# Patient Record
Sex: FEMALE | Race: WHITE | NOT HISPANIC OR LATINO | Employment: OTHER | URBAN - METROPOLITAN AREA
[De-identification: names, ages, dates, MRNs, and addresses within clinical notes are randomized per-mention and may not be internally consistent; named-entity substitution may affect disease eponyms.]

---

## 2017-01-04 ENCOUNTER — MYC MEDICAL ADVICE (OUTPATIENT)
Dept: PEDIATRICS | Facility: CLINIC | Age: 62
End: 2017-01-04

## 2017-02-06 ENCOUNTER — OFFICE VISIT (OUTPATIENT)
Dept: ENDOCRINOLOGY | Facility: CLINIC | Age: 62
End: 2017-02-06
Payer: COMMERCIAL

## 2017-02-06 VITALS
SYSTOLIC BLOOD PRESSURE: 120 MMHG | DIASTOLIC BLOOD PRESSURE: 61 MMHG | HEIGHT: 68 IN | BODY MASS INDEX: 44.41 KG/M2 | HEART RATE: 70 BPM | WEIGHT: 293 LBS

## 2017-02-06 DIAGNOSIS — Z79.4 TYPE 2 DIABETES MELLITUS WITHOUT COMPLICATION, WITH LONG-TERM CURRENT USE OF INSULIN (H): Primary | ICD-10-CM

## 2017-02-06 DIAGNOSIS — E11.9 TYPE 2 DIABETES MELLITUS WITHOUT COMPLICATION, WITH LONG-TERM CURRENT USE OF INSULIN (H): Primary | ICD-10-CM

## 2017-02-06 LAB — HBA1C MFR BLD: 7.5 % (ref 0–5.7)

## 2017-02-06 PROCEDURE — 36415 COLL VENOUS BLD VENIPUNCTURE: CPT | Performed by: INTERNAL MEDICINE

## 2017-02-06 PROCEDURE — 99214 OFFICE O/P EST MOD 30 MIN: CPT | Performed by: INTERNAL MEDICINE

## 2017-02-06 PROCEDURE — 83036 HEMOGLOBIN GLYCOSYLATED A1C: CPT | Performed by: INTERNAL MEDICINE

## 2017-02-06 NOTE — PROGRESS NOTES
Endocrinology Note         Sherry is a 61 year old female presents today for type 2 diabetes.    PREMA Powell is a 61 years old pleasant female who is here for follow up type 2 diabetes.     She has been diagnosed with type 2 diabetes for 25 years after she was being diagnosed with gestational diabetes in her last 2 pregnancies. Her weight before pregnancy was 190-200. Her highest weight was 300s. She was initially started on oral medication and then insulin was added for more than 5 years ago.    Her A1C has been in 9% range since 2012. She was on Byetta but she did not feel it helped. She was also on Actos but stopped due to 40 lbs weight gain and increased SOB.     Interim history: Last visit was 9/2016  A1C today is improving to 7.5% with more adherence and compliance.  She is currently on Victoza 1.8 mg daily, Lantus 70 units am and 70 units pm, Humalog 3 units per 15 gram CHO, Novolog sliding scale 1:5 above 140 mg/dl and metformin 2000 mg daily. Reviewed glucose log, FBG ranged 189 (SD 58), no hypoglycemia. She checked blood glucose once a day in the morning but did not check in the afternoon.     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     DM complications  Retinopathy: no DR, +mild cataract - 9/2015 -- due this year  Nephropathy: negative urine microalb - on lisinopril 60 mg, BP controlled  Neuropathy: none  Aspirin - taking 325 mg  LDL 26 (3/1016) on Crestor 10 mg    Past Medical History  Past Medical History   Diagnosis Date     Type II or unspecified type diabetes mellitus without mention of complication, not stated as uncontrolled 1991     oral meds frist, then insulin eventually later, difficult to control most of the time      Family history of malignant neoplasm of breast      mother - young age - 45, and maternal cousin and aunt  as well - no BRCA testing done      PERS HX HEPATITIS B- RESOLVED] 1977     acute virus only - no chronic disease      Irritable bowel syndrome      goes between the 2 - nerve related - more stressed more problems      MICROALBUMINURIA      unsure how long - been on the lisinopril - for a few years at well      HYPERLIPIDEMIA 2000     fairly recent - in the last 5 years - high for DM levels  -cholesterol recent      DEPRESSION      comes and goes - tried meds - unsuccessfully, certain times of the year, no psych intervention and no counselors in the past - and not interested      OBESITY      OSTEOARTHRITIS L KNEE 2003     total knee on the left - and pain is gone since the knee replacement      FAMILY HX COLON CANCER      Pat uncles x 2     Diverticulosis of colon (without mention of hemorrhage) 4/04     colonoscopy     ECHO-mildLVH,tr MR,mild thick lflets w inc LA,trTR   12/03     Unspecified hypothyroidism 10/11/2006     TSH 3.36 - mild subclinical hypothyroidism - deciding on following or starting low dose meds - with dr Oreilly - following      Nonspecific abnormal results of liver function study 2/3/2003     SGOT - has been high in the past - since the hepatitis b - borderline elevation - not really changing any      Essential hypertension, benign 1990s     late 1990s - started medications at that time - not to difficult to control meds      Family history of stroke (cerebrovascular)      grandmother in early life in her 40s      Family history of other cardiovascular diseases      father - CAD, and lipids/HTN - multiple members of the family      Family history of diabetes mellitus      sister and grandmother with DM      GENESIS (obstructive sleep apnea) 10/15/2006     psg 5/15 AHI 53 aPAP 8-15     PERS HX HEPATITIS B- RESOLVED]      Cataract      Heart murmur        Allergies  Allergies   Allergen Reactions     Atorvastatin Calcium      Gas     Pravachol [Pravastatin Sodium]      Pravachol - dry cough       Simvastatin      Myalgia     Medications  Current Outpatient Prescriptions   Medication Sig Dispense Refill     blood glucose monitoring (FREESTYLE LITE) test strip Uses 4 strips daily. 3 Month 0     insulin lispro (HUMALOG) 100 UNIT/ML injection Humalog 3 units per 15 gram CHO and sliding scale 1:10 above 140 mg/dl. Uses approx 90 units daily. 30 mL 1     insulin glargine (LANTUS) 100 UNIT/ML PEN Lantus 70 units am and 70 units pm 135 mL 3     metoprolol (TOPROL XL) 25 MG 24 hr tablet Take 1 tablet (25 mg) by mouth daily 90 tablet 3     hydrochlorothiazide (HYDRODIURIL) 25 MG tablet Take 1 tablet (25 mg) by mouth daily 90 tablet 3     levothyroxine (SYNTHROID, LEVOTHROID) 75 MCG tablet Take 1 tablet (75 mcg) by mouth daily 90 tablet 3     rosuvastatin (CRESTOR) 10 MG tablet Take 1 tablet (10 mg) by mouth daily 90 tablet 3     lisinopril (PRINIVIL,ZESTRIL) 40 MG tablet Take 1.5 tablets (60 mg) by mouth daily 135 tablet 3     metFORMIN (GLUCOPHAGE-XR) 500 MG 24 hr tablet Take 4 tablets (2,000 mg) by mouth At Bedtime 360 tablet 3     liraglutide (VICTOZA PEN) 18 MG/3ML soln Inject 1.8 mg Subcutaneous daily 12 mL 3     ASPIRIN EC PO Take 325 mg by mouth daily       Ibuprofen (ADVIL PO) Take 600 mg by mouth 4 times daily       Multiple Vitamins-Minerals (ADVANCED DIABETIC MULTIVITAMIN) TABS Take 1 tablet by mouth daily       Garlic 1000 MG CAPS Take 1 capsule by mouth daily Takes during summer months.  Done taking until next summer.       ORDER FOR DME, SET TO LOCAL PRINT, Respironics REMSTAR 60 Series Auto CPAP 8-15cm H2O, Airfit P10 nasal pillow mask w/medium pillows       insulin pen needle (GNP CLICKFINE PEN NEEDLES) 31G X 8 MM MISC 1 Device by * route daily Patient due for office visit for further refills. 30 each 0     Family History  Maternal grandmother had type 2 diabetes  Daughter has GDM  Father had CAD s/p stent, lung cancer and abdominal aortic anuerysm  Older brother has CABG, and abdominal aortic  "anuerysm  Young brother has Afib, SVT  Another younger brother has multiple myeloma  Sister has SVT    Social History  No smoking, rarely drink EtOH  No illicit drug  Works a nurse in the ICU  Lives by herself, has 3 daughters    ROS  Constitutional: gained 6 lbs in 5 months  Eyes: no vision change, +mild cataract, no DR - eye exam in 9/2015  Cardiovascular: no chest pain, palpitations  Respiratory: no dyspnea, cough, +shortness of breath  GI: no nausea, vomiting, diarrhea, constipation, no abdominal pain   : no change in urine, no dysuria   Musculoskeletal: no legs swelling, s/p left and right knee replacement  Integumentary: no concerning lesions  Neuro: no loss of strength or sensation, no numbness or tingling, no tremor, no dizziness, no headache   Endo: no polyuria or polydipsia, no temperature intolerance   Heme/Lymph: no concerning bumps, no bleeding problems   Psych: no depression or anxiety, no sleep problems.    Physical Exam  /61 mmHg  Pulse 70  Ht 1.727 m (5' 8\")  Wt 133.6 kg (294 lb 8.6 oz)  BMI 44.79 kg/m2  LMP 10/02/2007  Body mass index is 44.79 kg/(m^2).  Constitutional: no distress, comfortable, pleasant   Eyes: anicteric, normal extra-ocular movements, no lid lag or retraction  Thyroid: not enlarged  CVS: RRR, normal S1,S2, no murmur  Lungs: CTA B/L  Abd: soft, obese, NT, ND  Musculoskeletal: no edema, DP 2+  NS: cranial nerve grossly intact, peripheral sensation intact, 1+patellar reflex bilaterally, normal gait, no tremor   Skin:no jaundice   Psychological: appropriate mood     RESULTS  A1C      7.5   2/6/2017  A1C      8.9   9/7/2016  A1C      7.3   3/16/2016  A1C      7.9   1/18/2016  A1C      7.7   9/16/2015       ENDO DIABETES Latest Ref Rng 3/16/2016   HEMOGLOBIN A1C 4.3 - 6.0 % 7.3 (H)   HGB 11.7 - 15.7 g/dL    GLUCOSE, FASTING     GLUCOSE 70 - 99 mg/dL 182 (H)   CHOLESTEROL <200 mg/dL 108   LDL CHOLESTEROL, CALCULATED <100 mg/dL 26   HDL CHOLESTEROL >49 mg/dL 37 (L) "   VLDL-CHOLESTEROL 0 - 30 mg/dL    NON HDL CHOLESTEROL <130 mg/dL 71   TRIGLYCERIDES <150 mg/dL 225 (H)   MICROALBUMIN URINE  13   MICROALBUMIN MG/G CR 0 - 25 mg/g Cr 10.16   CREATININE 0.52 - 1.04 mg/dL 0.73   POTASSIUM 3.4 - 5.3 mmol/L 3.6   ALT 0 - 50 U/L 27     ASSESSMENT:    Sherry is a 61 years old pleasant female who is here to for type 2 diabetes.     1) type 2 diabetes: better control with A1C of 7.5% today due to improved adherence and compliance. She has multiple risks for poor control due to obesity, poor dietary choice and lack of physical activity. A1C goal should be <8% to prevent microvascular complications.   -continue Lantus 70 units am and 70 units pm, Humalog 3 units per 15 gram CHO and change sliding scale 1:10 above 140 mg/dl and metformin 2000 mg daily.  -continue Victoza 1.8 mg daily    2) DM complication  Retinopathy: no DR, +mild cataract - 9/2015  -- remind her to have eye exam this year.  Nephropathy: negative urine microalb 3/2016 - BP controlled - on lisinopril 60 mg  Neuropathy: none  Aspirin - taking 325 mg  LDL 26 (3/2016) on Crestor 10 mg    3) Hypertension: borderline. On lisinopirl 60 mg, HCTZ 25 mg.     PLAN:   - continue Lantus 70 units am and 70 units pm  - continue Victoza 1.8 mg daily  - continue with current dose of Humalog 3 units per 15 gram of CHO, metformin 2000 mg per day  - emphasize on diet modification and exercise  - RTC 4 months    Renny Agarwal MD  Endocrinology  [phone]

## 2017-02-06 NOTE — Clinical Note
2/6/2017      RE: Sherry Slaughter  [address]  [facility] [id number]     Dear Colleague,    Thank you for referring your patient, Sherry Slaughter, to the [facility]. Please see a copy of my visit note below.         Endocrinology Note         Sherry is a 61 year old female presents today for type 2 diabetes.    HPI  Sherry is a 61 years old pleasant female who is here for follow up type 2 diabetes.     She has been diagnosed with type 2 diabetes for 25 years after she was being diagnosed with gestational diabetes in her last 2 pregnancies. Her weight before pregnancy was 190-200. Her highest weight was 300s. She was initially started on oral medication and then insulin was added for more than 5 years ago.    Her A1C has been in 9% range since 2012. She was on Byetta but she did not feel it helped. She was also on Actos but stopped due to 40 lbs weight gain and increased SOB.     Interim history: Last visit was 9/2016  A1C today is improving to 7.5% with more adherence and compliance.  She is currently on Victoza 1.8 mg daily, Lantus 70 units am and 70 units pm, Humalog 3 units per 15 gram CHO, Novolog sliding scale 1:5 above 140 mg/dl and metformin 2000 mg daily. Reviewed glucose log, FBG ranged 189 (SD 58), no hypoglycemia. She checked blood glucose once a day in the morning but did not check in the afternoon.     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     DM complications  Retinopathy: no DR, +mild cataract - 9/2015 -- due this year  Nephropathy: negative urine microalb - on lisinopril 60 mg, BP controlled  Neuropathy: none  Aspirin - taking 325 mg  LDL 26 (3/1016) on Crestor 10 mg    Past Medical History  Past Medical History   Diagnosis Date     Type II or unspecified type diabetes mellitus without  mention of complication, not stated as uncontrolled 1991     oral meds frist, then insulin eventually later, difficult to control most of the time      Family history of malignant neoplasm of breast      mother - young age - 45, and maternal cousin and aunt as well - no BRCA testing done      PERS HX HEPATITIS B- RESOLVED] 1977     acute virus only - no chronic disease      Irritable bowel syndrome      goes between the 2 - nerve related - more stressed more problems      MICROALBUMINURIA      unsure how long - been on the lisinopril - for a few years at well      HYPERLIPIDEMIA 2000     fairly recent - in the last 5 years - high for DM levels  -cholesterol recent      DEPRESSION      comes and goes - tried meds - unsuccessfully, certain times of the year, no psych intervention and no counselors in the past - and not interested      OBESITY      OSTEOARTHRITIS L KNEE 2003     total knee on the left - and pain is gone since the knee replacement      FAMILY HX COLON CANCER      Pat uncles x 2     Diverticulosis of colon (without mention of hemorrhage) 4/04     colonoscopy     ECHO-mildLVH,tr MR,mild thick lflets w inc LA,trTR   12/03     Unspecified hypothyroidism 10/11/2006     TSH 3.36 - mild subclinical hypothyroidism - deciding on following or starting low dose meds - with dr Oreilly - following      Nonspecific abnormal results of liver function study 2/3/2003     SGOT - has been high in the past - since the hepatitis b - borderline elevation - not really changing any      Essential hypertension, benign 1990s     late 1990s - started medications at that time - not to difficult to control meds      Family history of stroke (cerebrovascular)      grandmother in early life in her 40s      Family history of other cardiovascular diseases      father - CAD, and lipids/HTN - multiple members of the family      Family history of diabetes mellitus      sister and grandmother with DM      GENESIS (obstructive sleep apnea)  10/15/2006     psg 5/15 AHI 53 aPAP 8-15     PERS HX HEPATITIS B- RESOLVED]      Cataract      Heart murmur        Allergies  Allergies   Allergen Reactions     Atorvastatin Calcium      Gas     Pravachol [Pravastatin Sodium]      Pravachol - dry cough      Simvastatin      Myalgia     Medications  Current Outpatient Prescriptions   Medication Sig Dispense Refill     blood glucose monitoring (FREESTYLE LITE) test strip Uses 4 strips daily. 3 Month 0     insulin lispro (HUMALOG) 100 UNIT/ML injection Humalog 3 units per 15 gram CHO and sliding scale 1:10 above 140 mg/dl. Uses approx 90 units daily. 30 mL 1     insulin glargine (LANTUS) 100 UNIT/ML PEN Lantus 70 units am and 70 units pm 135 mL 3     metoprolol (TOPROL XL) 25 MG 24 hr tablet Take 1 tablet (25 mg) by mouth daily 90 tablet 3     hydrochlorothiazide (HYDRODIURIL) 25 MG tablet Take 1 tablet (25 mg) by mouth daily 90 tablet 3     levothyroxine (SYNTHROID, LEVOTHROID) 75 MCG tablet Take 1 tablet (75 mcg) by mouth daily 90 tablet 3     rosuvastatin (CRESTOR) 10 MG tablet Take 1 tablet (10 mg) by mouth daily 90 tablet 3     lisinopril (PRINIVIL,ZESTRIL) 40 MG tablet Take 1.5 tablets (60 mg) by mouth daily 135 tablet 3     metFORMIN (GLUCOPHAGE-XR) 500 MG 24 hr tablet Take 4 tablets (2,000 mg) by mouth At Bedtime 360 tablet 3     liraglutide (VICTOZA PEN) 18 MG/3ML soln Inject 1.8 mg Subcutaneous daily 12 mL 3     ASPIRIN EC PO Take 325 mg by mouth daily       Ibuprofen (ADVIL PO) Take 600 mg by mouth 4 times daily       Multiple Vitamins-Minerals (ADVANCED DIABETIC MULTIVITAMIN) TABS Take 1 tablet by mouth daily       Garlic 1000 MG CAPS Take 1 capsule by mouth daily Takes during summer months.  Done taking until next summer.       ORDER FOR DME, SET TO LOCAL PRINT, Respironics REMSTAR 60 Series Auto CPAP 8-15cm H2O, Airfit P10 nasal pillow mask w/medium pillows       insulin pen needle (GNP CLICKFINE PEN NEEDLES) 31G X 8 MM MISC 1 Device by * route daily  "Patient due for office visit for further refills. 30 each 0     Family History  Maternal grandmother had type 2 diabetes  Daughter has GDM  Father had CAD s/p stent, lung cancer and abdominal aortic anuerysm  Older brother has CABG, and abdominal aortic anuerysm  Young brother has Afib, SVT  Another younger brother has multiple myeloma  Sister has SVT    Social History  No smoking, rarely drink EtOH  No illicit drug  Works a nurse in the ICU  Lives by herself, has 3 daughters    ROS  Constitutional: gained 6 lbs in 5 months  Eyes: no vision change, +mild cataract, no DR - eye exam in 9/2015  Cardiovascular: no chest pain, palpitations  Respiratory: no dyspnea, cough, +shortness of breath  GI: no nausea, vomiting, diarrhea, constipation, no abdominal pain   : no change in urine, no dysuria   Musculoskeletal: no legs swelling, s/p left and right knee replacement  Integumentary: no concerning lesions  Neuro: no loss of strength or sensation, no numbness or tingling, no tremor, no dizziness, no headache   Endo: no polyuria or polydipsia, no temperature intolerance   Heme/Lymph: no concerning bumps, no bleeding problems   Psych: no depression or anxiety, no sleep problems.    Physical Exam  /61 mmHg  Pulse 70  Ht 1.727 m (5' 8\")  Wt 133.6 kg (294 lb 8.6 oz)  BMI 44.79 kg/m2  LMP 10/02/2007  Body mass index is 44.79 kg/(m^2).  Constitutional: no distress, comfortable, pleasant   Eyes: anicteric, normal extra-ocular movements, no lid lag or retraction  Thyroid: not enlarged  CVS: RRR, normal S1,S2, no murmur  Lungs: CTA B/L  Abd: soft, obese, NT, ND  Musculoskeletal: no edema, DP 2+  NS: cranial nerve grossly intact, peripheral sensation intact, 1+patellar reflex bilaterally, normal gait, no tremor   Skin:no jaundice   Psychological: appropriate mood     RESULTS  A1C      7.5   2/6/2017  A1C      8.9   9/7/2016  A1C      7.3   3/16/2016  A1C      7.9   1/18/2016  A1C      7.7   9/16/2015       ENDO DIABETES " Latest Ref Rng 3/16/2016   HEMOGLOBIN A1C 4.3 - 6.0 % 7.3 (H)   HGB 11.7 - 15.7 g/dL    GLUCOSE, FASTING     GLUCOSE 70 - 99 mg/dL 182 (H)   CHOLESTEROL <200 mg/dL 108   LDL CHOLESTEROL, CALCULATED <100 mg/dL 26   HDL CHOLESTEROL >49 mg/dL 37 (L)   VLDL-CHOLESTEROL 0 - 30 mg/dL    NON HDL CHOLESTEROL <130 mg/dL 71   TRIGLYCERIDES <150 mg/dL 225 (H)   MICROALBUMIN URINE  13   MICROALBUMIN MG/G CR 0 - 25 mg/g Cr 10.16   CREATININE 0.52 - 1.04 mg/dL 0.73   POTASSIUM 3.4 - 5.3 mmol/L 3.6   ALT 0 - 50 U/L 27     ASSESSMENT:    Sherry is a 61 years old pleasant female who is here to for type 2 diabetes.     1) type 2 diabetes: better control with A1C of 7.5% today due to improved adherence and compliance. She has multiple risks for poor control due to obesity, poor dietary choice and lack of physical activity. A1C goal should be <8% to prevent microvascular complications.   -continue Lantus 70 units am and 70 units pm, Humalog 3 units per 15 gram CHO and change sliding scale 1:10 above 140 mg/dl and metformin 2000 mg daily.  -continue Victoza 1.8 mg daily    2) DM complication  Retinopathy: no DR, +mild cataract - 9/2015  -- remind her to have eye exam this year.  Nephropathy: negative urine microalb 3/2016 - BP controlled - on lisinopril 60 mg  Neuropathy: none  Aspirin - taking 325 mg  LDL 26 (3/2016) on Crestor 10 mg    3) Hypertension: borderline. On lisinopirl 60 mg, HCTZ 25 mg.     PLAN:   - continue Lantus 70 units am and 70 units pm  - continue Victoza 1.8 mg daily  - continue with current dose of Humalog 3 units per 15 gram of CHO, metformin 2000 mg per day  - emphasize on diet modification and exercise  - RTC 4 months    Renny Agarwal MD  Endocrinology  [phone]        Again, thank you for allowing me to participate in the care of your patient.      Sincerely,    Renny Agarwal MD

## 2017-02-06 NOTE — MR AVS SNAPSHOT
After Visit Summary   2/6/2017    Sherry Slaughter    MRN: [id number]           Patient Information     Date Of Birth          1955        Visit Information        Provider Department      2/6/2017 8:45 AM Renny Agarwal MD; MG SAMMI LOU [facility]        Today's Diagnoses     Type 2 diabetes mellitus without complication, with long-term current use of insulin (H)    -  1       Care Instructions      Sending blood sugars to your provider at [place]:  We want to help you with your diabetes management, which often requires frequent adjustments to your therapy. For your convenience, we have several ways to send your blood sugars to your doctor for review.    - Send message directly to your doctor through My Chart.  Please ask the rooming staff if you would like to sign up for My Chart.  This is a fast and confidential way to send your information and communicate directly with your provider.   - Record readings and fax to [phone].  We have a template for you to use for your convenience.  - If you have a Medtronic pump, upload to [employer] and notify your provider of your username and password.   - Stop by the clinic with your meter for download.   - My Chart or call Devi Harkins, Diabetes Educator at [phone]  - Call the clinic and speak to one of the endocrine nurses to relay information on the telephone.  Maya Montero, or Mae at [phone].   -    Please call the on-call Endocrinologist at the [facility] for after       hours/weekend needs at [phone] Option #4.    Please note that you do not need to FAST if you are just having an A1C drawn. Please remember to ALWAYS bring your glucose meter with to your appointment. This data is very important for the management of your care.    Thank you!  Your [place] Diabetes Care Team              Follow-ups after your visit        Your next 10 appointments already scheduled     Jun 22, 2017  8:00 AM    LAB with LAB FIRST FLOOR [facility] ([facility])    [address] [place] [id number] [phone]           Patient must bring picture ID.  Patient should be prepared to give a urine specimen  Please do not eat 10-12 hours before your appointment if you are coming in fasting for labs on lipids, cholesterol, or glucose (sugar).  Pregnant women should follow their Care Team instructions. Water with medications is okay. Do not drink coffee or other fluids.   If you have concerns about taking  your medications, please ask at office or if scheduling via [employer], send a message by clicking on Secure Messaging, Message Your Care Team.            Jun 26, 2017  9:30 AM   Return Visit with Renny Agarwal MD   [facility] ([facility]    [address] [place] [address] [id number] [phone]              Future tests that were ordered for you today     Open Standing Orders        Priority Remaining Interval Expires Ordered    Hemoglobin A1c Routine 4/4 Q 3 Mo 2/6/2018 2/6/2017          Open Future Orders        Priority Expected Expires Ordered    Creatinine Routine  2/6/2018 2/6/2017    Lipid Profile Routine  2/6/2018 2/6/2017    TSH Routine  2/6/2018 2/6/2017            Who to contact     If you have questions or need follow up information about today's clinic visit or your schedule please contact [facility] directly at [phone].  Normal or non-critical lab and imaging results will be communicated to you by MyChart, letter or phone within 4 business days after the clinic has received the results. If you do not hear from us within 7 days, please contact the clinic through Pop.[employer]art or phone. If you have a critical or abnormal lab result, we will notify you by phone as soon as possible.  Submit refill requests through [employer] or call your pharmacy and they will forward the refill request to us.  "Please allow 3 business days for your refill to be completed.          Additional Information About Your Visit        Clav[employer] Information     STYLHUNT gives you secure access to your electronic health record. If you see a primary care provider, you can also send messages to your care team and make appointments. If you have questions, please call your primary care clinic.  If you do not have a primary care provider, please call [phone] and they will assist you.      STYLHUNT is an electronic gateway that provides easy, online access to your medical records. With STYLHUNT, you can request a clinic appointment, read your test results, renew a prescription or communicate with your care team.     To access your existing account, please contact your [facility] or call [phone] for assistance.        Care EveryWhere ID     This is your Care EveryWhere ID. This could be used by other organizations to access your [facility] medical records  [id number]        Your Vitals Were     Pulse Height BMI (Body Mass Index) Last Period          70 1.727 m (5' 8\") 44.79 kg/m2 10/02/2007         Blood Pressure from Last 3 Encounters:   02/06/17 120/61   10/20/16 137/79   10/17/16 128/68    Weight from Last 3 Encounters:   02/06/17 133.6 kg (294 lb 8.6 oz)   10/17/16 130.636 kg (288 lb)   09/07/16 132.45 kg (292 lb)              We Performed the Following     Albumin Random Urine Quantitative     Hemoglobin A1c POCT          Today's Medication Changes          These changes are accurate as of: 2/6/17  9:25 AM.  If you have any questions, ask your nurse or doctor.               Stop taking these medicines if you haven't already. Please contact your care team if you have questions.     blood glucose monitoring test strip   Commonly known as:  FREESTYLE LITE   Stopped by:  Renny Agarwal MD                    Primary Care Provider Office Phone # Fax #    Marilou Arciniega [phone]    "    [facility] [address]  [facility] [address]        Thank you!     Thank you for choosing [facility]  for your care. Our goal is always to provide you with excellent care. Hearing back from our patients is one way we can continue to improve our services. Please take a few minutes to complete the written survey that you may receive in the mail after your visit with us. Thank you!             Your Updated Medication List - Protect others around you: Learn how to safely use, store and throw away your medicines at www.disposemymeds.org.          This list is accurate as of: 2/6/17  9:25 AM.  Always use your most recent med list.                   Brand Name Dispense Instructions for use    ADVANCED DIABETIC MULTIVITAMIN Tabs      Take 1 tablet by mouth daily       ADVIL PO      Take 600 mg by mouth 4 times daily       ASPIRIN EC PO      Take 325 mg by mouth daily       Garlic 1000 MG Caps      Take 1 capsule by mouth daily Takes during summer months.  Done taking until next summer.       hydrochlorothiazide 25 MG tablet    HYDRODIURIL    90 tablet    Take 1 tablet (25 mg) by mouth daily       insulin glargine 100 UNIT/ML injection    LANTUS    135 mL    Lantus 70 units am and 70 units pm       insulin lispro 100 UNIT/ML injection    HumaLOG    30 mL    Humalog 3 units per 15 gram CHO and sliding scale 1:10 above 140 mg/dl. Uses approx 90 units daily.       insulin pen needle 31G X 8 MM    GNP CLICKFINE PEN NEEDLES    30 each    1 Device by * route daily Patient due for office visit for further refills.       levothyroxine 75 MCG tablet    SYNTHROID/LEVOTHROID    90 tablet    Take 1 tablet (75 mcg) by mouth daily       liraglutide 18 MG/3ML soln    VICTOZA PEN    12 mL    Inject 1.8 mg Subcutaneous daily       lisinopril 40 MG tablet    PRINIVIL/ZESTRIL    135 tablet    Take 1.5 tablets (60 mg) by mouth daily       metFORMIN 500 MG 24 hr tablet    GLUCOPHAGE-XR    360 tablet    Take 4  tablets (2,000 mg) by mouth At Bedtime       metoprolol 25 MG 24 hr tablet    TOPROL XL    90 tablet    Take 1 tablet (25 mg) by mouth daily       order for [facility]      Respironics REMSTAR 60 Series Auto CPAP 8-15cm H2O, Airfit P10 nasal pillow mask w/medium pillows       rosuvastatin 10 MG tablet    CRESTOR    90 tablet    Take 1 tablet (10 mg) by mouth daily

## 2017-02-06 NOTE — PATIENT INSTRUCTIONS
Sending blood sugars to your provider at [place]:  We want to help you with your diabetes management, which often requires frequent adjustments to your therapy. For your convenience, we have several ways to send your blood sugars to your doctor for review.    - Send message directly to your doctor through My Chart.  Please ask the rooming staff if you would like to sign up for My Chart.  This is a fast and confidential way to send your information and communicate directly with your provider.   - Record readings and fax to [phone].  We have a template for you to use for your convenience.  - If you have a Medtronic pump, upload to [employer] and notify your provider of your username and password.   - Stop by the clinic with your meter for download.   - My Chart or call Devi Harkins, Diabetes Educator at [phone]  - Call the clinic and speak to one of the endocrine nurses to relay information on the telephone.  Maya Montero, or Mae at [phone].   -    Please call the on-call Endocrinologist at the [facility] for after       hours/weekend needs at [phone] Option #4.    Please note that you do not need to FAST if you are just having an A1C drawn. Please remember to ALWAYS bring your glucose meter with to your appointment. This data is very important for the management of your care.    Thank you!  Your [place] Diabetes Care Team

## 2017-02-06 NOTE — NURSING NOTE
"Sherry Slaughter's goals for this visit include: Follow up Diabetes  She requests these members of her care team be copied on today's visit information: YES    PCP: Marilou Arciniega    Referring Provider:  Marilou Arciniega MD  [facility]  [address] [place] [address]    Chief Complaint   Patient presents with     Diabetes       Initial Ht 1.727 m (5' 8\")  Wt 133.6 kg (294 lb 8.6 oz)  BMI 44.79 kg/m2  LMP 10/02/2007 Estimated body mass index is 44.79 kg/(m^2) as calculated from the following:    Height as of this encounter: 1.727 m (5' 8\").    Weight as of this encounter: 133.6 kg (294 lb 8.6 oz).  Medication Reconciliation: complete    Do you need any medication refills at today's visit? NO    "

## 2017-04-21 DIAGNOSIS — Z79.4 LONG-TERM INSULIN USE (H): ICD-10-CM

## 2017-05-09 DIAGNOSIS — E11.65 TYPE 2 DIABETES MELLITUS WITH HYPERGLYCEMIA (H): ICD-10-CM

## 2017-05-09 RX ORDER — LIRAGLUTIDE 6 MG/ML
1.8 INJECTION SUBCUTANEOUS DAILY
Qty: 12 ML | Refills: 3 | Status: SHIPPED | OUTPATIENT
Start: 2017-05-09 | End: 2017-07-03

## 2017-05-10 ENCOUNTER — OFFICE VISIT (OUTPATIENT)
Dept: OPTOMETRY | Facility: CLINIC | Age: 62
End: 2017-05-10
Payer: COMMERCIAL

## 2017-05-10 DIAGNOSIS — Z79.4 TYPE 2 DIABETES MELLITUS WITHOUT COMPLICATION, WITH LONG-TERM CURRENT USE OF INSULIN (H): ICD-10-CM

## 2017-05-10 DIAGNOSIS — H52.4 PRESBYOPIA: Primary | ICD-10-CM

## 2017-05-10 DIAGNOSIS — H52.203 ASTIGMATISM, BILATERAL: ICD-10-CM

## 2017-05-10 DIAGNOSIS — E11.9 TYPE 2 DIABETES MELLITUS WITHOUT COMPLICATION, WITH LONG-TERM CURRENT USE OF INSULIN (H): ICD-10-CM

## 2017-05-10 DIAGNOSIS — H26.9 CATARACTS, BOTH EYES: ICD-10-CM

## 2017-05-10 PROCEDURE — 92015 DETERMINE REFRACTIVE STATE: CPT | Performed by: OPTOMETRIST

## 2017-05-10 PROCEDURE — 92014 COMPRE OPH EXAM EST PT 1/>: CPT | Performed by: OPTOMETRIST

## 2017-05-10 ASSESSMENT — REFRACTION_WEARINGRX
OD_AXIS: 150
OS_CYLINDER: +0.75
SPECS_TYPE: OTC READERS DIDNT BRING
OS_ADD: +2.25
OS_SPHERE: -0.75
OD_ADD: +2.25
OD_CYLINDER: +0.50
OD_SPHERE: PLANO
OS_AXIS: 017

## 2017-05-10 ASSESSMENT — VISUAL ACUITY
METHOD: SNELLEN - LINEAR
OS_SC: 20/70
OS_SC: 20/25
OD_SC: 20/70
OS_SC+: -2
OD_SC: 20/20

## 2017-05-10 ASSESSMENT — REFRACTION_MANIFEST
OD_AXIS: 010
OS_AXIS: 011
OS_CYLINDER: +0.25
OD_ADD: +2.50
OS_SPHERE: -0.50
OS_ADD: +2.50
OD_CYLINDER: +0.75
OD_SPHERE: -0.25

## 2017-05-10 ASSESSMENT — CONF VISUAL FIELD
OD_NORMAL: 1
METHOD: COUNTING FINGERS
OS_NORMAL: 1

## 2017-05-10 ASSESSMENT — EXTERNAL EXAM - RIGHT EYE: OD_EXAM: NORMAL

## 2017-05-10 ASSESSMENT — TONOMETRY
OS_IOP_MMHG: 21
IOP_METHOD: TONOPEN
OD_IOP_MMHG: 19

## 2017-05-10 ASSESSMENT — EXTERNAL EXAM - LEFT EYE: OS_EXAM: NORMAL

## 2017-05-10 ASSESSMENT — SLIT LAMP EXAM - LIDS
COMMENTS: NORMAL
COMMENTS: NORMAL

## 2017-05-10 ASSESSMENT — CUP TO DISC RATIO
OD_RATIO: 0.3
OS_RATIO: 0.3

## 2017-05-10 NOTE — MR AVS SNAPSHOT
After Visit Summary   5/10/2017    Sherry Slaughter    MRN: [id number]           Patient Information     Date Of Birth          1955        Visit Information        Provider Department      5/10/2017 10:30 AM Kameron Pedraza, KULDEEP [facility]        Today's Diagnoses     Presbyopia    -  1    Astigmatism, bilateral        Cataracts, both eyes        Type 2 diabetes mellitus without complication, with long-term current use of insulin (H)        No diabetic retinopathy in both eyes          Care Instructions    You have cataracts which are affecting your vision.  A change in glasses will not give you much improvement.  A cataract evaluation can be scheduled with the eye surgeon to discuss with you the option of cataract surgery.     Referral to Dr. Linares at [facility] for cataract evaluation.    There are not any signs of the diabetes affecting the eyes today.  It is important that you get your eyes dilated once yearly and keep good control of your diabetes.    Return in 1 year for a complete eye exam or sooner if needed.    Kameron Pedraza, KULDEEP          Follow-ups after your visit        Additional Services     OPHTHALMOLOGY ADULT REFERRAL       Your provider has referred you to:  [facility]: [facility] [phone]   [email or web address]      Please be aware that coverage of these services is subject to the terms and limitations of your health insurance plan.  Call member services at your health plan with any benefit or coverage questions.      Please bring the following to your appointment:  >>   Any x-rays, CTs or MRIs which have been performed.  Contact the facility where they were done to arrange for  prior to your scheduled appointment.  Any new CT, MRI or other procedures ordered by your specialist must be performed at a [facility] facility or coordinated by your clinic's  referral office.    >>   List of current medications   >>   This referral request   >>   Any documents/labs given to you for this referral                  Your next 10 appointments already scheduled     Jul 03, 2017  9:30 AM CDT   LAB with LAB FIRST FLOOR [facility] ([facility])    [address] [place] [id number] [phone]           Patient must bring picture ID.  Patient should be prepared to give a urine specimen  Please do not eat 10-12 hours before your appointment if you are coming in fasting for labs on lipids, cholesterol, or glucose (sugar).  Pregnant women should follow their Care Team instructions. Water with medications is okay. Do not drink coffee or other fluids.   If you have concerns about taking  your medications, please ask at office or if scheduling via [employer], send a message by clicking on Secure Messaging, Message Your Care Team.            Jul 03, 2017 10:00 AM CDT   Return Visit with Renny Agarwal MD   [facility])    [address] [facility] [address] [phone]              Who to contact     If you have questions or need follow up information about today's clinic visit or your schedule please contact [facility] directly at [phone].  Normal or non-critical lab and imaging results will be communicated to you by MyChart, letter or phone within 4 business days after the clinic has received the results. If you do not hear from us within 7 days, please contact the clinic through MyChart or phone. If you have a critical or abnormal lab result, we will notify you by phone as soon as possible.  Submit refill requests through [employer] or call your pharmacy and they will forward the refill request to us. Please allow 3 business days for your refill to be completed.          Additional Information About Your Visit        [employer] Text Information      [employer] gives you secure access to your electronic health record. If you see a primary care provider, you can also send messages to your care team and make appointments. If you have questions, please call your primary care clinic.  If you do not have a primary care provider, please call [phone] and they will assist you.      [employer] is an electronic gateway that provides easy, online access to your medical records. With [employer], you can request a clinic appointment, read your test results, renew a prescription or communicate with your care team.     To access your existing account, please contact your [facility] or call [phone] for assistance.        Care EveryWhere ID     This is your Care EveryWhere ID. This could be used by other organizations to access your [facility] medical records  [id number]        Your Vitals Were     Last Period                   10/02/2007            Blood Pressure from Last 3 Encounters:   02/06/17 120/61   10/20/16 137/79   10/17/16 128/68    Weight from Last 3 Encounters:   02/06/17 133.6 kg (294 lb 8.6 oz)   10/17/16 130.6 kg (288 lb)   09/07/16 132.5 kg (292 lb)              We Performed the Following     EYE EXAM (SIMPLE-NONBILLABLE)     OPHTHALMOLOGY ADULT REFERRAL     REFRACTION        Primary Care Provider Office Phone # Fax #    Marilou Arciniega [phone]       [facility] [address]  [facility] [address]        Thank you!     Thank you for choosing [facility]  for your care. Our goal is always to provide you with excellent care. Hearing back from our patients is one way we can continue to improve our services. Please take a few minutes to complete the written survey that you may receive in the mail after your visit with us. Thank you!             Your Updated Medication List - Protect others around you: Learn how to safely use, store and throw away your medicines at www.disposemymeds.org.     "      This list is accurate as of: 5/10/17 11:26 AM.  Always use your most recent med list.                   Brand Name Dispense Instructions for use    ADVANCED DIABETIC MULTIVITAMIN Tabs      Take 1 tablet by mouth daily       ADVIL PO      Take 600 mg by mouth 4 times daily       ASPIRIN EC PO      Take 325 mg by mouth daily       Garlic 1000 MG Caps      Take 1 capsule by mouth daily Takes during summer months.  Done taking until next summer.       hydrochlorothiazide 25 MG tablet    HYDRODIURIL    90 tablet    Take 1 tablet (25 mg) by mouth daily       insulin glargine 100 UNIT/ML injection    LANTUS    135 mL    Lantus 70 units am and 70 units pm       insulin lispro 100 UNIT/ML injection    HumaLOG    45 mL    Needs Humalog \"KWIKPEN\". Inject 3 units per 15 gram CHO and sliding scale 1:10 above 140 mg/dl. Uses approx 90 units daily.       insulin pen needle 31G X 8 MM    GNP CLICKFINE PEN NEEDLES    30 each    1 Device by * route daily Patient due for office visit for further refills.       levothyroxine 75 MCG tablet    SYNTHROID/LEVOTHROID    90 tablet    Take 1 tablet (75 mcg) by mouth daily       liraglutide 18 MG/3ML soln    VICTOZA PEN    12 mL    Inject 1.8 mg Subcutaneous daily       lisinopril 40 MG tablet    PRINIVIL/ZESTRIL    135 tablet    Take 1.5 tablets (60 mg) by mouth daily       metFORMIN 500 MG 24 hr tablet    GLUCOPHAGE-XR    360 tablet    Take 4 tablets (2,000 mg) by mouth At Bedtime       metoprolol 25 MG 24 hr tablet    TOPROL XL    90 tablet    Take 1 tablet (25 mg) by mouth daily       order for [facility]      Respironics REMSTAR 60 Series Auto CPAP 8-15cm H2O, Airfit P10 nasal pillow mask w/medium pillows       rosuvastatin 10 MG tablet    CRESTOR    90 tablet    Take 1 tablet (10 mg) by mouth daily         "

## 2017-05-10 NOTE — LETTER
May 10, 2017         Sherry Slaughter    1955  [id number]    Dear Dr. Arciniega and Any,    Your patient was seen in our office on 5/10/2017 for a dilated diabetic eye exam.      Findings:    No Retinopathy  OU - Bilaterally (both)    Comments:   Moderate cataracts both eyes-referral for cataract surgery if interested.    Return in 1 year for a complete eye exam or sooner if needed.      Sincerely,    Kameron Pedraza, OD  [address] [address]  [phone]

## 2017-05-10 NOTE — PATIENT INSTRUCTIONS
You have cataracts which are affecting your vision.  A change in glasses will not give you much improvement.  A cataract evaluation can be scheduled with the eye surgeon to discuss with you the option of cataract surgery.     Ref[facility] to [facility] for [facility].    There are not any signs of the diabetes affecting the eyes today.  It is important that you get your eyes dilated once yearly and keep good control of your diabetes.    Return in 1 year for a complete eye exam or sooner if needed.    Kameron Pedraza, OD

## 2017-06-04 ENCOUNTER — MYC REFILL (OUTPATIENT)
Dept: PEDIATRICS | Facility: CLINIC | Age: 62
End: 2017-06-04

## 2017-06-04 DIAGNOSIS — E11.9 TYPE 2 DIABETES, HBA1C GOAL < 7% (H): ICD-10-CM

## 2017-06-05 NOTE — TELEPHONE ENCOUNTER
Message from MyChart:  Original authorizing provider: MD Sherry Jordan would like a refill of the following medications:  insulin pen needle (GNP CLICKFINE PEN NEEDLES) 31G X 8 MM MISC [Tyler Stoddard MD]    Preferred pharmacy: [facility] [place], [place] - [address] [place] [address]    Comment:

## 2017-06-05 NOTE — TELEPHONE ENCOUNTER
insulin pen needle (GNP CLICKFINE PEN NEEDLES) 31G X 8 MM MISC      Last Written Prescription Date: 12/16/13  Last Fill Quantity: 30,  # refills: 0   Last Office Visit with [facility], [facility] or [facility] prescribing provider: 10/17/16.                                           Next 5 appointments (look out 90 days)     Jul 03, 2017 10:00 AM CDT   Return Visit with Renny Agarwal MD   [facility] ([facility])    [address] [place] [phone]   [phone]

## 2017-06-06 NOTE — TELEPHONE ENCOUNTER
Routing refill request to provider for review/approval because:  Patient needs to be seen because:  It has been greater than 6 months since last office visit with PCP.     Adrienne Zaidi RN

## 2017-06-17 ENCOUNTER — HEALTH MAINTENANCE LETTER (OUTPATIENT)
Age: 62
End: 2017-06-17

## 2017-07-03 ENCOUNTER — OFFICE VISIT (OUTPATIENT)
Dept: ENDOCRINOLOGY | Facility: CLINIC | Age: 62
End: 2017-07-03
Payer: COMMERCIAL

## 2017-07-03 VITALS
BODY MASS INDEX: 41.95 KG/M2 | WEIGHT: 293 LBS | HEART RATE: 69 BPM | DIASTOLIC BLOOD PRESSURE: 71 MMHG | HEIGHT: 70 IN | SYSTOLIC BLOOD PRESSURE: 140 MMHG

## 2017-07-03 DIAGNOSIS — Z79.4 UNCONTROLLED TYPE 2 DIABETES MELLITUS WITH HYPERGLYCEMIA, WITH LONG-TERM CURRENT USE OF INSULIN (H): ICD-10-CM

## 2017-07-03 DIAGNOSIS — I10 ESSENTIAL HYPERTENSION WITH GOAL BLOOD PRESSURE LESS THAN 140/90: ICD-10-CM

## 2017-07-03 DIAGNOSIS — Z79.4 LONG-TERM INSULIN USE (H): ICD-10-CM

## 2017-07-03 DIAGNOSIS — Z79.4 TYPE 2 DIABETES MELLITUS WITH HYPERGLYCEMIA, WITH LONG-TERM CURRENT USE OF INSULIN (H): ICD-10-CM

## 2017-07-03 DIAGNOSIS — E03.4 HYPOTHYROIDISM DUE TO ACQUIRED ATROPHY OF THYROID: ICD-10-CM

## 2017-07-03 DIAGNOSIS — E78.5 HYPERLIPIDEMIA LDL GOAL <100: ICD-10-CM

## 2017-07-03 DIAGNOSIS — E11.9 TYPE 2 DIABETES MELLITUS WITHOUT COMPLICATION, WITH LONG-TERM CURRENT USE OF INSULIN (H): ICD-10-CM

## 2017-07-03 DIAGNOSIS — Z79.4 TYPE 2 DIABETES MELLITUS WITHOUT COMPLICATION, WITH LONG-TERM CURRENT USE OF INSULIN (H): ICD-10-CM

## 2017-07-03 DIAGNOSIS — E11.65 TYPE 2 DIABETES MELLITUS WITH HYPERGLYCEMIA, WITH LONG-TERM CURRENT USE OF INSULIN (H): ICD-10-CM

## 2017-07-03 DIAGNOSIS — E11.65 UNCONTROLLED TYPE 2 DIABETES MELLITUS WITH HYPERGLYCEMIA, WITH LONG-TERM CURRENT USE OF INSULIN (H): ICD-10-CM

## 2017-07-03 DIAGNOSIS — Z11.59 NEED FOR HEPATITIS C SCREENING TEST: ICD-10-CM

## 2017-07-03 LAB
ALT SERPL W P-5'-P-CCNC: 48 U/L (ref 0–50)
ANION GAP SERPL CALCULATED.3IONS-SCNC: 7 MMOL/L (ref 3–14)
BUN SERPL-MCNC: 20 MG/DL (ref 7–30)
CALCIUM SERPL-MCNC: 9.6 MG/DL (ref 8.5–10.1)
CHLORIDE SERPL-SCNC: 106 MMOL/L (ref 94–109)
CHOLEST SERPL-MCNC: 143 MG/DL
CO2 SERPL-SCNC: 28 MMOL/L (ref 20–32)
CREAT SERPL-MCNC: 0.78 MG/DL (ref 0.52–1.04)
CREAT UR-MCNC: 86 MG/DL
GFR SERPL CREATININE-BSD FRML MDRD: 74 ML/MIN/1.7M2
GLUCOSE SERPL-MCNC: 147 MG/DL (ref 70–99)
HBA1C MFR BLD: 7.7 % (ref 4.3–6)
HCV AB SERPL QL IA: NORMAL
HDLC SERPL-MCNC: 39 MG/DL
LDLC SERPL CALC-MCNC: 71 MG/DL
MICROALBUMIN UR-MCNC: 7 MG/L
MICROALBUMIN/CREAT UR: 8.21 MG/G CR (ref 0–25)
NONHDLC SERPL-MCNC: 104 MG/DL
POTASSIUM SERPL-SCNC: 4.4 MMOL/L (ref 3.4–5.3)
SODIUM SERPL-SCNC: 141 MMOL/L (ref 133–144)
TRIGL SERPL-MCNC: 164 MG/DL
TSH SERPL DL<=0.005 MIU/L-ACNC: 2.03 MU/L (ref 0.4–4)

## 2017-07-03 PROCEDURE — 99214 OFFICE O/P EST MOD 30 MIN: CPT | Performed by: INTERNAL MEDICINE

## 2017-07-03 PROCEDURE — 84443 ASSAY THYROID STIM HORMONE: CPT | Performed by: INTERNAL MEDICINE

## 2017-07-03 PROCEDURE — 80061 LIPID PANEL: CPT | Performed by: INTERNAL MEDICINE

## 2017-07-03 PROCEDURE — 36415 COLL VENOUS BLD VENIPUNCTURE: CPT | Performed by: INTERNAL MEDICINE

## 2017-07-03 PROCEDURE — 82043 UR ALBUMIN QUANTITATIVE: CPT | Performed by: INTERNAL MEDICINE

## 2017-07-03 PROCEDURE — 83036 HEMOGLOBIN GLYCOSYLATED A1C: CPT | Performed by: INTERNAL MEDICINE

## 2017-07-03 PROCEDURE — 86803 HEPATITIS C AB TEST: CPT | Performed by: INTERNAL MEDICINE

## 2017-07-03 PROCEDURE — 84460 ALANINE AMINO (ALT) (SGPT): CPT | Performed by: INTERNAL MEDICINE

## 2017-07-03 PROCEDURE — 80048 BASIC METABOLIC PNL TOTAL CA: CPT | Performed by: INTERNAL MEDICINE

## 2017-07-03 RX ORDER — LIRAGLUTIDE 6 MG/ML
1.8 INJECTION SUBCUTANEOUS DAILY
Qty: 12 ML | Refills: 3 | Status: SHIPPED | OUTPATIENT
Start: 2017-07-03 | End: 2018-01-11

## 2017-07-03 RX ORDER — LEVOTHYROXINE SODIUM 75 UG/1
75 TABLET ORAL DAILY
Qty: 90 TABLET | Refills: 3 | Status: SHIPPED | OUTPATIENT
Start: 2017-07-03 | End: 2018-06-25

## 2017-07-03 RX ORDER — METFORMIN HCL 500 MG
2000 TABLET, EXTENDED RELEASE 24 HR ORAL AT BEDTIME
Qty: 360 TABLET | Refills: 3 | Status: SHIPPED | OUTPATIENT
Start: 2017-07-03 | End: 2018-09-18

## 2017-07-03 NOTE — PATIENT INSTRUCTIONS
Thank you for choosing the [facility], Department of Endocrinology. It has been a pleasure servicing you today. Please contact us at [phone] with any questions. If you need to speak to an Endocrinologist and it is after 5:00 pm or on a weekend, please call the On-Call Endocrinologist at [phone].        Sending blood sugars to your provider at [facility]:  We want to help you with your diabetes management, which often requires frequent adjustments to your therapy. For your convenience, we have several ways to send your blood sugars to your doctor for review.    - Send message directly to your doctor through My Chart.  Please ask the rooming staff if you would like to sign up for My Chart.  This is a fast and confidential way to send your information and communicate directly with your provider.   - Record readings and fax to [phone].  We have a template for you to use for your convenience.  - If you have a Medtronic pump, upload to [employer] and notify your provider of your username and password.   - Stop by the clinic with your meter for download.   - My Chart or call Devi Harkins, Diabetes Educator at [phone]  - Call the clinic and speak to one of the endocrine nurses to relay information on the telephone.  Maya Montero or Mae at [phone].   -    Please call the on-call Endocrinologist at the [facility] for after       hours/weekend needs at [phone] Option #4.    Please note that you do not need to FAST if you are just having an A1C drawn. Please remember to ALWAYS bring your glucose meter with to your appointment. This data is very important for the management of your care.    Thank you!  Your [facility] Diabetes Care Team

## 2017-07-03 NOTE — PROGRESS NOTES
Dear Sherry,   Here are your recent results which are within the expected range. Please continue with your current plan of care.     Please call or Mychart to our office if you have further questions.     Marilou Arciniega MD

## 2017-07-03 NOTE — LETTER
7/3/2017       RE: Sherry Slaughter  [id number]  [facility] [address]     Dear Colleague,    Thank you for referring your patient, Sherry Slaughter, to the [facility]. Please see a copy of my visit note below.         Endocrinology Note         Sherry is a 62 years old female presents today for type 2 diabetes.    HPI  Sherry is a 62 years old pleasant female who is here for follow up type 2 diabetes.     She has been diagnosed with type 2 diabetes for 25 years after she was being diagnosed with gestational diabetes in her last 2 pregnancies. Her weight before pregnancy was 190-200. Her highest weight was 300s. She was initially started on oral medication and then insulin was added for more than 5 years ago.    Her A1C has been in 9% range since 2012. She was on Byetta but she did not feel it helped. She was also on Actos but stopped due to 40 lbs weight gain and increased SOB.     Interim history:   A1C today is 7.7%.  She is currently on Victoza 1.8 mg daily, Lantus 70 units am and 70 units pm, Humalog 3 units per 15 gram CHO, Novolog sliding scale 1:5 above 140 mg/dl and metformin 2000 mg daily. Reviewed glucose log, FBG ranged 206 (SD 41), no hypoglycemia. She checked blood glucose 1-3 times per day.     Exercise: no regular exercise but walk at work, s/p bilateral knee replacement. 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     DM complications  Retinopathy: no DR, +cataract - 6/2017  Nephropathy: negative urine microalb - on lisinopril 60 mg -- BP is a little high today  Neuropathy: none  Aspirin - taking 325 mg  LDL 71 (3/2017) on Crestor 10 mg    Past Medical History  Past Medical History:   Diagnosis Date     Cataract      DEPRESSION     comes and goes - tried meds - unsuccessfully, certain times of the year, no  psych intervention and no counselors in the past - and not interested      Diverticulosis of colon (without mention of hemorrhage) 4/04    colonoscopy     ECHO-mildLVH,tr MR,mild thick lflets w inc LA,trTR   12/03     Essential hypertension, benign 1990s    late 1990s - started medications at that time - not to difficult to control meds      Fam hx-cardiovas dis NEC     father - CAD, and lipids/HTN - multiple members of the family      Family history of diabetes mellitus     sister and grandmother with DM      Family history of malignant neoplasm of breast     mother - young age - 45, and maternal cousin and aunt as well - no BRCA testing done      Family history of stroke (cerebrovascular)     grandmother in early life in her 40s      FAMILY HX COLON CANCER     Pat uncles x 2     Heart murmur      HYPERLIPIDEMIA 2000    fairly recent - in the last 5 years - high for DM levels  -cholesterol recent      Irritable bowel syndrome     goes between the 2 - nerve related - more stressed more problems      MICROALBUMINURIA     unsure how long - been on the lisinopril - for a few years at well      Nonspecific abnormal results of liver function study 2/3/2003    SGOT - has been high in the past - since the hepatitis b - borderline elevation - not really changing any      OBESITY      GENESIS (obstructive sleep apnea) 10/15/2006    psg 5/15 AHI 53 aPAP 8-15     OSTEOARTHRITIS L KNEE 2003    total knee on the left - and pain is gone since the knee replacement      PERS HX HEPATITIS B- RESOLVED] 1977    acute virus only - no chronic disease      PERS HX HEPATITIS B- RESOLVED]      Type II or unspecified type diabetes mellitus without mention of complication, not stated as uncontrolled 1991    oral meds frist, then insulin eventually later, difficult to control most of the time      Unspecified hypothyroidism 10/11/2006    TSH 3.36 - mild subclinical hypothyroidism - deciding on following or starting low dose meds - with dr Oreilly  "- following        Allergies  Allergies   Allergen Reactions     Atorvastatin Calcium      Gas     Pravachol [Pravastatin Sodium]      Pravachol - dry cough      Simvastatin      Myalgia     Medications  Current Outpatient Prescriptions   Medication Sig Dispense Refill     insulin pen needle (GNP CLICKFINE PEN NEEDLES) 31G X 8 MM 1 Device by * route daily Patient due for office visit for further refills. 30 each 2     liraglutide (VICTOZA PEN) 18 MG/3ML soln Inject 1.8 mg Subcutaneous daily 12 mL 3     insulin lispro (HUMALOG) 100 UNIT/ML injection Needs Humalog \"KWIKPEN\". Inject 3 units per 15 gram CHO and sliding scale 1:10 above 140 mg/dl. Uses approx 90 units daily. 45 mL 1     insulin glargine (LANTUS) 100 UNIT/ML PEN Lantus 70 units am and 70 units pm 135 mL 3     metoprolol (TOPROL XL) 25 MG 24 hr tablet Take 1 tablet (25 mg) by mouth daily 90 tablet 3     hydrochlorothiazide (HYDRODIURIL) 25 MG tablet Take 1 tablet (25 mg) by mouth daily 90 tablet 3     levothyroxine (SYNTHROID, LEVOTHROID) 75 MCG tablet Take 1 tablet (75 mcg) by mouth daily 90 tablet 3     rosuvastatin (CRESTOR) 10 MG tablet Take 1 tablet (10 mg) by mouth daily 90 tablet 3     lisinopril (PRINIVIL,ZESTRIL) 40 MG tablet Take 1.5 tablets (60 mg) by mouth daily 135 tablet 3     metFORMIN (GLUCOPHAGE-XR) 500 MG 24 hr tablet Take 4 tablets (2,000 mg) by mouth At Bedtime 360 tablet 3     ASPIRIN EC PO Take 325 mg by mouth daily       Ibuprofen (ADVIL PO) Take 600 mg by mouth 4 times daily       Multiple Vitamins-Minerals (ADVANCED DIABETIC MULTIVITAMIN) TABS Take 1 tablet by mouth daily       Garlic 1000 MG CAPS Take 1 capsule by mouth daily Takes during summer months.  Done taking until next summer.       ORDER FOR DME, SET TO LOCAL PRINT, Respironics REMSTAR 60 Series Auto CPAP 8-15cm H2O, Airfit P10 nasal pillow mask w/medium pillows       Family History  Maternal grandmother had type 2 diabetes  Daughter has GDM  Father had CAD s/p stent, " "lung cancer and abdominal aortic anuerysm  Older brother has CABG, and abdominal aortic anuerysm  Young brother has Afib, SVT  Another younger brother has multiple myeloma  Sister has SVT    Social History  No smoking, rarely drink EtOH  No illicit drug  Works a nurse in the ICU  Lives by herself, has 3 daughters    ROS  Constitutional: stable weight  Eyes: no vision change, +mild cataract, no DR - eye exam in 9/2015  Cardiovascular: no chest pain, palpitations  Respiratory: no dyspnea, cough, +shortness of breath  GI: no nausea, vomiting, diarrhea, constipation, no abdominal pain   : no change in urine, no dysuria   Musculoskeletal: no legs swelling, s/p left and right knee replacement, +low back pain  Integumentary: no concerning lesions  Neuro: no loss of strength or sensation, no numbness or tingling, no tremor, no dizziness, no headache   Endo: no polyuria or polydipsia, no temperature intolerance   Heme/Lymph: no concerning bumps, no bleeding problems   Psych: no depression or anxiety, no sleep problems.    Physical Exam  /71  Pulse 69  Ht 1.778 m (5' 10\")  Wt 133.9 kg (295 lb 1.6 oz)  LMP 10/02/2007  BMI 42.34 kg/m2  Body mass index is 42.34 kg/(m^2).  Constitutional: no distress, comfortable, pleasant   Eyes: anicteric, normal extra-ocular movements, no lid lag or retraction  CVS: RRR, normal S1,S2, no murmur  Lungs: CTA B/L  Abd: soft, obese, NT, ND  Musculoskeletal: no edema, DP 2+  NS: cranial nerve grossly intact, peripheral sensation intact, 1+patellar reflex bilaterally, normal gait, no tremor   Skin:no jaundice   Psychological: appropriate mood     RESULTS  Lab Results   Component Value Date    A1C 7.7 07/03/2017    A1C 7.5 02/06/2017    A1C 8.9 09/07/2016    A1C 7.3 03/16/2016    A1C 7.9 01/18/2016      ENDO DIABETES Latest Ref Rng & Units 7/3/2017   GLUCOSE 70 - 99 mg/dL 147 (H)   CHOLESTEROL <200 mg/dL 143   LDL CHOLESTEROL, CALCULATED <100 mg/dL 71   HDL CHOLESTEROL >49 mg/dL 39 (L) "   VLDL-CHOLESTEROL 0 - 30 mg/dL    NON HDL CHOLESTEROL <130 mg/dL 104   TRIGLYCERIDES <150 mg/dL 164 (H)   ALBUMIN URINE MG/L mg/L    ALBUMIN URINE MG/G CR 0 - 25 mg/g Cr    CREATININE 0.52 - 1.04 mg/dL 0.78   POTASSIUM 3.4 - 5.3 mmol/L 4.4   ALT 0 - 50 U/L 48     ENDO THYROID LABS-UMP Latest Ref Rng & Units 7/3/2017   TSH 0.40 - 4.00 mU/L 2.03     ASSESSMENT:    Sherry is a 62 years old pleasant female who is here to for type 2 diabetes.     1) type 2 diabetes: A1C of 7.7% today. She has multiple risks for poor control due to obesity, poor dietary choice and lack of physical activity. A1C goal should be <8% to prevent microvascular complications.   - continue Lantus 70 units am and 70 units pm, Humalog 3 units per 15 gram CHO and change sliding scale 1:10 above 140 mg/dl and metformin 2000 mg daily.  - continue Victoza 1.8 mg daily -- she will also call the insurance company and see which one will cover from now on    2) DM complication  Retinopathy: no DR, +cataract - 6/2017  Nephropathy: negative urine microalb 3/2016 - BP controlled - on lisinopril 60 mg  Neuropathy: none  Aspirin - taking 325 mg  LDL 71 (7/2017) on Crestor 10 mg    3) Hypertension: borderline. On lisinopirl 60 mg, HCTZ 25 mg.     4) hypothyroidism: stable. TSH is 2.03 today. Will continue with levothyroxine 75 mcg daily.    PLAN:   - continue Lantus 70 units am and 70 units pm  - continue Victoza 1.8 mg daily -- she will also call the insurance company and see which one will cover from now on.  - continue with current dose of Humalog 3 units per 15 gram of CHO, metformin 2000 mg per day  - continue with levothyroxine 75 mcg daily.  - emphasize on diet modification and exercise  - RTC 5 months    Renny Agarwal MD  Endocrinology  [phone]      Again, thank you for allowing me to participate in the care of your patient.      Sincerely,    Renny Agarwal MD

## 2017-07-03 NOTE — PROGRESS NOTES
Endocrinology Note         Sherry is a 62 years old female presents today for type 2 diabetes.    HPI  Sherry is a 62 years old pleasant female who is here for follow up type 2 diabetes.     She has been diagnosed with type 2 diabetes for 25 years after she was being diagnosed with gestational diabetes in her last 2 pregnancies. Her weight before pregnancy was 190-200. Her highest weight was 300s. She was initially started on oral medication and then insulin was added for more than 5 years ago.    Her A1C has been in 9% range since 2012. She was on Byetta but she did not feel it helped. She was also on Actos but stopped due to 40 lbs weight gain and increased SOB.     Interim history:   A1C today is 7.7%.  She is currently on Victoza 1.8 mg daily, Lantus 70 units am and 70 units pm, Humalog 3 units per 15 gram CHO, Novolog sliding scale 1:5 above 140 mg/dl and metformin 2000 mg daily. Reviewed glucose log, FBG ranged 206 (SD 41), no hypoglycemia. She checked blood glucose 1-3 times per day.     Exercise: no regular exercise but walk at work, s/p bilateral knee replacement. 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     DM complications  Retinopathy: no DR, +cataract - 6/2017  Nephropathy: negative urine microalb - on lisinopril 60 mg -- BP is a little high today  Neuropathy: none  Aspirin - taking 325 mg  LDL 71 (3/2017) on Crestor 10 mg    Past Medical History  Past Medical History:   Diagnosis Date     Cataract      DEPRESSION     comes and goes - tried meds - unsuccessfully, certain times of the year, no psych intervention and no counselors in the past - and not interested      Diverticulosis of colon (without mention of hemorrhage) 4/04    colonoscopy     ECHO-mildLVH,tr MR,mild thick lflets w inc LA,trTR   12/03     Essential hypertension, benign 1990s    late 1990s - started medications at that time - not  to difficult to control meds      Fam hx-cardiovas dis NEC     father - CAD, and lipids/HTN - multiple members of the family      Family history of diabetes mellitus     sister and grandmother with DM      Family history of malignant neoplasm of breast     mother - young age - 45, and maternal cousin and aunt as well - no BRCA testing done      Family history of stroke (cerebrovascular)     grandmother in early life in her 40s      FAMILY HX COLON CANCER     Pat uncles x 2     Heart murmur      HYPERLIPIDEMIA 2000    fairly recent - in the last 5 years - high for DM levels  -cholesterol recent      Irritable bowel syndrome     goes between the 2 - nerve related - more stressed more problems      MICROALBUMINURIA     unsure how long - been on the lisinopril - for a few years at well      Nonspecific abnormal results of liver function study 2/3/2003    SGOT - has been high in the past - since the hepatitis b - borderline elevation - not really changing any      OBESITY      GENESIS (obstructive sleep apnea) 10/15/2006    psg 5/15 AHI 53 aPAP 8-15     OSTEOARTHRITIS L KNEE 2003    total knee on the left - and pain is gone since the knee replacement      PERS HX HEPATITIS B- RESOLVED] 1977    acute virus only - no chronic disease      PERS HX HEPATITIS B- RESOLVED]      Type II or unspecified type diabetes mellitus without mention of complication, not stated as uncontrolled 1991    oral meds frist, then insulin eventually later, difficult to control most of the time      Unspecified hypothyroidism 10/11/2006    TSH 3.36 - mild subclinical hypothyroidism - deciding on following or starting low dose meds - with dr Oreilly - following        Allergies  Allergies   Allergen Reactions     Atorvastatin Calcium      Gas     Pravachol [Pravastatin Sodium]      Pravachol - dry cough      Simvastatin      Myalgia     Medications  Current Outpatient Prescriptions   Medication Sig Dispense Refill     insulin pen needle (GNP CLICKFINE  "PEN NEEDLES) 31G X 8 MM 1 Device by * route daily Patient due for office visit for further refills. 30 each 2     liraglutide (VICTOZA PEN) 18 MG/3ML soln Inject 1.8 mg Subcutaneous daily 12 mL 3     insulin lispro (HUMALOG) 100 UNIT/ML injection Needs Humalog \"KWIKPEN\". Inject 3 units per 15 gram CHO and sliding scale 1:10 above 140 mg/dl. Uses approx 90 units daily. 45 mL 1     insulin glargine (LANTUS) 100 UNIT/ML PEN Lantus 70 units am and 70 units pm 135 mL 3     metoprolol (TOPROL XL) 25 MG 24 hr tablet Take 1 tablet (25 mg) by mouth daily 90 tablet 3     hydrochlorothiazide (HYDRODIURIL) 25 MG tablet Take 1 tablet (25 mg) by mouth daily 90 tablet 3     levothyroxine (SYNTHROID, LEVOTHROID) 75 MCG tablet Take 1 tablet (75 mcg) by mouth daily 90 tablet 3     rosuvastatin (CRESTOR) 10 MG tablet Take 1 tablet (10 mg) by mouth daily 90 tablet 3     lisinopril (PRINIVIL,ZESTRIL) 40 MG tablet Take 1.5 tablets (60 mg) by mouth daily 135 tablet 3     metFORMIN (GLUCOPHAGE-XR) 500 MG 24 hr tablet Take 4 tablets (2,000 mg) by mouth At Bedtime 360 tablet 3     ASPIRIN EC PO Take 325 mg by mouth daily       Ibuprofen (ADVIL PO) Take 600 mg by mouth 4 times daily       Multiple Vitamins-Minerals (ADVANCED DIABETIC MULTIVITAMIN) TABS Take 1 tablet by mouth daily       Garlic 1000 MG CAPS Take 1 capsule by mouth daily Takes during summer months.  Done taking until next summer.       ORDER FOR DME, SET TO LOCAL PRINT, Respironics REMSTAR 60 Series Auto CPAP 8-15cm H2O, Airfit P10 nasal pillow mask w/medium pillows       Family History  Maternal grandmother had type 2 diabetes  Daughter has GDM  Father had CAD s/p stent, lung cancer and abdominal aortic anuerysm  Older brother has CABG, and abdominal aortic anuerysm  Young brother has Afib, SVT  Another younger brother has multiple myeloma  Sister has SVT    Social History  No smoking, rarely drink EtOH  No illicit drug  Works a nurse in the ICU  Lives by herself, has 3 " "daughters    ROS  Constitutional: stable weight  Eyes: no vision change, +mild cataract, no DR - eye exam in 9/2015  Cardiovascular: no chest pain, palpitations  Respiratory: no dyspnea, cough, +shortness of breath  GI: no nausea, vomiting, diarrhea, constipation, no abdominal pain   : no change in urine, no dysuria   Musculoskeletal: no legs swelling, s/p left and right knee replacement, +low back pain  Integumentary: no concerning lesions  Neuro: no loss of strength or sensation, no numbness or tingling, no tremor, no dizziness, no headache   Endo: no polyuria or polydipsia, no temperature intolerance   Heme/Lymph: no concerning bumps, no bleeding problems   Psych: no depression or anxiety, no sleep problems.    Physical Exam  /71  Pulse 69  Ht 1.778 m (5' 10\")  Wt 133.9 kg (295 lb 1.6 oz)  LMP 10/02/2007  BMI 42.34 kg/m2  Body mass index is 42.34 kg/(m^2).  Constitutional: no distress, comfortable, pleasant   Eyes: anicteric, normal extra-ocular movements, no lid lag or retraction  CVS: RRR, normal S1,S2, no murmur  Lungs: CTA B/L  Abd: soft, obese, NT, ND  Musculoskeletal: no edema, DP 2+  NS: cranial nerve grossly intact, peripheral sensation intact, 1+patellar reflex bilaterally, normal gait, no tremor   Skin:no jaundice   Psychological: appropriate mood     RESULTS  Lab Results   Component Value Date    A1C 7.7 07/03/2017    A1C 7.5 02/06/2017    A1C 8.9 09/07/2016    A1C 7.3 03/16/2016    A1C 7.9 01/18/2016      ENDO DIABETES Latest Ref Rng & Units 7/3/2017   GLUCOSE 70 - 99 mg/dL 147 (H)   CHOLESTEROL <200 mg/dL 143   LDL CHOLESTEROL, CALCULATED <100 mg/dL 71   HDL CHOLESTEROL >49 mg/dL 39 (L)   VLDL-CHOLESTEROL 0 - 30 mg/dL    NON HDL CHOLESTEROL <130 mg/dL 104   TRIGLYCERIDES <150 mg/dL 164 (H)   ALBUMIN URINE MG/L mg/L    ALBUMIN URINE MG/G CR 0 - 25 mg/g Cr    CREATININE 0.52 - 1.04 mg/dL 0.78   POTASSIUM 3.4 - 5.3 mmol/L 4.4   ALT 0 - 50 U/L 48     ENDO THYROID LABS-UMP Latest Ref Rng & " Units 7/3/2017   TSH 0.40 - 4.00 mU/L 2.03     ASSESSMENT:    Sherry is a 62 years old pleasant female who is here to for type 2 diabetes.     1) type 2 diabetes: A1C of 7.7% today. She has multiple risks for poor control due to obesity, poor dietary choice and lack of physical activity. A1C goal should be <8% to prevent microvascular complications.   - continue Lantus 70 units am and 70 units pm, Humalog 3 units per 15 gram CHO and change sliding scale 1:10 above 140 mg/dl and metformin 2000 mg daily.  - continue Victoza 1.8 mg daily -- she will also call the insurance company and see which one will cover from now on    2) DM complication  Retinopathy: no DR, +cataract - 6/2017  Nephropathy: negative urine microalb 3/2016 - BP controlled - on lisinopril 60 mg  Neuropathy: none  Aspirin - taking 325 mg  LDL 71 (7/2017) on Crestor 10 mg    3) Hypertension: borderline. On lisinopirl 60 mg, HCTZ 25 mg.     4) hypothyroidism: stable. TSH is 2.03 today. Will continue with levothyroxine 75 mcg daily.    PLAN:   - continue Lantus 70 units am and 70 units pm  - continue Victoza 1.8 mg daily -- she will also call the insurance company and see which one will cover from now on.  - continue with current dose of Humalog 3 units per 15 gram of CHO, metformin 2000 mg per day  - continue with levothyroxine 75 mcg daily.  - emphasize on diet modification and exercise  - RTC 5 months    Renny Agarwal MD  Endocrinology  [phone]

## 2017-07-03 NOTE — PROGRESS NOTES
Dear Sherry,   Here are your recent results.   -- labs were normal or at baseline. No change in your thyroid/lipid medications.   -- return in October for annual physical    Please call or Mychart to our office if you have further questions.     Marilou Arciniega MD

## 2017-07-03 NOTE — MR AVS SNAPSHOT
After Visit Summary   7/3/2017    Sherry Slaughter    MRN: [id number]           Patient Information     Date Of Birth          1955        Visit Information        Provider Department      7/3/2017 10:00 AM Renny Agarwal MD [facility]        Today's Diagnoses     Uncontrolled diabetes mellitus (H)        Long-term insulin use (H)          Care Instructions    Thank you for choosing the [facility] Department of Endocrinology. It has been a pleasure servicing you today. Please contact us at [phone] with any questions. If you need to speak to an Endocrinologist and it is after 5:00 pm or on a weekend, please call the On-Call Endocrinologist at [phone].        Sending blood sugars to your provider at [place]:  We want to help you with your diabetes management, which often requires frequent adjustments to your therapy. For your convenience, we have several ways to send your blood sugars to your doctor for review.    - Send message directly to your doctor through My Chart.  Please ask the rooming staff if you would like to sign up for My Chart.  This is a fast and confidential way to send your information and communicate directly with your provider.   - Record readings and fax to [phone].  We have a template for you to use for your convenience.  - If you have a Medtronic pump, upload to Carelink and notify your provider of your username and password.   - Stop by the clinic with your meter for download.   - My Chart or call Devi Harkins Diabetes Educator at [phone]  - Call the clinic and speak to one of the endocrine nurses to relay information on the telephone.  Maya Montero, or Mae at [phone].   -    Please call the on-call Endocrinologist at the [facility] for after       hours/weekend needs at [phone] Option #4.    Please note that you do not need to FAST if you are just having an A1C drawn. Please  remember to ALWAYS bring your glucose meter with to your appointment. This data is very important for the management of your care.    Thank you!  Your [place] [facility] Care Team                      Follow-ups after your visit        Your next 10 appointments already scheduled     Dec 11, 2017  8:45 AM CST   Return Visit with Renny Agarwal MD, MG ENDO NURSE   [facility]    [id number] [place] [address] [phone]              Who to contact     If you have questions or need follow up information about today's clinic visit or your schedule please contact [facility] directly at [phone].  Normal or non-critical lab and imaging results will be communicated to you by PowerDMShart, letter or phone within 4 business days after the clinic has received the results. If you do not hear from us within 7 days, please contact the clinic through PowerDMShart or phone. If you have a critical or abnormal lab result, we will notify you by phone as soon as possible.  Submit refill requests through Lonely Sock or call your pharmacy and they will forward the refill request to us. Please allow 3 business days for your refill to be completed.          Additional Information About Your Visit        PowerDMSharMicromidas Information     Lonely Sock gives you secure access to your electronic health record. If you see a primary care provider, you can also send messages to your care team and make appointments. If you have questions, please call your primary care clinic.  If you do not have a primary care provider, please call [phone] and they will assist you.      Lonely Sock is an electronic gateway that provides easy, online access to your medical records. With Lonely Sock, you can request a clinic appointment, read your test results, renew a prescription or communicate with your care team.     To access your existing account, please contact your [facility]  "Physicians Clinic or call [phone] for assistance.        Care EveryWhere ID     This is your Care EveryWhere ID. This could be used by other organizations to access your [place] medical records  [id number]        Your Vitals Were     Pulse Height Last Period BMI (Body Mass Index)          69 1.778 m (5' 10\") 10/02/2007 42.34 kg/m2         Blood Pressure from Last 3 Encounters:   07/03/17 140/71   02/06/17 120/61   10/20/16 137/79    Weight from Last 3 Encounters:   07/03/17 133.9 kg (295 lb 1.6 oz)   02/06/17 133.6 kg (294 lb 8.6 oz)   10/17/16 130.6 kg (288 lb)              Today, you had the following     No orders found for display       Primary Care Provider Office Phone # Fax #    Marilou Arciniega [phone]       [facility] [address]  [facility] [address]        Equal Access to Services     [facility] : Hadii aad ku hadasho Soomaali, waaxda luqadaha, qaybta kaalmada adeegyada, waxay viky haylola [place] . So [facility] [phone].    ATENCIÓN: Si habla español, tiene a espinosa disposición servicios gratuitos de asistencia lingüística. Llame al [phone].    We comply with applicable federal civil rights laws and [place] laws. We do not discriminate on the basis of race, color, national origin, age, disability sex, sexual orientation or gender identity.            Thank you!     Thank you for choosing [facility]  for your care. Our goal is always to provide you with excellent care. Hearing back from our patients is one way we can continue to improve our services. Please take a few minutes to complete the written survey that you may receive in the mail after your visit with us. Thank you!             Your Updated Medication List - Protect others around you: Learn how to safely use, store and throw away your medicines at www.disposemymeds.org.          This list is accurate as of: 7/3/17 10:24 AM.  Always use your most recent med list.             " "      Brand Name Dispense Instructions for use Diagnosis    ADVANCED DIABETIC MULTIVITAMIN Tabs      Take 1 tablet by mouth daily        ADVIL PO      Take 600 mg by mouth 4 times daily        ASPIRIN EC PO      Take 325 mg by mouth daily        Garlic 1000 MG Caps      Take 1 capsule by mouth daily Takes during summer months.  Done taking until next summer.        hydrochlorothiazide 25 MG tablet    HYDRODIURIL    90 tablet    Take 1 tablet (25 mg) by mouth daily    Essential hypertension with goal blood pressure less than 140/90       insulin glargine 100 UNIT/ML injection    LANTUS    135 mL    Lantus 70 units am and 70 units pm    Uncontrolled diabetes mellitus (H), Long-term insulin use (H)       insulin lispro 100 UNIT/ML injection    HumaLOG    45 mL    Needs Humalog \"KWIKPEN\". Inject 3 units per 15 gram CHO and sliding scale 1:10 above 140 mg/dl. Uses approx 90 units daily.    Uncontrolled diabetes mellitus (H), Long-term insulin use (H)       insulin pen needle 31G X 8 MM    GNP CLICKFINE PEN NEEDLES    30 each    1 Device by * route daily Patient due for office visit for further refills.    Type 2 diabetes, HbA1c goal < 7% (H)       levothyroxine 75 MCG tablet    SYNTHROID/LEVOTHROID    90 tablet    Take 1 tablet (75 mcg) by mouth daily    Hypothyroidism due to acquired atrophy of thyroid       liraglutide 18 MG/3ML soln    VICTOZA PEN    12 mL    Inject 1.8 mg Subcutaneous daily    Type 2 diabetes mellitus with hyperglycemia (H)       lisinopril 40 MG tablet    PRINIVIL/ZESTRIL    135 tablet    Take 1.5 tablets (60 mg) by mouth daily    Essential hypertension with goal blood pressure less than 140/90       metFORMIN 500 MG 24 hr tablet    GLUCOPHAGE-XR    360 tablet    Take 4 tablets (2,000 mg) by mouth At Bedtime    Uncontrolled diabetes mellitus (H)       metoprolol 25 MG 24 hr tablet    TOPROL XL    90 tablet    Take 1 tablet (25 mg) by mouth daily    Essential hypertension with goal blood pressure " less than 140/90       order for DME      Respironics REMSTAR 60 Series Auto CPAP 8-15cm H2O, Airfit P10 nasal pillow mask w/medium pillows        rosuvastatin 10 MG tablet    CRESTOR    90 tablet    Take 1 tablet (10 mg) by mouth daily    Essential hypertension with goal blood pressure less than 140/90

## 2017-07-03 NOTE — NURSING NOTE
"Sherry Slaughter's goals for this visit include:   Chief Complaint   Patient presents with     Diabetes       She requests these members of her care team be copied on today's visit information: Marilou Arciniega      PCP: Marilou Arciniega    Referring Provider:  ESTABLISHED PATIENT  No address on file    Chief Complaint   Patient presents with     Diabetes       Initial /71  Pulse 69  Ht 1.778 m (5' 10\")  Wt 133.9 kg (295 lb 1.6 oz)  LMP 10/02/2007  BMI 42.34 kg/m2 Estimated body mass index is 42.34 kg/(m^2) as calculated from the following:    Height as of this encounter: 1.778 m (5' 10\").    Weight as of this encounter: 133.9 kg (295 lb 1.6 oz).  Medication Reconciliation: complete    Do you need any medication refills at today's visit? yes    Mae Bustamante LPN      "

## 2017-09-06 ENCOUNTER — OFFICE VISIT (OUTPATIENT)
Dept: OPTOMETRY | Facility: CLINIC | Age: 62
End: 2017-09-06
Payer: COMMERCIAL

## 2017-09-06 DIAGNOSIS — H43.811 POSTERIOR VITREOUS DETACHMENT OF RIGHT EYE: Primary | ICD-10-CM

## 2017-09-06 PROCEDURE — 99213 OFFICE O/P EST LOW 20 MIN: CPT | Performed by: OPTOMETRIST

## 2017-09-06 ASSESSMENT — VISUAL ACUITY
OD_SC: 20/30
METHOD: SNELLEN - LINEAR
OS_SC: 20/30
OS_SC+: -1

## 2017-09-06 ASSESSMENT — EXTERNAL EXAM - RIGHT EYE: OD_EXAM: NORMAL

## 2017-09-06 ASSESSMENT — REFRACTION_WEARINGRX
OD_CYLINDER: +0.75
OS_ADD: +2.50
OS_AXIS: 011
OD_AXIS: 010
OS_CYLINDER: +0.25
OD_SPHERE: -0.25
OS_SPHERE: -0.50
OD_ADD: +2.50

## 2017-09-06 ASSESSMENT — CUP TO DISC RATIO
OD_RATIO: 0.3
OS_RATIO: 0.3

## 2017-09-06 ASSESSMENT — SLIT LAMP EXAM - LIDS
COMMENTS: NORMAL
COMMENTS: NORMAL

## 2017-09-06 ASSESSMENT — TONOMETRY
IOP_METHOD: TONOPEN
OD_IOP_MMHG: 21
OS_IOP_MMHG: 19

## 2017-09-06 ASSESSMENT — EXTERNAL EXAM - LEFT EYE: OS_EXAM: NORMAL

## 2017-09-06 NOTE — PATIENT INSTRUCTIONS
You have a PVD- posterior vitreous detachment which is due to the gel of the eye shrinking and clumping together.  This can sometimes cause holes or tears in the retina.  The signs of a retinal detachment are flashes of light or a curtain coming over the vision. If you notice any of these changes please let me know right away.  If I am not available this is an emergency type situation that you would need to be seen.    Recommend annual eye exams.  Return 5/18 or sooner if needed.    Kameron Pedraza, OD

## 2017-09-06 NOTE — MR AVS SNAPSHOT
After Visit Summary   9/6/2017    Sherry Slaughter    MRN: [id number]           Patient Information     Date Of Birth          1955        Visit Information        Provider Department      9/6/2017 3:00 PM Kameron Pedraza, OD [facility]        Today's Diagnoses     Posterior vitreous detachment of right eye    -  1      Care Instructions    You have a PVD- posterior vitreous detachment which is due to the gel of the eye shrinking and clumping together.  This can sometimes cause holes or tears in the retina.  The signs of a retinal detachment are flashes of light or a curtain coming over the vision. If you notice any of these changes please let me know right away.  If I am not available this is an emergency type situation that you would need to be seen.    Recommend annual eye exams.  Return 5/18 or sooner if needed.    Kameron Pedraza OD            Follow-ups after your visit        Follow-up notes from your care team     Return for Annual Visit.      Your next 10 appointments already scheduled     Oct 17, 2017  9:00 AM CDT   New Visit with Bandar Linares MD, MG OPHTH NURSE ONLY   [facility] ([facility])    [address] [place] [address] [phone]            Dec 18, 2017  9:00 AM CST   Return Visit with Renny Agarwal MD, MG ENDO NURSE   [facility] ([facility])    [address] [place] [address] [phone]              Who to contact     If you have questions or need follow up information about today's clinic visit or your schedule please contact [facility] directly at [phone].  Normal or non-critical lab and imaging results will be communicated to you by MyChart, letter or phone within 4 business days after the clinic has received the results. If you do not hear from us within 7 days, please contact the clinic through Gidsyt or  phone. If you have a critical or abnormal lab result, we will notify you by phone as soon as possible.  Submit refill requests through Aliveshoes or call your pharmacy and they will forward the refill request to us. Please allow 3 business days for your refill to be completed.          Additional Information About Your Visit        Tappxhart Information     Aliveshoes gives you secure access to your electronic health record. If you see a primary care provider, you can also send messages to your care team and make appointments. If you have questions, please call your primary care clinic.  If you do not have a primary care provider, please call [phone] and they will assist you.      Aliveshoes is an electronic gateway that provides easy, online access to your medical records. With Aliveshoes, you can request a clinic appointment, read your test results, renew a prescription or communicate with your care team.     To access your existing account, please contact your [facility] or call [phone] for assistance.        Care EveryWhere ID     This is your Care EveryWhere ID. This could be used by other organizations to access your [place] medical records  [id number]        Your Vitals Were     Last Period                   10/02/2007            Blood Pressure from Last 3 Encounters:   07/03/17 140/71   02/06/17 120/61   10/20/16 137/79    Weight from Last 3 Encounters:   07/03/17 133.9 kg (295 lb 1.6 oz)   02/06/17 133.6 kg (294 lb 8.6 oz)   10/17/16 130.6 kg (288 lb)              Today, you had the following     No orders found for display       Primary Care Provider Office Phone # Fax #    Marilou Arciniega [phone]       [address]  [facility] [address]        Equal Access to Services     [facility]: Hadii canelo [place], wamarsha manjarrez, qaybta acosta cuellar. So [facility] [phone].    ATENCIÓN: Kraig rodriguez,  tiene a espinosa disposición servicios gratuitos de asistencia lingüística. Wilfredo pace [phone].    We comply with applicable federal civil rights laws and [place] laws. We do not discriminate on the basis of race, color, national origin, age, disability sex, sexual orientation or gender identity.            Thank you!     Thank you for choosing [facility]  for your care. Our goal is always to provide you with excellent care. Hearing back from our patients is one way we can continue to improve our services. Please take a few minutes to complete the written survey that you may receive in the mail after your visit with us. Thank you!             Your Updated Medication List - Protect others around you: Learn how to safely use, store and throw away your medicines at www.disposemymeds.org.          This list is accurate as of: 9/6/17  5:27 PM.  Always use your most recent med list.                   Brand Name Dispense Instructions for use Diagnosis    ADVANCED DIABETIC MULTIVITAMIN Tabs      Take 1 tablet by mouth daily        ADVIL PO      Take 600 mg by mouth 4 times daily        ASPIRIN EC PO      Take 325 mg by mouth daily        Garlic 1000 MG Caps      Take 1 capsule by mouth daily Takes during summer months.  Done taking until next summer.        hydrochlorothiazide 25 MG tablet    HYDRODIURIL    90 tablet    Take 1 tablet (25 mg) by mouth daily    Essential hypertension with goal blood pressure less than 140/90       insulin glargine 100 UNIT/ML injection    LANTUS    135 mL    Lantus 70 units am and 70 units pm    Long-term insulin use (H)       insulin lispro 100 UNIT/ML injection    HumaLOG PEN    45 mL    3 units per 15 gram CHO and sliding scale 1:10 above 140 mg/dl. Uses approx 90 units daily.    Long-term insulin use (H)       insulin pen needle 31G X 8 MM    GNP CLICKFINE PEN NEEDLES    30 each    1 Device by * route daily Patient due for office visit for further refills.    Type 2  diabetes, HbA1c goal < 7% (H)       levothyroxine 75 MCG tablet    SYNTHROID/LEVOTHROID    90 tablet    Take 1 tablet (75 mcg) by mouth daily    Hypothyroidism due to acquired atrophy of thyroid       liraglutide 18 MG/3ML soln    VICTOZA PEN    12 mL    Inject 1.8 mg Subcutaneous daily    Type 2 diabetes mellitus with hyperglycemia, with long-term current use of insulin (H)       lisinopril 40 MG tablet    PRINIVIL/ZESTRIL    135 tablet    Take 1.5 tablets (60 mg) by mouth daily    Essential hypertension with goal blood pressure less than 140/90       metFORMIN 500 MG 24 hr tablet    GLUCOPHAGE-XR    360 tablet    Take 4 tablets (2,000 mg) by mouth At Bedtime    Uncontrolled type 2 diabetes mellitus with hyperglycemia, with long-term current use of insulin (H)       metoprolol 25 MG 24 hr tablet    TOPROL XL    90 tablet    Take 1 tablet (25 mg) by mouth daily    Essential hypertension with goal blood pressure less than 140/90       order for [facility]      Respironics REMSTAR 60 Series Auto CPAP 8-15cm H2O, Airfit P10 nasal pillow mask w/medium pillows        rosuvastatin 10 MG tablet    CRESTOR    90 tablet    Take 1 tablet (10 mg) by mouth daily    Essential hypertension with goal blood pressure less than 140/90

## 2017-09-06 NOTE — PROGRESS NOTES
Chief Complaint   Patient presents with     Eye Problem     New floaters right eye       HPI    Affected eye(s):  Right   Symptoms:     Floaters      Duration:  4 days   Frequency:  Constant       Do you have eye pain now?:  No      Comments:  Feels like bugs floating around right eye.  No flashes/curtain.             Medical, surgical and family histories reviewed and updated 9/6/2017.       OBJECTIVE: See Ophthalmology exam    ASSESSMENT:    ICD-10-CM    1. Posterior vitreous detachment of right eye H43.811       PLAN:     Patient Instructions   You have a PVD- posterior vitreous detachment which is due to the gel of the eye shrinking and clumping together.  This can sometimes cause holes or tears in the retina.  The signs of a retinal detachment are flashes of light or a curtain coming over the vision. If you notice any of these changes please let me know right away.  If I am not available this is an emergency type situation that you would need to be seen.    Recommend annual eye exams.  Return 5/18 or sooner if needed.    Kameron Pedraza, OD

## 2017-09-15 DIAGNOSIS — E11.9 TYPE 2 DIABETES, HBA1C GOAL < 7% (H): ICD-10-CM

## 2017-10-05 DIAGNOSIS — I10 ESSENTIAL HYPERTENSION WITH GOAL BLOOD PRESSURE LESS THAN 140/90: ICD-10-CM

## 2017-10-05 NOTE — TELEPHONE ENCOUNTER
Lisinopril 40 mg Tablet  Last Written Prescription Date: 10/17/2016  Last Fill Quantity: 135, # refills: 3  Last Office Visit with [facility], [facility] or [facility] prescribing provider: 07/03/2017  Next 5 appointments (look out 90 days)     Dec 18, 2017  9:00 AM CST   Return Visit with Renny Agarwal MD, MG ENDO NURSE   [facility] ([facility])    [address] [address]   [phone]                   Potassium   Date Value Ref Range Status   07/03/2017 4.4 3.4 - 5.3 mmol/L Final     Creatinine   Date Value Ref Range Status   07/03/2017 0.78 0.52 - 1.04 mg/dL Final     BP Readings from Last 3 Encounters:   07/03/17 140/71   02/06/17 120/61   10/20/16 137/79     Hydrochlorothiazide 25 mg Tablet    Last Written Prescription Date: 10/17/2016  Last Fill Quantity: 90, # refills: 3  Last Office Visit with [facility] prescribing provider: 07/03/2017  Next 5 appointments (look out 90 days)     Dec 18, 2017  9:00 AM CST   Return Visit with Renny Agarwal MD, MG ENDO NURSE   [facility] ([facility])    [address] [address] [address]   [phone]                   Potassium   Date Value Ref Range Status   07/03/2017 4.4 3.4 - 5.3 mmol/L Final     Creatinine   Date Value Ref Range Status   07/03/2017 0.78 0.52 - 1.04 mg/dL Final     BP Readings from Last 3 Encounters:   07/03/17 140/71   02/06/17 120/61   10/20/16 137/79         Rosuvastatin 10 mg Tablet  Last Written Prescription Date: 10/17/2016  Last Fill Quantity: 90, # refills: 3  Last Office Visit with [facility], [facility] or [facility] prescribing provider: 07/03/2017  Next 5 appointments (look out 90 days)     Dec 18, 2017  9:00 AM CST   Return Visit with Renny Agarwal MD, MG ENDO NURSE   [facility] ([facility])    [address] [address]   [phone]                   Lab Results    Component Value Date    CHOL 143 07/03/2017     Lab Results   Component Value Date    HDL 39 07/03/2017     Lab Results   Component Value Date    LDL 71 07/03/2017     Lab Results   Component Value Date    TRIG 164 07/03/2017     Lab Results   Component Value Date    CHOLHDLRATIO 3.2 02/19/2015         [facility]y Chan [facility]  [phone]

## 2017-10-05 NOTE — LETTER
October 6, 2017      Sherry Slaughter  [address]  [facility] [address]              Dear Sherry,      Thank you for your refill request. We recently received a call from your pharmacy requesting a refill of your medication. We have provided a 30 day refill of your medication requests.      However, our records show it is time for an annual office visit with Dr. Arciniega to ensure your treatment is safe and effective for you.      Regular follow up, including visits with your health care provider and laboratory monitoring are necessary to make sure your medication is working properly.    You can reach us at [phone] or schedule online via Touch-Writer.    Thank you for taking an active role in your healthcare.    Sincerely,  Adrienne Zaidi RN,   [facility]  Marilou Arciniega MD

## 2017-10-06 RX ORDER — LISINOPRIL 40 MG/1
60 TABLET ORAL DAILY
Qty: 45 TABLET | Refills: 0 | Status: SHIPPED | OUTPATIENT
Start: 2017-10-06 | End: 2017-11-02

## 2017-10-06 RX ORDER — HYDROCHLOROTHIAZIDE 25 MG/1
25 TABLET ORAL DAILY
Qty: 30 TABLET | Refills: 0 | Status: SHIPPED | OUTPATIENT
Start: 2017-10-06 | End: 2017-11-02

## 2017-10-06 RX ORDER — ROSUVASTATIN CALCIUM 10 MG/1
10 TABLET, COATED ORAL DAILY
Qty: 30 TABLET | Refills: 0 | Status: SHIPPED | OUTPATIENT
Start: 2017-10-06 | End: 2017-11-02

## 2017-10-11 ENCOUNTER — PRE VISIT (OUTPATIENT)
Dept: OPHTHALMOLOGY | Facility: CLINIC | Age: 62
End: 2017-10-11

## 2017-10-11 NOTE — TELEPHONE ENCOUNTER
October 11, 2017                 Pre-Visit Documentation: Sherry Slaughter is a 62 year old female    Chief Complaint   Patient presents with     Previsit     appt w/ Dr Linares     Cataract Evaluation     refered by Dr Pedraza           Current Outpatient Prescriptions   Medication Sig Dispense Refill     hydrochlorothiazide (HYDRODIURIL) 25 MG tablet Take 1 tablet (25 mg) by mouth daily 30 tablet 0     lisinopril (PRINIVIL/ZESTRIL) 40 MG tablet Take 1.5 tablets (60 mg) by mouth daily 45 tablet 0     rosuvastatin (CRESTOR) 10 MG tablet Take 1 tablet (10 mg) by mouth daily 30 tablet 0     blood glucose monitoring (NO BRAND SPECIFIED) test strip Use to test blood sugar 4 times daily or as directed. 100 each 11     insulin pen needle (GNP Avrupa MineralsFINE PEN NEEDLES) 31G X 8 MM 1 Device by * route daily Patient due for office visit for further refills. 30 each 2     insulin glargine (LANTUS) 100 UNIT/ML injection Lantus 70 units am and 70 units pm 135 mL 3     insulin lispro (HUMALOG PEN) 100 UNIT/ML injection 3 units per 15 gram CHO and sliding scale 1:10 above 140 mg/dl. Uses approx 90 units daily. 45 mL 3     liraglutide (VICTOZA PEN) 18 MG/3ML soln Inject 1.8 mg Subcutaneous daily 12 mL 3     levothyroxine (SYNTHROID/LEVOTHROID) 75 MCG tablet Take 1 tablet (75 mcg) by mouth daily 90 tablet 3     metFORMIN (GLUCOPHAGE-XR) 500 MG 24 hr tablet Take 4 tablets (2,000 mg) by mouth At Bedtime 360 tablet 3     metoprolol (TOPROL XL) 25 MG 24 hr tablet Take 1 tablet (25 mg) by mouth daily 90 tablet 3     ASPIRIN EC PO Take 325 mg by mouth daily       Ibuprofen (ADVIL PO) Take 600 mg by mouth 4 times daily       Multiple Vitamins-Minerals (ADVANCED DIABETIC MULTIVITAMIN) TABS Take 1 tablet by mouth daily       Garlic 1000 MG CAPS Take 1 capsule by mouth daily Takes during summer months.  Done taking until next summer.       ORDER FOR DME, SET TO LOCAL PRINT, Respironics REMSTAR 60 Series Auto CPAP 8-15cm H2O, Airfit P10  nasal pillow mask w/medium pillows         Savita MONET. COA. OSC

## 2017-10-17 ENCOUNTER — TRANSFERRED RECORDS (OUTPATIENT)
Dept: HEALTH INFORMATION MANAGEMENT | Facility: CLINIC | Age: 62
End: 2017-10-17

## 2017-10-17 ENCOUNTER — TELEPHONE (OUTPATIENT)
Dept: OPHTHALMOLOGY | Facility: CLINIC | Age: 62
End: 2017-10-17

## 2017-10-17 ENCOUNTER — OFFICE VISIT (OUTPATIENT)
Dept: OPHTHALMOLOGY | Facility: CLINIC | Age: 62
End: 2017-10-17
Attending: OPTOMETRIST
Payer: COMMERCIAL

## 2017-10-17 DIAGNOSIS — H25.13 AGE-RELATED NUCLEAR CATARACT OF BOTH EYES: Primary | ICD-10-CM

## 2017-10-17 PROCEDURE — 92002 INTRM OPH EXAM NEW PATIENT: CPT | Performed by: OPHTHALMOLOGY

## 2017-10-17 ASSESSMENT — SLIT LAMP EXAM - LIDS
COMMENTS: NORMAL
COMMENTS: NORMAL

## 2017-10-17 ASSESSMENT — EXTERNAL EXAM - RIGHT EYE: OD_EXAM: NORMAL

## 2017-10-17 ASSESSMENT — VISUAL ACUITY
METHOD: SNELLEN - LINEAR
OS_SC: 20/30
OD_SC: 20/30
OS_SC+: -1

## 2017-10-17 ASSESSMENT — TONOMETRY
OD_IOP_MMHG: 16
OS_IOP_MMHG: 19
IOP_METHOD: ICARE

## 2017-10-17 ASSESSMENT — EXTERNAL EXAM - LEFT EYE: OS_EXAM: NORMAL

## 2017-10-17 ASSESSMENT — CUP TO DISC RATIO
OS_RATIO: 0.5
OD_RATIO: 0.5

## 2017-10-17 NOTE — PROGRESS NOTES
Assessment & Plan   Sherry Slaughter is a 62 year old female who presents with:   Review of systems for the eyes was negative other than the pertinent positives and negatives noted in the HPI.  History is obtained from the patient.    Age-related nuclear cataract of both eyes  - visually significant left eye greater than right eye.  - A's and K's today - results reviewed and appropriate lenses chosen, see scanned documentation.  - Schedule surgery - at [facility]   Discussed lens options. Standard lens monofocal vs monovision. Multifocal vs Toric     Risks, benefits, an alternatives of intended procedure discussed. She wishes to proceed.    Pt would like to proceed with surgery starting with the left eye using a standard lens setting both eyes for distance.    She understands that She may still need to wear glasses after surgery.    Return for surgery    Documentation for today's encounter was performed by Savita Ramirez COA. OSC. Acting as a scribe in my presence. I have reviewed and verified that it is an accurate recording of today's encounter.    Attending Physician Attestation:  Complete documentation of historical and exam elements from today's encounter can be found in the full encounter summary report (not reduplicated in this progress note).  I personally obtained the chief complaint(s) and history of present illness.  I confirmed and edited as necessary the review of systems, past medical/surgical history, family history, social history, and examination findings as documented by others; and I examined the patient myself.  I personally reviewed the relevant tests, images, and reports as documented above.  I formulated and edited as necessary the assessment and plan and discussed the findings and management plan with the patient and family. - Bandar Linares MD

## 2017-10-17 NOTE — TELEPHONE ENCOUNTER
Surgery orders sent to Lesvia at Dr. Linares's [place] office.  Albertina Pineda, Surgery Scheduling Coordinator

## 2017-10-17 NOTE — LETTER
10/17/2017       RE: Sherry Slaughter  [address]  [facility] [address]     Dear Colleague,    Thank you for referring your patient, Sherry Slaughter, to the [facility] at [facility]. Please see a copy of my visit note below.    Assessment & Plan   Sherry Slaughter is a 62 year old female who presents with:   Review of systems for the eyes was negative other than the pertinent positives and negatives noted in the HPI.  History is obtained from the patient.    Age-related nuclear cataract of both eyes  - visually significant left eye greater than right eye.  - A's and K's today - results reviewed and appropriate lenses chosen, see scanned documentation.  - Schedule surgery - at [facility]   Discussed lens options. Standard lens monofocal vs monovision. Multifocal vs Toric     Risks, benefits, an alternatives of intended procedure discussed. She wishes to proceed.    Pt would like to proceed with surgery starting with the left eye using a standard lens setting both eyes for distance.    She understands that She may still need to wear glasses after surgery.    Return for surgery    Documentation for today's encounter was performed by Savita Ramirez COA. OSC. Acting as a scribe in my presence. I have reviewed and verified that it is an accurate recording of today's encounter.    Attending Physician Attestation:  Complete documentation of historical and exam elements from today's encounter can be found in the full encounter summary report (not reduplicated in this progress note).  I personally obtained the chief complaint(s) and history of present illness.  I confirmed and edited as necessary the review of systems, past medical/surgical history, family history, social history, and examination findings as documented by others; and I examined the patient myself.  I personally reviewed the relevant tests, images, and reports as  documented above.  I formulated and edited as necessary the assessment and plan and discussed the findings and management plan with the patient and family. - Bandar Linares MD      Again, thank you for allowing me to participate in the care of your patient.      Sincerely,    Bandar Linares MD

## 2017-10-17 NOTE — PATIENT INSTRUCTIONS
Dr. Linares's surgery scheduler will call you to set up your surgery and follow up appointments following your surgery. You will need a pre op physical with your primary doctor within 30 days both of your separate eye surgeries.  (ex. If you are scheduled to have surgery on Aug 8, and Aug 15 you will want to have your pre op physical after July 15 but before Aug 7. This will ensure that the preoperative physical will be able to be used for both surgeries and you will not have to get a second physical)  You will be prescribed 3 eye drops to start before surgery. An antibiotic that you will use for a total of 10 days, 1 anti inflammatory drop and a steroid eye drop that you will use for a total of 21 days.    When you schedule your surgery you should get a kit from the RN or surgery scheduler.     This kit will include: 1 pair of post surgical sunglasses,  and tape to secure the shield on that you will receive during the procedure.      Please remember to bring your kit with you on the day of surgery.    Prescription medications to be included in your eye kit and direction are as follow: (these could be different however based on your insurance coverage, please make sure to follow the directions on the bottles)

## 2017-10-17 NOTE — MR AVS SNAPSHOT
After Visit Summary   10/17/2017    Sherry Slaughter    MRN: [id number]           Patient Information     Date Of Birth          1955        Visit Information        Provider Department      10/17/2017 9:15 AM Bandar Linares MD [facility]        Today's Diagnoses     Age-related nuclear cataract of both eyes    -  1      Care Instructions    Dr. Linares's surgery scheduler will call you to set up your surgery and follow up appointments following your surgery. You will need a pre op physical with your primary doctor within 30 days both of your separate eye surgeries.  (ex. If you are scheduled to have surgery on Aug 8, and Aug 15 you will want to have your pre op physical after July 15 but before Aug 7. This will ensure that the preoperative physical will be able to be used for both surgeries and you will not have to get a second physical)  You will be prescribed 3 eye drops to start before surgery. An antibiotic that you will use for a total of 10 days, 1 anti inflammatory drop and a steroid eye drop that you will use for a total of 21 days.    When you schedule your surgery you should get a kit from the RN or surgery scheduler.     This kit will include: 1 pair of post surgical sunglasses,  and tape to secure the shield on that you will receive during the procedure.      Please remember to bring your kit with you on the day of surgery.    Prescription medications to be included in your eye kit and direction are as follow: (these could be different however based on your insurance coverage, please make sure to follow the directions on the bottles)                    Follow-ups after your visit        Follow-up notes from your care team     Return for Surgery.      Your next 10 appointments already scheduled     Nov 02, 2017  9:10 AM CDT   MyChart Long with Marilou Arciniega MD PhD   [facility]    [address] [place] [address]  [facility] [id number] [phone]            Dec 18, 2017  9:00 AM CST   Return Visit with Renny Agarwal MD, [facility] ENDO NURSE   [facility] ([facility]    [address] [place] [id number] [phone]              Who to contact     If you have questions or need follow up information about today's clinic visit or your schedule please contact [facility] directly at [phone].  Normal or non-critical lab and imaging results will be communicated to you by [employer], letter or phone within 4 business days after the clinic has received the results. If you do not hear from us within 7 days, please contact the clinic through [employer] or phone. If you have a critical or abnormal lab result, we will notify you by phone as soon as possible.  Submit refill requests through [employer] or call your pharmacy and they will forward the refill request to us. Please allow 3 business days for your refill to be completed.          Additional Information About Your Visit        MyChart Information     [employer] gives you secure access to your electronic health record. If you see a primary care provider, you can also send messages to your care team and make appointments. If you have questions, please call your primary care clinic.  If you do not have a primary care provider, please call [phone] and they will assist you.      [employer] is an electronic gateway that provides easy, online access to your medical records. With [employer], you can request a clinic appointment, read your test results, renew a prescription or communicate with your care team.     To access your existing account, please contact your [facility] or call [phone] for assistance.        Care EveryWhere ID     This is your Care EveryWhere ID. This could be used by other organizations to access your [facility] medical records  [id number]        Your Vitals  Were     Last Period                   10/02/2007            Blood Pressure from Last 3 Encounters:   07/03/17 140/71   02/06/17 120/61   10/20/16 137/79    Weight from Last 3 Encounters:   07/03/17 133.9 kg (295 lb 1.6 oz)   02/06/17 133.6 kg (294 lb 8.6 oz)   10/17/16 130.6 kg (288 lb)              We Performed the Following     EYE EXAM, NEW PATIENT,COMPREHESV        Primary Care Provider Office Phone # Fax #    Marilou Arciniega MD PhD [phone]       [address]  [facility] [address]        Equal Access to Services     [facility]: Hadii aad ku hadasho Sorikyali, waaxda luqadaha, qaybta kaalmada adechrisyada, acosta pennington . So [facility] [phone].    ATENCIÓN: Si habla español, tiene a espinosa disposición servicios gratuitos de asistencia lingüística. Llame al [phone].    We comply with applicable federal civil rights laws and [place] laws. We do not discriminate on the basis of race, color, national origin, age, disability, sex, sexual orientation, or gender identity.            Thank you!     Thank you for choosing [facility]  for your care. Our goal is always to provide you with excellent care. Hearing back from our patients is one way we can continue to improve our services. Please take a few minutes to complete the written survey that you may receive in the mail after your visit with us. Thank you!             Your Updated Medication List - Protect others around you: Learn how to safely use, store and throw away your medicines at www.disposemymeds.org.          This list is accurate as of: 10/17/17 10:06 AM.  Always use your most recent med list.                   Brand Name Dispense Instructions for use Diagnosis    ADVANCED DIABETIC MULTIVITAMIN Tabs      Take 1 tablet by mouth daily        ADVIL PO      Take 600 mg by mouth 4 times daily        ASPIRIN EC PO      Take 325 mg by mouth daily        blood glucose monitoring test strip    no brand  specified    100 each    Use to test blood sugar 4 times daily or as directed.    Type 2 diabetes mellitus without complication, with long-term current use of insulin (H)       Garlic 1000 MG Caps      Take 1 capsule by mouth daily Takes during summer months.  Done taking until next summer.        hydrochlorothiazide 25 MG tablet    HYDRODIURIL    30 tablet    Take 1 tablet (25 mg) by mouth daily    Essential hypertension with goal blood pressure less than 140/90       insulin glargine 100 UNIT/ML injection    LANTUS    135 mL    Lantus 70 units am and 70 units pm    Long-term insulin use (H)       insulin lispro 100 UNIT/ML injection    HumaLOG PEN    45 mL    3 units per 15 gram CHO and sliding scale 1:10 above 140 mg/dl. Uses approx 90 units daily.    Long-term insulin use (H)       insulin pen needle 31G X 8 MM    GNP CLICKFINE PEN NEEDLES    30 each    1 Device by * route daily Patient due for office visit for further refills.    Type 2 diabetes, HbA1c goal < 7% (H)       levothyroxine 75 MCG tablet    SYNTHROID/LEVOTHROID    90 tablet    Take 1 tablet (75 mcg) by mouth daily    Hypothyroidism due to acquired atrophy of thyroid       liraglutide 18 MG/3ML soln    VICTOZA PEN    12 mL    Inject 1.8 mg Subcutaneous daily    Type 2 diabetes mellitus with hyperglycemia, with long-term current use of insulin (H)       lisinopril 40 MG tablet    PRINIVIL/ZESTRIL    45 tablet    Take 1.5 tablets (60 mg) by mouth daily    Essential hypertension with goal blood pressure less than 140/90       metFORMIN 500 MG 24 hr tablet    GLUCOPHAGE-XR    360 tablet    Take 4 tablets (2,000 mg) by mouth At Bedtime    Uncontrolled type 2 diabetes mellitus with hyperglycemia, with long-term current use of insulin (H)       metoprolol 25 MG 24 hr tablet    TOPROL XL    90 tablet    Take 1 tablet (25 mg) by mouth daily    Essential hypertension with goal blood pressure less than 140/90       order for [facility]      Respironics REMSTAR 60  Series Auto CPAP 8-15cm H2O, Airfit P10 nasal pillow mask w/medium pillows        rosuvastatin 10 MG tablet    CRESTOR    30 tablet    Take 1 tablet (10 mg) by mouth daily    Essential hypertension with goal blood pressure less than 140/90

## 2017-10-17 NOTE — NURSING NOTE
Patient presents with:  Cataract Evaluation: refered from Dr Pedraza, having trouble with night vision, lots of glare and halos      Referring Provider:  [place]  [address] [place] [address]    HPI    Symptoms:     Decreased vision   Difficulty with reading   Difficulty watching television   Difficulty with driving   Glare   Halos      Frequency:  Constant       Do you have eye pain now?:  No      Comments:     Cataract Evaluation: refered from Dr Pedraza, having trouble with night vision, lots of glare and halos               Savita MONET. COA. OSC

## 2017-10-17 NOTE — TELEPHONE ENCOUNTER
"Procedure: left Cataract  Facility: [facility]  Length: 0.5 hour(s)  Surgeon:Patrick Linares MD  Anesthesia: Local with MAC  Diagnosis: Cataract  Out Patient or AM admit:  (Same day)  BMI:Estimated body mass index is 42.34 kg/(m^2) as calculated from the following:    Height as of 7/3/17: 1.778 m (5' 10\").    Weight as of 7/3/17: 133.9 kg (295 lb 1.6 oz). (If over 43 for general or 45 for MAC cannot be scheduled at [facility])   Pre-op Appointments needed: History & Physical within 30 days of surgery  Post-op appointments needed: Cataract1 day 1 week   needed:No   Surgery packet/instructions given to patient?:  No  Pre op teaching done:  No  Lens Orders Needed: Yes: ZCB00 23.0, with incision at  135    Referring provider: Royal Arrington Considerations:       Procedure: right Cataract  Facility: [facility]  Length: 0.5 hour(s)  Surgeon:Patrick Linares MD  Anesthesia: Local with MAC  Diagnosis: Cataract  Out Patient or AM admit:  (Same day)  BMI:Estimated body mass index is 42.34 kg/(m^2) as calculated from the following:    Height as of 7/3/17: 1.778 m (5' 10\").    Weight as of 7/3/17: 133.9 kg (295 lb 1.6 oz). (If over 43 for general or 45 for MAC cannot be scheduled at [facility])   Pre-op Appointments needed: History & Physical within 30 days of surgery  Post-op appointments needed: Cataract 1 day 1 week 4 weeks   needed:No   Surgery packet/instructions given to patient?:  No  Pre op teaching done:  No  Lens Orders Needed: Yes: ZCB00 23.0, with incision at  135    Referring provider:   Special Considerations:       PT WANTS ASAP!!            "

## 2017-11-02 ENCOUNTER — TELEPHONE (OUTPATIENT)
Dept: PEDIATRICS | Facility: CLINIC | Age: 62
End: 2017-11-02

## 2017-11-02 ENCOUNTER — OFFICE VISIT (OUTPATIENT)
Dept: PEDIATRICS | Facility: CLINIC | Age: 62
End: 2017-11-02
Payer: COMMERCIAL

## 2017-11-02 VITALS
HEART RATE: 81 BPM | OXYGEN SATURATION: 97 % | TEMPERATURE: 97.1 F | DIASTOLIC BLOOD PRESSURE: 78 MMHG | WEIGHT: 293 LBS | BODY MASS INDEX: 42.9 KG/M2 | SYSTOLIC BLOOD PRESSURE: 136 MMHG

## 2017-11-02 DIAGNOSIS — I10 ESSENTIAL HYPERTENSION WITH GOAL BLOOD PRESSURE LESS THAN 140/90: Primary | ICD-10-CM

## 2017-11-02 DIAGNOSIS — Z79.4 TYPE 2 DIABETES MELLITUS WITHOUT COMPLICATION, WITH LONG-TERM CURRENT USE OF INSULIN (H): ICD-10-CM

## 2017-11-02 DIAGNOSIS — E66.01 MORBID OBESITY (H): ICD-10-CM

## 2017-11-02 DIAGNOSIS — E78.5 HYPERLIPIDEMIA LDL GOAL <100: ICD-10-CM

## 2017-11-02 DIAGNOSIS — E11.9 TYPE 2 DIABETES MELLITUS WITHOUT COMPLICATION, WITH LONG-TERM CURRENT USE OF INSULIN (H): ICD-10-CM

## 2017-11-02 DIAGNOSIS — Z12.31 ENCOUNTER FOR SCREENING MAMMOGRAM FOR BREAST CANCER: ICD-10-CM

## 2017-11-02 DIAGNOSIS — I10 ESSENTIAL HYPERTENSION WITH GOAL BLOOD PRESSURE LESS THAN 140/90: ICD-10-CM

## 2017-11-02 PROCEDURE — 99214 OFFICE O/P EST MOD 30 MIN: CPT | Performed by: INTERNAL MEDICINE

## 2017-11-02 RX ORDER — LISINOPRIL 40 MG/1
60 TABLET ORAL DAILY
Qty: 135 TABLET | Refills: 3 | Status: SHIPPED | OUTPATIENT
Start: 2017-11-02 | End: 2018-09-17

## 2017-11-02 RX ORDER — LISINOPRIL 40 MG/1
60 TABLET ORAL DAILY
Qty: 45 TABLET | Refills: 3 | Status: SHIPPED | OUTPATIENT
Start: 2017-11-02 | End: 2017-11-02

## 2017-11-02 RX ORDER — METOPROLOL SUCCINATE 25 MG/1
25 TABLET, EXTENDED RELEASE ORAL DAILY
Qty: 90 TABLET | Refills: 3 | Status: SHIPPED | OUTPATIENT
Start: 2017-11-02 | End: 2018-09-17

## 2017-11-02 RX ORDER — ROSUVASTATIN CALCIUM 10 MG/1
10 TABLET, COATED ORAL DAILY
Qty: 90 TABLET | Refills: 3 | Status: SHIPPED | OUTPATIENT
Start: 2017-11-02 | End: 2018-09-17

## 2017-11-02 RX ORDER — HYDROCHLOROTHIAZIDE 25 MG/1
25 TABLET ORAL DAILY
Qty: 90 TABLET | Refills: 3 | Status: SHIPPED | OUTPATIENT
Start: 2017-11-02 | End: 2018-09-17

## 2017-11-02 NOTE — MR AVS SNAPSHOT
After Visit Summary   11/2/2017    Sherry Slaughter    MRN: [id number]           Patient Information     Date Of Birth          1955        Visit Information        Provider Department      11/2/2017 9:10 AM Marilou Arciniega MD PhD [facility]        Today's Diagnoses     Essential hypertension with goal blood pressure less than 140/90    -  1    Type 2 diabetes mellitus without complication, with long-term current use of insulin (H)        Hyperlipidemia LDL goal <100        Encounter for screening mammogram for breast cancer        Need for shingles vaccine          Care Instructions    Return in one year for annual check up.     Mammogram next year.     Find out from [employer] the date and type of Tetanus vaccine you received.       Medication(s) prescribed today:    Orders Placed This Encounter   Medications     hydrochlorothiazide (HYDRODIURIL) 25 MG tablet     Sig: Take 1 tablet (25 mg) by mouth daily     Dispense:  90 tablet     Refill:  3     lisinopril (PRINIVIL/ZESTRIL) 40 MG tablet     Sig: Take 1.5 tablets (60 mg) by mouth daily     Dispense:  45 tablet     Refill:  3     rosuvastatin (CRESTOR) 10 MG tablet     Sig: Take 1 tablet (10 mg) by mouth daily     Dispense:  90 tablet     Refill:  3     metoprolol (TOPROL XL) 25 MG 24 hr tablet     Sig: Take 1 tablet (25 mg) by mouth daily     Dispense:  90 tablet     Refill:  3                Follow-ups after your visit        Your next 10 appointments already scheduled     Dec 18, 2017  9:00 AM CST   Return Visit with Renny Agarwal MD, MG ENDO NURSE   [facility] ([facility]    [address] [place] [id number] [phone]              Future tests that were ordered for you today     Open Future Orders        Priority Expected Expires Ordered    MA Screening Digital Bilateral Routine  11/2/2018 11/2/2017            Who to contact     If you have questions or  need follow up information about today's clinic visit or your schedule please contact [facility] directly at [phone].  Normal or non-critical lab and imaging results will be communicated to you by [employer], letter or phone within 4 business days after the clinic has received the results. If you do not hear from us within 7 days, please contact the clinic through [employer] or phone. If you have a critical or abnormal lab result, we will notify you by phone as soon as possible.  Submit refill requests through [employer] or call your pharmacy and they will forward the refill request to us. Please allow 3 business days for your refill to be completed.          Additional Information About Your Visit        [employer] Information     [employer] gives you secure access to your electronic health record. If you see a primary care provider, you can also send messages to your care team and make appointments. If you have questions, please call your primary care clinic.  If you do not have a primary care provider, please call [phone] and they will assist you.      [employer] is an electronic gateway that provides easy, online access to your medical records. With [employer], you can request a clinic appointment, read your test results, renew a prescription or communicate with your care team.     To access your existing account, please contact your [facility] or call [phone] for assistance.        Care EveryWhere ID     This is your Care EveryWhere ID. This could be used by other organizations to access your [facility] medical records  [id number]        Your Vitals Were     Pulse Temperature Last Period Pulse Oximetry BMI (Body Mass Index)       81 97.1  F (36.2  C) (Temporal) 10/02/2007 97% 42.9 kg/m2        Blood Pressure from Last 3 Encounters:   11/02/17 136/78   07/03/17 140/71   02/06/17 120/61    Weight from Last 3 Encounters:   11/02/17 299 lb (135.6 kg)   07/03/17 295 lb 1.6  oz (133.9 kg)   02/06/17 294 lb 8.6 oz (133.6 kg)              We Performed the Following     ZOSTER VACC LIVE SUBQ NJX          Where to get your medicines      These medications were sent to [facility] - [place], [place] - [address]  [address], [facility] [address]     Phone:  [phone]     hydrochlorothiazide 25 MG tablet    lisinopril 40 MG tablet    metoprolol 25 MG 24 hr tablet    rosuvastatin 10 MG tablet          Primary Care Provider Office Phone # Fax #    Marilou Arciniega MD PhD [phone]       [address]  [facility] [address]        Equal Access to Services     [facility]: Hadii aad ku hadasho Soomaali, waaxda luqadaha, qaybta kaalmada adeegyada, waxtomasz [place] . So [facility] [phone].    ATENCIÓN: Si habla español, tiene a espinosa disposición servicios gratuitos de asistencia lingüística. Llame al [phone].    We comply with applicable federal civil rights laws and [place] laws. We do not discriminate on the basis of race, color, national origin, age, disability, sex, sexual orientation, or gender identity.            Thank you!     Thank you for choosing [facility]  for your care. Our goal is always to provide you with excellent care. Hearing back from our patients is one way we can continue to improve our services. Please take a few minutes to complete the written survey that you may receive in the mail after your visit with us. Thank you!             Your Updated Medication List - Protect others around you: Learn how to safely use, store and throw away your medicines at www.disposemymeds.org.          This list is accurate as of: 11/2/17  9:40 AM.  Always use your most recent med list.                   Brand Name Dispense Instructions for use Diagnosis    ADVANCED DIABETIC MULTIVITAMIN Tabs      Take 1 tablet by mouth daily        ADVIL PO      Take 600 mg by mouth 4 times daily        ASPIRIN EC PO       Take 325 mg by mouth daily        blood glucose monitoring test strip    no brand specified    100 each    Use to test blood sugar 4 times daily or as directed.    Type 2 diabetes mellitus without complication, with long-term current use of insulin (H)       Garlic 1000 MG Caps      Take 1 capsule by mouth daily Takes during summer months.  Done taking until next summer.        hydrochlorothiazide 25 MG tablet    HYDRODIURIL    90 tablet    Take 1 tablet (25 mg) by mouth daily    Essential hypertension with goal blood pressure less than 140/90       insulin glargine 100 UNIT/ML injection    LANTUS    135 mL    Lantus 70 units am and 70 units pm    Long-term insulin use (H)       insulin lispro 100 UNIT/ML injection    HumaLOG PEN    45 mL    3 units per 15 gram CHO and sliding scale 1:10 above 140 mg/dl. Uses approx 90 units daily.    Long-term insulin use (H)       insulin pen needle 31G X 8 MM    GNP CLICKFINE PEN NEEDLES    30 each    1 Device by * route daily Patient due for office visit for further refills.    Type 2 diabetes, HbA1c goal < 7% (H)       levothyroxine 75 MCG tablet    SYNTHROID/LEVOTHROID    90 tablet    Take 1 tablet (75 mcg) by mouth daily    Hypothyroidism due to acquired atrophy of thyroid       liraglutide 18 MG/3ML soln    VICTOZA PEN    12 mL    Inject 1.8 mg Subcutaneous daily    Type 2 diabetes mellitus with hyperglycemia, with long-term current use of insulin (H)       lisinopril 40 MG tablet    PRINIVIL/ZESTRIL    45 tablet    Take 1.5 tablets (60 mg) by mouth daily    Essential hypertension with goal blood pressure less than 140/90       metFORMIN 500 MG 24 hr tablet    GLUCOPHAGE-XR    360 tablet    Take 4 tablets (2,000 mg) by mouth At Bedtime    Uncontrolled type 2 diabetes mellitus with hyperglycemia, with long-term current use of insulin (H)       metoprolol 25 MG 24 hr tablet    TOPROL XL    90 tablet    Take 1 tablet (25 mg) by mouth daily    Essential hypertension with  goal blood pressure less than 140/90       order for DME      Respironics REMSTAR 60 Series Auto CPAP 8-15cm H2O, Airfit P10 nasal pillow mask w/medium pillows        rosuvastatin 10 MG tablet    CRESTOR    90 tablet    Take 1 tablet (10 mg) by mouth daily    Hyperlipidemia LDL goal <100

## 2017-11-02 NOTE — NURSING NOTE
"Chief Complaint   Patient presents with     Recheck Medication       Initial /78  Pulse 81  Temp 97.1  F (36.2  C) (Temporal)  Wt 299 lb (135.6 kg)  LMP 10/02/2007  SpO2 97%  BMI 42.9 kg/m2 Estimated body mass index is 42.9 kg/(m^2) as calculated from the following:    Height as of 7/3/17: 5' 10\" (1.778 m).    Weight as of this encounter: 299 lb (135.6 kg).  Medication Reconciliation: complete    "

## 2017-11-02 NOTE — PATIENT INSTRUCTIONS
Return in one year for annual check up.     Mammogram next year.     Find out from [employer] the date and type of Tetanus vaccine you received.       Medication(s) prescribed today:    Orders Placed This Encounter   Medications     hydrochlorothiazide (HYDRODIURIL) 25 MG tablet     Sig: Take 1 tablet (25 mg) by mouth daily     Dispense:  90 tablet     Refill:  3     lisinopril (PRINIVIL/ZESTRIL) 40 MG tablet     Sig: Take 1.5 tablets (60 mg) by mouth daily     Dispense:  45 tablet     Refill:  3     rosuvastatin (CRESTOR) 10 MG tablet     Sig: Take 1 tablet (10 mg) by mouth daily     Dispense:  90 tablet     Refill:  3     metoprolol (TOPROL XL) 25 MG 24 hr tablet     Sig: Take 1 tablet (25 mg) by mouth daily     Dispense:  90 tablet     Refill:  3

## 2017-11-02 NOTE — PROGRESS NOTES
SUBJECTIVE:   Sherry Slaughter is a 62 year old female who presents to clinic today for the following health issues:      Medication Followup of all meds    Taking Medication as prescribed: yes    Side Effects:  None    Medication Helping Symptoms:  yes     Sherry has Morbid obesity (H); Hypothyroidism; GENESIS (obstructive sleep apnea); Hypertension goal BP (blood pressure) < 140/90; Mitral valve insufficiency; Tubular adenoma of colon; Long-term insulin use (H); and Uncontrolled diabetes mellitus (H) on her pertinent problem list.    Patient reports she has been doing well.  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  She hopes that this new job will be less more anterior on her body.    She has diabetes follow up with endocrine in December.    She reports occasional palpitations.  It so infrequent that she has never been able to have this Trip to monitor.  She has seen cardiology couple years ago.  She thinks those episodes are atrial fibrillation because her brother and sisters both have it.  She declined any further evaluation at this time.      ROS:  Constitutional, HEENT, cardiovascular, pulmonary, gi and gu systems are negative, except as otherwise noted.       Current Outpatient Prescriptions on File Prior to Visit:  insulin glargine (LANTUS) 100 UNIT/ML injection Lantus 70 units am and 70 units pm   insulin lispro (HUMALOG PEN) 100 UNIT/ML injection 3 units per 15 gram CHO and sliding scale 1:10 above 140 mg/dl. Uses approx 90 units daily.   liraglutide (VICTOZA PEN) 18 MG/3ML soln Inject 1.8 mg Subcutaneous daily   levothyroxine (SYNTHROID/LEVOTHROID) 75 MCG tablet Take 1 tablet (75 mcg) by mouth daily   metFORMIN (GLUCOPHAGE-XR) 500 MG 24 hr tablet Take 4 tablets (2,000 mg) by mouth At Bedtime   ASPIRIN EC PO Take 325 mg by mouth daily   Ibuprofen (ADVIL PO) Take 600 mg by mouth 4 times daily   Multiple Vitamins-Minerals (ADVANCED DIABETIC  MULTIVITAMIN) TABS Take 1 tablet by mouth daily   Garlic 1000 MG CAPS Take 1 capsule by mouth daily Takes during summer months.  Done taking until next summer.   [DISCONTINUED] hydrochlorothiazide (HYDRODIURIL) 25 MG tablet Take 1 tablet (25 mg) by mouth daily   [DISCONTINUED] lisinopril (PRINIVIL/ZESTRIL) 40 MG tablet Take 1.5 tablets (60 mg) by mouth daily   [DISCONTINUED] rosuvastatin (CRESTOR) 10 MG tablet Take 1 tablet (10 mg) by mouth daily   blood glucose monitoring (NO BRAND SPECIFIED) test strip Use to test blood sugar 4 times daily or as directed.   insulin pen needle (GNP [employer] PEN NEEDLES) 31G X 8 MM 1 Device by * route daily Patient due for office visit for further refills.   [DISCONTINUED] metoprolol (TOPROL XL) 25 MG 24 hr tablet Take 1 tablet (25 mg) by mouth daily   ORDER FOR DME, SET TO LOCAL PRINT, Respironics REMSTAR 60 Series Auto CPAP 8-15cm H2O, Airfit P10 nasal pillow mask w/medium pillows     No current facility-administered medications on file prior to visit.     Patient Active Problem List   Diagnosis     Morbid obesity (H)     Diverticulosis of large intestine     Nonspecific abnormal results of cardiovascular function study     FAMILY HX COLON CANCER     Nonallopathic lesion of thoracic region     Hypothyroidism     GENESIS (obstructive sleep apnea)     Irritable bowel syndrome     Family history of malignant neoplasm of breast     History of major depression     OSTEOARTHRITIS L KNEE  s/p knee replacement on the left      Hypertension goal BP (blood pressure) < 140/90     Family history of stroke (cerebrovascular)     Family history of other cardiovascular diseases     Family history of diabetes mellitus     ABSENCE OF MENSTRUATION - perimenopausal      JOINT PAIN-ANKLE - right ankle      Mitral valve insufficiency     Hyperlipidemia LDL goal <100     Aortic sclerosis     Cataracts, both eyes     Tubular adenoma of colon     History of viral hepatitis, type B     Chronic low back pain      Long-term insulin use (H)     Uncontrolled diabetes mellitus (H)     S/P total knee replacement using cement, right     Aftercare following right knee joint replacement surgery     Lumbago     Type 2 diabetes mellitus with hyperglycemia (H)     Posterior vitreous detachment of right eye     Past Surgical History:   Procedure Laterality Date     ARTHROPLASTY KNEE Right 9/23/2015    Procedure: ARTHROPLASTY KNEE;  Surgeon: Rufus Brown MD;  Location: [facility] OR     C NONSPECIFIC PROCEDURE  6/06    right triple arthrodecesis      C NONSPECIFIC PROCEDURE  7/06     right bunion surgery      C TOTAL KNEE ARTHROPLASTY  3/03    L knee     COLONOSCOPY  4/04    diverticulosis, rec repeat 10 yrs(consider fam hx?)     ORTHOPEDIC SURGERY      right ankle     STRESS ECHO (METRO)  12/03    no ischemia, limited by fatigue     SURGICAL HISTORY OF -       EXP LAP- R OVARY CYSTS     SURGICAL HISTORY OF -   1981,1984,1985    CHILDBIRTH       Social History   Substance Use Topics     Smoking status: Never Smoker     Smokeless tobacco: Never Used      Comment: tried in early 20s only      Alcohol use Yes      Comment: Approx 3 times a year - mild increase now      Family History   Problem Relation Age of Onset     DIABETES Maternal Grandmother      DM TYPE 2     CEREBROVASCULAR DISEASE Maternal Grandmother      Hypertension Sister      Lipids Sister      Hypertension Sister      Lipids Sister      Gynecology Sister      Blood Disease Paternal Grandmother      T CELL LEUKEMIA     Hypertension Brother      Lipids Brother      Congenital Anomalies Brother      Cardiovascular Brother      Hypertension Brother      Lipids Brother      Obesity Brother      Hypertension Brother      Respiratory Brother      Sleep apnea     Alzheimer Disease Mother      Asthma Mother      Hypertension Mother      Breast Cancer Mother 40     has mastectomy and lived to 84     Allergies Mother      Sulfa,     Arthritis Mother      Cardiovascular Mother       Depression Mother      Respiratory Mother      Lipids Mother      CANCER Mother      Thyroid Disease Mother      CEREBROVASCULAR DISEASE Mother      Cancer - colorectal Other      CANCER Father      Aneurysm Father      brother, AAA     Other Cancer Father      lung ca     Cancer - colorectal Paternal Uncle      late 50s to early 60s - great uncles      Breast Cancer Other      Maternal cousin     Arrhythmia Sister      2 brothers 1 sister Afib     Breast Cancer Maternal Aunt 62     Other Cancer Maternal Aunt 35     Ovarian cancer.  Survived despite late recurrence and is now 92.     Glaucoma No family hx of      Macular Degeneration No family hx of             Problem list, Medication list, Allergies, and Medical/Social/Surgical histories reviewed in [facility] and updated as appropriate.    OBJECTIVE:                                                    /78  Pulse 81  Temp 97.1  F (36.2  C) (Temporal)  Wt 299 lb (135.6 kg)  LMP 10/02/2007  SpO2 97%  BMI 42.9 kg/m2    GEN: healthy, alert and no distress  HEENT: unremarkable.  Neck:     supple, no thyromegaly, no cervical lymphadenopathy.   ARTUR:  CTA B/L, no wheezing or crackles.  CV:  RRR no murmur.  Intact distal pulses, good cap refill.  Abd: soft, normal active bowel sounds, non tender, non distended. No mass or organomegaly.   Ext:  no cyanosis, clubbing or edema.       Component      Latest Ref Rng & Units 7/3/2017   Sodium      133 - 144 mmol/L 141   Potassium      3.4 - 5.3 mmol/L 4.4   Chloride      94 - 109 mmol/L 106   Carbon Dioxide      20 - 32 mmol/L 28   Anion Gap      3 - 14 mmol/L 7   Glucose      70 - 99 mg/dL 147 (H)   Urea Nitrogen      7 - 30 mg/dL 20   Creatinine      0.52 - 1.04 mg/dL 0.78   GFR Estimate      >60 mL/min/1.7m2 74   GFR Estimate If Black      >60 mL/min/1.7m2 90   Calcium      8.5 - 10.1 mg/dL 9.6   Cholesterol      <200 mg/dL 143   Triglycerides      <150 mg/dL 164 (H)   HDL Cholesterol      >49 mg/dL 39 (L)   LDL  Cholesterol Calculated      <100 mg/dL 71   Non HDL Cholesterol      <130 mg/dL 104   Creatinine Urine      mg/dL 86   Albumin Urine mg/L      mg/L 7   Albumin Urine mg/g Cr      0 - 25 mg/g Cr 8.21   Hepatitis C Antibody      NR Nonreactive . . .   ALT      0 - 50 U/L 48   TSH      0.40 - 4.00 mU/L 2.03   Hemoglobin A1C      4.3 - 6.0 % 7.7 (H)        ASSESSMENT/PLAN:                                                      62 year old female with the following diagnoses and treatment plan:      ICD-10-CM    1. Essential hypertension with goal blood pressure less than 140/90 I10 hydrochlorothiazide (HYDRODIURIL) 25 MG tablet     lisinopril (PRINIVIL/ZESTRIL) 40 MG tablet     metoprolol (TOPROL XL) 25 MG 24 hr tablet   2. Type 2 diabetes mellitus without complication, with long-term current use of insulin (H) E11.9     Z79.4    3. Hyperlipidemia LDL goal <100 E78.5 rosuvastatin (CRESTOR) 10 MG tablet   4. Encounter for screening mammogram for breast cancer Z12.31 MA Screening Digital Bilateral   5. Morbid obesity (H) E66.01        -- Blood pressure is still in control.  Her medications are refilled.  We discussed weight management.  Patient at this time is not interested in any formal evaluation for this.  She will see endocrine for diabetes follow up.    -- She plans to do mammogram next year when she has new health insurance. She reports having had tetanus vaccine with [employer] 2 months ago. I asked her to get the date and record of which type of tetanus vaccine she received.    -- discussed the implication of atrial fibrillation, even if PAF, she would be high risk for stroke, and anticoagulation would be recommended if the palpitations she experienced are indeed afib. She is ICU nurse, aware of the implication but not interested in pursuing further evaluation at this time.   -- return to see me in one year for chronic disease review.     Will call or return to clinic if worsening or symptoms not improving as  discussed.  See Patient Instructions.      Marilou Arciniega MD-PhD  [facility]    (Note: Chart documentation was done in part with Dragon Voice Recognition software. Although reviewed after completion, some word and grammatical errors may remain.)

## 2017-11-02 NOTE — TELEPHONE ENCOUNTER
[facility] Call Center    Phone Message    Name of Caller: [facility]    Phone Number: Other phone number:  [phone]    Best time to return call: any    May a detailed message be left on voicemail: NA    Relation to patient: Other Name: Charmaine  Relationship: [facility]  Is there legal documentation in chart to discuss information with this person: NA      Reason for Call: Pharmacy requesting prescription for lisinopril (PRINIVIL/ZESTRIL) 40 MG tablet be re-entered to reflect 90 day supply with 3 refills to match other prescriptions that were entered.    Action Taken: Message routed to:  Primary Care p [phone]

## 2017-11-21 DIAGNOSIS — E11.9 TYPE 2 DIABETES, HBA1C GOAL < 7% (H): ICD-10-CM

## 2018-01-08 NOTE — TELEPHONE ENCOUNTER
Date Scheduled: OS 3/15/18, OD 4/5/18  Facility: Other [facility]  Surgeon: Vijay   On google calendar?: Not Applicable  Post-op appointment scheduled:    scheduled?: Not Applicable  Surgery packet/instructions confirmed received?  mailed by [place] office  Special Considerations:   Savita PALACIOS. O[facility]

## 2018-01-11 ENCOUNTER — TELEPHONE (OUTPATIENT)
Dept: ENDOCRINOLOGY | Facility: CLINIC | Age: 63
End: 2018-01-11

## 2018-01-11 DIAGNOSIS — Z79.4 TYPE 2 DIABETES MELLITUS WITH HYPERGLYCEMIA, WITH LONG-TERM CURRENT USE OF INSULIN (H): ICD-10-CM

## 2018-01-11 DIAGNOSIS — E11.65 TYPE 2 DIABETES MELLITUS WITH HYPERGLYCEMIA, WITH LONG-TERM CURRENT USE OF INSULIN (H): ICD-10-CM

## 2018-01-11 RX ORDER — LIRAGLUTIDE 6 MG/ML
1.8 INJECTION SUBCUTANEOUS DAILY
Qty: 12 ML | Refills: 1 | Status: SHIPPED | OUTPATIENT
Start: 2018-01-11 | End: 2018-03-27

## 2018-01-11 NOTE — TELEPHONE ENCOUNTER
[facility] Call Center    Phone Message    Name of Caller: [place] [facility]    Phone Number: Other phone number:  [phone]    Best time to return call: soon    May a detailed message be left on voicemail: no      Reason for Call: Other: [facility] discharge pharmacy would like a call back they have some questions about the presciription Humalo. Please give a call back     Action Taken: Message routed to:  Adult Clinics: Endocrinology p [phone]

## 2018-01-11 NOTE — TELEPHONE ENCOUNTER
Contacted pharmacy to review and discuss question. Pharmacist states that patient's insurance is no longer covering Humalog and she needs a new prescription for Novolog. She also notes that patient needs a refill on her Victoza.    Confirmed no allergy on medication list for Novolog. Prescriptions will be sent to pharmacy. No added to prescriptions to call for appointment.     Kylah Katz RN  Endocrine Care Coordinator  [facility]

## 2018-03-14 ENCOUNTER — OFFICE VISIT (OUTPATIENT)
Dept: PEDIATRICS | Facility: CLINIC | Age: 63
End: 2018-03-14
Payer: COMMERCIAL

## 2018-03-14 VITALS
SYSTOLIC BLOOD PRESSURE: 122 MMHG | WEIGHT: 293 LBS | HEART RATE: 74 BPM | HEIGHT: 70 IN | TEMPERATURE: 97.7 F | BODY MASS INDEX: 41.95 KG/M2 | DIASTOLIC BLOOD PRESSURE: 68 MMHG | OXYGEN SATURATION: 97 %

## 2018-03-14 DIAGNOSIS — Z79.4 LONG-TERM INSULIN USE (H): ICD-10-CM

## 2018-03-14 DIAGNOSIS — H26.223 CATARACT OF BOTH EYES SECONDARY TO OCULAR DISORDER: ICD-10-CM

## 2018-03-14 DIAGNOSIS — Z01.818 PREOP GENERAL PHYSICAL EXAM: Primary | ICD-10-CM

## 2018-03-14 DIAGNOSIS — E03.4 HYPOTHYROIDISM DUE TO ACQUIRED ATROPHY OF THYROID: ICD-10-CM

## 2018-03-14 LAB
ANION GAP SERPL CALCULATED.3IONS-SCNC: 5 MMOL/L (ref 3–14)
BUN SERPL-MCNC: 19 MG/DL (ref 7–30)
CALCIUM SERPL-MCNC: 9.8 MG/DL (ref 8.5–10.1)
CHLORIDE SERPL-SCNC: 107 MMOL/L (ref 94–109)
CO2 SERPL-SCNC: 31 MMOL/L (ref 20–32)
CREAT SERPL-MCNC: 0.91 MG/DL (ref 0.52–1.04)
GFR SERPL CREATININE-BSD FRML MDRD: 62 ML/MIN/1.7M2
GLUCOSE SERPL-MCNC: 81 MG/DL (ref 70–99)
HBA1C MFR BLD: 6.9 % (ref 4.3–6)
POTASSIUM SERPL-SCNC: 4.2 MMOL/L (ref 3.4–5.3)
SODIUM SERPL-SCNC: 143 MMOL/L (ref 133–144)

## 2018-03-14 PROCEDURE — 99215 OFFICE O/P EST HI 40 MIN: CPT | Performed by: INTERNAL MEDICINE

## 2018-03-14 PROCEDURE — 93000 ELECTROCARDIOGRAM COMPLETE: CPT | Performed by: INTERNAL MEDICINE

## 2018-03-14 PROCEDURE — 83036 HEMOGLOBIN GLYCOSYLATED A1C: CPT | Performed by: INTERNAL MEDICINE

## 2018-03-14 PROCEDURE — 36415 COLL VENOUS BLD VENIPUNCTURE: CPT | Performed by: INTERNAL MEDICINE

## 2018-03-14 PROCEDURE — 80048 BASIC METABOLIC PNL TOTAL CA: CPT | Performed by: INTERNAL MEDICINE

## 2018-03-14 RX ORDER — INSULIN GLARGINE 100 [IU]/ML
70 INJECTION, SOLUTION SUBCUTANEOUS 2 TIMES DAILY
Qty: 63 ML | Refills: 1 | COMMUNITY
Start: 2018-03-14 | End: 2019-05-21

## 2018-03-14 NOTE — PATIENT INSTRUCTIONS
Make appointment(s) for:   -- get labs today       Before your surgery:  10 days before: stop all over the counter supplements  1 week before: stop aspirin if you are taking aspirin.  2 days before: stop ibuprofen, naproxen or similar antiinflammatory medications. You may use Tylenol for pain or headache.     On the day of your surgery:  You may take routine morning pills with the smallest amount of water possible.     Insulin:   Basaglar: 56 units the evening before and 56 units the morning of the surgery   Do not take Victoza or quick acting insulin the morning of your surgery.       After surgery:   You may resume all your medications after the surgery unless your surgeon instructs you otherwise.             Before Your Surgery      Call your surgeon if there is any change in your health. This includes signs of a cold or flu (such as a sore throat, runny nose, cough, rash or fever).    Do not smoke, drink alcohol or take over the counter medicine (unless your surgeon or primary care doctor tells you to) for the 24 hours before and after surgery.    If you take prescribed drugs: Follow your doctor s orders about which medicines to take and which to stop until after surgery.    Eating and drinking prior to surgery: follow the instructions from your surgeon    Take a shower or bath the night before surgery. Use the soap your surgeon gave you to gently clean your skin. If you do not have soap from your surgeon, use your regular soap. Do not shave or scrub the surgery site.  Wear clean pajamas and have clean sheets on your bed.

## 2018-03-14 NOTE — NURSING NOTE
"Chief Complaint   Patient presents with     Pre-Op Exam       Initial /68  Pulse 74  Temp 97.7  F (36.5  C) (Temporal)  Ht 5' 10\" (1.778 m)  Wt 295 lb 8 oz (134 kg)  LMP 10/02/2007  SpO2 97%  BMI 42.4 kg/m2 Estimated body mass index is 42.4 kg/(m^2) as calculated from the following:    Height as of this encounter: 5' 10\" (1.778 m).    Weight as of this encounter: 295 lb 8 oz (134 kg).  Medication Reconciliation: complete     Nichol Friend CMA  "

## 2018-03-14 NOTE — PROGRESS NOTES
Dear Sherry,   Here are your recent results.   -- A1c improved. This is excellent.     Please call or Mychart to our office if you have further questions.     Marilou Arciniega MD-PhD

## 2018-03-14 NOTE — PROGRESS NOTES
[address] [address]  [phone]  Dept: [phone]    PRE-OP EVALUATION:  Today's date: 3/14/2018    Sherry Slaughter (: 1955) presents for pre-operative evaluation assessment as requested by Dr. Linares.  She requires evaluation and anesthesia risk assessment prior to undergoing surgery/procedure for treatment of Cataract Left eye .    Proposed Surgery/ Procedure: Cataract Left eye .  Date of Surgery/ Procedure: 03/15/18  Time of Surgery/ Procedure: 1 pm  Hospital/Surgical Facility: [facility]  Fax number for surgical facility: [phone]  Primary Physician: Marilou Arciniega  Type of Anesthesia Anticipated: General    Patient has a Health Care Directive or Living Will:  NO    1. NO - Do you have a history of heart attack, stroke, stent, bypass or surgery on an artery in the head, neck, heart or legs?  2. NO - Do you ever have any pain or discomfort in your chest?  3. NO - Do you have a history of  Heart Failure?  4. YES- Are you troubled by shortness of breath when: walking on the level, up a slight hill or at night? After 2-5 flights  5. NO - Do you currently have a cold, bronchitis or other respiratory infection?  6. NO - Do you have a cough, shortness of breath or wheezing?  7. NO - Do you sometimes get pains in the calves of your legs when you walk?  8. YES - Do you or anyone in your family have previous history of blood clots? Brother recently dx with PE, DVT. He has multiple myeloma.   9. NO - Do you or does anyone in your family have a serious bleeding problem such as prolonged bleeding following surgeries or cuts?  10. NO - Have you ever had problems with anemia or been told to take iron pills?  11. NO - Have you had any abnormal blood loss such as black, tarry or bloody stools, or abnormal vaginal bleeding?  12. NO - Have you ever had a blood transfusion?  13. NO - Have you or any of your relatives ever had  problems with anesthesia?  14. YES - Do you have sleep apnea, excessive snoring or daytime drowsiness? Snoring, sleep apnea  15. NO - Do you have any prosthetic heart valves?  16. YES - Do you have prosthetic joints? Bilateral knees, right ankle  17. NO - Is there any chance that you may be pregnant?      HPI:     HPI related to upcoming procedure: cataract surgery for both eyes.       DIABETES - Patient has a longstanding history of DiabetesType Type II . Patient is being treated with insulin injections and denies significant side effects. Control has been good. Complicating factors include but are not limited to: high cholesterol , HTN  and obesity; overdue for diabetes follow up.                                                                                                                .  HYPERTENSION - Patient has longstanding history of HTN , currently denies any symptoms referable to elevated blood pressure. Specifically denies chest pain, palpitations, dyspnea, orthopnea, PND or peripheral edema. Blood pressure readings have been in normal range. Current medication regimen is as listed below. Patient denies any side effects of medication.                                                                                                                                                                                          .  HYPERLIPIDEMIA - Patient has a long history of significant Hyperlipidemia requiring medication for treatment with recent good control. Patient reports no problems or side effects with the medication.                                                                                                                                                       .  SLEEP PROBLEM - known GENESIS on CPAP.                                                                                                                  .    MEDICAL HISTORY:     Patient Active Problem List    Diagnosis Date Noted     Mitral  valve insufficiency 06/15/2010     Priority: High     Interpretation Summary 6.15.10  Global and regional left ventricular function is normal with an EF of 60-65%.   Mild mitral insufficiency is present.  PatientHeight: 70 in  PatientWeight: 290 lbs  BSA 2.4 m^2        Posterior vitreous detachment of right eye 09/06/2017     Priority: Medium     Type 2 diabetes mellitus with hyperglycemia (H) 06/06/2016     Priority: Medium     Lumbago 11/02/2015     Priority: Medium     Aftercare following right knee joint replacement surgery 09/28/2015     Priority: Medium     S/P total knee replacement using cement, right 09/23/2015     Priority: Medium     Long-term insulin use (H) 02/19/2015     Priority: Medium     Uncontrolled diabetes mellitus (H) 02/19/2015     Priority: Medium     Chronic low back pain 06/11/2014     Priority: Medium     Since 2007. Works with chiropractor on back pain.        History of viral hepatitis, type B 03/07/2014     Priority: Medium     In 1979 due to finger stick form dialysis patient.        Tubular adenoma of colon 02/28/2014     Priority: Medium     On colonoscopy 2/2014. Repeat in 5 years.        Cataracts, both eyes 10/16/2013     Priority: Medium     Aortic sclerosis 07/10/2011     Priority: Medium     On echo 7/11       Hyperlipidemia LDL goal <100 10/31/2010     Priority: Medium     History of major depression      Priority: Medium     comes and goes - tried meds - unsuccessfully, certain times of the year, no psych intervention and no counselors in the past - and not interested        Hypertension goal BP (blood pressure) < 140/90      Priority: Medium     late 1990s - started medications at that time - not too difficult to control meds        GENESIS (obstructive sleep apnea) 10/15/2006     Priority: Medium     PSH at [facility] [place] 5/2015 Severe GENESIS AHI 54  Clinically signficant GENESIS with hypoxemia - with sats into the 70s during REM - rem phenomenon - rec for CPAP, weight reduction as well         Nonspecific abnormal results of cardiovascular function study 04/10/2005     Priority: Medium     Problem list name updated by automated process. Provider to review       Morbid obesity (H) 04/14/2003     Priority: Medium     JOINT PAIN-ANKLE - right ankle  12/11/2006     Priority: Low     S/p surgery - triple arthrodecesis and bunion surgery on the right        ABSENCE OF MENSTRUATION - perimenopausal  10/30/2006     Priority: Low     Irritable bowel syndrome      Priority: Low     goes between the 2 - nerve related - more stressed more problems        Family history of malignant neoplasm of breast      Priority: Low     mother - young age - 45, and maternal cousin and aunt as well - no BRCA testing done        OSTEOARTHRITIS L KNEE  s/p knee replacement on the left       Priority: Low     total knee on the left - and pain is gone since the knee replacement        Family history of stroke (cerebrovascular)      Priority: Low     grandmother in early life in her 40s        Family history of other cardiovascular diseases      Priority: Low     father - CAD, and lipids/HTN - multiple members of the family   Problem list name updated by automated process. Provider to review and confirm  Problem list name updated by automated process. Provider to review       Family history of diabetes mellitus      Priority: Low     sister and grandmother with DM        Hypothyroidism 10/11/2006     Priority: Low     TSH 3.36 - mild subclinical hypothyroidism - deciding on following or starting low dose meds - with dr Oreilly - following          Nonallopathic lesion of thoracic region 08/23/2005     Priority: Low     Problem list name updated by automated process. Provider to review       FAMILY HX COLON CANCER 04/10/2005     Priority: Low     Pat uncles x 2       Diverticulosis of large intestine 02/15/2005     Priority: Low     colonoscopy  Problem list name updated by automated process. Provider to review        Past Medical  History:   Diagnosis Date     Cataract      DEPRESSION     comes and goes - tried meds - unsuccessfully, certain times of the year, no psych intervention and no counselors in the past - and not interested      Diverticulosis of colon (without mention of hemorrhage) 4/04    colonoscopy     ECHO-mildLVH,tr MR,mild thick lflets w inc LA,trTR   12/03     Essential hypertension, benign 1990s    late 1990s - started medications at that time - not to difficult to control meds      Fam hx-cardiovas dis NEC     father - CAD, and lipids/HTN - multiple members of the family      Family history of diabetes mellitus     sister and grandmother with DM      Family history of malignant neoplasm of breast     mother - young age - 45, and maternal cousin and aunt as well - no BRCA testing done      Family history of stroke (cerebrovascular)     grandmother in early life in her 40s      FAMILY HX COLON CANCER     Pat uncles x 2     Heart murmur      HYPERLIPIDEMIA 2000    fairly recent - in the last 5 years - high for DM levels  -cholesterol recent      Irritable bowel syndrome     goes between the 2 - nerve related - more stressed more problems      MICROALBUMINURIA     unsure how long - been on the lisinopril - for a few years at well      Nonspecific abnormal results of liver function study 2/3/2003    SGOT - has been high in the past - since the hepatitis b - borderline elevation - not really changing any      OBESITY      GENESIS (obstructive sleep apnea) 10/15/2006    psg 5/15 AHI 53 aPAP 8-15     OSTEOARTHRITIS L KNEE 2003    total knee on the left - and pain is gone since the knee replacement      PERS HX HEPATITIS B- RESOLVED] 1977    acute virus only - no chronic disease      PERS HX HEPATITIS B- RESOLVED]      Type II or unspecified type diabetes mellitus without mention of complication, not stated as uncontrolled 1991    oral meds frist, then insulin eventually later, difficult to control most of the time      Unspecified  hypothyroidism 10/11/2006    TSH 3.36 - mild subclinical hypothyroidism - deciding on following or starting low dose meds - with dr Oreilly - following      Past Surgical History:   Procedure Laterality Date     ARTHROPLASTY KNEE Right 9/23/2015    Procedure: ARTHROPLASTY KNEE;  Surgeon: Rufus Brown MD;  Location: [facility] OR     C NONSPECIFIC PROCEDURE  6/06    right triple arthrodecesis      C NONSPECIFIC PROCEDURE  7/06     right bunion surgery      C TOTAL KNEE ARTHROPLASTY  3/03    L knee     COLONOSCOPY  4/04    diverticulosis, rec repeat 10 yrs(consider fam hx?)     ORTHOPEDIC SURGERY      right ankle     STRESS ECHO (METRO)  12/03    no ischemia, limited by fatigue     SURGICAL HISTORY OF -       EXP LAP- R OVARY CYSTS     SURGICAL HISTORY OF -   1981,1984,1985    CHILDBIRTH     Current Outpatient Prescriptions   Medication Sig Dispense Refill     insulin aspart (NOVOLOG FLEXPEN) 100 UNIT/ML injection 3 units per 15 gram CHO and sliding scale 1:10 above 140 mg/dl. Uses approx 90 units daily 45 mL 1     liraglutide (VICTOZA PEN) 18 MG/3ML soln Inject 1.8 mg Subcutaneous daily 12 mL 1     insulin pen needle (GNP [employer] Joy[employer] Technology[employer]INE PEN NEEDLES) 31G X 8 MM 1 Device by * route daily 100 each 2     hydrochlorothiazide (HYDRODIURIL) 25 MG tablet Take 1 tablet (25 mg) by mouth daily 90 tablet 3     rosuvastatin (CRESTOR) 10 MG tablet Take 1 tablet (10 mg) by mouth daily 90 tablet 3     metoprolol (TOPROL XL) 25 MG 24 hr tablet Take 1 tablet (25 mg) by mouth daily 90 tablet 3     lisinopril (PRINIVIL/ZESTRIL) 40 MG tablet Take 1.5 tablets (60 mg) by mouth daily 135 tablet 3     blood glucose monitoring (NO BRAND SPECIFIED) test strip Use to test blood sugar 4 times daily or as directed. 100 each 11     insulin glargine (LANTUS) 100 UNIT/ML injection Lantus 70 units am and 70 units pm 135 mL 3     levothyroxine (SYNTHROID/LEVOTHROID) 75 MCG tablet Take 1 tablet (75 mcg) by mouth daily 90 tablet 3     metFORMIN (GLUCOPHAGE-XR)  500 MG 24 hr tablet Take 4 tablets (2,000 mg) by mouth At Bedtime 360 tablet 3     ASPIRIN EC PO Take 325 mg by mouth daily       Ibuprofen (ADVIL PO) Take 600 mg by mouth 4 times daily       Multiple Vitamins-Minerals (ADVANCED DIABETIC MULTIVITAMIN) TABS Take 1 tablet by mouth daily       Garlic 1000 MG CAPS Take 1 capsule by mouth daily Takes during summer months.  Done taking until next summer.       ORDER FOR DME, SET TO LOCAL PRINT, Respironics REMSTAR 60 Series Auto CPAP 8-15cm H2O, Airfit P10 nasal pillow mask w/medium pillows       OTC products: None, except as noted above    Allergies   Allergen Reactions     Atorvastatin Calcium      Gas     Pravachol [Pravastatin Sodium]      Pravachol - dry cough      Simvastatin      Myalgia      Latex Allergy: NO    Social History   Substance Use Topics     Smoking status: Never Smoker     Smokeless tobacco: Never Used      Comment: tried in early 20s only      Alcohol use Yes      Comment: Approx 3 times a year - mild increase now      History   Drug Use No       REVIEW OF SYSTEMS:   CONSTITUTIONAL: NEGATIVE for fever, chills, change in weight  ENT/MOUTH: NEGATIVE for ear, mouth and throat problems  RESP: NEGATIVE for significant cough or SOB  CV: NEGATIVE for chest pain, palpitations or peripheral edema  GI: NEGATIVE for nausea, abdominal pain, heartburn, or change in bowel habits  MUSCULOSKELETAL: NEGATIVE for significant arthralgias or myalgia    EXAM:   LMP 10/02/2007  GENERAL APPEARANCE: healthy, alert and no distress  HENT: ear canals and TM's normal and nose and mouth without ulcers or lesions  RESP: lungs clear to auscultation - no rales, rhonchi or wheezes  CV: regular rate and rhythm, normal S1 S2, no S3 or S4 and no murmur, click or rub   ABDOMEN: soft, nontender, no HSM or masses and bowel sounds normal  MS: extremities normal- no gross deformities noted    DIAGNOSTICS:   EKG: appears normal, NSR    Results for orders placed or performed in visit on  03/14/18   Hemoglobin A1c   Result Value Ref Range    Hemoglobin A1C 6.9 (H) 4.3 - 6.0 %   Basic metabolic panel   Result Value Ref Range    Sodium 143 133 - 144 mmol/L    Potassium 4.2 3.4 - 5.3 mmol/L    Chloride 107 94 - 109 mmol/L    Carbon Dioxide 31 20 - 32 mmol/L    Anion Gap 5 3 - 14 mmol/L    Glucose 81 70 - 99 mg/dL    Urea Nitrogen 19 7 - 30 mg/dL    Creatinine 0.91 0.52 - 1.04 mg/dL    GFR Estimate 62 >60 mL/min/1.7m2    GFR Estimate If Black 75 >60 mL/min/1.7m2    Calcium 9.8 8.5 - 10.1 mg/dL          IMPRESSION:   Reason for surgery/procedure: bilateral cataract  Diagnosis/reason for consult:       ICD-10-CM    1. Preop general physical exam Z01.818 EKG 12-lead complete w/read - Clinics     Hemoglobin A1c     Basic metabolic panel   2. Cataract of both eyes secondary to ocular disorder H26.223 Hemoglobin A1c     Basic metabolic panel   3. Long-term insulin use (H) Z79.4 BASAGLAR 100 UNIT/ML injection   4. Hypothyroidism due to acquired atrophy of thyroid E03.4         The proposed surgical procedure is considered LOW risk.    REVISED CARDIAC RISK INDEX  The patient has the following serious cardiovascular risks for perioperative complications such as (MI, PE, VFib and 3  AV Block):  Diabetes Mellitus (on Insulin)  INTERPRETATION: 1 risks: Class II (low risk - 0.9% complication rate)    The patient has the following additional risks for perioperative complications:  No identified additional risks      RECOMMENDATIONS:       Obstructive Sleep Apnea (or suspected sleep apnea)  Monitor clinically respiratory status      --Patient is to take all scheduled medications on the day of surgery EXCEPT for modifications listed below.    Diabetes Medication Use  -----Hold usual oral and non-insulin diabetic meds (e.g. Metformin, Actos, Glipizide) while NPO.   -----Take 80% of long acting insulin (e.g. Lantus, NPH) while NPO (fasting)  -----Hold short acting insulin (e.g. Novolog, Humalog) while NPO  (fasting)      Anticoagulant or Antiplatelet Medication Use  Bleeding risk is low for this procedure (e.g. dental, skin, cataract)        ACE Inhibitor or Angiotensin Receptor Blocker (ARB) Use  Ace inhibitor or Angiotensin Receptor Blocker (ARB) and should HOLD this medication for the 24 hours prior to surgery.      APPROVAL GIVEN to proceed with proposed procedure, without further diagnostic evaluation       Signed Electronically by: Marilou Arciniega MD PhD    Copy of this evaluation report is provided to requesting physician.    [place] Preop Guidelines

## 2018-03-14 NOTE — MR AVS SNAPSHOT
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  2 days before: stop ibuprofen, naproxen or similar antiinflammatory medications. You may use Tylenol for pain or headache.     On the day of your surgery:  You may take routine morning pills with the smallest amount of water possible.     Insulin:   Basaglar: 56 units the evening before and 56 units the morning of the surgery   Do not take Victoza or quick acting insulin the morning of your surgery.       After surgery:   You may resume all your medications after the surgery unless your surgeon instructs you otherwise.             Before Your Surgery      Call your surgeon if there is any change in your health. This includes signs of a cold or flu (such as a sore throat, runny nose, cough, rash or fever).    Do not smoke, drink alcohol or take over the counter medicine (unless your surgeon or primary care doctor tells you to) for the 24 hours before and after surgery.    If you take prescribed drugs: Follow your doctor s orders about which medicines to take and which to stop until after surgery.    Eating and drinking prior to surgery: follow the instructions from your surgeon    Take a shower or bath the night before surgery. Use the soap your surgeon gave you to gently clean your skin. If you do not have soap from your  surgeon, use your regular soap. Do not shave or scrub the surgery site.  Wear clean pajamas and have clean sheets on your bed.           Follow-ups after your visit        Your next 10 appointments already scheduled     Mar 15, 2018   Procedure with [facility] MD   [facility]    [address]  [place] [address]   [phone]            Mar 16, 2018  8:20 AM CDT   [facility] Visit with Kameron Pedraza OD   [facility]    [address] [place] [address]   [phone]            Mar 21, 2018 11:40 AM CDT   Return Visit with Kameron Pedraza OD   [facility]    [address] [place] [address]   [phone]            Apr 05, 2018   Procedure with Bandar Linares MD   [facility] (--)    [address]  [facility] [address]   [phone]            Apr 11, 2018 11:20 AM CDT   Return Visit with Kameron Pedraza OD   [facility]    [address] [place] [address] [phone]            May 02, 2018 11:00 AM CDT   Return Visit with Kameron Pedraza OD   [facility])    [address] [place] [address] [phone]              Who to contact     If you have questions or need follow up information about today's clinic visit or your schedule please contact [facility] directly at [phone].  Normal or non-critical lab and imaging results will be communicated to you by MyChart, letter or phone within 4 business days after the clinic has received the results. If you do not hear from us within 7 days, please contact the clinic through MyChart or phone. If you have a critical or abnormal lab result, we will notify you by phone as soon as possible.  Submit refill requests  "through [employer] or call your pharmacy and they will forward the refill request to us. Please allow 3 business days for your refill to be completed.          Additional Information About Your Visit        [employer] Information     [employer] gives you secure access to your electronic health record. If you see a primary care provider, you can also send messages to your care team and make appointments. If you have questions, please call your primary care clinic.  If you do not have a primary care provider, please call [phone] and they will assist you.      [employer] is an electronic gateway that provides easy, online access to your medical records. With [employer], you can request a clinic appointment, read your test results, renew a prescription or communicate with your care team.     To access your existing account, please contact your [facility] or call [phone] for assistance.        Care EveryWhere ID     This is your Care EveryWhere ID. This could be used by other organizations to access your [place] medical records  [id number]        Your Vitals Were     Pulse Temperature Height Last Period Pulse Oximetry BMI (Body Mass Index)    74 97.7  F (36.5  C) (Temporal) 5' 10\" (1.778 m) 10/02/2007 97% 42.4 kg/m2       Blood Pressure from Last 3 Encounters:   03/14/18 122/68   11/02/17 136/78   07/03/17 140/71    Weight from Last 3 Encounters:   03/14/18 295 lb 8 oz (134 kg)   11/02/17 299 lb (135.6 kg)   07/03/17 295 lb 1.6 oz (133.9 kg)              We Performed the Following     Basic metabolic panel     EKG 12-lead complete w/read - Clinics     Hemoglobin A1c          Today's Medication Changes          These changes are accurate as of 3/14/18 10:34 AM.  If you have any questions, ask your nurse or doctor.               These medicines have changed or have updated prescriptions.        Dose/Directions    BASAGLAR 100 UNIT/ML injection   This may have changed:    - how much to take  - " how to take this  - when to take this  - additional instructions   Used for:  Long-term insulin use (H)   Changed by:  Marilou Arciniega MD PhD        Dose:  70 Units   Inject 70 Units Subcutaneous 2 times daily   Quantity:  63 mL   Refills:  1                Primary Care Provider Office Phone # Fax #    Marilou Arciniega MD PhD [phone] [address]  [facility] [address]        Equal Access to Services     [facility]: Hadii aad ku hadasho Soomaali, waaxda luqadaha, qaybta kaalmada adeegyada, waxay idiin hayaan adeeg kharash la'aan . So [facility] [phone].    ATENCIÓN: Si habla español, tiene a espinosa disposición servicios gratuitos de asistencia lingüística. Llame al [phone].    We comply with applicable federal civil rights laws and [place] laws. We do not discriminate on the basis of race, color, national origin, age, disability, sex, sexual orientation, or gender identity.            Thank you!     Thank you for choosing [facility]  for your care. Our goal is always to provide you with excellent care. Hearing back from our patients is one way we can continue to improve our services. Please take a few minutes to complete the written survey that you may receive in the mail after your visit with us. Thank you!             Your Updated Medication List - Protect others around you: Learn how to safely use, store and throw away your medicines at www.disposemymeds.org.          This list is accurate as of 3/14/18 10:34 AM.  Always use your most recent med list.                   Brand Name Dispense Instructions for use Diagnosis    ADVANCED DIABETIC MULTIVITAMIN Tabs      Take 1 tablet by mouth daily        ADVIL PO      Take 600 mg by mouth 4 times daily        ASPIRIN EC PO      Take 325 mg by mouth daily        BASAGLAR 100 UNIT/ML injection     63 mL    Inject 70 Units Subcutaneous 2 times daily    Long-term insulin use (H)       blood glucose monitoring test strip    no brand  specified    100 each    Use to test blood sugar 4 times daily or as directed.    Type 2 diabetes mellitus without complication, with long-term current use of insulin (H)       Garlic 1000 MG Caps      Take 1 capsule by mouth daily Takes during summer months.  Done taking until next summer.        hydrochlorothiazide 25 MG tablet    HYDRODIURIL    90 tablet    Take 1 tablet (25 mg) by mouth daily    Essential hypertension with goal blood pressure less than 140/90       insulin aspart 100 UNIT/ML injection    NovoLOG FLEXPEN    45 mL    3 units per 15 gram CHO and sliding scale 1:10 above 140 mg/dl. Uses approx 90 units daily    Type 2 diabetes mellitus with hyperglycemia, with long-term current use of insulin (H)       insulin pen needle 31G X 8 MM    GNP CLICKFINE PEN NEEDLES    100 each    1 Device by * route daily    Type 2 diabetes, HbA1c goal < 7% (H)       levothyroxine 75 MCG tablet    SYNTHROID/LEVOTHROID    90 tablet    Take 1 tablet (75 mcg) by mouth daily    Hypothyroidism due to acquired atrophy of thyroid       liraglutide 18 MG/3ML soln    VICTOZA PEN    12 mL    Inject 1.8 mg Subcutaneous daily    Type 2 diabetes mellitus with hyperglycemia, with long-term current use of insulin (H)       lisinopril 40 MG tablet    PRINIVIL/ZESTRIL    135 tablet    Take 1.5 tablets (60 mg) by mouth daily    Essential hypertension with goal blood pressure less than 140/90       metFORMIN 500 MG 24 hr tablet    GLUCOPHAGE-XR    360 tablet    Take 4 tablets (2,000 mg) by mouth At Bedtime    Uncontrolled type 2 diabetes mellitus with hyperglycemia, with long-term current use of insulin (H)       metoprolol succinate 25 MG 24 hr tablet    TOPROL XL    90 tablet    Take 1 tablet (25 mg) by mouth daily    Essential hypertension with goal blood pressure less than 140/90       [facility]      [facility] 60 Series Auto CPAP 8-15cm H2O, Airfit P10 nasal pillow mask w/medium pillows        rosuvastatin 10 MG tablet     CRESTOR    90 tablet    Take 1 tablet (10 mg) by mouth daily    Hyperlipidemia LDL goal <100

## 2018-03-15 ENCOUNTER — HOSPITAL ENCOUNTER (OUTPATIENT)
Facility: CLINIC | Age: 63
Discharge: HOME | End: 2018-03-15
Attending: OPHTHALMOLOGY | Admitting: OPHTHALMOLOGY
Payer: COMMERCIAL

## 2018-03-15 ENCOUNTER — ANESTHESIA (OUTPATIENT)
Dept: SURGERY | Facility: CLINIC | Age: 63
End: 2018-03-15
Payer: COMMERCIAL

## 2018-03-15 ENCOUNTER — SURGERY (OUTPATIENT)
Age: 63
End: 2018-03-15

## 2018-03-15 ENCOUNTER — ANESTHESIA EVENT (OUTPATIENT)
Dept: SURGERY | Facility: CLINIC | Age: 63
End: 2018-03-15
Payer: COMMERCIAL

## 2018-03-15 VITALS
RESPIRATION RATE: 16 BRPM | SYSTOLIC BLOOD PRESSURE: 148 MMHG | TEMPERATURE: 97.2 F | OXYGEN SATURATION: 96 % | DIASTOLIC BLOOD PRESSURE: 77 MMHG

## 2018-03-15 LAB
GLUCOSE BLDC GLUCOMTR-MCNC: 66 MG/DL (ref 70–99)
GLUCOSE BLDC GLUCOMTR-MCNC: 68 MG/DL (ref 70–99)
GLUCOSE BLDC GLUCOMTR-MCNC: 74 MG/DL (ref 70–99)

## 2018-03-15 PROCEDURE — 25000128 H RX IP 250 OP 636: Performed by: NURSE ANESTHETIST, CERTIFIED REGISTERED

## 2018-03-15 PROCEDURE — 25000128 H RX IP 250 OP 636: Performed by: ANESTHESIOLOGY

## 2018-03-15 PROCEDURE — 25000125 ZZHC RX 250: Performed by: OPHTHALMOLOGY

## 2018-03-15 PROCEDURE — V2632 POST CHMBR INTRAOCULAR LENS: HCPCS | Performed by: OPHTHALMOLOGY

## 2018-03-15 PROCEDURE — 25000125 ZZHC RX 250: Performed by: NURSE ANESTHETIST, CERTIFIED REGISTERED

## 2018-03-15 PROCEDURE — 82962 GLUCOSE BLOOD TEST: CPT

## 2018-03-15 PROCEDURE — 25000128 H RX IP 250 OP 636: Performed by: OPHTHALMOLOGY

## 2018-03-15 PROCEDURE — 25800025 ZZH RX 258: Performed by: SURGERY

## 2018-03-15 PROCEDURE — 36000101 ZZH EYE SURGERY LEVEL 3 1ST 30 MIN: Performed by: OPHTHALMOLOGY

## 2018-03-15 PROCEDURE — 37000008 ZZH ANESTHESIA TECHNICAL FEE, 1ST 30 MIN: Performed by: OPHTHALMOLOGY

## 2018-03-15 PROCEDURE — 27210794 ZZH OR GENERAL SUPPLY STERILE: Performed by: OPHTHALMOLOGY

## 2018-03-15 PROCEDURE — 40000170 ZZH STATISTIC PRE-PROCEDURE ASSESSMENT II: Performed by: OPHTHALMOLOGY

## 2018-03-15 PROCEDURE — 71000028 ZZH EYE RECOVERY PHASE 2 EACH 15 MINS: Performed by: OPHTHALMOLOGY

## 2018-03-15 PROCEDURE — 25000125 ZZHC RX 250: Performed by: ANESTHESIOLOGY

## 2018-03-15 DEVICE — EYE IMP IOL AMO PCL TECNIS ZCB00 23.0: Type: IMPLANTABLE DEVICE | Site: EYE | Status: FUNCTIONAL

## 2018-03-15 RX ORDER — PROPARACAINE HYDROCHLORIDE 5 MG/ML
1 SOLUTION/ DROPS OPHTHALMIC ONCE
Status: COMPLETED | OUTPATIENT
Start: 2018-03-15 | End: 2018-03-15

## 2018-03-15 RX ORDER — TROPICAMIDE 10 MG/ML
1 SOLUTION/ DROPS OPHTHALMIC
Status: COMPLETED | OUTPATIENT
Start: 2018-03-15 | End: 2018-03-15

## 2018-03-15 RX ORDER — TETRACAINE HYDROCHLORIDE 5 MG/ML
SOLUTION OPHTHALMIC PRN
Status: DISCONTINUED | OUTPATIENT
Start: 2018-03-15 | End: 2018-03-15 | Stop reason: HOSPADM

## 2018-03-15 RX ORDER — SODIUM CHLORIDE, SODIUM LACTATE, POTASSIUM CHLORIDE, CALCIUM CHLORIDE 600; 310; 30; 20 MG/100ML; MG/100ML; MG/100ML; MG/100ML
500 INJECTION, SOLUTION INTRAVENOUS CONTINUOUS
Status: DISCONTINUED | OUTPATIENT
Start: 2018-03-15 | End: 2018-03-15 | Stop reason: HOSPADM

## 2018-03-15 RX ORDER — LIDOCAINE HYDROCHLORIDE 10 MG/ML
INJECTION, SOLUTION EPIDURAL; INFILTRATION; INTRACAUDAL; PERINEURAL PRN
Status: DISCONTINUED | OUTPATIENT
Start: 2018-03-15 | End: 2018-03-15 | Stop reason: HOSPADM

## 2018-03-15 RX ORDER — DEXTROSE MONOHYDRATE 25 G/50ML
25 INJECTION, SOLUTION INTRAVENOUS ONCE
Status: COMPLETED | OUTPATIENT
Start: 2018-03-15 | End: 2018-03-15

## 2018-03-15 RX ORDER — DICLOFENAC SODIUM 1 MG/ML
1 SOLUTION/ DROPS OPHTHALMIC
Status: COMPLETED | OUTPATIENT
Start: 2018-03-15 | End: 2018-03-15

## 2018-03-15 RX ORDER — BALANCED SALT SOLUTION 6.4; .75; .48; .3; 3.9; 1.7 MG/ML; MG/ML; MG/ML; MG/ML; MG/ML; MG/ML
SOLUTION OPHTHALMIC PRN
Status: DISCONTINUED | OUTPATIENT
Start: 2018-03-15 | End: 2018-03-15 | Stop reason: HOSPADM

## 2018-03-15 RX ORDER — PHENYLEPHRINE HYDROCHLORIDE 25 MG/ML
1 SOLUTION/ DROPS OPHTHALMIC
Status: COMPLETED | OUTPATIENT
Start: 2018-03-15 | End: 2018-03-15

## 2018-03-15 RX ORDER — MOXIFLOXACIN 5 MG/ML
1 SOLUTION/ DROPS OPHTHALMIC
Status: COMPLETED | OUTPATIENT
Start: 2018-03-15 | End: 2018-03-15

## 2018-03-15 RX ORDER — PROPARACAINE HYDROCHLORIDE 5 MG/ML
1 SOLUTION/ DROPS OPHTHALMIC ONCE
Status: DISCONTINUED | OUTPATIENT
Start: 2018-03-15 | End: 2018-03-15 | Stop reason: HOSPADM

## 2018-03-15 RX ORDER — ONDANSETRON 2 MG/ML
INJECTION INTRAMUSCULAR; INTRAVENOUS PRN
Status: DISCONTINUED | OUTPATIENT
Start: 2018-03-15 | End: 2018-03-15

## 2018-03-15 RX ADMIN — PHENYLEPHRINE HYDROCHLORIDE 1 DROP: 2.5 SOLUTION/ DROPS OPHTHALMIC at 12:22

## 2018-03-15 RX ADMIN — TROPICAMIDE 1 DROP: 10 SOLUTION/ DROPS OPHTHALMIC at 12:04

## 2018-03-15 RX ADMIN — MOXIFLOXACIN 1 DROP: 5 SOLUTION/ DROPS OPHTHALMIC at 12:14

## 2018-03-15 RX ADMIN — DEXMEDETOMIDINE HYDROCHLORIDE 8 MCG: 100 INJECTION, SOLUTION INTRAVENOUS at 13:45

## 2018-03-15 RX ADMIN — MIDAZOLAM 2 MG: 1 INJECTION INTRAMUSCULAR; INTRAVENOUS at 13:32

## 2018-03-15 RX ADMIN — DICLOFENAC SODIUM 1 DROP: 1 SOLUTION/ DROPS OPHTHALMIC at 12:22

## 2018-03-15 RX ADMIN — TROPICAMIDE 1 DROP: 10 SOLUTION/ DROPS OPHTHALMIC at 12:22

## 2018-03-15 RX ADMIN — PHENYLEPHRINE HYDROCHLORIDE 1 DROP: 2.5 SOLUTION/ DROPS OPHTHALMIC at 12:14

## 2018-03-15 RX ADMIN — EPINEPHRINE 500 ML: 1 INJECTION, SOLUTION, CONCENTRATE INTRAVENOUS at 13:43

## 2018-03-15 RX ADMIN — BALANCED SALT SOLUTION 15 ML: 6.4; .75; .48; .3; 3.9; 1.7 SOLUTION OPHTHALMIC at 13:43

## 2018-03-15 RX ADMIN — Medication 1 DROP: at 13:50

## 2018-03-15 RX ADMIN — PHENYLEPHRINE HYDROCHLORIDE 1 DROP: 2.5 SOLUTION/ DROPS OPHTHALMIC at 12:04

## 2018-03-15 RX ADMIN — DICLOFENAC SODIUM 1 DROP: 1 SOLUTION/ DROPS OPHTHALMIC at 12:14

## 2018-03-15 RX ADMIN — DEXTROSE MONOHYDRATE 25 ML: 25 INJECTION, SOLUTION INTRAVENOUS at 13:07

## 2018-03-15 RX ADMIN — EPINEPHRINE 0.5 ML: 1 INJECTION, SOLUTION, CONCENTRATE INTRAVENOUS at 13:43

## 2018-03-15 RX ADMIN — MOXIFLOXACIN 1 DROP: 5 SOLUTION/ DROPS OPHTHALMIC at 12:04

## 2018-03-15 RX ADMIN — DICLOFENAC SODIUM 1 DROP: 1 SOLUTION/ DROPS OPHTHALMIC at 12:04

## 2018-03-15 RX ADMIN — TROPICAMIDE 1 DROP: 10 SOLUTION/ DROPS OPHTHALMIC at 12:14

## 2018-03-15 RX ADMIN — TETRACAINE HYDROCHLORIDE 1 DROP: 5 SOLUTION OPHTHALMIC at 13:44

## 2018-03-15 RX ADMIN — PROPARACAINE HYDROCHLORIDE 1 DROP: 5 SOLUTION/ DROPS OPHTHALMIC at 12:04

## 2018-03-15 RX ADMIN — LIDOCAINE HYDROCHLORIDE 1 ML: 10 INJECTION, SOLUTION EPIDURAL; INFILTRATION; INTRACAUDAL; PERINEURAL at 12:29

## 2018-03-15 RX ADMIN — LIDOCAINE HYDROCHLORIDE 0.5 ML: 10 INJECTION, SOLUTION EPIDURAL; INFILTRATION; INTRACAUDAL; PERINEURAL at 13:43

## 2018-03-15 RX ADMIN — SODIUM CHLORIDE, POTASSIUM CHLORIDE, SODIUM LACTATE AND CALCIUM CHLORIDE 500 ML: 600; 310; 30; 20 INJECTION, SOLUTION INTRAVENOUS at 12:29

## 2018-03-15 RX ADMIN — ONDANSETRON 4 MG: 2 INJECTION INTRAMUSCULAR; INTRAVENOUS at 13:41

## 2018-03-15 RX ADMIN — Medication 0.8 ML: at 13:43

## 2018-03-15 RX ADMIN — MOXIFLOXACIN 1 DROP: 5 SOLUTION/ DROPS OPHTHALMIC at 12:22

## 2018-03-15 ASSESSMENT — ENCOUNTER SYMPTOMS: DYSRHYTHMIAS: 0

## 2018-03-15 NOTE — OP NOTE
PATIENT NAME:  Sherry Slaughter    :  1955    PATIENT NUMBER:  [id number]    DATE OF SURGERY:  3/15/2018    SURGEON:  Bandar Linares M.D.    PREOPERATIVE DIAGNOSIS: Cataract left eye.    POSTOPERATIVE DIAGNOSIS:  Same    PROCEDURE PERFORMED:    1. Phacoemulsification with posterior chamber intraocular lens left eye.    ANESTHESIA:  Topical/MAC    COMPLICATIONS:  None    PROCEDURE: Following adequate preoperative dilation the patient was given topical anesthesia consisting of Proparacaine.  The patient was brought to the operative suite where the eye was prepped and draped in the usual sterile fashion.  A lid speculum was applied. A super sharp blade was used to create a paracentesis, through which 1% preservative free Lidocaine was injected.  Visoelastic was then used to inflate the anterior chamber.  A biplanar incision at the clear cornea limbus was created with a keratome.  A continuous curvilinear capsulorrhexis was started with a cystitome and completed using Utrata forceps.  The lens was hydrodissected and hydro delineated using BSS on a cannula.  The lens nucleus was removed using phacoemulsification.  Remaining cortex was removed using irrigation and aspiration.  Viscoelastic was injected to inflate the capsular bag and a 23.0 D [id number] IOL was inserted into the capsular bag without difficulty.  Residual viscoelastic and provisc material was removed with irrigation and aspiration.  BSS was used to hydrate the corneal incision and paracentesis sites which were checked and noted to be watertight.  A drop of Vigamox was applied to the eye and a clear plastic shield was placed.  The patient tolerated the procedure well and left the operative suite in stable condition.    Bandar Linares M.D.

## 2018-03-15 NOTE — ANESTHESIA POSTPROCEDURE EVALUATION
Patient: Sherry Slaughter    Procedure(s):  LEFT EYE PHACOEMULSIFICATION CLEAR CORNEA WITH STANDARD INTRAOCULAR LENS IMPLANT - Wound Class: I-Clean    Diagnosis:LEFT EYE CATARACT  Diagnosis Additional Information: No value filed.    Anesthesia Type:  MAC    Note:  Anesthesia Post Evaluation    Patient location during evaluation: PACU  Patient participation: Able to fully participate in evaluation  Level of consciousness: awake and alert  Pain management: adequate  Airway patency: patent  Cardiovascular status: acceptable and hemodynamically stable  Respiratory status: acceptable and unassisted  Hydration status: acceptable  PONV: none     Anesthetic complications: None          Last vitals:  Vitals:    03/15/18 1213 03/15/18 1357 03/15/18 1400   BP: 146/79 141/84 148/77   Resp: 16 16 16   Temp: 36.2  C (97.2  F)     SpO2: 97% 96% 96%         Electronically Signed By: Tamir Treadwell MD  March 15, 2018  2:39 PM

## 2018-03-15 NOTE — H&P (VIEW-ONLY)
[address] [address]  [phone]  Dept: [phone]    PRE-OP EVALUATION:  Today's date: 3/14/2018    Sherry Slaughter (: 1955) presents for pre-operative evaluation assessment as requested by Dr. Linares.  She requires evaluation and anesthesia risk assessment prior to undergoing surgery/procedure for treatment of Cataract Left eye .    Proposed Surgery/ Procedure: Cataract Left eye .  Date of Surgery/ Procedure: 03/15/18  Time of Surgery/ Procedure: 1 pm  Hospital/Surgical Facility: [facility]  Fax number for surgical facility: [phone]  Primary Physician: Marilou Arciniega  Type of Anesthesia Anticipated: General    Patient has a Health Care Directive or Living Will:  NO    1. NO - Do you have a history of heart attack, stroke, stent, bypass or surgery on an artery in the head, neck, heart or legs?  2. NO - Do you ever have any pain or discomfort in your chest?  3. NO - Do you have a history of  Heart Failure?  4. YES- Are you troubled by shortness of breath when: walking on the level, up a slight hill or at night? After 2-5 flights  5. NO - Do you currently have a cold, bronchitis or other respiratory infection?  6. NO - Do you have a cough, shortness of breath or wheezing?  7. NO - Do you sometimes get pains in the calves of your legs when you walk?  8. YES - Do you or anyone in your family have previous history of blood clots? Brother recently dx with PE, DVT. He has multiple myeloma.   9. NO - Do you or does anyone in your family have a serious bleeding problem such as prolonged bleeding following surgeries or cuts?  10. NO - Have you ever had problems with anemia or been told to take iron pills?  11. NO - Have you had any abnormal blood loss such as black, tarry or bloody stools, or abnormal vaginal bleeding?  12. NO - Have you ever had a blood transfusion?  13. NO - Have you or any of your relatives ever had  problems with anesthesia?  14. YES - Do you have sleep apnea, excessive snoring or daytime drowsiness? Snoring, sleep apnea  15. NO - Do you have any prosthetic heart valves?  16. YES - Do you have prosthetic joints? Bilateral knees, right ankle  17. NO - Is there any chance that you may be pregnant?      HPI:     HPI related to upcoming procedure: cataract surgery for both eyes.       DIABETES - Patient has a longstanding history of DiabetesType Type II . Patient is being treated with insulin injections and denies significant side effects. Control has been good. Complicating factors include but are not limited to: high cholesterol , HTN  and obesity; overdue for diabetes follow up.                                                                                                                .  HYPERTENSION - Patient has longstanding history of HTN , currently denies any symptoms referable to elevated blood pressure. Specifically denies chest pain, palpitations, dyspnea, orthopnea, PND or peripheral edema. Blood pressure readings have been in normal range. Current medication regimen is as listed below. Patient denies any side effects of medication.                                                                                                                                                                                          .  HYPERLIPIDEMIA - Patient has a long history of significant Hyperlipidemia requiring medication for treatment with recent good control. Patient reports no problems or side effects with the medication.                                                                                                                                                       .  SLEEP PROBLEM - known GENESIS on CPAP.                                                                                                                  .    MEDICAL HISTORY:     Patient Active Problem List    Diagnosis Date Noted     Mitral  valve insufficiency 06/15/2010     Priority: High     Interpretation Summary 6.15.10  Global and regional left ventricular function is normal with an EF of 60-65%.   Mild mitral insufficiency is present.  PatientHeight: 70 in  PatientWeight: 290 lbs  BSA 2.4 m^2        Posterior vitreous detachment of right eye 09/06/2017     Priority: Medium     Type 2 diabetes mellitus with hyperglycemia (H) 06/06/2016     Priority: Medium     Lumbago 11/02/2015     Priority: Medium     Aftercare following right knee joint replacement surgery 09/28/2015     Priority: Medium     S/P total knee replacement using cement, right 09/23/2015     Priority: Medium     Long-term insulin use (H) 02/19/2015     Priority: Medium     Uncontrolled diabetes mellitus (H) 02/19/2015     Priority: Medium     Chronic low back pain 06/11/2014     Priority: Medium     Since 2007. Works with chiropractor on back pain.        History of viral hepatitis, type B 03/07/2014     Priority: Medium     In 1979 due to finger stick form dialysis patient.        Tubular adenoma of colon 02/28/2014     Priority: Medium     On colonoscopy 2/2014. Repeat in 5 years.        Cataracts, both eyes 10/16/2013     Priority: Medium     Aortic sclerosis 07/10/2011     Priority: Medium     On echo 7/11       Hyperlipidemia LDL goal <100 10/31/2010     Priority: Medium     History of major depression      Priority: Medium     comes and goes - tried meds - unsuccessfully, certain times of the year, no psych intervention and no counselors in the past - and not interested        Hypertension goal BP (blood pressure) < 140/90      Priority: Medium     late 1990s - started medications at that time - not too difficult to control meds        GENESIS (obstructive sleep apnea) 10/15/2006     Priority: Medium     PSH at [facility] 5/2015 Severe GENESIS AHI 54  Clinically signficant GENESIS with hypoxemia - with sats into the 70s during REM - rem phenomenon - rec for CPAP, weight reduction as well         Nonspecific abnormal results of cardiovascular function study 04/10/2005     Priority: Medium     Problem list name updated by automated process. Provider to review       Morbid obesity (H) 04/14/2003     Priority: Medium     JOINT PAIN-ANKLE - right ankle  12/11/2006     Priority: Low     S/p surgery - triple arthrodecesis and bunion surgery on the right        ABSENCE OF MENSTRUATION - perimenopausal  10/30/2006     Priority: Low     Irritable bowel syndrome      Priority: Low     goes between the 2 - nerve related - more stressed more problems        Family history of malignant neoplasm of breast      Priority: Low     mother - young age - 45, and maternal cousin and aunt as well - no BRCA testing done        OSTEOARTHRITIS L KNEE  s/p knee replacement on the left       Priority: Low     total knee on the left - and pain is gone since the knee replacement        Family history of stroke (cerebrovascular)      Priority: Low     grandmother in early life in her 40s        Family history of other cardiovascular diseases      Priority: Low     father - CAD, and lipids/HTN - multiple members of the family   Problem list name updated by automated process. Provider to review and confirm  Problem list name updated by automated process. Provider to review       Family history of diabetes mellitus      Priority: Low     sister and grandmother with DM        Hypothyroidism 10/11/2006     Priority: Low     TSH 3.36 - mild subclinical hypothyroidism - deciding on following or starting low dose meds - with dr Oreilly - following          Nonallopathic lesion of thoracic region 08/23/2005     Priority: Low     Problem list name updated by automated process. Provider to review       FAMILY HX COLON CANCER 04/10/2005     Priority: Low     Pat uncles x 2       Diverticulosis of large intestine 02/15/2005     Priority: Low     colonoscopy  Problem list name updated by automated process. Provider to review        Past Medical  History:   Diagnosis Date     Cataract      DEPRESSION     comes and goes - tried meds - unsuccessfully, certain times of the year, no psych intervention and no counselors in the past - and not interested      Diverticulosis of colon (without mention of hemorrhage) 4/04    colonoscopy     ECHO-mildLVH,tr MR,mild thick lflets w inc LA,trTR   12/03     Essential hypertension, benign 1990s    late 1990s - started medications at that time - not to difficult to control meds      Fam hx-cardiovas dis NEC     father - CAD, and lipids/HTN - multiple members of the family      Family history of diabetes mellitus     sister and grandmother with DM      Family history of malignant neoplasm of breast     mother - young age - 45, and maternal cousin and aunt as well - no BRCA testing done      Family history of stroke (cerebrovascular)     grandmother in early life in her 40s      FAMILY HX COLON CANCER     Pat uncles x 2     Heart murmur      HYPERLIPIDEMIA 2000    fairly recent - in the last 5 years - high for DM levels  -cholesterol recent      Irritable bowel syndrome     goes between the 2 - nerve related - more stressed more problems      MICROALBUMINURIA     unsure how long - been on the lisinopril - for a few years at well      Nonspecific abnormal results of liver function study 2/3/2003    SGOT - has been high in the past - since the hepatitis b - borderline elevation - not really changing any      OBESITY      GENESIS (obstructive sleep apnea) 10/15/2006    psg 5/15 AHI 53 aPAP 8-15     OSTEOARTHRITIS L KNEE 2003    total knee on the left - and pain is gone since the knee replacement      PERS HX HEPATITIS B- RESOLVED] 1977    acute virus only - no chronic disease      PERS HX HEPATITIS B- RESOLVED]      Type II or unspecified type diabetes mellitus without mention of complication, not stated as uncontrolled 1991    oral meds frist, then insulin eventually later, difficult to control most of the time      Unspecified  hypothyroidism 10/11/2006    TSH 3.36 - mild subclinical hypothyroidism - deciding on following or starting low dose meds - with dr Oreilly - following      Past Surgical History:   Procedure Laterality Date     ARTHROPLASTY KNEE Right 9/23/2015    Procedure: ARTHROPLASTY KNEE;  Surgeon: Rufus Brown MD;  Location: [facility] OR     C NONSPECIFIC PROCEDURE  6/06    right triple arthrodecesis      C NONSPECIFIC PROCEDURE  7/06     right bunion surgery      C TOTAL KNEE ARTHROPLASTY  3/03    L knee     COLONOSCOPY  4/04    diverticulosis, rec repeat 10 yrs(consider fam hx?)     ORTHOPEDIC SURGERY      right ankle     STRESS ECHO (METRO)  12/03    no ischemia, limited by fatigue     SURGICAL HISTORY OF -       EXP LAP- R OVARY CYSTS     SURGICAL HISTORY OF -   1981,1984,1985    CHILDBIRTH     Current Outpatient Prescriptions   Medication Sig Dispense Refill     insulin aspart (NOVOLOG FLEXPEN) 100 UNIT/ML injection 3 units per 15 gram CHO and sliding scale 1:10 above 140 mg/dl. Uses approx 90 units daily 45 mL 1     liraglutide (VICTOZA PEN) 18 MG/3ML soln Inject 1.8 mg Subcutaneous daily 12 mL 1     insulin pen needle (GNP FotomotoFINE PEN NEEDLES) 31G X 8 MM 1 Device by * route daily 100 each 2     hydrochlorothiazide (HYDRODIURIL) 25 MG tablet Take 1 tablet (25 mg) by mouth daily 90 tablet 3     rosuvastatin (CRESTOR) 10 MG tablet Take 1 tablet (10 mg) by mouth daily 90 tablet 3     metoprolol (TOPROL XL) 25 MG 24 hr tablet Take 1 tablet (25 mg) by mouth daily 90 tablet 3     lisinopril (PRINIVIL/ZESTRIL) 40 MG tablet Take 1.5 tablets (60 mg) by mouth daily 135 tablet 3     blood glucose monitoring (NO BRAND SPECIFIED) test strip Use to test blood sugar 4 times daily or as directed. 100 each 11     insulin glargine (LANTUS) 100 UNIT/ML injection Lantus 70 units am and 70 units pm 135 mL 3     levothyroxine (SYNTHROID/LEVOTHROID) 75 MCG tablet Take 1 tablet (75 mcg) by mouth daily 90 tablet 3     metFORMIN (GLUCOPHAGE-XR)  500 MG 24 hr tablet Take 4 tablets (2,000 mg) by mouth At Bedtime 360 tablet 3     ASPIRIN EC PO Take 325 mg by mouth daily       Ibuprofen (ADVIL PO) Take 600 mg by mouth 4 times daily       Multiple Vitamins-Minerals (ADVANCED DIABETIC MULTIVITAMIN) TABS Take 1 tablet by mouth daily       Garlic 1000 MG CAPS Take 1 capsule by mouth daily Takes during summer months.  Done taking until next summer.       ORDER FOR DME, SET TO LOCAL PRINT, Respironics REMSTAR 60 Series Auto CPAP 8-15cm H2O, Airfit P10 nasal pillow mask w/medium pillows       OTC products: None, except as noted above    Allergies   Allergen Reactions     Atorvastatin Calcium      Gas     Pravachol [Pravastatin Sodium]      Pravachol - dry cough      Simvastatin      Myalgia      Latex Allergy: NO    Social History   Substance Use Topics     Smoking status: Never Smoker     Smokeless tobacco: Never Used      Comment: tried in early 20s only      Alcohol use Yes      Comment: Approx 3 times a year - mild increase now      History   Drug Use No       REVIEW OF SYSTEMS:   CONSTITUTIONAL: NEGATIVE for fever, chills, change in weight  ENT/MOUTH: NEGATIVE for ear, mouth and throat problems  RESP: NEGATIVE for significant cough or SOB  CV: NEGATIVE for chest pain, palpitations or peripheral edema  GI: NEGATIVE for nausea, abdominal pain, heartburn, or change in bowel habits  MUSCULOSKELETAL: NEGATIVE for significant arthralgias or myalgia    EXAM:   LMP 10/02/2007  GENERAL APPEARANCE: healthy, alert and no distress  HENT: ear canals and TM's normal and nose and mouth without ulcers or lesions  RESP: lungs clear to auscultation - no rales, rhonchi or wheezes  CV: regular rate and rhythm, normal S1 S2, no S3 or S4 and no murmur, click or rub   ABDOMEN: soft, nontender, no HSM or masses and bowel sounds normal  MS: extremities normal- no gross deformities noted    DIAGNOSTICS:   EKG: appears normal, NSR    Results for orders placed or performed in visit on  03/14/18   Hemoglobin A1c   Result Value Ref Range    Hemoglobin A1C 6.9 (H) 4.3 - 6.0 %   Basic metabolic panel   Result Value Ref Range    Sodium 143 133 - 144 mmol/L    Potassium 4.2 3.4 - 5.3 mmol/L    Chloride 107 94 - 109 mmol/L    Carbon Dioxide 31 20 - 32 mmol/L    Anion Gap 5 3 - 14 mmol/L    Glucose 81 70 - 99 mg/dL    Urea Nitrogen 19 7 - 30 mg/dL    Creatinine 0.91 0.52 - 1.04 mg/dL    GFR Estimate 62 >60 mL/min/1.7m2    GFR Estimate If Black 75 >60 mL/min/1.7m2    Calcium 9.8 8.5 - 10.1 mg/dL          IMPRESSION:   Reason for surgery/procedure: bilateral cataract  Diagnosis/reason for consult:       ICD-10-CM    1. Preop general physical exam Z01.818 EKG 12-lead complete w/read - Clinics     Hemoglobin A1c     Basic metabolic panel   2. Cataract of both eyes secondary to ocular disorder H26.223 Hemoglobin A1c     Basic metabolic panel   3. Long-term insulin use (H) Z79.4 BASAGLAR 100 UNIT/ML injection   4. Hypothyroidism due to acquired atrophy of thyroid E03.4         The proposed surgical procedure is considered LOW risk.    REVISED CARDIAC RISK INDEX  The patient has the following serious cardiovascular risks for perioperative complications such as (MI, PE, VFib and 3  AV Block):  Diabetes Mellitus (on Insulin)  INTERPRETATION: 1 risks: Class II (low risk - 0.9% complication rate)    The patient has the following additional risks for perioperative complications:  No identified additional risks      RECOMMENDATIONS:       Obstructive Sleep Apnea (or suspected sleep apnea)  Monitor clinically respiratory status      --Patient is to take all scheduled medications on the day of surgery EXCEPT for modifications listed below.    Diabetes Medication Use  -----Hold usual oral and non-insulin diabetic meds (e.g. Metformin, Actos, Glipizide) while NPO.   -----Take 80% of long acting insulin (e.g. Lantus, NPH) while NPO (fasting)  -----Hold short acting insulin (e.g. Novolog, Humalog) while NPO  (fasting)      Anticoagulant or Antiplatelet Medication Use  Bleeding risk is low for this procedure (e.g. dental, skin, cataract)        ACE Inhibitor or Angiotensin Receptor Blocker (ARB) Use  Ace inhibitor or Angiotensin Receptor Blocker (ARB) and should HOLD this medication for the 24 hours prior to surgery.      APPROVAL GIVEN to proceed with proposed procedure, without further diagnostic evaluation       Signed Electronically by: Marilou Arciniega MD PhD    Copy of this evaluation report is provided to requesting physician.    [employer] Preop Guidelines

## 2018-03-15 NOTE — IP AVS SNAPSHOT
[facility]    [address]    [place] [address]    Phone:  [phone]    Fax:  [phone]                                       After Visit Summary   3/15/2018    Sherry Slaughter    MRN: [id number]           After Visit Summary Signature Page     I have received my discharge instructions, and my questions have been answered. I have discussed any challenges I see with this plan with the nurse or doctor.    ..........................................................................................................................................  Patient/Patient Representative Signature      ..........................................................................................................................................  Patient Representative Print Name and Relationship to Patient    ..................................................               ................................................  Date                                            Time    ..........................................................................................................................................  Reviewed by Signature/Title    ...................................................              ..............................................  Date                                                            Time

## 2018-03-15 NOTE — IP AVS SNAPSHOT
MRN:[id number]                      After Visit Summary   3/15/2018    Sherry Slaughter    MRN: [id number]           Thank you!     Thank you for choosing [facility] for your care. Our goal is always to provide you with excellent care. Hearing back from our patients is one way we can continue to improve our services. Please take a few minutes to complete the written survey that you may receive in the mail after you visit with us. Thank you!        Patient Information     Date Of Birth          1955        About your hospital stay     You were admitted on:  March 15, 2018 You last received care in the:  [facility]    You were discharged on:  March 15, 2018       Who to Call     For medical emergencies, please call 911.  For non-urgent questions about your medical care, please call your primary care provider or clinic, [phone]  For questions related to your surgery, please call your surgery clinic        Attending Provider     Provider Specialty    Bandar Linares MD Ophthalmology       Primary Care Provider Office Phone # Fax #    Marilou Arciniega MD [facility] [phone]      Your next 10 appointments already scheduled     Mar 16, 2018  8:20 AM CDT   Return Visit with Kameron Pedraza OD   [facility])    [address] [place] [address] [id number] [phone]            Mar 21, 2018 11:40 AM CDT   Return Visit with Kameron Pedraza OD   [facility])    [address] [place] [id number] [phone]            Apr 05, 2018   Procedure with Bandar Linares MD   [facility] (--)    [address]  [facility] [address]   [phone]            Apr 11, 2018 11:20 AM CDT   Return Visit with Kameron Pedraza OD   [facility])    [address] [place] [address]  [id number]   [phone]            May 02, 2018 11:00 AM CDT   Return Visit with Kameron Pedraza OD   [facility] ([facility])    [address] [place] [id number]   [phone]              Further instructions from your care team       [facility] Discharge  Instructions  Anesthesia ([facility] Care Center)   Adult Discharge Instructions    For 24 hours after surgery    1. Get plenty of rest.  Make arrangements to have a responsible adult stay with you for at least 6 hours after you leave the hospital.  2. Do not drive or use heavy equipment for 24 hours.    3. Do not drink alcohol for 24 hours.  4. Do not sign legal documents or make important decisions for 24 hours.  5. Avoid strenuous or risky activities. You may feel lightheaded.  If so, sit for a few minutes before standing.  Have someone help you get up.   6. Conscious sedation patients may resume a regular diet..  7. Any questions of medical nature, call your physician.    Cataract Surgery Postoperative Instructions  Dr. Bandar Linares      Postoperative Medications: After surgery, you will use eye drop medications. In most cases, you will start these eye drops 2 days before the day of surgery.   Follow the directions provided to you from the pharmacy or from the doctor's office.    The drops might sting a little when they are instilled, and that is normal.    It doesn't matter what order you put the drops into your eyes, but you should wait at least one minute between drops.    Recently we started using a compounded drop that contains all three prescriptions in one bottle. If you have this drop you only need to use one drop 4 time per day. Many people choose to do the drops at breakfast, lunch, dinner, and bedtime.    Artificial Tears- Are lubricating drops used to moisturize the eye.  You can use these as much as you want.  Particularly if your eyes feel watery, gritty, or  "uncomfortable.  Chilling these drops in the refrigerator results in a more soothing feeling.  There are several brands of artificial tears available including, but not limited to, Optive, Refresh, Systane, Blink, Genteal, Soothe, and others.  You should not use drops that are \"gets the red out.\"  You do not need a prescription for these medications.     Please continue any glaucoma, dry eye, or other medications you were using prior to the surgery.      Please allow 24 to 48 hours when requesting refills, and call BEFORE you run out of drops.    Restriction on Activities-  It is extremely important that you DO NOT RUB THE TREATED EYE.    - You will be given a clear plastic shield to wear as protection over your eye the night after surgery.    - Refrain from many activities that may put your eye at risk of injury, as well as areas containing a high volume of chemicals, dust, and debris.    - Do Not wear any eye makeup or moisturizer around the eye for 1 week after surgery.    - Do Not swim or go into a hot-tub, jacuzzi, or sauna for 1 week following surgery.  You can take showers as normal, but avoid getting shampoo or soap into your eyes.     - Avoid strenuous activity, including lifting more than 10 pounds, for 1 week after surgery.       - It is fine to bathe, read, watch TV, and use the computer.    Symptoms requiring medical attention:     - Sudden onset of increased discharge from the eye.  - Persistent or increasing pain in the eye.  - Sudden decrease in vision.  - Persistent nausea or vomiting.      If you have any questions or concerns before or after your surgery, please contact Dr. Linares's office at [phone].         Revised 3/27/2017    Pending Results     Date and Time Order Name Status Description    3/14/2018 1000 EKG 12-LEAD COMPLETE W/READ - CLINICS In process             Admission Information     Date & Time Provider Department Dept. Phone    3/15/2018 Bandar Linares MD [place] [facility] [phone]      Your Vitals Were     Blood Pressure Temperature Respirations Last Period Pulse Oximetry       148/77 97.2  F (36.2  C) (Temporal) 16 10/02/2007 96%       [employer] Information     [employer] gives you secure access to your electronic health record. If you see a primary care provider, you can also send messages to your care team and make appointments. If you have questions, please call your primary care clinic.  If you do not have a primary care provider, please call [phone] and they will assist you.        Care EveryWhere ID     This is your Care EveryWhere ID. This could be used by other organizations to access your [place] medical records  [id number]        Equal Access to Services     OLE VILLEGAS : Fili Dunaway, fabio manjarrez, chepe willett, acosta salinas. So [facility] [phone].    ATENCIÓN: Si habla español, tiene a espinosa disposición servicios gratuitos de asistencia lingüística. Llame al [phone].    We comply with applicable federal civil rights laws and [place] laws. We do not discriminate on the basis of race, color, national origin, age, disability, sex, sexual orientation, or gender identity.               Review of your medicines      UNREVIEWED medicines. Ask your doctor about these medicines        Dose / Directions    ADVANCED DIABETIC MULTIVITAMIN Tabs        Dose:  1 tablet   Take 1 tablet by mouth daily   Refills:  0       ADVIL PO        Dose:  600 mg   Take 600 mg by mouth 4 times daily   Refills:  0       ASPIRIN EC PO        Dose:  325 mg   Take 325 mg by mouth daily   Refills:  0       BASAGLAR 100 UNIT/ML injection   Used for:  Long-term insulin use (H)        Dose:  70 Units   Inject 70 Units Subcutaneous 2 times daily   Quantity:  63 mL   Refills:  1       Garlic 1000 MG Caps        Dose:  1 capsule   Take 1 capsule by mouth daily Takes during summer months.  Done taking until next summer.    Refills:  0       hydrochlorothiazide 25 MG tablet   Commonly known as:  HYDRODIURIL   Used for:  Essential hypertension with goal blood pressure less than 140/90        Dose:  25 mg   Take 1 tablet (25 mg) by mouth daily   Quantity:  90 tablet   Refills:  3       insulin aspart 100 UNIT/ML injection   Commonly known as:  NovoLOG FLEXPEN   Used for:  Type 2 diabetes mellitus with hyperglycemia, with long-term current use of insulin (H)        3 units per 15 gram CHO and sliding scale 1:10 above 140 mg/dl. Uses approx 90 units daily   Quantity:  45 mL   Refills:  1       levothyroxine 75 MCG tablet   Commonly known as:  SYNTHROID/LEVOTHROID   Used for:  Hypothyroidism due to acquired atrophy of thyroid        Dose:  75 mcg   Take 1 tablet (75 mcg) by mouth daily   Quantity:  90 tablet   Refills:  3       liraglutide 18 MG/3ML soln   Commonly known as:  VICTOZA PEN   Used for:  Type 2 diabetes mellitus with hyperglycemia, with long-term current use of insulin (H)        Dose:  1.8 mg   Inject 1.8 mg Subcutaneous daily   Quantity:  12 mL   Refills:  1       lisinopril 40 MG tablet   Commonly known as:  PRINIVIL/ZESTRIL   Used for:  Essential hypertension with goal blood pressure less than 140/90        Dose:  60 mg   Take 1.5 tablets (60 mg) by mouth daily   Quantity:  135 tablet   Refills:  3       metFORMIN 500 MG 24 hr tablet   Commonly known as:  GLUCOPHAGE-XR   Used for:  Uncontrolled type 2 diabetes mellitus with hyperglycemia, with long-term current use of insulin (H)        Dose:  2000 mg   Take 4 tablets (2,000 mg) by mouth At Bedtime   Quantity:  360 tablet   Refills:  3       metoprolol succinate 25 MG 24 hr tablet   Commonly known as:  TOPROL XL   Used for:  Essential hypertension with goal blood pressure less than 140/90        Dose:  25 mg   Take 1 tablet (25 mg) by mouth daily   Quantity:  90 tablet   Refills:  3       rosuvastatin 10 MG tablet   Commonly known as:  CRESTOR   Used for:  Hyperlipidemia  LDL goal <100        Dose:  10 mg   Take 1 tablet (10 mg) by mouth daily   Quantity:  90 tablet   Refills:  3         CONTINUE these medicines which have NOT CHANGED        Dose / Directions    blood glucose monitoring test strip   Commonly known as:  no brand specified   Used for:  Type 2 diabetes mellitus without complication, with long-term current use of insulin (H)        Use to test blood sugar 4 times daily or as directed.   Quantity:  100 each   Refills:  11       insulin pen needle 31G X 8 MM   Commonly known as:  GNP CLICKFINE PEN NEEDLES   Used for:  Type 2 diabetes, HbA1c goal < 7% (H)        Dose:  1 Device   1 Device by * route daily   Quantity:  100 each   Refills:  2       order for DME        Respironics REMSTAR 60 Series Auto CPAP 8-15cm H2O, Airfit P10 nasal pillow mask w/medium pillows   Refills:  0                Protect others around you: Learn how to safely use, store and throw away your medicines at www.disposemymeds.org.             Medication List: This is a list of all your medications and when to take them. Check marks below indicate your daily home schedule. Keep this list as a reference.      Medications           Morning Afternoon Evening Bedtime As Needed    ADVANCED DIABETIC MULTIVITAMIN Tabs   Take 1 tablet by mouth daily                                ADVIL PO   Take 600 mg by mouth 4 times daily                                ASPIRIN EC PO   Take 325 mg by mouth daily                                BASAGLAR 100 UNIT/ML injection   Inject 70 Units Subcutaneous 2 times daily                                blood glucose monitoring test strip   Commonly known as:  no brand specified   Use to test blood sugar 4 times daily or as directed.                                Garlic 1000 MG Caps   Take 1 capsule by mouth daily Takes during summer months.  Done taking until next summer.                                hydrochlorothiazide 25 MG tablet   Commonly known as:  HYDRODIURIL   Take 1  tablet (25 mg) by mouth daily                                insulin aspart 100 UNIT/ML injection   Commonly known as:  NovoLOG FLEXPEN   3 units per 15 gram CHO and sliding scale 1:10 above 140 mg/dl. Uses approx 90 units daily                                insulin pen needle 31G X 8 MM   Commonly known as:  GNP CLICKFINE PEN NEEDLES   1 Device by * route daily                                levothyroxine 75 MCG tablet   Commonly known as:  SYNTHROID/LEVOTHROID   Take 1 tablet (75 mcg) by mouth daily                                liraglutide 18 MG/3ML soln   Commonly known as:  VICTOZA PEN   Inject 1.8 mg Subcutaneous daily                                lisinopril 40 MG tablet   Commonly known as:  PRINIVIL/ZESTRIL   Take 1.5 tablets (60 mg) by mouth daily                                metFORMIN 500 MG 24 hr tablet   Commonly known as:  GLUCOPHAGE-XR   Take 4 tablets (2,000 mg) by mouth At Bedtime                                metoprolol succinate 25 MG 24 hr tablet   Commonly known as:  TOPROL XL   Take 1 tablet (25 mg) by mouth daily                                order for [facility]   Respironics REMSTAR 60 Series Auto CPAP 8-15cm H2O, Airfit P10 nasal pillow mask w/medium pillows                                rosuvastatin 10 MG tablet   Commonly known as:  CRESTOR   Take 1 tablet (10 mg) by mouth daily

## 2018-03-15 NOTE — ANESTHESIA PREPROCEDURE EVALUATION
Anesthesia Evaluation     . Pt has had prior anesthetic.     No history of anesthetic complications          ROS/MED HX    ENT/Pulmonary:     (+)sleep apnea, uses CPAP , . .    Neurologic:       Cardiovascular:     (+) hypertension----. : . . . :. .      (-) arrhythmias and irregular heartbeat/palpitations   METS/Exercise Tolerance:     Hematologic:         Musculoskeletal:         GI/Hepatic:        (-) GERD   Renal/Genitourinary:         Endo:     (+) type II DM thyroid problem hypothyroidism, Obesity, .      Psychiatric:     (+) psychiatric history depression      Infectious Disease:         Malignancy:         Other:                     Physical Exam  Normal systems: cardiovascular and pulmonary    Airway   Mallampati: II  TM distance: >3 FB  Neck ROM: full    Dental   Comment: native    Cardiovascular       Pulmonary                     Anesthesia Plan      History & Physical Review  History and physical reviewed and following examination; no interval change.    ASA Status:  2 .    NPO Status:  > 8 hours    Plan for MAC   PONV prophylaxis:  Ondansetron (or other 5HT-3)       Postoperative Care      Consents  Anesthetic plan, risks, benefits and alternatives discussed with:  Patient..                          .

## 2018-03-16 ENCOUNTER — OFFICE VISIT (OUTPATIENT)
Dept: OPTOMETRY | Facility: CLINIC | Age: 63
End: 2018-03-16
Payer: COMMERCIAL

## 2018-03-16 DIAGNOSIS — Z96.1 PSEUDOPHAKIA OF LEFT EYE: Primary | ICD-10-CM

## 2018-03-16 PROCEDURE — 99024 POSTOP FOLLOW-UP VISIT: CPT | Performed by: OPTOMETRIST

## 2018-03-16 ASSESSMENT — REFRACTION_WEARINGRX
OD_ADD: +2.50
OD_CYLINDER: +0.75
OD_AXIS: 010
OS_CYLINDER: +0.25
OS_SPHERE: -0.50
OS_ADD: +2.50
OD_SPHERE: -0.25
OS_AXIS: 011

## 2018-03-16 ASSESSMENT — TONOMETRY
IOP_METHOD: TONOPEN
OS_IOP_MMHG: 29

## 2018-03-16 ASSESSMENT — VISUAL ACUITY
OS_SC: 20/25
METHOD: SNELLEN - LINEAR
OD_SC: 20/25
OD_SC+: -1

## 2018-03-16 ASSESSMENT — EXTERNAL EXAM - LEFT EYE: OS_EXAM: NORMAL

## 2018-03-16 ASSESSMENT — SLIT LAMP EXAM - LIDS: COMMENTS: NORMAL

## 2018-03-16 NOTE — LETTER
3/16/2018         RE: Sherry Slaughter  [address]  [facility] [address]        Dear Colleague,    Thank you for referring your patient, Sherry Slaughter, to the [facility]. Please see a copy of my visit note below.    CHIEF COMPLAINT:   Chief Complaint   Patient presents with     Surgical Followup     1 day CAT surgery OS       Type of surgery CAT surgery OS  Date of surgery 3/15/2018 - OS; OD scheduled 4/5    Sometimes feels like something is in her eye.    Drops reviewed.    OBJECTIVE:     See ophthalmology exam    ASSESSMENT:         ICD-10-CM    1. Pseudophakia of left eye Z96.1 POST-OP FOLLOW-UP VISIT     PLAN:      Patient Instructions   Continue with drops and scheduled follow up appointments.  Follow directions on your bottles as far as how many drops to use daily.    Wear shield while sleeping.       Please continue any glaucoma, dry eye, or other medications you were using prior to the surgery.        Kameron Pedraza, KULDEEP  [facility]  [address] [place] [address]  Tel- [phone]  Lfk-[phone]        Again, thank you for allowing me to participate in the care of your patient.        Sincerely,        Kameron Pedraza OD

## 2018-03-16 NOTE — PROGRESS NOTES
CHIEF COMPLAINT:   Chief Complaint   Patient presents with     Surgical Followup     1 day CAT surgery OS       Type of surgery CAT surgery OS  Date of surgery 3/15/2018 - OS; OD scheduled 4/5    Sometimes feels like something is in her eye.    Drops reviewed.    OBJECTIVE:     See ophthalmology exam    ASSESSMENT:         ICD-10-CM    1. Pseudophakia of left eye Z96.1 POST-OP FOLLOW-UP VISIT     PLAN:      Patient Instructions   Continue with drops and scheduled follow up appointments.  Follow directions on your bottles as far as how many drops to use daily.    Wear shield while sleeping.       Please continue any glaucoma, dry eye, or other medications you were using prior to the surgery.        Kameron Pedraza, OD  [facility]  [address] [place] [address]  Tel- [phone]  Owi-[phone]

## 2018-03-16 NOTE — MR AVS SNAPSHOT
After Visit Summary   3/16/2018    Sherry Slaughter    MRN: [id number]           Patient Information     Date Of Birth          1955        Visit Information        Provider Department      3/16/2018 8:20 AM Kameron Pedraza OD [facility]        Today's Diagnoses     Pseudophakia of left eye    -  1      Care Instructions    Continue with drops and scheduled follow up appointments.  Follow directions on your bottles as far as how many drops to use daily.    Wear shield while sleeping.       Please continue any glaucoma, dry eye, or other medications you were using prior to the surgery.        Kameron Pedraza OD  [facility]  [address] [place] [address]  Tel- [phone]  [phone]            Follow-ups after your visit        Follow-up notes from your care team     Return in about 1 week (around 3/23/2018) for Follow Up.      Your next 10 appointments already scheduled     Mar 21, 2018 11:40 AM CDT   Return Visit with Kameron Pedraza OD   [facility])    [address] [facility] [id number] [phone]            Apr 05, 2018   Procedure with Bandar Linares MD   [facility] PeriOP Services (--)    [address]  [facility] [address]   [phone]            Apr 11, 2018 11:20 AM CDT   Return Visit with Kameron Pedraza OD   [facility])    [address] [facility] [id number] [phone]            May 02, 2018 11:00 AM CDT   Return Visit with Kameron Pedraza OD   [facility] ([facility])    [address] [facility] [address] [phone]              Who to contact     If you have questions or need follow up information about today's clinic visit or your schedule please contact [facility] directly at [phone].  Normal or  non-critical lab and imaging results will be communicated to you by C[employer] Controller[employer], letter or phone within 4 business days after the clinic has received the results. If you do not hear from us within 7 days, please contact the clinic through [employer] or phone. If you have a critical or abnormal lab result, we will notify you by phone as soon as possible.  Submit refill requests through [employer] or call your pharmacy and they will forward the refill request to us. Please allow 3 business days for your refill to be completed.          Additional Information About Your Visit        [employer] Information     [employer] gives you secure access to your electronic health record. If you see a primary care provider, you can also send messages to your care team and make appointments. If you have questions, please call your primary care clinic.  If you do not have a primary care provider, please call [phone] and they will assist you.      [employer] is an electronic gateway that provides easy, online access to your medical records. With [employer], you can request a clinic appointment, read your test results, renew a prescription or communicate with your care team.     To access your existing account, please contact your [facility] or call [phone] for assistance.        Care EveryWhere ID     This is your Care EveryWhere ID. This could be used by other organizations to access your [place] medical records  [id number]        Your Vitals Were     Last Period                   10/02/2007            Blood Pressure from Last 3 Encounters:   03/15/18 148/77   03/14/18 122/68   11/02/17 136/78    Weight from Last 3 Encounters:   03/14/18 134 kg (295 lb 8 oz)   11/02/17 135.6 kg (299 lb)   07/03/17 133.9 kg (295 lb 1.6 oz)              We Performed the Following     POST-OP FOLLOW-UP VISIT        Primary Care Provider Office Phone # Fax #    Marilou Arciniega MD PhD [phone] [address]  [facility] [address]        Equal Access to Services     [facility] NELLY : Hadii canelo love hugorichard Magdalenaali, waaxda luqadaha, qaybta kamelloraffaele willett, acosta murphykevinfito salinas. So [facility] [phone].    ATENCIÓN: Si habla español, tiene a espinosa disposición servicios gratuitos de asistencia lingüística. Llame al [phone].    We comply with applicable federal civil rights laws and [place] laws. We do not discriminate on the basis of race, color, national origin, age, disability, sex, sexual orientation, or gender identity.            Thank you!     Thank you for choosing [facility]  for your care. Our goal is always to provide you with excellent care. Hearing back from our patients is one way we can continue to improve our services. Please take a few minutes to complete the written survey that you may receive in the mail after your visit with us. Thank you!             Your Updated Medication List - Protect others around you: Learn how to safely use, store and throw away your medicines at www.disposemymeds.org.          This list is accurate as of 3/16/18  8:35 AM.  Always use your most recent med list.                   Brand Name Dispense Instructions for use Diagnosis    ADVANCED DIABETIC MULTIVITAMIN Tabs      Take 1 tablet by mouth daily        ADVIL PO      Take 600 mg by mouth 4 times daily        ASPIRIN EC PO      Take 325 mg by mouth daily        BASAGLAR 100 UNIT/ML injection     63 mL    Inject 70 Units Subcutaneous 2 times daily    Long-term insulin use (H)       blood glucose monitoring test strip    no brand specified    100 each    Use to test blood sugar 4 times daily or as directed.    Type 2 diabetes mellitus without complication, with long-term current use of insulin (H)       Garlic 1000 MG Caps      Take 1 capsule by mouth daily Takes during summer months.  Done taking until next summer.        hydrochlorothiazide 25 MG tablet    HYDRODIURIL    90 tablet     Take 1 tablet (25 mg) by mouth daily    Essential hypertension with goal blood pressure less than 140/90       insulin aspart 100 UNIT/ML injection    NovoLOG FLEXPEN    45 mL    3 units per 15 gram CHO and sliding scale 1:10 above 140 mg/dl. Uses approx 90 units daily    Type 2 diabetes mellitus with hyperglycemia, with long-term current use of insulin (H)       insulin pen needle 31G X 8 MM    GNP CLICKFINE PEN NEEDLES    100 each    1 Device by * route daily    Type 2 diabetes, HbA1c goal < 7% (H)       levothyroxine 75 MCG tablet    SYNTHROID/LEVOTHROID    90 tablet    Take 1 tablet (75 mcg) by mouth daily    Hypothyroidism due to acquired atrophy of thyroid       liraglutide 18 MG/3ML soln    VICTOZA PEN    12 mL    Inject 1.8 mg Subcutaneous daily    Type 2 diabetes mellitus with hyperglycemia, with long-term current use of insulin (H)       lisinopril 40 MG tablet    PRINIVIL/ZESTRIL    135 tablet    Take 1.5 tablets (60 mg) by mouth daily    Essential hypertension with goal blood pressure less than 140/90       metFORMIN 500 MG 24 hr tablet    GLUCOPHAGE-XR    360 tablet    Take 4 tablets (2,000 mg) by mouth At Bedtime    Uncontrolled type 2 diabetes mellitus with hyperglycemia, with long-term current use of insulin (H)       metoprolol succinate 25 MG 24 hr tablet    TOPROL XL    90 tablet    Take 1 tablet (25 mg) by mouth daily    Essential hypertension with goal blood pressure less than 140/90       order for [facility]      Respironics REMSTAR 60 Series Auto CPAP 8-15cm H2O, Airfit P10 nasal pillow mask w/medium pillows        rosuvastatin 10 MG tablet    CRESTOR    90 tablet    Take 1 tablet (10 mg) by mouth daily    Hyperlipidemia LDL goal <100

## 2018-03-16 NOTE — PATIENT INSTRUCTIONS
Continue with drops and scheduled follow up appointments.  Follow directions on your bottles as far as how many drops to use daily.    Wear shield while sleeping.       Please continue any glaucoma, dry eye, or other medications you were using prior to the surgery.        Kameron Pedraza, OD  [facility]  [address] [place] [address]  Tel- [phone]  [phone]

## 2018-03-21 ENCOUNTER — OFFICE VISIT (OUTPATIENT)
Dept: OPTOMETRY | Facility: CLINIC | Age: 63
End: 2018-03-21
Payer: COMMERCIAL

## 2018-03-21 DIAGNOSIS — Z96.1 PSEUDOPHAKIA OF LEFT EYE: Primary | ICD-10-CM

## 2018-03-21 PROCEDURE — 99024 POSTOP FOLLOW-UP VISIT: CPT | Performed by: OPTOMETRIST

## 2018-03-21 ASSESSMENT — TONOMETRY
OS_IOP_MMHG: 19
IOP_METHOD: TONOPEN

## 2018-03-21 ASSESSMENT — VISUAL ACUITY
OS_SC: 20/20
OD_SC+: -1
METHOD: SNELLEN - LINEAR
OD_SC: 20/20

## 2018-03-21 ASSESSMENT — EXTERNAL EXAM - LEFT EYE: OS_EXAM: NORMAL

## 2018-03-21 ASSESSMENT — SLIT LAMP EXAM - LIDS: COMMENTS: NORMAL

## 2018-03-21 NOTE — MR AVS SNAPSHOT
After Visit Summary   3/21/2018    Sherry Slaughter    MRN: [id number]           Patient Information     Date Of Birth          1955        Visit Information        Provider Department      3/21/2018 11:40 AM Kameron Pedraza OD [facility]        Today's Diagnoses     Pseudophakia of left eye    -  1      Care Instructions    Continue drops as recommended.     Ok to discontinue shield while sleeping.  All restrictions are lifted.    Keep follow up appointments as scheduled.       Please continue any glaucoma, dry eye, or other medications you were using prior to the surgery.        Kameron Pedraza OD  [facility]  [address] [place] [address]  Tel- [phone]            Follow-ups after your visit        Follow-up notes from your care team     Return if symptoms worsen or fail to improve.      Your next 10 appointments already scheduled     Apr 05, 2018   Procedure with Bandar Linares MD   [facility] PeriOP Services (--)    [address]  [facility] [address]   [phone]            Apr 11, 2018 11:20 AM CDT   Return Visit with Kameron Pedraza OD   [facility] ([facility])    [address] [facility] [address] [phone]            May 02, 2018 11:00 AM CDT   Return Visit with Kameron Pedraza OD   [facility] ([facility])    [address] [facility] [address] [phone]              Who to contact     If you have questions or need follow up information about today's clinic visit or your schedule please contact [facility] directly at [phone].  Normal or non-critical lab and imaging results will be communicated to you by MyChart, letter or phone within 4 business days after the clinic has received the results. If you do not hear from us within 7 days, please contact the clinic through MyChart or  phone. If you have a critical or abnormal lab result, we will notify you by phone as soon as possible.  Submit refill requests through [employer] or call your pharmacy and they will forward the refill request to us. Please allow 3 business days for your refill to be completed.          Additional Information About Your Visit        [employer] Information     [employer] gives you secure access to your electronic health record. If you see a primary care provider, you can also send messages to your care team and make appointments. If you have questions, please call your primary care clinic.  If you do not have a primary care provider, please call [phone] and they will assist you.      [employer] is an electronic gateway that provides easy, online access to your medical records. With [employer], you can request a clinic appointment, read your test results, renew a prescription or communicate with your care team.     To access your existing account, please contact your [facility] or call [phone] for assistance.        Care EveryWhere ID     This is your Care EveryWhere ID. This could be used by other organizations to access your [place] medical records  [id number]        Your Vitals Were     Last Period                   10/02/2007            Blood Pressure from Last 3 Encounters:   03/15/18 148/77   03/14/18 122/68   11/02/17 136/78    Weight from Last 3 Encounters:   03/14/18 134 kg (295 lb 8 oz)   11/02/17 135.6 kg (299 lb)   07/03/17 133.9 kg (295 lb 1.6 oz)              We Performed the Following     POST-OP FOLLOW-UP VISIT        Primary Care Provider Office Phone # Fax #    Marilou Arciniega MD PhD [phone]       [address]  [facility] [address]        Equal Access to Services     [facility]: Hadii canelo [place], fabio manjarrez, qaacosta mir . [facility] [phone].    ATENCIÓN: sola Perez  espinosa disposición servicios gratuitos de asistencia lingüística. Wilfredo pace [phone].    We comply with applicable federal civil rights laws and [place] laws. We do not discriminate on the basis of race, color, national origin, age, disability, sex, sexual orientation, or gender identity.            Thank you!     Thank you for choosing [facility]  for your care. Our goal is always to provide you with excellent care. Hearing back from our patients is one way we can continue to improve our services. Please take a few minutes to complete the written survey that you may receive in the mail after your visit with us. Thank you!             Your Updated Medication List - Protect others around you: Learn how to safely use, store and throw away your medicines at www.disposemymeds.org.          This list is accurate as of 3/21/18 11:48 AM.  Always use your most recent med list.                   Brand Name Dispense Instructions for use Diagnosis    ADVANCED DIABETIC MULTIVITAMIN Tabs      Take 1 tablet by mouth daily        ADVIL PO      Take 600 mg by mouth 4 times daily        ASPIRIN EC PO      Take 325 mg by mouth daily        BASAGLAR 100 UNIT/ML injection     63 mL    Inject 70 Units Subcutaneous 2 times daily    Long-term insulin use (H)       blood glucose monitoring test strip    no brand specified    100 each    Use to test blood sugar 4 times daily or as directed.    Type 2 diabetes mellitus without complication, with long-term current use of insulin (H)       Garlic 1000 MG Caps      Take 1 capsule by mouth daily Takes during summer months.  Done taking until next summer.        hydrochlorothiazide 25 MG tablet    HYDRODIURIL    90 tablet    Take 1 tablet (25 mg) by mouth daily    Essential hypertension with goal blood pressure less than 140/90       insulin aspart 100 UNIT/ML injection    NovoLOG FLEXPEN    45 mL    3 units per 15 gram CHO and sliding scale 1:10 above 140 mg/dl. Uses approx 90  units daily    Type 2 diabetes mellitus with hyperglycemia, with long-term current use of insulin (H)       insulin pen needle 31G X 8 MM    GNP CLICKFINE PEN NEEDLES    100 each    1 Device by * route daily    Type 2 diabetes, HbA1c goal < 7% (H)       levothyroxine 75 MCG tablet    SYNTHROID/LEVOTHROID    90 tablet    Take 1 tablet (75 mcg) by mouth daily    Hypothyroidism due to acquired atrophy of thyroid       liraglutide 18 MG/3ML soln    VICTOZA PEN    12 mL    Inject 1.8 mg Subcutaneous daily    Type 2 diabetes mellitus with hyperglycemia, with long-term current use of insulin (H)       lisinopril 40 MG tablet    PRINIVIL/ZESTRIL    135 tablet    Take 1.5 tablets (60 mg) by mouth daily    Essential hypertension with goal blood pressure less than 140/90       metFORMIN 500 MG 24 hr tablet    GLUCOPHAGE-XR    360 tablet    Take 4 tablets (2,000 mg) by mouth At Bedtime    Uncontrolled type 2 diabetes mellitus with hyperglycemia, with long-term current use of insulin (H)       metoprolol succinate 25 MG 24 hr tablet    TOPROL XL    90 tablet    Take 1 tablet (25 mg) by mouth daily    Essential hypertension with goal blood pressure less than 140/90       order for [facility]      Respironics REMSTAR 60 Series Auto CPAP 8-15cm H2O, Airfit P10 nasal pillow mask w/medium pillows        rosuvastatin 10 MG tablet    CRESTOR    90 tablet    Take 1 tablet (10 mg) by mouth daily    Hyperlipidemia LDL goal <100

## 2018-03-21 NOTE — LETTER
3/21/2018         RE: Sherry Slaughter  [address]  [facility] [address]        Dear Colleague,    Thank you for referring your patient, Sherry Slaughter, to the [facility]. Please see a copy of my visit note below.    CHIEF COMPLAINT:   Chief Complaint   Patient presents with     Surgical Followup     1 week post op cataract os eye       Type of surgery cataract   Date of surgery 3- os eye    Mona Herbert, Optometric Assistant, A.B.O.C.     Drops reviewed.    OBJECTIVE:     See ophthalmology exam    ASSESSMENT:         ICD-10-CM    1. Pseudophakia of left eye Z96.1 POST-OP FOLLOW-UP VISIT     PLAN:      Patient Instructions   Continue drops as recommended.     Ok to discontinue shield while sleeping.  All restrictions are lifted.    Keep follow up appointments as scheduled.       Please continue any glaucoma, dry eye, or other medications you were using prior to the surgery.        Kameron Pedraza OD  [facility]  [address] [place] [address]  Tel- [phone]        Again, thank you for allowing me to participate in the care of your patient.        Sincerely,        Kameron Pedraza, OD

## 2018-03-21 NOTE — PATIENT INSTRUCTIONS
Continue drops as recommended.     Ok to discontinue shield while sleeping.  All restrictions are lifted.    Keep follow up appointments as scheduled.       Please continue any glaucoma, dry eye, or other medications you were using prior to the surgery.        Kameron Pedraza, OD  [facility]  [address] [place] [address]  Tel- [phone]

## 2018-03-21 NOTE — PROGRESS NOTES
CHIEF COMPLAINT:   Chief Complaint   Patient presents with     Surgical Followup     1 week post op cataract os eye       Type of surgery cataract   Date of surgery 3- os eye    Mona Herbert, Optometric Assistant, A.B.O.C.     Drops reviewed.    OBJECTIVE:     See ophthalmology exam    ASSESSMENT:         ICD-10-CM    1. Pseudophakia of left eye Z96.1 POST-OP FOLLOW-UP VISIT     PLAN:      Patient Instructions   Continue drops as recommended.     Ok to discontinue shield while sleeping.  All restrictions are lifted.    Keep follow up appointments as scheduled.       Please continue any glaucoma, dry eye, or other medications you were using prior to the surgery.        Kameron Pedraza, OD  [facility]  [address] [place] [address]  Tel- [phone]

## 2018-03-27 DIAGNOSIS — E11.65 TYPE 2 DIABETES MELLITUS WITH HYPERGLYCEMIA, WITH LONG-TERM CURRENT USE OF INSULIN (H): ICD-10-CM

## 2018-03-27 DIAGNOSIS — Z79.4 TYPE 2 DIABETES MELLITUS WITH HYPERGLYCEMIA, WITH LONG-TERM CURRENT USE OF INSULIN (H): ICD-10-CM

## 2018-03-27 RX ORDER — LIRAGLUTIDE 6 MG/ML
1.8 INJECTION SUBCUTANEOUS DAILY
Qty: 12 ML | Refills: 1 | Status: SHIPPED | OUTPATIENT
Start: 2018-03-27 | End: 2018-05-22

## 2018-03-27 NOTE — TELEPHONE ENCOUNTER
Received refill request for patient's Victoza 1.8 mg daily.    Last office visit with Dr. Agarwal on 7/3/17. No future office visit scheduled.    Contacted patient to review. She reports that she has started a new job and can only make Wednesday appointments and can no longer see Dr. Agarwal.     Rescheduled patient to see Dr. Delgado Wednesday May 2. Refill completed until next appointment.     Patient also notes that she had recent A1C with Dr. Arciniega on 3/14/18 and it was 6.9%.      Will update Dr. Agarwal.      Kylah Katz, RN  Endocrine Care Coordinator  [facility]

## 2018-04-02 ENCOUNTER — MYC MEDICAL ADVICE (OUTPATIENT)
Dept: PEDIATRICS | Facility: CLINIC | Age: 63
End: 2018-04-02

## 2018-04-03 DIAGNOSIS — R59.1 LYMPHADENOPATHY: Primary | ICD-10-CM

## 2018-04-03 RX ORDER — CEPHALEXIN 500 MG/1
500 CAPSULE ORAL 3 TIMES DAILY
Qty: 30 CAPSULE | Refills: 0 | Status: SHIPPED | OUTPATIENT
Start: 2018-04-03 | End: 2018-05-02

## 2018-04-03 NOTE — TELEPHONE ENCOUNTER
Routing [employer] message to covering providers  to please review when able.      Veronika Mcneil RN, [facility]

## 2018-04-03 NOTE — TELEPHONE ENCOUNTER
Lets get her in for an appointment when she requests but if significant pain or swelling may need to go to the ER

## 2018-04-03 NOTE — TELEPHONE ENCOUNTER
Called patient, gave message per Opal [place] NP.  She agrees to plan.  She states she will talk to someone she works with and if they cannot see her, she will go to the ER.    Veronika Mcneil RN, [facility]

## 2018-04-04 NOTE — H&P (VIEW-ONLY)
[address] [address]  [phone]  Dept: [phone]    PRE-OP EVALUATION:  Today's date: 3/14/2018    Sherry Slaughter (: 1955) presents for pre-operative evaluation assessment as requested by Dr. Linares.  She requires evaluation and anesthesia risk assessment prior to undergoing surgery/procedure for treatment of Cataract Left eye .    Proposed Surgery/ Procedure: Cataract Left eye .  Date of Surgery/ Procedure: 03/15/18  Time of Surgery/ Procedure: 1 pm  Hospital/Surgical Facility: [facility]  Fax number for surgical facility: [phone]  Primary Physician: Marilou Arciniega  Type of Anesthesia Anticipated: General    Patient has a Health Care Directive or Living Will:  NO    1. NO - Do you have a history of heart attack, stroke, stent, bypass or surgery on an artery in the head, neck, heart or legs?  2. NO - Do you ever have any pain or discomfort in your chest?  3. NO - Do you have a history of  Heart Failure?  4. YES- Are you troubled by shortness of breath when: walking on the level, up a slight hill or at night? After 2-5 flights  5. NO - Do you currently have a cold, bronchitis or other respiratory infection?  6. NO - Do you have a cough, shortness of breath or wheezing?  7. NO - Do you sometimes get pains in the calves of your legs when you walk?  8. YES - Do you or anyone in your family have previous history of blood clots? Brother recently dx with PE, DVT. He has multiple myeloma.   9. NO - Do you or does anyone in your family have a serious bleeding problem such as prolonged bleeding following surgeries or cuts?  10. NO - Have you ever had problems with anemia or been told to take iron pills?  11. NO - Have you had any abnormal blood loss such as black, tarry or bloody stools, or abnormal vaginal bleeding?  12. NO - Have you ever had a blood transfusion?  13. NO - Have you or any of your relatives ever had  problems with anesthesia?  14. YES - Do you have sleep apnea, excessive snoring or daytime drowsiness? Snoring, sleep apnea  15. NO - Do you have any prosthetic heart valves?  16. YES - Do you have prosthetic joints? Bilateral knees, right ankle  17. NO - Is there any chance that you may be pregnant?      HPI:     HPI related to upcoming procedure: cataract surgery for both eyes.       DIABETES - Patient has a longstanding history of DiabetesType Type II . Patient is being treated with insulin injections and denies significant side effects. Control has been good. Complicating factors include but are not limited to: high cholesterol , HTN  and obesity; overdue for diabetes follow up.                                                                                                                .  HYPERTENSION - Patient has longstanding history of HTN , currently denies any symptoms referable to elevated blood pressure. Specifically denies chest pain, palpitations, dyspnea, orthopnea, PND or peripheral edema. Blood pressure readings have been in normal range. Current medication regimen is as listed below. Patient denies any side effects of medication.                                                                                                                                                                                          .  HYPERLIPIDEMIA - Patient has a long history of significant Hyperlipidemia requiring medication for treatment with recent good control. Patient reports no problems or side effects with the medication.                                                                                                                                                       .  SLEEP PROBLEM - known GENESIS on CPAP.                                                                                                                  .    MEDICAL HISTORY:     Patient Active Problem List    Diagnosis Date Noted     Mitral  valve insufficiency 06/15/2010     Priority: High     Interpretation Summary 6.15.10  Global and regional left ventricular function is normal with an EF of 60-65%.   Mild mitral insufficiency is present.  PatientHeight: 70 in  PatientWeight: 290 lbs  BSA 2.4 m^2        Posterior vitreous detachment of right eye 09/06/2017     Priority: Medium     Type 2 diabetes mellitus with hyperglycemia (H) 06/06/2016     Priority: Medium     Lumbago 11/02/2015     Priority: Medium     Aftercare following right knee joint replacement surgery 09/28/2015     Priority: Medium     S/P total knee replacement using cement, right 09/23/2015     Priority: Medium     Long-term insulin use (H) 02/19/2015     Priority: Medium     Uncontrolled diabetes mellitus (H) 02/19/2015     Priority: Medium     Chronic low back pain 06/11/2014     Priority: Medium     Since 2007. Works with chiropractor on back pain.        History of viral hepatitis, type B 03/07/2014     Priority: Medium     In 1979 due to finger stick form dialysis patient.        Tubular adenoma of colon 02/28/2014     Priority: Medium     On colonoscopy 2/2014. Repeat in 5 years.        Cataracts, both eyes 10/16/2013     Priority: Medium     Aortic sclerosis 07/10/2011     Priority: Medium     On echo 7/11       Hyperlipidemia LDL goal <100 10/31/2010     Priority: Medium     History of major depression      Priority: Medium     comes and goes - tried meds - unsuccessfully, certain times of the year, no psych intervention and no counselors in the past - and not interested        Hypertension goal BP (blood pressure) < 140/90      Priority: Medium     late 1990s - started medications at that time - not too difficult to control meds        GENESIS (obstructive sleep apnea) 10/15/2006     Priority: Medium     PSH at [facility] 5/2015 Severe GENESIS AHI 54  Clinically signficant GENESIS with hypoxemia - with sats into the 70s during REM - rem phenomenon - rec for CPAP, weight reduction as well         Nonspecific abnormal results of cardiovascular function study 04/10/2005     Priority: Medium     Problem list name updated by automated process. Provider to review       Morbid obesity (H) 04/14/2003     Priority: Medium     JOINT PAIN-ANKLE - right ankle  12/11/2006     Priority: Low     S/p surgery - triple arthrodecesis and bunion surgery on the right        ABSENCE OF MENSTRUATION - perimenopausal  10/30/2006     Priority: Low     Irritable bowel syndrome      Priority: Low     goes between the 2 - nerve related - more stressed more problems        Family history of malignant neoplasm of breast      Priority: Low     mother - young age - 45, and maternal cousin and aunt as well - no BRCA testing done        OSTEOARTHRITIS L KNEE  s/p knee replacement on the left       Priority: Low     total knee on the left - and pain is gone since the knee replacement        Family history of stroke (cerebrovascular)      Priority: Low     grandmother in early life in her 40s        Family history of other cardiovascular diseases      Priority: Low     father - CAD, and lipids/HTN - multiple members of the family   Problem list name updated by automated process. Provider to review and confirm  Problem list name updated by automated process. Provider to review       Family history of diabetes mellitus      Priority: Low     sister and grandmother with DM        Hypothyroidism 10/11/2006     Priority: Low     TSH 3.36 - mild subclinical hypothyroidism - deciding on following or starting low dose meds - with dr Oreilly - following          Nonallopathic lesion of thoracic region 08/23/2005     Priority: Low     Problem list name updated by automated process. Provider to review       FAMILY HX COLON CANCER 04/10/2005     Priority: Low     Pat uncles x 2       Diverticulosis of large intestine 02/15/2005     Priority: Low     colonoscopy  Problem list name updated by automated process. Provider to review        Past Medical  History:   Diagnosis Date     Cataract      DEPRESSION     comes and goes - tried meds - unsuccessfully, certain times of the year, no psych intervention and no counselors in the past - and not interested      Diverticulosis of colon (without mention of hemorrhage) 4/04    colonoscopy     ECHO-mildLVH,tr MR,mild thick lflets w inc LA,trTR   12/03     Essential hypertension, benign 1990s    late 1990s - started medications at that time - not to difficult to control meds      Fam hx-cardiovas dis NEC     father - CAD, and lipids/HTN - multiple members of the family      Family history of diabetes mellitus     sister and grandmother with DM      Family history of malignant neoplasm of breast     mother - young age - 45, and maternal cousin and aunt as well - no BRCA testing done      Family history of stroke (cerebrovascular)     grandmother in early life in her 40s      FAMILY HX COLON CANCER     Pat uncles x 2     Heart murmur      HYPERLIPIDEMIA 2000    fairly recent - in the last 5 years - high for DM levels  -cholesterol recent      Irritable bowel syndrome     goes between the 2 - nerve related - more stressed more problems      MICROALBUMINURIA     unsure how long - been on the lisinopril - for a few years at well      Nonspecific abnormal results of liver function study 2/3/2003    SGOT - has been high in the past - since the hepatitis b - borderline elevation - not really changing any      OBESITY      GENESIS (obstructive sleep apnea) 10/15/2006    psg 5/15 AHI 53 aPAP 8-15     OSTEOARTHRITIS L KNEE 2003    total knee on the left - and pain is gone since the knee replacement      PERS HX HEPATITIS B- RESOLVED] 1977    acute virus only - no chronic disease      PERS HX HEPATITIS B- RESOLVED]      Type II or unspecified type diabetes mellitus without mention of complication, not stated as uncontrolled 1991    oral meds frist, then insulin eventually later, difficult to control most of the time      Unspecified  hypothyroidism 10/11/2006    TSH 3.36 - mild subclinical hypothyroidism - deciding on following or starting low dose meds - with dr Oreilly - following      Past Surgical History:   Procedure Laterality Date     ARTHROPLASTY KNEE Right 9/23/2015    Procedure: ARTHROPLASTY KNEE;  Surgeon: Rufus Brown MD;  Location: [facility] OR     C NONSPECIFIC PROCEDURE  6/06    right triple arthrodecesis      C NONSPECIFIC PROCEDURE  7/06     right bunion surgery      C TOTAL KNEE ARTHROPLASTY  3/03    L knee     COLONOSCOPY  4/04    diverticulosis, rec repeat 10 yrs(consider fam hx?)     ORTHOPEDIC SURGERY      right ankle     STRESS ECHO (METRO)  12/03    no ischemia, limited by fatigue     SURGICAL HISTORY OF -       EXP LAP- R OVARY CYSTS     SURGICAL HISTORY OF -   1981,1984,1985    CHILDBIRTH     Current Outpatient Prescriptions   Medication Sig Dispense Refill     insulin aspart (NOVOLOG FLEXPEN) 100 UNIT/ML injection 3 units per 15 gram CHO and sliding scale 1:10 above 140 mg/dl. Uses approx 90 units daily 45 mL 1     liraglutide (VICTOZA PEN) 18 MG/3ML soln Inject 1.8 mg Subcutaneous daily 12 mL 1     insulin pen needle (GNP Outracks TechnologiesFINE PEN NEEDLES) 31G X 8 MM 1 Device by * route daily 100 each 2     hydrochlorothiazide (HYDRODIURIL) 25 MG tablet Take 1 tablet (25 mg) by mouth daily 90 tablet 3     rosuvastatin (CRESTOR) 10 MG tablet Take 1 tablet (10 mg) by mouth daily 90 tablet 3     metoprolol (TOPROL XL) 25 MG 24 hr tablet Take 1 tablet (25 mg) by mouth daily 90 tablet 3     lisinopril (PRINIVIL/ZESTRIL) 40 MG tablet Take 1.5 tablets (60 mg) by mouth daily 135 tablet 3     blood glucose monitoring (NO BRAND SPECIFIED) test strip Use to test blood sugar 4 times daily or as directed. 100 each 11     insulin glargine (LANTUS) 100 UNIT/ML injection Lantus 70 units am and 70 units pm 135 mL 3     levothyroxine (SYNTHROID/LEVOTHROID) 75 MCG tablet Take 1 tablet (75 mcg) by mouth daily 90 tablet 3     metFORMIN (GLUCOPHAGE-XR)  500 MG 24 hr tablet Take 4 tablets (2,000 mg) by mouth At Bedtime 360 tablet 3     ASPIRIN EC PO Take 325 mg by mouth daily       Ibuprofen (ADVIL PO) Take 600 mg by mouth 4 times daily       Multiple Vitamins-Minerals (ADVANCED DIABETIC MULTIVITAMIN) TABS Take 1 tablet by mouth daily       Garlic 1000 MG CAPS Take 1 capsule by mouth daily Takes during summer months.  Done taking until next summer.       ORDER FOR DME, SET TO LOCAL PRINT, Respironics REMSTAR 60 Series Auto CPAP 8-15cm H2O, Airfit P10 nasal pillow mask w/medium pillows       OTC products: None, except as noted above    Allergies   Allergen Reactions     Atorvastatin Calcium      Gas     Pravachol [Pravastatin Sodium]      Pravachol - dry cough      Simvastatin      Myalgia      Latex Allergy: NO    Social History   Substance Use Topics     Smoking status: Never Smoker     Smokeless tobacco: Never Used      Comment: tried in early 20s only      Alcohol use Yes      Comment: Approx 3 times a year - mild increase now      History   Drug Use No       REVIEW OF SYSTEMS:   CONSTITUTIONAL: NEGATIVE for fever, chills, change in weight  ENT/MOUTH: NEGATIVE for ear, mouth and throat problems  RESP: NEGATIVE for significant cough or SOB  CV: NEGATIVE for chest pain, palpitations or peripheral edema  GI: NEGATIVE for nausea, abdominal pain, heartburn, or change in bowel habits  MUSCULOSKELETAL: NEGATIVE for significant arthralgias or myalgia    EXAM:   LMP 10/02/2007  GENERAL APPEARANCE: healthy, alert and no distress  HENT: ear canals and TM's normal and nose and mouth without ulcers or lesions  RESP: lungs clear to auscultation - no rales, rhonchi or wheezes  CV: regular rate and rhythm, normal S1 S2, no S3 or S4 and no murmur, click or rub   ABDOMEN: soft, nontender, no HSM or masses and bowel sounds normal  MS: extremities normal- no gross deformities noted    DIAGNOSTICS:   EKG: appears normal, NSR    Results for orders placed or performed in visit on  03/14/18   Hemoglobin A1c   Result Value Ref Range    Hemoglobin A1C 6.9 (H) 4.3 - 6.0 %   Basic metabolic panel   Result Value Ref Range    Sodium 143 133 - 144 mmol/L    Potassium 4.2 3.4 - 5.3 mmol/L    Chloride 107 94 - 109 mmol/L    Carbon Dioxide 31 20 - 32 mmol/L    Anion Gap 5 3 - 14 mmol/L    Glucose 81 70 - 99 mg/dL    Urea Nitrogen 19 7 - 30 mg/dL    Creatinine 0.91 0.52 - 1.04 mg/dL    GFR Estimate 62 >60 mL/min/1.7m2    GFR Estimate If Black 75 >60 mL/min/1.7m2    Calcium 9.8 8.5 - 10.1 mg/dL          IMPRESSION:   Reason for surgery/procedure: bilateral cataract  Diagnosis/reason for consult:       ICD-10-CM    1. Preop general physical exam Z01.818 EKG 12-lead complete w/read - Clinics     Hemoglobin A1c     Basic metabolic panel   2. Cataract of both eyes secondary to ocular disorder H26.223 Hemoglobin A1c     Basic metabolic panel   3. Long-term insulin use (H) Z79.4 BASAGLAR 100 UNIT/ML injection   4. Hypothyroidism due to acquired atrophy of thyroid E03.4         The proposed surgical procedure is considered LOW risk.    REVISED CARDIAC RISK INDEX  The patient has the following serious cardiovascular risks for perioperative complications such as (MI, PE, VFib and 3  AV Block):  Diabetes Mellitus (on Insulin)  INTERPRETATION: 1 risks: Class II (low risk - 0.9% complication rate)    The patient has the following additional risks for perioperative complications:  No identified additional risks      RECOMMENDATIONS:       Obstructive Sleep Apnea (or suspected sleep apnea)  Monitor clinically respiratory status      --Patient is to take all scheduled medications on the day of surgery EXCEPT for modifications listed below.    Diabetes Medication Use  -----Hold usual oral and non-insulin diabetic meds (e.g. Metformin, Actos, Glipizide) while NPO.   -----Take 80% of long acting insulin (e.g. Lantus, NPH) while NPO (fasting)  -----Hold short acting insulin (e.g. Novolog, Humalog) while NPO  (fasting)      Anticoagulant or Antiplatelet Medication Use  Bleeding risk is low for this procedure (e.g. dental, skin, cataract)        ACE Inhibitor or Angiotensin Receptor Blocker (ARB) Use  Ace inhibitor or Angiotensin Receptor Blocker (ARB) and should HOLD this medication for the 24 hours prior to surgery.      APPROVAL GIVEN to proceed with proposed procedure, without further diagnostic evaluation       Signed Electronically by: Marilou Arciniega MD PhD    Copy of this evaluation report is provided to requesting physician.    [facility] Preop Guidelines

## 2018-04-05 ENCOUNTER — HOSPITAL ENCOUNTER (OUTPATIENT)
Facility: CLINIC | Age: 63
Discharge: HOME | End: 2018-04-05
Attending: OPHTHALMOLOGY | Admitting: OPHTHALMOLOGY
Payer: COMMERCIAL

## 2018-04-05 ENCOUNTER — ANESTHESIA (OUTPATIENT)
Dept: SURGERY | Facility: CLINIC | Age: 63
End: 2018-04-05
Payer: COMMERCIAL

## 2018-04-05 ENCOUNTER — ANESTHESIA EVENT (OUTPATIENT)
Dept: SURGERY | Facility: CLINIC | Age: 63
End: 2018-04-05
Payer: COMMERCIAL

## 2018-04-05 VITALS
RESPIRATION RATE: 20 BRPM | HEART RATE: 69 BPM | OXYGEN SATURATION: 97 % | DIASTOLIC BLOOD PRESSURE: 86 MMHG | WEIGHT: 293 LBS | TEMPERATURE: 99 F | SYSTOLIC BLOOD PRESSURE: 147 MMHG | BODY MASS INDEX: 41.95 KG/M2 | HEIGHT: 70 IN

## 2018-04-05 LAB
GLUCOSE BLDC GLUCOMTR-MCNC: 115 MG/DL (ref 70–99)
GLUCOSE BLDC GLUCOMTR-MCNC: 132 MG/DL (ref 70–99)

## 2018-04-05 PROCEDURE — 27210794 ZZH OR GENERAL SUPPLY STERILE: Performed by: OPHTHALMOLOGY

## 2018-04-05 PROCEDURE — 25000125 ZZHC RX 250: Performed by: ANESTHESIOLOGY

## 2018-04-05 PROCEDURE — 25000128 H RX IP 250 OP 636: Performed by: ANESTHESIOLOGY

## 2018-04-05 PROCEDURE — 40000170 ZZH STATISTIC PRE-PROCEDURE ASSESSMENT II: Performed by: OPHTHALMOLOGY

## 2018-04-05 PROCEDURE — 82962 GLUCOSE BLOOD TEST: CPT

## 2018-04-05 PROCEDURE — 25000128 H RX IP 250 OP 636: Performed by: OPHTHALMOLOGY

## 2018-04-05 PROCEDURE — 25000128 H RX IP 250 OP 636: Performed by: NURSE ANESTHETIST, CERTIFIED REGISTERED

## 2018-04-05 PROCEDURE — 37000009 ZZH ANESTHESIA TECHNICAL FEE, EACH ADDTL 15 MIN: Performed by: OPHTHALMOLOGY

## 2018-04-05 PROCEDURE — V2632 POST CHMBR INTRAOCULAR LENS: HCPCS | Performed by: OPHTHALMOLOGY

## 2018-04-05 PROCEDURE — 37000008 ZZH ANESTHESIA TECHNICAL FEE, 1ST 30 MIN: Performed by: OPHTHALMOLOGY

## 2018-04-05 PROCEDURE — 71000028 ZZH EYE RECOVERY PHASE 2 EACH 15 MINS: Performed by: OPHTHALMOLOGY

## 2018-04-05 PROCEDURE — 36000101 ZZH EYE SURGERY LEVEL 3 1ST 30 MIN: Performed by: OPHTHALMOLOGY

## 2018-04-05 PROCEDURE — 25000125 ZZHC RX 250: Performed by: OPHTHALMOLOGY

## 2018-04-05 DEVICE — EYE IMP IOL AMO PCL TECNIS ZCB00 23.0: Type: IMPLANTABLE DEVICE | Site: EYE | Status: FUNCTIONAL

## 2018-04-05 RX ORDER — DICLOFENAC SODIUM 1 MG/ML
1 SOLUTION/ DROPS OPHTHALMIC
Status: COMPLETED | OUTPATIENT
Start: 2018-04-05 | End: 2018-04-05

## 2018-04-05 RX ORDER — PHENYLEPHRINE HYDROCHLORIDE 25 MG/ML
1 SOLUTION/ DROPS OPHTHALMIC
Status: COMPLETED | OUTPATIENT
Start: 2018-04-05 | End: 2018-04-05

## 2018-04-05 RX ORDER — MOXIFLOXACIN 5 MG/ML
1 SOLUTION/ DROPS OPHTHALMIC
Status: COMPLETED | OUTPATIENT
Start: 2018-04-05 | End: 2018-04-05

## 2018-04-05 RX ORDER — LIDOCAINE HYDROCHLORIDE 10 MG/ML
INJECTION, SOLUTION EPIDURAL; INFILTRATION; INTRACAUDAL; PERINEURAL PRN
Status: DISCONTINUED | OUTPATIENT
Start: 2018-04-05 | End: 2018-04-05 | Stop reason: HOSPADM

## 2018-04-05 RX ORDER — PROPARACAINE HYDROCHLORIDE 5 MG/ML
1 SOLUTION/ DROPS OPHTHALMIC ONCE
Status: DISCONTINUED | OUTPATIENT
Start: 2018-04-05 | End: 2018-04-05 | Stop reason: HOSPADM

## 2018-04-05 RX ORDER — TETRACAINE HYDROCHLORIDE 5 MG/ML
SOLUTION OPHTHALMIC PRN
Status: DISCONTINUED | OUTPATIENT
Start: 2018-04-05 | End: 2018-04-05 | Stop reason: HOSPADM

## 2018-04-05 RX ORDER — BALANCED SALT SOLUTION 6.4; .75; .48; .3; 3.9; 1.7 MG/ML; MG/ML; MG/ML; MG/ML; MG/ML; MG/ML
SOLUTION OPHTHALMIC PRN
Status: DISCONTINUED | OUTPATIENT
Start: 2018-04-05 | End: 2018-04-05 | Stop reason: HOSPADM

## 2018-04-05 RX ORDER — PROPARACAINE HYDROCHLORIDE 5 MG/ML
1 SOLUTION/ DROPS OPHTHALMIC ONCE
Status: COMPLETED | OUTPATIENT
Start: 2018-04-05 | End: 2018-04-05

## 2018-04-05 RX ORDER — SODIUM CHLORIDE, SODIUM LACTATE, POTASSIUM CHLORIDE, CALCIUM CHLORIDE 600; 310; 30; 20 MG/100ML; MG/100ML; MG/100ML; MG/100ML
500 INJECTION, SOLUTION INTRAVENOUS CONTINUOUS
Status: DISCONTINUED | OUTPATIENT
Start: 2018-04-05 | End: 2018-04-05 | Stop reason: HOSPADM

## 2018-04-05 RX ORDER — TROPICAMIDE 10 MG/ML
1 SOLUTION/ DROPS OPHTHALMIC
Status: COMPLETED | OUTPATIENT
Start: 2018-04-05 | End: 2018-04-05

## 2018-04-05 RX ADMIN — DICLOFENAC SODIUM 1 DROP: 1 SOLUTION OPHTHALMIC at 12:47

## 2018-04-05 RX ADMIN — DICLOFENAC SODIUM 1 DROP: 1 SOLUTION OPHTHALMIC at 12:57

## 2018-04-05 RX ADMIN — SODIUM CHLORIDE, SODIUM LACTATE, POTASSIUM CHLORIDE, CALCIUM CHLORIDE: 600; 310; 30; 20 INJECTION, SOLUTION INTRAVENOUS at 13:54

## 2018-04-05 RX ADMIN — TROPICAMIDE 1 DROP: 10 SOLUTION/ DROPS OPHTHALMIC at 12:47

## 2018-04-05 RX ADMIN — LIDOCAINE HYDROCHLORIDE 0.5 ML: 10 INJECTION, SOLUTION EPIDURAL; INFILTRATION; INTRACAUDAL; PERINEURAL at 13:09

## 2018-04-05 RX ADMIN — PHENYLEPHRINE HYDROCHLORIDE 1 DROP: 2.5 SOLUTION/ DROPS OPHTHALMIC at 12:52

## 2018-04-05 RX ADMIN — TROPICAMIDE 1 DROP: 10 SOLUTION/ DROPS OPHTHALMIC at 12:57

## 2018-04-05 RX ADMIN — SODIUM CHLORIDE, SODIUM LACTATE, POTASSIUM CHLORIDE, CALCIUM CHLORIDE 500 ML: 600; 310; 30; 20 INJECTION, SOLUTION INTRAVENOUS at 13:09

## 2018-04-05 RX ADMIN — MOXIFLOXACIN 1 DROP: 5 SOLUTION/ DROPS OPHTHALMIC at 12:47

## 2018-04-05 RX ADMIN — PROPARACAINE HYDROCHLORIDE 1 DROP: 5 SOLUTION/ DROPS OPHTHALMIC at 12:47

## 2018-04-05 RX ADMIN — MOXIFLOXACIN 1 DROP: 5 SOLUTION/ DROPS OPHTHALMIC at 12:57

## 2018-04-05 RX ADMIN — PHENYLEPHRINE HYDROCHLORIDE 1 DROP: 2.5 SOLUTION/ DROPS OPHTHALMIC at 12:57

## 2018-04-05 RX ADMIN — TROPICAMIDE 1 DROP: 10 SOLUTION/ DROPS OPHTHALMIC at 12:52

## 2018-04-05 RX ADMIN — MIDAZOLAM 2 MG: 1 INJECTION INTRAMUSCULAR; INTRAVENOUS at 13:56

## 2018-04-05 RX ADMIN — DICLOFENAC SODIUM 1 DROP: 1 SOLUTION OPHTHALMIC at 12:52

## 2018-04-05 RX ADMIN — MOXIFLOXACIN 1 DROP: 5 SOLUTION/ DROPS OPHTHALMIC at 12:52

## 2018-04-05 RX ADMIN — PHENYLEPHRINE HYDROCHLORIDE 1 DROP: 2.5 SOLUTION/ DROPS OPHTHALMIC at 12:47

## 2018-04-05 ASSESSMENT — ENCOUNTER SYMPTOMS: DYSRHYTHMIAS: 0

## 2018-04-05 NOTE — OP NOTE
PATIENT NAME:  Sherry Slaughter    :  1955    PATIENT NUMBER:  [id number]    DATE OF SURGERY:  2018    SURGEON:  Bandar Linares M.D.    PREOPERATIVE DIAGNOSIS: Cataract right eye.    POSTOPERATIVE DIAGNOSIS:  Same    PROCEDURE PERFORMED:    1. Phacoemulsification with posterior chamber intraocular lens right eye.    ANESTHESIA:  Topical/MAC    COMPLICATIONS:  None    PROCEDURE: Following adequate preoperative dilation the patient was given topical anesthesia consisting of Proparacaine.  The patient was brought to the operative suite where the eye was prepped and draped in the usual sterile fashion.  A lid speculum was applied. A super sharp blade was used to create a paracentesis, through which 1% preservative free Lidocaine was injected.  Visoelastic was then used to inflate the anterior chamber.  A biplanar incision at the clear cornea limbus was created with a keratome.  A continuous curvilinear capsulorrhexis was started with a cystitome and completed using Utrata forceps.  The lens was hydrodissected and hydro delineated using BSS on a cannula.  The lens nucleus was removed using phacoemulsification.  Remaining cortex was removed using irrigation and aspiration.  Viscoelastic was injected to inflate the capsular bag and a 23.0 D [id number] IOL was inserted into the capsular bag without difficulty.  Residual viscoelastic and provisc material was removed with irrigation and aspiration.  BSS was used to hydrate the corneal incision and paracentesis sites which were checked and noted to be watertight.  A drop of Vigamox was applied to the eye and a clear plastic shield was placed.  The patient tolerated the procedure well and left the operative suite in stable condition.    Bandar Linares M.D.

## 2018-04-05 NOTE — ANESTHESIA CARE TRANSFER NOTE
Patient: Sherry Slaughter    Procedure(s):  RIGHT PHACOEMULSIFICATION CLEAR CORNEA WITH STANDARD INTRAOCULAR LENS IMPLANT  - Wound Class: I-Clean    Diagnosis: CATARACT  Diagnosis Additional Information: No value filed.    Anesthesia Type:   MAC     Note:  Airway :Room Air  Patient transferred to:Phase II  Comments: Level of Conscious:Awake  Vital Signs   BP:162/85   HR:66   RR:16   O2 Saturation:96   Oxygen LPM:Room Air  Patient Status:Stable  Report to PACU RN.Handoff Report: Identifed the Patient, Identified the Reponsible Provider, Reviewed the pertinent medical history, Discussed the surgical course, Reviewed Intra-OP anesthesia mangement and issues during anesthesia, Set expectations for post-procedure period and Allowed opportunity for questions and acknowledgement of understanding      Vitals: (Last set prior to Anesthesia Care Transfer)    CRNA VITALS  4/5/2018 1352 - 4/5/2018 1425      4/5/2018             Resp Rate (set): 10                Electronically Signed By: Christine Marie Volp Hodgkins, CRNA, APRN CRNA  April 5, 2018  2:25 PM

## 2018-04-05 NOTE — IP AVS SNAPSHOT
MRN:[id number]                      After Visit Summary   4/5/2018    Sherry Slaughter    MRN: [id number]           Thank you!     Thank you for choosing [facility] for your care. Our goal is always to provide you with excellent care. Hearing back from our patients is one way we can continue to improve our services. Please take a few minutes to complete the written survey that you may receive in the mail after you visit with us. Thank you!        Patient Information     Date Of Birth          1955        About your hospital stay     You were admitted on:  April 5, 2018 You last received care in the:  [facility]    You were discharged on:  April 5, 2018       Who to Call     For medical emergencies, please call 911.  For non-urgent questions about your medical care, please call your primary care provider or clinic, [phone]  For questions related to your surgery, please call your surgery clinic        Attending Provider     Provider Specialty    Bandar Linares MD Ophthalmology       Primary Care Provider Office Phone # Fax #    Marilou Arciniega MD [facility] [phone]      Your next 10 appointments already scheduled     Apr 11, 2018 11:20 AM CDT   Return Visit with Kameron Pedraza OD   [facility])    [address] [place] [address] [id number] [phone]            May 02, 2018  9:00 AM CDT   Return Visit with Rika Delgado MD   [facility])    [address] [place] [id number] [phone]            May 02, 2018 11:00 AM CDT   Return Visit with Kameron Pedraza OD   [facility])    [address] [facility] [id number] [phone]              Further instructions from your care team       [facility] Discharge  Instructions  Anesthesia (Eye  "[facility])   Adult Discharge Instructions    For 24 hours after surgery    1. Get plenty of rest.  Make arrangements to have a responsible adult stay with you for at least 6 hours after you leave the hospital.  2. Do not drive or use heavy equipment for 24 hours.    3. Do not drink alcohol for 24 hours.  4. Do not sign legal documents or make important decisions for 24 hours.  5. Avoid strenuous or risky activities. You may feel lightheaded.  If so, sit for a few minutes before standing.  Have someone help you get up.   6. Conscious sedation patients may resume a regular diet..  7. Any questions of medical nature, call your physician.    Cataract Surgery Postoperative Instructions  Dr. Bandar Linares      Postoperative Medications: After surgery, you will use eye drop medications. In most cases, you will start these eye drops 2 days before the day of surgery.   Follow the directions provided to you from the pharmacy or from the doctor's office.    The drops might sting a little when they are instilled, and that is normal.    It doesn't matter what order you put the drops into your eyes, but you should wait at least one minute between drops.    Recently we started using a compounded drop that contains all three prescriptions in one bottle. If you have this drop you only need to use one drop 4 time per day. Many people choose to do the drops at breakfast, lunch, dinner, and bedtime.    Artificial Tears- Are lubricating drops used to moisturize the eye.  You can use these as much as you want.  Particularly if your eyes feel watery, gritty, or uncomfortable.  Chilling these drops in the refrigerator results in a more soothing feeling.  There are several brands of artificial tears available including, but not limited to, Optive, Refresh, Systane, Blink, Genteal, Soothe, and others.  You should not use drops that are \"gets the red out.\"  You do not need a prescription for these medications.     Please continue any " "glaucoma, dry eye, or other medications you were using prior to the surgery.      Please allow 24 to 48 hours when requesting refills, and call BEFORE you run out of drops.    Restriction on Activities-  It is extremely important that you DO NOT RUB THE TREATED EYE.    - You will be given a clear plastic shield to wear as protection over your eye the night after surgery.    - Refrain from many activities that may put your eye at risk of injury, as well as areas containing a high volume of chemicals, dust, and debris.    - Do Not wear any eye makeup or moisturizer around the eye for 1 week after surgery.    - Do Not swim or go into a hot-tub, jacuzzi, or sauna for 1 week following surgery.  You can take showers as normal, but avoid getting shampoo or soap into your eyes.     - Avoid strenuous activity, including lifting more than 10 pounds, for 1 week after surgery.       - It is fine to bathe, read, watch TV, and use the computer.    Symptoms requiring medical attention:     - Sudden onset of increased discharge from the eye.  - Persistent or increasing pain in the eye.  - Sudden decrease in vision.  - Persistent nausea or vomiting.      If you have any questions or concerns before or after your surgery, please contact Dr. Linares's office at [phone].             Pending Results     No orders found from 4/3/2018 to 4/6/2018.            Admission Information     Date & Time Provider Department Dept. Phone    4/5/2018 Bandar Linares MD [facility] [phone]      Your Vitals Were     Blood Pressure Pulse Temperature Respirations Height Weight    162/85 69 99  F (37.2  C) (Temporal) 16 1.778 m (5' 10\") 134 kg (295 lb 8 oz)    Last Period Pulse Oximetry BMI (Body Mass Index)             10/02/2007 96% 42.4 kg/m2         [employer] Information     [facility] gives you secure access to your electronic health record. If you see a primary care provider, you can also send messages to your " care team and make appointments. If you have questions, please call your primary care clinic.  If you do not have a primary care provider, please call [phone] and they will assist you.        Care EveryWhere ID     This is your Care EveryWhere ID. This could be used by other organizations to access your [facility] medical records  [id number]        Equal Access to Services     OLE VILLEGAS : Hadchino salomon Sopresley, waaxda luqadaha, qaybta kaalmada tamie, acosta pennington . So [place] -[phone].    ATENCIÓN: Si habla español, tiene a espinosa disposición servicios gratuitos de asistencia lingüística. Llame al [phone].    We comply with applicable federal civil rights laws and [place] laws. We do not discriminate on the basis of race, color, national origin, age, disability, sex, sexual orientation, or gender identity.               Review of your medicines      UNREVIEWED medicines. Ask your doctor about these medicines        Dose / Directions    ADVANCED DIABETIC MULTIVITAMIN Tabs        Dose:  1 tablet   Take 1 tablet by mouth daily   Refills:  0       ADVIL PO        Dose:  600 mg   Take 600 mg by mouth 4 times daily   Refills:  0       ASPIRIN EC PO        Dose:  325 mg   Take 325 mg by mouth daily   Refills:  0       BASAGLAR 100 UNIT/ML injection   Used for:  Long-term insulin use (H)        Dose:  70 Units   Inject 70 Units Subcutaneous 2 times daily Today took 35 units this am   Quantity:  63 mL   Refills:  1       cephALEXin 500 MG capsule   Commonly known as:  KEFLEX   Used for:  Lymphadenopathy        Dose:  500 mg   Take 1 capsule (500 mg) by mouth 3 times daily   Quantity:  30 capsule   Refills:  0       Garlic 1000 MG Caps        Dose:  1 capsule   Take 1 capsule by mouth daily Takes during summer months.  Done taking until next summer.   Refills:  0       hydrochlorothiazide 25 MG tablet   Commonly known as:  HYDRODIURIL   Used for:  Essential hypertension with  goal blood pressure less than 140/90        Dose:  25 mg   Take 1 tablet (25 mg) by mouth daily   Quantity:  90 tablet   Refills:  3       insulin aspart 100 UNIT/ML injection   Commonly known as:  NovoLOG FLEXPEN   Used for:  Type 2 diabetes mellitus with hyperglycemia, with long-term current use of insulin (H)        3 units per 15 gram CHO and sliding scale 1:10 above 140 mg/dl. Uses approx 90 units daily   Quantity:  45 mL   Refills:  1       levothyroxine 75 MCG tablet   Commonly known as:  SYNTHROID/LEVOTHROID   Used for:  Hypothyroidism due to acquired atrophy of thyroid        Dose:  75 mcg   Take 1 tablet (75 mcg) by mouth daily   Quantity:  90 tablet   Refills:  3       liraglutide 18 MG/3ML soln   Commonly known as:  VICTOZA PEN   Used for:  Type 2 diabetes mellitus with hyperglycemia, with long-term current use of insulin (H)        Dose:  1.8 mg   Inject 1.8 mg Subcutaneous daily   Quantity:  12 mL   Refills:  1       lisinopril 40 MG tablet   Commonly known as:  PRINIVIL/ZESTRIL   Used for:  Essential hypertension with goal blood pressure less than 140/90        Dose:  60 mg   Take 1.5 tablets (60 mg) by mouth daily   Quantity:  135 tablet   Refills:  3       metFORMIN 500 MG 24 hr tablet   Commonly known as:  GLUCOPHAGE-XR   Used for:  Uncontrolled type 2 diabetes mellitus with hyperglycemia, with long-term current use of insulin (H)        Dose:  2000 mg   Take 4 tablets (2,000 mg) by mouth At Bedtime   Quantity:  360 tablet   Refills:  3       metoprolol succinate 25 MG 24 hr tablet   Commonly known as:  TOPROL XL   Used for:  Essential hypertension with goal blood pressure less than 140/90        Dose:  25 mg   Take 1 tablet (25 mg) by mouth daily   Quantity:  90 tablet   Refills:  3       rosuvastatin 10 MG tablet   Commonly known as:  CRESTOR   Used for:  Hyperlipidemia LDL goal <100        Dose:  10 mg   Take 1 tablet (10 mg) by mouth daily   Quantity:  90 tablet   Refills:  3         CONTINUE  these medicines which have NOT CHANGED        Dose / Directions    blood glucose monitoring test strip   Commonly known as:  no brand specified   Used for:  Type 2 diabetes mellitus without complication, with long-term current use of insulin (H)        Use to test blood sugar 4 times daily or as directed.   Quantity:  100 each   Refills:  11       insulin pen needle 31G X 8 MM   Commonly known as:  GNP CLICKFINE PEN NEEDLES   Used for:  Type 2 diabetes, HbA1c goal < 7% (H)        Dose:  1 Device   1 Device by * route daily   Quantity:  100 each   Refills:  2       order for DME        Respironics REMSTAR 60 Series Auto CPAP 8-15cm H2O, Airfit P10 nasal pillow mask w/medium pillows   Refills:  0                Protect others around you: Learn how to safely use, store and throw away your medicines at www.disposemymeds.org.             Medication List: This is a list of all your medications and when to take them. Check marks below indicate your daily home schedule. Keep this list as a reference.      Medications           Morning Afternoon Evening Bedtime As Needed    ADVANCED DIABETIC MULTIVITAMIN Tabs   Take 1 tablet by mouth daily                                ADVIL PO   Take 600 mg by mouth 4 times daily                                ASPIRIN EC PO   Take 325 mg by mouth daily                                BASAGLAR 100 UNIT/ML injection   Inject 70 Units Subcutaneous 2 times daily Today took 35 units this am                                blood glucose monitoring test strip   Commonly known as:  no brand specified   Use to test blood sugar 4 times daily or as directed.                                cephALEXin 500 MG capsule   Commonly known as:  KEFLEX   Take 1 capsule (500 mg) by mouth 3 times daily                                Garlic 1000 MG Caps   Take 1 capsule by mouth daily Takes during summer months.  Done taking until next summer.                                hydrochlorothiazide 25 MG tablet    Commonly known as:  HYDRODIURIL   Take 1 tablet (25 mg) by mouth daily                                insulin aspart 100 UNIT/ML injection   Commonly known as:  NovoLOG FLEXPEN   3 units per 15 gram CHO and sliding scale 1:10 above 140 mg/dl. Uses approx 90 units daily                                insulin pen needle 31G X 8 MM   Commonly known as:  GNP CLICKFINE PEN NEEDLES   1 Device by * route daily                                levothyroxine 75 MCG tablet   Commonly known as:  SYNTHROID/LEVOTHROID   Take 1 tablet (75 mcg) by mouth daily                                liraglutide 18 MG/3ML soln   Commonly known as:  VICTOZA PEN   Inject 1.8 mg Subcutaneous daily                                lisinopril 40 MG tablet   Commonly known as:  PRINIVIL/ZESTRIL   Take 1.5 tablets (60 mg) by mouth daily                                metFORMIN 500 MG 24 hr tablet   Commonly known as:  GLUCOPHAGE-XR   Take 4 tablets (2,000 mg) by mouth At Bedtime                                metoprolol succinate 25 MG 24 hr tablet   Commonly known as:  TOPROL XL   Take 1 tablet (25 mg) by mouth daily                                order for [facility]   Respironics REMSTAR 60 Series Auto CPAP 8-15cm H2O, Airfit P10 nasal pillow mask w/medium pillows                                rosuvastatin 10 MG tablet   Commonly known as:  CRESTOR   Take 1 tablet (10 mg) by mouth daily

## 2018-04-05 NOTE — ANESTHESIA CARE TRANSFER NOTE
Patient: Sherry Slaughter    Procedure(s):  RIGHT PHACOEMULSIFICATION CLEAR CORNEA WITH STANDARD INTRAOCULAR LENS IMPLANT  - Wound Class: I-Clean    Diagnosis: CATARACT  Diagnosis Additional Information: No value filed.    Anesthesia Type:   MAC     Note:  Anesthesia Care Transfer Notewriter    Vitals: (Last set prior to Anesthesia Care Transfer)    CRNA VITALS  4/5/2018 1352 - 4/5/2018 1426      4/5/2018             Resp Rate (set): 10                Electronically Signed By: Christine Marie Volp Hodgkins, CRNA, APRN CRNA  April 5, 2018  2:26 PM

## 2018-04-05 NOTE — ANESTHESIA PREPROCEDURE EVALUATION
Anesthesia Evaluation     . Pt has had prior anesthetic.     No history of anesthetic complications          ROS/MED HX    ENT/Pulmonary:     (+)sleep apnea, uses CPAP , . .    Neurologic:  - neg neurologic ROS     Cardiovascular:     (+) hypertension----. : . . . :. .      (-) arrhythmias and irregular heartbeat/palpitations   METS/Exercise Tolerance:     Hematologic:  - neg hematologic  ROS       Musculoskeletal:  - neg musculoskeletal ROS       GI/Hepatic:        (-) GERD   Renal/Genitourinary:  - ROS Renal section negative       Endo:     (+) type II DM thyroid problem hypothyroidism, Obesity, .      Psychiatric:     (+) psychiatric history depression      Infectious Disease:  - neg infectious disease ROS       Malignancy:      - no malignancy   Other:    - neg other ROS                 Physical Exam  Normal systems: cardiovascular, pulmonary and dental    Airway   Mallampati: II  TM distance: >3 FB  Neck ROM: full    Dental   Comment: native    Cardiovascular       Pulmonary                         Anesthesia Plan      History & Physical Review  History and physical reviewed and following examination; no interval change.    ASA Status:  2 .    NPO Status:  > 6 hours    Plan for MAC   PONV prophylaxis:  Ondansetron (or other 5HT-3)       Postoperative Care      Consents  Anesthetic plan, risks, benefits and alternatives discussed with:  Patient..                          .

## 2018-04-05 NOTE — ANESTHESIA POSTPROCEDURE EVALUATION
Patient: Sherry Slaughter    Procedure(s):  RIGHT PHACOEMULSIFICATION CLEAR CORNEA WITH STANDARD INTRAOCULAR LENS IMPLANT  - Wound Class: I-Clean    Diagnosis:CATARACT  Diagnosis Additional Information: No value filed.    Anesthesia Type:  MAC    Note:  Anesthesia Post Evaluation    Patient location during evaluation: PACU  Patient participation: Able to fully participate in evaluation  Level of consciousness: awake  Pain management: adequate  Airway patency: patent  Cardiovascular status: acceptable  Respiratory status: acceptable  Hydration status: acceptable  PONV: none     Anesthetic complications: None          Last vitals:  Vitals:    04/05/18 1310 04/05/18 1427 04/05/18 1440   BP: 153/78 162/85 147/86   Pulse: 69     Resp: 18 16 20   Temp: 37.2  C (99  F)     SpO2: 98% 96% 97%         Electronically Signed By: María Parham  April 5, 2018  4:05 PM

## 2018-04-05 NOTE — IP AVS SNAPSHOT
[facility]    [address]    [place] [address]    Phone:  [phone]    Fax:  [phone]                                       After Visit Summary   4/5/2018    Sherry Slaughter    MRN: [id number]           After Visit Summary Signature Page     I have received my discharge instructions, and my questions have been answered. I have discussed any challenges I see with this plan with the nurse or doctor.    ..........................................................................................................................................  Patient/Patient Representative Signature      ..........................................................................................................................................  Patient Representative Print Name and Relationship to Patient    ..................................................               ................................................  Date                                            Time    ..........................................................................................................................................  Reviewed by Signature/Title    ...................................................              ..............................................  Date                                                            Time

## 2018-04-05 NOTE — DISCHARGE INSTRUCTIONS
"[facility] Anesthesia Eye Care Center Discharge  Instructions  Anesthesia (Eye Care Center)   Adult Discharge Instructions    For 24 hours after surgery    1. Get plenty of rest.  Make arrangements to have a responsible adult stay with you for at least 6 hours after you leave the hospital.  2. Do not drive or use heavy equipment for 24 hours.    3. Do not drink alcohol for 24 hours.  4. Do not sign legal documents or make important decisions for 24 hours.  5. Avoid strenuous or risky activities. You may feel lightheaded.  If so, sit for a few minutes before standing.  Have someone help you get up.   6. Conscious sedation patients may resume a regular diet..  7. Any questions of medical nature, call your physician.    Cataract Surgery Postoperative Instructions  Dr. Bandar Linares      Postoperative Medications: After surgery, you will use eye drop medications. In most cases, you will start these eye drops 2 days before the day of surgery.   Follow the directions provided to you from the pharmacy or from the doctor's office.    The drops might sting a little when they are instilled, and that is normal.    It doesn't matter what order you put the drops into your eyes, but you should wait at least one minute between drops.    Recently we started using a compounded drop that contains all three prescriptions in one bottle. If you have this drop you only need to use one drop 4 time per day. Many people choose to do the drops at breakfast, lunch, dinner, and bedtime.    Artificial Tears- Are lubricating drops used to moisturize the eye.  You can use these as much as you want.  Particularly if your eyes feel watery, gritty, or uncomfortable.  Chilling these drops in the refrigerator results in a more soothing feeling.  There are several brands of artificial tears available including, but not limited to, Optive, Refresh, Systane, Blink, Genteal, Soothe, and others.  You should not use drops that are \"gets the red " "out.\"  You do not need a prescription for these medications.     Please continue any glaucoma, dry eye, or other medications you were using prior to the surgery.      Please allow 24 to 48 hours when requesting refills, and call BEFORE you run out of drops.    Restriction on Activities-  It is extremely important that you DO NOT RUB THE TREATED EYE.    - You will be given a clear plastic shield to wear as protection over your eye the night after surgery.    - Refrain from many activities that may put your eye at risk of injury, as well as areas containing a high volume of chemicals, dust, and debris.    - Do Not wear any eye makeup or moisturizer around the eye for 1 week after surgery.    - Do Not swim or go into a hot-tub, jacuzzi, or sauna for 1 week following surgery.  You can take showers as normal, but avoid getting shampoo or soap into your eyes.     - Avoid strenuous activity, including lifting more than 10 pounds, for 1 week after surgery.       - It is fine to bathe, read, watch TV, and use the computer.    Symptoms requiring medical attention:     - Sudden onset of increased discharge from the eye.  - Persistent or increasing pain in the eye.  - Sudden decrease in vision.  - Persistent nausea or vomiting.      If you have any questions or concerns before or after your surgery, please contact Dr. Linares's office at [phone].           "

## 2018-04-09 DIAGNOSIS — R59.0 LYMPHADENOPATHY OF RIGHT CERVICAL REGION: Primary | ICD-10-CM

## 2018-04-10 ENCOUNTER — RADIANT APPOINTMENT (OUTPATIENT)
Dept: CT IMAGING | Facility: CLINIC | Age: 63
End: 2018-04-10
Payer: COMMERCIAL

## 2018-04-10 DIAGNOSIS — R59.0 LYMPHADENOPATHY OF RIGHT CERVICAL REGION: ICD-10-CM

## 2018-04-11 ENCOUNTER — OFFICE VISIT (OUTPATIENT)
Dept: OPTOMETRY | Facility: CLINIC | Age: 63
End: 2018-04-11
Payer: COMMERCIAL

## 2018-04-11 DIAGNOSIS — Z96.1 PSEUDOPHAKIA OF RIGHT EYE: Primary | ICD-10-CM

## 2018-04-11 PROCEDURE — 99024 POSTOP FOLLOW-UP VISIT: CPT | Performed by: OPTOMETRIST

## 2018-04-11 ASSESSMENT — VISUAL ACUITY
METHOD: SNELLEN - LINEAR
OS_SC: 20/25
OD_SC+: -2
OD_SC: 20/25
OS_SC+: -2

## 2018-04-11 ASSESSMENT — TONOMETRY
OD_IOP_MMHG: 28
IOP_METHOD: TONOPEN

## 2018-04-11 ASSESSMENT — REFRACTION_WEARINGRX
OD_ADD: +2.50
OD_SPHERE: -0.25
OD_CYLINDER: +0.75
OS_AXIS: 011
OD_AXIS: 010
OS_SPHERE: -0.50
OS_ADD: +2.50
OS_CYLINDER: +0.25

## 2018-04-11 ASSESSMENT — SLIT LAMP EXAM - LIDS
COMMENTS: NORMAL
COMMENTS: NORMAL

## 2018-04-11 ASSESSMENT — EXTERNAL EXAM - LEFT EYE: OS_EXAM: NORMAL

## 2018-04-11 ASSESSMENT — EXTERNAL EXAM - RIGHT EYE: OD_EXAM: NORMAL

## 2018-04-11 NOTE — PROGRESS NOTES
CHIEF COMPLAINT:   Chief Complaint   Patient presents with     Surgical Followup     1 week post op cataract od eye       Type of surgery cataract   Date of surgery 4-5-2018 od eye                            3- os eye    Mona Herbert, Optometric Assistant, A.B.O.C.     Drops reviewed.    OBJECTIVE:     See ophthalmology exam    ASSESSMENT:         ICD-10-CM    1. Pseudophakia of right eye Z96.1 POST-OP FOLLOW-UP VISIT     PLAN:      Patient Instructions   Continue drops as directed.    Start drops left eye also- same directions as right eye.    Ok to discontinue shield while sleeping.  All restrictions are lifted.    Keep follow up appointments as scheduled.    Return in 3 weeks for final refraction.       Please continue any glaucoma, dry eye, or other medications you were using prior to the surgery.        Kameron Pedraza, OD  [facility]  [address] [place] [address]  Tel- [phone]

## 2018-04-11 NOTE — MR AVS SNAPSHOT
After Visit Summary   4/11/2018    Sherry Slaughter    MRN: [id number]           Patient Information     Date Of Birth          1955        Visit Information        Provider Department      4/11/2018 11:20 AM Kameron Pedraza OD [facility]        Today's Diagnoses     Pseudophakia of right eye    -  1      Care Instructions    Continue drops as directed.    Start drops left eye also- same directions as right eye.    Ok to discontinue shield while sleeping.  All restrictions are lifted.    Keep follow up appointments as scheduled.    Return in 3 weeks for final refraction.       Please continue any glaucoma, dry eye, or other medications you were using prior to the surgery.        Kameron Pedraza OD  [facility]  [address] [address] [place], [place] [address]. [place] [address]  Tel- [phone]          Follow-ups after your visit        Follow-up notes from your care team     Return in about 3 weeks (around 5/2/2018) for Follow Up.      Your next 10 appointments already scheduled     May 02, 2018  9:00 AM CDT   Return Visit with Rika Delgado MD   [facility] ([facility])    [address] [place] [id number] [phone]            May 02, 2018 11:00 AM CDT   Return Visit with Kameron Pedraza OD   [facility] ([facility])    [address] [place] [address] [phone]            May 08, 2018  7:45 AM CDT   (Arrive by 7:30 AM)   New Patient Visit with Gen Watkins MD   [facility] ([facility])    [address] [place] [address] [phone]              Who to contact     If you have questions or need follow up information about today's clinic visit or your schedule please contact [facility] directly at [phone].  Normal or non-critical lab and imaging results will be  communicated to you by TransMed Systemshart, letter or phone within 4 business days after the clinic has received the results. If you do not hear from us within 7 days, please contact the clinic through CombineNet or phone. If you have a critical or abnormal lab result, we will notify you by phone as soon as possible.  Submit refill requests through CombineNet or call your pharmacy and they will forward the refill request to us. Please allow 3 business days for your refill to be completed.          Additional Information About Your Visit        TransMed SystemsharKeychain Logistics Information     CombineNet gives you secure access to your electronic health record. If you see a primary care provider, you can also send messages to your care team and make appointments. If you have questions, please call your primary care clinic.  If you do not have a primary care provider, please call [phone] and they will assist you.      CombineNet is an electronic gateway that provides easy, online access to your medical records. With CombineNet, you can request a clinic appointment, read your test results, renew a prescription or communicate with your care team.     To access your existing account, please contact your [facility] or call [phone] for assistance.        Care EveryWhere ID     This is your Care EveryWhere ID. This could be used by other organizations to access your [facility] medical records  [id number]        Your Vitals Were     Last Period                   10/02/2007            Blood Pressure from Last 3 Encounters:   04/05/18 147/86   03/15/18 148/77   03/14/18 122/68    Weight from Last 3 Encounters:   04/05/18 134 kg (295 lb 8 oz)   03/14/18 134 kg (295 lb 8 oz)   11/02/17 135.6 kg (299 lb)              We Performed the Following     POST-OP FOLLOW-UP VISIT        Primary Care Provider Office Phone # Fax #    Marilou Arciniega MD PhD [phone]       [address] [address] [address]        Equal Access to Services      [place] : Hadii aad ku aime [place], waaxda luqadaha, qaybta kaalmada adeel, acosta viky haylola murphykevinfito [place] . So [facility] [phone].    ATENCIÓN: Si habla español, tiene a espinosa disposición servicios gratuitos de asistencia lingüística. Llame al [phone].    We comply with applicable federal civil rights laws and [place] laws. We do not discriminate on the basis of race, color, national origin, age, disability, sex, sexual orientation, or gender identity.            Thank you!     Thank you for choosing [facility]  for your care. Our goal is always to provide you with excellent care. Hearing back from our patients is one way we can continue to improve our services. Please take a few minutes to complete the written survey that you may receive in the mail after your visit with us. Thank you!             Your Updated Medication List - Protect others around you: Learn how to safely use, store and throw away your medicines at www.disposemymeds.org.          This list is accurate as of 4/11/18 12:20 PM.  Always use your most recent med list.                   Brand Name Dispense Instructions for use Diagnosis    ADVANCED DIABETIC MULTIVITAMIN Tabs      Take 1 tablet by mouth daily        ADVIL PO      Take 600 mg by mouth 4 times daily        ASPIRIN EC PO      Take 325 mg by mouth daily        BASAGLAR 100 UNIT/ML injection     63 mL    Inject 70 Units Subcutaneous 2 times daily Today took 35 units this am    Long-term insulin use (H)       blood glucose monitoring test strip    no brand specified    100 each    Use to test blood sugar 4 times daily or as directed.    Type 2 diabetes mellitus without complication, with long-term current use of insulin (H)       cephALEXin 500 MG capsule    KEFLEX    30 capsule    Take 1 capsule (500 mg) by mouth 3 times daily    Lymphadenopathy       Garlic 1000 MG Caps      Take 1 capsule by mouth daily Takes during summer months.  Done  taking until next summer.        hydrochlorothiazide 25 MG tablet    HYDRODIURIL    90 tablet    Take 1 tablet (25 mg) by mouth daily    Essential hypertension with goal blood pressure less than 140/90       insulin aspart 100 UNIT/ML injection    NovoLOG FLEXPEN    45 mL    3 units per 15 gram CHO and sliding scale 1:10 above 140 mg/dl. Uses approx 90 units daily    Type 2 diabetes mellitus with hyperglycemia, with long-term current use of insulin (H)       insulin pen needle 31G X 8 MM    GNP CLICKFINE PEN NEEDLES    100 each    1 Device by * route daily    Type 2 diabetes, HbA1c goal < 7% (H)       levothyroxine 75 MCG tablet    SYNTHROID/LEVOTHROID    90 tablet    Take 1 tablet (75 mcg) by mouth daily    Hypothyroidism due to acquired atrophy of thyroid       liraglutide 18 MG/3ML soln    VICTOZA PEN    12 mL    Inject 1.8 mg Subcutaneous daily    Type 2 diabetes mellitus with hyperglycemia, with long-term current use of insulin (H)       lisinopril 40 MG tablet    PRINIVIL/ZESTRIL    135 tablet    Take 1.5 tablets (60 mg) by mouth daily    Essential hypertension with goal blood pressure less than 140/90       metFORMIN 500 MG 24 hr tablet    GLUCOPHAGE-XR    360 tablet    Take 4 tablets (2,000 mg) by mouth At Bedtime    Uncontrolled type 2 diabetes mellitus with hyperglycemia, with long-term current use of insulin (H)       metoprolol succinate 25 MG 24 hr tablet    TOPROL XL    90 tablet    Take 1 tablet (25 mg) by mouth daily    Essential hypertension with goal blood pressure less than 140/90       order for [facility]      Respironics REMSTAR 60 Series Auto CPAP 8-15cm H2O, Airfit P10 nasal pillow mask w/medium pillows        rosuvastatin 10 MG tablet    CRESTOR    90 tablet    Take 1 tablet (10 mg) by mouth daily    Hyperlipidemia LDL goal <100

## 2018-04-11 NOTE — PATIENT INSTRUCTIONS
Continue drops as directed.    Start drops left eye also- same directions as right eye.    Ok to discontinue shield while sleeping.  All restrictions are lifted.    Keep follow up appointments as scheduled.    Return in 3 weeks for final refraction.       Please continue any glaucoma, dry eye, or other medications you were using prior to the surgery.        Kameron Pedraza, OD  [facility]  [address] [place] [address]  Tel- [phone]

## 2018-04-11 NOTE — LETTER
4/11/2018         RE: Sherry Slaughter  [address]  [facility] [address]        Dear Colleague,    Thank you for referring your patient, Sherry Slaughter, to the [facility]. Please see a copy of my visit note below.    CHIEF COMPLAINT:   Chief Complaint   Patient presents with     Surgical Followup     1 week post op cataract od eye       Type of surgery cataract   Date of surgery 4-5-2018 od eye                            3- os eye    Mona Herbert, Optometric Assistant, A.B.O.C.     Drops reviewed.    OBJECTIVE:     See ophthalmology exam    ASSESSMENT:         ICD-10-CM    1. Pseudophakia of right eye Z96.1 POST-OP FOLLOW-UP VISIT     PLAN:      Patient Instructions   Continue drops as directed.    Start drops left eye also- same directions as right eye.    Ok to discontinue shield while sleeping.  All restrictions are lifted.    Keep follow up appointments as scheduled.    Return in 3 weeks for final refraction.       Please continue any glaucoma, dry eye, or other medications you were using prior to the surgery.        Kameron Pedraza, KULDEEP  [facility]  [address] [place] [address]  Tel- [phone]      Again, thank you for allowing me to participate in the care of your patient.        Sincerely,        Kameron Pedraza OD

## 2018-05-02 ENCOUNTER — OFFICE VISIT (OUTPATIENT)
Dept: OPTOMETRY | Facility: CLINIC | Age: 63
End: 2018-05-02
Payer: COMMERCIAL

## 2018-05-02 ENCOUNTER — TELEPHONE (OUTPATIENT)
Dept: OTOLARYNGOLOGY | Facility: CLINIC | Age: 63
End: 2018-05-02

## 2018-05-02 ENCOUNTER — OFFICE VISIT (OUTPATIENT)
Dept: ENDOCRINOLOGY | Facility: CLINIC | Age: 63
End: 2018-05-02
Payer: COMMERCIAL

## 2018-05-02 VITALS
HEIGHT: 68 IN | HEART RATE: 77 BPM | OXYGEN SATURATION: 97 % | BODY MASS INDEX: 44.41 KG/M2 | SYSTOLIC BLOOD PRESSURE: 154 MMHG | WEIGHT: 293 LBS | DIASTOLIC BLOOD PRESSURE: 85 MMHG

## 2018-05-02 DIAGNOSIS — Z79.4 TYPE 2 DIABETES MELLITUS TREATED WITH INSULIN (H): Primary | ICD-10-CM

## 2018-05-02 DIAGNOSIS — Z96.1 PSEUDOPHAKIA OF BOTH EYES: Primary | ICD-10-CM

## 2018-05-02 DIAGNOSIS — E66.01 MORBID OBESITY (H): ICD-10-CM

## 2018-05-02 DIAGNOSIS — I10 HYPERTENSION GOAL BP (BLOOD PRESSURE) < 140/90: ICD-10-CM

## 2018-05-02 DIAGNOSIS — E11.9 TYPE 2 DIABETES MELLITUS TREATED WITH INSULIN (H): Primary | ICD-10-CM

## 2018-05-02 DIAGNOSIS — H10.13 ALLERGIC CONJUNCTIVITIS, BILATERAL: ICD-10-CM

## 2018-05-02 DIAGNOSIS — E03.9 HYPOTHYROIDISM, UNSPECIFIED TYPE: ICD-10-CM

## 2018-05-02 PROCEDURE — 99024 POSTOP FOLLOW-UP VISIT: CPT | Performed by: OPTOMETRIST

## 2018-05-02 PROCEDURE — 99214 OFFICE O/P EST MOD 30 MIN: CPT | Performed by: INTERNAL MEDICINE

## 2018-05-02 RX ORDER — OLOPATADINE HYDROCHLORIDE 2 MG/ML
1 SOLUTION/ DROPS OPHTHALMIC DAILY PRN
Qty: 1 BOTTLE | Refills: 6 | Status: SHIPPED | OUTPATIENT
Start: 2018-05-02 | End: 2019-05-21

## 2018-05-02 ASSESSMENT — VISUAL ACUITY
METHOD: SNELLEN - LINEAR
OD_SC: 20/40
OS_SC: 20/60
OS_SC: 20/25
OD_SC: 20/40+2

## 2018-05-02 ASSESSMENT — TONOMETRY
IOP_METHOD: TONOPEN
OD_IOP_MMHG: 13
OS_IOP_MMHG: 11

## 2018-05-02 ASSESSMENT — REFRACTION_WEARINGRX
OS_AXIS: 011
OS_ADD: +2.50
OD_AXIS: 010
OS_SPHERE: -0.50
OS_CYLINDER: +0.25
OD_SPHERE: -0.25
OD_ADD: +2.50
OD_CYLINDER: +0.75

## 2018-05-02 ASSESSMENT — REFRACTION_MANIFEST
OD_AXIS: 010
OD_ADD: +2.50
OS_AXIS: 008
OS_SPHERE: -1.00
OS_ADD: +2.50
OD_CYLINDER: +1.50
OD_SPHERE: -1.00
OS_CYLINDER: +1.25

## 2018-05-02 ASSESSMENT — EXTERNAL EXAM - RIGHT EYE: OD_EXAM: NORMAL

## 2018-05-02 ASSESSMENT — CUP TO DISC RATIO
OS_RATIO: 0.3
OD_RATIO: 0.3

## 2018-05-02 ASSESSMENT — SLIT LAMP EXAM - LIDS
COMMENTS: NORMAL
COMMENTS: NORMAL

## 2018-05-02 ASSESSMENT — EXTERNAL EXAM - LEFT EYE: OS_EXAM: NORMAL

## 2018-05-02 NOTE — MR AVS SNAPSHOT
After Visit Summary   5/2/2018    Sherry Slaughter    MRN: [id number]           Patient Information     Date Of Birth          1955        Visit Information        Provider Department      5/2/2018 9:00 AM Rika Delgado MD [facility]        Today's Diagnoses     Type 2 diabetes mellitus treated with insulin (H)    -  1    Hypothyroidism, unspecified type        Morbid obesity (H)          Care Instructions    [facility]-Department of Endocrinology  Devi Harkins RN, Diabetes Educator: [phone]  Clinic Nurses Kylah Trejo: [phone]  Clinic Fax: [phone]  On-Call Endocrine at the [place] (after hours/weekends): [phone] option 4  Scheduling Line: [phone]    Appointment Reminders:  * Please bring meter with for staff to download  * If you are due ONLY for an A1C, it is scheduled with the nurse and will be done in clinic. You do not need to schedule a lab appointment. Fasting is not required for an A1C.  * Refill request should be submitted to your pharmacy. They will contact clinic for approval.      Please call [facility] to schedule an outpatient lab appointment prior to 9/12 follow up            Follow-ups after your visit        Follow-up notes from your care team     Return in about 4 months (around 9/2/2018) for labs prior to f/up apt.      Your next 10 appointments already scheduled     May 02, 2018 11:00 AM CDT   Return Visit with Kameron Pedraza OD   [facility] ([facility])    [address] [place] [address] [phone]            May 08, 2018  7:45 AM CDT   (Arrive by 7:30 AM)   New Patient Visit with Gen Watkins MD   [facility] ([facility])    [address] [address] [phone]            Sep 12, 2018  1:30 PM CDT   Return Visit with Rika Jenkins  MD Danny   [facility]    [address] [address] [place] [address] [phone]              Future tests that were ordered for you today     Open Standing Orders        Priority Remaining Interval Expires Ordered    Hemoglobin A1c Routine 4/4 Q 3 MO 5/2/2019 5/2/2018          Open Future Orders        Priority Expected Expires Ordered    Lipid panel reflex to direct LDL Fasting Routine 9/2/2018 10/2/2018 5/2/2018    Albumin Random Urine Quantitative with Creat Ratio Routine 9/2/2018 10/2/2018 5/2/2018    TSH with free T4 reflex Routine 9/2/2018 10/2/2018 5/2/2018    Hematocrit Routine 9/2/2018 10/2/2018 5/2/2018            Who to contact     If you have questions or need follow up information about today's clinic visit or your schedule please contact [facility] directly at [phone].  Normal or non-critical lab and imaging results will be communicated to you by Kosmixhart, letter or phone within 4 business days after the clinic has received the results. If you do not hear from us within 7 days, please contact the clinic through Ganjiwangt or phone. If you have a critical or abnormal lab result, we will notify you by phone as soon as possible.  Submit refill requests through Sungy Mobile or call your pharmacy and they will forward the refill request to us. Please allow 3 business days for your refill to be completed.          Additional Information About Your Visit        Sungy Mobile Information     Sung[employer] Mobile gives you secure access to your electronic health record. If you see a primary care provider, you can also send messages to your care team and make appointments. If you have questions, please call your primary care clinic.  If you do not have a primary care provider, please call [phone] and they will assist you.      Sungy Mobile is an electronic gateway that provides easy, online access to your medical records. With Sungy Mobile, you can request a  "clinic appointment, read your test results, renew a prescription or communicate with your care team.     To access your existing account, please contact your [facility] or call [phone] for assistance.        Care EveryWhere ID     This is your Care EveryWhere ID. This could be used by other organizations to access your [facility] medical records  [id number]        Your Vitals Were     Pulse Height Last Period Pulse Oximetry BMI (Body Mass Index)       77 1.721 m (5' 7.75\") 10/02/2007 97% 45.93 kg/m2        Blood Pressure from Last 3 Encounters:   05/02/18 154/85   04/05/18 147/86   03/15/18 148/77    Weight from Last 3 Encounters:   05/02/18 136 kg (299 lb 13.2 oz)   04/05/18 134 kg (295 lb 8 oz)   03/14/18 134 kg (295 lb 8 oz)               Primary Care Provider Office Phone # Fax #    Marilou Arciniega MD PhD [phone]       [address] [address] [facility] [address]        Equal Access to Services     [facility]: Hadii aad ku hadasho Soomaali, waaxda luqadaha, qaybta kaalmada adeegyada, acosta [place] . So [facility] [phone].    ATENCIÓN: Si habla español, tiene a espinosa disposición servicios gratuitos de asistencia lingüística. [facility] [phone].    We comply with applicable federal civil rights laws and [place] laws. We do not discriminate on the basis of race, color, national origin, age, disability, sex, sexual orientation, or gender identity.            Thank you!     Thank you for choosing [facility]  for your care. Our goal is always to provide you with excellent care. Hearing back from our patients is one way we can continue to improve our services. Please take a few minutes to complete the written survey that you may receive in the mail after your visit with us. Thank you!             Your Updated Medication List - Protect others around you: Learn how to safely use, store and throw away your medicines at " www.disposemymeds.org.          This list is accurate as of 5/2/18 10:05 AM.  Always use your most recent med list.                   Brand Name Dispense Instructions for use Diagnosis    ADVANCED DIABETIC MULTIVITAMIN Tabs      Take 1 tablet by mouth daily        ADVIL PO      Take 600 mg by mouth 4 times daily        ASPIRIN EC PO      Take 325 mg by mouth daily        BASAGLAR 100 UNIT/ML injection     63 mL    Inject 70 Units Subcutaneous 2 times daily Today took 35 units this am    Long-term insulin use (H)       blood glucose monitoring test strip    no brand specified    100 each    Use to test blood sugar 4 times daily or as directed.    Type 2 diabetes mellitus without complication, with long-term current use of insulin (H)       Garlic 1000 MG Caps      Take 1 capsule by mouth daily Takes during summer months.  Done taking until next summer.        hydrochlorothiazide 25 MG tablet    HYDRODIURIL    90 tablet    Take 1 tablet (25 mg) by mouth daily    Essential hypertension with goal blood pressure less than 140/90       insulin aspart 100 UNIT/ML injection    NovoLOG FLEXPEN    45 mL    3 units per 15 gram CHO and sliding scale 1:10 above 140 mg/dl. Uses approx 90 units daily    Type 2 diabetes mellitus with hyperglycemia, with long-term current use of insulin (H)       insulin pen needle 31G X 8 MM    GNP CLICKFINE PEN NEEDLES    100 each    1 Device by * route daily    Type 2 diabetes, HbA1c goal < 7% (H)       levothyroxine 75 MCG tablet    SYNTHROID/LEVOTHROID    90 tablet    Take 1 tablet (75 mcg) by mouth daily    Hypothyroidism due to acquired atrophy of thyroid       liraglutide 18 MG/3ML soln    VICTOZA PEN    12 mL    Inject 1.8 mg Subcutaneous daily    Type 2 diabetes mellitus with hyperglycemia, with long-term current use of insulin (H)       lisinopril 40 MG tablet    PRINIVIL/ZESTRIL    135 tablet    Take 1.5 tablets (60 mg) by mouth daily    Essential hypertension with goal blood pressure  less than 140/90       metFORMIN 500 MG 24 hr tablet    GLUCOPHAGE-XR    360 tablet    Take 4 tablets (2,000 mg) by mouth At Bedtime    Uncontrolled type 2 diabetes mellitus with hyperglycemia, with long-term current use of insulin (H)       metoprolol succinate 25 MG 24 hr tablet    TOPROL XL    90 tablet    Take 1 tablet (25 mg) by mouth daily    Essential hypertension with goal blood pressure less than 140/90       [place] [facility]      Respironics REMSTAR 60 Series Auto CPAP 8-15cm H2O, Airfit P10 nasal pillow mask w/medium pillows        rosuvastatin 10 MG tablet    CRESTOR    90 tablet    Take 1 tablet (10 mg) by mouth daily    Hyperlipidemia LDL goal <100

## 2018-05-02 NOTE — MR AVS SNAPSHOT
After Visit Summary   5/2/2018    Sherry Slaughter    MRN: [id number]           Patient Information     Date Of Birth          1955        Visit Information        Provider Department      5/2/2018 11:00 AM Kameron Pedraza OD [facility]        Today's Diagnoses     Pseudophakia of both eyes    -  1    Allergic conjunctivitis, bilateral          Care Instructions    Eyeglass prescription given.    Prescription for Pataday to be used once daily for itchy eyes.  Use as needed.  Patanol, Zaditor, or Optivar- ok substitute- 1 drop both eyes 2 x day as needed.     Recommend returning for dilated fundus exam. It is a more thorough exam of the retina.    Kameron Pedraza, KULDEEP              Follow-ups after your visit        Follow-up notes from your care team     Return for dilated fundus exam.      Your next 10 appointments already scheduled     May 18, 2018 12:30 PM CDT   (Arrive by 12:15 PM)   New Patient Visit with Gen Watkins MD   [facility] ([facility])    [address] [facility] [facility]  [address] [facility] [address] [phone]            Sep 12, 2018  1:30 PM CDT   Return Visit with Rika Delgado MD   [facility] ([facility])    [address] [place] [address] [phone]              Future tests that were ordered for you today     Open Standing Orders        Priority Remaining Interval Expires Ordered    Hemoglobin A1c Routine 4/4 Q 3 MO 5/2/2019 5/2/2018          Open Future Orders        Priority Expected Expires Ordered    Lipid panel reflex to direct LDL Fasting Routine 9/2/2018 10/2/2018 5/2/2018    Albumin Random Urine Quantitative with Creat Ratio Routine 9/2/2018 10/2/2018 5/2/2018    TSH with free T4 reflex Routine 9/2/2018 10/2/2018 5/2/2018    Hematocrit Routine 9/2/2018 10/2/2018 5/2/2018            Who to contact     If you have questions or need follow  up information about today's clinic visit or your schedule please contact [facility] directly at [phone].  Normal or non-critical lab and imaging results will be communicated to you by [employer], letter or phone within 4 business days after the clinic has received the results. If you do not hear from us within 7 days, please contact the clinic through [employer] or phone. If you have a critical or abnormal lab result, we will notify you by phone as soon as possible.  Submit refill requests through [employer] or call your pharmacy and they will forward the refill request to us. Please allow 3 business days for your refill to be completed.          Additional Information About Your Visit        [employer] Information     [employer] gives you secure access to your electronic health record. If you see a primary care provider, you can also send messages to your care team and make appointments. If you have questions, please call your primary care clinic.  If you do not have a primary care provider, please call [phone] and they will assist you.      [employer] is an electronic gateway that provides easy, online access to your medical records. With [employer], you can request a clinic appointment, read your test results, renew a prescription or communicate with your care team.     To access your existing account, please contact your [facility] or call [phone] for assistance.        Care EveryWhere ID     This is your Care EveryWhere ID. This could be used by other organizations to access your [facility] medical records  [id number]        Your Vitals Were     Last Period                   10/02/2007            Blood Pressure from Last 3 Encounters:   05/02/18 154/85   04/05/18 147/86   03/15/18 148/77    Weight from Last 3 Encounters:   05/02/18 136 kg (299 lb 13.2 oz)   04/05/18 134 kg (295 lb 8 oz)   03/14/18 134 kg (295 lb 8 oz)              We Performed the Following      POST-OP FOLLOW-UP VISIT          Today's Medication Changes          These changes are accurate as of 5/2/18 12:39 PM.  If you have any questions, ask your nurse or doctor.               Start taking these medicines.        Dose/Directions    olopatadine HCl 0.2 % Soln   Commonly known as:  PATADAY   Used for:  Allergic conjunctivitis, bilateral   Started by:  Kameron Pedraza, OD        Dose:  1 drop   Place 1 drop into both eyes daily as needed (For itchy, water eyes.)   Quantity:  1 Bottle   Refills:  6            Where to get your medicines      These medications were sent to [facility] - [place], [place] - [address], [address]  [address], [address], [facility] [address]     Phone:  [phone]     olopatadine HCl 0.2 % Soln                Primary Care Provider Office Phone # Fax #    Marilou Arciniega MD PhD [phone]       [address]  [facility] [address]        Equal Access to Services     [facility]: Hadii canelo ku hadasho Soomaali, waaxda luqadaha, qaybta kaalmada adeegyada, waxay idiin haylola [place] . So [facility] [phone].    ATENCIÓN: Si habla español, tiene a espinosa disposición servicios gratuitos de asistencia lingüística. [facility] [phone].    We comply with applicable federal civil rights laws and [place] laws. We do not discriminate on the basis of race, color, national origin, age, disability, sex, sexual orientation, or gender identity.            Thank you!     Thank you for choosing [facility]  for your care. Our goal is always to provide you with excellent care. Hearing back from our patients is one way we can continue to improve our services. Please take a few minutes to complete the written survey that you may receive in the mail after your visit with us. Thank you!             Your Updated Medication List - Protect others around you: Learn how to safely use, store and throw away your medicines at  www.disposemymeds.org.          This list is accurate as of 5/2/18 12:39 PM.  Always use your most recent med list.                   Brand Name Dispense Instructions for use Diagnosis    ADVANCED DIABETIC MULTIVITAMIN Tabs      Take 1 tablet by mouth daily        ADVIL PO      Take 600 mg by mouth 4 times daily        ASPIRIN EC PO      Take 325 mg by mouth daily        BASAGLAR 100 UNIT/ML injection     63 mL    Inject 70 Units Subcutaneous 2 times daily Today took 35 units this am    Long-term insulin use (H)       blood glucose monitoring test strip    no brand specified    100 each    Use to test blood sugar 4 times daily or as directed.    Type 2 diabetes mellitus without complication, with long-term current use of insulin (H)       Garlic 1000 MG Caps      Take 1 capsule by mouth daily Takes during summer months.  Done taking until next summer.        hydrochlorothiazide 25 MG tablet    HYDRODIURIL    90 tablet    Take 1 tablet (25 mg) by mouth daily    Essential hypertension with goal blood pressure less than 140/90       insulin aspart 100 UNIT/ML injection    NovoLOG FLEXPEN    45 mL    3 units per 15 gram CHO and sliding scale 1:10 above 140 mg/dl. Uses approx 90 units daily    Type 2 diabetes mellitus with hyperglycemia, with long-term current use of insulin (H)       insulin pen needle 31G X 8 MM    GNP CLICKFINE PEN NEEDLES    100 each    1 Device by * route daily    Type 2 diabetes, HbA1c goal < 7% (H)       levothyroxine 75 MCG tablet    SYNTHROID/LEVOTHROID    90 tablet    Take 1 tablet (75 mcg) by mouth daily    Hypothyroidism due to acquired atrophy of thyroid       liraglutide 18 MG/3ML soln    VICTOZA PEN    12 mL    Inject 1.8 mg Subcutaneous daily    Type 2 diabetes mellitus with hyperglycemia, with long-term current use of insulin (H)       lisinopril 40 MG tablet    PRINIVIL/ZESTRIL    135 tablet    Take 1.5 tablets (60 mg) by mouth daily    Essential hypertension with goal blood pressure  less than 140/90       metFORMIN 500 MG 24 hr tablet    GLUCOPHAGE-XR    360 tablet    Take 4 tablets (2,000 mg) by mouth At Bedtime    Uncontrolled type 2 diabetes mellitus with hyperglycemia, with long-term current use of insulin (H)       metoprolol succinate 25 MG 24 hr tablet    TOPROL XL    90 tablet    Take 1 tablet (25 mg) by mouth daily    Essential hypertension with goal blood pressure less than 140/90       olopatadine HCl 0.2 % Soln    PATADAY    1 Bottle    Place 1 drop into both eyes daily as needed (For itchy, water eyes.)    Allergic conjunctivitis, bilateral       order for DME      Respironics REMSTAR 60 Series Auto CPAP 8-15cm H2O, Airfit P10 nasal pillow mask w/medium pillows        rosuvastatin 10 MG tablet    CRESTOR    90 tablet    Take 1 tablet (10 mg) by mouth daily    Hyperlipidemia LDL goal <100

## 2018-05-02 NOTE — PATIENT INSTRUCTIONS
Eyeglass prescription given.    Prescription for Pataday to be used once daily for itchy eyes.  Use as needed.  Patanol, Zaditor, or Optivar- ok substitute- 1 drop both eyes 2 x day as needed.     Recommend returning for dilated fundus exam. It is a more thorough exam of the retina.    Kameron Pedraza, OD

## 2018-05-02 NOTE — PATIENT INSTRUCTIONS
[facility]-Department of Endocrinology  Devi Harkins RN, Diabetes Educator: [phone]  Clinic Nurses Kylah Trejo: [phone]  Clinic Fax: [phone]  On-Call Endocrine at the [facility] (after hours/weekends): [phone] option 4  Scheduling Line: [phone]    Appointment Reminders:  * Please bring meter with for staff to download  * If you are due ONLY for an A1C, it is scheduled with the nurse and will be done in clinic. You do not need to schedule a lab appointment. Fasting is not required for an A1C.  * Refill request should be submitted to your pharmacy. They will contact clinic for approval.      Please call [facility] to schedule an outpatient lab appointment prior to 9/12 follow up

## 2018-05-02 NOTE — PROGRESS NOTES
Sherry is a 63 year old female previously seen by one of my colleagues, who returns for follow-up.    She has been diagnosed with type 2 diabetes 25 years after she was being diagnosed with gestational diabetes during both her pregnancies. Her weight before pregnancy was 190-200. Her highest weight was 300s. She was initially started on oral medication and then insulin was added, more than 6 years ago.  Her A1C has been in 9% range since 2012. She was on Byetta but she did not feel it helped. She was also on Actos but stopped due to 40 lbs weight gain and increased SOB.     Hemoglobin A1c in mid March this year was 6.9%. She is currently on Victoza 1.8 mg daily, Lantus 70 units am and 70 units pm, Humalog 3 units per 15 gram CHO, Novolog sliding scale 1:5 above 140 mg/dl and 2000 mg metformin daily.  On average, she reports administering approximately 15 units NovoLog per meal (she has 5 carb units per meal).    The glucometer readings revealed that she checks her blood glucose 0.8 times daily.  Average blood glucose is 189, with a standard deviation of 66 and a range variable between 70 and 309.  In the morning, her blood sugar is quite variable, from 1 isolated hypoglycemic episode of 72 309.  She rarely checks her blood glucose at bedtime but, when she does, the numbers are elevated, in the 200s.  Dinner is around 7 PM and she goes to bed around 9 PM.  She denies snacking around bedtime, although, sometimes, she might have an ice cream.  The hypoglycemic episodes have been rare. As per patient, they tend to occur during daytime.    DM complications  Retinopathy: last eye exam: this month; no DR, B/L surgery for cataract   Nephropathy: negative urine microalb - on lisinopril 60 mg -- BP is a little high today  Neuropathy: occasional burning sensation in her feet for the last couple of years   She is symptomatic for hypoglycemia (able to recognize blood sugar numbers in the 90s - she gets sweaty, hot and  lightheaded).  She corrects the hypoglycemic symptoms by having something to eat. She does not have glucagon at home (never experienced severe hypoglycemic episodes).   Aspirin - taking 325 mg  LDL 71 (3/2017) on Crestor 10 mg  Using the CPAP   Exercise limited due to bilateral knee replacement and low back pain.    Past Medical History  Past Medical History:   Diagnosis Date     Cataract      DEPRESSION     comes and goes - tried meds - unsuccessfully, certain times of the year, no psych intervention and no counselors in the past - and not interested      Diverticulosis of colon (without mention of hemorrhage) 4/04    colonoscopy     ECHO-mildLVH,tr MR,mild thick lflets w inc LA,trTR   12/03     Essential hypertension, benign 1990s    late 1990s - started medications at that time - not to difficult to control meds      Fam hx-cardiovas dis NEC     father - CAD, and lipids/HTN - multiple members of the family      Family history of diabetes mellitus     sister and grandmother with DM      Family history of malignant neoplasm of breast     mother - young age - 45, and maternal cousin and aunt as well - no BRCA testing done      Family history of stroke (cerebrovascular)     grandmother in early life in her 40s      FAMILY HX COLON CANCER     Pat uncles x 2     Heart murmur      HYPERLIPIDEMIA 2000    fairly recent - in the last 5 years - high for DM levels  -cholesterol recent      Irritable bowel syndrome     goes between the 2 - nerve related - more stressed more problems      MICROALBUMINURIA     unsure how long - been on the lisinopril - for a few years at well      Nonspecific abnormal results of liver function study 2/3/2003    SGOT - has been high in the past - since the hepatitis b - borderline elevation - not really changing any      OBESITY      GENESIS (obstructive sleep apnea) 10/15/2006    psg 5/15 AHI 53 aPAP 8-15     OSTEOARTHRITIS L KNEE 2003    total knee on the left - and pain is gone since the knee  replacement      PERS HX HEPATITIS B- RESOLVED] 1977    acute virus only - no chronic disease      PERS HX HEPATITIS B- RESOLVED]      Type II or unspecified type diabetes mellitus without mention of complication, not stated as uncontrolled 1991    oral meds frist, then insulin eventually later, difficult to control most of the time      Unspecified hypothyroidism 10/11/2006    TSH 3.36 - mild subclinical hypothyroidism - deciding on following or starting low dose meds - with dr Oreilly - following    Hepatis B     Allergies  Allergies   Allergen Reactions     Atorvastatin Calcium      Gas     Pravachol [Pravastatin Sodium]      Pravachol - dry cough      Simvastatin      Myalgia     Medications  Current Outpatient Prescriptions   Medication Sig Dispense Refill     ASPIRIN EC PO Take 325 mg by mouth daily       BASAGLAR 100 UNIT/ML injection Inject 70 Units Subcutaneous 2 times daily Today took 35 units this am 63 mL 1     blood glucose monitoring (NO BRAND SPECIFIED) test strip Use to test blood sugar 4 times daily or as directed. 100 each 11     Garlic 1000 MG CAPS Take 1 capsule by mouth daily Takes during summer months.  Done taking until next summer.       hydrochlorothiazide (HYDRODIURIL) 25 MG tablet Take 1 tablet (25 mg) by mouth daily 90 tablet 3     Ibuprofen (ADVIL PO) Take 600 mg by mouth 4 times daily       insulin aspart (NOVOLOG FLEXPEN) 100 UNIT/ML injection 3 units per 15 gram CHO and sliding scale 1:10 above 140 mg/dl. Uses approx 90 units daily 45 mL 1     insulin pen needle (GNP CLICKFINE PEN NEEDLES) 31G X 8 MM 1 Device by * route daily 100 each 2     levothyroxine (SYNTHROID/LEVOTHROID) 75 MCG tablet Take 1 tablet (75 mcg) by mouth daily 90 tablet 3     liraglutide (VICTOZA PEN) 18 MG/3ML soln Inject 1.8 mg Subcutaneous daily 12 mL 1     lisinopril (PRINIVIL/ZESTRIL) 40 MG tablet Take 1.5 tablets (60 mg) by mouth daily 135 tablet 3     metFORMIN (GLUCOPHAGE-XR) 500 MG 24 hr tablet Take 4 tablets  "(2,000 mg) by mouth At Bedtime 360 tablet 3     metoprolol (TOPROL XL) 25 MG 24 hr tablet Take 1 tablet (25 mg) by mouth daily 90 tablet 3     Multiple Vitamins-Minerals (ADVANCED DIABETIC MULTIVITAMIN) TABS Take 1 tablet by mouth daily       rosuvastatin (CRESTOR) 10 MG tablet Take 1 tablet (10 mg) by mouth daily 90 tablet 3     ORDER FOR DME, SET TO LOCAL PRINT, Respironics REMSTAR 60 Series Auto CPAP 8-15cm H2O, Airfit P10 nasal pillow mask w/medium pillows       Family History  Maternal grandmother had type 2 diabetes  Daughter has GDM  Father had CAD s/p stent, lung cancer and abdominal aortic anuerysm  Older brother has CABG, and abdominal aortic anuerysm  Young brother has Afib, SVT  Another younger brother has multiple myeloma  Sister has SVT    Social History  No smoking, rarely drink EtOH  No illicit drug  Works a nurse in the ICU  Lives by herself, has 3 daughters    ROS  Constitutional: stable weight, fatigue   Eyes: no vision change, +mild cataract, no DR - eye exam in 9/2015  Cardiovascular: no chest pain, palpitations  Respiratory: has some URI for a couple of days   GI: no nausea, vomiting, diarrhea, constipation, no abdominal pain   : no change in urine, no dysuria   Musculoskeletal: no legs swelling, joint pain - mainly the knees and the low back pain  Integumentary: no concerning lesions  Neuro: no loss of strength or sensation, no numbness or tingling, no tremor, no dizziness, no headache   Endo: no polyuria or polydipsia, no temperature intolerance   Heme/Lymph: no concerning bumps, no bleeding problems   Psych: no depression or anxiety, no sleep problems.    Physical Exam  BP Readings from Last 6 Encounters:   05/02/18 154/85   04/05/18 147/86   03/15/18 148/77   03/14/18 122/68   11/02/17 136/78   07/03/17 140/71     /85  Pulse 77  Ht 1.721 m (5' 7.75\")  Wt 136 kg (299 lb 13.2 oz)  LMP 10/02/2007  SpO2 97%  BMI 45.93 kg/m2  Body mass index is 45.93 kg/(m^2).  Constitutional: " general obesity, no distress, comfortable, pleasant   Eyes: anicteric, normal extra-ocular movements, no lid lag or retraction  Neck: no thyromegaly; palpable 2-3 cm firm mass anterior and under the jaw angle, fairly fixed   CVS: RRR, normal S1,S2, systolic murmur with radiation to the carotid arteries   Lungs: CTA B/L  Abd: soft, obese, NT, ND  Musculoskeletal: no edema, DP 2+; left lower extremities - mild atrophy compared with the right   NS: no tremor, normal gait, knee reflexes absent, decreased sensation on the soles of both feet (R>L), flat feet, B/L mild calluses   Skin: benign abd striae   Psychological: appropriate mood     RESULTS  Lab Results   Component Value Date    A1C 6.9 (H) 03/14/2018    A1C 7.7 (H) 07/03/2017    A1C 7.5 02/06/2017    A1C 8.9 09/07/2016    A1C 7.3 (H) 03/16/2016       Hemoglobin   Date Value Ref Range Status   09/25/2015 11.5 (L) 11.7 - 15.7 g/dL Final     Hematocrit   Date Value Ref Range Status   05/14/2013 39.3 35.0 - 47.0 % Final     Cholesterol   Date Value Ref Range Status   07/03/2017 143 <200 mg/dL Final     Cholesterol/HDL Ratio   Date Value Ref Range Status   02/19/2015 3.2 0.0 - 5.0 Final     HDL Cholesterol   Date Value Ref Range Status   07/03/2017 39 (L) >49 mg/dL Final     LDL Cholesterol Calculated   Date Value Ref Range Status   07/03/2017 71 <100 mg/dL Final     Comment:     Desirable:       <100 mg/dl     VLDL-Cholesterol   Date Value Ref Range Status   02/19/2015 34 (H) 0 - 30 mg/dL Final     Triglycerides   Date Value Ref Range Status   07/03/2017 164 (H) <150 mg/dL Final     Comment:     Borderline high:  150-199 mg/dl   High:             200-499 mg/dl   Very high:       >499 mg/dl   Fasting specimen       Albumin Urine mg/L   Date Value Ref Range Status   07/03/2017 7 mg/L Final     TSH   Date Value Ref Range Status   07/03/2017 2.03 0.40 - 4.00 mU/L Final         Last Basic Metabolic Panel:    Sodium   Date Value Ref Range Status   03/14/2018 143 133 - 144  mmol/L Final     Potassium   Date Value Ref Range Status   03/14/2018 4.2 3.4 - 5.3 mmol/L Final     Chloride   Date Value Ref Range Status   03/14/2018 107 94 - 109 mmol/L Final     Calcium   Date Value Ref Range Status   03/14/2018 9.8 8.5 - 10.1 mg/dL Final     Carbon Dioxide   Date Value Ref Range Status   03/14/2018 31 20 - 32 mmol/L Final     Urea Nitrogen   Date Value Ref Range Status   03/14/2018 19 7 - 30 mg/dL Final     Creatinine   Date Value Ref Range Status   03/14/2018 0.91 0.52 - 1.04 mg/dL Final     GFR Estimate   Date Value Ref Range Status   03/14/2018 62 >60 mL/min/1.7m2 Final     Comment:     Non  GFR Calc     Glucose   Date Value Ref Range Status   03/14/2018 81 70 - 99 mg/dL Final     Comment:     Non Fasting       AST   Date Value Ref Range Status   03/07/2014 41 0 - 45 U/L Final     ALT   Date Value Ref Range Status   07/03/2017 48 0 - 50 U/L Final     Albumin   Date Value Ref Range Status   03/07/2014 4.2 3.3 - 4.9 g/dL Final         ASSESSMENT:      1. Type 2 diabetes  Sherry is a 63 year old pleasant female, with a history of type 2 diabetes in the setting of obesity, sleep apnea, decreased physical exercise due to knee and back pain.    Her diabetes appears to be well controlled, based on the A1c.  It is known to be complicated by intermittent microalbuminuria and mild neuropathy.  The glucometer reveals higher blood sugar numbers than what the A1c indicates.  The patient has not had a hemoglobin checked recently.  Recommendations:  Administer NovoLog 10-15 minutes prior to eating  Keep written records of the blood glucose, carbohydrate intake and insulin dose administered and send them to us as needed  Follow-up urine microalbumin, hematocrit and lipid panel.    2. Hypertension: Uncontrolled. On lisinopril 40 mg, HCTZ 25 mg and metoprolol 25 mg daily.   The patient is reluctant to start another antihypertensive medication.  She would prefer to check her blood pressure  at home and contact us if the numbers are persistently above 140/80.  Counseled on the beneficial effects of diet and weight loss on both diabetes and blood pressure control.  I also advised the patient to try to reduce the intake of salt.    3.  Hypothyroidism.  Clinically, the patient appears euthyroid.  Biochemically, most recent TSH was normal in July last year.  I recommended to continue to take 75 mcg levothyroxine daily and have the thyroid hormone levels rechecked at her follow-up appointment.      Orders Placed This Encounter   Procedures     Lipid panel reflex to direct LDL Fasting     Albumin Random Urine Quantitative with Creat Ratio     TSH with free T4 reflex     Hematocrit     Hemoglobin A1c

## 2018-05-02 NOTE — LETTER
5/2/2018         RE: Sherry Slaughter  [address]  [facility] [address]        Dear Colleague,    Thank you for referring your patient, Sherry Slaughter, to the [facility]. Please see a copy of my visit note below.    CHIEF COMPLAINT:   Chief Complaint   Patient presents with     Surgical Followup     final refraction cataract surgery       Type of surgery cataract  Date of [id number] od eye                            3- os eye    Eyes are itchy and dry at times.  Pt is using refresh drops when eyes get really itchy - last used yesterday afternoon.  Eyes are really itchy today.    Drops reviewed.    OBJECTIVE:     See ophthalmology exam    ASSESSMENT:         ICD-10-CM    1. Pseudophakia of both eyes Z96.1 POST-OP FOLLOW-UP VISIT   2. Allergic conjunctivitis, bilateral H10.13 olopatadine HCl (PATADAY) 0.2 % SOLN     POST-OP FOLLOW-UP VISIT     PLAN:      Patient Instructions   Eyeglass prescription given.    Prescription for Pataday to be used once daily for itchy eyes.  Use as needed.  Patanol, Zaditor, or Optivar- ok substitute- 1 drop both eyes 2 x day as needed.     Recommend returning for dilated fundus exam. It is a more thorough exam of the retina.    Kameron Pedraza, OD          Again, thank you for allowing me to participate in the care of your patient.        Sincerely,        Kameron Pedraza, OD

## 2018-05-02 NOTE — LETTER
5/2/2018         RE: Sherry Slaughter  [address]  [facility] [address]        Dear Colleague,    Thank you for referring your patient, Sherry Slaughter, to the [email or web address] Please see a copy of my visit note below.        Sherry is a 63 year old female previously seen by one of my colleagues, who returns for follow-up.    She has been diagnosed with type 2 diabetes 25 years after she was being diagnosed with gestational diabetes during both her pregnancies. Her weight before pregnancy was 190-200. Her highest weight was 300s. She was initially started on oral medication and then insulin was added, more than 6 years ago.  Her A1C has been in 9% range since 2012. She was on Byetta but she did not feel it helped. She was also on Actos but stopped due to 40 lbs weight gain and increased SOB.     Hemoglobin A1c in mid March this year was 6.9%. She is currently on Victoza 1.8 mg daily, Lantus 70 units am and 70 units pm, Humalog 3 units per 15 gram CHO, Novolog sliding scale 1:5 above 140 mg/dl and 2000 mg metformin daily.  On average, she reports administering approximately 15 units NovoLog per meal (she has 5 carb units per meal).    The glucometer readings revealed that she checks her blood glucose 0.8 times daily.  Average blood glucose is 189, with a standard deviation of 66 and a range variable between 70 and 309.  In the morning, her blood sugar is quite variable, from 1 isolated hypoglycemic episode of 72 309.  She rarely checks her blood glucose at bedtime but, when she does, the numbers are elevated, in the 200s.  Dinner is around 7 PM and she goes to bed around 9 PM.  She denies snacking around bedtime, although, sometimes, she might have an ice cream.  The hypoglycemic episodes have been rare. As per patient, they tend to occur during daytime.    DM complications  Retinopathy: last eye exam: this month; no DR, B/L surgery for cataract   Nephropathy: negative  urine microalb - on lisinopril 60 mg -- BP is a little high today  Neuropathy: occasional burning sensation in her feet for the last couple of years   She is symptomatic for hypoglycemia (able to recognize blood sugar numbers in the 90s - she gets sweaty, hot and lightheaded).  She corrects the hypoglycemic symptoms by having something to eat. She does not have glucagon at home (never experienced severe hypoglycemic episodes).   Aspirin - taking 325 mg  LDL 71 (3/2017) on Crestor 10 mg  Using the CPAP   Exercise limited due to bilateral knee replacement and low back pain.    Past Medical History  Past Medical History:   Diagnosis Date     Cataract      DEPRESSION     comes and goes - tried meds - unsuccessfully, certain times of the year, no psych intervention and no counselors in the past - and not interested      Diverticulosis of colon (without mention of hemorrhage) 4/04    colonoscopy     ECHO-mildLVH,tr MR,mild thick lflets w inc LA,trTR   12/03     Essential hypertension, benign 1990s    late 1990s - started medications at that time - not to difficult to control meds      Fam hx-cardiovas dis NEC     father - CAD, and lipids/HTN - multiple members of the family      Family history of diabetes mellitus     sister and grandmother with DM      Family history of malignant neoplasm of breast     mother - young age - 45, and maternal cousin and aunt as well - no BRCA testing done      Family history of stroke (cerebrovascular)     grandmother in early life in her 40s      FAMILY HX COLON CANCER     Pat uncles x 2     Heart murmur      HYPERLIPIDEMIA 2000    fairly recent - in the last 5 years - high for DM levels  -cholesterol recent      Irritable bowel syndrome     goes between the 2 - nerve related - more stressed more problems      MICROALBUMINURIA     unsure how long - been on the lisinopril - for a few years at well      Nonspecific abnormal results of liver function study 2/3/2003    SGOT - has been high in  the past - since the hepatitis b - borderline elevation - not really changing any      OBESITY      GENESIS (obstructive sleep apnea) 10/15/2006    psg 5/15 AHI 53 aPAP 8-15     OSTEOARTHRITIS L KNEE 2003    total knee on the left - and pain is gone since the knee replacement      PERS HX HEPATITIS B- RESOLVED] 1977    acute virus only - no chronic disease      PERS HX HEPATITIS B- RESOLVED]      Type II or unspecified type diabetes mellitus without mention of complication, not stated as uncontrolled 1991    oral meds frist, then insulin eventually later, difficult to control most of the time      Unspecified hypothyroidism 10/11/2006    TSH 3.36 - mild subclinical hypothyroidism - deciding on following or starting low dose meds - with dr Oreilly - following    Hepatis B     Allergies  Allergies   Allergen Reactions     Atorvastatin Calcium      Gas     Pravachol [Pravastatin Sodium]      Pravachol - dry cough      Simvastatin      Myalgia     Medications  Current Outpatient Prescriptions   Medication Sig Dispense Refill     ASPIRIN EC PO Take 325 mg by mouth daily       BASAGLAR 100 UNIT/ML injection Inject 70 Units Subcutaneous 2 times daily Today took 35 units this am 63 mL 1     blood glucose monitoring (NO BRAND SPECIFIED) test strip Use to test blood sugar 4 times daily or as directed. 100 each 11     Garlic 1000 MG CAPS Take 1 capsule by mouth daily Takes during summer months.  Done taking until next summer.       hydrochlorothiazide (HYDRODIURIL) 25 MG tablet Take 1 tablet (25 mg) by mouth daily 90 tablet 3     Ibuprofen (ADVIL PO) Take 600 mg by mouth 4 times daily       insulin aspart (NOVOLOG FLEXPEN) 100 UNIT/ML injection 3 units per 15 gram CHO and sliding scale 1:10 above 140 mg/dl. Uses approx 90 units daily 45 mL 1     insulin pen needle (GNP CLICKFINE PEN NEEDLES) 31G X 8 MM 1 Device by * route daily 100 each 2     levothyroxine (SYNTHROID/LEVOTHROID) 75 MCG tablet Take 1 tablet (75 mcg) by mouth daily  90 tablet 3     liraglutide (VICTOZA PEN) 18 MG/3ML soln Inject 1.8 mg Subcutaneous daily 12 mL 1     lisinopril (PRINIVIL/ZESTRIL) 40 MG tablet Take 1.5 tablets (60 mg) by mouth daily 135 tablet 3     metFORMIN (GLUCOPHAGE-XR) 500 MG 24 hr tablet Take 4 tablets (2,000 mg) by mouth At Bedtime 360 tablet 3     metoprolol (TOPROL XL) 25 MG 24 hr tablet Take 1 tablet (25 mg) by mouth daily 90 tablet 3     Multiple Vitamins-Minerals (ADVANCED DIABETIC MULTIVITAMIN) TABS Take 1 tablet by mouth daily       rosuvastatin (CRESTOR) 10 MG tablet Take 1 tablet (10 mg) by mouth daily 90 tablet 3     ORDER FOR DME, SET TO LOCAL PRINT, Respironics REMSTAR 60 Series Auto CPAP 8-15cm H2O, Airfit P10 nasal pillow mask w/medium pillows       Family History  Maternal grandmother had type 2 diabetes  Daughter has GDM  Father had CAD s/p stent, lung cancer and abdominal aortic anuerysm  Older brother has CABG, and abdominal aortic anuerysm  Young brother has Afib, SVT  Another younger brother has multiple myeloma  Sister has SVT    Social History  No smoking, rarely drink EtOH  No illicit drug  Works a nurse in the ICU  Lives by herself, has 3 daughters    ROS  Constitutional: stable weight, fatigue   Eyes: no vision change, +mild cataract, no DR - eye exam in 9/2015  Cardiovascular: no chest pain, palpitations  Respiratory: has some URI for a couple of days   GI: no nausea, vomiting, diarrhea, constipation, no abdominal pain   : no change in urine, no dysuria   Musculoskeletal: no legs swelling, joint pain - mainly the knees and the low back pain  Integumentary: no concerning lesions  Neuro: no loss of strength or sensation, no numbness or tingling, no tremor, no dizziness, no headache   Endo: no polyuria or polydipsia, no temperature intolerance   Heme/Lymph: no concerning bumps, no bleeding problems   Psych: no depression or anxiety, no sleep problems.    Physical Exam  BP Readings from Last 6 Encounters:   05/02/18 154/85  "  04/05/18 147/86   03/15/18 148/77   03/14/18 122/68   11/02/17 136/78   07/03/17 140/71     /85  Pulse 77  Ht 1.721 m (5' 7.75\")  Wt 136 kg (299 lb 13.2 oz)  LMP 10/02/2007  SpO2 97%  BMI 45.93 kg/m2  Body mass index is 45.93 kg/(m^2).  Constitutional: general obesity, no distress, comfortable, pleasant   Eyes: anicteric, normal extra-ocular movements, no lid lag or retraction  Neck: no thyromegaly; palpable 2-3 cm firm mass anterior and under the jaw angle, fairly fixed   CVS: RRR, normal S1,S2, systolic murmur with radiation to the carotid arteries   Lungs: CTA B/L  Abd: soft, obese, NT, ND  Musculoskeletal: no edema, DP 2+; left lower extremities - mild atrophy compared with the right   NS: no tremor, normal gait, knee reflexes absent, decreased sensation on the soles of both feet (R>L), flat feet, B/L mild calluses   Skin: benign abd striae   Psychological: appropriate mood     RESULTS  Lab Results   Component Value Date    A1C 6.9 (H) 03/14/2018    A1C 7.7 (H) 07/03/2017    A1C 7.5 02/06/2017    A1C 8.9 09/07/2016    A1C 7.3 (H) 03/16/2016       Hemoglobin   Date Value Ref Range Status   09/25/2015 11.5 (L) 11.7 - 15.7 g/dL Final     Hematocrit   Date Value Ref Range Status   05/14/2013 39.3 35.0 - 47.0 % Final     Cholesterol   Date Value Ref Range Status   07/03/2017 143 <200 mg/dL Final     Cholesterol/HDL Ratio   Date Value Ref Range Status   02/19/2015 3.2 0.0 - 5.0 Final     HDL Cholesterol   Date Value Ref Range Status   07/03/2017 39 (L) >49 mg/dL Final     LDL Cholesterol Calculated   Date Value Ref Range Status   07/03/2017 71 <100 mg/dL Final     Comment:     Desirable:       <100 mg/dl     VLDL-Cholesterol   Date Value Ref Range Status   02/19/2015 34 (H) 0 - 30 mg/dL Final     Triglycerides   Date Value Ref Range Status   07/03/2017 164 (H) <150 mg/dL Final     Comment:     Borderline high:  150-199 mg/dl   High:             200-499 mg/dl   Very high:       >499 mg/dl   Fasting " specimen       Albumin Urine mg/L   Date Value Ref Range Status   07/03/2017 7 mg/L Final     TSH   Date Value Ref Range Status   07/03/2017 2.03 0.40 - 4.00 mU/L Final         Last Basic Metabolic Panel:    Sodium   Date Value Ref Range Status   03/14/2018 143 133 - 144 mmol/L Final     Potassium   Date Value Ref Range Status   03/14/2018 4.2 3.4 - 5.3 mmol/L Final     Chloride   Date Value Ref Range Status   03/14/2018 107 94 - 109 mmol/L Final     Calcium   Date Value Ref Range Status   03/14/2018 9.8 8.5 - 10.1 mg/dL Final     Carbon Dioxide   Date Value Ref Range Status   03/14/2018 31 20 - 32 mmol/L Final     Urea Nitrogen   Date Value Ref Range Status   03/14/2018 19 7 - 30 mg/dL Final     Creatinine   Date Value Ref Range Status   03/14/2018 0.91 0.52 - 1.04 mg/dL Final     GFR Estimate   Date Value Ref Range Status   03/14/2018 62 >60 mL/min/1.7m2 Final     Comment:     Non  GFR Calc     Glucose   Date Value Ref Range Status   03/14/2018 81 70 - 99 mg/dL Final     Comment:     Non Fasting       AST   Date Value Ref Range Status   03/07/2014 41 0 - 45 U/L Final     ALT   Date Value Ref Range Status   07/03/2017 48 0 - 50 U/L Final     Albumin   Date Value Ref Range Status   03/07/2014 4.2 3.3 - 4.9 g/dL Final         ASSESSMENT:      1. Type 2 diabetes  Sherry is a 63 year old pleasant female, with a history of type 2 diabetes in the setting of obesity, sleep apnea, decreased physical exercise due to knee and back pain.    Her diabetes appears to be well controlled, based on the A1c.  It is known to be complicated by intermittent microalbuminuria and mild neuropathy.  The glucometer reveals higher blood sugar numbers than what the A1c indicates.  The patient has not had a hemoglobin checked recently.  Recommendations:  Administer NovoLog 10-15 minutes prior to eating  Keep written records of the blood glucose, carbohydrate intake and insulin dose administered and send them to us as  needed  Follow-up urine microalbumin, hematocrit and lipid panel.    2. Hypertension: Uncontrolled. On lisinopril 40 mg, HCTZ 25 mg and metoprolol 25 mg daily.   The patient is reluctant to start another antihypertensive medication.  She would prefer to check her blood pressure at home and contact us if the numbers are persistently above 140/80.  Counseled on the beneficial effects of diet and weight loss on both diabetes and blood pressure control.  I also advised the patient to try to reduce the intake of salt.    3.  Hypothyroidism.  Clinically, the patient appears euthyroid.  Biochemically, most recent TSH was normal in July last year.  I recommended to continue to take 75 mcg levothyroxine daily and have the thyroid hormone levels rechecked at her follow-up appointment.      Orders Placed This Encounter   Procedures     Lipid panel reflex to direct LDL Fasting     Albumin Random Urine Quantitative with Creat Ratio     TSH with free T4 reflex     Hematocrit     Hemoglobin A1c           Again, thank you for allowing me to participate in the care of your patient.        Sincerely,        Rika Delgado MD

## 2018-05-02 NOTE — PROGRESS NOTES
CHIEF COMPLAINT:   Chief Complaint   Patient presents with     Surgical Followup     final refraction cataract surgery       Type of surgery cataract  Date of [id number] od eye                            3- os eye    Eyes are itchy and dry at times.  Pt is using refresh drops when eyes get really itchy - last used yesterday afternoon.  Eyes are really itchy today.    Drops reviewed.    OBJECTIVE:     See ophthalmology exam    ASSESSMENT:         ICD-10-CM    1. Pseudophakia of both eyes Z96.1 POST-OP FOLLOW-UP VISIT   2. Allergic conjunctivitis, bilateral H10.13 olopatadine HCl (PATADAY) 0.2 % SOLN     POST-OP FOLLOW-UP VISIT     PLAN:      Patient Instructions   Eyeglass prescription given.    Prescription for Pataday to be used once daily for itchy eyes.  Use as needed.  Patanol, Zaditor, or Optivar- ok substitute- 1 drop both eyes 2 x day as needed.     Recommend returning for dilated fundus exam. It is a more thorough exam of the retina.    Kameron Pedraza, OD

## 2018-05-02 NOTE — NURSING NOTE
"Sherry Slaughter's goals for this visit include: Establish Care Diabetes  She requests these members of her care team be copied on today's visit information: YES    PCP: Marilou Arciniega    Referring Provider:  No referring provider defined for this encounter.    Chief Complaint   Patient presents with     Diabetes       Initial Ht 1.721 m (5' 7.75\")  Wt 136 kg (299 lb 13.2 oz)  LMP 10/02/2007  BMI 45.93 kg/m2 Estimated body mass index is 45.93 kg/(m^2) as calculated from the following:    Height as of this encounter: 1.721 m (5' 7.75\").    Weight as of this encounter: 136 kg (299 lb 13.2 oz).  Medication Reconciliation: complete    Do you need any medication refills at today's visit? NO    "

## 2018-05-14 NOTE — TELEPHONE ENCOUNTER
FUTURE VISIT INFORMATION      FUTURE VISIT INFORMATION:    Date: 5/18/18    Time: 12:30PM    Location: [facility]  REFERRAL INFORMATION:    Referring provider:  Dr Drea Shannon    Referring providers clinic:  [facility]    Reason for visit/diagnosis  Blocked salivary gland    RECORDS REQUESTED FROM:       Clinic name Comments Records Status Imaging Status   [facility] 4/10/18 CT Neck  EPIC PACS                                   RECORDS STATUS

## 2018-05-18 ENCOUNTER — PRE VISIT (OUTPATIENT)
Dept: OTOLARYNGOLOGY | Facility: CLINIC | Age: 63
End: 2018-05-18

## 2018-05-18 ENCOUNTER — OFFICE VISIT (OUTPATIENT)
Dept: OTOLARYNGOLOGY | Facility: CLINIC | Age: 63
End: 2018-05-18
Payer: COMMERCIAL

## 2018-05-18 ENCOUNTER — OFFICE VISIT (OUTPATIENT)
Dept: OPTOMETRY | Facility: CLINIC | Age: 63
End: 2018-05-18
Payer: COMMERCIAL

## 2018-05-18 VITALS — SYSTOLIC BLOOD PRESSURE: 144 MMHG | DIASTOLIC BLOOD PRESSURE: 79 MMHG | HEIGHT: 68 IN

## 2018-05-18 DIAGNOSIS — K11.8 SUBMANDIBULAR DUCT OBSTRUCTION: Primary | ICD-10-CM

## 2018-05-18 DIAGNOSIS — Z96.1 PSEUDOPHAKIA OF BOTH EYES: Primary | ICD-10-CM

## 2018-05-18 PROCEDURE — 99024 POSTOP FOLLOW-UP VISIT: CPT | Performed by: OPTOMETRIST

## 2018-05-18 ASSESSMENT — TONOMETRY
OS_IOP_MMHG: 15
OD_IOP_MMHG: 12
IOP_METHOD: TONOPEN

## 2018-05-18 ASSESSMENT — SLIT LAMP EXAM - LIDS
COMMENTS: MEIBOMIAN GLAND DYSFUNCTION
COMMENTS: MEIBOMIAN GLAND DYSFUNCTION

## 2018-05-18 ASSESSMENT — REFRACTION_WEARINGRX
OD_ADD: +2.50
OD_AXIS: 010
OS_AXIS: 008
OD_CYLINDER: +1.50
OS_SPHERE: -1.00
OD_SPHERE: -1.00
OS_ADD: +2.50
OS_CYLINDER: +1.25

## 2018-05-18 ASSESSMENT — VISUAL ACUITY
OS_SC: 20/25
OD_SC: 20/30
OS_SC+: -2
OD_SC+: -2
METHOD: SNELLEN - LINEAR

## 2018-05-18 ASSESSMENT — EXTERNAL EXAM - LEFT EYE: OS_EXAM: NORMAL

## 2018-05-18 ASSESSMENT — EXTERNAL EXAM - RIGHT EYE: OD_EXAM: NORMAL

## 2018-05-18 ASSESSMENT — PAIN SCALES - GENERAL: PAINLEVEL: NO PAIN (0)

## 2018-05-18 ASSESSMENT — CUP TO DISC RATIO
OS_RATIO: 0.3
OD_RATIO: 0.3

## 2018-05-18 NOTE — PATIENT INSTRUCTIONS
Follow up in early July with a pre CT scan to see Dr Watkins.  Conner Hurtado RN on 5/18/2018 at 1:39 PM

## 2018-05-18 NOTE — MR AVS SNAPSHOT
After Visit Summary   5/18/2018    Sherry Slaughter    MRN: [id number]           Patient Information     Date Of Birth          1955        Visit Information        Provider Department      5/18/2018 12:30 PM Gen Watkins MD [facility]        Today's Diagnoses     Submandibular duct obstruction    -  1      Care Instructions    Follow up in early July with a pre CT scan to see Dr Watkins.  Conner Hurtado RN on 5/18/2018 at 1:39 PM            Follow-ups after your visit        Your next 10 appointments already scheduled     Jul 10, 2018  9:40 AM CDT   CT SOFT TISSUE NECK W/O & W CONTRAST with UCCT1   [facility] [place] [facility] ([facility])    [address] [address] [phone]           Please bring any scans or X-rays taken at other hospitals, if similar tests were done. Also bring a list of your medicines, including vitamins, minerals and over-the-counter drugs. It is safest to leave personal items at home.  Be sure to tell your doctor:   If you have any allergies.   If there s any chance you are pregnant.   If you are breastfeeding.    If you have diabetes as your medication may need to be adjusted for this exam.  You will have contrast for this exam. To prepare:   Do not eat or drink for 2 hours before your exam. If you need to take medicine, you may take it with small sips of water. (We may ask you to take liquid medicine as well.)   The day before your exam, drink extra fluids at least six 8-ounce glasses (unless your doctor tells you to restrict your fluids).  Patients over 70 or patients with diabetes or kidney problems:   If you haven t had a blood test (creatinine test) within the last 30 days, the Cardiologist/Radiologist may require you to get this test prior to your exam.  Please wear loose clothing, such as a sweat suit or jogging clothes. Avoid snaps, zippers and other metal. We may  "ask you to undress and put on a hospital gown.  If you have any questions, please call the Imaging Department where you will have your exam.            Jul 10, 2018 11:00 AM CDT   (Arrive by 10:45 AM)   New Patient Visit with Gen Watkins MD   [facility] ([facility])    [address] [facility]  [address] [facility] [id number] [phone]            Sep 12, 2018  1:30 PM CDT   Return Visit with Rika Delgado MD   [facility]    [address] [place] [address] [phone]              Who to contact     Please call your clinic at [phone] to:    Ask questions about your health    Make or cancel appointments    Discuss your medicines    Learn about your test results    Speak to your doctor            Additional Information About Your Visit        The [employer] Information     [employer] gives you secure access to your electronic health record. If you see a primary care provider, you can also send messages to your care team and make appointments. If you have questions, please call your primary care clinic.  If you do not have a primary care provider, please call [phone] and they will assist you.      [employer] is an electronic gateway that provides easy, online access to your medical records. With [employer], you can request a clinic appointment, read your test results, renew a prescription or communicate with your care team.     To access your existing account, please contact your [facility] or call [phone] for assistance.        Care EveryWhere ID     This is your Care EveryWhere ID. This could be used by other organizations to access your [place] medical records  [id number]        Your Vitals Were     Height Last Period                1.727 m (5' 8\") 10/02/2007           Blood Pressure from Last 3 Encounters:   05/18/18 144/79   05/02/18 154/85 "   04/05/18 147/86    Weight from Last 3 Encounters:   05/02/18 136 kg (299 lb 13.2 oz)   04/05/18 134 kg (295 lb 8 oz)   03/14/18 134 kg (295 lb 8 oz)              We Performed the Following     CT Soft tissue neck w & w/o contrast        Primary Care Provider Office Phone # Fax #    Marilou Arciniega MD PhD [phone]       [address]  [facility] [address]        Equal Access to Services     GINETTE VILLEGAS : Hadii aad ku hadasho Soomaali, waaxda luqadaha, qaybta kaalmada adeegyada, waxay idiin hayaan adechris murphyarafito [place] . So [facility] [phone].    ATENCIÓN: Si habla español, tiene a espinosa disposición servicios gratuitos de asistencia lingüística. Ll[facility] [phone].    We comply with applicable federal civil rights laws and [place] laws. We do not discriminate on the basis of race, color, national origin, age, disability, sex, sexual orientation, or gender identity.            Thank you!     Thank you for choosing [facility]  for your care. Our goal is always to provide you with excellent care. Hearing back from our patients is one way we can continue to improve our services. Please take a few minutes to complete the written survey that you may receive in the mail after your visit with us. Thank you!             Your Updated Medication List - Protect others around you: Learn how to safely use, store and throw away your medicines at www.disposemymeds.org.          This list is accurate as of 5/18/18  1:46 PM.  Always use your most recent med list.                   Brand Name Dispense Instructions for use Diagnosis    ADVANCED DIABETIC MULTIVITAMIN Tabs      Take 1 tablet by mouth daily        ADVIL PO      Take 600 mg by mouth 4 times daily        ASPIRIN EC PO      Take 325 mg by mouth daily        BASAGLAR 100 UNIT/ML injection     63 mL    Inject 70 Units Subcutaneous 2 times daily Today took 35 units this am    Long-term insulin use (H)       blood glucose monitoring test strip     no brand specified    100 each    Use to test blood sugar 4 times daily or as directed.    Type 2 diabetes mellitus without complication, with long-term current use of insulin (H)       Garlic 1000 MG Caps      Take 1 capsule by mouth daily Takes during summer months.  Done taking until next summer.        hydrochlorothiazide 25 MG tablet    HYDRODIURIL    90 tablet    Take 1 tablet (25 mg) by mouth daily    Essential hypertension with goal blood pressure less than 140/90       insulin pen needle 31G X 8 MM    GNP CLICKFINE PEN NEEDLES    100 each    1 Device by * route daily    Type 2 diabetes, HbA1c goal < 7% (H)       levothyroxine 75 MCG tablet    SYNTHROID/LEVOTHROID    90 tablet    Take 1 tablet (75 mcg) by mouth daily    Hypothyroidism due to acquired atrophy of thyroid       liraglutide 18 MG/3ML soln    VICTOZA PEN    12 mL    Inject 1.8 mg Subcutaneous daily    Type 2 diabetes mellitus with hyperglycemia, with long-term current use of insulin (H)       lisinopril 40 MG tablet    PRINIVIL/ZESTRIL    135 tablet    Take 1.5 tablets (60 mg) by mouth daily    Essential hypertension with goal blood pressure less than 140/90       metFORMIN 500 MG 24 hr tablet    GLUCOPHAGE-XR    360 tablet    Take 4 tablets (2,000 mg) by mouth At Bedtime    Uncontrolled type 2 diabetes mellitus with hyperglycemia, with long-term current use of insulin (H)       metoprolol succinate 25 MG 24 hr tablet    TOPROL XL    90 tablet    Take 1 tablet (25 mg) by mouth daily    Essential hypertension with goal blood pressure less than 140/90       olopatadine HCl 0.2 % Soln    PATADAY    1 Bottle    Place 1 drop into both eyes daily as needed (For itchy, water eyes.)    Allergic conjunctivitis, bilateral       order for DME      Respironics REMSTAR 60 Series Auto CPAP 8-15cm H2O, Airfit P10 nasal pillow mask w/medium pillows        rosuvastatin 10 MG tablet    CRESTOR    90 tablet    Take 1 tablet (10 mg) by mouth daily     Hyperlipidemia LDL goal <100

## 2018-05-18 NOTE — LETTER
5/18/2018       RE: Sherry Slaughter  [address]  [facility] [address]     Dear Colleague,    Thank you for referring your patient, Sherry Slaughter, to the [facility] EAR NOSE AND THROAT at [facility]. Please see a copy of my visit note below.    HISTORY OF PRESENT ILLNESS: Sherry Slaughter is a 63 year old female with a history of HISTORY OF PRESENT ILLNESS:  The patient had a history of a right submandibular gland area that got better with Keflex.  A CAT scan was done and shows almost a nodular area adjacent to the gland that could represent infection and could almost represent a mass.  There were no stones that are present.  She has been on both antibiotics as well as sialagogues and things of this nature.  She is a lot better.  She has no other current complaints at the present time today.      ASSESSMENT:  Patient with a history of right submandibular gland infection.        PLAN:  I do not quite understand what exactly is going on in the CAT scan on the right side.  This could be resulting infection because the scan was done at about that time.   We will need to repeat a CAT scan of her neck in the near future to be able to make sure that this gets complete resolution.  Otherwise, she may have to have a biopsy or submandibular gland removal.      FO/ms         Last 2 Scores for Patient-Answered VHI Questionnaire  No flowsheet data found.    Last 2 Scores for Patient-Answered CSI Questionnaire  No flowsheet data found.      Last 2 Scores for Patient-Answered EAT Questionnaire  No flowsheet data found.        PAST MEDICAL HISTORY:   Past Medical History:   Diagnosis Date     Cataract      DEPRESSION     comes and goes - tried meds - unsuccessfully, certain times of the year, no psych intervention and no counselors in the past - and not interested      Diverticulosis of colon (without mention of hemorrhage) 4/04    colonoscopy     ECHO-Janessatr  MR,mild thick lflets w inc LA,trTR   12/03     Essential hypertension, benign 1990s    late 1990s - started medications at that time - not to difficult to control meds      Fam hx-cardiovas dis NEC     father - CAD, and lipids/HTN - multiple members of the family      Family history of diabetes mellitus     sister and grandmother with DM      Family history of malignant neoplasm of breast     mother - young age - 45, and maternal cousin and aunt as well - no BRCA testing done      Family history of stroke (cerebrovascular)     grandmother in early life in her 40s      FAMILY HX COLON CANCER     Pat uncles x 2     Heart disease     murmur/     Heart murmur      HYPERLIPIDEMIA 2000    fairly recent - in the last 5 years - high for DM levels  -cholesterol recent      Irritable bowel syndrome     goes between the 2 - nerve related - more stressed more problems      MICROALBUMINURIA     unsure how long - been on the lisinopril - for a few years at well      Nonspecific abnormal results of liver function study 2/3/2003    SGOT - has been high in the past - since the hepatitis b - borderline elevation - not really changing any      OBESITY      Obstructive sleep apnea      GENESIS (obstructive sleep apnea) 10/15/2006    psg 5/15 AHI 53 aPAP 8-15     OSTEOARTHRITIS L KNEE 2003    total knee on the left - and pain is gone since the knee replacement      PERS HX HEPATITIS B- RESOLVED] 1977    acute virus only - no chronic disease      PERS HX HEPATITIS B- RESOLVED]      Type II or unspecified type diabetes mellitus without mention of complication, not stated as uncontrolled 1991    oral meds frist, then insulin eventually later, difficult to control most of the time      Unspecified hypothyroidism 10/11/2006    TSH 3.36 - mild subclinical hypothyroidism - deciding on following or starting low dose meds - with dr Oreilly - following        PAST SURGICAL HISTORY:   Past Surgical History:   Procedure Laterality Date     ARTHROPLASTY  KNEE Right 9/23/2015    Procedure: ARTHROPLASTY KNEE;  Surgeon: Rufus Brown MD;  Location: [facility] OR     C NONSPECIFIC PROCEDURE  6/06    right triple arthrodecesis      C NONSPECIFIC PROCEDURE  7/06     right bunion surgery      C TOTAL KNEE ARTHROPLASTY  3/03    L knee     CATARACT IOL, RT/LT Left      COLONOSCOPY  4/04    diverticulosis, rec repeat 10 yrs(consider fam hx?)     ORTHOPEDIC SURGERY      right ankle     PHACOEMULSIFICATION CLEAR CORNEA WITH STANDARD INTRAOCULAR LENS IMPLANT Left 3/15/2018    Procedure: PHACOEMULSIFICATION CLEAR CORNEA WITH STANDARD INTRAOCULAR LENS IMPLANT;  LEFT EYE PHACOEMULSIFICATION CLEAR CORNEA WITH STANDARD INTRAOCULAR LENS IMPLANT;  Surgeon: Bandar Linares MD;  [facility]     PHACOEMULSIFICATION CLEAR CORNEA WITH STANDARD INTRAOCULAR LENS IMPLANT Right 4/5/2018    Procedure: PHACOEMULSIFICATION CLEAR CORNEA WITH STANDARD INTRAOCULAR LENS IMPLANT;  RIGHT PHACOEMULSIFICATION CLEAR CORNEA WITH STANDARD INTRAOCULAR LENS IMPLANT ;  Surgeon: Bandar Linares MD;  Location:  [facility]     STRESS ECHO (METRO)  12/03    no ischemia, limited by fatigue     SURGICAL HISTORY OF -       EXP LAP- R OVARY CYSTS     SURGICAL HISTORY OF -   1981,1984,1985    CHILDBIRTH       FAMILY HISTORY:   Family History   Problem Relation Age of Onset     DIABETES Maternal Grandmother      DM TYPE 2     CEREBROVASCULAR DISEASE Maternal Grandmother      Hypertension Sister      Lipids Sister      HEART DISEASE Sister      Chronic AFib     Hypertension Sister      Lipids Sister      Gynecology Sister      DIABETES Sister      Asthma Sister      Blood Disease Paternal Grandmother      T CELL LEUKEMIA     Hypertension Brother      Lipids Brother      Congenital Anomalies Brother      Cardiovascular Brother      HEART DISEASE Brother      CABG/AFIB     Hypertension Brother      Lipids Brother      Obesity Brother      Hypertension Brother      Respiratory Brother      Sleep apnea     HEART  DISEASE Brother      AFib     Alzheimer Disease Mother      Asthma Mother      Hypertension Mother      Breast Cancer Mother 40     has mastectomy and lived to 84     Allergies Mother      Sulfa,     Arthritis Mother      Cardiovascular Mother      Depression Mother      Respiratory Mother      Lipids Mother      CANCER Mother      breast     Thyroid Disease Mother      CEREBROVASCULAR DISEASE Mother      Dementia Mother      Alzheimers     Cancer - colorectal Other      CANCER Father      lung     Aneurysm Father      brother, AAA     Other Cancer Father      lung ca     Asthma Sister      Cancer - colorectal Paternal Uncle      late 50s to early 60s - great uncles      Breast Cancer Other      Maternal cousin     Arrhythmia Sister      2 brothers 1 sister Afib     Breast Cancer Maternal Aunt 62     Other Cancer Maternal Aunt 35     Ovarian cancer.  Survived despite late recurrence and is now 92.     Glaucoma No family hx of      Macular Degeneration No family hx of        SOCIAL HISTORY:   Social History   Substance Use Topics     Smoking status: Never Smoker     Smokeless tobacco: Never Used      Comment: tried in early 20s only      Alcohol use Yes      Comment: rare       REVIEW OF SYSTEMS: Ten point review of systems was performed and is negative except for:   [facility] ENT ROS 5/9/2018   Ears, Nose, Throat Nasal congestion or drainage   Cardiopulmonary Cough, Wheezing   Hematologic Lymph node swelling        ALLERGIES: Atorvastatin calcium; Pravachol [pravastatin sodium]; and Simvastatin    MEDICATIONS:   Current Outpatient Prescriptions   Medication Sig Dispense Refill     ASPIRIN EC PO Take 325 mg by mouth daily       BASAGLAR 100 UNIT/ML injection Inject 70 Units Subcutaneous 2 times daily Today took 35 units this am 63 mL 1     blood glucose monitoring (NO BRAND SPECIFIED) test strip Use to test blood sugar 4 times daily or as directed. 100 each 11     Garlic 1000 MG CAPS Take 1 capsule by mouth daily Takes  during summer months.  Done taking until next summer.       hydrochlorothiazide (HYDRODIURIL) 25 MG tablet Take 1 tablet (25 mg) by mouth daily 90 tablet 3     Ibuprofen (ADVIL PO) Take 600 mg by mouth 4 times daily       insulin pen needle (GNP CLICKFINE PEN NEEDLES) 31G X 8 MM 1 Device by * route daily 100 each 2     levothyroxine (SYNTHROID/LEVOTHROID) 75 MCG tablet Take 1 tablet (75 mcg) by mouth daily 90 tablet 3     liraglutide (VICTOZA PEN) 18 MG/3ML soln Inject 1.8 mg Subcutaneous daily 12 mL 1     lisinopril (PRINIVIL/ZESTRIL) 40 MG tablet Take 1.5 tablets (60 mg) by mouth daily 135 tablet 3     metFORMIN (GLUCOPHAGE-XR) 500 MG 24 hr tablet Take 4 tablets (2,000 mg) by mouth At Bedtime 360 tablet 3     metoprolol (TOPROL XL) 25 MG 24 hr tablet Take 1 tablet (25 mg) by mouth daily 90 tablet 3     Multiple Vitamins-Minerals (ADVANCED DIABETIC MULTIVITAMIN) TABS Take 1 tablet by mouth daily       olopatadine HCl (PATADAY) 0.2 % SOLN Place 1 drop into both eyes daily as needed (For itchy, water eyes.) 1 Bottle 6     ORDER FOR DME, SET TO LOCAL PRINT, Respironics REMSTAR 60 Series Auto CPAP 8-15cm H2O, Airfit P10 nasal pillow mask w/medium pillows       rosuvastatin (CRESTOR) 10 MG tablet Take 1 tablet (10 mg) by mouth daily 90 tablet 3         PHYSICAL EXAMINATION:  She  is awake, alert and in no apparent distress.    Her tympanic membranes are clear and intact bilaterally. External auditory canals are clear.  Nasal exam shows a mild septal deviation without obstruction.  Examination of the oral cavity shows no suspicious lesions.  There is symmetric movement of the tongue and soft palate.    The oropharynx is clear.  Her neck is supple without significant adenopathy.  Pulse is regular.  Upper airway is clear.  Cranial nerves II-XII are grossly intact.   Submandibular glaND SLIGHTLY TENDER STILL INDURATED.          IMPRESSION/PLAN:      Again, thank you for allowing me to participate in the care of your  patient.      Sincerely,    Gen Watkins MD

## 2018-05-18 NOTE — PROGRESS NOTES
Chief Complaint   Patient presents with     DILATED EXAM      Complains of right eye being dry    Dilation to complete 5/2/2018    Stacy Velazquez  Optometric Assistant, [facility]      Medical, surgical and family histories reviewed and updated 5/18/2018.      OBJECTIVE: See Ophthalmology exam    ASSESSMENT:    ICD-10-CM    1. Pseudophakia of both eyes Z96.1 POST-OP FOLLOW-UP VISIT      PLAN:    Patient Instructions   Continue allergy drops as needed.    Systane Balance- 1 drop both eyes 2-4 x day.    Rx for glasses as needed.    Return in 1 year for a complete eye exam or sooner if needed.    Kameron Pedraza, OD

## 2018-05-18 NOTE — PATIENT INSTRUCTIONS
Continue allergy drops as needed.    Systane Balance- 1 drop both eyes 2-4 x day.    Rx for glasses as needed.    Return in 1 year for a complete eye exam or sooner if needed.    Kameron Pedraza, KULDEEP    The affects of the dilating drops last for 4- 6 hours.  You will be more sensitive to light and vision will be blurry up close.  Mydriatic sunglasses were given if needed.      Optometry Providers       Clinic Locations                                 Telephone Number   Dr. Alisha Rodriguez [facility] [place]   [place] [phone]     [place] [facility] Hours:                [place] [facility] Hours:       [place] [facility] Hours:   [address]   [address]     [address] [place] [address]   [place] [address]    [place] [address]  Phone: [phone]                    Phone: [phone]     Phone: [phone]                      Monday 8:00-7:00                          Monday 8:00-7:00                          Monday 8:00-7:00              Tuesday 8:00-6:00                          Tuesday 8:00-7:00                          Tuesday 8:00-7:00              Wednesday 8:00-6:00                  Wednesday 8:00-7:00                   Wednesday 8:00-7:00      Thursday 8:00-6:00                        Thursday 8:00-7:00                         Thursday 8:00-7:00            Friday 8:00-5:00                              Friday 8:00-5:00                              Friday 8:00-5:00    [facility]:   [address] [place] [address]  [phone]    Monday 8:00-7:00  Tuesday 8:00-7:00  Wednesday 8:00-7:00  Thursday 8:00-7:00  Friday 8:00-5:00  Please log on to [email or web address] to order your contact lenses.  The link is found on the [facility] page.  As always, Thank you for trusting us with your health care needs!

## 2018-05-18 NOTE — PROGRESS NOTES
HISTORY OF PRESENT ILLNESS: Sherry Slaughter is a 63 year old female with a history of HISTORY OF PRESENT ILLNESS:  The patient had a history of a right submandibular gland area that got better with Keflex.  A CAT scan was done and shows almost a nodular area adjacent to the gland that could represent infection and could almost represent a mass.  There were no stones that are present.  She has been on both antibiotics as well as sialagogues and things of this nature.  She is a lot better.  She has no other current complaints at the present time today.      ASSESSMENT:  Patient with a history of right submandibular gland infection.        PLAN:  I do not quite understand what exactly is going on in the CAT scan on the right side.  This could be resulting infection because the scan was done at about that time.   We will need to repeat a CAT scan of her neck in the near future to be able to make sure that this gets complete resolution.  Otherwise, she may have to have a biopsy or submandibular gland removal.      FO/ms         Last 2 Scores for Patient-Answered VHI Questionnaire  No flowsheet data found.    Last 2 Scores for Patient-Answered CSI Questionnaire  No flowsheet data found.      Last 2 Scores for Patient-Answered EAT Questionnaire  No flowsheet data found.        PAST MEDICAL HISTORY:   Past Medical History:   Diagnosis Date     Cataract      DEPRESSION     comes and goes - tried meds - unsuccessfully, certain times of the year, no psych intervention and no counselors in the past - and not interested      Diverticulosis of colon (without mention of hemorrhage) 4/04    colonoscopy     ECHO-mildLVH,tr MR,mild thick lflets w inc LA,trTR   12/03     Essential hypertension, benign 1990s    late 1990s - started medications at that time - not to difficult to control meds      Fam hx-cardiovas dis NEC     father - CAD, and lipids/HTN - multiple members of the family      Family history of diabetes mellitus      sister and grandmother with DM      Family history of malignant neoplasm of breast     mother - young age - 45, and maternal cousin and aunt as well - no BRCA testing done      Family history of stroke (cerebrovascular)     grandmother in early life in her 40s      FAMILY HX COLON CANCER     Pat uncles x 2     Heart disease     murmur/     Heart murmur      HYPERLIPIDEMIA 2000    fairly recent - in the last 5 years - high for DM levels  -cholesterol recent      Irritable bowel syndrome     goes between the 2 - nerve related - more stressed more problems      MICROALBUMINURIA     unsure how long - been on the lisinopril - for a few years at well      Nonspecific abnormal results of liver function study 2/3/2003    SGOT - has been high in the past - since the hepatitis b - borderline elevation - not really changing any      OBESITY      Obstructive sleep apnea      GENESIS (obstructive sleep apnea) 10/15/2006    psg 5/15 AHI 53 aPAP 8-15     OSTEOARTHRITIS L KNEE 2003    total knee on the left - and pain is gone since the knee replacement      PERS HX HEPATITIS B- RESOLVED] 1977    acute virus only - no chronic disease      PERS HX HEPATITIS B- RESOLVED]      Type II or unspecified type diabetes mellitus without mention of complication, not stated as uncontrolled 1991    oral meds frist, then insulin eventually later, difficult to control most of the time      Unspecified hypothyroidism 10/11/2006    TSH 3.36 - mild subclinical hypothyroidism - deciding on following or starting low dose meds - with dr Oreilly - following        PAST SURGICAL HISTORY:   Past Surgical History:   Procedure Laterality Date     ARTHROPLASTY KNEE Right 9/23/2015    Procedure: ARTHROPLASTY KNEE;  Surgeon: Rufus Brown MD;  Location:  OR     C NONSPECIFIC PROCEDURE  6/06    right triple arthrodecesis      C NONSPECIFIC PROCEDURE  7/06     right bunion surgery      C TOTAL KNEE ARTHROPLASTY  3/03    L knee     CATARACT IOL, RT/LT Left       COLONOSCOPY  4/04    diverticulosis, rec repeat 10 yrs(consider fam hx?)     ORTHOPEDIC SURGERY      right ankle     PHACOEMULSIFICATION CLEAR CORNEA WITH STANDARD INTRAOCULAR LENS IMPLANT Left 3/15/2018    Procedure: PHACOEMULSIFICATION CLEAR CORNEA WITH STANDARD INTRAOCULAR LENS IMPLANT;  LEFT EYE PHACOEMULSIFICATION CLEAR CORNEA WITH STANDARD INTRAOCULAR LENS IMPLANT;  Surgeon: Bandar Linares MD;  [facility]     PHACOEMULSIFICATION CLEAR CORNEA WITH STANDARD INTRAOCULAR LENS IMPLANT Right 4/5/2018    Procedure: PHACOEMULSIFICATION CLEAR CORNEA WITH STANDARD INTRAOCULAR LENS IMPLANT;  RIGHT PHACOEMULSIFICATION CLEAR CORNEA WITH STANDARD INTRAOCULAR LENS IMPLANT ;  Surgeon: Bandar Linares MD;  Location:  [facility]     STRESS ECHO (METRO)  12/03    no ischemia, limited by fatigue     SURGICAL HISTORY OF -       EXP LAP- R OVARY CYSTS     SURGICAL HISTORY OF -   1981,1984,1985    CHILDBIRTH       FAMILY HISTORY:   Family History   Problem Relation Age of Onset     DIABETES Maternal Grandmother      DM TYPE 2     CEREBROVASCULAR DISEASE Maternal Grandmother      Hypertension Sister      Lipids Sister      HEART DISEASE Sister      Chronic AFib     Hypertension Sister      Lipids Sister      Gynecology Sister      DIABETES Sister      Asthma Sister      Blood Disease Paternal Grandmother      T CELL LEUKEMIA     Hypertension Brother      Lipids Brother      Congenital Anomalies Brother      Cardiovascular Brother      HEART DISEASE Brother      CABG/AFIB     Hypertension Brother      Lipids Brother      Obesity Brother      Hypertension Brother      Respiratory Brother      Sleep apnea     HEART DISEASE Brother      AFib     Alzheimer Disease Mother      Asthma Mother      Hypertension Mother      Breast Cancer Mother 40     has mastectomy and lived to 84     Allergies Mother      Sulfa,     Arthritis Mother      Cardiovascular Mother      Depression Mother      Respiratory Mother      Lipids  Mother      CANCER Mother      breast     Thyroid Disease Mother      CEREBROVASCULAR DISEASE Mother      Dementia Mother      Alzheimers     Cancer - colorectal Other      CANCER Father      lung     Aneurysm Father      brother, AAA     Other Cancer Father      lung ca     Asthma Sister      Cancer - colorectal Paternal Uncle      late 50s to early 60s - great uncles      Breast Cancer Other      Maternal cousin     Arrhythmia Sister      2 brothers 1 sister Afib     Breast Cancer Maternal Aunt 62     Other Cancer Maternal Aunt 35     Ovarian cancer.  Survived despite late recurrence and is now 92.     Glaucoma No family hx of      Macular Degeneration No family hx of        SOCIAL HISTORY:   Social History   Substance Use Topics     Smoking status: Never Smoker     Smokeless tobacco: Never Used      Comment: tried in early 20s only      Alcohol use Yes      Comment: rare       REVIEW OF SYSTEMS: Ten point review of systems was performed and is negative except for:   [facility] ENT ROS 5/9/2018   Ears, Nose, Throat Nasal congestion or drainage   Cardiopulmonary Cough, Wheezing   Hematologic Lymph node swelling        ALLERGIES: Atorvastatin calcium; Pravachol [pravastatin sodium]; and Simvastatin    MEDICATIONS:   Current Outpatient Prescriptions   Medication Sig Dispense Refill     ASPIRIN [facility] PO Take 325 mg by mouth daily       BASAGLAR 100 UNIT/ML injection Inject 70 Units Subcutaneous 2 times daily Today took 35 units this am 63 mL 1     blood glucose monitoring (NO BRAND SPECIFIED) test strip Use to test blood sugar 4 times daily or as directed. 100 each 11     Garlic 1000 MG CAPS Take 1 capsule by mouth daily Takes during summer months.  Done taking until next summer.       hydrochlorothiazide (HYDRODIURIL) 25 MG tablet Take 1 tablet (25 mg) by mouth daily 90 tablet 3     Ibuprofen (ADVIL PO) Take 600 mg by mouth 4 times daily       insulin pen needle (GNP CLICKFINE PEN NEEDLES) 31G X 8 MM 1 Device by * route daily  100 each 2     levothyroxine (SYNTHROID/LEVOTHROID) 75 MCG tablet Take 1 tablet (75 mcg) by mouth daily 90 tablet 3     liraglutide (VICTOZA PEN) 18 MG/3ML soln Inject 1.8 mg Subcutaneous daily 12 mL 1     lisinopril (PRINIVIL/ZESTRIL) 40 MG tablet Take 1.5 tablets (60 mg) by mouth daily 135 tablet 3     metFORMIN (GLUCOPHAGE-XR) 500 MG 24 hr tablet Take 4 tablets (2,000 mg) by mouth At Bedtime 360 tablet 3     metoprolol (TOPROL XL) 25 MG 24 hr tablet Take 1 tablet (25 mg) by mouth daily 90 tablet 3     Multiple Vitamins-Minerals (ADVANCED DIABETIC MULTIVITAMIN) TABS Take 1 tablet by mouth daily       olopatadine HCl (PATADAY) 0.2 % SOLN Place 1 drop into both eyes daily as needed (For itchy, water eyes.) 1 Bottle 6     ORDER FOR DME, SET TO LOCAL PRINT, Respironics REMSTAR 60 Series Auto CPAP 8-15cm H2O, Airfit P10 nasal pillow mask w/medium pillows       rosuvastatin (CRESTOR) 10 MG tablet Take 1 tablet (10 mg) by mouth daily 90 tablet 3         PHYSICAL EXAMINATION:  She  is awake, alert and in no apparent distress.    Her tympanic membranes are clear and intact bilaterally. External auditory canals are clear.  Nasal exam shows a mild septal deviation without obstruction.  Examination of the oral cavity shows no suspicious lesions.  There is symmetric movement of the tongue and soft palate.    The oropharynx is clear.  Her neck is supple without significant adenopathy.  Pulse is regular.  Upper airway is clear.  Cranial nerves II-XII are grossly intact.   Submandibular glaND SLIGHTLY TENDER STILL INDURATED.          IMPRESSION/PLAN:

## 2018-05-18 NOTE — NURSING NOTE
"Chief Complaint   Patient presents with     Consult     Consultation for blocked salivary gland right sided, currently subsided      Blood pressure 144/79, height 1.727 m (5' 8\"), last menstrual period 10/02/2007, not currently breastfeeding.    Ruy Campo    "

## 2018-05-18 NOTE — LETTER
5/18/2018         RE: Sherry Slaughter  [address]  [facility] [address]        Dear Colleague,    Thank you for referring your patient, Sherry Slaughter, to the [facility]. Please see a copy of my visit note below.    Chief Complaint   Patient presents with     DILATED EXAM      Complains of right eye being dry    Dilation to complete 5/2/2018    Stacy LampSauk Centre Hospitaljessenia  Optometric Assistant, [facility]      Medical, surgical and family histories reviewed and updated 5/18/2018.      OBJECTIVE: See Ophthalmology exam    ASSESSMENT:    ICD-10-CM    1. Pseudophakia of both eyes Z96.1 POST-OP FOLLOW-UP VISIT      PLAN:    Patient Instructions   Continue allergy drops as needed.    Systane Balance- 1 drop both eyes 2-4 x day.    Rx for glasses as needed.    Return in 1 year for a complete eye exam or sooner if needed.    Kameron Pedraza, OD                      Again, thank you for allowing me to participate in the care of your patient.        Sincerely,        Kameron Pedraza, OD

## 2018-05-18 NOTE — MR AVS SNAPSHOT
After Visit Summary   5/18/2018    Sherry Slaughter    MRN: [id number]           Patient Information     Date Of Birth          1955        Visit Information        Provider Department      5/18/2018 9:20 AM Kameron Pedraza OD [facility]        Today's Diagnoses     Pseudophakia of both eyes    -  1      Care Instructions    Continue allergy drops as needed.    Systane Balance- 1 drop both eyes 2-4 x day.    Rx for glasses as needed.    Return in 1 year for a complete eye exam or sooner if needed.    Kameron Pedraza OD    The affects of the dilating drops last for 4- 6 hours.  You will be more sensitive to light and vision will be blurry up close.  Mydriatic sunglasses were given if needed.      Optometry Providers       Clinic Locations                                 Telephone Number   Dr. Alisha Rodriguez [facility]   [place] [phone]     [facility] Hours:                [facility] Hours:       [facility] Hours:   [address] [facility] [address]   [address] [facility]     [address] [place] [address]   [place] [address]    [place] [address]  Phone: [phone]                    Phone: [phone]     Phone: [phone]                      Monday 8:00-7:00                          Monday 8:00-7:00                          Monday 8:00-7:00              Tuesday 8:00-6:00                          Tuesday 8:00-7:00                          Tuesday 8:00-7:00              Wednesday 8:00-6:00                  Wednesday 8:00-7:00                   Wednesday 8:00-7:00      Thursday 8:00-6:00                        Thursday 8:00-7:00                         Thursday 8:00-7:00            Friday 8:00-5:00                              Friday 8:00-5:00                              Friday 8:00-5:00    [facility] Hours:   [address]  [place] [address]  [phone]    Monday 8:00-7:00  Tuesday 8:00-7:00  Wednesday 8:00-7:00  Thursday 8:00-7:00  Friday 8:00-5:00  Please log on to [email or web address] to order your contact lenses.  The link is found on the [facility] page.  As always, Thank you for trusting us with your health care needs!              Follow-ups after your visit        Follow-up notes from your care team     Return in about 1 year (around 5/18/2019) for Annual Visit.      Your next 10 appointments already scheduled     May 18, 2018 12:30 PM CDT   (Arrive by 12:15 PM)   New Patient Visit with Gen Watkins MD   [facility] ([facility])    [address] [address] [phone]            Sep 12, 2018  1:30 PM CDT   Return Visit with Rika Dlegado MD   [facility]    [address] [place] [address] [phone]              Who to contact     If you have questions or need follow up information about today's clinic visit or your schedule please contact [facility] directly at [phone].  Normal or non-critical lab and imaging results will be communicated to you by MyChart, letter or phone within 4 business days after the clinic has received the results. If you do not hear from us within 7 days, please contact the clinic through MyChart or phone. If you have a critical or abnormal lab result, we will notify you by phone as soon as possible.  Submit refill requests through [employer] or call your pharmacy and they will forward the refill request to us. Please allow 3 business days for your refill to be completed.          Additional Information About Your Visit        [employer] Information     [employer] gives you secure access to your electronic health record. If you see a primary care provider, you can also send messages to your care team  and make appointments. If you have questions, please call your primary care clinic.  If you do not have a primary care provider, please call [phone] and they will assist you.      [employer] is an electronic gateway that provides easy, online access to your medical records. With [employer], you can request a clinic appointment, read your test results, renew a prescription or communicate with your care team.     To access your existing account, please contact your [facility] or call [phone] for assistance.        Care EveryWhere ID     This is your Care EveryWhere ID. This could be used by other organizations to access your [place] medical records  [id number]        Your Vitals Were     Last Period                   10/02/2007            Blood Pressure from Last 3 Encounters:   05/02/18 154/85   04/05/18 147/86   03/15/18 148/77    Weight from Last 3 Encounters:   05/02/18 136 kg (299 lb 13.2 oz)   04/05/18 134 kg (295 lb 8 oz)   03/14/18 134 kg (295 lb 8 oz)              We Performed the Following     POST-OP FOLLOW-UP VISIT        Primary Care Provider Office Phone # Fax #    Marilou Arciniega MD PhD [phone]       [address]  [facility] [address]        Equal Access to Services     OLE VILLEGAS : Hadii aad ku hadasho Soomaali, waaxda luqadaha, qaybta kaalmada adeegyada, waxay viky haylola salinas. So [facility] [phone].    ATENCIÓN: Si habla español, tiene a espinosa disposición servicios gratuitos de asistencia lingüística. Llame al [phone].    We comply with applicable federal civil rights laws and [place] laws. We do not discriminate on the basis of race, color, national origin, age, disability, sex, sexual orientation, or gender identity.            Thank you!     Thank you for choosing [facility]  for your care. Our goal is always to provide you with excellent care. Hearing back from our patients is one way we can continue  to improve our services. Please take a few minutes to complete the written survey that you may receive in the mail after your visit with us. Thank you!             Your Updated Medication List - Protect others around you: Learn how to safely use, store and throw away your medicines at www.disposemymeds.org.          This list is accurate as of 5/18/18  9:55 AM.  Always use your most recent med list.                   Brand Name Dispense Instructions for use Diagnosis    ADVANCED DIABETIC MULTIVITAMIN Tabs      Take 1 tablet by mouth daily        ADVIL PO      Take 600 mg by mouth 4 times daily        ASPIRIN EC PO      Take 325 mg by mouth daily        BASAGLAR 100 UNIT/ML injection     63 mL    Inject 70 Units Subcutaneous 2 times daily Today took 35 units this am    Long-term insulin use (H)       blood glucose monitoring test strip    no brand specified    100 each    Use to test blood sugar 4 times daily or as directed.    Type 2 diabetes mellitus without complication, with long-term current use of insulin (H)       Garlic 1000 MG Caps      Take 1 capsule by mouth daily Takes during summer months.  Done taking until next summer.        hydrochlorothiazide 25 MG tablet    HYDRODIURIL    90 tablet    Take 1 tablet (25 mg) by mouth daily    Essential hypertension with goal blood pressure less than 140/90       insulin aspart 100 UNIT/ML injection    NovoLOG FLEXPEN    45 mL    3 units per 15 gram CHO and sliding scale 1:10 above 140 mg/dl. Uses approx 90 units daily    Type 2 diabetes mellitus with hyperglycemia, with long-term current use of insulin (H)       insulin pen needle 31G X 8 MM    GNP CLICKFINE PEN NEEDLES    100 each    1 Device by * route daily    Type 2 diabetes, HbA1c goal < 7% (H)       levothyroxine 75 MCG tablet    SYNTHROID/LEVOTHROID    90 tablet    Take 1 tablet (75 mcg) by mouth daily    Hypothyroidism due to acquired atrophy of thyroid       liraglutide 18 MG/3ML soln    VICTOZA PEN    12 mL     Inject 1.8 mg Subcutaneous daily    Type 2 diabetes mellitus with hyperglycemia, with long-term current use of insulin (H)       lisinopril 40 MG tablet    PRINIVIL/ZESTRIL    135 tablet    Take 1.5 tablets (60 mg) by mouth daily    Essential hypertension with goal blood pressure less than 140/90       metFORMIN 500 MG 24 hr tablet    GLUCOPHAGE-XR    360 tablet    Take 4 tablets (2,000 mg) by mouth At Bedtime    Uncontrolled type 2 diabetes mellitus with hyperglycemia, with long-term current use of insulin (H)       metoprolol succinate 25 MG 24 hr tablet    TOPROL XL    90 tablet    Take 1 tablet (25 mg) by mouth daily    Essential hypertension with goal blood pressure less than 140/90       olopatadine HCl 0.2 % Soln    PATADAY    1 Bottle    Place 1 drop into both eyes daily as needed (For itchy, water eyes.)    Allergic conjunctivitis, bilateral       order for DME      Respironics REMSTAR 60 Series Auto CPAP 8-15cm H2O, Airfit P10 nasal pillow mask w/medium pillows        rosuvastatin 10 MG tablet    CRESTOR    90 tablet    Take 1 tablet (10 mg) by mouth daily    Hyperlipidemia LDL goal <100

## 2018-05-22 DIAGNOSIS — E11.65 TYPE 2 DIABETES MELLITUS WITH HYPERGLYCEMIA, WITH LONG-TERM CURRENT USE OF INSULIN (H): ICD-10-CM

## 2018-05-22 DIAGNOSIS — Z79.4 TYPE 2 DIABETES MELLITUS WITH HYPERGLYCEMIA, WITH LONG-TERM CURRENT USE OF INSULIN (H): ICD-10-CM

## 2018-05-22 RX ORDER — LIRAGLUTIDE 6 MG/ML
1.8 INJECTION SUBCUTANEOUS DAILY
Qty: 12 ML | Refills: 3 | Status: SHIPPED | OUTPATIENT
Start: 2018-05-22 | End: 2018-10-05

## 2018-05-22 RX ORDER — INSULIN ASPART 100 [IU]/ML
INJECTION, SOLUTION INTRAVENOUS; SUBCUTANEOUS
Qty: 45 ML | Refills: 3 | Status: SHIPPED | OUTPATIENT
Start: 2018-05-22 | End: 2018-12-12

## 2018-06-15 ENCOUNTER — TELEPHONE (OUTPATIENT)
Dept: ENDOCRINOLOGY | Facility: CLINIC | Age: 63
End: 2018-06-15

## 2018-06-15 DIAGNOSIS — E11.65 TYPE 2 DIABETES MELLITUS WITH HYPERGLYCEMIA (H): Primary | ICD-10-CM

## 2018-06-15 NOTE — TELEPHONE ENCOUNTER
Rin is calling from  [facility] pharmacy requesting if the patient's current med BASAGLAR 100 UNIT/ML injection [[id number]] (Order [id number]) can be switched to Lantus due to patient not having insurance right now. It would be more cost effective for her. If so please send a new Rx. Please advise [phone].

## 2018-06-15 NOTE — TELEPHONE ENCOUNTER
Patient no longer has health insurance. Pharmacy was able to find co-pay card for Lantus.     Prescription completed to pharmacy.    Maya Soler LPN  Adult Endocrinology  [facility]

## 2018-06-23 ENCOUNTER — HEALTH MAINTENANCE LETTER (OUTPATIENT)
Age: 63
End: 2018-06-23

## 2018-06-25 DIAGNOSIS — E03.4 HYPOTHYROIDISM DUE TO ACQUIRED ATROPHY OF THYROID: ICD-10-CM

## 2018-06-25 RX ORDER — LEVOTHYROXINE SODIUM 75 UG/1
75 TABLET ORAL DAILY
Qty: 90 TABLET | Refills: 3 | Status: SHIPPED | OUTPATIENT
Start: 2018-06-25 | End: 2018-12-12

## 2018-07-02 ENCOUNTER — DOCUMENTATION ONLY (OUTPATIENT)
Dept: LAB | Facility: CLINIC | Age: 63
End: 2018-07-02

## 2018-07-02 DIAGNOSIS — I10 HYPERTENSION GOAL BP (BLOOD PRESSURE) < 140/90: Primary | ICD-10-CM

## 2018-07-02 NOTE — PROGRESS NOTES
Pt is coming to lab and needs a creatine level done for her CT scan please place future lab order for pt   Thank You Zoila ERIC

## 2018-07-03 NOTE — PROGRESS NOTES
Routing to provider for review.     Ivory Ang RN   [facility]

## 2018-07-09 DIAGNOSIS — I10 HYPERTENSION GOAL BP (BLOOD PRESSURE) < 140/90: ICD-10-CM

## 2018-07-09 LAB
ANION GAP SERPL CALCULATED.3IONS-SCNC: 7 MMOL/L (ref 3–14)
BUN SERPL-MCNC: 17 MG/DL (ref 7–30)
CALCIUM SERPL-MCNC: 9.4 MG/DL (ref 8.5–10.1)
CHLORIDE SERPL-SCNC: 105 MMOL/L (ref 94–109)
CO2 SERPL-SCNC: 28 MMOL/L (ref 20–32)
CREAT SERPL-MCNC: 0.74 MG/DL (ref 0.52–1.04)
GFR SERPL CREATININE-BSD FRML MDRD: 78 ML/MIN/1.7M2
GLUCOSE SERPL-MCNC: 109 MG/DL (ref 70–99)
POTASSIUM SERPL-SCNC: 4 MMOL/L (ref 3.4–5.3)
SODIUM SERPL-SCNC: 140 MMOL/L (ref 133–144)

## 2018-07-09 PROCEDURE — 36415 COLL VENOUS BLD VENIPUNCTURE: CPT | Performed by: NURSE PRACTITIONER

## 2018-07-09 PROCEDURE — 80048 BASIC METABOLIC PNL TOTAL CA: CPT | Performed by: NURSE PRACTITIONER

## 2018-07-09 NOTE — PROGRESS NOTES
Dear Sherry,   Here are your recent results.   -- electrolyte panel is normal other than glucose slightly elevated.  -- looks like the lab didn't run the labs ordered by Dr. Lazo. You have an appointment to see her in September. I can have the lab run some of the labs you did the lab fasting today. Otherwise, you will need to schedule another lab appointment to do her labs. Let me know if you want the lab to run some of her labs.     Please call or Mychart to our office if you have further questions.     Marilou Arciniega MD-PhD

## 2018-07-10 ENCOUNTER — RADIANT APPOINTMENT (OUTPATIENT)
Dept: CT IMAGING | Facility: CLINIC | Age: 63
End: 2018-07-10
Attending: OTOLARYNGOLOGY
Payer: COMMERCIAL

## 2018-07-10 ENCOUNTER — OFFICE VISIT (OUTPATIENT)
Dept: OTOLARYNGOLOGY | Facility: CLINIC | Age: 63
End: 2018-07-10
Payer: COMMERCIAL

## 2018-07-10 VITALS — WEIGHT: 293 LBS | HEIGHT: 68 IN | BODY MASS INDEX: 44.41 KG/M2

## 2018-07-10 DIAGNOSIS — K11.20 SIALADENITIS: Primary | ICD-10-CM

## 2018-07-10 RX ORDER — IOPAMIDOL 755 MG/ML
100 INJECTION, SOLUTION INTRAVASCULAR ONCE
Status: COMPLETED | OUTPATIENT
Start: 2018-07-10 | End: 2018-07-10

## 2018-07-10 RX ADMIN — IOPAMIDOL 100 ML: 755 INJECTION, SOLUTION INTRAVASCULAR at 10:16

## 2018-07-10 ASSESSMENT — PAIN SCALES - GENERAL: PAINLEVEL: NO PAIN (0)

## 2018-07-10 NOTE — LETTER
7/10/2018       RE: Sherry Slaughter  [address]  [facility] [address]     Dear Colleague,    Thank you for referring your patient, Sherry Slaughter, to the [facility] E[facility]SE [facility] TH[facility]AT at [facility]. Please see a copy of my visit note below.    HISTORY OF PRESENT ILLNESS:  Sherry had lymphadenopathy/sialadenitis on the right side of her neck.  There was a couple of months ago.  She comes back now.  She still feels a hard mass on the right side of her neck adjacent to her submandibular gland.  It is essentially nontender now and is probably 8-9 times smaller than the area that was swollen on her last exam.  She has no other current complaints at the present time today.  She is otherwise getting by quite well.      PHYSICAL EXAMINATION:  The patient is alert, oriented x3 and pleasant.  Skin of the face, lips, and neck on her is quite normal.  Oral cavity and oropharynx is clear.  I feel this almost a firm lymph node that is about 12 mm in size in zone I of the neck on the right side.  I feel the gland on that side.  Everything else feels wonderful and there are no other masses or lesions seen.      ASSESSMENT:  Patient with a history of right-sided sialadenitis.  She seems to be getting better at the present time today.        PLAN:  We are going to give her about another six weeks and feel this again before we go ahead and do any biopsies on it.     Again, thank you for allowing me to participate in the care of your patient.      Sincerely,    Gen Watkins MD

## 2018-07-10 NOTE — MR AVS SNAPSHOT
After Visit Summary   7/10/2018    Sherry Slaughter    MRN: [id number]           Patient Information     Date Of Birth          1955        Visit Information        Provider Department      7/10/2018 11:00 AM Gen Watkins MD M [facility] Ear Nose and Thro[facility]         Follow-ups after your visit        Follow-up notes from your care team     Return in about 6 weeks (around 8/21/2018).      Your next 10 appointments already scheduled     Aug 21, 2018  9:00 AM CDT   (Arrive by 8:45 AM)   Return Visit with MD GAUTAM Donato [facility] N[facility] ([facility])    [address] [facility]  [address] [facility] [address] [phone]            Sep 12, 2018  1:30 PM CDT   Return Visit with Rika Delgado MD   [facility]    [address] [place] [address] [phone]              Who to contact     Please call your clinic at [phone] to:    Ask questions about your health    Make or cancel appointments    Discuss your medicines    Learn about your test results    Speak to your doctor            Additional Information About Your Visit        [employer] Information     [employer] gives you secure access to your electronic health record. If you see a primary care provider, you can also send messages to your care team and make appointments. If you have questions, please call your primary care clinic.  If you do not have a primary care provider, please call [phone] and they will assist you.      [employer] is an electronic gateway that provides easy, online access to your medical records. With [employer], you can request a clinic appointment, read your test results, renew a prescription or communicate with your care team.     To access your existing account, please contact your [facility] or call [phone] for assistance.        Care EveryWhere ID      "This is your Care EveryWhere ID. This could be used by other organizations to access your [place] medical records  [id number]        Your Vitals Were     Height Last Period BMI (Body Mass Index)             1.727 m (5' 8\") 10/02/2007 44.85 kg/m2          Blood Pressure from Last 3 Encounters:   05/18/18 144/79   05/02/18 154/85   04/05/18 147/86    Weight from Last 3 Encounters:   07/10/18 133.8 kg (295 lb)   05/02/18 136 kg (299 lb 13.2 oz)   04/05/18 134 kg (295 lb 8 oz)              Today, you had the following     No orders found for display       Primary Care Provider Office Phone # Fax #    Marilou Arciniega MD [facility] [phone]       [address]  [facility] [address]        Equal Access to Services     [facility]: Hadii canelo love hadasho Soomaali, waaxda luqadaha, qaybta kaalmada adeegyada, acosta pennington . So [facility] [phone].    ATENCIÓN: Si habla español, tiene a espinosa disposición servicios gratuitos de asistencia lingüística. Llame al [phone].    We comply with applicable federal civil rights laws and [place] laws. We do not discriminate on the basis of race, color, national origin, age, disability, sex, sexual orientation, or gender identity.            Thank you!     Thank you for choosing [facility] E[facility] NOSE AND TH[facility]AT  for your care. Our goal is always to provide you with excellent care. Hearing back from our patients is one way we can continue to improve our services. Please take a few minutes to complete the written survey that you may receive in the mail after your visit with us. Thank you!             Your Updated Medication List - Protect others around you: Learn how to safely use, store and throw away your medicines at www.disposemymeds.org.          This list is accurate as of 7/10/18 12:33 PM.  Always use your most recent med list.                   Brand Name Dispense Instructions for use Diagnosis    ADVANCED DIABETIC MULTIVITAMIN Tabs      Take 1 " tablet by mouth daily        ADVIL PO      Take 600 mg by mouth 4 times daily        ASPIRIN EC PO      Take 325 mg by mouth daily        * BASAGLAR 100 UNIT/ML injection     63 mL    Inject 70 Units Subcutaneous 2 times daily Today took 35 units this am    Long-term insulin use (H)       * insulin glargine 100 UNIT/ML injection    LANTUS SOLOSTAR    45 mL    Inject 70 Units Subcutaneous 2 times daily    Type 2 diabetes mellitus with hyperglycemia (H)       blood glucose monitoring test strip    no brand specified    100 each    Use to test blood sugar 4 times daily or as directed.    Type 2 diabetes mellitus without complication, with long-term current use of insulin (H)       Garlic 1000 MG Caps      Take 1 capsule by mouth daily Takes during summer months.  Done taking until next summer.        hydrochlorothiazide 25 MG tablet    HYDRODIURIL    90 tablet    Take 1 tablet (25 mg) by mouth daily    Essential hypertension with goal blood pressure less than 140/90       insulin pen needle 31G X 8 MM    GNP CLICKFINE PEN NEEDLES    100 each    1 Device by * route daily    Type 2 diabetes, HbA1c goal < 7% (H)       levothyroxine 75 MCG tablet    SYNTHROID/LEVOTHROID    90 tablet    Take 1 tablet (75 mcg) by mouth daily    Hypothyroidism due to acquired atrophy of thyroid       liraglutide 18 MG/3ML soln    VICTOZA PEN    12 mL    Inject 1.8 mg Subcutaneous daily    Type 2 diabetes mellitus with hyperglycemia, with long-term current use of insulin (H)       lisinopril 40 MG tablet    PRINIVIL/ZESTRIL    135 tablet    Take 1.5 tablets (60 mg) by mouth daily    Essential hypertension with goal blood pressure less than 140/90       metFORMIN 500 MG 24 hr tablet    GLUCOPHAGE-XR    360 tablet    Take 4 tablets (2,000 mg) by mouth At Bedtime    Uncontrolled type 2 diabetes mellitus with hyperglycemia, with long-term current use of insulin (H)       metoprolol succinate 25 MG 24 hr tablet    TOPROL XL    90 tablet    Take 1  tablet (25 mg) by mouth daily    Essential hypertension with goal blood pressure less than 140/90       NovoLOG FLEXPEN 100 UNIT/ML injection   Generic drug:  insulin aspart     45 mL    Use 3 units per 15 gram carbs + sliding scale. Total Daily Dose=90 units    Type 2 diabetes mellitus with hyperglycemia, with long-term current use of insulin (H)       olopatadine HCl 0.2 % Soln    PATADAY    1 Bottle    Place 1 drop into both eyes daily as needed (For itchy, water eyes.)    Allergic conjunctivitis, bilateral       order for DME      Respironics REMSTAR 60 Series Auto CPAP 8-15cm H2O, Airfit P10 nasal pillow mask w/medium pillows        rosuvastatin 10 MG tablet    CRESTOR    90 tablet    Take 1 tablet (10 mg) by mouth daily    Hyperlipidemia LDL goal <100       * Notice:  This list has 2 medication(s) that are the same as other medications prescribed for you. Read the directions carefully, and ask your doctor or other care provider to review them with you.

## 2018-07-10 NOTE — NURSING NOTE
Chief Complaint   Patient presents with     RECHECK     Follow up, evaluating new mass on right side of neck     Ignacio Wright, EMT

## 2018-07-10 NOTE — DISCHARGE INSTRUCTIONS

## 2018-07-10 NOTE — PROGRESS NOTES
HISTORY OF PRESENT ILLNESS:  Sherry had lymphadenopathy/sialadenitis on the right side of her neck.  There was a couple of months ago.  She comes back now.  She still feels a hard mass on the right side of her neck adjacent to her submandibular gland.  It is essentially nontender now and is probably 8-9 times smaller than the area that was swollen on her last exam.  She has no other current complaints at the present time today.  She is otherwise getting by quite well.      PHYSICAL EXAMINATION:  The patient is alert, oriented x3 and pleasant.  Skin of the face, lips, and neck on her is quite normal.  Oral cavity and oropharynx is clear.  I feel this almost a firm lymph node that is about 12 mm in size in zone I of the neck on the right side.  I feel the gland on that side.  Everything else feels wonderful and there are no other masses or lesions seen.      ASSESSMENT:  Patient with a history of right-sided sialadenitis.  She seems to be getting better at the present time today.        PLAN:  We are going to give her about another six weeks and feel this again before we go ahead and do any biopsies on it.

## 2018-08-01 ENCOUNTER — DOCUMENTATION ONLY (OUTPATIENT)
Dept: ENDOCRINOLOGY | Facility: CLINIC | Age: 63
End: 2018-08-01

## 2018-08-01 NOTE — PROGRESS NOTES
Insulin-Treated Diabetes Mellitus Report completed and signed by Dr. Delgado.    Faxed to [place] [employer] [address] [employer] [phone].    Original mailed to patient. Copy sent down to scanning.     Kylah Katz RN  Endocrine Care Coordinator  [facility] [place]

## 2018-08-21 ENCOUNTER — OFFICE VISIT (OUTPATIENT)
Dept: OTOLARYNGOLOGY | Facility: CLINIC | Age: 63
End: 2018-08-21
Payer: COMMERCIAL

## 2018-08-21 VITALS — WEIGHT: 293 LBS | BODY MASS INDEX: 44.41 KG/M2 | HEIGHT: 68 IN

## 2018-08-21 DIAGNOSIS — K11.20 SIALADENITIS: Primary | ICD-10-CM

## 2018-08-21 ASSESSMENT — PAIN SCALES - GENERAL: PAINLEVEL: NO PAIN (0)

## 2018-08-21 NOTE — MR AVS SNAPSHOT
After Visit Summary   8/21/2018    Sherry Slaughter    MRN: [id number]           Patient Information     Date Of Birth          1955        Visit Information        Provider Department      8/21/2018 9:00 AM Gen Watkins MD [facility]        Today's Diagnoses     Sialadenitis    -  1      Care Instructions    1.  You were seen in the ENT Clinic today by Dr. Watkins.  If you have any questions or concerns after your appointment, please call [phone].  Press option #1 for scheduling related needs.  Press option #3 for Nurse advice.    Millicent WILDE, RN  [facility]   Head & Neck Surgery                 Follow-ups after your visit        Your next 10 appointments already scheduled     Sep 12, 2018  1:15 PM CDT   Return Visit with Riak Delgado MD, MG ENDO NURSE   [facility] ([facility]    [address] [place] [id number] [phone]              Who to contact     Please call your clinic at [phone] to:    Ask questions about your health    Make or cancel appointments    Discuss your medicines    Learn about your test results    Speak to your doctor            Additional Information About Your Visit        [employer] Information     Ch[employer] gives you secure access to your electronic health record. If you see a primary care provider, you can also send messages to your care team and make appointments. If you have questions, please call your primary care clinic.  If you do not have a primary care provider, please call [phone] and they will assist you.      Chirply is an electronic gateway that provides easy, online access to your medical records. With [employer], you can request a clinic appointment, read your test results, renew a prescription or communicate with your care team.     To access your existing account, please contact your [facility] or  "call [phone] for assistance.        Care EveryWhere ID     This is your Care EveryWhere ID. This could be used by other organizations to access your [facility] medical records  [id number]        Your Vitals Were     Height Last Period BMI (Body Mass Index)             1.727 m (5' 8\") 10/02/2007 44.85 kg/m2          Blood Pressure from Last 3 Encounters:   05/18/18 144/79   05/02/18 154/85   04/05/18 147/86    Weight from Last 3 Encounters:   08/21/18 133.8 kg (295 lb)   07/10/18 133.8 kg (295 lb)   05/02/18 136 kg (299 lb 13.2 oz)              Today, you had the following     No orders found for display       Primary Care Provider Office Phone # Fax #    Marilou Arciniega MD PhD [phone]       [address]  [facility] [address]        Equal Access to Services     OLE VILLEGAS AH: Hadii canelo ku hadasho Soomaali, waaxda luqadaha, qaybta kaalmada adeegyada, acosta pennington . So [facility] [place] [phone].    ATENCIÓN: Si gerardola espprieto, tiene a espinosa disposición servicios gratuitos de asistencia lingüística. Llame al [phone].    We comply with applicable federal civil rights laws and [place] laws. We do not discriminate on the basis of race, color, national origin, age, disability, sex, sexual orientation, or gender identity.            Thank you!     Thank you for choosing [facility] EAR NOSE AND THROAT  for your care. Our goal is always to provide you with excellent care. Hearing back from our patients is one way we can continue to improve our services. Please take a few minutes to complete the written survey that you may receive in the mail after your visit with us. Thank you!             Your Updated Medication List - Protect others around you: Learn how to safely use, store and throw away your medicines at www.disposemymeds.org.          This list is accurate as of 8/21/18 11:59 PM.  Always use your most recent med list.                   Brand Name Dispense Instructions for use " Diagnosis    ADVANCED DIABETIC MULTIVITAMIN Tabs      Take 1 tablet by mouth daily        ADVIL PO      Take 600 mg by mouth 4 times daily        ASPIRIN EC PO      Take 325 mg by mouth daily        * BASAGLAR 100 UNIT/ML injection     63 mL    Inject 70 Units Subcutaneous 2 times daily Today took 35 units this am    Long-term insulin use (H)       * insulin glargine 100 UNIT/ML injection    LANTUS SOLOSTAR    45 mL    Inject 70 Units Subcutaneous 2 times daily    Type 2 diabetes mellitus with hyperglycemia (H)       blood glucose monitoring test strip    no brand specified    100 each    Use to test blood sugar 4 times daily or as directed.    Type 2 diabetes mellitus without complication, with long-term current use of insulin (H)       Garlic 1000 MG Caps      Take 1 capsule by mouth daily Takes during summer months.  Done taking until next summer.        hydrochlorothiazide 25 MG tablet    HYDRODIURIL    90 tablet    Take 1 tablet (25 mg) by mouth daily    Essential hypertension with goal blood pressure less than 140/90       insulin pen needle 31G X 8 MM    GNP CLICKFINE PEN NEEDLES    100 each    1 Device by * route daily    Type 2 diabetes, HbA1c goal < 7% (H)       levothyroxine 75 MCG tablet    SYNTHROID/LEVOTHROID    90 tablet    Take 1 tablet (75 mcg) by mouth daily    Hypothyroidism due to acquired atrophy of thyroid       liraglutide 18 MG/3ML soln    VICTOZA PEN    12 mL    Inject 1.8 mg Subcutaneous daily    Type 2 diabetes mellitus with hyperglycemia, with long-term current use of insulin (H)       lisinopril 40 MG tablet    PRINIVIL/ZESTRIL    135 tablet    Take 1.5 tablets (60 mg) by mouth daily    Essential hypertension with goal blood pressure less than 140/90       metFORMIN 500 MG 24 hr tablet    GLUCOPHAGE-XR    360 tablet    Take 4 tablets (2,000 mg) by mouth At Bedtime    Uncontrolled type 2 diabetes mellitus with hyperglycemia, with long-term current use of insulin (H)       metoprolol  succinate 25 MG 24 hr tablet    TOPROL XL    90 tablet    Take 1 tablet (25 mg) by mouth daily    Essential hypertension with goal blood pressure less than 140/90       NovoLOG FLEXPEN 100 UNIT/ML injection   Generic drug:  insulin aspart     45 mL    Use 3 units per 15 gram carbs + sliding scale. Total Daily Dose=90 units    Type 2 diabetes mellitus with hyperglycemia, with long-term current use of insulin (H)       olopatadine HCl 0.2 % Soln    PATADAY    1 Bottle    Place 1 drop into both eyes daily as needed (For itchy, water eyes.)    Allergic conjunctivitis, bilateral       order for DME      Respironics REMSTAR 60 Series Auto CPAP 8-15cm H2O, Airfit P10 nasal pillow mask w/medium pillows        rosuvastatin 10 MG tablet    CRESTOR    90 tablet    Take 1 tablet (10 mg) by mouth daily    Hyperlipidemia LDL goal <100       * Notice:  This list has 2 medication(s) that are the same as other medications prescribed for you. Read the directions carefully, and ask your doctor or other care provider to review them with you.

## 2018-08-21 NOTE — LETTER
8/21/2018       RE: Sherry Slaughter  [address] [address] [place] [id number]     Dear Colleague,    Thank you for referring your patient, Sherry Slaughter, to the [facility] E[facility] AND TH[facility]AT at [facility]. Please see a copy of my visit note below.    HISTORY OF PRESENT ILLNESS:  Sherry Slaughter is 63 years of age.  She had a history of a terrible sialadenitis on the right side.  It ended up with a large inflamed lymph node that persisted for quite some time afterwards.  It was quite significant.  She has no other new complaints at the present time today.  She is here for followup to see that she is turning in the right direction or for resolution of the lymph node.  She cannot feel it now and it does not cause her any pain.  No other current complaints.  No recurrent sialadenitis.      PHYSICAL EXAMINATION:  The patient is alert, oriented x3 and pleasant.  Skin of the face, lips, and neck on her is quite normal.  Cranial nerve function is intact.  Her oral cavity is clear.  Submandibular gland palpation on the right side is normal and there is no tenderness, but there is a single lymph node that still persists at 10-15 mm on the right side that is not visible anymore.  It is hyalinized and not fixed to anything in it palpates out quite nicely.  No other adenopathy is present in the neck.      ASSESSMENT:  Submandibular sialadenitis.      PLAN:  At this point in time, I am going to make her p.r.n. followup.  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      FO/ms         Again, thank you for allowing me to participate in the care of your patient.      Sincerely,    Gen Watkins MD

## 2018-08-21 NOTE — PATIENT INSTRUCTIONS
1.  You were seen in the ENT Clinic today by Dr. Watkins.  If you have any questions or concerns after your appointment, please call [phone].  Press option #1 for scheduling related needs.  Press option #3 for Nurse advice.    Millicent SEWELLN, RN  [facility]   Head & Neck Surgery

## 2018-08-21 NOTE — PROGRESS NOTES
HISTORY OF PRESENT ILLNESS:  Sherry Slaughter is 63 years of age.  She had a history of a terrible sialadenitis on the right side.  It ended up with a large inflamed lymph node that persisted for quite some time afterwards.  It was quite significant.  She has no other new complaints at the present time today.  She is here for followup to see that she is turning in the right direction or for resolution of the lymph node.  She cannot feel it now and it does not cause her any pain.  No other current complaints.  No recurrent sialadenitis.      PHYSICAL EXAMINATION:  The patient is alert, oriented x3 and pleasant.  Skin of the face, lips, and neck on her is quite normal.  Cranial nerve function is intact.  Her oral cavity is clear.  Submandibular gland palpation on the right side is normal and there is no tenderness, but there is a single lymph node that still persists at 10-15 mm on the right side that is not visible anymore.  It is hyalinized and not fixed to anything in it palpates out quite nicely.  No other adenopathy is present in the neck.      ASSESSMENT:  Submandibular sialadenitis.      PLAN:  At this point in time, I am going to make her p.r.n. followup.  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      TOBI/ms

## 2018-08-24 ENCOUNTER — TELEPHONE (OUTPATIENT)
Dept: ENDOCRINOLOGY | Facility: CLINIC | Age: 63
End: 2018-08-24

## 2018-08-24 NOTE — TELEPHONE ENCOUNTER
Left message for patient with the following information:    Your appointment with Dr. Delgado on 09/12/2018 has been canceled due to a family emergency. You will be contacted in the near future to reschedule the appointment. A letter is also being mailed out to you regarding canceled appointment.     Efe Gerardo  Procedure , [place]  Peds Specialty and Adult Endocrinology

## 2018-09-17 ENCOUNTER — MYC REFILL (OUTPATIENT)
Dept: PEDIATRICS | Facility: CLINIC | Age: 63
End: 2018-09-17

## 2018-09-17 DIAGNOSIS — I10 ESSENTIAL HYPERTENSION WITH GOAL BLOOD PRESSURE LESS THAN 140/90: ICD-10-CM

## 2018-09-17 DIAGNOSIS — E78.5 HYPERLIPIDEMIA LDL GOAL <100: ICD-10-CM

## 2018-09-17 NOTE — TELEPHONE ENCOUNTER
[facility]  Original authorizing provider: Marilou Arciniega MD PhD    Sherry Slaughter would like a refill of the following medications:  hydrochlorothiazide (HYDRODIURIL) 25 MG tablet [Marilou Arciniega MD PhD]  rosuvastatin (CRESTOR) 10 MG tablet [Marilou Arciniega MD PhD]  metoprolol (TOPROL XL) 25 MG 24 hr tablet [Marilou Arciniega MD PhD]  lisinopril (PRINIVIL/ZESTRIL) 40 MG tablet [Marilou Arciniega MD PhD]    Preferred pharmacy: [address]    Comment:  need to be reordered    Medication renewals requested in this message routed to other providers:  liraglutide (VICTOZA PEN) 18 MG/3ML soln [Rika Delgado MD]  levothyroxine (SYNTHROID/LEVOTHROID) 75 MCG tablet [Rika Delgado MD]  metFORMIN (GLUCOPHAGE-XR) 500 MG 24 hr tablet [Renny Agarwal MD]  insulin glargine (LANTUS SOLOSTAR) 100 UNIT/ML pen [Renny Agarwal MD]

## 2018-09-17 NOTE — LETTER
September 18, 2018      Sherry Slaughter  [address] [address] [place] [id number]              Dear Sherry,      We recently received a call from your pharmacy requesting a refill of your medication. We have provided a 30 day refill of your medication, lisinopril (PRINIVIL/ZESTRIL) 40 MG tablet, metoprolol (TOPROL XL) 25 MG 24 hr tablet, rosuvastatin (CRESTOR) 10 MG tablet, & hydrochlorothiazide (HYDRODIURIL) 25 MG tablet, as requested.      A review of your chart indicates that you are due for an annual physical and annual fasting lab work with Dr. Arciniega.    We have authorized a one time refill of your medication to allow time for you to schedule.     Please come in fasting for the appointment. Fasting entails nothing to eat or drink 8 hours prior to your appointment; with the exception on water. You may take your medication the day of the appointment.    Please call the clinic to schedule your appointment.    Thank you for taking an active role in your healthcare.      Sincerely,    [facility]

## 2018-09-18 RX ORDER — LISINOPRIL 40 MG/1
60 TABLET ORAL DAILY
Qty: 45 TABLET | Refills: 0 | Status: SHIPPED | OUTPATIENT
Start: 2018-09-18 | End: 2018-11-14

## 2018-09-18 RX ORDER — ROSUVASTATIN CALCIUM 10 MG/1
10 TABLET, COATED ORAL DAILY
Qty: 30 TABLET | Refills: 0 | Status: SHIPPED | OUTPATIENT
Start: 2018-09-18 | End: 2018-11-14

## 2018-09-18 RX ORDER — METOPROLOL SUCCINATE 25 MG/1
25 TABLET, EXTENDED RELEASE ORAL DAILY
Qty: 30 TABLET | Refills: 0 | Status: SHIPPED | OUTPATIENT
Start: 2018-09-18 | End: 2018-11-14

## 2018-09-18 RX ORDER — HYDROCHLOROTHIAZIDE 25 MG/1
25 TABLET ORAL DAILY
Qty: 30 TABLET | Refills: 0 | Status: SHIPPED | OUTPATIENT
Start: 2018-09-18 | End: 2018-11-14

## 2018-09-18 NOTE — TELEPHONE ENCOUNTER
hydrochlorothiazide (HYDRODIURIL) 25 MG tablet 90 tablet 3 11/2/2017  No   Sig - Route: Take 1 tablet (25 mg) by mouth daily - Oral     Last OV with Dr. Arciniega: 3/14/18    No future apts.     BP Readings from Last 3 Encounters:   05/18/18 144/79   05/02/18 154/85   04/05/18 147/86     Creatinine   Date Value Ref Range Status   07/09/2018 0.74 0.52 - 1.04 mg/dL Final     Potassium   Date Value Ref Range Status   07/09/2018 4.0 3.4 - 5.3 mmol/L Final     Diuretics (Including Combos) Protocol Failed9/17 8:55 AM   Blood pressure under 140/90 in past 12 months    Recent (12 mo) or future (30 days) visit within the authorizing provider's specialty    Patient is age 18 or older    No active pregancy on record    Normal serum creatinine on file in past 12 months    Normal serum potassium on file in past 12 months    Normal serum sodium on file in past 12 months    No positive pregnancy test in past 12 months     rosuvastatin (CRESTOR) 10 MG tablet 90 tablet 3 11/2/2017  No   Sig - Route: Take 1 tablet (10 mg) by mouth daily - Oral     LDL Cholesterol Calculated   Date Value Ref Range Status   07/03/2017 71 <100 mg/dL Final     Comment:     Desirable:       <100 mg/dl       Statins Protocol Failed9/17 8:55 AM   LDL on file in past 12 months    No abnormal creatine kinase in past 12 months    Recent (12 mo) or future (30 days) visit within the authorizing provider's specialty    Patient is age 18 or older    No active pregnancy on record    No positive pregnancy test in past 12 months     metoprolol (TOPROL XL) 25 MG 24 hr tablet 90 tablet 3 11/2/2017  No   Sig - Route: Take 1 tablet (25 mg) by mouth daily - Oral       Beta-Blockers Protocol Failed9/17 8:55 AM   Blood pressure under 140/90 in past 12 months    Patient is age 6 or older    Recent (12 mo) or future (30 days) visit within the authorizing provider's specialty     lisinopril (PRINIVIL/ZESTRIL) 40 MG tablet 135 tablet 3 11/2/2017  No   Sig - Route: Take 1.5 tablets (60  mg) by mouth daily - Oral     ACE Inhibitors (Including Combos) Protocol Failed9/17 8:55 AM   Blood pressure under 140/90 in past 12 months    Recent (12 mo) or future (30 days) visit within the authorizing provider's specialty    Patient is age 18 or older    No active pregnancy on record    Normal serum creatinine on file in past 12 months    Normal serum potassium on file in past 12 months    No positive pregnancy test in past 12 months     Patient due for an annual physical and fasting lab work with Dr. Suleiman bull. Patient notified by letter.     Adrienne Zaidi RN

## 2018-10-05 DIAGNOSIS — Z79.4 TYPE 2 DIABETES MELLITUS WITH HYPERGLYCEMIA, WITH LONG-TERM CURRENT USE OF INSULIN (H): ICD-10-CM

## 2018-10-05 DIAGNOSIS — E11.65 TYPE 2 DIABETES MELLITUS WITH HYPERGLYCEMIA, WITH LONG-TERM CURRENT USE OF INSULIN (H): ICD-10-CM

## 2018-10-05 RX ORDER — LIRAGLUTIDE 6 MG/ML
1.8 INJECTION SUBCUTANEOUS DAILY
Qty: 12 ML | Refills: 1 | Status: SHIPPED | OUTPATIENT
Start: 2018-10-05 | End: 2018-11-21

## 2018-10-05 NOTE — TELEPHONE ENCOUNTER
Faxed refill request received from [facility] pharmacy.   Medication Requested: Victoza 18mg/3ml  Directions: Inject 1.8mg under the skin daily  Quantity: 9  Last Office Visit: 5/2/18  Next Appointment Scheduled for: 12/4/18

## 2018-10-19 DIAGNOSIS — E11.65 TYPE 2 DIABETES MELLITUS WITH HYPERGLYCEMIA (H): ICD-10-CM

## 2018-10-19 NOTE — TELEPHONE ENCOUNTER
Long Acting Insulin Protocol Dwyajn35/19 9:56 AM  x Blood pressure less than 140/90 in past 6 months   x LDL on file in past 12 months   x Microalbumin on file in past 12 months   x HgbA1C in past 3 or 6 months    Serum creatinine on file in past 12 months    Patient is age 18 or older    Recent (6 mo) or future (30 days) visit within the authorizing provider's specialty         Patient has office visit with Dr. Delgado on 12/4/18. Will refill until office visit.    Kylah Katz RN  Endocrine Care Coordinator  [facility]

## 2018-10-19 NOTE — TELEPHONE ENCOUNTER
Faxed refill request received from: [facility] [place], [place] - [address] [place] [address].   Medication Requested: Lantus solostar  Directions: inject 70 units under the skin two times a daily  Quantity: 45  Last Office Visit: 5/2/18  Next Appointment Scheduled for: 12/4/18    Chloé Sparks CMA  Adult Endocrinology  [facility]

## 2018-11-14 ENCOUNTER — RADIANT APPOINTMENT (OUTPATIENT)
Dept: MAMMOGRAPHY | Facility: CLINIC | Age: 63
End: 2018-11-14
Payer: COMMERCIAL

## 2018-11-14 ENCOUNTER — OFFICE VISIT (OUTPATIENT)
Dept: PEDIATRICS | Facility: CLINIC | Age: 63
End: 2018-11-14
Payer: COMMERCIAL

## 2018-11-14 VITALS
DIASTOLIC BLOOD PRESSURE: 71 MMHG | WEIGHT: 285 LBS | BODY MASS INDEX: 43.19 KG/M2 | OXYGEN SATURATION: 97 % | TEMPERATURE: 97.3 F | HEIGHT: 68 IN | HEART RATE: 75 BPM | SYSTOLIC BLOOD PRESSURE: 121 MMHG

## 2018-11-14 DIAGNOSIS — E11.65 TYPE 2 DIABETES MELLITUS WITH HYPERGLYCEMIA, WITH LONG-TERM CURRENT USE OF INSULIN (H): ICD-10-CM

## 2018-11-14 DIAGNOSIS — Z00.00 MEDICARE ANNUAL WELLNESS VISIT, SUBSEQUENT: Primary | ICD-10-CM

## 2018-11-14 DIAGNOSIS — Z23 NEED FOR PNEUMOCOCCAL VACCINATION: ICD-10-CM

## 2018-11-14 DIAGNOSIS — Z00.00 PREVENTATIVE HEALTH CARE: ICD-10-CM

## 2018-11-14 DIAGNOSIS — E11.9 TYPE 2 DIABETES MELLITUS TREATED WITH INSULIN (H): ICD-10-CM

## 2018-11-14 DIAGNOSIS — E78.5 HYPERLIPIDEMIA LDL GOAL <100: ICD-10-CM

## 2018-11-14 DIAGNOSIS — I10 ESSENTIAL HYPERTENSION WITH GOAL BLOOD PRESSURE LESS THAN 140/90: ICD-10-CM

## 2018-11-14 DIAGNOSIS — Z79.4 TYPE 2 DIABETES MELLITUS WITH HYPERGLYCEMIA, WITH LONG-TERM CURRENT USE OF INSULIN (H): ICD-10-CM

## 2018-11-14 DIAGNOSIS — Z79.4 TYPE 2 DIABETES MELLITUS TREATED WITH INSULIN (H): ICD-10-CM

## 2018-11-14 DIAGNOSIS — E03.9 HYPOTHYROIDISM, UNSPECIFIED TYPE: ICD-10-CM

## 2018-11-14 LAB
ALT SERPL W P-5'-P-CCNC: 37 U/L (ref 0–50)
CHOLEST SERPL-MCNC: 109 MG/DL
CREAT UR-MCNC: 146 MG/DL
HBA1C MFR BLD: 6.8 % (ref 0–5.6)
HCT VFR BLD AUTO: 41.6 % (ref 35–47)
HDLC SERPL-MCNC: 37 MG/DL
LDLC SERPL CALC-MCNC: 20 MG/DL
MICROALBUMIN UR-MCNC: 24 MG/L
MICROALBUMIN/CREAT UR: 16.44 MG/G CR (ref 0–25)
NONHDLC SERPL-MCNC: 72 MG/DL
TRIGL SERPL-MCNC: 262 MG/DL
TSH SERPL DL<=0.005 MIU/L-ACNC: 1.74 MU/L (ref 0.4–4)

## 2018-11-14 PROCEDURE — 36415 COLL VENOUS BLD VENIPUNCTURE: CPT | Performed by: INTERNAL MEDICINE

## 2018-11-14 PROCEDURE — 90471 IMMUNIZATION ADMIN: CPT | Performed by: INTERNAL MEDICINE

## 2018-11-14 PROCEDURE — 84460 ALANINE AMINO (ALT) (SGPT): CPT | Performed by: INTERNAL MEDICINE

## 2018-11-14 PROCEDURE — 77067 SCR MAMMO BI INCL CAD: CPT | Performed by: RADIOLOGY

## 2018-11-14 PROCEDURE — 85014 HEMATOCRIT: CPT | Performed by: INTERNAL MEDICINE

## 2018-11-14 PROCEDURE — 90670 PCV13 VACCINE IM: CPT | Performed by: INTERNAL MEDICINE

## 2018-11-14 PROCEDURE — 83036 HEMOGLOBIN GLYCOSYLATED A1C: CPT | Performed by: INTERNAL MEDICINE

## 2018-11-14 PROCEDURE — 84443 ASSAY THYROID STIM HORMONE: CPT | Performed by: INTERNAL MEDICINE

## 2018-11-14 PROCEDURE — 99397 PER PM REEVAL EST PAT 65+ YR: CPT | Mod: 25 | Performed by: INTERNAL MEDICINE

## 2018-11-14 PROCEDURE — 80061 LIPID PANEL: CPT | Performed by: INTERNAL MEDICINE

## 2018-11-14 PROCEDURE — 82043 UR ALBUMIN QUANTITATIVE: CPT | Performed by: INTERNAL MEDICINE

## 2018-11-14 RX ORDER — METOPROLOL SUCCINATE 25 MG/1
25 TABLET, EXTENDED RELEASE ORAL DAILY
Qty: 90 TABLET | Refills: 3 | Status: SHIPPED | OUTPATIENT
Start: 2018-11-14 | End: 2019-12-17

## 2018-11-14 RX ORDER — HYDROCHLOROTHIAZIDE 25 MG/1
25 TABLET ORAL DAILY
Qty: 90 TABLET | Refills: 3 | Status: SHIPPED | OUTPATIENT
Start: 2018-11-14 | End: 2019-12-17

## 2018-11-14 RX ORDER — LIRAGLUTIDE 6 MG/ML
1.8 INJECTION SUBCUTANEOUS DAILY
Qty: 12 ML | Refills: 1 | Status: CANCELLED | OUTPATIENT
Start: 2018-11-14

## 2018-11-14 RX ORDER — LISINOPRIL 40 MG/1
60 TABLET ORAL DAILY
Qty: 45 TABLET | Refills: 3 | Status: SHIPPED | OUTPATIENT
Start: 2018-11-14 | End: 2019-04-09

## 2018-11-14 RX ORDER — INSULIN ASPART 100 [IU]/ML
INJECTION, SOLUTION INTRAVENOUS; SUBCUTANEOUS
Qty: 45 ML | Refills: 3 | Status: CANCELLED | OUTPATIENT
Start: 2018-11-14

## 2018-11-14 RX ORDER — ROSUVASTATIN CALCIUM 10 MG/1
10 TABLET, COATED ORAL DAILY
Qty: 90 TABLET | Refills: 3 | Status: SHIPPED | OUTPATIENT
Start: 2018-11-14 | End: 2018-11-20

## 2018-11-14 NOTE — MR AVS SNAPSHOT
After Visit Summary   11/14/2018    Sherry Slaughter    MRN: [id number]           Patient Information     Date Of Birth          1955        Visit Information        Provider Department      11/14/2018 3:30 PM Marilou Arciniega MD PhD [facility]        Today's Diagnoses     Medicare annual wellness visit, subsequent    -  1    Type 2 diabetes mellitus with hyperglycemia (H)        Essential hypertension with goal blood pressure less than 140/90        Type 2 diabetes mellitus with hyperglycemia, with long-term current use of insulin (H)        Hyperlipidemia LDL goal <100        Need for pneumococcal vaccination          Care Instructions    Make appointment(s) for:   --      Check with your insurance regarding coverage for Shingrix (RZV) - the new shingles vaccine.       Medication(s) prescribed today:    Orders Placed This Encounter   Medications     hydrochlorothiazide (HYDRODIURIL) 25 MG tablet     Sig: Take 1 tablet (25 mg) by mouth daily     Dispense:  90 tablet     Refill:  3     lisinopril (PRINIVIL/ZESTRIL) 40 MG tablet     Sig: Take 1.5 tablets (60 mg) by mouth daily     Dispense:  45 tablet     Refill:  3     metoprolol succinate (TOPROL XL) 25 MG 24 hr tablet     Sig: Take 1 tablet (25 mg) by mouth daily     Dispense:  90 tablet     Refill:  3     rosuvastatin (CRESTOR) 10 MG tablet     Sig: Take 1 tablet (10 mg) by mouth daily     Dispense:  90 tablet     Refill:  3           Preventive Health Recommendations    See your health care provider every year to    Review health changes.     Discuss preventive care.      Review your medicines if your doctor has prescribed any.      You no longer need a yearly Pap test unless you've had an abnormal Pap test in the past 10 years. If you have vaginal symptoms, such as bleeding or discharge, be sure to talk with your provider about a Pap test.      Every 1 to 2 years, have a mammogram.  If you are over 69, talk with your health  care provider about whether or not you want to continue having screening mammograms.      Every 10 years, have a colonoscopy. Or, have a yearly FIT test (stool test). These exams will check for colon cancer.       Have a cholesterol test every 5 years, or more often if your doctor advises it.       Have a diabetes test (fasting glucose) every three years. If you are at risk for diabetes, you should have this test more often.       At age 65, have a bone density scan (DEXA) to check for osteoporosis (brittle bone disease).    Shots:    Get a flu shot each year.    Get a tetanus shot every 10 years.    Talk to your doctor about your pneumonia vaccines. There are now two you should receive - Pneumovax (PPSV 23) and Prevnar (PCV 13).    Talk to your pharmacist about the shingles vaccine.    Talk to your doctor about the hepatitis B vaccine.    Nutrition:     Eat at least 5 servings of fruits and vegetables each day.      Eat whole-grain bread, whole-wheat pasta and brown rice instead of white grains and rice.      Get adequate Calcium and Vitamin D.     Lifestyle    Exercise at least 150 minutes a week (30 minutes a day, 5 days a week). This will help you control your weight and prevent disease.      Limit alcohol to one drink per day.      No smoking.       Wear sunscreen to prevent skin cancer.       See your dentist twice a year for an exam and cleaning.      See your eye doctor every 1 to 2 years to screen for conditions such as glaucoma, macular degeneration and cataracts.    Personalized Prevention Plan  You are due for the preventive services outlined below.  Your care team is available to assist you in scheduling these services.  If you have already completed any of these items, please share that information with your care team to update in your medical record.  Health Maintenance Due   Topic Date Due     HIV SCREEN (SYSTEM ASSIGNED)  04/09/1971     JUDD QUESTIONNAIRE 1 YEAR  01/12/2015     Depression Assessment  - yearly  01/12/2015     Discuss Advance Directive Planning  07/13/2016     Mammogram - yearly  03/16/2017     Pneumococcal Vaccine (1 of 2 - PCV13) 04/09/2018     A1C (Diabetes) Lab - every 3 months  06/14/2018     Liver Monitoring Lab - yearly  07/03/2018     Thyroid Function Lab (TSH) - yearly  07/03/2018     Cholesterol Lab - yearly  07/03/2018     Microalbumin Lab - yearly  07/03/2018     Flu Vaccine (1) 09/01/2018             Follow-ups after your visit        Your next 10 appointments already scheduled     Dec 04, 2018  2:00 PM CST   Return Visit with Rika Delgado MD   [facility]    [address] [place] [id number] [phone]              Who to contact     If you have questions or need follow up information about today's clinic visit or your schedule please contact [facility] directly at [phone].  Normal or non-critical lab and imaging results will be communicated to you by [employer], letter or phone within 4 business days after the clinic has received the results. If you do not hear from us within 7 days, please contact the clinic through [employer] or phone. If you have a critical or abnormal lab result, we will notify you by phone as soon as possible.  Submit refill requests through [employer] or call your pharmacy and they will forward the refill request to us. Please allow 3 business days for your refill to be completed.          Additional Information About Your Visit        [employer] Information     [employer] gives you secure access to your electronic health record. If you see a primary care provider, you can also send messages to your care team and make appointments. If you have questions, please call your primary care clinic.  If you do not have a primary care provider, please call [phone] and they will assist you.      [employer] is an electronic gateway that provides easy, online access to your medical  "records. With [employer], you can request a clinic appointment, read your test results, renew a prescription or communicate with your care team.     To access your existing account, please contact your [facility] or call [phone] for assistance.        Care EveryWhere ID     This is your Care EveryWhere ID. This could be used by other organizations to access your [facility] [place] medical records  [id number]        Your Vitals Were     Pulse Temperature Height Last Period Pulse Oximetry BMI (Body Mass Index)    75 97.3  F (36.3  C) (Temporal) 5' 7.75\" (1.721 m) 10/02/2007 97% 43.65 kg/m2       Blood Pressure from Last 3 Encounters:   11/14/18 121/71   05/18/18 144/79   05/02/18 154/85    Weight from Last 3 Encounters:   11/14/18 285 lb (129.3 kg)   08/21/18 295 lb (133.8 kg)   07/10/18 295 lb (133.8 kg)              We Performed the Following     ALT     PCV13, IM (6+ WK) - [id number]          Where to get your medicines      These medications were sent to [facility] [place], [place] - [address]  [address] [facility] [address] [facility] [address]     Phone:  [phone]     hydrochlorothiazide 25 MG tablet    lisinopril 40 MG tablet    metoprolol succinate 25 MG 24 hr tablet    rosuvastatin 10 MG tablet          Primary Care Provider Office Phone # Fax #    Marilou Arciniega MD PhD [phone]       [address]  [facility] [address]        Equal Access to Services     [facility]: Hadii canelo ku hadasho Soomaali, waaxda luqadaha, qaybta kaalmada adeegyaraffaele, acosta salinas. So [facility] [phone].    ATENCIÓN: Si habla español, tiene a espinosa disposición servicios gratuitos de asistencia lingüística. Llame al [phone].    We comply with applicable federal civil rights laws and [place] laws. We do not discriminate on the basis of race, color, national origin, age, disability, sex, sexual orientation, or gender identity.            Thank " you!     Thank you for choosing [facility]  for your care. Our goal is always to provide you with excellent care. Hearing back from our patients is one way we can continue to improve our services. Please take a few minutes to complete the written survey that you may receive in the mail after your visit with us. Thank you!             Your Updated Medication List - Protect others around you: Learn how to safely use, store and throw away your medicines at www.disposemymeds.org.          This list is accurate as of 11/14/18  3:56 PM.  Always use your most recent med list.                   Brand Name Dispense Instructions for use Diagnosis    ADVANCED DIABETIC MULTIVITAMIN Tabs      Take 1 tablet by mouth daily        ADVIL PO      Take 600 mg by mouth 4 times daily        ASPIRIN EC PO      Take 325 mg by mouth daily        * BASAGLAR 100 UNIT/ML injection     63 mL    Inject 70 Units Subcutaneous 2 times daily Today took 35 units this am    Long-term insulin use (H)       * insulin glargine 100 UNIT/ML injection    LANTUS SOLOSTAR    45 mL    Inject 70 Units Subcutaneous 2 times daily    Type 2 diabetes mellitus with hyperglycemia (H)       blood glucose monitoring test strip    no brand specified    100 each    Use to test blood sugar 4 times daily or as directed.    Type 2 diabetes mellitus without complication, with long-term current use of insulin (H)       Garlic 1000 MG Caps      Take 1 capsule by mouth daily Takes during summer months.  Done taking until next summer.        hydrochlorothiazide 25 MG tablet    HYDRODIURIL    90 tablet    Take 1 tablet (25 mg) by mouth daily    Essential hypertension with goal blood pressure less than 140/90       insulin pen needle 31G X 8 MM    GNP CLICKFINE PEN NEEDLES    100 each    1 Device by * route daily    Type 2 diabetes, HbA1c goal < 7% (H)       levothyroxine 75 MCG tablet    SYNTHROID/LEVOTHROID    90 tablet    Take 1 tablet (75 mcg) by mouth daily     Hypothyroidism due to acquired atrophy of thyroid       liraglutide 18 MG/3ML soln    VICTOZA PEN    12 mL    Inject 1.8 mg Subcutaneous daily    Type 2 diabetes mellitus with hyperglycemia, with long-term current use of insulin (H)       lisinopril 40 MG tablet    PRINIVIL/ZESTRIL    45 tablet    Take 1.5 tablets (60 mg) by mouth daily    Essential hypertension with goal blood pressure less than 140/90       metFORMIN 500 MG 24 hr tablet    GLUCOPHAGE-XR    360 tablet    Take 4 tablets (2,000 mg) by mouth At Bedtime    Uncontrolled type 2 diabetes mellitus with hyperglycemia, with long-term current use of insulin (H)       metoprolol succinate 25 MG 24 hr tablet    TOPROL XL    90 tablet    Take 1 tablet (25 mg) by mouth daily    Essential hypertension with goal blood pressure less than 140/90       NovoLOG FLEXPEN 100 UNIT/ML injection   Generic drug:  insulin aspart     45 mL    Use 3 units per 15 gram carbs + sliding scale. Total Daily Dose=90 units    Type 2 diabetes mellitus with hyperglycemia, with long-term current use of insulin (H)       olopatadine HCl 0.2 % Soln    PATADAY    1 Bottle    Place 1 drop into both eyes daily as needed (For itchy, water eyes.)    Allergic conjunctivitis, bilateral       order for DME      Respironics REMSTAR 60 Series Auto CPAP 8-15cm H2O, Airfit P10 nasal pillow mask w/medium pillows        rosuvastatin 10 MG tablet    CRESTOR    90 tablet    Take 1 tablet (10 mg) by mouth daily    Hyperlipidemia LDL goal <100       * Notice:  This list has 2 medication(s) that are the same as other medications prescribed for you. Read the directions carefully, and ask your doctor or other care provider to review them with you.       verbal cues/1 person assist

## 2018-11-14 NOTE — PATIENT INSTRUCTIONS
Make appointment(s) for:   --      Check with your insurance regarding coverage for Shingrix (RZV) - the new shingles vaccine.       Medication(s) prescribed today:    Orders Placed This Encounter   Medications     hydrochlorothiazide (HYDRODIURIL) 25 MG tablet     Sig: Take 1 tablet (25 mg) by mouth daily     Dispense:  90 tablet     Refill:  3     lisinopril (PRINIVIL/ZESTRIL) 40 MG tablet     Sig: Take 1.5 tablets (60 mg) by mouth daily     Dispense:  45 tablet     Refill:  3     metoprolol succinate (TOPROL XL) 25 MG 24 hr tablet     Sig: Take 1 tablet (25 mg) by mouth daily     Dispense:  90 tablet     Refill:  3     rosuvastatin (CRESTOR) 10 MG tablet     Sig: Take 1 tablet (10 mg) by mouth daily     Dispense:  90 tablet     Refill:  3           Preventive Health Recommendations    See your health care provider every year to    Review health changes.     Discuss preventive care.      Review your medicines if your doctor has prescribed any.      You no longer need a yearly Pap test unless you've had an abnormal Pap test in the past 10 years. If you have vaginal symptoms, such as bleeding or discharge, be sure to talk with your provider about a Pap test.      Every 1 to 2 years, have a mammogram.  If you are over 69, talk with your health care provider about whether or not you want to continue having screening mammograms.      Every 10 years, have a colonoscopy. Or, have a yearly FIT test (stool test). These exams will check for colon cancer.       Have a cholesterol test every 5 years, or more often if your doctor advises it.       Have a diabetes test (fasting glucose) every three years. If you are at risk for diabetes, you should have this test more often.       At age 65, have a bone density scan (DEXA) to check for osteoporosis (brittle bone disease).    Shots:    Get a flu shot each year.    Get a tetanus shot every 10 years.    Talk to your doctor about your pneumonia vaccines. There are now two you  should receive - Pneumovax (PPSV 23) and Prevnar (PCV 13).    Talk to your pharmacist about the shingles vaccine.    Talk to your doctor about the hepatitis B vaccine.    Nutrition:     Eat at least 5 servings of fruits and vegetables each day.      Eat whole-grain bread, whole-wheat pasta and brown rice instead of white grains and rice.      Get adequate Calcium and Vitamin D.     Lifestyle    Exercise at least 150 minutes a week (30 minutes a day, 5 days a week). This will help you control your weight and prevent disease.      Limit alcohol to one drink per day.      No smoking.       Wear sunscreen to prevent skin cancer.       See your dentist twice a year for an exam and cleaning.      See your eye doctor every 1 to 2 years to screen for conditions such as glaucoma, macular degeneration and cataracts.    Personalized Prevention Plan  You are due for the preventive services outlined below.  Your care team is available to assist you in scheduling these services.  If you have already completed any of these items, please share that information with your care team to update in your medical record.  Health Maintenance Due   Topic Date Due     HIV SCREEN (SYSTEM ASSIGNED)  04/09/1971     JUDD QUESTIONNAIRE 1 YEAR  01/12/2015     Depression Assessment - yearly  01/12/2015     Discuss Advance Directive Planning  07/13/2016     Mammogram - yearly  03/16/2017     Pneumococcal Vaccine (1 of 2 - PCV13) 04/09/2018     A1C (Diabetes) Lab - every 3 months  06/14/2018     Liver Monitoring Lab - yearly  07/03/2018     Thyroid Function Lab (TSH) - yearly  07/03/2018     Cholesterol Lab - yearly  07/03/2018     Microalbumin Lab - yearly  07/03/2018     Flu Vaccine (1) 09/01/2018

## 2018-11-14 NOTE — NURSING NOTE
Screening Questionnaire for Adult Immunization    Are you sick today?   No   Do you have allergies to medications, food, a vaccine component or latex?   No   Have you ever had a serious reaction after receiving a vaccination?   No   Do you have a long-term health problem with heart disease, lung disease, asthma, kidney disease, metabolic disease (e.g. diabetes), anemia, or other blood disorder?   No   Do you have cancer, leukemia, HIV/AIDS, or any other immune system problem?   No   In the past 3 months, have you taken medications that affect  your immune system, such as prednisone, other steroids, or anticancer drugs; drugs for the treatment of rheumatoid arthritis, Crohn s disease, or psoriasis; or have you had radiation treatments?   No   Have you had a seizure, or a brain or other nervous system problem?   No   During the past year, have you received a transfusion of blood or blood     products, or been given immune (gamma) globulin or antiviral drug?   No   For women: Are you pregnant or is there a chance you could become        pregnant during the next month?   No   Have you received any vaccinations in the past 4 weeks?   No     Immunization questionnaire answers were all negative.      MNVFC doesn't apply on this patient           Screening performed by Ginger Shane

## 2018-11-14 NOTE — PROGRESS NOTES
"  SUBJECTIVE:   Sherry Slaughter is a 63 year old female who presents for Preventive Visit.      Are you in the first 12 months of your Medicare Part B coverage?  No.    Physical Health:    In general, how would you rate your overall physical health? good    Outside of work, how many days during the week do you exercise? none    Outside of work, approximately how many minutes a day do you exercise?not applicable    If you drink alcohol do you typically have >3 drinks per day or >7 drinks per week? No    Do you usually eat at least 4 servings of fruit and vegetables a day, include whole grains & fiber and avoid regularly eating high fat or \"junk\" foods? Yes    Do you have any problems taking medications regularly?  No    Do you have any side effects from medications? none    Needs assistance for the following daily activities: no assistance needed    Which of the following safety concerns are present in your home?:  throw rugs in the hallway and lack of grab bars in the bathroom     Hearing impairment: No    In the past 6 months, have you been bothered by leaking of urine? yes    Mental Health:    In general, how would you rate your overall mental or emotional health? good  PHQ-2 Score: 0    Additional concerns to address?  No    Fall risk:      Fallen 2 or more times in the past year?: No  Any fall with injury in the past year?: No        COGNITIVE SCREEN  1) Repeat 3 items (Leader, Season, Table)    2) Clock draw: NORMAL  3) 3 item recall: Recalls 3 objects  Results: 3 items recalled: COGNITIVE IMPAIRMENT LESS LIKELY    Mini-CogTM Copyright PASHA Hernandez. Licensed by the author for use in [facility]; reprinted with permission (david@[facility]). All rights reserved.            -------------------------------------    Reviewed and updated as needed this visit by clinical staff  Tobacco  Allergies  Meds  Med Hx  Surg Hx  Fam Hx  Soc Hx        Reviewed and updated as needed this visit by Provider     " "   Social History   Substance Use Topics     Smoking status: Never Smoker     Smokeless tobacco: Never Used      Comment: tried in early 20s only      Alcohol use Yes      Comment: rare                             Do you feel safe in your environment - Yes    Do you have a Health Care Directive?: No: Advance care planning reviewed with patient; information given to patient to review.    Current providers sharing in care for this patient include:   Patient Care Team:  Marilou Arciniega MD PhD as PCP - General (Internal Medicine)    The following health maintenance items are reviewed in Epic and correct as of today:  Health Maintenance   Topic Date Due     JUDD QUESTIONNAIRE 1 YEAR  01/12/2015     PHQ-9 Q1YR  01/12/2015     ADVANCE DIRECTIVE PLANNING Q5 YRS  07/13/2016     A1C Q3 MO  02/14/2019     COLONOSCOPY Q5 YR  02/24/2019     FOOT EXAM Q1 YEAR  05/02/2019     EYE EXAM Q1 YEAR  05/18/2019     BMP Q1 YR  07/09/2019     ALT Q1 YR  11/14/2019     TSH W/ FREE T4 REFLEX Q1 YEAR  11/14/2019     FALL RISK ASSESSMENT  11/14/2019     LIPID MONITORING Q1 YEAR  11/14/2019     MICROALBUMIN Q1 YEAR  11/14/2019     PNEUMOCOCCAL (2 of 2 - PPSV23) 11/14/2019     MAMMO Q1 YR  11/14/2019     TETANUS Q10 YR  10/13/2027     DEXA SCAN SCREENING (SYSTEM ASSIGNED)  Completed     HIV SCREEN (SYSTEM ASSIGNED)  Addressed     INFLUENZA VACCINE  Completed     HEPATITIS C SCREENING  Completed     BP Readings from Last 3 Encounters:   11/14/18 121/71   05/18/18 144/79   05/02/18 154/85    Wt Readings from Last 3 Encounters:   11/14/18 285 lb (129.3 kg)   08/21/18 295 lb (133.8 kg)   07/10/18 295 lb (133.8 kg)                    Mammogram Screening: Patient over age 50, mutual decision to screen reflected in health maintenance.    ROS:  Constitutional, HEENT, cardiovascular, pulmonary, gi and gu systems are negative, except as otherwise noted.    OBJECTIVE:   /71  Pulse 75  Temp 97.3  F (36.3  C) (Temporal)  Ht 5' 7.75\" (1.721 m)  Wt 285 lb " "(129.3 kg)  [facility] 10/02/2007  SpO2 97%  BMI 43.65 kg/m2 Estimated body mass index is 43.65 kg/(m^2) as calculated from the following:    Height as of this encounter: 5' 7.75\" (1.721 m).    Weight as of this encounter: 285 lb (129.3 kg).  EXAM:   GENERAL: healthy, alert and no distress  EYES: Eyes grossly normal to inspection, PERRL and conjunctivae and sclerae normal  HENT: ear canals and TM's normal, nose and mouth without ulcers or lesions  NECK: no adenopathy, no asymmetry, masses, or scars and thyroid normal to palpation  RESP: lungs clear to auscultation - no rales, rhonchi or wheezes  BREAST: normal without masses, tenderness or nipple discharge and no palpable axillary masses or adenopathy  CV: regular rate and rhythm, normal S1 S2, no S3 or S4, no murmur, click or rub, no peripheral edema and peripheral pulses strong  ABDOMEN: soft, nontender, no hepatosplenomegaly, no masses and bowel sounds normal  MS: no gross musculoskeletal defects noted, no edema  SKIN: no suspicious lesions or rashes  NEURO: Normal strength and tone, mentation intact and speech normal  PSYCH: mentation appears normal, affect normal/bright    Diagnostic Test Results:  Orders Only on 11/14/2018   Component Date Value Ref Range Status     Cholesterol 11/14/2018 109  <200 mg/dL Final     Triglycerides 11/14/2018 262* <150 mg/dL Final    Comment: Borderline high:  150-199 mg/dl  High:             200-499 mg/dl  Very high:       >499 mg/dl  Fasting specimen       HDL Cholesterol 11/14/2018 37* >49 mg/dL Final     LDL Cholesterol Calculated 11/14/2018 20  <100 mg/dL Final    Desirable:       <100 mg/dl     Non HDL Cholesterol 11/14/2018 72  <130 mg/dL Final     Creatinine Urine 11/14/2018 146  mg/dL Final     Albumin Urine mg/L 11/14/2018 24  mg/L Final     Albumin Urine mg/g Cr 11/14/2018 16.44  0 - 25 mg/g Cr Final     TSH 11/14/2018 1.74  0.40 - 4.00 mU/L Final     Hematocrit 11/14/2018 41.6  35.0 - 47.0 % Final     Hemoglobin A1C " "11/14/2018 6.8* 0 - 5.6 % Final    Comment: Normal <5.7% Prediabetes 5.7-6.4%  Diabetes 6.5% or higher - adopted from ADA   consensus guidelines.            ASSESSMENT / PLAN:       ICD-10-CM    1. Medicare annual wellness visit, subsequent Z00.00    2. Type 2 diabetes mellitus with hyperglycemia (H) E11.65    3. Essential hypertension with goal blood pressure less than 140/90 I10 hydrochlorothiazide (HYDRODIURIL) 25 MG tablet     lisinopril (PRINIVIL/ZESTRIL) 40 MG tablet     metoprolol succinate (TOPROL XL) 25 MG 24 hr tablet   4. Type 2 diabetes mellitus with hyperglycemia, with long-term current use of insulin (H) E11.65     Z79.4    5. Hyperlipidemia LDL goal <100 E78.5 ALT     DISCONTINUED: rosuvastatin (CRESTOR) 10 MG tablet   6. Need for pneumococcal vaccination Z23 PCV13, IM (6+ WK) - [id number]     -- stable chronic health issues. Medications refilled.    -- return in 1 year for annual wellness.     End of Life Planning:  Patient currently has an advanced directive: No.  I have verified the patient's ablity to prepare an advanced directive/make health care decisions.  Literature was provided to assist patient in preparing an advanced directive.    COUNSELING:  Reviewed preventive health counseling, as reflected in patient instructions    BP Readings from Last 1 Encounters:   11/14/18 121/71     Estimated body mass index is 43.65 kg/(m^2) as calculated from the following:    Height as of this encounter: 5' 7.75\" (1.721 m).    Weight as of this encounter: 285 lb (129.3 kg).      Weight management plan: sees Endocrine     reports that she has never smoked. She has never used smokeless tobacco.      Appropriate preventive services were discussed with this patient, including applicable screening as appropriate for cardiovascular disease, diabetes, osteopenia/osteoporosis, and glaucoma.  As appropriate for age/gender, discussed screening for colorectal cancer, prostate cancer, breast cancer, and cervical cancer. " Checklist reviewing preventive services available has been given to the patient.    Reviewed patients plan of care and provided an AVS. The Basic Care Plan (routine screening as documented in Health Maintenance) for Sherry meets the Care Plan requirement. This Care Plan has been established and reviewed with the Patient.    Counseling Resources:  ATP IV Guidelines  Pooled Cohorts Equation Calculator  Breast Cancer Risk Calculator  FRAX Risk Assessment  ICSI Preventive Guidelines  Dietary Guidelines for Americans, 2010  USDA's MyPlate  ASA Prophylaxis  Lung CA Screening    Marilou Arciniega MD PhD  [facility]

## 2018-11-15 NOTE — PROGRESS NOTES
Dear Sherry,   Your recent result are within acceptable range or at baseline. Please continue with your current plan of care.       Please call or Mychart to our office if you have further questions.     Marilou Arciniega MD-PhD

## 2018-11-20 DIAGNOSIS — E78.5 HYPERLIPIDEMIA LDL GOAL <100: ICD-10-CM

## 2018-11-20 RX ORDER — ROSUVASTATIN CALCIUM 10 MG/1
10 TABLET, COATED ORAL DAILY
Qty: 90 TABLET | Refills: 3 | Status: SHIPPED | OUTPATIENT
Start: 2018-11-20 | End: 2020-01-09

## 2018-11-20 NOTE — TELEPHONE ENCOUNTER
CRESTOR 10MG TABS  Last Written Prescription Date:  10/05/2018  Last Fill Quantity: 90 TABLETS,  # refills: 3   Last office visit: 11/14/2018 with prescribing provider:    Last refill per Pharmacy   Future Office Visit:   Next 5 appointments (look out 90 days)     Dec 04, 2018  2:00 PM CST   [facility]    [address] [place] [id number]   [phone]

## 2018-12-04 ENCOUNTER — OFFICE VISIT (OUTPATIENT)
Dept: ENDOCRINOLOGY | Facility: CLINIC | Age: 63
End: 2018-12-04
Payer: COMMERCIAL

## 2018-12-04 VITALS
BODY MASS INDEX: 44.45 KG/M2 | HEART RATE: 81 BPM | WEIGHT: 290.2 LBS | OXYGEN SATURATION: 98 % | DIASTOLIC BLOOD PRESSURE: 68 MMHG | SYSTOLIC BLOOD PRESSURE: 138 MMHG

## 2018-12-04 DIAGNOSIS — E03.9 HYPOTHYROIDISM, UNSPECIFIED TYPE: ICD-10-CM

## 2018-12-04 DIAGNOSIS — Z79.4 CONTROLLED TYPE 2 DIABETES MELLITUS WITH DIABETIC NEPHROPATHY, WITH LONG-TERM CURRENT USE OF INSULIN (H): Primary | ICD-10-CM

## 2018-12-04 DIAGNOSIS — I10 HYPERTENSION GOAL BP (BLOOD PRESSURE) < 140/90: ICD-10-CM

## 2018-12-04 DIAGNOSIS — E11.21 CONTROLLED TYPE 2 DIABETES MELLITUS WITH DIABETIC NEPHROPATHY, WITH LONG-TERM CURRENT USE OF INSULIN (H): Primary | ICD-10-CM

## 2018-12-04 PROCEDURE — 99214 OFFICE O/P EST MOD 30 MIN: CPT | Performed by: INTERNAL MEDICINE

## 2018-12-04 RX ORDER — FLASH GLUCOSE SCANNING READER
1 EACH MISCELLANEOUS DAILY
Qty: 1 DEVICE | Refills: 0 | Status: SHIPPED | OUTPATIENT
Start: 2018-12-04 | End: 2021-11-24

## 2018-12-04 RX ORDER — FLASH GLUCOSE SENSOR
1 KIT MISCELLANEOUS
Qty: 6 EACH | Refills: 3 | Status: SHIPPED | OUTPATIENT
Start: 2018-12-04 | End: 2019-05-21

## 2018-12-04 NOTE — PATIENT INSTRUCTIONS
[facility]  Devi Harkins RN, Diabetes Educator: [phone]  Clinic Nurses Kylah Trejo: [phone]  Clinic Fax: [phone]  On-Call Endocrine at [facility] (after hours/weekends): [phone] option 4  Scheduling Line: [phone]    Appointment Reminders:  * Please bring meter with for staff to download  * If you are due ONLY for an A1C, it is scheduled with the nurse and will be done in clinic. You do not need to schedule a lab appointment. Fasting is not required for an A1C.  * Refill request should be submitted to your pharmacy. They will contact clinic for approval.

## 2018-12-04 NOTE — LETTER
12/4/2018         RE: Sherry Slaughter  [address] [address] [place] [id number]        Dear Colleague,    Thank you for referring your patient, Sherry Slaughter, to the [facility]. Please see a copy of my visit note below.        Sherry is a 63 year old female seen in follow-up for type 2 diabetes.    She has been diagnosed with type 2 diabetes 25 years after she was diagnosed with gestational diabetes, during both her pregnancies. Her weight before pregnancy was 190-200. Her highest weight was 300s. She was initially started on oral medications and then insulin was added, more than 6 years ago.  Her A1C has been in 9% range since 2012. She was on Byetta but she did not feel it helped. She was also on Actos but stopped due to 40 lbs weight gain and increased SOB.  For the last 2-3 years, she has been taking Victoza and the patient does not feel the medication is effective in curbing her appetite.  She continues to take metformin, 2 g extended release daily.      Current insulin regimen consists of 70 units Basaglar, twice daily, and NovoLog, 3 units per 15 g carbohydrates and the correction scale of 1 unit per 5 mg above 140.  On average, she reports administering approximately 15 units NovoLog per meal (5 carb units).    She states she has not been paying attention to her diabetes control recently, as she has been busy moving.  A1c today was 6.8%, stable since her last visit here.  Today, she forgot her meter.  She reports morning blood sugar numbers in the 140-160 range.  In general, the morning blood sugar is the highest blood sugar of the day.  The hypoglycemic episodes occur approximately once a month mainly mid afternoon, if she skips lunch.    DM complications  Retinopathy: last eye exam:5/2018; no DR, B/L surgery for cataract   Nephropathy: negative urine microalb - on lisinopril 60 mg; GFR in the 70s  Neuropathy: occasional burning sensation in her feet for the  last couple of years   She is symptomatic for hypoglycemia (able to recognize blood sugar numbers in the 90s - she gets sweaty, hot and lightheaded).  She corrects the hypoglycemic symptoms by having something to eat. She does not have glucagon at home (never experienced severe hypoglycemic episodes).   Aspirin - taking 325 mg  LDL 20 (2018) on Crestor 10 mg  Using the CPAP   Exercise limited due to bilateral knee replacement and low back pain.    Past Medical History  Past Medical History:   Diagnosis Date     Cataract      DEPRESSION     comes and goes - tried meds - unsuccessfully, certain times of the year, no psych intervention and no counselors in the past - and not interested      Diverticulosis of colon (without mention of hemorrhage) 4/04    colonoscopy     ECHO-mildLVH,tr MR,mild thick lflets w inc LA,trTR   12/03     Essential hypertension, benign 1990s    late 1990s - started medications at that time - not to difficult to control meds      Fam hx-cardiovas dis NEC     father - CAD, and lipids/HTN - multiple members of the family      Family history of diabetes mellitus     sister and grandmother with DM      Family history of malignant neoplasm of breast     mother - young age - 45, and maternal cousin and aunt as well - no BRCA testing done      Family history of stroke (cerebrovascular)     grandmother in early life in her 40s      FAMILY HX COLON CANCER     Pat uncles x 2     Heart disease     murmur/     Heart murmur      HYPERLIPIDEMIA 2000    fairly recent - in the last 5 years - high for DM levels  -cholesterol recent      Irritable bowel syndrome     goes between the 2 - nerve related - more stressed more problems      MICROALBUMINURIA     unsure how long - been on the lisinopril - for a few years at well      Nonspecific abnormal results of liver function study 2/3/2003    SGOT - has been high in the past - since the hepatitis b - borderline elevation - not really changing any      OBESITY       Obstructive sleep apnea      GENESIS (obstructive sleep apnea) 10/15/2006    psg 5/15 AHI 53 aPAP 8-15     OSTEOARTHRITIS L KNEE 2003    total knee on the left - and pain is gone since the knee replacement      PERS HX HEPATITIS B- RESOLVED] 1977    acute virus only - no chronic disease      PERS HX HEPATITIS B- RESOLVED]      Type II or unspecified type diabetes mellitus without mention of complication, not stated as uncontrolled 1991    oral meds frist, then insulin eventually later, difficult to control most of the time      Unspecified hypothyroidism 10/11/2006    TSH 3.36 - mild subclinical hypothyroidism - deciding on following or starting low dose meds - with dr Oreilly - following    Hepatis B     Allergies  Allergies   Allergen Reactions     Atorvastatin Calcium      Gas     Pravachol [Pravastatin Sodium]      Pravachol - dry cough      Simvastatin      Myalgia     Medications  Current Outpatient Prescriptions   Medication Sig Dispense Refill     ASPIRIN EC PO Take 325 mg by mouth daily       blood glucose monitoring (NO BRAND SPECIFIED) test strip Use to test blood sugar 4 times daily or as directed. 100 each 11     hydrochlorothiazide (HYDRODIURIL) 25 MG tablet Take 1 tablet (25 mg) by mouth daily 90 tablet 3     Ibuprofen (ADVIL PO) Take 600 mg by mouth 4 times daily       insulin glargine (LANTUS SOLOSTAR) 100 UNIT/ML pen Inject 70 Units Subcutaneous 2 times daily 45 mL 3     insulin pen needle (GNP CLICKFINE PEN NEEDLES) 31G X 8 MM 1 Device by * route daily 100 each 2     levothyroxine (SYNTHROID/LEVOTHROID) 75 MCG tablet Take 1 tablet (75 mcg) by mouth daily 90 tablet 3     liraglutide (VICTOZA PEN) 18 MG/3ML solution Inject 1.8 mg Subcutaneous daily 27 mL 1     lisinopril (PRINIVIL/ZESTRIL) 40 MG tablet Take 1.5 tablets (60 mg) by mouth daily 45 tablet 3     metFORMIN (GLUCOPHAGE-XR) 500 MG 24 hr tablet Take 4 tablets (2,000 mg) by mouth At Bedtime 360 tablet 3     metoprolol succinate (TOPROL XL) 25 MG  24 hr tablet Take 1 tablet (25 mg) by mouth daily 90 tablet 3     Multiple Vitamins-Minerals (ADVANCED DIABETIC MULTIVITAMIN) TABS Take 1 tablet by mouth daily       NOVOLOG FLEXPEN 100 UNIT/ML soln Use 3 units per 15 gram carbs + sliding scale. Total Daily Dose=90 units 45 mL 3     rosuvastatin (CRESTOR) 10 MG tablet Take 1 tablet (10 mg) by mouth daily 90 tablet 3     BASAGLAR 100 UNIT/ML injection Inject 70 Units Subcutaneous 2 times daily Today took 35 units this am 63 mL 1     Garlic 1000 MG CAPS Take 1 capsule by mouth daily Takes during summer months.  Done taking until next summer.       olopatadine HCl (PATADAY) 0.2 % SOLN Place 1 drop into both eyes daily as needed (For itchy, water eyes.) (Patient not taking: Reported on 11/14/2018) 1 Bottle 6     ORDER FOR DME, SET TO LOCAL PRINT, Respironics REMSTAR 60 Series Auto CPAP 8-15cm H2O, Airfit P10 nasal pillow mask w/medium pillows       Family History  Maternal grandmother had type 2 diabetes  Daughter has GDM  Father had CAD s/p stent, lung cancer and abdominal aortic anuerysm  Older brother has CABG, and abdominal aortic anuerysm  Young brother has Afib, SVT  Another younger brother has multiple myeloma  Sister has SVT    Social History  No smoking, rarely drink EtOH  No illicit drug  Works as a nurse in the ICU  Lives by herself, has 3 daughters    ROS  Constitutional: stable weight, fatigue   Eyes: no vision changes  Cardiovascular: no chest pain, occasional palpitations - short lived   Respiratory: no SOB or cough   GI: alternating episodes of constipation and diarrhea; no abdominal pain; she associates the diarrhea with increased stress   : no change in urine, no dysuria; urinates 1-2 times a night   Musculoskeletal: no legs swelling, joint pain - mainly the knees and the low back pain  Integumentary: no concerning lesions  Neuro: no loss of strength or sensation, no numbness or tingling, no tremor, no dizziness, no headache   Endo: no temperature  intolerance   Heme/Lymph: no concerning bumps, no bleeding problems   Psych: no depression or anxiety, no sleep problems.    Physical Exam  BP Readings from Last 6 Encounters:   12/04/18 138/68   11/14/18 121/71   05/18/18 144/79   05/02/18 154/85   04/05/18 147/86   03/15/18 148/77     Wt Readings from Last 10 Encounters:   12/04/18 131.6 kg (290 lb 3.2 oz)   11/14/18 129.3 kg (285 lb)   08/21/18 133.8 kg (295 lb)   07/10/18 133.8 kg (295 lb)   05/02/18 136 kg (299 lb 13.2 oz)   04/05/18 134 kg (295 lb 8 oz)   03/14/18 134 kg (295 lb 8 oz)   11/02/17 135.6 kg (299 lb)   07/03/17 133.9 kg (295 lb 1.6 oz)   02/06/17 133.6 kg (294 lb 8.6 oz)     /68 (BP Location: Left arm, Patient Position: Chair, Cuff Size: Adult Large)  Pulse 81  Wt 131.6 kg (290 lb 3.2 oz)  LMP 10/02/2007  SpO2 98%  BMI 44.45 kg/m2  Body mass index is 44.45 kg/(m^2).     Constitutional: general obesity, no distress, comfortable, pleasant   Eyes: anicteric, normal extra-ocular movements, no lid lag or retraction  Neck: no thyromegaly; no palpable nodules  CVS: RRR, normal S1,S2, systolic murmur with radiation to the carotid arteries   Lungs: CTA B/L  Musculoskeletal: no edema, DP 2+; left lower extremities - mild atrophy compared with the right   NS: no tremor, normal gait, knee reflexes absent, decreased sensation on the soles of both feet (R>L), flat feet, B/L calluses, onychomycosis  Skin: benign abd striae   Psychological: appropriate mood     RESULTS  I reviewed prior lab results documented in epic.  Lab Results   Component Value Date    A1C 6.8 (H) 11/14/2018    A1C 6.9 (H) 03/14/2018    A1C 7.7 (H) 07/03/2017    A1C 7.5 02/06/2017    A1C 8.9 09/07/2016       Hemoglobin   Date Value Ref Range Status   09/25/2015 11.5 (L) 11.7 - 15.7 g/dL Final     Hematocrit   Date Value Ref Range Status   11/14/2018 41.6 35.0 - 47.0 % Final     Cholesterol   Date Value Ref Range Status   11/14/2018 109 <200 mg/dL Final     Cholesterol/HDL Ratio    Date Value Ref Range Status   02/19/2015 3.2 0.0 - 5.0 Final     HDL Cholesterol   Date Value Ref Range Status   11/14/2018 37 (L) >49 mg/dL Final     LDL Cholesterol Calculated   Date Value Ref Range Status   11/14/2018 20 <100 mg/dL Final     Comment:     Desirable:       <100 mg/dl     VLDL-Cholesterol   Date Value Ref Range Status   02/19/2015 34 (H) 0 - 30 mg/dL Final     Triglycerides   Date Value Ref Range Status   11/14/2018 262 (H) <150 mg/dL Final     Comment:     Borderline high:  150-199 mg/dl  High:             200-499 mg/dl  Very high:       >499 mg/dl  Fasting specimen       Albumin Urine mg/L   Date Value Ref Range Status   11/14/2018 24 mg/L Final     TSH   Date Value Ref Range Status   11/14/2018 1.74 0.40 - 4.00 mU/L Final         Last Basic Metabolic Panel:    Sodium   Date Value Ref Range Status   07/09/2018 140 133 - 144 mmol/L Final     Potassium   Date Value Ref Range Status   07/09/2018 4.0 3.4 - 5.3 mmol/L Final     Chloride   Date Value Ref Range Status   07/09/2018 105 94 - 109 mmol/L Final     Calcium   Date Value Ref Range Status   07/09/2018 9.4 8.5 - 10.1 mg/dL Final     Carbon Dioxide   Date Value Ref Range Status   07/09/2018 28 20 - 32 mmol/L Final     Urea Nitrogen   Date Value Ref Range Status   07/09/2018 17 7 - 30 mg/dL Final     Creatinine   Date Value Ref Range Status   07/09/2018 0.74 0.52 - 1.04 mg/dL Final     GFR Estimate   Date Value Ref Range Status   07/09/2018 78 >60 mL/min/1.7m2 Final     Comment:     Non  GFR Calc     Glucose   Date Value Ref Range Status   07/09/2018 109 (H) 70 - 99 mg/dL Final     Comment:     Non Fasting       AST   Date Value Ref Range Status   03/07/2014 41 0 - 45 U/L Final     ALT   Date Value Ref Range Status   11/14/2018 37 0 - 50 U/L Final     Albumin   Date Value Ref Range Status   03/07/2014 4.2 3.3 - 4.9 g/dL Final         ASSESSMENT:      1. Type 2 diabetes  Sherry is a 63 year old pleasant female, with a history of  type 2 diabetes in the setting of obesity, sleep apnea, decreased physical exercise due to knee and back pain.    Her diabetes appears to be well controlled, based on the A1c.  It is known to be complicated by mild neuropathy.  Checking her blood sugar on a consistent basis has been challenging and the patient is interested in a glucose sensor.  I reviewed with her the DEXCOM G6 and the FreeStyle Flash Tanya sensors.  Counseled on their use and differences.  Advised to continue to check her blood sugar with the meter, for hypo-and hyperglycemic episodes noted on the sensor.  She opted for FreeStyle Flash Tanya.  Recommendations:  Prescription for CGM placed  Advised to schedule an appointment with the CDE if she has questions regarding the use of the sensor.  Also discussed about considering an SGLT2 medication, like Jardiance.  Counseled on side effects.  Long-term, she might benefit from this medication as it does decrease the cardiovascular mortality, improves the blood pressure and controls the weight.  With this medication, we may be able to reduce the dose of insulin.  The patient prefers to postpone this for now.  Schedule an appointment with podiatry.     2. Hypertension: Fairly well controlled. On lisinopril 60 mg, HCTZ 25 mg and metoprolol 25 mg daily.  We will continue to monitor.    3.  Hypothyroidism. Biochemically, most recent TSH was normal, last month.  I recommended to continue to take the same dose of levothyroxine.    Orders Placed This Encounter   Procedures     ORTHO  REFERRAL           Again, thank you for allowing me to participate in the care of your patient.        Sincerely,        Rika Delgado MD

## 2018-12-04 NOTE — PROGRESS NOTES
Sherry is a 63 year old female seen in follow-up for type 2 diabetes.    She has been diagnosed with type 2 diabetes 25 years after she was diagnosed with gestational diabetes, during both her pregnancies. Her weight before pregnancy was 190-200. Her highest weight was 300s. She was initially started on oral medications and then insulin was added, more than 6 years ago.  Her A1C has been in 9% range since 2012. She was on Byetta but she did not feel it helped. She was also on Actos but stopped due to 40 lbs weight gain and increased SOB.  For the last 2-3 years, she has been taking Victoza and the patient does not feel the medication is effective in curbing her appetite.  She continues to take metformin, 2 g extended release daily.      Current insulin regimen consists of 70 units Basaglar, twice daily, and NovoLog, 3 units per 15 g carbohydrates and the correction scale of 1 unit per 5 mg above 140.  On average, she reports administering approximately 15 units NovoLog per meal (5 carb units).    She states she has not been paying attention to her diabetes control recently, as she has been busy moving.  A1c today was 6.8%, stable since her last visit here.  Today, she forgot her meter.  She reports morning blood sugar numbers in the 140-160 range.  In general, the morning blood sugar is the highest blood sugar of the day.  The hypoglycemic episodes occur approximately once a month mainly mid afternoon, if she skips lunch.    DM complications  Retinopathy: last eye exam:5/2018; no DR, B/L surgery for cataract   Nephropathy: negative urine microalb - on lisinopril 60 mg; GFR in the 70s  Neuropathy: occasional burning sensation in her feet for the last couple of years   She is symptomatic for hypoglycemia (able to recognize blood sugar numbers in the 90s - she gets sweaty, hot and lightheaded).  She corrects the hypoglycemic symptoms by having something to eat. She does not have glucagon at home (never  experienced severe hypoglycemic episodes).   Aspirin - taking 325 mg  LDL 20 (2018) on Crestor 10 mg  Using the CPAP   Exercise limited due to bilateral knee replacement and low back pain.    Past Medical History  Past Medical History:   Diagnosis Date     Cataract      DEPRESSION     comes and goes - tried meds - unsuccessfully, certain times of the year, no psych intervention and no counselors in the past - and not interested      Diverticulosis of colon (without mention of hemorrhage) 4/04    colonoscopy     ECHO-mildLVH,tr MR,mild thick lflets w inc LA,trTR   12/03     Essential hypertension, benign 1990s    late 1990s - started medications at that time - not to difficult to control meds      Fam hx-cardiovas dis NEC     father - CAD, and lipids/HTN - multiple members of the family      Family history of diabetes mellitus     sister and grandmother with DM      Family history of malignant neoplasm of breast     mother - young age - 45, and maternal cousin and aunt as well - no BRCA testing done      Family history of stroke (cerebrovascular)     grandmother in early life in her 40s      FAMILY HX COLON CANCER     Pat uncles x 2     Heart disease     murmur/     Heart murmur      HYPERLIPIDEMIA 2000    fairly recent - in the last 5 years - high for DM levels  -cholesterol recent      Irritable bowel syndrome     goes between the 2 - nerve related - more stressed more problems      MICROALBUMINURIA     unsure how long - been on the lisinopril - for a few years at well      Nonspecific abnormal results of liver function study 2/3/2003    SGOT - has been high in the past - since the hepatitis b - borderline elevation - not really changing any      OBESITY      Obstructive sleep apnea      GENESIS (obstructive sleep apnea) 10/15/2006    psg 5/15 AHI 53 aPAP 8-15     OSTEOARTHRITIS L KNEE 2003    total knee on the left - and pain is gone since the knee replacement      PERS HX HEPATITIS B- RESOLVED] 1977    acute virus  only - no chronic disease      PERS HX HEPATITIS B- RESOLVED]      Type II or unspecified type diabetes mellitus without mention of complication, not stated as uncontrolled 1991    oral meds frist, then insulin eventually later, difficult to control most of the time      Unspecified hypothyroidism 10/11/2006    TSH 3.36 - mild subclinical hypothyroidism - deciding on following or starting low dose meds - with dr Oreilly - following    Hepatis B     Allergies  Allergies   Allergen Reactions     Atorvastatin Calcium      Gas     Pravachol [Pravastatin Sodium]      Pravachol - dry cough      Simvastatin      Myalgia     Medications  Current Outpatient Prescriptions   Medication Sig Dispense Refill     ASPIRIN EC PO Take 325 mg by mouth daily       blood glucose monitoring (NO BRAND SPECIFIED) test strip Use to test blood sugar 4 times daily or as directed. 100 each 11     hydrochlorothiazide (HYDRODIURIL) 25 MG tablet Take 1 tablet (25 mg) by mouth daily 90 tablet 3     Ibuprofen (ADVIL PO) Take 600 mg by mouth 4 times daily       insulin glargine (LANTUS SOLOSTAR) 100 UNIT/ML pen Inject 70 Units Subcutaneous 2 times daily 45 mL 3     insulin pen needle (GNP PrePlayFINE PEN NEEDLES) 31G X 8 MM 1 Device by * route daily 100 each 2     levothyroxine (SYNTHROID/LEVOTHROID) 75 MCG tablet Take 1 tablet (75 mcg) by mouth daily 90 tablet 3     liraglutide (VICTOZA PEN) 18 MG/3ML solution Inject 1.8 mg Subcutaneous daily 27 mL 1     lisinopril (PRINIVIL/ZESTRIL) 40 MG tablet Take 1.5 tablets (60 mg) by mouth daily 45 tablet 3     metFORMIN (GLUCOPHAGE-XR) 500 MG 24 hr tablet Take 4 tablets (2,000 mg) by mouth At Bedtime 360 tablet 3     metoprolol succinate (TOPROL XL) 25 MG 24 hr tablet Take 1 tablet (25 mg) by mouth daily 90 tablet 3     Multiple Vitamins-Minerals (ADVANCED DIABETIC MULTIVITAMIN) TABS Take 1 tablet by mouth daily       NOVOLOG FLEXPEN 100 UNIT/ML soln Use 3 units per 15 gram carbs + sliding scale. Total Daily  Dose=90 units 45 mL 3     rosuvastatin (CRESTOR) 10 MG tablet Take 1 tablet (10 mg) by mouth daily 90 tablet 3     BASAGLAR 100 UNIT/ML injection Inject 70 Units Subcutaneous 2 times daily Today took 35 units this am 63 mL 1     Garlic 1000 MG CAPS Take 1 capsule by mouth daily Takes during summer months.  Done taking until next summer.       olopatadine HCl (PATADAY) 0.2 % SOLN Place 1 drop into both eyes daily as needed (For itchy, water eyes.) (Patient not taking: Reported on 11/14/2018) 1 Bottle 6     ORDER FOR DME, SET TO LOCAL PRINT, Respironics REMSTAR 60 Series Auto CPAP 8-15cm H2O, Airfit P10 nasal pillow mask w/medium pillows       Family History  Maternal grandmother had type 2 diabetes  Daughter has GDM  Father had CAD s/p stent, lung cancer and abdominal aortic anuerysm  Older brother has CABG, and abdominal aortic anuerysm  Young brother has Afib, SVT  Another younger brother has multiple myeloma  Sister has SVT    Social History  No smoking, rarely drink EtOH  No illicit drug  Works as a nurse in the ICU  Lives by herself, has 3 daughters    ROS  Constitutional: stable weight, fatigue   Eyes: no vision changes  Cardiovascular: no chest pain, occasional palpitations - short lived   Respiratory: no SOB or cough   GI: alternating episodes of constipation and diarrhea; no abdominal pain; she associates the diarrhea with increased stress   : no change in urine, no dysuria; urinates 1-2 times a night   Musculoskeletal: no legs swelling, joint pain - mainly the knees and the low back pain  Integumentary: no concerning lesions  Neuro: no loss of strength or sensation, no numbness or tingling, no tremor, no dizziness, no headache   Endo: no temperature intolerance   Heme/Lymph: no concerning bumps, no bleeding problems   Psych: no depression or anxiety, no sleep problems.    Physical Exam  BP Readings from Last 6 Encounters:   12/04/18 138/68   11/14/18 121/71   05/18/18 144/79   05/02/18 154/85   04/05/18  147/86   03/15/18 148/77     Wt Readings from Last 10 Encounters:   12/04/18 131.6 kg (290 lb 3.2 oz)   11/14/18 129.3 kg (285 lb)   08/21/18 133.8 kg (295 lb)   07/10/18 133.8 kg (295 lb)   05/02/18 136 kg (299 lb 13.2 oz)   04/05/18 134 kg (295 lb 8 oz)   03/14/18 134 kg (295 lb 8 oz)   11/02/17 135.6 kg (299 lb)   07/03/17 133.9 kg (295 lb 1.6 oz)   02/06/17 133.6 kg (294 lb 8.6 oz)     /68 (BP Location: Left arm, Patient Position: Chair, Cuff Size: Adult Large)  Pulse 81  Wt 131.6 kg (290 lb 3.2 oz)  LMP 10/02/2007  SpO2 98%  BMI 44.45 kg/m2  Body mass index is 44.45 kg/(m^2).     Constitutional: general obesity, no distress, comfortable, pleasant   Eyes: anicteric, normal extra-ocular movements, no lid lag or retraction  Neck: no thyromegaly; no palpable nodules  CVS: RRR, normal S1,S2, systolic murmur with radiation to the carotid arteries   Lungs: CTA B/L  Musculoskeletal: no edema, DP 2+; left lower extremities - mild atrophy compared with the right   NS: no tremor, normal gait, knee reflexes absent, decreased sensation on the soles of both feet (R>L), flat feet, B/L calluses, onychomycosis  Skin: benign abd striae   Psychological: appropriate mood     RESULTS  I reviewed prior lab results documented in epic.  Lab Results   Component Value Date    A1C 6.8 (H) 11/14/2018    A1C 6.9 (H) 03/14/2018    A1C 7.7 (H) 07/03/2017    A1C 7.5 02/06/2017    A1C 8.9 09/07/2016       Hemoglobin   Date Value Ref Range Status   09/25/2015 11.5 (L) 11.7 - 15.7 g/dL Final     Hematocrit   Date Value Ref Range Status   11/14/2018 41.6 35.0 - 47.0 % Final     Cholesterol   Date Value Ref Range Status   11/14/2018 109 <200 mg/dL Final     Cholesterol/HDL Ratio   Date Value Ref Range Status   02/19/2015 3.2 0.0 - 5.0 Final     HDL Cholesterol   Date Value Ref Range Status   11/14/2018 37 (L) >49 mg/dL Final     LDL Cholesterol Calculated   Date Value Ref Range Status   11/14/2018 20 <100 mg/dL Final     Comment:      Desirable:       <100 mg/dl     VLDL-Cholesterol   Date Value Ref Range Status   02/19/2015 34 (H) 0 - 30 mg/dL Final     Triglycerides   Date Value Ref Range Status   11/14/2018 262 (H) <150 mg/dL Final     Comment:     Borderline high:  150-199 mg/dl  High:             200-499 mg/dl  Very high:       >499 mg/dl  Fasting specimen       Albumin Urine mg/L   Date Value Ref Range Status   11/14/2018 24 mg/L Final     TSH   Date Value Ref Range Status   11/14/2018 1.74 0.40 - 4.00 mU/L Final         Last Basic Metabolic Panel:    Sodium   Date Value Ref Range Status   07/09/2018 140 133 - 144 mmol/L Final     Potassium   Date Value Ref Range Status   07/09/2018 4.0 3.4 - 5.3 mmol/L Final     Chloride   Date Value Ref Range Status   07/09/2018 105 94 - 109 mmol/L Final     Calcium   Date Value Ref Range Status   07/09/2018 9.4 8.5 - 10.1 mg/dL Final     Carbon Dioxide   Date Value Ref Range Status   07/09/2018 28 20 - 32 mmol/L Final     Urea Nitrogen   Date Value Ref Range Status   07/09/2018 17 7 - 30 mg/dL Final     Creatinine   Date Value Ref Range Status   07/09/2018 0.74 0.52 - 1.04 mg/dL Final     GFR Estimate   Date Value Ref Range Status   07/09/2018 78 >60 mL/min/1.7m2 Final     Comment:     Non  GFR Calc     Glucose   Date Value Ref Range Status   07/09/2018 109 (H) 70 - 99 mg/dL Final     Comment:     Non Fasting       AST   Date Value Ref Range Status   03/07/2014 41 0 - 45 U/L Final     ALT   Date Value Ref Range Status   11/14/2018 37 0 - 50 U/L Final     Albumin   Date Value Ref Range Status   03/07/2014 4.2 3.3 - 4.9 g/dL Final         ASSESSMENT:      1. Type 2 diabetes  Sherry is a 63 year old pleasant female, with a history of type 2 diabetes in the setting of obesity, sleep apnea, decreased physical exercise due to knee and back pain.    Her diabetes appears to be well controlled, based on the A1c.  It is known to be complicated by mild neuropathy.  Checking her blood sugar on a  consistent basis has been challenging and the patient is interested in a glucose sensor.  I reviewed with her the DEXCOM G6 and the FreeStyle Flash Tanya sensors.  Counseled on their use and differences.  Advised to continue to check her blood sugar with the meter, for hypo-and hyperglycemic episodes noted on the sensor.  She opted for FreeStyle Flash Tanya.  Recommendations:  Prescription for CGM placed  Advised to schedule an appointment with the CDE if she has questions regarding the use of the sensor.  Also discussed about considering an SGLT2 medication, like Jardiance.  Counseled on side effects.  Long-term, she might benefit from this medication as it does decrease the cardiovascular mortality, improves the blood pressure and controls the weight.  With this medication, we may be able to reduce the dose of insulin.  The patient prefers to postpone this for now.  Schedule an appointment with podiatry.     2. Hypertension: Fairly well controlled. On lisinopril 60 mg, HCTZ 25 mg and metoprolol 25 mg daily.  We will continue to monitor.    3.  Hypothyroidism. Biochemically, most recent TSH was normal, last month.  I recommended to continue to take the same dose of levothyroxine.    Orders Placed This Encounter   Procedures     ORTHO  REFERRAL

## 2018-12-04 NOTE — MR AVS SNAPSHOT
After Visit Summary   12/4/2018    Sherry Slaughter    MRN: [id number]           Patient Information     Date Of Birth          1955        Visit Information        Provider Department      12/4/2018 2:00 PM Rika Delgado MD [facility]        Today's Diagnoses     Controlled type 2 diabetes mellitus with diabetic nephropathy, with long-term current use of insulin (H)    -  1      Care Instructions    [facility]-Department of Endocrinology  Devi Harkins RN, Diabetes Educator: [phone]  Clinic Nurses Kylah Trejo: [phone]  Clinic Fax: [phone]  On-Call Endocrine at [facility] (after hours/weekends): [phone] option 4  Scheduling Line: [phone]    Appointment Reminders:  * Please bring meter with for staff to download  * If you are due ONLY for an A1C, it is scheduled with the nurse and will be done in clinic. You do not need to schedule a lab appointment. Fasting is not required for an A1C.  * Refill request should be submitted to your pharmacy. They will contact clinic for approval.                Follow-ups after your visit        Additional Services     ORTHO  REFERRAL       [facility] is referring you to the Orthopedic  Services at [facility].       The  Representative will assist you in the coordination of your Orthopedic and Musculoskeletal Care as prescribed by your physician.    The  Representative will call you within 1 business day to help schedule your appointment, or you may contact the  Representative at:    All areas ~ [phone]    Type of Referral : Podiatry / Foot & Ankle Surgery       Timeframe requested: Routine    Coverage of these services is subject to the terms and limitations of your health insurance plan.  Please call member services at your health plan with any benefit or coverage questions.       If X-rays, CT or MRI's have been performed, please contact the facility where they were done to arrange for , prior to your scheduled appointment.  Please bring this referral request to your appointment and present it to your specialist.                  Follow-up notes from your care team     Return in about 6 months (around 6/4/2019).      Your next 10 appointments already scheduled     Jun 05, 2019 10:15 AM CDT   Return Visit with Rika Delgado MD, MG ENDO NURSE   [facility]    [id number] [place] [id number] [phone]              Who to contact     If you have questions or need follow up information about today's clinic visit or your schedule please contact [facility] directly at [phone].  Normal or non-critical lab and imaging results will be communicated to you by MaxPrepshart, letter or phone within 4 business days after the clinic has received the results. If you do not hear from us within 7 days, please contact the clinic through MaxPrepshart or phone. If you have a critical or abnormal lab result, we will notify you by phone as soon as possible.  Submit refill requests through GILUPI or call your pharmacy and they will forward the refill request to us. Please allow 3 business days for your refill to be completed.          Additional Information About Your Visit        MaxPrepshart Information     GILUPI gives you secure access to your electronic health record. If you see a primary care provider, you can also send messages to your care team and make appointments. If you have questions, please call your primary care clinic.  If you do not have a primary care provider, please call [phone] and they will assist you.      GILUPI is an electronic gateway that provides easy, online access to your medical records. With GILUPI, you can request a clinic appointment, read your test results, renew a prescription  or communicate with your care team.     To access your existing account, please contact your [facility] or call [phone] for assistance.        Care EveryWhere ID     This is your Care EveryWhere ID. This could be used by other organizations to access your [facility] medical records  [id number]        Your Vitals Were     Pulse Last Period Pulse Oximetry BMI (Body Mass Index)          81 10/02/2007 98% 44.45 kg/m2         Blood Pressure from Last 3 Encounters:   12/04/18 138/68   11/14/18 121/71   05/18/18 144/79    Weight from Last 3 Encounters:   12/04/18 131.6 kg (290 lb 3.2 oz)   11/14/18 129.3 kg (285 lb)   08/21/18 133.8 kg (295 lb)              We Performed the Following     ORTHO  REFERRAL          Today's Medication Changes          These changes are accurate as of 12/4/18  3:02 PM.  If you have any questions, ask your nurse or doctor.               Start taking these medicines.        Dose/Directions    FREESTYLE MIKE 14 DAY READER Fiona   Used for:  Controlled type 2 diabetes mellitus with diabetic nephropathy, with long-term current use of insulin (H)   Started by:  Rika Delgado MD        Dose:  1 each   1 each daily   Quantity:  1 Device   Refills:  0       FREESTYLE MIKE 14 DAY SENSOR Misc   Used for:  Controlled type 2 diabetes mellitus with diabetic nephropathy, with long-term current use of insulin (H)   Started by:  Rika Delgado MD        Dose:  1 each   1 each every 14 days   Quantity:  6 each   Refills:  3            Where to get your medicines      These medications were sent to [facility] [address] [place] [address] [address] [facility] [address]  [address] [facility] [address] [facility] [address]     Phone:  [phone]     FREESTYLE MIKE 14 DAY READER Fiona    FREESTYLE MIKE 14 DAY SENSOR Misc                Primary Care Provider Office Phone # Fax #    Marilou Arciniega MD PhD [phone]       [address]  [facility]  [address]        Equal Access to Services     [facility] : Hadii canelo love hugorichard Magdalenaali, waaramda luqadaha, qaybta dahianamelloraffaele willett, acosta salinas. So [facility] [phone].    ATENCIÓN: Si habla español, tiene a espinosa disposición servicios gratuitos de asistencia lingüística. Llame al [phone].    We comply with applicable federal civil rights laws and [place] laws. We do not discriminate on the basis of race, color, national origin, age, disability, sex, sexual orientation, or gender identity.            Thank you!     Thank you for choosing [facility]  for your care. Our goal is always to provide you with excellent care. Hearing back from our patients is one way we can continue to improve our services. Please take a few minutes to complete the written survey that you may receive in the mail after your visit with us. Thank you!             Your Updated Medication List - Protect others around you: Learn how to safely use, store and throw away your medicines at www.disposemymeds.org.          This list is accurate as of 12/4/18  3:02 PM.  Always use your most recent med list.                   Brand Name Dispense Instructions for use Diagnosis    ADVANCED DIABETIC MULTIVITAMIN Tabs      Take 1 tablet by mouth daily        ADVIL PO      Take 600 mg by mouth 4 times daily        ASPIRIN EC PO      Take 325 mg by mouth daily        blood glucose monitoring test strip    NO BRAND SPECIFIED    100 each    Use to test blood sugar 4 times daily or as directed.    Type 2 diabetes mellitus without complication, with long-term current use of insulin (H)       FREESTYLE MIKE 14 DAY READER Fiona     1 Device    1 each daily    Controlled type 2 diabetes mellitus with diabetic nephropathy, with long-term current use of insulin (H)       FREESTYLE MIKE 14 DAY SENSOR Misc     6 each    1 each every 14 days    Controlled type 2 diabetes mellitus with diabetic nephropathy, with long-term  current use of insulin (H)       Garlic 1000 MG Caps      Take 1 capsule by mouth daily Takes during summer months.  Done taking until next summer.        hydrochlorothiazide 25 MG tablet    HYDRODIURIL    90 tablet    Take 1 tablet (25 mg) by mouth daily    Essential hypertension with goal blood pressure less than 140/90       * insulin glargine 100 UNIT/ML pen     63 mL    Inject 70 Units Subcutaneous 2 times daily Today took 35 units this am    Long-term insulin use (H)       * insulin glargine 100 UNIT/ML pen    LANTUS SOLOSTAR    45 mL    Inject 70 Units Subcutaneous 2 times daily    Type 2 diabetes mellitus with hyperglycemia (H)       insulin pen needle 31G X 8 MM miscellaneous    GNP CLICKFINE PEN NEEDLES    100 each    1 Device by * route daily    Type 2 diabetes, HbA1c goal < 7% (H)       levothyroxine 75 MCG tablet    SYNTHROID/LEVOTHROID    90 tablet    Take 1 tablet (75 mcg) by mouth daily    Hypothyroidism due to acquired atrophy of thyroid       liraglutide 18 MG/3ML solution    VICTOZA PEN    27 mL    Inject 1.8 mg Subcutaneous daily    Type 2 diabetes mellitus with hyperglycemia, with long-term current use of insulin (H)       lisinopril 40 MG tablet    PRINIVIL/ZESTRIL    45 tablet    Take 1.5 tablets (60 mg) by mouth daily    Essential hypertension with goal blood pressure less than 140/90       metFORMIN 500 MG 24 hr tablet    GLUCOPHAGE-XR    360 tablet    Take 4 tablets (2,000 mg) by mouth At Bedtime    Uncontrolled type 2 diabetes mellitus with hyperglycemia, with long-term current use of insulin (H)       metoprolol succinate ER 25 MG 24 hr tablet    TOPROL XL    90 tablet    Take 1 tablet (25 mg) by mouth daily    Essential hypertension with goal blood pressure less than 140/90       NovoLOG FLEXPEN 100 UNIT/ML pen   Generic drug:  insulin aspart     45 mL    Use 3 units per 15 gram carbs + sliding scale. Total Daily Dose=90 units    Type 2 diabetes mellitus with hyperglycemia, with  long-term current use of insulin (H)       olopatadine 0.2 % ophthalmic solution    PATADAY    1 Bottle    Place 1 drop into both eyes daily as needed (For itchy, water eyes.)    Allergic conjunctivitis, bilateral       order for DME      Respironics REMSTAR 60 Series Auto CPAP 8-15cm H2O, Airfit P10 nasal pillow mask w/medium pillows        rosuvastatin 10 MG tablet    CRESTOR    90 tablet    Take 1 tablet (10 mg) by mouth daily    Hyperlipidemia LDL goal <100       * Notice:  This list has 2 medication(s) that are the same as other medications prescribed for you. Read the directions carefully, and ask your doctor or other care provider to review them with you.

## 2018-12-04 NOTE — NURSING NOTE
Sherry Slaughter's goals for this visit include: Dm follow up  She requests these members of her care team be copied on today's visit information: Yes    PCP: Marilou Arciniega    Referring Provider:  No referring provider defined for this encounter.    /68 (BP Location: Left arm, Patient Position: Chair, Cuff Size: Adult Large)  Pulse 81  Wt 131.6 kg (290 lb 3.2 oz)  LMP 10/02/2007  SpO2 98%  BMI 44.45 kg/m2    Do you need any medication refills at today's visit? Yes - would like 90 day supplies

## 2018-12-12 ENCOUNTER — MYC REFILL (OUTPATIENT)
Dept: PEDIATRICS | Facility: CLINIC | Age: 63
End: 2018-12-12

## 2018-12-12 ENCOUNTER — MYC REFILL (OUTPATIENT)
Dept: ENDOCRINOLOGY | Facility: CLINIC | Age: 63
End: 2018-12-12

## 2018-12-12 DIAGNOSIS — E78.5 HYPERLIPIDEMIA LDL GOAL <100: ICD-10-CM

## 2018-12-12 DIAGNOSIS — I10 ESSENTIAL HYPERTENSION WITH GOAL BLOOD PRESSURE LESS THAN 140/90: ICD-10-CM

## 2018-12-12 DIAGNOSIS — E11.65 TYPE 2 DIABETES MELLITUS WITH HYPERGLYCEMIA (H): ICD-10-CM

## 2018-12-12 DIAGNOSIS — E11.65 TYPE 2 DIABETES MELLITUS WITH HYPERGLYCEMIA, WITH LONG-TERM CURRENT USE OF INSULIN (H): ICD-10-CM

## 2018-12-12 DIAGNOSIS — E11.65 UNCONTROLLED TYPE 2 DIABETES MELLITUS WITH HYPERGLYCEMIA, WITH LONG-TERM CURRENT USE OF INSULIN (H): ICD-10-CM

## 2018-12-12 DIAGNOSIS — Z79.4 UNCONTROLLED TYPE 2 DIABETES MELLITUS WITH HYPERGLYCEMIA, WITH LONG-TERM CURRENT USE OF INSULIN (H): ICD-10-CM

## 2018-12-12 DIAGNOSIS — Z79.4 TYPE 2 DIABETES MELLITUS WITH HYPERGLYCEMIA, WITH LONG-TERM CURRENT USE OF INSULIN (H): ICD-10-CM

## 2018-12-12 DIAGNOSIS — E11.9 TYPE 2 DIABETES, HBA1C GOAL < 7% (H): ICD-10-CM

## 2018-12-12 DIAGNOSIS — E03.4 HYPOTHYROIDISM DUE TO ACQUIRED ATROPHY OF THYROID: ICD-10-CM

## 2018-12-12 RX ORDER — INSULIN ASPART 100 [IU]/ML
INJECTION, SOLUTION INTRAVENOUS; SUBCUTANEOUS
Qty: 45 ML | Refills: 3 | Status: SHIPPED | OUTPATIENT
Start: 2018-12-12 | End: 2019-05-21

## 2018-12-12 RX ORDER — METFORMIN HCL 500 MG
2000 TABLET, EXTENDED RELEASE 24 HR ORAL AT BEDTIME
Qty: 360 TABLET | Refills: 3 | Status: SHIPPED | OUTPATIENT
Start: 2018-12-12 | End: 2019-05-19

## 2018-12-12 RX ORDER — LIRAGLUTIDE 6 MG/ML
1.8 INJECTION SUBCUTANEOUS DAILY
Qty: 27 ML | Refills: 3 | Status: SHIPPED | OUTPATIENT
Start: 2018-12-12 | End: 2019-03-27

## 2018-12-12 RX ORDER — LEVOTHYROXINE SODIUM 75 UG/1
75 TABLET ORAL DAILY
Qty: 90 TABLET | Refills: 3 | Status: SHIPPED | OUTPATIENT
Start: 2018-12-12 | End: 2019-07-05

## 2018-12-13 RX ORDER — ROSUVASTATIN CALCIUM 10 MG/1
10 TABLET, COATED ORAL DAILY
Qty: 90 TABLET | Refills: 3 | OUTPATIENT
Start: 2018-12-13

## 2018-12-13 RX ORDER — METOPROLOL SUCCINATE 25 MG/1
25 TABLET, EXTENDED RELEASE ORAL DAILY
Qty: 90 TABLET | Refills: 3 | OUTPATIENT
Start: 2018-12-13

## 2018-12-13 RX ORDER — LISINOPRIL 40 MG/1
60 TABLET ORAL DAILY
Qty: 45 TABLET | Refills: 3 | OUTPATIENT
Start: 2018-12-13

## 2018-12-13 RX ORDER — HYDROCHLOROTHIAZIDE 25 MG/1
25 TABLET ORAL DAILY
Qty: 90 TABLET | Refills: 3 | OUTPATIENT
Start: 2018-12-13

## 2018-12-13 NOTE — TELEPHONE ENCOUNTER
Disp Refills Start End LORNA    hydrochlorothiazide (HYDRODIURIL) 25 MG tablet 90 tablet 3 11/14/2018  No   Sig - Route: Take 1 tablet (25 mg) by mouth daily - Oral   Pharmacy     [address]      Disp Refills Start End LORNA   lisinopril (PRINIVIL/ZESTRIL) 40 MG tablet 45 tablet 3 11/14/2018  No   Sig - Route: Take 1.5 tablets (60 mg) by mouth daily - Oral     Pharmacy     [address]      Disp Refills Start End LORNA   metoprolol succinate (TOPROL XL) 25 MG 24 hr tablet 90 tablet 3 11/14/2018  No   Sig - Route: Take 1 tablet (25 mg) by mouth daily - Oral     Pharmacy     [address]      Disp Refills Start End LORNA   rosuvastatin (CRESTOR) 10 MG tablet 90 tablet 3 11/20/2018  No   Sig - Route: Take 1 tablet (10 mg) by mouth daily - Oral     Pharmacy     [address]     Our records indicate duplicate request. Same requesting pharmacy. Adrienne Zaidi, RN

## 2018-12-19 ENCOUNTER — OFFICE VISIT (OUTPATIENT)
Dept: PODIATRY | Facility: CLINIC | Age: 63
End: 2018-12-19
Payer: COMMERCIAL

## 2018-12-19 VITALS — OXYGEN SATURATION: 98 % | DIASTOLIC BLOOD PRESSURE: 72 MMHG | SYSTOLIC BLOOD PRESSURE: 138 MMHG | HEART RATE: 72 BPM

## 2018-12-19 DIAGNOSIS — M21.619 BUNION: Primary | ICD-10-CM

## 2018-12-19 DIAGNOSIS — E11.69 TYPE 2 DIABETES MELLITUS WITH DIABETIC FOOT DEFORMITY (H): ICD-10-CM

## 2018-12-19 DIAGNOSIS — M21.969 TYPE 2 DIABETES MELLITUS WITH DIABETIC FOOT DEFORMITY (H): ICD-10-CM

## 2018-12-19 DIAGNOSIS — B35.3 TINEA PEDIS OF BOTH FEET: ICD-10-CM

## 2018-12-19 PROCEDURE — 99203 OFFICE O/P NEW LOW 30 MIN: CPT | Performed by: PODIATRIST

## 2018-12-19 RX ORDER — ECONAZOLE NITRATE 10 MG/G
CREAM TOPICAL DAILY
Qty: 85 G | Refills: 6 | Status: SHIPPED | OUTPATIENT
Start: 2018-12-19 | End: 2019-03-19

## 2018-12-19 NOTE — PATIENT INSTRUCTIONS
Thanks for coming today.  [facility]  [address] [place] [address]    To schedule future appointments in Ortho Clinic, you may call [phone].    To schedule ordered imaging by your Provider: Call [facility] at [phone]    [employer] available online at:   [email or web address]    Please call if any further questions or concerns [phone] and ask for the Orthopedic Department. Clinic hours 8 am to 5 pm.    Return to clinic if symptoms worsen.

## 2018-12-19 NOTE — LETTER
12/19/2018         RE: Sherry Slaughter  [address]  [place] [address]        Dear Colleague,    Thank you for referring your patient, Sherry Slaughter, to the [facility]. Please see a copy of my visit note below.    Past Medical History:   Diagnosis Date     Cataract      DEPRESSION     comes and goes - tried meds - unsuccessfully, certain times of the year, no psych intervention and no counselors in the past - and not interested      Diverticulosis of colon (without mention of hemorrhage) 4/04    colonoscopy     ECHO-mildLVH,tr MR,mild thick lflets w inc LA,trTR   12/03     Essential hypertension, benign 1990s    late 1990s - started medications at that time - not to difficult to control meds      Fam hx-cardiovas dis [facility]     father - CAD, and lipids/HTN - multiple members of the family      Family history of diabetes mellitus     sister and grandmother with DM      Family history of malignant neoplasm of breast     mother - young age - 45, and maternal cousin and aunt as well - no BRCA testing done      Family history of stroke (cerebrovascular)     grandmother in early life in her 40s      FAMILY HX COLON CANCER     Pat uncles x 2     Heart disease     murmur/     Heart murmur      HYPERLIPIDEMIA 2000    fairly recent - in the last 5 years - high for DM levels  -cholesterol recent      Irritable bowel syndrome     goes between the 2 - nerve related - more stressed more problems      MICROALBUMINURIA     unsure how long - been on the lisinopril - for a few years at well      Nonspecific abnormal results of liver function study 2/3/2003    SGOT - has been high in the past - since the hepatitis b - borderline elevation - not really changing any      OBESITY      Obstructive sleep apnea      GENESIS (obstructive sleep apnea) 10/15/2006    psg 5/15 AHI 53 aPAP 8-15     OSTEOARTHRITIS L KNEE 2003    total knee on the left - and pain is gone since the knee replacement      PERS  HX HEPATITIS B- RESOLVED] 1977    acute virus only - no chronic disease      PERS HX HEPATITIS B- RESOLVED]      Type II or unspecified type diabetes mellitus without mention of complication, not stated as uncontrolled 1991    oral meds frist, then insulin eventually later, difficult to control most of the time      Unspecified hypothyroidism 10/11/2006    TSH 3.36 - mild subclinical hypothyroidism - deciding on following or starting low dose meds - with dr Oreilly - following      Patient Active Problem List   Diagnosis     Morbid obesity (H)     Diverticulosis of large intestine     Nonspecific abnormal results of cardiovascular function study     FAMILY HX COLON CANCER     Nonallopathic lesion of thoracic region     Hypothyroidism     GENESIS (obstructive sleep apnea)     Irritable bowel syndrome     Family history of malignant neoplasm of breast     History of major depression     OSTEOARTHRITIS L KNEE  s/p knee replacement on the left      Hypertension goal BP (blood pressure) < 140/90     Family history of stroke (cerebrovascular)     Family history of other cardiovascular diseases     Family history of diabetes mellitus     ABSENCE OF MENSTRUATION - perimenopausal      JOINT PAIN-ANKLE - right ankle      Mitral valve insufficiency     Hyperlipidemia LDL goal <100     Aortic sclerosis     Tubular adenoma of colon     History of viral hepatitis, type B     Chronic low back pain     Long-term insulin use (H)     Uncontrolled diabetes mellitus (H)     S/P total knee replacement using cement, right     Aftercare following right knee joint replacement surgery     Lumbago     Type 2 diabetes mellitus with hyperglycemia (H)     Posterior vitreous detachment of right eye     Pseudophakia of both eyes     Past Surgical History:   Procedure Laterality Date     ARTHROPLASTY KNEE Right 9/23/2015    Procedure: ARTHROPLASTY KNEE;  Surgeon: Rufus Brown MD;  Location:  OR      NONSPECIFIC PROCEDURE  6/06    right triple  arthrodecesis      C NONSPECIFIC PROCEDURE  7/06     right bunion surgery      C TOTAL KNEE ARTHROPLASTY  3/03    L knee     CATARACT IOL, RT/LT Left      COLONOSCOPY  4/04    diverticulosis, rec repeat 10 yrs(consider fam hx?)     ORTHOPEDIC SURGERY      right ankle     PHACOEMULSIFICATION CLEAR CORNEA WITH STANDARD INTRAOCULAR LENS IMPLANT Left 3/15/2018    Procedure: PHACOEMULSIFICATION CLEAR CORNEA WITH STANDARD INTRAOCULAR LENS IMPLANT;  LEFT EYE PHACOEMULSIFICATION CLEAR CORNEA WITH STANDARD INTRAOCULAR LENS IMPLANT;  Surgeon: Bandar Linares MD;  Location:  [facility]     PHACOEMULSIFICATION CLEAR CORNEA WITH STANDARD INTRAOCULAR LENS IMPLANT Right 4/5/2018    Procedure: PHACOEMULSIFICATION CLEAR CORNEA WITH STANDARD INTRAOCULAR LENS IMPLANT;  RIGHT PHACOEMULSIFICATION CLEAR CORNEA WITH STANDARD INTRAOCULAR LENS IMPLANT ;  Surgeon: Bandar Linares MD;  Location:  [facility]     STRESS ECHO (METRO)  12/03    no ischemia, limited by fatigue     SURGICAL HISTORY OF -       EXP LAP- R OVARY CYSTS     SURGICAL HISTORY OF -   1981,1984,1985    CHILDBIRTH     Social History     Socioeconomic History     Marital status:      Spouse name: Not on file     Number of children: 3     Years of education: Not on file     Highest education level: Not on file   Social Needs     Financial resource strain: Not on file     Food insecurity - worry: Not on file     Food insecurity - inability: Not on file     Transportation needs - medical: Not on file     Transportation needs - non-medical: Not on file   Occupational History     Occupation: RN     Employer: [employer]   Tobacco Use     Smoking status: Never Smoker     Smokeless tobacco: Never Used     Tobacco comment: tried in early 20s only    Substance and Sexual Activity     Alcohol use: Yes     Comment: rare     Drug use: No     Sexual activity: No     Comment:  - since for 20 years, no signficant other  > 5 years sexual activity     Other Topics Concern      Service No     Blood Transfusions No     Caffeine Concern No     Occupational Exposure Yes     Comment: Normal nursing exposures     Hobby Hazards No     Sleep Concern Yes     Stress Concern No     Comment: Stress level at HM=5, stress level at [facility]=6     Weight Concern Yes     Special Diet Yes     Comment: Diabetic diet (watching carbs)     Back Care No     Comment: Occassional back spasms     Exercise Yes     Comment: Pt joined a health club goes approx 3-4 times a week,half to one mile daily     Bike Helmet No     Seat Belt Yes     Comment: Sometimes     Self-Exams Yes     Parent/sibling w/ CABG, MI or angioplasty before 65F 55M? 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      Family History   Problem Relation Age of Onset     Diabetes Maternal Grandmother         DM TYPE 2     Cerebrovascular Disease Maternal Grandmother      Hypertension Sister      Lipids Sister      Heart Disease Sister         Chronic AFib     Hypertension Sister      Lipids Sister      Gynecology Sister      Diabetes Sister      Asthma Sister      Blood Disease Paternal Grandmother         T CELL LEUKEMIA     Hypertension Brother      Lipids Brother      Congenital Anomalies Brother      Cardiovascular Brother      Heart Disease Brother         CABG/AFIB     Hypertension Brother      Lipids Brother      Obesity Brother      Hypertension Brother      Respiratory Brother         Sleep apnea     Heart Disease Brother         AFib     Alzheimer Disease Mother      Asthma Mother      Hypertension Mother      Breast Cancer Mother 40        has mastectomy and lived to 84     Allergies Mother         Sulfa,     Arthritis Mother      Cardiovascular Mother      Depression Mother      Respiratory Mother      Lipids Mother      Cancer Mother         breast     Thyroid Disease Mother      Cerebrovascular Disease Mother      Dementia Mother          Alzheimers     Cancer - colorectal Other      Cancer Father         lung     Aneurysm Father         brother, AAA     Other Cancer Father         lung ca     Asthma Sister      Cancer - colorectal Paternal Uncle         late 50s to early 60s - great uncles      Breast Cancer Other         Maternal cousin     Arrhythmia Sister         2 brothers 1 sister Afib     Breast Cancer Maternal Aunt 62     Other Cancer Maternal Aunt 35        Ovarian cancer.  Survived despite late recurrence and is now 92.     Glaucoma No family hx of      Macular Degeneration No family hx of      Lab Results   Component Value Date    A1C 6.8 11/14/2018    A1C 6.9 03/14/2018    A1C 7.7 07/03/2017    A1C 7.5 02/06/2017    A1C 8.9 09/07/2016     Last Comprehensive Metabolic Panel:  Sodium   Date Value Ref Range Status   07/09/2018 140 133 - 144 mmol/L Final     Potassium   Date Value Ref Range Status   07/09/2018 4.0 3.4 - 5.3 mmol/L Final     Chloride   Date Value Ref Range Status   07/09/2018 105 94 - 109 mmol/L Final     Carbon Dioxide   Date Value Ref Range Status   07/09/2018 28 20 - 32 mmol/L Final     Anion Gap   Date Value Ref Range Status   07/09/2018 7 3 - 14 mmol/L Final     Glucose   Date Value Ref Range Status   07/09/2018 109 (H) 70 - 99 mg/dL Final     Comment:     Non Fasting     Urea Nitrogen   Date Value Ref Range Status   07/09/2018 17 7 - 30 mg/dL Final     Creatinine   Date Value Ref Range Status   07/09/2018 0.74 0.52 - 1.04 mg/dL Final     GFR Estimate   Date Value Ref Range Status   07/09/2018 78 >60 mL/min/1.7m2 Final     Comment:     Non  GFR Calc     Calcium   Date Value Ref Range Status   07/09/2018 9.4 8.5 - 10.1 mg/dL Final     SUBJECTIVE FINDINGS:  This 63-year-old female presents from Rika Delgado MD, for bunion left foot and she relates that it has been bothering her. She relates if she does a lot of walking, it kind of hurts on the first MPJ.  She relates no specific treatment.  She does  not get too much numbness, tingling or neuropathy in her feet.  She relates she has had ulcers on the left foot in the past.  She does not wear diabetic shoes.  She relates she did have a triple arthrodesis and bunion surgery about 6 years ago on the right foot and that was for flat foot and for bunion.  She relates no other specific relieving or aggravating factors.        OBJECTIVE FINDINGS:  DP and PT are 2/4 bilaterally.  She has laterally deviated hallux with dorsal medial first MPJ prominence left foot.  She has a flexible flat foot type.  She has hyperkeratotic tissue buildup plantar medial first MPJ and hallux on the left.  She has decreased inversion and eversion range of motion on the right foot.  There is no erythema, no drainage, no odor, no calor bilaterally, no gross tendon voids bilaterally.  Deep tendon reflexes are intact bilaterally.  Sharp/dull is intact with 5.07 [place] monofilament bilaterally although I had to check it twice on the left fifth MPJ area.  She has dry scaly skin in a moccasin pattern bilaterally.  She has thick and dystrophic discolored nails bilaterally do differing degrees.  She relates that the first MPJ bothers her when it hurts.        ASSESSMENT AND PLAN:  Hallux abductor valgus with bunion left.  Tinea pedis bilaterally.  She is diabetic.  She is getting some neuropathy symptoms.  Her protective sensation is intact.  She also has dorsally contracted digits 2-5 bilaterally on physical exam.  She has hammertoes.  Diagnosis and treatment options discussed with her.  She is advised on stretching, Raiza toe alignment splint dispensed and use discussed with her.  Prescription for diabetic shoes and inserts given and she was given the phone number and address to orthotics and prosthetics lab to pick that up.  Prescription for econazole cream given and use discussed with her.  She will return to clinic to see me in about 3 months.         Again, thank you for allowing me  to participate in the care of your patient.        Sincerely,        Rufus Hutson DPM

## 2018-12-19 NOTE — NURSING NOTE
Sherry Slaughter's chief complaint for this visit includes:  Chief Complaint   Patient presents with     Consult For     Diabetic foot check      PCP: Marilou Arciniega    Referring Provider:  Rika Delgado MD  [address] [place] [address]    /72   Pulse 72   LMP 10/02/2007   SpO2 98%   Data Unavailable     Do you need any medication refills at today's visit? no

## 2018-12-19 NOTE — PROGRESS NOTES
Past Medical History:   Diagnosis Date     Cataract      DEPRESSION     comes and goes - tried meds - unsuccessfully, certain times of the year, no psych intervention and no counselors in the past - and not interested      Diverticulosis of colon (without mention of hemorrhage) 4/04    colonoscopy     ECHO-mildLVH,tr MR,mild thick lflets w inc LA,trTR   12/03     Essential hypertension, benign 1990s    late 1990s - started medications at that time - not to difficult to control meds      Fam hx-cardiovas dis NEC     father - CAD, and lipids/HTN - multiple members of the family      Family history of diabetes mellitus     sister and grandmother with DM      Family history of malignant neoplasm of breast     mother - young age - 45, and maternal cousin and aunt as well - no BRCA testing done      Family history of stroke (cerebrovascular)     grandmother in early life in her 40s      FAMILY HX COLON CANCER     Pat uncles x 2     Heart disease     murmur/     Heart murmur      HYPERLIPIDEMIA 2000    fairly recent - in the last 5 years - high for DM levels  -cholesterol recent      Irritable bowel syndrome     goes between the 2 - nerve related - more stressed more problems      MICROALBUMINURIA     unsure how long - been on the lisinopril - for a few years at well      Nonspecific abnormal results of liver function study 2/3/2003    SGOT - has been high in the past - since the hepatitis b - borderline elevation - not really changing any      OBESITY      Obstructive sleep apnea      GENESIS (obstructive sleep apnea) 10/15/2006    psg 5/15 AHI 53 aPAP 8-15     OSTEOARTHRITIS L KNEE 2003    total knee on the left - and pain is gone since the knee replacement      PERS HX HEPATITIS B- RESOLVED] 1977    acute virus only - no chronic disease      PERS HX HEPATITIS B- RESOLVED]      Type II or unspecified type diabetes mellitus without mention of complication, not stated as uncontrolled 1991    oral meds frist, then insulin  eventually later, difficult to control most of the time      Unspecified hypothyroidism 10/11/2006    TSH 3.36 - mild subclinical hypothyroidism - deciding on following or starting low dose meds - with dr Oreilly - following      Patient Active Problem List   Diagnosis     Morbid obesity (H)     Diverticulosis of large intestine     Nonspecific abnormal results of cardiovascular function study     FAMILY HX COLON CANCER     Nonallopathic lesion of thoracic region     Hypothyroidism     GENESIS (obstructive sleep apnea)     Irritable bowel syndrome     Family history of malignant neoplasm of breast     History of major depression     OSTEOARTHRITIS L KNEE  s/p knee replacement on the left      Hypertension goal BP (blood pressure) < 140/90     Family history of stroke (cerebrovascular)     Family history of other cardiovascular diseases     Family history of diabetes mellitus     ABSENCE OF MENSTRUATION - perimenopausal      JOINT PAIN-ANKLE - right ankle      Mitral valve insufficiency     Hyperlipidemia LDL goal <100     Aortic sclerosis     Tubular adenoma of colon     History of viral hepatitis, type B     Chronic low back pain     Long-term insulin use (H)     Uncontrolled diabetes mellitus (H)     S/P total knee replacement using cement, right     Aftercare following right knee joint replacement surgery     Lumbago     Type 2 diabetes mellitus with hyperglycemia (H)     Posterior vitreous detachment of right eye     Pseudophakia of both eyes     Past Surgical History:   Procedure Laterality Date     ARTHROPLASTY KNEE Right 9/23/2015    Procedure: ARTHROPLASTY KNEE;  Surgeon: Rufus Brown MD;  Location: [facility] OR     C NONSPECIFIC PROCEDURE  6/06    right triple arthrodecesis      C NONSPECIFIC PROCEDURE  7/06     right bunion surgery      C TOTAL KNEE ARTHROPLASTY  3/03    L knee     CATARACT IOL, RT/LT Left      COLONOSCOPY  4/04    diverticulosis, rec repeat 10 yrs(consider fam hx?)     ORTHOPEDIC SURGERY       right ankle     PHACOEMULSIFICATION CLEAR CORNEA WITH STANDARD INTRAOCULAR LENS IMPLANT Left 3/15/2018    Procedure: PHACOEMULSIFICATION CLEAR CORNEA WITH STANDARD INTRAOCULAR LENS IMPLANT;  LEFT EYE PHACOEMULSIFICATION CLEAR CORNEA WITH STANDARD INTRAOCULAR LENS IMPLANT;  Surgeon: Bandar Linares MD;  Location: [facility]     PHACOEMULSIFICATION CLEAR CORNEA WITH STANDARD INTRAOCULAR LENS IMPLANT Right 4/5/2018    Procedure: PHACOEMULSIFICATION CLEAR CORNEA WITH STANDARD INTRAOCULAR LENS IMPLANT;  RIGHT PHACOEMULSIFICATION CLEAR CORNEA WITH STANDARD INTRAOCULAR LENS IMPLANT ;  Surgeon: Bandar Linares MD;  Location: [facility]     STRESS ECHO (METRO)  12/03    no ischemia, limited by fatigue     SURGICAL HISTORY OF -       EXP LAP- R OVARY CYSTS     SURGICAL HISTORY OF -   1981,1984,1985    CHILDBIRTH     Social History     Socioeconomic History     Marital status:      Spouse name: Not on file     Number of children: 3     Years of education: Not on file     Highest education level: Not on file   Social Needs     Financial resource strain: Not on file     Food insecurity - worry: Not on file     Food insecurity - inability: Not on file     Transportation needs - medical: Not on file     Transportation needs - non-medical: Not on file   Occupational History     Occupation: RN     Employer: [employer]   Tobacco Use     Smoking status: Never Smoker     Smokeless tobacco: Never Used     Tobacco comment: tried in early 20s only    Substance and Sexual Activity     Alcohol use: Yes     Comment: rare     Drug use: No     Sexual activity: No     Comment:  - since for 20 years, no signficant other  > 5 years sexual activity    Other Topics Concern      Service No     Blood Transfusions No     Caffeine Concern No     Occupational Exposure Yes     Comment: Normal nursing exposures     Hobby Hazards No     Sleep Concern Yes     Stress Concern No     Comment: Stress level at  HM=5, stress level at WK=6     Weight Concern Yes     Special Diet Yes     Comment: Diabetic diet (watching carbs)     Back Care No     Comment: Occassional back spasms     Exercise Yes     Comment: Pt joined a health club goes approx 3-4 times a week,half to one mile daily     Bike Helmet No     Seat Belt Yes     Comment: Sometimes     Self-Exams Yes     Parent/sibling w/ CABG, MI or angioplasty before 65F 55M? 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      Family History   Problem Relation Age of Onset     Diabetes Maternal Grandmother         DM TYPE 2     Cerebrovascular Disease Maternal Grandmother      Hypertension Sister      Lipids Sister      Heart Disease Sister         Chronic AFib     Hypertension Sister      Lipids Sister      Gynecology Sister      Diabetes Sister      Asthma Sister      Blood Disease Paternal Grandmother         T CELL LEUKEMIA     Hypertension Brother      Lipids Brother      Congenital Anomalies Brother      Cardiovascular Brother      Heart Disease Brother         CABG/AFIB     Hypertension Brother      Lipids Brother      Obesity Brother      Hypertension Brother      Respiratory Brother         Sleep apnea     Heart Disease Brother         AFib     Alzheimer Disease Mother      Asthma Mother      Hypertension Mother      Breast Cancer Mother 40        has mastectomy and lived to      Allergies Mother         Sulfa,     Arthritis Mother      Cardiovascular Mother      Depression Mother      Respiratory Mother      Lipids Mother      Cancer Mother         breast     Thyroid Disease Mother      Cerebrovascular Disease Mother      Dementia Mother         Alzheimers     Cancer - colorectal Other      Cancer Father         lung     Aneurysm Father         brother, AAA     Other Cancer Father         lung ca     Asthma Sister      Cancer - colorectal Paternal Uncle         late 50s to early 60s - great uncles       Breast Cancer Other         Maternal cousin     Arrhythmia Sister         2 brothers 1 sister Afib     Breast Cancer Maternal Aunt 62     Other Cancer Maternal Aunt 35        Ovarian cancer.  Survived despite late recurrence and is now 92.     Glaucoma No family hx of      Macular Degeneration No family hx of      Lab Results   Component Value Date    A1C 6.8 11/14/2018    A1C 6.9 03/14/2018    A1C 7.7 07/03/2017    A1C 7.5 02/06/2017    A1C 8.9 09/07/2016     Last Comprehensive Metabolic Panel:  Sodium   Date Value Ref Range Status   07/09/2018 140 133 - 144 mmol/L Final     Potassium   Date Value Ref Range Status   07/09/2018 4.0 3.4 - 5.3 mmol/L Final     Chloride   Date Value Ref Range Status   07/09/2018 105 94 - 109 mmol/L Final     Carbon Dioxide   Date Value Ref Range Status   07/09/2018 28 20 - 32 mmol/L Final     Anion Gap   Date Value Ref Range Status   07/09/2018 7 3 - 14 mmol/L Final     Glucose   Date Value Ref Range Status   07/09/2018 109 (H) 70 - 99 mg/dL Final     Comment:     Non Fasting     Urea Nitrogen   Date Value Ref Range Status   07/09/2018 17 7 - 30 mg/dL Final     Creatinine   Date Value Ref Range Status   07/09/2018 0.74 0.52 - 1.04 mg/dL Final     GFR Estimate   Date Value Ref Range Status   07/09/2018 78 >60 mL/min/1.7m2 Final     Comment:     Non  GFR Calc     Calcium   Date Value Ref Range Status   07/09/2018 9.4 8.5 - 10.1 mg/dL Final     SUBJECTIVE FINDINGS:  This 63-year-old female presents from Rika Delgado MD, for bunion left foot and she relates that it has been bothering her. She relates if she does a lot of walking, it kind of hurts on the first MPJ.  She relates no specific treatment.  She does not get too much numbness, tingling or neuropathy in her feet.  She relates she has had ulcers on the left foot in the past.  She does not wear diabetic shoes.  She relates she did have a triple arthrodesis and bunion surgery about 6 years ago on the right  foot and that was for flat foot and for bunion.  She relates no other specific relieving or aggravating factors.        OBJECTIVE FINDINGS:  DP and PT are 2/4 bilaterally.  She has laterally deviated hallux with dorsal medial first MPJ prominence left foot.  She has a flexible flat foot type.  She has hyperkeratotic tissue buildup plantar medial first MPJ and hallux on the left.  She has decreased inversion and eversion range of motion on the right foot.  There is no erythema, no drainage, no odor, no calor bilaterally, no gross tendon voids bilaterally.  Deep tendon reflexes are intact bilaterally.  Sharp/dull is intact with 5.07 Barney-Andrez monofilament bilaterally although I had to check it twice on the left fifth MPJ area.  She has dry scaly skin in a moccasin pattern bilaterally.  She has thick and dystrophic discolored nails bilaterally do differing degrees.  She relates that the first MPJ bothers her when it hurts.        ASSESSMENT AND PLAN:  Hallux abductor valgus with bunion left.  Tinea pedis bilaterally.  She is diabetic.  She is getting some neuropathy symptoms.  Her protective sensation is intact.  She also has dorsally contracted digits 2-5 bilaterally on physical exam.  She has hammertoes.  Diagnosis and treatment options discussed with her.  She is advised on stretching, Raiza toe alignment splint dispensed and use discussed with her.  Prescription for diabetic shoes and inserts given and she was given the phone number and address to orthotics and prosthetics lab to pick that up.  Prescription for econazole cream given and use discussed with her.  She will return to clinic to see me in about 3 months.

## 2019-01-03 ENCOUNTER — DOCUMENTATION ONLY (OUTPATIENT)
Dept: ORTHOPEDICS | Facility: CLINIC | Age: 64
End: 2019-01-03

## 2019-01-03 NOTE — PROGRESS NOTES
S. Pt. arrived to our  facility today for an eval. for new diabetic shoes and custom orthotics.     O./goals. The objective is to help protect diabetic feet and to relieve 1st met/ray callus areas. The goal is to help pt. stand/transfer/ambulate with ease and relief.     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  She would like some orthotics and shoes to help protect her and give her 1st met/ray relief.  At this time, I have measured pt.'s feet for new, diabetic shoes and have taken biofoam impressions for custom fo's. At this time, I have ordered New Balance Woman's 928 White, Lace, 124E diabetic shoes from [facility](conf. #[id number]) and sent out the biofoam impressions to have 3 pairs of custom, fo's fabricated from [employer].     P. Pt. is scheduled to arrive back to our facility in approx. 3 weeks for a fitting. Pt. has been instructed to contact our facility with any future questions and/or concerns.    Ad ESPINAL [facility] Licensed Orthotist , ABC Certified Orthotist

## 2019-01-22 ENCOUNTER — DOCUMENTATION ONLY (OUTPATIENT)
Dept: ORTHOPEDICS | Facility: CLINIC | Age: 64
End: 2019-01-22

## 2019-01-22 NOTE — PROGRESS NOTES
S: Pt. arrived to our facility today for a fitting for custom, foot orthotics and shoes to help protect diabetic feet.     O/Goals: The objective is to help protect diabetic feet and to relieve 1st met/ray callus areas. The goal is to help pt. stand/transfer/ambulate with ease and relief.     A: I have fit pt. with 3 pairs of custom diabetic foot orthotics and 1 pair of New Balance Woman's 928 White, Lace, 124E diabetic shoes. Pt. has been instructed with proper donning, doffing, cleaning, and the importance skin care and checking. An instructional sheet has been given to pt. and gone through thoroughly. Pt. has ambulated with the orthotics and is satisfied with overall fit.  I have instructed patient to stop using the shoes/orthotics immediately, if there were any concerns on fit or any signs of irritation and to contact our facility to discuss the issue(s.)    P: Pt. has been instructed to contact our facility with any future questions and/or concerns.     Ad ESPINAL [facility] Licensed Orthotist , ABC Certified Orthotist

## 2019-03-19 ENCOUNTER — OFFICE VISIT (OUTPATIENT)
Dept: PODIATRY | Facility: CLINIC | Age: 64
End: 2019-03-19
Payer: COMMERCIAL

## 2019-03-19 ENCOUNTER — MYC REFILL (OUTPATIENT)
Dept: ORTHOPEDICS | Facility: CLINIC | Age: 64
End: 2019-03-19

## 2019-03-19 ENCOUNTER — OFFICE VISIT (OUTPATIENT)
Dept: URGENT CARE | Facility: RETAIL CLINIC | Age: 64
End: 2019-03-19
Payer: COMMERCIAL

## 2019-03-19 VITALS
SYSTOLIC BLOOD PRESSURE: 122 MMHG | HEART RATE: 83 BPM | OXYGEN SATURATION: 95 % | DIASTOLIC BLOOD PRESSURE: 75 MMHG | TEMPERATURE: 97.3 F

## 2019-03-19 VITALS — DIASTOLIC BLOOD PRESSURE: 70 MMHG | SYSTOLIC BLOOD PRESSURE: 129 MMHG | OXYGEN SATURATION: 98 % | HEART RATE: 74 BPM

## 2019-03-19 DIAGNOSIS — J02.9 ACUTE PHARYNGITIS, UNSPECIFIED ETIOLOGY: Primary | ICD-10-CM

## 2019-03-19 DIAGNOSIS — J06.9 VIRAL URI WITH COUGH: ICD-10-CM

## 2019-03-19 DIAGNOSIS — M21.619 BUNION: Primary | ICD-10-CM

## 2019-03-19 DIAGNOSIS — E11.69 TYPE 2 DIABETES MELLITUS WITH DIABETIC FOOT DEFORMITY (H): ICD-10-CM

## 2019-03-19 DIAGNOSIS — M21.969 TYPE 2 DIABETES MELLITUS WITH DIABETIC FOOT DEFORMITY (H): ICD-10-CM

## 2019-03-19 DIAGNOSIS — B35.3 TINEA PEDIS OF BOTH FEET: ICD-10-CM

## 2019-03-19 LAB — S PYO AG THROAT QL IA.RAPID: NORMAL

## 2019-03-19 PROCEDURE — 99213 OFFICE O/P EST LOW 20 MIN: CPT | Performed by: PHYSICIAN ASSISTANT

## 2019-03-19 PROCEDURE — 99213 OFFICE O/P EST LOW 20 MIN: CPT | Performed by: PODIATRIST

## 2019-03-19 PROCEDURE — 87880 STREP A ASSAY W/OPTIC: CPT | Mod: QW | Performed by: PHYSICIAN ASSISTANT

## 2019-03-19 PROCEDURE — 87081 CULTURE SCREEN ONLY: CPT | Performed by: PHYSICIAN ASSISTANT

## 2019-03-19 RX ORDER — ECONAZOLE NITRATE 10 MG/G
CREAM TOPICAL DAILY
Qty: 85 G | Refills: 6 | Status: SHIPPED | OUTPATIENT
Start: 2019-03-19 | End: 2019-05-19

## 2019-03-19 NOTE — PATIENT INSTRUCTIONS
Thanks for coming today.  [facility]tho[facility] Clinic  [address] [place] [address]    To schedule future appointments in Ortho Clinic, you may call [phone].    To schedule ordered imaging by your Provider: Call [facility] at [phone]    [employer] available online at:   [email or web address]    Please call if any further questions or concerns [phone] and ask for the Orthopedic Department. Clinic hours 8 am to 5 pm.    Return to clinic if symptoms worsen.

## 2019-03-19 NOTE — NURSING NOTE
Sherry Slaughter's chief complaint for this visit includes:  Chief Complaint   Patient presents with     RECHECK     diabetic foot check     PCP: Marilou Arciniega    Referring Provider:  Rufus Hutson DPM  [address] [place] [phone]    /70   Pulse 74   LMP 10/02/2007   SpO2 98%   Data Unavailable     Do you need any medication refills at today's visit? no

## 2019-03-19 NOTE — LETTER
3/19/2019         RE: Sherry Slaughter  [id number]  [place] [address]        Dear Colleague,    Thank you for referring your patient, Sherry Slaughter, to the [facility]. Please see a copy of my visit note below.    Past Medical History:   Diagnosis Date     Cataract      DEPRESSION     comes and goes - tried meds - unsuccessfully, certain times of the year, no psych intervention and no counselors in the past - and not interested      Diverticulosis of colon (without mention of hemorrhage) 4/04    colonoscopy     ECHO-mildLVH,tr MR,mild thick lflets w inc LA,trTR   12/03     Essential hypertension, benign 1990s    late 1990s - started medications at that time - not to difficult to control meds      Fam hx-cardiovas dis NEC     father - CAD, and lipids/HTN - multiple members of the family      Family history of diabetes mellitus     sister and grandmother with DM      Family history of malignant neoplasm of breast     mother - young age - 45, and maternal cousin and aunt as well - no BRCA testing done      Family history of stroke (cerebrovascular)     grandmother in early life in her 40s      FAMILY HX COLON CANCER     Pat uncles x 2     Heart disease     murmur/     Heart murmur      HYPERLIPIDEMIA 2000    fairly recent - in the last 5 years - high for DM levels  -cholesterol recent      Irritable bowel syndrome     goes between the 2 - nerve related - more stressed more problems      MICROALBUMINURIA     unsure how long - been on the lisinopril - for a few years at well      Nonspecific abnormal results of liver function study 2/3/2003    SGOT - has been high in the past - since the hepatitis b - borderline elevation - not really changing any      OBESITY      Obstructive sleep apnea      GENESIS (obstructive sleep apnea) 10/15/2006    psg 5/15 AHI 53 aPAP 8-15     OSTEOARTHRITIS L KNEE 2003    total knee on the left - and pain is gone since the knee replacement      PERS HX  HEPATITIS B- RESOLVED] 1977    acute virus only - no chronic disease      PERS HX HEPATITIS B- RESOLVED]      Type II or unspecified type diabetes mellitus without mention of complication, not stated as uncontrolled 1991    oral meds frist, then insulin eventually later, difficult to control most of the time      Unspecified hypothyroidism 10/11/2006    TSH 3.36 - mild subclinical hypothyroidism - deciding on following or starting low dose meds - with dr Oreilly - following      Patient Active Problem List   Diagnosis     Morbid obesity (H)     Diverticulosis of large intestine     Nonspecific abnormal results of cardiovascular function study     FAMILY HX COLON CANCER     Nonallopathic lesion of thoracic region     Hypothyroidism     GENESIS (obstructive sleep apnea)     Irritable bowel syndrome     Family history of malignant neoplasm of breast     History of major depression     OSTEOARTHRITIS L KNEE  s/p knee replacement on the left      Hypertension goal BP (blood pressure) < 140/90     Family history of stroke (cerebrovascular)     Family history of other cardiovascular diseases     Family history of diabetes mellitus     ABSENCE OF MENSTRUATION - perimenopausal      JOINT PAIN-ANKLE - right ankle      Mitral valve insufficiency     Hyperlipidemia LDL goal <100     Aortic sclerosis     Tubular adenoma of colon     History of viral hepatitis, type B     Chronic low back pain     Long-term insulin use (H)     Uncontrolled diabetes mellitus (H)     S/P total knee replacement using cement, right     Aftercare following right knee joint replacement surgery     Lumbago     Type 2 diabetes mellitus with hyperglycemia (H)     Posterior vitreous detachment of right eye     Pseudophakia of both eyes     Past Surgical History:   Procedure Laterality Date     ARTHROPLASTY KNEE Right 9/23/2015    Procedure: ARTHROPLASTY KNEE;  Surgeon: Rufus Brown MD;  Location:  OR      NONSPECIFIC PROCEDURE  6/06    right triple  arthrodecesis      C NONSPECIFIC PROCEDURE  7/06     right bunion surgery      C TOTAL KNEE ARTHROPLASTY  3/03    L knee     CATARACT IOL, RT/LT Left      COLONOSCOPY  4/04    diverticulosis, rec repeat 10 yrs(consider fam hx?)     ORTHOPEDIC SURGERY      right ankle     PHACOEMULSIFICATION CLEAR CORNEA WITH STANDARD INTRAOCULAR LENS IMPLANT Left 3/15/2018    Procedure: PHACOEMULSIFICATION CLEAR CORNEA WITH STANDARD INTRAOCULAR LENS IMPLANT;  LEFT EYE PHACOEMULSIFICATION CLEAR CORNEA WITH STANDARD INTRAOCULAR LENS IMPLANT;  Surgeon: Bandar Linares MD;  Location:  [facility]     PHACOEMULSIFICATION CLEAR CORNEA WITH STANDARD INTRAOCULAR LENS IMPLANT Right 4/5/2018    Procedure: PHACOEMULSIFICATION CLEAR CORNEA WITH STANDARD INTRAOCULAR LENS IMPLANT;  RIGHT PHACOEMULSIFICATION CLEAR CORNEA WITH STANDARD INTRAOCULAR LENS IMPLANT ;  Surgeon: Bandar Linares MD;  Location:  [facility]     STRESS ECHO (METRO)  12/03    no ischemia, limited by fatigue     SURGICAL HISTORY OF -       EXP LAP- R OVARY CYSTS     SURGICAL HISTORY OF -   1981,1984,1985    CHILDBIRTH     Social History     Socioeconomic History     Marital status:      Spouse name: Not on file     Number of children: 3     Years of education: Not on file     Highest education level: Not on file   Occupational History     Occupation: RN     Employer: [employer]   Social Needs     Financial resource strain: Not on file     Food insecurity:     Worry: Not on file     Inability: Not on file     Transportation needs:     Medical: Not on file     Non-medical: Not on file   Tobacco Use     Smoking status: Never Smoker     Smokeless tobacco: Never Used     Tobacco comment: tried in early 20s only    Substance and Sexual Activity     Alcohol use: Yes     Comment: rare     Drug use: No     Sexual activity: No     Comment:  - since for 20 years, no signficant other  > 5 years sexual activity    Lifestyle     Physical activity:      Days per week: Not on file     Minutes per session: Not on file     Stress: Not on file   Relationships     Social connections:     Talks on phone: Not on file     Gets together: Not on file     Attends Cheondoism service: Not on file     Active member of club or organization: Not on file     Attends meetings of clubs or organizations: Not on file     Relationship status: Not on file     Intimate partner violence:     Fear of current or ex partner: Not on file     Emotionally abused: Not on file     Physically abused: Not on file     Forced sexual activity: Not on file   Other Topics Concern      Service No     Blood Transfusions No     Caffeine Concern No     Occupational Exposure Yes     Comment: Normal nursing exposures     Hobby Hazards No     Sleep Concern Yes     Stress Concern No     Comment: Stress level at HM=5, stress level at WK=6     Weight Concern Yes     Special Diet Yes     Comment: Diabetic diet (watching carbs)     Back Care No     Comment: Occassional back spasms     Exercise Yes     Comment: Pt joined a health club goes approx 3-4 times a week,half to one mile daily     Bike Helmet No     Seat Belt Yes     Comment: Sometimes     Self-Exams Yes     Parent/sibling w/ CABG, MI or angioplasty before 65F 55M? 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      Family History   Problem Relation Age of Onset     Diabetes Maternal Grandmother         DM TYPE 2     Cerebrovascular Disease Maternal Grandmother      Hypertension Sister      Lipids Sister      Heart Disease Sister         Chronic AFib     Hypertension Sister      Lipids Sister      Gynecology Sister      Diabetes Sister      Asthma Sister      Blood Disease Paternal Grandmother         T CELL LEUKEMIA     Hypertension Brother      Lipids Brother      Congenital Anomalies Brother      Cardiovascular Brother      Heart Disease Brother         CABG/AFIB     Hypertension  Brother      Lipids Brother      Obesity Brother      Hypertension Brother      Respiratory Brother         Sleep apnea     Heart Disease Brother         AFib     Alzheimer Disease Mother      Asthma Mother      Hypertension Mother      Breast Cancer Mother 40        has mastectomy and lived to 84     Allergies Mother         Sulfa,     Arthritis Mother      Cardiovascular Mother      Depression Mother      Respiratory Mother      Lipids Mother      Cancer Mother         breast     Thyroid Disease Mother      Cerebrovascular Disease Mother      Dementia Mother         Alzheimers     Cancer - colorectal Other      Cancer Father         lung     Aneurysm Father         brother, AAA     Other Cancer Father         lung ca     Asthma Sister      Cancer - colorectal Paternal Uncle         late 50s to early 60s - great uncles      Breast Cancer Other         Maternal cousin     Arrhythmia Sister         2 brothers 1 sister Afib     Breast Cancer Maternal Aunt 62     Other Cancer Maternal Aunt 35        Ovarian cancer.  Survived despite late recurrence and is now 92.     Glaucoma No family hx of      Macular Degeneration No family hx of      Lab Results   Component Value Date    A1C 6.8 11/14/2018    A1C 6.9 03/14/2018    A1C 7.7 07/03/2017    A1C 7.5 02/06/2017    A1C 8.9 09/07/2016     SUBJECTIVE FINDINGS:  A 63-year-old female returns to clinic for hallux abductovalgus with bunion, left, tinea pedis, and diabetes.  She relates that she got her diabetic shoes and those are good.  Relates that she is wearing compression socks.  She relates that she gets some slight neuropathy symptoms in her feet.  No ulcers or sores since we have seen her last.  She uses the econazole.  She feels that helps.  She still has some toenail fungus, though.      OBJECTIVE FINDINGS:  DP and PT are 2/4 bilaterally.  She has hyperkeratotic tissue buildup, plantar medial hallux and first MPJ, left.  She has laterally deviated hallux with  dorsomedial first MPJ prominence, left.  She has dorsally contracted digits bilaterally.  There is no erythema, no edema, no drainage, no odor, no calor bilaterally.      ASSESSMENT:  Hallux abductovalgus with bunion, left.  Tinea pedis is nearly resolved.  She is diabetic with some neuropathy symptoms.  Diagnosis and treatment options discussed with her.  She will continue the diabetic shoes and the stretching and the econazole cream for the onychomycosis and return to clinic and see me in about 4 months for diabetic foot check.           Again, thank you for allowing me to participate in the care of your patient.        Sincerely,        Rufus Hutson DPM

## 2019-03-19 NOTE — PROGRESS NOTES
Past Medical History:   Diagnosis Date     Cataract      DEPRESSION     comes and goes - tried meds - unsuccessfully, certain times of the year, no psych intervention and no counselors in the past - and not interested      Diverticulosis of colon (without mention of hemorrhage) 4/04    colonoscopy     ECHO-mildLVH,tr MR,mild thick lflets w inc LA,trTR   12/03     Essential hypertension, benign 1990s    late 1990s - started medications at that time - not to difficult to control meds      Fam hx-cardiovas dis NEC     father - CAD, and lipids/HTN - multiple members of the family      Family history of diabetes mellitus     sister and grandmother with DM      Family history of malignant neoplasm of breast     mother - young age - 45, and maternal cousin and aunt as well - no BRCA testing done      Family history of stroke (cerebrovascular)     grandmother in early life in her 40s      FAMILY HX COLON CANCER     Pat uncles x 2     Heart disease     murmur/     Heart murmur      HYPERLIPIDEMIA 2000    fairly recent - in the last 5 years - high for DM levels  -cholesterol recent      Irritable bowel syndrome     goes between the 2 - nerve related - more stressed more problems      MICROALBUMINURIA     unsure how long - been on the lisinopril - for a few years at well      Nonspecific abnormal results of liver function study 2/3/2003    SGOT - has been high in the past - since the hepatitis b - borderline elevation - not really changing any      OBESITY      Obstructive sleep apnea      GENESIS (obstructive sleep apnea) 10/15/2006    psg 5/15 AHI 53 aPAP 8-15     OSTEOARTHRITIS L KNEE 2003    total knee on the left - and pain is gone since the knee replacement      PERS HX HEPATITIS B- RESOLVED] 1977    acute virus only - no chronic disease      PERS HX HEPATITIS B- RESOLVED]      Type II or unspecified type diabetes mellitus without mention of complication, not stated as uncontrolled 1991    oral meds frist, then insulin  eventually later, difficult to control most of the time      Unspecified hypothyroidism 10/11/2006    TSH 3.36 - mild subclinical hypothyroidism - deciding on following or starting low dose meds - with dr Oreilly - following      Patient Active Problem List   Diagnosis     Morbid obesity (H)     Diverticulosis of large intestine     Nonspecific abnormal results of cardiovascular function study     FAMILY HX COLON CANCER     Nonallopathic lesion of thoracic region     Hypothyroidism     GENESIS (obstructive sleep apnea)     Irritable bowel syndrome     Family history of malignant neoplasm of breast     History of major depression     OSTEOARTHRITIS L KNEE  s/p knee replacement on the left      Hypertension goal BP (blood pressure) < 140/90     Family history of stroke (cerebrovascular)     Family history of other cardiovascular diseases     Family history of diabetes mellitus     ABSENCE OF MENSTRUATION - perimenopausal      JOINT PAIN-ANKLE - right ankle      Mitral valve insufficiency     Hyperlipidemia LDL goal <100     Aortic sclerosis     Tubular adenoma of colon     History of viral hepatitis, type B     Chronic low back pain     Long-term insulin use (H)     Uncontrolled diabetes mellitus (H)     S/P total knee replacement using cement, right     Aftercare following right knee joint replacement surgery     Lumbago     Type 2 diabetes mellitus with hyperglycemia (H)     Posterior vitreous detachment of right eye     Pseudophakia of both eyes     Past Surgical History:   Procedure Laterality Date     ARTHROPLASTY KNEE Right 9/23/2015    Procedure: ARTHROPLASTY KNEE;  Surgeon: Rufus Brown MD;  Location: [facility] OR     C NONSPECIFIC PROCEDURE  6/06    right triple arthrodecesis      C NONSPECIFIC PROCEDURE  7/06     right bunion surgery      C TOTAL KNEE ARTHROPLASTY  3/03    L knee     CATARACT IOL, RT/LT Left      COLONOSCOPY  4/04    diverticulosis, rec repeat 10 yrs(consider fam hx?)     ORTHOPEDIC SURGERY       right ankle     PHACOEMULSIFICATION CLEAR CORNEA WITH STANDARD INTRAOCULAR LENS IMPLANT Left 3/15/2018    Procedure: PHACOEMULSIFICATION CLEAR CORNEA WITH STANDARD INTRAOCULAR LENS IMPLANT;  LEFT EYE PHACOEMULSIFICATION CLEAR CORNEA WITH STANDARD INTRAOCULAR LENS IMPLANT;  Surgeon: Bandar Linares MD;  Location: [facility]     PHACOEMULSIFICATION CLEAR CORNEA WITH STANDARD INTRAOCULAR LENS IMPLANT Right 4/5/2018    Procedure: PHACOEMULSIFICATION CLEAR CORNEA WITH STANDARD INTRAOCULAR LENS IMPLANT;  RIGHT PHACOEMULSIFICATION CLEAR CORNEA WITH STANDARD INTRAOCULAR LENS IMPLANT ;  Surgeon: Bandar Linares MD;  Location: [facility]     STRESS ECHO (METRO)  12/03    no ischemia, limited by fatigue     SURGICAL HISTORY OF -       EXP LAP- R OVARY CYSTS     SURGICAL HISTORY OF -   1981,1984,1985    CHILDBIRTH     Social History     Socioeconomic History     Marital status:      Spouse name: Not on file     Number of children: 3     Years of education: Not on file     Highest education level: Not on file   Occupational History     Occupation: RN     Employer: [employer]   Social Needs     Financial resource strain: Not on file     Food insecurity:     Worry: Not on file     Inability: Not on file     Transportation needs:     Medical: Not on file     Non-medical: Not on file   Tobacco Use     Smoking status: Never Smoker     Smokeless tobacco: Never Used     Tobacco comment: tried in early 20s only    Substance and Sexual Activity     Alcohol use: Yes     Comment: rare     Drug use: No     Sexual activity: No     Comment:  - since for 20 years, no signficant other  > 5 years sexual activity    Lifestyle     Physical activity:     Days per week: Not on file     Minutes per session: Not on file     Stress: Not on file   Relationships     Social connections:     Talks on phone: Not on file     Gets together: Not on file     Attends Mosque service: Not on file     Active member of  club or organization: Not on file     Attends meetings of clubs or organizations: Not on file     Relationship status: Not on file     Intimate partner violence:     Fear of current or ex partner: Not on file     Emotionally abused: Not on file     Physically abused: Not on file     Forced sexual activity: Not on file   Other Topics Concern      Service No     Blood Transfusions No     Caffeine Concern No     Occupational Exposure Yes     Comment: Normal nursing exposures     Hobby Hazards No     Sleep Concern Yes     Stress Concern No     Comment: Stress level at HM=5, stress level at WK=6     Weight Concern Yes     Special Diet Yes     Comment: Diabetic diet (watching carbs)     Back Care No     Comment: Occassional back spasms     Exercise Yes     Comment: Pt joined a health club goes approx 3-4 times a week,half to one mile daily     Bike Helmet No     Seat Belt Yes     Comment: Sometimes     Self-Exams Yes     Parent/sibling w/ CABG, MI or angioplasty before 65F 55M? 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      Family History   Problem Relation Age of Onset     Diabetes Maternal Grandmother         DM TYPE 2     Cerebrovascular Disease Maternal Grandmother      Hypertension Sister      Lipids Sister      Heart Disease Sister         Chronic AFib     Hypertension Sister      Lipids Sister      Gynecology Sister      Diabetes Sister      Asthma Sister      Blood Disease Paternal Grandmother         T CELL LEUKEMIA     Hypertension Brother      Lipids Brother      Congenital Anomalies Brother      Cardiovascular Brother      Heart Disease Brother         CABG/AFIB     Hypertension Brother      Lipids Brother      Obesity Brother      Hypertension Brother      Respiratory Brother         Sleep apnea     Heart Disease Brother         AFib     Alzheimer Disease Mother      Asthma Mother      Hypertension Mother      Breast Cancer  Mother 40        has mastectomy and lived to 84     Allergies Mother         Sulfa,     Arthritis Mother      Cardiovascular Mother      Depression Mother      Respiratory Mother      Lipids Mother      Cancer Mother         breast     Thyroid Disease Mother      Cerebrovascular Disease Mother      Dementia Mother         Alzheimers     Cancer - colorectal Other      Cancer Father         lung     Aneurysm Father         brother, AAA     Other Cancer Father         lung ca     Asthma Sister      Cancer - colorectal Paternal Uncle         late 50s to early 60s - great uncles      Breast Cancer Other         Maternal cousin     Arrhythmia Sister         2 brothers 1 sister Afib     Breast Cancer Maternal Aunt 62     Other Cancer Maternal Aunt 35        Ovarian cancer.  Survived despite late recurrence and is now 92.     Glaucoma No family hx of      Macular Degeneration No family hx of      Lab Results   Component Value Date    A1C 6.8 11/14/2018    A1C 6.9 03/14/2018    A1C 7.7 07/03/2017    A1C 7.5 02/06/2017    A1C 8.9 09/07/2016     SUBJECTIVE FINDINGS:  A 63-year-old female returns to clinic for hallux abductovalgus with bunion, left, tinea pedis, and diabetes.  She relates that she got her diabetic shoes and those are good.  Relates that she is wearing compression socks.  She relates that she gets some slight neuropathy symptoms in her feet.  No ulcers or sores since we have seen her last.  She uses the econazole.  She feels that helps.  She still has some toenail fungus, though.      OBJECTIVE FINDINGS:  DP and PT are 2/4 bilaterally.  She has hyperkeratotic tissue buildup, plantar medial hallux and first MPJ, left.  She has laterally deviated hallux with dorsomedial first MPJ prominence, left.  She has dorsally contracted digits bilaterally.  There is no erythema, no edema, no drainage, no odor, no calor bilaterally.      ASSESSMENT:  Hallux abductovalgus with bunion, left.  Tinea pedis is nearly resolved.   She is diabetic with some neuropathy symptoms.  Diagnosis and treatment options discussed with her.  She will continue the diabetic shoes and the stretching and the econazole cream for the onychomycosis and return to clinic and see me in about 4 months for diabetic foot check.

## 2019-03-20 NOTE — PROGRESS NOTES
Chief Complaint   Patient presents with     Pharyngitis     since saturday, started out with a non productive cough then sore throat, pt thinks feverish     SUBJECTIVE:  Sherry Slaughter is a 63 year old female presenting with a chief complaint of a sore throat.  Onset of symptoms was 3 days ago.  Course of illness: gradual onset.  Severity: moderate  Current and Associated symptoms: cough - productive and subjective fever.  Treatment measures tried include: ibuprofen about 12 hours ago  Predisposing factors include: None.    Past Medical History:   Diagnosis Date     Cataract      DEPRESSION     comes and goes - tried meds - unsuccessfully, certain times of the year, no psych intervention and no counselors in the past - and not interested      Diverticulosis of colon (without mention of hemorrhage) 4/04    colonoscopy     ECHO-mildLVH,tr MR,mild thick lflets w inc LA,trTR   12/03     Essential hypertension, benign 1990s    late 1990s - started medications at that time - not to difficult to control meds      Fam hx-cardiovas dis NEC     father - CAD, and lipids/HTN - multiple members of the family      Family history of diabetes mellitus     sister and grandmother with DM      Family history of malignant neoplasm of breast     mother - young age - 45, and maternal cousin and aunt as well - no BRCA testing done      Family history of stroke (cerebrovascular)     grandmother in early life in her 40s      FAMILY HX COLON CANCER     Pat uncles x 2     Heart disease     murmur/     Heart murmur      HYPERLIPIDEMIA 2000    fairly recent - in the last 5 years - high for DM levels  -cholesterol recent      Irritable bowel syndrome     goes between the 2 - nerve related - more stressed more problems      MICROALBUMINURIA     unsure how long - been on the lisinopril - for a few years at well      Nonspecific abnormal results of liver function study 2/3/2003    SGOT - has been high in the past - since the hepatitis b -  borderline elevation - not really changing any      OBESITY      Obstructive sleep apnea      GENESIS (obstructive sleep apnea) 10/15/2006    psg 5/15 AHI 53 aPAP 8-15     OSTEOARTHRITIS L KNEE 2003    total knee on the left - and pain is gone since the knee replacement      PERS HX HEPATITIS B- RESOLVED] 1977    acute virus only - no chronic disease      PERS HX HEPATITIS B- RESOLVED]      Type II or unspecified type diabetes mellitus without mention of complication, not stated as uncontrolled 1991    oral meds frist, then insulin eventually later, difficult to control most of the time      Unspecified hypothyroidism 10/11/2006    TSH 3.36 - mild subclinical hypothyroidism - deciding on following or starting low dose meds - with dr Oreilly - following      Current Outpatient Medications   Medication Sig Dispense Refill     ASPIRIN EC PO Take 325 mg by mouth daily       blood glucose monitoring (NO BRAND SPECIFIED) test strip Use to test blood sugar 4 times daily or as directed. 100 each 11     Continuous Blood Gluc  (FREESTYLE MIKE 14 DAY READER) KATIA 1 each daily 1 Device 0     Continuous Blood Gluc Sensor (FREESTYLE MIKE 14 DAY SENSOR) MISC 1 each every 14 days 6 each 3     econazole nitrate 1 % external cream Apply topically daily To feet and toenails. 85 g 6     hydrochlorothiazide (HYDRODIURIL) 25 MG tablet Take 1 tablet (25 mg) by mouth daily 90 tablet 3     Ibuprofen (ADVIL PO) Take 600 mg by mouth 4 times daily       insulin glargine (LANTUS SOLOSTAR PEN) 100 UNIT/ML pen Inject 70 Units Subcutaneous 2 times daily 45 mL 3     insulin pen needle (GNP CLICKFINE PEN NEEDLES) 31G X 8 MM miscellaneous 1 Device by Combination route daily 100 each 3     levothyroxine (SYNTHROID/LEVOTHROID) 75 MCG tablet Take 1 tablet (75 mcg) by mouth daily 90 tablet 3     liraglutide (VICTOZA PEN) 18 MG/3ML solution Inject 1.8 mg Subcutaneous daily 27 mL 3     lisinopril (PRINIVIL/ZESTRIL) 40 MG tablet Take 1.5 tablets (60  mg) by mouth daily 45 tablet 3     metFORMIN (GLUCOPHAGE-XR) 500 MG 24 hr tablet Take 4 tablets (2,000 mg) by mouth At Bedtime 360 tablet 3     metoprolol succinate (TOPROL XL) 25 MG 24 hr tablet Take 1 tablet (25 mg) by mouth daily 90 tablet 3     Multiple Vitamins-Minerals (ADVANCED DIABETIC MULTIVITAMIN) TABS Take 1 tablet by mouth daily       NOVOLOG FLEXPEN 100 UNIT/ML soln Use 3 units per 15 gram carbs + sliding scale. Total Daily Dose=90 units 45 mL 3     olopatadine HCl (PATADAY) 0.2 % SOLN Place 1 drop into both eyes daily as needed (For itchy, water eyes.) 1 Bottle 6     ORDER FOR DME, SET TO LOCAL PRINT, Respironics REMSTAR 60 Series Auto CPAP 8-15cm H2O, Airfit P10 nasal pillow mask w/medium pillows       rosuvastatin (CRESTOR) 10 MG tablet Take 1 tablet (10 mg) by mouth daily 90 tablet 3     BASAGLAR 100 UNIT/ML injection Inject 70 Units Subcutaneous 2 times daily Today took 35 units this am 63 mL 1     Garlic 1000 MG CAPS Take 1 capsule by mouth daily Takes during summer months.  Done taking until next summer.       Social History     Tobacco Use     Smoking status: Never Smoker     Smokeless tobacco: Never Used     Tobacco comment: tried in early 20s only    Substance Use Topics     Alcohol use: Yes     Comment: rare     Allergies   Allergen Reactions     Atorvastatin Calcium      Gas     Pravachol [Pravastatin Sodium]      Pravachol - dry cough      Simvastatin      Myalgia     REVIEW OF SYSTEMS  General: NEGATIVE for fever, headache.  Skin: Negative for rash.  ENT: POSITIVE for sore throat. NEGATIVE for nasal congestion.  Resp: POSITIVE for cough. NEGATIVE for wheezing and SOB.    OBJECTIVE:   /75   Pulse 83   Temp 97.3  F (36.3  C) (Tympanic)   LMP 10/02/2007   SpO2 95%   GENERAL APPEARANCE: healthy, alert and in no distress  HEENT: Eyes PEERL, conjunctiva clear. Bilateral ear canals and TMs normal. Nose normal. Pharynx erythematous without tonsillar hypertrophy or exudate noted.  NECK:  "supple, non-tender to palpation, no adenopathy noted  RESP: lungs clear to auscultation - no rales, rhonchi or wheezes  CV: regular rates and rhythm, normal S1 S2, no murmur noted  SKIN: no suspicious lesions or rashes    Rapid Strep test is negative; await throat culture results.    ASSESSMENT:    ICD-10-CM    1. Acute pharyngitis, unspecified etiology J02.9 BETA STREP GROUP A R/O CULTURE     RAPID STREP SCREEN   2. Viral URI with cough J06.9     B97.89      PLAN:   Patient Instructions   Rapid strep test today is negative.   Your throat culture is pending. [facility] will call if positive results to start antibiotics at that time; No call if the culture is negative.  Drink plenty of fluids and rest.  May use salt water gargles- about 8 oz warm water with about 1 teaspoon salt  Over the counter pain relievers such as Tylenol or ibuprofen may be used as needed.   Honey lemon tea helps to soothe the throat. \"Throat Coat\" tea is soothing as well.  Please follow up with primary care provider if not improving, worsening or new symptoms.    Follow up with primary care provider with any problems, questions or concerns or if symptoms worsen or fail to improve. Patient agreed to plan and verbalized understanding.    HARJIT Leigh - [place]  "

## 2019-03-22 LAB
BACTERIA SPEC CULT: NORMAL
SPECIMEN SOURCE: NORMAL

## 2019-03-27 ENCOUNTER — MYC REFILL (OUTPATIENT)
Dept: ENDOCRINOLOGY | Facility: CLINIC | Age: 64
End: 2019-03-27

## 2019-03-27 DIAGNOSIS — E11.65 TYPE 2 DIABETES MELLITUS WITH HYPERGLYCEMIA (H): ICD-10-CM

## 2019-03-27 DIAGNOSIS — Z79.4 TYPE 2 DIABETES MELLITUS WITH HYPERGLYCEMIA, WITH LONG-TERM CURRENT USE OF INSULIN (H): ICD-10-CM

## 2019-03-27 DIAGNOSIS — E11.9 TYPE 2 DIABETES, HBA1C GOAL < 7% (H): ICD-10-CM

## 2019-03-27 DIAGNOSIS — E11.65 TYPE 2 DIABETES MELLITUS WITH HYPERGLYCEMIA, WITH LONG-TERM CURRENT USE OF INSULIN (H): ICD-10-CM

## 2019-03-27 RX ORDER — LIRAGLUTIDE 6 MG/ML
1.8 INJECTION SUBCUTANEOUS DAILY
Qty: 27 ML | Refills: 3 | Status: SHIPPED | OUTPATIENT
Start: 2019-03-27 | End: 2019-06-24

## 2019-04-09 DIAGNOSIS — I10 ESSENTIAL HYPERTENSION WITH GOAL BLOOD PRESSURE LESS THAN 140/90: ICD-10-CM

## 2019-04-09 RX ORDER — LISINOPRIL 40 MG/1
TABLET ORAL
Qty: 45 TABLET | Refills: 2 | Status: SHIPPED | OUTPATIENT
Start: 2019-04-09 | End: 2019-05-19

## 2019-05-16 ENCOUNTER — HEALTH MAINTENANCE LETTER (OUTPATIENT)
Age: 64
End: 2019-05-16

## 2019-05-19 ENCOUNTER — MYC REFILL (OUTPATIENT)
Dept: ORTHOPEDICS | Facility: CLINIC | Age: 64
End: 2019-05-19

## 2019-05-19 ENCOUNTER — MYC REFILL (OUTPATIENT)
Dept: ENDOCRINOLOGY | Facility: CLINIC | Age: 64
End: 2019-05-19

## 2019-05-19 ENCOUNTER — MYC REFILL (OUTPATIENT)
Dept: PEDIATRICS | Facility: CLINIC | Age: 64
End: 2019-05-19

## 2019-05-19 DIAGNOSIS — E11.65 UNCONTROLLED TYPE 2 DIABETES MELLITUS WITH HYPERGLYCEMIA, WITH LONG-TERM CURRENT USE OF INSULIN (H): ICD-10-CM

## 2019-05-19 DIAGNOSIS — I10 ESSENTIAL HYPERTENSION WITH GOAL BLOOD PRESSURE LESS THAN 140/90: ICD-10-CM

## 2019-05-19 DIAGNOSIS — Z79.4 UNCONTROLLED TYPE 2 DIABETES MELLITUS WITH HYPERGLYCEMIA, WITH LONG-TERM CURRENT USE OF INSULIN (H): ICD-10-CM

## 2019-05-19 DIAGNOSIS — B35.3 TINEA PEDIS OF BOTH FEET: ICD-10-CM

## 2019-05-20 RX ORDER — LISINOPRIL 40 MG/1
60 TABLET ORAL DAILY
Qty: 135 TABLET | Refills: 0 | Status: SHIPPED | OUTPATIENT
Start: 2019-05-20 | End: 2019-07-05

## 2019-05-20 RX ORDER — METFORMIN HCL 500 MG
2000 TABLET, EXTENDED RELEASE 24 HR ORAL AT BEDTIME
Qty: 360 TABLET | Refills: 0 | Status: SHIPPED | OUTPATIENT
Start: 2019-05-20 | End: 2019-05-21

## 2019-05-20 RX ORDER — ECONAZOLE NITRATE 10 MG/G
CREAM TOPICAL DAILY
Qty: 85 G | Refills: 6 | Status: SHIPPED | OUTPATIENT
Start: 2019-05-20 | End: 2019-06-10

## 2019-05-21 ENCOUNTER — OFFICE VISIT (OUTPATIENT)
Dept: ENDOCRINOLOGY | Facility: CLINIC | Age: 64
End: 2019-05-21
Payer: COMMERCIAL

## 2019-05-21 VITALS
WEIGHT: 292 LBS | HEART RATE: 74 BPM | SYSTOLIC BLOOD PRESSURE: 168 MMHG | DIASTOLIC BLOOD PRESSURE: 80 MMHG | BODY MASS INDEX: 44.25 KG/M2 | OXYGEN SATURATION: 96 % | HEIGHT: 68 IN

## 2019-05-21 DIAGNOSIS — E03.4 HYPOTHYROIDISM DUE TO ACQUIRED ATROPHY OF THYROID: ICD-10-CM

## 2019-05-21 DIAGNOSIS — Z79.4 UNCONTROLLED TYPE 2 DIABETES MELLITUS WITH HYPERGLYCEMIA, WITH LONG-TERM CURRENT USE OF INSULIN (H): Primary | ICD-10-CM

## 2019-05-21 DIAGNOSIS — E11.65 UNCONTROLLED TYPE 2 DIABETES MELLITUS WITH HYPERGLYCEMIA, WITH LONG-TERM CURRENT USE OF INSULIN (H): Primary | ICD-10-CM

## 2019-05-21 DIAGNOSIS — I10 HYPERTENSION GOAL BP (BLOOD PRESSURE) < 140/90: ICD-10-CM

## 2019-05-21 DIAGNOSIS — E66.01 MORBID OBESITY (H): ICD-10-CM

## 2019-05-21 LAB — HBA1C MFR BLD: 7.8 % (ref 0–5.6)

## 2019-05-21 PROCEDURE — 83036 HEMOGLOBIN GLYCOSYLATED A1C: CPT | Performed by: INTERNAL MEDICINE

## 2019-05-21 PROCEDURE — 99214 OFFICE O/P EST MOD 30 MIN: CPT | Performed by: INTERNAL MEDICINE

## 2019-05-21 PROCEDURE — 36415 COLL VENOUS BLD VENIPUNCTURE: CPT | Performed by: INTERNAL MEDICINE

## 2019-05-21 RX ORDER — METFORMIN HCL 500 MG
2000 TABLET, EXTENDED RELEASE 24 HR ORAL AT BEDTIME
Qty: 360 TABLET | Refills: 3 | Status: SHIPPED | OUTPATIENT
Start: 2019-05-21 | End: 2020-06-07

## 2019-05-21 RX ORDER — FLASH GLUCOSE SENSOR
1 KIT MISCELLANEOUS
Qty: 6 EACH | Refills: 3 | Status: SHIPPED | OUTPATIENT
Start: 2019-05-21 | End: 2019-07-31

## 2019-05-21 ASSESSMENT — MIFFLIN-ST. JEOR: SCORE: 1908.51

## 2019-05-21 NOTE — PATIENT INSTRUCTIONS
Empagliflozin  Rik Noonan       U500 insulin - administer 85 U in the morning and 80 U before supper (preferably 30 minutes prior to eating)  Scan the nevin before meals and at bedtime, 4 times daily

## 2019-05-21 NOTE — NURSING NOTE
"Sherry Slaughter's goals for this visit include:   Chief Complaint   Patient presents with     Diabetes     She requests these members of her care team be copied on today's visit information: Yes    PCP: Marilou Arciniega    Referring Provider:  Marilou Arciniega MD PhD  [address] [place] [address]    /87 (BP Location: Left arm, Patient Position: Sitting, Cuff Size: Adult Large)   Pulse 97   Ht 1.72 m (5' 7.72\")   Wt 132.5 kg (292 lb)   LMP 10/02/2007   SpO2 96%   BMI 44.77 kg/m      Do you need any medication refills at today's visit? Yes    "

## 2019-05-21 NOTE — LETTER
5/21/2019         RE: Sherry Slaughter  [address]  [place] [address]        Dear Colleague,    Thank you for referring your patient, Sherry Slaughter, to the [facility]. Please see a copy of my visit note below.        Sherry is a 64 year old female seen in follow-up for type 2 diabetes.    She has been diagnosed with type 2 diabetes 25 years after she was diagnosed with gestational diabetes, during both her pregnancies. Her weight before pregnancy was 190-200. Her highest weight was 300s. She was initially started on oral medications and then insulin was added around 2012.     Her A1C has been in 9% range since 2012. She was on Byetta but she did not feel it helped. She was also on Actos but stopped due to 40 lbs weight gain and increased SOB.  For the last 2-3 years, she has been taking Victoza and the patient does not feel the medication is effective in curbing her appetite.  She continues to take metformin, 2 g extended release daily.      Current antidiabetic regimen consists of:   2 g extended release metformin daily  1.8 mg Victoza daily  70 units Lantus, twice daily (at 6 AM and 10 PM)  NovoLog, 3 units per 15 g carbohydrates and a correction scale of 1 unit per 5 mg above 140.      A1c today was 7.8%, up from 6.8% at her last visit here.   She is going to retire in the beginning of June and she plans to transition to Medicare.  She is concerned about the cost of her antidiabetic medications and she points out that Victoza is too expensive.  On average, she reports administering approximately 20 units NovoLog per meal.  Most of the days, she has 3 meals.  Sometimes, she skips lunch.  The nevin sensor reveals an average blood glucose of 190 with a standard addition of 53.  40% of the blood sugar numbers are within target of  and 60% are above.  She only scans the sensor consistently in the morning, occasionally during daytime.  As a result, there is a gap  between 1 PM and 9:30 PM, when no blood glucose numbers are recorded, most of the days.  She denies experiencing hypoglycemia and she has not been using the correction NovoLog.  Average morning blood sugar is around 180.    DM complications  Retinopathy: last eye exam: scheduled for 6/2019; no DR, B/L surgery for cataract   Nephropathy: negative urine microalb - on lisinopril 60 mg; GFR in the 70s  Neuropathy: occasional burning sensation in her feet for the last couple of years   She is symptomatic for hypoglycemia (able to recognize blood sugar numbers in the 90s - she gets sweaty, hot and lightheaded).  She corrects the hypoglycemic symptoms by having something to eat. She does not have glucagon at home (never experienced severe hypoglycemic episodes).   Aspirin - taking 325 mg  LDL 20 (2018) on Crestor 10 mg  Using the CPAP   Exercise limited due to bilateral knee replacement and low back pain.    2.  Hypertension  Her blood pressure is managed with 60 mg lisinopril daily, 25 mg metoprolol daily and 25 mg hydrochlorothiazide daily.    Past Medical History  Past Medical History:   Diagnosis Date     Cataract      DEPRESSION     comes and goes - tried meds - unsuccessfully, certain times of the year, no psych intervention and no counselors in the past - and not interested      Diverticulosis of colon (without mention of hemorrhage) 4/04    colonoscopy     ECHO-mildLVH,tr MR,mild thick lflets w inc LA,trTR   12/03     Essential hypertension, benign 1990s    late 1990s - started medications at that time - not to difficult to control meds      Fam hx-cardiovas dis NEC     father - CAD, and lipids/HTN - multiple members of the family      Family history of diabetes mellitus     sister and grandmother with DM      Family history of malignant neoplasm of breast     mother - young age - 45, and maternal cousin and aunt as well - no BRCA testing done      Family history of stroke (cerebrovascular)     grandmother in early  life in her 40s      FAMILY HX COLON CANCER     Pat uncles x 2     Heart disease     murmur/     Heart murmur      HYPERLIPIDEMIA 2000    fairly recent - in the last 5 years - high for DM levels  -cholesterol recent      Irritable bowel syndrome     goes between the 2 - nerve related - more stressed more problems      MICROALBUMINURIA     unsure how long - been on the lisinopril - for a few years at well      Nonspecific abnormal results of liver function study 2/3/2003    SGOT - has been high in the past - since the hepatitis b - borderline elevation - not really changing any      OBESITY      Obstructive sleep apnea      GENESIS (obstructive sleep apnea) 10/15/2006    psg 5/15 AHI 53 aPAP 8-15     OSTEOARTHRITIS L KNEE 2003    total knee on the left - and pain is gone since the knee replacement      PERS HX HEPATITIS B- RESOLVED] 1977    acute virus only - no chronic disease      PERS HX HEPATITIS B- RESOLVED]      Type II or unspecified type diabetes mellitus without mention of complication, not stated as uncontrolled 1991    oral meds frist, then insulin eventually later, difficult to control most of the time      Unspecified hypothyroidism 10/11/2006    TSH 3.36 - mild subclinical hypothyroidism - deciding on following or starting low dose meds - with dr Oreilly - following    Hepatis B     Allergies  Allergies   Allergen Reactions     Atorvastatin Calcium      Gas     Pravachol [Pravastatin Sodium]      Pravachol - dry cough      Simvastatin      Myalgia     Medications    Current Outpatient Medications:      ASPIRIN EC PO, Take 325 mg by mouth daily, Disp: , Rfl:      Continuous Blood Gluc  (FREESTYLE MIKE 14 DAY READER) KATIA, 1 each daily, Disp: 1 Device, Rfl: 0     Continuous Blood Gluc Sensor (FREESTYLE MIKE 14 DAY SENSOR) MISC, 1 each every 14 days, Disp: 6 each, Rfl: 3     econazole nitrate 1 % external cream, Apply topically daily To feet and toenails., Disp: 85 g, Rfl: 6     Garlic 1000 MG CAPS,  Take 1 capsule by mouth daily Takes during summer months.  Done taking until next summer., Disp: , Rfl:      hydrochlorothiazide (HYDRODIURIL) 25 MG tablet, Take 1 tablet (25 mg) by mouth daily, Disp: 90 tablet, Rfl: 3     Ibuprofen (ADVIL PO), Take 600 mg by mouth 4 times daily, Disp: , Rfl:      insulin glargine (LANTUS SOLOSTAR PEN) 100 UNIT/ML pen, Inject 70 Units Subcutaneous 2 times daily, Disp: 45 mL, Rfl: 3     insulin pen needle (GNP CLICKFINE PEN NEEDLES) 31G X 8 MM miscellaneous, 1 Device by Combination route daily, Disp: 100 each, Rfl: 3     levothyroxine (SYNTHROID/LEVOTHROID) 75 MCG tablet, Take 1 tablet (75 mcg) by mouth daily, Disp: 90 tablet, Rfl: 3     liraglutide (VICTOZA PEN) 18 MG/3ML solution, Inject 1.8 mg Subcutaneous daily, Disp: 27 mL, Rfl: 3     lisinopril (PRINIVIL/ZESTRIL) 40 MG tablet, Take 1.5 tablets (60 mg) by mouth daily, Disp: 135 tablet, Rfl: 0     metFORMIN (GLUCOPHAGE-XR) 500 MG 24 hr tablet, Take 4 tablets (2,000 mg) by mouth At Bedtime, Disp: 360 tablet, Rfl: 3     metoprolol succinate (TOPROL XL) 25 MG 24 hr tablet, Take 1 tablet (25 mg) by mouth daily, Disp: 90 tablet, Rfl: 3     Multiple Vitamins-Minerals (ADVANCED DIABETIC MULTIVITAMIN) TABS, Take 1 tablet by mouth daily, Disp: , Rfl:      NOVOLOG FLEXPEN 100 UNIT/ML soln, Use 3 units per 15 gram carbs + sliding scale. Total Daily Dose=90 units, Disp: 45 mL, Rfl: 3     ORDER FOR DME, SET TO LOCAL PRINT,, Respironics REMSTAR 60 Series Auto CPAP 8-15cm H2O, Airfit P10 nasal pillow mask w/medium pillows, Disp: , Rfl:      rosuvastatin (CRESTOR) 10 MG tablet, Take 1 tablet (10 mg) by mouth daily, Disp: 90 tablet, Rfl: 3     blood glucose monitoring (NO BRAND SPECIFIED) test strip, Use to test blood sugar 4 times daily or as directed. (Patient not taking: Reported on 5/21/2019), Disp: 100 each, Rfl: 11    Family History  Maternal grandmother had type 2 diabetes  Daughter has GDM  Father had CAD s/p stent, lung cancer and  "abdominal aortic anuerysm  Older brother has CABG, and abdominal aortic anuerysm  Young brother has Afib, SVT  Another younger brother has multiple myeloma  Sister has SVT    Social History  No smoking, rarely drink EtOH  No illicit drug  Works as a nurse in the ICU  Lives by herself, has 3 daughters    ROS  Constitutional: stable weight, fatigue   Eyes: no vision changes  Cardiovascular: no chest pain, no palpitations    Respiratory: no SOB or cough   GI: alternating episodes of constipation and diarrhea; no abdominal pain; she associates the diarrhea with increased stress   : no change in urine, no dysuria; urinates 1-2 times a night   Musculoskeletal: no legs swelling, joint pain - mainly the knees and the low back pain  Integumentary: no concerning lesions  Neuro: no loss of strength or sensation, no numbness or tingling, no tremor, no dizziness, no headache   Endo: no temperature intolerance   Heme/Lymph: no concerning bumps, no bleeding problems   Psych: no depression or anxiety, no sleep problems.    Physical Exam  BP Readings from Last 6 Encounters:   05/21/19 149/87   03/19/19 122/75   03/19/19 129/70   12/19/18 138/72   12/04/18 138/68   11/14/18 121/71     Wt Readings from Last 10 Encounters:   05/21/19 132.5 kg (292 lb)   12/04/18 131.6 kg (290 lb 3.2 oz)   11/14/18 129.3 kg (285 lb)   08/21/18 133.8 kg (295 lb)   07/10/18 133.8 kg (295 lb)   05/02/18 136 kg (299 lb 13.2 oz)   04/05/18 134 kg (295 lb 8 oz)   03/14/18 134 kg (295 lb 8 oz)   11/02/17 135.6 kg (299 lb)   07/03/17 133.9 kg (295 lb 1.6 oz)     /80   Pulse 74   Ht 1.72 m (5' 7.72\")   Wt 132.5 kg (292 lb)   LMP 10/02/2007   SpO2 96%   BMI 44.77 kg/m     Body mass index is 44.77 kg/m .     Constitutional: general obesity, no distress, comfortable, pleasant   Eyes: anicteric, normal extra-ocular movements, no lid lag or retraction  Neck: no thyromegaly; no palpable nodules  CVS: RRR, normal S1,S2, systolic murmur with radiation to " the carotid arteries   Lungs: CTA B/L  Musculoskeletal: no edema, DP 2+; left lower extremities - mild atrophy compared with the right   GI: Abdomen soft, nontender, nondistended, positive bowel sounds  NS: no tremor, normal gait, knee reflexes absent  Skin: benign abd striae   Psychological: appropriate mood  Feet: mildy decreased sensation on the soles of both feet, flat feet, B/L calluses, onychomycosis    RESULTS  I reviewed prior lab results documented in epic.  Lab Results   Component Value Date    A1C 7.8 (A) 05/21/2019    A1C 6.8 (H) 11/14/2018    A1C 6.9 (H) 03/14/2018    A1C 7.7 (H) 07/03/2017    A1C 7.5 02/06/2017       Hemoglobin   Date Value Ref Range Status   09/25/2015 11.5 (L) 11.7 - 15.7 g/dL Final     Hematocrit   Date Value Ref Range Status   11/14/2018 41.6 35.0 - 47.0 % Final     Cholesterol   Date Value Ref Range Status   11/14/2018 109 <200 mg/dL Final     Cholesterol/HDL Ratio   Date Value Ref Range Status   02/19/2015 3.2 0.0 - 5.0 Final     HDL Cholesterol   Date Value Ref Range Status   11/14/2018 37 (L) >49 mg/dL Final     LDL Cholesterol Calculated   Date Value Ref Range Status   11/14/2018 20 <100 mg/dL Final     Comment:     Desirable:       <100 mg/dl     VLDL-Cholesterol   Date Value Ref Range Status   02/19/2015 34 (H) 0 - 30 mg/dL Final     Triglycerides   Date Value Ref Range Status   11/14/2018 262 (H) <150 mg/dL Final     Comment:     Borderline high:  150-199 mg/dl  High:             200-499 mg/dl  Very high:       >499 mg/dl  Fasting specimen       Albumin Urine mg/L   Date Value Ref Range Status   11/14/2018 24 mg/L Final     TSH   Date Value Ref Range Status   11/14/2018 1.74 0.40 - 4.00 mU/L Final         Last Basic Metabolic Panel:    Sodium   Date Value Ref Range Status   07/09/2018 140 133 - 144 mmol/L Final     Potassium   Date Value Ref Range Status   07/09/2018 4.0 3.4 - 5.3 mmol/L Final     Chloride   Date Value Ref Range Status   07/09/2018 105 94 - 109 mmol/L Final      Calcium   Date Value Ref Range Status   07/09/2018 9.4 8.5 - 10.1 mg/dL Final     Carbon Dioxide   Date Value Ref Range Status   07/09/2018 28 20 - 32 mmol/L Final     Urea Nitrogen   Date Value Ref Range Status   07/09/2018 17 7 - 30 mg/dL Final     Creatinine   Date Value Ref Range Status   07/09/2018 0.74 0.52 - 1.04 mg/dL Final     GFR Estimate   Date Value Ref Range Status   07/09/2018 78 >60 mL/min/1.7m2 Final     Comment:     Non  GFR Calc     Glucose   Date Value Ref Range Status   07/09/2018 109 (H) 70 - 99 mg/dL Final     Comment:     Non Fasting       AST   Date Value Ref Range Status   03/07/2014 41 0 - 45 U/L Final     ALT   Date Value Ref Range Status   11/14/2018 37 0 - 50 U/L Final     Albumin   Date Value Ref Range Status   03/07/2014 4.2 3.3 - 4.9 g/dL Final         ASSESSMENT:      1. Type 2 diabetes  Sherry is a 64 year old pleasant female, with a history of type 2 diabetes in the setting of obesity, sleep apnea, decreased physical exercise due to knee and back pain.  Her diabetes is suboptimally controlled.  It is known to be complicated by mild neuropathy.    Recommendations:  Scan the nevin sensor consistently, before meals and at bedtime  Continue metformin  Discontinue Victoza, in view of the high copayment.  The patient was given a list with other GLP-1 analogs and SGLT2 inhibitors, to check their insurance coverage.  She is going to contact us if any of these medications are a good option from a financial standpoint.  Discontinue NovoLog and Lantus and start U500 insulin.  I recommended 85 units U500 insulin in the morning, prior to breakfast and 80 units prior to dinner.  Insulin should be administered approximately 30 minutes prior to food intake.  She should contact us with the blood sugar numbers, so we can gradually titrate the insulin based on the sensor readings.  Follow-up lab work, urine microalbumin CMP, hematocrit, lipid panel.    2. Hypertension,  uncontrolled. On lisinopril 60 mg, HCTZ 25 mg and metoprolol 25 mg daily.    If this is a persistent issue at her next appointment, we are going to consider increasing the dose of metoprolol.    3.  Hypothyroidism.  Clinically and biochemically, the patient is euthyroid.  I recommended to continue to take the same dose of levothyroxine.  Follow-up reflex TSH at her next appointment    Orders Placed This Encounter   Procedures     Hemoglobin A1c POCT     Albumin Random Urine Quantitative with Creat Ratio     Comprehensive metabolic panel     Hematocrit     Lipid panel reflex to direct LDL Fasting     TSH with free T4 reflex           Again, thank you for allowing me to participate in the care of your patient.        Sincerely,        Rika Delgado MD

## 2019-05-21 NOTE — PROGRESS NOTES
Sherry is a 64 year old female seen in follow-up for type 2 diabetes.    She has been diagnosed with type 2 diabetes 25 years after she was diagnosed with gestational diabetes, during both her pregnancies. Her weight before pregnancy was 190-200. Her highest weight was 300s. She was initially started on oral medications and then insulin was added around 2012.     Her A1C has been in 9% range since 2012. She was on Byetta but she did not feel it helped. She was also on Actos but stopped due to 40 lbs weight gain and increased SOB.  For the last 2-3 years, she has been taking Victoza and the patient does not feel the medication is effective in curbing her appetite.  She continues to take metformin, 2 g extended release daily.      Current antidiabetic regimen consists of:   2 g extended release metformin daily  1.8 mg Victoza daily  70 units Lantus, twice daily (at 6 AM and 10 PM)  NovoLog, 3 units per 15 g carbohydrates and a correction scale of 1 unit per 5 mg above 140.      A1c today was 7.8%, up from 6.8% at her last visit here.   She is going to retire in the beginning of June and she plans to transition to Medicare.  She is concerned about the cost of her antidiabetic medications and she points out that Victoza is too expensive.  On average, she reports administering approximately 20 units NovoLog per meal.  Most of the days, she has 3 meals.  Sometimes, she skips lunch.  The nevin sensor reveals an average blood glucose of 190 with a standard addition of 53.  40% of the blood sugar numbers are within target of  and 60% are above.  She only scans the sensor consistently in the morning, occasionally during daytime.  As a result, there is a gap between 1 PM and 9:30 PM, when no blood glucose numbers are recorded, most of the days.  She denies experiencing hypoglycemia and she has not been using the correction NovoLog.  Average morning blood sugar is around 180.    DM complications  Retinopathy: last  eye exam: scheduled for 6/2019; no DR, B/L surgery for cataract   Nephropathy: negative urine microalb - on lisinopril 60 mg; GFR in the 70s  Neuropathy: occasional burning sensation in her feet for the last couple of years   She is symptomatic for hypoglycemia (able to recognize blood sugar numbers in the 90s - she gets sweaty, hot and lightheaded).  She corrects the hypoglycemic symptoms by having something to eat. She does not have glucagon at home (never experienced severe hypoglycemic episodes).   Aspirin - taking 325 mg  LDL 20 (2018) on Crestor 10 mg  Using the CPAP   Exercise limited due to bilateral knee replacement and low back pain.    2.  Hypertension  Her blood pressure is managed with 60 mg lisinopril daily, 25 mg metoprolol daily and 25 mg hydrochlorothiazide daily.    Past Medical History  Past Medical History:   Diagnosis Date     Cataract      DEPRESSION     comes and goes - tried meds - unsuccessfully, certain times of the year, no psych intervention and no counselors in the past - and not interested      Diverticulosis of colon (without mention of hemorrhage) 4/04    colonoscopy     ECHO-mildLVH,tr MR,mild thick lflets w inc LA,trTR   12/03     Essential hypertension, benign 1990s    late 1990s - started medications at that time - not to difficult to control meds      Fam hx-cardiovas dis NEC     father - CAD, and lipids/HTN - multiple members of the family      Family history of diabetes mellitus     sister and grandmother with DM      Family history of malignant neoplasm of breast     mother - young age - 45, and maternal cousin and aunt as well - no BRCA testing done      Family history of stroke (cerebrovascular)     grandmother in early life in her 40s      FAMILY HX COLON CANCER     Pat uncles x 2     Heart disease     murmur/     Heart murmur      HYPERLIPIDEMIA 2000    fairly recent - in the last 5 years - high for DM levels  -cholesterol recent      Irritable bowel syndrome     goes  between the 2 - nerve related - more stressed more problems      MICROALBUMINURIA     unsure how long - been on the lisinopril - for a few years at well      Nonspecific abnormal results of liver function study 2/3/2003    SGOT - has been high in the past - since the hepatitis b - borderline elevation - not really changing any      OBESITY      Obstructive sleep apnea      GENESIS (obstructive sleep apnea) 10/15/2006    psg 5/15 AHI 53 aPAP 8-15     OSTEOARTHRITIS L KNEE 2003    total knee on the left - and pain is gone since the knee replacement      PERS HX HEPATITIS B- RESOLVED] 1977    acute virus only - no chronic disease      PERS HX HEPATITIS B- RESOLVED]      Type II or unspecified type diabetes mellitus without mention of complication, not stated as uncontrolled 1991    oral meds frist, then insulin eventually later, difficult to control most of the time      Unspecified hypothyroidism 10/11/2006    TSH 3.36 - mild subclinical hypothyroidism - deciding on following or starting low dose meds - with dr Oreilly - following    Hepatis B     Allergies  Allergies   Allergen Reactions     Atorvastatin Calcium      Gas     Pravachol [Pravastatin Sodium]      Pravachol - dry cough      Simvastatin      Myalgia     Medications    Current Outpatient Medications:      ASPIRIN EC PO, Take 325 mg by mouth daily, Disp: , Rfl:      Continuous Blood Gluc  (FREESTYLE MIKE 14 DAY READER) KATIA, 1 each daily, Disp: 1 Device, Rfl: 0     Continuous Blood Gluc Sensor (FREESTYLE MIKE 14 DAY SENSOR) MISC, 1 each every 14 days, Disp: 6 each, Rfl: 3     econazole nitrate 1 % external cream, Apply topically daily To feet and toenails., Disp: 85 g, Rfl: 6     Garlic 1000 MG CAPS, Take 1 capsule by mouth daily Takes during summer months.  Done taking until next summer., Disp: , Rfl:      hydrochlorothiazide (HYDRODIURIL) 25 MG tablet, Take 1 tablet (25 mg) by mouth daily, Disp: 90 tablet, Rfl: 3     Ibuprofen (ADVIL PO), Take 600 mg  by mouth 4 times daily, Disp: , Rfl:      insulin glargine (LANTUS SOLOSTAR PEN) 100 UNIT/ML pen, Inject 70 Units Subcutaneous 2 times daily, Disp: 45 mL, Rfl: 3     insulin pen needle (GNP CLICKFINE PEN NEEDLES) 31G X 8 MM miscellaneous, 1 Device by Combination route daily, Disp: 100 each, Rfl: 3     levothyroxine (SYNTHROID/LEVOTHROID) 75 MCG tablet, Take 1 tablet (75 mcg) by mouth daily, Disp: 90 tablet, Rfl: 3     liraglutide (VICTOZA PEN) 18 MG/3ML solution, Inject 1.8 mg Subcutaneous daily, Disp: 27 mL, Rfl: 3     lisinopril (PRINIVIL/ZESTRIL) 40 MG tablet, Take 1.5 tablets (60 mg) by mouth daily, Disp: 135 tablet, Rfl: 0     metFORMIN (GLUCOPHAGE-XR) 500 MG 24 hr tablet, Take 4 tablets (2,000 mg) by mouth At Bedtime, Disp: 360 tablet, Rfl: 3     metoprolol succinate (TOPROL XL) 25 MG 24 hr tablet, Take 1 tablet (25 mg) by mouth daily, Disp: 90 tablet, Rfl: 3     Multiple Vitamins-Minerals (ADVANCED DIABETIC MULTIVITAMIN) TABS, Take 1 tablet by mouth daily, Disp: , Rfl:      NOVOLOG FLEXPEN 100 UNIT/ML soln, Use 3 units per 15 gram carbs + sliding scale. Total Daily Dose=90 units, Disp: 45 mL, Rfl: 3     ORDER FOR DME, SET TO LOCAL PRINT,, Respironics REMSTAR 60 Series Auto CPAP 8-15cm H2O, Airfit P10 nasal pillow mask w/medium pillows, Disp: , Rfl:      rosuvastatin (CRESTOR) 10 MG tablet, Take 1 tablet (10 mg) by mouth daily, Disp: 90 tablet, Rfl: 3     blood glucose monitoring (NO BRAND SPECIFIED) test strip, Use to test blood sugar 4 times daily or as directed. (Patient not taking: Reported on 5/21/2019), Disp: 100 each, Rfl: 11    Family History  Maternal grandmother had type 2 diabetes  Daughter has GDM  Father had CAD s/p stent, lung cancer and abdominal aortic anuerysm  Older brother has CABG, and abdominal aortic anuerysm  Young brother has Afib, SVT  Another younger brother has multiple myeloma  Sister has SVT    Social History  No smoking, rarely drink EtOH  No illicit drug  Works as a nurse in the  "ICU  Lives by herself, has 3 daughters    ROS  Constitutional: stable weight, fatigue   Eyes: no vision changes  Cardiovascular: no chest pain, no palpitations    Respiratory: no SOB or cough   GI: alternating episodes of constipation and diarrhea; no abdominal pain; she associates the diarrhea with increased stress   : no change in urine, no dysuria; urinates 1-2 times a night   Musculoskeletal: no legs swelling, joint pain - mainly the knees and the low back pain  Integumentary: no concerning lesions  Neuro: no loss of strength or sensation, no numbness or tingling, no tremor, no dizziness, no headache   Endo: no temperature intolerance   Heme/Lymph: no concerning bumps, no bleeding problems   Psych: no depression or anxiety, no sleep problems.    Physical Exam  BP Readings from Last 6 Encounters:   05/21/19 149/87   03/19/19 122/75   03/19/19 129/70   12/19/18 138/72   12/04/18 138/68   11/14/18 121/71     Wt Readings from Last 10 Encounters:   05/21/19 132.5 kg (292 lb)   12/04/18 131.6 kg (290 lb 3.2 oz)   11/14/18 129.3 kg (285 lb)   08/21/18 133.8 kg (295 lb)   07/10/18 133.8 kg (295 lb)   05/02/18 136 kg (299 lb 13.2 oz)   04/05/18 134 kg (295 lb 8 oz)   03/14/18 134 kg (295 lb 8 oz)   11/02/17 135.6 kg (299 lb)   07/03/17 133.9 kg (295 lb 1.6 oz)     /80   Pulse 74   Ht 1.72 m (5' 7.72\")   Wt 132.5 kg (292 lb)   LMP 10/02/2007   SpO2 96%   BMI 44.77 kg/m    Body mass index is 44.77 kg/m .     Constitutional: general obesity, no distress, comfortable, pleasant   Eyes: anicteric, normal extra-ocular movements, no lid lag or retraction  Neck: no thyromegaly; no palpable nodules  CVS: RRR, normal S1,S2, systolic murmur with radiation to the carotid arteries   Lungs: CTA B/L  Musculoskeletal: no edema, DP 2+; left lower extremities - mild atrophy compared with the right   GI: Abdomen soft, nontender, nondistended, positive bowel sounds  NS: no tremor, normal gait, knee reflexes absent  Skin: benign " abd striae   Psychological: appropriate mood  Feet: mildy decreased sensation on the soles of both feet, flat feet, B/L calluses, onychomycosis    RESULTS  I reviewed prior lab results documented in epic.  Lab Results   Component Value Date    A1C 7.8 (A) 05/21/2019    A1C 6.8 (H) 11/14/2018    A1C 6.9 (H) 03/14/2018    A1C 7.7 (H) 07/03/2017    A1C 7.5 02/06/2017       Hemoglobin   Date Value Ref Range Status   09/25/2015 11.5 (L) 11.7 - 15.7 g/dL Final     Hematocrit   Date Value Ref Range Status   11/14/2018 41.6 35.0 - 47.0 % Final     Cholesterol   Date Value Ref Range Status   11/14/2018 109 <200 mg/dL Final     Cholesterol/HDL Ratio   Date Value Ref Range Status   02/19/2015 3.2 0.0 - 5.0 Final     HDL Cholesterol   Date Value Ref Range Status   11/14/2018 37 (L) >49 mg/dL Final     LDL Cholesterol Calculated   Date Value Ref Range Status   11/14/2018 20 <100 mg/dL Final     Comment:     Desirable:       <100 mg/dl     VLDL-Cholesterol   Date Value Ref Range Status   02/19/2015 34 (H) 0 - 30 mg/dL Final     Triglycerides   Date Value Ref Range Status   11/14/2018 262 (H) <150 mg/dL Final     Comment:     Borderline high:  150-199 mg/dl  High:             200-499 mg/dl  Very high:       >499 mg/dl  Fasting specimen       Albumin Urine mg/L   Date Value Ref Range Status   11/14/2018 24 mg/L Final     TSH   Date Value Ref Range Status   11/14/2018 1.74 0.40 - 4.00 mU/L Final         Last Basic Metabolic Panel:    Sodium   Date Value Ref Range Status   07/09/2018 140 133 - 144 mmol/L Final     Potassium   Date Value Ref Range Status   07/09/2018 4.0 3.4 - 5.3 mmol/L Final     Chloride   Date Value Ref Range Status   07/09/2018 105 94 - 109 mmol/L Final     Calcium   Date Value Ref Range Status   07/09/2018 9.4 8.5 - 10.1 mg/dL Final     Carbon Dioxide   Date Value Ref Range Status   07/09/2018 28 20 - 32 mmol/L Final     Urea Nitrogen   Date Value Ref Range Status   07/09/2018 17 7 - 30 mg/dL Final     Creatinine    Date Value Ref Range Status   07/09/2018 0.74 0.52 - 1.04 mg/dL Final     GFR Estimate   Date Value Ref Range Status   07/09/2018 78 >60 mL/min/1.7m2 Final     Comment:     Non  GFR Calc     Glucose   Date Value Ref Range Status   07/09/2018 109 (H) 70 - 99 mg/dL Final     Comment:     Non Fasting       AST   Date Value Ref Range Status   03/07/2014 41 0 - 45 U/L Final     ALT   Date Value Ref Range Status   11/14/2018 37 0 - 50 U/L Final     Albumin   Date Value Ref Range Status   03/07/2014 4.2 3.3 - 4.9 g/dL Final         ASSESSMENT:      1. Type 2 diabetes  Sherry is a 64 year old pleasant female, with a history of type 2 diabetes in the setting of obesity, sleep apnea, decreased physical exercise due to knee and back pain.  Her diabetes is suboptimally controlled.  It is known to be complicated by mild neuropathy.    Recommendations:  Scan the nevin sensor consistently, before meals and at bedtime  Continue metformin  Discontinue Victoza, in view of the high copayment.  The patient was given a list with other GLP-1 analogs and SGLT2 inhibitors, to check their insurance coverage.  She is going to contact us if any of these medications are a good option from a financial standpoint.  Discontinue NovoLog and Lantus and start U500 insulin.  I recommended 85 units U500 insulin in the morning, prior to breakfast and 80 units prior to dinner.  Insulin should be administered approximately 30 minutes prior to food intake.  She should contact us with the blood sugar numbers, so we can gradually titrate the insulin based on the sensor readings.  Follow-up lab work, urine microalbumin CMP, hematocrit, lipid panel.    2. Hypertension, uncontrolled. On lisinopril 60 mg, HCTZ 25 mg and metoprolol 25 mg daily.    If this is a persistent issue at her next appointment, we are going to consider increasing the dose of metoprolol.    3.  Hypothyroidism.  Clinically and biochemically, the patient is euthyroid.  I  recommended to continue to take the same dose of levothyroxine.  Follow-up reflex TSH at her next appointment    Orders Placed This Encounter   Procedures     Hemoglobin A1c POCT     Albumin Random Urine Quantitative with Creat Ratio     Comprehensive metabolic panel     Hematocrit     Lipid panel reflex to direct LDL Fasting     TSH with free T4 reflex

## 2019-05-28 ENCOUNTER — MYC REFILL (OUTPATIENT)
Dept: ENDOCRINOLOGY | Facility: CLINIC | Age: 64
End: 2019-05-28

## 2019-05-28 DIAGNOSIS — E11.9 TYPE 2 DIABETES, HBA1C GOAL < 7% (H): ICD-10-CM

## 2019-06-10 ENCOUNTER — MYC REFILL (OUTPATIENT)
Dept: PEDIATRICS | Facility: CLINIC | Age: 64
End: 2019-06-10

## 2019-06-10 ENCOUNTER — MYC REFILL (OUTPATIENT)
Dept: ORTHOPEDICS | Facility: CLINIC | Age: 64
End: 2019-06-10

## 2019-06-10 DIAGNOSIS — B35.3 TINEA PEDIS OF BOTH FEET: ICD-10-CM

## 2019-06-10 DIAGNOSIS — I10 ESSENTIAL HYPERTENSION WITH GOAL BLOOD PRESSURE LESS THAN 140/90: ICD-10-CM

## 2019-06-11 ENCOUNTER — MYC MEDICAL ADVICE (OUTPATIENT)
Dept: ENDOCRINOLOGY | Facility: CLINIC | Age: 64
End: 2019-06-11

## 2019-06-11 DIAGNOSIS — E11.9 TYPE 2 DIABETES, HBA1C GOAL < 7% (H): Primary | ICD-10-CM

## 2019-06-11 RX ORDER — METOPROLOL SUCCINATE 25 MG/1
25 TABLET, EXTENDED RELEASE ORAL DAILY
Qty: 90 TABLET | Refills: 3 | OUTPATIENT
Start: 2019-06-11

## 2019-06-11 RX ORDER — ECONAZOLE NITRATE 10 MG/G
CREAM TOPICAL DAILY
Qty: 85 G | Refills: 6 | Status: SHIPPED | OUTPATIENT
Start: 2019-06-11 | End: 2019-12-17

## 2019-06-11 NOTE — TELEPHONE ENCOUNTER
Per chart review, patient was given refills on metoprolol for one year in November. Should have refills available. Pharmacy notified.     Ivory Ang RN, BSN, PHN  [facility]

## 2019-06-18 NOTE — TELEPHONE ENCOUNTER
I placed the prescription for empagliflozin, if covered by her insurance.  As mentioned in my note, she can transition to you 500 insulin and stop taking Lantus and NovoLog.

## 2019-06-19 ENCOUNTER — HOSPITAL ENCOUNTER (OUTPATIENT)
Facility: AMBULATORY SURGERY CENTER | Age: 64
Discharge: HOME | End: 2019-06-19
Attending: SPECIALIST | Admitting: SPECIALIST
Payer: COMMERCIAL

## 2019-06-19 VITALS
RESPIRATION RATE: 16 BRPM | OXYGEN SATURATION: 94 % | DIASTOLIC BLOOD PRESSURE: 61 MMHG | TEMPERATURE: 97.8 F | SYSTOLIC BLOOD PRESSURE: 124 MMHG | HEART RATE: 77 BPM

## 2019-06-19 LAB
COLONOSCOPY: NORMAL
GLUCOSE BLDC GLUCOMTR-MCNC: 237 MG/DL (ref 70–99)

## 2019-06-19 PROCEDURE — 88305 TISSUE EXAM BY PATHOLOGIST: CPT | Performed by: SPECIALIST

## 2019-06-19 PROCEDURE — G8918 PT W/O PREOP ORDER IV AB PRO: HCPCS

## 2019-06-19 PROCEDURE — 45385 COLONOSCOPY W/LESION REMOVAL: CPT

## 2019-06-19 PROCEDURE — G8907 PT DOC NO EVENTS ON DISCHARG: HCPCS

## 2019-06-19 RX ORDER — ONDANSETRON 2 MG/ML
4 INJECTION INTRAMUSCULAR; INTRAVENOUS
Status: DISCONTINUED | OUTPATIENT
Start: 2019-06-19 | End: 2019-06-20 | Stop reason: HOSPADM

## 2019-06-19 RX ORDER — FENTANYL CITRATE 50 UG/ML
INJECTION, SOLUTION INTRAMUSCULAR; INTRAVENOUS PRN
Status: DISCONTINUED | OUTPATIENT
Start: 2019-06-19 | End: 2019-06-19 | Stop reason: HOSPADM

## 2019-06-19 RX ORDER — INSULIN ASPART 100 [IU]/ML
INJECTION, SOLUTION INTRAVENOUS; SUBCUTANEOUS
COMMUNITY
Start: 2019-04-17 | End: 2019-08-21

## 2019-06-19 RX ORDER — INSULIN GLARGINE 100 [IU]/ML
INJECTION, SOLUTION SUBCUTANEOUS
COMMUNITY
Start: 2019-04-24 | End: 2019-08-21

## 2019-06-19 RX ORDER — AMOXICILLIN 500 MG/1
2000 CAPSULE ORAL
COMMUNITY
Start: 2019-03-21 | End: 2022-05-25

## 2019-06-19 RX ORDER — LIDOCAINE 40 MG/G
CREAM TOPICAL
Status: DISCONTINUED | OUTPATIENT
Start: 2019-06-19 | End: 2019-06-20 | Stop reason: HOSPADM

## 2019-06-19 NOTE — H&P
Pre-Endoscopy History and Physical     Sherry Slaughter MRN# [id number]   YOB: 1955 Age: 64 year old     Date of Procedure: 6/19/2019  Primary care provider: Marilou Arciniega  Type of Endoscopy: Colonoscopy with possible biopsy, possible polypectomy  Reason for Procedure: history of polyps   Type of Anesthesia Anticipated: Conscious Sedation    HPI:    Sherry is a 64 year old female who will be undergoing the above procedure.      A history and physical has been performed. The patient's medications and allergies have been reviewed. The risks and benefits of the procedure and the sedation options and risks were discussed with the patient.  All questions were answered and informed consent was obtained.      She denies a personal or family history of anesthesia complications or bleeding disorders.     Patient Active Problem List   Diagnosis     Morbid obesity (H)     Diverticulosis of large intestine     Nonspecific abnormal results of cardiovascular function study     FAMILY HX COLON CANCER     Nonallopathic lesion of thoracic region     Hypothyroidism     GENESIS (obstructive sleep apnea)     Irritable bowel syndrome     Family history of malignant neoplasm of breast     History of major depression     OSTEOARTHRITIS L KNEE  s/p knee replacement on the left      Hypertension goal BP (blood pressure) < 140/90     Family history of stroke (cerebrovascular)     Family history of other cardiovascular diseases     Family history of diabetes mellitus     ABSENCE OF MENSTRUATION - perimenopausal      JOINT PAIN-ANKLE - right ankle      Mitral valve insufficiency     Hyperlipidemia LDL goal <100     Aortic sclerosis     Tubular adenoma of colon     History of viral hepatitis, type B     Chronic low back pain     Long-term insulin use (H)     Uncontrolled diabetes mellitus (H)     S/P total knee replacement using cement, right     Aftercare following right knee joint replacement surgery     Lumbago     Type 2  diabetes mellitus with hyperglycemia (H)     Posterior vitreous detachment of right eye     Pseudophakia of both eyes        Past Medical History:   Diagnosis Date     Cataract      DEPRESSION     comes and goes - tried meds - unsuccessfully, certain times of the year, no psych intervention and no counselors in the past - and not interested      Diverticulosis of colon (without mention of hemorrhage) 4/04    colonoscopy     ECHO-mildLVH,tr MR,mild thick lflets w inc LA,trTR   12/03     Essential hypertension, benign 1990s    late 1990s - started medications at that time - not to difficult to control meds      Fam hx-cardiovas dis [facility]     father - CAD, and lipids/HTN - multiple members of the family      Family history of diabetes mellitus     sister and grandmother with DM      Family history of malignant neoplasm of breast     mother - young age - 45, and maternal cousin and aunt as well - no BRCA testing done      Family history of stroke (cerebrovascular)     grandmother in early life in her 40s      FAMILY HX COLON CANCER     Pat uncles x 2     Heart disease     murmur/     Heart murmur      HYPERLIPIDEMIA 2000    fairly recent - in the last 5 years - high for DM levels  -cholesterol recent      Irritable bowel syndrome     goes between the 2 - nerve related - more stressed more problems      MICROALBUMINURIA     unsure how long - been on the lisinopril - for a few years at well      Nonspecific abnormal results of liver function study 2/3/2003    SGOT - has been high in the past - since the hepatitis b - borderline elevation - not really changing any      OBESITY      Obstructive sleep apnea      GENESIS (obstructive sleep apnea) 10/15/2006    psg 5/15 AHI 53 aPAP 8-15     OSTEOARTHRITIS L KNEE 2003    total knee on the left - and pain is gone since the knee replacement      PERS HX HEPATITIS B- RESOLVED] 1977    acute virus only - no chronic disease      PERS HX HEPATITIS B- RESOLVED]      Type II or unspecified  type diabetes mellitus without mention of complication, not stated as uncontrolled 1991    oral meds frist, then insulin eventually later, difficult to control most of the time      Unspecified hypothyroidism 10/11/2006    TSH 3.36 - mild subclinical hypothyroidism - deciding on following or starting low dose meds - with dr Oreilly - following         Past Surgical History:   Procedure Laterality Date     ARTHROPLASTY KNEE Right 9/23/2015    Procedure: ARTHROPLASTY KNEE;  Surgeon: Rufus Brown MD;  Location:  OR     C NONSPECIFIC PROCEDURE  6/06    right triple arthrodecesis      C NONSPECIFIC PROCEDURE  7/06     right bunion surgery      C TOTAL KNEE ARTHROPLASTY  3/03    L knee     CATARACT IOL, RT/LT Left      COLONOSCOPY  4/04    diverticulosis, rec repeat 10 yrs(consider fam hx?)     ORTHOPEDIC SURGERY      right ankle     PHACOEMULSIFICATION CLEAR CORNEA WITH STANDARD INTRAOCULAR LENS IMPLANT Left 3/15/2018    Procedure: PHACOEMULSIFICATION CLEAR CORNEA WITH STANDARD INTRAOCULAR LENS IMPLANT;  LEFT EYE PHACOEMULSIFICATION CLEAR CORNEA WITH STANDARD INTRAOCULAR LENS IMPLANT;  Surgeon: Bandar Linares MD;  Location:  [facility]     PHACOEMULSIFICATION CLEAR CORNEA WITH STANDARD INTRAOCULAR LENS IMPLANT Right 4/5/2018    Procedure: PHACOEMULSIFICATION CLEAR CORNEA WITH STANDARD INTRAOCULAR LENS IMPLANT;  RIGHT PHACOEMULSIFICATION CLEAR CORNEA WITH STANDARD INTRAOCULAR LENS IMPLANT ;  Surgeon: Bandar Linares MD;  Location:  [facility]     STRESS ECHO (METRO)  12/03    no ischemia, limited by fatigue     SURGICAL HISTORY OF -       EXP LAP- R OVARY CYSTS     SURGICAL HISTORY OF -   1981,1984,1985    CHILDBIRTH       Social History     Tobacco Use     Smoking status: Never Smoker     Smokeless tobacco: Never Used     Tobacco comment: tried in early 20s only    Substance Use Topics     Alcohol use: Yes     Comment: rare       Family History   Problem Relation Age of Onset     Diabetes Maternal  Grandmother         DM TYPE 2     Cerebrovascular Disease Maternal Grandmother      Hypertension Sister      Lipids Sister      Heart Disease Sister         Chronic AFib     Hypertension Sister      Lipids Sister      Gynecology Sister      Diabetes Sister      Asthma Sister      Blood Disease Paternal Grandmother         T CELL LEUKEMIA     Hypertension Brother      Lipids Brother      Congenital Anomalies Brother      Cardiovascular Brother      Heart Disease Brother         CABG/AFIB     Hypertension Brother      Lipids Brother      Obesity Brother      Hypertension Brother      Respiratory Brother         Sleep apnea     Heart Disease Brother         AFib     Alzheimer Disease Mother      Asthma Mother      Hypertension Mother      Breast Cancer Mother 40        has mastectomy and lived to 84     Allergies Mother         Sulfa,     Arthritis Mother      Cardiovascular Mother      Depression Mother      Respiratory Mother      Lipids Mother      Cancer Mother         breast     Thyroid Disease Mother      Cerebrovascular Disease Mother      Dementia Mother         Alzheimers     Cancer - colorectal Other      Cancer Father         lung     Aneurysm Father         brother, AAA     Other Cancer Father         lung ca     Asthma Sister      Cancer - colorectal Paternal Uncle         late 50s to early 60s - great uncles      Breast Cancer Other         Maternal cousin     Arrhythmia Sister         2 brothers 1 sister Afib     Breast Cancer Maternal Aunt 62     Other Cancer Maternal Aunt 35        Ovarian cancer.  Survived despite late recurrence and is now 92.     Glaucoma No family hx of      Macular Degeneration No family hx of        Prior to Admission medications    Medication Sig Start Date End Date Taking? Authorizing Provider   econazole nitrate 1 % external cream Apply topically daily To feet and toenails. 6/11/19  Yes Rufus Hutson DPM   Garlic 1000 MG CAPS Take 1 capsule by mouth daily Takes during  summer months.  Done taking until next summer.   Yes Reported, Patient   hydrochlorothiazide (HYDRODIURIL) 25 MG tablet Take 1 tablet (25 mg) by mouth daily 11/14/18  Yes Marilou Arciniega MD PhD   insulin pen needle (GNP CLICKFINE PEN NEEDLES) 31G X 8 MM miscellaneous Use six times daily or as directed 6/3/19  Yes Rika Delgado MD   LANTUS SOLOSTAR 100 UNIT/ML soln  4/24/19  Yes Reported, Patient   levothyroxine (SYNTHROID/LEVOTHROID) 75 MCG tablet Take 1 tablet (75 mcg) by mouth daily 12/12/18  Yes Rika Delgado MD   liraglutide (VICTOZA PEN) 18 MG/3ML solution Inject 1.8 mg Subcutaneous daily 3/27/19  Yes Rika Delgado MD   lisinopril (PRINIVIL/ZESTRIL) 40 MG tablet Take 1.5 tablets (60 mg) by mouth daily 5/20/19  Yes Marilou Arciniega MD PhD   metFORMIN (GLUCOPHAGE-XR) 500 MG 24 hr tablet Take 4 tablets (2,000 mg) by mouth At Bedtime 5/21/19  Yes Rika Delgado MD   metoprolol succinate (TOPROL XL) 25 MG 24 hr tablet Take 1 tablet (25 mg) by mouth daily 11/14/18  Yes Marilou Arciniega MD PhD   Multiple Vitamins-Minerals (ADVANCED DIABETIC MULTIVITAMIN) TABS Take 1 tablet by mouth daily   Yes Reported, Patient   NOVOLOG FLEXPEN 100 UNIT/ML soln  4/17/19  Yes Reported, Patient   rosuvastatin (CRESTOR) 10 MG tablet Take 1 tablet (10 mg) by mouth daily 11/20/18  Yes Marilou Arciniega MD PhD   amoxicillin (AMOXIL) 500 MG capsule  3/21/19   Reported, Patient   ASPIRIN EC PO Take 325 mg by mouth daily    Reported, Patient   blood glucose monitoring (NO BRAND SPECIFIED) test strip Use to test blood sugar 4 times daily or as directed.  Patient not taking: Reported on 5/21/2019 9/15/17   Renny Agarwal MD   Continuous Blood Gluc  (FREESTYLE MIKE 14 DAY READER) KATIA 1 each daily 12/4/18   Rika Delgado MD   Continuous Blood Gluc Sensor (FREESTYLE MIKE 14 DAY SENSOR) MISC 1 each every 14 days 5/21/19   Rika Delgado MD   empagliflozin (JARDIANCE) 10 MG TABS  "tablet Take 1 tablet (10 mg) by mouth daily 6/17/19   Rika Delgado MD   Ibuprofen (ADVIL PO) Take 600 mg by mouth 4 times daily    Reported, Patient   insulin reg HIGH CONC (HUMULIN R U-500 KWIKPEN) 500 UNIT/ML PEN soln Administer 85 U in the morning and 80 U at bedtime. 5/21/19   Rika Delgado MD   ORDER FOR DME, SET TO LOCAL PRINT, Respironics REMSTAR 60 Series Auto CPAP 8-15cm H2O, Airfit P10 nasal pillow mask w/medium pillows 5/19/15   Vivian Mata MD       Allergies   Allergen Reactions     Atorvastatin Calcium      Gas     Pravachol [Pravastatin Sodium]      Pravachol - dry cough      Simvastatin      Myalgia        REVIEW OF SYSTEMS:   5 point ROS negative except as noted above in HPI, including Gen., Resp., CV, GI &  system review.    PHYSICAL EXAM:   /79   Temp 97.8  F (36.6  C) (Temporal)   Resp 18   LMP 10/02/2007   SpO2 94%  Estimated body mass index is 44.77 kg/m  as calculated from the following:    Height as of 5/21/19: 1.72 m (5' 7.72\").    Weight as of 5/21/19: 132.5 kg (292 lb).   GENERAL APPEARANCE: alert, and oriented  MENTAL STATUS: alert  AIRWAY EXAM: Mallampatti Class II (visualization of the soft palate, fauces, and uvula)  RESP: lungs clear to auscultation - no rales, rhonchi or wheezes  CV: regular rates and rhythm  DIAGNOSTICS:    Not indicated    IMPRESSION   ASA Class 2 - Mild systemic disease    PLAN:   Plan for Colonoscopy with possible biopsy, possible polypectomy. We discussed the risks, benefits and alternatives and the patient wished to proceed.    The above has been forwarded to the consulting provider.      Signed Electronically by: Zeb Duarte  June 19, 2019          "

## 2019-06-21 LAB — COPATH REPORT: NORMAL

## 2019-06-24 ENCOUNTER — MYC REFILL (OUTPATIENT)
Dept: ENDOCRINOLOGY | Facility: CLINIC | Age: 64
End: 2019-06-24

## 2019-06-24 DIAGNOSIS — Z79.4 TYPE 2 DIABETES MELLITUS WITH HYPERGLYCEMIA, WITH LONG-TERM CURRENT USE OF INSULIN (H): ICD-10-CM

## 2019-06-24 DIAGNOSIS — E11.65 TYPE 2 DIABETES MELLITUS WITH HYPERGLYCEMIA, WITH LONG-TERM CURRENT USE OF INSULIN (H): ICD-10-CM

## 2019-06-24 RX ORDER — LIRAGLUTIDE 6 MG/ML
1.8 INJECTION SUBCUTANEOUS DAILY
Qty: 27 ML | Refills: 3 | Status: SHIPPED | OUTPATIENT
Start: 2019-06-24 | End: 2019-08-21

## 2019-06-24 NOTE — TELEPHONE ENCOUNTER
Per 5/21/19 Office Visit:  Discontinue Victoza, in view of the high copayment.  The patient was given a list with other GLP-1 analogs and SGLT2 inhibitors, to check their insurance coverage.  She is going to contact us if any of these medications are a good option from a financial standpoint.    Writer contacted patient to review. She is currently still taking Victoza. She is requesting refill to see how much it would cost once insurance changes July 1 to Medicare.    Refill completed.    Maya Soler LPN  Diabetes Clinic Coordinator   [facility]  [facility]

## 2019-06-28 ENCOUNTER — OFFICE VISIT (OUTPATIENT)
Dept: OPTOMETRY | Facility: CLINIC | Age: 64
End: 2019-06-28
Payer: COMMERCIAL

## 2019-06-28 DIAGNOSIS — E11.65 UNCONTROLLED TYPE 2 DIABETES MELLITUS WITH HYPERGLYCEMIA (H): ICD-10-CM

## 2019-06-28 DIAGNOSIS — H52.4 PRESBYOPIA: ICD-10-CM

## 2019-06-28 DIAGNOSIS — Z96.1 PSEUDOPHAKIA OF BOTH EYES: ICD-10-CM

## 2019-06-28 DIAGNOSIS — H52.223 REGULAR ASTIGMATISM OF BOTH EYES: ICD-10-CM

## 2019-06-28 DIAGNOSIS — Z01.00 EXAMINATION OF EYES AND VISION: Primary | ICD-10-CM

## 2019-06-28 PROCEDURE — 92014 COMPRE OPH EXAM EST PT 1/>: CPT | Performed by: OPTOMETRIST

## 2019-06-28 PROCEDURE — 92015 DETERMINE REFRACTIVE STATE: CPT | Performed by: OPTOMETRIST

## 2019-06-28 ASSESSMENT — TONOMETRY
IOP_METHOD: TONOPEN
OD_IOP_MMHG: 19
OS_IOP_MMHG: 20

## 2019-06-28 ASSESSMENT — REFRACTION_WEARINGRX
OS_SPHERE: -1.00
OD_CYLINDER: +1.50
OD_SPHERE: +2.75
SPECS_TYPE: OTC READERS DIDNT BRING
OD_SPHERE: -1.00
OS_CYLINDER: +1.25
OD_AXIS: 010
OS_AXIS: 008
OD_ADD: +2.50
OS_SPHERE: +2.75
OS_ADD: +2.50

## 2019-06-28 ASSESSMENT — REFRACTION_MANIFEST
OS_CYLINDER: +1.00
OS_ADD: +2.50
OS_SPHERE: -0.75
OS_AXIS: 008
OD_CYLINDER: +1.50
OD_AXIS: 010
OD_ADD: +2.50
OD_SPHERE: -1.00

## 2019-06-28 ASSESSMENT — VISUAL ACUITY
METHOD: SNELLEN - LINEAR
OD_SC: 20/30
OD_SC+: -1
OS_SC: 20/25
OS_SC: 20/200
OD_SC: 20/70

## 2019-06-28 ASSESSMENT — SLIT LAMP EXAM - LIDS
COMMENTS: MEIBOMIAN GLAND DYSFUNCTION, DERMATOCHALASIS
COMMENTS: MEIBOMIAN GLAND DYSFUNCTION, DERMATOCHALASIS

## 2019-06-28 ASSESSMENT — CUP TO DISC RATIO
OD_RATIO: 0.3
OS_RATIO: 0.3

## 2019-06-28 ASSESSMENT — CONF VISUAL FIELD
OD_NORMAL: 1
OS_NORMAL: 1

## 2019-06-28 ASSESSMENT — EXTERNAL EXAM - RIGHT EYE: OD_EXAM: NORMAL

## 2019-06-28 ASSESSMENT — EXTERNAL EXAM - LEFT EYE: OS_EXAM: NORMAL

## 2019-06-28 NOTE — PROGRESS NOTES
Chief Complaint   Patient presents with     Diabetic Eye Exam        Lab Results   Component Value Date    A1C 7.8 05/21/2019    A1C 6.8 11/14/2018    A1C 6.9 03/14/2018    A1C 7.7 07/03/2017    A1C 7.5 02/06/2017       Last Eye Exam: 5-  Dilated Previously: Yes    What are you currently using to see? otc readers    Distance Vision Acuity: Noticed gradual change in both eyes    Near Vision Acuity: Satisfied with vision while reading  With otc  readers    Eye Comfort: good  Do you use eye drops? : No  Occupation or Hobbies: retired    Mona Herbert Optometric Assistant, A.B.O.C.     Medical, surgical and family histories reviewed and updated 6/28/2019.       OBJECTIVE: See Ophthalmology exam    ASSESSMENT:    ICD-10-CM    1. Examination of eyes and vision Z01.00    2. Uncontrolled type 2 diabetes mellitus with hyperglycemia (H) E11.65    3. Presbyopia H52.4    4. Regular astigmatism of both eyes H52.223    5. Pseudophakia of both eyes Z96.1       PLAN:    Sherry Slaughter aware  eye exam results will be sent to Marilou Arciniega.  Patient Instructions   There are not any signs of the diabetes affecting the eyes today.  It is important that you get your eyes dilated once yearly and keep good control of your diabetes.    Eyeglass prescription given.  Your options are a bifocal which would allow you to see distance and near vision or separate glasses for distance and reading.    Return in 1 year for a complete eye exam or sooner if needed.    Kameron Pedraza, OD

## 2019-06-28 NOTE — PATIENT INSTRUCTIONS
There are not any signs of the diabetes affecting the eyes today.  It is important that you get your eyes dilated once yearly and keep good control of your diabetes.    Eyeglass prescription given.  Your options are a bifocal which would allow you to see distance and near vision or separate glasses for distance and reading.    Return in 1 year for a complete eye exam or sooner if needed.    Kameron Pedraza, OD      The affects of the dilating drops last for 4- 6 hours.  You will be more sensitive to light and vision will be blurry up close.  Mydriatic sunglasses were given if needed.    Patient Education   Diabetes weakens the blood vessels all over the body, including the eyes. Damage to the blood vessels in the eyes can cause swelling or bleeding into part of the eye (called the retina). This is called diabetic retinopathy (YOSELIN-tin--pu-thee). If not treated, this disease can cause vision loss or blindness.   Symptoms may include blurred or distorted vision, but many people have no symptoms. It's important to see your eye doctor regularly to check for problems.   Early treatment and good control can help protect your vision. Here are the things you can do to help prevent vision loss:      1. Keep your blood sugar levels under tight control.      2. Bring high blood pressure under control.      3. No smoking.      4. Have yearly dilated eye exams.         Optometry Providers       Clinic Locations                                 Telephone Number   [facility] [phone]     [facility] Hours:                [facility] Hours:       [facility] Hours:   [address]   [address]     [address] [place] [address]   [place] [address]    [place] [address]  Phone: [phone]                    Phone: [phone]     Phone: [phone]                       Monday 8:00-7:00                          Monday 8:00-7:00                          Monday 8:00-7:00              Tuesday 8:00-6:00                          Tuesday 8:00-7:00                          Tuesday 8:00-7:00              Wednesday 8:00-6:00                  Wednesday 8:00-7:00                   Wednesday 8:00-7:00      Thursday 8:00-6:00                        Thursday 8:00-7:00                         Thursday 8:00-7:00            Friday 8:00-5:00                              Friday 8:00-5:00                              Friday 8:00-5:00    [facility] Hours:   [address] [place] [address]  [phone]    Monday 8:00-7:00  Tuesday 8:00-7:00  Wednesday 8:00-7:00  Thursday 8:00-7:00  Friday 8:00-5:00  Please log on to [email or web address] to order your contact lenses.  The link is found on the [facility] page.  As always, Thank you for trusting us with your health care needs!

## 2019-07-05 ENCOUNTER — MYC REFILL (OUTPATIENT)
Dept: PEDIATRICS | Facility: CLINIC | Age: 64
End: 2019-07-05

## 2019-07-05 ENCOUNTER — MYC REFILL (OUTPATIENT)
Dept: ENDOCRINOLOGY | Facility: CLINIC | Age: 64
End: 2019-07-05

## 2019-07-05 DIAGNOSIS — E03.4 HYPOTHYROIDISM DUE TO ACQUIRED ATROPHY OF THYROID: ICD-10-CM

## 2019-07-05 DIAGNOSIS — I10 ESSENTIAL HYPERTENSION WITH GOAL BLOOD PRESSURE LESS THAN 140/90: ICD-10-CM

## 2019-07-05 RX ORDER — LEVOTHYROXINE SODIUM 75 UG/1
75 TABLET ORAL DAILY
Qty: 90 TABLET | Refills: 3 | Status: SHIPPED | OUTPATIENT
Start: 2019-07-05 | End: 2020-06-08

## 2019-07-05 RX ORDER — LISINOPRIL 40 MG/1
60 TABLET ORAL DAILY
Qty: 135 TABLET | Refills: 0 | Status: SHIPPED | OUTPATIENT
Start: 2019-07-05 | End: 2019-10-03

## 2019-07-15 ENCOUNTER — OFFICE VISIT (OUTPATIENT)
Dept: PODIATRY | Facility: CLINIC | Age: 64
End: 2019-07-15
Payer: COMMERCIAL

## 2019-07-15 DIAGNOSIS — M21.969 TYPE 2 DIABETES MELLITUS WITH DIABETIC FOOT DEFORMITY (H): Primary | ICD-10-CM

## 2019-07-15 DIAGNOSIS — E11.69 TYPE 2 DIABETES MELLITUS WITH DIABETIC FOOT DEFORMITY (H): Primary | ICD-10-CM

## 2019-07-15 DIAGNOSIS — B35.3 TINEA PEDIS OF BOTH FEET: ICD-10-CM

## 2019-07-15 PROCEDURE — 99212 OFFICE O/P EST SF 10 MIN: CPT | Performed by: PODIATRIST

## 2019-07-15 NOTE — NURSING NOTE
Sherry Slaughter's chief complaint for this visit includes:  Chief Complaint   Patient presents with     Right Foot - Follow Up     Left Foot - Follow Up     PCP: Marilou Arciniega    Referring Provider:  Rika Delgado MD  [id number] [place] [address]    [facility] 10/02/2007   Data Unavailable     Do you need any medication refills at today's visit? No    Zoila Mckeon LPN

## 2019-07-15 NOTE — PROGRESS NOTES
Past Medical History:   Diagnosis Date     Cataract      DEPRESSION     comes and goes - tried meds - unsuccessfully, certain times of the year, no psych intervention and no counselors in the past - and not interested      Diverticulosis of colon (without mention of hemorrhage) 4/04    colonoscopy     ECHO-mildLVH,tr MR,mild thick lflets w inc LA,trTR   12/03     Essential hypertension, benign 1990s    late 1990s - started medications at that time - not to difficult to control meds      Fam hx-cardiovas dis NEC     father - CAD, and lipids/HTN - multiple members of the family      Family history of diabetes mellitus     sister and grandmother with DM      Family history of malignant neoplasm of breast     mother - young age - 45, and maternal cousin and aunt as well - no BRCA testing done      Family history of stroke (cerebrovascular)     grandmother in early life in her 40s      FAMILY HX COLON CANCER     Pat uncles x 2     Heart disease     murmur/     Heart murmur      HYPERLIPIDEMIA 2000    fairly recent - in the last 5 years - high for DM levels  -cholesterol recent      Irritable bowel syndrome     goes between the 2 - nerve related - more stressed more problems      MICROALBUMINURIA     unsure how long - been on the lisinopril - for a few years at well      Nonspecific abnormal results of liver function study 2/3/2003    SGOT - has been high in the past - since the hepatitis b - borderline elevation - not really changing any      OBESITY      Obstructive sleep apnea      GENESIS (obstructive sleep apnea) 10/15/2006    psg 5/15 AHI 53 aPAP 8-15     OSTEOARTHRITIS L KNEE 2003    total knee on the left - and pain is gone since the knee replacement      PERS HX HEPATITIS B- RESOLVED] 1977    acute virus only - no chronic disease      PERS HX HEPATITIS B- RESOLVED]      Type II or unspecified type diabetes mellitus without mention of complication, not stated as uncontrolled 1991    oral meds frist, then insulin  eventually later, difficult to control most of the time      Unspecified hypothyroidism 10/11/2006    TSH 3.36 - mild subclinical hypothyroidism - deciding on following or starting low dose meds - with dr Oreilly - following      Patient Active Problem List   Diagnosis     Morbid obesity (H)     Diverticulosis of large intestine     Nonspecific abnormal results of cardiovascular function study     FAMILY HX COLON CANCER     Nonallopathic lesion of thoracic region     Hypothyroidism     GENESIS (obstructive sleep apnea)     Irritable bowel syndrome     Family history of malignant neoplasm of breast     History of major depression     OSTEOARTHRITIS L KNEE  s/p knee replacement on the left      Hypertension goal BP (blood pressure) < 140/90     Family history of stroke (cerebrovascular)     Family history of other cardiovascular diseases     Family history of diabetes mellitus     ABSENCE OF MENSTRUATION - perimenopausal      JOINT PAIN-ANKLE - right ankle      Mitral valve insufficiency     Hyperlipidemia LDL goal <100     Aortic sclerosis     Tubular adenoma of colon     History of viral hepatitis, type B     Chronic low back pain     Long-term insulin use (H)     Uncontrolled diabetes mellitus (H)     S/P total knee replacement using cement, right     Aftercare following right knee joint replacement surgery     Lumbago     Type 2 diabetes mellitus with hyperglycemia (H)     Posterior vitreous detachment of right eye     Pseudophakia of both eyes     Past Surgical History:   Procedure Laterality Date     ARTHROPLASTY KNEE Right 9/23/2015    Procedure: ARTHROPLASTY KNEE;  Surgeon: Rufus Brown MD;  Location: [facility]     C NONSPECIFIC PROCEDURE  6/06    right triple arthrodecesis      C NONSPECIFIC PROCEDURE  7/06     right bunion surgery      C TOTAL KNEE ARTHROPLASTY  3/03    L knee     CATARACT IOL, RT/LT Left      COLONOSCOPY  4/04    diverticulosis, rec repeat 10 yrs(consider fam hx?)     COLONOSCOPY WITH CO2  INSUFFLATION N/A 6/19/2019    Procedure: COLONOSCOPY, WITH CO2 INSUFFLATION;  Surgeon: Zeb Duarte MD;  Location: [facility]     ORTHOPEDIC SURGERY      right ankle     PHACOEMULSIFICATION CLEAR CORNEA WITH STANDARD INTRAOCULAR LENS IMPLANT Left 3/15/2018    Procedure: PHACOEMULSIFICATION CLEAR CORNEA WITH STANDARD INTRAOCULAR LENS IMPLANT;  LEFT EYE PHACOEMULSIFICATION CLEAR CORNEA WITH STANDARD INTRAOCULAR LENS IMPLANT;  Surgeon: Bandar Linares MD;  Location:  [facility]     PHACOEMULSIFICATION CLEAR CORNEA WITH STANDARD INTRAOCULAR LENS IMPLANT Right 4/5/2018    Procedure: PHACOEMULSIFICATION CLEAR CORNEA WITH STANDARD INTRAOCULAR LENS IMPLANT;  RIGHT PHACOEMULSIFICATION CLEAR CORNEA WITH STANDARD INTRAOCULAR LENS IMPLANT ;  Surgeon: Bandar Linares MD;  Location:  [facility]     STRESS ECHO (METRO)  12/03    no ischemia, limited by fatigue     SURGICAL HISTORY OF -       EXP LAP- R OVARY CYSTS     SURGICAL HISTORY OF -   1981,1984,1985    CHILDBIRTH     Social History     Socioeconomic History     Marital status:      Spouse name: Not on file     Number of children: 3     Years of education: Not on file     Highest education level: Not on file   Occupational History     Occupation: RN     Employer: [employer]   Social Needs     Financial resource strain: Not on file     Food insecurity:     Worry: Not on file     Inability: Not on file     Transportation needs:     Medical: Not on file     Non-medical: Not on file   Tobacco Use     Smoking status: Never Smoker     Smokeless tobacco: Never Used     Tobacco comment: tried in early 20s only    Substance and Sexual Activity     Alcohol use: Yes     Comment: rare     Drug use: No     Sexual activity: Never     Comment:  - since for 20 years, no signficant other  > 5 years sexual activity    Lifestyle     Physical activity:     Days per week: Not on file     Minutes per session: Not on file     Stress: Not on file    Relationships     Social connections:     Talks on phone: Not on file     Gets together: Not on file     Attends Nondenominational service: Not on file     Active member of club or organization: Not on file     Attends meetings of clubs or organizations: Not on file     Relationship status: Not on file     Intimate partner violence:     Fear of current or ex partner: Not on file     Emotionally abused: Not on file     Physically abused: Not on file     Forced sexual activity: Not on file   Other Topics Concern      Service No     Blood Transfusions No     Caffeine Concern No     Occupational Exposure Yes     Comment: Normal nursing exposures     Hobby Hazards No     Sleep Concern Yes     Stress Concern No     Comment: Stress level at HM=5, stress level at WK=6     Weight Concern Yes     Special Diet Yes     Comment: Diabetic diet (watching carbs)     Back Care No     Comment: Occassional back spasms     Exercise Yes     Comment: Pt joined a health club goes approx 3-4 times a week,half to one mile daily     Bike Helmet No     Seat Belt Yes     Comment: Sometimes     Self-Exams Yes     Parent/sibling w/ CABG, MI or angioplasty before 65F 55M? 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      Family History   Problem Relation Age of Onset     Diabetes Maternal Grandmother         DM TYPE 2     Cerebrovascular Disease Maternal Grandmother      Hypertension Sister      Lipids Sister      Heart Disease Sister         Chronic AFib     Hypertension Sister      Lipids Sister      Gynecology Sister      Diabetes Sister      Asthma Sister      Blood Disease Paternal Grandmother         T CELL LEUKEMIA     Hypertension Brother      Lipids Brother      Congenital Anomalies Brother      Cardiovascular Brother      Heart Disease Brother         CABG/AFIB     Hypertension Brother      Lipids Brother      Obesity Brother      Hypertension Brother       Respiratory Brother         Sleep apnea     Heart Disease Brother         AFib     Alzheimer Disease Mother      Asthma Mother      Hypertension Mother      Breast Cancer Mother 40        has mastectomy and lived to 84     Allergies Mother         Sulfa,     Arthritis Mother      Cardiovascular Mother      Depression Mother      Respiratory Mother      Lipids Mother      Cancer Mother         breast     Thyroid Disease Mother      Cerebrovascular Disease Mother      Dementia Mother         Alzheimers     Cancer - colorectal Other      Cancer Father         lung     Aneurysm Father         brother, AAA     Other Cancer Father         lung ca     Asthma Sister      Cancer - colorectal Paternal Uncle         late 50s to early 60s - great uncles      Breast Cancer Other         Maternal cousin     Arrhythmia Sister         2 brothers 1 sister Afib     Breast Cancer Maternal Aunt 62     Other Cancer Maternal Aunt 35        Ovarian cancer.  Survived despite late recurrence and is now 92.     Glaucoma No family hx of      Macular Degeneration No family hx of      Lab Results   Component Value Date    A1C 7.8 05/21/2019    A1C 6.8 11/14/2018    A1C 6.9 03/14/2018    A1C 7.7 07/03/2017    A1C 7.5 02/06/2017     SUBJECTIVE FINDINGS:  64-year-old female returns to clinic for diabetic foot check and bunion, left.  She relates she got her diabetic shoes.  Those are good.  She is wearing flip-flops today.  She does not wear those in the summer.  She wears those at work.  She relates she is diabetic.  She gets on and off numbness, tingling and neuropathy in her feet.  She relates her feet are doing much better though.  She is using the econazole cream with no problems.      OBJECTIVE FINDINGS:  DP and PT are 2/4 bilaterally.  She has dorsally contracted digits 2-5 bilaterally.  She has hyperkeratotic tissue buildup with some cracking on the plantar medial hallux and first MPJ on the left.  She has laterally deviated hallux with  dorsal first MPJ prominence, left.  No erythema, no drainage, no odor, no calor bilaterally.      ASSESSMENT/PLAN:  Diabetes with peripheral neuropathy.  Her tinea pedis is doing well.  She still has onychomycosis present, although she has nail polish on today.  Continue the econazole cream and diabetic shoes.  Diagnosis and treatment options discussed with patient.  She will return to clinic and see me in about 4 months.

## 2019-07-15 NOTE — LETTER
7/15/2019         RE: Sherry Slaughter  [address]  [place] [address]        Dear Colleague,    Thank you for referring your patient, Sherry Slaughter, to the [facility], [place]. Please see a copy of my visit note below.    Past Medical History:   Diagnosis Date     Cataract      DEPRESSION     comes and goes - tried meds - unsuccessfully, certain times of the year, no psych intervention and no counselors in the past - and not interested      Diverticulosis of colon (without mention of hemorrhage) 4/04    colonoscopy     ECHO-mildLVH,tr MR,mild thick lflets w inc LA,trTR   12/03     Essential hypertension, benign 1990s    late 1990s - started medications at that time - not to difficult to control meds      Fam hx-cardiovas dis NEC     father - CAD, and lipids/HTN - multiple members of the family      Family history of diabetes mellitus     sister and grandmother with DM      Family history of malignant neoplasm of breast     mother - young age - 45, and maternal cousin and aunt as well - no BRCA testing done      Family history of stroke (cerebrovascular)     grandmother in early life in her 40s      FAMILY HX COLON CANCER     Pat uncles x 2     Heart disease     murmur/     Heart murmur      HYPERLIPIDEMIA 2000    fairly recent - in the last 5 years - high for DM levels  -cholesterol recent      Irritable bowel syndrome     goes between the 2 - nerve related - more stressed more problems      MICROALBUMINURIA     unsure how long - been on the lisinopril - for a few years at well      Nonspecific abnormal results of liver function study 2/3/2003    SGOT - has been high in the past - since the hepatitis b - borderline elevation - not really changing any      OBESITY      Obstructive sleep apnea      GENESIS (obstructive sleep apnea) 10/15/2006    psg 5/15 AHI 53 aPAP 8-15     OSTEOARTHRITIS L KNEE 2003    total knee on the left - and pain is gone since the knee replacement      PERS HX  HEPATITIS B- RESOLVED] 1977    acute virus only - no chronic disease      PERS HX HEPATITIS B- RESOLVED]      Type II or unspecified type diabetes mellitus without mention of complication, not stated as uncontrolled 1991    oral meds frist, then insulin eventually later, difficult to control most of the time      Unspecified hypothyroidism 10/11/2006    TSH 3.36 - mild subclinical hypothyroidism - deciding on following or starting low dose meds - with dr Oreilly - following      Patient Active Problem List   Diagnosis     Morbid obesity (H)     Diverticulosis of large intestine     Nonspecific abnormal results of cardiovascular function study     FAMILY HX COLON CANCER     Nonallopathic lesion of thoracic region     Hypothyroidism     GENESIS (obstructive sleep apnea)     Irritable bowel syndrome     Family history of malignant neoplasm of breast     History of major depression     OSTEOARTHRITIS L KNEE  s/p knee replacement on the left      Hypertension goal BP (blood pressure) < 140/90     Family history of stroke (cerebrovascular)     Family history of other cardiovascular diseases     Family history of diabetes mellitus     ABSENCE OF MENSTRUATION - perimenopausal      JOINT PAIN-ANKLE - right ankle      Mitral valve insufficiency     Hyperlipidemia LDL goal <100     Aortic sclerosis     Tubular adenoma of colon     History of viral hepatitis, type B     Chronic low back pain     Long-term insulin use (H)     Uncontrolled diabetes mellitus (H)     S/P total knee replacement using cement, right     Aftercare following right knee joint replacement surgery     Lumbago     Type 2 diabetes mellitus with hyperglycemia (H)     Posterior vitreous detachment of right eye     Pseudophakia of both eyes     Past Surgical History:   Procedure Laterality Date     ARTHROPLASTY KNEE Right 9/23/2015    Procedure: ARTHROPLASTY KNEE;  Surgeon: Rufus Brown MD;  Location:  OR      NONSPECIFIC PROCEDURE  6/06    right triple  arthrodecesis      C NONSPECIFIC PROCEDURE  7/06     right bunion surgery      C TOTAL KNEE ARTHROPLASTY  3/03    L knee     CATARACT IOL, RT/LT Left      COLONOSCOPY  4/04    diverticulosis, rec repeat 10 yrs(consider fam hx?)     COLONOSCOPY WITH CO2 INSUFFLATION N/A 6/19/2019    Procedure: COLONOSCOPY, WITH CO2 INSUFFLATION;  Surgeon: Zeb Duarte MD;  Location: [facility]     ORTHOPEDIC SURGERY      right ankle     PHACOEMULSIFICATION CLEAR CORNEA WITH STANDARD INTRAOCULAR LENS IMPLANT Left 3/15/2018    Procedure: PHACOEMULSIFICATION CLEAR CORNEA WITH STANDARD INTRAOCULAR LENS IMPLANT;  LEFT EYE PHACOEMULSIFICATION CLEAR CORNEA WITH STANDARD INTRAOCULAR LENS IMPLANT;  Surgeon: Bandar Linares MD;  Location:  [facility]     PHACOEMULSIFICATION CLEAR CORNEA WITH STANDARD INTRAOCULAR LENS IMPLANT Right 4/5/2018    Procedure: PHACOEMULSIFICATION CLEAR CORNEA WITH STANDARD INTRAOCULAR LENS IMPLANT;  RIGHT PHACOEMULSIFICATION CLEAR CORNEA WITH STANDARD INTRAOCULAR LENS IMPLANT ;  Surgeon: Bandar Linares MD;  Location:  [facility]     STRESS ECHO (METRO)  12/03    no ischemia, limited by fatigue     SURGICAL HISTORY OF -       EXP LAP- R OVARY CYSTS     SURGICAL HISTORY OF -   1981,1984,1985    CHILDBIRTH     Social History     Socioeconomic History     Marital status:      Spouse name: Not on file     Number of children: 3     Years of education: Not on file     Highest education level: Not on file   Occupational History     Occupation: RN     Employer: [employer]   Social Needs     Financial resource strain: Not on file     Food insecurity:     Worry: Not on file     Inability: Not on file     Transportation needs:     Medical: Not on file     Non-medical: Not on file   Tobacco Use     Smoking status: Never Smoker     Smokeless tobacco: Never Used     Tobacco comment: tried in early 20s only    Substance and Sexual Activity     Alcohol use: Yes     Comment: rare     Drug use: No      Sexual activity: Never     Comment:  - since for 20 years, no signficant other  > 5 years sexual activity    Lifestyle     Physical activity:     Days per week: Not on file     Minutes per session: Not on file     Stress: Not on file   Relationships     Social connections:     Talks on phone: Not on file     Gets together: Not on file     Attends Voodoo service: Not on file     Active member of club or organization: Not on file     Attends meetings of clubs or organizations: Not on file     Relationship status: Not on file     Intimate partner violence:     Fear of current or ex partner: Not on file     Emotionally abused: Not on file     Physically abused: Not on file     Forced sexual activity: Not on file   Other Topics Concern      Service No     Blood Transfusions No     Caffeine Concern No     Occupational Exposure Yes     Comment: Normal nursing exposures     Hobby Hazards No     Sleep Concern Yes     Stress Concern No     Comment: Stress level at HM=5, stress level at WK=6     Weight Concern Yes     Special Diet Yes     Comment: Diabetic diet (watching carbs)     Back Care No     Comment: Occassional back spasms     Exercise Yes     Comment: Pt joined a health club goes approx 3-4 times a week,half to one mile daily     Bike Helmet No     Seat Belt Yes     Comment: Sometimes     Self-Exams Yes     Parent/sibling w/ CABG, MI or angioplasty before 65F 55M? 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      Family History   Problem Relation Age of Onset     Diabetes Maternal Grandmother         DM TYPE 2     Cerebrovascular Disease Maternal Grandmother      Hypertension Sister      Lipids Sister      Heart Disease Sister         Chronic AFib     Hypertension Sister      Lipids Sister      Gynecology Sister      Diabetes Sister      Asthma Sister      Blood Disease Paternal Grandmother         T CELL LEUKEMIA      Hypertension Brother      Lipids Brother      Congenital Anomalies Brother      Cardiovascular Brother      Heart Disease Brother         CABG/AFIB     Hypertension Brother      Lipids Brother      Obesity Brother      Hypertension Brother      Respiratory Brother         Sleep apnea     Heart Disease Brother         AFib     Alzheimer Disease Mother      Asthma Mother      Hypertension Mother      Breast Cancer Mother 40        has mastectomy and lived to 84     Allergies Mother         Sulfa,     Arthritis Mother      Cardiovascular Mother      Depression Mother      Respiratory Mother      Lipids Mother      Cancer Mother         breast     Thyroid Disease Mother      Cerebrovascular Disease Mother      Dementia Mother         Alzheimers     Cancer - colorectal Other      Cancer Father         lung     Aneurysm Father         brother, AAA     Other Cancer Father         lung ca     Asthma Sister      Cancer - colorectal Paternal Uncle         late 50s to early 60s - great uncles      Breast Cancer Other         Maternal cousin     Arrhythmia Sister         2 brothers 1 sister Afib     Breast Cancer Maternal Aunt 62     Other Cancer Maternal Aunt 35        Ovarian cancer.  Survived despite late recurrence and is now 92.     Glaucoma No family hx of      Macular Degeneration No family hx of      Lab Results   Component Value Date    A1C 7.8 05/21/2019    A1C 6.8 11/14/2018    A1C 6.9 03/14/2018    A1C 7.7 07/03/2017    A1C 7.5 02/06/2017     SUBJECTIVE FINDINGS:  64-year-old female returns to clinic for diabetic foot check and bunion, left.  She relates she got her diabetic shoes.  Those are good.  She is wearing flip-flops today.  She does not wear those in the summer.  She wears those at work.  She relates she is diabetic.  She gets on and off numbness, tingling and neuropathy in her feet.  She relates her feet are doing much better though.  She is using the econazole cream with no problems.      OBJECTIVE  FINDINGS:  DP and PT are 2/4 bilaterally.  She has dorsally contracted digits 2-5 bilaterally.  She has hyperkeratotic tissue buildup with some cracking on the plantar medial hallux and first MPJ on the left.  She has laterally deviated hallux with dorsal first MPJ prominence, left.  No erythema, no drainage, no odor, no calor bilaterally.      ASSESSMENT/PLAN:  Diabetes with peripheral neuropathy.  Her tinea pedis is doing well.  She still has onychomycosis present, although she has nail polish on today.  Continue the econazole cream and diabetic shoes.  Diagnosis and treatment options discussed with patient.  She will return to clinic and see me in about 4 months.           Again, thank you for allowing me to participate in the care of your patient.        Sincerely,        Rufus Hutson DPM

## 2019-07-15 NOTE — PATIENT INSTRUCTIONS
Thanks for coming today.  [facility]  [address] [place] [address]    To schedule future appointments in Ortho Clinic, you may call [phone].    To schedule ordered imaging by your provider:   Call [facility] [phone]    To schedule an injection ordered by your provider:  Call [facility] Injection scheduling line: [phone]  [email or web address] available online at:  [email or web address]    Please call if any further questions or concerns ([phone]).  Clinic hours 8 am to 5 pm.    Return to clinic (call) if symptoms worsen or fail to improve.

## 2019-07-31 ENCOUNTER — MYC REFILL (OUTPATIENT)
Dept: ENDOCRINOLOGY | Facility: CLINIC | Age: 64
End: 2019-07-31

## 2019-07-31 DIAGNOSIS — Z79.4 UNCONTROLLED TYPE 2 DIABETES MELLITUS WITH HYPERGLYCEMIA, WITH LONG-TERM CURRENT USE OF INSULIN (H): ICD-10-CM

## 2019-07-31 DIAGNOSIS — E11.65 UNCONTROLLED TYPE 2 DIABETES MELLITUS WITH HYPERGLYCEMIA, WITH LONG-TERM CURRENT USE OF INSULIN (H): ICD-10-CM

## 2019-08-01 RX ORDER — FLASH GLUCOSE SENSOR
1 KIT MISCELLANEOUS
Qty: 6 EACH | Refills: 3 | Status: SHIPPED | OUTPATIENT
Start: 2019-08-01 | End: 2020-06-08

## 2019-08-21 ENCOUNTER — OFFICE VISIT (OUTPATIENT)
Dept: ENDOCRINOLOGY | Facility: CLINIC | Age: 64
End: 2019-08-21
Payer: COMMERCIAL

## 2019-08-21 VITALS
DIASTOLIC BLOOD PRESSURE: 79 MMHG | BODY MASS INDEX: 44.1 KG/M2 | OXYGEN SATURATION: 97 % | HEIGHT: 67 IN | HEART RATE: 64 BPM | WEIGHT: 281 LBS | SYSTOLIC BLOOD PRESSURE: 126 MMHG

## 2019-08-21 DIAGNOSIS — I49.8 BIGEMINAL RHYTHM: ICD-10-CM

## 2019-08-21 DIAGNOSIS — E78.5 HYPERLIPIDEMIA LDL GOAL <100: ICD-10-CM

## 2019-08-21 DIAGNOSIS — E03.9 HYPOTHYROIDISM, UNSPECIFIED TYPE: ICD-10-CM

## 2019-08-21 DIAGNOSIS — E11.65 TYPE 2 DIABETES MELLITUS WITH HYPERGLYCEMIA, WITH LONG-TERM CURRENT USE OF INSULIN (H): Primary | ICD-10-CM

## 2019-08-21 DIAGNOSIS — Z79.4 TYPE 2 DIABETES MELLITUS WITH HYPERGLYCEMIA, WITH LONG-TERM CURRENT USE OF INSULIN (H): Primary | ICD-10-CM

## 2019-08-21 LAB — HBA1C MFR BLD: 7.5 % (ref 0–5.6)

## 2019-08-21 PROCEDURE — 99215 OFFICE O/P EST HI 40 MIN: CPT | Performed by: INTERNAL MEDICINE

## 2019-08-21 PROCEDURE — 36415 COLL VENOUS BLD VENIPUNCTURE: CPT | Performed by: INTERNAL MEDICINE

## 2019-08-21 PROCEDURE — 83036 HEMOGLOBIN GLYCOSYLATED A1C: CPT | Performed by: INTERNAL MEDICINE

## 2019-08-21 PROCEDURE — 93000 ELECTROCARDIOGRAM COMPLETE: CPT | Performed by: INTERNAL MEDICINE

## 2019-08-21 PROCEDURE — 95251 CONT GLUC MNTR ANALYSIS I&R: CPT | Performed by: INTERNAL MEDICINE

## 2019-08-21 RX ORDER — LIRAGLUTIDE 6 MG/ML
1.8 INJECTION SUBCUTANEOUS DAILY
Qty: 27 ML | Refills: 3 | Status: SHIPPED | OUTPATIENT
Start: 2019-08-21 | End: 2020-07-22

## 2019-08-21 ASSESSMENT — MIFFLIN-ST. JEOR: SCORE: 1854.86

## 2019-08-21 NOTE — LETTER
8/21/2019         RE: Sherry Slaughter  [address] [address] [place] [address]        Dear Colleague,    Thank you for referring your patient, Sherry Slaughter, to the [facility]. Please see a copy of my visit note below.        Sherry is a 64 year old female seen in follow-up for type 2 diabetes.  The patient was diagnosed with type 2 diabetes 25 years after she was diagnosed with gestational diabetes, during both her pregnancies. Her weight before pregnancy was 190-200. Her highest weight was 300s. She was initially started on oral medications and then insulin was added around 2012.     Her A1C has been in 9% range since 2012. She was on Byetta but she did not feel it helped. She was also on Actos but stopped due to 40 lbs weight gain and increased SOB.  For approximately 3 years, she was treated with Victoza.  It was discontinued due to cost.  She continues to take metformin, 2 g extended release daily.  In the beginning of July 2019, she was transitioned to U500 insulin and the dose was progressively increased.  Since July 15, she has been complying with a very low carbohydrate diet (the patient reports a very restrictive diet, consisting of meats, salads, eggs, sugar-free dark chocolate and nuts).  As a result, she lost 10 pounds and the blood sugar numbers have significantly improved.    Current dose of U500 insulin is 110 units in the morning (when she wakes up, anywhere between 5-8 AM) and 100 units at suppertime (anywhere between 5-7 PM).  At her last visit here, I prescribe Jardiance, 10 mg daily.  She reports a constant itchiness sensation in the perineal area and she has to use a topical cream.     A1c today was 7.5%, down from 7.8% at her last visit here.     The FreeStyle Flash Tanya reveals an average blood glucose of 156 with a standard deviation of 47.  67% of the blood sugar numbers are within target of 70 to 180, 4% are below 70 and 29% in the hyperglycemic  range.  The sensor tracings revealed mild postprandial hyperglycemia, starting with breakfast and persistent throughout the day.  Her nighttime blood sugar is well controlled.  There are occasional hypoglycemic episodes noted on the sensor around 4 AM.  As per patient, she denies waking up during the night with symptoms of hypoglycemia.  She does wake up to use the restroom 2-3 times a night and she does scan her blood sugar at that time.    DM complications  Retinopathy: last eye exam: 6/2019; no DR, S/P B/L surgery for cataract   Nephropathy: negative urine microalb - on lisinopril 60 mg; GFR in the 70s  Neuropathy: occasional burning sensation in her feet for the last couple of years   She is symptomatic for hypoglycemia (able to recognize blood sugar numbers in the 90s - she gets sweaty, hot and lightheaded).  She corrects the hypoglycemic symptoms by having something to eat. She does not have glucagon at home (never experienced severe hypoglycemic episodes).   Aspirin - taking 325 mg  LDL 20 (2018) on Crestor 10 mg  Using the CPAP   Exercise limited due to bilateral knee replacement and low back pain.    2.  Hypertension  Her blood pressure is managed with 60 mg lisinopril daily, 25 mg metoprolol daily and 25 mg hydrochlorothiazide daily.    Past Medical History  Past Medical History:   Diagnosis Date     Cataract      DEPRESSION     comes and goes - tried meds - unsuccessfully, certain times of the year, no psych intervention and no counselors in the past - and not interested      Diverticulosis of colon (without mention of hemorrhage) 4/04    colonoscopy     ECHO-mildLVH,tr MR,mild thick lflets w inc LA,trTR   12/03     Essential hypertension, benign 1990s    late 1990s - started medications at that time - not to difficult to control meds      Fam hx-cardiovas dis NEC     father - CAD, and lipids/HTN - multiple members of the family      Family history of diabetes mellitus     sister and grandmother with DM       Family history of malignant neoplasm of breast     mother - young age - 45, and maternal cousin and aunt as well - no BRCA testing done      Family history of stroke (cerebrovascular)     grandmother in early life in her 40s      FAMILY HX COLON CANCER     Pat uncles x 2     Heart disease     murmur/     Heart murmur      HYPERLIPIDEMIA 2000    fairly recent - in the last 5 years - high for DM levels  -cholesterol recent      Irritable bowel syndrome     goes between the 2 - nerve related - more stressed more problems      MICROALBUMINURIA     unsure how long - been on the lisinopril - for a few years at well      Nonspecific abnormal results of liver function study 2/3/2003    SGOT - has been high in the past - since the hepatitis b - borderline elevation - not really changing any      OBESITY      Obstructive sleep apnea      GENESIS (obstructive sleep apnea) 10/15/2006    psg 5/15 AHI 53 aPAP 8-15     OSTEOARTHRITIS L KNEE 2003    total knee on the left - and pain is gone since the knee replacement      PERS HX HEPATITIS B- RESOLVED] 1977    acute virus only - no chronic disease      PERS HX HEPATITIS B- RESOLVED]      Type II or unspecified type diabetes mellitus without mention of complication, not stated as uncontrolled 1991    oral meds frist, then insulin eventually later, difficult to control most of the time      Unspecified hypothyroidism 10/11/2006    TSH 3.36 - mild subclinical hypothyroidism - deciding on following or starting low dose meds - with dr Oreilly - following    Hepatis B     Allergies  Allergies   Allergen Reactions     Atorvastatin Calcium      Gas     Pravachol [Pravastatin Sodium]      Pravachol - dry cough      Simvastatin      Myalgia     Medications    Current Outpatient Medications:      ASPIRIN EC PO, Take 325 mg by mouth daily, Disp: , Rfl:      Continuous Blood Gluc  (FREESTYLE MIKE 14 DAY READER) KATIA, 1 each daily, Disp: 1 Device, Rfl: 0     Continuous Blood Gluc Sensor  (FREESTYLE MIKE 14 DAY SENSOR) [facility], 1 each every 14 days, Disp: 6 each, Rfl: 3     econazole nitrate 1 % external cream, Apply topically daily To feet and toenails., Disp: 85 g, Rfl: 6     empagliflozin (JARDIANCE) 10 MG TABS tablet, Take 1 tablet (10 mg) by mouth daily, Disp: 90 tablet, Rfl: 3     Garlic 1000 MG CAPS, Take 1 capsule by mouth daily Takes during summer months.  Done taking until next summer., Disp: , Rfl:      hydrochlorothiazide (HYDRODIURIL) 25 MG tablet, Take 1 tablet (25 mg) by mouth daily, Disp: 90 tablet, Rfl: 3     Ibuprofen (ADVIL PO), Take 600 mg by mouth 4 times daily, Disp: , Rfl:      insulin pen needle (GNP CLICKFINE PEN NEEDLES) 31G X 8 MM miscellaneous, Use six times daily or as directed, Disp: 100 each, Rfl: 3     insulin reg HIGH CONC (HUMULIN R U-500 KWIKPEN) 500 UNIT/ML PEN soln, Administer 85 U in the morning and 80 U at bedtime. (Patient taking differently: Administer 110 U in the morning and 100 U at bedtime.), Disp: 40 mL, Rfl: 3     levothyroxine (SYNTHROID/LEVOTHROID) 75 MCG tablet, Take 1 tablet (75 mcg) by mouth daily, Disp: 90 tablet, Rfl: 3     lisinopril (PRINIVIL/ZESTRIL) 40 MG tablet, Take 1.5 tablets (60 mg) by mouth daily, Disp: 135 tablet, Rfl: 0     metFORMIN (GLUCOPHAGE-XR) 500 MG 24 hr tablet, Take 4 tablets (2,000 mg) by mouth At Bedtime, Disp: 360 tablet, Rfl: 3     metoprolol succinate (TOPROL XL) 25 MG 24 hr tablet, Take 1 tablet (25 mg) by mouth daily, Disp: 90 tablet, Rfl: 3     Multiple Vitamins-Minerals (ADVANCED DIABETIC MULTIVITAMIN) TABS, Take 1 tablet by mouth daily, Disp: , Rfl:      ORDER FOR DME, SET TO LOCAL PRINT,, Respironics REMSTAR 60 Series Auto CPAP 8-15cm H2O, Airfit P10 nasal pillow mask w/medium pillows, Disp: , Rfl:      rosuvastatin (CRESTOR) 10 MG tablet, Take 1 tablet (10 mg) by mouth daily, Disp: 90 tablet, Rfl: 3     amoxicillin (AMOXIL) 500 MG capsule, Before dental procedures, Disp: , Rfl:      blood glucose monitoring (NO BRAND  "SPECIFIED) test strip, Use to test blood sugar 4 times daily or as directed. (Patient not taking: Reported on 8/21/2019), Disp: 100 each, Rfl: 11    Family History  Maternal grandmother had type 2 diabetes  Daughter has GDM  Father had CAD s/p stent, lung cancer and abdominal aortic anuerysm  Older brother has CABG, and abdominal aortic anuerysm  Young brother has Afib, SVT  Another younger brother has multiple myeloma  Sister has SVT    Social History  No smoking, rarely drink EtOH  No illicit drug  Works as a nurse in the ICU  Lives by herself, has 3 daughters    ROS  Constitutional: weight down 10 lbs in the last year, fatigue   Eyes: no vision changes  Cardiovascular: no chest pain, no palpitations    Respiratory: no SOB or cough   GI: alternating episodes of constipation and diarrhea; no abdominal pain; she associates the diarrhea with increased stress   : no change in urine, no dysuria; urinates 1-2 times a night   Musculoskeletal: no legs swelling, joint pain - mainly the knees and the low back pain  Integumentary: no concerning lesions  Neuro: no loss of strength or sensation, no numbness or tingling, no tremor, no dizziness, no headache   Endo: no temperature intolerance   Heme/Lymph: no concerning bumps, no bleeding problems   Psych: no depression or anxiety, no sleep problems.    Physical Exam  BP Readings from Last 6 Encounters:   08/21/19 126/79   06/19/19 124/61   05/21/19 168/80   03/19/19 122/75   03/19/19 129/70   12/19/18 138/72     Wt Readings from Last 10 Encounters:   08/21/19 127.5 kg (281 lb)   05/21/19 132.5 kg (292 lb)   12/04/18 131.6 kg (290 lb 3.2 oz)   11/14/18 129.3 kg (285 lb)   08/21/18 133.8 kg (295 lb)   07/10/18 133.8 kg (295 lb)   05/02/18 136 kg (299 lb 13.2 oz)   04/05/18 134 kg (295 lb 8 oz)   03/14/18 134 kg (295 lb 8 oz)   11/02/17 135.6 kg (299 lb)     /79 (BP Location: Left arm, Patient Position: Sitting, Cuff Size: Adult Large)   Pulse 64   Ht 1.714 m (5' 7.48\")  "  Wt 127.5 kg (281 lb)   LMP 10/02/2007   SpO2 97%   BMI 43.39 kg/m     Body mass index is 43.39 kg/m .     Constitutional: general obesity, no distress, comfortable, pleasant   Eyes: anicteric, normal extra-ocular movements, no lid lag or retraction  Neck: no thyromegaly; no palpable nodules  CVS: Intermittent bigeminal rhythm, systolic murmur with radiation to the carotid arteries   Lungs: CTA B/L  Musculoskeletal: no edema, DP 2+; left lower extremities - mild atrophy compared with the right   GI: Abdomen soft, nontender, nondistended, positive bowel sounds  NS: no tremor, normal gait, knee reflexes absent  Skin: benign abd striae   Psychological: appropriate mood  Feet: Sensation intact to monofilament testing.    RESULTS  I reviewed prior lab results documented in epic.  Lab Results   Component Value Date    A1C 7.5 (A) 08/21/2019    A1C 7.8 (A) 05/21/2019    A1C 6.8 (H) 11/14/2018    A1C 6.9 (H) 03/14/2018    A1C 7.7 (H) 07/03/2017       Hemoglobin   Date Value Ref Range Status   09/25/2015 11.5 (L) 11.7 - 15.7 g/dL Final     Hematocrit   Date Value Ref Range Status   11/14/2018 41.6 35.0 - 47.0 % Final     Cholesterol   Date Value Ref Range Status   11/14/2018 109 <200 mg/dL Final     Cholesterol/HDL Ratio   Date Value Ref Range Status   02/19/2015 3.2 0.0 - 5.0 Final     HDL Cholesterol   Date Value Ref Range Status   11/14/2018 37 (L) >49 mg/dL Final     LDL Cholesterol Calculated   Date Value Ref Range Status   11/14/2018 20 <100 mg/dL Final     Comment:     Desirable:       <100 mg/dl     VLDL-Cholesterol   Date Value Ref Range Status   02/19/2015 34 (H) 0 - 30 mg/dL Final     Triglycerides   Date Value Ref Range Status   11/14/2018 262 (H) <150 mg/dL Final     Comment:     Borderline high:  150-199 mg/dl  High:             200-499 mg/dl  Very high:       >499 mg/dl  Fasting specimen       Albumin Urine mg/L   Date Value Ref Range Status   11/14/2018 24 mg/L Final     TSH   Date Value Ref Range Status    11/14/2018 1.74 0.40 - 4.00 mU/L Final         Last Basic Metabolic Panel:    Sodium   Date Value Ref Range Status   07/09/2018 140 133 - 144 mmol/L Final     Potassium   Date Value Ref Range Status   07/09/2018 4.0 3.4 - 5.3 mmol/L Final     Chloride   Date Value Ref Range Status   07/09/2018 105 94 - 109 mmol/L Final     Calcium   Date Value Ref Range Status   07/09/2018 9.4 8.5 - 10.1 mg/dL Final     Carbon Dioxide   Date Value Ref Range Status   07/09/2018 28 20 - 32 mmol/L Final     Urea Nitrogen   Date Value Ref Range Status   07/09/2018 17 7 - 30 mg/dL Final     Creatinine   Date Value Ref Range Status   07/09/2018 0.74 0.52 - 1.04 mg/dL Final     GFR Estimate   Date Value Ref Range Status   07/09/2018 78 >60 mL/min/1.7m2 Final     Comment:     Non  GFR Calc     Glucose   Date Value Ref Range Status   07/09/2018 109 (H) 70 - 99 mg/dL Final     Comment:     Non Fasting       AST   Date Value Ref Range Status   03/07/2014 41 0 - 45 U/L Final     ALT   Date Value Ref Range Status   11/14/2018 37 0 - 50 U/L Final     Albumin   Date Value Ref Range Status   03/07/2014 4.2 3.3 - 4.9 g/dL Final         ASSESSMENT:      1. Type 2 diabetes  Sherry is a 64 year old pleasant female, with a history of type 2 diabetes in the setting of obesity, sleep apnea.  With a better diet and weight loss, the glycemic control significantly improved and her A1c is now 7.5 on U500 insulin.  Her diabetes is known to be complicated by mild neuropathy.  Recommendations:  Scan the nevin sensor consistently, before meals and at bedtime (intermittently, there are periods of time with no tracings)  Continue metformin  Increase the dose of morning insulin to 120 units  Follow-up urine microalbumin, CMP and hematocrit    2. Hypertension, controlled. On lisinopril 60 mg, HCTZ 25 mg and metoprolol 25 mg daily.      3.  Hypothyroidism.  Clinically, the patient is euthyroid.  I recommended to continue to take the same dose of  levothyroxine.  Follow-up reflex TSH at her next appointment    4.  Bigeminal rhythm.  Clinically, the patient is asymptomatic, with no chest pain, shortness of breath, lightheadedness or palpitations.  An EKG done in the clinic revealed a heart rate of 63, with normal sinus rhythm and nonspecific changes, similar to the prior EKG recordings.  I advised the patient to follow-up on this with her primary care provider and consider cardiac monitoring.    Orders Placed This Encounter   Procedures     Albumin Random Urine Quantitative with Creat Ratio     Comprehensive metabolic panel     Hematocrit     Lipid panel reflex to direct LDL Fasting     EKG 12-lead complete w/read - Clinics           Again, thank you for allowing me to participate in the care of your patient.        Sincerely,        Rika Delgado MD

## 2019-08-21 NOTE — NURSING NOTE
"Sherry Slaughter's goals for this visit include:   Chief Complaint   Patient presents with     Diabetes     She requests these members of her care team be copied on today's visit information: Yes    PCP: Marilou Arciniega    Referring Provider:  Marilou Arciniega MD PhD  [id number] [place] [address]    /79 (BP Location: Left arm, Patient Position: Sitting, Cuff Size: Adult Large)   Pulse 64   Ht 1.714 m (5' 7.48\")   Wt 127.5 kg (281 lb)   LMP 10/02/2007   SpO2 97%   BMI 43.39 kg/m      Do you need any medication refills at today's visit? Yes    "

## 2019-08-21 NOTE — PROGRESS NOTES
Sherry is a 64 year old female seen in follow-up for type 2 diabetes.  The patient was diagnosed with type 2 diabetes 25 years after she was diagnosed with gestational diabetes, during both her pregnancies. Her weight before pregnancy was 190-200. Her highest weight was 300s. She was initially started on oral medications and then insulin was added around 2012.     Her A1C has been in 9% range since 2012. She was on Byetta but she did not feel it helped. She was also on Actos but stopped due to 40 lbs weight gain and increased SOB.  For approximately 3 years, she was treated with Victoza.  It was discontinued due to cost.  She continues to take metformin, 2 g extended release daily.  In the beginning of July 2019, she was transitioned to U500 insulin and the dose was progressively increased.  Since July 15, she has been complying with a very low carbohydrate diet (the patient reports a very restrictive diet, consisting of meats, salads, eggs, sugar-free dark chocolate and nuts).  As a result, she lost 10 pounds and the blood sugar numbers have significantly improved.    Current dose of U500 insulin is 110 units in the morning (when she wakes up, anywhere between 5-8 AM) and 100 units at suppertime (anywhere between 5-7 PM).  At her last visit here, I prescribe Jardiance, 10 mg daily.  She reports a constant itchiness sensation in the perineal area and she has to use a topical cream.     A1c today was 7.5%, down from 7.8% at her last visit here.     The FreeStyle Flash Tanya reveals an average blood glucose of 156 with a standard deviation of 47.  67% of the blood sugar numbers are within target of 70 to 180, 4% are below 70 and 29% in the hyperglycemic range.  The sensor tracings revealed mild postprandial hyperglycemia, starting with breakfast and persistent throughout the day.  Her nighttime blood sugar is well controlled.  There are occasional hypoglycemic episodes noted on the sensor around 4 AM.  As per  patient, she denies waking up during the night with symptoms of hypoglycemia.  She does wake up to use the restroom 2-3 times a night and she does scan her blood sugar at that time.    DM complications  Retinopathy: last eye exam: 6/2019; no DR, S/P B/L surgery for cataract   Nephropathy: negative urine microalb - on lisinopril 60 mg; GFR in the 70s  Neuropathy: occasional burning sensation in her feet for the last couple of years   She is symptomatic for hypoglycemia (able to recognize blood sugar numbers in the 90s - she gets sweaty, hot and lightheaded).  She corrects the hypoglycemic symptoms by having something to eat. She does not have glucagon at home (never experienced severe hypoglycemic episodes).   Aspirin - taking 325 mg  LDL 20 (2018) on Crestor 10 mg  Using the CPAP   Exercise limited due to bilateral knee replacement and low back pain.    2.  Hypertension  Her blood pressure is managed with 60 mg lisinopril daily, 25 mg metoprolol daily and 25 mg hydrochlorothiazide daily.    Past Medical History  Past Medical History:   Diagnosis Date     Cataract      DEPRESSION     comes and goes - tried meds - unsuccessfully, certain times of the year, no psych intervention and no counselors in the past - and not interested      Diverticulosis of colon (without mention of hemorrhage) 4/04    colonoscopy     ECHO-mildLVH,tr MR,mild thick lflets w inc LA,trTR   12/03     Essential hypertension, benign 1990s    late 1990s - started medications at that time - not to difficult to control meds      Fam hx-cardiovas dis NEC     father - CAD, and lipids/HTN - multiple members of the family      Family history of diabetes mellitus     sister and grandmother with DM      Family history of malignant neoplasm of breast     mother - young age - 45, and maternal cousin and aunt as well - no BRCA testing done      Family history of stroke (cerebrovascular)     grandmother in early life in her 40s      FAMILY HX COLON CANCER      Pat uncles x 2     Heart disease     murmur/     Heart murmur      HYPERLIPIDEMIA 2000    fairly recent - in the last 5 years - high for DM levels  -cholesterol recent      Irritable bowel syndrome     goes between the 2 - nerve related - more stressed more problems      MICROALBUMINURIA     unsure how long - been on the lisinopril - for a few years at well      Nonspecific abnormal results of liver function study 2/3/2003    SGOT - has been high in the past - since the hepatitis b - borderline elevation - not really changing any      OBESITY      Obstructive sleep apnea      GENESIS (obstructive sleep apnea) 10/15/2006    psg 5/15 AHI 53 aPAP 8-15     OSTEOARTHRITIS L KNEE 2003    total knee on the left - and pain is gone since the knee replacement      PERS HX HEPATITIS B- RESOLVED] 1977    acute virus only - no chronic disease      PERS HX HEPATITIS B- RESOLVED]      Type II or unspecified type diabetes mellitus without mention of complication, not stated as uncontrolled 1991    oral meds frist, then insulin eventually later, difficult to control most of the time      Unspecified hypothyroidism 10/11/2006    TSH 3.36 - mild subclinical hypothyroidism - deciding on following or starting low dose meds - with dr Oreilly - following    Hepatis B     Allergies  Allergies   Allergen Reactions     Atorvastatin Calcium      Gas     Pravachol [Pravastatin Sodium]      Pravachol - dry cough      Simvastatin      Myalgia     Medications    Current Outpatient Medications:      ASPIRIN EC PO, Take 325 mg by mouth daily, Disp: , Rfl:      Continuous Blood Gluc  (FREESTYLE MIKE 14 DAY READER) KATIA, 1 each daily, Disp: 1 Device, Rfl: 0     Continuous Blood Gluc Sensor (FREESTYLE MIKE 14 DAY SENSOR) MISC, 1 each every 14 days, Disp: 6 each, Rfl: 3     econazole nitrate 1 % external cream, Apply topically daily To feet and toenails., Disp: 85 g, Rfl: 6     empagliflozin (JARDIANCE) 10 MG TABS tablet, Take 1 tablet (10 mg) by  mouth daily, Disp: 90 tablet, Rfl: 3     Garlic 1000 MG CAPS, Take 1 capsule by mouth daily Takes during summer months.  Done taking until next summer., Disp: , Rfl:      hydrochlorothiazide (HYDRODIURIL) 25 MG tablet, Take 1 tablet (25 mg) by mouth daily, Disp: 90 tablet, Rfl: 3     Ibuprofen (ADVIL PO), Take 600 mg by mouth 4 times daily, Disp: , Rfl:      insulin pen needle (GNP CLICKFINE PEN NEEDLES) 31G X 8 MM miscellaneous, Use six times daily or as directed, Disp: 100 each, Rfl: 3     insulin reg HIGH CONC (HUMULIN R U-500 KWIKPEN) 500 UNIT/ML PEN soln, Administer 85 U in the morning and 80 U at bedtime. (Patient taking differently: Administer 110 U in the morning and 100 U at bedtime.), Disp: 40 mL, Rfl: 3     levothyroxine (SYNTHROID/LEVOTHROID) 75 MCG tablet, Take 1 tablet (75 mcg) by mouth daily, Disp: 90 tablet, Rfl: 3     lisinopril (PRINIVIL/ZESTRIL) 40 MG tablet, Take 1.5 tablets (60 mg) by mouth daily, Disp: 135 tablet, Rfl: 0     metFORMIN (GLUCOPHAGE-XR) 500 MG 24 hr tablet, Take 4 tablets (2,000 mg) by mouth At Bedtime, Disp: 360 tablet, Rfl: 3     metoprolol succinate (TOPROL XL) 25 MG 24 hr tablet, Take 1 tablet (25 mg) by mouth daily, Disp: 90 tablet, Rfl: 3     Multiple Vitamins-Minerals (ADVANCED DIABETIC MULTIVITAMIN) TABS, Take 1 tablet by mouth daily, Disp: , Rfl:      ORDER FOR DME, SET TO LOCAL PRINT,, Respironics REMSTAR 60 Series Auto CPAP 8-15cm H2O, Airfit P10 nasal pillow mask w/medium pillows, Disp: , Rfl:      rosuvastatin (CRESTOR) 10 MG tablet, Take 1 tablet (10 mg) by mouth daily, Disp: 90 tablet, Rfl: 3     amoxicillin (AMOXIL) 500 MG capsule, Before dental procedures, Disp: , Rfl:      blood glucose monitoring (NO BRAND SPECIFIED) test strip, Use to test blood sugar 4 times daily or as directed. (Patient not taking: Reported on 8/21/2019), Disp: 100 each, Rfl: 11    Family History  Maternal grandmother had type 2 diabetes  Daughter has GDM  Father had CAD s/p stent, lung  "cancer and abdominal aortic anuerysm  Older brother has CABG, and abdominal aortic anuerysm  Young brother has Afib, SVT  Another younger brother has multiple myeloma  Sister has SVT    Social History  No smoking, rarely drink EtOH  No illicit drug  Works as a nurse in the ICU  Lives by herself, has 3 daughters    ROS  Constitutional: weight down 10 lbs in the last year, fatigue   Eyes: no vision changes  Cardiovascular: no chest pain, no palpitations    Respiratory: no SOB or cough   GI: alternating episodes of constipation and diarrhea; no abdominal pain; she associates the diarrhea with increased stress   : no change in urine, no dysuria; urinates 1-2 times a night   Musculoskeletal: no legs swelling, joint pain - mainly the knees and the low back pain  Integumentary: no concerning lesions  Neuro: no loss of strength or sensation, no numbness or tingling, no tremor, no dizziness, no headache   Endo: no temperature intolerance   Heme/Lymph: no concerning bumps, no bleeding problems   Psych: no depression or anxiety, no sleep problems.    Physical Exam  BP Readings from Last 6 Encounters:   08/21/19 126/79   06/19/19 124/61   05/21/19 168/80   03/19/19 122/75   03/19/19 129/70   12/19/18 138/72     Wt Readings from Last 10 Encounters:   08/21/19 127.5 kg (281 lb)   05/21/19 132.5 kg (292 lb)   12/04/18 131.6 kg (290 lb 3.2 oz)   11/14/18 129.3 kg (285 lb)   08/21/18 133.8 kg (295 lb)   07/10/18 133.8 kg (295 lb)   05/02/18 136 kg (299 lb 13.2 oz)   04/05/18 134 kg (295 lb 8 oz)   03/14/18 134 kg (295 lb 8 oz)   11/02/17 135.6 kg (299 lb)     /79 (BP Location: Left arm, Patient Position: Sitting, Cuff Size: Adult Large)   Pulse 64   Ht 1.714 m (5' 7.48\")   Wt 127.5 kg (281 lb)   LMP 10/02/2007   SpO2 97%   BMI 43.39 kg/m    Body mass index is 43.39 kg/m .     Constitutional: general obesity, no distress, comfortable, pleasant   Eyes: anicteric, normal extra-ocular movements, no lid lag or " retraction  Neck: no thyromegaly; no palpable nodules  CVS: Intermittent bigeminal rhythm, systolic murmur with radiation to the carotid arteries   Lungs: CTA B/L  Musculoskeletal: no edema, DP 2+; left lower extremities - mild atrophy compared with the right   GI: Abdomen soft, nontender, nondistended, positive bowel sounds  NS: no tremor, normal gait, knee reflexes absent  Skin: benign abd striae   Psychological: appropriate mood  Feet: Sensation intact to monofilament testing.    RESULTS  I reviewed prior lab results documented in epic.  Lab Results   Component Value Date    A1C 7.5 (A) 08/21/2019    A1C 7.8 (A) 05/21/2019    A1C 6.8 (H) 11/14/2018    A1C 6.9 (H) 03/14/2018    A1C 7.7 (H) 07/03/2017       Hemoglobin   Date Value Ref Range Status   09/25/2015 11.5 (L) 11.7 - 15.7 g/dL Final     Hematocrit   Date Value Ref Range Status   11/14/2018 41.6 35.0 - 47.0 % Final     Cholesterol   Date Value Ref Range Status   11/14/2018 109 <200 mg/dL Final     Cholesterol/HDL Ratio   Date Value Ref Range Status   02/19/2015 3.2 0.0 - 5.0 Final     HDL Cholesterol   Date Value Ref Range Status   11/14/2018 37 (L) >49 mg/dL Final     LDL Cholesterol Calculated   Date Value Ref Range Status   11/14/2018 20 <100 mg/dL Final     Comment:     Desirable:       <100 mg/dl     VLDL-Cholesterol   Date Value Ref Range Status   02/19/2015 34 (H) 0 - 30 mg/dL Final     Triglycerides   Date Value Ref Range Status   11/14/2018 262 (H) <150 mg/dL Final     Comment:     Borderline high:  150-199 mg/dl  High:             200-499 mg/dl  Very high:       >499 mg/dl  Fasting specimen       Albumin Urine mg/L   Date Value Ref Range Status   11/14/2018 24 mg/L Final     TSH   Date Value Ref Range Status   11/14/2018 1.74 0.40 - 4.00 mU/L Final         Last Basic Metabolic Panel:    Sodium   Date Value Ref Range Status   07/09/2018 140 133 - 144 mmol/L Final     Potassium   Date Value Ref Range Status   07/09/2018 4.0 3.4 - 5.3 mmol/L Final      Chloride   Date Value Ref Range Status   07/09/2018 105 94 - 109 mmol/L Final     Calcium   Date Value Ref Range Status   07/09/2018 9.4 8.5 - 10.1 mg/dL Final     Carbon Dioxide   Date Value Ref Range Status   07/09/2018 28 20 - 32 mmol/L Final     Urea Nitrogen   Date Value Ref Range Status   07/09/2018 17 7 - 30 mg/dL Final     Creatinine   Date Value Ref Range Status   07/09/2018 0.74 0.52 - 1.04 mg/dL Final     GFR Estimate   Date Value Ref Range Status   07/09/2018 78 >60 mL/min/1.7m2 Final     Comment:     Non  GFR Calc     Glucose   Date Value Ref Range Status   07/09/2018 109 (H) 70 - 99 mg/dL Final     Comment:     Non Fasting       AST   Date Value Ref Range Status   03/07/2014 41 0 - 45 U/L Final     ALT   Date Value Ref Range Status   11/14/2018 37 0 - 50 U/L Final     Albumin   Date Value Ref Range Status   03/07/2014 4.2 3.3 - 4.9 g/dL Final         ASSESSMENT:      1. Type 2 diabetes  Sherry is a 64 year old pleasant female, with a history of type 2 diabetes in the setting of obesity, sleep apnea.  With a better diet and weight loss, the glycemic control significantly improved and her A1c is now 7.5 on U500 insulin.  Her diabetes is known to be complicated by mild neuropathy.  Recommendations:  Scan the nevin sensor consistently, before meals and at bedtime (intermittently, there are periods of time with no tracings)  Continue metformin  Increase the dose of morning insulin to 120 units  Follow-up urine microalbumin, CMP and hematocrit    2. Hypertension, controlled. On lisinopril 60 mg, HCTZ 25 mg and metoprolol 25 mg daily.      3.  Hypothyroidism.  Clinically, the patient is euthyroid.  I recommended to continue to take the same dose of levothyroxine.  Follow-up reflex TSH at her next appointment    4.  Bigeminal rhythm.  Clinically, the patient is asymptomatic, with no chest pain, shortness of breath, lightheadedness or palpitations.  An EKG done in the clinic revealed a  heart rate of 63, with normal sinus rhythm and nonspecific changes, similar to the prior EKG recordings.  I advised the patient to follow-up on this with her primary care provider and consider cardiac monitoring.    Orders Placed This Encounter   Procedures     Albumin Random Urine Quantitative with Creat Ratio     Comprehensive metabolic panel     Hematocrit     Lipid panel reflex to direct LDL Fasting     EKG 12-lead complete w/read - Clinics

## 2019-09-12 ENCOUNTER — OFFICE VISIT (OUTPATIENT)
Dept: ORTHOPEDICS | Facility: OTHER | Age: 64
End: 2019-09-12
Payer: COMMERCIAL

## 2019-09-12 ENCOUNTER — ANCILLARY PROCEDURE (OUTPATIENT)
Dept: GENERAL RADIOLOGY | Facility: OTHER | Age: 64
End: 2019-09-12
Attending: PHYSICAL MEDICINE & REHABILITATION
Payer: COMMERCIAL

## 2019-09-12 VITALS
BODY MASS INDEX: 45.44 KG/M2 | HEIGHT: 67 IN | DIASTOLIC BLOOD PRESSURE: 64 MMHG | SYSTOLIC BLOOD PRESSURE: 119 MMHG | WEIGHT: 289.5 LBS

## 2019-09-12 DIAGNOSIS — M79.645 BILATERAL THUMB PAIN: Primary | ICD-10-CM

## 2019-09-12 DIAGNOSIS — M18.0 PRIMARY OSTEOARTHRITIS OF BOTH FIRST CARPOMETACARPAL JOINTS: ICD-10-CM

## 2019-09-12 DIAGNOSIS — M79.644 BILATERAL THUMB PAIN: Primary | ICD-10-CM

## 2019-09-12 DIAGNOSIS — M79.645 BILATERAL THUMB PAIN: ICD-10-CM

## 2019-09-12 DIAGNOSIS — M79.644 BILATERAL THUMB PAIN: ICD-10-CM

## 2019-09-12 PROCEDURE — 99204 OFFICE O/P NEW MOD 45 MIN: CPT | Mod: 25 | Performed by: PHYSICAL MEDICINE & REHABILITATION

## 2019-09-12 PROCEDURE — 20600 DRAIN/INJ JOINT/BURSA W/O US: CPT | Mod: LT | Performed by: PHYSICAL MEDICINE & REHABILITATION

## 2019-09-12 PROCEDURE — 73140 X-RAY EXAM OF FINGER(S): CPT | Mod: LT

## 2019-09-12 RX ORDER — TRIAMCINOLONE ACETONIDE 40 MG/ML
20 INJECTION, SUSPENSION INTRA-ARTICULAR; INTRAMUSCULAR
Status: DISCONTINUED | OUTPATIENT
Start: 2019-09-12 | End: 2021-01-26

## 2019-09-12 RX ADMIN — TRIAMCINOLONE ACETONIDE 20 MG: 40 INJECTION, SUSPENSION INTRA-ARTICULAR; INTRAMUSCULAR at 10:57

## 2019-09-12 ASSESSMENT — MIFFLIN-ST. JEOR: SCORE: 1893.41

## 2019-09-12 NOTE — PATIENT INSTRUCTIONS
Today's Plan of Care:  -Steroid injection performed today.  Take it easy over the next few days. Keep in mind that the steroid may take up to 3 days to start working and up to 2 weeks to reach maximal effect.  Ice 15-20 minutes as needed for soreness.  Patient's preferred over the counter medication as needed for pain as directed on packaging. Steroids can temporarily raise the blood glucose. Closely monitor your blood sugars over the next week.    -Brace as needed for comfort and support  -Over the counter lidocaine cream as needed (i.e. Aspercreme or generic equivalent)  -Avoid aggravating activities.      -We also discussed other future treatment options:  Hand therapy    Follow Up: As needed if symptoms fail to improve or worsen. Please call with any questions or concerns.

## 2019-09-12 NOTE — LETTER
9/12/2019         RE: Sherry Slaughter  [address]  [place] [address]        Dear Colleague,    Thank you for referring your patient, Sherry Slaughter, to the [facility]. Please see a copy of my visit note below.    Sports Medicine Clinic Visit    PCP: Marilou Arciniega    CC: Patient presents with:  Musculoskeletal Problem: bilateral thumb pain      HPI:  Sherry Slaughter is a 64 year old female who is seen as a self referral.   She notes bilateral thumb pain that began 4-5 month ago with insidious onset. She notes the left thumb is worse than the right. She notes that sometimes there is a sharp, shooting pain and sometimes it is achy.   She rates the pain at a 10/10 at its worst and a 4/10 currently.  Symptoms are relieved with Ibuprofen slightly. Symptoms are worsened by gripping, grasping. She endorses weakness.   She denies swelling, bruising, popping, grinding, catching, locking, instability, numbness and tingling.  Other treatment has included nothing. She notes difficulty with opening jars.       Review of Systems:  Musculoskeletal: as above  Remainder of review of systems is negative including constitutional, eyes, ENT, CV, pulmonary, GI, , endocrine, skin, hematologic, and neurologic except as noted in HPI or medical history.    History reviewed. No pertinent past surgical/medical/family/social history other than as mentioned in HPI.    Patient Active Problem List   Diagnosis     Morbid obesity (H)     Diverticulosis of large intestine     Nonspecific abnormal results of cardiovascular function study     FAMILY HX COLON CANCER     Nonallopathic lesion of thoracic region     Hypothyroidism     GENESIS (obstructive sleep apnea)     Irritable bowel syndrome     Family history of malignant neoplasm of breast     History of major depression     OSTEOARTHRITIS L KNEE  s/p knee replacement on the left      Hypertension goal BP (blood pressure) < 140/90     Family history of  stroke (cerebrovascular)     Family history of other cardiovascular diseases     Family history of diabetes mellitus     ABSENCE OF MENSTRUATION - perimenopausal      JOINT PAIN-ANKLE - right ankle      Mitral valve insufficiency     Hyperlipidemia LDL goal <100     Aortic sclerosis     Tubular adenoma of colon     History of viral hepatitis, type B     Chronic low back pain     Long-term insulin use (H)     Uncontrolled diabetes mellitus (H)     S/P total knee replacement using cement, right     Aftercare following right knee joint replacement surgery     Lumbago     Type 2 diabetes mellitus with hyperglycemia (H)     Posterior vitreous detachment of right eye     Pseudophakia of both eyes     Past Medical History:   Diagnosis Date     Cataract      DEPRESSION     comes and goes - tried meds - unsuccessfully, certain times of the year, no psych intervention and no counselors in the past - and not interested      Diverticulosis of colon (without mention of hemorrhage) 4/04    colonoscopy     ECHO-mildLVH,tr MR,mild thick lflets w inc LA,trTR   12/03     Essential hypertension, benign 1990s    late 1990s - started medications at that time - not to difficult to control meds      Fam hx-cardiovas dis NEC     father - CAD, and lipids/HTN - multiple members of the family      Family history of diabetes mellitus     sister and grandmother with DM      Family history of malignant neoplasm of breast     mother - young age - 45, and maternal cousin and aunt as well - no BRCA testing done      Family history of stroke (cerebrovascular)     grandmother in early life in her 40s      FAMILY HX COLON CANCER     Pat uncles x 2     Heart disease     murmur/     Heart murmur      HYPERLIPIDEMIA 2000    fairly recent - in the last 5 years - high for DM levels  -cholesterol recent      Irritable bowel syndrome     goes between the 2 - nerve related - more stressed more problems      MICROALBUMINURIA     unsure how long - been on the  lisinopril - for a few years at well      Nonspecific abnormal results of liver function study 2/3/2003    SGOT - has been high in the past - since the hepatitis b - borderline elevation - not really changing any      OBESITY      Obstructive sleep apnea      GENESIS (obstructive sleep apnea) 10/15/2006    psg 5/15 AHI 53 aPAP 8-15     OSTEOARTHRITIS L KNEE 2003    total knee on the left - and pain is gone since the knee replacement      PERS HX HEPATITIS B- RESOLVED] 1977    acute virus only - no chronic disease      PERS HX HEPATITIS B- RESOLVED]      Type II or unspecified type diabetes mellitus without mention of complication, not stated as uncontrolled 1991    oral meds frist, then insulin eventually later, difficult to control most of the time      Unspecified hypothyroidism 10/11/2006    TSH 3.36 - mild subclinical hypothyroidism - deciding on following or starting low dose meds - with dr Oreilly - following      Past Surgical History:   Procedure Laterality Date     ARTHROPLASTY KNEE Right 9/23/2015    Procedure: ARTHROPLASTY KNEE;  Surgeon: Rufus Brown MD;  Location:  OR     C NONSPECIFIC PROCEDURE  6/06    right triple arthrodecesis      C NONSPECIFIC PROCEDURE  7/06     right bunion surgery      C TOTAL KNEE ARTHROPLASTY  3/03    L knee     CATARACT IOL, RT/LT Left      COLONOSCOPY  4/04    diverticulosis, rec repeat 10 yrs(consider fam hx?)     COLONOSCOPY WITH CO2 INSUFFLATION N/A 6/19/2019    Procedure: COLONOSCOPY, WITH CO2 INSUFFLATION;  Surgeon: Zeb Duarte MD;  Location:  OR     ORTHOPEDIC SURGERY      right ankle     PHACOEMULSIFICATION CLEAR CORNEA WITH STANDARD INTRAOCULAR LENS IMPLANT Left 3/15/2018    Procedure: PHACOEMULSIFICATION CLEAR CORNEA WITH STANDARD INTRAOCULAR LENS IMPLANT;  LEFT EYE PHACOEMULSIFICATION CLEAR CORNEA WITH STANDARD INTRAOCULAR LENS IMPLANT;  Surgeon: Bandar Linares MD;  Location: [facility]     PHACOEMULSIFICATION CLEAR CORNEA WITH STANDARD INTRAOCULAR  LENS IMPLANT Right 4/5/2018    Procedure: PHACOEMULSIFICATION CLEAR CORNEA WITH STANDARD INTRAOCULAR LENS IMPLANT;  RIGHT PHACOEMULSIFICATION CLEAR CORNEA WITH STANDARD INTRAOCULAR LENS IMPLANT ;  Surgeon: Bandar Linares MD;  Location[facility]     STRESS ECHO (METRO)  12/03    no ischemia, limited by fatigue     SURGICAL HISTORY OF -       EXP LAP- R OVARY CYSTS     SURGICAL HISTORY OF -   1981,1984,1985    CHILDBIRTH     Family History   Problem Relation Age of Onset     Diabetes Maternal Grandmother         DM TYPE 2     Cerebrovascular Disease Maternal Grandmother      Hypertension Sister      Lipids Sister      Heart Disease Sister         Chronic AFib     Hypertension Sister      Lipids Sister      Gynecology Sister      Diabetes Sister      Asthma Sister      Blood Disease Paternal Grandmother         T CELL LEUKEMIA     Hypertension Brother      Lipids Brother      Congenital Anomalies Brother      Cardiovascular Brother      Heart Disease Brother         CABG/AFIB     Hypertension Brother      Lipids Brother      Obesity Brother      Hypertension Brother      Respiratory Brother         Sleep apnea     Heart Disease Brother         AFib     Alzheimer Disease Mother      Asthma Mother      Hypertension Mother      Breast Cancer Mother 40        has mastectomy and lived to 84     Allergies Mother         Sulfa,     Arthritis Mother      Cardiovascular Mother      Depression Mother      Respiratory Mother      Lipids Mother      Cancer Mother         breast     Thyroid Disease Mother      Cerebrovascular Disease Mother      Dementia Mother         Alzheimers     Cancer - colorectal Other      Cancer Father         lung     Aneurysm Father         brother, AAA     Other Cancer Father         lung ca     Asthma Sister      Cancer - colorectal Paternal Uncle         late 50s to early 60s - great uncles      Breast Cancer Other         Maternal cousin     Arrhythmia Sister         2 brothers 1 sister Afib      Breast Cancer Maternal Aunt 62     Other Cancer Maternal Aunt 35        Ovarian cancer.  Survived despite late recurrence and is now 92.     Glaucoma No family hx of      Macular Degeneration No family hx of      Social History     Socioeconomic History     Marital status:      Spouse name: Not on file     Number of children: 3     Years of education: Not on file     Highest education level: Not on file   Occupational History     Occupation: RN     Employer: [employer]   Social Needs     Financial resource strain: Not on file     Food insecurity:     Worry: Not on file     Inability: Not on file     Transportation needs:     Medical: Not on file     Non-medical: Not on file   Tobacco Use     Smoking status: Never Smoker     Smokeless tobacco: Never Used     Tobacco comment: tried in early 20s only    Substance and Sexual Activity     Alcohol use: Yes     Comment: rare     Drug use: No     Sexual activity: Never     Comment:  - since for 20 years, no signficant other  > 5 years sexual activity    Lifestyle     Physical activity:     Days per week: Not on file     Minutes per session: Not on file     Stress: Not on file   Relationships     Social connections:     Talks on phone: Not on file     Gets together: Not on file     Attends Religion service: Not on file     Active member of club or organization: Not on file     Attends meetings of clubs or organizations: Not on file     Relationship status: Not on file     Intimate partner violence:     Fear of current or ex partner: Not on file     Emotionally abused: Not on file     Physically abused: Not on file     Forced sexual activity: Not on file   Other Topics Concern      Service No     Blood Transfusions No     Caffeine Concern No     Occupational Exposure Yes     Comment: Normal nursing exposures     Hobby Hazards No     Sleep Concern Yes     Stress Concern No     Comment: Stress level at HM=5, stress level at WK=6  "    Weight Concern Yes     Special Diet Yes     Comment: Diabetic diet (watching carbs)     Back Care No     Comment: Occassional back spasms     Exercise Yes     Comment: Pt joined a health club goes approx 3-4 times a week,half to one mile daily     Bike Helmet No     Seat Belt Yes     Comment: Sometimes     Self-Exams Yes     Parent/sibling w/ CABG, MI or angioplasty before 65F 55M? 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        She is retired from nursing.     Current Outpatient Medications   Medication     ASPIRIN EC PO     Garlic 1000 MG CAPS     hydrochlorothiazide (HYDRODIURIL) 25 MG tablet     insulin reg HIGH CONC (HUMULIN R U-500 KWIKPEN) 500 UNIT/ML PEN soln     levothyroxine (SYNTHROID/LEVOTHROID) 75 MCG tablet     liraglutide (VICTOZA PEN) 18 MG/3ML solution     lisinopril (PRINIVIL/ZESTRIL) 40 MG tablet     metFORMIN (GLUCOPHAGE-XR) 500 MG 24 hr tablet     metoprolol succinate (TOPROL XL) 25 MG 24 hr tablet     rosuvastatin (CRESTOR) 10 MG tablet     amoxicillin (AMOXIL) 500 MG capsule     blood glucose monitoring (NO BRAND SPECIFIED) test strip     Continuous Blood Gluc  (FREESTYLE MIKE 14 DAY READER) KATIA     Continuous Blood Gluc Sensor (FREESTYLE MIKE 14 DAY SENSOR) MISC     econazole nitrate 1 % external cream     Ibuprofen (ADVIL PO)     insulin pen needle (GNP CLICKFINE PEN NEEDLES) 31G X 8 MM miscellaneous     Multiple Vitamins-Minerals (ADVANCED DIABETIC MULTIVITAMIN) TABS     ORDER FOR DME, SET TO LOCAL PRINT,     order for DME     order for DME     No current facility-administered medications for this visit.      Allergies   Allergen Reactions     Atorvastatin Calcium      Gas     Pravachol [Pravastatin Sodium]      Pravachol - dry cough      Simvastatin      Myalgia         Objective:  /64   Ht 1.714 m (5' 7.48\")   Wt 131.3 kg (289 lb 8 oz)   LMP 10/02/2007   BMI 44.70 kg/m       General: Alert " and in no distress    Head: Normocephalic, atraumatic  Eyes: no scleral icterus or conjunctival erythema   Oropharynx:  Mucous membranes moist  Skin: no erythema, petechiae, or jaundice  CV: regular rhythm by palpation, 2+ distal pulses  Resp: normal respiratory effort without conversational dyspnea   Psych: normal mood and affect    Neuro: Motor strength and sensation as noted below    Musculoskeletal:  Bilateral Wrist and Hand exam    Inspection:       No swelling, bruising or deformity bilaterally    Palpation:  -Tender over the bilateral CMC joints, left 1st metacarpal, and left 1st MCP joint    ROM:       Full and symmetric active range of motion of the forearm, wrist and digits bilaterally    Strength:  Forearm supination 5/5 bilaterally  Forearm pronation 5/5 bilaterally  Wrist extension 5/5 bilaterally  Wrist flexion 5/5 bilaterally   strength 5/5 bilaterally  Finger abduction 5/5 bilaterally    Special Tests:        neg (-) Finkelstein's maneuver bilaterally        pos (+) 1st CMC grind test on the left    Neurovascular:       2+ radial pulses bilaterally and normal sensation to light touch in the radial, median and ulnar nerve distributions      Radiology:  X-rays ordered and independent visualization of images performed and reviewed with Sherry    Recent Results (from the past 24 hour(s))   XR Finger Bilateral G/E 2 vw    Narrative    XR FINGER BILATERAL G/E 2 VW   9/12/2019 10:14 AM     HISTORY: Bilateral thumb pain; Bilateral thumb pain    COMPARISON: None.      Impression    IMPRESSION:   Right: Severe degenerative changes seen in the first carpometacarpal  joint. The interphalangeal joints are unremarkable.    Left: Severe degenerative changes seen in the carpometacarpal joint.  The MCP and phalangeal joints are unremarkable.    DYLON GAVIRIA MD       Hand / Upper Extremity Injection/Arthrocentesis: L thumb CMC  Date/Time: 9/12/2019 10:57 AM  Performed by: Ludivina Jennings MD  Authorized  by: Ludivina Jennings MD     Indications:  Pain  Needle Size:  25 G  Guidance: landmark    Approach:  Radial  Condition: osteoarthritis    Location:  Thumb  Site:  L thumb CMC    Medications:  20 mg triamcinolone 40 MG/ML  Medications comment:  0.5ml 0.5% bupivicaine  NDC:52521-182-48  Lot: [id number]  1/31/20    Outcome:  Tolerated well, no immediate complications  Procedure discussed: discussed risks, benefits, and alternatives    Consent Given by:  Patient          Assessment:  1. Bilateral thumb pain    2. Primary osteoarthritis of both first carpometacarpal joints        Plan:  Discussed the assessment with the patient and developed a plan together:  -Left 1st CMC steroid injection performed today.  Take it easy over the next few days. Keep in mind that the steroid may take up to 3 days to start working and up to 2 weeks to reach maximal effect.  Ice 15-20 minutes as needed for soreness.  Patient's preferred over the counter medication as needed for pain as directed on packaging. Advised that steroids can temporarily raise the blood glucose and she should closely monitor her blood sugars over the next week.  Last Hgb A1C 7.5 on 8/21/19.  -Thumb spica braces as needed for comfort and support  -Over the counter lidocaine cream as needed (i.e. Aspercreme or generic equivalent)  -Avoid aggravating activities.      -We also discussed other future treatment options:  Hand therapy    Follow Up: As needed if symptoms fail to improve or worsen. Please call with any questions or concerns.       Zulema Jennings MD, [facility]  [facility]    Again, thank you for allowing me to participate in the care of your patient.        Sincerely,        Ludivina Jennings MD

## 2019-09-12 NOTE — PROGRESS NOTES
Sports Medicine Clinic Visit    PCP: Marilou Arciniega    CC: Patient presents with:  Musculoskeletal Problem: bilateral thumb pain      HPI:  Sherry Slaughter is a 64 year old female who is seen as a self referral.   She notes bilateral thumb pain that began 4-5 month ago with insidious onset. She notes the left thumb is worse than the right. She notes that sometimes there is a sharp, shooting pain and sometimes it is achy.   She rates the pain at a 10/10 at its worst and a 4/10 currently.  Symptoms are relieved with Ibuprofen slightly. Symptoms are worsened by gripping, grasping. She endorses weakness.   She denies swelling, bruising, popping, grinding, catching, locking, instability, numbness and tingling.  Other treatment has included nothing. She notes difficulty with opening jars.       Review of Systems:  Musculoskeletal: as above  Remainder of review of systems is negative including constitutional, eyes, ENT, CV, pulmonary, GI, , endocrine, skin, hematologic, and neurologic except as noted in HPI or medical history.    History reviewed. No pertinent past surgical/medical/family/social history other than as mentioned in HPI.    Patient Active Problem List   Diagnosis     Morbid obesity (H)     Diverticulosis of large intestine     Nonspecific abnormal results of cardiovascular function study     FAMILY HX COLON CANCER     Nonallopathic lesion of thoracic region     Hypothyroidism     GENESIS (obstructive sleep apnea)     Irritable bowel syndrome     Family history of malignant neoplasm of breast     History of major depression     OSTEOARTHRITIS L KNEE  s/p knee replacement on the left      Hypertension goal BP (blood pressure) < 140/90     Family history of stroke (cerebrovascular)     Family history of other cardiovascular diseases     Family history of diabetes mellitus     ABSENCE OF MENSTRUATION - perimenopausal      JOINT PAIN-ANKLE - right ankle      Mitral valve insufficiency     Hyperlipidemia LDL goal  <100     Aortic sclerosis     Tubular adenoma of colon     History of viral hepatitis, type B     Chronic low back pain     Long-term insulin use (H)     Uncontrolled diabetes mellitus (H)     S/P total knee replacement using cement, right     Aftercare following right knee joint replacement surgery     Lumbago     Type 2 diabetes mellitus with hyperglycemia (H)     Posterior vitreous detachment of right eye     Pseudophakia of both eyes     Past Medical History:   Diagnosis Date     Cataract      DEPRESSION     comes and goes - tried meds - unsuccessfully, certain times of the year, no psych intervention and no counselors in the past - and not interested      Diverticulosis of colon (without mention of hemorrhage) 4/04    colonoscopy     ECHO-mildLVH,tr MR,mild thick lflets w inc LA,trTR   12/03     Essential hypertension, benign 1990s    late 1990s - started medications at that time - not to difficult to control meds      Fam hx-cardiovas dis NEC     father - CAD, and lipids/HTN - multiple members of the family      Family history of diabetes mellitus     sister and grandmother with DM      Family history of malignant neoplasm of breast     mother - young age - 45, and maternal cousin and aunt as well - no BRCA testing done      Family history of stroke (cerebrovascular)     grandmother in early life in her 40s      FAMILY HX COLON CANCER     Pat uncles x 2     Heart disease     murmur/     Heart murmur      HYPERLIPIDEMIA 2000    fairly recent - in the last 5 years - high for DM levels  -cholesterol recent      Irritable bowel syndrome     goes between the 2 - nerve related - more stressed more problems      MICROALBUMINURIA     unsure how long - been on the lisinopril - for a few years at well      Nonspecific abnormal results of liver function study 2/3/2003    SGOT - has been high in the past - since the hepatitis b - borderline elevation - not really changing any      OBESITY      Obstructive sleep apnea       GENESIS (obstructive sleep apnea) 10/15/2006    psg 5/15 AHI 53 aPAP 8-15     OSTEOARTHRITIS L KNEE 2003    total knee on the left - and pain is gone since the knee replacement      PERS HX HEPATITIS B- RESOLVED] 1977    acute virus only - no chronic disease      PERS HX HEPATITIS B- RESOLVED]      Type II or unspecified type diabetes mellitus without mention of complication, not stated as uncontrolled 1991    oral meds frist, then insulin eventually later, difficult to control most of the time      Unspecified hypothyroidism 10/11/2006    TSH 3.36 - mild subclinical hypothyroidism - deciding on following or starting low dose meds - with dr Oreilly - following      Past Surgical History:   Procedure Laterality Date     ARTHROPLASTY KNEE Right 9/23/2015    Procedure: ARTHROPLASTY KNEE;  Surgeon: Rufus Brown MD;  Location:  OR     C NONSPECIFIC PROCEDURE  6/06    right triple arthrodecesis      C NONSPECIFIC PROCEDURE  7/06     right bunion surgery      C TOTAL KNEE ARTHROPLASTY  3/03    L knee     CATARACT IOL, RT/LT Left      COLONOSCOPY  4/04    diverticulosis, rec repeat 10 yrs(consider fam hx?)     COLONOSCOPY WITH CO2 INSUFFLATION N/A 6/19/2019    Procedure: COLONOSCOPY, WITH CO2 INSUFFLATION;  Surgeon: Zeb Duarte MD;  Location:  OR     ORTHOPEDIC SURGERY      right ankle     PHACOEMULSIFICATION CLEAR CORNEA WITH STANDARD INTRAOCULAR LENS IMPLANT Left 3/15/2018    Procedure: PHACOEMULSIFICATION CLEAR CORNEA WITH STANDARD INTRAOCULAR LENS IMPLANT;  LEFT EYE PHACOEMULSIFICATION CLEAR CORNEA WITH STANDARD INTRAOCULAR LENS IMPLANT;  Surgeon: Bandar Linares MD;  Location: [facility]     PHACOEMULSIFICATION CLEAR CORNEA WITH STANDARD INTRAOCULAR LENS IMPLANT Right 4/5/2018    Procedure: PHACOEMULSIFICATION CLEAR CORNEA WITH STANDARD INTRAOCULAR LENS IMPLANT;  RIGHT PHACOEMULSIFICATION CLEAR CORNEA WITH STANDARD INTRAOCULAR LENS IMPLANT ;  Surgeon: Bandar Linares MD;  Location: [facility]     STRESS  ECHO (METRO)  12/03    no ischemia, limited by fatigue     SURGICAL HISTORY OF -       EXP LAP- R OVARY CYSTS     SURGICAL HISTORY OF -   1981,1984,1985    CHILDBIRTH     Family History   Problem Relation Age of Onset     Diabetes Maternal Grandmother         DM TYPE 2     Cerebrovascular Disease Maternal Grandmother      Hypertension Sister      Lipids Sister      Heart Disease Sister         Chronic AFib     Hypertension Sister      Lipids Sister      Gynecology Sister      Diabetes Sister      Asthma Sister      Blood Disease Paternal Grandmother         T CELL LEUKEMIA     Hypertension Brother      Lipids Brother      Congenital Anomalies Brother      Cardiovascular Brother      Heart Disease Brother         CABG/AFIB     Hypertension Brother      Lipids Brother      Obesity Brother      Hypertension Brother      Respiratory Brother         Sleep apnea     Heart Disease Brother         AFib     Alzheimer Disease Mother      Asthma Mother      Hypertension Mother      Breast Cancer Mother 40        has mastectomy and lived to 84     Allergies Mother         Sulfa,     Arthritis Mother      Cardiovascular Mother      Depression Mother      Respiratory Mother      Lipids Mother      Cancer Mother         breast     Thyroid Disease Mother      Cerebrovascular Disease Mother      Dementia Mother         Alzheimers     Cancer - colorectal Other      Cancer Father         lung     Aneurysm Father         brother, AAA     Other Cancer Father         lung ca     Asthma Sister      Cancer - colorectal Paternal Uncle         late 50s to early 60s - great uncles      Breast Cancer Other         Maternal cousin     Arrhythmia Sister         2 brothers 1 sister Afib     Breast Cancer Maternal Aunt 62     Other Cancer Maternal Aunt 35        Ovarian cancer.  Survived despite late recurrence and is now 92.     Glaucoma No family hx of      Macular Degeneration No family hx of      Social History     Socioeconomic History      Marital status:      Spouse name: Not on file     Number of children: 3     Years of education: Not on file     Highest education level: Not on file   Occupational History     Occupation: RN     Employer: [employer]   Social Needs     Financial resource strain: Not on file     Food insecurity:     Worry: Not on file     Inability: Not on file     Transportation needs:     Medical: Not on file     Non-medical: Not on file   Tobacco Use     Smoking status: Never Smoker     Smokeless tobacco: Never Used     Tobacco comment: tried in early 20s only    Substance and Sexual Activity     Alcohol use: Yes     Comment: rare     Drug use: No     Sexual activity: Never     Comment:  - since for 20 years, no signficant other  > 5 years sexual activity    Lifestyle     Physical activity:     Days per week: Not on file     Minutes per session: Not on file     Stress: Not on file   Relationships     Social connections:     Talks on phone: Not on file     Gets together: Not on file     Attends [employer]ec[employer] service: Not on file     Active member of club or organization: Not on file     Attends meetings of clubs or organizations: Not on file     Relationship status: Not on file     Intimate partner violence:     Fear of current or ex partner: Not on file     Emotionally abused: Not on file     Physically abused: Not on file     Forced sexual activity: Not on file   Other Topics Concern      Service No     Blood Transfusions No     Caffeine Concern No     Occupational Exposure Yes     Comment: Normal nursing exposures     Hobby Hazards No     Sleep Concern Yes     Stress Concern No     Comment: Stress level at HM=5, stress level at WK=6     Weight Concern Yes     Special Diet Yes     Comment: Diabetic diet (watching carbs)     Back Care No     Comment: Occassional back spasms     Exercise Yes     Comment: Pt joined a health club goes approx 3-4 times a week,half to one mile daily     Bike Helmet  "No     Seat Belt Yes     Comment: Sometimes     Self-Exams Yes     Parent/sibling w/ CABG, MI or angioplasty before 65F 55M? 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        She is retired from nursing.     Current Outpatient Medications   Medication     ASPIRIN EC PO     Garlic 1000 MG CAPS     hydrochlorothiazide (HYDRODIURIL) 25 MG tablet     insulin reg HIGH CONC (HUMULIN R U-500 KWIKPEN) 500 UNIT/ML PEN soln     levothyroxine (SYNTHROID/LEVOTHROID) 75 MCG tablet     liraglutide (VICTOZA PEN) 18 MG/3ML solution     lisinopril (PRINIVIL/ZESTRIL) 40 MG tablet     metFORMIN (GLUCOPHAGE-XR) 500 MG 24 hr tablet     metoprolol succinate (TOPROL XL) 25 MG 24 hr tablet     rosuvastatin (CRESTOR) 10 MG tablet     amoxicillin (AMOXIL) 500 MG capsule     blood glucose monitoring (NO BRAND SPECIFIED) test strip     Continuous Blood Gluc  (FREESTYLE MIKE 14 DAY READER) KATIA     Continuous Blood Gluc Sensor (FREESTYLE MIKE 14 DAY SENSOR) MISC     econazole nitrate 1 % external cream     Ibuprofen (ADVIL PO)     insulin pen needle (GNP CLICKFINE PEN NEEDLES) 31G X 8 MM miscellaneous     Multiple Vitamins-Minerals (ADVANCED DIABETIC MULTIVITAMIN) TABS     ORDER FOR DME, SET TO LOCAL PRINT,     order for DME     order for DME     No current facility-administered medications for this visit.      Allergies   Allergen Reactions     Atorvastatin Calcium      Gas     Pravachol [Pravastatin Sodium]      Pravachol - dry cough      Simvastatin      Myalgia         Objective:  /64   Ht 1.714 m (5' 7.48\")   Wt 131.3 kg (289 lb 8 oz)   LMP 10/02/2007   BMI 44.70 kg/m      General: Alert and in no distress    Head: Normocephalic, atraumatic  Eyes: no scleral icterus or conjunctival erythema   Oropharynx:  Mucous membranes moist  Skin: no erythema, petechiae, or jaundice  CV: regular rhythm by palpation, 2+ distal pulses  Resp: normal respiratory " effort without conversational dyspnea   Psych: normal mood and affect    Neuro: Motor strength and sensation as noted below    Musculoskeletal:  Bilateral Wrist and Hand exam    Inspection:       No swelling, bruising or deformity bilaterally    Palpation:  -Tender over the bilateral CMC joints, left 1st metacarpal, and left 1st MCP joint    ROM:       Full and symmetric active range of motion of the forearm, wrist and digits bilaterally    Strength:  Forearm supination 5/5 bilaterally  Forearm pronation 5/5 bilaterally  Wrist extension 5/5 bilaterally  Wrist flexion 5/5 bilaterally   strength 5/5 bilaterally  Finger abduction 5/5 bilaterally    Special Tests:        neg (-) Finkelstein's maneuver bilaterally        pos (+) 1st CMC grind test on the left    Neurovascular:       2+ radial pulses bilaterally and normal sensation to light touch in the radial, median and ulnar nerve distributions      Radiology:  X-rays ordered and independent visualization of images performed and reviewed with Sherry Church Results (from the past 24 hour(s))   XR Finger Bilateral G/E 2 vw    Narrative    XR FINGER BILATERAL G/E 2 VW   9/12/2019 10:14 AM     HISTORY: Bilateral thumb pain; Bilateral thumb pain    COMPARISON: None.      Impression    IMPRESSION:   Right: Severe degenerative changes seen in the first carpometacarpal  joint. The interphalangeal joints are unremarkable.    Left: Severe degenerative changes seen in the carpometacarpal joint.  The MCP and phalangeal joints are unremarkable.    DYLON GAVIRIA MD       Hand / Upper Extremity Injection/Arthrocentesis: L thumb CMC  Date/Time: 9/12/2019 10:57 AM  Performed by: Ludivina Jennings MD  Authorized by: Ludivina Jennings MD     Indications:  Pain  Needle Size:  25 G  Guidance: landmark    Approach:  Radial  Condition: osteoarthritis    Location:  Thumb  Site:  L thumb CMC    Medications:  20 mg triamcinolone 40 MG/ML  Medications comment:  0.5ml  0.5% bupivicaine  NDC:01096-579-97  Lot: [id number]  1/31/20    Outcome:  Tolerated well, no immediate complications  Procedure discussed: discussed risks, benefits, and alternatives    Consent Given by:  Patient          Assessment:  1. Bilateral thumb pain    2. Primary osteoarthritis of both first carpometacarpal joints        Plan:  Discussed the assessment with the patient and developed a plan together:  -Left 1st CMC steroid injection performed today.  Take it easy over the next few days. Keep in mind that the steroid may take up to 3 days to start working and up to 2 weeks to reach maximal effect.  Ice 15-20 minutes as needed for soreness.  Patient's preferred over the counter medication as needed for pain as directed on packaging. Advised that steroids can temporarily raise the blood glucose and she should closely monitor her blood sugars over the next week.  Last Hgb A1C 7.5 on 8/21/19.  -Thumb spica braces as needed for comfort and support  -Over the counter lidocaine cream as needed (i.e. Aspercreme or generic equivalent)  -Avoid aggravating activities.      -We also discussed other future treatment options:  Hand therapy    Follow Up: As needed if symptoms fail to improve or worsen. Please call with any questions or concerns.       Zulema Jennings MD, CAQ Sports Medicine  [facility]

## 2019-09-20 ENCOUNTER — MYC REFILL (OUTPATIENT)
Dept: ENDOCRINOLOGY | Facility: CLINIC | Age: 64
End: 2019-09-20

## 2019-09-20 DIAGNOSIS — E11.9 TYPE 2 DIABETES MELLITUS (H): Primary | ICD-10-CM

## 2019-09-25 ENCOUNTER — OFFICE VISIT (OUTPATIENT)
Dept: PEDIATRICS | Facility: CLINIC | Age: 64
End: 2019-09-25
Payer: COMMERCIAL

## 2019-09-25 VITALS
OXYGEN SATURATION: 98 % | BODY MASS INDEX: 42.86 KG/M2 | SYSTOLIC BLOOD PRESSURE: 137 MMHG | WEIGHT: 277.6 LBS | TEMPERATURE: 97.2 F | HEART RATE: 67 BPM | DIASTOLIC BLOOD PRESSURE: 80 MMHG

## 2019-09-25 DIAGNOSIS — I49.1 PREMATURE ATRIAL CONTRACTION: Primary | ICD-10-CM

## 2019-09-25 DIAGNOSIS — Z23 FLU VACCINE NEED: ICD-10-CM

## 2019-09-25 DIAGNOSIS — I49.9 IRREGULAR HEART RATE: ICD-10-CM

## 2019-09-25 LAB
ANION GAP SERPL CALCULATED.3IONS-SCNC: 8 MMOL/L (ref 3–14)
BUN SERPL-MCNC: 26 MG/DL (ref 7–30)
CALCIUM SERPL-MCNC: 10.2 MG/DL (ref 8.5–10.1)
CHLORIDE SERPL-SCNC: 106 MMOL/L (ref 94–109)
CO2 SERPL-SCNC: 28 MMOL/L (ref 20–32)
CREAT SERPL-MCNC: 0.86 MG/DL (ref 0.52–1.04)
ERYTHROCYTE [DISTWIDTH] IN BLOOD BY AUTOMATED COUNT: 13.2 % (ref 10–15)
GFR SERPL CREATININE-BSD FRML MDRD: 70 ML/MIN/{1.73_M2}
GLUCOSE SERPL-MCNC: 134 MG/DL (ref 70–99)
HCT VFR BLD AUTO: 44.6 % (ref 35–47)
HGB BLD-MCNC: 14.8 G/DL (ref 11.7–15.7)
MCH RBC QN AUTO: 31.6 PG (ref 26.5–33)
MCHC RBC AUTO-ENTMCNC: 33.2 G/DL (ref 31.5–36.5)
MCV RBC AUTO: 95 FL (ref 78–100)
PLATELET # BLD AUTO: 218 10E9/L (ref 150–450)
POTASSIUM SERPL-SCNC: 4.2 MMOL/L (ref 3.4–5.3)
RBC # BLD AUTO: 4.69 10E12/L (ref 3.8–5.2)
SODIUM SERPL-SCNC: 142 MMOL/L (ref 133–144)
WBC # BLD AUTO: 8.2 10E9/L (ref 4–11)

## 2019-09-25 PROCEDURE — 36415 COLL VENOUS BLD VENIPUNCTURE: CPT | Performed by: INTERNAL MEDICINE

## 2019-09-25 PROCEDURE — 90662 IIV NO PRSV INCREASED AG IM: CPT | Performed by: INTERNAL MEDICINE

## 2019-09-25 PROCEDURE — G0008 ADMIN INFLUENZA VIRUS VAC: HCPCS | Performed by: INTERNAL MEDICINE

## 2019-09-25 PROCEDURE — 80048 BASIC METABOLIC PNL TOTAL CA: CPT | Performed by: INTERNAL MEDICINE

## 2019-09-25 PROCEDURE — 99213 OFFICE O/P EST LOW 20 MIN: CPT | Mod: 25 | Performed by: INTERNAL MEDICINE

## 2019-09-25 PROCEDURE — 85027 COMPLETE CBC AUTOMATED: CPT | Performed by: INTERNAL MEDICINE

## 2019-09-25 PROCEDURE — 93000 ELECTROCARDIOGRAM COMPLETE: CPT | Performed by: INTERNAL MEDICINE

## 2019-09-25 NOTE — PROGRESS NOTES
Subjective     Sherry Slaughter is a 64 year old female who presents to clinic today for the following health issues:    Sherry Slaughter has Morbid obesity (H); Hypothyroidism; GENESIS (obstructive sleep apnea); Hypertension goal BP (blood pressure) < 140/90; Mitral valve insufficiency; Tubular adenoma of colon; Long-term insulin use (H); and Uncontrolled diabetes mellitus (H) on their pertinent problem list.       HPI   Concern - Irregular heartbeat  Onset: A while    Description:   Pt. States she will have an irregular heartbeat. Pt. Stated Endocrine referred her to see her PCP.    Intensity: mild    Progression of Symptoms:  same    Accompanying Signs & Symptoms:  None    Previous history of similar problem:   No    Precipitating factors:   Worsened by: None    Alleviating factors:  Improved by: None    Therapies Tried and outcome: None    -------------------------------------  Pt had endocrine visit in August, noted bigeminy rhythm on auscultation. ECG showed NSR. Pt denies any palpation, lightheadedness, dizziness, shortness of breath. She is not aware of any abnormal heart rhythm until she was told. She has a family history of afib in a few family members.     ROS:  Constitutional, HEENT, cardiovascular, pulmonary, gi and gu systems are negative, except as otherwise noted.       amoxicillin (AMOXIL) 500 MG capsule, Before dental procedures  ASPIRIN EC PO, Take 325 mg by mouth daily  blood glucose monitoring (NO BRAND SPECIFIED) test strip, Use to test blood sugar 4 times daily or as directed.  Continuous Blood Gluc  (FREESTYLE MIKE 14 DAY READER) KATIA, 1 each daily  Continuous Blood Gluc Sensor (FREESTYLE MIKE 14 DAY SENSOR) MISC, 1 each every 14 days  econazole nitrate 1 % external cream, Apply topically daily To feet and toenails.  Garlic 1000 MG CAPS, Take 1 capsule by mouth daily Takes during summer months.  Done taking until next summer.  hydrochlorothiazide (HYDRODIURIL) 25 MG  tablet, Take 1 tablet (25 mg) by mouth daily  Ibuprofen (ADVIL PO), Take 600 mg by mouth 4 times daily  insulin pen needle (GNP CLICKFINE PEN NEEDLES) 31G X 8 MM miscellaneous, Use six times daily or as directed  insulin reg HIGH CONC (HUMULIN R U-500 KWIKPEN) 500 UNIT/ML PEN soln, Administer  120 U in the morning and 100 U at bedtime.  levothyroxine (SYNTHROID/LEVOTHROID) 75 MCG tablet, Take 1 tablet (75 mcg) by mouth daily  liraglutide (VICTOZA PEN) 18 MG/3ML solution, Inject 1.8 mg Subcutaneous daily  lisinopril (PRINIVIL/ZESTRIL) 40 MG tablet, Take 1.5 tablets (60 mg) by mouth daily  metFORMIN (GLUCOPHAGE-XR) 500 MG 24 hr tablet, Take 4 tablets (2,000 mg) by mouth At Bedtime  metoprolol succinate (TOPROL XL) 25 MG 24 hr tablet, Take 1 tablet (25 mg) by mouth daily  Multiple Vitamins-Minerals (ADVANCED DIABETIC MULTIVITAMIN) TABS, Take 1 tablet by mouth daily  ORDER FOR DME, SET TO LOCAL PRINT,, Respironics REMSTAR 60 Series Auto CPAP 8-15cm H2O, Airfit P10 nasal pillow mask w/medium pillows  order for DME, Equipment being ordered: wrist brace with thumb spica, L  order for DME, Equipment being ordered: wrist brace with thumb spica, R  rosuvastatin (CRESTOR) 10 MG tablet, Take 1 tablet (10 mg) by mouth daily    triamcinolone (KENALOG-40) injection 20 mg           Patient Active Problem List   Diagnosis     Morbid obesity (H)     Diverticulosis of large intestine     Nonspecific abnormal results of cardiovascular function study     FAMILY HX COLON CANCER     Nonallopathic lesion of thoracic region     Hypothyroidism     GENESIS (obstructive sleep apnea)     Irritable bowel syndrome     Family history of malignant neoplasm of breast     History of major depression     OSTEOARTHRITIS L KNEE  s/p knee replacement on the left      Hypertension goal BP (blood pressure) < 140/90     Family history of stroke (cerebrovascular)     Family history of other cardiovascular diseases     Family history of diabetes mellitus      ABSENCE OF MENSTRUATION - perimenopausal      JOINT PAIN-ANKLE - right ankle      Mitral valve insufficiency     Hyperlipidemia LDL goal <100     Aortic sclerosis     Tubular adenoma of colon     History of viral hepatitis, type B     Chronic low back pain     Long-term insulin use (H)     Uncontrolled diabetes mellitus (H)     S/P total knee replacement using cement, right     Aftercare following right knee joint replacement surgery     Lumbago     Type 2 diabetes mellitus with hyperglycemia (H)     Posterior vitreous detachment of right eye     Pseudophakia of both eyes     Past Surgical History:   Procedure Laterality Date     ARTHROPLASTY KNEE Right 9/23/2015    Procedure: ARTHROPLASTY KNEE;  Surgeon: Rufus Brown MD;  Location:  OR     C NONSPECIFIC PROCEDURE  6/06    right triple arthrodecesis      C NONSPECIFIC PROCEDURE  7/06     right bunion surgery      C TOTAL KNEE ARTHROPLASTY  3/03    L knee     CATARACT IOL, RT/LT Left      COLONOSCOPY  4/04    diverticulosis, rec repeat 10 yrs(consider fam hx?)     COLONOSCOPY WITH CO2 INSUFFLATION N/A 6/19/2019    Procedure: COLONOSCOPY, WITH CO2 INSUFFLATION;  Surgeon: Zeb Duarte MD;  Location:  OR     ORTHOPEDIC SURGERY      right ankle     PHACOEMULSIFICATION CLEAR CORNEA WITH STANDARD INTRAOCULAR LENS IMPLANT Left 3/15/2018    Procedure: PHACOEMULSIFICATION CLEAR CORNEA WITH STANDARD INTRAOCULAR LENS IMPLANT;  LEFT EYE PHACOEMULSIFICATION CLEAR CORNEA WITH STANDARD INTRAOCULAR LENS IMPLANT;  Surgeon: Bandar Linares MD;  Location: [facility]     PHACOEMULSIFICATION CLEAR CORNEA WITH STANDARD INTRAOCULAR LENS IMPLANT Right 4/5/2018    Procedure: PHACOEMULSIFICATION CLEAR CORNEA WITH STANDARD INTRAOCULAR LENS IMPLANT;  RIGHT PHACOEMULSIFICATION CLEAR CORNEA WITH STANDARD INTRAOCULAR LENS IMPLANT ;  Surgeon: Bandar Linares MD;  Location:  [facility]     STRESS ECHO (METRO)  12/03    no ischemia, limited by fatigue     SURGICAL HISTORY OF -        EXP LAP- R OVARY CYSTS     SURGICAL HISTORY OF -   1981,1984,1985    CHILDBIRTH       Social History     Tobacco Use     Smoking status: Never Smoker     Smokeless tobacco: Never Used     Tobacco comment: tried in early 20s only    Substance Use Topics     Alcohol use: Yes     Comment: rare     Family History   Problem Relation Age of Onset     Diabetes Maternal Grandmother         DM TYPE 2     Cerebrovascular Disease Maternal Grandmother      Hypertension Sister      Lipids Sister      Heart Disease Sister         Chronic AFib     Hypertension Sister      Lipids Sister      Gynecology Sister      Diabetes Sister      Asthma Sister      Blood Disease Paternal Grandmother         T CELL LEUKEMIA     Hypertension Brother      Lipids Brother      Congenital Anomalies Brother      Cardiovascular Brother      Heart Disease Brother         CABG/AFIB     Hypertension Brother      Lipids Brother      Obesity Brother      Hypertension Brother      Respiratory Brother         Sleep apnea     Heart Disease Brother         AFib     Alzheimer Disease Mother      Asthma Mother      Hypertension Mother      Breast Cancer Mother 40        has mastectomy and lived to 84     Allergies Mother         Sulfa,     Arthritis Mother      Cardiovascular Mother      Depression Mother      Respiratory Mother      Lipids Mother      Cancer Mother         breast     Thyroid Disease Mother      Cerebrovascular Disease Mother      Dementia Mother         Alzheimers     Cancer - colorectal Other      Cancer Father         lung     Aneurysm Father         brother, AAA     Other Cancer Father         lung ca     Asthma Sister      Cancer - colorectal Paternal Uncle         late 50s to early 60s - great uncles      Breast Cancer Other         Maternal cousin     Arrhythmia Sister         2 brothers 1 sister Afib     Breast Cancer Maternal Aunt 62     Other Cancer Maternal Aunt 35        Ovarian cancer.  Survived despite late recurrence and is now  92.     Glaucoma No family hx of      Macular Degeneration No family hx of              Problem list, Medication list, Allergies, and Medical/Social/Surgical histories reviewed in [facility] and updated as appropriate.    OBJECTIVE:                                                    /80 (BP Location: Right arm, Patient Position: Sitting, Cuff Size: Adult Large)   Pulse 67   Temp 97.2  F (36.2  C) (Temporal)   Wt 125.9 kg (277 lb 9.6 oz)   LMP 10/02/2007   SpO2 98%   BMI 42.86 kg/m      GENERAL: healthy, alert and no distress  HEENT: unremarkable  Neck: no adenopathy/mass/stiffness. Thyroid normal.  Lung: clear, no wheezing/rhonchi/crackles  Heart: slightly irregular rhythm, normal S1/2, no murmur/gallop/rup  Abd: soft, normal BS, non tender, no organomegaly   Ext: no cyanosis/clubbing/edema      Diagnostic test results:  ECG: sinus with occasional PAC     ASSESSMENT/PLAN:                                                      64 year old female with the following diagnoses and treatment plan:      ICD-10-CM    1. Premature atrial contraction I49.1 Basic metabolic panel     CBC with platelets   2. Irregular heart rate I49.9 EKG 12-lead complete w/read - Clinics   3. Flu vaccine need Z23 FLU VACCINE, INCREASED ANTIGEN, PRESV FREE       -- PAC: benign. Get labs done.   -- updated flu vaccine.     Will call or return to clinic if worsening or symptoms not improving as discussed.  See Patient Instructions.      Marilou Arciniega MD-PhD  [facility] [place]    (Note: Chart documentation was done in part with Dragon Voice Recognition software. Although reviewed after completion, some word and grammatical errors may remain.)

## 2019-09-25 NOTE — RESULT ENCOUNTER NOTE
Dear Sherry,   Your recent lab results showed the following:  -- blood count and electrolyte panel were acceptable other than calcium is very borderline elevated. This level usually does not cause PACs.   -- Dr. Lazo is going to recheck this in December. We can wait until the recheck then.     Please call or Mychart to our office if you have further questions.     Marilou Arciniega MD-PhD

## 2019-10-01 ENCOUNTER — HEALTH MAINTENANCE LETTER (OUTPATIENT)
Age: 64
End: 2019-10-01

## 2019-10-03 ENCOUNTER — MYC REFILL (OUTPATIENT)
Dept: PEDIATRICS | Facility: CLINIC | Age: 64
End: 2019-10-03

## 2019-10-03 DIAGNOSIS — I10 ESSENTIAL HYPERTENSION WITH GOAL BLOOD PRESSURE LESS THAN 140/90: ICD-10-CM

## 2019-10-03 RX ORDER — LISINOPRIL 40 MG/1
60 TABLET ORAL DAILY
Qty: 135 TABLET | Refills: 1 | Status: SHIPPED | OUTPATIENT
Start: 2019-10-03 | End: 2020-04-01

## 2019-12-10 DIAGNOSIS — E11.9 TYPE 2 DIABETES MELLITUS TREATED WITH INSULIN (H): ICD-10-CM

## 2019-12-10 DIAGNOSIS — Z79.4 TYPE 2 DIABETES MELLITUS TREATED WITH INSULIN (H): ICD-10-CM

## 2019-12-10 DIAGNOSIS — Z79.4 TYPE 2 DIABETES MELLITUS WITH HYPERGLYCEMIA, WITH LONG-TERM CURRENT USE OF INSULIN (H): ICD-10-CM

## 2019-12-10 DIAGNOSIS — E11.65 TYPE 2 DIABETES MELLITUS WITH HYPERGLYCEMIA, WITH LONG-TERM CURRENT USE OF INSULIN (H): ICD-10-CM

## 2019-12-10 DIAGNOSIS — E03.4 HYPOTHYROIDISM DUE TO ACQUIRED ATROPHY OF THYROID: ICD-10-CM

## 2019-12-10 LAB
ALBUMIN SERPL-MCNC: 3.8 G/DL (ref 3.4–5)
ALP SERPL-CCNC: 48 U/L (ref 40–150)
ALT SERPL W P-5'-P-CCNC: 29 U/L (ref 0–50)
ANION GAP SERPL CALCULATED.3IONS-SCNC: 6 MMOL/L (ref 3–14)
AST SERPL W P-5'-P-CCNC: 20 U/L (ref 0–45)
BILIRUB SERPL-MCNC: 0.3 MG/DL (ref 0.2–1.3)
BUN SERPL-MCNC: 21 MG/DL (ref 7–30)
CALCIUM SERPL-MCNC: 9.3 MG/DL (ref 8.5–10.1)
CHLORIDE SERPL-SCNC: 104 MMOL/L (ref 94–109)
CHOLEST SERPL-MCNC: 135 MG/DL
CO2 SERPL-SCNC: 26 MMOL/L (ref 20–32)
CREAT SERPL-MCNC: 0.8 MG/DL (ref 0.52–1.04)
CREAT UR-MCNC: 125 MG/DL
GFR SERPL CREATININE-BSD FRML MDRD: 76 ML/MIN/{1.73_M2}
GLUCOSE SERPL-MCNC: 258 MG/DL (ref 70–99)
HBA1C MFR BLD: 7.3 % (ref 0–5.6)
HCT VFR BLD AUTO: 43.6 % (ref 35–47)
HDLC SERPL-MCNC: 43 MG/DL
LDLC SERPL CALC-MCNC: 43 MG/DL
MICROALBUMIN UR-MCNC: 23 MG/L
MICROALBUMIN/CREAT UR: 18.48 MG/G CR (ref 0–25)
NONHDLC SERPL-MCNC: 92 MG/DL
POTASSIUM SERPL-SCNC: 4.2 MMOL/L (ref 3.4–5.3)
PROT SERPL-MCNC: 7.1 G/DL (ref 6.8–8.8)
SODIUM SERPL-SCNC: 136 MMOL/L (ref 133–144)
TRIGL SERPL-MCNC: 243 MG/DL
TSH SERPL DL<=0.005 MIU/L-ACNC: 1.66 MU/L (ref 0.4–4)

## 2019-12-10 PROCEDURE — 83036 HEMOGLOBIN GLYCOSYLATED A1C: CPT | Performed by: INTERNAL MEDICINE

## 2019-12-10 PROCEDURE — 84443 ASSAY THYROID STIM HORMONE: CPT | Performed by: INTERNAL MEDICINE

## 2019-12-10 PROCEDURE — 36415 COLL VENOUS BLD VENIPUNCTURE: CPT | Performed by: INTERNAL MEDICINE

## 2019-12-10 PROCEDURE — 80053 COMPREHEN METABOLIC PANEL: CPT | Performed by: INTERNAL MEDICINE

## 2019-12-10 PROCEDURE — 85014 HEMATOCRIT: CPT | Performed by: INTERNAL MEDICINE

## 2019-12-10 PROCEDURE — 80061 LIPID PANEL: CPT | Performed by: INTERNAL MEDICINE

## 2019-12-10 PROCEDURE — 82043 UR ALBUMIN QUANTITATIVE: CPT | Performed by: INTERNAL MEDICINE

## 2019-12-11 ENCOUNTER — OFFICE VISIT (OUTPATIENT)
Dept: ENDOCRINOLOGY | Facility: CLINIC | Age: 64
End: 2019-12-11
Payer: COMMERCIAL

## 2019-12-11 VITALS
BODY MASS INDEX: 43.94 KG/M2 | OXYGEN SATURATION: 97 % | HEART RATE: 88 BPM | DIASTOLIC BLOOD PRESSURE: 75 MMHG | SYSTOLIC BLOOD PRESSURE: 120 MMHG | WEIGHT: 284.6 LBS

## 2019-12-11 DIAGNOSIS — I10 HYPERTENSION GOAL BP (BLOOD PRESSURE) < 140/90: ICD-10-CM

## 2019-12-11 DIAGNOSIS — E03.9 HYPOTHYROIDISM, UNSPECIFIED TYPE: ICD-10-CM

## 2019-12-11 DIAGNOSIS — E11.9 TYPE 2 DIABETES MELLITUS TREATED WITH INSULIN (H): Primary | ICD-10-CM

## 2019-12-11 DIAGNOSIS — Z79.4 TYPE 2 DIABETES MELLITUS TREATED WITH INSULIN (H): Primary | ICD-10-CM

## 2019-12-11 PROCEDURE — 99214 OFFICE O/P EST MOD 30 MIN: CPT | Performed by: INTERNAL MEDICINE

## 2019-12-11 PROCEDURE — 95251 CONT GLUC MNTR ANALYSIS I&R: CPT | Performed by: INTERNAL MEDICINE

## 2019-12-11 NOTE — NURSING NOTE
Sherry Slaughter's goals for this visit include:   Chief Complaint   Patient presents with     Diabetes     Thyroid Disease     She requests these members of her care team be copied on today's visit information: Yes    PCP: Marilou Arciniega    Referring Provider:  Marilou Arciniega MD PhD  [id number] [place] [address]    /75 (BP Location: Left arm, Patient Position: Sitting, Cuff Size: Adult Large)   Pulse 88   Wt 129.1 kg (284 lb 9.6 oz)   LMP 10/02/2007   SpO2 97%   BMI 43.94 kg/m      Do you need any medication refills at today's visit? No

## 2019-12-11 NOTE — PROGRESS NOTES
Sherry is a 64 year old female seen in follow-up for type 2 diabetes.    The patient was diagnosed with type 2 diabetes 25 years after she was diagnosed with gestational diabetes, during both her pregnancies. Her weight before pregnancy was 190-200. Her highest weight was 300s. She was initially started on oral medications and then insulin was added around 2012.     Her A1C has been in 9% range since 2012. She was on Byetta but she did not feel it helped. She was also on Actos but stopped due to 40 lbs weight gain and increased SOB.  For approximately 3 years, she was treated with Victoza.  It was discontinued due to cost.  She continues to take metformin, 2 g extended release daily.  In the beginning of July 2019, she was transitioned to U500 insulin and the dose was progressively increased. Jardiance was tried in 2019 and was discontinued due to genital fungal infections.       Current dose of U500 insulin is 120 units in the morning (when she wakes up, anywhere between 5-8 AM) and 100 units at suppertime (anywhere between 5-7 PM).  If her predinner blood sugar is lowish, she might decrease the dose of predinner insulin to 80 or 90 units.  A1c today was 7.3%, stable since her last visit here.     She has been scanning the freestyle nevin by using both the  and the iPhone.  As a result, some of the blood glucose tracings are interrupted.  On the sensor, the average blood glucose is 141 with a standard deviation of 59.  64% of the blood sugar numbers are within target of 70-1 80, 25% are above 180 and 11% are in the hypoglycemic range.  Most of the hypoglycemic episodes tend to occur between 2 and 3 AM.  Most of them are mild, in the 60s on the glucometer.    The patient has been trying to have the breakfast and dinner as the main meals of the day.  Frequently, lunch consists of a salad.  She has a couple of snacks throughout the day, generally with no or low carbs.    DM complications  Retinopathy:  last eye exam: 6/2019; no DR, S/P B/L surgery for cataract   Nephropathy: negative urine microalb - on lisinopril 60 mg; GFR in the 70s  Neuropathy: occasional burning sensation in her feet for the last couple of years   She is symptomatic for hypoglycemia (able to recognize blood sugar numbers in the 90s - she gets sweaty, hot and lightheaded).  She corrects the hypoglycemic symptoms by having something to eat. She does not have glucagon at home (never experienced severe hypoglycemic episodes).   Aspirin - taking 325 mg  LDL 20 (2018) on Crestor 10 mg  Using the CPAP   Exercise limited due to bilateral knee replacement and low back pain.    2.  Hypertension  Her blood pressure is managed with 60 mg lisinopril daily, 25 mg metoprolol daily and 25 mg hydrochlorothiazide daily.    Past Medical History  Past Medical History:   Diagnosis Date     Cataract      DEPRESSION     comes and goes - tried meds - unsuccessfully, certain times of the year, no psych intervention and no counselors in the past - and not interested      Diverticulosis of colon (without mention of hemorrhage) 4/04    colonoscopy     ECHO-mildLVH,tr MR,mild thick lflets w inc LA,trTR   12/03     Essential hypertension, benign 1990s    late 1990s - started medications at that time - not to difficult to control meds      Fam hx-cardiovas dis [facility]     father - CAD, and lipids/HTN - multiple members of the family      Family history of diabetes mellitus     sister and grandmother with DM      Family history of malignant neoplasm of breast     mother - young age - 45, and maternal cousin and aunt as well - no BRCA testing done      Family history of stroke (cerebrovascular)     grandmother in early life in her 40s      FAMILY HX COLON CANCER     Pat uncles x 2     Heart disease     murmur/     Heart murmur      HYPERLIPIDEMIA 2000    fairly recent - in the last 5 years - high for DM levels  -cholesterol recent      Irritable bowel syndrome     goes between  the 2 - nerve related - more stressed more problems      MICROALBUMINURIA     unsure how long - been on the lisinopril - for a few years at well      Nonspecific abnormal results of liver function study 2/3/2003    SGOT - has been high in the past - since the hepatitis b - borderline elevation - not really changing any      OBESITY      Obstructive sleep apnea      GENESIS (obstructive sleep apnea) 10/15/2006    psg 5/15 AHI 53 aPAP 8-15     OSTEOARTHRITIS L KNEE 2003    total knee on the left - and pain is gone since the knee replacement      PERS HX HEPATITIS B- RESOLVED] 1977    acute virus only - no chronic disease      PERS HX HEPATITIS B- RESOLVED]      Type II or unspecified type diabetes mellitus without mention of complication, not stated as uncontrolled 1991    oral meds frist, then insulin eventually later, difficult to control most of the time      Unspecified hypothyroidism 10/11/2006    TSH 3.36 - mild subclinical hypothyroidism - deciding on following or starting low dose meds - with dr Oreilly - following    Hepatis B     Allergies  Allergies   Allergen Reactions     Atorvastatin Calcium      Gas     Pravachol [Pravastatin Sodium]      Pravachol - dry cough      Simvastatin      Myalgia     Medications    Current Outpatient Medications:      ASPIRIN EC PO, Take 325 mg by mouth daily, Disp: , Rfl:      blood glucose monitoring (NO BRAND SPECIFIED) test strip, Use to test blood sugar 4 times daily or as directed., Disp: 100 each, Rfl: 11     Continuous Blood Gluc  (FREESTYLE MIKE 14 DAY READER) KATIA, 1 each daily, Disp: 1 Device, Rfl: 0     Continuous Blood Gluc Sensor (FREESTYLE MIKE 14 DAY SENSOR) MISC, 1 each every 14 days, Disp: 6 each, Rfl: 3     econazole nitrate 1 % external cream, Apply topically daily To feet and toenails., Disp: 85 g, Rfl: 6     hydrochlorothiazide (HYDRODIURIL) 25 MG tablet, Take 1 tablet (25 mg) by mouth daily, Disp: 90 tablet, Rfl: 3     Ibuprofen (ADVIL PO), Take 600  mg by mouth 4 times daily, Disp: , Rfl:      insulin pen needle (GNP CLICKFINE PEN NEEDLES) 31G X 8 MM miscellaneous, Use six times daily or as directed, Disp: 100 each, Rfl: 3     insulin reg HIGH CONC (HUMULIN R U-500 KWIKPEN) 500 UNIT/ML PEN soln, Administer  120 U in the morning and 100 U at bedtime., Disp: 40 mL, Rfl: 3     levothyroxine (SYNTHROID/LEVOTHROID) 75 MCG tablet, Take 1 tablet (75 mcg) by mouth daily, Disp: 90 tablet, Rfl: 3     liraglutide (VICTOZA PEN) 18 MG/3ML solution, Inject 1.8 mg Subcutaneous daily, Disp: 27 mL, Rfl: 3     lisinopril (PRINIVIL/ZESTRIL) 40 MG tablet, Take 1.5 tablets (60 mg) by mouth daily, Disp: 135 tablet, Rfl: 1     metFORMIN (GLUCOPHAGE-XR) 500 MG 24 hr tablet, Take 4 tablets (2,000 mg) by mouth At Bedtime, Disp: 360 tablet, Rfl: 3     metoprolol succinate (TOPROL XL) 25 MG 24 hr tablet, Take 1 tablet (25 mg) by mouth daily, Disp: 90 tablet, Rfl: 3     Multiple Vitamins-Minerals (ADVANCED DIABETIC MULTIVITAMIN) TABS, Take 1 tablet by mouth daily, Disp: , Rfl:      ORDER FOR DME, SET TO LOCAL PRINT,, Respironics REMSTAR 60 Series Auto CPAP 8-15cm H2O, Airfit P10 nasal pillow mask w/medium pillows, Disp: , Rfl:      rosuvastatin (CRESTOR) 10 MG tablet, Take 1 tablet (10 mg) by mouth daily, Disp: 90 tablet, Rfl: 3     amoxicillin (AMOXIL) 500 MG capsule, Before dental procedures, Disp: , Rfl:      Garlic 1000 MG CAPS, Take 1 capsule by mouth daily Takes during summer months.  Done taking until next summer., Disp: , Rfl:      order for DME, Equipment being ordered: wrist brace with thumb spica, L, Disp: 1 Device, Rfl: 0     order for DME, Equipment being ordered: wrist brace with thumb spica, R, Disp: 1 Device, Rfl: 0    Current Facility-Administered Medications:      triamcinolone (KENALOG-40) injection 20 mg, 20 mg, , , Ludivina Jennings MD, 20 mg at 09/12/19 1057    Family History  Maternal grandmother had type 2 diabetes  Daughter has GDM  Father had CAD s/p  stent, lung cancer and abdominal aortic anuerysm  Older brother has CABG, and abdominal aortic anuerysm  Young brother has Afib, SVT  Another younger brother has multiple myeloma  Sister has SVT    Social History  No smoking, rarely drink EtOH  No illicit drug  Works as a nurse in the ICU  Lives by herself, has 3 daughters    ROS  Constitutional: weight down 10 lbs in the last year, fatigue   Eyes: no vision changes  Cardiovascular: no chest pain, no palpitations    Respiratory: no SOB or cough   GI: alternating episodes of constipation and diarrhea; no abdominal pain; she associates the diarrhea with increased stress   : no change in urine, no dysuria; urinates 1-2 times a night   Musculoskeletal: no legs swelling, joint pain - mainly the knees and the low back pain  Integumentary: no concerning lesions  Neuro: no loss of strength or sensation, no numbness or tingling, no tremor, no dizziness, no headache   Endo: no temperature intolerance   Heme/Lymph: no concerning bumps, no bleeding problems   Psych: no depression or anxiety, no sleep problems.    Physical Exam  BP Readings from Last 6 Encounters:   12/11/19 120/75   09/25/19 137/80   09/12/19 119/64   08/21/19 126/79   06/19/19 124/61   05/21/19 168/80     Wt Readings from Last 10 Encounters:   12/11/19 129.1 kg (284 lb 9.6 oz)   09/25/19 125.9 kg (277 lb 9.6 oz)   09/12/19 131.3 kg (289 lb 8 oz)   08/21/19 127.5 kg (281 lb)   05/21/19 132.5 kg (292 lb)   12/04/18 131.6 kg (290 lb 3.2 oz)   11/14/18 129.3 kg (285 lb)   08/21/18 133.8 kg (295 lb)   07/10/18 133.8 kg (295 lb)   05/02/18 136 kg (299 lb 13.2 oz)     /75 (BP Location: Left arm, Patient Position: Sitting, Cuff Size: Adult Large)   Pulse 88   Wt 129.1 kg (284 lb 9.6 oz)   LMP 10/02/2007   SpO2 97%   BMI 43.94 kg/m    Body mass index is 43.94 kg/m .     Constitutional: general obesity, no distress, comfortable, pleasant   Eyes: anicteric, normal extra-ocular movements, no lid lag or  retraction  Neck: no thyromegaly; no palpable nodules  CVS: Regular rhythm, systolic murmur with radiation to the carotid arteries   Lungs: CTA B/L  Musculoskeletal: no edema, DP 2+; left lower extremities - mild atrophy compared with the right   GI: Abdomen soft, nontender, nondistended, positive bowel sounds  NS: no tremor, normal gait, knee reflexes absent  Skin: benign abd striae   Psychological: appropriate mood    RESULTS  I reviewed prior lab results documented in epic.  Lab Results   Component Value Date    A1C 7.3 (H) 12/10/2019    A1C 7.5 (A) 08/21/2019    A1C 7.8 (A) 05/21/2019    A1C 6.8 (H) 11/14/2018    A1C 6.9 (H) 03/14/2018       Hemoglobin   Date Value Ref Range Status   09/25/2019 14.8 11.7 - 15.7 g/dL Final     Hematocrit   Date Value Ref Range Status   12/10/2019 43.6 35.0 - 47.0 % Final     Cholesterol   Date Value Ref Range Status   12/10/2019 135 <200 mg/dL Final     Cholesterol/HDL Ratio   Date Value Ref Range Status   02/19/2015 3.2 0.0 - 5.0 Final     HDL Cholesterol   Date Value Ref Range Status   12/10/2019 43 (L) >49 mg/dL Final     LDL Cholesterol Calculated   Date Value Ref Range Status   12/10/2019 43 <100 mg/dL Final     Comment:     Desirable:       <100 mg/dl     VLDL-Cholesterol   Date Value Ref Range Status   02/19/2015 34 (H) 0 - 30 mg/dL Final     Triglycerides   Date Value Ref Range Status   12/10/2019 243 (H) <150 mg/dL Final     Comment:     Borderline high:  150-199 mg/dl  High:             200-499 mg/dl  Very high:       >499 mg/dl  Fasting specimen       Albumin Urine mg/L   Date Value Ref Range Status   12/10/2019 23 mg/L Final     TSH   Date Value Ref Range Status   12/10/2019 1.66 0.40 - 4.00 mU/L Final         Last Basic Metabolic Panel:    Sodium   Date Value Ref Range Status   12/10/2019 136 133 - 144 mmol/L Final     Potassium   Date Value Ref Range Status   12/10/2019 4.2 3.4 - 5.3 mmol/L Final     Chloride   Date Value Ref Range Status   12/10/2019 104 94 - 109  mmol/L Final     Calcium   Date Value Ref Range Status   12/10/2019 9.3 8.5 - 10.1 mg/dL Final     Carbon Dioxide   Date Value Ref Range Status   12/10/2019 26 20 - 32 mmol/L Final     Urea Nitrogen   Date Value Ref Range Status   12/10/2019 21 7 - 30 mg/dL Final     Creatinine   Date Value Ref Range Status   12/10/2019 0.80 0.52 - 1.04 mg/dL Final     GFR Estimate   Date Value Ref Range Status   12/10/2019 76 >60 mL/min/[1.73_m2] Final     Comment:     Non  GFR Calc  Starting 12/18/2018, serum creatinine based estimated GFR (eGFR) will be   calculated using the Chronic Kidney Disease Epidemiology Collaboration   (CKD-EPI) equation.       Glucose   Date Value Ref Range Status   12/10/2019 258 (H) 70 - 99 mg/dL Final     Comment:     Fasting specimen       AST   Date Value Ref Range Status   12/10/2019 20 0 - 45 U/L Final     ALT   Date Value Ref Range Status   12/10/2019 29 0 - 50 U/L Final     Albumin   Date Value Ref Range Status   12/10/2019 3.8 3.4 - 5.0 g/dL Final         ASSESSMENT:      1. Type 2 diabetes  Sherry is a 64 year old pleasant female, with a history of type 2 diabetes in the setting of obesity, sleep apnea.  Her diabetes is known to be complicated by mild neuropathy.  Overall, she achieves a fairly good blood glucose control.  Recommendations:  Scan the nevin sensor by using the same device  Continue metformin  Decrease the dose of evening insulin to 85 units.  Have the sensor downloaded for our review in a couple weeks  Continue to work on diet and exercise    2. Hypertension, controlled. On lisinopril 60 mg, HCTZ 25 mg and metoprolol 25 mg daily.      3.  Hypothyroidism.  Clinically and biochemically, the patient is euthyroid.  I recommended to continue to take the same dose of levothyroxine.    No orders of the defined types were placed in this encounter.

## 2019-12-11 NOTE — LETTER
12/11/2019         RE: Sherry Slaughter  [address] [place] [address]        Dear Colleague,    Thank you for referring your patient, Sherry Slaughter, to the [facility]. Please see a copy of my visit note below.        Sherry is a 64 year old female seen in follow-up for type 2 diabetes.    The patient was diagnosed with type 2 diabetes 25 years after she was diagnosed with gestational diabetes, during both her pregnancies. Her weight before pregnancy was 190-200. Her highest weight was 300s. She was initially started on oral medications and then insulin was added around 2012.     Her A1C has been in 9% range since 2012. She was on Byetta but she did not feel it helped. She was also on Actos but stopped due to 40 lbs weight gain and increased SOB.  For approximately 3 years, she was treated with Victoza.  It was discontinued due to cost.  She continues to take metformin, 2 g extended release daily.  In the beginning of July 2019, she was transitioned to U500 insulin and the dose was progressively increased. Jardiance was tried in 2019 and was discontinued due to genital fungal infections.       Current dose of U500 insulin is 120 units in the morning (when she wakes up, anywhere between 5-8 AM) and 100 units at suppertime (anywhere between 5-7 PM).  If her predinner blood sugar is lowish, she might decrease the dose of predinner insulin to 80 or 90 units.  A1c today was 7.3%, stable since her last visit here.     She has been scanning the freestyle nevin by using both the  and the iPhone.  As a result, some of the blood glucose tracings are interrupted.  On the sensor, the average blood glucose is 141 with a standard deviation of 59.  64% of the blood sugar numbers are within target of 70-1 80, 25% are above 180 and 11% are in the hypoglycemic range.  Most of the hypoglycemic episodes tend to occur between 2 and 3 AM.  Most of them are mild, in the 60s on the  glucometer.    The patient has been trying to have the breakfast and dinner as the main meals of the day.  Frequently, lunch consists of a salad.  She has a couple of snacks throughout the day, generally with no or low carbs.    DM complications  Retinopathy: last eye exam: 6/2019; no DR, S/P B/L surgery for cataract   Nephropathy: negative urine microalb - on lisinopril 60 mg; GFR in the 70s  Neuropathy: occasional burning sensation in her feet for the last couple of years   She is symptomatic for hypoglycemia (able to recognize blood sugar numbers in the 90s - she gets sweaty, hot and lightheaded).  She corrects the hypoglycemic symptoms by having something to eat. She does not have glucagon at home (never experienced severe hypoglycemic episodes).   Aspirin - taking 325 mg  LDL 20 (2018) on Crestor 10 mg  Using the CPAP   Exercise limited due to bilateral knee replacement and low back pain.    2.  Hypertension  Her blood pressure is managed with 60 mg lisinopril daily, 25 mg metoprolol daily and 25 mg hydrochlorothiazide daily.    Past Medical History  Past Medical History:   Diagnosis Date     Cataract      DEPRESSION     comes and goes - tried meds - unsuccessfully, certain times of the year, no psych intervention and no counselors in the past - and not interested      Diverticulosis of colon (without mention of hemorrhage) 4/04    colonoscopy     ECHO-mildLVH,tr MR,mild thick lflets w inc LA,trTR   12/03     Essential hypertension, benign 1990s    late 1990s - started medications at that time - not to difficult to control meds      Fam hx-cardiovas dis NEC     father - CAD, and lipids/HTN - multiple members of the family      Family history of diabetes mellitus     sister and grandmother with DM      Family history of malignant neoplasm of breast     mother - young age - 45, and maternal cousin and aunt as well - no BRCA testing done      Family history of stroke (cerebrovascular)     grandmother in early life  in her 40s      FAMILY HX COLON CANCER     Pat uncles x 2     Heart disease     murmur/     Heart murmur      HYPERLIPIDEMIA 2000    fairly recent - in the last 5 years - high for DM levels  -cholesterol recent      Irritable bowel syndrome     goes between the 2 - nerve related - more stressed more problems      MICROALBUMINURIA     unsure how long - been on the lisinopril - for a few years at well      Nonspecific abnormal results of liver function study 2/3/2003    SGOT - has been high in the past - since the hepatitis b - borderline elevation - not really changing any      OBESITY      Obstructive sleep apnea      GENESIS (obstructive sleep apnea) 10/15/2006    psg 5/15 AHI 53 aPAP 8-15     OSTEOARTHRITIS L KNEE 2003    total knee on the left - and pain is gone since the knee replacement      PERS HX HEPATITIS B- RESOLVED] 1977    acute virus only - no chronic disease      PERS HX HEPATITIS B- RESOLVED]      Type II or unspecified type diabetes mellitus without mention of complication, not stated as uncontrolled 1991    oral meds frist, then insulin eventually later, difficult to control most of the time      Unspecified hypothyroidism 10/11/2006    TSH 3.36 - mild subclinical hypothyroidism - deciding on following or starting low dose meds - with dr Oreilly - following    Hepatis B     Allergies  Allergies   Allergen Reactions     Atorvastatin Calcium      Gas     Pravachol [Pravastatin Sodium]      Pravachol - dry cough      Simvastatin      Myalgia     Medications    Current Outpatient Medications:      ASPIRIN EC PO, Take 325 mg by mouth daily, Disp: , Rfl:      blood glucose monitoring (NO BRAND SPECIFIED) test strip, Use to test blood sugar 4 times daily or as directed., Disp: 100 each, Rfl: 11     Continuous Blood Gluc  (FREESTYLE MIKE 14 DAY READER) KATIA, 1 each daily, Disp: 1 Device, Rfl: 0     Continuous Blood Gluc Sensor (FREESTYLE MIKE 14 DAY SENSOR) MISC, 1 each every 14 days, Disp: 6 each,  Rfl: 3     econazole nitrate 1 % external cream, Apply topically daily To feet and toenails., Disp: 85 g, Rfl: 6     hydrochlorothiazide (HYDRODIURIL) 25 MG tablet, Take 1 tablet (25 mg) by mouth daily, Disp: 90 tablet, Rfl: 3     Ibuprofen (ADVIL PO), Take 600 mg by mouth 4 times daily, Disp: , Rfl:      insulin pen needle (GNP CLICKFINE PEN NEEDLES) 31G X 8 MM miscellaneous, Use six times daily or as directed, Disp: 100 each, Rfl: 3     insulin reg HIGH CONC (HUMULIN R U-500 KWIKPEN) 500 UNIT/ML PEN soln, Administer  120 U in the morning and 100 U at bedtime., Disp: 40 mL, Rfl: 3     levothyroxine (SYNTHROID/LEVOTHROID) 75 MCG tablet, Take 1 tablet (75 mcg) by mouth daily, Disp: 90 tablet, Rfl: 3     liraglutide (VICTOZA PEN) 18 MG/3ML solution, Inject 1.8 mg Subcutaneous daily, Disp: 27 mL, Rfl: 3     lisinopril (PRINIVIL/ZESTRIL) 40 MG tablet, Take 1.5 tablets (60 mg) by mouth daily, Disp: 135 tablet, Rfl: 1     metFORMIN (GLUCOPHAGE-XR) 500 MG 24 hr tablet, Take 4 tablets (2,000 mg) by mouth At Bedtime, Disp: 360 tablet, Rfl: 3     metoprolol succinate (TOPROL XL) 25 MG 24 hr tablet, Take 1 tablet (25 mg) by mouth daily, Disp: 90 tablet, Rfl: 3     Multiple Vitamins-Minerals (ADVANCED DIABETIC MULTIVITAMIN) TABS, Take 1 tablet by mouth daily, Disp: , Rfl:      ORDER FOR DME, SET TO LOCAL PRINT,, Respironics REMSTAR 60 Series Auto CPAP 8-15cm H2O, Airfit P10 nasal pillow mask w/medium pillows, Disp: , Rfl:      rosuvastatin (CRESTOR) 10 MG tablet, Take 1 tablet (10 mg) by mouth daily, Disp: 90 tablet, Rfl: 3     amoxicillin (AMOXIL) 500 MG capsule, Before dental procedures, Disp: , Rfl:      Garlic 1000 MG CAPS, Take 1 capsule by mouth daily Takes during summer months.  Done taking until next summer., Disp: , Rfl:      order for DME, Equipment being ordered: wrist brace with thumb spica, L, Disp: 1 Device, Rfl: 0     order for DME, Equipment being ordered: wrist brace with thumb spica, R, Disp: 1 Device, Rfl:  0    Current Facility-Administered Medications:      triamcinolone (KENALOG-40) injection 20 mg, 20 mg, , , Ludivina Jennings MD, 20 mg at 09/12/19 1057    Family History  Maternal grandmother had type 2 diabetes  Daughter has GDM  Father had CAD s/p stent, lung cancer and abdominal aortic anuerysm  Older brother has CABG, and abdominal aortic anuerysm  Young brother has Afib, SVT  Another younger brother has multiple myeloma  Sister has SVT    Social History  No smoking, rarely drink EtOH  No illicit drug  Works as a nurse in the ICU  Lives by herself, has 3 daughters    ROS  Constitutional: weight down 10 lbs in the last year, fatigue   Eyes: no vision changes  Cardiovascular: no chest pain, no palpitations    Respiratory: no SOB or cough   GI: alternating episodes of constipation and diarrhea; no abdominal pain; she associates the diarrhea with increased stress   : no change in urine, no dysuria; urinates 1-2 times a night   Musculoskeletal: no legs swelling, joint pain - mainly the knees and the low back pain  Integumentary: no concerning lesions  Neuro: no loss of strength or sensation, no numbness or tingling, no tremor, no dizziness, no headache   Endo: no temperature intolerance   Heme/Lymph: no concerning bumps, no bleeding problems   Psych: no depression or anxiety, no sleep problems.    Physical Exam  BP Readings from Last 6 Encounters:   12/11/19 120/75   09/25/19 137/80   09/12/19 119/64   08/21/19 126/79   06/19/19 124/61   05/21/19 168/80     Wt Readings from Last 10 Encounters:   12/11/19 129.1 kg (284 lb 9.6 oz)   09/25/19 125.9 kg (277 lb 9.6 oz)   09/12/19 131.3 kg (289 lb 8 oz)   08/21/19 127.5 kg (281 lb)   05/21/19 132.5 kg (292 lb)   12/04/18 131.6 kg (290 lb 3.2 oz)   11/14/18 129.3 kg (285 lb)   08/21/18 133.8 kg (295 lb)   07/10/18 133.8 kg (295 lb)   05/02/18 136 kg (299 lb 13.2 oz)     /75 (BP Location: Left arm, Patient Position: Sitting, Cuff Size: Adult Large)   Pulse  88   Wt 129.1 kg (284 lb 9.6 oz)   LMP 10/02/2007   SpO2 97%   BMI 43.94 kg/m     Body mass index is 43.94 kg/m .     Constitutional: general obesity, no distress, comfortable, pleasant   Eyes: anicteric, normal extra-ocular movements, no lid lag or retraction  Neck: no thyromegaly; no palpable nodules  CVS: Regular rhythm, systolic murmur with radiation to the carotid arteries   Lungs: CTA B/L  Musculoskeletal: no edema, DP 2+; left lower extremities - mild atrophy compared with the right   GI: Abdomen soft, nontender, nondistended, positive bowel sounds  NS: no tremor, normal gait, knee reflexes absent  Skin: benign abd striae   Psychological: appropriate mood    RESULTS  I reviewed prior lab results documented in epic.  Lab Results   Component Value Date    A1C 7.3 (H) 12/10/2019    A1C 7.5 (A) 08/21/2019    A1C 7.8 (A) 05/21/2019    A1C 6.8 (H) 11/14/2018    A1C 6.9 (H) 03/14/2018       Hemoglobin   Date Value Ref Range Status   09/25/2019 14.8 11.7 - 15.7 g/dL Final     Hematocrit   Date Value Ref Range Status   12/10/2019 43.6 35.0 - 47.0 % Final     Cholesterol   Date Value Ref Range Status   12/10/2019 135 <200 mg/dL Final     Cholesterol/HDL Ratio   Date Value Ref Range Status   02/19/2015 3.2 0.0 - 5.0 Final     HDL Cholesterol   Date Value Ref Range Status   12/10/2019 43 (L) >49 mg/dL Final     LDL Cholesterol Calculated   Date Value Ref Range Status   12/10/2019 43 <100 mg/dL Final     Comment:     Desirable:       <100 mg/dl     VLDL-Cholesterol   Date Value Ref Range Status   02/19/2015 34 (H) 0 - 30 mg/dL Final     Triglycerides   Date Value Ref Range Status   12/10/2019 243 (H) <150 mg/dL Final     Comment:     Borderline high:  150-199 mg/dl  High:             200-499 mg/dl  Very high:       >499 mg/dl  Fasting specimen       Albumin Urine mg/L   Date Value Ref Range Status   12/10/2019 23 mg/L Final     TSH   Date Value Ref Range Status   12/10/2019 1.66 0.40 - 4.00 mU/L Final         Last  Basic Metabolic Panel:    Sodium   Date Value Ref Range Status   12/10/2019 136 133 - 144 mmol/L Final     Potassium   Date Value Ref Range Status   12/10/2019 4.2 3.4 - 5.3 mmol/L Final     Chloride   Date Value Ref Range Status   12/10/2019 104 94 - 109 mmol/L Final     Calcium   Date Value Ref Range Status   12/10/2019 9.3 8.5 - 10.1 mg/dL Final     Carbon Dioxide   Date Value Ref Range Status   12/10/2019 26 20 - 32 mmol/L Final     Urea Nitrogen   Date Value Ref Range Status   12/10/2019 21 7 - 30 mg/dL Final     Creatinine   Date Value Ref Range Status   12/10/2019 0.80 0.52 - 1.04 mg/dL Final     GFR Estimate   Date Value Ref Range Status   12/10/2019 76 >60 mL/min/[1.73_m2] Final     Comment:     Non  GFR Calc  Starting 12/18/2018, serum creatinine based estimated GFR (eGFR) will be   calculated using the Chronic Kidney Disease Epidemiology Collaboration   (CKD-EPI) equation.       Glucose   Date Value Ref Range Status   12/10/2019 258 (H) 70 - 99 mg/dL Final     Comment:     Fasting specimen       AST   Date Value Ref Range Status   12/10/2019 20 0 - 45 U/L Final     ALT   Date Value Ref Range Status   12/10/2019 29 0 - 50 U/L Final     Albumin   Date Value Ref Range Status   12/10/2019 3.8 3.4 - 5.0 g/dL Final         ASSESSMENT:      1. Type 2 diabetes  Sherry is a 64 year old pleasant female, with a history of type 2 diabetes in the setting of obesity, sleep apnea.  Her diabetes is known to be complicated by mild neuropathy.  Overall, she achieves a fairly good blood glucose control.  Recommendations:  Scan the nevin sensor by using the same device  Continue metformin  Decrease the dose of evening insulin to 85 units.  Have the sensor downloaded for our review in a couple weeks  Continue to work on diet and exercise    2. Hypertension, controlled. On lisinopril 60 mg, HCTZ 25 mg and metoprolol 25 mg daily.      3.  Hypothyroidism.  Clinically and biochemically, the patient is  euthyroid.  I recommended to continue to take the same dose of levothyroxine.    No orders of the defined types were placed in this encounter.          Again, thank you for allowing me to participate in the care of your patient.        Sincerely,        Rika Delgado MD

## 2019-12-15 ENCOUNTER — HEALTH MAINTENANCE LETTER (OUTPATIENT)
Age: 64
End: 2019-12-15

## 2019-12-17 ENCOUNTER — MYC REFILL (OUTPATIENT)
Dept: ENDOCRINOLOGY | Facility: CLINIC | Age: 64
End: 2019-12-17

## 2019-12-17 ENCOUNTER — MYC REFILL (OUTPATIENT)
Dept: PEDIATRICS | Facility: CLINIC | Age: 64
End: 2019-12-17

## 2019-12-17 ENCOUNTER — MYC REFILL (OUTPATIENT)
Dept: ORTHOPEDICS | Facility: CLINIC | Age: 64
End: 2019-12-17

## 2019-12-17 DIAGNOSIS — E03.4 HYPOTHYROIDISM DUE TO ACQUIRED ATROPHY OF THYROID: ICD-10-CM

## 2019-12-17 DIAGNOSIS — I10 ESSENTIAL HYPERTENSION WITH GOAL BLOOD PRESSURE LESS THAN 140/90: ICD-10-CM

## 2019-12-17 DIAGNOSIS — B35.3 TINEA PEDIS OF BOTH FEET: ICD-10-CM

## 2019-12-17 DIAGNOSIS — E11.9 TYPE 2 DIABETES, HBA1C GOAL < 7% (H): ICD-10-CM

## 2019-12-17 RX ORDER — LISINOPRIL 40 MG/1
60 TABLET ORAL DAILY
Qty: 135 TABLET | Refills: 1 | Status: CANCELLED | OUTPATIENT
Start: 2019-12-17

## 2019-12-17 RX ORDER — HYDROCHLOROTHIAZIDE 25 MG/1
25 TABLET ORAL DAILY
Qty: 90 TABLET | Refills: 2 | Status: SHIPPED | OUTPATIENT
Start: 2019-12-17 | End: 2020-09-09

## 2019-12-17 RX ORDER — ECONAZOLE NITRATE 10 MG/G
CREAM TOPICAL DAILY
Qty: 85 G | Refills: 6 | Status: SHIPPED | OUTPATIENT
Start: 2019-12-17 | End: 2020-11-25

## 2019-12-17 RX ORDER — LEVOTHYROXINE SODIUM 75 UG/1
75 TABLET ORAL DAILY
Qty: 90 TABLET | Refills: 3 | Status: CANCELLED | OUTPATIENT
Start: 2019-12-17

## 2019-12-17 RX ORDER — METOPROLOL SUCCINATE 25 MG/1
25 TABLET, EXTENDED RELEASE ORAL DAILY
Qty: 90 TABLET | Refills: 2 | Status: SHIPPED | OUTPATIENT
Start: 2019-12-17 | End: 2020-09-14

## 2019-12-17 NOTE — TELEPHONE ENCOUNTER
Last Clinic Visit: 12/11/2019  [facility]  insulin pen needle (GNP CLICKFINE PEN NEEDLES) 31G X 8 MM miscellaneous  90 day:1 RF sent to pharm        levothyroxine (SYNTHROID/LEVOTHROID) 75 MCG tablet My chart request: RF available

## 2020-01-07 ASSESSMENT — ACTIVITIES OF DAILY LIVING (ADL): CURRENT_FUNCTION: NO ASSISTANCE NEEDED

## 2020-01-09 ENCOUNTER — OFFICE VISIT (OUTPATIENT)
Dept: PEDIATRICS | Facility: CLINIC | Age: 65
End: 2020-01-09
Payer: COMMERCIAL

## 2020-01-09 VITALS
DIASTOLIC BLOOD PRESSURE: 77 MMHG | WEIGHT: 285 LBS | TEMPERATURE: 97.5 F | OXYGEN SATURATION: 96 % | BODY MASS INDEX: 43.19 KG/M2 | HEART RATE: 83 BPM | SYSTOLIC BLOOD PRESSURE: 135 MMHG | HEIGHT: 68 IN

## 2020-01-09 DIAGNOSIS — E66.01 MORBID OBESITY (H): ICD-10-CM

## 2020-01-09 DIAGNOSIS — Z12.31 ENCOUNTER FOR SCREENING MAMMOGRAM FOR BREAST CANCER: ICD-10-CM

## 2020-01-09 DIAGNOSIS — Z23 PNEUMOCOCCAL VACCINATION ADMINISTERED AT CURRENT VISIT: ICD-10-CM

## 2020-01-09 DIAGNOSIS — Z00.00 ENCOUNTER FOR MEDICARE ANNUAL WELLNESS EXAM: Primary | ICD-10-CM

## 2020-01-09 DIAGNOSIS — E78.5 HYPERLIPIDEMIA LDL GOAL <100: ICD-10-CM

## 2020-01-09 DIAGNOSIS — I10 HYPERTENSION GOAL BP (BLOOD PRESSURE) < 140/90: ICD-10-CM

## 2020-01-09 PROCEDURE — G0438 PPPS, INITIAL VISIT: HCPCS | Performed by: INTERNAL MEDICINE

## 2020-01-09 PROCEDURE — 90732 PPSV23 VACC 2 YRS+ SUBQ/IM: CPT | Performed by: INTERNAL MEDICINE

## 2020-01-09 PROCEDURE — G0009 ADMIN PNEUMOCOCCAL VACCINE: HCPCS | Performed by: INTERNAL MEDICINE

## 2020-01-09 RX ORDER — ROSUVASTATIN CALCIUM 10 MG/1
10 TABLET, COATED ORAL DAILY
Qty: 90 TABLET | Refills: 3 | Status: SHIPPED | OUTPATIENT
Start: 2020-01-09 | End: 2021-02-02

## 2020-01-09 ASSESSMENT — MIFFLIN-ST. JEOR: SCORE: 1877.28

## 2020-01-09 NOTE — PROGRESS NOTES
"  SUBJECTIVE:   Sherry Slaughter is a 64 year old female who presents for Preventive Visit.    HPI:  Retired last June.  Has more joint aches and pains. Particular the hands, has trouble opening round door knobs. Saw ortho, had cortisone injection in the left hand. Will go back if it gets worse again.   She sees Dr. Dr Delgado for diabetes management  Are you in the first 12 months of your Medicare Part B coverage?  No    Physical Health:    In general, how would you rate your overall physical health? good    Outside of work, how many days during the week do you exercise? 1 day/week    Outside of work, approximately how many minutes a day do you exercise?less than 15 minutes    If you drink alcohol do you typically have >3 drinks per day or >7 drinks per week? Not Applicable    Do you usually eat at least 4 servings of fruit and vegetables a day, include whole grains & fiber and avoid regularly eating high fat or \"junk\" foods? NO    Do you have any problems taking medications regularly?  No    Do you have any side effects from medications? muscle aches    Needs assistance for the following daily activities: no assistance needed    Which of the following safety concerns are present in your home?  none identified     Hearing impairment: Yes, Difficulty understanding soft or whispered speech.    In the past 6 months, have you been bothered by leaking of urine? yes    Mental Health:    In general, how would you rate your overall mental or emotional health? good  PHQ-2 Score: (P) 0    Do you feel safe in your environment? Yes    Have you ever done Advance Care Planning? (For example, a Health Directive, POLST, or a discussion with a medical provider or your loved ones about your wishes): No, advance care planning information given to patient to review.  Patient plans to discuss their wishes with loved ones or provider.      Additional concerns to address?  No    Fall risk:  Fallen 2 or more times in the past " year?: No  Any fall with injury in the past year?: No    Cognitive Screenin) Repeat 3 items (Leader, Season, Table)    2) Clock draw: NORMAL  3) 3 item recall: Recalls 2 objects  Results: NORMAL clock, 1-2 items recalled: COGNITIVE IMPAIRMENT LESS LIKELY    Mini-CogTM Copyright PASHA Hernandez. Licensed by the author for use in [facility]; reprinted with permission (david@[facility]). All rights reserved.      Do you have sleep apnea, excessive snoring or daytime drowsiness?: yes  CPAP            Reviewed and updated as needed this visit by clinical staff  Tobacco  Allergies  Meds         Reviewed and updated as needed this visit by Provider        Social History     Tobacco Use     Smoking status: Never Smoker     Smokeless tobacco: Never Used     Tobacco comment: tried in early 20s only    Substance Use Topics     Alcohol use: Yes     Comment: rare                           Current providers sharing in care for this patient include:   Patient Care Team:  Marilou Arciniega MD PhD as PCP - General (Internal Medicine)    The following health maintenance items are reviewed in Epic and correct as of today:  Health Maintenance   Topic Date Due     ADVANCE CARE PLANNING  2016     ZOSTER IMMUNIZATION (2 of 3) 2017     MEDICARE ANNUAL WELLNESS VISIT  2019     FALL RISK ASSESSMENT  2019     MAMMO SCREENING  2019     PNEUMOCOCCAL IMMUNIZATION 65+ LOW/MEDIUM RISK (2 of 2 - PPSV23) 2019     A1C  03/10/2020     DIABETIC FOOT EXAM  2020     EYE EXAM  2020     ALT  12/10/2020     BMP  12/10/2020     LIPID  12/10/2020     MICROALBUMIN  12/10/2020     TSH W/FREE T4 REFLEX  12/10/2020     COLONOSCOPY  2024     DTAP/TDAP/TD IMMUNIZATION (4 - Td) 10/13/2027     DEXA  Completed     HEPATITIS C SCREENING  Completed     PHQ-2  Completed     INFLUENZA VACCINE  Completed     IPV IMMUNIZATION  Aged Out     MENINGITIS IMMUNIZATION  Aged Out     Labs reviewed in EPIC  Pneumonia  "Vaccine:Adults age 65+ who received Pneumovax (PPSV23) at 65 years or older: Should be given PCV13 > 1 year after their most recent PPSV23  Mammogram Screening: Mammogram Screening: Patient over age 50, mutual decision to screen reflected in health maintenance.    ROS:  Constitutional, HEENT, cardiovascular, pulmonary, gi and gu systems are negative, except as otherwise noted.    OBJECTIVE:   /77   Pulse 83   Temp 97.5  F (36.4  C) (Temporal)   Ht 1.721 m (5' 7.75\")   Wt 129.3 kg (285 lb)   LMP 10/02/2007   SpO2 96%   BMI 43.65 kg/m   Estimated body mass index is 43.65 kg/m  as calculated from the following:    Height as of this encounter: 1.721 m (5' 7.75\").    Weight as of this encounter: 129.3 kg (285 lb).  EXAM:   GENERAL: healthy, alert and no distress  EYES: Eyes grossly normal to inspection, PERRL and conjunctivae and sclerae normal  HENT: ear canals and TM's normal, nose and mouth without ulcers or lesions  NECK: no adenopathy, no asymmetry, masses, or scars and thyroid normal to palpation  RESP: lungs clear to auscultation - no rales, rhonchi or wheezes  CV: regular rate and rhythm, normal S1 S2, no S3 or S4, no murmur, click or rub, no peripheral edema and peripheral pulses strong  ABDOMEN: soft, nontender, no hepatosplenomegaly, no masses and bowel sounds normal  MS: no gross musculoskeletal defects noted, no edema  SKIN: no suspicious lesions or rashes  NEURO: Normal strength and tone, mentation intact and speech normal  PSYCH: mentation appears normal, affect normal/bright    Diagnostic Test Results:  Labs reviewed in Epic  Cholesterol   Date Value Ref Range Status   12/10/2019 135 <200 mg/dL Final   11/14/2018 109 <200 mg/dL Final     HDL Cholesterol   Date Value Ref Range Status   12/10/2019 43 (L) >49 mg/dL Final   11/14/2018 37 (L) >49 mg/dL Final     LDL Cholesterol Calculated   Date Value Ref Range Status   12/10/2019 43 <100 mg/dL Final     Comment:     Desirable:       <100 mg/dl "   11/14/2018 20 <100 mg/dL Final     Comment:     Desirable:       <100 mg/dl     Triglycerides   Date Value Ref Range Status   12/10/2019 243 (H) <150 mg/dL Final     Comment:     Borderline high:  150-199 mg/dl  High:             200-499 mg/dl  Very high:       >499 mg/dl  Fasting specimen     11/14/2018 262 (H) <150 mg/dL Final     Comment:     Borderline high:  150-199 mg/dl  High:             200-499 mg/dl  Very high:       >499 mg/dl  Fasting specimen       Cholesterol/HDL Ratio   Date Value Ref Range Status   02/19/2015 3.2 0.0 - 5.0 Final   01/10/2014 4.0 0.0 - 5.0 Final       ASSESSMENT / PLAN:       ICD-10-CM    1. Encounter for Medicare annual wellness exam Z00.00    2. Encounter for screening mammogram for breast cancer Z12.31 MA Screening Digital Bilateral   3. Pneumococcal vaccination administered at current visit Z23 PNEUMOCOCCAL VACCINE,ADULT,SQ OR IM   4. Hyperlipidemia LDL goal <100 E78.5 rosuvastatin (CRESTOR) 10 MG tablet   5. Morbid obesity (H) E66.01    6. Hypertension goal BP (blood pressure) < 140/90 I10      Patient will continue to work with endocrinologist regarding diabetes.  Hyperlipidemia: LDL is at goal.  Continue with Crestor.  Hypertension: Blood pressure at goal.  Morbid obesity: She was successful in weight loss down to 277 pounds with a ketogenic diet, but developed more hypoglycemia.  She has stopped using keto diet now.  Has since gained some weight back.  She plans to get into a gym to start exercising, and may consider bringing back components of keto diet.  Return in 1 year for annual wellness visit    Preventive screening/immunizations:   Mammogram: up to date  Pap: up to date  Colonoscopy: up to date  Immunizations: Updated Pneumovax today  -- Shingrix (RZV) advised. Pt will check insurance coverage.      COUNSELING:  Reviewed preventive health counseling, as reflected in patient instructions    Estimated body mass index is 43.65 kg/m  as calculated from the following:     "Height as of this encounter: 1.721 m (5' 7.75\").    Weight as of this encounter: 129.3 kg (285 lb).    Weight management plan: She was able to lose weight with ketogenic diet, but developed hypoglycemia.     reports that she has never smoked. She has never used smokeless tobacco.      Appropriate preventive services were discussed with this patient, including applicable screening as appropriate for cardiovascular disease, diabetes, osteopenia/osteoporosis, and glaucoma.  As appropriate for age/gender, discussed screening for colorectal cancer, prostate cancer, breast cancer, and cervical cancer. Checklist reviewing preventive services available has been given to the patient.    Reviewed patients plan of care and provided an AVS. The Basic Care Plan (routine screening as documented in Health Maintenance) for Sherry meets the Care Plan requirement. This Care Plan has been established and reviewed with the Patient.    Counseling Resources:  ATP IV Guidelines  Pooled Cohorts Equation Calculator  Breast Cancer Risk Calculator  FRAX Risk Assessment  ICSI Preventive Guidelines  Dietary Guidelines for Americans, 2010  USDA's MyPlate  ASA Prophylaxis  Lung CA Screening    Marilou Arciniega MD PhD  [facility]  "

## 2020-01-09 NOTE — PATIENT INSTRUCTIONS
Make appointment(s) for:   -- return in one year for annual wellness visits  -- mammogram.      Check with your insurance regarding coverage for Shingrix (RZV) - the new shingles vaccine.         Medication(s) prescribed today:    Orders Placed This Encounter   Medications     rosuvastatin (CRESTOR) 10 MG tablet     Sig: Take 1 tablet (10 mg) by mouth daily     Dispense:  90 tablet     Refill:  3             Patient Education   Personalized Prevention Plan  You are due for the preventive services outlined below.  Your care team is available to assist you in scheduling these services.  If you have already completed any of these items, please share that information with your care team to update in your medical record.  Health Maintenance Due   Topic Date Due     Discuss Advance Care Planning  07/13/2016     Zoster (Shingles) Vaccine (2 of 3) 12/28/2017     Annual Wellness Visit  11/14/2019     FALL RISK ASSESSMENT  11/14/2019     Mammogram  11/14/2019     Pneumococcal Vaccine (2 of 2 - PPSV23) 11/14/2019

## 2020-01-14 DIAGNOSIS — Z12.31 ENCOUNTER FOR SCREENING MAMMOGRAM FOR BREAST CANCER: ICD-10-CM

## 2020-03-22 ENCOUNTER — HEALTH MAINTENANCE LETTER (OUTPATIENT)
Age: 65
End: 2020-03-22

## 2020-04-01 ENCOUNTER — MYC MEDICAL ADVICE (OUTPATIENT)
Dept: ENDOCRINOLOGY | Facility: CLINIC | Age: 65
End: 2020-04-01

## 2020-04-01 DIAGNOSIS — I10 ESSENTIAL HYPERTENSION WITH GOAL BLOOD PRESSURE LESS THAN 140/90: ICD-10-CM

## 2020-04-01 RX ORDER — LISINOPRIL 40 MG/1
60 TABLET ORAL DAILY
Qty: 135 TABLET | Refills: 1 | Status: CANCELLED | OUTPATIENT
Start: 2020-04-01

## 2020-04-01 RX ORDER — LISINOPRIL 40 MG/1
TABLET ORAL
Qty: 135 TABLET | Refills: 0 | Status: SHIPPED | OUTPATIENT
Start: 2020-04-01 | End: 2020-07-02

## 2020-04-01 NOTE — TELEPHONE ENCOUNTER
Hello,  last fill date:12-  Last quantity:135    Thank You,  Zoila Bonilla  Pharmacy Technician  [facility]  [phone]

## 2020-04-01 NOTE — TELEPHONE ENCOUNTER
Please refer to RN refill guidelines. Refilled per protocol.    Ivory Ang RN   [facility]

## 2020-06-07 ENCOUNTER — MYC REFILL (OUTPATIENT)
Dept: ENDOCRINOLOGY | Facility: CLINIC | Age: 65
End: 2020-06-07

## 2020-06-07 DIAGNOSIS — E03.4 HYPOTHYROIDISM DUE TO ACQUIRED ATROPHY OF THYROID: ICD-10-CM

## 2020-06-07 DIAGNOSIS — Z79.4 UNCONTROLLED TYPE 2 DIABETES MELLITUS WITH HYPERGLYCEMIA, WITH LONG-TERM CURRENT USE OF INSULIN (H): ICD-10-CM

## 2020-06-07 DIAGNOSIS — E11.65 UNCONTROLLED TYPE 2 DIABETES MELLITUS WITH HYPERGLYCEMIA, WITH LONG-TERM CURRENT USE OF INSULIN (H): ICD-10-CM

## 2020-06-08 RX ORDER — LEVOTHYROXINE SODIUM 75 UG/1
TABLET ORAL
Qty: 90 TABLET | Refills: 1 | Status: ON HOLD | OUTPATIENT
Start: 2020-06-08 | End: 2020-12-06

## 2020-06-08 RX ORDER — FLASH GLUCOSE SENSOR
KIT MISCELLANEOUS
Qty: 6 EACH | Refills: 1 | Status: SHIPPED | OUTPATIENT
Start: 2020-06-08 | End: 2020-10-30

## 2020-06-08 RX ORDER — METFORMIN HCL 500 MG
2000 TABLET, EXTENDED RELEASE 24 HR ORAL AT BEDTIME
Qty: 360 TABLET | Refills: 1 | Status: ON HOLD | OUTPATIENT
Start: 2020-06-08 | End: 2020-12-06

## 2020-06-08 NOTE — TELEPHONE ENCOUNTER
Hello,  last fill date:03-  Last quantity:90    Thank You,  Zoila Bonilla  Pharmacy Technician  [facility]  [phone]

## 2020-06-16 NOTE — TELEPHONE ENCOUNTER
Hello,  last fill date:03-  Last quantity:45    Thank You,  Zoila Bonilla  Pharmacy Technician  [facility]  [phone]     NEPHROLOGY CONSULTATION NOTE    62yo female whose PMH includes HTN, HLD, COPD (still smokes), DM type 2 on insulin and an unspecified kidney disorder (managed by her PMD, never saw a nephrologist) and (to me) denies any history of CHF, presents to the ER with worsening shortness of breath and weakness over course of 3 weeks. There appears to be orthopnea and PND but patient denies fevers, chills or chest pain. She does have a chronic problem with diarrhea. Last ECHO and stress test was 5 years ago. Tonight she awoke tired and sweaty. On arrival to ER significant findings included a glucose of 30 (given D50 with symptom improvement), bradycardia and apparent acute CHF (clinically, BNP result, on bedside sonogram and on CXR)      PAST MEDICAL & SURGICAL HISTORY:  Hypertension  History of cholecystectomy  High blood cholesterol  Diabetes mellitus, type 2  Chronic obstructive pulmonary disease  History of cholecystectomy    Allergies:  No Known Allergies    Home Medications Reviewed    SOCIAL HISTORY:  Denies ETOH,Smoking,   FAMILY HISTORY:        REVIEW OF SYSTEMS:  CONSTITUTIONAL: No weakness, fevers or chills  EYES/ENT: No visual changes;  No vertigo or throat pain   NECK: No pain or stiffness  RESPIRATORY: No cough, wheezing, hemoptysis; No shortness of breath  CARDIOVASCULAR: No chest pain or palpitations.  GASTROINTESTINAL: No abdominal or epigastric pain. No nausea, vomiting, or hematemesis; No diarrhea or constipation. No melena or hematochezia.  GENITOURINARY: No dysuria, frequency, foamy urine, urinary urgency, incontinence or hematuria  NEUROLOGICAL: No numbness or weakness  SKIN: No itching, burning, rashes, or lesions   VASCULAR: No bilateral lower extremity edema.   All other review of systems is negative unless indicated above.    PHYSICAL EXAM:  Constitutional: NAD  HEENT: anicteric sclera, oropharynx clear, MMM  Neck: No JVD  Respiratory: b/ rhonchi  Cardiovascular: S1, S2, RRR  + murmur  Gastrointestinal: BS+, soft, NT/ND  Extremities: No cyanosis or clubbing. + peripheral edema  Neurological: A/O x 3, no focal deficits  Psychiatric: Normal mood, normal affect  : No CVA tenderness. + cortes  Skin: No rashes    Hospital Medications:   MEDICATIONS  (STANDING):  atorvastatin 10 milliGRAM(s) Oral at bedtime  furosemide   Injectable 20 milliGRAM(s) IV Push daily  heparin   Injectable 5000 Unit(s) SubCutaneous two times a day        VITALS:  T(F): 94.8 (20 @ 09:25), Max: 94.8 (20 @ 09:25)  HR: 64 (20 @ 09:25)  BP: 146/66 (20 @ 09:25)  RR: 16 (20 @ 09:25)  SpO2: 96% (20 @ 06:04)  Wt(kg): --    Height (cm): 160 (:25)  Weight (kg): 78.3 ( @ :25)  BMI (kg/m2): 30.6 (:25)  BSA (m2): 1.82 (:25)    LABS:      133<L>  |  102  |  80<HH>  ----------------------------<  30<LL>  4.4   |  19  |  2.8<H>    Ca    8.6      2020 03:45  Mg     2.5         TPro  6.6  /  Alb  3.6  /  TBili  0.2  /  DBili      /  AST  17  /  ALT  8   /  AlkPhos  142<H>                            9.4    9.24  )-----------( 100      ( 2020 03:45 )             31.4       Urine Studies:  Urinalysis Basic - ( 2020 04:25 )    Color: Yellow / Appearance: Clear / S.020 / pH:   Gluc:  / Ketone: Negative  / Bili: Negative / Urobili: 0.2 mg/dL   Blood:  / Protein: 30 mg/dL / Nitrite: Negative   Leuk Esterase: Trace / RBC:  / WBC 3-5 /HPF   Sq Epi:  / Non Sq Epi: Moderate /HPF / Bacteria: Few          RADIOLOGY & ADDITIONAL STUDIES:

## 2020-07-01 DIAGNOSIS — I10 ESSENTIAL HYPERTENSION WITH GOAL BLOOD PRESSURE LESS THAN 140/90: ICD-10-CM

## 2020-07-01 NOTE — TELEPHONE ENCOUNTER
Hello,  last fill date:04-  Last quantity:135    Thank You,  Zoila Bonilla  Pharmacy Technician  [facility]  [phone]

## 2020-07-02 ENCOUNTER — E-VISIT (OUTPATIENT)
Dept: PEDIATRICS | Facility: CLINIC | Age: 65
End: 2020-07-02
Payer: COMMERCIAL

## 2020-07-02 DIAGNOSIS — L08.9 TOE INFECTION: Primary | ICD-10-CM

## 2020-07-02 PROCEDURE — 99421 OL DIG E/M SVC 5-10 MIN: CPT | Performed by: INTERNAL MEDICINE

## 2020-07-02 RX ORDER — LISINOPRIL 40 MG/1
TABLET ORAL
Qty: 135 TABLET | Refills: 1 | Status: SHIPPED | OUTPATIENT
Start: 2020-07-02 | End: 2020-12-03

## 2020-07-02 RX ORDER — CEPHALEXIN 500 MG/1
500 CAPSULE ORAL 2 TIMES DAILY
Qty: 20 CAPSULE | Refills: 0 | Status: SHIPPED | OUTPATIENT
Start: 2020-07-02 | End: 2020-07-10

## 2020-07-07 ENCOUNTER — E-VISIT (OUTPATIENT)
Dept: PEDIATRICS | Facility: CLINIC | Age: 65
End: 2020-07-07
Payer: COMMERCIAL

## 2020-07-07 DIAGNOSIS — R21 RASH OF HANDS: Primary | ICD-10-CM

## 2020-07-07 PROCEDURE — 99207 ZZC NON-BILLABLE SERV PER CHARTING: CPT | Performed by: INTERNAL MEDICINE

## 2020-07-08 NOTE — TELEPHONE ENCOUNTER
Provider E-Visit time total (minutes):   Please call patient and let her know Dr Arciniega is not in office today but I see a note that if not improving on last visit that she wanted it to be seen   Please call as maybe we need to look at getting her in as may take a while for wound consult

## 2020-07-10 ENCOUNTER — OFFICE VISIT (OUTPATIENT)
Dept: PEDIATRICS | Facility: CLINIC | Age: 65
End: 2020-07-10
Payer: COMMERCIAL

## 2020-07-10 VITALS
SYSTOLIC BLOOD PRESSURE: 134 MMHG | TEMPERATURE: 96.9 F | WEIGHT: 291 LBS | DIASTOLIC BLOOD PRESSURE: 65 MMHG | BODY MASS INDEX: 44.57 KG/M2 | HEART RATE: 86 BPM | OXYGEN SATURATION: 97 %

## 2020-07-10 DIAGNOSIS — L08.9 TOE INFECTION: ICD-10-CM

## 2020-07-10 DIAGNOSIS — E11.65 UNCONTROLLED TYPE 2 DIABETES MELLITUS WITH HYPERGLYCEMIA (H): ICD-10-CM

## 2020-07-10 DIAGNOSIS — L89.893 PRESSURE INJURY OF TOE OF LEFT FOOT, STAGE 3 (H): Primary | ICD-10-CM

## 2020-07-10 PROCEDURE — 99213 OFFICE O/P EST LOW 20 MIN: CPT | Performed by: INTERNAL MEDICINE

## 2020-07-10 RX ORDER — CEPHALEXIN 500 MG/1
500 CAPSULE ORAL 4 TIMES DAILY
Qty: 40 CAPSULE | Refills: 0 | Status: SHIPPED | OUTPATIENT
Start: 2020-07-10 | End: 2020-07-22

## 2020-07-10 NOTE — PROGRESS NOTES
Subjective     Sherry Slaughter is a 65 year old female who presents to clinic today for the following health issues:    HPI   Wound on toe      Duration: 1 week    Description (location/character/radiation): Left grt toe    Intensity:  moderate    Accompanying signs and symptoms: Swelling, open sore bottom of toe    History (similar episodes/previous evaluation): None    Precipitating or alleviating factors: Diabetic    Therapies tried and outcome: Keflex 500 mg twice a day for 10 days.     Patient reported improvement in the wound after starting on Keflex.  However it does not appear to be completely healed.    Patient reported the sore started after she started wearing a different pair shoes.  Patient has history of diabetes, does not have intact sensation at the bottom of the feet.    ROS:  Constitutional, HEENT, cardiovascular, pulmonary, gi and gu systems are negative, except as otherwise noted.       amoxicillin (AMOXIL) 500 MG capsule, Before dental procedures  ASPIRIN EC PO, Take 325 mg by mouth daily  econazole nitrate 1 % external cream, Apply topically daily To feet and toenails.  Garlic 1000 MG CAPS, Take 1 capsule by mouth daily Takes during summer months.  Done taking until next summer.  hydrochlorothiazide (HYDRODIURIL) 25 MG tablet, Take 1 tablet (25 mg) by mouth daily  Ibuprofen (ADVIL PO), Take 600 mg by mouth 4 times daily  insulin reg HIGH CONC (HUMULIN R U-500 KWIKPEN) 500 UNIT/ML PEN soln, Administer  120 U in the morning and 100 U at bedtime.  levothyroxine (SYNTHROID/LEVOTHROID) 75 MCG tablet, TAKE ONE TABLET BY MOUTH ONCE DAILY  liraglutide (VICTOZA PEN) 18 MG/3ML solution, Inject 1.8 mg Subcutaneous daily  lisinopril (ZESTRIL) 40 MG tablet, TAKE 1 AND 1/2 TABLETS BY MOUTH ONCE DAILY  metFORMIN (GLUCOPHAGE-XR) 500 MG 24 hr tablet, Take 4 tablets (2,000 mg) by mouth At Bedtime  metoprolol succinate ER (TOPROL XL) 25 MG 24 hr tablet, Take 1 tablet (25 mg) by mouth daily  Multiple  Vitamins-Minerals (ADVANCED DIABETIC MULTIVITAMIN) TABS, Take 1 tablet by mouth daily  rosuvastatin (CRESTOR) 10 MG tablet, Take 1 tablet (10 mg) by mouth daily  blood glucose monitoring (NO BRAND SPECIFIED) test strip, Use to test blood sugar 4 times daily or as directed.  Continuous Blood Gluc  (FREESTYLE MIKE 14 DAY READER) KATIA, 1 each daily  Continuous Blood Gluc Sensor (FREESTYLE MIKE 14 DAY SENSOR) MISC, APPLY 1 SENSOR EVERY 14 DAYS  insulin pen needle (GNP CLICKFINE PEN NEEDLES) 31G X 8 MM miscellaneous, Use six times daily or as directed  ORDER FOR DME, SET TO LOCAL PRINT,, Respironics REMSTAR 60 Series Auto CPAP 8-15cm H2O, Airfit P10 nasal pillow mask w/medium pillows  order for DME, Equipment being ordered: wrist brace with thumb spica, L  order for DME, Equipment being ordered: wrist brace with thumb spica, R    triamcinolone (KENALOG-40) injection 20 mg           Patient Active Problem List   Diagnosis     Morbid obesity (H)     Diverticulosis of large intestine     Nonspecific abnormal results of cardiovascular function study     FAMILY HX COLON CANCER     Nonallopathic lesion of thoracic region     Hypothyroidism     GENESIS (obstructive sleep apnea)     Irritable bowel syndrome     Family history of malignant neoplasm of breast     History of major depression     OSTEOARTHRITIS L KNEE  s/p knee replacement on the left      Hypertension goal BP (blood pressure) < 140/90     Family history of stroke (cerebrovascular)     Family history of other cardiovascular diseases     Family history of diabetes mellitus     ABSENCE OF MENSTRUATION - perimenopausal      JOINT PAIN-ANKLE - right ankle      Mitral valve insufficiency     Hyperlipidemia LDL goal <100     Aortic sclerosis     Tubular adenoma of colon     History of viral hepatitis, type B     Chronic low back pain     Long-term insulin use (H)     Uncontrolled diabetes mellitus (H)     S/P total knee replacement using cement, right     Aftercare  following right knee joint replacement surgery     Lumbago     Type 2 diabetes mellitus with hyperglycemia (H)     Posterior vitreous detachment of right eye     Pseudophakia of both eyes     Past Surgical History:   Procedure Laterality Date     ARTHROPLASTY KNEE Right 9/23/2015    Procedure: ARTHROPLASTY KNEE;  Surgeon: Rufus Brown MD;  Location: [facility] OR     C NONSPECIFIC PROCEDURE  6/06    right triple arthrodecesis      C NONSPECIFIC PROCEDURE  7/06     right bunion surgery      C TOTAL KNEE ARTHROPLASTY  3/03    L knee     CATARACT IOL, RT/LT Left      COLONOSCOPY  4/04    diverticulosis, rec repeat 10 yrs(consider fam hx?)     COLONOSCOPY WITH CO2 INSUFFLATION N/A 6/19/2019    Procedure: COLONOSCOPY, WITH CO2 INSUFFLATION;  Surgeon: Zeb Duarte MD;  Location: [facility] OR     ORTHOPEDIC SURGERY      right ankle     PHACOEMULSIFICATION CLEAR CORNEA WITH STANDARD INTRAOCULAR LENS IMPLANT Left 3/15/2018    Procedure: PHACOEMULSIFICATION CLEAR CORNEA WITH STANDARD INTRAOCULAR LENS IMPLANT;  LEFT EYE PHACOEMULSIFICATION CLEAR CORNEA WITH STANDARD INTRAOCULAR LENS IMPLANT;  Surgeon: Bandar Linares MD;  Location:  [facility]     PHACOEMULSIFICATION CLEAR CORNEA WITH STANDARD INTRAOCULAR LENS IMPLANT Right 4/5/2018    Procedure: PHACOEMULSIFICATION CLEAR CORNEA WITH STANDARD INTRAOCULAR LENS IMPLANT;  RIGHT PHACOEMULSIFICATION CLEAR CORNEA WITH STANDARD INTRAOCULAR LENS IMPLANT ;  Surgeon: Bandar Linares MD;  Location:  [facility]     STRESS ECHO (METRO)  12/03    no ischemia, limited by fatigue     SURGICAL HISTORY OF -       EXP LAP- R OVARY CYSTS     SURGICAL HISTORY OF -   1981,1984,1985    CHILDBIRTH       Social History     Tobacco Use     Smoking status: Never Smoker     Smokeless tobacco: Never Used     Tobacco comment: tried in early 20s only    Substance Use Topics     Alcohol use: Yes     Comment: rare     Family History   Problem Relation Age of Onset     Diabetes Maternal Grandmother         DM  TYPE 2     Cerebrovascular Disease Maternal Grandmother      Hypertension Sister      Lipids Sister      Heart Disease Sister         Chronic AFib     Hypertension Sister      Lipids Sister      Gynecology Sister      Diabetes Sister      Asthma Sister      Blood Disease Paternal Grandmother         T CELL LEUKEMIA     Hypertension Brother      Lipids Brother      Congenital Anomalies Brother      Cardiovascular Brother      Heart Disease Brother         CABG/AFIB     Hypertension Brother      Lipids Brother      Obesity Brother      Hypertension Brother      Respiratory Brother         Sleep apnea     Heart Disease Brother         AFib     Alzheimer Disease Mother      Asthma Mother      Hypertension Mother      Breast Cancer Mother 40        has mastectomy and lived to 84     Allergies Mother         Sulfa,     Arthritis Mother      Cardiovascular Mother      Depression Mother      Respiratory Mother      Lipids Mother      Cancer Mother         breast     Thyroid Disease Mother      Cerebrovascular Disease Mother      Dementia Mother         Alzheimers     Cancer - colorectal Other      Cancer Father         lung     Aneurysm Father         brother, AAA     Other Cancer Father         lung ca     Asthma Sister      Cancer - colorectal Paternal Uncle         late 50s to early 60s - great uncles      Breast Cancer Other         Maternal cousin     Arrhythmia Sister         2 brothers 1 sister Afib     Breast Cancer Maternal Aunt 62     Other Cancer Maternal Aunt 35        Ovarian cancer.  Survived despite late recurrence and is now 92.     Glaucoma No family hx of      Macular Degeneration No family hx of              Problem list, Medication list, Allergies, and Medical/Social/Surgical histories reviewed in [facility] and updated as appropriate.    OBJECTIVE:                                                    /65   Pulse 86   Temp 96.9  F (36.1  C) (Temporal)   Wt 132 kg (291 lb)   LMP 10/02/2007   SpO2 97%    BMI 44.57 kg/m      GENERAL: healthy, alert and no distress  Left big toe: There is still mild erythema around the big toe, on the plantar surface there is a circular wound, with new granulation tissue and a small opening that exposed the underlying fat tissue.      Diagnostic test results:  No results found for any visits on 07/10/20.      ASSESSMENT/PLAN:                                                      65 year old female with the following diagnoses and treatment plan:      ICD-10-CM    1. Pressure injury of toe of left foot, stage 3 (H)  L89.893 [facility]  Referral   2. Toe infection  L08.9 cephALEXin (KEFLEX) 500 MG capsule     Orthopedic [facility] Spine  Referral   3. Uncontrolled type 2 diabetes mellitus with hyperglycemia (H)  E11.65      --Pressure ulcer of the left big toe: We will refer to podiatry for wound care  --There is still residual mild infection.  There is improvement with Keflex use, the dose is small.  I will have patient continue with Keflex with 4 times daily dosing for 10 days until she is seen by podiatrist    Will call or return to clinic if worsening or symptoms not improving as discussed.  See Patient Instructions.      Marilou Arciniega MD-PhD  [facility] [place]    (Note: Chart documentation was done in part with Dragon Voice Recognition software. Although reviewed after completion, some word and grammatical errors may remain.)

## 2020-07-10 NOTE — PATIENT INSTRUCTIONS
Make appointment(s) for:   -- podiatry referral.         Medication(s) prescribed today:    Orders Placed This Encounter   Medications     cephALEXin (KEFLEX) 500 MG capsule     Sig: Take 1 capsule (500 mg) by mouth 4 times daily for 10 days     Dispense:  40 capsule     Refill:  0

## 2020-07-14 ENCOUNTER — OFFICE VISIT (OUTPATIENT)
Dept: PODIATRY | Facility: CLINIC | Age: 65
End: 2020-07-14
Payer: COMMERCIAL

## 2020-07-14 DIAGNOSIS — L97.522 SKIN ULCER OF LEFT FOOT WITH FAT LAYER EXPOSED (H): ICD-10-CM

## 2020-07-14 DIAGNOSIS — M21.969 TYPE 2 DIABETES MELLITUS WITH DIABETIC FOOT DEFORMITY (H): Primary | ICD-10-CM

## 2020-07-14 DIAGNOSIS — E11.69 TYPE 2 DIABETES MELLITUS WITH DIABETIC FOOT DEFORMITY (H): Primary | ICD-10-CM

## 2020-07-14 DIAGNOSIS — E11.49 TYPE II OR UNSPECIFIED TYPE DIABETES MELLITUS WITH NEUROLOGICAL MANIFESTATIONS, NOT STATED AS UNCONTROLLED(250.60) (H): ICD-10-CM

## 2020-07-14 PROCEDURE — 97597 DBRDMT OPN WND 1ST 20 CM/<: CPT | Performed by: PODIATRIST

## 2020-07-14 NOTE — LETTER
7/14/2020         RE: Sherry Slaughter  [address]  [place] [address]        Dear Colleague,    Thank you for referring your patient, Sherry Slaughter, to the [facility]. Please see a copy of my visit note below.    Past Medical History:   Diagnosis Date     Cataract      DEPRESSION     comes and goes - tried meds - unsuccessfully, certain times of the year, no psych intervention and no counselors in the past - and not interested      Diverticulosis of colon (without mention of hemorrhage) 4/04    colonoscopy     ECHO-mildLVH,tr MR,mild thick lflets w inc LA,trTR   12/03     Essential hypertension, benign 1990s    late 1990s - started medications at that time - not to difficult to control meds      Fam hx-cardiovas dis NEC     father - CAD, and lipids/HTN - multiple members of the family      Family history of diabetes mellitus     sister and grandmother with DM      Family history of malignant neoplasm of breast     mother - young age - 45, and maternal cousin and aunt as well - no BRCA testing done      Family history of stroke (cerebrovascular)     grandmother in early life in her 40s      FAMILY HX COLON CANCER     Pat uncles x 2     Heart disease     murmur/     Heart murmur      HYPERLIPIDEMIA 2000    fairly recent - in the last 5 years - high for DM levels  -cholesterol recent      Irritable bowel syndrome     goes between the 2 - nerve related - more stressed more problems      MICROALBUMINURIA     unsure how long - been on the lisinopril - for a few years at well      Nonspecific abnormal results of liver function study 2/3/2003    SGOT - has been high in the past - since the hepatitis b - borderline elevation - not really changing any      OBESITY      Obstructive sleep apnea      GENESIS (obstructive sleep apnea) 10/15/2006    psg 5/15 AHI 53 aPAP 8-15     OSTEOARTHRITIS L KNEE 2003    total knee on the left - and pain is gone since the knee replacement      PERS HX  HEPATITIS B- RESOLVED] 1977    acute virus only - no chronic disease      PERS HX HEPATITIS B- RESOLVED]      Type II or unspecified type diabetes mellitus without mention of complication, not stated as uncontrolled 1991    oral meds frist, then insulin eventually later, difficult to control most of the time      Unspecified hypothyroidism 10/11/2006    TSH 3.36 - mild subclinical hypothyroidism - deciding on following or starting low dose meds - with dr Oreilly - following      Patient Active Problem List   Diagnosis     Morbid obesity (H)     Diverticulosis of large intestine     Nonspecific abnormal results of cardiovascular function study     FAMILY HX COLON CANCER     Nonallopathic lesion of thoracic region     Hypothyroidism     GENESIS (obstructive sleep apnea)     Irritable bowel syndrome     Family history of malignant neoplasm of breast     History of major depression     OSTEOARTHRITIS L KNEE  s/p knee replacement on the left      Hypertension goal BP (blood pressure) < 140/90     Family history of stroke (cerebrovascular)     Family history of other cardiovascular diseases     Family history of diabetes mellitus     ABSENCE OF MENSTRUATION - perimenopausal      JOINT PAIN-ANKLE - right ankle      Mitral valve insufficiency     Hyperlipidemia LDL goal <100     Aortic sclerosis     Tubular adenoma of colon     History of viral hepatitis, type B     Chronic low back pain     Long-term insulin use (H)     Uncontrolled diabetes mellitus (H)     S/P total knee replacement using cement, right     Aftercare following right knee joint replacement surgery     Lumbago     Type 2 diabetes mellitus with hyperglycemia (H)     Posterior vitreous detachment of right eye     Pseudophakia of both eyes     Past Surgical History:   Procedure Laterality Date     ARTHROPLASTY KNEE Right 9/23/2015    Procedure: ARTHROPLASTY KNEE;  Surgeon: Rufus Brown MD;  Location:  OR      NONSPECIFIC PROCEDURE  6/06    right triple  arthrodecesis      C NONSPECIFIC PROCEDURE  7/06     right bunion surgery      C TOTAL KNEE ARTHROPLASTY  3/03    L knee     CATARACT IOL, RT/LT Left      COLONOSCOPY  4/04    diverticulosis, rec repeat 10 yrs(consider fam hx?)     COLONOSCOPY WITH CO2 INSUFFLATION N/A 6/19/2019    Procedure: COLONOSCOPY, WITH CO2 INSUFFLATION;  Surgeon: Zeb Duarte MD;  Location: [facility] OR     ORTHOPEDIC SURGERY      right ankle     PHACOEMULSIFICATION CLEAR CORNEA WITH STANDARD INTRAOCULAR LENS IMPLANT Left 3/15/2018    Procedure: PHACOEMULSIFICATION CLEAR CORNEA WITH STANDARD INTRAOCULAR LENS IMPLANT;  LEFT EYE PHACOEMULSIFICATION CLEAR CORNEA WITH STANDARD INTRAOCULAR LENS IMPLANT;  Surgeon: Bandar Linares MD;  Location:  [facility]     PHACOEMULSIFICATION CLEAR CORNEA WITH STANDARD INTRAOCULAR LENS IMPLANT Right 4/5/2018    Procedure: PHACOEMULSIFICATION CLEAR CORNEA WITH STANDARD INTRAOCULAR LENS IMPLANT;  RIGHT PHACOEMULSIFICATION CLEAR CORNEA WITH STANDARD INTRAOCULAR LENS IMPLANT ;  Surgeon: Bandar Linares MD;  Location:  [facility]     STRESS ECHO (METRO)  12/03    no ischemia, limited by fatigue     SURGICAL HISTORY OF -       EXP LAP- R OVARY CYSTS     SURGICAL HISTORY OF -   1981,1984,1985    CHILDBIRTH     Social History     Socioeconomic History     Marital status:      Spouse name: Not on file     Number of children: 3     Years of education: Not on file     Highest education level: Not on file   Occupational History     Occupation: RN     Employer: [employer]   Social Needs     Financial resource strain: Not on file     Food insecurity     Worry: Not on file     Inability: Not on file     Transportation needs     Medical: Not on file     Non-medical: Not on file   Tobacco Use     Smoking status: Never Smoker     Smokeless tobacco: Never Used     Tobacco comment: tried in early 20s only    Substance and Sexual Activity     Alcohol use: Yes     Comment: rare     Drug use: No      Sexual activity: Never     Comment:  - since for 20 years, no signficant other  > 5 years sexual activity    Lifestyle     Physical activity     Days per week: Not on file     Minutes per session: Not on file     Stress: Not on file   Relationships     Social connections     Talks on phone: Not on file     Gets together: Not on file     Attends Nondenominational service: Not on file     Active member of club or organization: Not on file     Attends meetings of clubs or organizations: Not on file     Relationship status: Not on file     Intimate partner violence     Fear of current or ex partner: Not on file     Emotionally abused: Not on file     Physically abused: Not on file     Forced sexual activity: Not on file   Other Topics Concern      Service No     Blood Transfusions No     Caffeine Concern No     Occupational Exposure Yes     Comment: Normal nursing exposures     Hobby Hazards No     Sleep Concern Yes     Stress Concern No     Comment: Stress level at HM=5, stress level at WK=6     Weight Concern Yes     Special Diet Yes     Comment: Diabetic diet (watching carbs)     Back Care No     Comment: Occassional back spasms     Exercise Yes     Comment: Pt joined a health club goes approx 3-4 times a week,half to one mile daily     Bike Helmet No     Seat Belt Yes     Comment: Sometimes     Self-Exams Yes     Parent/sibling w/ CABG, MI or angioplasty before 65F 55M? 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      Family History   Problem Relation Age of Onset     Diabetes Maternal Grandmother         DM TYPE 2     Cerebrovascular Disease Maternal Grandmother      Hypertension Sister      Lipids Sister      Heart Disease Sister         Chronic AFib     Hypertension Sister      Lipids Sister      Gynecology Sister      Diabetes Sister      Asthma Sister      Blood Disease Paternal Grandmother         T CELL LEUKEMIA     Hypertension  Brother      Lipids Brother      Congenital Anomalies Brother      Cardiovascular Brother      Heart Disease Brother         CABG/AFIB     Hypertension Brother      Lipids Brother      Obesity Brother      Hypertension Brother      Respiratory Brother         Sleep apnea     Heart Disease Brother         AFib     Alzheimer Disease Mother      Asthma Mother      Hypertension Mother      Breast Cancer Mother 40        has mastectomy and lived to 84     Allergies Mother         Sulfa,     Arthritis Mother      Cardiovascular Mother      Depression Mother      Respiratory Mother      Lipids Mother      Cancer Mother         breast     Thyroid Disease Mother      Cerebrovascular Disease Mother      Dementia Mother         Alzheimers     Cancer - colorectal Other      Cancer Father         lung     Aneurysm Father         brother, AAA     Other Cancer Father         lung ca     Asthma Sister      Cancer - colorectal Paternal Uncle         late 50s to early 60s - great uncles      Breast Cancer Other         Maternal cousin     Arrhythmia Sister         2 brothers 1 sister Afib     Breast Cancer Maternal Aunt 62     Other Cancer Maternal Aunt 35        Ovarian cancer.  Survived despite late recurrence and is now 92.     Glaucoma No family hx of      Macular Degeneration No family hx of      Lab Results   Component Value Date    A1C 7.3 12/10/2019    A1C 7.5 08/21/2019    A1C 7.8 05/21/2019    A1C 6.8 11/14/2018    A1C 6.9 03/14/2018         SUBJECTIVE FINDINGS:  A 65-year-old female returns to clinic from Marilou Arciniega MD, for ulcer, left hallux.  She relates about 3 weeks ago, it broke open.  She is not sure if it was from self-trimming it with a grinding wheel or what, but it started cracking.  She relates she is diabetic.  She relates she does have some numbness, tingling and neuropathy in her feet that is not too bad.  She relates her toenail fungus is doing well.  She has been using the antifungal cream, econazole.  She  relates she does have diabetic shoes.  She wears those during her walking, but not all the time.  She has been wearing flip-flips.  She relates no specific injury.  No specific relieving or aggravating factors.  Previous notes reviewed.      OBJECTIVE FINDINGS:  DP and PT are 2/4 bilaterally.  She has a flat foot type bilaterally.  She has dorsally contracted digits 2-5 bilaterally.  She has laterally deviated hallux with dorsomedial first MPJ prominence bilaterally.  She has hyperkeratotic tissue buildup, plantar medial first MPJ and hallux on the left.  She has an ulcer on the plantar medial left hallux that is about 1 x 0.6 cm.  There is hyperkeratotic tissue buildup with ecchymosis.  Some residual venous congestion.  No gross erythema.  Some serosanguineous drainage.  No odor, no calor.  Some edema.  Sharp/dull is intact with 5.07 Anderson-Andrez monofilament bilaterally.  Deep tendon reflexes are intact bilaterally.  There are no gross tendon voids.  No pain on palpation bilaterally.      ASSESSMENT AND PLAN:  Ulcer, left hallux.  She is diabetic with peripheral neuropathy and foot deformities.  Diagnosis and treatment options discussed with the patient.  Sharp ulcer debridement with a #10 blade through the dermis.  No anesthesia needed and local wound care done upon consent today.  The ulcer bled upon debridement.  I am going to have her clean this daily with Wound Vashe, apply Iodosorb and a sterile dressing.  This was done today upon consent and use discussed with her.  DH2 shoe dispensed and use discussed with her.  Prescription for a Roll-A-Bout given and use discussed with her.  She was not sure if she was going to get that or not.  She will continue the Keflex and return to clinic and see me in 1 week.         Again, thank you for allowing me to participate in the care of your patient.        Sincerely,        Rufus Hutson DPM

## 2020-07-14 NOTE — NURSING NOTE
Sherry Slaughter's chief complaint for this visit includes:  Chief Complaint   Patient presents with     RECHECK     Toe injury/pressure injury     PCP: Marilou Arciniega    Referring Provider:  Marilou Arciniega MD PhD  [id number] [address] [address]    [facility] 10/02/2007   Data Unavailable     Do you need any medication refills at today's visit? no

## 2020-07-14 NOTE — PATIENT INSTRUCTIONS
Thanks for coming today.  Ortho[facility]Sports Medicine Clinic  [address] [place] [address]    To schedule future appointments in Ortho Clinic, you may call [phone].    To schedule ordered imaging by your provider:   Call Central Imaging [phone]    To schedule an injection ordered by your provider:  Call Central Imaging Injection scheduling line: [phone]  FounderSynchart available online at:  [email or web address]    Please call if any further questions or concerns ([phone]).  Clinic hours 8 am to 5 pm.    Return to clinic (call) if symptoms worsen or fail to improve.

## 2020-07-14 NOTE — PROGRESS NOTES
Past Medical History:   Diagnosis Date     Cataract      DEPRESSION     comes and goes - tried meds - unsuccessfully, certain times of the year, no psych intervention and no counselors in the past - and not interested      Diverticulosis of colon (without mention of hemorrhage) 4/04    colonoscopy     ECHO-mildLVH,tr MR,mild thick lflets w inc LA,trTR   12/03     Essential hypertension, benign 1990s    late 1990s - started medications at that time - not to difficult to control meds      Fam hx-cardiovas dis NEC     father - CAD, and lipids/HTN - multiple members of the family      Family history of diabetes mellitus     sister and grandmother with DM      Family history of malignant neoplasm of breast     mother - young age - 45, and maternal cousin and aunt as well - no BRCA testing done      Family history of stroke (cerebrovascular)     grandmother in early life in her 40s      FAMILY HX COLON CANCER     Pat uncles x 2     Heart disease     murmur/     Heart murmur      HYPERLIPIDEMIA 2000    fairly recent - in the last 5 years - high for DM levels  -cholesterol recent      Irritable bowel syndrome     goes between the 2 - nerve related - more stressed more problems      MICROALBUMINURIA     unsure how long - been on the lisinopril - for a few years at well      Nonspecific abnormal results of liver function study 2/3/2003    SGOT - has been high in the past - since the hepatitis b - borderline elevation - not really changing any      OBESITY      Obstructive sleep apnea      GENESIS (obstructive sleep apnea) 10/15/2006    psg 5/15 AHI 53 aPAP 8-15     OSTEOARTHRITIS L KNEE 2003    total knee on the left - and pain is gone since the knee replacement      PERS HX HEPATITIS B- RESOLVED] 1977    acute virus only - no chronic disease      PERS HX HEPATITIS B- RESOLVED]      Type II or unspecified type diabetes mellitus without mention of complication, not stated as uncontrolled 1991    oral meds frist, then insulin  eventually later, difficult to control most of the time      Unspecified hypothyroidism 10/11/2006    TSH 3.36 - mild subclinical hypothyroidism - deciding on following or starting low dose meds - with dr Oreilly - following      Patient Active Problem List   Diagnosis     Morbid obesity (H)     Diverticulosis of large intestine     Nonspecific abnormal results of cardiovascular function study     FAMILY HX COLON CANCER     Nonallopathic lesion of thoracic region     Hypothyroidism     GENESIS (obstructive sleep apnea)     Irritable bowel syndrome     Family history of malignant neoplasm of breast     History of major depression     OSTEOARTHRITIS L KNEE  s/p knee replacement on the left      Hypertension goal BP (blood pressure) < 140/90     Family history of stroke (cerebrovascular)     Family history of other cardiovascular diseases     Family history of diabetes mellitus     ABSENCE OF MENSTRUATION - perimenopausal      JOINT PAIN-ANKLE - right ankle      Mitral valve insufficiency     Hyperlipidemia LDL goal <100     Aortic sclerosis     Tubular adenoma of colon     History of viral hepatitis, type B     Chronic low back pain     Long-term insulin use (H)     Uncontrolled diabetes mellitus (H)     S/P total knee replacement using cement, right     Aftercare following right knee joint replacement surgery     Lumbago     Type 2 diabetes mellitus with hyperglycemia (H)     Posterior vitreous detachment of right eye     Pseudophakia of both eyes     Past Surgical History:   Procedure Laterality Date     ARTHROPLASTY KNEE Right 9/23/2015    Procedure: ARTHROPLASTY KNEE;  Surgeon: Rufus Brown MD;  Location: [facility] OR     C NONSPECIFIC PROCEDURE  6/06    right triple arthrodecesis      C NONSPECIFIC PROCEDURE  7/06     right bunion surgery      C TOTAL KNEE ARTHROPLASTY  3/03    L knee     CATARACT IOL, RT/LT Left      COLONOSCOPY  4/04    diverticulosis, rec repeat 10 yrs(consider fam hx?)     COLONOSCOPY WITH CO2  INSUFFLATION N/A 6/19/2019    Procedure: COLONOSCOPY, WITH CO2 INSUFFLATION;  Surgeon: Zeb Duarte MD;  Location: [facility] OR     ORTHOPEDIC SURGERY      right ankle     PHACOEMULSIFICATION CLEAR CORNEA WITH STANDARD INTRAOCULAR LENS IMPLANT Left 3/15/2018    Procedure: PHACOEMULSIFICATION CLEAR CORNEA WITH STANDARD INTRAOCULAR LENS IMPLANT;  LEFT EYE PHACOEMULSIFICATION CLEAR CORNEA WITH STANDARD INTRAOCULAR LENS IMPLANT;  Surgeon: Bandar Linares MD;  Location:  [facility]     PHACOEMULSIFICATION CLEAR CORNEA WITH STANDARD INTRAOCULAR LENS IMPLANT Right 4/5/2018    Procedure: PHACOEMULSIFICATION CLEAR CORNEA WITH STANDARD INTRAOCULAR LENS IMPLANT;  RIGHT PHACOEMULSIFICATION CLEAR CORNEA WITH STANDARD INTRAOCULAR LENS IMPLANT ;  Surgeon: Bandar Linares MD;  Location:  [facility]     STRESS ECHO (METRO)  12/03    no ischemia, limited by fatigue     SURGICAL HISTORY OF -       EXP LAP- R OVARY CYSTS     SURGICAL HISTORY OF -   1981,1984,1985    CHILDBIRTH     Social History     Socioeconomic History     Marital status:      Spouse name: Not on file     Number of children: 3     Years of education: Not on file     Highest education level: Not on file   Occupational History     Occupation: RN     Employer: [employer]   Social Needs     Financial resource strain: Not on file     Food insecurity     Worry: Not on file     Inability: Not on file     Transportation needs     Medical: Not on file     Non-medical: Not on file   Tobacco Use     Smoking status: Never Smoker     Smokeless tobacco: Never Used     Tobacco comment: tried in early 20s only    Substance and Sexual Activity     Alcohol use: Yes     Comment: rare     Drug use: No     Sexual activity: Never     Comment:  - since for 20 years, no signficant other  > 5 years sexual activity    Lifestyle     Physical activity     Days per week: Not on file     Minutes per session: Not on file     Stress: Not on file   Relationships      Social connections     Talks on phone: Not on file     Gets together: Not on file     Attends [employer] service: Not on file     Active member of club or organization: Not on file     Attends meetings of clubs or organizations: Not on file     Relationship status: Not on file     Intimate partner violence     Fear of current or ex partner: Not on file     Emotionally abused: Not on file     Physically abused: Not on file     Forced sexual activity: Not on file   Other Topics Concern      Service No     Blood Transfusions No     Caffeine Concern No     Occupational Exposure Yes     Comment: Normal nursing exposures     Hobby Hazards No     Sleep Concern Yes     Stress Concern No     Comment: Stress level at HM=5, stress level at WK=6     Weight Concern Yes     Special Diet Yes     Comment: Diabetic diet (watching carbs)     Back Care No     Comment: Occassional back spasms     Exercise Yes     Comment: Pt joined a health club goes approx 3-4 times a week,half to one mile daily     Bike Helmet No     Seat Belt Yes     Comment: Sometimes     Self-Exams Yes     Parent/sibling w/ CABG, MI or angioplasty before 65F 55M? 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      Family History   Problem Relation Age of Onset     Diabetes Maternal Grandmother         DM TYPE 2     Cerebrovascular Disease Maternal Grandmother      Hypertension Sister      Lipids Sister      Heart Disease Sister         Chronic AFib     Hypertension Sister      Lipids Sister      Gynecology Sister      Diabetes Sister      Asthma Sister      Blood Disease Paternal Grandmother         T CELL LEUKEMIA     Hypertension Brother      Lipids Brother      Congenital Anomalies Brother      Cardiovascular Brother      Heart Disease Brother         CABG/AFIB     Hypertension Brother      Lipids Brother      Obesity Brother      Hypertension Brother      Respiratory Brother          Sleep apnea     Heart Disease Brother         AFib     Alzheimer Disease Mother      Asthma Mother      Hypertension Mother      Breast Cancer Mother 40        has mastectomy and lived to 84     Allergies Mother         Sulfa,     Arthritis Mother      Cardiovascular Mother      Depression Mother      Respiratory Mother      Lipids Mother      Cancer Mother         breast     Thyroid Disease Mother      Cerebrovascular Disease Mother      Dementia Mother         Alzheimers     Cancer - colorectal Other      Cancer Father         lung     Aneurysm Father         brother, AAA     Other Cancer Father         lung ca     Asthma Sister      Cancer - colorectal Paternal Uncle         late 50s to early 60s - great uncles      Breast Cancer Other         Maternal cousin     Arrhythmia Sister         2 brothers 1 sister Afib     Breast Cancer Maternal Aunt 62     Other Cancer Maternal Aunt 35        Ovarian cancer.  Survived despite late recurrence and is now 92.     Glaucoma No family hx of      Macular Degeneration No family hx of      Lab Results   Component Value Date    A1C 7.3 12/10/2019    A1C 7.5 08/21/2019    A1C 7.8 05/21/2019    A1C 6.8 11/14/2018    A1C 6.9 03/14/2018         SUBJECTIVE FINDINGS:  A 65-year-old female returns to clinic from Marilou Arciniega MD, for ulcer, left hallux.  She relates about 3 weeks ago, it broke open.  She is not sure if it was from self-trimming it with a grinding wheel or what, but it started cracking.  She relates she is diabetic.  She relates she does have some numbness, tingling and neuropathy in her feet that is not too bad.  She relates her toenail fungus is doing well.  She has been using the antifungal cream, econazole.  She relates she does have diabetic shoes.  She wears those during her walking, but not all the time.  She has been wearing flip-flips.  She relates no specific injury.  No specific relieving or aggravating factors.  Previous notes reviewed.      OBJECTIVE  FINDINGS:  DP and PT are 2/4 bilaterally.  She has a flat foot type bilaterally.  She has dorsally contracted digits 2-5 bilaterally.  She has laterally deviated hallux with dorsomedial first MPJ prominence bilaterally.  She has hyperkeratotic tissue buildup, plantar medial first MPJ and hallux on the left.  She has an ulcer on the plantar medial left hallux that is about 1 x 0.6 cm.  There is hyperkeratotic tissue buildup with ecchymosis.  Some residual venous congestion.  No gross erythema.  Some serosanguineous drainage.  No odor, no calor.  Some edema.  Sharp/dull is intact with 5.07 [place]-Andrez monofilament bilaterally.  Deep tendon reflexes are intact bilaterally.  There are no gross tendon voids.  No pain on palpation bilaterally.      ASSESSMENT AND PLAN:  Ulcer, left hallux.  She is diabetic with peripheral neuropathy and foot deformities.  Diagnosis and treatment options discussed with the patient.  Sharp ulcer debridement with a #10 blade through the dermis.  No anesthesia needed and local wound care done upon consent today.  The ulcer bled upon debridement.  I am going to have her clean this daily with Wound Vashe, apply Iodosorb and a sterile dressing.  This was done today upon consent and use discussed with her.  DH2 shoe dispensed and use discussed with her.  Prescription for a Roll-A-Bout given and use discussed with her.  She was not sure if she was going to get that or not.  She will continue the Keflex and return to clinic and see me in 1 week.

## 2020-07-22 ENCOUNTER — TELEPHONE (OUTPATIENT)
Dept: ENDOCRINOLOGY | Facility: CLINIC | Age: 65
End: 2020-07-22

## 2020-07-22 ENCOUNTER — OFFICE VISIT (OUTPATIENT)
Dept: ENDOCRINOLOGY | Facility: CLINIC | Age: 65
End: 2020-07-22
Payer: COMMERCIAL

## 2020-07-22 ENCOUNTER — OFFICE VISIT (OUTPATIENT)
Dept: PODIATRY | Facility: CLINIC | Age: 65
End: 2020-07-22
Payer: COMMERCIAL

## 2020-07-22 VITALS
SYSTOLIC BLOOD PRESSURE: 123 MMHG | HEART RATE: 81 BPM | OXYGEN SATURATION: 96 % | BODY MASS INDEX: 44.6 KG/M2 | WEIGHT: 291.2 LBS | DIASTOLIC BLOOD PRESSURE: 73 MMHG

## 2020-07-22 DIAGNOSIS — Z79.4 TYPE 2 DIABETES MELLITUS WITH HYPERGLYCEMIA, WITH LONG-TERM CURRENT USE OF INSULIN (H): ICD-10-CM

## 2020-07-22 DIAGNOSIS — E11.49 TYPE II OR UNSPECIFIED TYPE DIABETES MELLITUS WITH NEUROLOGICAL MANIFESTATIONS, NOT STATED AS UNCONTROLLED(250.60) (H): Primary | ICD-10-CM

## 2020-07-22 DIAGNOSIS — E03.9 HYPOTHYROIDISM, UNSPECIFIED TYPE: ICD-10-CM

## 2020-07-22 DIAGNOSIS — Z79.4 TYPE 2 DIABETES MELLITUS TREATED WITH INSULIN (H): Primary | ICD-10-CM

## 2020-07-22 DIAGNOSIS — E11.9 TYPE 2 DIABETES MELLITUS TREATED WITH INSULIN (H): Primary | ICD-10-CM

## 2020-07-22 DIAGNOSIS — L97.522 SKIN ULCER OF LEFT FOOT WITH FAT LAYER EXPOSED (H): ICD-10-CM

## 2020-07-22 DIAGNOSIS — E66.01 MORBID OBESITY (H): ICD-10-CM

## 2020-07-22 DIAGNOSIS — E11.65 TYPE 2 DIABETES MELLITUS WITH HYPERGLYCEMIA, WITH LONG-TERM CURRENT USE OF INSULIN (H): ICD-10-CM

## 2020-07-22 DIAGNOSIS — E78.5 HYPERLIPIDEMIA LDL GOAL <100: ICD-10-CM

## 2020-07-22 LAB — HBA1C MFR BLD: 7.5 % (ref 0–5.6)

## 2020-07-22 PROCEDURE — 95251 CONT GLUC MNTR ANALYSIS I&R: CPT | Performed by: INTERNAL MEDICINE

## 2020-07-22 PROCEDURE — 99213 OFFICE O/P EST LOW 20 MIN: CPT | Performed by: PODIATRIST

## 2020-07-22 PROCEDURE — 83036 HEMOGLOBIN GLYCOSYLATED A1C: CPT | Performed by: INTERNAL MEDICINE

## 2020-07-22 PROCEDURE — 99214 OFFICE O/P EST MOD 30 MIN: CPT | Performed by: INTERNAL MEDICINE

## 2020-07-22 PROCEDURE — 36415 COLL VENOUS BLD VENIPUNCTURE: CPT | Performed by: INTERNAL MEDICINE

## 2020-07-22 RX ORDER — LIRAGLUTIDE 6 MG/ML
3 INJECTION, SOLUTION SUBCUTANEOUS DAILY
Qty: 18 ML | Refills: 3 | Status: SHIPPED | OUTPATIENT
Start: 2020-07-22 | End: 2020-07-22

## 2020-07-22 RX ORDER — LIRAGLUTIDE 6 MG/ML
3 INJECTION, SOLUTION SUBCUTANEOUS DAILY
Qty: 15 ML | Refills: 3 | Status: SHIPPED | OUTPATIENT
Start: 2020-07-22 | End: 2020-07-23

## 2020-07-22 NOTE — NURSING NOTE
Sherry Slaughter's goals for this visit include:   Chief Complaint   Patient presents with     Diabetes       She requests these members of her care team be copied on today's visit information: yes    PCP: Marilou Arciniega    Referring Provider:  Marilou Arciniega MD PhD  [address] [place] [address]    [facility] 10/02/2007     Do you need any medication refills at today's visit? no

## 2020-07-22 NOTE — PROGRESS NOTES
Past Medical History:   Diagnosis Date     Cataract      DEPRESSION     comes and goes - tried meds - unsuccessfully, certain times of the year, no psych intervention and no counselors in the past - and not interested      Diverticulosis of colon (without mention of hemorrhage) 4/04    colonoscopy     ECHO-mildLVH,tr MR,mild thick lflets w inc LA,trTR   12/03     Essential hypertension, benign 1990s    late 1990s - started medications at that time - not to difficult to control meds      Fam hx-cardiovas dis [facility]     father - CAD, and lipids/HTN - multiple members of the family      Family history of diabetes mellitus     sister and grandmother with DM      Family history of malignant neoplasm of breast     mother - young age - 45, and maternal cousin and aunt as well - no BRCA testing done      Family history of stroke (cerebrovascular)     grandmother in early life in her 40s      FAMILY HX COLON CANCER     Pat uncles x 2     Heart disease     murmur/     Heart murmur      HYPERLIPIDEMIA 2000    fairly recent - in the last 5 years - high for DM levels  -cholesterol recent      Irritable bowel syndrome     goes between the 2 - nerve related - more stressed more problems      MICROALBUMINURIA     unsure how long - been on the lisinopril - for a few years at well      Nonspecific abnormal results of liver function study 2/3/2003    SGOT - has been high in the past - since the hepatitis b - borderline elevation - not really changing any      OBESITY      Obstructive sleep apnea      GENESIS (obstructive sleep apnea) 10/15/2006    psg 5/15 AHI 53 aPAP 8-15     OSTEOARTHRITIS L KNEE 2003    total knee on the left - and pain is gone since the knee replacement      PERS HX HEPATITIS B- RESOLVED] 1977    acute virus only - no chronic disease      PERS HX HEPATITIS B- RESOLVED]      Type II or unspecified type diabetes mellitus without mention of complication, not stated as uncontrolled 1991    oral meds frist, then insulin  eventually later, difficult to control most of the time      Unspecified hypothyroidism 10/11/2006    TSH 3.36 - mild subclinical hypothyroidism - deciding on following or starting low dose meds - with dr Oreilly - following      Patient Active Problem List   Diagnosis     Morbid obesity (H)     Diverticulosis of large intestine     Nonspecific abnormal results of cardiovascular function study     FAMILY HX COLON CANCER     Nonallopathic lesion of thoracic region     Hypothyroidism     GENESIS (obstructive sleep apnea)     Irritable bowel syndrome     Family history of malignant neoplasm of breast     History of major depression     OSTEOARTHRITIS L KNEE  s/p knee replacement on the left      Hypertension goal BP (blood pressure) < 140/90     Family history of stroke (cerebrovascular)     Family history of other cardiovascular diseases     Family history of diabetes mellitus     ABSENCE OF MENSTRUATION - perimenopausal      JOINT PAIN-ANKLE - right ankle      Mitral valve insufficiency     Hyperlipidemia LDL goal <100     Aortic sclerosis     Tubular adenoma of colon     History of viral hepatitis, type B     Chronic low back pain     Long-term insulin use (H)     Uncontrolled diabetes mellitus (H)     S/P total knee replacement using cement, right     Aftercare following right knee joint replacement surgery     Lumbago     Type 2 diabetes mellitus with hyperglycemia (H)     Posterior vitreous detachment of right eye     Pseudophakia of both eyes     Past Surgical History:   Procedure Laterality Date     ARTHROPLASTY KNEE Right 9/23/2015    Procedure: ARTHROPLASTY KNEE;  Surgeon: Rufus Brown MD;  Location: [facility] OR     C NONSPECIFIC PROCEDURE  6/06    right triple arthrodecesis      C NONSPECIFIC PROCEDURE  7/06     right bunion surgery      C TOTAL KNEE ARTHROPLASTY  3/03    L knee     CATARACT IOL, RT/LT Left      COLONOSCOPY  4/04    diverticulosis, rec repeat 10 yrs(consider fam hx?)     COLONOSCOPY WITH CO2  INSUFFLATION N/A 6/19/2019    Procedure: COLONOSCOPY, WITH CO2 INSUFFLATION;  Surgeon: Zeb Duarte MD;  Location: [facility] OR     ORTHOPEDIC SURGERY      right ankle     PHACOEMULSIFICATION CLEAR CORNEA WITH STANDARD INTRAOCULAR LENS IMPLANT Left 3/15/2018    Procedure: PHACOEMULSIFICATION CLEAR CORNEA WITH STANDARD INTRAOCULAR LENS IMPLANT;  LEFT EYE PHACOEMULSIFICATION CLEAR CORNEA WITH STANDARD INTRAOCULAR LENS IMPLANT;  Surgeon: Bandar Linares MD;  Location:  [facility]     PHACOEMULSIFICATION CLEAR CORNEA WITH STANDARD INTRAOCULAR LENS IMPLANT Right 4/5/2018    Procedure: PHACOEMULSIFICATION CLEAR CORNEA WITH STANDARD INTRAOCULAR LENS IMPLANT;  RIGHT PHACOEMULSIFICATION CLEAR CORNEA WITH STANDARD INTRAOCULAR LENS IMPLANT ;  Surgeon: Bandar Linares MD;  Location:  [facility]     STRESS ECHO (METRO)  12/03    no ischemia, limited by fatigue     SURGICAL HISTORY OF -       EXP LAP- R OVARY CYSTS     SURGICAL HISTORY OF -   1981,1984,1985    CHILDBIRTH     Social History     Socioeconomic History     Marital status:      Spouse name: Not on file     Number of children: 3     Years of education: Not on file     Highest education level: Not on file   Occupational History     Occupation: RN     Employer: [employer]   Social Needs     Financial resource strain: Not on file     Food insecurity     Worry: Not on file     Inability: Not on file     Transportation needs     Medical: Not on file     Non-medical: Not on file   Tobacco Use     Smoking status: Never Smoker     Smokeless tobacco: Never Used     Tobacco comment: tried in early 20s only    Substance and Sexual Activity     Alcohol use: Yes     Comment: rare     Drug use: No     Sexual activity: Never     Comment:  - since for 20 years, no signficant other  > 5 years sexual activity    Lifestyle     Physical activity     Days per week: Not on file     Minutes per session: Not on file     Stress: Not on file   Relationships      Social connections     Talks on phone: Not on file     Gets together: Not on file     Attends Christian service: Not on file     Active member of club or organization: Not on file     Attends meetings of clubs or organizations: Not on file     Relationship status: Not on file     Intimate partner violence     Fear of current or ex partner: Not on file     Emotionally abused: Not on file     Physically abused: Not on file     Forced sexual activity: Not on file   Other Topics Concern      Service No     Blood Transfusions No     Caffeine Concern No     Occupational Exposure Yes     Comment: Normal nursing exposures     Hobby Hazards No     Sleep Concern Yes     Stress Concern No     Comment: Stress level at [facility]=5, stress level at [facility]=6     Weight Concern Yes     Special Diet Yes     Comment: Diabetic diet (watching carbs)     Back Care No     Comment: Occassional back spasms     Exercise Yes     Comment: Pt joined a health club goes approx 3-4 times a week,half to one mile daily     Bike Helmet No     Seat Belt Yes     Comment: Sometimes     Self-Exams Yes     Parent/sibling w/ CABG, MI or angioplasty before 65F 55M? 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      Family History   Problem Relation Age of Onset     Diabetes Maternal Grandmother         DM TYPE 2     Cerebrovascular Disease Maternal Grandmother      Hypertension Sister      Lipids Sister      Heart Disease Sister         Chronic AFib     Hypertension Sister      Lipids Sister      Gynecology Sister      Diabetes Sister      Asthma Sister      Blood Disease Paternal Grandmother         T CELL LEUKEMIA     Hypertension Brother      Lipids Brother      Congenital Anomalies Brother      Cardiovascular Brother      Heart Disease Brother         CABG/AFIB     Hypertension Brother      Lipids Brother      Obesity Brother      Hypertension Brother      Respiratory Brother          Sleep apnea     Heart Disease Brother         AFib     Alzheimer Disease Mother      Asthma Mother      Hypertension Mother      Breast Cancer Mother 40        has mastectomy and lived to 84     Allergies Mother         Sulfa,     Arthritis Mother      Cardiovascular Mother      Depression Mother      Respiratory Mother      Lipids Mother      Cancer Mother         breast     Thyroid Disease Mother      Cerebrovascular Disease Mother      Dementia Mother         Alzheimers     Cancer - colorectal Other      Cancer Father         lung     Aneurysm Father         brother, AAA     Other Cancer Father         lung ca     Asthma Sister      Cancer - colorectal Paternal Uncle         late 50s to early 60s - great uncles      Breast Cancer Other         Maternal cousin     Arrhythmia Sister         2 brothers 1 sister Afib     Breast Cancer Maternal Aunt 62     Other Cancer Maternal Aunt 35        Ovarian cancer.  Survived despite late recurrence and is now 92.     Glaucoma No family hx of      Macular Degeneration No family hx of      Lab Results   Component Value Date    A1C 7.3 12/10/2019    A1C 7.5 08/21/2019    A1C 7.8 05/21/2019    A1C 6.8 11/14/2018    A1C 6.9 03/14/2018     SUBJECTIVE FINDINGS:  65-year-old female returns to clinic for ulcer, left hallux.  She relates it is doing well.  It seems to be improving.  No new problems other than she did not get the Roll-A-Bout.  She relates she uses the DH2 shoe.  It kind of irritates her back a little bit but it is okay.  Using Iodosorb and the dressing with no problem.      OBJECTIVE FINDINGS:  DP and PT are 2/4 left.  She has a left plantar medial hallux ulcer that is basil.  There is minimal drainage.  Minimal hyperkeratotic tissue buildup.  No erythema, no odor, no calor.  No gross edema.      ASSESSMENT/PLAN:  Ulcer, left hallux.  She is diabetic with peripheral neuropathy.  This is improving.  Local wound care done upon consent today.  I am going to  continue the Iodosorb and wound cleanser and light dressing.  Continue the DH2 shoe as long as she can tolerate that.  Finish the Keflex.  Relates no problems with that.  She will return to clinic and see me in 2-3 weeks.

## 2020-07-22 NOTE — PATIENT INSTRUCTIONS
Thanks for coming today.  [facility]  [address] [place] [address]    To schedule future appointments in Ortho Clinic, you may call [phone].    To schedule ordered imaging by your provider:   Call [facility] [phone]    To schedule an injection ordered by your provider:  Call [facility] Injection scheduling line: [phone]  [employer] available online at:  [email or web address]    Please call if any further questions or concerns ([phone]).  Clinic hours 8 am to 5 pm.    Return to clinic (call) if symptoms worsen or fail to improve.

## 2020-07-22 NOTE — LETTER
7/22/2020         RE: Sherry Slaughter  [address]  [place] [address]        Dear Colleague,    Thank you for referring your patient, Sherry Slaughter, to the [facility]. Please see a copy of my visit note below.        Sherry is a 65 year old female seen in follow-up for type 2 diabetes.    The patient was diagnosed with type 2 diabetes 25 years after she was diagnosed with gestational diabetes, during both her pregnancies. Her weight before pregnancy was 190-200. Her highest weight was 300s. She was initially started on oral medications and then insulin was added around 2012.     Her A1C has been in 9% range since 2012. She was on Byetta but she did not feel it helped. She was also on Actos but stopped due to 40 lbs weight gain and increased SOB.  For approximately 3 years, she was treated with Victoza.  It was resumed in March 2019, now at 1.8 mg daily.  She continues to take metformin, 2 g extended release daily.  In the beginning of July 2019, she was transitioned to U500 insulin. Jardiance was tried in 2019 and was discontinued due to genital fungal infections.     A1c today was 7.5%, stable since her last visit here.     The patient has been trying to have the breakfast and dinner as the main meals of the day.  If she does snack around lunchtime or at bedtime, she tries to choose low carbohydrate containing snacks like soups or vegetable dips.  She administers 120 units U500 insulin in the morning (around 7-9 AM) and 100 units before dinner (anywhere between 7 and 9 PM).    I reviewed the Tanya CGM.  Average glucose is 163, with a glucose variability of 37.1%, corresponding to a GMI of 7.2%.  53 percent of the glucose numbers are within target of 70-1 80, 4% are in the mild hypoglycemic range, 2% are below 54 and 8% are above 250.  On the sensor, the biggest spike of the day is achieved around 9 PM, following dinner.  Approximately once a week, the patient does  experience hypoglycemic episodes on the sensor.  Most of them occur during the night.  The patient does not remember experiencing symptoms of hypoglycemia during the night.  She does not confirm hypoglycemia by using the glucometer.    She was recently diagnosed with a left toe infection and she is now under treatment with Keflex.  Follows up with Dr. Hutson.    DM complications  Retinopathy: last eye exam: 6/2019; no DR, S/P B/L surgery for cataract   Nephropathy: negative urine microalb - on lisinopril 60 mg; GFR in the 70s  Neuropathy: occasional burning sensation in her feet for the last couple of years   She is symptomatic for hypoglycemia (able to recognize blood sugar numbers in the 90s - she gets sweaty, hot and lightheaded).  She corrects the hypoglycemic symptoms by having something to eat.  Aspirin - taking 325 mg  LDL 43 (12/2020) on Crestor 10 mg  Using the CPAP   Exercise limited due to bilateral knee replacement and low back pain.    2.  Hypertension  Her blood pressure is managed with 60 mg lisinopril daily, 25 mg metoprolol daily and 25 mg hydrochlorothiazide daily.    Past Medical History  Past Medical History:   Diagnosis Date     Cataract      DEPRESSION     comes and goes - tried meds - unsuccessfully, certain times of the year, no psych intervention and no counselors in the past - and not interested      Diverticulosis of colon (without mention of hemorrhage) 4/04    colonoscopy     ECHO-mildLVH,tr MR,mild thick lflets w inc LA,trTR   12/03     Essential hypertension, benign 1990s    late 1990s - started medications at that time - not to difficult to control meds      Fam hx-cardiovas dis NEC     father - CAD, and lipids/HTN - multiple members of the family      Family history of diabetes mellitus     sister and grandmother with DM      Family history of malignant neoplasm of breast     mother - young age - 45, and maternal cousin and aunt as well - no BRCA testing done      Family history of  stroke (cerebrovascular)     grandmother in early life in her 40s      FAMILY HX COLON CANCER     Pat uncles x 2     Heart disease     murmur/     Heart murmur      HYPERLIPIDEMIA 2000    fairly recent - in the last 5 years - high for DM levels  -cholesterol recent      Irritable bowel syndrome     goes between the 2 - nerve related - more stressed more problems      MICROALBUMINURIA     unsure how long - been on the lisinopril - for a few years at well      Nonspecific abnormal results of liver function study 2/3/2003    SGOT - has been high in the past - since the hepatitis b - borderline elevation - not really changing any      OBESITY      Obstructive sleep apnea      GENESIS (obstructive sleep apnea) 10/15/2006    psg 5/15 AHI 53 aPAP 8-15     OSTEOARTHRITIS L KNEE 2003    total knee on the left - and pain is gone since the knee replacement      PERS HX HEPATITIS B- RESOLVED] 1977    acute virus only - no chronic disease      PERS HX HEPATITIS B- RESOLVED]      Type II or unspecified type diabetes mellitus without mention of complication, not stated as uncontrolled 1991    oral meds frist, then insulin eventually later, difficult to control most of the time      Unspecified hypothyroidism 10/11/2006    TSH 3.36 - mild subclinical hypothyroidism - deciding on following or starting low dose meds - with dr Oreilly - following    Hepatis B     Allergies  Allergies   Allergen Reactions     Atorvastatin Calcium      Gas     Pravachol [Pravastatin Sodium]      Pravachol - dry cough      Simvastatin      Myalgia     Medications    Current Outpatient Medications:      ASPIRIN EC PO, Take 325 mg by mouth daily, Disp: , Rfl:      blood glucose monitoring (NO BRAND SPECIFIED) test strip, Use to test blood sugar 4 times daily or as directed., Disp: 100 each, Rfl: 11     cephALEXin (KEFLEX) 500 MG capsule, Take 1 capsule (500 mg) by mouth 4 times daily for 10 days, Disp: 40 capsule, Rfl: 0     Continuous Blood Gluc   (FREESTYLE MIKE 14 DAY READER) KATIA, 1 each daily, Disp: 1 Device, Rfl: 0     Continuous Blood Gluc Sensor (FREESTYLE MIKE 14 DAY SENSOR) [facility] [place] 1 SENSOR EVERY 14 DAYS, Disp: 6 each, Rfl: 1     econazole nitrate 1 % external cream, Apply topically daily To feet and toenails., Disp: 85 g, Rfl: 6     Garlic 1000 MG CAPS, Take 1 capsule by mouth daily Takes during summer months.  Done taking until next summer., Disp: , Rfl:      hydrochlorothiazide (HYDRODIURIL) 25 MG tablet, Take 1 tablet (25 mg) by mouth daily, Disp: 90 tablet, Rfl: 2     Ibuprofen (ADVIL PO), Take 600 mg by mouth 4 times daily, Disp: , Rfl:      insulin pen needle (GNP CLICKFINE PEN NEEDLES) 31G X 8 MM miscellaneous, Use six times daily or as directed, Disp: 200 each, Rfl: 5     insulin reg HIGH CONC (HUMULIN R U-500 KWIKPEN) 500 UNIT/ML PEN soln, Administer  120 U in the morning and 100 U at bedtime., Disp: 40 mL, Rfl: 3     levothyroxine (SYNTHROID/LEVOTHROID) 75 MCG tablet, TAKE ONE TABLET BY MOUTH ONCE DAILY, Disp: 90 tablet, Rfl: 1     liraglutide (VICTOZA PEN) 18 MG/3ML solution, Inject 1.8 mg Subcutaneous daily, Disp: 27 mL, Rfl: 3     lisinopril (ZESTRIL) 40 MG tablet, TAKE 1 AND 1/2 TABLETS BY MOUTH ONCE DAILY, Disp: 135 tablet, Rfl: 1     metFORMIN (GLUCOPHAGE-XR) 500 MG 24 hr tablet, Take 4 tablets (2,000 mg) by mouth At Bedtime, Disp: 360 tablet, Rfl: 1     metoprolol succinate ER (TOPROL XL) 25 MG 24 hr tablet, Take 1 tablet (25 mg) by mouth daily, Disp: 90 tablet, Rfl: 2     Multiple Vitamins-Minerals (ADVANCED DIABETIC MULTIVITAMIN) TABS, Take 1 tablet by mouth daily, Disp: , Rfl:      ORDER FOR DME, SET TO LOCAL PRINT,, Respironics REMSTAR 60 Series Auto CPAP 8-15cm H2O, Airfit P10 nasal pillow mask w/medium pillows, Disp: , Rfl:      order for DME, Equipment being ordered: [id number] $70   Shoe LG, Disp: 1 Device, Rfl: 0     order for DME, Roll-A-Bout Walker. Patient can use for left foot., Disp: 1 Units, Rfl: 0      order for DME, Equipment being ordered: wrist brace with thumb spica, L, Disp: 1 Device, Rfl: 0     order for DME, Equipment being ordered: wrist brace with thumb spica, R, Disp: 1 Device, Rfl: 0     rosuvastatin (CRESTOR) 10 MG tablet, Take 1 tablet (10 mg) by mouth daily, Disp: 90 tablet, Rfl: 3     amoxicillin (AMOXIL) 500 MG capsule, Before dental procedures, Disp: , Rfl:     Current Facility-Administered Medications:      triamcinolone (KENALOG-40) injection 20 mg, 20 mg, , , Shaker, Ludivina Tripp MD, 20 mg at 09/12/19 1057    Family History  Maternal grandmother had type 2 diabetes  Daughter has GDM  Father had CAD s/p stent, lung cancer and abdominal aortic anuerysm  Older brother has CABG, and abdominal aortic anuerysm  Young brother has Afib, SVT  Another younger brother has multiple myeloma  Sister has SVT    Social History  No smoking, rarely drink EtOH  No illicit drug  Worked as a nurse in the ICU. Retired in 2019.   Lives by herself, has 3 daughters    ROS  Constitutional: weight fairly stable. Some fatigue.  Eyes: no vision changes  Cardiovascular: no chest pain, no palpitations    Respiratory: no SOB or cough   GI: alternating episodes of constipation and diarrhea; no abdominal pain; she associates the diarrhea with increased stress   : no change in urine, no dysuria; urinates 1-2 times a night   Musculoskeletal: no legs swelling, joint pain - mainly the knees and the low back pain  Integumentary: no concerning lesions  Neuro: no loss of strength or sensation, no numbness or tingling, no tremor, no dizziness, no headache   Endo: no temperature intolerance   Heme/Lymph: no concerning bumps, no bleeding problems   Psych: no depression or anxiety, no sleep problems.    Physical Exam  BP Readings from Last 6 Encounters:   07/22/20 123/73   07/10/20 134/65   01/09/20 135/77   12/11/19 120/75   09/25/19 137/80   09/12/19 119/64     Wt Readings from Last 10 Encounters:   07/22/20 132.1 kg (291 lb 3.2  oz)   07/10/20 132 kg (291 lb)   01/09/20 129.3 kg (285 lb)   12/11/19 129.1 kg (284 lb 9.6 oz)   09/25/19 125.9 kg (277 lb 9.6 oz)   09/12/19 131.3 kg (289 lb 8 oz)   08/21/19 127.5 kg (281 lb)   05/21/19 132.5 kg (292 lb)   12/04/18 131.6 kg (290 lb 3.2 oz)   11/14/18 129.3 kg (285 lb)     /73 (BP Location: Left arm, Patient Position: Sitting, Cuff Size: Adult Large)   Pulse 81   Wt 132.1 kg (291 lb 3.2 oz)   LMP 10/02/2007   SpO2 96%   BMI 44.60 kg/m    Body mass index is 44.6 kg/m .     Constitutional: general obesity, no distress, comfortable, pleasant   Eyes: anicteric, normal extra-ocular movements, no lid lag or retraction  Neck: no thyromegaly; no palpable nodules  CVS: Regular rhythm, systolic murmur with radiation to the carotid arteries   Lungs: CTA B/L  Musculoskeletal: no edema, DP 2+; left lower extremity - mild atrophy compared with the right ; wears a boot on the left foot   GI: Abdomen soft, nontender, nondistended, positive bowel sounds  NS: no tremor, normal gait, knee reflexes absent  Skin: benign abd striae   Psychological: appropriate mood    RESULTS  I reviewed prior lab results documented in epic.  Lab Results   Component Value Date    A1C 7.5 (A) 07/22/2020    A1C 7.3 (H) 12/10/2019    A1C 7.5 (A) 08/21/2019    A1C 7.8 (A) 05/21/2019    A1C 6.8 (H) 11/14/2018       Hemoglobin   Date Value Ref Range Status   09/25/2019 14.8 11.7 - 15.7 g/dL Final     Hematocrit   Date Value Ref Range Status   12/10/2019 43.6 35.0 - 47.0 % Final     Cholesterol   Date Value Ref Range Status   12/10/2019 135 <200 mg/dL Final     Cholesterol/HDL Ratio   Date Value Ref Range Status   02/19/2015 3.2 0.0 - 5.0 Final     HDL Cholesterol   Date Value Ref Range Status   12/10/2019 43 (L) >49 mg/dL Final     LDL Cholesterol Calculated   Date Value Ref Range Status   12/10/2019 43 <100 mg/dL Final     Comment:     Desirable:       <100 mg/dl     VLDL-Cholesterol   Date Value Ref Range Status   02/19/2015 34  (H) 0 - 30 mg/dL Final     Triglycerides   Date Value Ref Range Status   12/10/2019 243 (H) <150 mg/dL Final     Comment:     Borderline high:  150-199 mg/dl  High:             200-499 mg/dl  Very high:       >499 mg/dl  Fasting specimen       Albumin Urine mg/L   Date Value Ref Range Status   12/10/2019 23 mg/L Final     TSH   Date Value Ref Range Status   12/10/2019 1.66 0.40 - 4.00 mU/L Final         Last Basic Metabolic Panel:    Sodium   Date Value Ref Range Status   12/10/2019 136 133 - 144 mmol/L Final     Potassium   Date Value Ref Range Status   12/10/2019 4.2 3.4 - 5.3 mmol/L Final     Chloride   Date Value Ref Range Status   12/10/2019 104 94 - 109 mmol/L Final     Calcium   Date Value Ref Range Status   12/10/2019 9.3 8.5 - 10.1 mg/dL Final     Carbon Dioxide   Date Value Ref Range Status   12/10/2019 26 20 - 32 mmol/L Final     Urea Nitrogen   Date Value Ref Range Status   12/10/2019 21 7 - 30 mg/dL Final     Creatinine   Date Value Ref Range Status   12/10/2019 0.80 0.52 - 1.04 mg/dL Final     GFR Estimate   Date Value Ref Range Status   12/10/2019 76 >60 mL/min/[1.73_m2] Final     Comment:     Non  GFR Calc  Starting 12/18/2018, serum creatinine based estimated GFR (eGFR) will be   calculated using the Chronic Kidney Disease Epidemiology Collaboration   (CKD-EPI) equation.       Glucose   Date Value Ref Range Status   12/10/2019 258 (H) 70 - 99 mg/dL Final     Comment:     Fasting specimen       AST   Date Value Ref Range Status   12/10/2019 20 0 - 45 U/L Final     ALT   Date Value Ref Range Status   12/10/2019 29 0 - 50 U/L Final     Albumin   Date Value Ref Range Status   12/10/2019 3.8 3.4 - 5.0 g/dL Final         ASSESSMENT:      1. Type 2 diabetes  Sherry is a 65 year old pleasant female, with a history of type 2 diabetes in the setting of obesity, sleep apnea.  Her diabetes is known to be complicated by neuropathy.  Overall, she achieves a fairly good blood glucose  control.  Recommendations:  Increase the dose of evening insulin to 110 units and administer it 1 to 1-hour and 30 minutes prior to dinner.  Change of Victoza with Saxenda, at 3 mg daily  Try to walk on a daily basis, at least 20 minutes  Follow-up lab work in 6 months    2. Hypertension, controlled. On lisinopril 60 mg, HCTZ 25 mg and metoprolol 25 mg daily.      3.  Hypothyroidism.  Clinically, the patient is euthyroid.  Most recent TSH from December 2019 was normal.  I recommended to continue to take the same dose of levothyroxine.    Orders Placed This Encounter   Procedures     Continuous Glucose Monitoring >=72 hours PHYS INTERP     Albumin Random Urine Quantitative with Creat Ratio     CBC with platelets differential     Hematocrit     Lipid panel reflex to direct LDL Fasting     TSH with free T4 reflex           Again, thank you for allowing me to participate in the care of your patient.        Sincerely,        Rika Delgado MD

## 2020-07-22 NOTE — TELEPHONE ENCOUNTER
Will send to prior authorization team for review.       Kylah Katz RN  Endocrine Care Coordinator  [facility]

## 2020-07-22 NOTE — PROGRESS NOTES
Sherry is a 65 year old female seen in follow-up for type 2 diabetes.    The patient was diagnosed with type 2 diabetes 25 years after she was diagnosed with gestational diabetes, during both her pregnancies. Her weight before pregnancy was 190-200. Her highest weight was 300s. She was initially started on oral medications and then insulin was added around 2012.     Her A1C has been in 9% range since 2012. She was on Byetta but she did not feel it helped. She was also on Actos but stopped due to 40 lbs weight gain and increased SOB.  For approximately 3 years, she was treated with Victoza.  It was resumed in March 2019, now at 1.8 mg daily.  She continues to take metformin, 2 g extended release daily.  In the beginning of July 2019, she was transitioned to U500 insulin. Jardiance was tried in 2019 and was discontinued due to genital fungal infections.     A1c today was 7.5%, stable since her last visit here.     The patient has been trying to have the breakfast and dinner as the main meals of the day.  If she does snack around lunchtime or at bedtime, she tries to choose low carbohydrate containing snacks like soups or vegetable dips.  She administers 120 units U500 insulin in the morning (around 7-9 AM) and 100 units before dinner (anywhere between 7 and 9 PM).    I reviewed the Tanya CGM.  Average glucose is 163, with a glucose variability of 37.1%, corresponding to a GMI of 7.2%.  53 percent of the glucose numbers are within target of 70-1 80, 4% are in the mild hypoglycemic range, 2% are below 54 and 8% are above 250.  On the sensor, the biggest spike of the day is achieved around 9 PM, following dinner.  Approximately once a week, the patient does experience hypoglycemic episodes on the sensor.  Most of them occur during the night.  The patient does not remember experiencing symptoms of hypoglycemia during the night.  She does not confirm hypoglycemia by using the glucometer.    She was recently  diagnosed with a left toe infection and she is now under treatment with Keflex.  Follows up with Dr. Hutson.    DM complications  Retinopathy: last eye exam: 6/2019; no DR, S/P B/L surgery for cataract   Nephropathy: negative urine microalb - on lisinopril 60 mg; GFR in the 70s  Neuropathy: occasional burning sensation in her feet for the last couple of years   She is symptomatic for hypoglycemia (able to recognize blood sugar numbers in the 90s - she gets sweaty, hot and lightheaded).  She corrects the hypoglycemic symptoms by having something to eat.  Aspirin - taking 325 mg  LDL 43 (12/2020) on Crestor 10 mg  Using the CPAP   Exercise limited due to bilateral knee replacement and low back pain.    2.  Hypertension  Her blood pressure is managed with 60 mg lisinopril daily, 25 mg metoprolol daily and 25 mg hydrochlorothiazide daily.    Past Medical History  Past Medical History:   Diagnosis Date     Cataract      DEPRESSION     comes and goes - tried meds - unsuccessfully, certain times of the year, no psych intervention and no counselors in the past - and not interested      Diverticulosis of colon (without mention of hemorrhage) 4/04    colonoscopy     ECHO-mildLVH,tr MR,mild thick lflets w inc LA,trTR   12/03     Essential hypertension, benign 1990s    late 1990s - started medications at that time - not to difficult to control meds      Fam hx-cardiovas dis NEC     father - CAD, and lipids/HTN - multiple members of the family      Family history of diabetes mellitus     sister and grandmother with DM      Family history of malignant neoplasm of breast     mother - young age - 45, and maternal cousin and aunt as well - no BRCA testing done      Family history of stroke (cerebrovascular)     grandmother in early life in her 40s      FAMILY HX COLON CANCER     Pat uncles x 2     Heart disease     murmur/     Heart murmur      HYPERLIPIDEMIA 2000    fairly recent - in the last 5 years - high for DM levels   -cholesterol recent      Irritable bowel syndrome     goes between the 2 - nerve related - more stressed more problems      MICROALBUMINURIA     unsure how long - been on the lisinopril - for a few years at well      Nonspecific abnormal results of liver function study 2/3/2003    SGOT - has been high in the past - since the hepatitis b - borderline elevation - not really changing any      OBESITY      Obstructive sleep apnea      GENESIS (obstructive sleep apnea) 10/15/2006    psg 5/15 AHI 53 aPAP 8-15     OSTEOARTHRITIS L KNEE 2003    total knee on the left - and pain is gone since the knee replacement      PERS HX HEPATITIS B- RESOLVED] 1977    acute virus only - no chronic disease      PERS HX HEPATITIS B- RESOLVED]      Type II or unspecified type diabetes mellitus without mention of complication, not stated as uncontrolled 1991    oral meds frist, then insulin eventually later, difficult to control most of the time      Unspecified hypothyroidism 10/11/2006    TSH 3.36 - mild subclinical hypothyroidism - deciding on following or starting low dose meds - with dr Oreilly - following    Hepatis B     Allergies  Allergies   Allergen Reactions     Atorvastatin Calcium      Gas     Pravachol [Pravastatin Sodium]      Pravachol - dry cough      Simvastatin      Myalgia     Medications    Current Outpatient Medications:      ASPIRIN EC PO, Take 325 mg by mouth daily, Disp: , Rfl:      blood glucose monitoring (NO BRAND SPECIFIED) test strip, Use to test blood sugar 4 times daily or as directed., Disp: 100 each, Rfl: 11     cephALEXin (KEFLEX) 500 MG capsule, Take 1 capsule (500 mg) by mouth 4 times daily for 10 days, Disp: 40 capsule, Rfl: 0     Continuous Blood Gluc  (FREESTYLE MIKE 14 DAY READER) KATIA, 1 each daily, Disp: 1 Device, Rfl: 0     Continuous Blood Gluc Sensor (FREESTYLE MIKE 14 DAY SENSOR) [facility], APPLY 1 SENSOR EVERY 14 DAYS, Disp: 6 each, Rfl: 1     econazole nitrate 1 % external cream, Apply  topically daily To feet and toenails., Disp: 85 g, Rfl: 6     Garlic 1000 MG CAPS, Take 1 capsule by mouth daily Takes during summer months.  Done taking until next summer., Disp: , Rfl:      hydrochlorothiazide (HYDRODIURIL) 25 MG tablet, Take 1 tablet (25 mg) by mouth daily, Disp: 90 tablet, Rfl: 2     Ibuprofen (ADVIL PO), Take 600 mg by mouth 4 times daily, Disp: , Rfl:      insulin pen needle (GNP CLICKFINE PEN NEEDLES) 31G X 8 MM miscellaneous, Use six times daily or as directed, Disp: 200 each, Rfl: 5     insulin reg HIGH CONC (HUMULIN R U-500 KWIKPEN) 500 UNIT/ML PEN soln, Administer  120 U in the morning and 100 U at bedtime., Disp: 40 mL, Rfl: 3     levothyroxine (SYNTHROID/LEVOTHROID) 75 MCG tablet, TAKE ONE TABLET BY MOUTH ONCE DAILY, Disp: 90 tablet, Rfl: 1     liraglutide (VICTOZA PEN) 18 MG/3ML solution, Inject 1.8 mg Subcutaneous daily, Disp: 27 mL, Rfl: 3     lisinopril (ZESTRIL) 40 MG tablet, TAKE 1 AND 1/2 TABLETS BY MOUTH ONCE DAILY, Disp: 135 tablet, Rfl: 1     metFORMIN (GLUCOPHAGE-XR) 500 MG 24 hr tablet, Take 4 tablets (2,000 mg) by mouth At Bedtime, Disp: 360 tablet, Rfl: 1     metoprolol succinate ER (TOPROL XL) 25 MG 24 hr tablet, Take 1 tablet (25 mg) by mouth daily, Disp: 90 tablet, Rfl: 2     Multiple Vitamins-Minerals (ADVANCED DIABETIC MULTIVITAMIN) TABS, Take 1 tablet by mouth daily, Disp: , Rfl:      ORDER FOR DME, SET TO LOCAL PRINT,, Respironics REMSTAR 60 Series Auto CPAP 8-15cm H2O, Airfit P10 nasal pillow mask w/medium pillows, Disp: , Rfl:      order for DME, Equipment being ordered: [id number] $70   Shoe LG, Disp: 1 Device, Rfl: 0     order for DME, Roll-A-Bout Walker. Patient can use for left foot., Disp: 1 Units, Rfl: 0     order for DME, Equipment being ordered: wrist brace with thumb spica, L, Disp: 1 Device, Rfl: 0     order for DME, Equipment being ordered: wrist brace with thumb spica, R, Disp: 1 Device, Rfl: 0     rosuvastatin (CRESTOR) 10 MG tablet, Take 1  tablet (10 mg) by mouth daily, Disp: 90 tablet, Rfl: 3     amoxicillin (AMOXIL) 500 MG capsule, Before dental procedures, Disp: , Rfl:     Current Facility-Administered Medications:      triamcinolone (KENALOG-40) injection 20 mg, 20 mg, , , Ludivina Jennings MD, 20 mg at 09/12/19 1057    Family History  Maternal grandmother had type 2 diabetes  Daughter has GDM  Father had CAD s/p stent, lung cancer and abdominal aortic anuerysm  Older brother has CABG, and abdominal aortic anuerysm  Young brother has Afib, SVT  Another younger brother has multiple myeloma  Sister has SVT    Social History  No smoking, rarely drink EtOH  No illicit drug  Worked as a nurse in the ICU. Retired in 2019.   Lives by herself, has 3 daughters    ROS  Constitutional: weight fairly stable. Some fatigue.  Eyes: no vision changes  Cardiovascular: no chest pain, no palpitations    Respiratory: no SOB or cough   GI: alternating episodes of constipation and diarrhea; no abdominal pain; she associates the diarrhea with increased stress   : no change in urine, no dysuria; urinates 1-2 times a night   Musculoskeletal: no legs swelling, joint pain - mainly the knees and the low back pain  Integumentary: no concerning lesions  Neuro: no loss of strength or sensation, no numbness or tingling, no tremor, no dizziness, no headache   Endo: no temperature intolerance   Heme/Lymph: no concerning bumps, no bleeding problems   Psych: no depression or anxiety, no sleep problems.    Physical Exam  BP Readings from Last 6 Encounters:   07/22/20 123/73   07/10/20 134/65   01/09/20 135/77   12/11/19 120/75   09/25/19 137/80   09/12/19 119/64     Wt Readings from Last 10 Encounters:   07/22/20 132.1 kg (291 lb 3.2 oz)   07/10/20 132 kg (291 lb)   01/09/20 129.3 kg (285 lb)   12/11/19 129.1 kg (284 lb 9.6 oz)   09/25/19 125.9 kg (277 lb 9.6 oz)   09/12/19 131.3 kg (289 lb 8 oz)   08/21/19 127.5 kg (281 lb)   05/21/19 132.5 kg (292 lb)   12/04/18 131.6 kg  (290 lb 3.2 oz)   11/14/18 129.3 kg (285 lb)     /73 (BP Location: Left arm, Patient Position: Sitting, Cuff Size: Adult Large)   Pulse 81   Wt 132.1 kg (291 lb 3.2 oz)   LMP 10/02/2007   SpO2 96%   BMI 44.60 kg/m    Body mass index is 44.6 kg/m .     Constitutional: general obesity, no distress, comfortable, pleasant   Eyes: anicteric, normal extra-ocular movements, no lid lag or retraction  Neck: no thyromegaly; no palpable nodules  CVS: Regular rhythm, systolic murmur with radiation to the carotid arteries   Lungs: CTA B/L  Musculoskeletal: no edema, DP 2+; left lower extremity - mild atrophy compared with the right ; wears a boot on the left foot   GI: Abdomen soft, nontender, nondistended, positive bowel sounds  NS: no tremor, normal gait, knee reflexes absent  Skin: benign abd striae   Psychological: appropriate mood    RESULTS  I reviewed prior lab results documented in epic.  Lab Results   Component Value Date    A1C 7.5 (A) 07/22/2020    A1C 7.3 (H) 12/10/2019    A1C 7.5 (A) 08/21/2019    A1C 7.8 (A) 05/21/2019    A1C 6.8 (H) 11/14/2018       Hemoglobin   Date Value Ref Range Status   09/25/2019 14.8 11.7 - 15.7 g/dL Final     Hematocrit   Date Value Ref Range Status   12/10/2019 43.6 35.0 - 47.0 % Final     Cholesterol   Date Value Ref Range Status   12/10/2019 135 <200 mg/dL Final     Cholesterol/HDL Ratio   Date Value Ref Range Status   02/19/2015 3.2 0.0 - 5.0 Final     HDL Cholesterol   Date Value Ref Range Status   12/10/2019 43 (L) >49 mg/dL Final     LDL Cholesterol Calculated   Date Value Ref Range Status   12/10/2019 43 <100 mg/dL Final     Comment:     Desirable:       <100 mg/dl     VLDL-Cholesterol   Date Value Ref Range Status   02/19/2015 34 (H) 0 - 30 mg/dL Final     Triglycerides   Date Value Ref Range Status   12/10/2019 243 (H) <150 mg/dL Final     Comment:     Borderline high:  150-199 mg/dl  High:             200-499 mg/dl  Very high:       >499 mg/dl  Fasting specimen        Albumin Urine mg/L   Date Value Ref Range Status   12/10/2019 23 mg/L Final     TSH   Date Value Ref Range Status   12/10/2019 1.66 0.40 - 4.00 mU/L Final         Last Basic Metabolic Panel:    Sodium   Date Value Ref Range Status   12/10/2019 136 133 - 144 mmol/L Final     Potassium   Date Value Ref Range Status   12/10/2019 4.2 3.4 - 5.3 mmol/L Final     Chloride   Date Value Ref Range Status   12/10/2019 104 94 - 109 mmol/L Final     Calcium   Date Value Ref Range Status   12/10/2019 9.3 8.5 - 10.1 mg/dL Final     Carbon Dioxide   Date Value Ref Range Status   12/10/2019 26 20 - 32 mmol/L Final     Urea Nitrogen   Date Value Ref Range Status   12/10/2019 21 7 - 30 mg/dL Final     Creatinine   Date Value Ref Range Status   12/10/2019 0.80 0.52 - 1.04 mg/dL Final     GFR Estimate   Date Value Ref Range Status   12/10/2019 76 >60 mL/min/[1.73_m2] Final     Comment:     Non  GFR Calc  Starting 12/18/2018, serum creatinine based estimated GFR (eGFR) will be   calculated using the Chronic Kidney Disease Epidemiology Collaboration   (CKD-EPI) equation.       Glucose   Date Value Ref Range Status   12/10/2019 258 (H) 70 - 99 mg/dL Final     Comment:     Fasting specimen       AST   Date Value Ref Range Status   12/10/2019 20 0 - 45 U/L Final     ALT   Date Value Ref Range Status   12/10/2019 29 0 - 50 U/L Final     Albumin   Date Value Ref Range Status   12/10/2019 3.8 3.4 - 5.0 g/dL Final         ASSESSMENT:      1. Type 2 diabetes  Sherry is a 65 year old pleasant female, with a history of type 2 diabetes in the setting of obesity, sleep apnea.  Her diabetes is known to be complicated by neuropathy.  Overall, she achieves a fairly good blood glucose control.  Recommendations:  Increase the dose of evening insulin to 110 units and administer it 1 to 1-hour and 30 minutes prior to dinner.  Change of Victoza with Saxenda, at 3 mg daily  Try to walk on a daily basis, at least 20 minutes  Follow-up  lab work in 6 months    2. Hypertension, controlled. On lisinopril 60 mg, HCTZ 25 mg and metoprolol 25 mg daily.      3.  Hypothyroidism.  Clinically, the patient is euthyroid.  Most recent TSH from December 2019 was normal.  I recommended to continue to take the same dose of levothyroxine.    Orders Placed This Encounter   Procedures     Continuous Glucose Monitoring >=72 hours PHYS INTERP     Albumin Random Urine Quantitative with Creat Ratio     CBC with platelets differential     Hematocrit     Lipid panel reflex to direct LDL Fasting     TSH with free T4 reflex

## 2020-07-22 NOTE — TELEPHONE ENCOUNTER
Central Prior Authorization Team   Phone: [phone]      PA Initiation    Medication: Saxenda-PA initiated  Insurance Company: [employer] - Phone [phone] Fax [phone]  Pharmacy Filling the Rx: [facility] [place] - [place], [place] - [address] [id number]  Filling Pharmacy Phone: [phone]  Filling Pharmacy Fax:    Start Date: 7/22/2020

## 2020-07-22 NOTE — NURSING NOTE
Sherry Slaughter's chief complaint for this visit includes:  Chief Complaint   Patient presents with     RECHECK     toe injury     PCP: Marilou Arciniega    Referring Provider:  Marilou Arciniega MD PhD  [id number] [place] [address]    [facility] 10/02/2007   Data Unavailable     Do you need any medication refills at today's visit? no

## 2020-07-22 NOTE — LETTER
7/22/2020         RE: Sherry Slaughter  [address]  [place] [address]        Dear Colleague,    Thank you for referring your patient, Sherry Slaughter, to the [facility]. Please see a copy of my visit note below.    Past Medical History:   Diagnosis Date     Cataract      DEPRESSION     comes and goes - tried meds - unsuccessfully, certain times of the year, no psych intervention and no counselors in the past - and not interested      Diverticulosis of colon (without mention of hemorrhage) 4/04    colonoscopy     ECHO-mildLVH,tr MR,mild thick lflets w inc LA,trTR   12/03     Essential hypertension, benign 1990s    late 1990s - started medications at that time - not to difficult to control meds      Fam hx-cardiovas dis NEC     father - CAD, and lipids/HTN - multiple members of the family      Family history of diabetes mellitus     sister and grandmother with DM      Family history of malignant neoplasm of breast     mother - young age - 45, and maternal cousin and aunt as well - no BRCA testing done      Family history of stroke (cerebrovascular)     grandmother in early life in her 40s      FAMILY HX COLON CANCER     Pat uncles x 2     Heart disease     murmur/     Heart murmur      HYPERLIPIDEMIA 2000    fairly recent - in the last 5 years - high for DM levels  -cholesterol recent      Irritable bowel syndrome     goes between the 2 - nerve related - more stressed more problems      MICROALBUMINURIA     unsure how long - been on the lisinopril - for a few years at well      Nonspecific abnormal results of liver function study 2/3/2003    SGOT - has been high in the past - since the hepatitis b - borderline elevation - not really changing any      OBESITY      Obstructive sleep apnea      GENESIS (obstructive sleep apnea) 10/15/2006    psg 5/15 AHI 53 aPAP 8-15     OSTEOARTHRITIS L KNEE 2003    total knee on the left - and pain is gone since the knee replacement      PERS HX  HEPATITIS B- RESOLVED] 1977    acute virus only - no chronic disease      PERS HX HEPATITIS B- RESOLVED]      Type II or unspecified type diabetes mellitus without mention of complication, not stated as uncontrolled 1991    oral meds frist, then insulin eventually later, difficult to control most of the time      Unspecified hypothyroidism 10/11/2006    TSH 3.36 - mild subclinical hypothyroidism - deciding on following or starting low dose meds - with dr Oreilly - following      Patient Active Problem List   Diagnosis     Morbid obesity (H)     Diverticulosis of large intestine     Nonspecific abnormal results of cardiovascular function study     FAMILY HX COLON CANCER     Nonallopathic lesion of thoracic region     Hypothyroidism     GENESIS (obstructive sleep apnea)     Irritable bowel syndrome     Family history of malignant neoplasm of breast     History of major depression     OSTEOARTHRITIS L KNEE  s/p knee replacement on the left      Hypertension goal BP (blood pressure) < 140/90     Family history of stroke (cerebrovascular)     Family history of other cardiovascular diseases     Family history of diabetes mellitus     ABSENCE OF MENSTRUATION - perimenopausal      JOINT PAIN-ANKLE - right ankle      Mitral valve insufficiency     Hyperlipidemia LDL goal <100     Aortic sclerosis     Tubular adenoma of colon     History of viral hepatitis, type B     Chronic low back pain     Long-term insulin use (H)     Uncontrolled diabetes mellitus (H)     S/P total knee replacement using cement, right     Aftercare following right knee joint replacement surgery     Lumbago     Type 2 diabetes mellitus with hyperglycemia (H)     Posterior vitreous detachment of right eye     Pseudophakia of both eyes     Past Surgical History:   Procedure Laterality Date     ARTHROPLASTY KNEE Right 9/23/2015    Procedure: ARTHROPLASTY KNEE;  Surgeon: Rufus Brown MD;  Location:  OR      NONSPECIFIC PROCEDURE  6/06    right triple  arthrodecesis      C NONSPECIFIC PROCEDURE  7/06     right bunion surgery      C TOTAL KNEE ARTHROPLASTY  3/03    L knee     CATARACT IOL, RT/LT Left      COLONOSCOPY  4/04    diverticulosis, rec repeat 10 yrs(consider fam hx?)     COLONOSCOPY WITH CO2 INSUFFLATION N/A 6/19/2019    Procedure: COLONOSCOPY, WITH CO2 INSUFFLATION;  Surgeon: Zeb Duarte MD;  Location: [facility] OR     ORTHOPEDIC SURGERY      right ankle     PHACOEMULSIFICATION CLEAR CORNEA WITH STANDARD INTRAOCULAR LENS IMPLANT Left 3/15/2018    Procedure: PHACOEMULSIFICATION CLEAR CORNEA WITH STANDARD INTRAOCULAR LENS IMPLANT;  LEFT EYE PHACOEMULSIFICATION CLEAR CORNEA WITH STANDARD INTRAOCULAR LENS IMPLANT;  Surgeon: Bandar Linares MD;  Location:  [facility]     PHACOEMULSIFICATION CLEAR CORNEA WITH STANDARD INTRAOCULAR LENS IMPLANT Right 4/5/2018    Procedure: PHACOEMULSIFICATION CLEAR CORNEA WITH STANDARD INTRAOCULAR LENS IMPLANT;  RIGHT PHACOEMULSIFICATION CLEAR CORNEA WITH STANDARD INTRAOCULAR LENS IMPLANT ;  Surgeon: Bandar Linares MD;  Location:  [facility]     STRESS ECHO (METRO)  12/03    no ischemia, limited by fatigue     SURGICAL HISTORY OF -       EXP LAP- R OVARY CYSTS     SURGICAL HISTORY OF -   1981,1984,1985    CHILDBIRTH     Social History     Socioeconomic History     Marital status:      Spouse name: Not on file     Number of children: 3     Years of education: Not on file     Highest education level: Not on file   Occupational History     Occupation: RN     Employer: [employer]   Social Needs     Financial resource strain: Not on file     Food insecurity     Worry: Not on file     Inability: Not on file     Transportation needs     Medical: Not on file     Non-medical: Not on file   Tobacco Use     Smoking status: Never Smoker     Smokeless tobacco: Never Used     Tobacco comment: tried in early 20s only    Substance and Sexual Activity     Alcohol use: Yes     Comment: rare     Drug use: No      Sexual activity: Never     Comment:  - since for 20 years, no signficant other  > 5 years sexual activity    Lifestyle     Physical activity     Days per week: Not on file     Minutes per session: Not on file     Stress: Not on file   Relationships     Social connections     Talks on phone: Not on file     Gets together: Not on file     Attends Orthodoxy service: Not on file     Active member of club or organization: Not on file     Attends meetings of clubs or organizations: Not on file     Relationship status: Not on file     Intimate partner violence     Fear of current or ex partner: Not on file     Emotionally abused: Not on file     Physically abused: Not on file     Forced sexual activity: Not on file   Other Topics Concern      Service No     Blood Transfusions No     Caffeine Concern No     Occupational Exposure Yes     Comment: Normal nursing exposures     Hobby Hazards No     Sleep Concern Yes     Stress Concern No     Comment: Stress level at HM=5, stress level at WK=6     Weight Concern Yes     Special Diet Yes     Comment: Diabetic diet (watching carbs)     Back Care No     Comment: Occassional back spasms     Exercise Yes     Comment: Pt joined a health club goes approx 3-4 times a week,half to one mile daily     Bike Helmet No     Seat Belt Yes     Comment: Sometimes     Self-Exams Yes     Parent/sibling w/ CABG, MI or angioplasty before 65F 55M? 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      Family History   Problem Relation Age of Onset     Diabetes Maternal Grandmother         DM TYPE 2     Cerebrovascular Disease Maternal Grandmother      Hypertension Sister      Lipids Sister      Heart Disease Sister         Chronic AFib     Hypertension Sister      Lipids Sister      Gynecology Sister      Diabetes Sister      Asthma Sister      Blood Disease Paternal Grandmother         T CELL LEUKEMIA     Hypertension  Brother      Lipids Brother      Congenital Anomalies Brother      Cardiovascular Brother      Heart Disease Brother         CABG/AFIB     Hypertension Brother      Lipids Brother      Obesity Brother      Hypertension Brother      Respiratory Brother         Sleep apnea     Heart Disease Brother         AFib     Alzheimer Disease Mother      Asthma Mother      Hypertension Mother      Breast Cancer Mother 40        has mastectomy and lived to 84     Allergies Mother         Sulfa,     Arthritis Mother      Cardiovascular Mother      Depression Mother      Respiratory Mother      Lipids Mother      Cancer Mother         breast     Thyroid Disease Mother      Cerebrovascular Disease Mother      Dementia Mother         Alzheimers     Cancer - colorectal Other      Cancer Father         lung     Aneurysm Father         brother, AAA     Other Cancer Father         lung ca     Asthma Sister      Cancer - colorectal Paternal Uncle         late 50s to early 60s - great uncles      Breast Cancer Other         Maternal cousin     Arrhythmia Sister         2 brothers 1 sister Afib     Breast Cancer Maternal Aunt 62     Other Cancer Maternal Aunt 35        Ovarian cancer.  Survived despite late recurrence and is now 92.     Glaucoma No family hx of      Macular Degeneration No family hx of      Lab Results   Component Value Date    A1C 7.3 12/10/2019    A1C 7.5 08/21/2019    A1C 7.8 05/21/2019    A1C 6.8 11/14/2018    A1C 6.9 03/14/2018     SUBJECTIVE FINDINGS:  65-year-old female returns to clinic for ulcer, left hallux.  She relates it is doing well.  It seems to be improving.  No new problems other than she did not get the Roll-A-Bout.  She relates she uses the DH2 shoe.  It kind of irritates her back a little bit but it is okay.  Using Iodosorb and the dressing with no problem.      OBJECTIVE FINDINGS:  DP and PT are 2/4 left.  She has a left plantar medial hallux ulcer that is basil.  There is minimal drainage.   Minimal hyperkeratotic tissue buildup.  No erythema, no odor, no calor.  No gross edema.      ASSESSMENT/PLAN:  Ulcer, left hallux.  She is diabetic with peripheral neuropathy.  This is improving.  Local wound care done upon consent today.  I am going to continue the Iodosorb and wound cleanser and light dressing.  Continue the DH2 shoe as long as she can tolerate that.  Finish the Keflex.  Relates no problems with that.  She will return to clinic and see me in 2-3 weeks.         Again, thank you for allowing me to participate in the care of your patient.        Sincerely,        Rufus Hutson DPM

## 2020-07-23 RX ORDER — LIRAGLUTIDE 6 MG/ML
INJECTION SUBCUTANEOUS
Qty: 15 ML | Refills: 6 | Status: SHIPPED | OUTPATIENT
Start: 2020-07-23 | End: 2020-07-23

## 2020-07-23 RX ORDER — LIRAGLUTIDE 6 MG/ML
INJECTION SUBCUTANEOUS
Qty: 15 ML | Refills: 6 | Status: SHIPPED | OUTPATIENT
Start: 2020-07-23 | End: 2021-04-16

## 2020-07-23 NOTE — TELEPHONE ENCOUNTER
Prior Authorization Not Needed per Insurance-Per Galina at [facility], weight loss medications are excluded and not eligible for PA. No further review will be done.    Medication: Saxenda-PA Not Needed  Insurance Company: [employer] - Phone [phone] Fax [phone]  Expected CoPay:      Pharmacy Filling the Rx: [facility] - [place], [place] - [address], [id number]  Pharmacy Notified: No  Patient Notified: No

## 2020-07-23 NOTE — TELEPHONE ENCOUNTER
Per Dr. Delgado:    Please let the patient know Saxenda was no approved and I re prescribed Victoza as 2 injections daily, with a total dose of 3 mg.      Patient advised of Dr. Delgado's recommendations. Patient verbalizes understanding and agrees to plan.       Kylah Katz, RN  Endocrine Care Coordinator  [facility]

## 2020-07-28 NOTE — TELEPHONE ENCOUNTER
[facility] from 6/23/2020-7/23/2021.    Maya Soler LPN  Diabetes Clinic Coordinator   [facility]  [facility]

## 2020-08-12 ENCOUNTER — OFFICE VISIT (OUTPATIENT)
Dept: PODIATRY | Facility: CLINIC | Age: 65
End: 2020-08-12
Payer: COMMERCIAL

## 2020-08-12 VITALS — SYSTOLIC BLOOD PRESSURE: 149 MMHG | OXYGEN SATURATION: 97 % | DIASTOLIC BLOOD PRESSURE: 77 MMHG | HEART RATE: 82 BPM

## 2020-08-12 DIAGNOSIS — L97.522 SKIN ULCER OF LEFT FOOT WITH FAT LAYER EXPOSED (H): ICD-10-CM

## 2020-08-12 DIAGNOSIS — E11.49 TYPE II OR UNSPECIFIED TYPE DIABETES MELLITUS WITH NEUROLOGICAL MANIFESTATIONS, NOT STATED AS UNCONTROLLED(250.60) (H): Primary | ICD-10-CM

## 2020-08-12 PROCEDURE — 99212 OFFICE O/P EST SF 10 MIN: CPT | Performed by: PODIATRIST

## 2020-08-12 NOTE — LETTER
8/12/2020         RE: Sherry Slaughter  [address]  [place] [address]        Dear Colleague,    Thank you for referring your patient, Sherry Slaughter, to the [facility]. Please see a copy of my visit note below.    Past Medical History:   Diagnosis Date     Cataract      DEPRESSION     comes and goes - tried meds - unsuccessfully, certain times of the year, no psych intervention and no counselors in the past - and not interested      Diverticulosis of colon (without mention of hemorrhage) 4/04    colonoscopy     ECHO-mildLVH,tr MR,mild thick lflets w inc LA,trTR   12/03     Essential hypertension, benign 1990s    late 1990s - started medications at that time - not to difficult to control meds      Fam hx-cardiovas dis NEC     father - CAD, and lipids/HTN - multiple members of the family      Family history of diabetes mellitus     sister and grandmother with DM      Family history of malignant neoplasm of breast     mother - young age - 45, and maternal cousin and aunt as well - no BRCA testing done      Family history of stroke (cerebrovascular)     grandmother in early life in her 40s      FAMILY HX COLON CANCER     Pat uncles x 2     Heart disease     murmur/     Heart murmur      HYPERLIPIDEMIA 2000    fairly recent - in the last 5 years - high for DM levels  -cholesterol recent      Irritable bowel syndrome     goes between the 2 - nerve related - more stressed more problems      MICROALBUMINURIA     unsure how long - been on the lisinopril - for a few years at well      Nonspecific abnormal results of liver function study 2/3/2003    SGOT - has been high in the past - since the hepatitis b - borderline elevation - not really changing any      OBESITY      Obstructive sleep apnea      GENESIS (obstructive sleep apnea) 10/15/2006    psg 5/15 AHI 53 aPAP 8-15     OSTEOARTHRITIS L KNEE 2003    total knee on the left - and pain is gone since the knee replacement      PERS HX  HEPATITIS B- RESOLVED] 1977    acute virus only - no chronic disease      PERS HX HEPATITIS B- RESOLVED]      Type II or unspecified type diabetes mellitus without mention of complication, not stated as uncontrolled 1991    oral meds frist, then insulin eventually later, difficult to control most of the time      Unspecified hypothyroidism 10/11/2006    TSH 3.36 - mild subclinical hypothyroidism - deciding on following or starting low dose meds - with dr Oreilly - following      Patient Active Problem List   Diagnosis     Morbid obesity (H)     Diverticulosis of large intestine     Nonspecific abnormal results of cardiovascular function study     FAMILY HX COLON CANCER     Nonallopathic lesion of thoracic region     Hypothyroidism     GENESIS (obstructive sleep apnea)     Irritable bowel syndrome     Family history of malignant neoplasm of breast     History of major depression     OSTEOARTHRITIS L KNEE  s/p knee replacement on the left      Hypertension goal BP (blood pressure) < 140/90     Family history of stroke (cerebrovascular)     Family history of other cardiovascular diseases     Family history of diabetes mellitus     ABSENCE OF MENSTRUATION - perimenopausal      JOINT PAIN-ANKLE - right ankle      Mitral valve insufficiency     Hyperlipidemia LDL goal <100     Aortic sclerosis     Tubular adenoma of colon     History of viral hepatitis, type B     Chronic low back pain     Long-term insulin use (H)     Uncontrolled diabetes mellitus (H)     S/P total knee replacement using cement, right     Aftercare following right knee joint replacement surgery     Lumbago     Type 2 diabetes mellitus with hyperglycemia (H)     Posterior vitreous detachment of right eye     Pseudophakia of both eyes     Past Surgical History:   Procedure Laterality Date     ARTHROPLASTY KNEE Right 9/23/2015    Procedure: ARTHROPLASTY KNEE;  Surgeon: Rufus Brown MD;  Location:  OR      NONSPECIFIC PROCEDURE  6/06    right triple  arthrodecesis      C NONSPECIFIC PROCEDURE  7/06     right bunion surgery      C TOTAL KNEE ARTHROPLASTY  3/03    L knee     CATARACT IOL, RT/LT Left      COLONOSCOPY  4/04    diverticulosis, rec repeat 10 yrs(consider fam hx?)     COLONOSCOPY WITH CO2 INSUFFLATION N/A 6/19/2019    Procedure: COLONOSCOPY, WITH CO2 INSUFFLATION;  Surgeon: Zeb Duarte MD;  Location: [facility] OR     ORTHOPEDIC SURGERY      right ankle     PHACOEMULSIFICATION CLEAR CORNEA WITH STANDARD INTRAOCULAR LENS IMPLANT Left 3/15/2018    Procedure: PHACOEMULSIFICATION CLEAR CORNEA WITH STANDARD INTRAOCULAR LENS IMPLANT;  LEFT EYE PHACOEMULSIFICATION CLEAR CORNEA WITH STANDARD INTRAOCULAR LENS IMPLANT;  Surgeon: Bandar Linares MD;  Location:  [facility]     PHACOEMULSIFICATION CLEAR CORNEA WITH STANDARD INTRAOCULAR LENS IMPLANT Right 4/5/2018    Procedure: PHACOEMULSIFICATION CLEAR CORNEA WITH STANDARD INTRAOCULAR LENS IMPLANT;  RIGHT PHACOEMULSIFICATION CLEAR CORNEA WITH STANDARD INTRAOCULAR LENS IMPLANT ;  Surgeon: Bandar Linares MD;  Location:  [facility]     STRESS ECHO (METRO)  12/03    no ischemia, limited by fatigue     SURGICAL HISTORY OF -       EXP LAP- R OVARY CYSTS     SURGICAL HISTORY OF -   1981,1984,1985    CHILDBIRTH     Social History     Socioeconomic History     Marital status:      Spouse name: Not on file     Number of children: 3     Years of education: Not on file     Highest education level: Not on file   Occupational History     Occupation: RN     Employer: [employer]   Social Needs     Financial resource strain: Not on file     Food insecurity     Worry: Not on file     Inability: Not on file     Transportation needs     Medical: Not on file     Non-medical: Not on file   Tobacco Use     Smoking status: Never Smoker     Smokeless tobacco: Never Used     Tobacco comment: tried in early 20s only    Substance and Sexual Activity     Alcohol use: Yes     Comment: rare     Drug use: No      Sexual activity: Never     Comment:  - since for 20 years, no signficant other  > 5 years sexual activity    Lifestyle     Physical activity     Days per week: Not on file     Minutes per session: Not on file     Stress: Not on file   Relationships     Social connections     Talks on phone: Not on file     Gets together: Not on file     Attends Episcopalian service: Not on file     Active member of club or organization: Not on file     Attends meetings of clubs or organizations: Not on file     Relationship status: Not on file     Intimate partner violence     Fear of current or ex partner: Not on file     Emotionally abused: Not on file     Physically abused: Not on file     Forced sexual activity: Not on file   Other Topics Concern      Service No     Blood Transfusions No     Caffeine Concern No     Occupational Exposure Yes     Comment: Normal nursing exposures     Hobby Hazards No     Sleep Concern Yes     Stress Concern No     Comment: Stress level at HM=5, stress level at WK=6     Weight Concern Yes     Special Diet Yes     Comment: Diabetic diet (watching carbs)     Back Care No     Comment: Occassional back spasms     Exercise Yes     Comment: Pt joined a health club goes approx 3-4 times a week,half to one mile daily     Bike Helmet No     Seat Belt Yes     Comment: Sometimes     Self-Exams Yes     Parent/sibling w/ CABG, MI or angioplasty before 65F 55M? 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      Family History   Problem Relation Age of Onset     Diabetes Maternal Grandmother         DM TYPE 2     Cerebrovascular Disease Maternal Grandmother      Hypertension Sister      Lipids Sister      Heart Disease Sister         Chronic AFib     Hypertension Sister      Lipids Sister      Gynecology Sister      Diabetes Sister      Asthma Sister      Blood Disease Paternal Grandmother         T CELL LEUKEMIA     Hypertension  Brother      Lipids Brother      Congenital Anomalies Brother      Cardiovascular Brother      Heart Disease Brother         CABG/AFIB     Hypertension Brother      Lipids Brother      Obesity Brother      Hypertension Brother      Respiratory Brother         Sleep apnea     Heart Disease Brother         AFib     Alzheimer Disease Mother      Asthma Mother      Hypertension Mother      Breast Cancer Mother 40        has mastectomy and lived to 84     Allergies Mother         Sulfa,     Arthritis Mother      Cardiovascular Mother      Depression Mother      Respiratory Mother      Lipids Mother      Cancer Mother         breast     Thyroid Disease Mother      Cerebrovascular Disease Mother      Dementia Mother         Alzheimers     Cancer - colorectal Other      Cancer Father         lung     Aneurysm Father         brother, AAA     Other Cancer Father         lung ca     Asthma Sister      Cancer - colorectal Paternal Uncle         late 50s to early 60s - great uncles      Breast Cancer Other         Maternal cousin     Arrhythmia Sister         2 brothers 1 sister Afib     Breast Cancer Maternal Aunt 62     Other Cancer Maternal Aunt 35        Ovarian cancer.  Survived despite late recurrence and is now 92.     Glaucoma No family hx of      Macular Degeneration No family hx of      Lab Results   Component Value Date    A1C 7.5 07/22/2020    A1C 7.3 12/10/2019    A1C 7.5 08/21/2019    A1C 7.8 05/21/2019    A1C 6.8 11/14/2018       SUBJECTIVE FINDINGS:  65-year-old female returns to clinic for ulcer, left hallux.  She is diabetic with peripheral neuropathy.  Relates it is doing okay.  She has been self trimming it and using the Iodosorb and wound cleanser and putting a dressing on it.  Relates it is not healed.  Minimal drainage.  She switched off the DH2 shoe and is using a gel pad because the DH2 shoe was hurting her knee.      OBJECTIVE FINDINGS:  Vascular status intact left.  She has a left plantar medial hallux  ulcer that is basil.  Minimal drainage.  No erythema, no odor, no calor.  It is deep through the dermis.      ASSESSMENT/PLAN:  Ulcer, left hallux.  She is diabetic with peripheral neuropathy.  This is improving slowly.  Diagnosis and treatment discussed with her.  Local wound care done upon consent today.  I am going to discontinue the Iodosorb and have her use Cassandra every other day and wound cares daily.  Cassandra dispensed.  She will return to clinic and see me in 2 weeks.  Offloading discussed with her as well.           Again, thank you for allowing me to participate in the care of your patient.        Sincerely,        Rufus Hutson DPM

## 2020-08-12 NOTE — PATIENT INSTRUCTIONS
Thanks for coming today.  Ortho[facility] Medicine Clinic  [address] [place] [address]    To schedule future appointments in Ortho Clinic, you may call [phone].    To schedule ordered imaging by your provider:   Call [facility] [phone]    To schedule an injection ordered by your provider:  Call [facility] Injection scheduling line: [phone]  [facility]hart available online at:  [email or web address]    Please call if any further questions or concerns ([phone]).  Clinic hours 8 am to 5 pm.    Return to clinic (call) if symptoms worsen or fail to improve.

## 2020-08-12 NOTE — PROGRESS NOTES
Past Medical History:   Diagnosis Date     Cataract      DEPRESSION     comes and goes - tried meds - unsuccessfully, certain times of the year, no psych intervention and no counselors in the past - and not interested      Diverticulosis of colon (without mention of hemorrhage) 4/04    colonoscopy     ECHO-mildLVH,tr MR,mild thick lflets w inc LA,trTR   12/03     Essential hypertension, benign 1990s    late 1990s - started medications at that time - not to difficult to control meds      Fam hx-cardiovas dis NEC     father - CAD, and lipids/HTN - multiple members of the family      Family history of diabetes mellitus     sister and grandmother with DM      Family history of malignant neoplasm of breast     mother - young age - 45, and maternal cousin and aunt as well - no BRCA testing done      Family history of stroke (cerebrovascular)     grandmother in early life in her 40s      FAMILY HX COLON CANCER     Pat uncles x 2     Heart disease     murmur/     Heart murmur      HYPERLIPIDEMIA 2000    fairly recent - in the last 5 years - high for DM levels  -cholesterol recent      Irritable bowel syndrome     goes between the 2 - nerve related - more stressed more problems      MICROALBUMINURIA     unsure how long - been on the lisinopril - for a few years at well      Nonspecific abnormal results of liver function study 2/3/2003    SGOT - has been high in the past - since the hepatitis b - borderline elevation - not really changing any      OBESITY      Obstructive sleep apnea      GENESIS (obstructive sleep apnea) 10/15/2006    psg 5/15 AHI 53 aPAP 8-15     OSTEOARTHRITIS L KNEE 2003    total knee on the left - and pain is gone since the knee replacement      PERS HX HEPATITIS B- RESOLVED] 1977    acute virus only - no chronic disease      PERS HX HEPATITIS B- RESOLVED]      Type II or unspecified type diabetes mellitus without mention of complication, not stated as uncontrolled 1991    oral meds frist, then insulin  eventually later, difficult to control most of the time      Unspecified hypothyroidism 10/11/2006    TSH 3.36 - mild subclinical hypothyroidism - deciding on following or starting low dose meds - with dr Oreilly - following      Patient Active Problem List   Diagnosis     Morbid obesity (H)     Diverticulosis of large intestine     Nonspecific abnormal results of cardiovascular function study     FAMILY HX COLON CANCER     Nonallopathic lesion of thoracic region     Hypothyroidism     GENESIS (obstructive sleep apnea)     Irritable bowel syndrome     Family history of malignant neoplasm of breast     History of major depression     OSTEOARTHRITIS L KNEE  s/p knee replacement on the left      Hypertension goal BP (blood pressure) < 140/90     Family history of stroke (cerebrovascular)     Family history of other cardiovascular diseases     Family history of diabetes mellitus     ABSENCE OF MENSTRUATION - perimenopausal      JOINT PAIN-ANKLE - right ankle      Mitral valve insufficiency     Hyperlipidemia LDL goal <100     Aortic sclerosis     Tubular adenoma of colon     History of viral hepatitis, type B     Chronic low back pain     Long-term insulin use (H)     Uncontrolled diabetes mellitus (H)     S/P total knee replacement using cement, right     Aftercare following right knee joint replacement surgery     Lumbago     Type 2 diabetes mellitus with hyperglycemia (H)     Posterior vitreous detachment of right eye     Pseudophakia of both eyes     Past Surgical History:   Procedure Laterality Date     ARTHROPLASTY KNEE Right 9/23/2015    Procedure: ARTHROPLASTY KNEE;  Surgeon: Rufus Brown MD;  Location: [facility]     C NONSPECIFIC PROCEDURE  6/06    right triple arthrodecesis      C NONSPECIFIC PROCEDURE  7/06     right bunion surgery      C TOTAL KNEE ARTHROPLASTY  3/03    L knee     CATARACT IOL, RT/LT Left      COLONOSCOPY  4/04    diverticulosis, rec repeat 10 yrs(consider fam hx?)     COLONOSCOPY WITH CO2  INSUFFLATION N/A 6/19/2019    Procedure: COLONOSCOPY, WITH CO2 INSUFFLATION;  Surgeon: Zeb Duarte MD;  Location: [facility]     ORTHOPEDIC SURGERY      right ankle     PHACOEMULSIFICATION CLEAR CORNEA WITH STANDARD INTRAOCULAR LENS IMPLANT Left 3/15/2018    Procedure: PHACOEMULSIFICATION CLEAR CORNEA WITH STANDARD INTRAOCULAR LENS IMPLANT;  LEFT EYE PHACOEMULSIFICATION CLEAR CORNEA WITH STANDARD INTRAOCULAR LENS IMPLANT;  Surgeon: Bandar Linares MD;  [facility]     PHACOEMULSIFICATION CLEAR CORNEA WITH STANDARD INTRAOCULAR LENS IMPLANT Right 4/5/2018    Procedure: PHACOEMULSIFICATION CLEAR CORNEA WITH STANDARD INTRAOCULAR LENS IMPLANT;  RIGHT PHACOEMULSIFICATION CLEAR CORNEA WITH STANDARD INTRAOCULAR LENS IMPLANT ;  Surgeon: Bandar Linares MD;  [facility]     STRESS ECHO (METRO)  12/03    no ischemia, limited by fatigue     SURGICAL HISTORY OF -       EXP LAP- R OVARY CYSTS     SURGICAL HISTORY OF -   1981,1984,1985    CHILDBIRTH     Social History     Socioeconomic History     Marital status:      Spouse name: Not on file     Number of children: 3     Years of education: Not on file     Highest education level: Not on file   Occupational History     Occupation: RN     Employer: [employer]   Social Needs     Financial resource strain: Not on file     Food insecurity     Worry: Not on file     Inability: Not on file     Transportation needs     Medical: Not on file     Non-medical: Not on file   Tobacco Use     Smoking status: Never Smoker     Smokeless tobacco: Never Used     Tobacco comment: tried in early 20s only    Substance and Sexual Activity     Alcohol use: Yes     Comment: rare     Drug use: No     Sexual activity: Never     Comment:  - since for 20 years, no signficant other  > 5 years sexual activity    Lifestyle     Physical activity     Days per week: Not on file     Minutes per session: Not on file     Stress: Not on file   Relationships      Social connections     Talks on phone: Not on file     Gets together: Not on file     Attends [employer] service: Not on file     Active member of club or organization: Not on file     Attends meetings of clubs or organizations: Not on file     Relationship status: Not on file     Intimate partner violence     Fear of current or ex partner: Not on file     Emotionally abused: Not on file     Physically abused: Not on file     Forced sexual activity: Not on file   Other Topics Concern      Service No     Blood Transfusions No     Caffeine Concern No     Occupational Exposure Yes     Comment: Normal nursing exposures     Hobby Hazards No     Sleep Concern Yes     Stress Concern No     Comment: Stress level at HM=5, stress level at WK=6     Weight Concern Yes     Special Diet Yes     Comment: Diabetic diet (watching carbs)     Back Care No     Comment: Occassional back spasms     Exercise Yes     Comment: Pt joined a health club goes approx 3-4 times a week,half to one mile daily     Bike Helmet No     Seat Belt Yes     Comment: Sometimes     Self-Exams Yes     Parent/sibling w/ CABG, MI or angioplasty before 65F 55M? 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      Family History   Problem Relation Age of Onset     Diabetes Maternal Grandmother         DM TYPE 2     Cerebrovascular Disease Maternal Grandmother      Hypertension Sister      Lipids Sister      Heart Disease Sister         Chronic AFib     Hypertension Sister      Lipids Sister      Gynecology Sister      Diabetes Sister      Asthma Sister      Blood Disease Paternal Grandmother         T CELL LEUKEMIA     Hypertension Brother      Lipids Brother      Congenital Anomalies Brother      Cardiovascular Brother      Heart Disease Brother         CABG/AFIB     Hypertension Brother      Lipids Brother      Obesity Brother      Hypertension Brother      Respiratory Brother          Sleep apnea     Heart Disease Brother         AFib     Alzheimer Disease Mother      Asthma Mother      Hypertension Mother      Breast Cancer Mother 40        has mastectomy and lived to 84     Allergies Mother         Sulfa,     Arthritis Mother      Cardiovascular Mother      Depression Mother      Respiratory Mother      Lipids Mother      Cancer Mother         breast     Thyroid Disease Mother      Cerebrovascular Disease Mother      Dementia Mother         Alzheimers     Cancer - colorectal Other      Cancer Father         lung     Aneurysm Father         brother, AAA     Other Cancer Father         lung ca     Asthma Sister      Cancer - colorectal Paternal Uncle         late 50s to early 60s - great uncles      Breast Cancer Other         Maternal cousin     Arrhythmia Sister         2 brothers 1 sister Afib     Breast Cancer Maternal Aunt 62     Other Cancer Maternal Aunt 35        Ovarian cancer.  Survived despite late recurrence and is now 92.     Glaucoma No family hx of      Macular Degeneration No family hx of      Lab Results   Component Value Date    A1C 7.5 07/22/2020    A1C 7.3 12/10/2019    A1C 7.5 08/21/2019    A1C 7.8 05/21/2019    A1C 6.8 11/14/2018       SUBJECTIVE FINDINGS:  65-year-old female returns to clinic for ulcer, left hallux.  She is diabetic with peripheral neuropathy.  Relates it is doing okay.  She has been self trimming it and using the Iodosorb and wound cleanser and putting a dressing on it.  Relates it is not healed.  Minimal drainage.  She switched off the DH2 shoe and is using a gel pad because the DH2 shoe was hurting her knee.      OBJECTIVE FINDINGS:  Vascular status intact left.  She has a left plantar medial hallux ulcer that is basil.  Minimal drainage.  No erythema, no odor, no calor.  It is deep through the dermis.      ASSESSMENT/PLAN:  Ulcer, left hallux.  She is diabetic with peripheral neuropathy.  This is improving slowly.  Diagnosis and treatment  discussed with her.  Local wound care done upon consent today.  I am going to discontinue the Iodosorb and have her use Cassandra every other day and wound cares daily.  Cassandra dispensed.  She will return to clinic and see me in 2 weeks.  Offloading discussed with her as well.

## 2020-08-12 NOTE — NURSING NOTE
Sherry Slaughter's goals for this visit include:   Chief Complaint   Patient presents with     Left Foot - Follow Up     She requests these members of her care team be copied on today's visit information:     PCP: Marilou Arciniega    Referring Provider:  No referring provider defined for this encounter.    BP (!) 149/77 (BP Location: Right arm, Patient Position: Sitting, Cuff Size: Adult Regular)   Pulse 82   LMP 10/02/2007   SpO2 97%     Do you need any medication refills at today's visit? No  Avril Wall [facility]

## 2020-08-26 ENCOUNTER — OFFICE VISIT (OUTPATIENT)
Dept: PODIATRY | Facility: CLINIC | Age: 65
End: 2020-08-26
Payer: COMMERCIAL

## 2020-08-26 VITALS — OXYGEN SATURATION: 98 % | HEART RATE: 77 BPM | DIASTOLIC BLOOD PRESSURE: 77 MMHG | SYSTOLIC BLOOD PRESSURE: 147 MMHG

## 2020-08-26 DIAGNOSIS — E11.49 TYPE II OR UNSPECIFIED TYPE DIABETES MELLITUS WITH NEUROLOGICAL MANIFESTATIONS, NOT STATED AS UNCONTROLLED(250.60) (H): Primary | ICD-10-CM

## 2020-08-26 DIAGNOSIS — M21.619 BUNION: ICD-10-CM

## 2020-08-26 DIAGNOSIS — L97.522 SKIN ULCER OF LEFT FOOT WITH FAT LAYER EXPOSED (H): ICD-10-CM

## 2020-08-26 PROCEDURE — 97597 DBRDMT OPN WND 1ST 20 CM/<: CPT | Performed by: PODIATRIST

## 2020-08-26 NOTE — NURSING NOTE
Sherry Slaughter's chief complaint for this visit includes:  Chief Complaint   Patient presents with     RECHECK     Ulcer, left hallux     PCP: Marilou Arciniega    Referring Provider:  No referring provider defined for this encounter.    BP (!) 147/77 (BP Location: Right arm, Patient Position: Sitting, Cuff Size: Adult Regular)   Pulse 77   LMP 10/02/2007   SpO2 98%   Data Unavailable     Do you need any medication refills at today's visit? No    Opal Conner CMA

## 2020-08-26 NOTE — PROGRESS NOTES
Past Medical History:   Diagnosis Date     Cataract      DEPRESSION     comes and goes - tried meds - unsuccessfully, certain times of the year, no psych intervention and no counselors in the past - and not interested      Diverticulosis of colon (without mention of hemorrhage) 4/04    colonoscopy     ECHO-mildLVH,tr MR,mild thick lflets w inc LA,trTR   12/03     Essential hypertension, benign 1990s    late 1990s - started medications at that time - not to difficult to control meds      Fam hx-cardiovas dis NEC     father - CAD, and lipids/HTN - multiple members of the family      Family history of diabetes mellitus     sister and grandmother with DM      Family history of malignant neoplasm of breast     mother - young age - 45, and maternal cousin and aunt as well - no BRCA testing done      Family history of stroke (cerebrovascular)     grandmother in early life in her 40s      FAMILY HX COLON CANCER     Pat uncles x 2     Heart disease     murmur/     Heart murmur      HYPERLIPIDEMIA 2000    fairly recent - in the last 5 years - high for DM levels  -cholesterol recent      Irritable bowel syndrome     goes between the 2 - nerve related - more stressed more problems      MICROALBUMINURIA     unsure how long - been on the lisinopril - for a few years at well      Nonspecific abnormal results of liver function study 2/3/2003    SGOT - has been high in the past - since the hepatitis b - borderline elevation - not really changing any      OBESITY      Obstructive sleep apnea      GENESIS (obstructive sleep apnea) 10/15/2006    psg 5/15 AHI 53 aPAP 8-15     OSTEOARTHRITIS L KNEE 2003    total knee on the left - and pain is gone since the knee replacement      PERS HX HEPATITIS B- RESOLVED] 1977    acute virus only - no chronic disease      PERS HX HEPATITIS B- RESOLVED]      Type II or unspecified type diabetes mellitus without mention of complication, not stated as uncontrolled 1991    oral meds frist, then insulin  eventually later, difficult to control most of the time      Unspecified hypothyroidism 10/11/2006    TSH 3.36 - mild subclinical hypothyroidism - deciding on following or starting low dose meds - with dr Oreilly - following      Patient Active Problem List   Diagnosis     Morbid obesity (H)     Diverticulosis of large intestine     Nonspecific abnormal results of cardiovascular function study     FAMILY HX COLON CANCER     Nonallopathic lesion of thoracic region     Hypothyroidism     GENESIS (obstructive sleep apnea)     Irritable bowel syndrome     Family history of malignant neoplasm of breast     History of major depression     OSTEOARTHRITIS L KNEE  s/p knee replacement on the left      Hypertension goal BP (blood pressure) < 140/90     Family history of stroke (cerebrovascular)     Family history of other cardiovascular diseases     Family history of diabetes mellitus     ABSENCE OF MENSTRUATION - perimenopausal      JOINT PAIN-ANKLE - right ankle      Mitral valve insufficiency     Hyperlipidemia LDL goal <100     Aortic sclerosis     Tubular adenoma of colon     History of viral hepatitis, type B     Chronic low back pain     Long-term insulin use (H)     Uncontrolled diabetes mellitus (H)     S/P total knee replacement using cement, right     Aftercare following right knee joint replacement surgery     Lumbago     Type 2 diabetes mellitus with hyperglycemia (H)     Posterior vitreous detachment of right eye     Pseudophakia of both eyes     Past Surgical History:   Procedure Laterality Date     ARTHROPLASTY KNEE Right 9/23/2015    Procedure: ARTHROPLASTY KNEE;  Surgeon: Rufus Brown MD;  Location: [facility]     C NONSPECIFIC PROCEDURE  6/06    right triple arthrodecesis      C NONSPECIFIC PROCEDURE  7/06     right bunion surgery      C TOTAL KNEE ARTHROPLASTY  3/03    L knee     CATARACT IOL, RT/LT Left      COLONOSCOPY  4/04    diverticulosis, rec repeat 10 yrs(consider fam hx?)     COLONOSCOPY WITH CO2  INSUFFLATION N/A 6/19/2019    Procedure: COLONOSCOPY, WITH CO2 INSUFFLATION;  Surgeon: Zeb Duarte MD;  Location: [facility]     ORTHOPEDIC SURGERY      right ankle     PHACOEMULSIFICATION CLEAR CORNEA WITH STANDARD INTRAOCULAR LENS IMPLANT Left 3/15/2018    Procedure: PHACOEMULSIFICATION CLEAR CORNEA WITH STANDARD INTRAOCULAR LENS IMPLANT;  LEFT EYE PHACOEMULSIFICATION CLEAR CORNEA WITH STANDARD INTRAOCULAR LENS IMPLANT;  Surgeon: Bandar Linares MD;  Location:  [facility]     PHACOEMULSIFICATION CLEAR CORNEA WITH STANDARD INTRAOCULAR LENS IMPLANT Right 4/5/2018    Procedure: PHACOEMULSIFICATION CLEAR CORNEA WITH STANDARD INTRAOCULAR LENS IMPLANT;  RIGHT PHACOEMULSIFICATION CLEAR CORNEA WITH STANDARD INTRAOCULAR LENS IMPLANT ;  Surgeon: Bandar Linares MD;  Location:  [facility]     STRESS ECHO (METRO)  12/03    no ischemia, limited by fatigue     SURGICAL HISTORY OF -       EXP LAP- R OVARY CYSTS     SURGICAL HISTORY OF -   1981,1984,1985    CHILDBIRTH     Social History     Socioeconomic History     Marital status:      Spouse name: Not on file     Number of children: 3     Years of education: Not on file     Highest education level: Not on file   Occupational History     Occupation: RN     Employer: [employer]   Social Needs     Financial resource strain: Not on file     Food insecurity     Worry: Not on file     Inability: Not on file     Transportation needs     Medical: Not on file     Non-medical: Not on file   Tobacco Use     Smoking status: Never Smoker     Smokeless tobacco: Never Used     Tobacco comment: tried in early 20s only    Substance and Sexual Activity     Alcohol use: Yes     Comment: rare     Drug use: No     Sexual activity: Never     Comment:  - since for 20 years, no signficant other  > 5 years sexual activity    Lifestyle     Physical activity     Days per week: Not on file     Minutes per session: Not on file     Stress: Not on file   Relationships      Social connections     Talks on phone: Not on file     Gets together: Not on file     Attends Yazidism service: Not on file     Active member of club or organization: Not on file     Attends meetings of clubs or organizations: Not on file     Relationship status: Not on file     Intimate partner violence     Fear of current or ex partner: Not on file     Emotionally abused: Not on file     Physically abused: Not on file     Forced sexual activity: Not on file   Other Topics Concern      Service No     Blood Transfusions No     Caffeine Concern No     Occupational Exposure Yes     Comment: Normal nursing exposures     Hobby Hazards No     Sleep Concern Yes     Stress Concern No     Comment: Stress level at HM=5, stress level at WK=6     Weight Concern Yes     Special Diet Yes     Comment: Diabetic diet (watching carbs)     Back Care No     Comment: Occassional back spasms     Exercise Yes     Comment: Pt joined a health club goes approx 3-4 times a week,half to one mile daily     Bike Helmet No     Seat Belt Yes     Comment: Sometimes     Self-Exams Yes     Parent/sibling w/ CABG, MI or angioplasty before 65F 55M? 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      Family History   Problem Relation Age of Onset     Diabetes Maternal Grandmother         DM TYPE 2     Cerebrovascular Disease Maternal Grandmother      Hypertension Sister      Lipids Sister      Heart Disease Sister         Chronic AFib     Hypertension Sister      Lipids Sister      Gynecology Sister      Diabetes Sister      Asthma Sister      Blood Disease Paternal Grandmother         T CELL LEUKEMIA     Hypertension Brother      Lipids Brother      Congenital Anomalies Brother      Cardiovascular Brother      Heart Disease Brother         CABG/AFIB     Hypertension Brother      Lipids Brother      Obesity Brother      Hypertension Brother      Respiratory Brother          Sleep apnea     Heart Disease Brother         AFib     Alzheimer Disease Mother      Asthma Mother      Hypertension Mother      Breast Cancer Mother 40        has mastectomy and lived to 84     Allergies Mother         Sulfa,     Arthritis Mother      Cardiovascular Mother      Depression Mother      Respiratory Mother      Lipids Mother      Cancer Mother         breast     Thyroid Disease Mother      Cerebrovascular Disease Mother      Dementia Mother         Alzheimers     Cancer - colorectal Other      Cancer Father         lung     Aneurysm Father         brother, AAA     Other Cancer Father         lung ca     Asthma Sister      Cancer - colorectal Paternal Uncle         late 50s to early 60s - great uncles      Breast Cancer Other         Maternal cousin     Arrhythmia Sister         2 brothers 1 sister Afib     Breast Cancer Maternal Aunt 62     Other Cancer Maternal Aunt 35        Ovarian cancer.  Survived despite late recurrence and is now 92.     Glaucoma No family hx of      Macular Degeneration No family hx of      Lab Results   Component Value Date    A1C 7.5 07/22/2020    A1C 7.3 12/10/2019    A1C 7.5 08/21/2019    A1C 7.8 05/21/2019    A1C 6.8 11/14/2018         SUBJECTIVE FINDINGS:  65-year-old female returns to clinic for ulcer left hallux.  She relates it is about the same.  She has been using the Cassandra.  No new problems.  She cannot wear the DH2 shoe because it hurts her knee and leg.  She is wearing sandals currently.  No other specific relieving or aggravating factors.  She is diabetic with peripheral neuropathy.      OBJECTIVE FINDINGS:  Vascular status intact left.  She has laterally deviated hallux with dorsal medial first MPJ prominence.  She has an ulcer with hyperkeratotic tissue buildup plantar medial hallux.  It is about 0.8 x 0.5 cm.  It is deep through the dermis.  Some serosanguineous drainage, no erythema, no odor, no calor.      ASSESSMENT/PLAN:  Ulcer, left hallux.  She is  diabetic with peripheral neuropathy.  She has hallux valgus with a bunion present.  Diagnosis and treatment options discussed with her.  Sharp ulcer debridement through the dermis with a #15 blade.  No anesthesia needed and local wound care done upon consent today.  I am going to continue Cassandra every other day with wound cares.  Referral to Dr. Gray or Dr. Kovacs for any surgical options to correct the hallux valgus given and use discussed with her.  She will schedule to see me for diabetic foot cares in about 3 months.  We did talk about getting diabetic shoes at some point in time.  She relates she does have insoles in her work shoes.  I will see her pending her appointment with Dr. Kovacs or Dr. Gray.

## 2020-08-26 NOTE — PATIENT INSTRUCTIONS
Thanks for coming today.  [facility]  [address] [place] [address]    To schedule future appointments in Ortho Clinic, you may call [phone].    To schedule ordered imaging by your provider:   Call [facility] [phone]    To schedule an injection ordered by your provider:  Call [facility] Injection scheduling line: [phone]  [facility] available online at:  [email or web address]    Please call if any further questions or concerns ([phone]).  Clinic hours 8 am to 5 pm.    Return to clinic (call) if symptoms worsen or fail to improve.

## 2020-08-26 NOTE — LETTER
8/26/2020         RE: Sherry Slaughter  [address]  [place] [address]        Dear Colleague,    Thank you for referring your patient, Sherry Slaughter, to the [facility]. Please see a copy of my visit note below.    Past Medical History:   Diagnosis Date     Cataract      DEPRESSION     comes and goes - tried meds - unsuccessfully, certain times of the year, no psych intervention and no counselors in the past - and not interested      Diverticulosis of colon (without mention of hemorrhage) 4/04    colonoscopy     ECHO-mildLVH,tr MR,mild thick lflets w inc LA,trTR   12/03     Essential hypertension, benign 1990s    late 1990s - started medications at that time - not to difficult to control meds      Fam hx-cardiovas dis [facility]     father - CAD, and lipids/HTN - multiple members of the family      Family history of diabetes mellitus     sister and grandmother with DM      Family history of malignant neoplasm of breast     mother - young age - 45, and maternal cousin and aunt as well - no BRCA testing done      Family history of stroke (cerebrovascular)     grandmother in early life in her 40s      FAMILY HX COLON CANCER     Pat uncles x 2     Heart disease     murmur/     Heart murmur      HYPERLIPIDEMIA 2000    fairly recent - in the last 5 years - high for DM levels  -cholesterol recent      Irritable bowel syndrome     goes between the 2 - nerve related - more stressed more problems      MICROALBUMINURIA     unsure how long - been on the lisinopril - for a few years at well      Nonspecific abnormal results of liver function study 2/3/2003    SGOT - has been high in the past - since the hepatitis b - borderline elevation - not really changing any      OBESITY      Obstructive sleep apnea      GENESIS (obstructive sleep apnea) 10/15/2006    psg 5/15 AHI 53 aPAP 8-15     OSTEOARTHRITIS L KNEE 2003    total knee on the left - and pain is gone since the knee replacement      PERS HX  HEPATITIS B- RESOLVED] 1977    acute virus only - no chronic disease      PERS HX HEPATITIS B- RESOLVED]      Type II or unspecified type diabetes mellitus without mention of complication, not stated as uncontrolled 1991    oral meds frist, then insulin eventually later, difficult to control most of the time      Unspecified hypothyroidism 10/11/2006    TSH 3.36 - mild subclinical hypothyroidism - deciding on following or starting low dose meds - with dr Oreilly - following      Patient Active Problem List   Diagnosis     Morbid obesity (H)     Diverticulosis of large intestine     Nonspecific abnormal results of cardiovascular function study     FAMILY HX COLON CANCER     Nonallopathic lesion of thoracic region     Hypothyroidism     GENESIS (obstructive sleep apnea)     Irritable bowel syndrome     Family history of malignant neoplasm of breast     History of major depression     OSTEOARTHRITIS L KNEE  s/p knee replacement on the left      Hypertension goal BP (blood pressure) < 140/90     Family history of stroke (cerebrovascular)     Family history of other cardiovascular diseases     Family history of diabetes mellitus     ABSENCE OF MENSTRUATION - perimenopausal      JOINT PAIN-ANKLE - right ankle      Mitral valve insufficiency     Hyperlipidemia LDL goal <100     Aortic sclerosis     Tubular adenoma of colon     History of viral hepatitis, type B     Chronic low back pain     Long-term insulin use (H)     Uncontrolled diabetes mellitus (H)     S/P total knee replacement using cement, right     Aftercare following right knee joint replacement surgery     Lumbago     Type 2 diabetes mellitus with hyperglycemia (H)     Posterior vitreous detachment of right eye     Pseudophakia of both eyes     Past Surgical History:   Procedure Laterality Date     ARTHROPLASTY KNEE Right 9/23/2015    Procedure: ARTHROPLASTY KNEE;  Surgeon: Rufus Brown MD;  Location:  OR      NONSPECIFIC PROCEDURE  6/06    right triple  arthrodecesis      C NONSPECIFIC PROCEDURE  7/06     right bunion surgery      C TOTAL KNEE ARTHROPLASTY  3/03    L knee     CATARACT IOL, RT/LT Left      COLONOSCOPY  4/04    diverticulosis, rec repeat 10 yrs(consider fam hx?)     COLONOSCOPY WITH CO2 INSUFFLATION N/A 6/19/2019    Procedure: COLONOSCOPY, WITH CO2 INSUFFLATION;  Surgeon: Zeb Duarte MD;  Location: [facility] OR     ORTHOPEDIC SURGERY      right ankle     PHACOEMULSIFICATION CLEAR CORNEA WITH STANDARD INTRAOCULAR LENS IMPLANT Left 3/15/2018    Procedure: PHACOEMULSIFICATION CLEAR CORNEA WITH STANDARD INTRAOCULAR LENS IMPLANT;  LEFT EYE PHACOEMULSIFICATION CLEAR CORNEA WITH STANDARD INTRAOCULAR LENS IMPLANT;  Surgeon: Bandar Linares MD;  Location:  [facility]     PHACOEMULSIFICATION CLEAR CORNEA WITH STANDARD INTRAOCULAR LENS IMPLANT Right 4/5/2018    Procedure: PHACOEMULSIFICATION CLEAR CORNEA WITH STANDARD INTRAOCULAR LENS IMPLANT;  RIGHT PHACOEMULSIFICATION CLEAR CORNEA WITH STANDARD INTRAOCULAR LENS IMPLANT ;  Surgeon: Bandar Linares MD;  Location:  [facility]     STRESS ECHO (METRO)  12/03    no ischemia, limited by fatigue     SURGICAL HISTORY OF -       EXP LAP- R OVARY CYSTS     SURGICAL HISTORY OF -   1981,1984,1985    CHILDBIRTH     Social History     Socioeconomic History     Marital status:      Spouse name: Not on file     Number of children: 3     Years of education: Not on file     Highest education level: Not on file   Occupational History     Occupation: RN     Employer: [employer]   Social Needs     Financial resource strain: Not on file     Food insecurity     Worry: Not on file     Inability: Not on file     Transportation needs     Medical: Not on file     Non-medical: Not on file   Tobacco Use     Smoking status: Never Smoker     Smokeless tobacco: Never Used     Tobacco comment: tried in early 20s only    Substance and Sexual Activity     Alcohol use: Yes     Comment: rare     Drug use: No      Sexual activity: Never     Comment:  - since for 20 years, no signficant other  > 5 years sexual activity    Lifestyle     Physical activity     Days per week: Not on file     Minutes per session: Not on file     Stress: Not on file   Relationships     Social connections     Talks on phone: Not on file     Gets together: Not on file     Attends Hoahaoism service: Not on file     Active member of club or organization: Not on file     Attends meetings of clubs or organizations: Not on file     Relationship status: Not on file     Intimate partner violence     Fear of current or ex partner: Not on file     Emotionally abused: Not on file     Physically abused: Not on file     Forced sexual activity: Not on file   Other Topics Concern      Service No     Blood Transfusions No     Caffeine Concern No     Occupational Exposure Yes     Comment: Normal nursing exposures     Hobby Hazards No     Sleep Concern Yes     Stress Concern No     Comment: Stress level at HM=5, stress level at WK=6     Weight Concern Yes     Special Diet Yes     Comment: Diabetic diet (watching carbs)     Back Care No     Comment: Occassional back spasms     Exercise Yes     Comment: Pt joined a health club goes approx 3-4 times a week,half to one mile daily     Bike Helmet No     Seat Belt Yes     Comment: Sometimes     Self-Exams Yes     Parent/sibling w/ CABG, MI or angioplasty before 65F 55M? 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      Family History   Problem Relation Age of Onset     Diabetes Maternal Grandmother         DM TYPE 2     Cerebrovascular Disease Maternal Grandmother      Hypertension Sister      Lipids Sister      Heart Disease Sister         Chronic AFib     Hypertension Sister      Lipids Sister      Gynecology Sister      Diabetes Sister      Asthma Sister      Blood Disease Paternal Grandmother         T CELL LEUKEMIA     Hypertension  Brother      Lipids Brother      Congenital Anomalies Brother      Cardiovascular Brother      Heart Disease Brother         CABG/AFIB     Hypertension Brother      Lipids Brother      Obesity Brother      Hypertension Brother      Respiratory Brother         Sleep apnea     Heart Disease Brother         AFib     Alzheimer Disease Mother      Asthma Mother      Hypertension Mother      Breast Cancer Mother 40        has mastectomy and lived to 84     Allergies Mother         Sulfa,     Arthritis Mother      Cardiovascular Mother      Depression Mother      Respiratory Mother      Lipids Mother      Cancer Mother         breast     Thyroid Disease Mother      Cerebrovascular Disease Mother      Dementia Mother         Alzheimers     Cancer - colorectal Other      Cancer Father         lung     Aneurysm Father         brother, AAA     Other Cancer Father         lung ca     Asthma Sister      Cancer - colorectal Paternal Uncle         late 50s to early 60s - great uncles      Breast Cancer Other         Maternal cousin     Arrhythmia Sister         2 brothers 1 sister Afib     Breast Cancer Maternal Aunt 62     Other Cancer Maternal Aunt 35        Ovarian cancer.  Survived despite late recurrence and is now 92.     Glaucoma No family hx of      Macular Degeneration No family hx of      Lab Results   Component Value Date    A1C 7.5 07/22/2020    A1C 7.3 12/10/2019    A1C 7.5 08/21/2019    A1C 7.8 05/21/2019    A1C 6.8 11/14/2018         SUBJECTIVE FINDINGS:  65-year-old female returns to clinic for ulcer left hallux.  She relates it is about the same.  She has been using the Cassandra.  No new problems.  She cannot wear the DH2 shoe because it hurts her knee and leg.  She is wearing sandals currently.  No other specific relieving or aggravating factors.  She is diabetic with peripheral neuropathy.      OBJECTIVE FINDINGS:  Vascular status intact left.  She has laterally deviated hallux with dorsal medial first MPJ  prominence.  She has an ulcer with hyperkeratotic tissue buildup plantar medial hallux.  It is about 0.8 x 0.5 cm.  It is deep through the dermis.  Some serosanguineous drainage, no erythema, no odor, no calor.      ASSESSMENT/PLAN:  Ulcer, left hallux.  She is diabetic with peripheral neuropathy.  She has hallux valgus with a bunion present.  Diagnosis and treatment options discussed with her.  Sharp ulcer debridement through the dermis with a #15 blade.  No anesthesia needed and local wound care done upon consent today.  I am going to continue Cassandra every other day with wound cares.  Referral to Dr. Gray or Dr. Kovacs for any surgical options to correct the hallux valgus given and use discussed with her.  She will schedule to see me for diabetic foot cares in about 3 months.  We did talk about getting diabetic shoes at some point in time.  She relates she does have insoles in her work shoes.  I will see her pending her appointment with Dr. Kovacs or Dr. Gray.         Again, thank you for allowing me to participate in the care of your patient.        Sincerely,        Rufus Hutson DPM

## 2020-09-04 DIAGNOSIS — I10 ESSENTIAL HYPERTENSION WITH GOAL BLOOD PRESSURE LESS THAN 140/90: ICD-10-CM

## 2020-09-08 ENCOUNTER — TELEPHONE (OUTPATIENT)
Dept: ENDOCRINOLOGY | Facility: CLINIC | Age: 65
End: 2020-09-08

## 2020-09-08 ASSESSMENT — ENCOUNTER SYMPTOMS
POOR WOUND HEALING: 1
NAIL CHANGES: 0
FLANK PAIN: 0
NECK PAIN: 0
ARTHRALGIAS: 1
LEG PAIN: 0
BACK PAIN: 1
JOINT SWELLING: 0
HEMATURIA: 0
MYALGIAS: 1
HYPERTENSION: 1
LIGHT-HEADEDNESS: 0
HYPOTENSION: 0
ORTHOPNEA: 0
EXERCISE INTOLERANCE: 1
MUSCLE CRAMPS: 0
SLEEP DISTURBANCES DUE TO BREATHING: 0
PALPITATIONS: 1
DYSURIA: 0
DIFFICULTY URINATING: 0
MUSCLE WEAKNESS: 0
SYNCOPE: 0
STIFFNESS: 1
SKIN CHANGES: 0

## 2020-09-08 NOTE — TELEPHONE ENCOUNTER
[facility] Call Center    Phone Message    May a detailed message be left on voicemail: yes     Reason for Call: Form or Letter   Type or form/letter needing completion: Diabetic drivers report   Provider: Danny  Date form needed: 9/10/2020  Once completed: Fax form to: [phone]      Action Taken: Message routed to:  Adult Clinics: Endocrinology p [id number]    Travel Screening: Not Applicable

## 2020-09-08 NOTE — TELEPHONE ENCOUNTER
Received DMV form, writer filled out and sent e-mail to  to review.    Jasmin King MA  Adult Endocrinology  [facility]

## 2020-09-09 RX ORDER — HYDROCHLOROTHIAZIDE 25 MG/1
TABLET ORAL
Qty: 90 TABLET | Refills: 2 | Status: SHIPPED | OUTPATIENT
Start: 2020-09-09 | End: 2020-12-10

## 2020-09-10 DIAGNOSIS — I10 ESSENTIAL HYPERTENSION WITH GOAL BLOOD PRESSURE LESS THAN 140/90: ICD-10-CM

## 2020-09-11 NOTE — TELEPHONE ENCOUNTER
Insulin-Treated Diabetes Mellitus Report completed and signed by Dr. Delgado.    Faxed to [place] [employer] [address] and [employer] [phone].    Original mailed to patient. Copy sent down to scanning.     Chloé Sparks, [facility]  Adult Endocrinology  [facility]

## 2020-09-14 NOTE — TELEPHONE ENCOUNTER
METOPROLOL SUCCINATE ER 25MG TB24   Last Written Prescription Date:  12/17/2019  Last Fill Quantity: 90,   # refills: 2  Last Office Visit : 7/10/2020  Future Office visit:  None    Routing refill request to provider for review/approval because:  Blood pressure out of range   Change Med?   Change dose?  Add med?    Follow up with Pt/Blood Pressure check?       Refer to clinic for review   BP Readings from Last 3 Encounters:   08/26/20 (!) 147/77   08/12/20 (!) 149/77   07/22/20 123/73         Rika Ndiaye RN  Central Triage Red Flags/Med Refills

## 2020-09-15 RX ORDER — METOPROLOL SUCCINATE 25 MG/1
25 TABLET, EXTENDED RELEASE ORAL DAILY
Qty: 30 TABLET | Refills: 0 | Status: SHIPPED | OUTPATIENT
Start: 2020-09-15 | End: 2020-09-17

## 2020-09-15 NOTE — TELEPHONE ENCOUNTER
Please call pt to confirm her BP. Last two visit with podiatry, BP elevated. Does she check BP at home? I approved 30 days of metoprolol for now so she doesn't run out. May need to adjust dose if home BP is high.

## 2020-09-17 VITALS — SYSTOLIC BLOOD PRESSURE: 120 MMHG | DIASTOLIC BLOOD PRESSURE: 80 MMHG

## 2020-09-17 RX ORDER — METOPROLOL SUCCINATE 25 MG/1
25 TABLET, EXTENDED RELEASE ORAL DAILY
Qty: 90 TABLET | Refills: 2 | Status: SHIPPED | OUTPATIENT
Start: 2020-09-17 | End: 2020-11-25

## 2020-09-17 NOTE — TELEPHONE ENCOUNTER
Spoke to patient, noted she is not checking them at home. BP readings ranging from 120/80's-130/80's when it is checked in clinic. Informed of provider message and agreed to plan.     FRANKO Robles

## 2020-09-22 ENCOUNTER — OFFICE VISIT (OUTPATIENT)
Dept: ORTHOPEDICS | Facility: CLINIC | Age: 65
End: 2020-09-22
Attending: PODIATRIST
Payer: COMMERCIAL

## 2020-09-22 ENCOUNTER — PRE VISIT (OUTPATIENT)
Dept: ORTHOPEDICS | Facility: CLINIC | Age: 65
End: 2020-09-22

## 2020-09-22 ENCOUNTER — ANCILLARY PROCEDURE (OUTPATIENT)
Dept: GENERAL RADIOLOGY | Facility: CLINIC | Age: 65
End: 2020-09-22
Attending: ORTHOPAEDIC SURGERY
Payer: COMMERCIAL

## 2020-09-22 VITALS — WEIGHT: 293 LBS | HEIGHT: 69 IN | BODY MASS INDEX: 43.4 KG/M2

## 2020-09-22 DIAGNOSIS — M21.619 BUNION: Primary | ICD-10-CM

## 2020-09-22 DIAGNOSIS — M21.619 BUNION: ICD-10-CM

## 2020-09-22 DIAGNOSIS — L97.522 SKIN ULCER OF LEFT FOOT WITH FAT LAYER EXPOSED (H): ICD-10-CM

## 2020-09-22 DIAGNOSIS — E11.49 TYPE II OR UNSPECIFIED TYPE DIABETES MELLITUS WITH NEUROLOGICAL MANIFESTATIONS, NOT STATED AS UNCONTROLLED(250.60) (H): ICD-10-CM

## 2020-09-22 ASSESSMENT — MIFFLIN-ST. JEOR: SCORE: 1942.27

## 2020-09-22 NOTE — NURSING NOTE
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  She will call Lauren when ready to schedule, BMI 44, should plan hospital location     Person(s) involved in teaching:   Patient     Motivation Level:  Asks Questions: Yes  Eager to Learn: Yes  Cooperative: Yes  Receptive (willing/able to accept information): Yes  Any cultural factors/Gnosticism beliefs that may influence understanding or compliance? No  Comments:      Patient demonstrates understanding of the following:  Reason for the appointment, diagnosis and treatment plan: Yes  Knowledge of proper use of medications and conditions for which they are ordered (with special attention to potential side effects or drug interactions): Yes  Which situations necessitate calling provider and whom to contact: Yes       Teaching Concerns Addressed:   Comments:      Proper use and care of medications (medical equip, care aids, etc.): Yes  Nutritional needs and diet plan: Yes  Pain management techniques: Yes  Wound Care: Yes  How and/when to access community resources: NA     Instructional Materials Used/Given: preop pkt, antiseptic soap     Time spent with patient: 15 minutes.     Finasteride Pregnancy And Lactation Text: This medication is absolutely contraindicated during pregnancy. It is unknown if it is excreted in breast milk.

## 2020-09-22 NOTE — LETTER
9/22/2020         RE: Sherry Slaughter  [address] [place] [address]        Dear Colleague,    Thank you for referring your patient, Sherry Slaughter, to the [facility]. Please see a copy of my visit note below.    CHIEF COMPLAINT:  Left foot bunion deformity.      HISTORY OF PRESENT ILLNESS:  Ms. Slaughter is a 65-year-old female who presents today for evaluation of her left foot and great toe.  The patient reports to have a long history of bunion deformity that has been getting progressively worse.  She also reports to have developed a callus along the plantar aspect of the first MTP joint as well as an ulceration along the medial aspect of the distal phalanx.  The patient reports to have undergone a number of arthroplasties and to be ready now to proceed with correction of the bunion deformity.  Reports to have pain and difficulties on a daily basis as well as difficulties with wearing shoes.      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      PAST MEDICAL HISTORY:  Hepatitis B, high cholesterol, obesity, type 2 diabetes.      PAST SURGICAL HISTORY:  Reviewed today.      DRUG ALLERGIES:  Also reviewed today.      CURRENT MEDICATIONS:  Reviewed.       PHYSICAL EXAMINATION:  On today's visit, she presents as a pleasant female in no apparent distress with a height of 5 feet 8 inches and a weight of 297 pounds with a BMI of 44.2.      On today's visit, he presents with full range of motion of the left ankle, hindfoot and midfoot joints.  CMS intact.  Skin is intact.  She presents with an ulceration of approximately 5-7 mm in diameter located along the plantar medial aspect of the distal phalanx of the toe.  There is no drainage.  There is no evidence of infection.  There is a valgus deformity of the great toe with some osteophyte formation along the first metatarsal head.      RADIOGRAPHIC  EVALUATION:  Plain x-rays of the left foot were reviewed today which were significant for showing a noncongruent bunion deformity with some arthritic changes and bone spur formation.  Otherwise, there are no acute findings.      ASSESSMENT:    1.  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   2.  Left great toe superficial ulcer.      I discussed with the patient that at this point we can definitely proceed with a first MTP joint arthrodesis, which will address the deformity and the arthritis in 1 setting.  I discussed with her the most likely postoperative course and complications from undergoing such intervention, which include but are not limited to infection, bleeding, nerve damage, residual pain, nonunion and stiffness.      All questions were answered.  The patient was pleased with the discussion.  The patient will proceed with a new draw of a hemoglobin A1c given the fact that the last one was 7.5 in 07/2020.      The patient also will require to have complete healing of the skin prior to the procedure.      All questions were answered.  The patient was pleased with the discussion.      TT 30 minutes, CT 20 minutes.         Again, thank you for allowing me to participate in the care of your patient.        Sincerely,        Jeevan Gray MD

## 2020-09-22 NOTE — PROGRESS NOTES
CHIEF COMPLAINT:  Left foot bunion deformity.      HISTORY OF PRESENT ILLNESS:  Ms. Slaughter is a 65-year-old female who presents today for evaluation of her left foot and great toe.  The patient reports to have a long history of bunion deformity that has been getting progressively worse.  She also reports to have developed a callus along the plantar aspect of the first MTP joint as well as an ulceration along the medial aspect of the distal phalanx.  The patient reports to have undergone a number of arthroplasties and to be ready now to proceed with correction of the bunion deformity.  Reports to have pain and difficulties on a daily basis as well as difficulties with wearing shoes.      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      PAST MEDICAL HISTORY:  Hepatitis B, high cholesterol, obesity, type 2 diabetes.      PAST SURGICAL HISTORY:  Reviewed today.      DRUG ALLERGIES:  Also reviewed today.      CURRENT MEDICATIONS:  Reviewed.       PHYSICAL EXAMINATION:  On today's visit, she presents as a pleasant female in no apparent distress with a height of 5 feet 8 inches and a weight of 297 pounds with a BMI of 44.2.      On today's visit, he presents with full range of motion of the left ankle, hindfoot and midfoot joints.  CMS intact.  Skin is intact.  She presents with an ulceration of approximately 5-7 mm in diameter located along the plantar medial aspect of the distal phalanx of the toe.  There is no drainage.  There is no evidence of infection.  There is a valgus deformity of the great toe with some osteophyte formation along the first metatarsal head.      RADIOGRAPHIC EVALUATION:  Plain x-rays of the left foot were reviewed today which were significant for showing a noncongruent bunion deformity with some arthritic changes and bone spur formation.  Otherwise, there are no acute findings.      ASSESSMENT:    1.  Left first  MTP joint arthritis with valgus deformity.   2.  Left great toe superficial ulcer.      I discussed with the patient that at this point we can definitely proceed with a first MTP joint arthrodesis, which will address the deformity and the arthritis in 1 setting.  I discussed with her the most likely postoperative course and complications from undergoing such intervention, which include but are not limited to infection, bleeding, nerve damage, residual pain, nonunion and stiffness.      All questions were answered.  The patient was pleased with the discussion.  The patient will proceed with a new draw of a hemoglobin A1c given the fact that the last one was 7.5 in 07/2020.      The patient also will require to have complete healing of the skin prior to the procedure.      All questions were answered.  The patient was pleased with the discussion.      TT 30 minutes, CT 20 minutes.

## 2020-09-22 NOTE — NURSING NOTE
"Reason For Visit:   Chief Complaint   Patient presents with     Consult     left foot bunion       Ht 1.745 m (5' 8.7\")   Wt 134.8 kg (297 lb 1.6 oz)   LMP 10/02/2007   BMI 44.26 kg/m      Pain Assessment  Patient Currently in Pain: Yes  0-10 Pain Scale: 8(with a wrong step)  Primary Pain Location: Foot    Zoila Bunch, ATC    "

## 2020-10-23 ENCOUNTER — TELEPHONE (OUTPATIENT)
Dept: PODIATRY | Facility: CLINIC | Age: 65
End: 2020-10-23

## 2020-10-23 NOTE — TELEPHONE ENCOUNTER
[facility] Call Center    Phone Message    May a detailed message be left on voicemail: yes     Reason for Call: Patient scheduled appointment for 11/18/20 but hoping to get in sooner. She has her toe is infected and needs to get this resolved before she can have bunion surgery. Please advise. Thank you.    Action Taken: Message routed to:  Adult Clinics: Podiatry p [id number]    Travel Screening: Not Applicable

## 2020-10-23 NOTE — TELEPHONE ENCOUNTER
Toe has cracked open again. Not red, small amount of drainage for when it opened. Patient does not think it's infected at this point. We made her an appointment a week earlier. She will contact us if she thinks it is worsening.  Izabel Gonzalez RN

## 2020-10-30 ENCOUNTER — MYC REFILL (OUTPATIENT)
Dept: ENDOCRINOLOGY | Facility: CLINIC | Age: 65
End: 2020-10-30

## 2020-10-30 DIAGNOSIS — Z79.4 UNCONTROLLED TYPE 2 DIABETES MELLITUS WITH HYPERGLYCEMIA, WITH LONG-TERM CURRENT USE OF INSULIN (H): ICD-10-CM

## 2020-10-30 DIAGNOSIS — E11.65 UNCONTROLLED TYPE 2 DIABETES MELLITUS WITH HYPERGLYCEMIA, WITH LONG-TERM CURRENT USE OF INSULIN (H): ICD-10-CM

## 2020-10-30 RX ORDER — FLASH GLUCOSE SENSOR
KIT MISCELLANEOUS
Qty: 6 EACH | Refills: 3 | Status: SHIPPED | OUTPATIENT
Start: 2020-10-30 | End: 2021-01-04

## 2020-11-11 ENCOUNTER — OFFICE VISIT (OUTPATIENT)
Dept: PODIATRY | Facility: CLINIC | Age: 65
End: 2020-11-11
Payer: COMMERCIAL

## 2020-11-11 VITALS — OXYGEN SATURATION: 97 % | DIASTOLIC BLOOD PRESSURE: 84 MMHG | SYSTOLIC BLOOD PRESSURE: 136 MMHG | HEART RATE: 111 BPM

## 2020-11-11 DIAGNOSIS — M21.619 BUNION: ICD-10-CM

## 2020-11-11 DIAGNOSIS — L97.522 SKIN ULCER OF LEFT FOOT WITH FAT LAYER EXPOSED (H): ICD-10-CM

## 2020-11-11 DIAGNOSIS — E11.49 TYPE II OR UNSPECIFIED TYPE DIABETES MELLITUS WITH NEUROLOGICAL MANIFESTATIONS, NOT STATED AS UNCONTROLLED(250.60) (H): Primary | ICD-10-CM

## 2020-11-11 PROCEDURE — 99213 OFFICE O/P EST LOW 20 MIN: CPT | Performed by: PODIATRIST

## 2020-11-11 NOTE — PATIENT INSTRUCTIONS
Thanks for coming today.  Ortho/Sports Medicine Clinic  [address] [place] [address]    To schedule future appointments in Ortho Clinic, you may call [phone].    To schedule ordered imaging by your provider:   Call Central Imaging [phone]    To schedule an injection ordered by your provider:  Call Central Imaging Injection scheduling line: [phone]  [facility] available online at:  [email or web address]    Please call if any further questions or concerns ([phone]).  Clinic hours 8 am to 5 pm.    Return to clinic (call) if symptoms worsen or fail to improve.

## 2020-11-11 NOTE — PROGRESS NOTES
Past Medical History:   Diagnosis Date     Cataract      DEPRESSION     comes and goes - tried meds - unsuccessfully, certain times of the year, no psych intervention and no counselors in the past - and not interested      Diverticulosis of colon (without mention of hemorrhage) 4/04    colonoscopy     ECHO-mildLVH,tr MR,mild thick lflets w inc LA,trTR   12/03     Essential hypertension, benign 1990s    late 1990s - started medications at that time - not to difficult to control meds      Fam hx-cardiovas dis NEC     father - CAD, and lipids/HTN - multiple members of the family      Family history of diabetes mellitus     sister and grandmother with DM      Family history of malignant neoplasm of breast     mother - young age - 45, and maternal cousin and aunt as well - no BRCA testing done      Family history of stroke (cerebrovascular)     grandmother in early life in her 40s      FAMILY HX COLON CANCER     Pat uncles x 2     Heart disease     murmur/     Heart murmur      HYPERLIPIDEMIA 2000    fairly recent - in the last 5 years - high for DM levels  -cholesterol recent      Irritable bowel syndrome     goes between the 2 - nerve related - more stressed more problems      MICROALBUMINURIA     unsure how long - been on the lisinopril - for a few years at well      Nonspecific abnormal results of liver function study 2/3/2003    SGOT - has been high in the past - since the hepatitis b - borderline elevation - not really changing any      OBESITY      Obstructive sleep apnea      GENESIS (obstructive sleep apnea) 10/15/2006    psg 5/15 AHI 53 aPAP 8-15     OSTEOARTHRITIS L KNEE 2003    total knee on the left - and pain is gone since the knee replacement      PERS HX HEPATITIS B- RESOLVED] 1977    acute virus only - no chronic disease      PERS HX HEPATITIS B- RESOLVED]      Type II or unspecified type diabetes mellitus without mention of complication, not stated as uncontrolled 1991    oral meds frist, then insulin  eventually later, difficult to control most of the time      Unspecified hypothyroidism 10/11/2006    TSH 3.36 - mild subclinical hypothyroidism - deciding on following or starting low dose meds - with dr Oreilly - following      Patient Active Problem List   Diagnosis     Morbid obesity (H)     Diverticulosis of large intestine     Nonspecific abnormal results of cardiovascular function study     FAMILY HX COLON CANCER     Nonallopathic lesion of thoracic region     Hypothyroidism     GENESIS (obstructive sleep apnea)     Irritable bowel syndrome     Family history of malignant neoplasm of breast     History of major depression     OSTEOARTHRITIS L KNEE  s/p knee replacement on the left      Hypertension goal BP (blood pressure) < 140/90     Family history of stroke (cerebrovascular)     Family history of other cardiovascular diseases     Family history of diabetes mellitus     ABSENCE OF MENSTRUATION - perimenopausal      JOINT PAIN-ANKLE - right ankle      Mitral valve insufficiency     Hyperlipidemia LDL goal <100     Aortic sclerosis     Tubular adenoma of colon     History of viral hepatitis, type B     Chronic low back pain     Long-term insulin use (H)     Uncontrolled diabetes mellitus (H)     S/P total knee replacement using cement, right     Aftercare following right knee joint replacement surgery     Lumbago     Type 2 diabetes mellitus with hyperglycemia (H)     Posterior vitreous detachment of right eye     Pseudophakia of both eyes     Past Surgical History:   Procedure Laterality Date     ARTHROPLASTY KNEE Right 9/23/2015    Procedure: ARTHROPLASTY KNEE;  Surgeon: Rufus Brown MD;  Location:  OR     C TOTAL KNEE ARTHROPLASTY  3/03    L knee     CATARACT IOL, RT/LT Left      COLONOSCOPY  4/04    diverticulosis, rec repeat 10 yrs(consider fam hx?)     COLONOSCOPY WITH CO2 INSUFFLATION N/A 6/19/2019    Procedure: COLONOSCOPY, WITH CO2 INSUFFLATION;  Surgeon: Zeb Duarte MD;  Location:  OR      ORTHOPEDIC SURGERY      right ankle     PHACOEMULSIFICATION CLEAR CORNEA WITH STANDARD INTRAOCULAR LENS IMPLANT Left 3/15/2018    Procedure: PHACOEMULSIFICATION CLEAR CORNEA WITH STANDARD INTRAOCULAR LENS IMPLANT;  LEFT EYE PHACOEMULSIFICATION CLEAR CORNEA WITH STANDARD INTRAOCULAR LENS IMPLANT;  Surgeon: Bandar Linares MD;  Location: [facility]     PHACOEMULSIFICATION CLEAR CORNEA WITH STANDARD INTRAOCULAR LENS IMPLANT Right 4/5/2018    Procedure: PHACOEMULSIFICATION CLEAR CORNEA WITH STANDARD INTRAOCULAR LENS IMPLANT;  RIGHT PHACOEMULSIFICATION CLEAR CORNEA WITH STANDARD INTRAOCULAR LENS IMPLANT ;  Surgeon: Bandar Linares MD;  Location: [facility]     STRESS ECHO (METRO)  12/03    no ischemia, limited by fatigue     SURGICAL HISTORY OF -       EXP LAP- R OVARY CYSTS     SURGICAL HISTORY OF -   1981,1984,1985    CHILDBIRTH     ZZC NONSPECIFIC PROCEDURE  6/06    right triple arthrodecesis      ZZC NONSPECIFIC PROCEDURE  7/06     right bunion surgery      Social History     Socioeconomic History     Marital status:      Spouse name: Not on file     Number of children: 3     Years of education: Not on file     Highest education level: Not on file   Occupational History     Occupation: RN     Employer: [employer]   Social Needs     Financial resource strain: Not on file     Food insecurity     Worry: Not on file     Inability: Not on file     Transportation needs     Medical: Not on file     Non-medical: Not on file   Tobacco Use     Smoking status: Never Smoker     Smokeless tobacco: Never Used     Tobacco comment: tried in early 20s only    Substance and Sexual Activity     Alcohol use: Yes     Comment: rare     Drug use: No     Sexual activity: Never     Comment:  - since for 20 years, no signficant other  > 5 years sexual activity    Lifestyle     Physical activity     Days per week: Not on file     Minutes per session: Not on file     Stress: Not on file   Relationships      Social connections     Talks on phone: Not on file     Gets together: Not on file     Attends Hindu service: Not on file     Active member of club or organization: Not on file     Attends meetings of clubs or organizations: Not on file     Relationship status: Not on file     Intimate partner violence     Fear of current or ex partner: Not on file     Emotionally abused: Not on file     Physically abused: Not on file     Forced sexual activity: Not on file   Other Topics Concern      Service No     Blood Transfusions No     Caffeine Concern No     Occupational Exposure Yes     Comment: Normal nursing exposures     Hobby Hazards No     Sleep Concern Yes     Stress Concern No     Comment: Stress level at HM=5, stress level at WK=6     Weight Concern Yes     Special Diet Yes     Comment: Diabetic diet (watching carbs)     Back Care No     Comment: Occassional back spasms     Exercise Yes     Comment: Pt joined a health club goes approx 3-4 times a week,half to one mile daily     Bike Helmet No     Seat Belt Yes     Comment: Sometimes     Self-Exams Yes     Parent/sibling w/ CABG, MI or angioplasty before 65F 55M? 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      Family History   Problem Relation Age of Onset     Diabetes Maternal Grandmother         DM TYPE 2     Cerebrovascular Disease Maternal Grandmother      Hypertension Sister      Lipids Sister      Heart Disease Sister         Chronic AFib     Hypertension Sister      Lipids Sister      Gynecology Sister      Diabetes Sister      Asthma Sister      Blood Disease Paternal Grandmother         T CELL LEUKEMIA     Hypertension Brother      Lipids Brother      Congenital Anomalies Brother      Cardiovascular Brother      Heart Disease Brother         CABG/AFIB     Hypertension Brother      Lipids Brother      Obesity Brother      Hypertension Brother      Respiratory Brother          Sleep apnea     Heart Disease Brother         AFib     Alzheimer Disease Mother      Asthma Mother      Hypertension Mother      Breast Cancer Mother 40        has mastectomy and lived to 84     Allergies Mother         Sulfa,     Arthritis Mother      Cardiovascular Mother      Depression Mother      Respiratory Mother      Lipids Mother      Cancer Mother         breast     Thyroid Disease Mother      Cerebrovascular Disease Mother      Dementia Mother         Alzheimers     Cancer - colorectal Other      Cancer Father         lung     Aneurysm Father         brother, AAA     Other Cancer Father         lung ca     Asthma Sister      Cancer - colorectal Paternal Uncle         late 50s to early 60s - great uncles      Breast Cancer Other         Maternal cousin     Arrhythmia Sister         2 brothers 1 sister Afib     Breast Cancer Maternal Aunt 62     Other Cancer Maternal Aunt 35        Ovarian cancer.  Survived despite late recurrence and is now 92.     Glaucoma No family hx of      Macular Degeneration No family hx of      Lab Results   Component Value Date    A1C 7.5 07/22/2020    A1C 7.3 12/10/2019    A1C 7.5 08/21/2019    A1C 7.8 05/21/2019    A1C 6.8 11/14/2018         SUBJECTIVE FINDINGS:  A 65-year-old female returns to clinic for ulcer, left hallux.  She is diabetic with peripheral neuropathy and hallux valgus and bunions.  She relates she has seen Dr. Gray and he wants to fuse the joint, but he wants the ulcer healed first.  She relates it gets almost healed and then it cracks and breaks open.  She has insoles in some of her shoes, but not all of them.  She relates she has a Roll-A-Bout she has been intermittently using.  She relates she is using Bag Balm on it currently and self-trimming it.  She relates she was using the Iodosorb until she ran out.      OBJECTIVE FINDINGS:  DP and PT are 2/4 left.  She has left plantar medial hallux ulcer that is about 0.5 cm.  It is through the dermis.  Minimal  drainage.  No erythema, no odor, no calor.  Minimal edema.  Mild hyperkeratotic tissue buildup.       ASSESSMENT AND PLAN:  Ulcer, left hallux.  She is diabetic with peripheral neuropathy.  She has hallux valgus and bunion present.  Diagnosis and treatment options discussed with the patient.   Local wound care done upon consent today.  I am going to have her clean this with MicroKlenz, apply Iodosorb and a sterile dressing.  Diabetic CAM boot dispensed and use discussed with her.  She will continue to use her Roll-A-Bout as much as she can and return to clinic and see me in about 2 weeks.

## 2020-11-11 NOTE — LETTER
11/11/2020         RE: Sherry Slaughter  [address]  [place] [address]        Dear Colleague,    Thank you for referring your patient, Sherry Slaughter, to the [facility]. Please see a copy of my visit note below.    Past Medical History:   Diagnosis Date     Cataract      DEPRESSION     comes and goes - tried meds - unsuccessfully, certain times of the year, no psych intervention and no counselors in the past - and not interested      Diverticulosis of colon (without mention of hemorrhage) 4/04    colonoscopy     ECHO-mildLVH,tr MR,mild thick lflets w inc LA,trTR   12/03     Essential hypertension, benign 1990s    late 1990s - started medications at that time - not to difficult to control meds      Fam hx-cardiovas dis NEC     father - CAD, and lipids/HTN - multiple members of the family      Family history of diabetes mellitus     sister and grandmother with DM      Family history of malignant neoplasm of breast     mother - young age - 45, and maternal cousin and aunt as well - no BRCA testing done      Family history of stroke (cerebrovascular)     grandmother in early life in her 40s      FAMILY HX COLON CANCER     Pat uncles x 2     Heart disease     murmur/     Heart murmur      HYPERLIPIDEMIA 2000    fairly recent - in the last 5 years - high for DM levels  -cholesterol recent      Irritable bowel syndrome     goes between the 2 - nerve related - more stressed more problems      MICROALBUMINURIA     unsure how long - been on the lisinopril - for a few years at well      Nonspecific abnormal results of liver function study 2/3/2003    SGOT - has been high in the past - since the hepatitis b - borderline elevation - not really changing any      OBESITY      Obstructive sleep apnea      GENESIS (obstructive sleep apnea) 10/15/2006    psg 5/15 AHI 53 aPAP 8-15     OSTEOARTHRITIS L KNEE 2003    total knee on the left - and pain is gone since the knee replacement       PERS HX HEPATITIS B- RESOLVED] 1977    acute virus only - no chronic disease      PERS HX HEPATITIS B- RESOLVED]      Type II or unspecified type diabetes mellitus without mention of complication, not stated as uncontrolled 1991    oral meds frist, then insulin eventually later, difficult to control most of the time      Unspecified hypothyroidism 10/11/2006    TSH 3.36 - mild subclinical hypothyroidism - deciding on following or starting low dose meds - with dr Oreilly - following      Patient Active Problem List   Diagnosis     Morbid obesity (H)     Diverticulosis of large intestine     Nonspecific abnormal results of cardiovascular function study     FAMILY HX COLON CANCER     Nonallopathic lesion of thoracic region     Hypothyroidism     GENESIS (obstructive sleep apnea)     Irritable bowel syndrome     Family history of malignant neoplasm of breast     History of major depression     OSTEOARTHRITIS L KNEE  s/p knee replacement on the left      Hypertension goal BP (blood pressure) < 140/90     Family history of stroke (cerebrovascular)     Family history of other cardiovascular diseases     Family history of diabetes mellitus     ABSENCE OF MENSTRUATION - perimenopausal      JOINT PAIN-ANKLE - right ankle      Mitral valve insufficiency     Hyperlipidemia LDL goal <100     Aortic sclerosis     Tubular adenoma of colon     History of viral hepatitis, type B     Chronic low back pain     Long-term insulin use (H)     Uncontrolled diabetes mellitus (H)     S/P total knee replacement using cement, right     Aftercare following right knee joint replacement surgery     Lumbago     Type 2 diabetes mellitus with hyperglycemia (H)     Posterior vitreous detachment of right eye     Pseudophakia of both eyes     Past Surgical History:   Procedure Laterality Date     ARTHROPLASTY KNEE Right 9/23/2015    Procedure: ARTHROPLASTY KNEE;  Surgeon: Rufus Brown MD;  Location:  OR      TOTAL KNEE ARTHROPLASTY  3/03    L  knee     CATARACT IOL, RT/LT Left      COLONOSCOPY  4/04    diverticulosis, rec repeat 10 yrs(consider fam hx?)     COLONOSCOPY WITH CO2 INSUFFLATION N/A 6/19/2019    Procedure: COLONOSCOPY, WITH CO2 INSUFFLATION;  Surgeon: Zeb Duarte MD;  Location: [facility]     ORTHOPEDIC SURGERY      right ankle     PHACOEMULSIFICATION CLEAR CORNEA WITH STANDARD INTRAOCULAR LENS IMPLANT Left 3/15/2018    Procedure: PHACOEMULSIFICATION CLEAR CORNEA WITH STANDARD INTRAOCULAR LENS IMPLANT;  LEFT EYE PHACOEMULSIFICATION CLEAR CORNEA WITH STANDARD INTRAOCULAR LENS IMPLANT;  Surgeon: Bandar Linares MD;  Location:  [facility]     PHACOEMULSIFICATION CLEAR CORNEA WITH STANDARD INTRAOCULAR LENS IMPLANT Right 4/5/2018    Procedure: PHACOEMULSIFICATION CLEAR CORNEA WITH STANDARD INTRAOCULAR LENS IMPLANT;  RIGHT PHACOEMULSIFICATION CLEAR CORNEA WITH STANDARD INTRAOCULAR LENS IMPLANT ;  Surgeon: Bandar Linares MD;  Location:  [facility]     STRESS ECHO (METRO)  12/03    no ischemia, limited by fatigue     SURGICAL HISTORY OF -       EXP LAP- R OVARY CYSTS     SURGICAL HISTORY OF -   1981,1984,1985    CHILDBIRTH     ZZC NONSPECIFIC PROCEDURE  6/06    right triple arthrodecesis      ZZC NONSPECIFIC PROCEDURE  7/06     right bunion surgery      Social History     Socioeconomic History     Marital status:      Spouse name: Not on file     Number of children: 3     Years of education: Not on file     Highest education level: Not on file   Occupational History     Occupation: RN     Employer: [employer]   Social Needs     Financial resource strain: Not on file     Food insecurity     Worry: Not on file     Inability: Not on file     Transportation needs     Medical: Not on file     Non-medical: Not on file   Tobacco Use     Smoking status: Never Smoker     Smokeless tobacco: Never Used     Tobacco comment: tried in early 20s only    Substance and Sexual Activity     Alcohol use: Yes     Comment: rare     Drug use:  No     Sexual activity: Never     Comment:  - since for 20 years, no signficant other  > 5 years sexual activity    Lifestyle     Physical activity     Days per week: Not on file     Minutes per session: Not on file     Stress: Not on file   Relationships     Social connections     Talks on phone: Not on file     Gets together: Not on file     Attends Tenriism service: Not on file     Active member of club or organization: Not on file     Attends meetings of clubs or organizations: Not on file     Relationship status: Not on file     Intimate partner violence     Fear of current or ex partner: Not on file     Emotionally abused: Not on file     Physically abused: Not on file     Forced sexual activity: Not on file   Other Topics Concern      Service No     Blood Transfusions No     Caffeine Concern No     Occupational Exposure Yes     Comment: Normal nursing exposures     Hobby Hazards No     Sleep Concern Yes     Stress Concern No     Comment: Stress level at HM=5, stress level at WK=6     Weight Concern Yes     Special Diet Yes     Comment: Diabetic diet (watching carbs)     Back Care No     Comment: Occassional back spasms     Exercise Yes     Comment: Pt joined a health club goes approx 3-4 times a week,half to one mile daily     Bike Helmet No     Seat Belt Yes     Comment: Sometimes     Self-Exams Yes     Parent/sibling w/ CABG, MI or angioplasty before 65F 55M? 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      Family History   Problem Relation Age of Onset     Diabetes Maternal Grandmother         DM TYPE 2     Cerebrovascular Disease Maternal Grandmother      Hypertension Sister      Lipids Sister      Heart Disease Sister         Chronic AFib     Hypertension Sister      Lipids Sister      Gynecology Sister      Diabetes Sister      Asthma Sister      Blood Disease Paternal Grandmother         T CELL LEUKEMIA      Hypertension Brother      Lipids Brother      Congenital Anomalies Brother      Cardiovascular Brother      Heart Disease Brother         CABG/AFIB     Hypertension Brother      Lipids Brother      Obesity Brother      Hypertension Brother      Respiratory Brother         Sleep apnea     Heart Disease Brother         AFib     Alzheimer Disease Mother      Asthma Mother      Hypertension Mother      Breast Cancer Mother 40        has mastectomy and lived to 84     Allergies Mother         Sulfa,     Arthritis Mother      Cardiovascular Mother      Depression Mother      Respiratory Mother      Lipids Mother      Cancer Mother         breast     Thyroid Disease Mother      Cerebrovascular Disease Mother      Dementia Mother         Alzheimers     Cancer - colorectal Other      Cancer Father         lung     Aneurysm Father         brother, AAA     Other Cancer Father         lung ca     Asthma Sister      Cancer - colorectal Paternal Uncle         late 50s to early 60s - great uncles      Breast Cancer Other         Maternal cousin     Arrhythmia Sister         2 brothers 1 sister Afib     Breast Cancer Maternal Aunt 62     Other Cancer Maternal Aunt 35        Ovarian cancer.  Survived despite late recurrence and is now 92.     Glaucoma No family hx of      Macular Degeneration No family hx of      Lab Results   Component Value Date    A1C 7.5 07/22/2020    A1C 7.3 12/10/2019    A1C 7.5 08/21/2019    A1C 7.8 05/21/2019    A1C 6.8 11/14/2018         SUBJECTIVE FINDINGS:  A 65-year-old female returns to clinic for ulcer, left hallux.  She is diabetic with peripheral neuropathy and hallux valgus and bunions.  She relates she has seen Dr. Gray and he wants to fuse the joint, but he wants the ulcer healed first.  She relates it gets almost healed and then it cracks and breaks open.  She has insoles in some of her shoes, but not all of them.  She relates she has a Roll-A-Bout she has been intermittently using.  She relates  she is using Bag Balm on it currently and self-trimming it.  She relates she was using the Iodosorb until she ran out.      OBJECTIVE FINDINGS:  DP and PT are 2/4 left.  She has left plantar medial hallux ulcer that is about 0.5 cm.  It is through the dermis.  Minimal drainage.  No erythema, no odor, no calor.  Minimal edema.  Mild hyperkeratotic tissue buildup.       ASSESSMENT AND PLAN:  Ulcer, left hallux.  She is diabetic with peripheral neuropathy.  She has hallux valgus and bunion present.  Diagnosis and treatment options discussed with the patient.   Local wound care done upon consent today.  I am going to have her clean this with MicroKlenz, apply Iodosorb and a sterile dressing.  Diabetic CAM boot dispensed and use discussed with her.  She will continue to use her Roll-A-Bout as much as she can and return to clinic and see me in about 2 weeks.           Again, thank you for allowing me to participate in the care of your patient.        Sincerely,        Rufus Hutson DPM

## 2020-11-11 NOTE — NURSING NOTE
Sherry Slaughter's chief complaint for this visit includes:  Chief Complaint   Patient presents with     RECHECK     fissure left hallux     PCP: Marilou Arciniega    Referring Provider:  No referring provider defined for this encounter.    /84 (BP Location: Left arm, Patient Position: Sitting, Cuff Size: Adult Regular)   Pulse 111   LMP 10/02/2007   SpO2 97%   Data Unavailable     Do you need any medication refills at today's visit? No    Opal Conner CMA

## 2020-11-19 ENCOUNTER — TELEPHONE (OUTPATIENT)
Dept: CARDIOLOGY | Facility: CLINIC | Age: 65
End: 2020-11-19

## 2020-11-19 DIAGNOSIS — R00.0 TACHYCARDIA: Primary | ICD-10-CM

## 2020-11-19 NOTE — TELEPHONE ENCOUNTER
Called and spoke to patient about an earlier appt. She has no recent EKG and referral is for increased pulse and Afib.  Afib not seen in chart previously. Would like her to come and get EKG when she has appt on 11/30 and can move her appt with Dr Aranda up to 12/1.   She states that she has strips on her watch that show afib. Asked if she could print those off and bring in to the clinic for her appt as we do not have any documentation of the afib. She states her pulse has been high and Dr Arciniega did tell her that if her increase in metoprolol does not help, she will need to go to the ED. Agreed that she will need to do that.  [place] if [place] [facility] [place] Aranda and get back to her.     EMMY Cool

## 2020-11-19 NOTE — TELEPHONE ENCOUNTER
[facility] Call Center    Phone Message    May a detailed message be left on voicemail: yes     Reason for Call: Patient referred by Dr. Arciniega to see Dr. Aranda for increased heart rate/Afib.  Patient scheduled for next available virtual visit on 12/3/2020 but wanted to see if there is any way that she can see Dr. Aranda sooner for an in person or virtual visit.  Please call patient back to discuss scheduling.  Thank you.    Action Taken: Message routed to:  Adult Clinics: Cardiology p [id number]    Travel Screening: Not Applicable

## 2020-11-20 NOTE — TELEPHONE ENCOUNTER
Spoke to patient. She could have a virtual appt with Dr Aranda on 11/25 and have the EKG on 11/24 at [facility]. She is agreeable to have appt on 11/25, prefers 9:30 am. Rescheduled EKG for 11/24. She will try and print strips off of her phone, if not, she will email to me.     EMMY Cool

## 2020-11-23 ENCOUNTER — TELEPHONE (OUTPATIENT)
Dept: CARDIOLOGY | Facility: CLINIC | Age: 65
End: 2020-11-23

## 2020-11-23 NOTE — TELEPHONE ENCOUNTER
Called and left  for Pt informing her the times initially given for 11/25 appt were for return and only 30 minutes.  Noted writer did not realize she was a new, which should be a 60 minute timeslot.  Noted we could still do a visit that day at 3:30 PM.  Informed her that would be scheduled, and if there were any issues with that Pt should contact clinic.  Clinic telephone provided.    Janee Chen LPN

## 2020-11-24 DIAGNOSIS — R00.0 TACHYCARDIA: ICD-10-CM

## 2020-11-25 ENCOUNTER — VIRTUAL VISIT (OUTPATIENT)
Dept: CARDIOLOGY | Facility: CLINIC | Age: 65
End: 2020-11-25
Attending: INTERNAL MEDICINE
Payer: COMMERCIAL

## 2020-11-25 ENCOUNTER — HOSPITAL ENCOUNTER (INPATIENT)
Facility: CLINIC | Age: 65
LOS: 2 days | Discharge: HOME | DRG: 309 | End: 2020-11-27
Attending: EMERGENCY MEDICINE | Admitting: INTERNAL MEDICINE
Payer: COMMERCIAL

## 2020-11-25 DIAGNOSIS — I48.0 PAROXYSMAL ATRIAL FIBRILLATION (H): ICD-10-CM

## 2020-11-25 DIAGNOSIS — I48.19 PERSISTENT ATRIAL FIBRILLATION (H): Primary | ICD-10-CM

## 2020-11-25 DIAGNOSIS — Z20.828 CONTACT WITH AND (SUSPECTED) EXPOSURE TO OTHER VIRAL COMMUNICABLE DISEASES: ICD-10-CM

## 2020-11-25 LAB
ALBUMIN SERPL-MCNC: 3.9 G/DL (ref 3.4–5)
ALP SERPL-CCNC: 47 U/L (ref 40–150)
ALT SERPL W P-5'-P-CCNC: 42 U/L (ref 0–50)
ANION GAP SERPL CALCULATED.3IONS-SCNC: 9 MMOL/L (ref 3–14)
AST SERPL W P-5'-P-CCNC: ABNORMAL U/L (ref 0–45)
BASOPHILS # BLD AUTO: 0.1 10E9/L (ref 0–0.2)
BASOPHILS NFR BLD AUTO: 0.7 %
BILIRUB SERPL-MCNC: 0.5 MG/DL (ref 0.2–1.3)
BUN SERPL-MCNC: 19 MG/DL (ref 7–30)
CALCIUM SERPL-MCNC: 10.1 MG/DL (ref 8.5–10.1)
CHLORIDE SERPL-SCNC: 108 MMOL/L (ref 94–109)
CO2 SERPL-SCNC: 22 MMOL/L (ref 20–32)
CREAT SERPL-MCNC: 0.79 MG/DL (ref 0.52–1.04)
DIFFERENTIAL METHOD BLD: NORMAL
EOSINOPHIL # BLD AUTO: 0.2 10E9/L (ref 0–0.7)
EOSINOPHIL NFR BLD AUTO: 1.8 %
ERYTHROCYTE [DISTWIDTH] IN BLOOD BY AUTOMATED COUNT: 13.2 % (ref 10–15)
GFR SERPL CREATININE-BSD FRML MDRD: 77 ML/MIN/{1.73_M2}
GLUCOSE SERPL-MCNC: 156 MG/DL (ref 70–99)
HBA1C MFR BLD: 6.9 % (ref 0–5.6)
HCT VFR BLD AUTO: 43.3 % (ref 35–47)
HGB BLD-MCNC: 14.2 G/DL (ref 11.7–15.7)
IMM GRANULOCYTES # BLD: 0.1 10E9/L (ref 0–0.4)
IMM GRANULOCYTES NFR BLD: 0.6 %
INR PPP: 1.09 (ref 0.86–1.14)
INTERPRETATION ECG - MUSE: NORMAL
LABORATORY COMMENT REPORT: NORMAL
LACTATE BLD-SCNC: 2 MMOL/L (ref 0.7–2)
LYMPHOCYTES # BLD AUTO: 2.7 10E9/L (ref 0.8–5.3)
LYMPHOCYTES NFR BLD AUTO: 28 %
MAGNESIUM SERPL-MCNC: 1.7 MG/DL (ref 1.6–2.3)
MCH RBC QN AUTO: 31.6 PG (ref 26.5–33)
MCHC RBC AUTO-ENTMCNC: 32.8 G/DL (ref 31.5–36.5)
MCV RBC AUTO: 96 FL (ref 78–100)
MONOCYTES # BLD AUTO: 0.9 10E9/L (ref 0–1.3)
MONOCYTES NFR BLD AUTO: 9 %
NEUTROPHILS # BLD AUTO: 5.7 10E9/L (ref 1.6–8.3)
NEUTROPHILS NFR BLD AUTO: 59.9 %
NRBC # BLD AUTO: 0 10*3/UL
NRBC BLD AUTO-RTO: 0 /100
NT-PROBNP SERPL-MCNC: 714 PG/ML (ref 0–900)
PLATELET # BLD AUTO: 225 10E9/L (ref 150–450)
POTASSIUM SERPL-SCNC: 4.1 MMOL/L (ref 3.4–5.3)
PROT SERPL-MCNC: 7.4 G/DL (ref 6.8–8.8)
RBC # BLD AUTO: 4.5 10E12/L (ref 3.8–5.2)
SARS-COV-2 RNA SPEC QL NAA+PROBE: NEGATIVE
SARS-COV-2 RNA SPEC QL NAA+PROBE: NORMAL
SODIUM SERPL-SCNC: 139 MMOL/L (ref 133–144)
SPECIMEN SOURCE: NORMAL
SPECIMEN SOURCE: NORMAL
TSH SERPL DL<=0.005 MIU/L-ACNC: 2.12 MU/L (ref 0.4–4)
WBC # BLD AUTO: 9.6 10E9/L (ref 4–11)

## 2020-11-25 PROCEDURE — 84460 ALANINE AMINO (ALT) (SGPT): CPT

## 2020-11-25 PROCEDURE — 99222 1ST HOSP IP/OBS MODERATE 55: CPT | Mod: AI | Performed by: INTERNAL MEDICINE

## 2020-11-25 PROCEDURE — 83880 ASSAY OF NATRIURETIC PEPTIDE: CPT | Performed by: EMERGENCY MEDICINE

## 2020-11-25 PROCEDURE — 83605 ASSAY OF LACTIC ACID: CPT | Performed by: INTERNAL MEDICINE

## 2020-11-25 PROCEDURE — 84484 ASSAY OF TROPONIN QUANT: CPT | Performed by: EMERGENCY MEDICINE

## 2020-11-25 PROCEDURE — 82040 ASSAY OF SERUM ALBUMIN: CPT

## 2020-11-25 PROCEDURE — 999N000128 HC STATISTIC PERIPHERAL IV START W/O US GUIDANCE

## 2020-11-25 PROCEDURE — 250N000013 HC RX MED GY IP 250 OP 250 PS 637: Performed by: STUDENT IN AN ORGANIZED HEALTH CARE EDUCATION/TRAINING PROGRAM

## 2020-11-25 PROCEDURE — 84155 ASSAY OF PROTEIN SERUM: CPT

## 2020-11-25 PROCEDURE — 80048 BASIC METABOLIC PNL TOTAL CA: CPT

## 2020-11-25 PROCEDURE — 258N000003 HC RX IP 258 OP 636: Performed by: EMERGENCY MEDICINE

## 2020-11-25 PROCEDURE — 96365 THER/PROPH/DIAG IV INF INIT: CPT | Performed by: EMERGENCY MEDICINE

## 2020-11-25 PROCEDURE — 99285 EMERGENCY DEPT VISIT HI MDM: CPT | Mod: 25 | Performed by: EMERGENCY MEDICINE

## 2020-11-25 PROCEDURE — 85025 COMPLETE CBC W/AUTO DIFF WBC: CPT | Performed by: EMERGENCY MEDICINE

## 2020-11-25 PROCEDURE — C9803 HOPD COVID-19 SPEC COLLECT: HCPCS | Performed by: EMERGENCY MEDICINE

## 2020-11-25 PROCEDURE — 96375 TX/PRO/DX INJ NEW DRUG ADDON: CPT | Performed by: EMERGENCY MEDICINE

## 2020-11-25 PROCEDURE — 250N000009 HC RX 250: Performed by: EMERGENCY MEDICINE

## 2020-11-25 PROCEDURE — 83036 HEMOGLOBIN GLYCOSYLATED A1C: CPT | Performed by: EMERGENCY MEDICINE

## 2020-11-25 PROCEDURE — 84443 ASSAY THYROID STIM HORMONE: CPT | Performed by: EMERGENCY MEDICINE

## 2020-11-25 PROCEDURE — 99203 OFFICE O/P NEW LOW 30 MIN: CPT | Mod: 95 | Performed by: INTERNAL MEDICINE

## 2020-11-25 PROCEDURE — 214N000001 HC R&B CCU UMMC

## 2020-11-25 PROCEDURE — 93010 ELECTROCARDIOGRAM REPORT: CPT | Performed by: EMERGENCY MEDICINE

## 2020-11-25 PROCEDURE — 250N000013 HC RX MED GY IP 250 OP 250 PS 637: Performed by: EMERGENCY MEDICINE

## 2020-11-25 PROCEDURE — 84075 ASSAY ALKALINE PHOSPHATASE: CPT

## 2020-11-25 PROCEDURE — 82247 BILIRUBIN TOTAL: CPT

## 2020-11-25 PROCEDURE — 93005 ELECTROCARDIOGRAM TRACING: CPT | Performed by: EMERGENCY MEDICINE

## 2020-11-25 PROCEDURE — 83735 ASSAY OF MAGNESIUM: CPT | Performed by: EMERGENCY MEDICINE

## 2020-11-25 PROCEDURE — 85610 PROTHROMBIN TIME: CPT | Performed by: EMERGENCY MEDICINE

## 2020-11-25 PROCEDURE — 36415 COLL VENOUS BLD VENIPUNCTURE: CPT | Performed by: INTERNAL MEDICINE

## 2020-11-25 PROCEDURE — U0003 INFECTIOUS AGENT DETECTION BY NUCLEIC ACID (DNA OR RNA); SEVERE ACUTE RESPIRATORY SYNDROME CORONAVIRUS 2 (SARS-COV-2) (CORONAVIRUS DISEASE [COVID-19]), AMPLIFIED PROBE TECHNIQUE, MAKING USE OF HIGH THROUGHPUT TECHNOLOGIES AS DESCRIBED BY CMS-2020-01-R: HCPCS | Performed by: EMERGENCY MEDICINE

## 2020-11-25 PROCEDURE — 258N000003 HC RX IP 258 OP 636: Performed by: STUDENT IN AN ORGANIZED HEALTH CARE EDUCATION/TRAINING PROGRAM

## 2020-11-25 RX ORDER — METOPROLOL SUCCINATE 50 MG/1
50 TABLET, EXTENDED RELEASE ORAL DAILY
Status: DISCONTINUED | OUTPATIENT
Start: 2020-11-26 | End: 2020-11-27

## 2020-11-25 RX ORDER — ROSUVASTATIN CALCIUM 10 MG/1
10 TABLET, COATED ORAL DAILY
Status: DISCONTINUED | OUTPATIENT
Start: 2020-11-25 | End: 2020-11-27 | Stop reason: HOSPADM

## 2020-11-25 RX ORDER — AMOXICILLIN 250 MG
1 CAPSULE ORAL 2 TIMES DAILY
Status: DISCONTINUED | OUTPATIENT
Start: 2020-11-25 | End: 2020-11-27 | Stop reason: HOSPADM

## 2020-11-25 RX ORDER — LISINOPRIL 40 MG/1
40 TABLET ORAL DAILY
Status: DISCONTINUED | OUTPATIENT
Start: 2020-11-26 | End: 2020-11-27 | Stop reason: HOSPADM

## 2020-11-25 RX ORDER — ACETAMINOPHEN 325 MG/1
650 TABLET ORAL EVERY 4 HOURS PRN
Status: DISCONTINUED | OUTPATIENT
Start: 2020-11-25 | End: 2020-11-27 | Stop reason: HOSPADM

## 2020-11-25 RX ORDER — MAGNESIUM HYDROXIDE/ALUMINUM HYDROXICE/SIMETHICONE 120; 1200; 1200 MG/30ML; MG/30ML; MG/30ML
30 SUSPENSION ORAL EVERY 4 HOURS PRN
Status: DISCONTINUED | OUTPATIENT
Start: 2020-11-25 | End: 2020-11-27 | Stop reason: HOSPADM

## 2020-11-25 RX ORDER — DEXTROSE MONOHYDRATE 25 G/50ML
25-50 INJECTION, SOLUTION INTRAVENOUS
Status: DISCONTINUED | OUTPATIENT
Start: 2020-11-25 | End: 2020-11-27 | Stop reason: HOSPADM

## 2020-11-25 RX ORDER — HYDROCHLOROTHIAZIDE 25 MG/1
25 TABLET ORAL DAILY
Status: DISCONTINUED | OUTPATIENT
Start: 2020-11-26 | End: 2020-11-27 | Stop reason: HOSPADM

## 2020-11-25 RX ORDER — ECONAZOLE NITRATE 10 MG/G
CREAM TOPICAL DAILY PRN
COMMUNITY
End: 2023-04-17

## 2020-11-25 RX ORDER — DILTIAZEM HYDROCHLORIDE 5 MG/ML
25 INJECTION INTRAVENOUS ONCE
Status: COMPLETED | OUTPATIENT
Start: 2020-11-25 | End: 2020-11-25

## 2020-11-25 RX ORDER — NICOTINE POLACRILEX 4 MG
15-30 LOZENGE BUCCAL
Status: DISCONTINUED | OUTPATIENT
Start: 2020-11-25 | End: 2020-11-27 | Stop reason: HOSPADM

## 2020-11-25 RX ORDER — NITROGLYCERIN 0.4 MG/1
0.4 TABLET SUBLINGUAL EVERY 5 MIN PRN
Status: DISCONTINUED | OUTPATIENT
Start: 2020-11-25 | End: 2020-11-27 | Stop reason: HOSPADM

## 2020-11-25 RX ORDER — ACETAMINOPHEN 650 MG/1
650 SUPPOSITORY RECTAL EVERY 4 HOURS PRN
Status: DISCONTINUED | OUTPATIENT
Start: 2020-11-25 | End: 2020-11-27 | Stop reason: HOSPADM

## 2020-11-25 RX ORDER — AMOXICILLIN 250 MG
2 CAPSULE ORAL 2 TIMES DAILY
Status: DISCONTINUED | OUTPATIENT
Start: 2020-11-25 | End: 2020-11-27 | Stop reason: HOSPADM

## 2020-11-25 RX ORDER — POLYETHYLENE GLYCOL 3350 17 G/17G
17 POWDER, FOR SOLUTION ORAL DAILY
Status: DISCONTINUED | OUTPATIENT
Start: 2020-11-25 | End: 2020-11-27 | Stop reason: HOSPADM

## 2020-11-25 RX ORDER — DILTIAZEM HYDROCHLORIDE 30 MG/1
30 TABLET, FILM COATED ORAL ONCE
Status: COMPLETED | OUTPATIENT
Start: 2020-11-25 | End: 2020-11-25

## 2020-11-25 RX ORDER — METOPROLOL SUCCINATE 25 MG/1
50 TABLET, EXTENDED RELEASE ORAL DAILY
Status: ON HOLD | COMMUNITY
End: 2020-11-27

## 2020-11-25 RX ORDER — LEVOTHYROXINE SODIUM 75 UG/1
75 TABLET ORAL
Status: DISCONTINUED | OUTPATIENT
Start: 2020-11-26 | End: 2020-11-27 | Stop reason: HOSPADM

## 2020-11-25 RX ADMIN — RIVAROXABAN 20 MG: 20 TABLET, FILM COATED ORAL at 17:59

## 2020-11-25 RX ADMIN — DILTIAZEM HYDROCHLORIDE 5 MG/HR: 5 INJECTION INTRAVENOUS at 20:00

## 2020-11-25 RX ADMIN — DILTIAZEM HYDROCHLORIDE 30 MG: 30 TABLET, FILM COATED ORAL at 18:00

## 2020-11-25 RX ADMIN — DILTIAZEM HYDROCHLORIDE 25 MG: 5 INJECTION INTRAVENOUS at 17:55

## 2020-11-25 RX ADMIN — ROSUVASTATIN CALCIUM 10 MG: 10 TABLET, FILM COATED ORAL at 23:34

## 2020-11-25 RX ADMIN — SODIUM CHLORIDE, POTASSIUM CHLORIDE, SODIUM LACTATE AND CALCIUM CHLORIDE 500 ML: 600; 310; 30; 20 INJECTION, SOLUTION INTRAVENOUS at 23:34

## 2020-11-25 ASSESSMENT — MIFFLIN-ST. JEOR
SCORE: 1952.45
SCORE: 1930.68

## 2020-11-25 NOTE — LETTER
"11/25/2020      RE: Sherry Slaughter  [address]  [place] [address]       Dear Colleague,    Thank you for the opportunity to participate in the care of your patient, Sherry Slaughter, at the [facility] HEART [facility] at [facility]. Please see a copy of my visit note below.    Sherry Slaughter is a 65 year old female who is being evaluated via a billable video visit.      The patient has been notified of following:     \"This video visit will be conducted via a call between you and your physician/provider. We have found that certain health care needs can be provided without the need for an in-person physical exam.  This service lets us provide the care you need with a video conversation.  If a prescription is necessary we can send it directly to your pharmacy.  If lab work is needed we can place an order for that and you can then stop by our lab to have the test done at a later time.    Video visits are billed at different rates depending on your insurance coverage.  Please reach out to your insurance provider with any questions.    If during the course of the call the physician/provider feels a video visit is not appropriate, you will not be charged for this service.\"    Patient has given verbal consent for Video visit? Yes  How would you like to obtain your AVS? MyChart  If you are dropped from the video visit, the video invite should be resent to: Text to cell phone: [phone]   Will anyone else be joining your video visit? No      Vitals - Patient Reported  Systolic (Patient Reported): 121  Diastolic (Patient Reported): 79  Weight (Patient Reported): 131.5 kg (290 lb)  Height (Patient Reported): 177.8 cm (5' 10\")  BMI (Based on Pt Reported Ht/Wt): 41.61  Pulse (Patient Reported): 126  Pain Score: No Pain (0)(Has SOB)    Vitals were taken and medication reconciled.    SOILA Mcdermott  2:55 PM    Video-Visit Details    Type of service:  " Video Visit    Video Start Time:301pm    Video End Time:319pm    Originating Location (pt. Location):patient home      Distant Location (provider location):  home office    Platform used for Video Visit: Solum    Service Date: 2020      Marilou Arciniega MD   [facility]   [address] [place] [address]       RE:    Sherry Slaughter   MRN:  [id number]   :  1955      Dear Dr. Arciniega:      It was a pleasure participating in the care of your patient, Ms. Sherry Slaughter.  As you know, she is a 65-year-old lady who I saw today over via virtual video visit via Solum for rapid AFib.      Her past medical history is significant for the followin.  Type 2 diabetes.   2.  Hypertension.   3.  Hyperlipidemia.   4.  Depression.   5.  Obstructive sleep apnea, currently untreated.   6.  Hypothyroidism.   7.  Diverticulosis.   8.  Low back pain.   9.  Irritable bowel syndrome.      I last saw her on 03/10/2015.  She presents today with new symptoms.      Since our last visit, she has complained of a 2-week history of rapid palpitations.  She says that she has tracked it over her own phone with a monitoring program and found herself to be in rapid AFib in the 140-160 beat per minute range.  She saw you and her dose of Toprol was increased to the 50 mg per day range and her heart rate has come down to 140 beat per minute range.  She does get mildly lightheaded with her atrial fibrillation and she does get short of breath with it as well.  When her heart rate gets above 140, she gets some chest pressure.  If she moves too quickly or if she does any type of exertion, she gets the shortness of breath and lightheadedness.      She says that her blood pressures are in the 120s/70s with a pulse at the lowest in the 120s.  She has been in persistent atrial fibrillation for approximately 2 weeks.      Her [id number]-[id number] score is approximately 4 for hypertension, diabetes, age and female gender.       She otherwise denies maulik chest pain.  She does get short of breath and lightheaded with her atrial fibrillation.  She denies other PND, orthopnea, edema or syncope.      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      CURRENT MEDICATIONS:   1.  Aspirin 325 mg a day.   2.  Hydrochlorothiazide 25 mg a day.   3.  Levothyroxine.   4.  Lisinopril.  It looks like she is taking 60 mg a day, but the maximum dose is 40 mg.   5.  Metformin.   6.  Metoprolol succinate 50 mg a day.   7.  Crestor 10 mg a day.      PHYSICAL EXAMINATION:   VITAL SIGNS:  She says her blood pressure is running 121/76 with a pulse in the 120s.  Whenever she does activities, it raises up to 140s and she gets chest pressure, lightheadedness and shortness of breath.   GENERAL:  She is comfortable, well groomed.   PSYCHIATRIC:  She is alert and oriented x3.   HEENT:  Her eyes do not appear grossly erythematous or have exudate.   RESPIRATORY:  She is breathing comfortably without gross cough.      The remainder of the comprehensive physical exam was deferred secondary to COVID-19 pandemic and secondary to video visit restrictions.      LABORATORY DATA:  On 12/10/2019, potassium 4.2, GFR normal, TSH normal.      On 09/25/2019, hemoglobin normal.      Echocardiogram back on 02/11/2014 revealed normal LV systolic function, ejection fraction 60%-65% with severe left atrial enlargement, no significant valvular pathology.      EKG from yesterday is pending.  On 09/25/2019 it was a normal sinus rhythm, rate of 67 beats per minute with nonspecific T-wave changes.      IMPRESSION:  Sherry is a 65-year-old retired CV nurse from the [employer] who presents with several active issues:  Likely atrial fibrillation with rapid ventricular response.     I have not been able to review any rhythm strips or EKGs for this patient.  She claims to have been in persistent rapid 2 weeks with heart rates in the 140-beat per minute range.  She says that whenever she does  any type of light activity, her heart rate will go above 140 and she will get short of breath, lightheaded and have chest pressure, despite the fact that her Toprol was increased recently.   With these types of symptoms and considering that she has likely been in persistent rapid atrial fibrillation for 2 weeks, her risk of experiencing tachycardia-mediated cardiomyopathy and/or heart failure is increased significantly.  I think that is further more definitive and urgent medical care would be indicated.      PLAN:  I have advised her to seek immediate medical attention in the emergency room today in order to not only control her heart rate on a more urgent basis to avoid tachycardia-mediated cardiomyopathy and heart failure, but to also begin a noninvasive workup with lab work echocardiography and also starting anticoagulation if she is proven to be in atrial fibrillation as her [id number] score is 4 for hypertension, diabetes, age and female gender.  I anticipate that she will need her aspirin discontinued as well and that she should have a sleep workup and CPAP or dental device implemented in order to help avoid exacerbating this condition if atrial fibrillation is proven.      Although cardioversion may be possible, back in 2014 and her left atrium was reported to be severely enlarged already and is making it less likely that she will maintain normal sinus rhythm over the long run.  However, a single attempt might be considered if needed.      I will follow up with her after her visit to the ER and hopeful admission for rate control and workup and treatment implementation of the above.      Once again, it was a pleasure participating in the care of your patient, Ms. Sherry Scales.  Please feel free to contact me anytime if any questions regarding her care in the future.      Sincerely,         Joel Aranda MD              D: 11/25/2020   T: 11/25/2020   MT: LELAND      Name:     SHERRY SCALES    MRN:      [id number]        Account:      [id number]   :      1955           Service Date: 2020      Document: [id number]        Please do not hesitate to contact me if you have any questions/concerns.     Sincerely,     Joel Aranda MD

## 2020-11-25 NOTE — PROGRESS NOTES
Service Date: 2020      Marilou Arciniega MD   [facility]   [address] [place] [address]       RE:    Sherry Slaughter   MRN:  [id number]   :  1955      Dear Dr. Arciniega:      It was a pleasure participating in the care of your patient, Ms. Sherry Slaughter.  As you know, she is a 65-year-old lady who I saw today over via virtual video visit via [employer] for rapid AFib.      Her past medical history is significant for the followin.  Type 2 diabetes.   2.  Hypertension.   3.  Hyperlipidemia.   4.  Depression.   5.  Obstructive sleep apnea, currently untreated.   6.  Hypothyroidism.   7.  Diverticulosis.   8.  Low back pain.   9.  Irritable bowel syndrome.      I last saw her on 03/10/2015.  She presents today with new symptoms.      Since our last visit, she has complained of a 2-week history of rapid palpitations.  She says that she has tracked it over her own phone with a monitoring program and found herself to be in rapid AFib in the 140-160 beat per minute range.  She saw you and her dose of Toprol was increased to the 50 mg per day range and her heart rate has come down to 140 beat per minute range.  She does get mildly lightheaded with her atrial fibrillation and she does get short of breath with it as well.  When her heart rate gets above 140, she gets some chest pressure.  If she moves too quickly or if she does any type of exertion, she gets the shortness of breath and lightheadedness.      She says that her blood pressures are in the 120s/70s with a pulse at the lowest in the 120s.  She has been in persistent atrial fibrillation for approximately 2 weeks.      Her [id number] score is approximately 4 for hypertension, diabetes, age and female gender.      She otherwise denies maulik chest pain.  She does get short of breath and lightheaded with her atrial fibrillation.  She denies other PND, orthopnea, edema or syncope.      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      CURRENT MEDICATIONS:     1.  Aspirin 325 mg a day.   2.  Hydrochlorothiazide 25 mg a day.   3.  Levothyroxine.   4.  Lisinopril.  It looks like she is taking 60 mg a day, but the maximum dose is 40 mg.   5.  Metformin.   6.  Metoprolol succinate 50 mg a day.   7.  Crestor 10 mg a day.      PHYSICAL EXAMINATION:     VITAL SIGNS:  She says her blood pressure is running 121/76 with a pulse in the 120s.  Whenever she does activities, it raises up to 140s and she gets chest pressure, lightheadedness and shortness of breath.   GENERAL:  She is comfortable, well groomed.   PSYCHIATRIC:  She is alert and oriented x3.   HEENT:  Her eyes do not appear grossly erythematous or have exudate.   RESPIRATORY:  She is breathing comfortably without gross cough.      The remainder of the comprehensive physical exam was deferred secondary to COVID-19 pandemic and secondary to video visit restrictions.      LABORATORY DATA:  On 12/10/2019, potassium 4.2, GFR normal, TSH normal.      On 09/25/2019, hemoglobin normal.      Echocardiogram back on 02/11/2014 revealed normal LV systolic function, ejection fraction 60%-65% with severe left atrial enlargement, no significant valvular pathology.      EKG from yesterday is pending.  On 09/25/2019 it was a normal sinus rhythm, rate of 67 beats per minute with nonspecific T-wave changes.        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       I have not been able to review any rhythm strips or EKGs for this patient.  She claims to have been in persistent rapid 2 weeks with heart rates in the 140-beat per minute range.  She says that whenever she does any type of light activity, her heart rate will go above 140 and she will get short of breath, lightheaded and have chest pressure, despite the fact that her Toprol was increased recently.      With these types of symptoms and considering that she has likely been in persistent rapid atrial fibrillation for 2 weeks, her risk of experiencing tachycardia-mediated cardiomyopathy and/or heart failure is increased significantly.  I think that is further more definitive and urgent medical care would be indicated.        PLAN:      1.  I have advised her to seek immediate medical attention in the emergency room today in order to not only control her heart rate on a more urgent basis to avoid tachycardia-mediated cardiomyopathy and heart failure, but to also begin a noninvasive workup with lab work echocardiography.    She may also require starting anticoagulation if she is proven to be in atrial fibrillation as her [id number] score is 4 for hypertension, diabetes, age and female gender.      I anticipate that she will need her aspirin discontinued as well and that she should have a sleep workup and CPAP or dental device implemented in order to help avoid exacerbating this condition if atrial fibrillation is proven.      Although cardioversion may be possible, back in 2014 and her left atrium was reported to be severely enlarged already and is making it less likely that she will maintain normal sinus rhythm over the long run.  However, a single attempt might be considered if needed.      I will follow up with her after her visit to the ER and hopeful admission for rate control and workup and treatment implementation of the above.      Once again, it was a pleasure participating in the care of your patient, Ms. Sherry Slaughter.  Please feel free to contact me anytime if any questions regarding her care in the future.      Sincerely,              DEREJE LOYA MD        Addendum 12/3/20:    HR at home still in 120-130bpm range  SBP running in 125mmHg range    Impression:    Afib RVR symptomatic, rate control not achieved after hospitalization    Plan:    1.  Decrease Lisinopril to to more appropriate  dose 40mg po every day  2.  Hold hydrochlorothiazide for now  3.  Increase Toprol XL to 200mg po qam  4.  Increase Diltiazem to 360mg po every day (can take at night or stagger few hours away from Toprol)  5.  Watch for dizziness/lilghtheadedness with med changes which may require lying down and putting feet up and further decreasing Lisinopril if needed    6.  Await cardioversion         D: 2020   T: 2020   MT: LELAND      Name:     CONCEPCIÓN SCALES   MRN:      [id number]        Account:      [id number]   :      1955           Service Date: 2020      Document: [id number]

## 2020-11-25 NOTE — ED PROVIDER NOTES
[facility] ([facility])  11/25/20    History     Chief Complaint   Patient presents with     Atrial Fib     HPI  Sherry Slaughter is a 65 year old female with a past medical history of DM II, HTN, HLD, GENESIS, hypothyroidism who presents from clinic for evaluation of atrial fibrillation.     Per chart review, the patient attended a virtual cardiology appointment in which she complained of 2-week history of rapid palpitations. Patient's heart rhythm was recorded through a program on her phone which revealed the patient to be in rapid AFib in the 140-160 bpm. She notes associated lightheadedness and shortness of breath when in the 140 bpm range.        Past Medical History  Past Medical History:   Diagnosis Date     Cataract      DEPRESSION     comes and goes - tried meds - unsuccessfully, certain times of the year, no psych intervention and no counselors in the past - and not interested      Diverticulosis of colon (without mention of hemorrhage) 4/04    colonoscopy     ECHO-mildLVH,tr MR,mild thick lflets w inc LA,trTR   12/03     Essential hypertension, benign 1990s    late 1990s - started medications at that time - not to difficult to control meds      Fam hx-cardiovas dis [facility]     father - CAD, and lipids/HTN - multiple members of the family      Family history of diabetes mellitus     sister and grandmother with DM      Family history of malignant neoplasm of breast     mother - young age - 45, and maternal cousin and aunt as well - no BRCA testing done      Family history of stroke (cerebrovascular)     grandmother in early life in her 40s      FAMILY HX COLON CANCER     Pat uncles x 2     Heart disease     murmur/     Heart murmur      HYPERLIPIDEMIA 2000    fairly recent - in the last 5 years - high for DM levels  -cholesterol recent      Irritable bowel syndrome     goes between the 2 - nerve related - more stressed more problems      MICROALBUMINURIA     unsure how long -  been on the lisinopril - for a few years at well      Nonspecific abnormal results of liver function study 2/3/2003    SGOT - has been high in the past - since the hepatitis b - borderline elevation - not really changing any      OBESITY      Obstructive sleep apnea      GENESIS (obstructive sleep apnea) 10/15/2006    psg 5/15 AHI 53 aPAP 8-15     OSTEOARTHRITIS L KNEE 2003    total knee on the left - and pain is gone since the knee replacement      PERS HX HEPATITIS B- RESOLVED] 1977    acute virus only - no chronic disease      PERS HX HEPATITIS B- RESOLVED]      Type II or unspecified type diabetes mellitus without mention of complication, not stated as uncontrolled 1991    oral meds frist, then insulin eventually later, difficult to control most of the time      Unspecified hypothyroidism 10/11/2006    TSH 3.36 - mild subclinical hypothyroidism - deciding on following or starting low dose meds - with dr Oreilly - following      Past Surgical History:   Procedure Laterality Date     ARTHROPLASTY KNEE Right 9/23/2015    Procedure: ARTHROPLASTY KNEE;  Surgeon: Rufus Brown MD;  Location:  OR     C TOTAL KNEE ARTHROPLASTY  3/03    L knee     CATARACT IOL, RT/LT Left      COLONOSCOPY  4/04    diverticulosis, rec repeat 10 yrs(consider fam hx?)     COLONOSCOPY WITH CO2 INSUFFLATION N/A 6/19/2019    Procedure: COLONOSCOPY, WITH CO2 INSUFFLATION;  Surgeon: Zeb Duarte MD;  Location:  OR     ORTHOPEDIC SURGERY      right ankle     PHACOEMULSIFICATION CLEAR CORNEA WITH STANDARD INTRAOCULAR LENS IMPLANT Left 3/15/2018    Procedure: PHACOEMULSIFICATION CLEAR CORNEA WITH STANDARD INTRAOCULAR LENS IMPLANT;  LEFT EYE PHACOEMULSIFICATION CLEAR CORNEA WITH STANDARD INTRAOCULAR LENS IMPLANT;  Surgeon: Bandar Linares MD;  Location: [facility]     PHACOEMULSIFICATION CLEAR CORNEA WITH STANDARD INTRAOCULAR LENS IMPLANT Right 4/5/2018    Procedure: PHACOEMULSIFICATION CLEAR CORNEA WITH STANDARD INTRAOCULAR LENS IMPLANT;   RIGHT PHACOEMULSIFICATION CLEAR CORNEA WITH STANDARD INTRAOCULAR LENS IMPLANT ;  Surgeon: Bandar Linares MD;  Location:  [facility]     STRESS ECHO (METRO)  12/03    no ischemia, limited by fatigue     SURGICAL HISTORY OF -       EXP LAP- R OVARY CYSTS     SURGICAL HISTORY OF -   1981,1984,1985    CHILDBIRTH     ZZ NONSPECIFIC PROCEDURE  6/06    right triple arthrodecesis      ZZ NONSPECIFIC PROCEDURE  7/06     right bunion surgery      No current outpatient medications on file.    Allergies   Allergen Reactions     Atorvastatin Calcium      Gas     Pravachol [Pravastatin Sodium]      Pravachol - dry cough      Simvastatin      Myalgia     Family History  Family History   Problem Relation Age of Onset     Diabetes Maternal Grandmother         DM TYPE 2     Cerebrovascular Disease Maternal Grandmother      Hypertension Sister      Lipids Sister      Heart Disease Sister         Chronic AFib     Hypertension Sister      Lipids Sister      Gynecology Sister      Diabetes Sister      Asthma Sister      Blood Disease Paternal Grandmother         T CELL LEUKEMIA     Hypertension Brother      Lipids Brother      Congenital Anomalies Brother      Cardiovascular Brother      Heart Disease Brother         CABG/AFIB     Hypertension Brother      Lipids Brother      Obesity Brother      Hypertension Brother      Respiratory Brother         Sleep apnea     Heart Disease Brother         AFib     Alzheimer Disease Mother      Asthma Mother      Hypertension Mother      Breast Cancer Mother 40        has mastectomy and lived to 84     Allergies Mother         Sulfa,     Arthritis Mother      Cardiovascular Mother      Depression Mother      Respiratory Mother      Lipids Mother      Cancer Mother         breast     Thyroid Disease Mother      Cerebrovascular Disease Mother      Dementia Mother         Alzheimers     Cancer - colorectal Other      Cancer Father         lung     Aneurysm Father         brother, AAA     Other  "Cancer Father         lung ca     Asthma Sister      Cancer - colorectal Paternal Uncle         late 50s to early 60s - great uncles      Breast Cancer Other         Maternal cousin     Arrhythmia Sister         2 brothers 1 sister Afib     Breast Cancer Maternal Aunt 62     Other Cancer Maternal Aunt 35        Ovarian cancer.  Survived despite late recurrence and is now 92.     Glaucoma No family hx of      Macular Degeneration No family hx of      Social History   Social History     Tobacco Use     Smoking status: Never Smoker     Smokeless tobacco: Never Used     Tobacco comment: tried in early 20s only    Substance Use Topics     Alcohol use: Yes     Comment: rare     Drug use: No      Past medical history, past surgical history, medications, allergies, family history, and social history were reviewed with the patient. No additional pertinent items.       Review of Systems  A complete review of systems was performed with pertinent positives and negatives noted in the HPI, and all other systems negative.    Physical Exam   BP: (!) 155/95  Pulse: 133  Temp: 98.5  F (36.9  C)  Resp: 21  Height: 177.8 cm (5' 10\")  Weight: 131.5 kg (290 lb)  SpO2: 99 %  Physical Exam  Vitals signs and nursing note reviewed.   Constitutional:       General: She is not in acute distress.     Appearance: She is well-developed. She is not ill-appearing, toxic-appearing or diaphoretic.   HENT:      Head: Normocephalic and atraumatic.      Mouth/Throat:      Mouth: Mucous membranes are moist.   Eyes:      General: No scleral icterus.  Neck:      Musculoskeletal: Normal range of motion and neck supple.   Cardiovascular:      Rate and Rhythm: Tachycardia present. Rhythm irregular.      Pulses: Normal pulses.   Pulmonary:      Effort: Pulmonary effort is normal. No respiratory distress.   Abdominal:      Palpations: Abdomen is soft.      Tenderness: There is no abdominal tenderness.   Musculoskeletal:      Right lower leg: No edema.      Left " lower leg: No edema.   Skin:     General: Skin is warm and dry.      Capillary Refill: Capillary refill takes less than 2 seconds.      Coloration: Skin is not pale.      Findings: No erythema or rash.   Neurological:      Mental Status: She is alert and oriented to person, place, and time.           ED Course      Procedures       Critical Care time was 35 minutes for this patient excluding procedures.         EKG Interpretation:      Interpreted by Bandar Mccloud MD  Time reviewed:   Symptoms at time of EKG: Palpitations  Rhythm: atrial fibrillation - rapid  Rate: Tachycardia  Axis: Normal  Ectopy: none  Conduction: normal  ST Segments/ T Waves: No ST-T wave changes  Q Waves: none  Comparison to prior: New findings of atrial fibrillation    Clinical Impression: atrial fibrillation (new onset)                            Results for orders placed or performed during the hospital encounter of 11/25/20   CBC with platelets differential     Status: None   Result Value Ref Range    WBC 9.6 4.0 - 11.0 10e9/L    RBC Count 4.50 3.8 - 5.2 10e12/L    Hemoglobin 14.2 11.7 - 15.7 g/dL    Hematocrit 43.3 35.0 - 47.0 %    MCV 96 78 - 100 fl    MCH 31.6 26.5 - 33.0 pg    MCHC 32.8 31.5 - 36.5 g/dL    RDW 13.2 10.0 - 15.0 %    Platelet Count 225 150 - 450 10e9/L    Diff Method Automated Method     % Neutrophils 59.9 %    % Lymphocytes 28.0 %    % Monocytes 9.0 %    % Eosinophils 1.8 %    % Basophils 0.7 %    % Immature Granulocytes 0.6 %    Nucleated RBCs 0 0 /100    Absolute Neutrophil 5.7 1.6 - 8.3 10e9/L    Absolute Lymphocytes 2.7 0.8 - 5.3 10e9/L    Absolute Monocytes 0.9 0.0 - 1.3 10e9/L    Absolute Eosinophils 0.2 0.0 - 0.7 10e9/L    Absolute Basophils 0.1 0.0 - 0.2 10e9/L    Abs Immature Granulocytes 0.1 0 - 0.4 10e9/L    Absolute Nucleated RBC 0.0    Comprehensive metabolic panel     Status: Abnormal   Result Value Ref Range    Sodium 139 133 - 144 mmol/L    Potassium 4.1 3.4 - 5.3 mmol/L    Chloride 108 94 - 109  mmol/L    Carbon Dioxide 22 20 - 32 mmol/L    Anion Gap 9 3 - 14 mmol/L    Glucose 156 (H) 70 - 99 mg/dL    Urea Nitrogen 19 7 - 30 mg/dL    Creatinine 0.79 0.52 - 1.04 mg/dL    GFR Estimate 77 >60 mL/min/[1.73_m2]    GFR Estimate If Black 89 >60 mL/min/[1.73_m2]    Calcium 10.1 8.5 - 10.1 mg/dL    Bilirubin Total 0.5 0.2 - 1.3 mg/dL    Albumin 3.9 3.4 - 5.0 g/dL    Protein Total 7.4 6.8 - 8.8 g/dL    Alkaline Phosphatase 47 40 - 150 U/L    ALT 42 0 - 50 U/L    AST Unsatisfactory specimen - hemolyzed 0 - 45 U/L   TSH with free T4 reflex     Status: None   Result Value Ref Range    TSH 2.12 0.40 - 4.00 mU/L   INR     Status: None   Result Value Ref Range    INR 1.09 0.86 - 1.14   Nt probnp inpatient (BNP)     Status: None   Result Value Ref Range    N-Terminal Pro BNP Inpatient 714 0 - 900 pg/mL   Asymptomatic COVID-19 Virus (Coronavirus) by PCR     Status: None    Specimen: Nasopharyngeal   Result Value Ref Range    COVID-19 Virus PCR to [facility] - Source Nasopharyngeal     COVID-19 Virus PCR to [facility] - Result       Test received-See reflex to IDDL test SARS CoV2 (COVID-19) Virus RT-PCR   Hemoglobin A1c     Status: Abnormal   Result Value Ref Range    Hemoglobin A1C 6.9 (H) 0 - 5.6 %   Magnesium     Status: None   Result Value Ref Range    Magnesium 1.7 1.6 - 2.3 mg/dL   Lactic acid level STAT     Status: None   Result Value Ref Range    Lactate for Sepsis Protocol 2.0 0.7 - 2.0 mmol/L   SARS-CoV-2 COVID-19 Virus (Coronavirus) RT-PCR Nasopharyngeal     Status: None    Specimen: Nasopharyngeal   Result Value Ref Range    SARS-CoV-2 Virus Specimen Source Nasopharyngeal     SARS-CoV-2 PCR Result NEGATIVE     SARS-CoV-2 PCR Comment       Testing was performed using the Xpert Xpress SARS-CoV-2 Assay on the Cepheid Gene-Xpert   Instrument Systems. Additional information about this Emergency Use Authorization (EUA)   assay can be found via the Lab Guide.     EKG 12 lead     Status: None   Result Value Ref Range     Interpretation ECG Click View Image link to view waveform and result      Medications   diltiazem (CARDIZEM) 125 mg in sodium chloride 0.9 % 125 mL infusion (15 mg/hr Intravenous Rate/Dose Change 11/25/20 2343)   medication instruction (has no administration in time range)   nitroGLYcerin (NITROSTAT) sublingual tablet 0.4 mg (has no administration in time range)   alum & mag hydroxide-simethicone (MAALOX) suspension 30 mL (has no administration in time range)   senna-docusate (SENOKOT-S/PERICOLACE) 8.6-50 MG per tablet 1 tablet (1 tablet Oral Not Given 11/25/20 2153)     Or   senna-docusate (SENOKOT-S/PERICOLACE) 8.6-50 MG per tablet 2 tablet ( Oral See Alternative 11/25/20 2153)   polyethylene glycol (MIRALAX) Packet 17 g (17 g Oral Not Given 11/25/20 2152)   acetaminophen (TYLENOL) tablet 650 mg (has no administration in time range)   acetaminophen (TYLENOL) Suppository 650 mg (has no administration in time range)   Patient is already receiving anticoagulation with heparin, enoxaparin (LOVENOX), warfarin (COUMADIN)  or other anticoagulant medication (has no administration in time range)   hydrochlorothiazide (HYDRODIURIL) tablet 25 mg (has no administration in time range)   levothyroxine (SYNTHROID/LEVOTHROID) tablet 75 mcg (has no administration in time range)   metoprolol succinate ER (TOPROL-XL) 24 hr tablet 50 mg (has no administration in time range)   rosuvastatin (CRESTOR) tablet 10 mg (10 mg Oral Given 11/25/20 2334)   lisinopril (ZESTRIL) tablet 40 mg (has no administration in time range)   glucose gel 15-30 g (has no administration in time range)     Or   dextrose 50 % injection 25-50 mL (has no administration in time range)     Or   glucagon injection 1 mg (has no administration in time range)   insulin aspart (NovoLOG) injection (RAPID ACTING) (has no administration in time range)   diltiazem (CARDIZEM) tablet 30 mg (30 mg Oral Given 11/25/20 1800)   rivaroxaban ANTICOAGULANT (XARELTO) tablet 20 mg (20  mg Oral Given 11/25/20 7499)   diltiazem (CARDIZEM) injection 25 mg (25 mg Intravenous Given 11/25/20 0489)   lactated ringers BOLUS 500 mL (500 mLs Intravenous New Bag 11/25/20 1888)        Assessments & Plan (with Medical Decision Making)   This is a 65-year-old female patient coming into the emergency room with about a week or so of palpitations.  She states that she has had this in the past and is currently on metoprolol.  She otherwise denies any other complaints including chest pain and shortness of breath.  On physical exam she has an obvious atrial fibrillation with a heart rate of approximately 1 30-1 40.  The rest of exam is completely unremarkable.  Labs are reviewed which are reassuring.  She was provided with Cardizem IV and p.o. with only a brief period of rate control.  She is now on a Cardizem drip here in the emergency room.  We will continue to titrate the drip until rate control is obtained.  I did discuss the case with the cardiology service and they will accept.  She will be started on Xarelto here in the emergency room.    I have reviewed the nursing notes. I have reviewed the findings, diagnosis, plan and need for follow up with the patient.    Current Discharge Medication List          Final diagnoses:   Paroxysmal atrial fibrillation (H)       --  Bandar Mccloud MD  [facility] EMERGENCY DEPARTMENT  11/25/2020     Bandar Mccloud MD  11/26/20 0024       Bandar Mccloud MD  11/26/20 0026

## 2020-11-25 NOTE — PROGRESS NOTES
"Sherry Slaughter is a 65 year old female who is being evaluated via a billable video visit.      The patient has been notified of following:     \"This video visit will be conducted via a call between you and your physician/provider. We have found that certain health care needs can be provided without the need for an in-person physical exam.  This service lets us provide the care you need with a video conversation.  If a prescription is necessary we can send it directly to your pharmacy.  If lab work is needed we can place an order for that and you can then stop by our lab to have the test done at a later time.    Video visits are billed at different rates depending on your insurance coverage.  Please reach out to your insurance provider with any questions.    If during the course of the call the physician/provider feels a video visit is not appropriate, you will not be charged for this service.\"    Patient has given verbal consent for Video visit? Yes  How would you like to obtain your AVS? MyChart  If you are dropped from the video visit, the video invite should be resent to: Text to cell phone: [phone]   Will anyone else be joining your video visit? No      Vitals - Patient Reported  Systolic (Patient Reported): 121  Diastolic (Patient Reported): 79  Weight (Patient Reported): 131.5 kg (290 lb)  Height (Patient Reported): 177.8 cm (5' 10\")  BMI (Based on Pt Reported Ht/Wt): 41.61  Pulse (Patient Reported): 126  Pain Score: No Pain (0)(Has SOB)    Vitals were taken and medication reconciled.    SOILA Mcdermott  2:55 PM    Video-Visit Details    Type of service:  Video Visit    Video Start Time:301pm    Video End Time:319pm    Originating Location (pt. Location):patient home      Distant Location (provider location):  home office    Platform used for Video Visit: Yuliana    See dictation #[id number]  "

## 2020-11-25 NOTE — ED TRIAGE NOTES
Pt presents ambulatory to triage with symptoms of a-fib. Was seen yesterday and had a 12 lead EKG done and cardiology MD told pt to come to the ED for evaluation. SOB when active. Does not use O2 at home. Pt recently diagnoses with a-fib and has never been converted. Pt tachy in triage, EKG performed and shows a-fib/a-flutter.

## 2020-11-25 NOTE — PATIENT INSTRUCTIONS
Patient Instructions:  It was a pleasure to see you in the cardiology clinic today.      If you have any questions, you can reach my nurse, Janee BROTHERS LPN, at [phone].  Press Option #1 for the [facility], and then press Option #4 for nursing.    We are encouraging the use of [employer] to communicate with your HealthCare Provider    Medication Changes: None.    Recommendations: You were referred to the emergency room today for atrial fibrillation.    Studies Ordered: None.    The results from today include: None.    Please follow up: With Dr. Aranda based on result of ER Visit.    Sincerely,    Joel Aranda MD     If you have an urgent need after hours (8:00 am to 4:30 pm) please call [phone] and ask for the cardiology fellow on call.

## 2020-11-26 LAB
ANION GAP SERPL CALCULATED.3IONS-SCNC: 8 MMOL/L (ref 3–14)
BUN SERPL-MCNC: 13 MG/DL (ref 7–30)
CALCIUM SERPL-MCNC: 9.5 MG/DL (ref 8.5–10.1)
CHLORIDE SERPL-SCNC: 110 MMOL/L (ref 94–109)
CO2 SERPL-SCNC: 22 MMOL/L (ref 20–32)
CREAT SERPL-MCNC: 0.71 MG/DL (ref 0.52–1.04)
GFR SERPL CREATININE-BSD FRML MDRD: 88 ML/MIN/{1.73_M2}
GLUCOSE BLDC GLUCOMTR-MCNC: 103 MG/DL (ref 70–99)
GLUCOSE BLDC GLUCOMTR-MCNC: 147 MG/DL (ref 70–99)
GLUCOSE BLDC GLUCOMTR-MCNC: 178 MG/DL (ref 70–99)
GLUCOSE BLDC GLUCOMTR-MCNC: 205 MG/DL (ref 70–99)
GLUCOSE BLDC GLUCOMTR-MCNC: 210 MG/DL (ref 70–99)
GLUCOSE BLDC GLUCOMTR-MCNC: 236 MG/DL (ref 70–99)
GLUCOSE SERPL-MCNC: 180 MG/DL (ref 70–99)
INTERPRETATION ECG - MUSE: NORMAL
LACTATE BLD-SCNC: 2.9 MMOL/L (ref 0.7–2)
MAGNESIUM SERPL-MCNC: 1.7 MG/DL (ref 1.6–2.3)
MAGNESIUM SERPL-MCNC: 1.9 MG/DL (ref 1.6–2.3)
POTASSIUM SERPL-SCNC: 3.8 MMOL/L (ref 3.4–5.3)
POTASSIUM SERPL-SCNC: 4 MMOL/L (ref 3.4–5.3)
SODIUM SERPL-SCNC: 140 MMOL/L (ref 133–144)
TROPONIN I SERPL-MCNC: <0.015 UG/L (ref 0–0.04)

## 2020-11-26 PROCEDURE — 84132 ASSAY OF SERUM POTASSIUM: CPT | Performed by: NURSE PRACTITIONER

## 2020-11-26 PROCEDURE — 250N000009 HC RX 250: Performed by: EMERGENCY MEDICINE

## 2020-11-26 PROCEDURE — 250N000013 HC RX MED GY IP 250 OP 250 PS 637: Performed by: INTERNAL MEDICINE

## 2020-11-26 PROCEDURE — 36415 COLL VENOUS BLD VENIPUNCTURE: CPT | Performed by: STUDENT IN AN ORGANIZED HEALTH CARE EDUCATION/TRAINING PROGRAM

## 2020-11-26 PROCEDURE — 999N001017 HC STATISTIC GLUCOSE BY METER IP

## 2020-11-26 PROCEDURE — 83735 ASSAY OF MAGNESIUM: CPT | Performed by: NURSE PRACTITIONER

## 2020-11-26 PROCEDURE — 214N000001 HC R&B CCU UMMC

## 2020-11-26 PROCEDURE — 36415 COLL VENOUS BLD VENIPUNCTURE: CPT | Performed by: NURSE PRACTITIONER

## 2020-11-26 PROCEDURE — 250N000013 HC RX MED GY IP 250 OP 250 PS 637: Performed by: STUDENT IN AN ORGANIZED HEALTH CARE EDUCATION/TRAINING PROGRAM

## 2020-11-26 PROCEDURE — 250N000012 HC RX MED GY IP 250 OP 636 PS 637: Performed by: STUDENT IN AN ORGANIZED HEALTH CARE EDUCATION/TRAINING PROGRAM

## 2020-11-26 PROCEDURE — 93005 ELECTROCARDIOGRAM TRACING: CPT

## 2020-11-26 PROCEDURE — 250N000013 HC RX MED GY IP 250 OP 250 PS 637: Performed by: NURSE PRACTITIONER

## 2020-11-26 PROCEDURE — 83735 ASSAY OF MAGNESIUM: CPT | Performed by: STUDENT IN AN ORGANIZED HEALTH CARE EDUCATION/TRAINING PROGRAM

## 2020-11-26 PROCEDURE — 250N000011 HC RX IP 250 OP 636: Performed by: STUDENT IN AN ORGANIZED HEALTH CARE EDUCATION/TRAINING PROGRAM

## 2020-11-26 PROCEDURE — 93010 ELECTROCARDIOGRAM REPORT: CPT | Performed by: INTERNAL MEDICINE

## 2020-11-26 PROCEDURE — 258N000003 HC RX IP 258 OP 636: Performed by: EMERGENCY MEDICINE

## 2020-11-26 PROCEDURE — 83605 ASSAY OF LACTIC ACID: CPT | Performed by: STUDENT IN AN ORGANIZED HEALTH CARE EDUCATION/TRAINING PROGRAM

## 2020-11-26 PROCEDURE — 99232 SBSQ HOSP IP/OBS MODERATE 35: CPT | Performed by: NURSE PRACTITIONER

## 2020-11-26 PROCEDURE — 80048 BASIC METABOLIC PNL TOTAL CA: CPT | Performed by: STUDENT IN AN ORGANIZED HEALTH CARE EDUCATION/TRAINING PROGRAM

## 2020-11-26 PROCEDURE — 250N000012 HC RX MED GY IP 250 OP 636 PS 637: Performed by: NURSE PRACTITIONER

## 2020-11-26 RX ORDER — MAGNESIUM SULFATE HEPTAHYDRATE 40 MG/ML
2 INJECTION, SOLUTION INTRAVENOUS
Status: COMPLETED | OUTPATIENT
Start: 2020-11-26 | End: 2020-11-26

## 2020-11-26 RX ORDER — POTASSIUM CHLORIDE 750 MG/1
20 TABLET, EXTENDED RELEASE ORAL
Status: DISCONTINUED | OUTPATIENT
Start: 2020-11-26 | End: 2020-11-27 | Stop reason: HOSPADM

## 2020-11-26 RX ORDER — MAGNESIUM SULFATE HEPTAHYDRATE 40 MG/ML
2 INJECTION, SOLUTION INTRAVENOUS ONCE
Status: DISCONTINUED | OUTPATIENT
Start: 2020-11-26 | End: 2020-11-27 | Stop reason: HOSPADM

## 2020-11-26 RX ORDER — LIRAGLUTIDE 6 MG/ML
3 INJECTION SUBCUTANEOUS DAILY
Status: DISCONTINUED | OUTPATIENT
Start: 2020-11-27 | End: 2020-11-27 | Stop reason: HOSPADM

## 2020-11-26 RX ORDER — POTASSIUM CHLORIDE 750 MG/1
40 TABLET, EXTENDED RELEASE ORAL
Status: DISCONTINUED | OUTPATIENT
Start: 2020-11-26 | End: 2020-11-27 | Stop reason: HOSPADM

## 2020-11-26 RX ORDER — MAGNESIUM SULFATE HEPTAHYDRATE 40 MG/ML
2 INJECTION, SOLUTION INTRAVENOUS ONCE
Status: COMPLETED | OUTPATIENT
Start: 2020-11-26 | End: 2020-11-26

## 2020-11-26 RX ORDER — LIDOCAINE 40 MG/G
CREAM TOPICAL
Status: DISCONTINUED | OUTPATIENT
Start: 2020-11-26 | End: 2020-11-27 | Stop reason: HOSPADM

## 2020-11-26 RX ORDER — POTASSIUM CHLORIDE 750 MG/1
20 TABLET, EXTENDED RELEASE ORAL ONCE
Status: COMPLETED | OUTPATIENT
Start: 2020-11-26 | End: 2020-11-26

## 2020-11-26 RX ADMIN — INSULIN ASPART 3 UNITS: 100 INJECTION, SOLUTION INTRAVENOUS; SUBCUTANEOUS at 07:13

## 2020-11-26 RX ADMIN — DILTIAZEM HYDROCHLORIDE 10 MG/HR: 5 INJECTION INTRAVENOUS at 19:22

## 2020-11-26 RX ADMIN — INSULIN HUMAN 100 UNITS: 500 INJECTION, SOLUTION SUBCUTANEOUS at 18:47

## 2020-11-26 RX ADMIN — MAGNESIUM SULFATE IN WATER 2 G: 40 INJECTION, SOLUTION INTRAVENOUS at 03:41

## 2020-11-26 RX ADMIN — ACETAMINOPHEN 650 MG: 325 TABLET, FILM COATED ORAL at 03:52

## 2020-11-26 RX ADMIN — ROSUVASTATIN CALCIUM 10 MG: 10 TABLET, FILM COATED ORAL at 21:05

## 2020-11-26 RX ADMIN — ACETAMINOPHEN 650 MG: 325 TABLET, FILM COATED ORAL at 21:14

## 2020-11-26 RX ADMIN — INSULIN ASPART 2 UNITS: 100 INJECTION, SOLUTION INTRAVENOUS; SUBCUTANEOUS at 21:09

## 2020-11-26 RX ADMIN — INSULIN ASPART 2 UNITS: 100 INJECTION, SOLUTION INTRAVENOUS; SUBCUTANEOUS at 11:07

## 2020-11-26 RX ADMIN — POTASSIUM CHLORIDE 20 MEQ: 750 TABLET, EXTENDED RELEASE ORAL at 17:26

## 2020-11-26 RX ADMIN — LISINOPRIL 40 MG: 40 TABLET ORAL at 08:39

## 2020-11-26 RX ADMIN — DILTIAZEM HYDROCHLORIDE 10 MG/HR: 5 INJECTION INTRAVENOUS at 08:36

## 2020-11-26 RX ADMIN — METOPROLOL SUCCINATE 50 MG: 50 TABLET, EXTENDED RELEASE ORAL at 08:39

## 2020-11-26 RX ADMIN — HYDROCHLOROTHIAZIDE 25 MG: 25 TABLET ORAL at 08:38

## 2020-11-26 RX ADMIN — MAGNESIUM SULFATE IN WATER 2 G: 40 INJECTION, SOLUTION INTRAVENOUS at 19:47

## 2020-11-26 RX ADMIN — RIVAROXABAN 20 MG: 20 TABLET, FILM COATED ORAL at 17:26

## 2020-11-26 RX ADMIN — LEVOTHYROXINE SODIUM 75 MCG: 75 TABLET ORAL at 08:38

## 2020-11-26 RX ADMIN — INSULIN ASPART 1 UNITS: 100 INJECTION, SOLUTION INTRAVENOUS; SUBCUTANEOUS at 00:42

## 2020-11-26 ASSESSMENT — ACTIVITIES OF DAILY LIVING (ADL)
ADLS_ACUITY_SCORE: 11

## 2020-11-26 NOTE — PHARMACY-ADMISSION MEDICATION HISTORY
Admission Medication History Completed by Pharmacy    See [facility] Admission Navigator for allergy information, preferred outpatient pharmacy, prior to admission medications and immunization status.     Medication History Sources:     Patient, Dispense Report     Changes made to PTA medication list (reason):    Added: None    Deleted: None    Changed:  o Amoxicillin 500 mg capsule: before dental procedures --> take 2,000 mg by mouth before dental procedures (per patient)   o Aspirin EC --> added tablet strength (per patient)  o Econazole nitrate 1% cream: daily --> daily as needed (per patient)  o Ibuprofen po: take 600 mg by mouth 4 times daily --> ibuprofen 200 mg tablet: take 600 mg by mouth every 6 hours as needed (per patient)  o Metoprolol succinate ER 25 mg tablet: take 25 mg by mouth daily --> take 50 mg by mouth daily (per patient at the direction of her provider)    Additional Information:    The patient was able to state all dosages, directions, and last doses when provided the medication name.     The patient states that her provider increased her dose of metoprolol from 25 mg to 50 mg about a week ago to help with controlling her heart rate. She currently is taking 2 of the 25 mg tablets. Succinate formulation was confirmed in the dispense report.     The patient confirms taking 2 injection of Victoza for a total of 3 mg daily.     She confirms the current side effects in the allergy list and reports no new allergies at this time.     Patient preferred pharmacy: [facility]     Prior to Admission medications    Medication Sig Last Dose Taking? Auth Provider   amoxicillin (AMOXIL) 500 MG capsule Take 2,000 mg by mouth Before dental procedures Past Month Yes Reported, Patient   aspirin (ASA) 325 MG EC tablet Take 325 mg by mouth daily  11/25/2020 at am Yes Reported, Patient   econazole nitrate 1 % external cream Apply topically daily as needed To feet and toenails Past Week Yes Unknown, Entered By  History   HUMULIN R U-500 KWIKPEN 500 UNIT/ML PEN soln INJECT 120 UNITS IN THE MORNING  UNITS AT BEDTIME 11/25/2020 at am Yes Rika Delgado MD   hydrochlorothiazide (HYDRODIURIL) 25 MG tablet TAKE ONE TABLET BY MOUTH ONCE DAILY 11/25/2020 at am Yes Marilou Arciniega MD PhD   ibuprofen (ADVIL) 200 MG capsule Take 600 mg by mouth every 6 hours as needed  Past Week Yes Reported, Patient   levothyroxine (SYNTHROID/LEVOTHROID) 75 MCG tablet TAKE ONE TABLET BY MOUTH ONCE DAILY 11/25/2020 at am Yes Rika Delgado MD   liraglutide (VICTOZA PEN) 18 MG/3ML solution Administer two injections of 1.8 and 1.2 mg each, for a total daily dose of 3 mg. 11/25/2020 at am Yes Rika Delgado MD   lisinopril (ZESTRIL) 40 MG tablet TAKE 1 AND 1/2 TABLETS BY MOUTH ONCE DAILY 11/25/2020 at am Yes Marilou Arciniega MD PhD   metFORMIN (GLUCOPHAGE-XR) 500 MG 24 hr tablet Take 4 tablets (2,000 mg) by mouth At Bedtime 11/24/2020 at pm Yes Rika Delgado MD   metoprolol succinate ER (TOPROL-XL) 25 MG 24 hr tablet Take 50 mg by mouth daily 11/25/2020 at am Yes Unknown, Entered By History   Multiple Vitamins-Minerals (ADVANCED DIABETIC MULTIVITAMIN) TABS Take 1 tablet by mouth daily Past Week Yes Reported, Patient   rosuvastatin (CRESTOR) 10 MG tablet Take 1 tablet (10 mg) by mouth daily 11/24/2020 at pm Yes Marilou Arciniega MD PhD   blood glucose monitoring (NO BRAND SPECIFIED) test strip Use to test blood sugar 4 times daily or as directed.  Patient not taking: Reported on 11/25/2020   Renny Agarwal MD   Continuous Blood Gluc  (FREESTYLE MIKE 14 DAY READER) KATIA 1 each daily   Rika Delgado MD   Continuous Blood Gluc Sensor (FREESTYLE MIKE 14 DAY SENSOR) MISC Change every 14 days.   Rika Delgado MD   Garlic 1000 MG CAPS Take 1 capsule by mouth daily Takes during summer months.  Done taking until next summer. More than a month  Reported, Patient   insulin pen needle (GNP  CLICKFINE PEN NEEDLES) 31G X 8 MM miscellaneous Use six times daily or as directed   Rika Delgado MD   ORDER FOR DME, SET TO LOCAL PRINT, Respironics REMSTAR 60 Series Auto CPAP 8-15cm H2O, Airfit P10 nasal pillow mask w/medium pillows   Vivian Mata MD   order for DME Equipment being ordered: [id number] $70  DH Shoe LG  Patient not taking: Reported on 11/25/2020   Rufus Hutson DPM   order for DME Roll-A-Bout Walker. Patient can use for left foot.   Rufus Hutson DPM   order for DME Equipment being ordered: wrist brace with thumb spica, L  Patient not taking: Reported on 11/25/2020   Ludivina Jennings MD   order for DME Equipment being ordered: wrist brace with thumb spica, R  Patient not taking: Reported on 11/25/2020   Ludivina Jennings MD       Date completed: 11/25/20    Medication history completed by: ISABELLE RiceII Pharmacy Intern

## 2020-11-26 NOTE — ED NOTES
[facility]    ED Nurse to Floor Handoff     Sherry Slaughter is a 65 year old female who speaks English and lives alone,  in a home  They arrived in the ED by car from home    ED Chief Complaint: Atrial Fib    ED Dx;   Final diagnoses:   Paroxysmal atrial fibrillation (H)         Needed?: No    Allergies:   Allergies   Allergen Reactions     Atorvastatin Calcium      Gas     Pravachol [Pravastatin Sodium]      Pravachol - dry cough      Simvastatin      Myalgia   .  Past Medical Hx:   Past Medical History:   Diagnosis Date     Cataract      DEPRESSION     comes and goes - tried meds - unsuccessfully, certain times of the year, no psych intervention and no counselors in the past - and not interested      Diverticulosis of colon (without mention of hemorrhage) 4/04    colonoscopy     ECHO-mildLVH,tr MR,mild thick lflets w inc LA,trTR   12/03     Essential hypertension, benign 1990s    late 1990s - started medications at that time - not to difficult to control meds      Fam hx-cardiovas dis NEC     father - CAD, and lipids/HTN - multiple members of the family      Family history of diabetes mellitus     sister and grandmother with DM      Family history of malignant neoplasm of breast     mother - young age - 45, and maternal cousin and aunt as well - no BRCA testing done      Family history of stroke (cerebrovascular)     grandmother in early life in her 40s      FAMILY HX COLON CANCER     Pat uncles x 2     Heart disease     murmur/     Heart murmur      HYPERLIPIDEMIA 2000    fairly recent - in the last 5 years - high for DM levels  -cholesterol recent      Irritable bowel syndrome     goes between the 2 - nerve related - more stressed more problems      MICROALBUMINURIA     unsure how long - been on the lisinopril - for a few years at well      Nonspecific abnormal results of liver function study 2/3/2003    SGOT - has been high in the past - since  the hepatitis b - borderline elevation - not really changing any      OBESITY      Obstructive sleep apnea      GENESIS (obstructive sleep apnea) 10/15/2006    psg 5/15 AHI 53 aPAP 8-15     OSTEOARTHRITIS L KNEE 2003    total knee on the left - and pain is gone since the knee replacement      PERS HX HEPATITIS B- RESOLVED] 1977    acute virus only - no chronic disease      PERS HX HEPATITIS B- RESOLVED]      Type II or unspecified type diabetes mellitus without mention of complication, not stated as uncontrolled 1991    oral meds frist, then insulin eventually later, difficult to control most of the time      Unspecified hypothyroidism 10/11/2006    TSH 3.36 - mild subclinical hypothyroidism - deciding on following or starting low dose meds - with dr Oreilly - following       Baseline Mental status: WDL  Current Mental Status changes: at basesline    Infection present or suspected this encounter: no  Sepsis suspected: No  Isolation type: No active isolations  Patient tested for COVID 19 prior to admission: yes     Activity level - Baseline/Home:  Independent  Activity Level - Current:   Independent    Bariatric equipment needed?: No    In the ED these meds were given:   Medications   diltiazem (CARDIZEM) 125 mg in sodium chloride 0.9 % 125 mL infusion (5 mg/hr Intravenous New Bag 11/25/20 2000)   medication instruction (has no administration in time range)   nitroGLYcerin (NITROSTAT) sublingual tablet 0.4 mg (has no administration in time range)   alum & mag hydroxide-simethicone (MAALOX) suspension 30 mL (has no administration in time range)   senna-docusate (SENOKOT-S/PERICOLACE) 8.6-50 MG per tablet 1 tablet (has no administration in time range)     Or   senna-docusate (SENOKOT-S/PERICOLACE) 8.6-50 MG per tablet 2 tablet (has no administration in time range)   polyethylene glycol (MIRALAX) Packet 17 g (has no administration in time range)   acetaminophen (TYLENOL) tablet 650 mg (has no administration in time range)  "  acetaminophen (TYLENOL) Suppository 650 mg (has no administration in time range)   Patient is already receiving anticoagulation with heparin, enoxaparin (LOVENOX), warfarin (COUMADIN)  or other anticoagulant medication (has no administration in time range)   diltiazem (CARDIZEM) tablet 30 mg (30 mg Oral Given 11/25/20 1800)   rivaroxaban ANTICOAGULANT (XARELTO) tablet 20 mg (20 mg Oral Given 11/25/20 1759)   diltiazem (CARDIZEM) injection 25 mg (25 mg Intravenous Given 11/25/20 1755)       Drips running?  Yes Diltiazem    Home pump  No    Current LDAs  Peripheral IV 11/25/20 Right Upper forearm (Active)   Number of days: 0       Incision/Surgical Site 09/23/15 Right Knee (Active)   Number of days: 1890       Labs results:   Labs Ordered and Resulted from Time of ED Arrival Up to the Time of Departure from the ED   COMPREHENSIVE METABOLIC PANEL - Abnormal; Notable for the following components:       Result Value    Glucose 156 (*)     All other components within normal limits   CBC WITH PLATELETS DIFFERENTIAL   TSH WITH FREE T4 REFLEX   INR   NT PROBNP INPATIENT   IP ASSIGN PROVIDER TEAM TO TREATMENT TEAM   NO VTE PROPHYLAXIS   DAILY WEIGHTS   INTAKE AND OUTPUT   OBTAIN MEDICAL RECORDS   DOCUMENT   PATIENT EDUCATION   TELEMETRY MONITORING CARDIOLOGY ADULT   VITAL SIGNS AND PAIN ASSESSMENT   PULSE OXIMETRY NURSING   NOTIFY PROVIDER   ACTIVITY       Imaging Studies: No results found for this or any previous visit (from the past 24 hour(s)).    Recent vital signs:   BP (!) 149/91   Pulse 128   Temp 98.5  F (36.9  C) (Oral)   Resp 12   Ht 1.778 m (5' 10\")   Wt 131.5 kg (290 lb)   LMP 10/02/2007   SpO2 96%   Breastfeeding No   BMI 41.61 kg/m      [place] Coma Scale Score: 15 (11/25/20 1935)       Cardiac Rhythm: Tachycardia  Pt needs tele? Yes  Skin/wound Issues: None    Code Status: Full Code    Pain control: pt had none    Nausea control: pt had none    Abnormal labs/tests/findings requiring intervention: see " results    Family present during ED course? No   Family Comments/Social Situation comments:     Tasks needing completion: None    Jerome Funez, RN  [phone] [facility]

## 2020-11-26 NOTE — H&P
"[facility]     Cardiology History and Physical - Cards 1  Date of Admission:  11/25/2020    Assessment & Plan: HVSL    Mrs. Sherry Slaughter is a 65 year old female with a past medical history of Obesity, DM II, HTN, HLD, GENESIS untreated, depression and hypothyroidism who presented after being referred from cardiology clinic for symptomatic new onset of atrial flutter/fibrillation RVR and new dyspnea on exertion for the last 2 weeks.     #New diagnosis of atrial flutter/fibrillation, RVR  #[id number] score 4 for hypertension, diabetes, age, gender  Patient reported new onset of constant palpitations, associated to elevated Hrs 140-160s and abnormal rhythm register by home ECG monitor. Reported she has had this for about 2 weeks. Also endorses new onset of progressive dyspnea on exertion for about same time.   VSS except for persistent tachy 140-160s. EKG showed a.flutter. Patient was started on diltiazem gtt. Home medications were resumed as well. Labs remarkable only for hypomagnesemia and elevated lactate. Received Mg replacement and , with improvement. TSH wnl.   Last TTE was from 02/2014 and showed preserved LVEF and severe left atrial enlargement. Per Dr. Aranda note from day of admission, \"as her left atrium was reported to be severely enlarged already and is making it less likely that she will maintain normal sinus rhythm over the long run.  However, a single attempt might be considered if needed\".   -continue Diltiazem gtt.  -continue pta metoprolol succinate ER 50mg daily  -started on rivaroxaban. Stop pta aspirin daily.   -Telemetry   -ELIAS with cardioversion tomorrow.   -TTE for valve and EF assessment  -NPO midnight  -TELE   -BMP, Mg in the AM. Goal K>4, Mg>2    #Elevated lactic acid.   -Gave 500ml LR one. HD stable.   -Repeat lactate in AM    #HTN/HLD  -continue pta hydrochlorothiazide 25mg,   -continue pta lisinopril 40mg. In patient's home orders " is listed at 60mg.   -continue pta crestor 10mg daily.     #T2DM,   -Carb/cardio based diet  -BS check tid  -Holding pta metformin and insulin glargine as patient was NPO.   -SCC medium     Chronic problems:   #Hypothyroidism, continue pta levothyroxine.   #Untreated GENESIS  #H/o depression     Diet:   Cardiac/diabetes/non caffeine diet  DVT Prophylaxis: on rivaroxaban  [place] Catheter: not present  Code Status: Full code     Fluids: none  Lines: PIV     Disposition Plan   Expected discharge: Tomorrow, recommended to prior living arrangement once arrhythmia stabilized .  Entered: Josefa Lutz MD 11/25/2020, 7:50 PM     Patient will be staffed in the AM    Josefa Lutz MD   Internal Medicine Resident PGY-2  P     [facility]   Please see sign in/sign out for up to date coverage information  ______________________________________________________________________    Chief Complaint   Palpitations and dyspnea on exertion for two weeks    History is obtained from the patient    History of Present Illness     Mrs. Sherry Slaughter is a 65 year old female with a past medical history of Obesity, DM II, HTN, HLD, GENESIS untreated, depression  and hypothyroidism who presented after being referred from cardiology clinic for new onset of atrial fibrillation RVR.     Patient reports persistent palpitations, occasional chest discomfort, and new onset of dyspnea on exertion for the past 2 weeks. She bought a ECG home monitor that syncs to her cell-phone and has noticed a report of atrial fibrillation and persistent elevated heart rates of 140-160s.   Today she had a virtual visit with cardiology clinic and was referred to ER.     Denies lightheadedness, shortness of breath, current chest pain, syncopal episodes or related symptoms.   No nausea/vomiting, abdominal pain, diarrhea.      ED course:   -VSS except for tachycardia (130-140s).  Afebrile. Symptoms: palpitations only.   -EKG: A.flutter with RVR, non ST abnormalities  -Labs: Normal CBC. Cr 0.79 ( at baseline ), K 4.1, Mg 1.7 (replaced), lactate 2.0. Normal LFTs. A1C 6.9 (from 7.5)  -Cards labs: Normal proBNP 714, troponin I and TSH   -Received diltiazem po 30mg and dilt IV 25mg without improvement. Then she was started on dilt gtt.     At arrival to the floor patient only reported palpitations.     Review of Systems    The 10 point Review of Systems is negative other than noted in the HPI or here. Please see above for more details     Past Medical History    I have reviewed this patient's medical history and updated it with pertinent information if needed.   Past Medical History:   Diagnosis Date     Cataract      DEPRESSION     comes and goes - tried meds - unsuccessfully, certain times of the year, no psych intervention and no counselors in the past - and not interested      Diverticulosis of colon (without mention of hemorrhage) 4/04    colonoscopy     ECHO-mildLVH,tr MR,mild thick lflets w inc LA,trTR   12/03     Essential hypertension, benign 1990s    late 1990s - started medications at that time - not to difficult to control meds      Fam hx-cardiovas dis NEC     father - CAD, and lipids/HTN - multiple members of the family      Family history of diabetes mellitus     sister and grandmother with DM      Family history of malignant neoplasm of breast     mother - young age - 45, and maternal cousin and aunt as well - no BRCA testing done      Family history of stroke (cerebrovascular)     grandmother in early life in her 40s      FAMILY HX COLON CANCER     Pat uncles x 2     Heart disease     murmur/     Heart murmur      HYPERLIPIDEMIA 2000    fairly recent - in the last 5 years - high for DM levels  -cholesterol recent      Irritable bowel syndrome     goes between the 2 - nerve related - more stressed more problems      MICROALBUMINURIA     unsure how long - been on the lisinopril -  for a few years at well      Nonspecific abnormal results of liver function study 2/3/2003    SGOT - has been high in the past - since the hepatitis b - borderline elevation - not really changing any      OBESITY      Obstructive sleep apnea      GENESIS (obstructive sleep apnea) 10/15/2006    psg 5/15 AHI 53 aPAP 8-15     OSTEOARTHRITIS L KNEE 2003    total knee on the left - and pain is gone since the knee replacement      PERS HX HEPATITIS B- RESOLVED] 1977    acute virus only - no chronic disease      PERS HX HEPATITIS B- RESOLVED]      Type II or unspecified type diabetes mellitus without mention of complication, not stated as uncontrolled 1991    oral meds frist, then insulin eventually later, difficult to control most of the time      Unspecified hypothyroidism 10/11/2006    TSH 3.36 - mild subclinical hypothyroidism - deciding on following or starting low dose meds - with dr Oreilly - following        Past Surgical History   I have reviewed this patient's surgical history and updated it with pertinent information if needed.  Past Surgical History:   Procedure Laterality Date     ARTHROPLASTY KNEE Right 9/23/2015    Procedure: ARTHROPLASTY KNEE;  Surgeon: Rufus Brown MD;  Location:  OR      TOTAL KNEE ARTHROPLASTY  3/03    L knee     CATARACT IOL, RT/LT Left      COLONOSCOPY  4/04    diverticulosis, rec repeat 10 yrs(consider fam hx?)     COLONOSCOPY WITH CO2 INSUFFLATION N/A 6/19/2019    Procedure: COLONOSCOPY, WITH CO2 INSUFFLATION;  Surgeon: Zeb Duarte MD;  Location:  OR     ORTHOPEDIC SURGERY      right ankle     PHACOEMULSIFICATION CLEAR CORNEA WITH STANDARD INTRAOCULAR LENS IMPLANT Left 3/15/2018    Procedure: PHACOEMULSIFICATION CLEAR CORNEA WITH STANDARD INTRAOCULAR LENS IMPLANT;  LEFT EYE PHACOEMULSIFICATION CLEAR CORNEA WITH STANDARD INTRAOCULAR LENS IMPLANT;  Surgeon: Bandar Linares MD;  Location[facility]     PHACOEMULSIFICATION CLEAR CORNEA WITH STANDARD INTRAOCULAR LENS IMPLANT  Right 4/5/2018    Procedure: PHACOEMULSIFICATION CLEAR CORNEA WITH STANDARD INTRAOCULAR LENS IMPLANT;  RIGHT PHACOEMULSIFICATION CLEAR CORNEA WITH STANDARD INTRAOCULAR LENS IMPLANT ;  Surgeon: Bandar Linares MD;  Location: [facility]     STRESS ECHO (METRO)  12/03    no ischemia, limited by fatigue     SURGICAL HISTORY OF -       EXP LAP- R OVARY CYSTS     SURGICAL HISTORY OF -   1981,1984,1985    CHILDBIRTH     ZZC NONSPECIFIC PROCEDURE  6/06    right triple arthrodecesis      Z NONSPECIFIC PROCEDURE  7/06     right bunion surgery        Social History   I have reviewed this patient's social history and updated it with pertinent information if needed.  Social History     Tobacco Use     Smoking status: Never Smoker     Smokeless tobacco: Never Used     Tobacco comment: tried in early 20s only    Substance Use Topics     Alcohol use: Yes     Comment: rare     Drug use: No     Family History   I have reviewed this patient's family history and updated it with pertinent information if needed.   I have reviewed this patient's family history and updated it with pertinent information if needed.  Family History   Problem Relation Age of Onset     Diabetes Maternal Grandmother         DM TYPE 2     Cerebrovascular Disease Maternal Grandmother      Hypertension Sister      Lipids Sister      Heart Disease Sister         Chronic AFib     Hypertension Sister      Lipids Sister      Gynecology Sister      Diabetes Sister      Asthma Sister      Blood Disease Paternal Grandmother         T CELL LEUKEMIA     Hypertension Brother      Lipids Brother      Congenital Anomalies Brother      Cardiovascular Brother      Heart Disease Brother         CABG/AFIB     Hypertension Brother      Lipids Brother      Obesity Brother      Hypertension Brother      Respiratory Brother         Sleep apnea     Heart Disease Brother         AFib     Alzheimer Disease Mother      Asthma Mother      Hypertension Mother      Breast Cancer Mother  40        has mastectomy and lived to 84     Allergies Mother         Sulfa,     Arthritis Mother      Cardiovascular Mother      Depression Mother      Respiratory Mother      Lipids Mother      Cancer Mother         breast     Thyroid Disease Mother      Cerebrovascular Disease Mother      Dementia Mother         Alzheimers     Cancer - colorectal Other      Cancer Father         lung     Aneurysm Father         brother, AAA     Other Cancer Father         lung ca     Asthma Sister      Cancer - colorectal Paternal Uncle         late 50s to early 60s - great uncles      Breast Cancer Other         Maternal cousin     Arrhythmia Sister         2 brothers 1 sister Afib     Breast Cancer Maternal Aunt 62     Other Cancer Maternal Aunt 35        Ovarian cancer.  Survived despite late recurrence and is now 92.     Glaucoma No family hx of      Macular Degeneration No family hx of        Prior to Admission Medications   Prior to Admission Medications   Prescriptions Last Dose Informant Patient Reported? Taking?   ASPIRIN EC PO  Self Yes No   Sig: Take 325 mg by mouth daily   Continuous Blood Gluc  (FREESTYLE MIKE 14 DAY READER) KATIA   No No   Si each daily   Continuous Blood Gluc Sensor (ThoughtlySTYLE MIKE 14 DAY SENSOR) MISC   No No   Sig: Change every 14 days.   Garlic 1000 MG CAPS  Self Yes No   Sig: Take 1 capsule by mouth daily Takes during summer months.  Done taking until next summer.   HUMULIN R U-500 KWIKPEN 500 UNIT/ML PEN soln   No No   Sig: INJECT 120 UNITS IN THE MORNING  UNITS AT BEDTIME   Ibuprofen (ADVIL PO)  Self Yes No   Sig: Take 600 mg by mouth 4 times daily   Multiple Vitamins-Minerals (ADVANCED DIABETIC MULTIVITAMIN) TABS   Yes No   Sig: Take 1 tablet by mouth daily   ORDER FOR DME, SET TO LOCAL PRINT,  Self Yes No   Sig: Respironics REMSTAR 60 Series Auto CPAP 8-15cm H2O, Airfit P10 nasal pillow mask w/medium pillows   amoxicillin (AMOXIL) 500 MG capsule   Yes No   Sig: Before  dental procedures   blood glucose monitoring (NO BRAND SPECIFIED) test strip   No No   Sig: Use to test blood sugar 4 times daily or as directed.   Patient not taking: Reported on 11/25/2020   econazole nitrate 1 % external cream   No No   Sig: Apply topically daily To feet and toenails.   hydrochlorothiazide (HYDRODIURIL) 25 MG tablet   No No   Sig: TAKE ONE TABLET BY MOUTH ONCE DAILY   insulin pen needle (GNP CLICKFINE PEN NEEDLES) 31G X 8 MM miscellaneous   No No   Sig: Use six times daily or as directed   levothyroxine (SYNTHROID/LEVOTHROID) 75 MCG tablet   No No   Sig: TAKE ONE TABLET BY MOUTH ONCE DAILY   liraglutide (VICTOZA PEN) 18 MG/3ML solution   No No   Sig: Administer two injections of 1.8 and 1.2 mg each, for a total daily dose of 3 mg.   lisinopril (ZESTRIL) 40 MG tablet   No No   Sig: TAKE 1 AND 1/2 TABLETS BY MOUTH ONCE DAILY   metFORMIN (GLUCOPHAGE-XR) 500 MG 24 hr tablet   No No   Sig: Take 4 tablets (2,000 mg) by mouth At Bedtime   metoprolol succinate ER (TOPROL-XL) 25 MG 24 hr tablet   No No   Sig: Take 1 tablet (25 mg) by mouth daily   order for DME   No No   Sig: Equipment being ordered: wrist brace with thumb spica, L   Patient not taking: Reported on 11/25/2020   order for DME   No No   Sig: Equipment being ordered: wrist brace with thumb spica, R   Patient not taking: Reported on 11/25/2020   order for DME   No No   Sig: Roll-A-Bout Walker. Patient can use for left foot.   order for DME   No No   Sig: Equipment being ordered: [id number] $70  DH Shoe LG   Patient not taking: Reported on 11/25/2020   rosuvastatin (CRESTOR) 10 MG tablet   No No   Sig: Take 1 tablet (10 mg) by mouth daily      Facility-Administered Medications Last Administration Doses Remaining   triamcinolone (KENALOG-40) injection 20 mg 9/12/2019 10:57 AM         Allergies   Allergies   Allergen Reactions     Atorvastatin Calcium      Gas     Pravachol [Pravastatin Sodium]      Pravachol - dry cough      Simvastatin       Myalgia       Physical Exam   Vital Signs: Temp: 98.5  F (36.9  C) Temp src: Oral BP: 96/71 Pulse: 129   Resp: 23 SpO2: 96 % O2 Device: None (Room air)    Weight: 290 lbs 0 oz  General Appearance: Alert, in not acute distress.   Eyes: No jaundice  HEENT: No JVD   Respiratory: Clear breath sounds bilaterally. No wheezing, no crackles.   Cardiovascular: Irregular, no murmur.  GI: +BS, non-tender, non-distended, no rebound  Skin: Warm, perfused  Musculoskeletal: No peripheral edema.   Neurologic: Alert, orientated x4, strength 5/5 four extremities. No other gross focal deficit  Psychiatric: Normal affect    Data   Data reviewed today: I reviewed all medications, new labs and imaging results over the last 24 hours. I personally reviewed EKG at admission that showed atrial flutter 2:1, no significant ST changes, normal QRS,  and normal axis.     Recent Labs   Lab 11/25/20  1734   WBC 9.6   HGB 14.2   MCV 96      INR 1.09      POTASSIUM 4.1   CHLORIDE 108   CO2 22   BUN 19   CR 0.79   ANIONGAP 9   CASSIDY 10.1   *   ALBUMIN 3.9   PROTTOTAL 7.4   BILITOTAL 0.5   ALKPHOS 47   ALT 42   AST Unsatisfactory specimen - hemolyzed     Attending Attestation: I saw, examined and evaluated the patient and reviewed all relevant data. I agree with the findings, assessment and plan of care documented in the edited note and summarized the hernandez findings and plan with the patient.  - Diastolic dysfunction driven atrial fibrillation for ELIAS / cardioversion tomorrow  - Oral anticoagulation started with this admission

## 2020-11-26 NOTE — PLAN OF CARE
"Neuro: A&Ox4.   Cardiac: Afib HR 's. -130's.   Respiratory: Sating >98% on RA. Denies SOB. Clear/diminished.   GI/: Adequate urine output. BM X1  Diet/appetite: Tolerating CHO diet, NPO at midnight.   Activity:  Independent   Pain: Denies.   Skin: L toe wound- dressing C/D/I.   LDA's: R PIV x2.   Gtt: Dilt 10mg/hr.     Plan: ELIAS/cardioversion rescheduled for 11/27. Continue with POC. Notify primary team with changes.  /72 (BP Location: Left arm)   Pulse 100   Temp 97.6  F (36.4  C) (Oral)   Resp 18   Ht 1.778 m (5' 10\")   Wt 133.7 kg (294 lb 12.8 oz)   LMP 10/02/2007   SpO2 98%   Breastfeeding No   BMI 42.30 kg/m        "

## 2020-11-26 NOTE — PROGRESS NOTES
Cardiology Progress Note      Today's plan:  - Continue dilt gtt  - Echo   - NPO at midnight for ELIAS/DCCV tomorrow    Assessment & Plan:  Sherry Slaughter is a 65 year old female with a past medical history of obesity, DM II, HTN, HLD, untreated GENESIS, depression, and hypothyroidism who was referred from the cardiology clinic for new onset of rapid atrial flutter/fibrillation and dyspnea on exertion x 2 weeks.      #Atrial flutter/fibrillation, RVR   Reports BAEZ and palpitations with 's x 2 weeks. Home EKG monitor showed abnormal rhythm. Patient presented to cardiology clinic on day of admission where EKG revealed atrial flutter in the 130's. She was sent to ED for further work-up. Started on dilt gtt with good response. Labs remarkable only for hypomagnesemia and elevated lactate. Received Mg replacement and , with improvement. TSH wnl. Last TTE was from 02/2014 and showed preserved LVEF and severe left atrial enlargement. [id number] score 4 (hypertension, diabetes, age, gender). No prior AC. Currently in afib with rates 's.   -Continue diltiazem gtt  -Continue PTA Toprol XL 50mg daily  -Continue xarelto 20 mg daily, new this admission   - Monitor on telemetry   - Replace lytes per protocol  - Goal K>4, Mg>2  - Echo   - Plan for ELIAS/DCCV tomorrow  - NPO at midnight     #Elevated LA  S/p 500 ml bolus LR. HD stable. No s/s infection. Afebrile.   -Repeat lactate in AM     #HTN  -150's this admission. PTA antihypertensives.   -Continue PTA hydrochlorothiazide 25 mg daily   -Continue PTA lisinopril 40 mg    # HLD   LDL 43 (2019)  - Continue PTA rosuvastatin 10 mg    #T2DM  Not-well controlled. PTA insulin. A1C 6.9.   - Sliding scale insulin while inpatient  - Resume PTA Victoza    - Resume PTA insulin glargine  - BG Q4h and HS  - Hypoglycemia protocol     Chronic problems:   # Hypothyroidism: continue pta levothyroxine  #Untreated GENESIS: not on home CPAP  #H/o depression: stable  "    Prophylaxis/DVT: xarelto   Diet: Moderate CHO  Activity: as tolerated   Code Status: Full   Disposition: likely 1-2 days pending ELIAS/DCCV    Patient seen and discussed w/ Dr. Madrid. He agrees with the above plan.       Zoila Bhandari DNP, APRN, CNP  [facility]  General Cardiology        Interval History: No acute events overnight. Remains in afib with rates 's. Denies chest pain, palpitations, shortness of breath, dizziness, or edema. NPO for ELIAS/DCCV tomorrow.     Most recent vital signs:  /72 (BP Location: Left arm)   Pulse 100   Temp 97.6  F (36.4  C) (Oral)   Resp 18   Ht 1.778 m (5' 10\")   Wt 133.7 kg (294 lb 12.8 oz)   LMP 10/02/2007   SpO2 98%   Breastfeeding No   BMI 42.30 kg/m    Temp:  [97.6  F (36.4  C)-99.3  F (37.4  C)] 97.6  F (36.4  C)  Pulse:  [] 100  Resp:  [11-25] 18  BP: ()/(63-95) 133/72  SpO2:  [93 %-99 %] 98 %  Wt Readings from Last 2 Encounters:   11/25/20 133.7 kg (294 lb 12.8 oz)   09/22/20 134.8 kg (297 lb 1.6 oz)       Intake/Output Summary (Last 24 hours) at 11/26/2020 1505  Last data filed at 11/26/2020 1233  Gross per 24 hour   Intake 397.58 ml   Output 1025 ml   Net -627.42 ml       Physical exam:  General: In bed, in NAD  HEENT: EOMI, PERRLA, no scleral icterus or injection  CARDIAC: RRR, no m/r/g appreciated. Peripheral pulses 2+  RESP: CTAB, no wheezes, rhonchi or crackles appreciated.  GI: NABS, NT/ND, no guarding or rebound  EXTREMITIES: NO LE edema, pulses 2+.   SKIN: No acute lesions appreciated  NEURO: Alert and oriented X3, CN II-XII grossly intact, no focal neurological deficits noted    Drains and Tubes: No drains or tubes present  Access: No central lines or devices in place    Labs (Past three days):  CBC  Recent Labs   Lab 11/25/20  1734   WBC 9.6   RBC 4.50   HGB 14.2   HCT 43.3   MCV 96   MCH 31.6   MCHC 32.8   RDW 13.2        BMP  Recent Labs   Lab 11/26/20  0633 11/25/20  1734    139 "   POTASSIUM 4.0 4.1   CHLORIDE 110* 108   CO2 22 22   ANIONGAP 8 9   * 156*   BUN 13 19   CR 0.71 0.79   GFRESTIMATED 88 77   GFRESTBLACK >90 89   CASSIDY 9.5 10.1   MAG 1.9 1.7     Troponins:   Lab Results   Component Value Date    TROPI <0.015 11/25/2020        INR  Recent Labs   Lab 11/25/20  1734   INR 1.09     Liver panel  Recent Labs   Lab 11/25/20  1734   PROTTOTAL 7.4   ALBUMIN 3.9   BILITOTAL 0.5   ALKPHOS 47   AST Unsatisfactory specimen - hemolyzed   ALT 42       Imaging/procedure results:  EKG 12 Lead: 11/26/20

## 2020-11-26 NOTE — PLAN OF CARE
Belonging with pt  - Purse with wallet ($25)  - L leg brace  - R shoe  - Medication  - Pants x2  - Shirt x2  - Under wear  - Bra

## 2020-11-26 NOTE — PLAN OF CARE
Admission note  Arrived at 2055    Diagnosis: A-fib with RVR  Admitted from: ED  Via: Stretcher   Accompanied by: Nurse  Belongings: At bedside. declined sending any items to security.  Admission Profile: Complete  Teaching: orientation to unit, call don't fall, use of console, meal times, visiting hours, when to call for the RN (angina/sob/dizziness, etc.), and enforced importance of safety   Access: R PIV-Infusing  Telemetry: Placed on patient  Height/Weight: Complete

## 2020-11-26 NOTE — PHARMACY-CONSULT NOTE
U-500 Insulin Pharmacy Consult    Sherry Slaughter is a 65 year old female who has been using U-500 Insulin prior to admission.  The insulin was supplied in a U-500 Pen.  There is no dose conversion required with the pen.     Zoila Salmeron, PharmD Resident

## 2020-11-26 NOTE — PLAN OF CARE
Temp: 97.7  F (36.5  C) Temp src: Oral BP: 124/81 Pulse: 102   Resp: 18 SpO2: 97 % O2 Device: None (Room air)      Running: Diltiazem @ 10mL/hr  Mag replaced, PRN tylenol given    A:   Neuro: A/Ox4. Uses call light approprietly   Cardiac/Tele:  VVS. Afebrile, A-fib. Denies chest pain. Reports intermittent palpitation. Dilt gtt increased to 15 mL/hr. Heart rate started dropping and Dilt gtt was decreased to 10mL/hr.  Respiratory: Room air  GI/: Continent of bowel and bladder  Diet/Appetite: NPO  Skin: Sore on left big toe. Bandage applied. Two nurse (EMMY Burr) skin assessment completed.    LDAs: Two R PIV  Activity: Independent   Pain: Reported mild headache. PRN tylenol given and effective.    P: ELIAS and cardioversion ordered. Continue to monitor and notify team with changes.

## 2020-11-27 ENCOUNTER — APPOINTMENT (OUTPATIENT)
Dept: CARDIOLOGY | Facility: CLINIC | Age: 65
DRG: 309 | End: 2020-11-27
Attending: NURSE PRACTITIONER
Payer: COMMERCIAL

## 2020-11-27 VITALS
RESPIRATION RATE: 18 BRPM | SYSTOLIC BLOOD PRESSURE: 120 MMHG | DIASTOLIC BLOOD PRESSURE: 76 MMHG | HEIGHT: 70 IN | BODY MASS INDEX: 41.54 KG/M2 | HEART RATE: 105 BPM | OXYGEN SATURATION: 95 % | WEIGHT: 290.2 LBS | TEMPERATURE: 97.9 F

## 2020-11-27 LAB
ANION GAP SERPL CALCULATED.3IONS-SCNC: 10 MMOL/L (ref 3–14)
BUN SERPL-MCNC: 15 MG/DL (ref 7–30)
CALCIUM SERPL-MCNC: 9.9 MG/DL (ref 8.5–10.1)
CHLORIDE SERPL-SCNC: 106 MMOL/L (ref 94–109)
CO2 SERPL-SCNC: 23 MMOL/L (ref 20–32)
CREAT SERPL-MCNC: 0.78 MG/DL (ref 0.52–1.04)
GFR SERPL CREATININE-BSD FRML MDRD: 79 ML/MIN/{1.73_M2}
GLUCOSE BLDC GLUCOMTR-MCNC: 155 MG/DL (ref 70–99)
GLUCOSE BLDC GLUCOMTR-MCNC: 191 MG/DL (ref 70–99)
GLUCOSE BLDC GLUCOMTR-MCNC: 243 MG/DL (ref 70–99)
GLUCOSE SERPL-MCNC: 157 MG/DL (ref 70–99)
LACTATE BLD-SCNC: 1.7 MMOL/L (ref 0.7–2)
MAGNESIUM SERPL-MCNC: 1.9 MG/DL (ref 1.6–2.3)
POTASSIUM SERPL-SCNC: 3.6 MMOL/L (ref 3.4–5.3)
POTASSIUM SERPL-SCNC: 3.8 MMOL/L (ref 3.4–5.3)
SODIUM SERPL-SCNC: 139 MMOL/L (ref 133–144)

## 2020-11-27 PROCEDURE — 84132 ASSAY OF SERUM POTASSIUM: CPT | Performed by: INTERNAL MEDICINE

## 2020-11-27 PROCEDURE — 83735 ASSAY OF MAGNESIUM: CPT | Performed by: INTERNAL MEDICINE

## 2020-11-27 PROCEDURE — 99238 HOSP IP/OBS DSCHRG MGMT 30/<: CPT | Mod: 25 | Performed by: INTERNAL MEDICINE

## 2020-11-27 PROCEDURE — 83605 ASSAY OF LACTIC ACID: CPT | Performed by: NURSE PRACTITIONER

## 2020-11-27 PROCEDURE — 250N000013 HC RX MED GY IP 250 OP 250 PS 637: Performed by: STUDENT IN AN ORGANIZED HEALTH CARE EDUCATION/TRAINING PROGRAM

## 2020-11-27 PROCEDURE — 80048 BASIC METABOLIC PNL TOTAL CA: CPT | Performed by: NURSE PRACTITIONER

## 2020-11-27 PROCEDURE — 93306 TTE W/DOPPLER COMPLETE: CPT | Mod: 26 | Performed by: INTERNAL MEDICINE

## 2020-11-27 PROCEDURE — 255N000002 HC RX 255 OP 636: Performed by: INTERNAL MEDICINE

## 2020-11-27 PROCEDURE — 258N000003 HC RX IP 258 OP 636: Performed by: EMERGENCY MEDICINE

## 2020-11-27 PROCEDURE — 36415 COLL VENOUS BLD VENIPUNCTURE: CPT | Performed by: NURSE PRACTITIONER

## 2020-11-27 PROCEDURE — 36415 COLL VENOUS BLD VENIPUNCTURE: CPT | Performed by: INTERNAL MEDICINE

## 2020-11-27 PROCEDURE — 999N001017 HC STATISTIC GLUCOSE BY METER IP

## 2020-11-27 PROCEDURE — 93306 TTE W/DOPPLER COMPLETE: CPT

## 2020-11-27 PROCEDURE — 250N000009 HC RX 250: Performed by: NURSE PRACTITIONER

## 2020-11-27 PROCEDURE — 250N000013 HC RX MED GY IP 250 OP 250 PS 637: Performed by: NURSE PRACTITIONER

## 2020-11-27 PROCEDURE — 250N000009 HC RX 250: Performed by: EMERGENCY MEDICINE

## 2020-11-27 PROCEDURE — 250N000013 HC RX MED GY IP 250 OP 250 PS 637: Performed by: INTERNAL MEDICINE

## 2020-11-27 RX ORDER — DILTIAZEM HYDROCHLORIDE 240 MG/1
240 CAPSULE, COATED, EXTENDED RELEASE ORAL DAILY
Qty: 30 CAPSULE | Refills: 1 | Status: SHIPPED | OUTPATIENT
Start: 2020-11-28 | End: 2020-12-10

## 2020-11-27 RX ORDER — MAGNESIUM OXIDE 400 MG/1
400 TABLET ORAL 2 TIMES DAILY
Status: DISCONTINUED | OUTPATIENT
Start: 2020-11-27 | End: 2020-11-27 | Stop reason: HOSPADM

## 2020-11-27 RX ORDER — METOPROLOL SUCCINATE 50 MG/1
50 TABLET, EXTENDED RELEASE ORAL ONCE
Status: COMPLETED | OUTPATIENT
Start: 2020-11-27 | End: 2020-11-27

## 2020-11-27 RX ORDER — DILTIAZEM HYDROCHLORIDE 240 MG/1
240 CAPSULE, COATED, EXTENDED RELEASE ORAL DAILY
Status: DISCONTINUED | OUTPATIENT
Start: 2020-11-27 | End: 2020-11-27 | Stop reason: HOSPADM

## 2020-11-27 RX ORDER — METOPROLOL SUCCINATE 50 MG/1
50 TABLET, EXTENDED RELEASE ORAL DAILY
Qty: 90 TABLET | Refills: 0 | Status: SHIPPED | OUTPATIENT
Start: 2020-11-27 | End: 2020-12-03

## 2020-11-27 RX ORDER — POTASSIUM CHLORIDE 750 MG/1
20 TABLET, EXTENDED RELEASE ORAL ONCE
Status: COMPLETED | OUTPATIENT
Start: 2020-11-27 | End: 2020-11-27

## 2020-11-27 RX ORDER — DILTIAZEM HYDROCHLORIDE 30 MG/1
30 TABLET, FILM COATED ORAL EVERY 6 HOURS SCHEDULED
Status: DISCONTINUED | OUTPATIENT
Start: 2020-11-27 | End: 2020-11-27

## 2020-11-27 RX ADMIN — POTASSIUM CHLORIDE 20 MEQ: 750 TABLET, EXTENDED RELEASE ORAL at 09:31

## 2020-11-27 RX ADMIN — HUMAN ALBUMIN MICROSPHERES AND PERFLUTREN 6 ML: 10; .22 INJECTION, SOLUTION INTRAVENOUS at 12:30

## 2020-11-27 RX ADMIN — LISINOPRIL 40 MG: 40 TABLET ORAL at 08:41

## 2020-11-27 RX ADMIN — HYDROCHLOROTHIAZIDE 25 MG: 25 TABLET ORAL at 08:41

## 2020-11-27 RX ADMIN — LIRAGLUTIDE 3 MG: 6 INJECTION SUBCUTANEOUS at 08:42

## 2020-11-27 RX ADMIN — INSULIN ASPART 2 UNITS: 100 INJECTION, SOLUTION INTRAVENOUS; SUBCUTANEOUS at 05:03

## 2020-11-27 RX ADMIN — DILTIAZEM HYDROCHLORIDE 240 MG: 240 CAPSULE, COATED, EXTENDED RELEASE ORAL at 11:04

## 2020-11-27 RX ADMIN — DILTIAZEM HYDROCHLORIDE 10 MG/HR: 5 INJECTION INTRAVENOUS at 08:04

## 2020-11-27 RX ADMIN — Medication 400 MG: at 09:31

## 2020-11-27 RX ADMIN — METOPROLOL SUCCINATE 50 MG: 50 TABLET, EXTENDED RELEASE ORAL at 14:10

## 2020-11-27 RX ADMIN — LEVOTHYROXINE SODIUM 75 MCG: 75 TABLET ORAL at 08:41

## 2020-11-27 ASSESSMENT — ACTIVITIES OF DAILY LIVING (ADL)
ADLS_ACUITY_SCORE: 11

## 2020-11-27 ASSESSMENT — MIFFLIN-ST. JEOR: SCORE: 1931.59

## 2020-11-27 NOTE — PLAN OF CARE
Pt with a past medical history of obesity, DM II, HTN, HLD, untreated GENESIS, depression, and hypothyroidism who was referred from the cardiology clinic for new onset of rapid atrial flutter/fibrillation and dyspnea on exertion x 2 weeks. Presently Afib/flutter 80s-130s. VSS. Electrolytes replaced. Continues on a diltiazem drip at 10 mg/hr. VSS. Up ad daniel, denies pain. Elevated blood sugars covered with insulin.

## 2020-11-27 NOTE — PROGRESS NOTES
D: Referred from cardiology clinic for new onset A. Fib/A flutter with RVR and dsypnea x2 weeks. Hx of obesity, DM2, HTN, HLD, GENESIS, depression and hypothyroidism.  ?  I/A : Monitored vitals and assessed pt status. A0x4. VSS. A fib 80-110s. Afebrile. Endorses slight arthritic pain, does not needed any intervention at this time. Diltiazem gtt turned off and transitioned to oral diltiazem. Restarted on metoprolol for better rate control, HR up to 130s.  ?  P: Continue to monitor Pt status and report changes to treatment team. Discharge home today, with plan to follow up for cardioversion outpatient.

## 2020-11-27 NOTE — DISCHARGE INSTRUCTIONS
1) ELIAS and cardioversion in 1 week.     2) Follow-up with Dr. Aranda in 2-4 weeks following cardioversion    3) Follow-up with PCP in 7-10 days for hospital follow-up

## 2020-11-27 NOTE — DISCHARGE SUMMARY
[address] [address]  p: [phone]    Discharge Summary: Cardiology Service    Sherry Slaughter MRN# [id number]   YOB: 1955 Age: 65 year old       Admission Date: 11/25/2020  Discharge Date: 11/27/20    Discharge Diagnoses:  1. Atrial fibrillation  2. Atrial flutter  3. HTN  4. HLD  5. DMII  6. Transient metabolic acidosis      Brief HPI:  Mrs. Sherry Slaughter is a 65 year old female with a past medical history of Obesity, DM II, HTN, HLD, GENESIS untreated, depression  and hypothyroidism who presented after being referred from cardiology clinic for new onset of atrial fibrillation RVR.      Patient reports persistent palpitations, occasional chest discomfort, and new onset of dyspnea on exertion for the past 2 weeks. She bought a ECG home monitor that syncs to her cell-phone and has noticed a report of atrial fibrillation and persistent elevated heart rates of 140-160s. She had a virtual visit with cardiology clinic on day of admission and was referred to ER.      Denies lightheadedness, shortness of breath, current chest pain, syncopal episodes or related symptoms.   No nausea/vomiting, abdominal pain, diarrhea.      Hospital Course by Diagnosis:  #Atrial flutter/fibrillation, RVR   Good response on dilt gtt. Labs remarkable only for hypomagnesemia and elevated lactate. Received Mg replacement and , with improvement. TSH wnl. Last TTE was from 02/2014 and showed preserved LVEF and severe left atrial enlargement. Repeat echo this admission with LVEF 60-65%, both atria appear normal. [id number] score 4 (hypertension, diabetes, age, gender). No prior AC. Currently in afib with rates 's on dilt gtt, will transition to oral prior to discharge. Patient was to undergo ELIAS/DCCV while inpatient although due to the holiday, there were no procedures. Given rates are well-controlled, it's reasonable to discharge  patient with planned ELIAS and cardioversion as outpatient in 1 week or simply cardiovert her following 4 weeks of anticoagulation. Patient prefers for ELIAS/DCCV in 1 week.   - Rate control: Start oral diltiazem  mg daily and PTA Toprol XL 50 mg daily given HR remain 110-120's.   - Continue xarelto 20 mg daily, new this admission   - Monitor HR and BP daily at home  - ELIAS and cardioversion in 1 week as outpatient   - Follow-up with Dr. Aranda in 2-4 weeks following ELIAS/DCCV     #HTN  -150's this admission. PTA antihypertensives.   -Continue PTA hydrochlorothiazide 25 mg daily   -Continue PTA lisinopril 40 mg     # HLD   LDL 43 (2019).  - Continue PTA rosuvastatin 10 mg     #T2DM  Not-well controlled. PTA insulin. A1C 6.9. Sliding scale insulin while inpatient with hypoglycemia protocol in place.   - Resume PTA Victoza    - Resume PTA insulin glargine  - Follow-up with PCP for further optimization    #Elevated lactic acid   S/p 500 ml bolus LR. HD stable. No s/s infection. Afebrile.     Pertinent Procedures:  1. None    Consults:  None    Medication Changes:  See below     Discharge medications:   Current Discharge Medication List      START taking these medications    Details   diltiazem ER COATED BEADS (CARDIZEM CD/CARTIA XT) 240 MG 24 hr capsule Take 1 capsule (240 mg) by mouth daily  Qty: 30 capsule, Refills: 1    Associated Diagnoses: Paroxysmal atrial fibrillation (H)      rivaroxaban ANTICOAGULANT (XARELTO) 20 MG TABS tablet Take 1 tablet (20 mg) by mouth daily (with dinner)  Qty: 90 tablet, Refills: 0    Associated Diagnoses: Paroxysmal atrial fibrillation (H)         CONTINUE these medications which have CHANGED    Details   metoprolol succinate ER (TOPROL-XL) 50 MG 24 hr tablet Take 1 tablet (50 mg) by mouth daily  Qty: 90 tablet, Refills: 0    Associated Diagnoses: Paroxysmal atrial fibrillation (H)         CONTINUE these medications which have NOT CHANGED    Details   amoxicillin (AMOXIL) 500 MG  capsule Take 2,000 mg by mouth Before dental procedures      econazole nitrate 1 % external cream Apply topically daily as needed To feet and toenails      HUMULIN R U-500 KWIKPEN 500 UNIT/ML PEN soln INJECT 120 UNITS IN THE MORNING  UNITS AT BEDTIME  Qty: 40 mL, Refills: 3    Associated Diagnoses: Type 2 diabetes mellitus treated with insulin (H)      hydrochlorothiazide (HYDRODIURIL) 25 MG tablet TAKE ONE TABLET BY MOUTH ONCE DAILY  Qty: 90 tablet, Refills: 2    Associated Diagnoses: Essential hypertension with goal blood pressure less than 140/90      ibuprofen (ADVIL) 200 MG capsule Take 600 mg by mouth every 6 hours as needed       levothyroxine (SYNTHROID/LEVOTHROID) 75 MCG tablet TAKE ONE TABLET BY MOUTH ONCE DAILY  Qty: 90 tablet, Refills: 1    Associated Diagnoses: Hypothyroidism due to acquired atrophy of thyroid      liraglutide (VICTOZA PEN) 18 MG/3ML solution Administer two injections of 1.8 and 1.2 mg each, for a total daily dose of 3 mg.  Qty: 15 mL, Refills: 6    Associated Diagnoses: Type 2 diabetes mellitus treated with insulin (H); Morbid obesity (H)      lisinopril (ZESTRIL) 40 MG tablet TAKE 1 AND 1/2 TABLETS BY MOUTH ONCE DAILY  Qty: 135 tablet, Refills: 1    Associated Diagnoses: Essential hypertension with goal blood pressure less than 140/90      metFORMIN (GLUCOPHAGE-XR) 500 MG 24 hr tablet Take 4 tablets (2,000 mg) by mouth At Bedtime  Qty: 360 tablet, Refills: 1    Associated Diagnoses: Uncontrolled type 2 diabetes mellitus with hyperglycemia, with long-term current use of insulin (H)      Multiple Vitamins-Minerals (ADVANCED DIABETIC MULTIVITAMIN) TABS Take 1 tablet by mouth daily      rosuvastatin (CRESTOR) 10 MG tablet Take 1 tablet (10 mg) by mouth daily  Qty: 90 tablet, Refills: 3    Associated Diagnoses: Hyperlipidemia LDL goal <100      blood glucose monitoring (NO BRAND SPECIFIED) test strip Use to test blood sugar 4 times daily or as directed.  Qty: 100 each, Refills: 11     Associated Diagnoses: Type 2 diabetes mellitus without complication, with long-term current use of insulin (H)      Continuous Blood Gluc  (FREESTYLE MIKE 14 DAY READER) KATIA 1 each daily  Qty: 1 Device, Refills: 0    Associated Diagnoses: Controlled type 2 diabetes mellitus with diabetic nephropathy, with long-term current use of insulin (H)      Continuous Blood Gluc Sensor (FREESTYLE MIKE 14 DAY SENSOR) MISC Change every 14 days.  Qty: 6 each, Refills: 3    Associated Diagnoses: Uncontrolled type 2 diabetes mellitus with hyperglycemia, with long-term current use of insulin (H)      Garlic 1000 MG CAPS Take 1 capsule by mouth daily Takes during summer months.  Done taking until next summer.      insulin pen needle (GNP [employer] CorporationFINE PEN NEEDLES) 31G X 8 MM miscellaneous Use six times daily or as directed  Qty: 200 each, Refills: 5    Associated Diagnoses: Type 2 diabetes, HbA1c goal < 7% (H)      !! ORDER FOR DME, SET TO LOCAL PRINT, Respironics REMSTAR 60 Series Auto CPAP 8-15cm H2O, Airfit P10 nasal pillow mask w/medium pillows      !! 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 order for DME Roll-A-Bout Walker. Patient can use for left foot.  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 order for DME Equipment being ordered: wrist brace with thumb spica, L  Qty: 1 Device, Refills: 0    Associated Diagnoses: Bilateral thumb pain      !! order for DME  Equipment being ordered: wrist brace with thumb spica, R  Qty: 1 Device, Refills: 0    Associated Diagnoses: Bilateral thumb pain       !! - Potential duplicate medications found. Please discuss with provider.      STOP taking these medications       aspirin (ASA) 325 MG EC tablet Comments:   Reason for Stopping:               Follow-up:  - Dr. Aranda with cardiology in 2-4 weeks   - PCP in 7-10 days for hospital follow-up     Labs or imaging requiring follow-up after discharge:  - ELIAS and cardioversion in 1 week    Code status:  Full     Condition on discharge  Temp:  [97.3  F (36.3  C)-98.9  F (37.2  C)] 97.9  F (36.6  C)  Pulse:  [] 105  Resp:  [18] 18  BP: (120-138)/(52-76) 120/76  SpO2:  [95 %-97 %] 95 %  General: Alert, interactive, NAD  Eyes: sclera anicteric, EOMI  Neck: JVP 0, carotid 2+ bilaterally  Cardiovascular: regular rate and rhythm, normal S1 and S2, no murmurs, gallops, or rubs  Resp: clear to auscultation bilaterally, no rales, wheezes, or rhonchi  GI: Soft, nontender, nondistended. +BS.  No HSM or masses, no rebound or guarding.  Extremities: No edema, no cyanosis or clubbing, dorsalis pedis and posterior tibialis pulses 2+ bilaterally  Skin: Warm and dry, no jaundice or rash  Neuro: CN 2-12 intact, moves all extremities equally  Psych: Alert & oriented x 3    Imaging with results:  Echocardiogram 11/27/20:  Interpretation Summary  Technically difficult study.Extremely poor acoustic windows.  Limited information was obtained during study.  Global and regional left ventricular function is normal with an EF of 60-65%.  Right ventricular function, chamber size, wall motion, and thickness are  normal.  Both atria appear normal.  Pulmonary artery systolic pressure cannot be assessed.  The inferior vena cava is normal.  No pericardial effusion is present.      Patient Care Team:  Marilou Arciniega MD PhD as PCP - General (Internal Medicine)  Marilou Arciniega MD PhD as Assigned PCP  Rika Delgado MD  as Assigned Endocrinology Provider  Kameron Pedraza OD as Assigned Surgical Provider  Rufus Hutson DPM as Assigned Musculoskeletal Provider    Zoila Bhandari DNP, APRN, CNP  [facility] Cardiology  [phone]    Attending Attestation: I saw and evaluated the patient for the discharge. I agree with the findings and plan of care documented in Zoila Bhandari's NP note and summarized our hernandez findings with the patient. I spent less than 30 minutes to coordinate and direct this patient's discharge.

## 2020-11-28 ENCOUNTER — PATIENT OUTREACH (OUTPATIENT)
Dept: CARE COORDINATION | Facility: CLINIC | Age: 65
End: 2020-11-28

## 2020-11-28 NOTE — PROGRESS NOTES
Date: 11/30/2020 Status: [facility]   Time: 9:00 AM Length: 30   Visit Type: RETURN [657] YUKI:     Provider: Rufus Hutson DPM Department:  PODIATRY     Patient has clinic visit within 24-48 hours of Discharge so no post DC follow up call is needed

## 2020-11-30 ENCOUNTER — OFFICE VISIT (OUTPATIENT)
Dept: PODIATRY | Facility: CLINIC | Age: 65
End: 2020-11-30
Payer: COMMERCIAL

## 2020-11-30 ENCOUNTER — TELEPHONE (OUTPATIENT)
Dept: CARDIOLOGY | Facility: CLINIC | Age: 65
End: 2020-11-30

## 2020-11-30 VITALS — OXYGEN SATURATION: 99 % | DIASTOLIC BLOOD PRESSURE: 80 MMHG | HEART RATE: 127 BPM | SYSTOLIC BLOOD PRESSURE: 129 MMHG

## 2020-11-30 DIAGNOSIS — L97.522 SKIN ULCER OF LEFT FOOT WITH FAT LAYER EXPOSED (H): ICD-10-CM

## 2020-11-30 DIAGNOSIS — I48.0 PAROXYSMAL ATRIAL FIBRILLATION (H): Primary | ICD-10-CM

## 2020-11-30 DIAGNOSIS — I10 ESSENTIAL HYPERTENSION WITH GOAL BLOOD PRESSURE LESS THAN 140/90: ICD-10-CM

## 2020-11-30 DIAGNOSIS — E11.49 TYPE II OR UNSPECIFIED TYPE DIABETES MELLITUS WITH NEUROLOGICAL MANIFESTATIONS, NOT STATED AS UNCONTROLLED(250.60) (H): Primary | ICD-10-CM

## 2020-11-30 PROCEDURE — 97597 DBRDMT OPN WND 1ST 20 CM/<: CPT | Performed by: PODIATRIST

## 2020-11-30 NOTE — PROGRESS NOTES
Past Medical History:   Diagnosis Date     Cataract      DEPRESSION     comes and goes - tried meds - unsuccessfully, certain times of the year, no psych intervention and no counselors in the past - and not interested      Diverticulosis of colon (without mention of hemorrhage) 4/04    colonoscopy     ECHO-mildLVH,tr MR,mild thick lflets w inc LA,trTR   12/03     Essential hypertension, benign 1990s    late 1990s - started medications at that time - not to difficult to control meds      Fam hx-cardiovas dis [facility]     father - CAD, and lipids/HTN - multiple members of the family      Family history of diabetes mellitus     sister and grandmother with DM      Family history of malignant neoplasm of breast     mother - young age - 45, and maternal cousin and aunt as well - no BRCA testing done      Family history of stroke (cerebrovascular)     grandmother in early life in her 40s      FAMILY HX COLON CANCER     Pat uncles x 2     Heart disease     murmur/     Heart murmur      HYPERLIPIDEMIA 2000    fairly recent - in the last 5 years - high for DM levels  -cholesterol recent      Irritable bowel syndrome     goes between the 2 - nerve related - more stressed more problems      MICROALBUMINURIA     unsure how long - been on the lisinopril - for a few years at well      Nonspecific abnormal results of liver function study 2/3/2003    SGOT - has been high in the past - since the hepatitis b - borderline elevation - not really changing any      OBESITY      Obstructive sleep apnea      GENESIS (obstructive sleep apnea) 10/15/2006    psg 5/15 AHI 53 aPAP 8-15     OSTEOARTHRITIS L KNEE 2003    total knee on the left - and pain is gone since the knee replacement      PERS HX HEPATITIS B- RESOLVED] 1977    acute virus only - no chronic disease      PERS HX HEPATITIS B- RESOLVED]      Type II or unspecified type diabetes mellitus without mention of complication, not stated as uncontrolled 1991    oral meds frist, then insulin  eventually later, difficult to control most of the time      Unspecified hypothyroidism 10/11/2006    TSH 3.36 - mild subclinical hypothyroidism - deciding on following or starting low dose meds - with dr Oreilly - following      Patient Active Problem List   Diagnosis     Morbid obesity (H)     Diverticulosis of large intestine     Nonspecific abnormal results of cardiovascular function study     FAMILY HX COLON CANCER     Nonallopathic lesion of thoracic region     Hypothyroidism     GENESIS (obstructive sleep apnea)     Irritable bowel syndrome     Family history of malignant neoplasm of breast     History of major depression     OSTEOARTHRITIS L KNEE  s/p knee replacement on the left      Hypertension goal BP (blood pressure) < 140/90     Family history of stroke (cerebrovascular)     Family history of other cardiovascular diseases     Family history of diabetes mellitus     ABSENCE OF MENSTRUATION - perimenopausal      JOINT PAIN-ANKLE - right ankle      Mitral valve insufficiency     Hyperlipidemia LDL goal <100     Aortic sclerosis     Tubular adenoma of colon     History of viral hepatitis, type B     Chronic low back pain     Long-term insulin use (H)     Uncontrolled diabetes mellitus (H)     S/P total knee replacement using cement, right     Aftercare following right knee joint replacement surgery     Lumbago     Type 2 diabetes mellitus with hyperglycemia (H)     Posterior vitreous detachment of right eye     Pseudophakia of both eyes     Paroxysmal atrial fibrillation (H)     Past Surgical History:   Procedure Laterality Date     ARTHROPLASTY KNEE Right 9/23/2015    Procedure: ARTHROPLASTY KNEE;  Surgeon: Rufus Brown MD;  Location: [facility]     C TOTAL KNEE ARTHROPLASTY  3/03    L knee     CATARACT IOL, RT/LT Left      COLONOSCOPY  4/04    diverticulosis, rec repeat 10 yrs(consider fam hx?)     COLONOSCOPY WITH CO2 INSUFFLATION N/A 6/19/2019    Procedure: COLONOSCOPY, WITH CO2 INSUFFLATION;  Surgeon:  Zeb Duarte MD;  Location: [facility]     ORTHOPEDIC SURGERY      right ankle     PHACOEMULSIFICATION CLEAR CORNEA WITH STANDARD INTRAOCULAR LENS IMPLANT Left 3/15/2018    Procedure: PHACOEMULSIFICATION CLEAR CORNEA WITH STANDARD INTRAOCULAR LENS IMPLANT;  LEFT EYE PHACOEMULSIFICATION CLEAR CORNEA WITH STANDARD INTRAOCULAR LENS IMPLANT;  Surgeon: Bandar Linares MD;  Location:  [facility]     PHACOEMULSIFICATION CLEAR CORNEA WITH STANDARD INTRAOCULAR LENS IMPLANT Right 4/5/2018    Procedure: PHACOEMULSIFICATION CLEAR CORNEA WITH STANDARD INTRAOCULAR LENS IMPLANT;  RIGHT PHACOEMULSIFICATION CLEAR CORNEA WITH STANDARD INTRAOCULAR LENS IMPLANT ;  Surgeon: Bandar Linares MD;  Location:  [facility]     STRESS ECHO (METRO)  12/03    no ischemia, limited by fatigue     SURGICAL HISTORY OF -       EXP LAP- R OVARY CYSTS     SURGICAL HISTORY OF -   1981,1984,1985    CHILDBIRTH     ZZC NONSPECIFIC PROCEDURE  6/06    right triple arthrodecesis      ZZC NONSPECIFIC PROCEDURE  7/06     right bunion surgery      Social History     Socioeconomic History     Marital status:      Spouse name: Not on file     Number of children: 3     Years of education: Not on file     Highest education level: Not on file   Occupational History     Occupation: RN     Employer: [employer]   Social Needs     Financial resource strain: Not on file     Food insecurity     Worry: Not on file     Inability: Not on file     Transportation needs     Medical: Not on file     Non-medical: Not on file   Tobacco Use     Smoking status: Never Smoker     Smokeless tobacco: Never Used     Tobacco comment: tried in early 20s only    Substance and Sexual Activity     Alcohol use: Yes     Comment: rare     Drug use: No     Sexual activity: Never     Comment:  - since for 20 years, no signficant other  > 5 years sexual activity    Lifestyle     Physical activity     Days per week: Not on file     Minutes per session: Not on file      Stress: Not on file   Relationships     Social connections     Talks on phone: Not on file     Gets together: Not on file     Attends Temple service: Not on file     Active member of club or organization: Not on file     Attends meetings of clubs or organizations: Not on file     Relationship status: Not on file     Intimate partner violence     Fear of current or ex partner: Not on file     Emotionally abused: Not on file     Physically abused: Not on file     Forced sexual activity: Not on file   Other Topics Concern      Service No     Blood Transfusions No     Caffeine Concern No     Occupational Exposure Yes     Comment: Normal nursing exposures     Hobby Hazards No     Sleep Concern Yes     Stress Concern No     Comment: Stress level at HM=5, stress level at WK=6     Weight Concern Yes     Special Diet Yes     Comment: Diabetic diet (watching carbs)     Back Care No     Comment: Occassional back spasms     Exercise Yes     Comment: Pt joined a health club goes approx 3-4 times a week,half to one mile daily     Bike Helmet No     Seat Belt Yes     Comment: Sometimes     Self-Exams Yes     Parent/sibling w/ CABG, MI or angioplasty before 65F 55M? 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      Family History   Problem Relation Age of Onset     Diabetes Maternal Grandmother         DM TYPE 2     Cerebrovascular Disease Maternal Grandmother      Hypertension Sister      Lipids Sister      Heart Disease Sister         Chronic AFib     Hypertension Sister      Lipids Sister      Gynecology Sister      Diabetes Sister      Asthma Sister      Blood Disease Paternal Grandmother         T CELL LEUKEMIA     Hypertension Brother      Lipids Brother      Congenital Anomalies Brother      Cardiovascular Brother      Heart Disease Brother         CABG/AFIB     Hypertension Brother      Lipids Brother      Obesity Brother       Hypertension Brother      Respiratory Brother         Sleep apnea     Heart Disease Brother         AFib     Alzheimer Disease Mother      Asthma Mother      Hypertension Mother      Breast Cancer Mother 40        has mastectomy and lived to 84     Allergies Mother         Sulfa,     Arthritis Mother      Cardiovascular Mother      Depression Mother      Respiratory Mother      Lipids Mother      Cancer Mother         breast     Thyroid Disease Mother      Cerebrovascular Disease Mother      Dementia Mother         Alzheimers     Cancer - colorectal Other      Cancer Father         lung     Aneurysm Father         brother, AAA     Other Cancer Father         lung ca     Asthma Sister      Cancer - colorectal Paternal Uncle         late 50s to early 60s - great uncles      Breast Cancer Other         Maternal cousin     Arrhythmia Sister         2 brothers 1 sister Afib     Breast Cancer Maternal Aunt 62     Other Cancer Maternal Aunt 35        Ovarian cancer.  Survived despite late recurrence and is now 92.     Glaucoma No family hx of      Macular Degeneration No family hx of      Lab Results   Component Value Date    A1C 6.9 11/25/2020    A1C 7.5 07/22/2020    A1C 7.3 12/10/2019    A1C 7.5 08/21/2019    A1C 7.8 05/21/2019     Last Comprehensive Metabolic Panel:  Sodium   Date Value Ref Range Status   11/27/2020 139 133 - 144 mmol/L Final     Potassium   Date Value Ref Range Status   11/27/2020 3.8 3.4 - 5.3 mmol/L Final     Chloride   Date Value Ref Range Status   11/27/2020 106 94 - 109 mmol/L Final     Carbon Dioxide   Date Value Ref Range Status   11/27/2020 23 20 - 32 mmol/L Final     Anion Gap   Date Value Ref Range Status   11/27/2020 10 3 - 14 mmol/L Final     Glucose   Date Value Ref Range Status   11/27/2020 157 (H) 70 - 99 mg/dL Final     Urea Nitrogen   Date Value Ref Range Status   11/27/2020 15 7 - 30 mg/dL Final     Creatinine   Date Value Ref Range Status   11/27/2020 0.78 0.52 - 1.04 mg/dL Final      GFR Estimate   Date Value Ref Range Status   11/27/2020 79 >60 mL/min/[1.73_m2] Final     Comment:     Non  GFR Calc  Starting 12/18/2018, serum creatinine based estimated GFR (eGFR) will be   calculated using the Chronic Kidney Disease Epidemiology Collaboration   (CKD-EPI) equation.       Calcium   Date Value Ref Range Status   11/27/2020 9.9 8.5 - 10.1 mg/dL Final     Lab Results   Component Value Date    WBC 9.6 11/25/2020     Lab Results   Component Value Date    RBC 4.50 11/25/2020     Lab Results   Component Value Date    HGB 14.2 11/25/2020     Lab Results   Component Value Date    HCT 43.3 11/25/2020     No components found for: MCT  Lab Results   Component Value Date    MCV 96 11/25/2020     Lab Results   Component Value Date    MCH 31.6 11/25/2020     Lab Results   Component Value Date    MCHC 32.8 11/25/2020     Lab Results   Component Value Date    RDW 13.2 11/25/2020     Lab Results   Component Value Date     11/25/2020         SUBJECTIVE FINDINGS:  65-year-old female returns to clinic for ulcer left hallux.  She is diabetic with peripheral neuropathy.  She relates she has been wearing the CAM boot and trying to stay off of it as much as she can.  She is intermittently using the CAM boot.  She is using Iodosorb and a dressing.  She relates she was hospitalized since we have seen her last.      OBJECTIVE FINDINGS:  DP and PT are 2/4 left.  She has a left plantar hallux ulcer with hyperkeratotic tissue buildup.  It is about 0.5 cm.  It is through the dermis, into the subcutaneous tissues.  Minimal drainage.  No erythema, no odor, no calor.  Minimal edema.      ASSESSMENT/PLAN:  Ulcer, left hallux.  She is diabetic with peripheral neuropathy.  She has hallux valgus and bunion present.  Diagnosis and treatment options discussed with her.  Sharp ulcer debridement with a #15 blade.  No anesthesia needed and local wound care done upon consent today.  Ulcer is debrided through the  dermis.  Aquacel Ag and a light dressing with Primapore applied.  I am going to have her continue cleaning this with wound cleanser.  She relates she has this.  She should apply Aquacel Ag and a light dressing.  I dispensed Aquacel Ag and Primapore.  Continue the CAM boot and offloading.  She will return to clinic and see me in 2 weeks.  I did discuss with her we may need to refer her back to Orthopedics for any surgical options if we do not see progress on this.  Previous notes reviewed.

## 2020-11-30 NOTE — TELEPHONE ENCOUNTER
Patient returned call to clinic. She would like to get arranged for TTE and Cardioversion this week. Per Dr. Byers inpatient visit note from discharge:    Patient was to undergo ELIAS/DCCV while inpatient although due to the holiday, there were no procedures. Given rates are well-controlled, it's reasonable to discharge patient with planned ELIAS and cardioversion as outpatient in 1 week or simply cardiovert her following 4 weeks of anticoagulation. Patient prefers for ELIAS/DCCV in 1 week.   - Rate control: Start oral diltiazem  mg daily and PTA Toprol XL 50 mg daily given HR remain 110-120's.   - Continue xarelto 20 mg daily, new this admission   - Monitor HR and BP daily at home  - ELIAS and cardioversion in 1 week as outpatient   - Follow-up with Dr. Aranda in 2-4 weeks following ELIAS/DCCV    Will route to Ana Schmidt, procedure  to assist with this.     María Hutchins, RN, BSN  [facility]

## 2020-11-30 NOTE — PATIENT INSTRUCTIONS
Thanks for coming today.  [facility]  [address] [place] [address]    To schedule future appointments in Ortho Clinic, you may call [phone].    To schedule ordered imaging by your provider:   Call [facility] [phone]    To schedule an injection ordered by your provider:  Call [facility] Injection scheduling line: [phone]  [facility] available online at:  [email or web address]    Please call if any further questions or concerns ([phone]).  Clinic hours 8 am to 5 pm.    Return to clinic (call) if symptoms worsen or fail to improve.

## 2020-11-30 NOTE — TELEPHONE ENCOUNTER
I returned patient's phone call. Patient states she is still in afib with her heart rate in the 120's. She states it does go into the 130's with activity. She states her BP is normal (120/80) and she does have shortness of breath with activity.     Patient is taking her metoprolol and diltiazem as prescribed.     Patient is wondering about her TTE and cardioversion being set up for this week. Confirmed orders are in and will ensure patient's appointments get set up.       Maxine Torres RN   [facility] Care Coordinator  [facility]

## 2020-11-30 NOTE — NURSING NOTE
Sherry Slaughter's chief complaint for this visit includes:  Chief Complaint   Patient presents with     RECHECK     ulcer, left hallux     PCP: Marilou Arciniega    Referring Provider:  No referring provider defined for this encounter.    [facility] 10/02/2007   Data Unavailable     Do you need any medication refills at today's visit? No    Opal Conner CMA

## 2020-11-30 NOTE — TELEPHONE ENCOUNTER
[facility] Call Center    Phone Message    May a detailed message be left on voicemail: yes     Reason for Call: Other: pt was at [facility] over the weekend, and her heart rate is still going at 120 or higher, please call yary abraham     Action Taken: Message routed to:  [facility] heart    Travel Screening: Not Applicable

## 2020-12-01 ENCOUNTER — TELEPHONE (OUTPATIENT)
Dept: CARDIOLOGY | Facility: CLINIC | Age: 65
End: 2020-12-01

## 2020-12-01 NOTE — TELEPHONE ENCOUNTER
EP Scheduling called the patient to schedule ELIAS/DCCV. The number [phone] was left for the patient to return the call and schedule the procedure.    Ana Schmidt  Periop Electrophysiology   [phone]

## 2020-12-02 DIAGNOSIS — Z11.59 ENCOUNTER FOR SCREENING FOR OTHER VIRAL DISEASES: ICD-10-CM

## 2020-12-02 DIAGNOSIS — Z11.59 ENCOUNTER FOR SCREENING FOR OTHER VIRAL DISEASES: Primary | ICD-10-CM

## 2020-12-02 PROCEDURE — U0003 INFECTIOUS AGENT DETECTION BY NUCLEIC ACID (DNA OR RNA); SEVERE ACUTE RESPIRATORY SYNDROME CORONAVIRUS 2 (SARS-COV-2) (CORONAVIRUS DISEASE [COVID-19]), AMPLIFIED PROBE TECHNIQUE, MAKING USE OF HIGH THROUGHPUT TECHNOLOGIES AS DESCRIBED BY CMS-2020-01-R: HCPCS | Mod: 90 | Performed by: PATHOLOGY

## 2020-12-02 PROCEDURE — 99000 SPECIMEN HANDLING OFFICE-LAB: CPT | Performed by: PATHOLOGY

## 2020-12-03 DIAGNOSIS — E11.65 UNCONTROLLED TYPE 2 DIABETES MELLITUS WITH HYPERGLYCEMIA, WITH LONG-TERM CURRENT USE OF INSULIN (H): Primary | ICD-10-CM

## 2020-12-03 DIAGNOSIS — E03.4 HYPOTHYROIDISM DUE TO ACQUIRED ATROPHY OF THYROID: ICD-10-CM

## 2020-12-03 DIAGNOSIS — Z79.4 UNCONTROLLED TYPE 2 DIABETES MELLITUS WITH HYPERGLYCEMIA, WITH LONG-TERM CURRENT USE OF INSULIN (H): Primary | ICD-10-CM

## 2020-12-03 LAB
SARS-COV-2 RNA SPEC QL NAA+PROBE: NOT DETECTED
SPECIMEN SOURCE: NORMAL

## 2020-12-03 RX ORDER — LISINOPRIL 40 MG/1
40 TABLET ORAL
Qty: 135 TABLET | Refills: 1 | COMMUNITY
Start: 2020-12-03 | End: 2021-01-19

## 2020-12-03 RX ORDER — METOPROLOL SUCCINATE 50 MG/1
200 TABLET, EXTENDED RELEASE ORAL DAILY
Qty: 360 TABLET | Refills: 0 | Status: SHIPPED | OUTPATIENT
Start: 2020-12-03 | End: 2020-12-23

## 2020-12-03 RX ORDER — DILTIAZEM HYDROCHLORIDE 120 MG/1
120 CAPSULE, EXTENDED RELEASE ORAL DAILY
Qty: 30 CAPSULE | Refills: 1 | Status: SHIPPED | OUTPATIENT
Start: 2020-12-03 | End: 2020-12-10

## 2020-12-03 NOTE — TELEPHONE ENCOUNTER
Addendum 12/3/20:     HR at home still in 120-130bpm range  SBP running in 125mmHg range     Impression:     Afib RVR symptomatic, rate control not achieved after hospitalization     Plan:     1.  Decrease Lisinopril to to more appropriate dose 40mg po every day  2.  Hold hydrochlorothiazide for now  3.  Increase Toprol XL to 200mg po qam  4.  Increase Diltiazem to 360mg po every day (can take at night or stagger few hours away from Toprol)  5.  Watch for dizziness/lilghtheadedness with med changes which may require lying down and putting feet up and further decreasing Lisinopril if needed   6.  Await cardioversion      Called and spoke to patient regarding Dr Aranda's recommendations above. She repeated instructions back to me. She did clarify that she is taking only 40 mg of lisinopril at this time.   Cardioversion planned for tomorrow.     EMMY Cool

## 2020-12-03 NOTE — TELEPHONE ENCOUNTER
And when you contact her, can you see what her home blood pressures are so that we might up-titrate her beta blocker for her high heart rates in the meantime?     Called and spoke to patient.  She states her blood pressures have been running 120's/75-80. Heart rate 125-130. Confirmed medications and that she is checking her BP a few hours after taking meds.  Will route to Dr Aranda.   EMMY Cool

## 2020-12-04 ENCOUNTER — HOSPITAL ENCOUNTER (OUTPATIENT)
Facility: CLINIC | Age: 65
Discharge: HOME | End: 2020-12-04
Attending: INTERNAL MEDICINE | Admitting: INTERNAL MEDICINE
Payer: COMMERCIAL

## 2020-12-04 ENCOUNTER — HOSPITAL ENCOUNTER (OUTPATIENT)
Dept: CARDIOLOGY | Facility: CLINIC | Age: 65
Discharge: HOME | End: 2020-12-04
Attending: NURSE PRACTITIONER | Admitting: NURSE PRACTITIONER
Payer: COMMERCIAL

## 2020-12-04 ENCOUNTER — HOSPITAL ENCOUNTER (OUTPATIENT)
Dept: CARDIOLOGY | Facility: CLINIC | Age: 65
End: 2020-12-04
Attending: NURSE PRACTITIONER | Admitting: INTERNAL MEDICINE
Payer: COMMERCIAL

## 2020-12-04 ENCOUNTER — APPOINTMENT (OUTPATIENT)
Dept: LAB | Facility: CLINIC | Age: 65
End: 2020-12-04
Attending: INTERNAL MEDICINE
Payer: COMMERCIAL

## 2020-12-04 ENCOUNTER — TELEPHONE (OUTPATIENT)
Dept: CARDIOLOGY | Facility: CLINIC | Age: 65
End: 2020-12-04

## 2020-12-04 ENCOUNTER — ANESTHESIA (OUTPATIENT)
Dept: SURGERY | Facility: CLINIC | Age: 65
End: 2020-12-04
Payer: COMMERCIAL

## 2020-12-04 ENCOUNTER — APPOINTMENT (OUTPATIENT)
Dept: MEDSURG UNIT | Facility: CLINIC | Age: 65
End: 2020-12-04
Attending: INTERNAL MEDICINE
Payer: COMMERCIAL

## 2020-12-04 ENCOUNTER — ANESTHESIA EVENT (OUTPATIENT)
Dept: SURGERY | Facility: CLINIC | Age: 65
End: 2020-12-04
Payer: COMMERCIAL

## 2020-12-04 VITALS
SYSTOLIC BLOOD PRESSURE: 104 MMHG | OXYGEN SATURATION: 96 % | HEART RATE: 75 BPM | RESPIRATION RATE: 15 BRPM | DIASTOLIC BLOOD PRESSURE: 65 MMHG

## 2020-12-04 DIAGNOSIS — I48.0 PAROXYSMAL ATRIAL FIBRILLATION (H): ICD-10-CM

## 2020-12-04 LAB
INR PPP: 1.52 (ref 0.86–1.14)
MAGNESIUM SERPL-MCNC: 1.6 MG/DL (ref 1.6–2.3)
POTASSIUM SERPL-SCNC: 4.1 MMOL/L (ref 3.4–5.3)

## 2020-12-04 PROCEDURE — 92960 CARDIOVERSION ELECTRIC EXT: CPT | Performed by: NURSE PRACTITIONER

## 2020-12-04 PROCEDURE — 92960 CARDIOVERSION ELECTRIC EXT: CPT

## 2020-12-04 PROCEDURE — 99152 MOD SED SAME PHYS/QHP 5/>YRS: CPT | Mod: GC | Performed by: INTERNAL MEDICINE

## 2020-12-04 PROCEDURE — 85610 PROTHROMBIN TIME: CPT | Performed by: NURSE PRACTITIONER

## 2020-12-04 PROCEDURE — 83735 ASSAY OF MAGNESIUM: CPT | Performed by: NURSE PRACTITIONER

## 2020-12-04 PROCEDURE — 250N000011 HC RX IP 250 OP 636: Performed by: INTERNAL MEDICINE

## 2020-12-04 PROCEDURE — 93312 ECHO TRANSESOPHAGEAL: CPT | Mod: 26 | Performed by: INTERNAL MEDICINE

## 2020-12-04 PROCEDURE — 84132 ASSAY OF SERUM POTASSIUM: CPT | Performed by: NURSE PRACTITIONER

## 2020-12-04 PROCEDURE — 258N000003 HC RX IP 258 OP 636: Performed by: INTERNAL MEDICINE

## 2020-12-04 PROCEDURE — 93325 DOPPLER ECHO COLOR FLOW MAPG: CPT | Mod: 26 | Performed by: INTERNAL MEDICINE

## 2020-12-04 PROCEDURE — 250N000009 HC RX 250: Performed by: NURSE ANESTHETIST, CERTIFIED REGISTERED

## 2020-12-04 PROCEDURE — 93312 ECHO TRANSESOPHAGEAL: CPT

## 2020-12-04 PROCEDURE — 93320 DOPPLER ECHO COMPLETE: CPT | Mod: 26 | Performed by: INTERNAL MEDICINE

## 2020-12-04 PROCEDURE — 36415 COLL VENOUS BLD VENIPUNCTURE: CPT | Performed by: NURSE PRACTITIONER

## 2020-12-04 PROCEDURE — 999N000054 HC STATISTIC EKG NON-CHARGEABLE

## 2020-12-04 PROCEDURE — 370N000001 HC ANESTHESIA TECHNICAL FEE, 1ST 30 MIN

## 2020-12-04 PROCEDURE — 250N000009 HC RX 250: Performed by: INTERNAL MEDICINE

## 2020-12-04 RX ORDER — SODIUM CHLORIDE 9 MG/ML
1000 INJECTION, SOLUTION INTRAVENOUS CONTINUOUS
Status: DISCONTINUED | OUTPATIENT
Start: 2020-12-04 | End: 2020-12-05 | Stop reason: HOSPADM

## 2020-12-04 RX ORDER — MAGNESIUM SULFATE HEPTAHYDRATE 40 MG/ML
2 INJECTION, SOLUTION INTRAVENOUS
Status: DISCONTINUED | OUTPATIENT
Start: 2020-12-04 | End: 2020-12-05 | Stop reason: HOSPADM

## 2020-12-04 RX ORDER — POTASSIUM CHLORIDE 1500 MG/1
20 TABLET, EXTENDED RELEASE ORAL
Status: DISCONTINUED | OUTPATIENT
Start: 2020-12-04 | End: 2020-12-05 | Stop reason: HOSPADM

## 2020-12-04 RX ORDER — LIDOCAINE 40 MG/G
CREAM TOPICAL
Status: DISCONTINUED | OUTPATIENT
Start: 2020-12-04 | End: 2020-12-05 | Stop reason: HOSPADM

## 2020-12-04 RX ORDER — ATROPINE SULFATE 0.1 MG/ML
.5-1 INJECTION INTRAVENOUS
Status: DISCONTINUED | OUTPATIENT
Start: 2020-12-04 | End: 2020-12-05 | Stop reason: HOSPADM

## 2020-12-04 RX ORDER — NALOXONE HYDROCHLORIDE 0.4 MG/ML
0.4 INJECTION, SOLUTION INTRAMUSCULAR; INTRAVENOUS; SUBCUTANEOUS
Status: DISCONTINUED | OUTPATIENT
Start: 2020-12-04 | End: 2020-12-05 | Stop reason: HOSPADM

## 2020-12-04 RX ORDER — POTASSIUM CHLORIDE 1500 MG/1
40 TABLET, EXTENDED RELEASE ORAL
Status: DISCONTINUED | OUTPATIENT
Start: 2020-12-04 | End: 2020-12-05 | Stop reason: HOSPADM

## 2020-12-04 RX ORDER — FENTANYL CITRATE 50 UG/ML
25 INJECTION, SOLUTION INTRAMUSCULAR; INTRAVENOUS
Status: DISCONTINUED | OUTPATIENT
Start: 2020-12-04 | End: 2020-12-05 | Stop reason: HOSPADM

## 2020-12-04 RX ORDER — NALOXONE HYDROCHLORIDE 0.4 MG/ML
0.2 INJECTION, SOLUTION INTRAMUSCULAR; INTRAVENOUS; SUBCUTANEOUS
Status: DISCONTINUED | OUTPATIENT
Start: 2020-12-04 | End: 2020-12-05 | Stop reason: HOSPADM

## 2020-12-04 RX ORDER — ACYCLOVIR 200 MG/1
9.5 CAPSULE ORAL
Status: DISCONTINUED | OUTPATIENT
Start: 2020-12-04 | End: 2020-12-05 | Stop reason: HOSPADM

## 2020-12-04 RX ORDER — METOPROLOL TARTRATE 1 MG/ML
2.5-5 INJECTION, SOLUTION INTRAVENOUS
Status: DISCONTINUED | OUTPATIENT
Start: 2020-12-04 | End: 2020-12-05 | Stop reason: HOSPADM

## 2020-12-04 RX ORDER — LIDOCAINE HYDROCHLORIDE 20 MG/ML
15 SOLUTION OROPHARYNGEAL ONCE
Status: COMPLETED | OUTPATIENT
Start: 2020-12-04 | End: 2020-12-04

## 2020-12-04 RX ORDER — FLUMAZENIL 0.1 MG/ML
0.2 INJECTION, SOLUTION INTRAVENOUS
Status: DISCONTINUED | OUTPATIENT
Start: 2020-12-04 | End: 2020-12-05 | Stop reason: HOSPADM

## 2020-12-04 RX ADMIN — FENTANYL CITRATE 50 MCG: 50 INJECTION, SOLUTION INTRAMUSCULAR; INTRAVENOUS at 13:02

## 2020-12-04 RX ADMIN — TOPICAL ANESTHETIC 0.5 ML: 200 SPRAY DENTAL; PERIODONTAL at 13:00

## 2020-12-04 RX ADMIN — LIDOCAINE HYDROCHLORIDE 35 MG: 10 INJECTION, SOLUTION EPIDURAL; INFILTRATION; INTRACAUDAL; PERINEURAL at 13:39

## 2020-12-04 RX ADMIN — MIDAZOLAM 2 MG: 1 INJECTION INTRAMUSCULAR; INTRAVENOUS at 13:02

## 2020-12-04 RX ADMIN — LIDOCAINE HYDROCHLORIDE 30 ML: 20 SOLUTION ORAL; TOPICAL at 12:55

## 2020-12-04 RX ADMIN — SODIUM CHLORIDE 500 ML: 9 INJECTION, SOLUTION INTRAVENOUS at 14:04

## 2020-12-04 ASSESSMENT — ENCOUNTER SYMPTOMS: DYSRHYTHMIAS: 1

## 2020-12-04 NOTE — SEDATION DOCUMENTATION
Pt arrived in ECHO department  for scheduled ELIAS.   Procedure explained, questions answered and consent signed. Discharge instructions discussed with patient.  Pt's throat sprayed at 1300, therefore pt will not be able to eat or drink until 2 hours after at 1500 .  Informed pt of this time and encouraged to start with warm fluids and soft foods.    Pt tolerated procedure well, and was given a total of 50 mcg IV fentanyl and 2 mg IV versed for conscious sedation.  Pt denied throat or chest pain after ELIAS complete.   ELIAS probe 61 used for procedure.  No clots visualized on ELIAS so proceeded with DCCV.  Anesthesia gave pt 35 mg IV brevitol for sedation and pt was DCCV at 150 Joules to a SR.  Pt denied C.P or throat pain after procedure and was D/C home after awake and VSS.  Escorted out to front lobby by staff in w/c to meet pt's ride home.

## 2020-12-04 NOTE — ANESTHESIA PREPROCEDURE EVALUATION
Anesthesia Pre-Procedure Evaluation    Patient: Sherry Slaughter   MRN:     [id number] Gender:   female   Age:    65 year old :      1955        Preoperative Diagnosis: Paroxysmal atrial fibrillation (H) [I48.0]   Procedure(s):  ANESTHESIA, FOR CARDIOVERSION @1400     LABS:  CBC:   Lab Results   Component Value Date    WBC 9.6 2020    WBC 8.2 2019    HGB 14.2 2020    HGB 14.8 2019    HCT 43.3 2020    HCT 43.6 12/10/2019     2020     2019     BMP:   Lab Results   Component Value Date     2020     2020    POTASSIUM 4.1 2020    POTASSIUM 3.8 2020    CHLORIDE 106 2020    CHLORIDE 110 (H) 2020    CO2 23 2020    CO2 22 2020    BUN 15 2020    BUN 13 2020    CR 0.78 2020    CR 0.71 2020     (H) 2020     (H) 2020     COAGS:   Lab Results   Component Value Date    PTT 31 2006    INR 1.52 (H) 2020     POC:   Lab Results   Component Value Date     (H) 2020     OTHER:   Lab Results   Component Value Date    PH 7.437 2015    LACT 1.7 2020    A1C 6.9 (H) 2020    CASSIDY 9.9 2020    PHOS 3.7 2013    MAG 1.6 2020    ALBUMIN 3.9 2020    PROTTOTAL 7.4 2020    ALT 42 2020    AST Unsatisfactory specimen - hemolyzed 2020    ALKPHOS 47 2020    BILITOTAL 0.5 2020    TSH 2.12 2020    T4 1.08@ 2009    CRP 1.5 2005    SED 16 2005        Preop Vitals    BP Readings from Last 3 Encounters:   20 104/65   20 129/80   20 120/76    Pulse Readings from Last 3 Encounters:   20 75   20 127   20 105      Resp Readings from Last 3 Encounters:   20 15   20 18   19 16    SpO2 Readings from Last 3 Encounters:   20 96%   20 99%   20 95%      Temp Readings from Last 1 Encounters:   20 36.6  " C (97.9  F) (Oral)    Ht Readings from Last 1 Encounters:   11/25/20 1.778 m (5' 10\")      Wt Readings from Last 1 Encounters:   11/27/20 131.6 kg (290 lb 3.2 oz)    Estimated body mass index is 41.64 kg/m  as calculated from the following:    Height as of 11/25/20: 1.778 m (5' 10\").    Weight as of 11/27/20: 131.6 kg (290 lb 3.2 oz).     LDA:  Peripheral IV 11/25/20 Right Upper forearm (Active)   Number of days: 9       Peripheral IV 11/25/20 Right;Anterior Lower forearm (Active)   Number of days: 9       Peripheral IV 12/04/20 Left Upper forearm (Active)   Site Assessment WDL 12/04/20 1233   Line Status Infusing 12/04/20 1233   Dressing Intervention New dressing  12/04/20 1233   Phlebitis Scale 0-->no symptoms 12/04/20 1233   Infiltration Site Treatment Method  None 12/04/20 1233   If infiltrated, was a Vesicant infusing? No 12/04/20 1233   Number of days: 0        Past Medical History:   Diagnosis Date     Cataract      DEPRESSION     comes and goes - tried meds - unsuccessfully, certain times of the year, no psych intervention and no counselors in the past - and not interested      Diverticulosis of colon (without mention of hemorrhage) 4/04    colonoscopy     ECHO-mildLVH,tr MR,mild thick lflets w inc LA,trTR   12/03     Essential hypertension, benign 1990s    late 1990s - started medications at that time - not to difficult to control meds      Fam hx-cardiovas dis NEC     father - CAD, and lipids/HTN - multiple members of the family      Family history of diabetes mellitus     sister and grandmother with DM      Family history of malignant neoplasm of breast     mother - young age - 45, and maternal cousin and aunt as well - no BRCA testing done      Family history of stroke (cerebrovascular)     grandmother in early life in her 40s      FAMILY HX COLON CANCER     Pat uncles x 2     Heart disease     murmur/     Heart murmur      HYPERLIPIDEMIA 2000    fairly recent - in the last 5 years - high for DM levels  " -cholesterol recent      Irritable bowel syndrome     goes between the 2 - nerve related - more stressed more problems      MICROALBUMINURIA     unsure how long - been on the lisinopril - for a few years at well      Nonspecific abnormal results of liver function study 2/3/2003    SGOT - has been high in the past - since the hepatitis b - borderline elevation - not really changing any      OBESITY      Obstructive sleep apnea      GENESIS (obstructive sleep apnea) 10/15/2006    psg 5/15 AHI 53 aPAP 8-15     OSTEOARTHRITIS L KNEE 2003    total knee on the left - and pain is gone since the knee replacement      PERS HX HEPATITIS B- RESOLVED] 1977    acute virus only - no chronic disease      PERS HX HEPATITIS B- RESOLVED]      Type II or unspecified type diabetes mellitus without mention of complication, not stated as uncontrolled 1991    oral meds frist, then insulin eventually later, difficult to control most of the time      Unspecified hypothyroidism 10/11/2006    TSH 3.36 - mild subclinical hypothyroidism - deciding on following or starting low dose meds - with dr Oreilly - following       Past Surgical History:   Procedure Laterality Date     ARTHROPLASTY KNEE Right 9/23/2015    Procedure: ARTHROPLASTY KNEE;  Surgeon: Rufus Brown MD;  Location: [facility] TOTAL KNEE ARTHROPLASTY  3/03    L knee     CATARACT IOL, RT/LT Left      COLONOSCOPY  4/04    diverticulosis, rec repeat 10 yrs(consider fam hx?)     COLONOSCOPY WITH CO2 INSUFFLATION N/A 6/19/2019    Procedure: COLONOSCOPY, WITH CO2 INSUFFLATION;  Surgeon: Zeb Duarte MD;  Location:  OR     ORTHOPEDIC SURGERY      right ankle     PHACOEMULSIFICATION CLEAR CORNEA WITH STANDARD INTRAOCULAR LENS IMPLANT Left 3/15/2018    Procedure: PHACOEMULSIFICATION CLEAR CORNEA WITH STANDARD INTRAOCULAR LENS IMPLANT;  LEFT EYE PHACOEMULSIFICATION CLEAR CORNEA WITH STANDARD INTRAOCULAR LENS IMPLANT;  Surgeon: Bandar Linares MD;  Location: [facility]      PHACOEMULSIFICATION CLEAR CORNEA WITH STANDARD INTRAOCULAR LENS IMPLANT Right 4/5/2018    Procedure: PHACOEMULSIFICATION CLEAR CORNEA WITH STANDARD INTRAOCULAR LENS IMPLANT;  RIGHT PHACOEMULSIFICATION CLEAR CORNEA WITH STANDARD INTRAOCULAR LENS IMPLANT ;  Surgeon: Bandar Linares MD;  Location: [facility]     STRESS ECHO (METRO)  12/03    no ischemia, limited by fatigue     SURGICAL HISTORY OF -       EXP LAP- R OVARY CYSTS     SURGICAL HISTORY OF -   1981,1984,1985    CHILDBIRTH     ZZ NONSPECIFIC PROCEDURE  6/06    right triple arthrodecesis      ZZ NONSPECIFIC PROCEDURE  7/06     right bunion surgery       Allergies   Allergen Reactions     Atorvastatin Calcium      Gas     Pravachol [Pravastatin Sodium]      Pravachol - dry cough      Simvastatin      Myalgia        Anesthesia Evaluation     . Pt has had prior anesthetic.     No history of anesthetic complications          ROS/MED HX    ENT/Pulmonary:     (+)sleep apnea, , . .    Neurologic:  - neg neurologic ROS     Cardiovascular: Comment: TTE 11/27/2020:  LV EF 60-65%. Normal size  RV normal  No significant valvular pathology    (+) hypertension----. : . . . :. dysrhythmias (paroxysmal afib) .       METS/Exercise Tolerance:     Hematologic:         Musculoskeletal:         GI/Hepatic:     (+) hepatitis (resolved) type B,      (-) GERD   Renal/Genitourinary:         Endo:     (+) type II DM Using insulin thyroid problem hypothyroidism, Obesity, .      Psychiatric:         Infectious Disease:         Malignancy:         Other:                         PHYSICAL EXAM:   Mental Status/Neuro: A/A/O   Airway: Facies: Feasible  Mallampati: II  Mouth/Opening: Full  TM distance: > 6 cm  Neck ROM: Full   Respiratory: Auscultation: CTAB     Resp. Rate: Normal      CV: Rhythm: Regular  Heart: Normal Sounds   Comments: upper right posterior temporary crown                     Assessment:   ASA SCORE: 3       NPO Status: NPO Appropriate     Plan:   Anes. Type:  General          Airway: Native Airway   Access/Monitoring: PIV        PONV Management: Adult Risk Factors: Female     CONSENT: Direct conversation   Plan and risks discussed with: Patient   Blood Products: Consent Deferred (Minimal Blood Loss)                   Tyrell Vicente MD

## 2020-12-04 NOTE — ANESTHESIA POSTPROCEDURE EVALUATION
Anesthesia POST Procedure Evaluation    Patient: Sherry Slaughter   MRN:     [id number] Gender:   female   Age:    65 year old :      1955        Preoperative Diagnosis: Paroxysmal atrial fibrillation (H) [I48.0]   Procedure(s):  ANESTHESIA, FOR CARDIOVERSION @1400   Postop Comments: No value filed.     Anesthesia Type: General       Disposition: Outpatient   Postop Pain Control: Uneventful            Sign Out: Well controlled pain   PONV: No   Neuro/Psych: Uneventful            Sign Out: Acceptable/Baseline neuro status   Airway/Respiratory: Uneventful            Sign Out: Acceptable/Baseline resp. status   CV/Hemodynamics: Uneventful            Sign Out: Acceptable CV status   Other NRE: NONE   DID A NON-ROUTINE EVENT OCCUR? No         Last Anesthesia Record Vitals:  CRNA VITALS  2020 1317 - 2020 1417      2020             NIBP:  123/73    Ht Rate:  74    SpO2:  100 %    Resp Rate (observed):  14    EKG:  Sinus rhythm;1st degree AV block          Last PACU Vitals:  No vitals data found for the desired time range.        Electronically Signed By: Tyrell Vicente MD, 2020, 2:35 PM

## 2020-12-04 NOTE — TELEPHONE ENCOUNTER
Called and spoke to patient.She was cardioverted today and went home in sinus rhythm. Her pulse is running in the 70's, Last few blood pressures /63-75.  She is asking if she should still take the increased dose of Toprol at 200 mg daily and diltiazem 360 mg daily. The hospital staff could not tell her.     Will try and reach Dr Aranda for clarification     EMMY Cool    Date: 12/4/2020    Time of Call: 5:22 PM     Diagnosis:  PAF     [ TORB ] Ordering provider: Dr Jonh Aranda    Order: Continue medications the same. If she gets hypotensive lightheaded, or SBP<100 she should dut her lisinopril to 20 mg daily. If she still has symptom, she can hold the lisinopril .      Order received by: EMMY Cool     Follow-up/additional notes: Called and gave information to patient. She had no questions.

## 2020-12-04 NOTE — TELEPHONE ENCOUNTER
[facility] Call Center    Phone Message    May a detailed message be left on voicemail: yes     Reason for Call: Other: pt has question about her meds and if she should stay at dosage or go back down, please advise with her     Action Taken: Message routed to:  Adult Clinics: Cardiology p [id number]    Travel Screening: Not Applicable

## 2020-12-04 NOTE — DISCHARGE INSTRUCTIONS
GOING HOME AFTER YOUR CARDIOVERSION    FOR NEXT 24 HOURS:    An adult should stay with you.    Relax and take it easy.    DO NOT make any important legal decisions.    DO NOT drive or operate machines at home or at work.    Resume your regular diet and drink plenty of fluids.    If you have any redness/skin sorness where the patches were placed, you may use aloe vera gel or 1% hydrocortisone cream to the skin (sold at drug stores)    Take Tylenol (Acetaminophen) per your provider's recommendations as needed to help relieve local pain.     CALL YOUR HEALTHCARE PROVIDER IF:    You develop nausea or vomiting    You develop hives or a rash or any unexplained itching    Have irregular heartbeat or fast pulse    Feel faint, dizzy, or lightheaded    Have chest pain with increased activity    Have bleeding issues from blood-thinning medicines    CALL 911 RIGHT AWAY IF YOU HAVE:    Pain in your chest, arm, shoulder, neck, or upper back    Shortness of breath    Loss of vision, speech, or strength or coordination in any body part    Weakness in your arm or leg    Uncontrolled bleeding    Feel unstable in any way     FOLLOW UP APPOINTMENT:      Follow up as scheduled with EP/Cardiology Provider in 4 weeks    ADDITIONAL INFORMATION:  Cardiovascular Clinic:   [address] [place] [address]. [place] [address]  Your Care Team:  EP Cardiology   Telephone Number     Steph Larkin NP, Dr. *** [phone]   Drea Davidson RN  [phone]     For scheduling appts or procedures:    Ana Schmidt   [phone]   For the Device Clinic (Pacemakers and ICD's)   RN's :   Sabina Freire  During business hours: [phone]     After business hours:   [phone]- select option 4 and ask for job code [id number].       As always, Thank you for trusting us with your health care needs!

## 2020-12-06 RX ORDER — METFORMIN HCL 500 MG
TABLET, EXTENDED RELEASE 24 HR ORAL
Qty: 360 TABLET | Refills: 1 | Status: SHIPPED | OUTPATIENT
Start: 2020-12-06 | End: 2021-06-09

## 2020-12-06 RX ORDER — LEVOTHYROXINE SODIUM 75 UG/1
75 TABLET ORAL DAILY
Qty: 90 TABLET | Refills: 1 | Status: SHIPPED | OUTPATIENT
Start: 2020-12-06 | End: 2021-06-16

## 2020-12-07 LAB
INTERPRETATION ECG - MUSE: NORMAL
INTERPRETATION ECG - MUSE: NORMAL

## 2020-12-09 ENCOUNTER — TELEPHONE (OUTPATIENT)
Dept: CARDIOLOGY | Facility: CLINIC | Age: 65
End: 2020-12-09

## 2020-12-09 ENCOUNTER — APPOINTMENT (OUTPATIENT)
Dept: GENERAL RADIOLOGY | Facility: CLINIC | Age: 65
DRG: 189 | End: 2020-12-09
Attending: EMERGENCY MEDICINE
Payer: COMMERCIAL

## 2020-12-09 ENCOUNTER — HOSPITAL ENCOUNTER (INPATIENT)
Facility: CLINIC | Age: 65
LOS: 1 days | Discharge: HOME | DRG: 189 | End: 2020-12-10
Attending: EMERGENCY MEDICINE | Admitting: INTERNAL MEDICINE
Payer: COMMERCIAL

## 2020-12-09 ENCOUNTER — TELEPHONE (OUTPATIENT)
Dept: PEDIATRICS | Facility: CLINIC | Age: 65
End: 2020-12-09

## 2020-12-09 DIAGNOSIS — R05.9 COUGH: ICD-10-CM

## 2020-12-09 DIAGNOSIS — I48.0 PAROXYSMAL ATRIAL FIBRILLATION (H): ICD-10-CM

## 2020-12-09 DIAGNOSIS — R06.02 SOB (SHORTNESS OF BREATH): ICD-10-CM

## 2020-12-09 DIAGNOSIS — I10 ESSENTIAL HYPERTENSION: Primary | ICD-10-CM

## 2020-12-09 DIAGNOSIS — Z20.828 CONTACT WITH AND (SUSPECTED) EXPOSURE TO OTHER VIRAL COMMUNICABLE DISEASES: ICD-10-CM

## 2020-12-09 LAB
ANION GAP SERPL CALCULATED.3IONS-SCNC: 7 MMOL/L (ref 3–14)
BASOPHILS # BLD AUTO: 0.1 10E9/L (ref 0–0.2)
BASOPHILS NFR BLD AUTO: 0.6 %
BUN SERPL-MCNC: 19 MG/DL (ref 7–30)
CALCIUM SERPL-MCNC: 9.6 MG/DL (ref 8.5–10.1)
CHLORIDE SERPL-SCNC: 112 MMOL/L (ref 94–109)
CO2 SERPL-SCNC: 22 MMOL/L (ref 20–32)
CREAT SERPL-MCNC: 0.75 MG/DL (ref 0.52–1.04)
DIFFERENTIAL METHOD BLD: NORMAL
EOSINOPHIL # BLD AUTO: 0.2 10E9/L (ref 0–0.7)
EOSINOPHIL NFR BLD AUTO: 1.8 %
ERYTHROCYTE [DISTWIDTH] IN BLOOD BY AUTOMATED COUNT: 13.5 % (ref 10–15)
GFR SERPL CREATININE-BSD FRML MDRD: 82 ML/MIN/{1.73_M2}
GLUCOSE SERPL-MCNC: 114 MG/DL (ref 70–99)
HCT VFR BLD AUTO: 37.7 % (ref 35–47)
HGB BLD-MCNC: 12.1 G/DL (ref 11.7–15.7)
IMM GRANULOCYTES # BLD: 0.1 10E9/L (ref 0–0.4)
IMM GRANULOCYTES NFR BLD: 1.1 %
INTERPRETATION ECG - MUSE: NORMAL
LYMPHOCYTES # BLD AUTO: 2 10E9/L (ref 0.8–5.3)
LYMPHOCYTES NFR BLD AUTO: 20.2 %
MAGNESIUM SERPL-MCNC: 1.8 MG/DL (ref 1.6–2.3)
MCH RBC QN AUTO: 31.3 PG (ref 26.5–33)
MCHC RBC AUTO-ENTMCNC: 32.1 G/DL (ref 31.5–36.5)
MCV RBC AUTO: 97 FL (ref 78–100)
MONOCYTES # BLD AUTO: 1 10E9/L (ref 0–1.3)
MONOCYTES NFR BLD AUTO: 9.9 %
NEUTROPHILS # BLD AUTO: 6.6 10E9/L (ref 1.6–8.3)
NEUTROPHILS NFR BLD AUTO: 66.4 %
NRBC # BLD AUTO: 0 10*3/UL
NRBC BLD AUTO-RTO: 0 /100
NT-PROBNP SERPL-MCNC: 333 PG/ML (ref 0–900)
PHOSPHATE SERPL-MCNC: 3.5 MG/DL (ref 2.5–4.5)
PLATELET # BLD AUTO: 176 10E9/L (ref 150–450)
POTASSIUM SERPL-SCNC: 3.7 MMOL/L (ref 3.4–5.3)
PROCALCITONIN SERPL-MCNC: <0.05 NG/ML
RBC # BLD AUTO: 3.87 10E12/L (ref 3.8–5.2)
SARS-COV-2 RNA SPEC QL NAA+PROBE: NORMAL
SODIUM SERPL-SCNC: 141 MMOL/L (ref 133–144)
SPECIMEN SOURCE: NORMAL
TROPONIN I SERPL-MCNC: <0.015 UG/L (ref 0–0.04)
WBC # BLD AUTO: 10 10E9/L (ref 4–11)

## 2020-12-09 PROCEDURE — C9803 HOPD COVID-19 SPEC COLLECT: HCPCS | Performed by: EMERGENCY MEDICINE

## 2020-12-09 PROCEDURE — 99285 EMERGENCY DEPT VISIT HI MDM: CPT | Mod: 25 | Performed by: EMERGENCY MEDICINE

## 2020-12-09 PROCEDURE — 71045 X-RAY EXAM CHEST 1 VIEW: CPT

## 2020-12-09 PROCEDURE — 120N000001 HC R&B MED SURG/OB

## 2020-12-09 PROCEDURE — 84484 ASSAY OF TROPONIN QUANT: CPT | Performed by: EMERGENCY MEDICINE

## 2020-12-09 PROCEDURE — 71045 X-RAY EXAM CHEST 1 VIEW: CPT | Mod: 26 | Performed by: RADIOLOGY

## 2020-12-09 PROCEDURE — 250N000011 HC RX IP 250 OP 636: Performed by: EMERGENCY MEDICINE

## 2020-12-09 PROCEDURE — 93010 ELECTROCARDIOGRAM REPORT: CPT | Performed by: EMERGENCY MEDICINE

## 2020-12-09 PROCEDURE — 99223 1ST HOSP IP/OBS HIGH 75: CPT | Mod: AI | Performed by: INTERNAL MEDICINE

## 2020-12-09 PROCEDURE — U0003 INFECTIOUS AGENT DETECTION BY NUCLEIC ACID (DNA OR RNA); SEVERE ACUTE RESPIRATORY SYNDROME CORONAVIRUS 2 (SARS-COV-2) (CORONAVIRUS DISEASE [COVID-19]), AMPLIFIED PROBE TECHNIQUE, MAKING USE OF HIGH THROUGHPUT TECHNOLOGIES AS DESCRIBED BY CMS-2020-01-R: HCPCS | Performed by: EMERGENCY MEDICINE

## 2020-12-09 PROCEDURE — 96376 TX/PRO/DX INJ SAME DRUG ADON: CPT | Performed by: EMERGENCY MEDICINE

## 2020-12-09 PROCEDURE — 83880 ASSAY OF NATRIURETIC PEPTIDE: CPT | Performed by: EMERGENCY MEDICINE

## 2020-12-09 PROCEDURE — 84100 ASSAY OF PHOSPHORUS: CPT | Performed by: EMERGENCY MEDICINE

## 2020-12-09 PROCEDURE — 80048 BASIC METABOLIC PNL TOTAL CA: CPT | Performed by: EMERGENCY MEDICINE

## 2020-12-09 PROCEDURE — 83735 ASSAY OF MAGNESIUM: CPT | Performed by: EMERGENCY MEDICINE

## 2020-12-09 PROCEDURE — 84145 PROCALCITONIN (PCT): CPT | Performed by: EMERGENCY MEDICINE

## 2020-12-09 PROCEDURE — 96375 TX/PRO/DX INJ NEW DRUG ADDON: CPT | Performed by: EMERGENCY MEDICINE

## 2020-12-09 PROCEDURE — 85025 COMPLETE CBC W/AUTO DIFF WBC: CPT | Performed by: EMERGENCY MEDICINE

## 2020-12-09 RX ORDER — NITROGLYCERIN 0.4 MG/1
0.4 TABLET SUBLINGUAL EVERY 5 MIN PRN
Status: DISCONTINUED | OUTPATIENT
Start: 2020-12-09 | End: 2020-12-10

## 2020-12-09 RX ORDER — FUROSEMIDE 10 MG/ML
40 INJECTION INTRAMUSCULAR; INTRAVENOUS ONCE
Status: COMPLETED | OUTPATIENT
Start: 2020-12-09 | End: 2020-12-09

## 2020-12-09 RX ADMIN — FUROSEMIDE 40 MG: 10 INJECTION, SOLUTION INTRAVENOUS at 21:43

## 2020-12-09 ASSESSMENT — ENCOUNTER SYMPTOMS
COUGH: 1
PALPITATIONS: 0
VOMITING: 0
SHORTNESS OF BREATH: 1
NAUSEA: 0
FEVER: 0
ABDOMINAL DISTENTION: 0

## 2020-12-09 ASSESSMENT — MIFFLIN-ST. JEOR: SCORE: 1914.81

## 2020-12-09 NOTE — TELEPHONE ENCOUNTER
Called and spoke to patient. She states she feels well. Her BP is running 110-120's/60's. Pulse 70-80's. Taking diltiazem 360 mg daily, hydrochlorothiazide 25 mg daily, Lisinopril 40 mg daily, and metoprolol 200 mg daily. She was scheduled for a follow up visit post cardioversion. She was told to call if she has any problems.     EMMY Cool

## 2020-12-09 NOTE — TELEPHONE ENCOUNTER
Spoke with patient, she is very short of breath.  States she woke up with a bit of a wheeze and a sore throat, she is very obviously short of breath and having difficulty with conversation.  Patient was COVID tested on 12/4 and was negative. She feels that she is having COVID symptoms.  Patient advised to be evaluated in the ER due to severity of shortness of breath present to ensure that her oxygenation is adequate, and also to ensure the symptoms are not cardiac related, due to recent ELIAS and cardioversion.  Patient reports that her HR is regular and controlled.      Patient is reluctant to go to ER, but verbalizes understanding of recommendations.      Sabina Barbosa RN

## 2020-12-09 NOTE — ED AVS SNAPSHOT
[facility] Emergency Department  [address] [address]  Phone: [phone]                                    Sheryr Slaughter   MRN: [id number]    Department: [facility] Emergency Department   Date of Visit: 12/9/2020           After Visit Summary Signature Page    I have received my discharge instructions, and my questions have been answered. I have discussed any challenges I see with this plan with the nurse or doctor.    ..........................................................................................................................................  Patient/Patient Representative Signature      ..........................................................................................................................................  Patient Representative Print Name and Relationship to Patient    ..................................................               ................................................  Date                                   Time    ..........................................................................................................................................  Reviewed by Signature/Title    ...................................................              ..............................................  Date                                               Time          22EPIC Rev 08/18

## 2020-12-09 NOTE — ED TRIAGE NOTES
Triage Assessment & Note:      Patient presents with: PT c/o SOB. PT had a recent ELIAS. Sunday she developed SOB with 2-3 word dyspnea now. PT reports her SOB increases with ambulation. PT denies palpitations and CP at this time. PT was advised to come in for evaluation by clinic .     Home Treatments/Remedies: None    Febrile / Afebrile? Afebrile     Duration of C/o: 4 days     Reyes Ferrera RN  December 9, 2020

## 2020-12-09 NOTE — TELEPHONE ENCOUNTER
[facility] Call Center    Phone Message    May a detailed message be left on voicemail: yes     Reason for Call: Patient contacted clinic requesting to speak with Patricia. Per [facility] care team unavailable. Please call back to discuss.    Action Taken: Message routed to:  Adult Clinics: Cardiology p [id number]    Travel Screening: Not Applicable

## 2020-12-09 NOTE — TELEPHONE ENCOUNTER
[facility] Call Center    Phone Message    May a detailed message be left on voicemail: yes     Reason for Call: Symptoms or Concerns     Pt said that she had a COVID test on 12/4/20 and it was negative but she is having some shortness of breath and would like to discuss with a nurse.  Thanks.    Action Taken: Message routed to:  Primary Care p [id number]    Travel Screening: Not Applicable

## 2020-12-10 ENCOUNTER — APPOINTMENT (OUTPATIENT)
Dept: CARDIOLOGY | Facility: CLINIC | Age: 65
DRG: 189 | End: 2020-12-10
Payer: COMMERCIAL

## 2020-12-10 VITALS
OXYGEN SATURATION: 96 % | SYSTOLIC BLOOD PRESSURE: 123 MMHG | WEIGHT: 290 LBS | BODY MASS INDEX: 42.95 KG/M2 | HEIGHT: 69 IN | DIASTOLIC BLOOD PRESSURE: 70 MMHG | RESPIRATION RATE: 18 BRPM | HEART RATE: 79 BPM | TEMPERATURE: 98 F

## 2020-12-10 LAB
ALBUMIN UR-MCNC: NEGATIVE MG/DL
ANION GAP SERPL CALCULATED.3IONS-SCNC: 9 MMOL/L (ref 3–14)
APPEARANCE UR: CLEAR
BASE EXCESS BLDV CALC-SCNC: 2.3 MMOL/L
BILIRUB UR QL STRIP: NEGATIVE
BUN SERPL-MCNC: 16 MG/DL (ref 7–30)
CALCIUM SERPL-MCNC: 9.8 MG/DL (ref 8.5–10.1)
CHLORIDE SERPL-SCNC: 105 MMOL/L (ref 94–109)
CO2 SERPL-SCNC: 25 MMOL/L (ref 20–32)
COLOR UR AUTO: ABNORMAL
CREAT SERPL-MCNC: 0.7 MG/DL (ref 0.52–1.04)
GFR SERPL CREATININE-BSD FRML MDRD: 89 ML/MIN/{1.73_M2}
GLUCOSE BLDC GLUCOMTR-MCNC: 125 MG/DL (ref 70–99)
GLUCOSE SERPL-MCNC: 172 MG/DL (ref 70–99)
GLUCOSE UR STRIP-MCNC: NEGATIVE MG/DL
HCO3 BLDV-SCNC: 27 MMOL/L (ref 21–28)
HGB UR QL STRIP: NEGATIVE
KETONES UR STRIP-MCNC: 10 MG/DL
LABORATORY COMMENT REPORT: NORMAL
LEUKOCYTE ESTERASE UR QL STRIP: NEGATIVE
MAGNESIUM SERPL-MCNC: 2.1 MG/DL (ref 1.6–2.3)
NITRATE UR QL: NEGATIVE
O2/TOTAL GAS SETTING VFR VENT: 21 %
PCO2 BLDV: 39 MM HG (ref 40–50)
PH BLDV: 7.44 PH (ref 7.32–7.43)
PH UR STRIP: 5 PH (ref 5–7)
PO2 BLDV: 36 MM HG (ref 25–47)
POTASSIUM SERPL-SCNC: 3.3 MMOL/L (ref 3.4–5.3)
RBC #/AREA URNS AUTO: 1 /HPF (ref 0–2)
SARS-COV-2 RNA SPEC QL NAA+PROBE: NEGATIVE
SODIUM SERPL-SCNC: 139 MMOL/L (ref 133–144)
SOURCE: ABNORMAL
SP GR UR STRIP: 1 (ref 1–1.03)
SPECIMEN SOURCE: NORMAL
SQUAMOUS #/AREA URNS AUTO: <1 /HPF (ref 0–1)
UROBILINOGEN UR STRIP-MCNC: NORMAL MG/DL (ref 0–2)
WBC #/AREA URNS AUTO: <1 /HPF (ref 0–5)

## 2020-12-10 PROCEDURE — 999N001017 HC STATISTIC GLUCOSE BY METER IP

## 2020-12-10 PROCEDURE — 82803 BLOOD GASES ANY COMBINATION: CPT | Performed by: INTERNAL MEDICINE

## 2020-12-10 PROCEDURE — 93308 TTE F-UP OR LMTD: CPT | Mod: 26 | Performed by: INTERNAL MEDICINE

## 2020-12-10 PROCEDURE — 81001 URINALYSIS AUTO W/SCOPE: CPT | Performed by: STUDENT IN AN ORGANIZED HEALTH CARE EDUCATION/TRAINING PROGRAM

## 2020-12-10 PROCEDURE — 99238 HOSP IP/OBS DSCHRG MGMT 30/<: CPT | Mod: 25 | Performed by: NURSE PRACTITIONER

## 2020-12-10 PROCEDURE — 93325 DOPPLER ECHO COLOR FLOW MAPG: CPT | Mod: 26 | Performed by: INTERNAL MEDICINE

## 2020-12-10 PROCEDURE — 96365 THER/PROPH/DIAG IV INF INIT: CPT | Performed by: EMERGENCY MEDICINE

## 2020-12-10 PROCEDURE — 96375 TX/PRO/DX INJ NEW DRUG ADDON: CPT | Performed by: EMERGENCY MEDICINE

## 2020-12-10 PROCEDURE — 250N000012 HC RX MED GY IP 250 OP 636 PS 637: Performed by: STUDENT IN AN ORGANIZED HEALTH CARE EDUCATION/TRAINING PROGRAM

## 2020-12-10 PROCEDURE — 93325 DOPPLER ECHO COLOR FLOW MAPG: CPT

## 2020-12-10 PROCEDURE — 250N000013 HC RX MED GY IP 250 OP 250 PS 637: Performed by: NURSE PRACTITIONER

## 2020-12-10 PROCEDURE — 255N000002 HC RX 255 OP 636: Performed by: INTERNAL MEDICINE

## 2020-12-10 PROCEDURE — 250N000009 HC RX 250: Performed by: STUDENT IN AN ORGANIZED HEALTH CARE EDUCATION/TRAINING PROGRAM

## 2020-12-10 PROCEDURE — 93321 DOPPLER ECHO F-UP/LMTD STD: CPT | Mod: 26 | Performed by: INTERNAL MEDICINE

## 2020-12-10 PROCEDURE — 250N000013 HC RX MED GY IP 250 OP 250 PS 637: Performed by: STUDENT IN AN ORGANIZED HEALTH CARE EDUCATION/TRAINING PROGRAM

## 2020-12-10 PROCEDURE — 80048 BASIC METABOLIC PNL TOTAL CA: CPT | Performed by: INTERNAL MEDICINE

## 2020-12-10 PROCEDURE — 96367 TX/PROPH/DG ADDL SEQ IV INF: CPT | Performed by: EMERGENCY MEDICINE

## 2020-12-10 PROCEDURE — 36415 COLL VENOUS BLD VENIPUNCTURE: CPT | Performed by: INTERNAL MEDICINE

## 2020-12-10 PROCEDURE — 83735 ASSAY OF MAGNESIUM: CPT | Performed by: INTERNAL MEDICINE

## 2020-12-10 PROCEDURE — 250N000011 HC RX IP 250 OP 636: Performed by: STUDENT IN AN ORGANIZED HEALTH CARE EDUCATION/TRAINING PROGRAM

## 2020-12-10 RX ORDER — MAGNESIUM HYDROXIDE/ALUMINUM HYDROXICE/SIMETHICONE 120; 1200; 1200 MG/30ML; MG/30ML; MG/30ML
30 SUSPENSION ORAL EVERY 4 HOURS PRN
Status: DISCONTINUED | OUTPATIENT
Start: 2020-12-10 | End: 2020-12-10 | Stop reason: HOSPADM

## 2020-12-10 RX ORDER — LISINOPRIL 40 MG/1
40 TABLET ORAL DAILY
Status: DISCONTINUED | OUTPATIENT
Start: 2020-12-10 | End: 2020-12-10 | Stop reason: HOSPADM

## 2020-12-10 RX ORDER — AMOXICILLIN 250 MG
2 CAPSULE ORAL 2 TIMES DAILY
Status: DISCONTINUED | OUTPATIENT
Start: 2020-12-10 | End: 2020-12-10 | Stop reason: HOSPADM

## 2020-12-10 RX ORDER — DILTIAZEM HYDROCHLORIDE 180 MG/1
360 CAPSULE, COATED, EXTENDED RELEASE ORAL DAILY
Status: DISCONTINUED | OUTPATIENT
Start: 2020-12-11 | End: 2020-12-10 | Stop reason: HOSPADM

## 2020-12-10 RX ORDER — HYDROCHLOROTHIAZIDE 25 MG/1
25 TABLET ORAL DAILY
Status: DISCONTINUED | OUTPATIENT
Start: 2020-12-10 | End: 2020-12-10

## 2020-12-10 RX ORDER — ROSUVASTATIN CALCIUM 10 MG/1
10 TABLET, COATED ORAL DAILY
Status: DISCONTINUED | OUTPATIENT
Start: 2020-12-10 | End: 2020-12-10 | Stop reason: HOSPADM

## 2020-12-10 RX ORDER — FUROSEMIDE 20 MG
20 TABLET ORAL DAILY
Qty: 90 TABLET | Refills: 3 | Status: SHIPPED | OUTPATIENT
Start: 2020-12-11 | End: 2021-07-21

## 2020-12-10 RX ORDER — DILTIAZEM HYDROCHLORIDE 120 MG/1
120 TABLET, FILM COATED ORAL ONCE
Status: COMPLETED | OUTPATIENT
Start: 2020-12-10 | End: 2020-12-10

## 2020-12-10 RX ORDER — FUROSEMIDE 20 MG
40 TABLET ORAL DAILY
Status: DISCONTINUED | OUTPATIENT
Start: 2020-12-10 | End: 2020-12-10 | Stop reason: HOSPADM

## 2020-12-10 RX ORDER — METOPROLOL SUCCINATE 50 MG/1
200 TABLET, EXTENDED RELEASE ORAL DAILY
Status: DISCONTINUED | OUTPATIENT
Start: 2020-12-10 | End: 2020-12-10 | Stop reason: HOSPADM

## 2020-12-10 RX ORDER — POTASSIUM CHLORIDE 750 MG/1
40 TABLET, EXTENDED RELEASE ORAL ONCE
Status: COMPLETED | OUTPATIENT
Start: 2020-12-10 | End: 2020-12-10

## 2020-12-10 RX ORDER — POLYETHYLENE GLYCOL 3350 17 G/17G
17 POWDER, FOR SOLUTION ORAL DAILY
Status: DISCONTINUED | OUTPATIENT
Start: 2020-12-10 | End: 2020-12-10 | Stop reason: HOSPADM

## 2020-12-10 RX ORDER — FUROSEMIDE 10 MG/ML
40 INJECTION INTRAMUSCULAR; INTRAVENOUS ONCE
Status: COMPLETED | OUTPATIENT
Start: 2020-12-10 | End: 2020-12-10

## 2020-12-10 RX ORDER — DILTIAZEM HYDROCHLORIDE 120 MG/1
120 CAPSULE, EXTENDED RELEASE ORAL DAILY
Qty: 30 CAPSULE | Refills: 1 | COMMUNITY
Start: 2020-12-10 | End: 2021-02-03

## 2020-12-10 RX ORDER — DILTIAZEM HYDROCHLORIDE 240 MG/1
240 CAPSULE, COATED, EXTENDED RELEASE ORAL DAILY
Qty: 30 CAPSULE | Refills: 1 | Status: SHIPPED | OUTPATIENT
Start: 2020-12-10 | End: 2021-02-22

## 2020-12-10 RX ORDER — MAGNESIUM SULFATE HEPTAHYDRATE 40 MG/ML
2 INJECTION, SOLUTION INTRAVENOUS ONCE
Status: COMPLETED | OUTPATIENT
Start: 2020-12-10 | End: 2020-12-10

## 2020-12-10 RX ORDER — LIDOCAINE 40 MG/G
CREAM TOPICAL
Status: DISCONTINUED | OUTPATIENT
Start: 2020-12-10 | End: 2020-12-10 | Stop reason: HOSPADM

## 2020-12-10 RX ORDER — DILTIAZEM HYDROCHLORIDE 240 MG/1
240 CAPSULE, COATED, EXTENDED RELEASE ORAL DAILY
Status: DISCONTINUED | OUTPATIENT
Start: 2020-12-10 | End: 2020-12-10

## 2020-12-10 RX ORDER — ACETAMINOPHEN 325 MG/1
650 TABLET ORAL EVERY 4 HOURS PRN
Status: DISCONTINUED | OUTPATIENT
Start: 2020-12-10 | End: 2020-12-10 | Stop reason: HOSPADM

## 2020-12-10 RX ORDER — LEVOTHYROXINE SODIUM 75 UG/1
75 TABLET ORAL DAILY
Status: DISCONTINUED | OUTPATIENT
Start: 2020-12-10 | End: 2020-12-10 | Stop reason: HOSPADM

## 2020-12-10 RX ORDER — LIRAGLUTIDE 6 MG/ML
3 INJECTION SUBCUTANEOUS DAILY
Status: DISCONTINUED | OUTPATIENT
Start: 2020-12-10 | End: 2020-12-10 | Stop reason: HOSPADM

## 2020-12-10 RX ORDER — DEXTROSE MONOHYDRATE 25 G/50ML
25-50 INJECTION, SOLUTION INTRAVENOUS
Status: DISCONTINUED | OUTPATIENT
Start: 2020-12-10 | End: 2020-12-10 | Stop reason: HOSPADM

## 2020-12-10 RX ORDER — ACETAMINOPHEN 650 MG/1
650 SUPPOSITORY RECTAL EVERY 4 HOURS PRN
Status: DISCONTINUED | OUTPATIENT
Start: 2020-12-10 | End: 2020-12-10 | Stop reason: HOSPADM

## 2020-12-10 RX ORDER — NITROGLYCERIN 0.4 MG/1
0.4 TABLET SUBLINGUAL EVERY 5 MIN PRN
Status: DISCONTINUED | OUTPATIENT
Start: 2020-12-10 | End: 2020-12-10 | Stop reason: HOSPADM

## 2020-12-10 RX ORDER — AMOXICILLIN 250 MG
1 CAPSULE ORAL 2 TIMES DAILY
Status: DISCONTINUED | OUTPATIENT
Start: 2020-12-10 | End: 2020-12-10 | Stop reason: HOSPADM

## 2020-12-10 RX ORDER — HYDRALAZINE HYDROCHLORIDE 20 MG/ML
10 INJECTION INTRAMUSCULAR; INTRAVENOUS EVERY 4 HOURS PRN
Status: DISCONTINUED | OUTPATIENT
Start: 2020-12-10 | End: 2020-12-10 | Stop reason: HOSPADM

## 2020-12-10 RX ORDER — NICOTINE POLACRILEX 4 MG
15-30 LOZENGE BUCCAL
Status: DISCONTINUED | OUTPATIENT
Start: 2020-12-10 | End: 2020-12-10 | Stop reason: HOSPADM

## 2020-12-10 RX ADMIN — LISINOPRIL 40 MG: 40 TABLET ORAL at 07:36

## 2020-12-10 RX ADMIN — LIRAGLUTIDE 3 MG: 6 INJECTION SUBCUTANEOUS at 07:36

## 2020-12-10 RX ADMIN — ACETAMINOPHEN 650 MG: 325 TABLET, FILM COATED ORAL at 06:26

## 2020-12-10 RX ADMIN — FUROSEMIDE 40 MG: 20 TABLET ORAL at 10:27

## 2020-12-10 RX ADMIN — DILTIAZEM HYDROCHLORIDE 240 MG: 240 CAPSULE, COATED, EXTENDED RELEASE ORAL at 07:35

## 2020-12-10 RX ADMIN — LEVOTHYROXINE SODIUM 75 MCG: 75 TABLET ORAL at 07:35

## 2020-12-10 RX ADMIN — HUMAN ALBUMIN MICROSPHERES AND PERFLUTREN 5 ML: 10; .22 INJECTION, SOLUTION INTRAVENOUS at 07:20

## 2020-12-10 RX ADMIN — MAGNESIUM SULFATE IN WATER 2 G: 40 INJECTION, SOLUTION INTRAVENOUS at 02:03

## 2020-12-10 RX ADMIN — FUROSEMIDE 40 MG: 10 INJECTION, SOLUTION INTRAVENOUS at 00:59

## 2020-12-10 RX ADMIN — DILTIAZEM HYDROCHLORIDE 120 MG: 120 TABLET, FILM COATED ORAL at 10:27

## 2020-12-10 RX ADMIN — POTASSIUM CHLORIDE 40 MEQ: 750 TABLET, EXTENDED RELEASE ORAL at 02:02

## 2020-12-10 NOTE — ED PROVIDER NOTES
ED Provider Note  [facility]      History     Chief Complaint   Patient presents with     Shortness of Breath     The history is provided by the patient and medical records.     Sherry Slaughter is a 65 year old female with a PMH significant for DM2, PAF/flutter (on Xarelto), HTN, HLD, untreated GENESIS, and transient metabolic acidosis who presents to the ED today for shortness of breath. Patient had a recent ELIAS on 12/4 and cardioversion for new onset afib with RVR. She reports that after the procedure she had a sore throat that went away, but came back on Sunday. She also has since developed shortness of breath and a dry cough. This began to worsen yesterday, but was much worse today. Her shortness of breath is worse with ambulation and made better by lying flat. She reports that her heart rate has been normal today and she does not feel like she did last time with afib because then she felt her heart racing. She reports some pressure in her upper back, but denies chest pain. She denies fever, leg swelling, and abdominal distention.  Prior to discharge from last admission for new onset afib with RVR, patient was started on oral diltiazem, metoprolol, and Xarelto. she has been compliant with all meds.     Past Medical History  Past Medical History:   Diagnosis Date     Cataract      DEPRESSION     comes and goes - tried meds - unsuccessfully, certain times of the year, no psych intervention and no counselors in the past - and not interested      Diverticulosis of colon (without mention of hemorrhage) 4/04    colonoscopy     ECHO-mildLVH,tr MR,mild thick lflets w inc LA,trTR   12/03     Essential hypertension, benign 1990s    late 1990s - started medications at that time - not to difficult to control meds      Fam hx-cardiovas dis [facility]     father - CAD, and lipids/HTN - multiple members of the family      Family history of diabetes mellitus     sister and grandmother with DM       Family history of malignant neoplasm of breast     mother - young age - 45, and maternal cousin and aunt as well - no BRCA testing done      Family history of stroke (cerebrovascular)     grandmother in early life in her 40s      FAMILY HX COLON CANCER     Pat uncles x 2     Heart disease     murmur/     Heart murmur      HYPERLIPIDEMIA 2000    fairly recent - in the last 5 years - high for DM levels  -cholesterol recent      Irritable bowel syndrome     goes between the 2 - nerve related - more stressed more problems      MICROALBUMINURIA     unsure how long - been on the lisinopril - for a few years at well      Nonspecific abnormal results of liver function study 2/3/2003    SGOT - has been high in the past - since the hepatitis b - borderline elevation - not really changing any      OBESITY      Obstructive sleep apnea      GENESIS (obstructive sleep apnea) 10/15/2006    psg 5/15 AHI 53 aPAP 8-15     OSTEOARTHRITIS L KNEE 2003    total knee on the left - and pain is gone since the knee replacement      PERS HX HEPATITIS B- RESOLVED] 1977    acute virus only - no chronic disease      PERS HX HEPATITIS B- RESOLVED]      Type II or unspecified type diabetes mellitus without mention of complication, not stated as uncontrolled 1991    oral meds frist, then insulin eventually later, difficult to control most of the time      Unspecified hypothyroidism 10/11/2006    TSH 3.36 - mild subclinical hypothyroidism - deciding on following or starting low dose meds - with dr Oreilly - following      Past Surgical History:   Procedure Laterality Date     ANESTHESIA CARDIOVERSION N/A 12/4/2020    Procedure: ANESTHESIA, FOR CARDIOVERSION @1400;  Surgeon: GENERIC ANESTHESIA PROVIDER;  Location: [facility] OR     ARTHROPLASTY KNEE Right 9/23/2015    Procedure: ARTHROPLASTY KNEE;  Surgeon: Rufus Brown MD;  Location:  OR     C TOTAL KNEE ARTHROPLASTY  3/03    L knee     CATARACT IOL, RT/LT Left      COLONOSCOPY  4/04    diverticulosis,  rec repeat 10 yrs(consider fam hx?)     COLONOSCOPY WITH CO2 INSUFFLATION N/A 6/19/2019    Procedure: COLONOSCOPY, WITH CO2 INSUFFLATION;  Surgeon: Zeb Duarte MD;  Location: [facility] OR     ORTHOPEDIC SURGERY      right ankle     PHACOEMULSIFICATION CLEAR CORNEA WITH STANDARD INTRAOCULAR LENS IMPLANT Left 3/15/2018    Procedure: PHACOEMULSIFICATION CLEAR CORNEA WITH STANDARD INTRAOCULAR LENS IMPLANT;  LEFT EYE PHACOEMULSIFICATION CLEAR CORNEA WITH STANDARD INTRAOCULAR LENS IMPLANT;  Surgeon: Bandar Linares MD;  Location:  [facility]     PHACOEMULSIFICATION CLEAR CORNEA WITH STANDARD INTRAOCULAR LENS IMPLANT Right 4/5/2018    Procedure: PHACOEMULSIFICATION CLEAR CORNEA WITH STANDARD INTRAOCULAR LENS IMPLANT;  RIGHT PHACOEMULSIFICATION CLEAR CORNEA WITH STANDARD INTRAOCULAR LENS IMPLANT ;  Surgeon: Bandar Linares MD;  Location:  [facility]     STRESS ECHO (METRO)  12/03    no ischemia, limited by fatigue     SURGICAL HISTORY OF -       EXP LAP- R OVARY CYSTS     SURGICAL HISTORY OF -   1981,1984,1985    CHILDBIRTH     ZZC NONSPECIFIC PROCEDURE  6/06    right triple arthrodecesis      ZZC NONSPECIFIC PROCEDURE  7/06     right bunion surgery           amoxicillin (AMOXIL) 500 MG capsule       blood glucose monitoring (NO BRAND SPECIFIED) test strip       Continuous Blood Gluc  (FREESTYLE MIKE 14 DAY READER) KATIA       Continuous Blood Gluc Sensor (FREESTYLE MIKE 14 DAY SENSOR) MISC       diltiazem ER (DILT-XR) 120 MG 24 hr capsule       diltiazem ER COATED BEADS (CARDIZEM CD/CARTIA XT) 240 MG 24 hr capsule       econazole nitrate 1 % external cream       Garlic 1000 MG CAPS       HUMULIN R U-500 KWIKPEN 500 UNIT/ML PEN soln       hydrochlorothiazide (HYDRODIURIL) 25 MG tablet       ibuprofen (ADVIL) 200 MG capsule       insulin pen needle (GNP CLICKFINE PEN NEEDLES) 31G X 8 MM miscellaneous       levothyroxine (SYNTHROID/LEVOTHROID) 75 MCG tablet       liraglutide (VICTOZA PEN) 18 MG/3ML solution        lisinopril (ZESTRIL) 40 MG tablet       metFORMIN (GLUCOPHAGE-XR) 500 MG 24 hr tablet       metoprolol succinate ER (TOPROL-XL) 50 MG 24 hr tablet       Multiple Vitamins-Minerals (ADVANCED DIABETIC MULTIVITAMIN) TABS       ORDER FOR DME, SET TO LOCAL PRINT,       order for DME       order for DME       order for DME       order for DME       rivaroxaban ANTICOAGULANT (XARELTO) 20 MG TABS tablet       rosuvastatin (CRESTOR) 10 MG tablet      Allergies   Allergen Reactions     Atorvastatin Calcium      Gas     Pravachol [Pravastatin Sodium]      Pravachol - dry cough      Simvastatin      Myalgia     Family History  Family History   Problem Relation Age of Onset     Diabetes Maternal Grandmother         DM TYPE 2     Cerebrovascular Disease Maternal Grandmother      Hypertension Sister      Lipids Sister      Heart Disease Sister         Chronic AFib     Hypertension Sister      Lipids Sister      Gynecology Sister      Diabetes Sister      Asthma Sister      Blood Disease Paternal Grandmother         T CELL LEUKEMIA     Hypertension Brother      Lipids Brother      Congenital Anomalies Brother      Cardiovascular Brother      Heart Disease Brother         CABG/AFIB     Hypertension Brother      Lipids Brother      Obesity Brother      Hypertension Brother      Respiratory Brother         Sleep apnea     Heart Disease Brother         AFib     Alzheimer Disease Mother      Asthma Mother      Hypertension Mother      Breast Cancer Mother 40        has mastectomy and lived to 84     Allergies Mother         Sulfa,     Arthritis Mother      Cardiovascular Mother      Depression Mother      Respiratory Mother      Lipids Mother      Cancer Mother         breast     Thyroid Disease Mother      Cerebrovascular Disease Mother      Dementia Mother         Alzheimers     Cancer - colorectal Other      Cancer Father         lung     Aneurysm Father         brother, AAA     Other Cancer Father         lung ca     Asthma Sister   "    Cancer - colorectal Paternal Uncle         late 50s to early 60s - great uncles      Breast Cancer Other         Maternal cousin     Arrhythmia Sister         2 brothers 1 sister Afib     Breast Cancer Maternal Aunt 62     Other Cancer Maternal Aunt 35        Ovarian cancer.  Survived despite late recurrence and is now 92.     Glaucoma No family hx of      Macular Degeneration No family hx of      Social History   Social History     Tobacco Use     Smoking status: Never Smoker     Smokeless tobacco: Never Used     Tobacco comment: tried in early 20s only    Substance Use Topics     Alcohol use: Yes     Comment: rare     Drug use: No      Past medical history, past surgical history, medications, allergies, family history, and social history were reviewed with the patient. No additional pertinent items.       Review of Systems   Constitutional: Negative for fever.   Respiratory: Positive for cough (dry) and shortness of breath.    Cardiovascular: Negative for chest pain, palpitations and leg swelling.   Gastrointestinal: Negative for abdominal distention, nausea and vomiting.   All other systems reviewed and are negative.    A complete review of systems was performed with pertinent positives and negatives noted in the HPI, and all other systems negative.    Physical Exam   BP: (!) 174/94  Pulse: 85  Temp: 98  F (36.7  C)  Resp: 20  Height: 175.3 cm (5' 9\")  Weight: 131.5 kg (290 lb)  SpO2: 97 %  Physical Exam  Vitals signs and nursing note reviewed.   Constitutional:       General: She is not in acute distress.     Appearance: Normal appearance. She is well-developed. She is obese. She is not toxic-appearing or diaphoretic.   HENT:      Head: Normocephalic and atraumatic.      Nose: Nose normal.   Eyes:      General: No scleral icterus.     Conjunctiva/sclera: Conjunctivae normal.   Neck:      Musculoskeletal: Normal range of motion and neck supple. No neck rigidity.   Cardiovascular:      Rate and Rhythm: Normal " rate. Rhythm regularly irregular.   Pulmonary:      Effort: Tachypnea present. No respiratory distress.      Breath sounds: No stridor.      Comments: Speaking in 2-3 word sentences  Abdominal:      General: Abdomen is protuberant. There is distension.      Palpations: Abdomen is soft. There is no mass.      Tenderness: There is no abdominal tenderness. There is no guarding or rebound.   Musculoskeletal: Normal range of motion.         General: No tenderness, deformity or signs of injury.      Right lower leg: No edema.      Left lower leg: No edema.   Skin:     General: Skin is warm and dry.      Coloration: Skin is not cyanotic, jaundiced or pale.      Findings: No rash.   Neurological:      General: No focal deficit present.      Mental Status: She is alert and oriented to person, place, and time.   Psychiatric:         Mood and Affect: Mood normal.         Behavior: Behavior normal.         Thought Content: Thought content normal.         ED Course      Procedures             EKG Interpretation:      Interpreted by July Boss MD  Time reviewed: 1819  Symptoms at time of EKG: SOB   Rhythm: atrial fibrillation - controlled  Rate: Normal and 84  Axis: Normal  Ectopy: none  Conduction: normal  ST Segments/ T Waves: Non-specific ST-T wave changes  Q Waves: none  Comparison to prior: post cardioversion EKG with NSR and 1st degree AVB    Clinical Impression: atrial fibrillation (new onset)                            Results for orders placed or performed during the hospital encounter of 12/09/20   XR Chest Port 1 View     Status: None    Narrative    EXAM: XR CHEST PORT 1 VW  12/9/2020 8:26 PM     HISTORY:  sob       COMPARISON:  None    FINDINGS: Single view of the chest. Trachea is midline. Cardiomegaly.  Bilateral interstitial opacities. Small right pleural effusion. No  pneumothorax.       Impression    IMPRESSION:   1. Bilateral interstitial opacities, edema versus infection.  2. Small right pleural  effusion.  3. Cardiomegaly.    I have personally reviewed the examination and initial interpretation  and I agree with the findings.    ISRAEL BAÑUELOS MD   CBC with platelets differential     Status: None   Result Value Ref Range    WBC 10.0 4.0 - 11.0 10e9/L    RBC Count 3.87 3.8 - 5.2 10e12/L    Hemoglobin 12.1 11.7 - 15.7 g/dL    Hematocrit 37.7 35.0 - 47.0 %    MCV 97 78 - 100 fl    MCH 31.3 26.5 - 33.0 pg    MCHC 32.1 31.5 - 36.5 g/dL    RDW 13.5 10.0 - 15.0 %    Platelet Count 176 150 - 450 10e9/L    Diff Method Automated Method     % Neutrophils 66.4 %    % Lymphocytes 20.2 %    % Monocytes 9.9 %    % Eosinophils 1.8 %    % Basophils 0.6 %    % Immature Granulocytes 1.1 %    Nucleated RBCs 0 0 /100    Absolute Neutrophil 6.6 1.6 - 8.3 10e9/L    Absolute Lymphocytes 2.0 0.8 - 5.3 10e9/L    Absolute Monocytes 1.0 0.0 - 1.3 10e9/L    Absolute Eosinophils 0.2 0.0 - 0.7 10e9/L    Absolute Basophils 0.1 0.0 - 0.2 10e9/L    Abs Immature Granulocytes 0.1 0 - 0.4 10e9/L    Absolute Nucleated RBC 0.0    Basic metabolic panel     Status: Abnormal   Result Value Ref Range    Sodium 141 133 - 144 mmol/L    Potassium 3.7 3.4 - 5.3 mmol/L    Chloride 112 (H) 94 - 109 mmol/L    Carbon Dioxide 22 20 - 32 mmol/L    Anion Gap 7 3 - 14 mmol/L    Glucose 114 (H) 70 - 99 mg/dL    Urea Nitrogen 19 7 - 30 mg/dL    Creatinine 0.75 0.52 - 1.04 mg/dL    GFR Estimate 82 >60 mL/min/[1.73_m2]    GFR Estimate If Black >90 >60 mL/min/[1.73_m2]    Calcium 9.6 8.5 - 10.1 mg/dL   Troponin I     Status: None   Result Value Ref Range    Troponin I ES <0.015 0.000 - 0.045 ug/L   Procalcitonin     Status: None   Result Value Ref Range    Procalcitonin <0.05 ng/ml   Symptomatic COVID-19 Virus (Coronavirus) by PCR     Status: None    Specimen: Nasopharyngeal   Result Value Ref Range    COVID-19 Virus PCR to [facility] - Source Nasopharyngeal     COVID-19 Virus PCR to [facility] - Result       Test received-See reflex to IDDL test SARS CoV2 (COVID-19)  Virus RT-PCR   Nt probnp inpatient     Status: None   Result Value Ref Range    N-Terminal Pro BNP Inpatient 333 0 - 900 pg/mL   Magnesium     Status: None   Result Value Ref Range    Magnesium 1.8 1.6 - 2.3 mg/dL   Phosphorus     Status: None   Result Value Ref Range    Phosphorus 3.5 2.5 - 4.5 mg/dL   EKG 12-lead, tracing only     Status: None   Result Value Ref Range    Interpretation ECG Click View Image link to view waveform and result      Medications   nitroGLYcerin (NITROSTAT) sublingual tablet 0.4 mg (has no administration in time range)   hydrALAZINE (APRESOLINE) injection 10 mg (has no administration in time range)   furosemide (LASIX) injection 40 mg (40 mg Intravenous Given 12/9/20 2141)   furosemide (LASIX) injection 40 mg (40 mg Intravenous Given 12/10/20 0059)        Assessments & Plan (with Medical Decision Making)   Sherry Slaughter is a 65 year old female with a PMH significant for DM2, PAF/flutter (on Xarelto), HTN, HLD, untreated GENESIS, and transient metabolic acidosis who presents to the ED today for shortness of breath.    Ddx: Recurrent A. fib, COVID-19, volume overload, ACUTE CORONARY SYNDROME    Patient p/w rate-controlled atrial fibrillation after recent cardioversion to sinus rhythm.  She is anticoagulated on Xarelto and has been compliant, therefore low risk for pulmonary embolism.  She has no chest pain.  Labs within normal limits.  Patient hypertensive on arrival, mildly tachypneic, satting 97% on room air.  No infectious symptoms.  No orthopnea or PND.  No pitting edema of the lower extremities.  Chest x-ray shows bilateral interstitial opacities and a small right pleural effusion with cardiomegaly.  I suspect this is pulmonary edema, likely related to patient's atrial fibrillation.  Troponin negative.  Procalcitonin negative.  Given IV Lasix.  Admitted to cards 1 on telemetry.    I have reviewed the nursing notes. I have reviewed the findings, diagnosis, plan and need for  follow up with the patient.    New Prescriptions    No medications on file       Final diagnoses:   SOB (shortness of breath)   I, Janiya Contreras, am serving as a trained medical scribe to document services personally performed by July Boss MD, based on the provider's statements to me.     July HERNANDEZ MD, was physically present and have reviewed and verified the accuracy of this note documented by Janiya Cotnreras.      --  July Boss MD  [facility] EMERGENCY DEPARTMENT  12/9/2020     July Boss MD  12/10/20 0128

## 2020-12-10 NOTE — H&P
[facility]     Cardiology History and Physical - Cards 1       Date of Admission:  12/9/2020    Assessment & Plan: HVSL    Mrs. Sherry Slaughter is a 65 year old female with a past medical history of afib/alfutter (diagnosed 11/2020), DM II, HTN, HLD, GENESIS untreated, depression and hypothyroidism admitted for hypertensive emergency.     # Hypertensive emergency  Initial /96. Currently 190s/120s. CXR with pulmonary edema. Took PTA antihypertensives prior to coming in to the ED. States that BPs have been relatively normal PTA but has not been checking frequently. S/p 40mg IV lasix in ED. She will likely be boarding there tonight because the hospital is near capacity.   - Continue PTA hydrochlorothiazide 25mg, lisinopril 40mg  - Hold NSAIDS (patient has recently been using ibuprofen for discomfort)  - PRN hydralazine for >180/120  - Additional 40mg IV lasix with electrolyte replacement PRN  - UA pending     # Progressive dyspnea  4 days progressive dyspnea with sore throat. No prior history of lung disease. Tachypnea but not hypoxic on exam.  (down from 714 on 11/25); EKG afib with rate 84. CXR with bilateral opacities, likely pulmonary edema. Troponin negative x 1. No leukocytosis, negative procal. Exam with scattered rales. At this point, suspect dyspnea from pulmonary edema, likely related to hypertension. However, CHF, ischemia, COVID vs other viral illness, etc also possible. S/p 40mg IV lasix in ED.   - Await COVID results  - Additional 40mg IV lasix  - Oxygen as needed  - Treat hypertension as above   - Repeat TTE ordered  - VBG for AM     # Paroxysmal atrial fibrillation, rate controlled s/p recent DCCV 12/4  Recently hospitalized for new diagnosis of afib/aflutter with RVR 11/25-11/27. Patient did have dyspnea, palpitations, occasional chest discomfort at that time. TTE during admission notable for LVEF 60-65%, both atria normal, unable to  comment on diastolic dysfunction. Rates controlled with diltiazem and discharged with good rate control (HR ~100). Underwent successful DCCV 12/4. EKG on arrival confirms she is again in atrial fibrillation but with rates in the 70s-80s.  She has been compliant on her xarelto and other medications. [id number] score 4 for hypertension, diabetes, age, gender.  - Telemetry  - Continue diltiazem 240mg qday, metoprolol succinate 200mg qday, Xarelto 20mg qday     # HLD   LDL 43 (2019).  - Continue PTA rosuvastatin 10 mg     # T2DM  A1C 6.9 11/25. Takes NPH, Victoza, metformin  - PTA Victoza , metformin   - Reduced PTA NPH (100-80 vs -100 as patient states she's been <100 last few BG checks)   - Sliding scale correction insulin     Chronic problems:   # Hypothyroidism, continue pta levothyroxine.   # Untreated GENESIS  # H/o depression     Diet: Cardiac/CC diet  DVT Prophylaxis: On Xarelto  [place] Catheter: not present  Code Status: Full  Lines: Peripheral IV     Disposition Plan   Expected discharge: 1-2 days, recommended to prior living arrangement once blood pressure within normal limits.    Entered: Asia Terry MD 12/09/2020, 10:59 PM     The patient will be formally staffed in the AM.     Asia Terry MD  [facility]   Please see sign in/sign out for up to date coverage information    I have seen, interviewed, and examined patient. I have reviewed the laboratory tests, imaging, and other investigations. I have reviewed the management plan with the patient. I discussed with the team and agree with the findings and plan in this resident/fellow/nurse practitioner's note. In addition, changes in the physical examination, assessment and plan have been incorporated into the note by myself, as to make it a single cohesive document.       Sharon Mccracken MD, MS  Cardiology/Cardiac EP Attending  Staff      ______________________________________________________________________    Chief Complaint   Shortness of breath    History is obtained from the patient    History of Present Illness   Sherry Slaughter is a 65 year old female with a PMH significant for DM2, PAF/flutter (on Xarelto), HTN, HLD, untreated GENESIS who presented to the ED with progressive shortness of breath.    She reports that she's had worsening dyspnea with activity since Sunday 12/6 and now becomes very winded with only minimal exertion. She feels relatively comfortable at rest. Also reporting a sore throat and dry cough. Recently hospitalized for new diagnosis of afib/aflutter with RVR 11/25-11/27; started on Xarelto at that time. She was cardioverted 12/4 successfully and had a sore throat afterwards which improved then returned. She came in because her dyspnea was noticeably worse today and she was concerned about COVID. She wears a fitbit and her heart rate since cardioversion has been in the 70s. She believes that she is actually more comfortable when she is lying flat. Reports compliance with all her medications, including Xarelto. Took all her PM meds prior to comming to the ED.    She denies light-headedness, orthopnea, PND, edema, known weight change, chest pain. Has occasional palpitations, fatigue. BP at home has been 120s systolic at the highest, diastolics in the 70s. Mostly 110s systolic. No fevers, chills, headache, productive cough, abdominal pain/fullness, constipation/diarrhea, nausea/vomiting, difficulty urinating, dysuria, change in UOP, weakness, bleeding/bruising, new skin findings. No known COVID contact. Has chronic transient/migratory body aches which have not changed recently. She has been taking ibuprofen intermittently for the body aches and dyspnea.    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     Review of Systems    The 10 point Review of Systems is negative other than noted in  the [facility] or here.     Past Medical History    I have reviewed this patient's medical history and updated it with pertinent information if needed.   Past Medical History:   Diagnosis Date     Cataract      DEPRESSION     comes and goes - tried meds - unsuccessfully, certain times of the year, no psych intervention and no counselors in the past - and not interested      Diverticulosis of colon (without mention of hemorrhage) 4/04    colonoscopy     ECHO-mildLVH,tr MR,mild thick lflets w inc LA,trTR   12/03     Essential hypertension, benign 1990s    late 1990s - started medications at that time - not to difficult to control meds      Fam hx-cardiovas dis NEC     father - CAD, and lipids/HTN - multiple members of the family      Family history of diabetes mellitus     sister and grandmother with DM      Family history of malignant neoplasm of breast     mother - young age - 45, and maternal cousin and aunt as well - no BRCA testing done      Family history of stroke (cerebrovascular)     grandmother in early life in her 40s      FAMILY HX COLON CANCER     Pat uncles x 2     Heart disease     murmur/     Heart murmur      HYPERLIPIDEMIA 2000    fairly recent - in the last 5 years - high for DM levels  -cholesterol recent      Irritable bowel syndrome     goes between the 2 - nerve related - more stressed more problems      MICROALBUMINURIA     unsure how long - been on the lisinopril - for a few years at well      Nonspecific abnormal results of liver function study 2/3/2003    SGOT - has been high in the past - since the hepatitis b - borderline elevation - not really changing any      OBESITY      Obstructive sleep apnea      GENESIS (obstructive sleep apnea) 10/15/2006    psg 5/15 AHI 53 aPAP 8-15     OSTEOARTHRITIS L KNEE 2003    total knee on the left - and pain is gone since the knee replacement      PERS HX HEPATITIS B- RESOLVED] 1977    acute virus only - no chronic disease      PERS HX HEPATITIS B- RESOLVED]       Type II or unspecified type diabetes mellitus without mention of complication, not stated as uncontrolled 1991    oral meds frist, then insulin eventually later, difficult to control most of the time      Unspecified hypothyroidism 10/11/2006    TSH 3.36 - mild subclinical hypothyroidism - deciding on following or starting low dose meds - with dr Oreilly - following      Past Surgical History   I have reviewed this patient's surgical history and updated it with pertinent information if needed.  Past Surgical History:   Procedure Laterality Date     ANESTHESIA CARDIOVERSION N/A 12/4/2020    Procedure: ANESTHESIA, FOR CARDIOVERSION @1400;  Surgeon: GENERIC ANESTHESIA PROVIDER;  Location: [facility]     ARTHROPLASTY KNEE Right 9/23/2015    Procedure: ARTHROPLASTY KNEE;  Surgeon: Rufus Brown MD;  Location:  OR     C TOTAL KNEE ARTHROPLASTY  3/03    L knee     CATARACT IOL, RT/LT Left      COLONOSCOPY  4/04    diverticulosis, rec repeat 10 yrs(consider fam hx?)     COLONOSCOPY WITH CO2 INSUFFLATION N/A 6/19/2019    Procedure: COLONOSCOPY, WITH CO2 INSUFFLATION;  Surgeon: Zeb Duarte MD;  Location:  OR     ORTHOPEDIC SURGERY      right ankle     PHACOEMULSIFICATION CLEAR CORNEA WITH STANDARD INTRAOCULAR LENS IMPLANT Left 3/15/2018    Procedure: PHACOEMULSIFICATION CLEAR CORNEA WITH STANDARD INTRAOCULAR LENS IMPLANT;  LEFT EYE PHACOEMULSIFICATION CLEAR CORNEA WITH STANDARD INTRAOCULAR LENS IMPLANT;  Surgeon: Banadr Linares MD;  Location: [facility]     PHACOEMULSIFICATION CLEAR CORNEA WITH STANDARD INTRAOCULAR LENS IMPLANT Right 4/5/2018    Procedure: PHACOEMULSIFICATION CLEAR CORNEA WITH STANDARD INTRAOCULAR LENS IMPLANT;  RIGHT PHACOEMULSIFICATION CLEAR CORNEA WITH STANDARD INTRAOCULAR LENS IMPLANT ;  Surgeon: Bandar Linares MD;  Location:  [facility]     STRESS ECHO (METRO)  12/03    no ischemia, limited by fatigue     SURGICAL HISTORY OF -       EXP LAP- R OVARY CYSTS     SURGICAL HISTORY OF -    1981,1984,1985    CHILDBIRTH     [facility] NONSPECIFIC PROCEDURE  6/06    right triple arthrodecesis      [facility] NONSPECIFIC PROCEDURE  7/06     right bunion surgery        Social History   I have reviewed this patient's social history and updated it with pertinent information if needed.  Social History     Tobacco Use     Smoking status: Never Smoker     Smokeless tobacco: Never Used     Tobacco comment: tried in early 20s only    Substance Use Topics     Alcohol use: Yes     Comment: rare     Drug use: No     Family History   I have reviewed this patient's family history and updated it with pertinent information if needed.   I have reviewed this patient's family history and updated it with pertinent information if needed.  Family History   Problem Relation Age of Onset     Diabetes Maternal Grandmother         DM TYPE 2     Cerebrovascular Disease Maternal Grandmother      Hypertension Sister      Lipids Sister      Heart Disease Sister         Chronic AFib     Hypertension Sister      Lipids Sister      Gynecology Sister      Diabetes Sister      Asthma Sister      Blood Disease Paternal Grandmother         T CELL LEUKEMIA     Hypertension Brother      Lipids Brother      Congenital Anomalies Brother      Cardiovascular Brother      Heart Disease Brother         CABG/AFIB     Hypertension Brother      Lipids Brother      Obesity Brother      Hypertension Brother      Respiratory Brother         Sleep apnea     Heart Disease Brother         AFib     Alzheimer Disease Mother      Asthma Mother      Hypertension Mother      Breast Cancer Mother 40        has mastectomy and lived to      Allergies Mother         Sulfa,     Arthritis Mother      Cardiovascular Mother      Depression Mother      Respiratory Mother      Lipids Mother      Cancer Mother         breast     Thyroid Disease Mother      Cerebrovascular Disease Mother      Dementia Mother         Alzheimers     Cancer - colorectal Other      Cancer Father          lung     Aneurysm Father         brother, AAA     Other Cancer Father         lung ca     Asthma Sister      Cancer - colorectal Paternal Uncle         late 50s to early 60s - great uncles      Breast Cancer Other         Maternal cousin     Arrhythmia Sister         2 brothers 1 sister Afib     Breast Cancer Maternal Aunt 62     Other Cancer Maternal Aunt 35        Ovarian cancer.  Survived despite late recurrence and is now 92.     Glaucoma No family hx of      Macular Degeneration No family hx of      Prior to Admission Medications   Prior to Admission Medications   Prescriptions Last Dose Informant Patient Reported? Taking?   Continuous Blood Gluc  (FREESTYLE MIKE 14 DAY READER) KATIA   No No   Si each daily   Continuous Blood Gluc Sensor (FREESTYLE MIKE 14 DAY SENSOR) MISC   No No   Sig: Change every 14 days.   Garlic 1000 MG CAPS  Self Yes No   Sig: Take 1 capsule by mouth daily Takes during summer months.  Done taking until next summer.   HUMULIN R U-500 KWIKPEN 500 UNIT/ML PEN soln   No No   Sig: INJECT 120 UNITS IN THE MORNING  UNITS AT BEDTIME   Multiple Vitamins-Minerals (ADVANCED DIABETIC MULTIVITAMIN) TABS   Yes No   Sig: Take 1 tablet by mouth daily   ORDER FOR DME, SET TO LOCAL PRINT,  Self Yes No   Sig: Respironics REMSTAR 60 Series Auto CPAP 8-15cm H2O, Airfit P10 nasal pillow mask w/medium pillows   amoxicillin (AMOXIL) 500 MG capsule   Yes No   Sig: Take 2,000 mg by mouth Before dental procedures   blood glucose monitoring (NO BRAND SPECIFIED) test strip   No No   Sig: Use to test blood sugar 4 times daily or as directed.   Patient not taking: Reported on 2020   diltiazem ER (DILT-XR) 120 MG 24 hr capsule   No No   Sig: Take 1 capsule (120 mg) by mouth daily   diltiazem ER COATED BEADS (CARDIZEM CD/CARTIA XT) 240 MG 24 hr capsule   No No   Sig: Take 1 capsule (240 mg) by mouth daily   econazole nitrate 1 % external cream   Yes No   Sig: Apply topically daily as needed To  feet and toenails   hydrochlorothiazide (HYDRODIURIL) 25 MG tablet   No No   Sig: TAKE ONE TABLET BY MOUTH ONCE DAILY   ibuprofen (ADVIL) 200 MG capsule  Self Yes No   Sig: Take 600 mg by mouth every 6 hours as needed    insulin pen needle (GNP CLICKFINE PEN NEEDLES) 31G X 8 MM miscellaneous   No No   Sig: Use six times daily or as directed   levothyroxine (SYNTHROID/LEVOTHROID) 75 MCG tablet   No No   Sig: Take 1 tablet (75 mcg) by mouth daily   liraglutide (VICTOZA PEN) 18 MG/3ML solution   No No   Sig: Administer two injections of 1.8 and 1.2 mg each, for a total daily dose of 3 mg.   lisinopril (ZESTRIL) 40 MG tablet   Yes No   Sig: Take 1 tablet (40 mg) by mouth   metFORMIN (GLUCOPHAGE-XR) 500 MG 24 hr tablet   No No   Sig: Take 4 tabs HS   metoprolol succinate ER (TOPROL-XL) 50 MG 24 hr tablet   No No   Sig: Take 4 tablets (200 mg) by mouth daily   order for DME   No No   Sig: Equipment being ordered: wrist brace with thumb spica, L   Patient not taking: Reported on 11/25/2020   order for DME   No No   Sig: Equipment being ordered: wrist brace with thumb spica, R   Patient not taking: Reported on 11/25/2020   order for DME   No No   Sig: Roll-A-Bout Walker. Patient can use for left foot.   order for DME   No No   Sig: Equipment being ordered: [id number] $70  DH Shoe LG   Patient not taking: Reported on 11/25/2020   rivaroxaban ANTICOAGULANT (XARELTO) 20 MG TABS tablet   No No   Sig: Take 1 tablet (20 mg) by mouth daily (with dinner)   rosuvastatin (CRESTOR) 10 MG tablet   No No   Sig: Take 1 tablet (10 mg) by mouth daily      Facility-Administered Medications Last Administration Doses Remaining   triamcinolone (KENALOG-40) injection 20 mg 9/12/2019 10:57 AM         Allergies   Allergies   Allergen Reactions     Atorvastatin Calcium      Gas     Pravachol [Pravastatin Sodium]      Pravachol - dry cough      Simvastatin      Myalgia     Physical Exam   Vital Signs: Temp: 98  F (36.7  C) Temp src: Oral BP:  (!) 179/87 Pulse: 77   Resp: 20 SpO2: 96 %      Weight: 290 lbs 0 oz    General Appearance: female, appears stated age, mild respiratory distress with talking  Eyes: EOMI, PERRL, sclera clear  HEENT: NCAT, MMM, neck supple  Respiratory: Mildly increased work of breathing, rales in bilateral lower lung fields, no rhonchi, wheezes heard  Cardiovascular: Faint heart sounds, irregular rate and rhythm, no murmurs/rubs/gallops heard; no JVD appreciable; trace BLE edema   GI: abdomen soft, nontender, normal bowel sounds  Lymph/Hematologic: No cervical or axillary LAD  Skin: No bruising, ecchymoses visualized  Musculoskeletal: No joint swelling   Neurologic: No FND appreciated; moving all extremities spontaneously  Psychiatric: pleasant mood, full affect    Data   Data reviewed today: I reviewed all medications, new labs and imaging results over the last 24 hours. I personally reviewed Cardiology specific data review: the EKG tracing showing afib with nonspecific ST changes     Recent Labs   Lab 12/09/20  2132 12/04/20  1158   WBC 10.0  --    HGB 12.1  --    MCV 97  --      --    INR  --  1.52*     --    POTASSIUM 3.7 4.1   CHLORIDE 112*  --    CO2 22  --    BUN 19  --    CR 0.75  --    ANIONGAP 7  --    CASSIDY 9.6  --    *  --    TROPI <0.015  --

## 2020-12-10 NOTE — PROGRESS NOTES
Rule Out COVID-19 Handoff:  Sherry is a LOW SUSPICION PUI.  SOB likely 2/2 flash pulmonary edema 2/2 HTN. Follow these instructions:    If COVID test positive -> continue isolation precautions    If COVID test negative -> discontinue COVID-specific isolation precautions    Chela Gupta APRN, CNP  [facility]

## 2020-12-10 NOTE — ED NOTES
[facility]    ED Nurse to Floor Handoff     Sherry Slaughter is a 65 year old female who speaks English and lives with a spouse,  in a home  They arrived in the ED by car from home    ED Chief Complaint: Shortness of Breath    ED Dx;   Final diagnoses:   SOB (shortness of breath)         Needed?: No    Allergies:   Allergies   Allergen Reactions     Atorvastatin Calcium      Gas     Pravachol [Pravastatin Sodium]      Pravachol - dry cough      Simvastatin      Myalgia   .  Past Medical Hx:   Past Medical History:   Diagnosis Date     Cataract      DEPRESSION     comes and goes - tried meds - unsuccessfully, certain times of the year, no psych intervention and no counselors in the past - and not interested      Diverticulosis of colon (without mention of hemorrhage) 4/04    colonoscopy     ECHO-mildLVH,tr MR,mild thick lflets w inc LA,trTR   12/03     Essential hypertension, benign 1990s    late 1990s - started medications at that time - not to difficult to control meds      Fam hx-cardiovas dis NEC     father - CAD, and lipids/HTN - multiple members of the family      Family history of diabetes mellitus     sister and grandmother with DM      Family history of malignant neoplasm of breast     mother - young age - 45, and maternal cousin and aunt as well - no BRCA testing done      Family history of stroke (cerebrovascular)     grandmother in early life in her 40s      FAMILY HX COLON CANCER     Pat uncles x 2     Heart disease     murmur/     Heart murmur      HYPERLIPIDEMIA 2000    fairly recent - in the last 5 years - high for DM levels  -cholesterol recent      Irritable bowel syndrome     goes between the 2 - nerve related - more stressed more problems      MICROALBUMINURIA     unsure how long - been on the lisinopril - for a few years at well      Nonspecific abnormal results of liver function study 2/3/2003    SGOT - has been high in the past -  since the hepatitis b - borderline elevation - not really changing any      OBESITY      Obstructive sleep apnea      GENESIS (obstructive sleep apnea) 10/15/2006    psg 5/15 AHI 53 aPAP 8-15     OSTEOARTHRITIS L KNEE 2003    total knee on the left - and pain is gone since the knee replacement      PERS HX HEPATITIS B- RESOLVED] 1977    acute virus only - no chronic disease      PERS HX HEPATITIS B- RESOLVED]      Type II or unspecified type diabetes mellitus without mention of complication, not stated as uncontrolled 1991    oral meds frist, then insulin eventually later, difficult to control most of the time      Unspecified hypothyroidism 10/11/2006    TSH 3.36 - mild subclinical hypothyroidism - deciding on following or starting low dose meds - with dr Oreilly - following       Baseline Mental status: WDL  Current Mental Status changes: at basesline    Infection present or suspected this encounter: no  Sepsis suspected: No  Isolation type: Special Precautions-COVID-19  Patient tested for COVID 19 prior to admission: YES     Activity level - Baseline/Home:  Independent  Activity Level - Current:   Independent    Bariatric equipment needed?: No    In the ED these meds were given:   Medications   nitroGLYcerin (NITROSTAT) sublingual tablet 0.4 mg (has no administration in time range)   furosemide (LASIX) injection 40 mg (40 mg Intravenous Given 12/9/20 2143)       Drips running?  No    Home pump  No    Current LDAs  Peripheral IV 11/25/20 Right Upper forearm (Active)   Number of days: 14       Peripheral IV 11/25/20 Right;Anterior Lower forearm (Active)   Number of days: 14       Peripheral IV 12/04/20 Left Upper forearm (Active)   Number of days: 5       Peripheral IV 12/09/20 Right Hand (Active)   Site Assessment WDL 12/09/20 2203   Number of days: 0       Wound Toe (Comment  which one) Pressure injury community acquired (Active)   Number of days: 13       Incision/Surgical Site 09/23/15 Right Knee (Active)   Number  "of days: 1904       Labs results:   Labs Ordered and Resulted from Time of ED Arrival Up to the Time of Departure from the ED   CBC WITH PLATELETS DIFFERENTIAL   BASIC METABOLIC PANEL   TROPONIN I   PROCALCITONIN   COVID-19 VIRUS (CORONAVIRUS) BY PCR   NT PROBNP INPATIENT       Imaging Studies:   Recent Results (from the past 24 hour(s))   XR Chest Port 1 View    Narrative    EXAM: XR CHEST PORT 1 VW  12/9/2020 8:26 PM     HISTORY:  sob       COMPARISON:  None    FINDINGS: Single view of the chest. Trachea is midline. Cardiomegaly.  Bilateral interstitial opacities. Small right pleural effusion. No  pneumothorax.       Impression    IMPRESSION:   1. Bilateral interstitial opacities, edema versus infection.  2. Small right pleural effusion.  3. Cardiomegaly.    I have personally reviewed the examination and initial interpretation  and I agree with the findings.    ISRAEL BAÑUELOS MD       Recent vital signs:   BP (!) 179/87   Pulse 77   Temp 98  F (36.7  C) (Oral)   Resp 20   Ht 1.753 m (5' 9\")   Wt 131.5 kg (290 lb)   LMP 10/02/2007   SpO2 96%   BMI 42.83 kg/m      Hayley Coma Scale Score: 15 (12/09/20 2007)       Cardiac Rhythm: Normal Sinus  Pt needs tele? No  Skin/wound Issues: None    Code Status: Full Code    Pain control: pt had none    Nausea control: pt had none    Abnormal labs/tests/findings requiring intervention: see results    Family present during ED course? No   Family Comments/Social Situation comments: see results    Tasks needing completion: None    Anastacia Hernandez, RN  [phone]    "

## 2020-12-10 NOTE — DISCHARGE SUMMARY
[address] [address]  p: [phone]    Discharge Summary: Cardiology Service    Sherry Slaughter MRN# [id number]   YOB: 1955 Age: 65 year old       Admission Date: 12/9/2020  Discharge Date: 12/10/2020    Discharge Diagnoses:  1. Acute pulmonary edema 2/2 HTN  2. Hypertensive Emergency, resolved  3. Paroxysmal atrial fibrillation, rate controlled s/p recent DCCV 12/4  4. HLD  5. T2DM  6. Hypothyroidism, continue pta levothyroxine.   7. Untreated GENESIS  8. H/o depression    Brief HPI:  Sherry Slaughter is a 65 year old female with a past medical history of afib/alfutter (diagnosed 11/2020), DM II, HTN, HLD, GENESIS untreated, depression and hypothyroidism admitted for hypertensive emergency    Hospital Course by Diagnosis:  # Hypertensive emergency, resolved  # Acute pulmonary edema 2/2 HTN  4 days progressive dyspnea with sore throat. No prior history of lung disease. Tachypnea but not hypoxic on exam. Initial /96 . (down from 714 on 11/25); EKG nsr with freq PAC's. CXR with bilateral opacities, likely pulmonary edema. Troponin negative x 1. No leukocytosis, negative procal. Suspect dyspnea from acute pulmonary edema, likely related to hypertension.  S/p 40mg IV lasix in ED with resolution of symptoms. States that BPs have been relatively normal PTA but has not been checking frequently, also notes she was recently taken off HCTZ. Echocardiogram with EF 55-60%, normal IVC.   - Start Lasix 20 mg daily  - Continue lisinopril 40mg  - As OP, if more BP control needed, could consider changing BB to Coreg      # Paroxysmal atrial fibrillation, rate controlled s/p recent DCCV 12/4  Recently hospitalized for new diagnosis of afib/aflutter with RVR 11/25-11/27. Patient did have dyspnea, palpitations, occasional chest discomfort at that time. TTE during admission notable for LVEF 60-65%, both atria normal, unable to  comment on diastolic dysfunction. Rates controlled with diltiazem and discharged with good rate control (HR ~100). Underwent successful DCCV 12/4. EKG on arrival confirms she is again in atrial fibrillation but with rates in the 70s-80s.  She has been compliant on her xarelto and other medications. [id number] score 4 for hypertension, diabetes, age, gender.  - Continue diltiazem 360mg qday, metoprolol succinate 200mg qday, Xarelto 20mg qday  - Follow up with Dr. Mccracken in EP clinic in 4-6 weeks     # HLD   LDL 43 (2019).  - Continue PTA rosuvastatin 10 mg     # T2DM  A1C 6.9 11/25. Takes NPH, Victoza, metformin  - PTA Victoza, metformin   - Continue PTA U500  - Sliding scale correction insulin     Chronic problems:   # Hypothyroidism, continue pta levothyroxine.   # Untreated GENESIS  # H/o depression      Medication Changes:  START Lasix 20 mg daily  STOP hydrochlorothiazide     Discharge medications:   Current Discharge Medication List      START taking these medications    Details   furosemide (LASIX) 20 MG tablet Take 1 tablet (20 mg) by mouth daily  Qty: 90 tablet, Refills: 3    Associated Diagnoses: SOB (shortness of breath); Essential hypertension         CONTINUE these medications which have CHANGED    Details   diltiazem ER (DILT-XR) 120 MG 24 hr capsule Take 1 capsule (120 mg) by mouth daily Take in addition to 240mg ( total of 360mg)  Qty: 30 capsule, Refills: 1    Associated Diagnoses: Paroxysmal atrial fibrillation (H)      diltiazem ER COATED BEADS (CARDIZEM CD/CARTIA XT) 240 MG 24 hr capsule Take 1 capsule (240 mg) by mouth daily Take in addition to 120mg tablet (total of 360mg)  Qty: 30 capsule, Refills: 1    Associated Diagnoses: Paroxysmal atrial fibrillation (H)         CONTINUE these medications which have NOT CHANGED    Details   amoxicillin (AMOXIL) 500 MG capsule Take 2,000 mg by mouth Before dental procedures      blood glucose monitoring (NO BRAND SPECIFIED) test strip Use to test blood sugar 4  times daily or as directed.  Qty: 100 each, Refills: 11    Associated Diagnoses: Type 2 diabetes mellitus without complication, with long-term current use of insulin (H)      Continuous Blood Gluc  (FREESTYLE MIKE 14 DAY READER) KATIA 1 each daily  Qty: 1 Device, Refills: 0    Associated Diagnoses: Controlled type 2 diabetes mellitus with diabetic nephropathy, with long-term current use of insulin (H)      Continuous Blood Gluc Sensor (FREESTYLE MIKE 14 DAY SENSOR) MISC Change every 14 days.  Qty: 6 each, Refills: 3    Associated Diagnoses: Uncontrolled type 2 diabetes mellitus with hyperglycemia, with long-term current use of insulin (H)      econazole nitrate 1 % external cream Apply topically daily as needed To feet and toenails      Garlic 1000 MG CAPS Take 1 capsule by mouth daily Takes during summer months.  Done taking until next summer.      HUMULIN R U-500 KWIKPEN 500 UNIT/ML PEN soln INJECT 120 UNITS IN THE MORNING  UNITS AT BEDTIME  Qty: 40 mL, Refills: 3    Associated Diagnoses: Type 2 diabetes mellitus treated with insulin (H)      ibuprofen (ADVIL) 200 MG capsule Take 600 mg by mouth every 6 hours as needed       insulin pen needle (GNP CLICKFINE PEN NEEDLES) 31G X 8 MM miscellaneous Use six times daily or as directed  Qty: 200 each, Refills: 5    Associated Diagnoses: Type 2 diabetes, HbA1c goal < 7% (H)      levothyroxine (SYNTHROID/LEVOTHROID) 75 MCG tablet Take 1 tablet (75 mcg) by mouth daily  Qty: 90 tablet, Refills: 1    Associated Diagnoses: Hypothyroidism due to acquired atrophy of thyroid      liraglutide (VICTOZA PEN) 18 MG/3ML solution Administer two injections of 1.8 and 1.2 mg each, for a total daily dose of 3 mg.  Qty: 15 mL, Refills: 6    Associated Diagnoses: Type 2 diabetes mellitus treated with insulin (H); Morbid obesity (H)      lisinopril (ZESTRIL) 40 MG tablet Take 1 tablet (40 mg) by mouth  Qty: 135 tablet, Refills: 1    Associated Diagnoses: Essential  hypertension with goal blood pressure less than 140/90      metFORMIN (GLUCOPHAGE-XR) 500 MG 24 hr tablet Take 4 tabs HS  Qty: 360 tablet, Refills: 1    Associated Diagnoses: Uncontrolled type 2 diabetes mellitus with hyperglycemia, with long-term current use of insulin (H)      metoprolol succinate ER (TOPROL-XL) 50 MG 24 hr tablet Take 4 tablets (200 mg) by mouth daily  Qty: 360 tablet, Refills: 0    Comments: Increasing dose to 200 mg daily, DOES NOT NEED REFILL AT THIS TIME  Associated Diagnoses: Paroxysmal atrial fibrillation (H)      Multiple Vitamins-Minerals (ADVANCED DIABETIC MULTIVITAMIN) TABS Take 1 tablet by mouth daily      !! ORDER FOR DME, SET TO LOCAL PRINT, Respironics REMSTAR 60 Series Auto CPAP 8-15cm H2O, Airfit P10 nasal pillow mask w/medium pillows      !! 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 order for DME Roll-A-Bout Walker. Patient can use for left foot.  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 order for DME Equipment being ordered: wrist brace with thumb spica, L  Qty: 1 Device, Refills: 0    Associated Diagnoses: Bilateral thumb pain      !! order for DME Equipment being ordered: wrist brace with thumb spica, R  Qty: 1 Device, Refills: 0    Associated Diagnoses: Bilateral thumb pain      rivaroxaban ANTICOAGULANT (XARELTO) 20 MG TABS tablet Take 1 tablet (20 mg) by mouth daily (with dinner)  Qty: 90 tablet, Refills: 0     Associated Diagnoses: Paroxysmal atrial fibrillation (H)      rosuvastatin (CRESTOR) 10 MG tablet Take 1 tablet (10 mg) by mouth daily  Qty: 90 tablet, Refills: 3    Associated Diagnoses: Hyperlipidemia LDL goal <100       !! - Potential duplicate medications found. Please discuss with provider.      STOP taking these medications       hydrochlorothiazide (HYDRODIURIL) 25 MG tablet Comments:   Reason for Stopping:               Follow-up:  - PCP in 7-10 days for post hospitalization follow up  - EP with Dr. Mccracken in 4-6 weeks for afib follow up    Labs or imaging requiring follow-up after discharge:  N/A    Code status:  Full    Condition on discharge  Temp:  [98  F (36.7  C)] 98  F (36.7  C)  Pulse:  [75-99] 76  Resp:  [18-20] 18  BP: (135-193)/() 142/72  SpO2:  [91 %-97 %] 92 %  General: Alert, interactive, NAD  Eyes: sclera anicteric, EOMI  Neck: JVP flat, carotid 2+ bilaterally  Cardiovascular: regular rate and rhythm, normal S1 and S2, no murmurs, gallops, or rubs  Resp: clear to auscultation bilaterally, no rales, wheezes, or rhonchi  GI: Soft, nontender, nondistended. +BS.  No HSM or masses, no rebound or guarding.  Extremities: no edema, no cyanosis or clubbing, dorsalis pedis and posterior tibialis pulses 2+ bilaterally  Skin: Warm and dry, no jaundice or rash  Neuro: CN 2-12 intact, moves all extremities equally  Psych: Alert & oriented x 3    Imaging with results:  Echocardiogram 12/10/2020:  Interpretation Summary  Global and regional left ventricular function is normal with an EF of 55-60%.  Global right ventricular function is normal.  No pericardial effusion is present.  IVC diameter and respiratory changes fall into an intermediate range  suggesting an RA pressure of 8 mmHg.  No change from prior study.    Other imaging studies:  EKG 12 Lead 12/9/2020: NSR with [facility] PAC's, HR 84      Patient Care Team:  Marilou Arciniega MD PhD as PCP - General (Internal Medicine)  Marilou Arciniega MD PhD as Assigned  PCP  Rika Delgado MD as Assigned Endocrinology Provider  Kameron Pedraza OD as Assigned Surgical Provider  Rufus Hutson DPM as Assigned Musculoskeletal Provider    Chela FERNANDEZ, CNP  [facility]  [phone]

## 2020-12-15 ENCOUNTER — OFFICE VISIT (OUTPATIENT)
Dept: PODIATRY | Facility: CLINIC | Age: 65
End: 2020-12-15
Payer: COMMERCIAL

## 2020-12-15 ENCOUNTER — VIRTUAL VISIT (OUTPATIENT)
Dept: CARDIOLOGY | Facility: CLINIC | Age: 65
End: 2020-12-15
Payer: COMMERCIAL

## 2020-12-15 VITALS — SYSTOLIC BLOOD PRESSURE: 140 MMHG | HEART RATE: 70 BPM | OXYGEN SATURATION: 98 % | DIASTOLIC BLOOD PRESSURE: 75 MMHG

## 2020-12-15 DIAGNOSIS — M21.619 BUNION: ICD-10-CM

## 2020-12-15 DIAGNOSIS — I48.0 PAROXYSMAL ATRIAL FIBRILLATION (H): ICD-10-CM

## 2020-12-15 DIAGNOSIS — I50.9 CONGESTIVE HEART FAILURE, UNSPECIFIED HF CHRONICITY, UNSPECIFIED HEART FAILURE TYPE (H): ICD-10-CM

## 2020-12-15 DIAGNOSIS — E78.5 HYPERLIPIDEMIA LDL GOAL <100: ICD-10-CM

## 2020-12-15 DIAGNOSIS — L97.522 SKIN ULCER OF LEFT FOOT WITH FAT LAYER EXPOSED (H): ICD-10-CM

## 2020-12-15 DIAGNOSIS — G47.30 SLEEP APNEA, UNSPECIFIED TYPE: Primary | ICD-10-CM

## 2020-12-15 DIAGNOSIS — E11.49 TYPE II OR UNSPECIFIED TYPE DIABETES MELLITUS WITH NEUROLOGICAL MANIFESTATIONS, NOT STATED AS UNCONTROLLED(250.60) (H): Primary | ICD-10-CM

## 2020-12-15 PROCEDURE — 97597 DBRDMT OPN WND 1ST 20 CM/<: CPT | Performed by: PODIATRIST

## 2020-12-15 PROCEDURE — 99214 OFFICE O/P EST MOD 30 MIN: CPT | Mod: 95 | Performed by: INTERNAL MEDICINE

## 2020-12-15 RX ORDER — POTASSIUM CHLORIDE 1500 MG/1
TABLET, EXTENDED RELEASE ORAL
Qty: 96 TABLET | Refills: 3 | Status: SHIPPED | OUTPATIENT
Start: 2020-12-15 | End: 2021-09-15

## 2020-12-15 RX ORDER — HYDROCHLOROTHIAZIDE 25 MG/1
25 TABLET ORAL DAILY
Qty: 90 TABLET | Refills: 3 | Status: SHIPPED | OUTPATIENT
Start: 2020-12-15 | End: 2020-12-23

## 2020-12-15 NOTE — PROGRESS NOTES
"Sherry Slaughter is a 65 year old female who is being evaluated via a billable video visit.      The patient has been notified of following:     \"This video visit will be conducted via a call between you and your physician/provider. We have found that certain health care needs can be provided without the need for an in-person physical exam.  This service lets us provide the care you need with a video conversation.  If a prescription is necessary we can send it directly to your pharmacy.  If lab work is needed we can place an order for that and you can then stop by our lab to have the test done at a later time.    Video visits are billed at different rates depending on your insurance coverage.  Please reach out to your insurance provider with any questions.    If during the course of the call the physician/provider feels a video visit is not appropriate, you will not be charged for this service.\"    Patient has given verbal consent for Video visit? Yes  How would you like to obtain your AVS? MyChart  If you are dropped from the video visit, the video invite should be resent to: Text to cell phone: [phone]  Will anyone else be joining your video visit? No      Video-Visit Details    Type of service:  Video Visit    Video Start Time: 114pm  Video End Time: 137pm    Originating Location (pt. Location): patient home    Distant Location (provider location):  Home office    Platform used for Video Visit: Yuliana    See dictation#:[id number]        "

## 2020-12-15 NOTE — LETTER
12/15/2020         RE: Sherry Slaughter  [address]  [place] [address]        Dear Colleague,    Thank you for referring your patient, Sherry Slaughter, to the [facility]. Please see a copy of my visit note below.    Past Medical History:   Diagnosis Date     Cataract      DEPRESSION     comes and goes - tried meds - unsuccessfully, certain times of the year, no psych intervention and no counselors in the past - and not interested      Diverticulosis of colon (without mention of hemorrhage) 4/04    colonoscopy     ECHO-mildLVH,tr MR,mild thick lflets w inc LA,trTR   12/03     Essential hypertension, benign 1990s    late 1990s - started medications at that time - not to difficult to control meds      Fam hx-cardiovas dis [facility]     father - CAD, and lipids/HTN - multiple members of the family      Family history of diabetes mellitus     sister and grandmother with DM      Family history of malignant neoplasm of breast     mother - young age - 45, and maternal cousin and aunt as well - no BRCA testing done      Family history of stroke (cerebrovascular)     grandmother in early life in her 40s      FAMILY HX COLON CANCER     Pat uncles x 2     Heart disease     murmur/     Heart murmur      HYPERLIPIDEMIA 2000    fairly recent - in the last 5 years - high for DM levels  -cholesterol recent      Irritable bowel syndrome     goes between the 2 - nerve related - more stressed more problems      MICROALBUMINURIA     unsure how long - been on the lisinopril - for a few years at well      Nonspecific abnormal results of liver function study 2/3/2003    SGOT - has been high in the past - since the hepatitis b - borderline elevation - not really changing any      OBESITY      Obstructive sleep apnea      GENESIS (obstructive sleep apnea) 10/15/2006    psg 5/15 AHI 53 aPAP 8-15     OSTEOARTHRITIS L KNEE 2003    total knee on the left - and pain is gone since the knee replacement       PERS HX HEPATITIS B- RESOLVED] 1977    acute virus only - no chronic disease      PERS HX HEPATITIS B- RESOLVED]      Type II or unspecified type diabetes mellitus without mention of complication, not stated as uncontrolled 1991    oral meds frist, then insulin eventually later, difficult to control most of the time      Unspecified hypothyroidism 10/11/2006    TSH 3.36 - mild subclinical hypothyroidism - deciding on following or starting low dose meds - with dr Oreilly - following      Patient Active Problem List   Diagnosis     Morbid obesity (H)     Diverticulosis of large intestine     Nonspecific abnormal results of cardiovascular function study     FAMILY HX COLON CANCER     Nonallopathic lesion of thoracic region     Hypothyroidism     GENESIS (obstructive sleep apnea)     Irritable bowel syndrome     Family history of malignant neoplasm of breast     History of major depression     OSTEOARTHRITIS L KNEE  s/p knee replacement on the left      Hypertension goal BP (blood pressure) < 140/90     Family history of stroke (cerebrovascular)     Family history of other cardiovascular diseases     Family history of diabetes mellitus     ABSENCE OF MENSTRUATION - perimenopausal      JOINT PAIN-ANKLE - right ankle      Mitral valve insufficiency     Hyperlipidemia LDL goal <100     Aortic sclerosis     Tubular adenoma of colon     History of viral hepatitis, type B     Chronic low back pain     Long-term insulin use (H)     Uncontrolled diabetes mellitus (H)     S/P total knee replacement using cement, right     Aftercare following right knee joint replacement surgery     Lumbago     Type 2 diabetes mellitus with hyperglycemia (H)     Posterior vitreous detachment of right eye     Pseudophakia of both eyes     Paroxysmal atrial fibrillation (H)     SOB (shortness of breath)     Past Surgical History:   Procedure Laterality Date     ANESTHESIA CARDIOVERSION N/A 12/4/2020    Procedure: ANESTHESIA, FOR CARDIOVERSION @1400;   Surgeon: GENERIC ANESTHESIA PROVIDER;  Location: [facility] OR     ARTHROPLASTY KNEE Right 9/23/2015    Procedure: ARTHROPLASTY KNEE;  Surgeon: Rufus Brown MD;  Location: [facility] OR     C TOTAL KNEE ARTHROPLASTY  3/03    L knee     CATARACT IOL, RT/LT Left      COLONOSCOPY  4/04    diverticulosis, rec repeat 10 yrs(consider fam hx?)     COLONOSCOPY WITH CO2 INSUFFLATION N/A 6/19/2019    Procedure: COLONOSCOPY, WITH CO2 INSUFFLATION;  Surgeon: Zeb Duarte MD;  Location: [facility] OR     ORTHOPEDIC SURGERY      right ankle     PHACOEMULSIFICATION CLEAR CORNEA WITH STANDARD INTRAOCULAR LENS IMPLANT Left 3/15/2018    Procedure: PHACOEMULSIFICATION CLEAR CORNEA WITH STANDARD INTRAOCULAR LENS IMPLANT;  LEFT EYE PHACOEMULSIFICATION CLEAR CORNEA WITH STANDARD INTRAOCULAR LENS IMPLANT;  Surgeon: Bandar Linares MD;  Location:  [facility]     PHACOEMULSIFICATION CLEAR CORNEA WITH STANDARD INTRAOCULAR LENS IMPLANT Right 4/5/2018    Procedure: PHACOEMULSIFICATION CLEAR CORNEA WITH STANDARD INTRAOCULAR LENS IMPLANT;  RIGHT PHACOEMULSIFICATION CLEAR CORNEA WITH STANDARD INTRAOCULAR LENS IMPLANT ;  Surgeon: Bandar Linares MD;  Location:  [facility]     STRESS ECHO (METRO)  12/03    no ischemia, limited by fatigue     SURGICAL HISTORY OF -       EXP LAP- R OVARY CYSTS     SURGICAL HISTORY OF -   1981,1984,1985    CHILDBIRTH     ZZC NONSPECIFIC PROCEDURE  6/06    right triple arthrodecesis      ZZC NONSPECIFIC PROCEDURE  7/06     right bunion surgery      Social History     Socioeconomic History     Marital status:      Spouse name: Not on file     Number of children: 3     Years of education: Not on file     Highest education level: Not on file   Occupational History     Occupation: RN     Employer: [employer]   Social Needs     Financial resource strain: Not on file     Food insecurity     Worry: Not on file     Inability: Not on file     Transportation needs     Medical: Not on file     Non-medical: Not on  file   Tobacco Use     Smoking status: Never Smoker     Smokeless tobacco: Never Used     Tobacco comment: tried in early 20s only    Substance and Sexual Activity     Alcohol use: Yes     Comment: rare     Drug use: No     Sexual activity: Never     Comment:  - since for 20 years, no signficant other  > 5 years sexual activity    Lifestyle     Physical activity     Days per week: Not on file     Minutes per session: Not on file     Stress: Not on file   Relationships     Social connections     Talks on phone: Not on file     Gets together: Not on file     Attends [employer] service: Not on file     Active member of club or organization: Not on file     Attends meetings of clubs or organizations: Not on file     Relationship status: Not on file     Intimate partner violence     Fear of current or ex partner: Not on file     Emotionally abused: Not on file     Physically abused: Not on file     Forced sexual activity: Not on file   Other Topics Concern      Service No     Blood Transfusions No     Caffeine Concern No     Occupational Exposure Yes     Comment: Normal nursing exposures     Hobby Hazards No     Sleep Concern Yes     Stress Concern No     Comment: Stress level at HM=5, stress level at WK=6     Weight Concern Yes     Special Diet Yes     Comment: Diabetic diet (watching carbs)     Back Care No     Comment: Occassional back spasms     Exercise Yes     Comment: Pt joined a health club goes approx 3-4 times a week,half to one mile daily     Bike Helmet No     Seat Belt Yes     Comment: Sometimes     Self-Exams Yes     Parent/sibling w/ CABG, MI or angioplasty before 65F 55M? 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      Family History   Problem Relation Age of Onset     Diabetes Maternal Grandmother         DM TYPE 2     Cerebrovascular Disease Maternal Grandmother      Hypertension Sister      Lipids Sister      Heart  Disease Sister         Chronic AFib     Hypertension Sister      Lipids Sister      Gynecology Sister      Diabetes Sister      Asthma Sister      Blood Disease Paternal Grandmother         T CELL LEUKEMIA     Hypertension Brother      Lipids Brother      Congenital Anomalies Brother      Cardiovascular Brother      Heart Disease Brother         CABG/AFIB     Hypertension Brother      Lipids Brother      Obesity Brother      Hypertension Brother      Respiratory Brother         Sleep apnea     Heart Disease Brother         AFib     Alzheimer Disease Mother      Asthma Mother      Hypertension Mother      Breast Cancer Mother 40        has mastectomy and lived to 84     Allergies Mother         Sulfa,     Arthritis Mother      Cardiovascular Mother      Depression Mother      Respiratory Mother      Lipids Mother      Cancer Mother         breast     Thyroid Disease Mother      Cerebrovascular Disease Mother      Dementia Mother         Alzheimers     Cancer - colorectal Other      Cancer Father         lung     Aneurysm Father         brother, AAA     Other Cancer Father         lung ca     Asthma Sister      Cancer - colorectal Paternal Uncle         late 50s to early 60s - great uncles      Breast Cancer Other         Maternal cousin     Arrhythmia Sister         2 brothers 1 sister Afib     Breast Cancer Maternal Aunt 62     Other Cancer Maternal Aunt 35        Ovarian cancer.  Survived despite late recurrence and is now 92.     Glaucoma No family hx of      Macular Degeneration No family hx of      Lab Results   Component Value Date    A1C 6.9 11/25/2020    A1C 7.5 07/22/2020    A1C 7.3 12/10/2019    A1C 7.5 08/21/2019    A1C 7.8 05/21/2019         SUBJECTIVE FINDINGS:  65-year-old female returns to clinic for ulcer left hallux.  She is diabetic with peripheral neuropathy.  She has hallux valgus and bunion present.  She relates it gets better and worse again.  It was better the other day and now it is cracking  and open again.  She relates it is not draining much.  She is using the CAM boot and staying off it as much as she can.  She is using the Aquacel Ag and Primapore and it seems to be working okay.      OBJECTIVE FINDINGS:  DP and PT are 2/4, left.  She has laterally deviated hallux with dorsomedial first MPJ prominence.  She has hyperkeratotic tissue buildup plantar medial first MPJ.  She has an ulcer on the plantar medial left hallux.  It is about 0.5 cm in diameter.  Minimal drainage.  It is through the dermis.  There is no erythema, no odor, no calor.  Minimal edema.      ASSESSMENT AND PLAN:  Ulcer, left plantar medial hallux.  She is diabetic with peripheral neuropathy.  Diagnosis and treatment options discussed with her.  Sharp ulcer debridement with a #15 blade.  No anesthesia needed and local wound care done upon consent today.  Aquacel Ag and a Primapore were applied and use discussed with her.  She had seen Dr. Gray previously.  I gave her referral back to Dr. Hoffman to see if there are any surgical options as this is persisting.  She relates she has been using her diabetic CAM boot and her Roll-A-Bout.  She will return to clinic and see me in 1 week.     Also, she relates she has been self-filing this as well.         December 15, 2020             Marilou Arciniega MD   [address] [address]      Patient:  Sherry Slaughter   MRN:  [id number]   :  1955      Dear Dr. Arciniega:      It was a pleasure participating in the care of your patient, Ms. Sherry Slaughter.  As you know, she is a 65-year-old lady who I saw today over a virtual video visit via [employer] for rapid AFib,  hypertension, congestive heart failure, sleep apnea.      Her past medical history is significant for the followin.  Type 2 diabetes.   2.  Hypertension.   3.  Hyperlipidemia.   4.  Depression.   5.  Obstructive sleep apnea, currently untreated.   6.  Hypothyroidism.   7.   Diverticulosis.   8.  Low back pain.   9.  Irritable bowel syndrome.      I last saw her 11/25/2020.  At that time, I sent her to the emergency room for further treatment of her AFib.  She ended up having her rate controlled and had an echocardiogram performed.  She then had ELIAS-guided cardioversion performed on 12/04/2020 successfully.  She was then hospitalized again on 12/09/2020 for pulmonary edema, likely secondary to being in rapid AFib for quite some time in the past in addition to having her hydrochlorothiazide discontinued.  She was in pulmonary edema on 12/09/2020, was diuresed with 40 mg of IV Lasix and had p.o. Lasix 20 started and she was sent here for further evaluation.      In terms of her present symptoms, she has determined that her dry weight appears to be around the 290 pound range, where she estimates that.  She keeps track of her heart rhythm at home and finds herself to be in normal sinus rhythm.  There was mention in the medical record that she went back into AFib, but upon reviewing the EKG, although it was a very poor quality due to a very unstable baseline, P waves were identifiable and she was likely not in maulik atrial fibrillation at that time.  In any case, she says that she is in stable and normal sinus rhythm at home.  She has not had any bleeding or neurologic symptoms.  She feels that her blood pressures have been in the 130s and 140s at home with a pulse in the 69-74 range.  She is tolerating her medications adequately at present.  She denies having any significant chest pain or shortness of breath, PND or orthopnea.  She says that even before she went in with pulmonary edema, she did not feel bloated, swollen or really anything before simply being short of breath.  She does say that her weight was about 294 pounds when she did go into the hospital.      She otherwise does not have any complaints.      CURRENT MEDICATIONS:  She is taking diltiazem 360 mg a day, Lasix 20 mg a day,  insulin, levothyroxine, Victoza, lisinopril 40 mg a day, metformin, metoprolol succinate 200 mg a day, rivaroxaban 20 mg a day, rosuvastatin 10 mg a day.      PHYSICAL EXAMINATION:     VITAL SIGNS:  Her blood pressure is 140/75 with a pulse of 70.   GENERAL:  She is comfortable, well groomed.   PSYCHIATRIC:  She is alert and oriented x3.   HEENT:  Her eyes do not appear grossly erythematous or have exudate.   RESPIRATORY:  She is breathing comfortably without gross cough.      The remainder of the comprehensive physical exam was deferred secondary to COVID-19 pandemic and secondary to video visit restrictions.      LABORATORY DATA (12/10/2020):  Potassium 3.3, GFR normal.  On 12/09/2020, hemoglobin normal.  On 11/25/2020, her TSH was normal.      Echocardiogram performed on 12/10/2020 reveals normal LV systolic function, ejection fraction 55% to 60%.  There was only mild mitral insufficiency.  Aortic valve leaflets not well visualized, but peak velocity was 2.4, mean gradient was 12, consistent with mild aortic stenosis.        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  Atrial fibrillation with rapid ventricular response.      She underwent ELIAS-guided cardioversion on 12/04/2020.  She has a relatively structurally normal heart.  She is currently stable in normal sinus rhythm on significant doses of metoprolol and diltiazem.  We will continue to monitor.  She is tolerating anticoagulation well without gross bleeding.     2.  Hypertension.      Blood pressures are still not optimally controlled and still run in the 130s and 140s.  We will attempt to optimize.     3.  Congestive heart failure.      I suspect that her episode of congestive heart failure for which she was hospitalized recently on 12/09/2020 may have been related to her poor rate control previous to her cardioversion in addition to stopping hydrochlorothiazide.  Her dry weight is likely in  the 290 pound range.  We will attempt to optimize medical therapy.     4.  Hypokalemia.  We will add a potassium supplement in addition to her Lasix.     5.  Untreated sleep apnea.  She will require sleep evaluation in order to help prevent another episode of AFib.     6.  Mild aortic stenosis.      This was seen on recent echo and a mean gradient was only 12 mmHg.  We will continue to follow-through time.        PLAN:     1.  Restart hydrochlorothiazide 25 mg a day in order to hopefully reach goal systolic blood pressures in the 120s or below.     2.  Sleep evaluation.     3.  Start potassium supplement 20 mEq x4 today only, each dose  by 1 hour and then 20 mEq a day for daily maintenance with Lasix doses only.     4.  [facility] visit 1-2 weeks.     5.  Virtual video visit followup with me in 3 months, with labs prior, earlier if needed.     6.  We can plan on rechecking another echo for her aortic valve gradient in 2-3 years, earlier if needed.      Once again, it was a pleasure participating in the care of your patient, Ms. Concepción Scales.  Please feel free to contact me at any time with any questions regarding her care in the future.         Sincerely,      DEREJE LOYA MD             D: 12/15/2020   T: 12/15/2020   MT: MILA      Name:     CONCEPCIÓN SCALES   MRN:      [id number]        Account:      [id number]   :      1955      Document: [id number]       cc: Marilou Arciniega MD         Again, thank you for allowing me to participate in the care of your patient.        Sincerely,        Rufus Huston DPM

## 2020-12-15 NOTE — PATIENT INSTRUCTIONS
1. Restart hydrochlorothiazide 25mg po qam  2.  Sleep evaluation  3.  Start K supplement 20meq X4 today only then 20meq/day for daily maintenance with Lasix only  4.  Virtual [facility] clinic visit 1-2 weeks  5.  Virtual video visit followup 3mo with labs prior

## 2020-12-15 NOTE — PATIENT INSTRUCTIONS
Thanks for coming today.  [facility]  [address] [place] [address]    To schedule future appointments in Ortho Clinic, you may call [phone].    To schedule ordered imaging by your provider:   Call [facility] [phone]    To schedule an injection ordered by your provider:  Call [facility] Injection scheduling line: [phone]  [employer] available online at:  [email or web address]    Please call if any further questions or concerns ([phone]).  Clinic hours 8 am to 5 pm.    Return to clinic (call) if symptoms worsen or fail to improve.

## 2020-12-15 NOTE — PROGRESS NOTES
Past Medical History:   Diagnosis Date     Cataract      DEPRESSION     comes and goes - tried meds - unsuccessfully, certain times of the year, no psych intervention and no counselors in the past - and not interested      Diverticulosis of colon (without mention of hemorrhage) 4/04    colonoscopy     ECHO-mildLVH,tr MR,mild thick lflets w inc LA,trTR   12/03     Essential hypertension, benign 1990s    late 1990s - started medications at that time - not to difficult to control meds      Fam hx-cardiovas dis [facility]     father - CAD, and lipids/HTN - multiple members of the family      Family history of diabetes mellitus     sister and grandmother with DM      Family history of malignant neoplasm of breast     mother - young age - 45, and maternal cousin and aunt as well - no BRCA testing done      Family history of stroke (cerebrovascular)     grandmother in early life in her 40s      FAMILY HX COLON CANCER     Pat uncles x 2     Heart disease     murmur/     Heart murmur      HYPERLIPIDEMIA 2000    fairly recent - in the last 5 years - high for DM levels  -cholesterol recent      Irritable bowel syndrome     goes between the 2 - nerve related - more stressed more problems      MICROALBUMINURIA     unsure how long - been on the lisinopril - for a few years at well      Nonspecific abnormal results of liver function study 2/3/2003    SGOT - has been high in the past - since the hepatitis b - borderline elevation - not really changing any      OBESITY      Obstructive sleep apnea      GENESIS (obstructive sleep apnea) 10/15/2006    psg 5/15 AHI 53 aPAP 8-15     OSTEOARTHRITIS L KNEE 2003    total knee on the left - and pain is gone since the knee replacement      PERS HX HEPATITIS B- RESOLVED] 1977    acute virus only - no chronic disease      PERS HX HEPATITIS B- RESOLVED]      Type II or unspecified type diabetes mellitus without mention of complication, not stated as uncontrolled 1991    oral meds frist, then insulin  eventually later, difficult to control most of the time      Unspecified hypothyroidism 10/11/2006    TSH 3.36 - mild subclinical hypothyroidism - deciding on following or starting low dose meds - with dr Oreilly - following      Patient Active Problem List   Diagnosis     Morbid obesity (H)     Diverticulosis of large intestine     Nonspecific abnormal results of cardiovascular function study     FAMILY HX COLON CANCER     Nonallopathic lesion of thoracic region     Hypothyroidism     GENESIS (obstructive sleep apnea)     Irritable bowel syndrome     Family history of malignant neoplasm of breast     History of major depression     OSTEOARTHRITIS L KNEE  s/p knee replacement on the left      Hypertension goal BP (blood pressure) < 140/90     Family history of stroke (cerebrovascular)     Family history of other cardiovascular diseases     Family history of diabetes mellitus     ABSENCE OF MENSTRUATION - perimenopausal      JOINT PAIN-ANKLE - right ankle      Mitral valve insufficiency     Hyperlipidemia LDL goal <100     Aortic sclerosis     Tubular adenoma of colon     History of viral hepatitis, type B     Chronic low back pain     Long-term insulin use (H)     Uncontrolled diabetes mellitus (H)     S/P total knee replacement using cement, right     Aftercare following right knee joint replacement surgery     Lumbago     Type 2 diabetes mellitus with hyperglycemia (H)     Posterior vitreous detachment of right eye     Pseudophakia of both eyes     Paroxysmal atrial fibrillation (H)     SOB (shortness of breath)     Past Surgical History:   Procedure Laterality Date     ANESTHESIA CARDIOVERSION N/A 12/4/2020    Procedure: ANESTHESIA, FOR CARDIOVERSION @1400;  Surgeon: GENERIC ANESTHESIA PROVIDER;  Location: [facility] OR     ARTHROPLASTY KNEE Right 9/23/2015    Procedure: ARTHROPLASTY KNEE;  Surgeon: Rufus Brown MD;  Location:  OR     C TOTAL KNEE ARTHROPLASTY  3/03    L knee     CATARACT IOL, RT/LT Left       COLONOSCOPY  4/04    diverticulosis, rec repeat 10 yrs(consider fam hx?)     COLONOSCOPY WITH CO2 INSUFFLATION N/A 6/19/2019    Procedure: COLONOSCOPY, WITH CO2 INSUFFLATION;  Surgeon: Zeb Duarte MD;  Location: [facility] OR     ORTHOPEDIC SURGERY      right ankle     PHACOEMULSIFICATION CLEAR CORNEA WITH STANDARD INTRAOCULAR LENS IMPLANT Left 3/15/2018    Procedure: PHACOEMULSIFICATION CLEAR CORNEA WITH STANDARD INTRAOCULAR LENS IMPLANT;  LEFT EYE PHACOEMULSIFICATION CLEAR CORNEA WITH STANDARD INTRAOCULAR LENS IMPLANT;  Surgeon: Bandar Linares MD;  Location:  [facility]     PHACOEMULSIFICATION CLEAR CORNEA WITH STANDARD INTRAOCULAR LENS IMPLANT Right 4/5/2018    Procedure: PHACOEMULSIFICATION CLEAR CORNEA WITH STANDARD INTRAOCULAR LENS IMPLANT;  RIGHT PHACOEMULSIFICATION CLEAR CORNEA WITH STANDARD INTRAOCULAR LENS IMPLANT ;  Surgeon: Bandar Linares MD;  Location:  [facility]     STRESS ECHO (METRO)  12/03    no ischemia, limited by fatigue     SURGICAL HISTORY OF -       EXP LAP- R OVARY CYSTS     SURGICAL HISTORY OF -   1981,1984,1985    CHILDBIRTH     ZZC NONSPECIFIC PROCEDURE  6/06    right triple arthrodecesis      ZZC NONSPECIFIC PROCEDURE  7/06     right bunion surgery      Social History     Socioeconomic History     Marital status:      Spouse name: Not on file     Number of children: 3     Years of education: Not on file     Highest education level: Not on file   Occupational History     Occupation: RN     Employer: [employer]   Social Needs     Financial resource strain: Not on file     Food insecurity     Worry: Not on file     Inability: Not on file     Transportation needs     Medical: Not on file     Non-medical: Not on file   Tobacco Use     Smoking status: Never Smoker     Smokeless tobacco: Never Used     Tobacco comment: tried in early 20s only    Substance and Sexual Activity     Alcohol use: Yes     Comment: rare     Drug use: No     Sexual activity: Never      Comment:  - since for 20 years, no signficant other  > 5 years sexual activity    Lifestyle     Physical activity     Days per week: Not on file     Minutes per session: Not on file     Stress: Not on file   Relationships     Social connections     Talks on phone: Not on file     Gets together: Not on file     Attends [employer] service: Not on file     Active member of club or organization: Not on file     Attends meetings of clubs or organizations: Not on file     Relationship status: Not on file     Intimate partner violence     Fear of current or ex partner: Not on file     Emotionally abused: Not on file     Physically abused: Not on file     Forced sexual activity: Not on file   Other Topics Concern      Service No     Blood Transfusions No     Caffeine Concern No     Occupational Exposure Yes     Comment: Normal nursing exposures     Hobby Hazards No     Sleep Concern Yes     Stress Concern No     Comment: Stress level at HM=5, stress level at [facility]=6     Weight Concern Yes     Special Diet Yes     Comment: Diabetic diet (watching carbs)     Back Care No     Comment: Occassional back spasms     Exercise Yes     Comment: Pt joined a health club goes approx 3-4 times a week,half to one mile daily     Bike Helmet No     Seat Belt Yes     Comment: Sometimes     Self-Exams Yes     Parent/sibling w/ CABG, MI or angioplasty before 65F 55M? 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      Family History   Problem Relation Age of Onset     Diabetes Maternal Grandmother         DM TYPE 2     Cerebrovascular Disease Maternal Grandmother      Hypertension Sister      Lipids Sister      Heart Disease Sister         Chronic AFib     Hypertension Sister      Lipids Sister      Gynecology Sister      Diabetes Sister      Asthma Sister      Blood Disease Paternal Grandmother         T CELL LEUKEMIA     Hypertension Brother      Lipids Brother       Congenital Anomalies Brother      Cardiovascular Brother      Heart Disease Brother         CABG/AFIB     Hypertension Brother      Lipids Brother      Obesity Brother      Hypertension Brother      Respiratory Brother         Sleep apnea     Heart Disease Brother         AFib     Alzheimer Disease Mother      Asthma Mother      Hypertension Mother      Breast Cancer Mother 40        has mastectomy and lived to 84     Allergies Mother         Sulfa,     Arthritis Mother      Cardiovascular Mother      Depression Mother      Respiratory Mother      Lipids Mother      Cancer Mother         breast     Thyroid Disease Mother      Cerebrovascular Disease Mother      Dementia Mother         Alzheimers     Cancer - colorectal Other      Cancer Father         lung     Aneurysm Father         brother, AAA     Other Cancer Father         lung ca     Asthma Sister      Cancer - colorectal Paternal Uncle         late 50s to early 60s - great uncles      Breast Cancer Other         Maternal cousin     Arrhythmia Sister         2 brothers 1 sister Afib     Breast Cancer Maternal Aunt 62     Other Cancer Maternal Aunt 35        Ovarian cancer.  Survived despite late recurrence and is now 92.     Glaucoma No family hx of      Macular Degeneration No family hx of      Lab Results   Component Value Date    A1C 6.9 11/25/2020    A1C 7.5 07/22/2020    A1C 7.3 12/10/2019    A1C 7.5 08/21/2019    A1C 7.8 05/21/2019         SUBJECTIVE FINDINGS:  65-year-old female returns to clinic for ulcer left hallux.  She is diabetic with peripheral neuropathy.  She has hallux valgus and bunion present.  She relates it gets better and worse again.  It was better the other day and now it is cracking and open again.  She relates it is not draining much.  She is using the CAM boot and staying off it as much as she can.  She is using the Aquacel Ag and Primapore and it seems to be working okay.      OBJECTIVE FINDINGS:  DP and PT are 2/4, left.  She  has laterally deviated hallux with dorsomedial first MPJ prominence.  She has hyperkeratotic tissue buildup plantar medial first MPJ.  She has an ulcer on the plantar medial left hallux.  It is about 0.5 cm in diameter.  Minimal drainage.  It is through the dermis.  There is no erythema, no odor, no calor.  Minimal edema.      ASSESSMENT AND PLAN:  Ulcer, left plantar medial hallux.  She is diabetic with peripheral neuropathy.  Diagnosis and treatment options discussed with her.  Sharp ulcer debridement with a #15 blade.  No anesthesia needed and local wound care done upon consent today.  Aquacel Ag and a Primapore were applied and use discussed with her.  She had seen Dr. Gray previously.  I gave her referral back to Dr. Hoffman to see if there are any surgical options as this is persisting.  She relates she has been using her diabetic CAM boot and her Roll-A-Bout.  She will return to clinic and see me in 1 week.     Also, she relates she has been self-filing this as well.

## 2020-12-15 NOTE — NURSING NOTE
Sherry Slaughter's chief complaint for this visit includes:  Chief Complaint   Patient presents with     RECHECK     ulcer left hallux     PCP: Marilou Arciniega    Referring Provider:  No referring provider defined for this encounter.    BP (!) 140/75 (BP Location: Left arm, Patient Position: Sitting, Cuff Size: Adult Regular)   Pulse 70   LMP 10/02/2007   SpO2 98%   Data Unavailable     Do you need any medication refills at today's visit? No    Opal Conner CMA

## 2020-12-15 NOTE — PROGRESS NOTES
December 15, 2020             Marilou Arciniega MD   [facility]    [address] [place] [address]      Patient:  Sherry Slaughter   MRN:  [id number]   :  1955      Dear Dr. Arciniega:      It was a pleasure participating in the care of your patient, Ms. Sherry Slaughter.  As you know, she is a 65-year-old lady who I saw today over a virtual video visit via [facility] for rapid AFib,  hypertension, congestive heart failure, sleep apnea.      Her past medical history is significant for the followin.  Type 2 diabetes.   2.  Hypertension.   3.  Hyperlipidemia.   4.  Depression.   5.  Obstructive sleep apnea, currently untreated.   6.  Hypothyroidism.   7.  Diverticulosis.   8.  Low back pain.   9.  Irritable bowel syndrome.      I last saw her 2020.  At that time, I sent her to the emergency room for further treatment of her AFib.  She ended up having her rate controlled and had an echocardiogram performed.  She then had ELIAS-guided cardioversion performed on 2020 successfully.  She was then hospitalized again on 2020 for pulmonary edema, likely secondary to being in rapid AFib for quite some time in the past in addition to having her hydrochlorothiazide discontinued.  She was in pulmonary edema on 2020, was diuresed with 40 mg of IV Lasix and had p.o. Lasix 20 started and she was sent here for further evaluation.      In terms of her present symptoms, she has determined that her dry weight appears to be around the 290 pound range, where she estimates that.  She keeps track of her heart rhythm at home and finds herself to be in normal sinus rhythm.  There was mention in the medical record that she went back into AFib, but upon reviewing the EKG, although it was a very poor quality due to a very unstable baseline, P waves were identifiable and she was likely not in maulik atrial fibrillation at that time.  In any case, she says that she is in stable and normal sinus rhythm  at home.  She has not had any bleeding or neurologic symptoms.  She feels that her blood pressures have been in the 130s and 140s at home with a pulse in the 69-74 range.  She is tolerating her medications adequately at present.  She denies having any significant chest pain or shortness of breath, PND or orthopnea.  She says that even before she went in with pulmonary edema, she did not feel bloated, swollen or really anything before simply being short of breath.  She does say that her weight was about 294 pounds when she did go into the hospital.      She otherwise does not have any complaints.      CURRENT MEDICATIONS:  She is taking diltiazem 360 mg a day, Lasix 20 mg a day, insulin, levothyroxine, Victoza, lisinopril 40 mg a day, metformin, metoprolol succinate 200 mg a day, rivaroxaban 20 mg a day, rosuvastatin 10 mg a day.      PHYSICAL EXAMINATION:     VITAL SIGNS:  Her blood pressure is 140/75 with a pulse of 70.   GENERAL:  She is comfortable, well groomed.   PSYCHIATRIC:  She is alert and oriented x3.   HEENT:  Her eyes do not appear grossly erythematous or have exudate.   RESPIRATORY:  She is breathing comfortably without gross cough.      The remainder of the comprehensive physical exam was deferred secondary to COVID-19 pandemic and secondary to video visit restrictions.      LABORATORY DATA (12/10/2020):  Potassium 3.3, GFR normal.  On 12/09/2020, hemoglobin normal.  On 11/25/2020, her TSH was normal.      Echocardiogram performed on 12/10/2020 reveals normal LV systolic function, ejection fraction 55% to 60%.  There was only mild mitral insufficiency.  Aortic valve leaflets not well visualized, but peak velocity was 2.4, mean gradient was 12, consistent with mild aortic stenosis.        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  Atrial fibrillation with rapid ventricular response.      She underwent ELIAS-guided cardioversion on  12/04/2020.  She has a relatively structurally normal heart.  She is currently stable in normal sinus rhythm on significant doses of metoprolol and diltiazem.  We will continue to monitor.  She is tolerating anticoagulation well without gross bleeding.     2.  Hypertension.      Blood pressures are still not optimally controlled and still run in the 130s and 140s.  We will attempt to optimize.     3.  Congestive heart failure.      I suspect that her episode of congestive heart failure for which she was hospitalized recently on 12/09/2020 may have been related to her poor rate control previous to her cardioversion in addition to stopping hydrochlorothiazide.  Her dry weight is likely in the 290 pound range.  We will attempt to optimize medical therapy.     4.  Hypokalemia.  We will add a potassium supplement in addition to her Lasix.     5.  Untreated sleep apnea.  She will require sleep evaluation in order to help prevent another episode of AFib.     6.  Mild aortic stenosis.      This was seen on recent echo and a mean gradient was only 12 mmHg.  We will continue to follow-through time.        PLAN:     1.  Restart hydrochlorothiazide 25 mg a day in order to hopefully reach goal systolic blood pressures in the 120s or below.     2.  Sleep evaluation.     3.  Start potassium supplement 20 mEq x4 today only, each dose  by 1 hour and then 20 mEq a day for daily maintenance with Lasix doses only.     4.  [facility] visit 1-2 weeks.     5.  Virtual video visit followup with me in 3 months, with labs prior, earlier if needed.     6.  We can plan on rechecking another echo for her aortic valve gradient in 2-3 years, earlier if needed.      Once again, it was a pleasure participating in the care of your patient, Ms. Sherry Slaughter.  Please feel free to contact me at any time with any questions regarding her care in the future.         Sincerely,      DEREJE LOYA MD             D:  12/15/2020   T: 12/15/2020   MT: MILA      Name:     CONCEPCIÓN SCALES   MRN:      [id number]        Account:      [id number]   :      1955      Document: [id number]       cc: Marilou Arciniega MD

## 2020-12-16 DIAGNOSIS — I50.9 CONGESTIVE HEART FAILURE, UNSPECIFIED HF CHRONICITY, UNSPECIFIED HEART FAILURE TYPE (H): Primary | ICD-10-CM

## 2020-12-21 ENCOUNTER — MYC REFILL (OUTPATIENT)
Dept: PEDIATRICS | Facility: CLINIC | Age: 65
End: 2020-12-21

## 2020-12-21 ENCOUNTER — MYC REFILL (OUTPATIENT)
Dept: CARDIOLOGY | Facility: CLINIC | Age: 65
End: 2020-12-21

## 2020-12-21 DIAGNOSIS — I48.0 PAROXYSMAL ATRIAL FIBRILLATION (H): ICD-10-CM

## 2020-12-21 RX ORDER — METOPROLOL SUCCINATE 50 MG/1
200 TABLET, EXTENDED RELEASE ORAL DAILY
Qty: 360 TABLET | Refills: 0 | Status: CANCELLED | OUTPATIENT
Start: 2020-12-21

## 2020-12-22 ENCOUNTER — OFFICE VISIT (OUTPATIENT)
Dept: PODIATRY | Facility: CLINIC | Age: 65
End: 2020-12-22
Payer: COMMERCIAL

## 2020-12-22 VITALS — HEART RATE: 74 BPM | DIASTOLIC BLOOD PRESSURE: 75 MMHG | SYSTOLIC BLOOD PRESSURE: 129 MMHG | OXYGEN SATURATION: 97 %

## 2020-12-22 DIAGNOSIS — E11.49 TYPE II OR UNSPECIFIED TYPE DIABETES MELLITUS WITH NEUROLOGICAL MANIFESTATIONS, NOT STATED AS UNCONTROLLED(250.60) (H): Primary | ICD-10-CM

## 2020-12-22 DIAGNOSIS — L97.522 SKIN ULCER OF LEFT FOOT WITH FAT LAYER EXPOSED (H): ICD-10-CM

## 2020-12-22 PROCEDURE — 97597 DBRDMT OPN WND 1ST 20 CM/<: CPT | Performed by: PODIATRIST

## 2020-12-22 NOTE — NURSING NOTE
Sherry Slaughter's chief complaint for this visit includes:  Chief Complaint   Patient presents with     RECHECK     ulcer, left hallux     PCP: Marilou Arciniega    Referring Provider:  No referring provider defined for this encounter.    /75 (BP Location: Left arm, Patient Position: Sitting, Cuff Size: Adult Regular)   Pulse 74   LMP 10/02/2007   SpO2 97%   Data Unavailable     Do you need any medication refills at today's visit? No    Opal Conner CMA

## 2020-12-22 NOTE — LETTER
12/22/2020         RE: Sherry Slaughter  [id number]  [place] [address]        Dear Colleague,    Thank you for referring your patient, Sherry Slaughter, to the [facility]. Please see a copy of my visit note below.    Past Medical History:   Diagnosis Date     Cataract      DEPRESSION     comes and goes - tried meds - unsuccessfully, certain times of the year, no psych intervention and no counselors in the past - and not interested      Diverticulosis of colon (without mention of hemorrhage) 4/04    colonoscopy     ECHO-mildLVH,tr MR,mild thick lflets w inc LA,trTR   12/03     Essential hypertension, benign 1990s    late 1990s - started medications at that time - not to difficult to control meds      Fam hx-cardiovas dis NEC     father - CAD, and lipids/HTN - multiple members of the family      Family history of diabetes mellitus     sister and grandmother with DM      Family history of malignant neoplasm of breast     mother - young age - 45, and maternal cousin and aunt as well - no BRCA testing done      Family history of stroke (cerebrovascular)     grandmother in early life in her 40s      FAMILY HX COLON CANCER     Pat uncles x 2     Heart disease     murmur/     Heart murmur      HYPERLIPIDEMIA 2000    fairly recent - in the last 5 years - high for DM levels  -cholesterol recent      Irritable bowel syndrome     goes between the 2 - nerve related - more stressed more problems      MICROALBUMINURIA     unsure how long - been on the lisinopril - for a few years at well      Nonspecific abnormal results of liver function study 2/3/2003    SGOT - has been high in the past - since the hepatitis b - borderline elevation - not really changing any      OBESITY      Obstructive sleep apnea      GENESIS (obstructive sleep apnea) 10/15/2006    psg 5/15 AHI 53 aPAP 8-15     OSTEOARTHRITIS L KNEE 2003    total knee on the left - and pain is gone since the knee replacement       PERS HX HEPATITIS B- RESOLVED] 1977    acute virus only - no chronic disease      PERS HX HEPATITIS B- RESOLVED]      Type II or unspecified type diabetes mellitus without mention of complication, not stated as uncontrolled 1991    oral meds frist, then insulin eventually later, difficult to control most of the time      Unspecified hypothyroidism 10/11/2006    TSH 3.36 - mild subclinical hypothyroidism - deciding on following or starting low dose meds - with dr Oreilly - following      Patient Active Problem List   Diagnosis     Morbid obesity (H)     Diverticulosis of large intestine     Nonspecific abnormal results of cardiovascular function study     FAMILY HX COLON CANCER     Nonallopathic lesion of thoracic region     Hypothyroidism     GENESIS (obstructive sleep apnea)     Irritable bowel syndrome     Family history of malignant neoplasm of breast     History of major depression     OSTEOARTHRITIS L KNEE  s/p knee replacement on the left      Hypertension goal BP (blood pressure) < 140/90     Family history of stroke (cerebrovascular)     Family history of other cardiovascular diseases     Family history of diabetes mellitus     ABSENCE OF MENSTRUATION - perimenopausal      JOINT PAIN-ANKLE - right ankle      Mitral valve insufficiency     Hyperlipidemia LDL goal <100     Aortic sclerosis     Tubular adenoma of colon     History of viral hepatitis, type B     Chronic low back pain     Long-term insulin use (H)     Uncontrolled diabetes mellitus (H)     S/P total knee replacement using cement, right     Aftercare following right knee joint replacement surgery     Lumbago     Type 2 diabetes mellitus with hyperglycemia (H)     Posterior vitreous detachment of right eye     Pseudophakia of both eyes     Paroxysmal atrial fibrillation (H)     SOB (shortness of breath)     Past Surgical History:   Procedure Laterality Date     ANESTHESIA CARDIOVERSION N/A 12/4/2020    Procedure: ANESTHESIA, FOR CARDIOVERSION @1400;   Surgeon: GENERIC ANESTHESIA PROVIDER;  Location: [facility] OR     ARTHROPLASTY KNEE Right 9/23/2015    Procedure: ARTHROPLASTY KNEE;  Surgeon: Rufus Brown MD;  Location: [facility]     C TOTAL KNEE ARTHROPLASTY  3/03    L knee     CATARACT IOL, RT/LT Left      COLONOSCOPY  4/04    diverticulosis, rec repeat 10 yrs(consider fam hx?)     COLONOSCOPY WITH CO2 INSUFFLATION N/A 6/19/2019    Procedure: COLONOSCOPY, WITH CO2 INSUFFLATION;  Surgeon: Zeb Duarte MD;  Location: [facility]     ORTHOPEDIC SURGERY      right ankle     PHACOEMULSIFICATION CLEAR CORNEA WITH STANDARD INTRAOCULAR LENS IMPLANT Left 3/15/2018    Procedure: PHACOEMULSIFICATION CLEAR CORNEA WITH STANDARD INTRAOCULAR LENS IMPLANT;  LEFT EYE PHACOEMULSIFICATION CLEAR CORNEA WITH STANDARD INTRAOCULAR LENS IMPLANT;  Surgeon: Bandar Linares MD;  Location:  [facility]     PHACOEMULSIFICATION CLEAR CORNEA WITH STANDARD INTRAOCULAR LENS IMPLANT Right 4/5/2018    Procedure: PHACOEMULSIFICATION CLEAR CORNEA WITH STANDARD INTRAOCULAR LENS IMPLANT;  RIGHT PHACOEMULSIFICATION CLEAR CORNEA WITH STANDARD INTRAOCULAR LENS IMPLANT ;  Surgeon: Bandar Linares MD;  Location:  [facility]     STRESS ECHO (METRO)  12/03    no ischemia, limited by fatigue     SURGICAL HISTORY OF -       EXP LAP- R OVARY CYSTS     SURGICAL HISTORY OF -   1981,1984,1985    CHILDBIRTH     ZZC NONSPECIFIC PROCEDURE  6/06    right triple arthrodecesis      ZZC NONSPECIFIC PROCEDURE  7/06     right bunion surgery      Social History     Socioeconomic History     Marital status:      Spouse name: Not on file     Number of children: 3     Years of education: Not on file     Highest education level: Not on file   Occupational History     Occupation: RN     Employer: [employer]   Social Needs     Financial resource strain: Not on file     Food insecurity     Worry: Not on file     Inability: Not on file     Transportation needs     Medical: Not on file     Non-medical: Not on  file   Tobacco Use     Smoking status: Never Smoker     Smokeless tobacco: Never Used     Tobacco comment: tried in early 20s only    Substance and Sexual Activity     Alcohol use: Yes     Comment: rare     Drug use: No     Sexual activity: Never     Comment:  - since for 20 years, no signficant other  > 5 years sexual activity    Lifestyle     Physical activity     Days per week: Not on file     Minutes per session: Not on file     Stress: Not on file   Relationships     Social connections     Talks on phone: Not on file     Gets together: Not on file     Attends Denominational service: Not on file     Active member of club or organization: Not on file     Attends meetings of clubs or organizations: Not on file     Relationship status: Not on file     Intimate partner violence     Fear of current or ex partner: Not on file     Emotionally abused: Not on file     Physically abused: Not on file     Forced sexual activity: Not on file   Other Topics Concern      Service No     Blood Transfusions No     Caffeine Concern No     Occupational Exposure Yes     Comment: Normal nursing exposures     Hobby Hazards No     Sleep Concern Yes     Stress Concern No     Comment: Stress level at HM=5, stress level at WK=6     Weight Concern Yes     Special Diet Yes     Comment: Diabetic diet (watching carbs)     Back Care No     Comment: Occassional back spasms     Exercise Yes     Comment: Pt joined a health club goes approx 3-4 times a week,half to one mile daily     Bike Helmet No     Seat Belt Yes     Comment: Sometimes     Self-Exams Yes     Parent/sibling w/ CABG, MI or angioplasty before 65F 55M? 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      Family History   Problem Relation Age of Onset     Diabetes Maternal Grandmother         DM TYPE 2     Cerebrovascular Disease Maternal Grandmother      Hypertension Sister      Lipids Sister      Heart  Disease Sister         Chronic AFib     Hypertension Sister      Lipids Sister      Gynecology Sister      Diabetes Sister      Asthma Sister      Blood Disease Paternal Grandmother         T CELL LEUKEMIA     Hypertension Brother      Lipids Brother      Congenital Anomalies Brother      Cardiovascular Brother      Heart Disease Brother         CABG/AFIB     Hypertension Brother      Lipids Brother      Obesity Brother      Hypertension Brother      Respiratory Brother         Sleep apnea     Heart Disease Brother         AFib     Alzheimer Disease Mother      Asthma Mother      Hypertension Mother      Breast Cancer Mother 40        has mastectomy and lived to 84     Allergies Mother         Sulfa,     Arthritis Mother      Cardiovascular Mother      Depression Mother      Respiratory Mother      Lipids Mother      Cancer Mother         breast     Thyroid Disease Mother      Cerebrovascular Disease Mother      Dementia Mother         Alzheimers     Cancer - colorectal Other      Cancer Father         lung     Aneurysm Father         brother, AAA     Other Cancer Father         lung ca     Asthma Sister      Cancer - colorectal Paternal Uncle         late 50s to early 60s - great uncles      Breast Cancer Other         Maternal cousin     Arrhythmia Sister         2 brothers 1 sister Afib     Breast Cancer Maternal Aunt 62     Other Cancer Maternal Aunt 35        Ovarian cancer.  Survived despite late recurrence and is now 92.     Glaucoma No family hx of      Macular Degeneration No family hx of      Lab Results   Component Value Date    A1C 6.9 11/25/2020    A1C 7.5 07/22/2020    A1C 7.3 12/10/2019    A1C 7.5 08/21/2019    A1C 7.8 05/21/2019         SUBJECTIVE FINDINGS:  65-year-old female returns to clinic for ulcer left hallux.  She is diabetic with peripheral neuropathy.  She relates it is about the same.  She is using the Aquacel Ag and Primapore and it seems to be working okay.      OBJECTIVE FINDINGS:  JOE  and PT are 2/4, left.  She has laterally deviated hallux with dorsomedial first MPJ prominence.  She has hyperkeratotic tissue buildup plantar medial first MPJ.  She has an ulcer on the plantar medial left hallux.  It is about 0.3 cm in diameter.  Minimal drainage.  It is through the dermis.  There is no erythema, no odor, no calor.  Minimal edema.      ASSESSMENT AND PLAN:  Ulcer, left plantar medial hallux.  She is diabetic with peripheral neuropathy.  Diagnosis and treatment options discussed with her.  Sharp ulcer debridement with a #15 blade.  No anesthesia needed and local wound care done upon consent today.  Aquacel Ag and a Primapore were applied and use discussed with her.  Follow up with Dr. Hoffman tomorrow as scheduled and with me pending that treatment plan.         Again, thank you for allowing me to participate in the care of your patient.        Sincerely,        Rufus Hutson DPM

## 2020-12-22 NOTE — PROGRESS NOTES
Past Medical History:   Diagnosis Date     Cataract      DEPRESSION     comes and goes - tried meds - unsuccessfully, certain times of the year, no psych intervention and no counselors in the past - and not interested      Diverticulosis of colon (without mention of hemorrhage) 4/04    colonoscopy     ECHO-mildLVH,tr MR,mild thick lflets w inc LA,trTR   12/03     Essential hypertension, benign 1990s    late 1990s - started medications at that time - not to difficult to control meds      Fam hx-cardiovas dis NEC     father - CAD, and lipids/HTN - multiple members of the family      Family history of diabetes mellitus     sister and grandmother with DM      Family history of malignant neoplasm of breast     mother - young age - 45, and maternal cousin and aunt as well - no BRCA testing done      Family history of stroke (cerebrovascular)     grandmother in early life in her 40s      FAMILY HX COLON CANCER     Pat uncles x 2     Heart disease     murmur/     Heart murmur      HYPERLIPIDEMIA 2000    fairly recent - in the last 5 years - high for DM levels  -cholesterol recent      Irritable bowel syndrome     goes between the 2 - nerve related - more stressed more problems      MICROALBUMINURIA     unsure how long - been on the lisinopril - for a few years at well      Nonspecific abnormal results of liver function study 2/3/2003    SGOT - has been high in the past - since the hepatitis b - borderline elevation - not really changing any      OBESITY      Obstructive sleep apnea      GENESIS (obstructive sleep apnea) 10/15/2006    psg 5/15 AHI 53 aPAP 8-15     OSTEOARTHRITIS L KNEE 2003    total knee on the left - and pain is gone since the knee replacement      PERS HX HEPATITIS B- RESOLVED] 1977    acute virus only - no chronic disease      PERS HX HEPATITIS B- RESOLVED]      Type II or unspecified type diabetes mellitus without mention of complication, not stated as uncontrolled 1991    oral meds frist, then insulin  eventually later, difficult to control most of the time      Unspecified hypothyroidism 10/11/2006    TSH 3.36 - mild subclinical hypothyroidism - deciding on following or starting low dose meds - with dr Oreilly - following      Patient Active Problem List   Diagnosis     Morbid obesity (H)     Diverticulosis of large intestine     Nonspecific abnormal results of cardiovascular function study     FAMILY HX COLON CANCER     Nonallopathic lesion of thoracic region     Hypothyroidism     GENESIS (obstructive sleep apnea)     Irritable bowel syndrome     Family history of malignant neoplasm of breast     History of major depression     OSTEOARTHRITIS L KNEE  s/p knee replacement on the left      Hypertension goal BP (blood pressure) < 140/90     Family history of stroke (cerebrovascular)     Family history of other cardiovascular diseases     Family history of diabetes mellitus     ABSENCE OF MENSTRUATION - perimenopausal      JOINT PAIN-ANKLE - right ankle      Mitral valve insufficiency     Hyperlipidemia LDL goal <100     Aortic sclerosis     Tubular adenoma of colon     History of viral hepatitis, type B     Chronic low back pain     Long-term insulin use (H)     Uncontrolled diabetes mellitus (H)     S/P total knee replacement using cement, right     Aftercare following right knee joint replacement surgery     Lumbago     Type 2 diabetes mellitus with hyperglycemia (H)     Posterior vitreous detachment of right eye     Pseudophakia of both eyes     Paroxysmal atrial fibrillation (H)     SOB (shortness of breath)     Past Surgical History:   Procedure Laterality Date     ANESTHESIA CARDIOVERSION N/A 12/4/2020    Procedure: ANESTHESIA, FOR CARDIOVERSION @1400;  Surgeon: GENERIC ANESTHESIA PROVIDER;  Location: [facility] OR     ARTHROPLASTY KNEE Right 9/23/2015    Procedure: ARTHROPLASTY KNEE;  Surgeon: Rufus Brown MD;  Location:  OR     C TOTAL KNEE ARTHROPLASTY  3/03    L knee     CATARACT IOL, RT/LT Left       COLONOSCOPY  4/04    diverticulosis, rec repeat 10 yrs(consider fam hx?)     COLONOSCOPY WITH CO2 INSUFFLATION N/A 6/19/2019    Procedure: COLONOSCOPY, WITH CO2 INSUFFLATION;  Surgeon: Zeb Duarte MD;  Location: [facility]     ORTHOPEDIC SURGERY      right ankle     PHACOEMULSIFICATION CLEAR CORNEA WITH STANDARD INTRAOCULAR LENS IMPLANT Left 3/15/2018    Procedure: PHACOEMULSIFICATION CLEAR CORNEA WITH STANDARD INTRAOCULAR LENS IMPLANT;  LEFT EYE PHACOEMULSIFICATION CLEAR CORNEA WITH STANDARD INTRAOCULAR LENS IMPLANT;  Surgeon: Bandar Linares MD;  Location:  [facility]     PHACOEMULSIFICATION CLEAR CORNEA WITH STANDARD INTRAOCULAR LENS IMPLANT Right 4/5/2018    Procedure: PHACOEMULSIFICATION CLEAR CORNEA WITH STANDARD INTRAOCULAR LENS IMPLANT;  RIGHT PHACOEMULSIFICATION CLEAR CORNEA WITH STANDARD INTRAOCULAR LENS IMPLANT ;  Surgeon: Bandar Linares MD;  Location:  [facility]     STRESS ECHO (METRO)  12/03    no ischemia, limited by fatigue     SURGICAL HISTORY OF -       EXP LAP- R OVARY CYSTS     SURGICAL HISTORY OF -   1981,1984,1985    CHILDBIRTH     ZZC NONSPECIFIC PROCEDURE  6/06    right triple arthrodecesis      ZZC NONSPECIFIC PROCEDURE  7/06     right bunion surgery      Social History     Socioeconomic History     Marital status:      Spouse name: Not on file     Number of children: 3     Years of education: Not on file     Highest education level: Not on file   Occupational History     Occupation: RN     Employer: [employer]   Social Needs     Financial resource strain: Not on file     Food insecurity     Worry: Not on file     Inability: Not on file     Transportation needs     Medical: Not on file     Non-medical: Not on file   Tobacco Use     Smoking status: Never Smoker     Smokeless tobacco: Never Used     Tobacco comment: tried in early 20s only    Substance and Sexual Activity     Alcohol use: Yes     Comment: rare     Drug use: No     Sexual activity: Never      Comment:  - since for 20 years, no signficant other  > 5 years sexual activity    Lifestyle     Physical activity     Days per week: Not on file     Minutes per session: Not on file     Stress: Not on file   Relationships     Social connections     Talks on phone: Not on file     Gets together: Not on file     Attends Buddhism service: Not on file     Active member of club or organization: Not on file     Attends meetings of clubs or organizations: Not on file     Relationship status: Not on file     Intimate partner violence     Fear of current or ex partner: Not on file     Emotionally abused: Not on file     Physically abused: Not on file     Forced sexual activity: Not on file   Other Topics Concern      Service No     Blood Transfusions No     Caffeine Concern No     Occupational Exposure Yes     Comment: Normal nursing exposures     Hobby Hazards No     Sleep Concern Yes     Stress Concern No     Comment: Stress level at HM=5, stress level at WK=6     Weight Concern Yes     Special Diet Yes     Comment: Diabetic diet (watching carbs)     Back Care No     Comment: Occassional back spasms     Exercise Yes     Comment: Pt joined a health club goes approx 3-4 times a week,half to one mile daily     Bike Helmet No     Seat Belt Yes     Comment: Sometimes     Self-Exams Yes     Parent/sibling w/ CABG, MI or angioplasty before 65F 55M? 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      Family History   Problem Relation Age of Onset     Diabetes Maternal Grandmother         DM TYPE 2     Cerebrovascular Disease Maternal Grandmother      Hypertension Sister      Lipids Sister      Heart Disease Sister         Chronic AFib     Hypertension Sister      Lipids Sister      Gynecology Sister      Diabetes Sister      Asthma Sister      Blood Disease Paternal Grandmother         T CELL LEUKEMIA     Hypertension Brother      Lipids Brother       Congenital Anomalies Brother      Cardiovascular Brother      Heart Disease Brother         CABG/AFIB     Hypertension Brother      Lipids Brother      Obesity Brother      Hypertension Brother      Respiratory Brother         Sleep apnea     Heart Disease Brother         AFib     Alzheimer Disease Mother      Asthma Mother      Hypertension Mother      Breast Cancer Mother 40        has mastectomy and lived to 84     Allergies Mother         Sulfa,     Arthritis Mother      Cardiovascular Mother      Depression Mother      Respiratory Mother      Lipids Mother      Cancer Mother         breast     Thyroid Disease Mother      Cerebrovascular Disease Mother      Dementia Mother         Alzheimers     Cancer - colorectal Other      Cancer Father         lung     Aneurysm Father         brother, AAA     Other Cancer Father         lung ca     Asthma Sister      Cancer - colorectal Paternal Uncle         late 50s to early 60s - great uncles      Breast Cancer Other         Maternal cousin     Arrhythmia Sister         2 brothers 1 sister Afib     Breast Cancer Maternal Aunt 62     Other Cancer Maternal Aunt 35        Ovarian cancer.  Survived despite late recurrence and is now 92.     Glaucoma No family hx of      Macular Degeneration No family hx of      Lab Results   Component Value Date    A1C 6.9 11/25/2020    A1C 7.5 07/22/2020    A1C 7.3 12/10/2019    A1C 7.5 08/21/2019    A1C 7.8 05/21/2019         SUBJECTIVE FINDINGS:  65-year-old female returns to clinic for ulcer left hallux.  She is diabetic with peripheral neuropathy.  She relates it is about the same.  She is using the Aquacel Ag and Primapore and it seems to be working okay.      OBJECTIVE FINDINGS:  DP and PT are 2/4, left.  She has laterally deviated hallux with dorsomedial first MPJ prominence.  She has hyperkeratotic tissue buildup plantar medial first MPJ.  She has an ulcer on the plantar medial left hallux.  It is about 0.3 cm in diameter.   Minimal drainage.  It is through the dermis.  There is no erythema, no odor, no calor.  Minimal edema.      ASSESSMENT AND PLAN:  Ulcer, left plantar medial hallux.  She is diabetic with peripheral neuropathy.  Diagnosis and treatment options discussed with her.  Sharp ulcer debridement with a #15 blade.  No anesthesia needed and local wound care done upon consent today.  Aquacel Ag and a Primapore were applied and use discussed with her.  Follow up with Dr. Hoffman tomorrow as scheduled and with me pending that treatment plan.

## 2020-12-23 ENCOUNTER — PATIENT OUTREACH (OUTPATIENT)
Dept: CARDIOLOGY | Facility: CLINIC | Age: 65
End: 2020-12-23

## 2020-12-23 ENCOUNTER — OFFICE VISIT (OUTPATIENT)
Dept: PODIATRY | Facility: CLINIC | Age: 65
End: 2020-12-23
Attending: PODIATRIST
Payer: COMMERCIAL

## 2020-12-23 ENCOUNTER — VIRTUAL VISIT (OUTPATIENT)
Dept: CARDIOLOGY | Facility: CLINIC | Age: 65
End: 2020-12-23
Payer: COMMERCIAL

## 2020-12-23 VITALS
BODY MASS INDEX: 42.83 KG/M2 | DIASTOLIC BLOOD PRESSURE: 72 MMHG | SYSTOLIC BLOOD PRESSURE: 119 MMHG | HEART RATE: 74 BPM | WEIGHT: 290 LBS

## 2020-12-23 DIAGNOSIS — I10 HYPERTENSION GOAL BP (BLOOD PRESSURE) < 140/90: ICD-10-CM

## 2020-12-23 DIAGNOSIS — M21.619 BUNION: ICD-10-CM

## 2020-12-23 DIAGNOSIS — I48.0 PAROXYSMAL ATRIAL FIBRILLATION (H): ICD-10-CM

## 2020-12-23 DIAGNOSIS — G47.33 OSA (OBSTRUCTIVE SLEEP APNEA): ICD-10-CM

## 2020-12-23 DIAGNOSIS — I50.9 CONGESTIVE HEART FAILURE, UNSPECIFIED HF CHRONICITY, UNSPECIFIED HEART FAILURE TYPE (H): ICD-10-CM

## 2020-12-23 DIAGNOSIS — L97.522 SKIN ULCER OF LEFT FOOT WITH FAT LAYER EXPOSED (H): ICD-10-CM

## 2020-12-23 DIAGNOSIS — E08.65 DIABETES MELLITUS DUE TO UNDERLYING CONDITION, UNCONTROLLED, WITH HYPERGLYCEMIA (H): ICD-10-CM

## 2020-12-23 DIAGNOSIS — I50.9 CONGESTIVE HEART FAILURE, UNSPECIFIED HF CHRONICITY, UNSPECIFIED HEART FAILURE TYPE (H): Primary | ICD-10-CM

## 2020-12-23 DIAGNOSIS — E11.49 TYPE II OR UNSPECIFIED TYPE DIABETES MELLITUS WITH NEUROLOGICAL MANIFESTATIONS, NOT STATED AS UNCONTROLLED(250.60) (H): ICD-10-CM

## 2020-12-23 LAB
ANION GAP SERPL CALCULATED.3IONS-SCNC: 6 MMOL/L (ref 3–14)
BUN SERPL-MCNC: 33 MG/DL (ref 7–30)
CALCIUM SERPL-MCNC: 10.1 MG/DL (ref 8.5–10.1)
CHLORIDE SERPL-SCNC: 107 MMOL/L (ref 94–109)
CO2 SERPL-SCNC: 29 MMOL/L (ref 20–32)
CREAT SERPL-MCNC: 0.89 MG/DL (ref 0.52–1.04)
ERYTHROCYTE [DISTWIDTH] IN BLOOD BY AUTOMATED COUNT: 13.2 % (ref 10–15)
GFR SERPL CREATININE-BSD FRML MDRD: 66 ML/MIN/{1.73_M2}
GLUCOSE SERPL-MCNC: 129 MG/DL (ref 70–99)
HCT VFR BLD AUTO: 41.1 % (ref 35–47)
HGB BLD-MCNC: 13.6 G/DL (ref 11.7–15.7)
MCH RBC QN AUTO: 31.4 PG (ref 26.5–33)
MCHC RBC AUTO-ENTMCNC: 33.1 G/DL (ref 31.5–36.5)
MCV RBC AUTO: 95 FL (ref 78–100)
NT-PROBNP SERPL-MCNC: 41 PG/ML (ref 0–125)
PLATELET # BLD AUTO: 244 10E9/L (ref 150–450)
POTASSIUM SERPL-SCNC: 4 MMOL/L (ref 3.4–5.3)
RBC # BLD AUTO: 4.33 10E12/L (ref 3.8–5.2)
SODIUM SERPL-SCNC: 142 MMOL/L (ref 133–144)
TSH SERPL DL<=0.005 MIU/L-ACNC: 1.62 MU/L (ref 0.4–4)
WBC # BLD AUTO: 6.8 10E9/L (ref 4–11)

## 2020-12-23 PROCEDURE — 85027 COMPLETE CBC AUTOMATED: CPT | Performed by: NURSE PRACTITIONER

## 2020-12-23 PROCEDURE — 36415 COLL VENOUS BLD VENIPUNCTURE: CPT | Performed by: NURSE PRACTITIONER

## 2020-12-23 PROCEDURE — 80048 BASIC METABOLIC PNL TOTAL CA: CPT | Performed by: NURSE PRACTITIONER

## 2020-12-23 PROCEDURE — 99214 OFFICE O/P EST MOD 30 MIN: CPT | Performed by: PODIATRIST

## 2020-12-23 PROCEDURE — 84443 ASSAY THYROID STIM HORMONE: CPT | Performed by: NURSE PRACTITIONER

## 2020-12-23 PROCEDURE — 83880 ASSAY OF NATRIURETIC PEPTIDE: CPT | Performed by: NURSE PRACTITIONER

## 2020-12-23 PROCEDURE — 99214 OFFICE O/P EST MOD 30 MIN: CPT | Mod: 95 | Performed by: NURSE PRACTITIONER

## 2020-12-23 RX ORDER — METOPROLOL SUCCINATE 50 MG/1
200 TABLET, EXTENDED RELEASE ORAL DAILY
Qty: 360 TABLET | Refills: 0 | Status: SHIPPED | OUTPATIENT
Start: 2020-12-23 | End: 2021-06-10

## 2020-12-23 RX ORDER — HYDROCHLOROTHIAZIDE 25 MG/1
50 TABLET ORAL DAILY
Qty: 180 TABLET | Refills: 1 | Status: SHIPPED | OUTPATIENT
Start: 2020-12-23 | End: 2021-10-28

## 2020-12-23 NOTE — PROGRESS NOTES
Subjective:    Pt is seen today in consult from Dr. Hutson with the chief complaint of left foot pain.  Points to medial bump of bunion and states that has been bothersome for greater that several year(s).  Has tried shoegear changes with no improvement.  Bothersome both in joint and along medial aspect of 1st MPJ right foot.  Describes as burning pain.  Pain with ambulation and shoewear and difficulty working secondary to pain.  In 2006 patient had right triple arthrodesis and right head osteotomy to correct bunion.  She states that this worked very well.  She is noticed the deformity getting worse.  She has also developed a wound on the plantar medial portion of the hallux IPJ.  Is superficial.  She is having a hard time healing this.  Her wound care specialist would like the bunion corrected to help offload this so it can heal.  She denies any erythema edema purulence or odor here.  She has diabetes.  Currently she has control this better.  She is not a smoker.  Patient is on blood thinner Xarelto for her atrial fibrillation.  She is retired cardiac care nurse.  She has a sister and grandmother with diabetes.    ROS:  A 10-point review of systems was performed and is positive for that noted in the HPI and as seen below.  All other areas are negative.          Allergies   Allergen Reactions     Atorvastatin Calcium      Gas     Pravachol [Pravastatin Sodium]      Pravachol - dry cough      Simvastatin      Myalgia       Current Outpatient Medications   Medication Sig Dispense Refill     amoxicillin (AMOXIL) 500 MG capsule Take 2,000 mg by mouth Before dental procedures       blood glucose monitoring (NO BRAND SPECIFIED) test strip Use to test blood sugar 4 times daily or as directed. (Patient not taking: Reported on 11/25/2020) 100 each 11     Continuous Blood Gluc  (FREESTYLE MIKE 14 DAY READER) KATIA 1 each daily 1 Device 0     Continuous Blood Gluc Sensor (FREESTYLE MIKE 14 DAY SENSOR) MISC Change  every 14 days. 6 each 3     diltiazem ER (DILT-XR) 120 MG 24 hr capsule Take 1 capsule (120 mg) by mouth daily Take in addition to 240mg ( total of 360mg) 30 capsule 1     diltiazem ER COATED BEADS (CARDIZEM CD/CARTIA XT) 240 MG 24 hr capsule Take 1 capsule (240 mg) by mouth daily Take in addition to 120mg tablet (total of 360mg) 30 capsule 1     econazole nitrate 1 % external cream Apply topically daily as needed To feet and toenails       furosemide (LASIX) 20 MG tablet Take 1 tablet (20 mg) by mouth daily 90 tablet 3     Garlic 1000 MG CAPS Take 1 capsule by mouth daily Takes during summer months.  Done taking until next summer.       HUMULIN R U-500 KWIKPEN 500 UNIT/ML PEN soln INJECT 120 UNITS IN THE MORNING  UNITS AT BEDTIME 40 mL 3     hydrochlorothiazide (HYDRODIURIL) 25 MG tablet Take 1 tablet (25 mg) by mouth daily 90 tablet 3     ibuprofen (ADVIL) 200 MG capsule Take 600 mg by mouth every 6 hours as needed        insulin pen needle (GNP CLICKFINE PEN NEEDLES) 31G X 8 MM miscellaneous Use six times daily or as directed 200 each 5     levothyroxine (SYNTHROID/LEVOTHROID) 75 MCG tablet Take 1 tablet (75 mcg) by mouth daily 90 tablet 1     liraglutide (VICTOZA PEN) 18 MG/3ML solution Administer two injections of 1.8 and 1.2 mg each, for a total daily dose of 3 mg. 15 mL 6     lisinopril (ZESTRIL) 40 MG tablet Take 1 tablet (40 mg) by mouth 135 tablet 1     metFORMIN (GLUCOPHAGE-XR) 500 MG 24 hr tablet Take 4 tabs  tablet 1     metoprolol succinate ER (TOPROL-XL) 50 MG 24 hr tablet Take 4 tablets (200 mg) by mouth daily 360 tablet 0     Multiple Vitamins-Minerals (ADVANCED DIABETIC MULTIVITAMIN) TABS Take 1 tablet by mouth daily       ORDER FOR DME, SET TO LOCAL PRINT, Respironics REMSTAR 60 Series Auto CPAP 8-15cm H2O, Airfit P10 nasal pillow mask w/medium pillows       order for DME Equipment being ordered: [id number] $70   Rosalinda PHELPS (Patient not taking: Reported on 11/25/2020) 1 Device 0      order for DME Roll-A-Bout Walker. Patient can use for left foot. 1 Units 0     order for DME Equipment being ordered: wrist brace with thumb spica, L (Patient not taking: Reported on 11/25/2020) 1 Device 0     order for DME Equipment being ordered: wrist brace with thumb spica, R (Patient not taking: Reported on 11/25/2020) 1 Device 0     potassium chloride ER (KLOR-CON M) 20 MEQ CR tablet Take 4 pills (total 80meq)  by an hour each today only, then one pill (20meq) every day with Lasix only 96 tablet 3     rivaroxaban ANTICOAGULANT (XARELTO) 20 MG TABS tablet Take 1 tablet (20 mg) by mouth daily (with dinner) 90 tablet 0     rosuvastatin (CRESTOR) 10 MG tablet Take 1 tablet (10 mg) by mouth daily 90 tablet 3       Patient Active Problem List   Diagnosis     Morbid obesity (H)     Diverticulosis of large intestine     Nonspecific abnormal results of cardiovascular function study     FAMILY HX COLON CANCER     Nonallopathic lesion of thoracic region     Hypothyroidism     GENESIS (obstructive sleep apnea)     Irritable bowel syndrome     Family history of malignant neoplasm of breast     History of major depression     OSTEOARTHRITIS L KNEE  s/p knee replacement on the left      Hypertension goal BP (blood pressure) < 140/90     Family history of stroke (cerebrovascular)     Family history of other cardiovascular diseases     Family history of diabetes mellitus     ABSENCE OF MENSTRUATION - perimenopausal      JOINT PAIN-ANKLE - right ankle      Mitral valve insufficiency     Hyperlipidemia LDL goal <100     Aortic sclerosis     Tubular adenoma of colon     History of viral hepatitis, type B     Chronic low back pain     Long-term insulin use (H)     Uncontrolled diabetes mellitus (H)     S/P total knee replacement using cement, right     Aftercare following right knee joint replacement surgery     Lumbago     Type 2 diabetes mellitus with hyperglycemia (H)     Posterior vitreous detachment of right eye      Pseudophakia of both eyes     Paroxysmal atrial fibrillation (H)     SOB (shortness of breath)       Past Medical History:   Diagnosis Date     Cataract      DEPRESSION     comes and goes - tried meds - unsuccessfully, certain times of the year, no psych intervention and no counselors in the past - and not interested      Diverticulosis of colon (without mention of hemorrhage) 4/04    colonoscopy     ECHO-mildLVH,tr MR,mild thick lflets w inc LA,trTR   12/03     Essential hypertension, benign 1990s    late 1990s - started medications at that time - not to difficult to control meds      Fam hx-cardiovas dis [facility]     father - CAD, and lipids/HTN - multiple members of the family      Family history of diabetes mellitus     sister and grandmother with DM      Family history of malignant neoplasm of breast     mother - young age - 45, and maternal cousin and aunt as well - no BRCA testing done      Family history of stroke (cerebrovascular)     grandmother in early life in her 40s      FAMILY HX COLON CANCER     Pat uncles x 2     Heart disease     murmur/     Heart murmur      HYPERLIPIDEMIA 2000    fairly recent - in the last 5 years - high for DM levels  -cholesterol recent      Irritable bowel syndrome     goes between the 2 - nerve related - more stressed more problems      MICROALBUMINURIA     unsure how long - been on the lisinopril - for a few years at well      Nonspecific abnormal results of liver function study 2/3/2003    SGOT - has been high in the past - since the hepatitis b - borderline elevation - not really changing any      OBESITY      Obstructive sleep apnea      GENESIS (obstructive sleep apnea) 10/15/2006    psg 5/15 AHI 53 aPAP 8-15     OSTEOARTHRITIS L KNEE 2003    total knee on the left - and pain is gone since the knee replacement      PERS HX HEPATITIS B- RESOLVED] 1977    acute virus only - no chronic disease      PERS HX HEPATITIS B- RESOLVED]      Type II or unspecified type diabetes mellitus  without mention of complication, not stated as uncontrolled 1991    oral meds frist, then insulin eventually later, difficult to control most of the time      Unspecified hypothyroidism 10/11/2006    TSH 3.36 - mild subclinical hypothyroidism - deciding on following or starting low dose meds - with dr Oreilly - following        Past Surgical History:   Procedure Laterality Date     ANESTHESIA CARDIOVERSION N/A 12/4/2020    Procedure: ANESTHESIA, FOR CARDIOVERSION @1400;  Surgeon: GENERIC ANESTHESIA PROVIDER;  Location: [facility] OR     ARTHROPLASTY KNEE Right 9/23/2015    Procedure: ARTHROPLASTY KNEE;  Surgeon: Rufus Brown MD;  Location:  OR     C TOTAL KNEE ARTHROPLASTY  3/03    L knee     CATARACT IOL, RT/LT Left      COLONOSCOPY  4/04    diverticulosis, rec repeat 10 yrs(consider fam hx?)     COLONOSCOPY WITH CO2 INSUFFLATION N/A 6/19/2019    Procedure: COLONOSCOPY, WITH CO2 INSUFFLATION;  Surgeon: Zeb Duarte MD;  Location:  OR     ORTHOPEDIC SURGERY      right ankle     PHACOEMULSIFICATION CLEAR CORNEA WITH STANDARD INTRAOCULAR LENS IMPLANT Left 3/15/2018    Procedure: PHACOEMULSIFICATION CLEAR CORNEA WITH STANDARD INTRAOCULAR LENS IMPLANT;  LEFT EYE PHACOEMULSIFICATION CLEAR CORNEA WITH STANDARD INTRAOCULAR LENS IMPLANT;  Surgeon: Bandar Linares MD;  Location:  [facility]     PHACOEMULSIFICATION CLEAR CORNEA WITH STANDARD INTRAOCULAR LENS IMPLANT Right 4/5/2018    Procedure: PHACOEMULSIFICATION CLEAR CORNEA WITH STANDARD INTRAOCULAR LENS IMPLANT;  RIGHT PHACOEMULSIFICATION CLEAR CORNEA WITH STANDARD INTRAOCULAR LENS IMPLANT ;  Surgeon: Bandar Linares MD;  Location:  [facility]     STRESS ECHO (METRO)  12/03    no ischemia, limited by fatigue     SURGICAL HISTORY OF -       EXP LAP- R OVARY CYSTS     SURGICAL HISTORY OF -   1981,1984,1985    CHILDBIRTH     ZZ NONSPECIFIC PROCEDURE  6/06    right triple arthrodecesis      [facility] NONSPECIFIC PROCEDURE  7/06     right bunion surgery        Family  History   Problem Relation Age of Onset     Diabetes Maternal Grandmother         DM TYPE 2     Cerebrovascular Disease Maternal Grandmother      Hypertension Sister      Lipids Sister      Heart Disease Sister         Chronic AFib     Hypertension Sister      Lipids Sister      Gynecology Sister      Diabetes Sister      Asthma Sister      Blood Disease Paternal Grandmother         T CELL LEUKEMIA     Hypertension Brother      Lipids Brother      Congenital Anomalies Brother      Cardiovascular Brother      Heart Disease Brother         CABG/AFIB     Hypertension Brother      Lipids Brother      Obesity Brother      Hypertension Brother      Respiratory Brother         Sleep apnea     Heart Disease Brother         AFib     Alzheimer Disease Mother      Asthma Mother      Hypertension Mother      Breast Cancer Mother 40        has mastectomy and lived to 84     Allergies Mother         Sulfa,     Arthritis Mother      Cardiovascular Mother      Depression Mother      Respiratory Mother      Lipids Mother      Cancer Mother         breast     Thyroid Disease Mother      Cerebrovascular Disease Mother      Dementia Mother         Alzheimers     Cancer - colorectal Other      Cancer Father         lung     Aneurysm Father         brother, AAA     Other Cancer Father         lung ca     Asthma Sister      Cancer - colorectal Paternal Uncle         late 50s to early 60s - great uncles      Breast Cancer Other         Maternal cousin     Arrhythmia Sister         2 brothers 1 sister Afib     Breast Cancer Maternal Aunt 62     Other Cancer Maternal Aunt 35        Ovarian cancer.  Survived despite late recurrence and is now 92.     Glaucoma No family hx of      Macular Degeneration No family hx of        Social History     Tobacco Use     Smoking status: Never Smoker     Smokeless tobacco: Never Used     Tobacco comment: tried in early 20s only    Substance Use Topics     Alcohol use: Yes     Comment: rare          Objective:    Vitals: /72   Pulse 74   Wt 131.5 kg (290 lb)   LMP 10/02/2007   BMI 42.83 kg/m    BMI: Body mass index is 42.83 kg/m .  Height: Data Unavailable    Constitutional/ general:  Pt is in no apparent distress, appears well-nourished.  Cooperative with history and physical exam.     Psych:  The patient answered questions appropriately.  Normal affect.  Seems to have reasonable expectations, in terms of treatment.    Eyes:  Visual scanning/ tracking without deficit.    Ears:  Response to auditory stimuli is normal.   negative hearing aid devices.  Auricles in proper alignment.     Lymphatic:  Popliteal lymph nodes not enlarged.     Lungs:  Non labored breathing, non labored speech. No cough.  No audible wheezing. Even, quiet breathing.      Vascular:  Pedal pulses are palpable bilaterally for both the DP and PT arteries.  CFT < 3 sec.  positive edema.  positive varicosities.  positive pedal hair growth noted.     Neuro:  Alert and oriented x 3. Coordinated gait.  Light touch sensation is decreased on digits.  No obvious deficits.  No evidence of neurological-based weakness, spasticity, or contracture in the lower extremities.     Derm: Normal texture and turgor.  No erythema, ecchymosis, or cyanosis.  Small plantar thickness wound on the plantar medial portion of the left hallux IPJ.  No sinus tracts purulence or odor.  No erythema or edema.    Musculoskeletal:    Lower extremity muscle strength is normal.  Patient is ambulatory without an assistive device or brace.  Right foot has stiff rear foot with somewhat normal arch.  The bunion here is corrected and the range of motion is normal with no pain.  On the left foot she is quite pronated.  She has a bunion deformity.  Clinically we could see interphalange ES.  The range of motion is 80% normal and no pain at end range of motion.  Is somewhat track bound.  Extensor and flexor tendons intact.        Radiographic Exam:  Xrays, three views  left foot weight bearing obtained today.  This demonstrated an intermetatarsal angle of 12 degrees.  Sesamoid position appears to be a 7.  First MPJ joint space deviated.  positive metatarsus adductus.  We note the rear foot signs consistent with pronation and significant arthritis    Last hemoglobin A1c 6.9    ASSESSMENT:    Painful Hallux Valgus deformity left foot.  Type 2 diabetes mellitus  Left hallux ulcer    Plan:  Recent x-rays left foot personally reviewed.  Reviewed recent labs.  A bunion is caused by a muscle imbalance. The big toe is pulled toward the smaller toes. The lump is created by a bone pushing outward.   Bunion pain is usually a combination of shoes rubbing on the skin, nerve irritation, compression between the toes, joint misalignment, arthritis, and altered gait.   Most bunion pain can be improved by wearing compatible shoes. People with bunions cannot choose footwear just because they like the style. Your bunion should determine what shoe should be worn. Wide shoes with non-irritating seams, soft leather, and a square toe box are most compatible. Shoes should fit well out of the box but may need to be professionally stretched and modified to accommodate the bump. Heels, dress shoes, and pointed toes will not provide comfort.   Shoe inserts or orthotics will often times help with the bunion pain, however, inserts make a shoe fit more challenging. Pads placed around the bunion may help.   Bunion surgery involves cutting and repositioning the bones surrounding the bunion. Pins and screws are used to hold the bones in place during the healing process. The goal of bunion surgery is to reduce the size of the bunion bump. Realignment of the toe and joint is attempted. Most first toes can not be forced back into a normal alignment even with surgery.   Healing after surgery requires about 6 weeks of protection. This allows the bone to heal. Maximum recovery takes about one year. The scar tissue and  joint structures require this amount of time to finish the healing process. Expect stiffness, swelling, and numbness during that time frame.   Bunion surgery does involve side effects. Some side effects are predictable and others are less common but do occur. A scar will be visible and may be irritated by shoes. The shoe may rub on the screw or internal pin requiring surgical removal of the fixation devices. The screw and pin would like be in place for one year. The first toe may loose motion after surgery. The amount of stiffness is variable. Some people never regain motion of their big toe. This is due to scar tissue that develops with any surgery. The first toe may drift towards or away from the second toe. Spreading of the first and second toe is a rare occurrence after bunion surgery. This can be bothersome and may require surgical repair. Toe drift toward the second toe may result in a recurrent bunion and revision surgery. Joint fusion is one option to correct and unstable, drifting toe. This procedure straightens the toe, however, no motion remains. Fusion may be necessary to correct complications of bunion surgery or as the original procedure in severe cases.   All surgical procedures involve the risk of infection, numbness, pain, delayed bone healing, dislocation, blood clots, and continued foot pain. Bunion surgery is complex and should not be taken lightly.   Any skin incision can cause infection. Deep infection might involve the bone. If this occurs, repeat surgery and antibiotics through an IV for 6 weeks may be needed. Scar tissue from dissection and retraction can cause nerve pain or numbness. This is generally temporary but may be permanent. We do not have treatments that cure nerve problems. Pain could develop in the second toe due to a change in pressure. In addition, the cut in the bone may displace and require additional surgery.   Delayed healing would lengthen the healing time. Some bones  occasionally do not heal. If this occurs, repeat surgery, extended protection, or electronic bone stimulation may be needed. Smokers have a 20 % of the bone not healing; this is 2 times the risk of people who do not smoke.   Foot pain is complex. Most feet hurt for more than one reason. Fixing the bunion would not necessarily create a pain free foot. Appropriate shoes, healthy body weight, avoidance of bare foot walking, and moderation of activity will always be necessary to enjoy foot comfort. Your bunion may involve arthritis, or loss of the cartilage in the joint. This is incurable even with surgery. Also, long standing bunions often involve chronic irritation to the surrounding nerves. Nerve pain may not resolve even with reducing the bunion bump since permanent nerve damage may be present.   Bunion surgery is actually quite successful. Most surgical patients are pleased with their foot following bunion surgery. Many of the issues described above can be controlled by taking proper care of your foot during the healing process.   Discussed surgical as well as conservative options for her current foot pathology at length.  Patient has opted for surgical correction.  A left Dale bunionectomy and possible hallux proximal phalanx osteotomy will be done on an outpatient basis under MAC sedation.  Benefits and risks again discussed at length. These include, but are not limited to overcorrection, undercorrection, infection, numbness, scar formation, decreased ROM or painful ROM, loss of limb, chronic pain,need for further procedures.  She understands that all of these complications are at higher risk because of her diabetes even if it is well controlled.  Discussed risk of infection is greater with small wound on hallux.  No guarantees of outcome were given. Patient gave verbal understanding.  Explained to patient they have metatarsus adductus.  Discussed the cause of this and how it affects foot function.   Because of  "this they will always have a wide forefoot and this makes completely reducing a bunion more difficult.  A \"Z\" type of foot may also result in some residual abducting of the hallux.  Patient in a boot now to offload the wound.  Discussed she will need to wear cam walker for 6 weeks after surgery to protect the hardware in the osteotomy.  Will need pre-op history and physical within 7 days of the planned procedure. After care instructions, and the procedure were discussed at length.  We will write order for surgery to have this done in the near future.  Thanks allowing me to participate in the care of this patient.    David Hoffman, JOEM DPM, FACFAS            "

## 2020-12-23 NOTE — TELEPHONE ENCOUNTER
Spoke to patient and confirmed the plan to take Lasix 20mg every other day.     Maxine Torres RN   Medical Specialty Clinic Care Coordinator  [facility]

## 2020-12-23 NOTE — PATIENT INSTRUCTIONS
Take your medicines every day, as directed    Changes made today:  o Hydrochlorothiazide 50 mg daily  o Lasix decrease to 20 mg every other day   Monitor Your Weight and Symptoms    Contact us if you:      Gain 2 pounds in one day or 5 pounds in one week    Feel more short of breath    Notice more leg swelling    Feel lightheadeded   Change your lifestyle    Limit Salt or Sodium:    2000 mg  Limit Fluids:    2000 mL or approximately 64 ounces  Eat a Heart Healthy Diet    Low in saturated fats  Stay Active:    Aim to move at least 150 minutes every  week         To Contact us    During Business Hours:  [phone], option # 1 (University)  Then option # 4 (medical questions)     After hours, weekends or holidays:   [phone], Option #4  Ask to speak to the On-Call Cardiologist. Inform them you are a CORE/heart failure patient at the [facility].     Use [employer] allows you to communicate directly with your heart team through secure messaging.    [employer] can be accessed any time on your phone, computer, or tablet.    If you need assistance, we'd be happy to help!         Keep your Heart Appointments:    Virtual CORE in 2 weeks with labs a day prior

## 2020-12-23 NOTE — TELEPHONE ENCOUNTER
LM for patient to call back. Robinson would like the patient to take Lasix 20mg every other day, instead of everyday.     Will await return call from patient.     Maxine Torres RN   Medical Specialty Clinic Care Coordinator  [facility]

## 2020-12-23 NOTE — PROGRESS NOTES
"Sherry Slaughter is a 65 year old female who is being evaluated via a billable video visit.      The patient has been notified of following:     \"This video visit will be conducted via a call between you and your physician/provider. We have found that certain health care needs can be provided without the need for an in-person physical exam.  This service lets us provide the care you need with a video conversation.  If a prescription is necessary we can send it directly to your pharmacy.  If lab work is needed we can place an order for that and you can then stop by our lab to have the test done at a later time.    Video visits are billed at different rates depending on your insurance coverage.  Please reach out to your insurance provider with any questions.    If during the course of the call the physician/provider feels a video visit is not appropriate, you will not be charged for this service.\"    Patient has given verbal consent for Video visit? Yes      Video-Visit Details    Type of service:  Video Visit    Video Start Time: 2:56  Video End Time: 3:27    Originating Location (pt. Location): home    Distant Location (provider location):  [facility]     Platform used for Video Visit: [employer]    [facility] Sherry is being seen in the [facility] for an initial evaluation. She was referred by Dr. Aranda. Her past medical history is significant for the followin.  Type 2 diabetes.   2.  Hypertension.   3.  Hyperlipidemia.   4.  Depression.   5.  Obstructive sleep apnea, currently untreated.   6.  Hypothyroidism.   7.  Diverticulosis.   8.  Low back pain.   9.  Irritable bowel syndrome.     On 2020, Dr. Aranda saw the patient and sent her to the emergency room for further treatment of her AFib.  She ended up having her rate controlled and had an echocardiogram performed.  She then had ELIAS-guided cardioversion performed on 2020 successfully.  She was then " hospitalized again on 12/09/2020 for pulmonary edema, likely secondary to being in rapid AFib for quite some time in the past in addition to having her hydrochlorothiazide discontinued.  She was in pulmonary edema on 12/09/2020, was diuresed with 40 mg of IV Lasix and had p.o. Lasix 20 started and she was sent here for further evaluation.     At her last visit with Dr. Aranda, her hydrochlorothiazide was resumed at 25 mg/day.  Today she reports that her weight has been between 289 to 291 pounds.  Her dry weight appears to be around 290 pounds.  She denies any swelling in her lower extremities.  She states her appetite has been fine.  Her blood pressures have been systolically in the 130s over 70's.  She did have 1 reading recently which was 119/60.  She denies having any significant chest pain or shortness of breath, PND or orthopnea    CURRENT MEDICATIONS:  She is taking diltiazem 360 mg a day, Lasix 20 mg a day, insulin, levothyroxine, Victoza, lisinopril 40 mg a day, metformin, hydrochlorothiazide 25 mg ,  metoprolol succinate 200 mg a day, rivaroxaban 20 mg a day, rosuvastatin 10 mg a day.         ROS:   Constitutional: No fever, chills, or sweats.  ENT: No visual disturbance, ear ache, epistaxis, sore throat.   Allergies/Immunologic: Negative  Respiratory: No cough, hemoptysis.   Cardiovascular: As per HPI.   GI: As per HPI.   : No urinary frequency, dysuria, or hematuria.   Integument: Negative.   Psychiatric: Negative.   Neuro: Negative.   Endocrinology: negative   Musculoskeletal: negative    EXAM:   Constitutional - WDWN, no acute distress   Eyes - no redness or discharge, nonicteric sclera  Respiratory - nonlabored breathing, able to speak in full sentences without difficulty  CV: no visible edema of visualized upper extremities.  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  Atrial fibrillation with rapid ventricular response.       She underwent ELIAS-guided cardioversion on 12/04/2020.  She has a relatively structurally normal heart.  She is currently stable in normal sinus rhythm on significant doses of metoprolol and diltiazem.  We will continue to monitor.  She is tolerating anticoagulation well without gross bleeding.      2.  Hypertension.     Blood pressures are still not optimally controlled and still run in the 130s and 140s.  We will attempt to optimize- preferred 120's or below.      3.  Congestive heart failure.     Dr. Aranda suspects the  episode of congestive heart failure for which she was hospitalized recently on 12/09/2020 may have been related to her poor rate control previous to her cardioversion in addition to stopping hydrochlorothiazide.  Her dry weight is likely in the 290 pound range.  We will attempt to optimize medical therapy. LVEF 60-65%        4.  Untreated sleep apnea.  She will require sleep evaluation in order to help prevent another episode of AFib.      5.  Mild aortic stenosis.    This was seen on recent echo and a mean gradient was only 12 mmHg. continue to follow-through time.       Plan:   Hydrochlorothiazide 50 mg daily  Decrease the Lasix to 20 mg every other day  Labs prior  [facility] 2 weeks- virtual

## 2020-12-23 NOTE — LETTER
12/23/2020         RE: Sherry Slaughter  [address]  [place] [address]        Dear Colleague,    Thank you for referring your patient, Sherry Slaughter, to the [facility]. Please see a copy of my visit note below.    Subjective:    Pt is seen today in consult from Dr. Hutson with the chief complaint of left foot pain.  Points to medial bump of bunion and states that has been bothersome for greater that several year(s).  Has tried shoegear changes with no improvement.  Bothersome both in joint and along medial aspect of 1st MPJ right foot.  Describes as burning pain.  Pain with ambulation and shoewear and difficulty working secondary to pain.  In 2006 patient had right triple arthrodesis and right head osteotomy to correct bunion.  She states that this worked very well.  She is noticed the deformity getting worse.  She has also developed a wound on the plantar medial portion of the hallux IPJ.  Is superficial.  She is having a hard time healing this.  Her wound care specialist would like the bunion corrected to help offload this so it can heal.  She denies any erythema edema purulence or odor here.  She has diabetes.  Currently she has control this better.  She is not a smoker.  Patient is on blood thinner Xarelto for her atrial fibrillation.  She is retired cardiac care nurse.  She has a sister and grandmother with diabetes.    ROS:  A 10-point review of systems was performed and is positive for that noted in the HPI and as seen below.  All other areas are negative.          Allergies   Allergen Reactions     Atorvastatin Calcium      Gas     Pravachol [Pravastatin Sodium]      Pravachol - dry cough      Simvastatin      Myalgia       Current Outpatient Medications   Medication Sig Dispense Refill     amoxicillin (AMOXIL) 500 MG capsule Take 2,000 mg by mouth Before dental procedures       blood glucose monitoring (NO BRAND SPECIFIED) test strip Use to test blood  sugar 4 times daily or as directed. (Patient not taking: Reported on 11/25/2020) 100 each 11     Continuous Blood Gluc  (FREESTYLE MIEK 14 DAY READER) KATIA 1 each daily 1 Device 0     Continuous Blood Gluc Sensor (FREESTYLE MIKE 14 DAY SENSOR) MISC Change every 14 days. 6 each 3     diltiazem ER (DILT-XR) 120 MG 24 hr capsule Take 1 capsule (120 mg) by mouth daily Take in addition to 240mg ( total of 360mg) 30 capsule 1     diltiazem ER COATED BEADS (CARDIZEM CD/CARTIA XT) 240 MG 24 hr capsule Take 1 capsule (240 mg) by mouth daily Take in addition to 120mg tablet (total of 360mg) 30 capsule 1     econazole nitrate 1 % external cream Apply topically daily as needed To feet and toenails       furosemide (LASIX) 20 MG tablet Take 1 tablet (20 mg) by mouth daily 90 tablet 3     Garlic 1000 MG CAPS Take 1 capsule by mouth daily Takes during summer months.  Done taking until next summer.       HUMULIN R U-500 KWIKPEN 500 UNIT/ML PEN soln INJECT 120 UNITS IN THE MORNING  UNITS AT BEDTIME 40 mL 3     hydrochlorothiazide (HYDRODIURIL) 25 MG tablet Take 1 tablet (25 mg) by mouth daily 90 tablet 3     ibuprofen (ADVIL) 200 MG capsule Take 600 mg by mouth every 6 hours as needed        insulin pen needle (GNP CLICKFINE PEN NEEDLES) 31G X 8 MM miscellaneous Use six times daily or as directed 200 each 5     levothyroxine (SYNTHROID/LEVOTHROID) 75 MCG tablet Take 1 tablet (75 mcg) by mouth daily 90 tablet 1     liraglutide (VICTOZA PEN) 18 MG/3ML solution Administer two injections of 1.8 and 1.2 mg each, for a total daily dose of 3 mg. 15 mL 6     lisinopril (ZESTRIL) 40 MG tablet Take 1 tablet (40 mg) by mouth 135 tablet 1     metFORMIN (GLUCOPHAGE-XR) 500 MG 24 hr tablet Take 4 tabs  tablet 1     metoprolol succinate ER (TOPROL-XL) 50 MG 24 hr tablet Take 4 tablets (200 mg) by mouth daily 360 tablet 0     Multiple Vitamins-Minerals (ADVANCED DIABETIC MULTIVITAMIN) TABS Take 1 tablet by mouth daily        ORDER FOR DME, SET TO LOCAL PRINT, Respironics REMSTAR 60 Series Auto CPAP 8-15cm H2O, Airfit P10 nasal pillow mask w/medium pillows       order for DME Equipment being ordered: [id number] $70   Shoe LG (Patient not taking: Reported on 11/25/2020) 1 Device 0     order for DME Roll-A-Bout Walker. Patient can use for left foot. 1 Units 0     order for DME Equipment being ordered: wrist brace with thumb spica, L (Patient not taking: Reported on 11/25/2020) 1 Device 0     order for DME Equipment being ordered: wrist brace with thumb spica, R (Patient not taking: Reported on 11/25/2020) 1 Device 0     potassium chloride ER (KLOR-CON M) 20 MEQ CR tablet Take 4 pills (total 80meq)  by an hour each today only, then one pill (20meq) every day with Lasix only 96 tablet 3     rivaroxaban ANTICOAGULANT (XARELTO) 20 MG TABS tablet Take 1 tablet (20 mg) by mouth daily (with dinner) 90 tablet 0     rosuvastatin (CRESTOR) 10 MG tablet Take 1 tablet (10 mg) by mouth daily 90 tablet 3       Patient Active Problem List   Diagnosis     Morbid obesity (H)     Diverticulosis of large intestine     Nonspecific abnormal results of cardiovascular function study     FAMILY HX COLON CANCER     Nonallopathic lesion of thoracic region     Hypothyroidism     GENESIS (obstructive sleep apnea)     Irritable bowel syndrome     Family history of malignant neoplasm of breast     History of major depression     OSTEOARTHRITIS L KNEE  s/p knee replacement on the left      Hypertension goal BP (blood pressure) < 140/90     Family history of stroke (cerebrovascular)     Family history of other cardiovascular diseases     Family history of diabetes mellitus     ABSENCE OF MENSTRUATION - perimenopausal      JOINT PAIN-ANKLE - right ankle      Mitral valve insufficiency     Hyperlipidemia LDL goal <100     Aortic sclerosis     Tubular adenoma of colon     History of viral hepatitis, type B     Chronic low back pain     Long-term insulin use (H)      Uncontrolled diabetes mellitus (H)     S/P total knee replacement using cement, right     Aftercare following right knee joint replacement surgery     Lumbago     Type 2 diabetes mellitus with hyperglycemia (H)     Posterior vitreous detachment of right eye     Pseudophakia of both eyes     Paroxysmal atrial fibrillation (H)     SOB (shortness of breath)       Past Medical History:   Diagnosis Date     Cataract      DEPRESSION     comes and goes - tried meds - unsuccessfully, certain times of the year, no psych intervention and no counselors in the past - and not interested      Diverticulosis of colon (without mention of hemorrhage) 4/04    colonoscopy     ECHO-mildLVH,tr MR,mild thick lflets w inc LA,trTR   12/03     Essential hypertension, benign 1990s    late 1990s - started medications at that time - not to difficult to control meds      Fam hx-cardiovas dis [facility]     father - CAD, and lipids/HTN - multiple members of the family      Family history of diabetes mellitus     sister and grandmother with DM      Family history of malignant neoplasm of breast     mother - young age - 45, and maternal cousin and aunt as well - no BRCA testing done      Family history of stroke (cerebrovascular)     grandmother in early life in her 40s      FAMILY HX COLON CANCER     Pat uncles x 2     Heart disease     murmur/     Heart murmur      HYPERLIPIDEMIA 2000    fairly recent - in the last 5 years - high for DM levels  -cholesterol recent      Irritable bowel syndrome     goes between the 2 - nerve related - more stressed more problems      MICROALBUMINURIA     unsure how long - been on the lisinopril - for a few years at well      Nonspecific abnormal results of liver function study 2/3/2003    SGOT - has been high in the past - since the hepatitis b - borderline elevation - not really changing any      OBESITY      Obstructive sleep apnea      GENESIS (obstructive sleep apnea) 10/15/2006    psg 5/15 AHI 53 aPAP 8-15      OSTEOARTHRITIS L KNEE 2003    total knee on the left - and pain is gone since the knee replacement      PERS HX HEPATITIS B- RESOLVED] 1977    acute virus only - no chronic disease      PERS HX HEPATITIS B- RESOLVED]      Type II or unspecified type diabetes mellitus without mention of complication, not stated as uncontrolled 1991    oral meds frist, then insulin eventually later, difficult to control most of the time      Unspecified hypothyroidism 10/11/2006    TSH 3.36 - mild subclinical hypothyroidism - deciding on following or starting low dose meds - with dr Oreilly - following        Past Surgical History:   Procedure Laterality Date     ANESTHESIA CARDIOVERSION N/A 12/4/2020    Procedure: ANESTHESIA, FOR CARDIOVERSION @1400;  Surgeon: GENERIC ANESTHESIA PROVIDER;  Location: [facility] OR     ARTHROPLASTY KNEE Right 9/23/2015    Procedure: ARTHROPLASTY KNEE;  Surgeon: Rufus Brown MD;  Location:  OR     C TOTAL KNEE ARTHROPLASTY  3/03    L knee     CATARACT IOL, RT/LT Left      COLONOSCOPY  4/04    diverticulosis, rec repeat 10 yrs(consider fam hx?)     COLONOSCOPY WITH CO2 INSUFFLATION N/A 6/19/2019    Procedure: COLONOSCOPY, WITH CO2 INSUFFLATION;  Surgeon: Zeb Duarte MD;  Location:  OR     ORTHOPEDIC SURGERY      right ankle     PHACOEMULSIFICATION CLEAR CORNEA WITH STANDARD INTRAOCULAR LENS IMPLANT Left 3/15/2018    Procedure: PHACOEMULSIFICATION CLEAR CORNEA WITH STANDARD INTRAOCULAR LENS IMPLANT;  LEFT EYE PHACOEMULSIFICATION CLEAR CORNEA WITH STANDARD INTRAOCULAR LENS IMPLANT;  Surgeon: Bandar Linares MD;  Location: [facility]     PHACOEMULSIFICATION CLEAR CORNEA WITH STANDARD INTRAOCULAR LENS IMPLANT Right 4/5/2018    Procedure: PHACOEMULSIFICATION CLEAR CORNEA WITH STANDARD INTRAOCULAR LENS IMPLANT;  RIGHT PHACOEMULSIFICATION CLEAR CORNEA WITH STANDARD INTRAOCULAR LENS IMPLANT ;  Surgeon: Bandar Linares MD;  Location:  [facility]     STRESS ECHO (METRO)  12/03    no ischemia, limited by  fatigue     SURGICAL HISTORY OF -       EXP LAP- R OVARY CYSTS     SURGICAL HISTORY OF -   1981,1984,1985    CHILDBIRTH     ZZC NONSPECIFIC PROCEDURE  6/06    right triple arthrodecesis      ZZC NONSPECIFIC PROCEDURE  7/06     right bunion surgery        Family History   Problem Relation Age of Onset     Diabetes Maternal Grandmother         DM TYPE 2     Cerebrovascular Disease Maternal Grandmother      Hypertension Sister      Lipids Sister      Heart Disease Sister         Chronic AFib     Hypertension Sister      Lipids Sister      Gynecology Sister      Diabetes Sister      Asthma Sister      Blood Disease Paternal Grandmother         T CELL LEUKEMIA     Hypertension Brother      Lipids Brother      Congenital Anomalies Brother      Cardiovascular Brother      Heart Disease Brother         CABG/AFIB     Hypertension Brother      Lipids Brother      Obesity Brother      Hypertension Brother      Respiratory Brother         Sleep apnea     Heart Disease Brother         AFib     Alzheimer Disease Mother      Asthma Mother      Hypertension Mother      Breast Cancer Mother 40        has mastectomy and lived to 84     Allergies Mother         Sulfa,     Arthritis Mother      Cardiovascular Mother      Depression Mother      Respiratory Mother      Lipids Mother      Cancer Mother         breast     Thyroid Disease Mother      Cerebrovascular Disease Mother      Dementia Mother         Alzheimers     Cancer - colorectal Other      Cancer Father         lung     Aneurysm Father         brother, AAA     Other Cancer Father         lung ca     Asthma Sister      Cancer - colorectal Paternal Uncle         late 50s to early 60s - great uncles      Breast Cancer Other         Maternal cousin     Arrhythmia Sister         2 brothers 1 sister Afib     Breast Cancer Maternal Aunt 62     Other Cancer Maternal Aunt 35        Ovarian cancer.  Survived despite late recurrence and is now 92.     Glaucoma No family hx of       Macular Degeneration No family hx of        Social History     Tobacco Use     Smoking status: Never Smoker     Smokeless tobacco: Never Used     Tobacco comment: tried in early 20s only    Substance Use Topics     Alcohol use: Yes     Comment: rare         Objective:    Vitals: /72   Pulse 74   Wt 131.5 kg (290 lb)   LMP 10/02/2007   BMI 42.83 kg/m    BMI: Body mass index is 42.83 kg/m .  Height: Data Unavailable    Constitutional/ general:  Pt is in no apparent distress, appears well-nourished.  Cooperative with history and physical exam.     Psych:  The patient answered questions appropriately.  Normal affect.  Seems to have reasonable expectations, in terms of treatment.    Eyes:  Visual scanning/ tracking without deficit.    Ears:  Response to auditory stimuli is normal.   negative hearing aid devices.  Auricles in proper alignment.     Lymphatic:  Popliteal lymph nodes not enlarged.     Lungs:  Non labored breathing, non labored speech. No cough.  No audible wheezing. Even, quiet breathing.      Vascular:  Pedal pulses are palpable bilaterally for both the DP and PT arteries.  CFT < 3 sec.  positive edema.  positive varicosities.  positive pedal hair growth noted.     Neuro:  Alert and oriented x 3. Coordinated gait.  Light touch sensation is decreased on digits.  No obvious deficits.  No evidence of neurological-based weakness, spasticity, or contracture in the lower extremities.     Derm: Normal texture and turgor.  No erythema, ecchymosis, or cyanosis.  Small plantar thickness wound on the plantar medial portion of the left hallux IPJ.  No sinus tracts purulence or odor.  No erythema or edema.    Musculoskeletal:    Lower extremity muscle strength is normal.  Patient is ambulatory without an assistive device or brace.  Right foot has stiff rear foot with somewhat normal arch.  The bunion here is corrected and the range of motion is normal with no pain.  On the left foot she is quite pronated.  She  has a bunion deformity.  Clinically we could see interphalange ES.  The range of motion is 80% normal and no pain at end range of motion.  Is somewhat track bound.  Extensor and flexor tendons intact.        Radiographic Exam:  Xrays, three views left foot weight bearing obtained today.  This demonstrated an intermetatarsal angle of 12 degrees.  Sesamoid position appears to be a 7.  First MPJ joint space deviated.  positive metatarsus adductus.  We note the rear foot signs consistent with pronation and significant arthritis    Last hemoglobin A1c 6.9    ASSESSMENT:    Painful Hallux Valgus deformity left foot.  Type 2 diabetes mellitus  Left hallux ulcer    Plan:  Recent x-rays left foot personally reviewed.  Reviewed recent labs.  A bunion is caused by a muscle imbalance. The big toe is pulled toward the smaller toes. The lump is created by a bone pushing outward.   Bunion pain is usually a combination of shoes rubbing on the skin, nerve irritation, compression between the toes, joint misalignment, arthritis, and altered gait.   Most bunion pain can be improved by wearing compatible shoes. People with bunions cannot choose footwear just because they like the style. Your bunion should determine what shoe should be worn. Wide shoes with non-irritating seams, soft leather, and a square toe box are most compatible. Shoes should fit well out of the box but may need to be professionally stretched and modified to accommodate the bump. Heels, dress shoes, and pointed toes will not provide comfort.   Shoe inserts or orthotics will often times help with the bunion pain, however, inserts make a shoe fit more challenging. Pads placed around the bunion may help.   Bunion surgery involves cutting and repositioning the bones surrounding the bunion. Pins and screws are used to hold the bones in place during the healing process. The goal of bunion surgery is to reduce the size of the bunion bump. Realignment of the toe and joint is  attempted. Most first toes can not be forced back into a normal alignment even with surgery.   Healing after surgery requires about 6 weeks of protection. This allows the bone to heal. Maximum recovery takes about one year. The scar tissue and joint structures require this amount of time to finish the healing process. Expect stiffness, swelling, and numbness during that time frame.   Bunion surgery does involve side effects. Some side effects are predictable and others are less common but do occur. A scar will be visible and may be irritated by shoes. The shoe may rub on the screw or internal pin requiring surgical removal of the fixation devices. The screw and pin would like be in place for one year. The first toe may loose motion after surgery. The amount of stiffness is variable. Some people never regain motion of their big toe. This is due to scar tissue that develops with any surgery. The first toe may drift towards or away from the second toe. Spreading of the first and second toe is a rare occurrence after bunion surgery. This can be bothersome and may require surgical repair. Toe drift toward the second toe may result in a recurrent bunion and revision surgery. Joint fusion is one option to correct and unstable, drifting toe. This procedure straightens the toe, however, no motion remains. Fusion may be necessary to correct complications of bunion surgery or as the original procedure in severe cases.   All surgical procedures involve the risk of infection, numbness, pain, delayed bone healing, dislocation, blood clots, and continued foot pain. Bunion surgery is complex and should not be taken lightly.   Any skin incision can cause infection. Deep infection might involve the bone. If this occurs, repeat surgery and antibiotics through an IV for 6 weeks may be needed. Scar tissue from dissection and retraction can cause nerve pain or numbness. This is generally temporary but may be permanent. We do not have  treatments that cure nerve problems. Pain could develop in the second toe due to a change in pressure. In addition, the cut in the bone may displace and require additional surgery.   Delayed healing would lengthen the healing time. Some bones occasionally do not heal. If this occurs, repeat surgery, extended protection, or electronic bone stimulation may be needed. Smokers have a 20 % of the bone not healing; this is 2 times the risk of people who do not smoke.   Foot pain is complex. Most feet hurt for more than one reason. Fixing the bunion would not necessarily create a pain free foot. Appropriate shoes, healthy body weight, avoidance of bare foot walking, and moderation of activity will always be necessary to enjoy foot comfort. Your bunion may involve arthritis, or loss of the cartilage in the joint. This is incurable even with surgery. Also, long standing bunions often involve chronic irritation to the surrounding nerves. Nerve pain may not resolve even with reducing the bunion bump since permanent nerve damage may be present.   Bunion surgery is actually quite successful. Most surgical patients are pleased with their foot following bunion surgery. Many of the issues described above can be controlled by taking proper care of your foot during the healing process.   Discussed surgical as well as conservative options for her current foot pathology at length.  Patient has opted for surgical correction.  A left [place] bunionectomy and possible hallux proximal phalanx osteotomy will be done on an outpatient basis under MAC sedation.  Benefits and risks again discussed at length. These include, but are not limited to overcorrection, undercorrection, infection, numbness, scar formation, decreased ROM or painful ROM, loss of limb, chronic pain,need for further procedures.  She understands that all of these complications are at higher risk because of her diabetes even if it is well controlled.  Discussed risk of  "infection is greater with small wound on hallux.  No guarantees of outcome were given. Patient gave verbal understanding.  Explained to patient they have metatarsus adductus.  Discussed the cause of this and how it affects foot function.   Because of this they will always have a wide forefoot and this makes completely reducing a bunion more difficult.  A \"Z\" type of foot may also result in some residual abducting of the hallux.  Patient in a boot now to offload the wound.  Discussed she will need to wear cam walker for 6 weeks after surgery to protect the hardware in the osteotomy.  Will need pre-op history and physical within 7 days of the planned procedure. After care instructions, and the procedure were discussed at length.  We will write order for surgery to have this done in the near future.  Thanks allowing me to participate in the care of this patient.    David Hoffman DPM DPM, FACFAS                Again, thank you for allowing me to participate in the care of your patient.        Sincerely,        David Hoffman DPM    "

## 2020-12-29 ENCOUNTER — TELEPHONE (OUTPATIENT)
Dept: PODIATRY | Facility: CLINIC | Age: 65
End: 2020-12-29

## 2020-12-29 DIAGNOSIS — E11.65 UNCONTROLLED TYPE 2 DIABETES MELLITUS WITH HYPERGLYCEMIA, WITH LONG-TERM CURRENT USE OF INSULIN (H): ICD-10-CM

## 2020-12-29 DIAGNOSIS — Z79.4 UNCONTROLLED TYPE 2 DIABETES MELLITUS WITH HYPERGLYCEMIA, WITH LONG-TERM CURRENT USE OF INSULIN (H): ICD-10-CM

## 2020-12-29 NOTE — TELEPHONE ENCOUNTER
Confirmed w/Dr Hoffman 1-12-21 11:45 after [place] surgery or 2-2-21 to follow 2 cases. Dr Hoffman would like to schedule for 2-2-21.  [facility] [place] for patient to call [phone] to schedule surgery.

## 2020-12-29 NOTE — TELEPHONE ENCOUNTER
Type of surgery: left bunion correction with bone cutting and screw fixation (left)  CPT per  [place] [id number]     Type II or unspecified type diabetes mellitus with neurological manifestations, not stated as uncontrolled(250.60) (H) E11.49     Skin ulcer of left foot with fat layer exposed (H) L97.522     Bunion M21.619    Location of surgery: [facility]  Date and time of surgery: 2-2-21  TBD  Surgeon: Dr Hoffman  Pre-Op Appt Date: 1-21-21   Post-Op Appt Date: 2-5-21   Packet sent out: Yes  Pre-cert/Authorization competed  No prior auth needed, per [employer] by [employer] online list 2021  Date: 12/29/2020    Thank you,   María Whitehead   Prior Authorization [facility]  [phone]

## 2021-01-04 RX ORDER — FLASH GLUCOSE SENSOR
KIT MISCELLANEOUS
Qty: 6 EACH | Refills: 1 | Status: SHIPPED | OUTPATIENT
Start: 2021-01-04 | End: 2021-06-16

## 2021-01-05 DIAGNOSIS — I50.9 CONGESTIVE HEART FAILURE, UNSPECIFIED HF CHRONICITY, UNSPECIFIED HEART FAILURE TYPE (H): ICD-10-CM

## 2021-01-05 DIAGNOSIS — Z79.4 TYPE 2 DIABETES MELLITUS TREATED WITH INSULIN (H): ICD-10-CM

## 2021-01-05 DIAGNOSIS — E11.9 TYPE 2 DIABETES MELLITUS TREATED WITH INSULIN (H): ICD-10-CM

## 2021-01-05 LAB
ANION GAP SERPL CALCULATED.3IONS-SCNC: 4 MMOL/L (ref 3–14)
BASOPHILS # BLD AUTO: 0.1 10E9/L (ref 0–0.2)
BASOPHILS NFR BLD AUTO: 1 %
BUN SERPL-MCNC: 33 MG/DL (ref 7–30)
CALCIUM SERPL-MCNC: 10.1 MG/DL (ref 8.5–10.1)
CHLORIDE SERPL-SCNC: 107 MMOL/L (ref 94–109)
CHOLEST SERPL-MCNC: 125 MG/DL
CO2 SERPL-SCNC: 29 MMOL/L (ref 20–32)
CREAT SERPL-MCNC: 0.99 MG/DL (ref 0.52–1.04)
CREAT UR-MCNC: 22 MG/DL
DIFFERENTIAL METHOD BLD: NORMAL
EOSINOPHIL # BLD AUTO: 0.2 10E9/L (ref 0–0.7)
EOSINOPHIL NFR BLD AUTO: 3.2 %
ERYTHROCYTE [DISTWIDTH] IN BLOOD BY AUTOMATED COUNT: 13.3 % (ref 10–15)
GFR SERPL CREATININE-BSD FRML MDRD: 58 ML/MIN/{1.73_M2}
GLUCOSE SERPL-MCNC: 95 MG/DL (ref 70–99)
HBA1C MFR BLD: 6.8 % (ref 0–5.6)
HCT VFR BLD AUTO: 40.4 % (ref 35–47)
HDLC SERPL-MCNC: 45 MG/DL
HGB BLD-MCNC: 13.9 G/DL (ref 11.7–15.7)
IMM GRANULOCYTES # BLD: 0 10E9/L (ref 0–0.4)
IMM GRANULOCYTES NFR BLD: 0.4 %
LDLC SERPL CALC-MCNC: 36 MG/DL
LYMPHOCYTES # BLD AUTO: 2.4 10E9/L (ref 0.8–5.3)
LYMPHOCYTES NFR BLD AUTO: 32.9 %
MCH RBC QN AUTO: 32.3 PG (ref 26.5–33)
MCHC RBC AUTO-ENTMCNC: 34.4 G/DL (ref 31.5–36.5)
MCV RBC AUTO: 94 FL (ref 78–100)
MICROALBUMIN UR-MCNC: <5 MG/L
MICROALBUMIN/CREAT UR: NORMAL MG/G CR (ref 0–25)
MONOCYTES # BLD AUTO: 0.7 10E9/L (ref 0–1.3)
MONOCYTES NFR BLD AUTO: 10.1 %
NEUTROPHILS # BLD AUTO: 3.8 10E9/L (ref 1.6–8.3)
NEUTROPHILS NFR BLD AUTO: 52.4 %
NONHDLC SERPL-MCNC: 80 MG/DL
PLATELET # BLD AUTO: 208 10E9/L (ref 150–450)
POTASSIUM SERPL-SCNC: 3.8 MMOL/L (ref 3.4–5.3)
RBC # BLD AUTO: 4.31 10E12/L (ref 3.8–5.2)
SODIUM SERPL-SCNC: 140 MMOL/L (ref 133–144)
TRIGL SERPL-MCNC: 222 MG/DL
TSH SERPL DL<=0.005 MIU/L-ACNC: 2.38 MU/L (ref 0.4–4)
WBC # BLD AUTO: 7.3 10E9/L (ref 4–11)

## 2021-01-05 PROCEDURE — 85025 COMPLETE CBC W/AUTO DIFF WBC: CPT | Performed by: INTERNAL MEDICINE

## 2021-01-05 PROCEDURE — 83036 HEMOGLOBIN GLYCOSYLATED A1C: CPT | Performed by: INTERNAL MEDICINE

## 2021-01-05 PROCEDURE — 82043 UR ALBUMIN QUANTITATIVE: CPT | Performed by: INTERNAL MEDICINE

## 2021-01-05 PROCEDURE — 36415 COLL VENOUS BLD VENIPUNCTURE: CPT | Performed by: INTERNAL MEDICINE

## 2021-01-05 PROCEDURE — 80048 BASIC METABOLIC PNL TOTAL CA: CPT | Performed by: INTERNAL MEDICINE

## 2021-01-05 PROCEDURE — 80061 LIPID PANEL: CPT | Performed by: INTERNAL MEDICINE

## 2021-01-05 PROCEDURE — 84443 ASSAY THYROID STIM HORMONE: CPT | Performed by: INTERNAL MEDICINE

## 2021-01-06 ENCOUNTER — VIRTUAL VISIT (OUTPATIENT)
Dept: CARDIOLOGY | Facility: CLINIC | Age: 66
End: 2021-01-06
Payer: COMMERCIAL

## 2021-01-06 DIAGNOSIS — I50.9 OTHER CONGESTIVE HEART FAILURE (H): Primary | ICD-10-CM

## 2021-01-06 DIAGNOSIS — M21.619 BUNION: ICD-10-CM

## 2021-01-06 DIAGNOSIS — I10 HYPERTENSION GOAL BP (BLOOD PRESSURE) < 140/90: ICD-10-CM

## 2021-01-06 DIAGNOSIS — I48.0 PAROXYSMAL ATRIAL FIBRILLATION (H): ICD-10-CM

## 2021-01-06 DIAGNOSIS — G47.33 OSA (OBSTRUCTIVE SLEEP APNEA): ICD-10-CM

## 2021-01-06 DIAGNOSIS — E11.65 TYPE 2 DIABETES MELLITUS WITH HYPERGLYCEMIA, UNSPECIFIED WHETHER LONG TERM INSULIN USE (H): ICD-10-CM

## 2021-01-06 PROCEDURE — 99214 OFFICE O/P EST MOD 30 MIN: CPT | Mod: 95 | Performed by: NURSE PRACTITIONER

## 2021-01-06 NOTE — PROGRESS NOTES
"Sherry Slaughter is a 65 year old female who is being evaluated via a billable video visit.      How would you like to obtain your AVS? MyChart  If the video visit is dropped, the invitation should be resent by: Text to cell phone: [phone]  Will anyone else be joining your video visit? Yane Wilson LPN    Sherry Slaughter is a 65 year old female who is being evaluated via a billable video visit.      The patient has been notified of following:     \"This video visit will be conducted via a call between you and your physician/provider. We have found that certain health care needs can be provided without the need for an in-person physical exam.  This service lets us provide the care you need with a video conversation.  If a prescription is necessary we can send it directly to your pharmacy.  If lab work is needed we can place an order for that and you can then stop by our lab to have the test done at a later time.    Video visits are billed at different rates depending on your insurance coverage.  Please reach out to your insurance provider with any questions.    If during the course of the call the physician/provider feels a video visit is not appropriate, you will not be charged for this service.\"    Patient has given verbal consent for Video visit? Yes      Video-Visit Details    Type of service:  Video Visit    Video Start Time: 11:15  Video End Time: 11:32    Originating Location (pt. Location): home    Distant Location (provider location):  [facility] HEART [facility]     [facility]    Today Sherry is being seen in the CORE clinic for follow up. She was referred by Dr. Aranda. Her past medical history is significant for the followin.  Type 2 diabetes.   2.  Hypertension.   3.  Hyperlipidemia.   4.  Depression.   5.  Obstructive sleep apnea, currently untreated.   6.  Hypothyroidism.   7.  Diverticulosis.   8.  Low back pain.   9. " " Irritable bowel syndrome.     On 11/25/2020, Dr. Aranda saw the patient and sent her to the emergency room for further treatment of her AFib.  She ended up having her rate controlled and had an echocardiogram performed.  She then had ELIAS-guided cardioversion performed on 12/04/2020 successfully.  She was then hospitalized again on 12/09/2020 for pulmonary edema, likely secondary to being in rapid AFib for quite some time in the past in addition to having her hydrochlorothiazide discontinued.  She was in pulmonary edema on 12/09/2020, was diuresed with 40 mg of IV Lasix and had p.o. Lasix 20 started and she was sent here for further evaluation.     Last CORE appt was 12/23/20. At that appt we decreased the Lasix to 20 mg every other day. We had increased the hydrochlorothiazide to 50 mg daily.  Today she reports that her weight was 290 lbs pounds. She has been taking the lasix daily as she was up in weight for a few days. Her dry weight appears to be around 290 pounds.  She denies any swelling in her lower extremities.  She states her appetite has been fine.  Her blood pressures have been systolically in 110-120 systolic.  She denies having any significant chest pain or shortness of breath, PND or orthopnea. She says she can\" feel her heartbeat\"at times when she lays down. She denies racing or palpitations.  Her HR is in the 70's.     CURRENT MEDICATIONS:  She is taking diltiazem 360 mg a day, Lasix 20 mg a day, insulin, levothyroxine, Victoza, lisinopril 40 mg a day, metformin, hydrochlorothiazide 50 mg ,  metoprolol succinate 200 mg a day, rivaroxaban 20 mg a day, rosuvastatin 10 mg a day.       ROS:   Constitutional: No fever, chills, or sweats.  ENT: No visual disturbance, ear ache, epistaxis, sore throat.   Allergies/Immunologic: Negative  Respiratory: No cough, hemoptysis.   Cardiovascular: As per HPI.   GI: As per HPI.   : No urinary frequency, dysuria, or hematuria.   Integument: Negative.   Psychiatric: " Negative.   Neuro: Negative.   Endocrinology: negative   Musculoskeletal: negative    EXAM:   Constitutional - WDWN, no acute distress   Eyes - no redness or discharge, nonicteric sclera  Respiratory - nonlabored breathing, able to speak in full sentences without difficulty  CV: no visible edema of visualized upper extremities.  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 She appears to be euvolemic today. She will continue the 20 mg lasix daily unless she notes the weight to be lower than 290 lbs . Then she will trial one tab every other day. She would like to have a CORE follow up in April. She has up coming bunion surgery in February and has a follow up with Dr. Aranda in March.      1.  Atrial fibrillation with rapid ventricular response. She underwent ELIAS-guided cardioversion on 12/04/2020.  She has a relatively structurally normal heart.  She is currently stable in normal sinus rhythm on significant doses of metoprolol and diltiazem.  We will continue to monitor.  She is tolerating anticoagulation well without gross bleeding.      2.  Hypertension.     Blood pressures do appear to be well controlled as they have been at 120 or lower systolic daily.      3.  Congestive heart failure.     Dr. Aranda suspects the  episode of congestive heart failure for which she was hospitalized recently on 12/09/2020 may have been related to her poor rate control previous to her cardioversion in addition to stopping hydrochlorothiazide.  Her dry weight is likely in the 290 pound range. LVEF 60-65%        4.  Untreated sleep apnea.  She will require sleep evaluation in order to help prevent another episode  of AFib.      5.  Mild aortic stenosis.    This was seen on recent echo and a mean gradient was only 12 mmHg. continue to follow-through time.       Plan:   Dr. Aranda in March  CORE in early April - labs prior

## 2021-01-06 NOTE — PATIENT INSTRUCTIONS
Take your medicines every day, as directed    Changes made today:  o None.   o    Monitor Your Weight and Symptoms    Contact us if you:      Gain 2 pounds in one day or 5 pounds in one week    Feel more short of breath    Notice more leg swelling    Feel lightheadeded   Change your lifestyle    Limit Salt or Sodium:    2000 mg  Limit Fluids:    2000 mL or approximately 64 ounces  Eat a Heart Healthy Diet    Low in saturated fats  Stay Active:    Aim to move at least 150 minutes every  week         To Contact us    During Business Hours:  [phone], option # 1 (University)  Then option # 4 (medical questions)     After hours, weekends or holidays:   [phone], Option #4  Ask to speak to the On-Call Cardiologist. Inform them you are a CORE/heart failure patient at the [place].     Use [employer] allows you to communicate directly with your heart team through secure messaging.    [place] can be accessed any time on your phone, computer, or tablet.    If you need assistance, we'd be happy to help!         Keep your Heart Appointments:    Dr. Aranda in March    CORE in April

## 2021-01-11 DIAGNOSIS — Z11.59 ENCOUNTER FOR SCREENING FOR OTHER VIRAL DISEASES: Primary | ICD-10-CM

## 2021-01-11 NOTE — PROGRESS NOTES
Chief Complaint   Patient presents with     COMPREHENSIVE EYE EXAM     annual        Hemoglobin A1C   Date Value Ref Range Status   02/06/2017 7.5 % Final       Accompanied by grandmaximino  Last Eye Exam: 9-  Dilated Previously: Yes    What are you currently using to see? otc readers       Distance Vision Acuity: Noticed gradual change in both eyes    Near Vision Acuity: Satisfied with vision while reading  With otc readers    Eye Comfort: good  Do you use eye drops? : No  Occupation or Hobbies: RN at [employer]    Mona Herbert Optometric Assistant, A.B.O.C.          Medical, surgical and family histories reviewed and updated 5/10/2017.       OBJECTIVE: See Ophthalmology exam    ASSESSMENT:    ICD-10-CM    1. Presbyopia H52.4 REFRACTION   2. Astigmatism, bilateral H52.203 REFRACTION   3. Cataracts, both eyes H26.9 EYE EXAM (SIMPLE-NONBILLABLE)     OPHTHALMOLOGY ADULT REFERRAL   4. Type 2 diabetes mellitus without complication, with long-term current use of insulin (H) E11.9 EYE EXAM (SIMPLE-NONBILLABLE)    Z79.4    5. No diabetic retinopathy in both eyes Z03.89 EYE EXAM (SIMPLE-NONBILLABLE)      PLAN:     Patient Instructions   You have cataracts which are affecting your vision.  A change in glasses will not give you much improvement.  A cataract evaluation can be scheduled with the eye surgeon to discuss with you the option of cataract surgery.     Referral to Dr[facility] Linares at [facility] for cataract evaluation.    There are not any signs of the diabetes affecting the eyes today.  It is important that you get your eyes dilated once yearly and keep good control of your diabetes.    Return in 1 year for a complete eye exam or sooner if needed.    Kameron Pedraza, OD           Satisfactory

## 2021-01-14 ENCOUNTER — TELEPHONE (OUTPATIENT)
Dept: PEDIATRICS | Facility: CLINIC | Age: 66
End: 2021-01-14

## 2021-01-14 ENCOUNTER — MYC MEDICAL ADVICE (OUTPATIENT)
Dept: CARDIOLOGY | Facility: CLINIC | Age: 66
End: 2021-01-14

## 2021-01-14 DIAGNOSIS — I10 ESSENTIAL HYPERTENSION WITH GOAL BLOOD PRESSURE LESS THAN 140/90: ICD-10-CM

## 2021-01-14 NOTE — TELEPHONE ENCOUNTER
Routing refill request to provider for review/approval because:  Medication is reported/historical  Analy Becker BSN, RN

## 2021-01-15 NOTE — TELEPHONE ENCOUNTER
Please call patient.  Lisinopril maximum dose is 40 mg daily.  It is not advised to go up to 60 mg.  How long has she been taking this dose?

## 2021-01-17 ENCOUNTER — MYC REFILL (OUTPATIENT)
Dept: PEDIATRICS | Facility: CLINIC | Age: 66
End: 2021-01-17

## 2021-01-17 DIAGNOSIS — I48.0 PAROXYSMAL ATRIAL FIBRILLATION (H): ICD-10-CM

## 2021-01-19 ENCOUNTER — TELEPHONE (OUTPATIENT)
Dept: FAMILY MEDICINE | Facility: OTHER | Age: 66
End: 2021-01-19

## 2021-01-19 DIAGNOSIS — I10 ESSENTIAL HYPERTENSION WITH GOAL BLOOD PRESSURE LESS THAN 140/90: ICD-10-CM

## 2021-01-19 RX ORDER — LISINOPRIL 40 MG/1
40 TABLET ORAL DAILY
Qty: 90 TABLET | Refills: 1 | Status: SHIPPED | OUTPATIENT
Start: 2021-01-19 | End: 2021-04-14

## 2021-01-21 ENCOUNTER — OFFICE VISIT (OUTPATIENT)
Dept: FAMILY MEDICINE | Facility: CLINIC | Age: 66
End: 2021-01-21
Payer: COMMERCIAL

## 2021-01-21 VITALS
DIASTOLIC BLOOD PRESSURE: 74 MMHG | OXYGEN SATURATION: 98 % | WEIGHT: 292 LBS | RESPIRATION RATE: 16 BRPM | BODY MASS INDEX: 43.12 KG/M2 | TEMPERATURE: 98.1 F | SYSTOLIC BLOOD PRESSURE: 134 MMHG | HEART RATE: 71 BPM

## 2021-01-21 DIAGNOSIS — I34.0 NONRHEUMATIC MITRAL VALVE REGURGITATION: ICD-10-CM

## 2021-01-21 DIAGNOSIS — G47.33 OSA (OBSTRUCTIVE SLEEP APNEA): ICD-10-CM

## 2021-01-21 DIAGNOSIS — E03.9 HYPOTHYROIDISM, UNSPECIFIED TYPE: ICD-10-CM

## 2021-01-21 DIAGNOSIS — M21.612 BUNION, LEFT: ICD-10-CM

## 2021-01-21 DIAGNOSIS — Z01.818 PREOP GENERAL PHYSICAL EXAM: Primary | ICD-10-CM

## 2021-01-21 DIAGNOSIS — E11.59 CONTROLLED TYPE 2 DIABETES MELLITUS WITH OTHER CIRCULATORY COMPLICATION, WITH LONG-TERM CURRENT USE OF INSULIN (H): ICD-10-CM

## 2021-01-21 DIAGNOSIS — I48.0 PAROXYSMAL ATRIAL FIBRILLATION (H): ICD-10-CM

## 2021-01-21 DIAGNOSIS — E78.5 HYPERLIPIDEMIA LDL GOAL <100: ICD-10-CM

## 2021-01-21 DIAGNOSIS — Z79.4 LONG-TERM INSULIN USE (H): ICD-10-CM

## 2021-01-21 DIAGNOSIS — I10 HYPERTENSION GOAL BP (BLOOD PRESSURE) < 140/90: ICD-10-CM

## 2021-01-21 DIAGNOSIS — E66.01 MORBID OBESITY (H): ICD-10-CM

## 2021-01-21 DIAGNOSIS — Z79.01 ON CONTINUOUS ORAL ANTICOAGULATION: ICD-10-CM

## 2021-01-21 DIAGNOSIS — Z79.4 CONTROLLED TYPE 2 DIABETES MELLITUS WITH OTHER CIRCULATORY COMPLICATION, WITH LONG-TERM CURRENT USE OF INSULIN (H): ICD-10-CM

## 2021-01-21 PROCEDURE — 99214 OFFICE O/P EST MOD 30 MIN: CPT | Performed by: INTERNAL MEDICINE

## 2021-01-21 NOTE — H&P (VIEW-ONLY)
[address] [address]  Phone: [phone]  Primary Provider: Marilou Oswald  Pre-op Performing Provider: MARILOU OSWALD    PREOPERATIVE EVALUATION:  Today's date: 1/21/2021    Sherry Slaughter is a 65 year old female who presents for a preoperative evaluation.    Surgical Information:  Surgery/Procedure: Left bunion correction with bone cutting and screw fixation  Surgery Location:   Surgeon: Dr. David Hoffman  Surgery Date: 2/2/2021  Time of Surgery: 9:50  Where patient plans to recover: At home with family  Fax number for surgical facility: Note does not need to be faxed, will be available electronically in Epic.    Type of Anesthesia Anticipated: MAC    Subjective     HPI related to upcoming procedure: left bunion surgery with screw fixation.     Preop Questions 1/15/2021   1. Have you ever had a heart attack or stroke? No   2. Have you ever had surgery on your heart or blood vessels, such as a stent placement, a coronary artery bypass, or surgery on an artery in your head, neck, heart, or legs? No   3. Do you have chest pain with activity? No   4. Do you have a history of  heart failure? YES, resolved   5. Do you currently have a cold, bronchitis or symptoms of other infection? No   6. Do you have a cough, shortness of breath, or wheezing? No   7. Do you or anyone in your family have previous history of blood clots? No   8. Do you or does anyone in your family have a serious bleeding problem such as prolonged bleeding following surgeries or cuts? No   9. Have you ever had problems with anemia or been told to take iron pills? No   10. Have you had any abnormal blood loss such as black, tarry or bloody stools, or abnormal vaginal bleeding? No   11. Have you ever had a blood transfusion? No   12. Are you willing to have a blood transfusion if it is medically needed before, during, or after your surgery? Yes   13. Have you or any of your relatives  ever had problems with anesthesia? No   14. Do you have sleep apnea, excessive snoring or daytime drowsiness? YES - sleep apnea, currently untreated   14a. Do you have a CPAP machine? Yes   15. Do you have any artifical heart valves or other implanted medical devices like a pacemaker, defibrillator, or continuous glucose monitor? No   16. Do you have artificial joints? YES - both knees   17. Are you allergic to latex? No       Health Care Directive:  Patient does not have a Health Care Directive or Living Will: patient indicated full code. We do not have paper copy.     Preoperative Review of :   reviewed - no record of controlled substances prescribed.      Sherry has Morbid obesity (H); Hypothyroidism; GENESIS (obstructive sleep apnea); Hypertension goal BP (blood pressure) < 140/90; Mitral valve insufficiency; Tubular adenoma of colon; Long-term insulin use (H); Paroxysmal atrial fibrillation (H); and On continuous oral anticoagulation on their pertinent problem list.     These chronic health issues have been stable.  Diabetes is well controlled with A1c of 6.8 in January.  She has follow-up with cardiology regarding 1 episode of A. fib and A. fib associated heart failure.  She is being followed by [facility] clinic.  She is on Xarelto for paroxysmal A. fib.  Currently in sinus rhythm.  Most recent cardiac evaluation was on January 6, 2021.    Review of Systems  CONSTITUTIONAL: NEGATIVE for fever, chills, change in weight  ENT/MOUTH: NEGATIVE for ear, mouth and throat problems  RESP: NEGATIVE for significant cough or SOB  CV: NEGATIVE for chest pain, palpitations or peripheral edema  GI: NEGATIVE for nausea, abdominal pain, heartburn, or change in bowel habits  : Negative for dysuria, frequency, urgency or hematuria.    MUSCULOSKELETAL: POSITIVE  for left foot pain related to the bunion.  ENDOCRINE: NEGATIVE for temperature intolerance, skin/hair changes  HEME/ALLERGY/IMMUNE: NEGATIVE for bleeding  problems    Patient Active Problem List    Diagnosis Date Noted     On continuous oral anticoagulation 01/21/2021     Priority: High     Paroxysmal atrial fibrillation (H) 11/25/2020     Priority: High     Long-term insulin use (H) 02/19/2015     Priority: High     Tubular adenoma of colon 02/28/2014     Priority: High     On colonoscopy 2/2014. Repeat in 5 years.        Mitral valve insufficiency 06/15/2010     Priority: High     Interpretation Summary 6.15.10  Global and regional left ventricular function is normal with an EF of 60-65%.   Mild mitral insufficiency is present.  PatientHeight: 70 in  PatientWeight: 290 lbs  BSA 2.4 m^2        Hypertension goal BP (blood pressure) < 140/90      Priority: High     late 1990s - started medications at that time - not too difficult to control meds        GENESIS (obstructive sleep apnea) 10/15/2006     Priority: High     PSH at [facility] 5/2015 Severe GENESIS AHI 54  Clinically signficant GENESIS with hypoxemia - with sats into the 70s during REM - rem phenomenon - rec for CPAP, weight reduction as well        Hypothyroidism 10/11/2006     Priority: High     TSH 3.36 - mild subclinical hypothyroidism - deciding on following or starting low dose meds - with dr Oreilly - following          Morbid obesity (H) 04/14/2003     Priority: High     Bunion 12/23/2020     Priority: Medium     Added automatically from request for surgery [id number]       SOB (shortness of breath) 12/09/2020     Priority: Medium     Pseudophakia of both eyes 04/11/2018     Priority: Medium     Posterior vitreous detachment of right eye 09/06/2017     Priority: Medium     Type 2 diabetes mellitus with hyperglycemia (H) 06/06/2016     Priority: Medium     Lumbago 11/02/2015     Priority: Medium     S/P total knee replacement using cement, right 09/23/2015     Priority: Medium     History of viral hepatitis, type B 03/07/2014     Priority: Medium     In 1979 due to finger stick form dialysis patient.        Aortic sclerosis  07/10/2011     Priority: Medium     On echo 7/11       Hyperlipidemia LDL goal <100 10/31/2010     Priority: Medium     History of major depression      Priority: Medium     comes and goes - tried meds - unsuccessfully, certain times of the year, no psych intervention and no counselors in the past - and not interested        Nonspecific abnormal results of cardiovascular function study 04/10/2005     Priority: Medium     Problem list name updated by automated process. Provider to review       Chronic low back pain 06/11/2014     Priority: Low     Since 2007. Works with chiropractor on back pain.        JOINT PAIN-ANKLE - right ankle  12/11/2006     Priority: Low     S/p surgery - triple arthrodecesis and bunion surgery on the right        Irritable bowel syndrome      Priority: Low     goes between the 2 - nerve related - more stressed more problems        Family history of malignant neoplasm of breast      Priority: Low     mother - young age - 45, and maternal cousin and aunt as well - no BRCA testing done        OSTEOARTHRITIS L KNEE  s/p knee replacement on the left       Priority: Low     total knee on the left - and pain is gone since the knee replacement        Family history of stroke (cerebrovascular)      Priority: Low     grandmother in early life in her 40s        Family history of other cardiovascular diseases      Priority: Low     father - CAD, and lipids/HTN - multiple members of the family   Problem list name updated by automated process. Provider to review and confirm  Problem list name updated by automated process. Provider to review       Nonallopathic lesion of thoracic region 08/23/2005     Priority: Low     Problem list name updated by automated process. Provider to review       FAMILY HX COLON CANCER 04/10/2005     Priority: Low     Pat uncles x 2       Diverticulosis of large intestine 02/15/2005     Priority: Low     colonoscopy  Problem list name updated by automated process. Provider to  review        Past Medical History:   Diagnosis Date     Cataract      Congestive heart failure (H) 2019 NOVEMBER    AFIB     DEPRESSION     comes and goes - tried meds - unsuccessfully, certain times of the year, no psych intervention and no counselors in the past - and not interested      Diverticulosis of colon (without mention of hemorrhage) 4/04    colonoscopy     ECHO-mildLVH,tr MR,mild thick lflets w inc LA,trTR   12/03     Essential hypertension, benign 1990s    late 1990s - started medications at that time - not to difficult to control meds      Fam hx-cardiovas dis [facility]     father - CAD, and lipids/HTN - multiple members of the family      Family history of diabetes mellitus     sister and grandmother with DM      Family history of malignant neoplasm of breast     mother - young age - 45, and maternal cousin and aunt as well - no BRCA testing done      Family history of stroke (cerebrovascular)     grandmother in early life in her 40s      FAMILY HX COLON CANCER     Pat uncles x 2     Heart disease     murmur/     Heart murmur      HYPERLIPIDEMIA 2000    fairly recent - in the last 5 years - high for DM levels  -cholesterol recent      Irritable bowel syndrome     goes between the 2 - nerve related - more stressed more problems      MICROALBUMINURIA     unsure how long - been on the lisinopril - for a few years at well      Nonspecific abnormal results of liver function study 2/3/2003    SGOT - has been high in the past - since the hepatitis b - borderline elevation - not really changing any      OBESITY      Obstructive sleep apnea      GENESIS (obstructive sleep apnea) 10/15/2006    psg 5/15 AHI 53 aPAP 8-15     OSTEOARTHRITIS L KNEE 2003    total knee on the left - and pain is gone since the knee replacement      PERS HX HEPATITIS B- RESOLVED] 1977    acute virus only - no chronic disease      PERS HX HEPATITIS B- RESOLVED]      Type II or unspecified type diabetes mellitus without mention of complication,  not stated as uncontrolled 1991    oral meds frist, then insulin eventually later, difficult to control most of the time      Unspecified hypothyroidism 10/11/2006    TSH 3.36 - mild subclinical hypothyroidism - deciding on following or starting low dose meds - with dr Oreilly - following      Past Surgical History:   Procedure Laterality Date     ABDOMEN SURGERY      ovaian scope/ appendix removal     ANESTHESIA CARDIOVERSION N/A 12/4/2020    Procedure: ANESTHESIA, FOR CARDIOVERSION @1400;  Surgeon: GENERIC ANESTHESIA PROVIDER;  Location: [facility] OR     ARTHROPLASTY KNEE Right 9/23/2015    Procedure: ARTHROPLASTY KNEE;  Surgeon: Rufus Brown MD;  Location:  OR     C TOTAL KNEE ARTHROPLASTY  3/03    L knee     CATARACT IOL, RT/LT Left      COLONOSCOPY  4/04    diverticulosis, rec repeat 10 yrs(consider fam hx?)     COLONOSCOPY WITH CO2 INSUFFLATION N/A 6/19/2019    Procedure: COLONOSCOPY, WITH CO2 INSUFFLATION;  Surgeon: Zeb Duarte MD;  Location: [facility] OR     ORTHOPEDIC SURGERY      right ankle     PHACOEMULSIFICATION CLEAR CORNEA WITH STANDARD INTRAOCULAR LENS IMPLANT Left 3/15/2018    Procedure: PHACOEMULSIFICATION CLEAR CORNEA WITH STANDARD INTRAOCULAR LENS IMPLANT;  LEFT EYE PHACOEMULSIFICATION CLEAR CORNEA WITH STANDARD INTRAOCULAR LENS IMPLANT;  Surgeon: Bandar Linares MD;  Location:  [facility]     PHACOEMULSIFICATION CLEAR CORNEA WITH STANDARD INTRAOCULAR LENS IMPLANT Right 4/5/2018    Procedure: PHACOEMULSIFICATION CLEAR CORNEA WITH STANDARD INTRAOCULAR LENS IMPLANT;  RIGHT PHACOEMULSIFICATION CLEAR CORNEA WITH STANDARD INTRAOCULAR LENS IMPLANT ;  Surgeon: Bandar Linares MD;  Location:  [facility]     STRESS ECHO (METRO)  12/03    no ischemia, limited by fatigue     SURGICAL HISTORY OF -       EXP LAP- R OVARY CYSTS     SURGICAL HISTORY OF -   1981,1984,1985    CHILDBIRTH     ZZC NONSPECIFIC PROCEDURE  6/06    right triple arthrodecesis      ZZC NONSPECIFIC PROCEDURE  7/06     right bunion  surgery      Current Outpatient Medications   Medication Sig Dispense Refill     amoxicillin (AMOXIL) 500 MG capsule Take 2,000 mg by mouth Before dental procedures       blood glucose monitoring (NO BRAND SPECIFIED) test strip Use to test blood sugar 4 times daily or as directed. 100 each 11     Continuous Blood Gluc  (FREESTYLE MIKE 14 DAY READER) KATIA 1 each daily 1 Device 0     Continuous Blood Gluc Sensor (FREESTYLE MIKE 14 DAY SENSOR) [facility] APPLY 1 SENSOR AND CHANGE EVERY 14 DAYS AS DIRECTED 6 each 1     diltiazem ER (DILT-XR) 120 MG 24 hr capsule Take 1 capsule (120 mg) by mouth daily Take in addition to 240mg ( total of 360mg) 30 capsule 1     diltiazem ER COATED BEADS (CARDIZEM CD/CARTIA XT) 240 MG 24 hr capsule Take 1 capsule (240 mg) by mouth daily Take in addition to 120mg tablet (total of 360mg) 30 capsule 1     econazole nitrate 1 % external cream Apply topically daily as needed To feet and toenails       furosemide (LASIX) 20 MG tablet Take 1 tablet (20 mg) by mouth daily 90 tablet 3     Garlic 1000 MG CAPS Take 1 capsule by mouth daily Takes during summer months.  Done taking until next summer.       HUMULIN R U-500 KWIKPEN 500 UNIT/ML PEN soln INJECT 120 UNITS IN THE MORNING  UNITS AT BEDTIME 40 mL 3     hydrochlorothiazide (HYDRODIURIL) 25 MG tablet Take 2 tablets (50 mg) by mouth daily 180 tablet 1     ibuprofen (ADVIL) 200 MG capsule Take 600 mg by mouth every 6 hours as needed        insulin pen needle (GNP CLICKFINE PEN NEEDLES) 31G X 8 MM miscellaneous Use six times daily or as directed 200 each 5     levothyroxine (SYNTHROID/LEVOTHROID) 75 MCG tablet Take 1 tablet (75 mcg) by mouth daily 90 tablet 1     liraglutide (VICTOZA PEN) 18 MG/3ML solution Administer two injections of 1.8 and 1.2 mg each, for a total daily dose of 3 mg. 15 mL 6     lisinopril (ZESTRIL) 40 MG tablet Take 1 tablet (40 mg) by mouth daily 90 tablet 1     metFORMIN (GLUCOPHAGE-XR) 500 MG 24 hr tablet Take 4  tabs  tablet 1     metoprolol succinate ER (TOPROL-XL) 50 MG 24 hr tablet Take 4 tablets (200 mg) by mouth daily 360 tablet 0     Multiple Vitamins-Minerals (ADVANCED DIABETIC MULTIVITAMIN) TABS Take 1 tablet by mouth daily       ORDER FOR DME, SET TO LOCAL PRINT, Respironics REMSTAR 60 Series Auto CPAP 8-15cm H2O, Airfit P10 nasal pillow mask w/medium pillows       order for DME Equipment being ordered: [id number] $70   Shoe LG 1 Device 0     order for DME Roll-A-Bout Walker. Patient can use for left foot. 1 Units 0     order for DME Equipment being ordered: wrist brace with thumb spica, L 1 Device 0     order for DME Equipment being ordered: wrist brace with thumb spica, R 1 Device 0     potassium chloride ER (KLOR-CON M) 20 MEQ CR tablet Take 4 pills (total 80meq)  by an hour each today only, then one pill (20meq) every day with Lasix only 96 tablet 3     rivaroxaban ANTICOAGULANT (XARELTO) 20 MG TABS tablet Take 1 tablet (20 mg) by mouth daily (with dinner) 90 tablet 0     rosuvastatin (CRESTOR) 10 MG tablet Take 1 tablet (10 mg) by mouth daily 90 tablet 3       Allergies   Allergen Reactions     Atorvastatin Calcium      Gas     Pravachol [Pravastatin Sodium]      Pravachol - dry cough      Simvastatin      Myalgia        Social History     Tobacco Use     Smoking status: Never Smoker     Smokeless tobacco: Never Used     Tobacco comment: tried in early 20s only    Substance Use Topics     Alcohol use: Yes     Comment: rare     Family History   Problem Relation Age of Onset     Diabetes Maternal Grandmother         DM TYPE 2     Cerebrovascular Disease Maternal Grandmother      Hypertension Sister      Lipids Sister      Heart Disease Sister         Chronic AFib     Diabetes Sister      Coronary Artery Disease Sister         afib     Hypertension Sister      Lipids Sister      Gynecology Sister      Diabetes Sister      Asthma Sister      Blood Disease Paternal Grandmother         T CELL  LEUKEMIA     Hypertension Brother      Lipids Brother      Congenital Anomalies Brother      Cardiovascular Brother      Heart Disease Brother         CABG/AFIB     Coronary Artery Disease Brother         cabg afib AAA     Hypertension Brother      Lipids Brother      Other Cancer Brother         multple myeloma     Obesity Brother      Hypertension Brother      Respiratory Brother         Sleep apnea     Heart Disease Brother         AFib     Coronary Artery Disease Brother         afib     Alzheimer Disease Mother      Asthma Mother      Hypertension Mother      Breast Cancer Mother 40        has mastectomy and lived to 84     Allergies Mother         Sulfa,     Arthritis Mother      Cardiovascular Mother      Depression Mother      Respiratory Mother      Lipids Mother      Cancer Mother         breast     Thyroid Disease Mother      Cerebrovascular Disease Mother         FROM  AFIB     Dementia Mother         Alzheimers     Other Cancer Mother         breast     Cancer - colorectal Other      Colon Cancer Other         2019     Cancer Father         lung     Aneurysm Father         brother, AAA     Other Cancer Father         lung ca     Coronary Artery Disease Father         cad AAA     Asthma Sister      Cancer - colorectal Paternal Uncle         late 50s to early 60s - great uncles      Breast Cancer Other         Maternal cousin     Arrhythmia Sister         2 brothers 1 sister Afib     Breast Cancer Maternal Aunt 62     Other Cancer Maternal Aunt 35        Ovarian cancer.  Survived despite late recurrence and is now 92.     Glaucoma No family hx of      Macular Degeneration No family hx of      History   Drug Use No         Objective     /74   Pulse 71   Temp 98.1  F (36.7  C) (Oral)   Resp 16   Wt 132.5 kg (292 lb)   LMP 10/02/2007   SpO2 98%   BMI 43.12 kg/m      Physical Exam  GENERAL APPEARANCE: healthy, alert and no distress  RESP: lungs clear to auscultation - no rales, rhonchi or  wheezes  CV: regular rate and rhythm, normal S1 S2, no S3 or S4 and no murmur, click or rub   ABDOMEN: soft, nontender, no HSM or masses and bowel sounds normal  MS: extremities normal- no gross deformities noted  NEURO: Normal strength and tone, sensory exam grossly normal, mentation intact and speech normal    Recent Labs   Lab Test 01/05/21  0927 12/23/20  1034 12/04/20  1158 12/04/20  1158 11/25/20  1734 11/25/20  1734   HGB 13.9 13.6   < >  --   --  14.2    244   < >  --   --  225   INR  --   --   --  1.52*  --  1.09    142   < >  --    < > 139   POTASSIUM 3.8 4.0   < > 4.1   < > 4.1   CR 0.99 0.89   < >  --    < > 0.79   A1C 6.8*  --   --   --   --  6.9*    < > = values in this interval not displayed.        Diagnostics:     EKG: atrial flutter    Revised Cardiac Risk Index (RCRI):  The patient has the following serious cardiovascular risks for perioperative complications:   - Diabetes Mellitus (on Insulin) = 1 point     RCRI Interpretation: 1 point: Class II (low risk - 0.9% complication rate)           Assessment & Plan   The proposed surgical procedure is considered INTERMEDIATE risk.    Sherry was seen today for pre-op exam.    Diagnoses and all orders for this visit:    Preop general physical exam    Bunion, left    Controlled type 2 diabetes mellitus with other circulatory complication, with long-term current use of insulin (H)    Morbid obesity (H)    Nonrheumatic mitral valve regurgitation    Long-term insulin use (H)    Hypertension goal BP (blood pressure) < 140/90    Paroxysmal atrial fibrillation (H)    GENESIS (obstructive sleep apnea)    Hypothyroidism, unspecified type    On continuous oral anticoagulation    Hyperlipidemia LDL goal <100           Risks and Recommendations:  The patient has the following additional risks and recommendations for perioperative complications:   - Morbid obesity (BMI >40)  Cardiovascular:   -Optimized for surgery  Diabetes:  - Patient is on insulin  therapy; diabetic NPO guidelines provided and discussed. --Patient has virtual visit with the endocrine, to confirm diabetes medication perioperative adjustment.    Pulmonary:    - Incentive spirometry post-op  Obstructive Sleep Apnea:   --Currently untreated    Medication Instructions:  Patient is to take all scheduled medications on the day of surgery EXCEPT for modifications listed below:   - apixaban (Eliquis), edoxaban (Savaysa), rivaroxaban (Xarelto): Bleeding risk is moderate or high for this procedure AND CrCl  (>=) 50 mL/min. HOLD 2 days before surgery.    - ACE/ARB: May be continued on the day of surgery.    - Beta Blockers: Continue taking on the day of surgery.   - Diuretics: May continue due to heart failure.   - Statins: Continue taking on the day of surgery.    - Long acting insulin (e.g. glargine, detemir): Take 80% of the usual evening or morning dose before surgery -- to confirm with endocrine    RECOMMENDATION:  APPROVAL GIVEN to proceed with proposed procedure, without further diagnostic evaluation.    Signed Electronically by: Marilou Arciniega MD PhD    Copy of this evaluation report is provided to requesting physician.    [facility] Guidelines    Revised Cardiac Risk Index

## 2021-01-21 NOTE — PROGRESS NOTES
[address] [address]  Phone: [phone]  Primary Provider: Marilou Oswald  Pre-op Performing Provider: MARILOU OSWALD    PREOPERATIVE EVALUATION:  Today's date: 1/21/2021    Sherry Slaughter is a 65 year old female who presents for a preoperative evaluation.    Surgical Information:  Surgery/Procedure: Left bunion correction with bone cutting and screw fixation  Surgery Location:   Surgeon: Dr. David Hoffman  Surgery Date: 2/2/2021  Time of Surgery: 9:50  Where patient plans to recover: At home with family  Fax number for surgical facility: Note does not need to be faxed, will be available electronically in Epic.    Type of Anesthesia Anticipated: MAC    Subjective     HPI related to upcoming procedure: left bunion surgery with screw fixation.     Preop Questions 1/15/2021   1. Have you ever had a heart attack or stroke? No   2. Have you ever had surgery on your heart or blood vessels, such as a stent placement, a coronary artery bypass, or surgery on an artery in your head, neck, heart, or legs? No   3. Do you have chest pain with activity? No   4. Do you have a history of  heart failure? YES, resolved   5. Do you currently have a cold, bronchitis or symptoms of other infection? No   6. Do you have a cough, shortness of breath, or wheezing? No   7. Do you or anyone in your family have previous history of blood clots? No   8. Do you or does anyone in your family have a serious bleeding problem such as prolonged bleeding following surgeries or cuts? No   9. Have you ever had problems with anemia or been told to take iron pills? No   10. Have you had any abnormal blood loss such as black, tarry or bloody stools, or abnormal vaginal bleeding? No   11. Have you ever had a blood transfusion? No   12. Are you willing to have a blood transfusion if it is medically needed before, during, or after your surgery? Yes   13. Have you or any of your relatives  ever had problems with anesthesia? No   14. Do you have sleep apnea, excessive snoring or daytime drowsiness? YES - sleep apnea, currently untreated   14a. Do you have a CPAP machine? Yes   15. Do you have any artifical heart valves or other implanted medical devices like a pacemaker, defibrillator, or continuous glucose monitor? No   16. Do you have artificial joints? YES - both knees   17. Are you allergic to latex? No       Health Care Directive:  Patient does not have a Health Care Directive or Living Will: patient indicated full code. We do not have paper copy.     Preoperative Review of :   reviewed - no record of controlled substances prescribed.      Sherry has Morbid obesity (H); Hypothyroidism; GENESIS (obstructive sleep apnea); Hypertension goal BP (blood pressure) < 140/90; Mitral valve insufficiency; Tubular adenoma of colon; Long-term insulin use (H); Paroxysmal atrial fibrillation (H); and On continuous oral anticoagulation on their pertinent problem list.     These chronic health issues have been stable.  Diabetes is well controlled with A1c of 6.8 in January.  She has follow-up with cardiology regarding 1 episode of A. fib and A. fib associated heart failure.  She is being followed by [facility] clinic.  She is on Xarelto for paroxysmal A. fib.  Currently in sinus rhythm.  Most recent cardiac evaluation was on January 6, 2021.    Review of Systems  CONSTITUTIONAL: NEGATIVE for fever, chills, change in weight  ENT/MOUTH: NEGATIVE for ear, mouth and throat problems  RESP: NEGATIVE for significant cough or SOB  CV: NEGATIVE for chest pain, palpitations or peripheral edema  GI: NEGATIVE for nausea, abdominal pain, heartburn, or change in bowel habits  : Negative for dysuria, frequency, urgency or hematuria.    MUSCULOSKELETAL: POSITIVE  for left foot pain related to the bunion.  ENDOCRINE: NEGATIVE for temperature intolerance, skin/hair changes  HEME/ALLERGY/IMMUNE: NEGATIVE for bleeding  problems    Patient Active Problem List    Diagnosis Date Noted     On continuous oral anticoagulation 01/21/2021     Priority: High     Paroxysmal atrial fibrillation (H) 11/25/2020     Priority: High     Long-term insulin use (H) 02/19/2015     Priority: High     Tubular adenoma of colon 02/28/2014     Priority: High     On colonoscopy 2/2014. Repeat in 5 years.        Mitral valve insufficiency 06/15/2010     Priority: High     Interpretation Summary 6.15.10  Global and regional left ventricular function is normal with an EF of 60-65%.   Mild mitral insufficiency is present.  PatientHeight: 70 in  PatientWeight: 290 lbs  BSA 2.4 m^2        Hypertension goal BP (blood pressure) < 140/90      Priority: High     late 1990s - started medications at that time - not too difficult to control meds        GENESIS (obstructive sleep apnea) 10/15/2006     Priority: High     PSH at [facility] 5/2015 Severe GENESIS AHI 54  Clinically signficant GENESIS with hypoxemia - with sats into the 70s during REM - rem phenomenon - rec for CPAP, weight reduction as well        Hypothyroidism 10/11/2006     Priority: High     TSH 3.36 - mild subclinical hypothyroidism - deciding on following or starting low dose meds - with dr Oreilly - following          Morbid obesity (H) 04/14/2003     Priority: High     Bunion 12/23/2020     Priority: Medium     Added automatically from request for surgery [id number]       SOB (shortness of breath) 12/09/2020     Priority: Medium     Pseudophakia of both eyes 04/11/2018     Priority: Medium     Posterior vitreous detachment of right eye 09/06/2017     Priority: Medium     Type 2 diabetes mellitus with hyperglycemia (H) 06/06/2016     Priority: Medium     Lumbago 11/02/2015     Priority: Medium     S/P total knee replacement using cement, right 09/23/2015     Priority: Medium     History of viral hepatitis, type B 03/07/2014     Priority: Medium     In 1979 due to finger stick form dialysis patient.        Aortic sclerosis  07/10/2011     Priority: Medium     On echo 7/11       Hyperlipidemia LDL goal <100 10/31/2010     Priority: Medium     History of major depression      Priority: Medium     comes and goes - tried meds - unsuccessfully, certain times of the year, no psych intervention and no counselors in the past - and not interested        Nonspecific abnormal results of cardiovascular function study 04/10/2005     Priority: Medium     Problem list name updated by automated process. Provider to review       Chronic low back pain 06/11/2014     Priority: Low     Since 2007. Works with chiropractor on back pain.        JOINT PAIN-ANKLE - right ankle  12/11/2006     Priority: Low     S/p surgery - triple arthrodecesis and bunion surgery on the right        Irritable bowel syndrome      Priority: Low     goes between the 2 - nerve related - more stressed more problems        Family history of malignant neoplasm of breast      Priority: Low     mother - young age - 45, and maternal cousin and aunt as well - no BRCA testing done        OSTEOARTHRITIS L KNEE  s/p knee replacement on the left       Priority: Low     total knee on the left - and pain is gone since the knee replacement        Family history of stroke (cerebrovascular)      Priority: Low     grandmother in early life in her 40s        Family history of other cardiovascular diseases      Priority: Low     father - CAD, and lipids/HTN - multiple members of the family   Problem list name updated by automated process. Provider to review and confirm  Problem list name updated by automated process. Provider to review       Nonallopathic lesion of thoracic region 08/23/2005     Priority: Low     Problem list name updated by automated process. Provider to review       FAMILY HX COLON CANCER 04/10/2005     Priority: Low     Pat uncles x 2       Diverticulosis of large intestine 02/15/2005     Priority: Low     colonoscopy  Problem list name updated by automated process. Provider to  review        Past Medical History:   Diagnosis Date     Cataract      Congestive heart failure (H) 2019 NOVEMBER    AFIB     DEPRESSION     comes and goes - tried meds - unsuccessfully, certain times of the year, no psych intervention and no counselors in the past - and not interested      Diverticulosis of colon (without mention of hemorrhage) 4/04    colonoscopy     ECHO-mildLVH,tr MR,mild thick lflets w inc LA,trTR   12/03     Essential hypertension, benign 1990s    late 1990s - started medications at that time - not to difficult to control meds      Fam hx-cardiovas dis [facility]     father - CAD, and lipids/HTN - multiple members of the family      Family history of diabetes mellitus     sister and grandmother with DM      Family history of malignant neoplasm of breast     mother - young age - 45, and maternal cousin and aunt as well - no BRCA testing done      Family history of stroke (cerebrovascular)     grandmother in early life in her 40s      FAMILY HX COLON CANCER     Pat uncles x 2     Heart disease     murmur/     Heart murmur      HYPERLIPIDEMIA 2000    fairly recent - in the last 5 years - high for DM levels  -cholesterol recent      Irritable bowel syndrome     goes between the 2 - nerve related - more stressed more problems      MICROALBUMINURIA     unsure how long - been on the lisinopril - for a few years at well      Nonspecific abnormal results of liver function study 2/3/2003    SGOT - has been high in the past - since the hepatitis b - borderline elevation - not really changing any      OBESITY      Obstructive sleep apnea      GENESIS (obstructive sleep apnea) 10/15/2006    psg 5/15 AHI 53 aPAP 8-15     OSTEOARTHRITIS L KNEE 2003    total knee on the left - and pain is gone since the knee replacement      PERS HX HEPATITIS B- RESOLVED] 1977    acute virus only - no chronic disease      PERS HX HEPATITIS B- RESOLVED]      Type II or unspecified type diabetes mellitus without mention of complication,  not stated as uncontrolled 1991    oral meds frist, then insulin eventually later, difficult to control most of the time      Unspecified hypothyroidism 10/11/2006    TSH 3.36 - mild subclinical hypothyroidism - deciding on following or starting low dose meds - with dr Oreilly - following      Past Surgical History:   Procedure Laterality Date     ABDOMEN SURGERY      ovaian scope/ appendix removal     ANESTHESIA CARDIOVERSION N/A 12/4/2020    Procedure: ANESTHESIA, FOR CARDIOVERSION @1400;  Surgeon: GENERIC ANESTHESIA PROVIDER;  Location: [facility] OR     ARTHROPLASTY KNEE Right 9/23/2015    Procedure: ARTHROPLASTY KNEE;  Surgeon: Rufus Brown MD;  Location:  OR     C TOTAL KNEE ARTHROPLASTY  3/03    L knee     CATARACT IOL, RT/LT Left      COLONOSCOPY  4/04    diverticulosis, rec repeat 10 yrs(consider fam hx?)     COLONOSCOPY WITH CO2 INSUFFLATION N/A 6/19/2019    Procedure: COLONOSCOPY, WITH CO2 INSUFFLATION;  Surgeon: Zeb Duarte MD;  Location: [facility] OR     ORTHOPEDIC SURGERY      right ankle     PHACOEMULSIFICATION CLEAR CORNEA WITH STANDARD INTRAOCULAR LENS IMPLANT Left 3/15/2018    Procedure: PHACOEMULSIFICATION CLEAR CORNEA WITH STANDARD INTRAOCULAR LENS IMPLANT;  LEFT EYE PHACOEMULSIFICATION CLEAR CORNEA WITH STANDARD INTRAOCULAR LENS IMPLANT;  Surgeon: Bandar Linares MD;  Location:  [facility]     PHACOEMULSIFICATION CLEAR CORNEA WITH STANDARD INTRAOCULAR LENS IMPLANT Right 4/5/2018    Procedure: PHACOEMULSIFICATION CLEAR CORNEA WITH STANDARD INTRAOCULAR LENS IMPLANT;  RIGHT PHACOEMULSIFICATION CLEAR CORNEA WITH STANDARD INTRAOCULAR LENS IMPLANT ;  Surgeon: Bandar Linares MD;  Location:  [facility]     STRESS ECHO (METRO)  12/03    no ischemia, limited by fatigue     SURGICAL HISTORY OF -       EXP LAP- R OVARY CYSTS     SURGICAL HISTORY OF -   1981,1984,1985    CHILDBIRTH     ZZC NONSPECIFIC PROCEDURE  6/06    right triple arthrodecesis      ZZC NONSPECIFIC PROCEDURE  7/06     right bunion  surgery      Current Outpatient Medications   Medication Sig Dispense Refill     amoxicillin (AMOXIL) 500 MG capsule Take 2,000 mg by mouth Before dental procedures       blood glucose monitoring (NO BRAND SPECIFIED) test strip Use to test blood sugar 4 times daily or as directed. 100 each 11     Continuous Blood Gluc  (FREESTYLE MIKE 14 DAY READER) KATIA 1 each daily 1 Device 0     Continuous Blood Gluc Sensor (FREESTYLE MIKE 14 DAY SENSOR) [facility] APPLY 1 SENSOR AND CHANGE EVERY 14 DAYS AS DIRECTED 6 each 1     diltiazem ER (DILT-XR) 120 MG 24 hr capsule Take 1 capsule (120 mg) by mouth daily Take in addition to 240mg ( total of 360mg) 30 capsule 1     diltiazem ER COATED BEADS (CARDIZEM CD/CARTIA XT) 240 MG 24 hr capsule Take 1 capsule (240 mg) by mouth daily Take in addition to 120mg tablet (total of 360mg) 30 capsule 1     econazole nitrate 1 % external cream Apply topically daily as needed To feet and toenails       furosemide (LASIX) 20 MG tablet Take 1 tablet (20 mg) by mouth daily 90 tablet 3     Garlic 1000 MG CAPS Take 1 capsule by mouth daily Takes during summer months.  Done taking until next summer.       HUMULIN R U-500 KWIKPEN 500 UNIT/ML PEN soln INJECT 120 UNITS IN THE MORNING  UNITS AT BEDTIME 40 mL 3     hydrochlorothiazide (HYDRODIURIL) 25 MG tablet Take 2 tablets (50 mg) by mouth daily 180 tablet 1     ibuprofen (ADVIL) 200 MG capsule Take 600 mg by mouth every 6 hours as needed        insulin pen needle (GNP CLICKFINE PEN NEEDLES) 31G X 8 MM miscellaneous Use six times daily or as directed 200 each 5     levothyroxine (SYNTHROID/LEVOTHROID) 75 MCG tablet Take 1 tablet (75 mcg) by mouth daily 90 tablet 1     liraglutide (VICTOZA PEN) 18 MG/3ML solution Administer two injections of 1.8 and 1.2 mg each, for a total daily dose of 3 mg. 15 mL 6     lisinopril (ZESTRIL) 40 MG tablet Take 1 tablet (40 mg) by mouth daily 90 tablet 1     metFORMIN (GLUCOPHAGE-XR) 500 MG 24 hr tablet Take 4  tabs  tablet 1     metoprolol succinate ER (TOPROL-XL) 50 MG 24 hr tablet Take 4 tablets (200 mg) by mouth daily 360 tablet 0     Multiple Vitamins-Minerals (ADVANCED DIABETIC MULTIVITAMIN) TABS Take 1 tablet by mouth daily       ORDER FOR DME, SET TO LOCAL PRINT, Respironics REMSTAR 60 Series Auto CPAP 8-15cm H2O, Airfit P10 nasal pillow mask w/medium pillows       order for DME Equipment being ordered: [id number] $70   Shoe LG 1 Device 0     order for DME Roll-A-Bout Walker. Patient can use for left foot. 1 Units 0     order for DME Equipment being ordered: wrist brace with thumb spica, L 1 Device 0     order for DME Equipment being ordered: wrist brace with thumb spica, R 1 Device 0     potassium chloride ER (KLOR-CON M) 20 MEQ CR tablet Take 4 pills (total 80meq)  by an hour each today only, then one pill (20meq) every day with Lasix only 96 tablet 3     rivaroxaban ANTICOAGULANT (XARELTO) 20 MG TABS tablet Take 1 tablet (20 mg) by mouth daily (with dinner) 90 tablet 0     rosuvastatin (CRESTOR) 10 MG tablet Take 1 tablet (10 mg) by mouth daily 90 tablet 3       Allergies   Allergen Reactions     Atorvastatin Calcium      Gas     Pravachol [Pravastatin Sodium]      Pravachol - dry cough      Simvastatin      Myalgia        Social History     Tobacco Use     Smoking status: Never Smoker     Smokeless tobacco: Never Used     Tobacco comment: tried in early 20s only    Substance Use Topics     Alcohol use: Yes     Comment: rare     Family History   Problem Relation Age of Onset     Diabetes Maternal Grandmother         DM TYPE 2     Cerebrovascular Disease Maternal Grandmother      Hypertension Sister      Lipids Sister      Heart Disease Sister         Chronic AFib     Diabetes Sister      Coronary Artery Disease Sister         afib     Hypertension Sister      Lipids Sister      Gynecology Sister      Diabetes Sister      Asthma Sister      Blood Disease Paternal Grandmother         T CELL  LEUKEMIA     Hypertension Brother      Lipids Brother      Congenital Anomalies Brother      Cardiovascular Brother      Heart Disease Brother         CABG/AFIB     Coronary Artery Disease Brother         cabg afib AAA     Hypertension Brother      Lipids Brother      Other Cancer Brother         multple myeloma     Obesity Brother      Hypertension Brother      Respiratory Brother         Sleep apnea     Heart Disease Brother         AFib     Coronary Artery Disease Brother         afib     Alzheimer Disease Mother      Asthma Mother      Hypertension Mother      Breast Cancer Mother 40        has mastectomy and lived to 84     Allergies Mother         Sulfa,     Arthritis Mother      Cardiovascular Mother      Depression Mother      Respiratory Mother      Lipids Mother      Cancer Mother         breast     Thyroid Disease Mother      Cerebrovascular Disease Mother         FROM  AFIB     Dementia Mother         Alzheimers     Other Cancer Mother         breast     Cancer - colorectal Other      Colon Cancer Other         2019     Cancer Father         lung     Aneurysm Father         brother, AAA     Other Cancer Father         lung ca     Coronary Artery Disease Father         cad AAA     Asthma Sister      Cancer - colorectal Paternal Uncle         late 50s to early 60s - great uncles      Breast Cancer Other         Maternal cousin     Arrhythmia Sister         2 brothers 1 sister Afib     Breast Cancer Maternal Aunt 62     Other Cancer Maternal Aunt 35        Ovarian cancer.  Survived despite late recurrence and is now 92.     Glaucoma No family hx of      Macular Degeneration No family hx of      History   Drug Use No         Objective     /74   Pulse 71   Temp 98.1  F (36.7  C) (Oral)   Resp 16   Wt 132.5 kg (292 lb)   LMP 10/02/2007   SpO2 98%   BMI 43.12 kg/m      Physical Exam  GENERAL APPEARANCE: healthy, alert and no distress  RESP: lungs clear to auscultation - no rales, rhonchi or  wheezes  CV: regular rate and rhythm, normal S1 S2, no S3 or S4 and no murmur, click or rub   ABDOMEN: soft, nontender, no HSM or masses and bowel sounds normal  MS: extremities normal- no gross deformities noted  NEURO: Normal strength and tone, sensory exam grossly normal, mentation intact and speech normal    Recent Labs   Lab Test 01/05/21  0927 12/23/20  1034 12/04/20  1158 12/04/20  1158 11/25/20  1734 11/25/20  1734   HGB 13.9 13.6   < >  --   --  14.2    244   < >  --   --  225   INR  --   --   --  1.52*  --  1.09    142   < >  --    < > 139   POTASSIUM 3.8 4.0   < > 4.1   < > 4.1   CR 0.99 0.89   < >  --    < > 0.79   A1C 6.8*  --   --   --   --  6.9*    < > = values in this interval not displayed.        Diagnostics:     EKG: atrial flutter    Revised Cardiac Risk Index (RCRI):  The patient has the following serious cardiovascular risks for perioperative complications:   - Diabetes Mellitus (on Insulin) = 1 point     RCRI Interpretation: 1 point: Class II (low risk - 0.9% complication rate)           Assessment & Plan   The proposed surgical procedure is considered INTERMEDIATE risk.    Sherry was seen today for pre-op exam.    Diagnoses and all orders for this visit:    Preop general physical exam    Bunion, left    Controlled type 2 diabetes mellitus with other circulatory complication, with long-term current use of insulin (H)    Morbid obesity (H)    Nonrheumatic mitral valve regurgitation    Long-term insulin use (H)    Hypertension goal BP (blood pressure) < 140/90    Paroxysmal atrial fibrillation (H)    GENESIS (obstructive sleep apnea)    Hypothyroidism, unspecified type    On continuous oral anticoagulation    Hyperlipidemia LDL goal <100           Risks and Recommendations:  The patient has the following additional risks and recommendations for perioperative complications:   - Morbid obesity (BMI >40)  Cardiovascular:   -Optimized for surgery  Diabetes:  - Patient is on insulin  therapy; diabetic NPO guidelines provided and discussed. --Patient has virtual visit with the endocrine, to confirm diabetes medication perioperative adjustment.    Pulmonary:    - Incentive spirometry post-op  Obstructive Sleep Apnea:   --Currently untreated    Medication Instructions:  Patient is to take all scheduled medications on the day of surgery EXCEPT for modifications listed below:   - apixaban (Eliquis), edoxaban (Savaysa), rivaroxaban (Xarelto): Bleeding risk is moderate or high for this procedure AND CrCl  (>=) 50 mL/min. HOLD 2 days before surgery.    - ACE/ARB: May be continued on the day of surgery.    - Beta Blockers: Continue taking on the day of surgery.   - Diuretics: May continue due to heart failure.   - Statins: Continue taking on the day of surgery.    - Long acting insulin (e.g. glargine, detemir): Take 80% of the usual evening or morning dose before surgery -- to confirm with endocrine    RECOMMENDATION:  APPROVAL GIVEN to proceed with proposed procedure, without further diagnostic evaluation.    Signed Electronically by: Marilou Arciniega MD PhD    Copy of this evaluation report is provided to requesting physician.    [facility] Guidelines    Revised Cardiac Risk Index

## 2021-01-21 NOTE — PATIENT INSTRUCTIONS
"Before your surgery:  10 days before: stop all over the counter supplements  1 week before: stop aspirin if you are taking aspirin.  2 days before: stop ibuprofen, naproxen or similar antiinflammatory medications. You may use Tylenol for pain or headache.     Xeralto: stop 48 hours before the surgery, and restart when permissible from surgical standpoint.     On the day of your surgery:  You may take scheduled oral medications with the smallest amount of water possible.     Discuss with your endocrinologist regarding diabetes medication adjustment before the surgery.     After surgery:   You may resume all your medications after the surgery unless your surgeon instructs you otherwise.     Preparing for Your Surgery  Getting started  A surgery nurse will call you to review your health history and instructions. They will give you an arrival time based on your scheduled surgery time.  Please be ready to share the following:    Your doctor's clinic name and phone number    Your medical, surgical and anesthesia history    A list of allergies and sensitivities    A list of medicines, including herbal treatments and over-the-counter drugs    Whether the patient has a legal guardian (ask how to send us the papers in advance)  If your child is having surgery, please ask for a copy of Preparing for Your Child's Surgery.    Preparing for surgery    Within 30 days of surgery: Have an exam at your family clinic (primary care clinic), or go to a pre-operative clinic. This exam is called a \"History and Physical,\" or H&P.    At your H&P exam, talk to your care team about all medicines you take. If you need to stop any medicines before surgery, ask when to start taking them again.  ? We do this for your safety. Many medicines can make you bleed too much during surgery. Some change how well surgery (anesthesia) drugs work.    Call your insurance company to see what it will and won't pay for. Ask if they need to pre-approve the " surgery. (If no insurance, call [phone].)    Call your surgeon's clinic if there's any change in your health. This includes signs of a cold or flu (sore throat, runny nose, cough, rash, fever). It also includes a scrape or scratch near the surgery site.    If you have questions on the day of surgery, call your surgery center.  Eating and drinking guidelines  For your safety: Unless your surgeon tells you otherwise, follow the guidelines below.    Eat and drink as usual until 8 hours before surgery. After that, no food or milk.    Drink clear liquids until 2 hours before surgery. These are liquids you can see through, like water, Gatorade and Propel Water. You may also have black coffee and tea (no cream or milk).    Nothing by mouth within 2 hours of surgery. This includes gum, candy and breath mints.    Stop alcohol the midnight before surgery.    If your family clinic tells you to take medicine on the morning of surgery, it's okay to take it with a sip of water.  Preventing infection    Shower or bathe the night before and morning of your surgery. Follow the instructions your clinic gave you. (If no instructions, use regular soap.)    Don't shave or clip hair near your surgery site. This can lead to skin infection.    Don't smoke the morning of surgery. Smoking increases the risk of infection. You may chew nicotine gum up to 2 hours before surgery. A nicotine patch is okay.  ? Note: Some surgeries require you to completely quit smoking and nicotine. Check with your surgeon.    Your care team will make every effort to keep you safe from infection. We will:  ? Clean our hands often with soap and water (or an alcohol-based hand rub).  ? Clean the skin at your surgery site with a special soap that kills germs. We'll also remove hair from the site as needed.  ? Wear special hair covers, masks, gowns and gloves during surgery.  ? Give antibiotic medicine, if prescribed. Not all surgeries need antibiotics.  What to  bring on the day of surgery    Photo ID and insurance card    Copy of your health care directive, if you have one    Glasses and hearing aides (bring cases)  ? You can't wear contacts during surgery    Inhaler and eye drops, if you use them (tell us about these when you arrive)    CPAP machine or breathing device, if you use them    A few personal items, if spending the night    If you have . . .  ? A pacemaker or ICD (cardiac defibrillator): Bring the ID card.  ? An implanted stimulator: Bring the remote control.  ? A legal guardian: Bring a copy of the certified (court-stamped) guardianship papers.  Please remove any jewelry, including body piercings. Leave jewelry and other valuables at home.  If you're going home the day of surgery  Important: If you don't follow the rules below, we must cancel your surgery.     Arrange for someone to drive you home after surgery. You may not drive, take a taxi or take public transportation by yourself (unless you'll have local anesthesia only).    Arrange for a responsible adult to stay with you overnight. If you don't, we may keep you in the hospital overnight, and you may need to pay the costs yourself.  Questions?   If you have any questions for your care team, list them here: _________________________________________________________________________________________________________________________________________________________________________________________________________________________________________________________________________________________________________________________  For informational purposes only. Not to replace the advice of your health care provider. Copyright   1569-0790 [facility]. All rights reserved. Clinically reviewed by Estrella Martin MD. SMARTworks [id number] - REV 07/19.

## 2021-01-26 ENCOUNTER — VIRTUAL VISIT (OUTPATIENT)
Dept: ENDOCRINOLOGY | Facility: CLINIC | Age: 66
End: 2021-01-26
Payer: COMMERCIAL

## 2021-01-26 ENCOUNTER — ANESTHESIA EVENT (OUTPATIENT)
Dept: SURGERY | Facility: AMBULATORY SURGERY CENTER | Age: 66
End: 2021-01-26

## 2021-01-26 DIAGNOSIS — E03.9 HYPOTHYROIDISM, UNSPECIFIED TYPE: ICD-10-CM

## 2021-01-26 DIAGNOSIS — E11.9 TYPE 2 DIABETES MELLITUS TREATED WITH INSULIN (H): Primary | ICD-10-CM

## 2021-01-26 DIAGNOSIS — Z79.4 TYPE 2 DIABETES MELLITUS TREATED WITH INSULIN (H): Primary | ICD-10-CM

## 2021-01-26 DIAGNOSIS — N18.31 STAGE 3A CHRONIC KIDNEY DISEASE (H): ICD-10-CM

## 2021-01-26 PROCEDURE — 95251 CONT GLUC MNTR ANALYSIS I&R: CPT | Performed by: INTERNAL MEDICINE

## 2021-01-26 PROCEDURE — 99214 OFFICE O/P EST MOD 30 MIN: CPT | Mod: 95 | Performed by: INTERNAL MEDICINE

## 2021-01-26 NOTE — PROGRESS NOTES
Video-Visit Details    Type of service:  Video Visit    Video call started: 11:12 am  Video call ended: 11:41 am     Originating Location (pt. Location): Home  Distant Location (provider location):  [facility]   Platform used for Video Visit: Doximity    Due to the COVID 19 pandemic this visit was converted to a video visit in order to help prevent spread of infection in this patient and the general population.      Sherry is a 65 year old female seen in follow-up for type 2 diabetes. She was last seen in our clinic in July 2020.     The patient was diagnosed with type 2 diabetes ~ 25 years after she was diagnosed with gestational diabetes, during both her pregnancies. Her weight before pregnancy was 190-200. Her highest weight was 300s. She was initially started on oral medications and then insulin was added around 2012.     Her A1C has been around 7% over the last years. She was on Byetta but she did not feel it helped. She was also on Actos but stopped due to 40 lbs weight gain and increased SOB.  For approximately 3 years, she was treated with Victoza.  It was resumed in March 2019, now at 3 mg daily.  She continues to take metformin, 2 g extended release daily.  In the beginning of July 2019, she was transitioned to U500 insulin. Jardiance was tried in 2019 and was discontinued due to genital fungal infections.     Most recent A1c was 6.8%, on 1/5/21, in the absence of anemia.     The patient has been trying to have the breakfast and dinner as the main meals of the day.  If she does snack around lunchtime or at bedtime, she tries to choose low carbohydrate containing snacks.  She administers 120 units U500 insulin in the morning and 100 units before dinner.  At her prior appointment, I recommended to try to administer insulin 1 hour prior to eating.  The patient reports taking it after eating, quite frequently after dinner (30% of time).  Her meal schedule remains very irregular. Dinner is  anywhere between 5 and 8 PM.  For breakfast, she generally has eggs and sausage, occasionally a keto toast.  She drinks diet Pepsi or hot tea.  Sometimes, she prefers to have carbonated caffeinated drinks during the night, as they significantly improve her thirst and the feeling of dry mouth.          I reviewed the Tanya CGM.  Average glucose is 161, with a glucose variability of 32%, corresponding to a GMI of 7.2%.  63 percent of the glucose numbers are within target of , 1% are in the mild hypoglycemic range and 4% are above 250.  On the sensor, the biggest spike of the day is achieved around 8 PM, following dinner.  Approximately once a week, the patient does experience hypoglycemic episodes on the sensor.  Most of them occur during the night, around 1-2 am.  The patient does not remember experiencing symptoms of hypoglycemia during the night.  She checks her BG using the glucometer at the time it is low on the sensor and the numbers are significantly higher.  Sometimes, the blood sugar spontaneously increases during the night.    She continues to follow-up with Dr. Hutson regarding the left toe ulcer.  She is scheduled to pursue bunionectomy next week, in order to relieve the pressure in the area of the ulcer.    DM complications  Retinopathy: last eye exam: 6/2019; no DR, S/P B/L surgery for cataract   Nephropathy: negative urine microalb - on lisinopril 60 mg; GFR in the 50s.  Neuropathy: occasional burning sensation in her feet for the last couple of years   She is symptomatic for hypoglycemia (able to recognize blood sugar numbers in the 90s - she gets sweaty, hot and lightheaded).  She corrects the hypoglycemic symptoms by having something to eat.  Aspirin - taking 325 mg  LDL 36, HDL 45 (1/2021) on Crestor 10 mg  Using the CPAP   Exercise limited due to bilateral knee replacement and low back pain.    Since her last visit with me, she was diagnosed with atrial fibrillation she underwent ELIAS guided  cardioversion on 12/4/2020.  On 12/9/2020 she was hospitalized with congestive heart failure.  She is currently on anticoagulation, metoprolol and diltiazem. Overall, the GFR has been gradually declining over the last year.  The patient thinks she might have been dehydrated prior to the most recent labs, as they were done early morning, after taking Lasix. Her average blood pressure has been around 120-130s/70s.     2.  Hypertension  Her blood pressure is managed with 40 mg lisinopril daily, 360 mg diltiazem,  200 mg metoprolol daily, 25 mg hydrochlorothiazide and 20 mg furosemide daily.    3. High normal calcium levels, documented in our records since 2009.   The phosphorus levels have been normal, as well as Albumin.  She takes 1000 units vitamin D daily and a daily multivitamin.    Past Medical History  Past Medical History:   Diagnosis Date     Cataract      Congestive heart failure (H) 2019 NOVEMBER    AFIB     DEPRESSION     comes and goes - tried meds - unsuccessfully, certain times of the year, no psych intervention and no counselors in the past - and not interested      Diverticulosis of colon (without mention of hemorrhage) 4/04    colonoscopy     ECHO-mildLVH,tr MR,mild thick lflets w inc LA,trTR   12/03     Essential hypertension, benign 1990s    late 1990s - started medications at that time - not to difficult to control meds      Fam hx-cardiovas dis NEC     father - CAD, and lipids/HTN - multiple members of the family      Family history of diabetes mellitus     sister and grandmother with DM      Family history of malignant neoplasm of breast     mother - young age - 45, and maternal cousin and aunt as well - no BRCA testing done      Family history of stroke (cerebrovascular)     grandmother in early life in her 40s      FAMILY HX COLON CANCER     Pat uncles x 2     Heart disease     murmur/     Heart murmur      HYPERLIPIDEMIA 2000    fairly recent - in the last 5 years - high for DM levels   -cholesterol recent      Irritable bowel syndrome     goes between the 2 - nerve related - more stressed more problems      MICROALBUMINURIA     unsure how long - been on the lisinopril - for a few years at well      Nonspecific abnormal results of liver function study 2/3/2003    SGOT - has been high in the past - since the hepatitis b - borderline elevation - not really changing any      OBESITY      Obstructive sleep apnea      GENESIS (obstructive sleep apnea) 10/15/2006    psg 5/15 AHI 53 aPAP 8-15     OSTEOARTHRITIS L KNEE 2003    total knee on the left - and pain is gone since the knee replacement      PERS HX HEPATITIS B- RESOLVED] 1977    acute virus only - no chronic disease      PERS HX HEPATITIS B- RESOLVED]      Type II or unspecified type diabetes mellitus without mention of complication, not stated as uncontrolled 1991    oral meds frist, then insulin eventually later, difficult to control most of the time      Unspecified hypothyroidism 10/11/2006    TSH 3.36 - mild subclinical hypothyroidism - deciding on following or starting low dose meds - with dr Oreilly - following    Hepatis B     Allergies  Allergies   Allergen Reactions     Atorvastatin Calcium      Gas     Pravachol [Pravastatin Sodium]      Pravachol - dry cough      Simvastatin      Myalgia     Medications    Current Outpatient Medications:      amoxicillin (AMOXIL) 500 MG capsule, Take 2,000 mg by mouth Before dental procedures, Disp: , Rfl:      blood glucose monitoring (NO BRAND SPECIFIED) test strip, Use to test blood sugar 4 times daily or as directed., Disp: 100 each, Rfl: 11     Continuous Blood Gluc  (FREESTYLE MIKE 14 DAY READER) KATIA, 1 each daily, Disp: 1 Device, Rfl: 0     Continuous Blood Gluc Sensor (FREESTYLE MIKE 14 DAY SENSOR) [facility] APPLY 1 SENSOR AND CHANGE EVERY 14 DAYS AS DIRECTED, Disp: 6 each, Rfl: 1     diltiazem ER (DILT-XR) 120 MG 24 hr capsule, Take 1 capsule (120 mg) by mouth daily Take in addition to 240mg  ( total of 360mg), Disp: 30 capsule, Rfl: 1     diltiazem ER COATED BEADS (CARDIZEM CD/CARTIA XT) 240 MG 24 hr capsule, Take 1 capsule (240 mg) by mouth daily Take in addition to 120mg tablet (total of 360mg), Disp: 30 capsule, Rfl: 1     econazole nitrate 1 % external cream, Apply topically daily as needed To feet and toenails, Disp: , Rfl:      furosemide (LASIX) 20 MG tablet, Take 1 tablet (20 mg) by mouth daily, Disp: 90 tablet, Rfl: 3     Garlic 1000 MG CAPS, Take 1 capsule by mouth daily Takes during summer months.  Done taking until next summer., Disp: , Rfl:      HUMULIN R U-500 KWIKPEN 500 UNIT/ML PEN soln, INJECT 120 UNITS IN THE MORNING  UNITS AT BEDTIME, Disp: 40 mL, Rfl: 3     hydrochlorothiazide (HYDRODIURIL) 25 MG tablet, Take 2 tablets (50 mg) by mouth daily, Disp: 180 tablet, Rfl: 1     ibuprofen (ADVIL) 200 MG capsule, Take 600 mg by mouth every 6 hours as needed , Disp: , Rfl:      insulin pen needle (GNP CLICKFINE PEN NEEDLES) 31G X 8 MM miscellaneous, Use six times daily or as directed, Disp: 200 each, Rfl: 5     levothyroxine (SYNTHROID/LEVOTHROID) 75 MCG tablet, Take 1 tablet (75 mcg) by mouth daily, Disp: 90 tablet, Rfl: 1     liraglutide (VICTOZA PEN) 18 MG/3ML solution, Administer two injections of 1.8 and 1.2 mg each, for a total daily dose of 3 mg., Disp: 15 mL, Rfl: 6     lisinopril (ZESTRIL) 40 MG tablet, Take 1 tablet (40 mg) by mouth daily, Disp: 90 tablet, Rfl: 1     metFORMIN (GLUCOPHAGE-XR) 500 MG 24 hr tablet, Take 4 tabs HS, Disp: 360 tablet, Rfl: 1     metoprolol succinate ER (TOPROL-XL) 50 MG 24 hr tablet, Take 4 tablets (200 mg) by mouth daily, Disp: 360 tablet, Rfl: 0     Multiple Vitamins-Minerals (ADVANCED DIABETIC MULTIVITAMIN) TABS, Take 1 tablet by mouth daily, Disp: , Rfl:      ORDER FOR DME, SET TO LOCAL PRINT,, Respironics REMSTAR 60 Series Auto CPAP 8-15cm H2O, Airfit P10 nasal pillow mask w/medium pillows, Disp: , Rfl:      order for DME, Equipment being  ordered: [id number] $70   Shoe LG, Disp: 1 Device, Rfl: 0     order for DME, Roll-A-Bout Walker. Patient can use for left foot., Disp: 1 Units, Rfl: 0     order for DME, Equipment being ordered: wrist brace with thumb spica, L, Disp: 1 Device, Rfl: 0     order for DME, Equipment being ordered: wrist brace with thumb spica, R, Disp: 1 Device, Rfl: 0     potassium chloride ER (KLOR-CON M) 20 MEQ CR tablet, Take 4 pills (total 80meq)  by an hour each today only, then one pill (20meq) every day with Lasix only, Disp: 96 tablet, Rfl: 3     rivaroxaban ANTICOAGULANT (XARELTO) 20 MG TABS tablet, Take 1 tablet (20 mg) by mouth daily (with dinner), Disp: 90 tablet, Rfl: 0     rosuvastatin (CRESTOR) 10 MG tablet, Take 1 tablet (10 mg) by mouth daily, Disp: 90 tablet, Rfl: 3    Current Facility-Administered Medications:      triamcinolone (KENALOG-40) injection 20 mg, 20 mg, , , ShakerLudivina MD, 20 mg at 09/12/19 1057    Family History  Maternal grandmother had type 2 diabetes  Daughter has GDM  Father had CAD s/p stent, lung cancer and abdominal aortic anuerysm  Older brother has CABG, and abdominal aortic anuerysm  Young brother has Afib, SVT  Another younger brother has multiple myeloma  Sister has SVT    Social History  No smoking, rarely drink EtOH  No illicit drug  Worked as a nurse in the ICU. Retired in 2019.   Lives by herself, has 3 daughters    ROS  Constitutional: weight fairly stable around 290 pounds. Some fatigue.  10 point review systems negative unless specified above    Physical Exam  BP Readings from Last 6 Encounters:   01/21/21 134/74   12/23/20 119/72   12/22/20 129/75   12/15/20 (!) 140/75   12/10/20 123/70   12/04/20 104/65     Wt Readings from Last 10 Encounters:   01/21/21 132.5 kg (292 lb)   12/23/20 131.5 kg (290 lb)   12/09/20 131.5 kg (290 lb)   11/27/20 131.6 kg (290 lb 3.2 oz)   09/22/20 134.8 kg (297 lb 1.6 oz)   07/22/20 132.1 kg (291 lb 3.2 oz)   07/10/20 132 kg  (291 lb)   01/09/20 129.3 kg (285 lb)   12/11/19 129.1 kg (284 lb 9.6 oz)   09/25/19 125.9 kg (277 lb 9.6 oz)     LMP 10/02/2007   There is no height or weight on file to calculate BMI.     Physical Exam  General Appearance: alert, no distress noted, general obesity  Eyes: grossly normal to inspection, conjunctivae and sclerae normal, no lid lag or stare   Respiratory: no audible wheeze, cough, or visible cyanosis.  No visible retractions or increased work of breathing.  Able to speak fully in complete sentences.  Neurological: Cranial nerves grossly intact, mentation intact and speech normal; no tremor of the hands   Skin: no lesions on exposed skin   Psychological: mentation appears normal, affect normal, judgement and insight intact, normal speech and appearance well-groomed    RESULTS  I reviewed prior lab results documented in epic.  Lab Results   Component Value Date    A1C 6.8 (H) 01/05/2021    A1C 6.9 (H) 11/25/2020    A1C 7.5 (A) 07/22/2020    A1C 7.3 (H) 12/10/2019    A1C 7.5 (A) 08/21/2019       Hemoglobin   Date Value Ref Range Status   01/05/2021 13.9 11.7 - 15.7 g/dL Final     Hematocrit   Date Value Ref Range Status   01/05/2021 40.4 35.0 - 47.0 % Final     Cholesterol   Date Value Ref Range Status   01/05/2021 125 <200 mg/dL Final     Cholesterol/HDL Ratio   Date Value Ref Range Status   02/19/2015 3.2 0.0 - 5.0 Final     HDL Cholesterol   Date Value Ref Range Status   01/05/2021 45 (L) >49 mg/dL Final     LDL Cholesterol Calculated   Date Value Ref Range Status   01/05/2021 36 <100 mg/dL Final     Comment:     Desirable:       <100 mg/dl     VLDL-Cholesterol   Date Value Ref Range Status   02/19/2015 34 (H) 0 - 30 mg/dL Final     Triglycerides   Date Value Ref Range Status   01/05/2021 222 (H) <150 mg/dL Final     Comment:     Borderline high:  150-199 mg/dl  High:             200-499 mg/dl  Very high:       >499 mg/dl  Fasting specimen       Albumin Urine mg/L   Date Value Ref Range Status    01/05/2021 <5 mg/L Final     TSH   Date Value Ref Range Status   01/05/2021 2.38 0.40 - 4.00 mU/L Final         Last Basic Metabolic Panel:    Sodium   Date Value Ref Range Status   01/05/2021 140 133 - 144 mmol/L Final     Potassium   Date Value Ref Range Status   01/05/2021 3.8 3.4 - 5.3 mmol/L Final     Chloride   Date Value Ref Range Status   01/05/2021 107 94 - 109 mmol/L Final     Calcium   Date Value Ref Range Status   01/05/2021 10.1 8.5 - 10.1 mg/dL Final     Carbon Dioxide   Date Value Ref Range Status   01/05/2021 29 20 - 32 mmol/L Final     Urea Nitrogen   Date Value Ref Range Status   01/05/2021 33 (H) 7 - 30 mg/dL Final     Creatinine   Date Value Ref Range Status   01/05/2021 0.99 0.52 - 1.04 mg/dL Final     GFR Estimate   Date Value Ref Range Status   01/05/2021 58 (L) >60 mL/min/[1.73_m2] Final     Comment:     Non  GFR Calc  Starting 12/18/2018, serum creatinine based estimated GFR (eGFR) will be   calculated using the Chronic Kidney Disease Epidemiology Collaboration   (CKD-EPI) equation.       Glucose   Date Value Ref Range Status   01/05/2021 95 70 - 99 mg/dL Final     Comment:     Fasting specimen       AST   Date Value Ref Range Status   11/25/2020 Unsatisfactory specimen - hemolyzed 0 - 45 U/L Final     Comment:     Canceled, Test credited  Critical Value called to and read back by  GUTIERREZ GOMEZ RN ER 1901 11/25/2020 BY BRISEYDA       ALT   Date Value Ref Range Status   11/25/2020 42 0 - 50 U/L Final     Albumin   Date Value Ref Range Status   11/25/2020 3.9 3.4 - 5.0 g/dL Final         ASSESSMENT:      1. Type 2 diabetes  Sherry is a 65 year old pleasant female, with a history of type 2 diabetes in the setting of obesity, sleep apnea.  Her diabetes is known to be complicated by neuropathy.  Overall, she achieves a fairly good blood glucose control.  Recommendations:  Continue to work on reducing the overall carbohydrate and caloric intake and losing weight  Try to have a  regular meal schedule  Administer insulin approximately 1 hour prior to eating.  She can set up phone alarms as reminders.  Continue Metformin and Victoza  Avoid having caffeinated beverages at night, as they can trigger an increase in the blood sugar    2.  Gradual deterioration in GFR  Most likely related to her heart condition.  She is going to continue to follow-up with cardiology.  Her blood pressure has been well controlled, per patient report.  She maintains herself well-hydrated.    3.  Hypothyroidism.  Clinically and biochemically, the patient is euthyroid. I recommended to continue to take the same dose of levothyroxine.    No orders of the defined types were placed in this encounter.

## 2021-01-26 NOTE — PROGRESS NOTES
Sherry is a 65 year old who is being evaluated via a billable video visit.      Blood sugars available in Strut.    How would you like to obtain your AVS? MyChart  If the video visit is dropped, the invitation should be resent by: Text to cell phone: [phone]  Will anyone else be joining your video visit? No      Chloé Sparks [facility]  Adult Endocrinology  [facility]

## 2021-01-26 NOTE — LETTER
1/26/2021         RE: Sherry Slaughter  [address]  [place] [address]        Dear Colleague,    Thank you for referring your patient, Sherry Slaughter, to the [facility]. Please see a copy of my visit note below.    Video-Visit Details    Type of service:  Video Visit    Video call started: 11:12 am  Video call ended: 11:41 am     Originating Location (pt. Location): Home  Distant Location (provider location):  [facility]   Platform used for Video Visit: Doximity    Due to the COVID 19 pandemic this visit was converted to a video visit in order to help prevent spread of infection in this patient and the general population.      Sherry is a 65 year old female seen in follow-up for type 2 diabetes. She was last seen in our clinic in July 2020.     The patient was diagnosed with type 2 diabetes ~ 25 years after she was diagnosed with gestational diabetes, during both her pregnancies. Her weight before pregnancy was 190-200. Her highest weight was 300s. She was initially started on oral medications and then insulin was added around 2012.     Her A1C has been around 7% over the last years. She was on Byetta but she did not feel it helped. She was also on Actos but stopped due to 40 lbs weight gain and increased SOB.  For approximately 3 years, she was treated with Victoza.  It was resumed in March 2019, now at 3 mg daily.  She continues to take metformin, 2 g extended release daily.  In the beginning of July 2019, she was transitioned to U500 insulin. Jardiance was tried in 2019 and was discontinued due to genital fungal infections.     Most recent A1c was 6.8%, on 1/5/21, in the absence of anemia.     The patient has been trying to have the breakfast and dinner as the main meals of the day.  If she does snack around lunchtime or at bedtime, she tries to choose low carbohydrate containing snacks.  She administers 120 units U500 insulin in the  morning and 100 units before dinner.  At her prior appointment, I recommended to try to administer insulin 1 hour prior to eating.  The patient reports taking it after eating, quite frequently after dinner (30% of time).  Her meal schedule remains very irregular. Dinner is anywhere between 5 and 8 PM.  For breakfast, she generally has eggs and sausage, occasionally a keto toast.  She drinks diet Pepsi or hot tea.  Sometimes, she prefers to have carbonated caffeinated drinks during the night, as they significantly improve her thirst and the feeling of dry mouth.          I reviewed the Tanya CGM.  Average glucose is 161, with a glucose variability of 32%, corresponding to a GMI of 7.2%.  63 percent of the glucose numbers are within target of , 1% are in the mild hypoglycemic range and 4% are above 250.  On the sensor, the biggest spike of the day is achieved around 8 PM, following dinner.  Approximately once a week, the patient does experience hypoglycemic episodes on the sensor.  Most of them occur during the night, around 1-2 am.  The patient does not remember experiencing symptoms of hypoglycemia during the night.  She checks her BG using the glucometer at the time it is low on the sensor and the numbers are significantly higher.  Sometimes, the blood sugar spontaneously increases during the night.    She continues to follow-up with Dr. Hutson regarding the left toe ulcer.  She is scheduled to pursue bunionectomy next week, in order to relieve the pressure in the area of the ulcer.    DM complications  Retinopathy: last eye exam: 6/2019; no DR, S/P B/L surgery for cataract   Nephropathy: negative urine microalb - on lisinopril 60 mg; GFR in the 50s.  Neuropathy: occasional burning sensation in her feet for the last couple of years   She is symptomatic for hypoglycemia (able to recognize blood sugar numbers in the 90s - she gets sweaty, hot and lightheaded).  She corrects the hypoglycemic symptoms by having  something to eat.  Aspirin - taking 325 mg  LDL 36, HDL 45 (1/2021) on Crestor 10 mg  Using the CPAP   Exercise limited due to bilateral knee replacement and low back pain.    Since her last visit with me, she was diagnosed with atrial fibrillation she underwent ELIAS guided cardioversion on 12/4/2020.  On 12/9/2020 she was hospitalized with congestive heart failure.  She is currently on anticoagulation, metoprolol and diltiazem. Overall, the GFR has been gradually declining over the last year.  The patient thinks she might have been dehydrated prior to the most recent labs, as they were done early morning, after taking Lasix. Her average blood pressure has been around 120-130s/70s.     2.  Hypertension  Her blood pressure is managed with 40 mg lisinopril daily, 360 mg diltiazem,  200 mg metoprolol daily, 25 mg hydrochlorothiazide and 20 mg furosemide daily.    3. High normal calcium levels, documented in our records since 2009.   The phosphorus levels have been normal, as well as Albumin.  She takes 1000 units vitamin D daily and a daily multivitamin.    Past Medical History  Past Medical History:   Diagnosis Date     Cataract      Congestive heart failure (H) 2019 NOVEMBER    AFIB     DEPRESSION     comes and goes - tried meds - unsuccessfully, certain times of the year, no psych intervention and no counselors in the past - and not interested      Diverticulosis of colon (without mention of hemorrhage) 4/04    colonoscopy     ECHO-mildLVH,tr MR,mild thick lflets w inc LA,trTR   12/03     Essential hypertension, benign 1990s    late 1990s - started medications at that time - not to difficult to control meds      Fam hx-cardiovas dis NEC     father - CAD, and lipids/HTN - multiple members of the family      Family history of diabetes mellitus     sister and grandmother with DM      Family history of malignant neoplasm of breast     mother - young age - 45, and maternal cousin and aunt as well - no BRCA testing done       Family history of stroke (cerebrovascular)     grandmother in early life in her 40s      FAMILY HX COLON CANCER     Pat uncles x 2     Heart disease     murmur/     Heart murmur      HYPERLIPIDEMIA 2000    fairly recent - in the last 5 years - high for DM levels  -cholesterol recent      Irritable bowel syndrome     goes between the 2 - nerve related - more stressed more problems      MICROALBUMINURIA     unsure how long - been on the lisinopril - for a few years at well      Nonspecific abnormal results of liver function study 2/3/2003    SGOT - has been high in the past - since the hepatitis b - borderline elevation - not really changing any      OBESITY      Obstructive sleep apnea      GENESIS (obstructive sleep apnea) 10/15/2006    psg 5/15 AHI 53 aPAP 8-15     OSTEOARTHRITIS L KNEE 2003    total knee on the left - and pain is gone since the knee replacement      PERS HX HEPATITIS B- RESOLVED] 1977    acute virus only - no chronic disease      PERS HX HEPATITIS B- RESOLVED]      Type II or unspecified type diabetes mellitus without mention of complication, not stated as uncontrolled 1991    oral meds frist, then insulin eventually later, difficult to control most of the time      Unspecified hypothyroidism 10/11/2006    TSH 3.36 - mild subclinical hypothyroidism - deciding on following or starting low dose meds - with dr Oreilly - following    Hepatis B     Allergies  Allergies   Allergen Reactions     Atorvastatin Calcium      Gas     Pravachol [Pravastatin Sodium]      Pravachol - dry cough      Simvastatin      Myalgia     Medications    Current Outpatient Medications:      amoxicillin (AMOXIL) 500 MG capsule, Take 2,000 mg by mouth Before dental procedures, Disp: , Rfl:      blood glucose monitoring (NO BRAND SPECIFIED) test strip, Use to test blood sugar 4 times daily or as directed., Disp: 100 each, Rfl: 11     Continuous Blood Gluc  (FREESTYLE MIKE 14 DAY READER) KATIA, 1 each daily, Disp: 1  Device, Rfl: 0     Continuous Blood Gluc Sensor (FREESTYLE MIKE 14 DAY SENSOR) [facility], APPLY 1 SENSOR AND CHANGE EVERY 14 DAYS AS DIRECTED, Disp: 6 each, Rfl: 1     diltiazem ER (DILT-XR) 120 MG 24 hr capsule, Take 1 capsule (120 mg) by mouth daily Take in addition to 240mg ( total of 360mg), Disp: 30 capsule, Rfl: 1     diltiazem ER COATED BEADS (CARDIZEM CD/CARTIA XT) 240 MG 24 hr capsule, Take 1 capsule (240 mg) by mouth daily Take in addition to 120mg tablet (total of 360mg), Disp: 30 capsule, Rfl: 1     econazole nitrate 1 % external cream, Apply topically daily as needed To feet and toenails, Disp: , Rfl:      furosemide (LASIX) 20 MG tablet, Take 1 tablet (20 mg) by mouth daily, Disp: 90 tablet, Rfl: 3     Garlic 1000 MG CAPS, Take 1 capsule by mouth daily Takes during summer months.  Done taking until next summer., Disp: , Rfl:      HUMULIN R U-500 KWIKPEN 500 UNIT/ML PEN soln, INJECT 120 UNITS IN THE MORNING  UNITS AT BEDTIME, Disp: 40 mL, Rfl: 3     hydrochlorothiazide (HYDRODIURIL) 25 MG tablet, Take 2 tablets (50 mg) by mouth daily, Disp: 180 tablet, Rfl: 1     ibuprofen (ADVIL) 200 MG capsule, Take 600 mg by mouth every 6 hours as needed , Disp: , Rfl:      insulin pen needle (GNP CLICKFINE PEN NEEDLES) 31G X 8 MM miscellaneous, Use six times daily or as directed, Disp: 200 each, Rfl: 5     levothyroxine (SYNTHROID/LEVOTHROID) 75 MCG tablet, Take 1 tablet (75 mcg) by mouth daily, Disp: 90 tablet, Rfl: 1     liraglutide (VICTOZA PEN) 18 MG/3ML solution, Administer two injections of 1.8 and 1.2 mg each, for a total daily dose of 3 mg., Disp: 15 mL, Rfl: 6     lisinopril (ZESTRIL) 40 MG tablet, Take 1 tablet (40 mg) by mouth daily, Disp: 90 tablet, Rfl: 1     metFORMIN (GLUCOPHAGE-XR) 500 MG 24 hr tablet, Take 4 tabs HS, Disp: 360 tablet, Rfl: 1     metoprolol succinate ER (TOPROL-XL) 50 MG 24 hr tablet, Take 4 tablets (200 mg) by mouth daily, Disp: 360 tablet, Rfl: 0     Multiple Vitamins-Minerals  (ADVANCED DIABETIC MULTIVITAMIN) TABS, Take 1 tablet by mouth daily, Disp: , Rfl:      ORDER FOR DME, SET TO LOCAL PRINT,, Respironics REMSTAR 60 Series Auto CPAP 8-15cm H2O, Airfit P10 nasal pillow mask w/medium pillows, Disp: , Rfl:      order for DME, Equipment being ordered: [id number] $70  DH Shoe LG, Disp: 1 Device, Rfl: 0     order for DME, Roll-A-Bout Walker. Patient can use for left foot., Disp: 1 Units, Rfl: 0     order for DME, Equipment being ordered: wrist brace with thumb spica, L, Disp: 1 Device, Rfl: 0     order for DME, Equipment being ordered: wrist brace with thumb spica, R, Disp: 1 Device, Rfl: 0     potassium chloride ER (KLOR-CON M) 20 MEQ CR tablet, Take 4 pills (total 80meq)  by an hour each today only, then one pill (20meq) every day with Lasix only, Disp: 96 tablet, Rfl: 3     rivaroxaban ANTICOAGULANT (XARELTO) 20 MG TABS tablet, Take 1 tablet (20 mg) by mouth daily (with dinner), Disp: 90 tablet, Rfl: 0     rosuvastatin (CRESTOR) 10 MG tablet, Take 1 tablet (10 mg) by mouth daily, Disp: 90 tablet, Rfl: 3    Current Facility-Administered Medications:      triamcinolone (KENALOG-40) injection 20 mg, 20 mg, , , Ludivina Jennings MD, 20 mg at 09/12/19 1057    Family History  Maternal grandmother had type 2 diabetes  Daughter has GDM  Father had CAD s/p stent, lung cancer and abdominal aortic anuerysm  Older brother has CABG, and abdominal aortic anuerysm  Young brother has Afib, SVT  Another younger brother has multiple myeloma  Sister has SVT    Social History  No smoking, rarely drink EtOH  No illicit drug  Worked as a nurse in the ICU. Retired in 2019.   Lives by herself, has 3 daughters    ROS  Constitutional: weight fairly stable around 290 pounds. Some fatigue.  10 point review systems negative unless specified above    Physical Exam  BP Readings from Last 6 Encounters:   01/21/21 134/74   12/23/20 119/72   12/22/20 129/75   12/15/20 (!) 140/75   12/10/20 123/70    12/04/20 104/65     Wt Readings from Last 10 Encounters:   01/21/21 132.5 kg (292 lb)   12/23/20 131.5 kg (290 lb)   12/09/20 131.5 kg (290 lb)   11/27/20 131.6 kg (290 lb 3.2 oz)   09/22/20 134.8 kg (297 lb 1.6 oz)   07/22/20 132.1 kg (291 lb 3.2 oz)   07/10/20 132 kg (291 lb)   01/09/20 129.3 kg (285 lb)   12/11/19 129.1 kg (284 lb 9.6 oz)   09/25/19 125.9 kg (277 lb 9.6 oz)     LMP 10/02/2007   There is no height or weight on file to calculate BMI.     Physical Exam  General Appearance: alert, no distress noted, general obesity  Eyes: grossly normal to inspection, conjunctivae and sclerae normal, no lid lag or stare   Respiratory: no audible wheeze, cough, or visible cyanosis.  No visible retractions or increased work of breathing.  Able to speak fully in complete sentences.  Neurological: Cranial nerves grossly intact, mentation intact and speech normal; no tremor of the hands   Skin: no lesions on exposed skin   Psychological: mentation appears normal, affect normal, judgement and insight intact, normal speech and appearance well-groomed    RESULTS  I reviewed prior lab results documented in epic.  Lab Results   Component Value Date    A1C 6.8 (H) 01/05/2021    A1C 6.9 (H) 11/25/2020    A1C 7.5 (A) 07/22/2020    A1C 7.3 (H) 12/10/2019    A1C 7.5 (A) 08/21/2019       Hemoglobin   Date Value Ref Range Status   01/05/2021 13.9 11.7 - 15.7 g/dL Final     Hematocrit   Date Value Ref Range Status   01/05/2021 40.4 35.0 - 47.0 % Final     Cholesterol   Date Value Ref Range Status   01/05/2021 125 <200 mg/dL Final     Cholesterol/HDL Ratio   Date Value Ref Range Status   02/19/2015 3.2 0.0 - 5.0 Final     HDL Cholesterol   Date Value Ref Range Status   01/05/2021 45 (L) >49 mg/dL Final     LDL Cholesterol Calculated   Date Value Ref Range Status   01/05/2021 36 <100 mg/dL Final     Comment:     Desirable:       <100 mg/dl     VLDL-Cholesterol   Date Value Ref Range Status   02/19/2015 34 (H) 0 - 30 mg/dL Final      Triglycerides   Date Value Ref Range Status   01/05/2021 222 (H) <150 mg/dL Final     Comment:     Borderline high:  150-199 mg/dl  High:             200-499 mg/dl  Very high:       >499 mg/dl  Fasting specimen       Albumin Urine mg/L   Date Value Ref Range Status   01/05/2021 <5 mg/L Final     TSH   Date Value Ref Range Status   01/05/2021 2.38 0.40 - 4.00 mU/L Final         Last Basic Metabolic Panel:    Sodium   Date Value Ref Range Status   01/05/2021 140 133 - 144 mmol/L Final     Potassium   Date Value Ref Range Status   01/05/2021 3.8 3.4 - 5.3 mmol/L Final     Chloride   Date Value Ref Range Status   01/05/2021 107 94 - 109 mmol/L Final     Calcium   Date Value Ref Range Status   01/05/2021 10.1 8.5 - 10.1 mg/dL Final     Carbon Dioxide   Date Value Ref Range Status   01/05/2021 29 20 - 32 mmol/L Final     Urea Nitrogen   Date Value Ref Range Status   01/05/2021 33 (H) 7 - 30 mg/dL Final     Creatinine   Date Value Ref Range Status   01/05/2021 0.99 0.52 - 1.04 mg/dL Final     GFR Estimate   Date Value Ref Range Status   01/05/2021 58 (L) >60 mL/min/[1.73_m2] Final     Comment:     Non  GFR Calc  Starting 12/18/2018, serum creatinine based estimated GFR (eGFR) will be   calculated using the Chronic Kidney Disease Epidemiology Collaboration   (CKD-EPI) equation.       Glucose   Date Value Ref Range Status   01/05/2021 95 70 - 99 mg/dL Final     Comment:     Fasting specimen       AST   Date Value Ref Range Status   11/25/2020 Unsatisfactory specimen - hemolyzed 0 - 45 U/L Final     Comment:     Canceled, Test credited  Critical Value called to and read back by  GUTIERREZ GOMEZ RN ER 1901 11/25/2020 BY AA       ALT   Date Value Ref Range Status   11/25/2020 42 0 - 50 U/L Final     Albumin   Date Value Ref Range Status   11/25/2020 3.9 3.4 - 5.0 g/dL Final         ASSESSMENT:      1. Type 2 diabetes  Sherry is a 65 year old pleasant female, with a history of type 2 diabetes in the setting  of obesity, sleep apnea.  Her diabetes is known to be complicated by neuropathy.  Overall, she achieves a fairly good blood glucose control.  Recommendations:  Continue to work on reducing the overall carbohydrate and caloric intake and losing weight  Try to have a regular meal schedule  Administer insulin approximately 1 hour prior to eating.  She can set up phone alarms as reminders.  Continue Metformin and Victoza  Avoid having caffeinated beverages at night, as they can trigger an increase in the blood sugar    2.  Gradual deterioration in GFR  Most likely related to her heart condition.  She is going to continue to follow-up with cardiology.  Her blood pressure has been well controlled, per patient report.  She maintains herself well-hydrated.    3.  Hypothyroidism.  Clinically and biochemically, the patient is euthyroid. I recommended to continue to take the same dose of levothyroxine.    No orders of the defined types were placed in this encounter.          Sherry is a 65 year old who is being evaluated via a billable video visit.      Blood sugars available in libreview.    How would you like to obtain your AVS? MyChart  If the video visit is dropped, the invitation should be resent by: Text to cell phone: [phone]  Will anyone else be joining your video visit? No      [facility]  Adult Endocrinology  [facility], [place]        Again, thank you for allowing me to participate in the care of your patient.        Sincerely,        Rika Delgado MD

## 2021-01-27 ENCOUNTER — MYC MEDICAL ADVICE (OUTPATIENT)
Dept: ENDOCRINOLOGY | Facility: CLINIC | Age: 66
End: 2021-01-27

## 2021-01-27 DIAGNOSIS — E11.9 TYPE 2 DIABETES MELLITUS WITHOUT COMPLICATION, WITH LONG-TERM CURRENT USE OF INSULIN (H): ICD-10-CM

## 2021-01-27 DIAGNOSIS — Z79.4 TYPE 2 DIABETES MELLITUS WITHOUT COMPLICATION, WITH LONG-TERM CURRENT USE OF INSULIN (H): ICD-10-CM

## 2021-01-29 DIAGNOSIS — Z11.59 ENCOUNTER FOR SCREENING FOR OTHER VIRAL DISEASES: ICD-10-CM

## 2021-01-29 LAB
LABORATORY COMMENT REPORT: NORMAL
SARS-COV-2 RNA RESP QL NAA+PROBE: NEGATIVE
SARS-COV-2 RNA RESP QL NAA+PROBE: NORMAL
SPECIMEN SOURCE: NORMAL
SPECIMEN SOURCE: NORMAL

## 2021-01-29 PROCEDURE — U0005 INFEC AGEN DETEC AMPLI PROBE: HCPCS | Performed by: PODIATRIST

## 2021-01-29 PROCEDURE — U0003 INFECTIOUS AGENT DETECTION BY NUCLEIC ACID (DNA OR RNA); SEVERE ACUTE RESPIRATORY SYNDROME CORONAVIRUS 2 (SARS-COV-2) (CORONAVIRUS DISEASE [COVID-19]), AMPLIFIED PROBE TECHNIQUE, MAKING USE OF HIGH THROUGHPUT TECHNOLOGIES AS DESCRIBED BY CMS-2020-01-R: HCPCS | Performed by: PODIATRIST

## 2021-01-31 DIAGNOSIS — I48.0 PAROXYSMAL ATRIAL FIBRILLATION (H): ICD-10-CM

## 2021-02-01 DIAGNOSIS — E78.5 HYPERLIPIDEMIA LDL GOAL <100: ICD-10-CM

## 2021-02-02 ENCOUNTER — SURGERY (OUTPATIENT)
Age: 66
End: 2021-02-02
Payer: COMMERCIAL

## 2021-02-02 ENCOUNTER — HOSPITAL ENCOUNTER (OUTPATIENT)
Facility: AMBULATORY SURGERY CENTER | Age: 66
Discharge: HOME | End: 2021-02-02
Attending: PODIATRIST | Admitting: PODIATRIST
Payer: COMMERCIAL

## 2021-02-02 ENCOUNTER — ANCILLARY PROCEDURE (OUTPATIENT)
Dept: GENERAL RADIOLOGY | Facility: CLINIC | Age: 66
End: 2021-02-02
Attending: PODIATRIST
Payer: COMMERCIAL

## 2021-02-02 ENCOUNTER — ANESTHESIA (OUTPATIENT)
Dept: SURGERY | Facility: AMBULATORY SURGERY CENTER | Age: 66
End: 2021-02-02

## 2021-02-02 VITALS
TEMPERATURE: 97.4 F | RESPIRATION RATE: 16 BRPM | DIASTOLIC BLOOD PRESSURE: 57 MMHG | SYSTOLIC BLOOD PRESSURE: 98 MMHG | OXYGEN SATURATION: 94 %

## 2021-02-02 DIAGNOSIS — M21.612 BUNION, LEFT: ICD-10-CM

## 2021-02-02 DIAGNOSIS — M21.619 BUNION: ICD-10-CM

## 2021-02-02 LAB
GLUCOSE BLDC GLUCOMTR-MCNC: 140 MG/DL (ref 70–99)
GLUCOSE BLDC GLUCOMTR-MCNC: 199 MG/DL (ref 70–99)

## 2021-02-02 PROCEDURE — 28296 COR HLX VLGS DSTL MTAR OSTEO: CPT | Mod: LT | Performed by: PODIATRIST

## 2021-02-02 PROCEDURE — G8916 PT W IV AB GIVEN ON TIME: HCPCS

## 2021-02-02 PROCEDURE — 28296 COR HLX VLGS DSTL MTAR OSTEO: CPT | Mod: TA

## 2021-02-02 PROCEDURE — G8907 PT DOC NO EVENTS ON DISCHARG: HCPCS

## 2021-02-02 DEVICE — IMPLANTABLE DEVICE: Type: IMPLANTABLE DEVICE | Site: FOOT | Status: FUNCTIONAL

## 2021-02-02 RX ORDER — PHYSOSTIGMINE SALICYLATE 1 MG/ML
1.2 INJECTION INTRAVENOUS
Status: DISCONTINUED | OUTPATIENT
Start: 2021-02-02 | End: 2021-02-03 | Stop reason: HOSPADM

## 2021-02-02 RX ORDER — SODIUM CHLORIDE, SODIUM LACTATE, POTASSIUM CHLORIDE, CALCIUM CHLORIDE 600; 310; 30; 20 MG/100ML; MG/100ML; MG/100ML; MG/100ML
INJECTION, SOLUTION INTRAVENOUS CONTINUOUS
Status: DISCONTINUED | OUTPATIENT
Start: 2021-02-02 | End: 2021-02-03 | Stop reason: HOSPADM

## 2021-02-02 RX ORDER — NALOXONE HYDROCHLORIDE 0.4 MG/ML
0.4 INJECTION, SOLUTION INTRAMUSCULAR; INTRAVENOUS; SUBCUTANEOUS
Status: DISCONTINUED | OUTPATIENT
Start: 2021-02-02 | End: 2021-02-03 | Stop reason: HOSPADM

## 2021-02-02 RX ORDER — ONDANSETRON 4 MG/1
4 TABLET, ORALLY DISINTEGRATING ORAL EVERY 30 MIN PRN
Status: DISCONTINUED | OUTPATIENT
Start: 2021-02-02 | End: 2021-02-03 | Stop reason: HOSPADM

## 2021-02-02 RX ORDER — LIDOCAINE HYDROCHLORIDE 20 MG/ML
INJECTION, SOLUTION INFILTRATION; PERINEURAL PRN
Status: DISCONTINUED | OUTPATIENT
Start: 2021-02-02 | End: 2021-02-02

## 2021-02-02 RX ORDER — HYDROCODONE BITARTRATE AND ACETAMINOPHEN 5; 325 MG/1; MG/1
1-2 TABLET ORAL EVERY 4 HOURS PRN
Qty: 30 TABLET | Refills: 0 | Status: SHIPPED | OUTPATIENT
Start: 2021-02-02 | End: 2021-08-17

## 2021-02-02 RX ORDER — ROSUVASTATIN CALCIUM 10 MG/1
TABLET, COATED ORAL
Qty: 90 TABLET | Refills: 3 | Status: SHIPPED | OUTPATIENT
Start: 2021-02-02 | End: 2022-01-06

## 2021-02-02 RX ORDER — ONDANSETRON 2 MG/ML
INJECTION INTRAMUSCULAR; INTRAVENOUS PRN
Status: DISCONTINUED | OUTPATIENT
Start: 2021-02-02 | End: 2021-02-02

## 2021-02-02 RX ORDER — FENTANYL CITRATE 50 UG/ML
25-50 INJECTION, SOLUTION INTRAMUSCULAR; INTRAVENOUS
Status: DISCONTINUED | OUTPATIENT
Start: 2021-02-02 | End: 2021-02-03 | Stop reason: HOSPADM

## 2021-02-02 RX ORDER — FENTANYL CITRATE 50 UG/ML
25-50 INJECTION, SOLUTION INTRAMUSCULAR; INTRAVENOUS EVERY 5 MIN PRN
Status: DISCONTINUED | OUTPATIENT
Start: 2021-02-02 | End: 2021-02-03 | Stop reason: HOSPADM

## 2021-02-02 RX ORDER — ACETAMINOPHEN 325 MG/1
975 TABLET ORAL ONCE
Status: COMPLETED | OUTPATIENT
Start: 2021-02-02 | End: 2021-02-02

## 2021-02-02 RX ORDER — NALOXONE HYDROCHLORIDE 0.4 MG/ML
0.2 INJECTION, SOLUTION INTRAMUSCULAR; INTRAVENOUS; SUBCUTANEOUS
Status: DISCONTINUED | OUTPATIENT
Start: 2021-02-02 | End: 2021-02-03 | Stop reason: HOSPADM

## 2021-02-02 RX ORDER — CEFAZOLIN SODIUM 1 G/3ML
1 INJECTION, POWDER, FOR SOLUTION INTRAMUSCULAR; INTRAVENOUS SEE ADMIN INSTRUCTIONS
Status: DISCONTINUED | OUTPATIENT
Start: 2021-02-02 | End: 2021-02-03 | Stop reason: HOSPADM

## 2021-02-02 RX ORDER — BUPIVACAINE HYDROCHLORIDE 5 MG/ML
INJECTION, SOLUTION PERINEURAL PRN
Status: DISCONTINUED | OUTPATIENT
Start: 2021-02-02 | End: 2021-02-02 | Stop reason: HOSPADM

## 2021-02-02 RX ORDER — PROPOFOL 10 MG/ML
INJECTION, EMULSION INTRAVENOUS PRN
Status: DISCONTINUED | OUTPATIENT
Start: 2021-02-02 | End: 2021-02-02

## 2021-02-02 RX ORDER — CEFAZOLIN SODIUM IN 0.9 % NACL 3 G/100 ML
3 INTRAVENOUS SOLUTION, PIGGYBACK (ML) INTRAVENOUS
Status: COMPLETED | OUTPATIENT
Start: 2021-02-02 | End: 2021-02-02

## 2021-02-02 RX ORDER — OXYCODONE HYDROCHLORIDE 5 MG/1
5 TABLET ORAL EVERY 4 HOURS PRN
Status: DISCONTINUED | OUTPATIENT
Start: 2021-02-02 | End: 2021-02-03 | Stop reason: HOSPADM

## 2021-02-02 RX ORDER — ONDANSETRON 2 MG/ML
4 INJECTION INTRAMUSCULAR; INTRAVENOUS EVERY 30 MIN PRN
Status: DISCONTINUED | OUTPATIENT
Start: 2021-02-02 | End: 2021-02-03 | Stop reason: HOSPADM

## 2021-02-02 RX ORDER — ALBUTEROL SULFATE 0.83 MG/ML
2.5 SOLUTION RESPIRATORY (INHALATION) EVERY 4 HOURS PRN
Status: DISCONTINUED | OUTPATIENT
Start: 2021-02-02 | End: 2021-02-03 | Stop reason: HOSPADM

## 2021-02-02 RX ORDER — EPHEDRINE SULFATE 50 MG/ML
INJECTION, SOLUTION INTRAMUSCULAR; INTRAVENOUS; SUBCUTANEOUS PRN
Status: DISCONTINUED | OUTPATIENT
Start: 2021-02-02 | End: 2021-02-02

## 2021-02-02 RX ORDER — HYDROXYZINE HYDROCHLORIDE 25 MG/1
25 TABLET, FILM COATED ORAL EVERY 4 HOURS PRN
Qty: 30 TABLET | Refills: 0 | Status: SHIPPED | OUTPATIENT
Start: 2021-02-02 | End: 2021-08-17

## 2021-02-02 RX ORDER — PROPOFOL 10 MG/ML
INJECTION, EMULSION INTRAVENOUS CONTINUOUS PRN
Status: DISCONTINUED | OUTPATIENT
Start: 2021-02-02 | End: 2021-02-02

## 2021-02-02 RX ORDER — METOPROLOL TARTRATE 1 MG/ML
1-2 INJECTION, SOLUTION INTRAVENOUS EVERY 5 MIN PRN
Status: DISCONTINUED | OUTPATIENT
Start: 2021-02-02 | End: 2021-02-03 | Stop reason: HOSPADM

## 2021-02-02 RX ORDER — MEPERIDINE HYDROCHLORIDE 25 MG/ML
12.5 INJECTION INTRAMUSCULAR; INTRAVENOUS; SUBCUTANEOUS
Status: DISCONTINUED | OUTPATIENT
Start: 2021-02-02 | End: 2021-02-03 | Stop reason: HOSPADM

## 2021-02-02 RX ORDER — HYDRALAZINE HYDROCHLORIDE 20 MG/ML
2.5-5 INJECTION INTRAMUSCULAR; INTRAVENOUS EVERY 10 MIN PRN
Status: DISCONTINUED | OUTPATIENT
Start: 2021-02-02 | End: 2021-02-03 | Stop reason: HOSPADM

## 2021-02-02 RX ORDER — LIDOCAINE 40 MG/G
CREAM TOPICAL
Status: DISCONTINUED | OUTPATIENT
Start: 2021-02-02 | End: 2021-02-03 | Stop reason: HOSPADM

## 2021-02-02 RX ADMIN — EPHEDRINE SULFATE 10 MG: 50 INJECTION, SOLUTION INTRAMUSCULAR; INTRAVENOUS; SUBCUTANEOUS at 09:50

## 2021-02-02 RX ADMIN — Medication 3 G: at 08:34

## 2021-02-02 RX ADMIN — PROPOFOL 20 MG: 10 INJECTION, EMULSION INTRAVENOUS at 09:19

## 2021-02-02 RX ADMIN — LIDOCAINE HYDROCHLORIDE 60 MG: 20 INJECTION, SOLUTION INFILTRATION; PERINEURAL at 08:25

## 2021-02-02 RX ADMIN — PROPOFOL 50 MG: 10 INJECTION, EMULSION INTRAVENOUS at 08:26

## 2021-02-02 RX ADMIN — ONDANSETRON 4 MG: 2 INJECTION INTRAMUSCULAR; INTRAVENOUS at 09:12

## 2021-02-02 RX ADMIN — PROPOFOL 150 MCG/KG/MIN: 10 INJECTION, EMULSION INTRAVENOUS at 08:26

## 2021-02-02 RX ADMIN — ACETAMINOPHEN 975 MG: 325 TABLET ORAL at 07:28

## 2021-02-02 RX ADMIN — SODIUM CHLORIDE, SODIUM LACTATE, POTASSIUM CHLORIDE, CALCIUM CHLORIDE: 600; 310; 30; 20 INJECTION, SOLUTION INTRAVENOUS at 08:20

## 2021-02-02 RX ADMIN — BUPIVACAINE HYDROCHLORIDE 20 ML: 5 INJECTION, SOLUTION PERINEURAL at 08:43

## 2021-02-02 ASSESSMENT — ENCOUNTER SYMPTOMS: DYSRHYTHMIAS: 1

## 2021-02-02 NOTE — BRIEF OP NOTE
[facility] Brief Operative Note    Pre-operative diagnosis: Bunion [M21.619]   Post-operative diagnosis same   Procedure: Procedure(s):  Left bunion correction with bone cutting and screw fixation   Surgeon(s): Surgeon(s) and Role:     * David Hoffman DPM - Primary   Estimated blood loss: 1cc   Specimens: none   Findings:

## 2021-02-02 NOTE — ANESTHESIA CARE TRANSFER NOTE
Patient: Sherry Slaughter    Procedure(s):  Left bunion correction with bone cutting and screw fixation    Diagnosis: Bunion [M21.619]  Diagnosis Additional Information: No value filed.    Anesthesia Type:   MAC     Note:      Level of Consciousness: awake  Oxygen Supplementation: face mask  Level of Supplemental Oxygen: 6  Independent Airway: airway patency satisfactory and stable    Vital Signs Stable: post-procedure vital signs reviewed and stable  Report to RN Given: handoff report given  Patient transferred to: Phase II    Handoff Report: Identifed the Patient, Identified the Reponsible Provider, Reviewed the pertinent medical history, Discussed the surgical course, Reviewed Intra-OP anesthesia mangement and issues during anesthesia, Set expectations for post-procedure period and Allowed opportunity for questions and acknowledgement of understanding      Vitals: (Last set prior to Anesthesia Care Transfer)  CRNA VITALS  2/2/2021 0930 - 2/2/2021 1030      2/2/2021             Resp Rate (observed):  (!) 1        Electronically Signed By: JIM Curry CRNA  February 2, 2021  12:03 PM

## 2021-02-02 NOTE — DISCHARGE INSTRUCTIONS
[facility]   Adult Discharge Orders & Instructions     For 24 hours after surgery    1. Get plenty of rest.  A responsible adult must stay with you for at least 24 hours after you leave the hospital.   2. Do not drive or use heavy equipment.  If you have weakness or tingling, don't drive or use heavy equipment until this feeling goes away.  3. Do not drink alcohol.  4. Avoid strenuous or risky activities.  Ask for help when climbing stairs.   5. You may feel lightheaded.  IF so, sit for a few minutes before standing.  Have someone help you get up.   6. If you have nausea (feel sick to your stomach): Drink only clear liquids such as apple juice, ginger ale, broth or 7-Up.  Rest may also help.  Be sure to drink enough fluids.  Move to a regular diet as you feel able.  7. You may have a slight fever. Call the doctor if your fever is over 100 F (37.7 C) (taken under the tongue) or lasts longer than 24 hours.  8. You may have a dry mouth, a sore throat, muscle aches or trouble sleeping.  These should go away after 24 hours.  9. Do not make important or legal decisions.     Call your doctor for any of the followin.  Signs of infection (fever, growing tenderness at the surgery site, a large amount of drainage or bleeding, severe pain, foul-smelling drainage, redness, swelling).    2. It has been over 8 to 10 hours since surgery and you are still not able to urinate (pass water).    3.  Headache for over 24 hours.    4.  Numbness, tingling or weakness the day after surgery (if you had spinal anesthesia).                  5. Signs of Covid-19 infection (temperature over 100 degrees, shortness of breath, cough, loss of taste/smell, generalized body aches, persistent headache,                  chills, sore throat, nausea/vomiting/diarrhea).    To contact a doctor, call __[phone]_    Tylenol was given at 7:30 am.

## 2021-02-02 NOTE — ANESTHESIA POSTPROCEDURE EVALUATION
Patient: Sherry Slaughter    Procedure(s):  Left bunion correction with bone cutting and screw fixation    Diagnosis:Bunion [M21.619]  Diagnosis Additional Information: No value filed.    Anesthesia Type:  MAC    Note:  Disposition: Outpatient   Postop Pain Control: Uneventful            Sign Out: Well controlled pain   PONV: No   Neuro/Psych: Uneventful            Sign Out: Acceptable/Baseline neuro status   Airway/Respiratory: Uneventful            Sign Out: Acceptable/Baseline resp. status   CV/Hemodynamics: Uneventful            Sign Out: Acceptable CV status   Other NRE: NONE   DID A NON-ROUTINE EVENT OCCUR? No         Last vitals:  Vitals:    02/02/21 0716 02/02/21 1002 02/02/21 1024   BP: 122/59 99/50 98/57   Resp: 20 16 16   Temp: 36.4  C (97.6  F) 36.1  C (96.9  F) 36.3  C (97.4  F)   SpO2: 94% 95% 94%       Electronically Signed By: Virgilio [place] MD  February 2, 2021  1:52 PM

## 2021-02-02 NOTE — OP NOTE
Procedure Date: 02/02/2021      PREOPERATIVE DIAGNOSIS:  Left hallux abductovalgus deformity.      POSTOPERATIVE DIAGNOSIS:  Left hallux abductovalgus deformity.      PROCEDURE:  Left Dale bunionectomy.      ANESTHESIA:  MAC.      HEMOSTASIS:  Pneumatic ankle tourniquet at 250 mmHg.      INDICATIONS:  This patient has a forefoot deformity, diabetes, left hallux ulcer.  We hope to correct this deformity to help reduce her medial bump as well as make her function more mechanically effeciently to help heal the ulcer.  She understands possible complications include continued ulceration, slow healing and infection from diabetes.      DESCRIPTION OF PROCEDURE:  The patient was brought to the operating table in a supine position.  She was then given sedation by the anesthesiologist, and a left first Esparza block was done.  The foot was then prepped and draped in normal sterile manner.  The open wound was covered with tegaderm.  Pneumatic ankle tourniquet was placed around the ankle with copious amounts of Webril padding.  The foot was then elevated for complete exsanguination.  The tourniquet was inflated.  The foot was brought on the operating table, where the following procedure was performed.  At this time, an incision was made medial to the extensor hallucis longus tendon.  This was brought down to the deep fascia utilizing blunt and sharp dissection techniques.  All neurovascular structures that were encountered were either Bovie, tied, or retracted to the side.  We incised the periosteum the entire length of the incision, reflected off dorsally and medially.  We noted while we were dissecting through her tissues, they were quite stiff.  A first interspace release was done to our satisfaction, so that her hallux could easily move medially.  Removed the medial bump with a saw, using a K-wire we placed this as an axis guide in neutral position.  We then cuty standard long arm Dale bunionectomy.  We transposed the  metatarsal head laterally and temporarily fixated it.  We loaded the foot.  Good reduction of the deformity was noted.  We fixated this with two 2.7 mm screws from dorsal to plantar, utilizing standard AO fixation techniques.  We removed the remaining medial bump.  All sharp bony prominences were burred smooth.  We retightened the screws.  We loaded the foot.  Good reduction of the deformity was noted.  She still had some interphalangeus.  We elected not to correct this because of her stiff tissues as well as the patient having a wound on the plantar portion of her left hallux.  We then flushed the wound.  Standard layered closure was done at this time.  We dressed this with Adaptic, 4 x 4s, Gurpreet, Kerlix and Coban.  Tourniquet was deflated.  All digits were noted to show preoperative levels of perfusion.      COMPLICATIONS:  None.      ESTIMATED BLOOD LOSS:  1  mL.      The patient tolerated the procedure and anesthesia well.  She was then wheeled from the operating room to the recovery room with vital signs stable and intact sterile dressing.         LUCIANA INIGUEZ DPM             D: 2021   T: 2021   MT: ÁLVARO      Name:     CONCEPCIÓN SCALES   MRN:      [id number]        Account:        [id number]   :      1955           Procedure Date: 2021      Document: [id number]

## 2021-02-02 NOTE — ANESTHESIA PREPROCEDURE EVALUATION
Anesthesia Pre-Procedure Evaluation    Patient: Sherry Slaughter   MRN: [id number] : 1955        Preoperative Diagnosis: Bunion [M21.619]   Procedure : Procedure(s):  Left bunion correction with bone cutting and screw fixation     Past Medical History:   Diagnosis Date     Cataract      Congestive heart failure (H) 2019    AFIB     DEPRESSION     comes and goes - tried meds - unsuccessfully, certain times of the year, no psych intervention and no counselors in the past - and not interested      Diverticulosis of colon (without mention of hemorrhage)     colonoscopy     ECHO-mildLVH,tr MR,mild thick lflets w inc LA,trTR        Essential hypertension, benign     late  - started medications at that time - not to difficult to control meds      Fam hx-cardiovas dis [facility]     father - CAD, and lipids/HTN - multiple members of the family      Family history of diabetes mellitus     sister and grandmother with DM      Family history of malignant neoplasm of breast     mother - young age - 45, and maternal cousin and aunt as well - no BRCA testing done      Family history of stroke (cerebrovascular)     grandmother in early life in her 40s      FAMILY HX COLON CANCER     Pat uncles x 2     Heart disease     murmur/     Heart murmur      HYPERLIPIDEMIA     fairly recent - in the last 5 years - high for DM levels  -cholesterol recent      Irritable bowel syndrome     goes between the 2 - nerve related - more stressed more problems      MICROALBUMINURIA     unsure how long - been on the lisinopril - for a few years at well      Nonspecific abnormal results of liver function study 2/3/2003    SGOT - has been high in the past - since the hepatitis b - borderline elevation - not really changing any      OBESITY      Obstructive sleep apnea      GENESIS (obstructive sleep apnea) 10/15/2006    psg 5/15 AHI 53 aPAP 8-15     OSTEOARTHRITIS L KNEE     total knee on the left - and pain is  gone since the knee replacement      PERS HX HEPATITIS B- RESOLVED] 1977    acute virus only - no chronic disease      PERS HX HEPATITIS B- RESOLVED]      Type II or unspecified type diabetes mellitus without mention of complication, not stated as uncontrolled 1991    oral meds frist, then insulin eventually later, difficult to control most of the time      Unspecified hypothyroidism 10/11/2006    TSH 3.36 - mild subclinical hypothyroidism - deciding on following or starting low dose meds - with dr Oreilly - following       Past Surgical History:   Procedure Laterality Date     ABDOMEN SURGERY      ovaian scope/ appendix removal     ANESTHESIA CARDIOVERSION N/A 12/4/2020    Procedure: ANESTHESIA, FOR CARDIOVERSION @1400;  Surgeon: GENERIC ANESTHESIA PROVIDER;  Location: [facility] OR     ARTHROPLASTY KNEE Right 9/23/2015    Procedure: ARTHROPLASTY KNEE;  Surgeon: Rufus Brown MD;  Location:  OR      TOTAL KNEE ARTHROPLASTY  3/03    L knee     CATARACT IOL, RT/LT Left      COLONOSCOPY  4/04    diverticulosis, rec repeat 10 yrs(consider fam hx?)     COLONOSCOPY WITH CO2 INSUFFLATION N/A 6/19/2019    Procedure: COLONOSCOPY, WITH CO2 INSUFFLATION;  Surgeon: Zeb Duarte MD;  Location:  OR     ORTHOPEDIC SURGERY      right ankle     PHACOEMULSIFICATION CLEAR CORNEA WITH STANDARD INTRAOCULAR LENS IMPLANT Left 3/15/2018    Procedure: PHACOEMULSIFICATION CLEAR CORNEA WITH STANDARD INTRAOCULAR LENS IMPLANT;  LEFT EYE PHACOEMULSIFICATION CLEAR CORNEA WITH STANDARD INTRAOCULAR LENS IMPLANT;  Surgeon: Bandar Linares MD;  Location: [facility]     PHACOEMULSIFICATION CLEAR CORNEA WITH STANDARD INTRAOCULAR LENS IMPLANT Right 4/5/2018    Procedure: PHACOEMULSIFICATION CLEAR CORNEA WITH STANDARD INTRAOCULAR LENS IMPLANT;  RIGHT PHACOEMULSIFICATION CLEAR CORNEA WITH STANDARD INTRAOCULAR LENS IMPLANT ;  Surgeon: Bandar Linares MD;  Location:  [facility]     STRESS ECHO (METRO)  12/03    no ischemia, limited by fatigue      SURGICAL HISTORY OF -       EXP LAP- R OVARY CYSTS     SURGICAL HISTORY OF -   1981,1984,1985    CHILDBIRTH     Z NONSPECIFIC PROCEDURE  6/06    right triple arthrodecesis      Z NONSPECIFIC PROCEDURE  7/06     right bunion surgery       Allergies   Allergen Reactions     Atorvastatin Calcium      Gas     Pravachol [Pravastatin Sodium]      Pravachol - dry cough      Simvastatin      Myalgia      Social History     Tobacco Use     Smoking status: Never Smoker     Smokeless tobacco: Never Used     Tobacco comment: tried in early 20s only    Substance Use Topics     Alcohol use: Yes     Comment: rare      Wt Readings from Last 1 Encounters:   01/21/21 132.5 kg (292 lb)        Anesthesia Evaluation            ROS/MED HX  ENT/Pulmonary:  - neg pulmonary ROS   (+) sleep apnea,     Neurologic:  - neg neurologic ROS     Cardiovascular:  - neg cardiovascular ROS   (+) Dyslipidemia hypertension-----CHF dysrhythmias, a-fib,     METS/Exercise Tolerance:     Hematologic:  - neg hematologic  ROS     Musculoskeletal:  - neg musculoskeletal ROS (+) arthritis,     GI/Hepatic:  - neg GI/hepatic ROS     Renal/Genitourinary:  - neg Renal ROS     Endo:  - neg endo ROS   (+) type II DM, thyroid problem, hypothyroidism, Obesity,     Psychiatric/Substance Use:  - neg psychiatric ROS     Infectious Disease:  - neg infectious disease ROS     Malignancy:  - neg malignancy ROS     Other:  - neg other ROS          Physical Exam    Airway  airway exam normal      Mallampati: III   TM distance: > 3 FB   Neck ROM: full   Mouth opening: > 3 cm    Respiratory Devices and Support         Dental           Cardiovascular          Rhythm and rate: regular and normal     Pulmonary   pulmonary exam normal        breath sounds clear to auscultation           OUTSIDE LABS:  CBC:   Lab Results   Component Value Date    WBC 7.3 01/05/2021    WBC 6.8 12/23/2020    HGB 13.9 01/05/2021    HGB 13.6 12/23/2020    HCT 40.4 01/05/2021    HCT 41.1 12/23/2020      01/05/2021     12/23/2020     BMP:   Lab Results   Component Value Date     01/05/2021     12/23/2020    POTASSIUM 3.8 01/05/2021    POTASSIUM 4.0 12/23/2020    CHLORIDE 107 01/05/2021    CHLORIDE 107 12/23/2020    CO2 29 01/05/2021    CO2 29 12/23/2020    BUN 33 (H) 01/05/2021    BUN 33 (H) 12/23/2020    CR 0.99 01/05/2021    CR 0.89 12/23/2020    GLC 95 01/05/2021     (H) 12/23/2020     COAGS:   Lab Results   Component Value Date    PTT 31 06/05/2006    INR 1.52 (H) 12/04/2020     POC:   Lab Results   Component Value Date     (H) 02/02/2021     HEPATIC:   Lab Results   Component Value Date    ALBUMIN 3.9 11/25/2020    PROTTOTAL 7.4 11/25/2020    ALT 42 11/25/2020    AST Unsatisfactory specimen - hemolyzed 11/25/2020    ALKPHOS 47 11/25/2020    BILITOTAL 0.5 11/25/2020     OTHER:   Lab Results   Component Value Date    PH 7.437 05/07/2015    LACT 1.7 11/27/2020    A1C 6.8 (H) 01/05/2021    CASSIDY 10.1 01/05/2021    PHOS 3.5 12/09/2020    MAG 2.1 12/10/2020    TSH 2.38 01/05/2021    T4 1.08@ 08/11/2009    CRP 1.5 11/14/2005    SED 16 11/14/2005       Anesthesia Plan     History & Physical Review      ASA Status:  3. NPO Status:  NPO Appropriate. .  Plan for MAC with Propofol induction. Maintenance will be TIVA. Reason for MAC:  Deep or markedly invasive procedure (G8).         PONV prophylaxis:  Ondansetron (or other 5HT-3).       Consents  Anesthesia Plan(s) and associated risks, benefits, and realistic alternatives discussed.    Questions answered and patient/representative(s) expressed understanding.    Discussed with:  Patient.       Extended Intubation/Ventilatory Support Discussed No No     Use of blood products discussed: No.          Postoperative Care  Postoperative pain management: IV analgesics, Oral pain medications and Multi-modal analgesia.           Virgilio Hernandez MD

## 2021-02-03 RX ORDER — DILTIAZEM HYDROCHLORIDE 120 MG/1
120 CAPSULE, EXTENDED RELEASE ORAL DAILY
Qty: 90 CAPSULE | Refills: 3 | Status: SHIPPED | OUTPATIENT
Start: 2021-02-03 | End: 2021-02-22

## 2021-02-03 NOTE — TELEPHONE ENCOUNTER
DILT-XR 120MG CP24  Last Written Prescription Date:  12/10/2020  Last Fill Quantity: 30,   # refills: 1  Last Office Visit : 1/6/2021  Future Office visit:  3/16/2021  Routing refill request to provider for review/approval because:  Last ordered by an Emergency Provider.     Continue same dose of medication?   Please advise       Rika Ndiaye RN  Central Triage Red Flags/Med Refills

## 2021-02-05 ENCOUNTER — OFFICE VISIT (OUTPATIENT)
Dept: PODIATRY | Facility: CLINIC | Age: 66
End: 2021-02-05
Payer: COMMERCIAL

## 2021-02-05 VITALS — DIASTOLIC BLOOD PRESSURE: 62 MMHG | HEART RATE: 82 BPM | SYSTOLIC BLOOD PRESSURE: 118 MMHG | OXYGEN SATURATION: 97 %

## 2021-02-05 DIAGNOSIS — M21.612 BUNION, LEFT: Primary | ICD-10-CM

## 2021-02-05 DIAGNOSIS — E11.621 DIABETIC ULCER OF TOE OF LEFT FOOT ASSOCIATED WITH TYPE 2 DIABETES MELLITUS, UNSPECIFIED ULCER STAGE (H): ICD-10-CM

## 2021-02-05 DIAGNOSIS — L97.529 DIABETIC ULCER OF TOE OF LEFT FOOT ASSOCIATED WITH TYPE 2 DIABETES MELLITUS, UNSPECIFIED ULCER STAGE (H): ICD-10-CM

## 2021-02-05 DIAGNOSIS — E11.49 TYPE II OR UNSPECIFIED TYPE DIABETES MELLITUS WITH NEUROLOGICAL MANIFESTATIONS, NOT STATED AS UNCONTROLLED(250.60) (H): ICD-10-CM

## 2021-02-05 PROCEDURE — 99213 OFFICE O/P EST LOW 20 MIN: CPT | Mod: 24 | Performed by: PODIATRIST

## 2021-02-05 RX ORDER — CEPHALEXIN 500 MG/1
500 CAPSULE ORAL 4 TIMES DAILY
Qty: 28 CAPSULE | Refills: 0 | Status: SHIPPED | OUTPATIENT
Start: 2021-02-05 | End: 2021-02-17

## 2021-02-05 NOTE — PROGRESS NOTES
Subjective:    Patient is 3 days postopp left Dale bunionectomy procedure done on 2/2/21.  Patient is doing well, admits compliance, denies fevers, chills, nausea or vomiting.  Pain slowly getting better.  Patient stopped taking pain pills on Wednesday.  She has been taking the plantarflexed Cam walker off and doing range of motion.  She also has a knee walker.  She has no other new complaints.    Objective:    Dressings clean, dry and intact.  Incisions are dry, well coapted with no dehiscence or drainage.  Pulses are palpable, sensation to light touch is intact.  Normal postopp edema and ecchymosis.  Slight erythema on the operative site.  The ulcer on the plantar portion of the left hallux is dry.    Assessment:   Postopp day 3 left Dale bunionectomy.  Diabetes mellitus  Left hallux ulcer    Plan:  New dressing placed on foot.  Continue ACE bandage and elevation.  Start gentle range of motion of first MTPJ.  Any increase in erythema, edema, and pain patient to call me immediately.   Discussed there is a slight amount of erythema.  With the patient's diabetes and the ulcer on this foot we will start the patient on Keflex 500 mg 1 p.o. 4 times daily for 7 days.  Discussed the importance of taking off CAM Walker and doing range of motion at her ankle.  Anytime patient is ambulating she will have a cam walker to protect her foot.  Encouraged her to use the knee walker as much as possible and getting around.  Return to the clinic in 11 days for x-ray and suture removal.    David Hoffman, VARUN BOND, FACFAS

## 2021-02-05 NOTE — PATIENT INSTRUCTIONS
We wish you continued good healing. If you have any questions or concerns, please do not hesitate to contact us at [phone]    Pheedt (secure e-mail communication and access to your chart) to send a message or to make an appointment.    Please remember to call and schedule a follow up appointment if one was recommended at your earliest convenience.     +++OF MARCH 2020+++ LOCATION AND HOURS HAVE CHANGED    PLEASE CALL CLINICS TO VERIFY DAYS AND TIMES  PODIATRY CLINIC HOURS  TELEPHONE NUMBER    Dr. David TRUONGPJORDON [facility]        Clinics:  [facility]   Tuesday 1PM-6PM  [place]  Wednesday 745AM-330PM  [place]  Thursday/Friday 745AM-230PM  [place] [facility] APPOINTMENTS  [phone]    [place] APPOINTMENTS  [phone]          If you need a medication refill, please contact us you may need lab work and/or a follow up visit prior to your refill (i.e. Antifungal medications).    If MRI needed please call Imaging at [phone] or [phone]    HOW DO I GET MY KNEE SCOOTER? Knee scooters can be picked up at ANY Medical Supply stores with your knee scooter Prescription.  OR    Bring your signed prescription to an [facility] Medical Equipment showroom.

## 2021-02-05 NOTE — LETTER
2/5/2021         RE: Sherry Slaughter  [address]  [place] [address]        Dear Colleague,    Thank you for referring your patient, Sherry Slaughter, to the [facility]. Please see a copy of my visit note below.    Subjective:    Patient is 3 days postopp left Dale bunionectomy procedure done on 2/2/21.  Patient is doing well, admits compliance, denies fevers, chills, nausea or vomiting.  Pain slowly getting better.  Patient stopped taking pain pills on Wednesday.  She has been taking the plantarflexed Cam walker off and doing range of motion.  She also has a knee walker.  She has no other new complaints.    Objective:    Dressings clean, dry and intact.  Incisions are dry, well coapted with no dehiscence or drainage.  Pulses are palpable, sensation to light touch is intact.  Normal postopp edema and ecchymosis.  Slight erythema on the operative site.  The ulcer on the plantar portion of the left hallux is dry.    Assessment:   Postopp day 3 left Dale bunionectomy.  Diabetes mellitus  Left hallux ulcer    Plan:  New dressing placed on foot.  Continue ACE bandage and elevation.  Start gentle range of motion of first MTPJ.  Any increase in erythema, edema, and pain patient to call me immediately.   Discussed there is a slight amount of erythema.  With the patient's diabetes and the ulcer on this foot we will start the patient on Keflex 500 mg 1 p.o. 4 times daily for 7 days.  Discussed the importance of taking off CAM Walker and doing range of motion at her ankle.  Anytime patient is ambulating she will have a cam walker to protect her foot.  Encouraged her to use the knee walker as much as possible and getting around.  Return to the clinic in 11 days for x-ray and suture removal.    David Hoffman DPM DPM, FACFAS          Again, thank you for allowing me to participate in the care of your patient.        Sincerely,        David Hoffman DPM

## 2021-02-17 ENCOUNTER — OFFICE VISIT (OUTPATIENT)
Dept: PODIATRY | Facility: CLINIC | Age: 66
End: 2021-02-17
Payer: COMMERCIAL

## 2021-02-17 ENCOUNTER — ANCILLARY PROCEDURE (OUTPATIENT)
Dept: GENERAL RADIOLOGY | Facility: CLINIC | Age: 66
End: 2021-02-17
Attending: PODIATRIST
Payer: COMMERCIAL

## 2021-02-17 VITALS — OXYGEN SATURATION: 100 % | HEART RATE: 74 BPM

## 2021-02-17 DIAGNOSIS — E11.621 DIABETIC ULCER OF TOE OF LEFT FOOT ASSOCIATED WITH TYPE 2 DIABETES MELLITUS, UNSPECIFIED ULCER STAGE (H): ICD-10-CM

## 2021-02-17 DIAGNOSIS — M21.612 BUNION, LEFT: Primary | ICD-10-CM

## 2021-02-17 DIAGNOSIS — E11.49 TYPE II OR UNSPECIFIED TYPE DIABETES MELLITUS WITH NEUROLOGICAL MANIFESTATIONS, NOT STATED AS UNCONTROLLED(250.60) (H): ICD-10-CM

## 2021-02-17 DIAGNOSIS — L97.529 DIABETIC ULCER OF TOE OF LEFT FOOT ASSOCIATED WITH TYPE 2 DIABETES MELLITUS, UNSPECIFIED ULCER STAGE (H): ICD-10-CM

## 2021-02-17 DIAGNOSIS — M21.612 BUNION, LEFT: ICD-10-CM

## 2021-02-17 PROCEDURE — 99024 POSTOP FOLLOW-UP VISIT: CPT | Performed by: PODIATRIST

## 2021-02-17 PROCEDURE — 73630 X-RAY EXAM OF FOOT: CPT | Mod: LT | Performed by: RADIOLOGY

## 2021-02-17 NOTE — LETTER
2/17/2021         RE: Sherry Slaughter  [address]  [place] [address]        Dear Colleague,    Thank you for referring your patient, Sherry Slaughter, to the [facility]. Please see a copy of my visit note below.    Subjective:    Patient is 15 days postopp left Dale bunionectomy procedure done on 2/2/21.  Patient is doing well, admits compliance, denies fevers, chills, nausea or vomiting.  Patient has no pain.  She has been taking the plantarflexed Cam walker off and doing range of motion.  She also has a knee walker.  She has no other new complaints.    Objective:    Dressings clean, dry and intact.  Incisions are dry, well coapted with no dehiscence or drainage with suture removal.  Pulses are palpable, sensation to light touch is intact.  Normal postopp edema.  No ecchymosis.  Slight erythema on the operative site.  The ulcer on the plantar portion of the left hallux is now superficial eschar that is almost healed    Assessment:   Postopp day 3 left Dale bunionectomy.  Diabetes mellitus  Left hallux ulcer    Plan:  Sutures removed.  X-rays taken today of left foot.  Continue ACE bandage and elevation.  Continue range of motion of first MTPJ.  Any increase in erythema, edema, and pain patient to call me immediately.   Continue nonweightbearing.  We gave patient the 2-week warning.  Discussed hallux wound now very small eschar and almost totally healed.  Return to clinic in 4 weeks    David Hoffman DPM DPM, FACFAS          Again, thank you for allowing me to participate in the care of your patient.        Sincerely,        Daivd Hoffman DPM

## 2021-02-17 NOTE — PROGRESS NOTES
Subjective:    Patient is 15 days postopp left Dale bunionectomy procedure done on 2/2/21.  Patient is doing well, admits compliance, denies fevers, chills, nausea or vomiting.  Patient has no pain.  She has been taking the plantarflexed Cam walker off and doing range of motion.  She also has a knee walker.  She has no other new complaints.    Objective:    Dressings clean, dry and intact.  Incisions are dry, well coapted with no dehiscence or drainage with suture removal.  Pulses are palpable, sensation to light touch is intact.  Normal postopp edema.  No ecchymosis.  Slight erythema on the operative site.  The ulcer on the plantar portion of the left hallux is now superficial eschar that is almost healed    Assessment:   Postopp day 3 left Dale bunionectomy.  Diabetes mellitus  Left hallux ulcer    Plan:  Sutures removed.  X-rays taken today of left foot.  Continue ACE bandage and elevation.  Continue range of motion of first MTPJ.  Any increase in erythema, edema, and pain patient to call me immediately.   Continue nonweightbearing.  We gave patient the 2-week warning.  Discussed hallux wound now very small eschar and almost totally healed.  Return to clinic in 4 weeks    David Hoffman, VARUN BOND, FACFAS

## 2021-02-17 NOTE — PATIENT INSTRUCTIONS
We wish you continued good healing. If you have any questions or concerns, please do not hesitate to contact us at [phone]    ToolWiret (secure e-mail communication and access to your chart) to send a message or to make an appointment.    Please remember to call and schedule a follow up appointment if one was recommended at your earliest convenience.     +++OF MARCH 2020+++ LOCATION AND HOURS HAVE CHANGED    PLEASE CALL CLINICS TO VERIFY DAYS AND TIMES  PODIATRY CLINIC HOURS  TELEPHONE NUMBER    Dr. David TRUONGPJORDON [facility]        Clinics:  [facility]   Tuesday 1PM-6PM  [place]  Wednesday 745AM-330PM  [place]  Thursday/Friday 745AM-230PM  [place] APPOINTMENTS  [phone]    [place] APPOINTMENTS  [phone]          If you need a medication refill, please contact us you may need lab work and/or a follow up visit prior to your refill (i.e. Antifungal medications).    If MRI needed please call Imaging at [phone] or [phone]    HOW DO I GET MY KNEE SCOOTER? Knee scooters can be picked up at ANY Medical Supply stores with your knee scooter Prescription.  OR    Bring your signed prescription to an [facility] Medical Equipment showroom.

## 2021-02-21 DIAGNOSIS — I48.0 PAROXYSMAL ATRIAL FIBRILLATION (H): ICD-10-CM

## 2021-02-22 DIAGNOSIS — I48.0 PAROXYSMAL ATRIAL FIBRILLATION (H): ICD-10-CM

## 2021-02-22 RX ORDER — DILTIAZEM HYDROCHLORIDE 120 MG/1
120 CAPSULE, EXTENDED RELEASE ORAL DAILY
Qty: 90 CAPSULE | Refills: 3 | Status: SHIPPED | OUTPATIENT
Start: 2021-02-22 | End: 2021-09-28 | Stop reason: ALTCHOICE

## 2021-02-22 RX ORDER — DILTIAZEM HYDROCHLORIDE 240 MG/1
240 CAPSULE, COATED, EXTENDED RELEASE ORAL DAILY
Qty: 30 CAPSULE | Refills: 1 | Status: SHIPPED | OUTPATIENT
Start: 2021-02-22 | End: 2021-04-28

## 2021-02-24 NOTE — TELEPHONE ENCOUNTER
XARELTO 20MG TABS  Last Written Prescription Date:  12/22/2020  Last Fill Quantity: 90,   # refills: 0  Last Office Visit : 12/23/2020  Future Office visit:  3/16/2021    Routing refill request to provider for review/approval because:  Only a 90 day supply sent to pharm last order.  Continue this medication for Pt care?   Refer to Provider for review.         Rika Ndiaye RN  Central Triage Red Flags/Med Refills

## 2021-03-08 ENCOUNTER — IMMUNIZATION (OUTPATIENT)
Dept: PEDIATRICS | Facility: CLINIC | Age: 66
End: 2021-03-08
Payer: COMMERCIAL

## 2021-03-08 PROCEDURE — 91300 PR COVID VAC PFIZER DIL RECON 30 MCG/0.3 ML IM: CPT

## 2021-03-08 PROCEDURE — 0001A PR COVID VAC PFIZER DIL RECON 30 MCG/0.3 ML IM: CPT

## 2021-03-15 DIAGNOSIS — E78.5 HYPERLIPIDEMIA LDL GOAL <100: ICD-10-CM

## 2021-03-15 DIAGNOSIS — I48.0 PAROXYSMAL ATRIAL FIBRILLATION (H): ICD-10-CM

## 2021-03-15 LAB
ANION GAP SERPL CALCULATED.3IONS-SCNC: 6 MMOL/L (ref 3–14)
BUN SERPL-MCNC: 31 MG/DL (ref 7–30)
CALCIUM SERPL-MCNC: 10.1 MG/DL (ref 8.5–10.1)
CHLORIDE SERPL-SCNC: 107 MMOL/L (ref 94–109)
CHOLEST SERPL-MCNC: 130 MG/DL
CO2 SERPL-SCNC: 27 MMOL/L (ref 20–32)
CREAT SERPL-MCNC: 0.86 MG/DL (ref 0.52–1.04)
GFR SERPL CREATININE-BSD FRML MDRD: 69 ML/MIN/{1.73_M2}
GLUCOSE SERPL-MCNC: 146 MG/DL (ref 70–99)
HDLC SERPL-MCNC: 44 MG/DL
LDLC SERPL CALC-MCNC: 46 MG/DL
NONHDLC SERPL-MCNC: 86 MG/DL
POTASSIUM SERPL-SCNC: 4.4 MMOL/L (ref 3.4–5.3)
SODIUM SERPL-SCNC: 140 MMOL/L (ref 133–144)
TRIGL SERPL-MCNC: 200 MG/DL

## 2021-03-15 PROCEDURE — 80061 LIPID PANEL: CPT | Performed by: INTERNAL MEDICINE

## 2021-03-15 PROCEDURE — 80048 BASIC METABOLIC PNL TOTAL CA: CPT | Performed by: INTERNAL MEDICINE

## 2021-03-15 PROCEDURE — 36415 COLL VENOUS BLD VENIPUNCTURE: CPT | Performed by: INTERNAL MEDICINE

## 2021-03-16 ENCOUNTER — VIRTUAL VISIT (OUTPATIENT)
Dept: CARDIOLOGY | Facility: CLINIC | Age: 66
End: 2021-03-16
Payer: COMMERCIAL

## 2021-03-16 DIAGNOSIS — I35.0 AORTIC VALVE STENOSIS, ETIOLOGY OF CARDIAC VALVE DISEASE UNSPECIFIED: ICD-10-CM

## 2021-03-16 DIAGNOSIS — I48.0 PAROXYSMAL ATRIAL FIBRILLATION (H): ICD-10-CM

## 2021-03-16 DIAGNOSIS — I10 HYPERTENSION, UNSPECIFIED TYPE: Primary | ICD-10-CM

## 2021-03-16 DIAGNOSIS — E78.1 HYPERTRIGLYCERIDEMIA: ICD-10-CM

## 2021-03-16 DIAGNOSIS — I50.32 CHRONIC DIASTOLIC CONGESTIVE HEART FAILURE (H): ICD-10-CM

## 2021-03-16 PROCEDURE — 99214 OFFICE O/P EST MOD 30 MIN: CPT | Mod: 95 | Performed by: INTERNAL MEDICINE

## 2021-03-16 NOTE — PROGRESS NOTES
"Sherry is a 65 year old who is being evaluated via a billable video visit.      The patient has been notified of following:     \"This video visit will be conducted via a call between you and your physician/provider. We have found that certain health care needs can be provided without the need for an in-person physical exam.  This service lets us provide the care you need with a video conversation.  If a prescription is necessary we can send it directly to your pharmacy.  If lab work is needed we can place an order for that and you can then stop by our lab to have the test done at a later time.    Video visits are billed at different rates depending on your insurance coverage.  Please reach out to your insurance provider with any questions.    If during the course of the call the physician/provider feels a video visit is not appropriate, you will not be charged for this service.\"    Patient has given verbal consent for video visit? Yes    How would you like to obtain your AVS? Mail    Video-Visit Details    Type of service:  Video Visit    Video Start Time: 955am    Video End Time: 1005am    Total time 31min charting and coordination of care    Originating Location (pt. Location):patient home      Distant Location (provider location):  home office    Platform used for Video Visit: Yuliana    See dictation #[id number]  "

## 2021-03-16 NOTE — PROGRESS NOTES
2021       Marilou Arciniega MD   [facility]    [address] [place] [address]      Patient:  Sherry Slaughter   MRN:  [id number]   :  1955         Dear Dr. Arciniega:      It was a pleasure participating in the care of your patient, Ms. Sherry Slaughter.  As you know, she is a 65-year-old lady who I saw today via virtual video visit via [facility] for paroxysmal AFib, hypertension, congestive heart failure, possible sleep disorder, aortic stenosis and hyperlipidemia.      Her past medical history is significant for the followin.  Type 2 diabetes.   2.  Hypertension.   3.  Hyperlipidemia.   4.  Depression.   5.  Obstructive sleep apnea, currently untreated.   6.  Hypothyroidism.   7.  Diverticulosis.   8.  Low back pain.   9.  Irritable bowel syndrome.      I last saw her on December 15, 2020.  At that time, she had just gone through a hospitalization.  As you remember, she had an emergency room visit on 2020 for AFib.  She had ELIAS-guided cardioversion performed on 2021.      She was hospitalized 2020 for pulmonary edema, likely secondary to being in AFib for some time and having her hydrochlorothiazide discontinued.  She was diuresed with IV Lasix and was sent to me in December for further evaluation.  We had determined that her dry weight was about 290 pounds and we restarted her hydrochlorothiazide.      Since our last visit, she has done extremely well.  She has not had any further episodes of atrial whatsoever and she has had a steady heart rate in the 70s to low 80s.  Her blood pressures have been running 115-120/67 and she has had no swelling and her weight has been consistently at about 290.  She has not yet pursued the sleep study for treatment for her sleep apnea as of yet.      She otherwise is feeling good, does not feel any exertional limitation.  She denies chest pain, shortness of breath, PND, orthopnea, new edema, palpitations,  syncope or near-syncope.  She is tolerating her medications adequately and is otherwise without complaints.      In terms of her present medications, she is takin.  Diltiazem, a total of 360 mg a day.   2.  Lasix 20 mg a day.   4.  Hydrochlorothiazide 50 mg a day.   5.  Levothyroxine.   6.  Lisinopril 40 mg a day.   7.  Metformin.   8.  Metoprolol succinate 200 mg a day.   9.  Rivaroxaban 20 mg a day    10.  Rosuvastatin 10 mg a day.      PHYSICAL EXAMINATION:     VITAL SIGNS:  Her blood pressures are running 115-120s/67, with a pulse in the 70s and 80s.  Her weight is in the 290 pound range.   GENERAL:  She appears comfortable, well groomed.   PSYCHIATRIC:  She is alert and oriented x 3.   HEENT:  Her eyes do not appear grossly erythematous or have exudate.   RESPIRATORY:  She is breathing comfortably without gross cough.      The remainder of the comprehensive physical exam was deferred secondary to COVID-19 pandemic and secondary to video visit restrictions.      LABORATORY DATA:  Labs on March 15, 2021, potassium 4.4, GFR normal.  LDL was 46.      Limited echocardiogram December 10, 2020 reveals normal LV systolic function, no significant valvular pathology.        IMPRESSION:      Sherry is a 65-year-old lady with several active cardiac issues:      1.  Paroxysmal atrial fibrillation.      The patient underwent ELIAS-guided cardioversion on 2020.  She has a relatively structurally normal heart.  She is currently stable in normal sinus rhythm on significant doses of metoprolol and diltiazem.  Continue to monitor.  She is tolerating anticoagulation well without gross bleeding.  [id number] score of 5 for congestive heart failure, hypertension, age, diabetes and female gender.      2.  Hypertension, well controlled.        Blood pressures are now in the goal range, running 115-120s with a pulse in the 70s and 80s.  We will continue to monitor.      3.  Congestive heart failure, currently  euvolemic.      She is currently at her dry weight at 290 pounds, on low-dose Lasix 20 mg a day along with hydrochlorothiazide 50 mg a day.      Note that she went into pulmonary edema in 2020, likely secondary to being in rapid AFib in combination with stopping hydrochlorothiazide.  We will continue to monitor on her current regimen.      4.  Untreated sleep apnea.      She will require a sleep evaluation and treatment in order to prevent further episodes of atrial fibrillation in the future.      5.  Mild aortic stenosis.  Seen on recent echo, mean gradient 12.  Continue to follow through time.        PLAN:     1.  Continue the present medications at present doses.     2.  Sleep study to document and treat her sleep apnea properly.     3.  Followup in 6 months with lab work prior, earlier if needed.     4.  Consider repeating an echo within the next 2-3 years, earlier if needed.      Once again, it was a pleasure participating in the care of your patient, Ms. Concepción Scales.  Please feel free to contact me at any time if you have questions regarding her care in the future.         Sincerely,      DEREJE LOYA MD              D: 2021   T: 2021   MT: GERARDO      Name:     CONCEPCIÓN SCALES   MRN:      [id number]        Account:      [id number]   :      1955      Document: [id number]       cc: Marilou Arciniega MD

## 2021-03-17 ENCOUNTER — ANCILLARY PROCEDURE (OUTPATIENT)
Dept: GENERAL RADIOLOGY | Facility: CLINIC | Age: 66
End: 2021-03-17
Attending: PODIATRIST
Payer: COMMERCIAL

## 2021-03-17 ENCOUNTER — OFFICE VISIT (OUTPATIENT)
Dept: PODIATRY | Facility: CLINIC | Age: 66
End: 2021-03-17
Payer: COMMERCIAL

## 2021-03-17 ENCOUNTER — OFFICE VISIT (OUTPATIENT)
Dept: OPTOMETRY | Facility: CLINIC | Age: 66
End: 2021-03-17
Payer: COMMERCIAL

## 2021-03-17 VITALS — RESPIRATION RATE: 12 BRPM | DIASTOLIC BLOOD PRESSURE: 65 MMHG | SYSTOLIC BLOOD PRESSURE: 102 MMHG | HEART RATE: 75 BPM

## 2021-03-17 DIAGNOSIS — Z01.00 EXAMINATION OF EYES AND VISION: Primary | ICD-10-CM

## 2021-03-17 DIAGNOSIS — H52.4 PRESBYOPIA: ICD-10-CM

## 2021-03-17 DIAGNOSIS — H02.889 MEIBOMIAN GLAND DYSFUNCTION: ICD-10-CM

## 2021-03-17 DIAGNOSIS — M21.612 BUNION, LEFT: ICD-10-CM

## 2021-03-17 DIAGNOSIS — E11.65 UNCONTROLLED TYPE 2 DIABETES MELLITUS WITH HYPERGLYCEMIA (H): ICD-10-CM

## 2021-03-17 DIAGNOSIS — M21.612 BUNION, LEFT: Primary | ICD-10-CM

## 2021-03-17 DIAGNOSIS — H52.223 REGULAR ASTIGMATISM OF BOTH EYES: ICD-10-CM

## 2021-03-17 DIAGNOSIS — Z96.1 PSEUDOPHAKIA OF BOTH EYES: ICD-10-CM

## 2021-03-17 PROCEDURE — 92015 DETERMINE REFRACTIVE STATE: CPT | Performed by: OPTOMETRIST

## 2021-03-17 PROCEDURE — 92014 COMPRE OPH EXAM EST PT 1/>: CPT | Performed by: OPTOMETRIST

## 2021-03-17 PROCEDURE — 99024 POSTOP FOLLOW-UP VISIT: CPT | Performed by: PODIATRIST

## 2021-03-17 PROCEDURE — 73630 X-RAY EXAM OF FOOT: CPT | Mod: LT | Performed by: RADIOLOGY

## 2021-03-17 ASSESSMENT — REFRACTION_WEARINGRX
SPECS_TYPE: OTC READERS DIDNT BRING
OD_SPHERE: -1.00
OD_ADD: +2.50
OD_AXIS: 010
OS_SPHERE: -0.75
OS_ADD: +2.50
OS_CYLINDER: +1.00
OS_AXIS: 008
OD_CYLINDER: +1.50

## 2021-03-17 ASSESSMENT — TONOMETRY
OD_IOP_MMHG: 18
IOP_METHOD: TONOPEN
OS_IOP_MMHG: 18

## 2021-03-17 ASSESSMENT — REFRACTION_MANIFEST
OD_CYLINDER: +1.50
OD_AXIS: 010
OS_CYLINDER: +1.00
OS_ADD: +2.50
OD_ADD: +2.50
OS_AXIS: 008
OD_SPHERE: -1.00
OS_SPHERE: -0.75

## 2021-03-17 ASSESSMENT — VISUAL ACUITY
OD_SC+: -2
OS_SC: 20/25
OD_SC: 20/60
OS_SC: 20/60
OD_SC: 20/30
OS_SC+: -2
METHOD: SNELLEN - LINEAR

## 2021-03-17 ASSESSMENT — EXTERNAL EXAM - LEFT EYE: OS_EXAM: NORMAL

## 2021-03-17 ASSESSMENT — CONF VISUAL FIELD
OS_NORMAL: 1
OD_NORMAL: 1

## 2021-03-17 ASSESSMENT — CUP TO DISC RATIO
OD_RATIO: 0.3
OS_RATIO: 0.3

## 2021-03-17 ASSESSMENT — EXTERNAL EXAM - RIGHT EYE: OD_EXAM: NORMAL

## 2021-03-17 NOTE — PATIENT INSTRUCTIONS
There are not any signs of the diabetes affecting the eyes today.  It is important that you get your eyes dilated once yearly and keep good control of your diabetes.    Eyeglass prescription given.  Your options are a bifocal which would allow you to see distance and near vision or separate glasses for distance and reading.    Heat to the eyes daily for 10-15 minutes nightly with warm washcloth or reusable gel masks from the pharmacy or  Leslye heat masks can be purchased at [employer].    Refresh tears 1 drop 2-4x daily.    Ocusoft lid scrubs at night.     Return in 1 year for a complete eye exam or sooner if needed.    Kameron Pedraza, OD    The affects of the dilating drops last for 4- 6 hours.  You will be more sensitive to light and vision will be blurry up close.  Do not drive if you do not feel comfortable.  Mydriatic sunglasses were given if needed.    Patient Education   Diabetes weakens the blood vessels all over the body, including the eyes. Damage to the blood vessels in the eyes can cause swelling or bleeding into part of the eye (called the retina). This is called diabetic retinopathy (YOSELIN-tin--[facility]-thee). If not treated, this disease can cause vision loss or blindness.   Symptoms may include blurred or distorted vision, but many people have no symptoms. It's important to see your eye doctor regularly to check for problems.   Early treatment and good control can help protect your vision. Here are the things you can do to help prevent vision loss:      1. Keep your blood sugar levels under tight control.      2. Bring high blood pressure under control.      3. No smoking.      4. Have yearly dilated eye exams.         There is a combination of three treatments which can greatly improve symptoms of dry eyes.    1.  Artificial tears  2. Heat (eyes closed)  3. Eyelid and eyelash cleansing (eyes closed)     Use one drop of artificial tears both eyes 4 x daily.  Once in the morning, lunch, dinner and bedtime.  Continue to use the drops regardless if your eyes are comfortable or not.  Artificial tears work best as a preventative and not as well after your eyes are starting to bother you.  It may take 4- 6 weeks of using the drops before you notice improvement.  If after that time you are still having problems schedule an appointment for an evaluation and discussion of different treatments such as Restasis or Xiidra.  Dry eyes are a chronic condition and you may have more symptoms at certain times of the year.    Excess tearing can be due to the right tears not working properly or a blockage in the tear drainage system.  You can try using artificial tears 1 drop right eye 4 x day.  If the excess tearing is bothersome after 4-6 weeks of treatment then we can send you for further testing.  This would entail a referral to our oculoplastic specialist Dr. Maxine Quinteros at the [facility]-[phone].    Recommended brands are:    Systane Complete  Systane Ultra  Systane Balance  Refresh Advanced Optive  Refresh Relieva  Blink    Recommended brands for contact lens wearers are:    Systane contacts  Refresh contacts  Blink contacts    If you are using drops more than 4 x day or have sensitivities to preservatives I recommend non preserved artificial tears.  These come in 1 use vials.  They can be used every 1-2 hours.  Do not reuse the vials.    Recommended brands are:    Refresh Optive Hernesto-3  Systane- preservative free  Refresh-  preservative free  Blink- preservative free    Gels or ointment can be used at night.    Recommended brands are:    Systane Gel  Refresh Gel  Blink Gel  Genteal Gel    Systane night time (ointment)  Refresh Celluvisc  Refresh PM (ointment)      Visine, Clear Eyes or Murine (drops that get the red out) can irritate the eyes and cause a rebound effect where the eyes become more red and you end up using more drops.  Avoid drops containing tetrahydrozoline, naphazoline, phenylephrine, oxymetazoline.       OTC Lumify is a newer product that gives immediate redness relief without the rebound effect.  Use as needed to take the redness out.    Artificial tears may be used with other drops (such as allergy, glaucoma, antibiotics) around the same time.  Be sure to wait 5 minutes in between drops.    Heat to the eyelids can also improve your symptoms of dry eyes.  Leslye heat masks can be purchased at [employer] to be used nightly for 10-15 minutes.  Other options are gel masks that can be put in the microwave and purchased at most pharmacies.      Tea Tree Oil eyelid cleansers recommended are Ocusoft Oust foam cleanser to cleanse eyelids/lashes at night and in the am. Other options are Blephadex or Cliradex eyelid wipes.  KEEP EYES CLOSED when using these products.  These can be purchased on amazon.com   A good product for make up remover with tea tree oil is WeLoveEyes.  This can be found at www.[employer] or VenJu[employer].    Other good eyelid cleansers have hypochlorous acid which removes excess bacteria and is safe around the eyes. Products are Avenova, Ocusoft Hypochlor or Heyedrate. Spray solution onto cotton pad, close eyes and gently apply to eyelids and eyelashes using side to side motion.  You can also KEEP EYES CLOSED spray and rub into eyelashes.  You do not need to rinse it off. Use morning and evening. These products can be found on [employer].  You can check with your local pharmacy and see if they can order if for you if they don't have it.    Other brands of eyelid cleansing wipes are:    Ocusoft wipes  Systane wipes    A great eye make up line is [email or web address]      Optometry Providers       Clinic Locations                                 Telephone Number   Dr. Sanam Dennison  [place] [phone]     [facility] Hours:                [facility] Hours:       [facility] Hours:   [address]  [facility]   [address]     [address] [place] [address]   [place] [address]    [place] [address]  Phone: [phone]                    Phone: [phone]     Phone: [phone]                      Monday 8:00-7:00                          Monday 8:00-7:00                          Monday 8:00-7:00              Tuesday 8:00-6:00                          Tuesday 8:00-7:00                          Tuesday 8:00-7:00              Wednesday 8:00-6:00                  Wednesday 8:00-7:00                   Wednesday 8:00-7:00      Thursday 8:00-6:00                        Thursday 8:00-7:00                         Thursday 8:00-7:00            Friday 8:00-5:00                              Friday 8:00-5:00                              Friday 8:00-5:00    [facility] Hours:   [address] [place] [address]  [phone]    Monday 8:00-7:00  Tuesday 8:00-7:00  Wednesday 8:00-7:00  Thursday 8:00-7:00  Friday 8:00-5:00  Please log on to [email or web address] to order your contact lenses.  The link is found on the [facility] page.  As always, Thank you for trusting us with your health care needs!

## 2021-03-17 NOTE — PROGRESS NOTES
Chief Complaint   Patient presents with     Diabetic Eye Exam     Accompanied by self  Hemoglobin A1C   Date Value Ref Range Status   01/05/2021 6.8 (H) 0 - 5.6 % Final     Comment:     Normal <5.7% Prediabetes 5.7-6.4%  Diabetes 6.5% or higher - adopted from ADA   consensus guidelines.     11/25/2020 6.9 (H) 0 - 5.6 % Final     Comment:     Normal <5.7% Prediabetes 5.7-6.4%  Diabetes 6.5% or higher - adopted from ADA   consensus guidelines.     07/22/2020 7.5 (A) 0 - 5.6 % Final         Last Eye Exam: 6-  Dilated Previously: Yes    What are you currently using to see? otc readers    Distance Vision Acuity: Satisfied with vision sitting up but not laying down     Near Vision Acuity: Satisfied with vision while reading  with otc readers    Eye Comfort: dry  Do you use eye drops? : No  Occupation or Hobbies: retired     Mona Herbert Optometric Assistant, A.B.O.C.     Medical, surgical and family histories reviewed and updated 3/17/2021.       OBJECTIVE: See Ophthalmology exam    ASSESSMENT:    ICD-10-CM    1. Examination of eyes and vision  Z01.00 EYE EXAM (SIMPLE-NONBILLABLE)   2. Uncontrolled type 2 diabetes mellitus with hyperglycemia (H)  E11.65 EYE EXAM (SIMPLE-NONBILLABLE)   3. Presbyopia  H52.4 REFRACTION   4. Regular astigmatism of both eyes  H52.223 REFRACTION   5. Pseudophakia of both eyes  Z96.1 EYE EXAM (SIMPLE-NONBILLABLE)   6. Meibomian gland dysfunction  H02.889 EYE EXAM (SIMPLE-NONBILLABLE)      PLAN:    Sherry Slaughter aware  eye exam results will be sent to Marilou Arciniega.  Patient Instructions   There are not any signs of the diabetes affecting the eyes today.  It is important that you get your eyes dilated once yearly and keep good control of your diabetes.    Eyeglass prescription given.  Your options are a bifocal which would allow you to see distance and near vision or separate glasses for distance and reading.    Heat to the eyes daily for 10-15 minutes nightly with warm washcloth or  reusable gel masks from the pharmacy or  Leslye heat masks can be purchased at [employer].    Refresh tears 1 drop 2-4x daily.    Ocusoft lid scrubs at night.     Return in 1 year for a complete eye exam or sooner if needed.    Kameron Pedraza, OD

## 2021-03-17 NOTE — LETTER
3/17/2021         RE: Sherry Remyvladimir  [address]  [place] [address]        Dear Colleague,    Thank you for referring your patient, Sherry Slaughter, to the [facility]. Please see a copy of my visit note below.    Chief Complaint   Patient presents with     Diabetic Eye Exam     Accompanied by self  Hemoglobin A1C   Date Value Ref Range Status   01/05/2021 6.8 (H) 0 - 5.6 % Final     Comment:     Normal <5.7% Prediabetes 5.7-6.4%  Diabetes 6.5% or higher - adopted from ADA   consensus guidelines.     11/25/2020 6.9 (H) 0 - 5.6 % Final     Comment:     Normal <5.7% Prediabetes 5.7-6.4%  Diabetes 6.5% or higher - adopted from ADA   consensus guidelines.     07/22/2020 7.5 (A) 0 - 5.6 % Final         Last Eye Exam: 6-  Dilated Previously: Yes    What are you currently using to see? otc readers    Distance Vision Acuity: Satisfied with vision sitting up but not laying down     Near Vision Acuity: Satisfied with vision while reading  with otc readers    Eye Comfort: dry  Do you use eye drops? : No  Occupation or Hobbies: retired     Mona Herbert Optometric Assistant, A.B.O.C.     Medical, surgical and family histories reviewed and updated 3/17/2021.       OBJECTIVE: See Ophthalmology exam    ASSESSMENT:    ICD-10-CM    1. Examination of eyes and vision  Z01.00 EYE EXAM (SIMPLE-NONBILLABLE)   2. Uncontrolled type 2 diabetes mellitus with hyperglycemia (H)  E11.65 EYE EXAM (SIMPLE-NONBILLABLE)   3. Presbyopia  H52.4 REFRACTION   4. Regular astigmatism of both eyes  H52.223 REFRACTION   5. Pseudophakia of both eyes  Z96.1 EYE EXAM (SIMPLE-NONBILLABLE)   6. Meibomian gland dysfunction  H02.889 EYE EXAM (SIMPLE-NONBILLABLE)      PLAN:    Sherry GAUTAM Sukhjinder aware  eye exam results will be sent to Marilou Arciniega.  Patient Instructions   There are not any signs of the diabetes affecting the eyes today.  It is important that you get your eyes dilated once yearly and  keep good control of your diabetes.    Eyeglass prescription given.  Your options are a bifocal which would allow you to see distance and near vision or separate glasses for distance and reading.    Heat to the eyes daily for 10-15 minutes nightly with warm washcloth or reusable gel masks from the pharmacy or  Bridestory heat masks can be purchased at [employer].    Refresh tears 1 drop 2-4x daily.    Ocusoft lid scrubs at night.     Return in 1 year for a complete eye exam or sooner if needed.    Kameron Pedraza, OD               Again, thank you for allowing me to participate in the care of your patient.        Sincerely,        Kameron Pedraza, OD

## 2021-03-17 NOTE — PROGRESS NOTES
Subjective:    Patient is 6 weeks postopp left Dale bunionectomy procedure done on 2/2/21.  Patient is doing well, admits compliance, denies fevers, chills, nausea or vomiting.  Patient has no pain.  She has been nonweightbearing.  She has been taking the plantarflexed Cam walker off and doing range of motion.  She also has a knee walker.  She has no other new complaints.    Objective:    Dressings clean, dry and intact.  Incision well-healed pulses are palpable, sensation to light touch is intact.  Normal postopp edema.  No ecchymosis.  No erythema.  Range of motion of first MTPJ is normal on dorsiflexion and about 30% normal on plantarflexion.  The ulcer on the plantar portion of the left hallux is  healed    Assessment:   Postopp day 3 left Dale bunionectomy.  Diabetes mellitus  Left hallux ulcer    Plan:  X-rays taken today of left foot.  Patient may now graduate into walking in cam walker.  She will walk 2 to 3 hours today.  If no erythema edema or pain will slowly increase 1 to 2 hours a day.  After walking all day and a cam walker for a week she will then graduate into a good shoe utilizing a similar process.  Patient will only do gentle walking at this point.  She has any pain or swelling she will go back to nonweightbearing for 4 to 5 days and then slowly try again.  Discussed the wound on her hallux is now healed.  Return to clinic in 4 weeks for final check    David Hoffman DPM DPM, FACFAS

## 2021-03-17 NOTE — LETTER
3/17/2021         RE: Sherry Slaughter  [address]  [place] [address]        Dear Colleague,    Thank you for referring your patient, Sherry Slaughter, to the [facility]. Please see a copy of my visit note below.    Subjective:    Patient is 6 weeks postopp left Dale bunionectomy procedure done on 2/2/21.  Patient is doing well, admits compliance, denies fevers, chills, nausea or vomiting.  Patient has no pain.  She has been nonweightbearing.  She has been taking the plantarflexed Cam walker off and doing range of motion.  She also has a knee walker.  She has no other new complaints.    Objective:    Dressings clean, dry and intact.  Incision well-healed pulses are palpable, sensation to light touch is intact.  Normal postopp edema.  No ecchymosis.  No erythema.  Range of motion of first MTPJ is normal on dorsiflexion and about 30% normal on plantarflexion.  The ulcer on the plantar portion of the left hallux is  healed    Assessment:   Postopp day 3 left Dale bunionectomy.  Diabetes mellitus  Left hallux ulcer    Plan:  X-rays taken today of left foot.  Patient may now graduate into walking in cam walker.  She will walk 2 to 3 hours today.  If no erythema edema or pain will slowly increase 1 to 2 hours a day.  After walking all day and a cam walker for a week she will then graduate into a good shoe utilizing a similar process.  Patient will only do gentle walking at this point.  She has any pain or swelling she will go back to nonweightbearing for 4 to 5 days and then slowly try again.  Discussed the wound on her hallux is now healed.  Return to clinic in 4 weeks for final check    David Hoffman DPM DPM, FACFAS          Again, thank you for allowing me to participate in the care of your patient.        Sincerely,        David Hoffman DPM

## 2021-03-18 RX ORDER — LISINOPRIL 40 MG/1
40 TABLET ORAL DAILY
Qty: 30 TABLET | Refills: 0 | Status: SHIPPED | OUTPATIENT
Start: 2021-03-18 | End: 2021-09-15

## 2021-03-18 NOTE — TELEPHONE ENCOUNTER
See note from Virtual Visit on 3/16/2021 with Joel Aranda.  Lora Pan CMA([facility])

## 2021-03-18 NOTE — TELEPHONE ENCOUNTER
Prescription for lisinopril 40 mg daily sent to pharmacy- according to note from cardiology taking 40 mg daily- assist with scheduling follow up with Dr. Marilou Arciniega- could be virtual visit

## 2021-03-20 ENCOUNTER — HEALTH MAINTENANCE LETTER (OUTPATIENT)
Age: 66
End: 2021-03-20

## 2021-03-29 ENCOUNTER — IMMUNIZATION (OUTPATIENT)
Dept: PEDIATRICS | Facility: CLINIC | Age: 66
End: 2021-03-29
Attending: INTERNAL MEDICINE
Payer: COMMERCIAL

## 2021-03-29 ENCOUNTER — OFFICE VISIT (OUTPATIENT)
Dept: PODIATRY | Facility: CLINIC | Age: 66
End: 2021-03-29
Payer: COMMERCIAL

## 2021-03-29 VITALS — HEART RATE: 75 BPM | OXYGEN SATURATION: 96 % | DIASTOLIC BLOOD PRESSURE: 69 MMHG | SYSTOLIC BLOOD PRESSURE: 134 MMHG

## 2021-03-29 DIAGNOSIS — M21.969 TYPE 2 DIABETES MELLITUS WITH DIABETIC FOOT DEFORMITY (H): ICD-10-CM

## 2021-03-29 DIAGNOSIS — E11.69 TYPE 2 DIABETES MELLITUS WITH DIABETIC FOOT DEFORMITY (H): ICD-10-CM

## 2021-03-29 DIAGNOSIS — E11.49 TYPE II OR UNSPECIFIED TYPE DIABETES MELLITUS WITH NEUROLOGICAL MANIFESTATIONS, NOT STATED AS UNCONTROLLED(250.60) (H): Primary | ICD-10-CM

## 2021-03-29 DIAGNOSIS — Z87.2 HISTORY OF FOOT ULCER: ICD-10-CM

## 2021-03-29 PROCEDURE — 0002A PR COVID VAC PFIZER DIL RECON 30 MCG/0.3 ML IM: CPT

## 2021-03-29 PROCEDURE — 99214 OFFICE O/P EST MOD 30 MIN: CPT | Performed by: PODIATRIST

## 2021-03-29 PROCEDURE — 91300 PR COVID VAC PFIZER DIL RECON 30 MCG/0.3 ML IM: CPT

## 2021-03-29 NOTE — PATIENT INSTRUCTIONS
Thanks for coming today.  [facility]  [address] [place] [address]    To schedule future appointments in Ortho Clinic, you may call [phone].    To schedule ordered imaging by your provider:   Call [facility] [phone]    To schedule an injection ordered by your provider:  Call [facility] Injection scheduling line: [phone]  [email or web address] available online at:  [email or web address]    Please call if any further questions or concerns ([phone]).  Clinic hours 8 am to 5 pm.    Return to clinic (call) if symptoms worsen or fail to improve.

## 2021-03-29 NOTE — LETTER
3/29/2021         RE: Sherry Slaughter  [address]  [place] [address]        Dear Colleague,    Thank you for referring your patient, Sherry Slaughter, to the [facility]. Please see a copy of my visit note below.    Past Medical History:   Diagnosis Date     Cataract      Congestive heart failure (H) 2019 NOVEMBER    AFIB     DEPRESSION     comes and goes - tried meds - unsuccessfully, certain times of the year, no psych intervention and no counselors in the past - and not interested      Diverticulosis of colon (without mention of hemorrhage) 4/04    colonoscopy     ECHO-mildLVH,tr MR,mild thick lflets w inc LA,trTR   12/03     Essential hypertension, benign 1990s    late 1990s - started medications at that time - not to difficult to control meds      Fam hx-cardiovas dis [facility]     father - CAD, and lipids/HTN - multiple members of the family      Family history of diabetes mellitus     sister and grandmother with DM      Family history of malignant neoplasm of breast     mother - young age - 45, and maternal cousin and aunt as well - no BRCA testing done      Family history of stroke (cerebrovascular)     grandmother in early life in her 40s      FAMILY HX COLON CANCER     Pat uncles x 2     Heart disease     murmur/     Heart murmur      HYPERLIPIDEMIA 2000    fairly recent - in the last 5 years - high for DM levels  -cholesterol recent      Irritable bowel syndrome     goes between the 2 - nerve related - more stressed more problems      MICROALBUMINURIA     unsure how long - been on the lisinopril - for a few years at well      Nonspecific abnormal results of liver function study 2/3/2003    SGOT - has been high in the past - since the hepatitis b - borderline elevation - not really changing any      OBESITY      Obstructive sleep apnea      GENESIS (obstructive sleep apnea) 10/15/2006    psg 5/15 AHI 53 aPAP 8-15     OSTEOARTHRITIS L KNEE 2003    total knee on the  left - and pain is gone since the knee replacement      PERS HX HEPATITIS B- RESOLVED] 1977    acute virus only - no chronic disease      PERS HX HEPATITIS B- RESOLVED]      Type II or unspecified type diabetes mellitus without mention of complication, not stated as uncontrolled 1991    oral meds frist, then insulin eventually later, difficult to control most of the time      Unspecified hypothyroidism 10/11/2006    TSH 3.36 - mild subclinical hypothyroidism - deciding on following or starting low dose meds - with dr Oreilly - following      Patient Active Problem List   Diagnosis     Morbid obesity (H)     Diverticulosis of large intestine     Nonspecific abnormal results of cardiovascular function study     FAMILY HX COLON CANCER     Nonallopathic lesion of thoracic region     Hypothyroidism     GENESIS (obstructive sleep apnea)     Irritable bowel syndrome     Family history of malignant neoplasm of breast     Type 2 diabetes mellitus treated with insulin (H)     History of major depression     OSTEOARTHRITIS L KNEE  s/p knee replacement on the left      Hypertension goal BP (blood pressure) < 140/90     Family history of stroke (cerebrovascular)     Family history of other cardiovascular diseases     JOINT PAIN-ANKLE - right ankle      Mitral valve insufficiency     Hyperlipidemia LDL goal <100     Aortic sclerosis     Tubular adenoma of colon     History of viral hepatitis, type B     Chronic low back pain     Long-term insulin use (H)     S/P total knee replacement using cement, right     Lumbago     Type 2 diabetes mellitus with hyperglycemia (H)     Posterior vitreous detachment of right eye     Pseudophakia of both eyes     Paroxysmal atrial fibrillation (H)     SOB (shortness of breath)     Bunion     On continuous oral anticoagulation     Past Surgical History:   Procedure Laterality Date     ABDOMEN SURGERY      ovaian scope/ appendix removal     ANESTHESIA CARDIOVERSION N/A 12/4/2020    Procedure:  ANESTHESIA, FOR CARDIOVERSION @1400;  Surgeon: GENERIC ANESTHESIA PROVIDER;  Location: [facility] OR     ARTHROPLASTY KNEE Right 9/23/2015    Procedure: ARTHROPLASTY KNEE;  Surgeon: Rufus Brown MD;  Location:  OR     BUNIONECTOMY REN AND LEANDRO, COMBINED Left 2/2/2021    Procedure: Left bunion correction with bone cutting and screw fixation;  Surgeon: David Hoffman DPM;  Location: [facility] OR     C TOTAL KNEE ARTHROPLASTY  3/03    L knee     CATARACT IOL, RT/LT Left      COLONOSCOPY  4/04    diverticulosis, rec repeat 10 yrs(consider fam hx?)     COLONOSCOPY WITH CO2 INSUFFLATION N/A 6/19/2019    Procedure: COLONOSCOPY, WITH CO2 INSUFFLATION;  Surgeon: Zeb Duarte MD;  Location: [facility] OR     ORTHOPEDIC SURGERY      right ankle     PHACOEMULSIFICATION CLEAR CORNEA WITH STANDARD INTRAOCULAR LENS IMPLANT Left 3/15/2018    Procedure: PHACOEMULSIFICATION CLEAR CORNEA WITH STANDARD INTRAOCULAR LENS IMPLANT;  LEFT EYE PHACOEMULSIFICATION CLEAR CORNEA WITH STANDARD INTRAOCULAR LENS IMPLANT;  Surgeon: Bandar Linares MD;  Location:  [facility]     PHACOEMULSIFICATION CLEAR CORNEA WITH STANDARD INTRAOCULAR LENS IMPLANT Right 4/5/2018    Procedure: PHACOEMULSIFICATION CLEAR CORNEA WITH STANDARD INTRAOCULAR LENS IMPLANT;  RIGHT PHACOEMULSIFICATION CLEAR CORNEA WITH STANDARD INTRAOCULAR LENS IMPLANT ;  Surgeon: Bandar Linares MD;  Location:  [facility]     STRESS ECHO (METRO)  12/03    no ischemia, limited by fatigue     SURGICAL HISTORY OF -       EXP LAP- R OVARY CYSTS     SURGICAL HISTORY OF -   1981,1984,1985    CHILDBIRTH     ZZC NONSPECIFIC PROCEDURE  6/06    right triple arthrodecesis      ZZC NONSPECIFIC PROCEDURE  7/06     right bunion surgery      Social History     Socioeconomic History     Marital status:      Spouse name: Not on file     Number of children: 3     Years of education: Not on file     Highest education level: Not on file   Occupational History     Occupation: RN     Employer: [employer]  [employer]   Social Needs     Financial resource strain: Not on file     Food insecurity     Worry: Not on file     Inability: Not on file     Transportation needs     Medical: Not on file     Non-medical: Not on file   Tobacco Use     Smoking status: Never Smoker     Smokeless tobacco: Never Used     Tobacco comment: tried in early 20s only    Substance and Sexual Activity     Alcohol use: Yes     Comment: rare     Drug use: No     Sexual activity: Not Currently     Birth control/protection: Post-menopausal     Comment:  - since for 20 years, no signficant other  > 5 years sexual activity    Lifestyle     Physical activity     Days per week: Not on file     Minutes per session: Not on file     Stress: Not on file   Relationships     Social connections     Talks on phone: Not on file     Gets together: Not on file     Attends Protestant service: Not on file     Active member of club or organization: Not on file     Attends meetings of clubs or organizations: Not on file     Relationship status: Not on file     Intimate partner violence     Fear of current or ex partner: Not on file     Emotionally abused: Not on file     Physically abused: Not on file     Forced sexual activity: Not on file   Other Topics Concern      Service No     Blood Transfusions No     Caffeine Concern No     Occupational Exposure Yes     Comment: Normal nursing exposures     Hobby Hazards No     Sleep Concern Yes     Stress Concern No     Comment: Stress level at HM=5, stress level at WK=6     Weight Concern Yes     Special Diet Yes     Comment: Diabetic diet (watching carbs)     Back Care No     Comment: Occassional back spasms     Exercise Yes     Comment: Pt joined a health club goes approx 3-4 times a week,half to one mile daily     Bike Helmet No     Seat Belt Yes     Comment: Sometimes     Self-Exams Yes     Parent/sibling w/ CABG, MI or angioplasty before 65F 55M? 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      Family History   Problem Relation Age of Onset     Diabetes Maternal Grandmother         DM TYPE 2     Cerebrovascular Disease Maternal Grandmother      Hypertension Sister      Lipids Sister      Heart Disease Sister         Chronic AFib     Diabetes Sister      Coronary Artery Disease Sister         afib     Hypertension Sister      Lipids Sister      Gynecology Sister      Diabetes Sister      Asthma Sister      Blood Disease Paternal Grandmother         T CELL LEUKEMIA     Hypertension Brother      Lipids Brother      Congenital Anomalies Brother      Cardiovascular Brother      Heart Disease Brother         CABG/AFIB     Coronary Artery Disease Brother         cabg afib AAA     Hypertension Brother      Lipids Brother      Other Cancer Brother         multple myeloma     Obesity Brother      Hypertension Brother      Respiratory Brother         Sleep apnea     Heart Disease Brother         AFib     Coronary Artery Disease Brother         afib     Alzheimer Disease Mother      Asthma Mother      Hypertension Mother      Breast Cancer Mother 40        has mastectomy and lived to 84     Allergies Mother         Sulfa,     Arthritis Mother      Cardiovascular Mother      Depression Mother      Respiratory Mother      Lipids Mother      Cancer Mother         breast     Thyroid Disease Mother      Cerebrovascular Disease Mother         FROM  AFIB     Dementia Mother         Alzheimers     Other Cancer Mother         breast     Cancer - colorectal Other      Colon Cancer Other         2019     Cancer Father         lung     Aneurysm Father         brother, AAA     Other Cancer Father         lung ca     Coronary Artery Disease Father         cad AAA     Asthma Sister      Cancer - colorectal Paternal Uncle         late 50s to early 60s - great uncles      Breast Cancer Other         Maternal cousin     Arrhythmia Sister         2 brothers 1 sister  Afib     Breast Cancer Maternal Aunt 62     Other Cancer Maternal Aunt 35        Ovarian cancer.  Survived despite late recurrence and is now 92.     Glaucoma No family hx of      Macular Degeneration No family hx of      Lab Results   Component Value Date    A1C 6.8 01/05/2021    A1C 6.9 11/25/2020    A1C 7.5 07/22/2020    A1C 7.3 12/10/2019    A1C 7.5 08/21/2019     Last Comprehensive Metabolic Panel:  Sodium   Date Value Ref Range Status   03/15/2021 140 133 - 144 mmol/L Final     Potassium   Date Value Ref Range Status   03/15/2021 4.4 3.4 - 5.3 mmol/L Final     Chloride   Date Value Ref Range Status   03/15/2021 107 94 - 109 mmol/L Final     Carbon Dioxide   Date Value Ref Range Status   03/15/2021 27 20 - 32 mmol/L Final     Anion Gap   Date Value Ref Range Status   03/15/2021 6 3 - 14 mmol/L Final     Glucose   Date Value Ref Range Status   03/15/2021 146 (H) 70 - 99 mg/dL Final     Comment:     Fasting specimen     Urea Nitrogen   Date Value Ref Range Status   03/15/2021 31 (H) 7 - 30 mg/dL Final     Creatinine   Date Value Ref Range Status   03/15/2021 0.86 0.52 - 1.04 mg/dL Final     GFR Estimate   Date Value Ref Range Status   03/15/2021 69 >60 mL/min/[1.73_m2] Final     Comment:     Non  GFR Calc  Starting 12/18/2018, serum creatinine based estimated GFR (eGFR) will be   calculated using the Chronic Kidney Disease Epidemiology Collaboration   (CKD-EPI) equation.       Calcium   Date Value Ref Range Status   03/15/2021 10.1 8.5 - 10.1 mg/dL Final     SUBJECTIVE FINDINGS:  65-year-old female returns to clinic for diabetic foot cares, ulcer left hallux.  She relates that is doing well.  She relates it healed up shortly before she had bunion surgery.  She had bunion surgery and that is doing well, although she is still getting some swelling, and now that she is starting to walk a little bit more, there is a little bit of pain there but it is doing well.  She relates she needs some insoles.   No ulcers or sores since I have seen her last.  Relates to not really getting any numbness or tinging in her feet.  She is not wearing diabetic shoes.      OBJECTIVE FINDINGS:  DP and PT are 2/4 bilaterally.  She has dorsally contracted digits 2-5 bilaterally.  She has a flat foot type bilaterally.  She has some laterally deviated hallux with dorsal medial first MPJ prominence.  She has some edema on the first MPJ on the left.  No erythema, no odor, no calor bilaterally.  Deep tendon reflexes intact bilaterally.  Sharp/dull decreased with 5.07 [place] monofilament bilaterally.  No gross tendon voids bilaterally.  No open lesions bilaterally.      ASSESSMENT/PLAN:  Diabetes with peripheral neuropathy.  She does have hammer toes and hallux valgus.  She has had surgical correction of the left hallux valgus.  That appears to be doing well.  Ulcer remains healed.  She has no callus buildup in the hallux.  Diagnosis and treatment options discussed with her.  Prescription for diabetic shoes and inserts given and she was given the phone number and address [facility] to pick those up.  She will follow up with Dr. Hoffman as scheduled.  I did review Dr. Hoffman's previous notes.  She will return to clinic and see me in about 3 months.           Again, thank you for allowing me to participate in the care of your patient.        Sincerely,        Rufus Hutson DPM

## 2021-03-29 NOTE — PROGRESS NOTES
Past Medical History:   Diagnosis Date     Cataract      Congestive heart failure (H) 2019 NOVEMBER    AFIB     DEPRESSION     comes and goes - tried meds - unsuccessfully, certain times of the year, no psych intervention and no counselors in the past - and not interested      Diverticulosis of colon (without mention of hemorrhage) 4/04    colonoscopy     ECHO-mildLVH,tr MR,mild thick lflets w inc LA,trTR   12/03     Essential hypertension, benign 1990s    late 1990s - started medications at that time - not to difficult to control meds      Fam hx-cardiovas dis NEC     father - CAD, and lipids/HTN - multiple members of the family      Family history of diabetes mellitus     sister and grandmother with DM      Family history of malignant neoplasm of breast     mother - young age - 45, and maternal cousin and aunt as well - no BRCA testing done      Family history of stroke (cerebrovascular)     grandmother in early life in her 40s      FAMILY HX COLON CANCER     Pat uncles x 2     Heart disease     murmur/     Heart murmur      HYPERLIPIDEMIA 2000    fairly recent - in the last 5 years - high for DM levels  -cholesterol recent      Irritable bowel syndrome     goes between the 2 - nerve related - more stressed more problems      MICROALBUMINURIA     unsure how long - been on the lisinopril - for a few years at well      Nonspecific abnormal results of liver function study 2/3/2003    SGOT - has been high in the past - since the hepatitis b - borderline elevation - not really changing any      OBESITY      Obstructive sleep apnea      GENESIS (obstructive sleep apnea) 10/15/2006    psg 5/15 AHI 53 aPAP 8-15     OSTEOARTHRITIS L KNEE 2003    total knee on the left - and pain is gone since the knee replacement      PERS HX HEPATITIS B- RESOLVED] 1977    acute virus only - no chronic disease      PERS HX HEPATITIS B- RESOLVED]      Type II or unspecified type diabetes mellitus without mention of complication, not stated as  uncontrolled 1991    oral meds frist, then insulin eventually later, difficult to control most of the time      Unspecified hypothyroidism 10/11/2006    TSH 3.36 - mild subclinical hypothyroidism - deciding on following or starting low dose meds - with dr Oreilly - following      Patient Active Problem List   Diagnosis     Morbid obesity (H)     Diverticulosis of large intestine     Nonspecific abnormal results of cardiovascular function study     FAMILY HX COLON CANCER     Nonallopathic lesion of thoracic region     Hypothyroidism     GENESIS (obstructive sleep apnea)     Irritable bowel syndrome     Family history of malignant neoplasm of breast     Type 2 diabetes mellitus treated with insulin (H)     History of major depression     OSTEOARTHRITIS L KNEE  s/p knee replacement on the left      Hypertension goal BP (blood pressure) < 140/90     Family history of stroke (cerebrovascular)     Family history of other cardiovascular diseases     JOINT PAIN-ANKLE - right ankle      Mitral valve insufficiency     Hyperlipidemia LDL goal <100     Aortic sclerosis     Tubular adenoma of colon     History of viral hepatitis, type B     Chronic low back pain     Long-term insulin use (H)     S/P total knee replacement using cement, right     Lumbago     Type 2 diabetes mellitus with hyperglycemia (H)     Posterior vitreous detachment of right eye     Pseudophakia of both eyes     Paroxysmal atrial fibrillation (H)     SOB (shortness of breath)     Bunion     On continuous oral anticoagulation     Past Surgical History:   Procedure Laterality Date     ABDOMEN SURGERY      ovaian scope/ appendix removal     ANESTHESIA CARDIOVERSION N/A 12/4/2020    Procedure: ANESTHESIA, FOR CARDIOVERSION @1400;  Surgeon: GENERIC ANESTHESIA PROVIDER;  Location: [facility]     ARTHROPLASTY KNEE Right 9/23/2015    Procedure: ARTHROPLASTY KNEE;  Surgeon: Rufus Brown MD;  Location:  OR     BUNIONECTOMY REN AND LEANDRO, COMBINED Left 2/2/2021     Procedure: Left bunion correction with bone cutting and screw fixation;  Surgeon: David Hoffman DPM;  Location: [facility] OR     C TOTAL KNEE ARTHROPLASTY  3/03    L knee     CATARACT IOL, RT/LT Left      COLONOSCOPY  4/04    diverticulosis, rec repeat 10 yrs(consider fam hx?)     COLONOSCOPY WITH CO2 INSUFFLATION N/A 6/19/2019    Procedure: COLONOSCOPY, WITH CO2 INSUFFLATION;  Surgeon: Zeb Duarte MD;  Location: [facility] OR     ORTHOPEDIC SURGERY      right ankle     PHACOEMULSIFICATION CLEAR CORNEA WITH STANDARD INTRAOCULAR LENS IMPLANT Left 3/15/2018    Procedure: PHACOEMULSIFICATION CLEAR CORNEA WITH STANDARD INTRAOCULAR LENS IMPLANT;  LEFT EYE PHACOEMULSIFICATION CLEAR CORNEA WITH STANDARD INTRAOCULAR LENS IMPLANT;  Surgeon: Bandar Linares MD;  Location: [facility]     PHACOEMULSIFICATION CLEAR CORNEA WITH STANDARD INTRAOCULAR LENS IMPLANT Right 4/5/2018    Procedure: PHACOEMULSIFICATION CLEAR CORNEA WITH STANDARD INTRAOCULAR LENS IMPLANT;  RIGHT PHACOEMULSIFICATION CLEAR CORNEA WITH STANDARD INTRAOCULAR LENS IMPLANT ;  Surgeon: Bandar Linares MD;  Location: [facility]     STRESS ECHO (METRO)  12/03    no ischemia, limited by fatigue     SURGICAL HISTORY OF -       EXP LAP- R OVARY CYSTS     SURGICAL HISTORY OF -   1981,1984,1985    CHILDBIRTH     ZZC NONSPECIFIC PROCEDURE  6/06    right triple arthrodecesis      ZZC NONSPECIFIC PROCEDURE  7/06     right bunion surgery      Social History     Socioeconomic History     Marital status:      Spouse name: Not on file     Number of children: 3     Years of education: Not on file     Highest education level: Not on file   Occupational History     Occupation: RN     Employer: [employer]   Social Needs     Financial resource strain: Not on file     Food insecurity     Worry: Not on file     Inability: Not on file     Transportation needs     Medical: Not on file     Non-medical: Not on file   Tobacco Use     Smoking status: Never Smoker      Smokeless tobacco: Never Used     Tobacco comment: tried in early 20s only    Substance and Sexual Activity     Alcohol use: Yes     Comment: rare     Drug use: No     Sexual activity: Not Currently     Birth control/protection: Post-menopausal     Comment:  - since for 20 years, no signficant other  > 5 years sexual activity    Lifestyle     Physical activity     Days per week: Not on file     Minutes per session: Not on file     Stress: Not on file   Relationships     Social connections     Talks on phone: Not on file     Gets together: Not on file     Attends Nondenominational service: Not on file     Active member of club or organization: Not on file     Attends meetings of clubs or organizations: Not on file     Relationship status: Not on file     Intimate partner violence     Fear of current or ex partner: Not on file     Emotionally abused: Not on file     Physically abused: Not on file     Forced sexual activity: Not on file   Other Topics Concern      Service No     Blood Transfusions No     Caffeine Concern No     Occupational Exposure Yes     Comment: Normal nursing exposures     Hobby Hazards No     Sleep Concern Yes     Stress Concern No     Comment: Stress level at HM=5, stress level at WK=6     Weight Concern Yes     Special Diet Yes     Comment: Diabetic diet (watching carbs)     Back Care No     Comment: Occassional back spasms     Exercise Yes     Comment: Pt joined a health club goes approx 3-4 times a week,half to one mile daily     Bike Helmet No     Seat Belt Yes     Comment: Sometimes     Self-Exams Yes     Parent/sibling w/ CABG, MI or angioplasty before 65F 55M? 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      Family History   Problem Relation Age of Onset     Diabetes Maternal Grandmother         DM TYPE 2     Cerebrovascular Disease Maternal Grandmother      Hypertension Sister      Lipids Sister      Heart  Disease Sister         Chronic AFib     Diabetes Sister      Coronary Artery Disease Sister         afib     Hypertension Sister      Lipids Sister      Gynecology Sister      Diabetes Sister      Asthma Sister      Blood Disease Paternal Grandmother         T CELL LEUKEMIA     Hypertension Brother      Lipids Brother      Congenital Anomalies Brother      Cardiovascular Brother      Heart Disease Brother         CABG/AFIB     Coronary Artery Disease Brother         cabg afib AAA     Hypertension Brother      Lipids Brother      Other Cancer Brother         multple myeloma     Obesity Brother      Hypertension Brother      Respiratory Brother         Sleep apnea     Heart Disease Brother         AFib     Coronary Artery Disease Brother         afib     Alzheimer Disease Mother      Asthma Mother      Hypertension Mother      Breast Cancer Mother 40        has mastectomy and lived to 84     Allergies Mother         Sulfa,     Arthritis Mother      Cardiovascular Mother      Depression Mother      Respiratory Mother      Lipids Mother      Cancer Mother         breast     Thyroid Disease Mother      Cerebrovascular Disease Mother         FROM  AFIB     Dementia Mother         Alzheimers     Other Cancer Mother         breast     Cancer - colorectal Other      Colon Cancer Other         2019     Cancer Father         lung     Aneurysm Father         brother, AAA     Other Cancer Father         lung ca     Coronary Artery Disease Father         cad AAA     Asthma Sister      Cancer - colorectal Paternal Uncle         late 50s to early 60s - great uncles      Breast Cancer Other         Maternal cousin     Arrhythmia Sister         2 brothers 1 sister Afib     Breast Cancer Maternal Aunt 62     Other Cancer Maternal Aunt 35        Ovarian cancer.  Survived despite late recurrence and is now 92.     Glaucoma No family hx of      Macular Degeneration No family hx of      Lab Results   Component Value Date    A1C 6.8  01/05/2021    A1C 6.9 11/25/2020    A1C 7.5 07/22/2020    A1C 7.3 12/10/2019    A1C 7.5 08/21/2019     Last Comprehensive Metabolic Panel:  Sodium   Date Value Ref Range Status   03/15/2021 140 133 - 144 mmol/L Final     Potassium   Date Value Ref Range Status   03/15/2021 4.4 3.4 - 5.3 mmol/L Final     Chloride   Date Value Ref Range Status   03/15/2021 107 94 - 109 mmol/L Final     Carbon Dioxide   Date Value Ref Range Status   03/15/2021 27 20 - 32 mmol/L Final     Anion Gap   Date Value Ref Range Status   03/15/2021 6 3 - 14 mmol/L Final     Glucose   Date Value Ref Range Status   03/15/2021 146 (H) 70 - 99 mg/dL Final     Comment:     Fasting specimen     Urea Nitrogen   Date Value Ref Range Status   03/15/2021 31 (H) 7 - 30 mg/dL Final     Creatinine   Date Value Ref Range Status   03/15/2021 0.86 0.52 - 1.04 mg/dL Final     GFR Estimate   Date Value Ref Range Status   03/15/2021 69 >60 mL/min/[1.73_m2] Final     Comment:     Non  GFR Calc  Starting 12/18/2018, serum creatinine based estimated GFR (eGFR) will be   calculated using the Chronic Kidney Disease Epidemiology Collaboration   (CKD-EPI) equation.       Calcium   Date Value Ref Range Status   03/15/2021 10.1 8.5 - 10.1 mg/dL Final     SUBJECTIVE FINDINGS:  65-year-old female returns to clinic for diabetic foot cares, ulcer left hallux.  She relates that is doing well.  She relates it healed up shortly before she had bunion surgery.  She had bunion surgery and that is doing well, although she is still getting some swelling, and now that she is starting to walk a little bit more, there is a little bit of pain there but it is doing well.  She relates she needs some insoles.  No ulcers or sores since I have seen her last.  Relates to not really getting any numbness or tinging in her feet.  She is not wearing diabetic shoes.      OBJECTIVE FINDINGS:  DP and PT are 2/4 bilaterally.  She has dorsally contracted digits 2-5 bilaterally.  She has  a flat foot type bilaterally.  She has some laterally deviated hallux with dorsal medial first MPJ prominence.  She has some edema on the first MPJ on the left.  No erythema, no odor, no calor bilaterally.  Deep tendon reflexes intact bilaterally.  Sharp/dull decreased with 5.07 Princeton-Andrez monofilament bilaterally.  No gross tendon voids bilaterally.  No open lesions bilaterally.      ASSESSMENT/PLAN:  Diabetes with peripheral neuropathy.  She does have hammer toes and hallux valgus.  She has had surgical correction of the left hallux valgus.  That appears to be doing well.  Ulcer remains healed.  She has no callus buildup in the hallux.  Diagnosis and treatment options discussed with her.  Prescription for diabetic shoes and inserts given and she was given the phone number and address to [facility] to pick those up.  She will follow up with Dr. Hoffman as scheduled.  I did review Dr. Hoffman's previous notes.  She will return to clinic and see me in about 3 months.

## 2021-03-29 NOTE — NURSING NOTE
Sherry Slaughter's chief complaint for this visit includes:  Chief Complaint   Patient presents with     RECHECK     orthotic fitting     PCP: Marilou Arciniega    Referring Provider:  No referring provider defined for this encounter.    /69 (BP Location: Left arm, Patient Position: Sitting, Cuff Size: Adult Regular)   Pulse 75   LMP 10/02/2007   SpO2 96%   Data Unavailable     Do you need any medication refills at today's visit? NO    Opal Conner CMA

## 2021-04-06 VITALS — BODY MASS INDEX: 42.95 KG/M2 | HEIGHT: 69 IN | WEIGHT: 290 LBS

## 2021-04-06 PROBLEM — I48.0 PAROXYSMAL ATRIAL FIBRILLATION (H): Chronic | Status: ACTIVE | Noted: 2020-11-25

## 2021-04-06 ASSESSMENT — MIFFLIN-ST. JEOR: SCORE: 1914.81

## 2021-04-06 NOTE — PROGRESS NOTES
Sherry is a 65 year old who is being evaluated via a billable video visit.      How would you like to obtain your AVS? MyChart  If the video visit is dropped, the invitation should be resent by: Text to cell phone: [phone]   Will anyone else be joining your video visit? No        Hilda Jovel MA on 4/6/2021 at 3:12 PM      Video Start Time: 11:08 AM  Video-Visit Details    Type of service:  Video Visit    Video End Time:11:33 AM    Originating Location (pt. Location): Home    Distant Location (provider location):  [facility]     Platform used for Video Visit: Geospiza     Sleep Consultation:    Date on this visit: 4/7/2021    Sherry Slaughter is sent by Joel Aranda for a sleep consultation regarding obstructive sleep apnea.    Primary Physician: Marilou Arciniega     Chief Complaint   Patient presents with     Sleep Problem     Wants prescription for cpap and restart use.        Sherry Slaughter initially diagnosed in 2006 with mild but REM predominant obstructive sleep apnea with significant desaturations. Auto-CPAP recommended, but was not compliant.   Initial visit to [facility] was in 2015 for reevaluation of GENESIS. Repeat sleep study recommended.   5/2015 (293#)-AHI 52, RDI 54, lowest oxygen saturation was 70%  (time spent below 89% was 7.7 minutes). TCM increased from 34 to 44. CPAP 8-18 cm/H20.    Last Sleep Medicine follow up was in 2015.  She uses CPAP on and off.  She was diagnosed with Afib and states her cardiologist wants her to do better on CPAP.     Weight now is 290#    Overall, the patient rates her experience with PAP as 4 (0 poor, 10 great). The mask is comfortable. The mask is not leaking.  She is not snoring with the mask on. She is not having gasp arousals. She is not having any oral or nasal dryness. The pressure settings are comfortable     Her PAP interface is Nasal Pillows.    Bedtime is typically 8 PM. Usually  it takes about 5 minutes to fall asleep with the mask on. Wake time is typically 2 AM. The patient is usually getting ~7 hours of sleep per night.    She does feel rested in the morning.    Total score - Hooper: 6 (4/6/2021 11:10 AM)    GERMAN Total Score: 9    Respironics  Auto-PAP 8.0 - 15.0 cmH2O 30 day usage data:    13% of days with > 4 hours of use. 23/30 days with no use.   Average use 65 minutes per day.   Average leak 29.8 LPM.  Average % of night in large leak 2%.    CPAP 90% pressure 9.5cm.   AHI 1.87 events per hour.        Past medical/surgical history, family history, social history, medications and allergies were reviewed.      Problem List:  Patient Active Problem List    Diagnosis Date Noted     Mitral valve insufficiency 06/15/2010     Priority: High     Interpretation Summary 6.15.10  Global and regional left ventricular function is normal with an EF of 60-65%.   Mild mitral insufficiency is present.  PatientHeight: 70 in  PatientWeight: 290 lbs  BSA 2.4 m^2        On continuous oral anticoagulation 01/21/2021     Priority: Medium     Bunion 12/23/2020     Priority: Medium     Added automatically from request for surgery [id number]       SOB (shortness of breath) 12/09/2020     Priority: Medium     Paroxysmal atrial fibrillation (H) 11/25/2020     Priority: Medium     Pseudophakia of both eyes 04/11/2018     Priority: Medium     Posterior vitreous detachment of right eye 09/06/2017     Priority: Medium     Type 2 diabetes mellitus with hyperglycemia (H) 06/06/2016     Priority: Medium     Lumbago 11/02/2015     Priority: Medium     S/P total knee replacement using cement, right 09/23/2015     Priority: Medium     Long-term insulin use (H) 02/19/2015     Priority: Medium     Chronic low back pain 06/11/2014     Priority: Medium     Since 2007. Works with chiropractor on back pain.        History of viral hepatitis, type B 03/07/2014     Priority: Medium     In 1979 due to finger stick form dialysis  patient.        Tubular adenoma of colon 02/28/2014     Priority: Medium     On colonoscopy 2/2014. Repeat in 5 years.        Aortic sclerosis 07/10/2011     Priority: Medium     On echo 7/11       Hyperlipidemia LDL goal <100 10/31/2010     Priority: Medium     Type 2 diabetes mellitus treated with insulin (H)      Priority: Medium     unsure how long - been on the lisinopril - for a few years at well   Problem list name updated by automated process. Provider to review       History of major depression      Priority: Medium     comes and goes - tried meds - unsuccessfully, certain times of the year, no psych intervention and no counselors in the past - and not interested        Hypertension goal BP (blood pressure) < 140/90      Priority: Medium     late 1990s - started medications at that time - not too difficult to control meds        GENESIS (obstructive sleep apnea) 10/15/2006     Priority: Medium     PSH at [facility] 5/2015 (293#)-AHI 52, RDI 54, lowest oxygen saturation was 70%  (time spent below 89% was 7.7 minutes). TCM increased from 34 to 44. CPAP 8-18 cm/H20  Clinically signficant GENESIS with hypoxemia - with sats into the 70s during REM - rem phenomenon - rec for CPAP, weight reduction as well        Nonspecific abnormal results of cardiovascular function study 04/10/2005     Priority: Medium     Problem list name updated by automated process. Provider to review       Morbid obesity (H) 04/14/2003     Priority: Medium     JOINT PAIN-ANKLE - right ankle  12/11/2006     Priority: Low     S/p surgery - triple arthrodecesis and bunion surgery on the right        Irritable bowel syndrome      Priority: Low     goes between the 2 - nerve related - more stressed more problems        Family history of malignant neoplasm of breast      Priority: Low     mother - young age - 45, and maternal cousin and aunt as well - no BRCA testing done        OSTEOARTHRITIS L KNEE  s/p knee replacement on the left       Priority: Low      "total knee on the left - and pain is gone since the knee replacement        Family history of stroke (cerebrovascular)      Priority: Low     grandmother in early life in her 40s        Family history of other cardiovascular diseases      Priority: Low     father - CAD, and lipids/HTN - multiple members of the family   Problem list name updated by automated process. Provider to review and confirm  Problem list name updated by automated process. Provider to review       Hypothyroidism 10/11/2006     Priority: Low     TSH 3.36 - mild subclinical hypothyroidism - deciding on following or starting low dose meds - with dr Oreilly - following          Nonallopathic lesion of thoracic region 08/23/2005     Priority: Low     Problem list name updated by automated process. Provider to review       FAMILY HX COLON CANCER 04/10/2005     Priority: Low     Pat uncles x 2       Diverticulosis of large intestine 02/15/2005     Priority: Low     colonoscopy  Problem list name updated by automated process. Provider to review          Ht 1.753 m (5' 9\")   Wt 131.5 kg (290 lb)   LMP 10/02/2007   BMI 42.83 kg/m          GENERAL APPEARANCE: alert and no distress  EYES: Eyes grossly normal to inspection  RESP: breathing is non-labored  NEURO: mentation intact and speech normal  PSYCH: affect normal/bright    Last Comprehensive Metabolic Panel:  Sodium   Date Value Ref Range Status   03/15/2021 140 133 - 144 mmol/L Final     Potassium   Date Value Ref Range Status   03/15/2021 4.4 3.4 - 5.3 mmol/L Final     Chloride   Date Value Ref Range Status   03/15/2021 107 94 - 109 mmol/L Final     Carbon Dioxide   Date Value Ref Range Status   03/15/2021 27 20 - 32 mmol/L Final     Anion Gap   Date Value Ref Range Status   03/15/2021 6 3 - 14 mmol/L Final     Glucose   Date Value Ref Range Status   03/15/2021 146 (H) 70 - 99 mg/dL Final     Comment:     Fasting specimen     Urea Nitrogen   Date Value Ref Range Status   03/15/2021 31 (H) 7 - 30 " mg/dL Final     Creatinine   Date Value Ref Range Status   03/15/2021 0.86 0.52 - 1.04 mg/dL Final     GFR Estimate   Date Value Ref Range Status   03/15/2021 69 >60 mL/min/[1.73_m2] Final     Comment:     Non  GFR Calc  Starting 12/18/2018, serum creatinine based estimated GFR (eGFR) will be   calculated using the Chronic Kidney Disease Epidemiology Collaboration   (CKD-EPI) equation.       Calcium   Date Value Ref Range Status   03/15/2021 10.1 8.5 - 10.1 mg/dL Final     SpO2 Readings from Last 4 Encounters:   03/29/21 96%   02/17/21 100%   02/05/21 97%   02/02/21 94%     Impression/Plan:    Severe bstructive sleep apnea-  Poor CPAP adherence through the years. Recently diagnosed with atrial fibrillation and wants to improve CPAP compliance.   Today we reviewed alternative treatment options.  She wants to continue on CPAP at this time. Order for comprehensive DME order placed so that she can obtain all the supplies needed for her CPAP.      Old olysomnography reviewed.  Obstructive sleep apnea reviewed.  Complications of untreated sleep apnea were reviewed.    Follow up with me in 3 months.     Tammy Caban PA-C    CC: Joel Aranda

## 2021-04-06 NOTE — PATIENT INSTRUCTIONS
Your BMI is Body mass index is 42.83 kg/m .  Weight management is a personal decision.  If you are interested in exploring weight loss strategies, the following discussion covers the approaches that may be successful. Body mass index (BMI) is one way to tell whether you are at a healthy weight, overweight, or obese. It measures your weight in relation to your height.  A BMI of 18.5 to 24.9 is in the healthy range. A person with a BMI of 25 to 29.9 is considered overweight, and someone with a BMI of 30 or greater is considered obese. More than two-thirds of American adults are considered overweight or obese.  Being overweight or obese increases the risk for further weight gain. Excess weight may lead to heart disease and diabetes.  Creating and following plans for healthy eating and physical activity may help you improve your health.  Weight control is part of healthy lifestyle and includes exercise, emotional health, and healthy eating habits. Careful eating habits lifelong are the mainstay of weight control. Though there are significant health benefits from weight loss, long-term weight loss with diet alone may be very difficult to achieve- studies show long-term success with dietary management in less than 10% of people. Attaining a healthy weight may be especially difficult to achieve in those with severe obesity. In some cases, medications, devices and surgical management might be considered.  What can you do?  If you are overweight or obese and are interested in methods for weight loss, you should discuss this with your provider.     Consider reducing daily calorie intake by 500 calories.     Keep a food journal.     Avoiding skipping meals, consider cutting portions instead.    Diet combined with exercise helps maintain muscle while optimizing fat loss. Strength training is particularly important for building and maintaining muscle mass. Exercise helps reduce stress, increase energy, and improves fitness.  Increasing exercise without diet control, however, may not burn enough calories to loose weight.       Start walking three days a week 10-20 minutes at a time    Work towards walking thirty minutes five days a week     Eventually, increase the speed of your walking for 1-2 minutes at time    In addition, we recommend that you review healthy lifestyles and methods for weight loss available through the National Institutes of Health patient information sites:  http://win.niddk.nih.gov/publications/index.htm    And look into health and wellness programs that may be available through your health insurance provider, employer, local community center, or raya club.    Weight management plan: Patient was referred to their PCP to discuss a diet and exercise plan.

## 2021-04-07 ENCOUNTER — VIRTUAL VISIT (OUTPATIENT)
Dept: SLEEP MEDICINE | Facility: CLINIC | Age: 66
End: 2021-04-07
Attending: INTERNAL MEDICINE
Payer: COMMERCIAL

## 2021-04-07 DIAGNOSIS — I48.0 PAROXYSMAL ATRIAL FIBRILLATION (H): ICD-10-CM

## 2021-04-07 DIAGNOSIS — G47.33 OSA (OBSTRUCTIVE SLEEP APNEA): Primary | ICD-10-CM

## 2021-04-07 PROCEDURE — 99203 OFFICE O/P NEW LOW 30 MIN: CPT | Mod: 95 | Performed by: PHYSICIAN ASSISTANT

## 2021-04-13 DIAGNOSIS — I50.9 OTHER CONGESTIVE HEART FAILURE (H): ICD-10-CM

## 2021-04-13 LAB
ANION GAP SERPL CALCULATED.3IONS-SCNC: 3 MMOL/L (ref 3–14)
BUN SERPL-MCNC: 22 MG/DL (ref 7–30)
CALCIUM SERPL-MCNC: 10.1 MG/DL (ref 8.5–10.1)
CHLORIDE SERPL-SCNC: 106 MMOL/L (ref 94–109)
CO2 SERPL-SCNC: 28 MMOL/L (ref 20–32)
CREAT SERPL-MCNC: 0.81 MG/DL (ref 0.52–1.04)
GFR SERPL CREATININE-BSD FRML MDRD: 75 ML/MIN/{1.73_M2}
GLUCOSE SERPL-MCNC: 98 MG/DL (ref 70–99)
POTASSIUM SERPL-SCNC: 4.2 MMOL/L (ref 3.4–5.3)
SODIUM SERPL-SCNC: 137 MMOL/L (ref 133–144)

## 2021-04-13 PROCEDURE — 36415 COLL VENOUS BLD VENIPUNCTURE: CPT | Performed by: NURSE PRACTITIONER

## 2021-04-13 PROCEDURE — 80048 BASIC METABOLIC PNL TOTAL CA: CPT | Performed by: NURSE PRACTITIONER

## 2021-04-14 ENCOUNTER — OFFICE VISIT (OUTPATIENT)
Dept: PODIATRY | Facility: CLINIC | Age: 66
End: 2021-04-14
Payer: COMMERCIAL

## 2021-04-14 ENCOUNTER — ANCILLARY PROCEDURE (OUTPATIENT)
Dept: GENERAL RADIOLOGY | Facility: CLINIC | Age: 66
End: 2021-04-14
Attending: PODIATRIST
Payer: COMMERCIAL

## 2021-04-14 ENCOUNTER — VIRTUAL VISIT (OUTPATIENT)
Dept: CARDIOLOGY | Facility: CLINIC | Age: 66
End: 2021-04-14
Payer: COMMERCIAL

## 2021-04-14 VITALS
DIASTOLIC BLOOD PRESSURE: 72 MMHG | HEART RATE: 74 BPM | BODY MASS INDEX: 42.83 KG/M2 | SYSTOLIC BLOOD PRESSURE: 117 MMHG | WEIGHT: 290 LBS

## 2021-04-14 DIAGNOSIS — I10 ESSENTIAL HYPERTENSION WITH GOAL BLOOD PRESSURE LESS THAN 140/90: ICD-10-CM

## 2021-04-14 DIAGNOSIS — E11.49 TYPE II OR UNSPECIFIED TYPE DIABETES MELLITUS WITH NEUROLOGICAL MANIFESTATIONS, NOT STATED AS UNCONTROLLED(250.60) (H): ICD-10-CM

## 2021-04-14 DIAGNOSIS — I48.0 PAROXYSMAL ATRIAL FIBRILLATION (H): Chronic | ICD-10-CM

## 2021-04-14 DIAGNOSIS — M21.612 BUNION, LEFT: Primary | ICD-10-CM

## 2021-04-14 DIAGNOSIS — E11.9 TYPE 2 DIABETES MELLITUS TREATED WITH INSULIN (H): ICD-10-CM

## 2021-04-14 DIAGNOSIS — E66.01 MORBID OBESITY (H): ICD-10-CM

## 2021-04-14 DIAGNOSIS — G47.33 OSA (OBSTRUCTIVE SLEEP APNEA): Chronic | ICD-10-CM

## 2021-04-14 DIAGNOSIS — M21.612 BUNION, LEFT: ICD-10-CM

## 2021-04-14 DIAGNOSIS — I50.9 OTHER CONGESTIVE HEART FAILURE (H): Primary | ICD-10-CM

## 2021-04-14 DIAGNOSIS — M72.2 PLANTAR FASCIITIS: ICD-10-CM

## 2021-04-14 DIAGNOSIS — Z79.4 TYPE 2 DIABETES MELLITUS TREATED WITH INSULIN (H): ICD-10-CM

## 2021-04-14 PROCEDURE — 73630 X-RAY EXAM OF FOOT: CPT | Mod: LT | Performed by: RADIOLOGY

## 2021-04-14 PROCEDURE — 99213 OFFICE O/P EST LOW 20 MIN: CPT | Mod: 24 | Performed by: PODIATRIST

## 2021-04-14 PROCEDURE — 99214 OFFICE O/P EST MOD 30 MIN: CPT | Mod: 95 | Performed by: NURSE PRACTITIONER

## 2021-04-14 RX ORDER — LISINOPRIL 40 MG/1
40 TABLET ORAL DAILY
Qty: 90 TABLET | Refills: 1 | Status: SHIPPED | OUTPATIENT
Start: 2021-04-14 | End: 2021-10-18

## 2021-04-14 NOTE — PATIENT INSTRUCTIONS
Take your medicines every day, as directed    Changes made today:  o No change in medications today.     Monitor Your Weight and Symptoms    Contact us if you:      Gain 2 pounds in one day or 5 pounds in one week    Feel more short of breath    Notice more leg swelling    Feel lightheadeded   Change your lifestyle    Limit Salt or Sodium:    2000 mg  Limit Fluids:    2000 mL or approximately 64 ounces  Eat a Heart Healthy Diet    Low in saturated fats  Stay Active:    Aim to move at least 150 minutes every  week         To Contact us    During Business Hours:  [phone], option # 1 (University)  Then option # 4 (medical questions)     After hours, weekends or holidays:   [phone], Option #4  Ask to speak to the On-Call Cardiologist. Inform them you are a CORE/heart failure patient at the [facility].     Use [employer] allows you to communicate directly with your heart team through secure messaging.    [employer] can be accessed any time on your phone, computer, or tablet.    If you need assistance, we'd be happy to help!         Keep your Heart Appointments:      [facility] in July   Dr. Aranda in September

## 2021-04-14 NOTE — PROGRESS NOTES
Subjective:    Patient is 2 1/2 months postopp left Dale bunionectomy procedure done on 2/2/21.  Patient has been walking in his shoe.  Last visit she was instructed only to walk in cam walker.  She still has swelling and some pain but it has been going down over the last 2 weeks.  She denies ecchymosis.  She states the incision is healed.  She is also been getting pain in her left heel for 2 weeks.  She points to the plantar portion.  Pain aggravated by activity and relieved by rest.    Objective:    Incision well-healed.  Still edema noted on dorsum of first metatarsal.  No ecchymosis.  No erythema.  Range of motion of first MTPJ is normal on dorsiflexion and about 30% normal on plantarflexion.  The ulcer on the plantar portion of the left hallux is  Healed.  She has pain on her left heel plantar medial.  No calcaneal compression pain.  No skin breakdown.  No erythema or ecchymosis.    X-rays reveal no change in the osteotomy.  The proximal arm of the bunionectomy has broken off.  We can see increased bone callus around the osteotomy.  There is been no change in the osteotomy and is still in good alignment.    Assessment:   Postopp left Dale bunionectomy.  Diabetes mellitus  Left plantar fasciitis    Plan:  X-rays taken today of left foot.   Discussed with patient that there is been a fracture of the proximal arm of the Dale bunionectomy.  Also explained she has increased bone callus around this to stabilize it.  Discussed there is been no change in the osteotomy so hopefully this will not hurt her final outcome but discussed it may cause increased edema while this heals.  Patient wearing very malleable shoe.  She is getting new diabetic shoes and inserts starting tomorrow with the process.  We gave her a heel cup today for the plantar fasciitis.  We discussed icing and stretching.  When she gets her good shoes she will wear these at all times.  Discussed that the wound is healed.  Patient will return to the  clinic in 1 month if still having problems with her heel or the surgical site otherwise she will just send me a note to let me know how she is doing.    David Hoffman, VARUN, FACFAS

## 2021-04-14 NOTE — LETTER
4/14/2021         RE: Sherry Slaughter  [address]  [place] [address]        Dear Colleague,    Thank you for referring your patient, Sherry Slaughter, to the [facility]. Please see a copy of my visit note below.    Subjective:    Patient is 2 1/2 months postopp left Dale bunionectomy procedure done on 2/2/21.  Patient has been walking in his shoe.  Last visit she was instructed only to walk in cam walker.  She still has swelling and some pain but it has been going down over the last 2 weeks.  She denies ecchymosis.  She states the incision is healed.  She is also been getting pain in her left heel for 2 weeks.  She points to the plantar portion.  Pain aggravated by activity and relieved by rest.    Objective:    Incision well-healed.  Still edema noted on dorsum of first metatarsal.  No ecchymosis.  No erythema.  Range of motion of first MTPJ is normal on dorsiflexion and about 30% normal on plantarflexion.  The ulcer on the plantar portion of the left hallux is  Healed.  She has pain on her left heel plantar medial.  No calcaneal compression pain.  No skin breakdown.  No erythema or ecchymosis.    X-rays reveal no change in the osteotomy.  The proximal arm of the bunionectomy has broken off.  We can see increased bone callus around the osteotomy.  There is been no change in the osteotomy and is still in good alignment.    Assessment:   Postopp left Dale bunionectomy.  Diabetes mellitus  Left plantar fasciitis    Plan:  X-rays taken today of left foot.   Discussed with patient that there is been a fracture of the proximal arm of the Dale bunionectomy.  Also explained she has increased bone callus around this to stabilize it.  Discussed there is been no change in the osteotomy so hopefully this will not hurt her final outcome but discussed it may cause increased edema while this heals.  Patient wearing very malleable shoe.  She is getting new diabetic shoes  and inserts starting tomorrow with the process.  We gave her a heel cup today for the plantar fasciitis.  We discussed icing and stretching.  When she gets her good shoes she will wear these at all times.  Discussed that the wound is healed.  Patient will return to the clinic in 1 month if still having problems with her heel or the surgical site otherwise she will just send me a note to let me know how she is doing.    David Hoffman DPM, FACFAS          Again, thank you for allowing me to participate in the care of your patient.        Sincerely,        David Hoffman DPM

## 2021-04-14 NOTE — PATIENT INSTRUCTIONS
We wish you continued good healing. If you have any questions or concerns, please do not hesitate to contact us at [phone]    [phone] (secure e-mail communication and access to your chart) to send a message or to make an appointment.    Please remember to call and schedule a follow up appointment if one was recommended at your earliest convenience.     +++OF MARCH 2020+++ LOCATION AND HOURS HAVE CHANGED    PLEASE CALL CLINICS TO VERIFY DAYS AND TIMES  PODIATRY CLINIC HOURS  TELEPHONE NUMBER    Dr. David TRUONGPJORDON [facility]        Clinics:  [facility]   Tuesday 1PM-6PM  [place]  Wednesday 745AM-330PM  [place]  Thursday/Friday 745AM-230PM  [place] [facility] APPOINTMENTS  [phone]    [place] APPOINTMENTS  [phone]          If you need a medication refill, please contact us you may need lab work and/or a follow up visit prior to your refill (i.e. Antifungal medications).    If MRI needed please call Imaging at [phone] or [phone]    HOW DO I GET MY KNEE SCOOTER? Knee scooters can be picked up at ANY Medical Supply stores with your knee scooter Prescription.  OR    Bring your signed prescription to an [facility] showroom.

## 2021-04-16 ENCOUNTER — MEDICAL CORRESPONDENCE (OUTPATIENT)
Dept: HEALTH INFORMATION MANAGEMENT | Facility: CLINIC | Age: 66
End: 2021-04-16

## 2021-04-16 ENCOUNTER — MYC MEDICAL ADVICE (OUTPATIENT)
Dept: ENDOCRINOLOGY | Facility: CLINIC | Age: 66
End: 2021-04-16

## 2021-04-16 ENCOUNTER — TELEPHONE (OUTPATIENT)
Dept: FAMILY MEDICINE | Facility: CLINIC | Age: 66
End: 2021-04-16

## 2021-04-16 RX ORDER — LIRAGLUTIDE 6 MG/ML
INJECTION SUBCUTANEOUS
Qty: 15 ML | Refills: 3 | Status: SHIPPED | OUTPATIENT
Start: 2021-04-16 | End: 2021-08-17

## 2021-04-16 NOTE — TELEPHONE ENCOUNTER
liraglutide (VICTOZA PEN) 18 MG/3ML solution    Last Written Prescription Date:  7/23/20  Last Fill Quantity: 15ml,   # refills: 6  Last Office Visit : 1/26/21  Future Office visit: 7/27/21

## 2021-04-16 NOTE — TELEPHONE ENCOUNTER
FV orthotics and Prosthetics placed in Dr. Arciniega's bin for completion.    Carla SCHMIDT, Patient Care

## 2021-04-16 NOTE — TELEPHONE ENCOUNTER
Letter plus Pt Assistance Application form for Bridget (for Humulin u500 and Trulicity) mailed to pt.    Devi Harkins RN, BSN, CDE   [facility]

## 2021-04-16 NOTE — TELEPHONE ENCOUNTER
Patient unable to afford Victoza until high deductible is met.     Patient may benefit from [facility] Patient Assistance program for U500 and Trulicity.    Will forward to  and KIMBERLY Patel to review.    Maya Soler LPN  Adult Endocrinology   [facility]

## 2021-04-26 ENCOUNTER — TELEPHONE (OUTPATIENT)
Dept: EDUCATION SERVICES | Facility: CLINIC | Age: 66
End: 2021-04-26

## 2021-04-27 DIAGNOSIS — I48.0 PAROXYSMAL ATRIAL FIBRILLATION (H): ICD-10-CM

## 2021-04-27 NOTE — TELEPHONE ENCOUNTER
[facility] Patient Assistance form for Humulin R u500 faxed to [phone].    Devi Harkins, RN, BSN, CDE   [facility]

## 2021-04-28 RX ORDER — DILTIAZEM HYDROCHLORIDE 240 MG/1
240 CAPSULE, COATED, EXTENDED RELEASE ORAL DAILY
Qty: 90 CAPSULE | Refills: 1 | Status: SHIPPED | OUTPATIENT
Start: 2021-04-28 | End: 2021-09-28

## 2021-04-28 NOTE — TELEPHONE ENCOUNTER
diltiazem ER COATED BEADS (CARDIZEM CD/CARTIA XT) 240 MG 24 hr capsule     Last Written Prescription Date:  2/22/2021 *inpatient   Last Fill Quantity: 30 cap,   # refills: 1  Last Office Visit : 4/14/2021  Future Office visit:  7/21/2021    Routing refill request to provider for review/approval because:  Last written inpatient.

## 2021-05-15 ENCOUNTER — HEALTH MAINTENANCE LETTER (OUTPATIENT)
Age: 66
End: 2021-05-15

## 2021-06-06 DIAGNOSIS — Z79.4 UNCONTROLLED TYPE 2 DIABETES MELLITUS WITH HYPERGLYCEMIA, WITH LONG-TERM CURRENT USE OF INSULIN (H): ICD-10-CM

## 2021-06-06 DIAGNOSIS — I48.0 PAROXYSMAL ATRIAL FIBRILLATION (H): ICD-10-CM

## 2021-06-06 DIAGNOSIS — E11.65 UNCONTROLLED TYPE 2 DIABETES MELLITUS WITH HYPERGLYCEMIA, WITH LONG-TERM CURRENT USE OF INSULIN (H): ICD-10-CM

## 2021-06-09 RX ORDER — METFORMIN HCL 500 MG
2000 TABLET, EXTENDED RELEASE 24 HR ORAL AT BEDTIME
Qty: 360 TABLET | Refills: 1 | Status: SHIPPED | OUTPATIENT
Start: 2021-06-09 | End: 2021-11-24

## 2021-06-10 RX ORDER — METOPROLOL SUCCINATE 50 MG/1
200 TABLET, EXTENDED RELEASE ORAL DAILY
Qty: 360 TABLET | Refills: 3 | Status: SHIPPED | OUTPATIENT
Start: 2021-06-10 | End: 2021-09-28

## 2021-06-13 DIAGNOSIS — E03.4 HYPOTHYROIDISM DUE TO ACQUIRED ATROPHY OF THYROID: ICD-10-CM

## 2021-06-13 DIAGNOSIS — E11.65 UNCONTROLLED TYPE 2 DIABETES MELLITUS WITH HYPERGLYCEMIA, WITH LONG-TERM CURRENT USE OF INSULIN (H): ICD-10-CM

## 2021-06-13 DIAGNOSIS — Z79.4 UNCONTROLLED TYPE 2 DIABETES MELLITUS WITH HYPERGLYCEMIA, WITH LONG-TERM CURRENT USE OF INSULIN (H): ICD-10-CM

## 2021-06-16 RX ORDER — LEVOTHYROXINE SODIUM 75 UG/1
75 TABLET ORAL DAILY
Qty: 90 TABLET | Refills: 1 | Status: SHIPPED | OUTPATIENT
Start: 2021-06-16 | End: 2021-11-24

## 2021-06-16 RX ORDER — FLASH GLUCOSE SENSOR
KIT MISCELLANEOUS
Qty: 6 EACH | Refills: 1 | Status: SHIPPED | OUTPATIENT
Start: 2021-06-16 | End: 2021-08-17

## 2021-06-16 NOTE — TELEPHONE ENCOUNTER
LEVOTHYROXINE SODIUM 75MCG TABS    Last Written Prescription Date:  12/6/20  Last Fill Quantity: 90,   # refills: 1  FREESTYLE MIKE 14 DAY SENSO  MISC  Last Written Prescription Date:  1/4/2021  Last Fill Quantity: 6,   # refills: 1  Last Office Visit : 1/26/2021 [facility]  Future Office visit:  8/17/21     Refilled per passed protocol.     01/05/21  0927   TSH 2.38

## 2021-07-15 DIAGNOSIS — I50.9 OTHER CONGESTIVE HEART FAILURE (H): Primary | ICD-10-CM

## 2021-07-21 ENCOUNTER — OFFICE VISIT (OUTPATIENT)
Dept: CARDIOLOGY | Facility: CLINIC | Age: 66
End: 2021-07-21
Payer: COMMERCIAL

## 2021-07-21 ENCOUNTER — LAB (OUTPATIENT)
Dept: LAB | Facility: CLINIC | Age: 66
End: 2021-07-21
Payer: COMMERCIAL

## 2021-07-21 VITALS
WEIGHT: 293 LBS | DIASTOLIC BLOOD PRESSURE: 65 MMHG | HEART RATE: 72 BPM | SYSTOLIC BLOOD PRESSURE: 112 MMHG | BODY MASS INDEX: 43.28 KG/M2 | OXYGEN SATURATION: 98 %

## 2021-07-21 DIAGNOSIS — G47.33 OSA (OBSTRUCTIVE SLEEP APNEA): ICD-10-CM

## 2021-07-21 DIAGNOSIS — I50.32 CHRONIC DIASTOLIC CONGESTIVE HEART FAILURE (H): Primary | ICD-10-CM

## 2021-07-21 DIAGNOSIS — E08.65 DIABETES MELLITUS DUE TO UNDERLYING CONDITION, UNCONTROLLED, WITH HYPERGLYCEMIA (H): ICD-10-CM

## 2021-07-21 DIAGNOSIS — I50.9 OTHER CONGESTIVE HEART FAILURE (H): ICD-10-CM

## 2021-07-21 DIAGNOSIS — I48.0 PAROXYSMAL ATRIAL FIBRILLATION (H): ICD-10-CM

## 2021-07-21 DIAGNOSIS — I10 HYPERTENSION GOAL BP (BLOOD PRESSURE) < 140/90: Chronic | ICD-10-CM

## 2021-07-21 LAB
ANION GAP SERPL CALCULATED.3IONS-SCNC: 6 MMOL/L (ref 3–14)
BUN SERPL-MCNC: 32 MG/DL (ref 7–30)
CALCIUM SERPL-MCNC: 9.5 MG/DL (ref 8.5–10.1)
CHLORIDE BLD-SCNC: 105 MMOL/L (ref 94–109)
CO2 SERPL-SCNC: 27 MMOL/L (ref 20–32)
CREAT SERPL-MCNC: 0.91 MG/DL (ref 0.52–1.04)
GFR SERPL CREATININE-BSD FRML MDRD: 65 ML/MIN/1.73M2
GLUCOSE BLD-MCNC: 264 MG/DL (ref 70–99)
POTASSIUM BLD-SCNC: 4 MMOL/L (ref 3.4–5.3)
SODIUM SERPL-SCNC: 138 MMOL/L (ref 133–144)

## 2021-07-21 PROCEDURE — 80048 BASIC METABOLIC PNL TOTAL CA: CPT

## 2021-07-21 PROCEDURE — 99213 OFFICE O/P EST LOW 20 MIN: CPT | Performed by: NURSE PRACTITIONER

## 2021-07-21 PROCEDURE — 36415 COLL VENOUS BLD VENIPUNCTURE: CPT

## 2021-07-21 NOTE — PROGRESS NOTES
"      1.  Type 2 diabetes.   2.  Hypertension.   3.  Hyperlipidemia.   4.  Depression.   5.  Obstructive sleep apnea, currently untreated.   6.  Hypothyroidism.   7.  Diverticulosis.   8.  Low back pain.   9.  Irritable bowel syndrome.     On 11/25/2020, Dr. Aranda saw the patient and sent her to the emergency room for further treatment of her AFib.  She ended up having her rate controlled and had an echocardiogram performed.  She then had ELIAS-guided cardioversion performed on 12/04/2020 successfully.  She was then hospitalized again on 12/09/2020 for pulmonary edema, likely secondary to being in rapid AFib for quite some time in the past in addition to having her hydrochlorothiazide discontinued.  She was in pulmonary edema on 12/09/2020, was diuresed with 40 mg of IV Lasix and had p.o. Lasix 20 started and she was sent here for further evaluation.     CORE appt was 12/23/20. At that appt we decreased the Lasix to 20 mg every other day. We had increased the hydrochlorothiazide to 50 mg daily.  Today she reports that her weight was 290 lbs pounds. She has been taking the lasix daily as she was up in weight for a few days. Her dry weight appears to be around 290 pounds.  She denies any swelling in her lower extremities.  She states her appetite has been fine.  Her blood pressures have been systolically in 110-120 systolic.  She denies having any significant chest pain or shortness of breath, PND or orthopnea. She says she can\" feel her heartbeat\"at times when she lays down. She denies racing or palpitations.  Her HR is in the 70's.     4/14/21- She has been feeling well. Her weights have 289-291 lbs.  She denies any swelling in her lower extremities.  She states her appetite has been fine.  Her blood pressures have been systolically in 110-120 systolic.  She denies having any significant chest pain or shortness of breath, PND or orthopnea. She denies racing or palpitations.  Her HR is in the 70's.     7/21/21- working " with Endocrine about elevated glucose. SR HR 70's. SOB with humidity. She goes to baseball games with her grandchildren . No swelling in the legs today. Weight 292 lbs today. She states her appetite has been fine. She doesn't add salt to her food. She doesn't eat much processed food. She tries to eat the Keto bread. BAEZ but baseline.     CURRENT MEDICATIONS:  She is taking diltiazem 360 mg a day, Lasix 20 mg a day, insulin, levothyroxine, Victoza, lisinopril 40 mg a day, metformin, hydrochlorothiazide 50 mg ,  metoprolol succinate 200 mg a day, rivaroxaban 20 mg a day, rosuvastatin 10 mg a day.       ROS:   Constitutional: No fever, chills, or sweats.  ENT: No visual disturbance, ear ache, epistaxis, sore throat.   Allergies/Immunologic: Negative  Respiratory: No cough, hemoptysis.   Cardiovascular: As per HPI.   GI: As per HPI.   : No urinary frequency, dysuria, or hematuria.   Integument: Negative.   Psychiatric: Negative.   Neuro: Negative.   Endocrinology: negative   Musculoskeletal: negative    EXAM:   NECK:  Exam reveals no obvious jugular venous distention.   LUNGS:  Clear to auscultation.  Respiratory effort is normal.   CARDIAC:  Exam reveals a regular rate and rhythm, no obvious murmur or gallop appreciated.   ABDOMEN:  Belly soft, nontender.   EXTREMITIES:  Without gross edema.     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 She appears to be euvolemic/hypovolemic today. She has not noted her weight to be lower than 290 lbs so she has stayed on the 20 mg Lasix daily. She continues the hydrochlorothiazide 50 mg daily.     HFpEF:     BP: controlled   Fluid status euvolemic  Aldosterone antagonist: no  Ischemia evaluation: (complete/pending/not within the goals of care)   ACEi/ARB - Lisinopril  BB - Metoprolol   SCD prophylaxis - N/A, EF is normal   NSAID use: Contraindicated, reviewed with patient   Sleep Apnea Evaluation: (yes uses CPAP/ yes refuses CPAP/  no/pending evaluation)      1.  Atrial fibrillation with rapid ventricular response. She underwent ELIAS-guided cardioversion on 12/04/2020.  She has a relatively structurally normal heart.  She is currently stable in normal sinus rhythm on significant doses of metoprolol and diltiazem.  We will continue to monitor.  She is tolerating anticoagulation well without gross bleeding.      2.  Hypertension.     Blood pressures do appear to be well controlled as they have been at 120 or lower systolic daily.      3.  Congestive heart failure.     Dr. Aranda suspects the  episode of congestive heart failure for which she was hospitalized recently on 12/09/2020 may have been related to her poor rate control previous to her cardioversion in addition to stopping hydrochlorothiazide.  Her dry weight is likely in the 290 pound range. LVEF 60-65%        4.  Sleep apnea. She has been trying to use the Bi-pap more.      5.  Mild aortic stenosis.    This was seen on recent echo and a mean gradient was only 12 mmHg. continue to follow-through time.       I reviewed patients pertinent clinical laboratory studies  I reviewed patients medications  I reviewed patients pertinent medical records      Plan:  Lasix - decrease to every other day then eventually stop - patient to let us know if she is gaining weight or has worsening HF symptoms.    [facility] in November       Robinson FERNANDEZ NP-C  Advanced Heart Failure Nurse Practitioner  [facility]

## 2021-07-21 NOTE — PATIENT INSTRUCTIONS
Take your medicines every day, as directed    Changes made today:  Lasix - decrease to every other day then eventually stop - patient to let us know if she is gain weight or has worsening HF symptoms.     Monitor Your Weight and Symptoms    Contact us if you:      Gain 2 pounds in one day or 5 pounds in one week    Feel more short of breath    Notice more leg swelling    Feel lightheadeded   Change your lifestyle    Limit Salt or Sodium:    2000 mg  Limit Fluids:    2000 mL or approximately 64 ounces  Eat a Heart Healthy Diet    Low in saturated fats  Stay Active:    Aim to move at least 150 minutes every  week         To Contact us    During Business Hours:  [phone], option # 1 (University)  Then option # 4 (medical questions)     After hours, weekends or holidays:   [phone], Option #4  Ask to speak to the On-Call Cardiologist. Inform them you are a CORE/heart failure patient at the [facility].     Use [employer] allows you to communicate directly with your heart team through secure messaging.    [employer] can be accessed any time on your phone, computer, or tablet.    If you need assistance, we'd be happy to help!         Keep your Heart Appointments:    Core appointment in Nov with labs

## 2021-07-28 ENCOUNTER — OFFICE VISIT (OUTPATIENT)
Dept: PODIATRY | Facility: CLINIC | Age: 66
End: 2021-07-28
Payer: COMMERCIAL

## 2021-07-28 DIAGNOSIS — Z87.2 HISTORY OF FOOT ULCER: ICD-10-CM

## 2021-07-28 DIAGNOSIS — E11.49 TYPE II OR UNSPECIFIED TYPE DIABETES MELLITUS WITH NEUROLOGICAL MANIFESTATIONS, NOT STATED AS UNCONTROLLED(250.60) (H): Primary | ICD-10-CM

## 2021-07-28 PROCEDURE — 99213 OFFICE O/P EST LOW 20 MIN: CPT | Performed by: PODIATRIST

## 2021-07-28 NOTE — PROGRESS NOTES
Past Medical History:   Diagnosis Date     Cataract      Congestive heart failure (H) 2019 NOVEMBER    AFIB     DEPRESSION     comes and goes - tried meds - unsuccessfully, certain times of the year, no psych intervention and no counselors in the past - and not interested      Diverticulosis of colon (without mention of hemorrhage) 4/04    colonoscopy     ECHO-mildLVH,tr MR,mild thick lflets w inc LA,trTR   12/03     Essential hypertension, benign 1990s    late 1990s - started medications at that time - not to difficult to control meds      Fam hx-cardiovas dis NEC     father - CAD, and lipids/HTN - multiple members of the family      Family history of diabetes mellitus     sister and grandmother with DM      Family history of malignant neoplasm of breast     mother - young age - 45, and maternal cousin and aunt as well - no BRCA testing done      Family history of stroke (cerebrovascular)     grandmother in early life in her 40s      FAMILY HX COLON CANCER     Pat uncles x 2     Heart disease     murmur/     Heart murmur      HYPERLIPIDEMIA 2000    fairly recent - in the last 5 years - high for DM levels  -cholesterol recent      Irritable bowel syndrome     goes between the 2 - nerve related - more stressed more problems      MICROALBUMINURIA     unsure how long - been on the lisinopril - for a few years at well      Nonspecific abnormal results of liver function study 2/3/2003    SGOT - has been high in the past - since the hepatitis b - borderline elevation - not really changing any      OBESITY      Obstructive sleep apnea      GENESIS (obstructive sleep apnea) 10/15/2006    psg 5/15 AHI 53 aPAP 8-15     OSTEOARTHRITIS L KNEE 2003    total knee on the left - and pain is gone since the knee replacement      PERS HX HEPATITIS B- RESOLVED] 1977    acute virus only - no chronic disease      PERS HX HEPATITIS B- RESOLVED]      Type II or unspecified type diabetes mellitus without mention of complication, not stated as  uncontrolled 1991    oral meds frist, then insulin eventually later, difficult to control most of the time      Unspecified hypothyroidism 10/11/2006    TSH 3.36 - mild subclinical hypothyroidism - deciding on following or starting low dose meds - with dr Oreilly - following      Patient Active Problem List   Diagnosis     Morbid obesity (H)     Diverticulosis of large intestine     Nonspecific abnormal results of cardiovascular function study     FAMILY HX COLON CANCER     Nonallopathic lesion of thoracic region     Hypothyroidism     GENESIS (obstructive sleep apnea)     Irritable bowel syndrome     Family history of malignant neoplasm of breast     Type 2 diabetes mellitus treated with insulin (H)     History of major depression     OSTEOARTHRITIS L KNEE  s/p knee replacement on the left      Hypertension goal BP (blood pressure) < 140/90     Family history of stroke (cerebrovascular)     Family history of other cardiovascular diseases     JOINT PAIN-ANKLE - right ankle      Mitral valve insufficiency     Hyperlipidemia LDL goal <100     Aortic sclerosis     Tubular adenoma of colon     History of viral hepatitis, type B     Chronic low back pain     Long-term insulin use (H)     S/P total knee replacement using cement, right     Lumbago     Type 2 diabetes mellitus with hyperglycemia (H)     Posterior vitreous detachment of right eye     Pseudophakia of both eyes     Paroxysmal atrial fibrillation (H)     SOB (shortness of breath)     Bunion     On continuous oral anticoagulation     Past Surgical History:   Procedure Laterality Date     ABDOMEN SURGERY      ovaian scope/ appendix removal     ANESTHESIA CARDIOVERSION N/A 12/4/2020    Procedure: ANESTHESIA, FOR CARDIOVERSION @1400;  Surgeon: GENERIC ANESTHESIA PROVIDER;  Location: [facility] OR     ARTHROPLASTY KNEE Right 9/23/2015    Procedure: ARTHROPLASTY KNEE;  Surgeon: Rufus Brown MD;  Location:  OR     BUNIONECTOMY REN AND LEANDRO, COMBINED Left 2/2/2021     Procedure: Left bunion correction with bone cutting and screw fixation;  Surgeon: David Hoffman DPM;  Location: [facility] OR     C TOTAL KNEE ARTHROPLASTY  3/03    L knee     CATARACT IOL, RT/LT Left      COLONOSCOPY  4/04    diverticulosis, rec repeat 10 yrs(consider fam hx?)     COLONOSCOPY WITH CO2 INSUFFLATION N/A 6/19/2019    Procedure: COLONOSCOPY, WITH CO2 INSUFFLATION;  Surgeon: Zeb Duarte MD;  Location: [facility] OR     ORTHOPEDIC SURGERY      right ankle     PHACOEMULSIFICATION CLEAR CORNEA WITH STANDARD INTRAOCULAR LENS IMPLANT Left 3/15/2018    Procedure: PHACOEMULSIFICATION CLEAR CORNEA WITH STANDARD INTRAOCULAR LENS IMPLANT;  LEFT EYE PHACOEMULSIFICATION CLEAR CORNEA WITH STANDARD INTRAOCULAR LENS IMPLANT;  Surgeon: Bandar Linares MD;  Location: [facility]     PHACOEMULSIFICATION CLEAR CORNEA WITH STANDARD INTRAOCULAR LENS IMPLANT Right 4/5/2018    Procedure: PHACOEMULSIFICATION CLEAR CORNEA WITH STANDARD INTRAOCULAR LENS IMPLANT;  RIGHT PHACOEMULSIFICATION CLEAR CORNEA WITH STANDARD INTRAOCULAR LENS IMPLANT ;  Surgeon: Bandar Linares MD;  Location: [facility]     STRESS ECHO (METRO)  12/03    no ischemia, limited by fatigue     SURGICAL HISTORY OF -       EXP LAP- R OVARY CYSTS     SURGICAL HISTORY OF -   1981,1984,1985    CHILDBIRTH     ZZC NONSPECIFIC PROCEDURE  6/06    right triple arthrodecesis      ZZC NONSPECIFIC PROCEDURE  7/06     right bunion surgery      Social History     Socioeconomic History     Marital status:      Spouse name: Not on file     Number of children: 3     Years of education: Not on file     Highest education level: Not on file   Occupational History     Occupation: RN     Employer: [employer]   Tobacco Use     Smoking status: Never Smoker     Smokeless tobacco: Never Used     Tobacco comment: tried in early 20s only    Substance and Sexual Activity     Alcohol use: Yes     Comment: rare     Drug use: No     Sexual activity: Not Currently      Birth control/protection: Post-menopausal     Comment:  - since for 20 years, no signficant other  > 5 years sexual activity    Other Topics Concern      Service No     Blood Transfusions No     Caffeine Concern No     Occupational Exposure Yes     Comment: Normal nursing exposures     Hobby Hazards No     Sleep Concern Yes     Stress Concern No     Comment: Stress level at HM=5, stress level at WK=6     Weight Concern Yes     Special Diet Yes     Comment: Diabetic diet (watching carbs)     Back Care No     Comment: Occassional back spasms     Exercise Yes     Comment: Pt joined a health club goes approx 3-4 times a week,half to one mile daily     Bike Helmet No     Seat Belt Yes     Comment: Sometimes     Self-Exams Yes     Parent/sibling w/ CABG, MI or angioplasty before 65F 55M? 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      Social Determinants of Health     Financial Resource Strain:      Difficulty of Paying Living Expenses:    Food Insecurity:      Worried About Running Out of Food in the Last Year:      Ran Out of Food in the Last Year:    Transportation Needs:      Lack of Transportation (Medical):      Lack of Transportation (Non-Medical):    Physical Activity:      Days of Exercise per Week:      Minutes of Exercise per Session:    Stress:      Feeling of Stress :    Social Connections:      Frequency of Communication with Friends and Family:      Frequency of Social Gatherings with Friends and Family:      Attends Confucianism Services:      Active Member of Clubs or Organizations:      Attends Club or Organization Meetings:      Marital Status:    Intimate Partner Violence:      Fear of Current or Ex-Partner:      Emotionally Abused:      Physically Abused:      Sexually Abused:      Family History   Problem Relation Age of Onset     Diabetes Maternal Grandmother         DM TYPE 2     Cerebrovascular Disease Maternal  Grandmother      Hypertension Sister      Lipids Sister      Heart Disease Sister         Chronic AFib     Diabetes Sister      Coronary Artery Disease Sister         afib     Hypertension Sister      Lipids Sister      Gynecology Sister      Diabetes Sister      Asthma Sister      Blood Disease Paternal Grandmother         T CELL LEUKEMIA     Hypertension Brother      Lipids Brother      Congenital Anomalies Brother      Cardiovascular Brother      Heart Disease Brother         CABG/AFIB     Coronary Artery Disease Brother         cabg afib AAA     Hypertension Brother      Lipids Brother      Other Cancer Brother         multple myeloma     Obesity Brother      Hypertension Brother      Respiratory Brother         Sleep apnea     Heart Disease Brother         AFib     Coronary Artery Disease Brother         afib     Alzheimer Disease Mother      Asthma Mother      Hypertension Mother      Breast Cancer Mother 40        has mastectomy and lived to 84     Allergies Mother         Sulfa,     Arthritis Mother      Cardiovascular Mother      Depression Mother      Respiratory Mother      Lipids Mother      Cancer Mother         breast     Thyroid Disease Mother      Cerebrovascular Disease Mother         FROM  AFIB     Dementia Mother         Alzheimers     Other Cancer Mother         breast     Cancer - colorectal Other      Colon Cancer Other         2019     Cancer Father         lung     Aneurysm Father         brother, AAA     Other Cancer Father         lung ca     Coronary Artery Disease Father         cad AAA     Asthma Sister      Cancer - colorectal Paternal Uncle         late 50s to early 60s - great uncles      Breast Cancer Other         Maternal cousin     Arrhythmia Sister         2 brothers 1 sister Afib     Breast Cancer Maternal Aunt 62     Other Cancer Maternal Aunt 35        Ovarian cancer.  Survived despite late recurrence and is now 92.     Glaucoma No family hx of      Macular Degeneration No  family hx of      Lab Results   Component Value Date    A1C 6.8 01/05/2021    A1C 6.9 11/25/2020    A1C 7.5 07/22/2020    A1C 7.3 12/10/2019    A1C 7.5 08/21/2019     Last Comprehensive Metabolic Panel:  Sodium   Date Value Ref Range Status   07/21/2021 138 133 - 144 mmol/L Final   04/13/2021 137 133 - 144 mmol/L Final     Potassium   Date Value Ref Range Status   07/21/2021 4.0 3.4 - 5.3 mmol/L Final   04/13/2021 4.2 3.4 - 5.3 mmol/L Final     Chloride   Date Value Ref Range Status   07/21/2021 105 94 - 109 mmol/L Final   04/13/2021 106 94 - 109 mmol/L Final     Carbon Dioxide   Date Value Ref Range Status   04/13/2021 28 20 - 32 mmol/L Final     Carbon Dioxide (CO2)   Date Value Ref Range Status   07/21/2021 27 20 - 32 mmol/L Final     Anion Gap   Date Value Ref Range Status   07/21/2021 6 3 - 14 mmol/L Final   04/13/2021 3 3 - 14 mmol/L Final     Glucose   Date Value Ref Range Status   07/21/2021 264 (H) 70 - 99 mg/dL Final   04/13/2021 98 70 - 99 mg/dL Final     Comment:     Non Fasting     Urea Nitrogen   Date Value Ref Range Status   07/21/2021 32 (H) 7 - 30 mg/dL Final   04/13/2021 22 7 - 30 mg/dL Final     Creatinine   Date Value Ref Range Status   07/21/2021 0.91 0.52 - 1.04 mg/dL Final   04/13/2021 0.81 0.52 - 1.04 mg/dL Final     GFR Estimate   Date Value Ref Range Status   07/21/2021 65 >60 mL/min/1.73m2 Final     Comment:     As of July 11, 2021, eGFR is calculated by the CKD-EPI creatinine equation, without race adjustment. eGFR can be influenced by muscle mass, exercise, and diet. The reported eGFR is an estimation only and is only applicable if the renal function is stable.   04/13/2021 75 >60 mL/min/[1.73_m2] Final     Comment:     Non  GFR Calc  Starting 12/18/2018, serum creatinine based estimated GFR (eGFR) will be   calculated using the Chronic Kidney Disease Epidemiology Collaboration   (CKD-EPI) equation.       Calcium   Date Value Ref Range Status   07/21/2021 9.5 8.5 - 10.1  mg/dL Final   04/13/2021 10.1 8.5 - 10.1 mg/dL Final       SUBJECTIVE FINDINGS:  A 66-year-old female returns to clinic for diabetic foot cares.  She is diabetic.  She relates she intermittently has neuropathy.  She had a bunion surgery done on the left; that is doing pretty good.  I did review Dr. Hoffman 04/14/2021 note.  She relates no ulcers or sores since we have seen her last.  Relates she is intermittently wearing her diabetic shoes.    OBJECTIVE FINDINGS:  DP and PT are 2/4 bilaterally.  She has dorsally contracted digits 2 through 5 bilaterally.  There is no erythema, no drainage, no odor, no calor bilaterally.    ASSESSMENT AND PLAN:  Diabetes with peripheral neuropathy.  She has a history of a foot ulcer that is doing well.  Diagnosis and treatment discussed with her.  She will return to clinic and see me in 3-4 months.  Previous notes reviewed.      Low level of medical decision making with Number and Complexity of  Problems Addressed- low,  Amount and/or Complexity of Data to be Reviewed  and Analyzed- limited, and the Risk of Complications and/or  Morbidity or Mortality of  Patient Management- moderate.

## 2021-07-28 NOTE — NURSING NOTE
Sherry Slaughter's chief complaint for this visit includes:  Chief Complaint   Patient presents with     RECHECK     Follow up left foot     PCP: Marilou Arciniega    Referring Provider:  No referring provider defined for this encounter.    [facility] 10/02/2007   Data Unavailable     Do you need any medication refills at today's visit? no

## 2021-07-28 NOTE — LETTER
7/28/2021         RE: Sherry Slaughter  [address]  [place] [address]        Dear Colleague,    Thank you for referring your patient, Sherry Slaughter, to the [facility]. Please see a copy of my visit note below.    Past Medical History:   Diagnosis Date     Cataract      Congestive heart failure (H) 2019 NOVEMBER    AFIB     DEPRESSION     comes and goes - tried meds - unsuccessfully, certain times of the year, no psych intervention and no counselors in the past - and not interested      Diverticulosis of colon (without mention of hemorrhage) 4/04    colonoscopy     ECHO-mildLVH,tr MR,mild thick lflets w inc LA,trTR   12/03     Essential hypertension, benign 1990s    late 1990s - started medications at that time - not to difficult to control meds      Fam hx-cardiovas dis [facility]     father - CAD, and lipids/HTN - multiple members of the family      Family history of diabetes mellitus     sister and grandmother with DM      Family history of malignant neoplasm of breast     mother - young age - 45, and maternal cousin and aunt as well - no BRCA testing done      Family history of stroke (cerebrovascular)     grandmother in early life in her 40s      FAMILY HX COLON CANCER     Pat uncles x 2     Heart disease     murmur/     Heart murmur      HYPERLIPIDEMIA 2000    fairly recent - in the last 5 years - high for DM levels  -cholesterol recent      Irritable bowel syndrome     goes between the 2 - nerve related - more stressed more problems      MICROALBUMINURIA     unsure how long - been on the lisinopril - for a few years at well      Nonspecific abnormal results of liver function study 2/3/2003    SGOT - has been high in the past - since the hepatitis b - borderline elevation - not really changing any      OBESITY      Obstructive sleep apnea      GENESIS (obstructive sleep apnea) 10/15/2006    psg 5/15 AHI 53 aPAP 8-15     OSTEOARTHRITIS L KNEE 2003    total knee on the  left - and pain is gone since the knee replacement      PERS HX HEPATITIS B- RESOLVED] 1977    acute virus only - no chronic disease      PERS HX HEPATITIS B- RESOLVED]      Type II or unspecified type diabetes mellitus without mention of complication, not stated as uncontrolled 1991    oral meds frist, then insulin eventually later, difficult to control most of the time      Unspecified hypothyroidism 10/11/2006    TSH 3.36 - mild subclinical hypothyroidism - deciding on following or starting low dose meds - with dr Oreilly - following      Patient Active Problem List   Diagnosis     Morbid obesity (H)     Diverticulosis of large intestine     Nonspecific abnormal results of cardiovascular function study     FAMILY HX COLON CANCER     Nonallopathic lesion of thoracic region     Hypothyroidism     GENESIS (obstructive sleep apnea)     Irritable bowel syndrome     Family history of malignant neoplasm of breast     Type 2 diabetes mellitus treated with insulin (H)     History of major depression     OSTEOARTHRITIS L KNEE  s/p knee replacement on the left      Hypertension goal BP (blood pressure) < 140/90     Family history of stroke (cerebrovascular)     Family history of other cardiovascular diseases     JOINT PAIN-ANKLE - right ankle      Mitral valve insufficiency     Hyperlipidemia LDL goal <100     Aortic sclerosis     Tubular adenoma of colon     History of viral hepatitis, type B     Chronic low back pain     Long-term insulin use (H)     S/P total knee replacement using cement, right     Lumbago     Type 2 diabetes mellitus with hyperglycemia (H)     Posterior vitreous detachment of right eye     Pseudophakia of both eyes     Paroxysmal atrial fibrillation (H)     SOB (shortness of breath)     Bunion     On continuous oral anticoagulation     Past Surgical History:   Procedure Laterality Date     ABDOMEN SURGERY      ovaian scope/ appendix removal     ANESTHESIA CARDIOVERSION N/A 12/4/2020    Procedure:  ANESTHESIA, FOR CARDIOVERSION @1400;  Surgeon: GENERIC ANESTHESIA PROVIDER;  Location: [facility] OR     ARTHROPLASTY KNEE Right 9/23/2015    Procedure: ARTHROPLASTY KNEE;  Surgeon: Rufus Brown MD;  Location:  OR     BUNIONECTOMY REN AND LEANDRO, COMBINED Left 2/2/2021    Procedure: Left bunion correction with bone cutting and screw fixation;  Surgeon: David Hoffman DPM;  Location: [facility] OR     C TOTAL KNEE ARTHROPLASTY  3/03    L knee     CATARACT IOL, RT/LT Left      COLONOSCOPY  4/04    diverticulosis, rec repeat 10 yrs(consider fam hx?)     COLONOSCOPY WITH CO2 INSUFFLATION N/A 6/19/2019    Procedure: COLONOSCOPY, WITH CO2 INSUFFLATION;  Surgeon: Zeb Duarte MD;  Location: [facility] OR     ORTHOPEDIC SURGERY      right ankle     PHACOEMULSIFICATION CLEAR CORNEA WITH STANDARD INTRAOCULAR LENS IMPLANT Left 3/15/2018    Procedure: PHACOEMULSIFICATION CLEAR CORNEA WITH STANDARD INTRAOCULAR LENS IMPLANT;  LEFT EYE PHACOEMULSIFICATION CLEAR CORNEA WITH STANDARD INTRAOCULAR LENS IMPLANT;  Surgeon: Bandar Linares MD;  Location:  [facility]     PHACOEMULSIFICATION CLEAR CORNEA WITH STANDARD INTRAOCULAR LENS IMPLANT Right 4/5/2018    Procedure: PHACOEMULSIFICATION CLEAR CORNEA WITH STANDARD INTRAOCULAR LENS IMPLANT;  RIGHT PHACOEMULSIFICATION CLEAR CORNEA WITH STANDARD INTRAOCULAR LENS IMPLANT ;  Surgeon: Bandar Linares MD;  Location:  [facility]     STRESS ECHO (METRO)  12/03    no ischemia, limited by fatigue     SURGICAL HISTORY OF -       EXP LAP- R OVARY CYSTS     SURGICAL HISTORY OF -   1981,1984,1985    CHILDBIRTH     ZZC NONSPECIFIC PROCEDURE  6/06    right triple arthrodecesis      ZZC NONSPECIFIC PROCEDURE  7/06     right bunion surgery      Social History     Socioeconomic History     Marital status:      Spouse name: Not on file     Number of children: 3     Years of education: Not on file     Highest education level: Not on file   Occupational History     Occupation: RN     Employer: [employer]   Tobacco Use     Smoking status: Never Smoker     Smokeless tobacco: Never Used     Tobacco comment: tried in early 20s only    Substance and Sexual Activity     Alcohol use: Yes     Comment: rare     Drug use: No     Sexual activity: Not Currently     Birth control/protection: Post-menopausal     Comment:  - since for 20 years, no signficant other  > 5 years sexual activity    Other Topics Concern      Service No     Blood Transfusions No     Caffeine Concern No     Occupational Exposure Yes     Comment: Normal nursing exposures     Hobby Hazards No     Sleep Concern Yes     Stress Concern No     Comment: Stress level at HM=5, stress level at WK=6     Weight Concern Yes     Special Diet Yes     Comment: Diabetic diet (watching carbs)     Back Care No     Comment: Occassional back spasms     Exercise Yes     Comment: Pt joined a health club goes approx 3-4 times a week,half to one mile daily     Bike Helmet No     Seat Belt Yes     Comment: Sometimes     Self-Exams Yes     Parent/sibling w/ CABG, MI or angioplasty before 65F 55M? 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      Social Determinants of Health     Financial Resource Strain:      Difficulty of Paying Living Expenses:    Food Insecurity:      Worried About Running Out of Food in the Last Year:      Ran Out of Food in the Last Year:    Transportation Needs:      Lack of Transportation (Medical):      Lack of Transportation (Non-Medical):    Physical Activity:      Days of Exercise per Week:      Minutes of Exercise per Session:    Stress:      Feeling of Stress :    Social Connections:      Frequency of Communication with Friends and Family:      Frequency of Social Gatherings with Friends and Family:      Attends Taoism Services:      Active Member of Clubs or Organizations:      Attends Club or Organization Meetings:      Marital Status:    Intimate  Partner Violence:      Fear of Current or Ex-Partner:      Emotionally Abused:      Physically Abused:      Sexually Abused:      Family History   Problem Relation Age of Onset     Diabetes Maternal Grandmother         DM TYPE 2     Cerebrovascular Disease Maternal Grandmother      Hypertension Sister      Lipids Sister      Heart Disease Sister         Chronic AFib     Diabetes Sister      Coronary Artery Disease Sister         afib     Hypertension Sister      Lipids Sister      Gynecology Sister      Diabetes Sister      Asthma Sister      Blood Disease Paternal Grandmother         T CELL LEUKEMIA     Hypertension Brother      Lipids Brother      Congenital Anomalies Brother      Cardiovascular Brother      Heart Disease Brother         CABG/AFIB     Coronary Artery Disease Brother         cabg afib AAA     Hypertension Brother      Lipids Brother      Other Cancer Brother         multple myeloma     Obesity Brother      Hypertension Brother      Respiratory Brother         Sleep apnea     Heart Disease Brother         AFib     Coronary Artery Disease Brother         afib     Alzheimer Disease Mother      Asthma Mother      Hypertension Mother      Breast Cancer Mother 40        has mastectomy and lived to 84     Allergies Mother         Sulfa,     Arthritis Mother      Cardiovascular Mother      Depression Mother      Respiratory Mother      Lipids Mother      Cancer Mother         breast     Thyroid Disease Mother      Cerebrovascular Disease Mother         FROM  AFIB     Dementia Mother         Alzheimers     Other Cancer Mother         breast     Cancer - colorectal Other      Colon Cancer Other         2019     Cancer Father         lung     Aneurysm Father         brother, AAA     Other Cancer Father         lung ca     Coronary Artery Disease Father         cad AAA     Asthma Sister      Cancer - colorectal Paternal Uncle         late 50s to early 60s - great uncles      Breast Cancer Other          Maternal cousin     Arrhythmia Sister         2 brothers 1 sister Afib     Breast Cancer Maternal Aunt 62     Other Cancer Maternal Aunt 35        Ovarian cancer.  Survived despite late recurrence and is now 92.     Glaucoma No family hx of      Macular Degeneration No family hx of      Lab Results   Component Value Date    A1C 6.8 01/05/2021    A1C 6.9 11/25/2020    A1C 7.5 07/22/2020    A1C 7.3 12/10/2019    A1C 7.5 08/21/2019     Last Comprehensive Metabolic Panel:  Sodium   Date Value Ref Range Status   07/21/2021 138 133 - 144 mmol/L Final   04/13/2021 137 133 - 144 mmol/L Final     Potassium   Date Value Ref Range Status   07/21/2021 4.0 3.4 - 5.3 mmol/L Final   04/13/2021 4.2 3.4 - 5.3 mmol/L Final     Chloride   Date Value Ref Range Status   07/21/2021 105 94 - 109 mmol/L Final   04/13/2021 106 94 - 109 mmol/L Final     Carbon Dioxide   Date Value Ref Range Status   04/13/2021 28 20 - 32 mmol/L Final     Carbon Dioxide (CO2)   Date Value Ref Range Status   07/21/2021 27 20 - 32 mmol/L Final     Anion Gap   Date Value Ref Range Status   07/21/2021 6 3 - 14 mmol/L Final   04/13/2021 3 3 - 14 mmol/L Final     Glucose   Date Value Ref Range Status   07/21/2021 264 (H) 70 - 99 mg/dL Final   04/13/2021 98 70 - 99 mg/dL Final     Comment:     Non Fasting     Urea Nitrogen   Date Value Ref Range Status   07/21/2021 32 (H) 7 - 30 mg/dL Final   04/13/2021 22 7 - 30 mg/dL Final     Creatinine   Date Value Ref Range Status   07/21/2021 0.91 0.52 - 1.04 mg/dL Final   04/13/2021 0.81 0.52 - 1.04 mg/dL Final     GFR Estimate   Date Value Ref Range Status   07/21/2021 65 >60 mL/min/1.73m2 Final     Comment:     As of July 11, 2021, eGFR is calculated by the CKD-EPI creatinine equation, without race adjustment. eGFR can be influenced by muscle mass, exercise, and diet. The reported eGFR is an estimation only and is only applicable if the renal function is stable.   04/13/2021 75 >60 mL/min/[1.73_m2] Final     Comment:      Non  GFR Calc  Starting 12/18/2018, serum creatinine based estimated GFR (eGFR) will be   calculated using the Chronic Kidney Disease Epidemiology Collaboration   (CKD-EPI) equation.       Calcium   Date Value Ref Range Status   07/21/2021 9.5 8.5 - 10.1 mg/dL Final   04/13/2021 10.1 8.5 - 10.1 mg/dL Final       SUBJECTIVE FINDINGS:  A 66-year-old female returns to clinic for diabetic foot cares.  She is diabetic.  She relates she intermittently has neuropathy.  She had a bunion surgery done on the left; that is doing pretty good.  I did review Dr. Hoffman 04/14/2021 note.  She relates no ulcers or sores since we have seen her last.  Relates she is intermittently wearing her diabetic shoes.    OBJECTIVE FINDINGS:  DP and PT are 2/4 bilaterally.  She has dorsally contracted digits 2 through 5 bilaterally.  There is no erythema, no drainage, no odor, no calor bilaterally.    ASSESSMENT AND PLAN:  Diabetes with peripheral neuropathy.  She has a history of a foot ulcer that is doing well.  Diagnosis and treatment discussed with her.  She will return to clinic and see me in 3-4 months.  Previous notes reviewed.      Low level of medical decision making with Number and Complexity of  Problems Addressed- low,  Amount and/or Complexity of Data to be Reviewed  and Analyzed- limited, and the Risk of Complications and/or  Morbidity or Mortality of  Patient Management- moderate.      Again, thank you for allowing me to participate in the care of your patient.        Sincerely,        Rufus Hutson DPM

## 2021-08-08 NOTE — DISCHARGE INSTRUCTIONS
"[facility] Discharge  Instructions  Anesthesia (Eye Care Center)   Adult Discharge Instructions    For 24 hours after surgery    1. Get plenty of rest.  Make arrangements to have a responsible adult stay with you for at least 6 hours after you leave the hospital.  2. Do not drive or use heavy equipment for 24 hours.    3. Do not drink alcohol for 24 hours.  4. Do not sign legal documents or make important decisions for 24 hours.  5. Avoid strenuous or risky activities. You may feel lightheaded.  If so, sit for a few minutes before standing.  Have someone help you get up.   6. Conscious sedation patients may resume a regular diet..  7. Any questions of medical nature, call your physician.    Cataract Surgery Postoperative Instructions  Dr. Bandra Linares      Postoperative Medications: After surgery, you will use eye drop medications. In most cases, you will start these eye drops 2 days before the day of surgery.   Follow the directions provided to you from the pharmacy or from the doctor's office.    The drops might sting a little when they are instilled, and that is normal.    It doesn't matter what order you put the drops into your eyes, but you should wait at least one minute between drops.    Recently we started using a compounded drop that contains all three prescriptions in one bottle. If you have this drop you only need to use one drop 4 time per day. Many people choose to do the drops at breakfast, lunch, dinner, and bedtime.    Artificial Tears- Are lubricating drops used to moisturize the eye.  You can use these as much as you want.  Particularly if your eyes feel watery, gritty, or uncomfortable.  Chilling these drops in the refrigerator results in a more soothing feeling.  There are several brands of artificial tears available including, but not limited to, Optive, Refresh, Systane, Blink, Genteal, Soothe, and others.  You should not use drops that are \"gets the red " Pre-procedure teaching reinforced.   "out.\"  You do not need a prescription for these medications.     Please continue any glaucoma, dry eye, or other medications you were using prior to the surgery.      Please allow 24 to 48 hours when requesting refills, and call BEFORE you run out of drops.    Restriction on Activities-  It is extremely important that you DO NOT RUB THE TREATED EYE.    - You will be given a clear plastic shield to wear as protection over your eye the night after surgery.    - Refrain from many activities that may put your eye at risk of injury, as well as areas containing a high volume of chemicals, dust, and debris.    - Do Not wear any eye makeup or moisturizer around the eye for 1 week after surgery.    - Do Not swim or go into a hot-tub, jacuzzi, or sauna for 1 week following surgery.  You can take showers as normal, but avoid getting shampoo or soap into your eyes.     - Avoid strenuous activity, including lifting more than 10 pounds, for 1 week after surgery.       - It is fine to bathe, read, watch TV, and use the computer.    Symptoms requiring medical attention:     - Sudden onset of increased discharge from the eye.  - Persistent or increasing pain in the eye.  - Sudden decrease in vision.  - Persistent nausea or vomiting.      If you have any questions or concerns before or after your surgery, please contact Dr. Linares's office at [phone].         Revised 3/27/2017  "

## 2021-08-16 ENCOUNTER — LAB (OUTPATIENT)
Dept: LAB | Facility: CLINIC | Age: 66
End: 2021-08-16
Payer: COMMERCIAL

## 2021-08-16 DIAGNOSIS — E11.9 TYPE 2 DIABETES MELLITUS TREATED WITH INSULIN (H): ICD-10-CM

## 2021-08-16 DIAGNOSIS — Z79.4 TYPE 2 DIABETES MELLITUS TREATED WITH INSULIN (H): ICD-10-CM

## 2021-08-16 LAB
HBA1C MFR BLD: 9 % (ref 0–5.6)
HOLD SPECIMEN: NORMAL

## 2021-08-16 PROCEDURE — 83036 HEMOGLOBIN GLYCOSYLATED A1C: CPT

## 2021-08-16 PROCEDURE — 36415 COLL VENOUS BLD VENIPUNCTURE: CPT

## 2021-08-17 ENCOUNTER — VIRTUAL VISIT (OUTPATIENT)
Dept: ENDOCRINOLOGY | Facility: CLINIC | Age: 66
End: 2021-08-17
Payer: COMMERCIAL

## 2021-08-17 DIAGNOSIS — Z79.4 UNCONTROLLED TYPE 2 DIABETES MELLITUS WITH HYPERGLYCEMIA, WITH LONG-TERM CURRENT USE OF INSULIN (H): Primary | ICD-10-CM

## 2021-08-17 DIAGNOSIS — E78.5 HYPERLIPIDEMIA LDL GOAL <100: ICD-10-CM

## 2021-08-17 DIAGNOSIS — E11.65 UNCONTROLLED TYPE 2 DIABETES MELLITUS WITH HYPERGLYCEMIA, WITH LONG-TERM CURRENT USE OF INSULIN (H): Primary | ICD-10-CM

## 2021-08-17 DIAGNOSIS — E03.4 HYPOTHYROIDISM DUE TO ACQUIRED ATROPHY OF THYROID: ICD-10-CM

## 2021-08-17 PROCEDURE — 99214 OFFICE O/P EST MOD 30 MIN: CPT | Mod: 95 | Performed by: INTERNAL MEDICINE

## 2021-08-17 PROCEDURE — 95251 CONT GLUC MNTR ANALYSIS I&R: CPT | Performed by: INTERNAL MEDICINE

## 2021-08-17 RX ORDER — FUROSEMIDE 10 MG/ML
10 SOLUTION ORAL
COMMUNITY
End: 2021-09-15

## 2021-08-17 RX ORDER — INSULIN HUMAN 500 [IU]/ML
INJECTION, SOLUTION SUBCUTANEOUS
Qty: 54 ML | Refills: 3 | Status: SHIPPED | OUTPATIENT
Start: 2021-08-17 | End: 2022-05-25

## 2021-08-17 RX ORDER — SEMAGLUTIDE 2.4 MG/.75ML
2.4 INJECTION, SOLUTION SUBCUTANEOUS WEEKLY
Qty: 9 ML | Refills: 3 | Status: SHIPPED | OUTPATIENT
Start: 2021-08-17 | End: 2021-08-23

## 2021-08-17 RX ORDER — PEN NEEDLE, DIABETIC 31 GX5/16"
NEEDLE, DISPOSABLE MISCELLANEOUS
Qty: 300 EACH | Refills: 3 | Status: SHIPPED | OUTPATIENT
Start: 2021-08-17

## 2021-08-17 RX ORDER — FLASH GLUCOSE SENSOR
KIT MISCELLANEOUS
Qty: 6 EACH | Refills: 3 | Status: SHIPPED | OUTPATIENT
Start: 2021-08-17 | End: 2021-11-24

## 2021-08-17 NOTE — PROGRESS NOTES
Video-Visit Details    Type of service:  Video Visit  Start: 08/17/2021 10:13 am  Stop: 08/17/2021 10:34 am    Originating Location (pt. Location): Home  Distant Location (provider location):  [facility]   Platform used for Video Visit: Yuliana    Due to the COVID 19 pandemic this visit was converted to a video visit in order to help prevent spread of infection in this patient and the general population.      Sherry is a 66 year old female with a past medical history significant for obesity, hyperlipidemia, depression, obstructive sleep apnea, hypothyroidism, hypertension, osteoarthritis, hypercholesterolemia, atrial fibrillation (status post ELIAS cardioversion on 12/2020), seen in follow-up for type 2 diabetes. She was last seen in our clinic in 1/2021.     The patient was diagnosed with type 2 diabetes ~ 25 years after she was diagnosed with gestational diabetes, during both her pregnancies. Her weight before pregnancy was 190-200. Her highest weight was 300s. She was initially started on oral medications and then insulin was added around 2012.     Her A1C has been around 7% over the last years. She was on Byetta but she did not feel it helped. She was also on Actos but stopped due to 40 lbs weight gain and increased SOB.  For approximately 3 years, she was treated with Victoza.  It was resumed in March 2019, now at 3 mg daily.  She continues to take metformin, 2 g extended release daily.  In the beginning of July 2019, she was transitioned to U500 insulin. Jardiance was tried in 2019 and was discontinued due to genital fungal infections.     Most recent A1c was 9%, on 8/16/21, up from 6.8% on 1/5/21, in the absence of anemia.     Current antidiabetic regimen:   U500 insulin, 120 units in am and 100 U before dinner  1.8 mg Victoza daily (3 mg daily was too expensive)  2 gm ER metformin daily     Sometimes, if her blood sugar is not 300 or 400 range, she takes an extra dose of 100units insulin,  generally late afternoon.  The first dose is taken at 7:30 AM, the second dose before supper, around 5 PM.  In general, she has 2 main meals daily (breakfast and dinner) and a smaller lunch (salad or soup).  For snacks, she has been eating carrots, cucumbers, fruits.  She confirms she has been alternating the site of the insulin injections.     I reviewed the [facility]vin records.  Average glucose is 110, with a glucose variability of 30%, corresponding to a GMI of 8.3%.  Only 30% of the sensor numbers are within target of , 26% are above 250 and 1% are in the mild hypoglycemic range.        DM complications  Retinopathy: last eye exam: spring 2021; no DR, S/P B/L surgery for cataract   Nephropathy: negative urine microalb - on lisinopril 60 mg; GFR in the 60s. Negative urine microalbumin 1/21.   Neuropathy: occasional burning sensation in her feet for the last couple of years. H/o left toe ulcer.  S/p bunion surgery. Last podiatry exam 7/21, with Dr. Hutson.  She is symptomatic for hypoglycemia (able to recognize blood sugar numbers in the 90s - she gets sweaty, hot and lightheaded).  She corrects the hypoglycemic symptoms by having something to eat.  Aspirin - taking 325 mg  LDL 46, HDL 44 (3/2021) on Crestor 10 mg   Using the CPAP   Exercise limited due to bilateral knee replacement and low back pain.    2.  Hypertension  Her blood pressure is managed with 40 mg lisinopril daily, 360 mg diltiazem,  200 mg metoprolol daily and 25 mg hydrochlorothiazide daily.    3. High normal calcium levels, documented in our records since 2009. Prior PTH levels have been low normal.  Most recent calcium was 9.5, on 7/21/2021.  The phosphorus levels have been normal, as well as Albumin.  She takes 1000 units vitamin D daily and a daily multivitamin.    4. Hypothyroidism    Most recent TSH was 2.38 on 1/5/21.  She remains on a constant dose of 75 mcg levothyroxine daily.    Past Medical History  Past Medical History:   Diagnosis  Date     Cataract      Congestive heart failure (H) 2019 NOVEMBER    AFIB     DEPRESSION     comes and goes - tried meds - unsuccessfully, certain times of the year, no psych intervention and no counselors in the past - and not interested      Diverticulosis of colon (without mention of hemorrhage) 4/04    colonoscopy     ECHO-mildLVH,tr MR,mild thick lflets w inc LA,trTR   12/03     Essential hypertension, benign 1990s    late 1990s - started medications at that time - not to difficult to control meds      Fam hx-cardiovas dis NEC     father - CAD, and lipids/HTN - multiple members of the family      Family history of diabetes mellitus     sister and grandmother with DM      Family history of malignant neoplasm of breast     mother - young age - 45, and maternal cousin and aunt as well - no BRCA testing done      Family history of stroke (cerebrovascular)     grandmother in early life in her 40s      FAMILY HX COLON CANCER     Pat uncles x 2     Heart disease     murmur/     Heart murmur      HYPERLIPIDEMIA 2000    fairly recent - in the last 5 years - high for DM levels  -cholesterol recent      Irritable bowel syndrome     goes between the 2 - nerve related - more stressed more problems      MICROALBUMINURIA     unsure how long - been on the lisinopril - for a few years at well      Nonspecific abnormal results of liver function study 2/3/2003    SGOT - has been high in the past - since the hepatitis b - borderline elevation - not really changing any      OBESITY      Obstructive sleep apnea      GENESIS (obstructive sleep apnea) 10/15/2006    psg 5/15 AHI 53 aPAP 8-15     OSTEOARTHRITIS L KNEE 2003    total knee on the left - and pain is gone since the knee replacement      PERS HX HEPATITIS B- RESOLVED] 1977    acute virus only - no chronic disease      PERS HX HEPATITIS B- RESOLVED]      Type II or unspecified type diabetes mellitus without mention of complication, not stated as uncontrolled 1991    oral meds  frist, then insulin eventually later, difficult to control most of the time      Unspecified hypothyroidism 10/11/2006    TSH 3.36 - mild subclinical hypothyroidism - deciding on following or starting low dose meds - with dr Oreilly - following    Hepatis B     Allergies  Allergies   Allergen Reactions     Lipitor [Atorvastatin Calcium]      Gas     Pravachol [Pravastatin Sodium]      Pravachol - dry cough      Simvastatin      Myalgia     Medications    Current Outpatient Medications:      amoxicillin (AMOXIL) 500 MG capsule, Take 2,000 mg by mouth Before dental procedures, Disp: , Rfl:      blood glucose (NO BRAND SPECIFIED) test strip, Use to test blood sugar 2 times daily or as directed. Patient requesting One Touch Ultra Strips, Disp: 100 strip, Rfl: 3     Continuous Blood Gluc  (FREESTYLE MIKE 14 DAY READER) KATIA, 1 each daily, Disp: 1 Device, Rfl: 0     Continuous Blood Gluc Sensor (FREESTYLE MIKE 14 DAY SENSOR) MISC, APPLY 1 SENSOR AND CHANGE EVERY 14 DAYS AS DIRECTED, Disp: 6 each, Rfl: 1     diltiazem ER (DILT-XR) 120 MG 24 hr capsule, Take 1 capsule (120 mg) by mouth daily, Disp: 90 capsule, Rfl: 3     diltiazem ER COATED BEADS (CARDIZEM CD/CARTIA XT) 240 MG 24 hr capsule, Take 1 capsule (240 mg) by mouth daily Take in addition to 120mg tablet (total of 360mg), Disp: 90 capsule, Rfl: 1     econazole nitrate 1 % external cream, Apply topically daily as needed To feet and toenails, Disp: , Rfl:      Garlic 1000 MG CAPS, Take 1 capsule by mouth daily Takes during summer months.  Done taking until next summer., Disp: , Rfl:      HUMULIN R U-500 KWIKPEN 500 UNIT/ML PEN soln, INJECT 120 UNITS IN THE MORNING  UNITS AT BEDTIME, Disp: 40 mL, Rfl: 1     hydrochlorothiazide (HYDRODIURIL) 25 MG tablet, Take 2 tablets (50 mg) by mouth daily, Disp: 180 tablet, Rfl: 1     HYDROcodone-acetaminophen (NORCO) 5-325 MG tablet, Take 1-2 tablets by mouth every 4 hours as needed for moderate to severe pain,  Disp: 30 tablet, Rfl: 0     hydrOXYzine (ATARAX) 25 MG tablet, Take 1 tablet (25 mg) by mouth every 4 hours as needed for itching (pain and nausea), Disp: 30 tablet, Rfl: 0     ibuprofen (ADVIL) 200 MG capsule, Take 600 mg by mouth every 6 hours as needed , Disp: , Rfl:      insulin pen needle (GNP CLICKFINE PEN NEEDLES) 31G X 8 MM miscellaneous, Use six times daily or as directed, Disp: 200 each, Rfl: 5     levothyroxine (SYNTHROID/LEVOTHROID) 75 MCG tablet, TAKE 1 TABLET (75 MCG) BY MOUTH DAILY, Disp: 90 tablet, Rfl: 1     liraglutide (VICTOZA PEN) 18 MG/3ML solution, ADMINISTER 2 INJECTIONS OF 1.8MG AND 1.2MG EACH. TOTAL DAILY DOSE OF 3 MG, Disp: 15 mL, Rfl: 3     lisinopril (ZESTRIL) 40 MG tablet, Take 1 tablet (40 mg) by mouth daily, Disp: 90 tablet, Rfl: 1     lisinopril (ZESTRIL) 40 MG tablet, Take 1 tablet (40 mg) by mouth daily ++NO FURTHER REFILLS WITHOUT  FOLLOW UP+++ (Patient not taking: Reported on 4/14/2021), Disp: 30 tablet, Rfl: 0     metFORMIN (GLUCOPHAGE-XR) 500 MG 24 hr tablet, Take 4 tablets (2,000 mg) by mouth At Bedtime TAKE 4 TABLETS AT BEDTIME, Disp: 360 tablet, Rfl: 1     metoprolol succinate ER (TOPROL-XL) 50 MG 24 hr tablet, Take 4 tablets (200 mg) by mouth daily, Disp: 360 tablet, Rfl: 3     Multiple Vitamins-Minerals (ADVANCED DIABETIC MULTIVITAMIN) TABS, Take 1 tablet by mouth daily, Disp: , Rfl:      ORDER FOR DME, SET TO LOCAL PRINT,, Respironics REMSTAR 60 Series Auto CPAP 8-15cm H2O, Airfit P10 nasal pillow mask w/medium pillows, Disp: , Rfl:      order for DME, Equipment being ordered: [id number] $70   Rosalinda , Disp: 1 Device, Rfl: 0     order for DME, Roll-A-Bout Walker. Patient can use for left foot., Disp: 1 Units, Rfl: 0     potassium chloride ER (KLOR-CON M) 20 MEQ CR tablet, Take 4 pills (total 80meq)  by an hour each today only, then one pill (20meq) every day with Lasix only (Patient taking differently: 20 mEq one pill (20meq) every day with Lasix only),  "Disp: 96 tablet, Rfl: 3     rivaroxaban ANTICOAGULANT (XARELTO ANTICOAGULANT) 20 MG TABS tablet, Take 1 tablet (20 mg) by mouth daily (with dinner), Disp: 90 tablet, Rfl: 3     rosuvastatin (CRESTOR) 10 MG tablet, TAKE ONE TABLET BY MOUTH ONCE DAILY, Disp: 90 tablet, Rfl: 3    Family History  Maternal grandmother had type 2 diabetes  Daughter has GDM  Father had CAD s/p stent, lung cancer and abdominal aortic anuerysm  Older brother has CABG, and abdominal aortic anuerysm  Young brother has Afib, SVT  Another younger brother has multiple myeloma  Sister has SVT    Social History  No smoking, rarely drink EtOH  No illicit drug  Worked as a nurse in the ICU. Retired in 2019.   Lives by herself, has 3 daughters    Physical Exam  BP Readings from Last 6 Encounters:   07/21/21 112/65   04/14/21 117/72   03/29/21 134/69   03/17/21 102/65   02/05/21 118/62   02/02/21 98/57     Wt Readings from Last 10 Encounters:   07/21/21 132.9 kg (293 lb 1.6 oz)   04/14/21 131.5 kg (290 lb)   04/06/21 131.5 kg (290 lb)   01/21/21 132.5 kg (292 lb)   12/23/20 131.5 kg (290 lb)   12/09/20 131.5 kg (290 lb)   11/27/20 131.6 kg (290 lb 3.2 oz)   09/22/20 134.8 kg (297 lb 1.6 oz)   07/22/20 132.1 kg (291 lb 3.2 oz)   07/10/20 132 kg (291 lb)     LMP 10/02/2007   There is no height or weight on file to calculate BMI.   Estimated body mass index is 43.28 kg/m  as calculated from the following:    Height as of 4/6/21: 1.753 m (5' 9\").    Weight as of 7/21/21: 132.9 kg (293 lb 1.6 oz).    Physical Exam  General Appearance: alert, no distress noted, general obesity  Eyes: grossly normal to inspection, conjunctivae and sclerae normal, no lid lag or stare   Respiratory: no audible wheeze, cough, or visible cyanosis.  No visible retractions or increased work of breathing.  Able to speak fully in complete sentences.  Neurological: Cranial nerves grossly intact, mentation intact and speech normal; no tremor of the hands   Skin: no lesions on exposed " skin   Psychological: mentation appears normal, affect normal, judgement and insight intact, normal speech and appearance well-groomed    RESULTS  I reviewed prior lab results documented in epic.  Lab Results   Component Value Date    A1C 9.0 (H) 08/16/2021    A1C 6.8 (H) 01/05/2021    A1C 6.9 (H) 11/25/2020    A1C 7.5 (A) 07/22/2020    A1C 7.3 (H) 12/10/2019    A1C 7.5 (A) 08/21/2019       Hemoglobin   Date Value Ref Range Status   01/05/2021 13.9 11.7 - 15.7 g/dL Final     Hematocrit   Date Value Ref Range Status   01/05/2021 40.4 35.0 - 47.0 % Final     Cholesterol   Date Value Ref Range Status   03/15/2021 130 <200 mg/dL Final     Cholesterol/HDL Ratio   Date Value Ref Range Status   02/19/2015 3.2 0.0 - 5.0 Final     HDL Cholesterol   Date Value Ref Range Status   03/15/2021 44 (L) >49 mg/dL Final     LDL Cholesterol Calculated   Date Value Ref Range Status   03/15/2021 46 <100 mg/dL Final     Comment:     Desirable:       <100 mg/dl     VLDL-Cholesterol   Date Value Ref Range Status   02/19/2015 34 (H) 0 - 30 mg/dL Final     Triglycerides   Date Value Ref Range Status   03/15/2021 200 (H) <150 mg/dL Final     Comment:     Borderline high:  150-199 mg/dl  High:             200-499 mg/dl  Very high:       >499 mg/dl  Fasting specimen       Albumin Urine mg/L   Date Value Ref Range Status   01/05/2021 <5 mg/L Final     TSH   Date Value Ref Range Status   01/05/2021 2.38 0.40 - 4.00 mU/L Final         Last Basic Metabolic Panel:    Sodium   Date Value Ref Range Status   07/21/2021 138 133 - 144 mmol/L Final   04/13/2021 137 133 - 144 mmol/L Final     Potassium   Date Value Ref Range Status   07/21/2021 4.0 3.4 - 5.3 mmol/L Final   04/13/2021 4.2 3.4 - 5.3 mmol/L Final     Chloride   Date Value Ref Range Status   07/21/2021 105 94 - 109 mmol/L Final   04/13/2021 106 94 - 109 mmol/L Final     Calcium   Date Value Ref Range Status   07/21/2021 9.5 8.5 - 10.1 mg/dL Final   04/13/2021 10.1 8.5 - 10.1 mg/dL Final      Carbon Dioxide   Date Value Ref Range Status   04/13/2021 28 20 - 32 mmol/L Final     Carbon Dioxide (CO2)   Date Value Ref Range Status   07/21/2021 27 20 - 32 mmol/L Final     Urea Nitrogen   Date Value Ref Range Status   07/21/2021 32 (H) 7 - 30 mg/dL Final   04/13/2021 22 7 - 30 mg/dL Final     Creatinine   Date Value Ref Range Status   07/21/2021 0.91 0.52 - 1.04 mg/dL Final   04/13/2021 0.81 0.52 - 1.04 mg/dL Final     GFR Estimate   Date Value Ref Range Status   07/21/2021 65 >60 mL/min/1.73m2 Final     Comment:     As of July 11, 2021, eGFR is calculated by the CKD-EPI creatinine equation, without race adjustment. eGFR can be influenced by muscle mass, exercise, and diet. The reported eGFR is an estimation only and is only applicable if the renal function is stable.   04/13/2021 75 >60 mL/min/[1.73_m2] Final     Comment:     Non  GFR Calc  Starting 12/18/2018, serum creatinine based estimated GFR (eGFR) will be   calculated using the Chronic Kidney Disease Epidemiology Collaboration   (CKD-EPI) equation.       Glucose   Date Value Ref Range Status   07/21/2021 264 (H) 70 - 99 mg/dL Final   04/13/2021 98 70 - 99 mg/dL Final     Comment:     Non Fasting       AST   Date Value Ref Range Status   11/25/2020 Unsatisfactory specimen - hemolyzed 0 - 45 U/L Final     Comment:     Canceled, Test credited  Critical Value called to and read back by  GUTIERREZ GOMEZ RN ER 1901 11/25/2020 BY AA       ALT   Date Value Ref Range Status   11/25/2020 42 0 - 50 U/L Final     Albumin   Date Value Ref Range Status   11/25/2020 3.9 3.4 - 5.0 g/dL Final         ASSESSMENT:      1. Type 2 diabetes  Sherry is a 66 year old pleasant female, with a history of type 2 diabetes in the setting of obesity, sleep apnea, hypertension, hypercholesterolemia.  Her diabetes is known to be complicated by neuropathy.  Recently, the glycemic control has deteriorated.  Recommendations:  Continue to work on reducing the overall  carbohydrate and caloric intake and losing weight  Alternate site of insulin injection and use new areas, to avoid potential poor insulin absorption due to lipodystrophy.    Continue Metformin   Change Victoza with Wegovy, if approved by insurance   Increase the morning dose of U500 insulin to 130 units, at 7:30 AM  Administer a second dose of 100 units U500 at 1 PM and a third dose of 50 units at bedtime (around 10 PM).  Have the sensor downloaded for our review in 1 or 2 weeks    2. Hypothyroidism.  Clinically and biochemically, the patient is euthyroid. I recommended to continue to take the same dose of levothyroxine.    3.  Hypercholesterolemia, controlled on Crestor.    Orders Placed This Encounter   Procedures     Continuous Glucose Monitoring >=72 hours PHYS INTERP     Hemoglobin A1c

## 2021-08-17 NOTE — LETTER
8/17/2021         RE: Sherry Slaughter  [address]  [place] [address]        Dear Colleague,    Thank you for referring your patient, Sherry Slaughter, to the [facility]. Please see a copy of my visit note below.    Video-Visit Details    Type of service:  Video Visit  Start: 08/17/2021 10:13 am  Stop: 08/17/2021 10:34 am    Originating Location (pt. Location): Home  Distant Location (provider location):  [facility]   Platform used for Video Visit: Amwell    Due to the COVID 19 pandemic this visit was converted to a video visit in order to help prevent spread of infection in this patient and the general population.      Sherry is a 66 year old female with a past medical history significant for obesity, hyperlipidemia, depression, obstructive sleep apnea, hypothyroidism, hypertension, osteoarthritis, hypercholesterolemia, atrial fibrillation (status post ELIAS cardioversion on 12/2020), seen in follow-up for type 2 diabetes. She was last seen in our clinic in 1/2021.     The patient was diagnosed with type 2 diabetes ~ 25 years after she was diagnosed with gestational diabetes, during both her pregnancies. Her weight before pregnancy was 190-200. Her highest weight was 300s. She was initially started on oral medications and then insulin was added around 2012.     Her A1C has been around 7% over the last years. She was on Byetta but she did not feel it helped. She was also on Actos but stopped due to 40 lbs weight gain and increased SOB.  For approximately 3 years, she was treated with Victoza.  It was resumed in March 2019, now at 3 mg daily.  She continues to take metformin, 2 g extended release daily.  In the beginning of July 2019, she was transitioned to U500 insulin. Jardiance was tried in 2019 and was discontinued due to genital fungal infections.     Most recent A1c was 9%, on 8/16/21, up from 6.8% on 1/5/21, in the absence of anemia.      Current antidiabetic regimen:   U500 insulin, 120 units in am and 100 U before dinner  1.8 mg Victoza daily (3 mg daily was too expensive)  2 gm ER metformin daily     Sometimes, if her blood sugar is not 300 or 400 range, she takes an extra dose of 100units insulin, generally late afternoon.  The first dose is taken at 7:30 AM, the second dose before supper, around 5 PM.  In general, she has 2 main meals daily (breakfast and dinner) and a smaller lunch (salad or soup).  For snacks, she has been eating carrots, cucumbers, fruits.  She confirms she has been alternating the site of the insulin injections.     I reviewed the nevin records.  Average glucose is 110, with a glucose variability of 30%, corresponding to a GMI of 8.3%.  Only 30% of the sensor numbers are within target of , 26% are above 250 and 1% are in the mild hypoglycemic range.        DM complications  Retinopathy: last eye exam: spring 2021; no DR, S/P B/L surgery for cataract   Nephropathy: negative urine microalb - on lisinopril 60 mg; GFR in the 60s. Negative urine microalbumin 1/21.   Neuropathy: occasional burning sensation in her feet for the last couple of years. H/o left toe ulcer.  S/p bunion surgery. Last podiatry exam 7/21, with Dr. Hutson.  She is symptomatic for hypoglycemia (able to recognize blood sugar numbers in the 90s - she gets sweaty, hot and lightheaded).  She corrects the hypoglycemic symptoms by having something to eat.  Aspirin - taking 325 mg  LDL 46, HDL 44 (3/2021) on Crestor 10 mg   Using the CPAP   Exercise limited due to bilateral knee replacement and low back pain.    2.  Hypertension  Her blood pressure is managed with 40 mg lisinopril daily, 360 mg diltiazem,  200 mg metoprolol daily and 25 mg hydrochlorothiazide daily.    3. High normal calcium levels, documented in our records since 2009. Prior PTH levels have been low normal.  Most recent calcium was 9.5, on 7/21/2021.  The phosphorus levels have been  normal, as well as Albumin.  She takes 1000 units vitamin D daily and a daily multivitamin.    4. Hypothyroidism    Most recent TSH was 2.38 on 1/5/21.  She remains on a constant dose of 75 mcg levothyroxine daily.    Past Medical History  Past Medical History:   Diagnosis Date     Cataract      Congestive heart failure (H) 2019 NOVEMBER    AFIB     DEPRESSION     comes and goes - tried meds - unsuccessfully, certain times of the year, no psych intervention and no counselors in the past - and not interested      Diverticulosis of colon (without mention of hemorrhage) 4/04    colonoscopy     ECHO-mildLVH,tr MR,mild thick lflets w inc LA,trTR   12/03     Essential hypertension, benign 1990s    late 1990s - started medications at that time - not to difficult to control meds      Fam hx-cardiovas dis NEC     father - CAD, and lipids/HTN - multiple members of the family      Family history of diabetes mellitus     sister and grandmother with DM      Family history of malignant neoplasm of breast     mother - young age - 45, and maternal cousin and aunt as well - no BRCA testing done      Family history of stroke (cerebrovascular)     grandmother in early life in her 40s      FAMILY HX COLON CANCER     Pat uncles x 2     Heart disease     murmur/     Heart murmur      HYPERLIPIDEMIA 2000    fairly recent - in the last 5 years - high for DM levels  -cholesterol recent      Irritable bowel syndrome     goes between the 2 - nerve related - more stressed more problems      MICROALBUMINURIA     unsure how long - been on the lisinopril - for a few years at well      Nonspecific abnormal results of liver function study 2/3/2003    SGOT - has been high in the past - since the hepatitis b - borderline elevation - not really changing any      OBESITY      Obstructive sleep apnea      GENESIS (obstructive sleep apnea) 10/15/2006    psg 5/15 AHI 53 aPAP 8-15     OSTEOARTHRITIS L KNEE 2003    total knee on the left - and pain is gone  since the knee replacement      PERS HX HEPATITIS B- RESOLVED] 1977    acute virus only - no chronic disease      PERS HX HEPATITIS B- RESOLVED]      Type II or unspecified type diabetes mellitus without mention of complication, not stated as uncontrolled 1991    oral meds frist, then insulin eventually later, difficult to control most of the time      Unspecified hypothyroidism 10/11/2006    TSH 3.36 - mild subclinical hypothyroidism - deciding on following or starting low dose meds - with dr Oreilly - following    Hepatis B     Allergies  Allergies   Allergen Reactions     Lipitor [Atorvastatin Calcium]      Gas     Pravachol [Pravastatin Sodium]      Pravachol - dry cough      Simvastatin      Myalgia     Medications    Current Outpatient Medications:      amoxicillin (AMOXIL) 500 MG capsule, Take 2,000 mg by mouth Before dental procedures, Disp: , Rfl:      blood glucose (NO BRAND SPECIFIED) test strip, Use to test blood sugar 2 times daily or as directed. Patient requesting One Touch Ultra Strips, Disp: 100 strip, Rfl: 3     Continuous Blood Gluc  (FREESTYLE MIKE 14 DAY READER) KATIA, 1 each daily, Disp: 1 Device, Rfl: 0     Continuous Blood Gluc Sensor (FREESTYLE MIKE 14 DAY SENSOR) [facility], APPLY 1 SENSOR AND CHANGE EVERY 14 DAYS AS DIRECTED, Disp: 6 each, Rfl: 1     diltiazem ER (DILT-XR) 120 MG 24 hr capsule, Take 1 capsule (120 mg) by mouth daily, Disp: 90 capsule, Rfl: 3     diltiazem ER COATED BEADS (CARDIZEM CD/CARTIA XT) 240 MG 24 hr capsule, Take 1 capsule (240 mg) by mouth daily Take in addition to 120mg tablet (total of 360mg), Disp: 90 capsule, Rfl: 1     econazole nitrate 1 % external cream, Apply topically daily as needed To feet and toenails, Disp: , Rfl:      Garlic 1000 MG CAPS, Take 1 capsule by mouth daily Takes during summer months.  Done taking until next summer., Disp: , Rfl:      HUMULIN R U-500 KWIKPEN 500 UNIT/ML PEN soln, INJECT 120 UNITS IN THE MORNING  UNITS AT BEDTIME,  Disp: 40 mL, Rfl: 1     hydrochlorothiazide (HYDRODIURIL) 25 MG tablet, Take 2 tablets (50 mg) by mouth daily, Disp: 180 tablet, Rfl: 1     HYDROcodone-acetaminophen (NORCO) 5-325 MG tablet, Take 1-2 tablets by mouth every 4 hours as needed for moderate to severe pain, Disp: 30 tablet, Rfl: 0     hydrOXYzine (ATARAX) 25 MG tablet, Take 1 tablet (25 mg) by mouth every 4 hours as needed for itching (pain and nausea), Disp: 30 tablet, Rfl: 0     ibuprofen (ADVIL) 200 MG capsule, Take 600 mg by mouth every 6 hours as needed , Disp: , Rfl:      insulin pen needle (GNP CLICKFINE PEN NEEDLES) 31G X 8 MM miscellaneous, Use six times daily or as directed, Disp: 200 each, Rfl: 5     levothyroxine (SYNTHROID/LEVOTHROID) 75 MCG tablet, TAKE 1 TABLET (75 MCG) BY MOUTH DAILY, Disp: 90 tablet, Rfl: 1     liraglutide (VICTOZA PEN) 18 MG/3ML solution, ADMINISTER 2 INJECTIONS OF 1.8MG AND 1.2MG EACH. TOTAL DAILY DOSE OF 3 MG, Disp: 15 mL, Rfl: 3     lisinopril (ZESTRIL) 40 MG tablet, Take 1 tablet (40 mg) by mouth daily, Disp: 90 tablet, Rfl: 1     lisinopril (ZESTRIL) 40 MG tablet, Take 1 tablet (40 mg) by mouth daily ++NO FURTHER REFILLS WITHOUT  FOLLOW UP+++ (Patient not taking: Reported on 4/14/2021), Disp: 30 tablet, Rfl: 0     metFORMIN (GLUCOPHAGE-XR) 500 MG 24 hr tablet, Take 4 tablets (2,000 mg) by mouth At Bedtime TAKE 4 TABLETS AT BEDTIME, Disp: 360 tablet, Rfl: 1     metoprolol succinate ER (TOPROL-XL) 50 MG 24 hr tablet, Take 4 tablets (200 mg) by mouth daily, Disp: 360 tablet, Rfl: 3     Multiple Vitamins-Minerals (ADVANCED DIABETIC MULTIVITAMIN) TABS, Take 1 tablet by mouth daily, Disp: , Rfl:      ORDER FOR DME, SET TO LOCAL PRINT,, Respironics REMSTAR 60 Series Auto CPAP 8-15cm H2O, Airfit P10 nasal pillow mask w/medium pillows, Disp: , Rfl:      order for DME, Equipment being ordered: [id number] $70   Rosalinda LG, Disp: 1 Device, Rfl: 0     order for DME, Roll-A-Bout Walker. Patient can use for left foot., Disp:  "1 Units, Rfl: 0     potassium chloride ER (KLOR-CON M) 20 MEQ CR tablet, Take 4 pills (total 80meq)  by an hour each today only, then one pill (20meq) every day with Lasix only (Patient taking differently: 20 mEq one pill (20meq) every day with Lasix only), Disp: 96 tablet, Rfl: 3     rivaroxaban ANTICOAGULANT (XARELTO ANTICOAGULANT) 20 MG TABS tablet, Take 1 tablet (20 mg) by mouth daily (with dinner), Disp: 90 tablet, Rfl: 3     rosuvastatin (CRESTOR) 10 MG tablet, TAKE ONE TABLET BY MOUTH ONCE DAILY, Disp: 90 tablet, Rfl: 3    Family History  Maternal grandmother had type 2 diabetes  Daughter has GDM  Father had CAD s/p stent, lung cancer and abdominal aortic anuerysm  Older brother has CABG, and abdominal aortic anuerysm  Young brother has Afib, SVT  Another younger brother has multiple myeloma  Sister has SVT    Social History  No smoking, rarely drink EtOH  No illicit drug  Worked as a nurse in the ICU. Retired in 2019.   Lives by herself, has 3 daughters    Physical Exam  BP Readings from Last 6 Encounters:   07/21/21 112/65   04/14/21 117/72   03/29/21 134/69   03/17/21 102/65   02/05/21 118/62   02/02/21 98/57     Wt Readings from Last 10 Encounters:   07/21/21 132.9 kg (293 lb 1.6 oz)   04/14/21 131.5 kg (290 lb)   04/06/21 131.5 kg (290 lb)   01/21/21 132.5 kg (292 lb)   12/23/20 131.5 kg (290 lb)   12/09/20 131.5 kg (290 lb)   11/27/20 131.6 kg (290 lb 3.2 oz)   09/22/20 134.8 kg (297 lb 1.6 oz)   07/22/20 132.1 kg (291 lb 3.2 oz)   07/10/20 132 kg (291 lb)     LMP 10/02/2007   There is no height or weight on file to calculate BMI.   Estimated body mass index is 43.28 kg/m  as calculated from the following:    Height as of 4/6/21: 1.753 m (5' 9\").    Weight as of 7/21/21: 132.9 kg (293 lb 1.6 oz).    Physical Exam  General Appearance: alert, no distress noted, general obesity  Eyes: grossly normal to inspection, conjunctivae and sclerae normal, no lid lag or stare   Respiratory: no audible " wheeze, cough, or visible cyanosis.  No visible retractions or increased work of breathing.  Able to speak fully in complete sentences.  Neurological: Cranial nerves grossly intact, mentation intact and speech normal; no tremor of the hands   Skin: no lesions on exposed skin   Psychological: mentation appears normal, affect normal, judgement and insight intact, normal speech and appearance well-groomed    RESULTS  I reviewed prior lab results documented in epic.  Lab Results   Component Value Date    A1C 9.0 (H) 08/16/2021    A1C 6.8 (H) 01/05/2021    A1C 6.9 (H) 11/25/2020    A1C 7.5 (A) 07/22/2020    A1C 7.3 (H) 12/10/2019    A1C 7.5 (A) 08/21/2019       Hemoglobin   Date Value Ref Range Status   01/05/2021 13.9 11.7 - 15.7 g/dL Final     Hematocrit   Date Value Ref Range Status   01/05/2021 40.4 35.0 - 47.0 % Final     Cholesterol   Date Value Ref Range Status   03/15/2021 130 <200 mg/dL Final     Cholesterol/HDL Ratio   Date Value Ref Range Status   02/19/2015 3.2 0.0 - 5.0 Final     HDL Cholesterol   Date Value Ref Range Status   03/15/2021 44 (L) >49 mg/dL Final     LDL Cholesterol Calculated   Date Value Ref Range Status   03/15/2021 46 <100 mg/dL Final     Comment:     Desirable:       <100 mg/dl     VLDL-Cholesterol   Date Value Ref Range Status   02/19/2015 34 (H) 0 - 30 mg/dL Final     Triglycerides   Date Value Ref Range Status   03/15/2021 200 (H) <150 mg/dL Final     Comment:     Borderline high:  150-199 mg/dl  High:             200-499 mg/dl  Very high:       >499 mg/dl  Fasting specimen       Albumin Urine mg/L   Date Value Ref Range Status   01/05/2021 <5 mg/L Final     TSH   Date Value Ref Range Status   01/05/2021 2.38 0.40 - 4.00 mU/L Final         Last Basic Metabolic Panel:    Sodium   Date Value Ref Range Status   07/21/2021 138 133 - 144 mmol/L Final   04/13/2021 137 133 - 144 mmol/L Final     Potassium   Date Value Ref Range Status   07/21/2021 4.0 3.4 - 5.3 mmol/L Final   04/13/2021 4.2  3.4 - 5.3 mmol/L Final     Chloride   Date Value Ref Range Status   07/21/2021 105 94 - 109 mmol/L Final   04/13/2021 106 94 - 109 mmol/L Final     Calcium   Date Value Ref Range Status   07/21/2021 9.5 8.5 - 10.1 mg/dL Final   04/13/2021 10.1 8.5 - 10.1 mg/dL Final     Carbon Dioxide   Date Value Ref Range Status   04/13/2021 28 20 - 32 mmol/L Final     Carbon Dioxide (CO2)   Date Value Ref Range Status   07/21/2021 27 20 - 32 mmol/L Final     Urea Nitrogen   Date Value Ref Range Status   07/21/2021 32 (H) 7 - 30 mg/dL Final   04/13/2021 22 7 - 30 mg/dL Final     Creatinine   Date Value Ref Range Status   07/21/2021 0.91 0.52 - 1.04 mg/dL Final   04/13/2021 0.81 0.52 - 1.04 mg/dL Final     GFR Estimate   Date Value Ref Range Status   07/21/2021 65 >60 mL/min/1.73m2 Final     Comment:     As of July 11, 2021, eGFR is calculated by the CKD-EPI creatinine equation, without race adjustment. eGFR can be influenced by muscle mass, exercise, and diet. The reported eGFR is an estimation only and is only applicable if the renal function is stable.   04/13/2021 75 >60 mL/min/[1.73_m2] Final     Comment:     Non  GFR Calc  Starting 12/18/2018, serum creatinine based estimated GFR (eGFR) will be   calculated using the Chronic Kidney Disease Epidemiology Collaboration   (CKD-EPI) equation.       Glucose   Date Value Ref Range Status   07/21/2021 264 (H) 70 - 99 mg/dL Final   04/13/2021 98 70 - 99 mg/dL Final     Comment:     Non Fasting       AST   Date Value Ref Range Status   11/25/2020 Unsatisfactory specimen - hemolyzed 0 - 45 U/L Final     Comment:     Canceled, Test credited  Critical Value called to and read back by  GUTIERREZ GOMEZ RN ER 1901 11/25/2020 BY AA       ALT   Date Value Ref Range Status   11/25/2020 42 0 - 50 U/L Final     Albumin   Date Value Ref Range Status   11/25/2020 3.9 3.4 - 5.0 g/dL Final         ASSESSMENT:      1. Type 2 diabetes  Sherry is a 66 year old pleasant female, with a  history of type 2 diabetes in the setting of obesity, sleep apnea, hypertension, hypercholesterolemia.  Her diabetes is known to be complicated by neuropathy.  Recently, the glycemic control has deteriorated.  Recommendations:  Continue to work on reducing the overall carbohydrate and caloric intake and losing weight  Alternate site of insulin injection and use new areas, to avoid potential poor insulin absorption due to lipodystrophy.    Continue Metformin and Victoza  Increase the morning dose of U500 insulin to 130 units, at 7:30 AM  Administer a second dose of 100 units U500 at 1 PM and a third dose of 50 units at bedtime (around 10 PM).  Have the sensor downloaded for our review in 1 or 2 weeks    2. Hypothyroidism.  Clinically and biochemically, the patient is euthyroid. I recommended to continue to take the same dose of levothyroxine.    3.  Hypercholesterolemia, controlled on Crestor.    Orders Placed This Encounter   Procedures     Continuous Glucose Monitoring >=72 hours PHYS INTERP     Hemoglobin A1c           Tanya linked and reports available.    Sherry is a 66 year old who is being evaluated via a billable video visit.      How would you like to obtain your AVS? MyChart  If the video visit is dropped, the invitation should be resent by: Send to e-mail at: [email or web address]  Will anyone else be joining your video visit? No      Chloé Sparks, [facility]  Adult Endocrinology  [facility], [place]        Again, thank you for allowing me to participate in the care of your patient.        Sincerely,        Rika Delgado MD

## 2021-08-17 NOTE — PROGRESS NOTES
Tanya lazar and reports available.    Sherry is a 66 year old who is being evaluated via a billable video visit.      How would you like to obtain your AVS? MyChart  If the video visit is dropped, the invitation should be resent by: Send to e-mail at: [email or web address]  Will anyone else be joining your video visit? No      Chloé Sparks CMA  Adult Endocrinology  [facility]

## 2021-08-18 ENCOUNTER — TELEPHONE (OUTPATIENT)
Dept: LAB | Facility: CLINIC | Age: 66
End: 2021-08-18

## 2021-08-18 NOTE — TELEPHONE ENCOUNTER
Prior Authorization Retail Medication Request    Medication/Dose: PA required for Wegovy  ICD code (if different than what is on RX):    Previously Tried and Failed:    Rationale:      Insurance Name:  [facility] Part D  Insurance ID:  [id number]      Pharmacy Information (if different than what is on RX)  Name:    Phone:

## 2021-08-20 NOTE — TELEPHONE ENCOUNTER
PA Initiation    Medication: PA required for Wegovy  Insurance Company: [employer] - Phone [phone] Fax [phone]  Pharmacy Filling the Rx: [facility] - [place], [place] - [facility] [address]  Filling Pharmacy Phone:    Filling Pharmacy Fax:    Start Date: 8/20/2021    **Submitted PA over the phone**

## 2021-08-23 DIAGNOSIS — E66.01 MORBID OBESITY (H): ICD-10-CM

## 2021-08-23 DIAGNOSIS — Z79.4 TYPE 2 DIABETES MELLITUS TREATED WITH INSULIN (H): ICD-10-CM

## 2021-08-23 DIAGNOSIS — E11.9 TYPE 2 DIABETES MELLITUS TREATED WITH INSULIN (H): ICD-10-CM

## 2021-08-23 RX ORDER — LIRAGLUTIDE 6 MG/ML
1.8 INJECTION SUBCUTANEOUS DAILY
Qty: 27 ML | Refills: 3 | Status: SHIPPED | OUTPATIENT
Start: 2021-08-23 | End: 2022-03-03

## 2021-08-23 NOTE — TELEPHONE ENCOUNTER
PRIOR AUTHORIZATION DENIED    Medication: Wegovy PA denied    Denial Date:      Denial Rational:         Appeal Information:

## 2021-09-04 ENCOUNTER — HEALTH MAINTENANCE LETTER (OUTPATIENT)
Age: 66
End: 2021-09-04

## 2021-09-14 NOTE — PROGRESS NOTES
Medication Therapy Management (MTM) Encounter    ASSESSMENT:                            Medication Adherence/Access: No issues identified    Type 2 Diabetes: Patient is not meeting A1c goal of < 7%. Would benefit from following up with endocrinology to have them review her nevin readings as was recommended at previous endo appt. May benefit from prescription assistance program for Victoza and Humulin R 500.    Hypothyroidism: Stable. Last TSH is within normal limits.     Hyperlipidemia: Stable.    HIGH BLOOD PRESSURE/AFib/: May benefit from metoprolol XL 200mg daily in place of metorpolol XL 50mg 4 tablets daily to decrease pill burden. May also benefit from Ditliazem 360mg 1 daily in place of Diltaizam 120mg daily plus diltiazem 240mg daily to decrease pill burden as well as one less copay. May also benefit from hydrochlorothiazide 50mg 1 daily in place of hydrochlorothiazide 25mg 2 tablets daily. Patient to discuss with cardiology at appointment in 2 weeks.    Supplements: Stable    PLAN:                            Recommend contacting  the [place] Prescription Assistance Program/Fund (Destini Ugarte and Meryl Sesay) at [phone].  Could consider prescription for metoprolol XL 200mg daily in place of metorpolol XL 50mg 4 tablets daily  Could consider prescription for Ditliazem 360mg 1 daily in place of Diltaizam 120mg daily plus diltiazem 240mg daily.  Could consider prescription for hydrochlorothiazide 50mg daily in place of hydrochlorothiazide 25mg 2 tablets daily  Follow up with endocrinology for nevin review.    Follow-up: 1 month via [facility]    SUBJECTIVE/OBJECTIVE:                          Sherry Slaughter is a 66 year old female contacted via telephone for an initial visit. She was referred to me from her health plan.      Reason for visit: Medication Review. Cost of medications    Allergies/ADRs: Reviewed in chart  Past Medical History: Reviewed in chart  Tobacco: She reports that she has never  smoked. She has never used smokeless tobacco.  Alcohol: rarely  Caffeine: 2-3 16 ounces of diet pepsi/day Trying to cut down.  Activity: None    Medication Adherence/Access: Patient uses pill box(es)   Patient takes medications 3 time(s) per day.   Per patient, misses medication 0 times per week.   Medication barriers: affording medications. Cost of Victoza and insulin, Xarelto (is on a program through [employer] to get this for $85 rather than $150)  The patient fills medications at [place]: YES.    Type 2 Diabetes:  Currently taking metformin XR 2000mg daily, Victoza 1.8mg daily, Humulin R 500u/ml 130 units at 7am, 100 units at 1pm, 50 units at 10pm. Patient is not experiencing side effects.  Blood sugar monitoring: Continuous Glucose Monitor. Ranges (patient reported): Blood sugars have come down since Dose of Humulin R increased 8/17/2021 100-140 in the morning.   Symptoms of low blood sugar? none, Frequency of lows- couple during the night, not every night. Her CGM says 46 and patient has no symptoms so she will recheck with finger stick and her blood sugar in in the 90's  Symptoms of high blood sugar? none  Eye exam: up to date  Foot exam: due  Diet/Exercise: Tries to eat mainly a keto diet  Aspirin: Not taking due to Xarelto  Statin: Yes: Rosuvastatin   ACEi/ARB: Yes: Lisinopril.   Urine Albumin:   Lab Results   Component Value Date    UMALCR Unable to calculate due to low value 01/05/2021      Lab Results   Component Value Date    A1C 9.0 08/16/2021    A1C 6.8 01/05/2021    A1C 6.9 11/25/2020    A1C 7.5 07/22/2020    A1C 7.3 12/10/2019    A1C 7.5 08/21/2019     Hypothyroidism: Patient is taking levothyroxine 75 mcg daily. Patient is having the following symptoms: none.   TSH   Date Value Ref Range Status   01/05/2021 2.38 0.40 - 4.00 mU/L Final     Hyperlipidemia: Current therapy includes rosuvastatin 10mg daily.  Patient reports no significant myalgias or other side effects.  Pravachol made her cough,  simvastatin had myalgias, atorvastatin caused gas  The 10-year ASCVD risk score (Erikamarisa PIKE Jr., et al., 2013) is: 13.3%    Values used to calculate the score:      Age: 66 years      Sex: Female      Is Non- : No      Diabetic: Yes      Tobacco smoker: No      Systolic Blood Pressure: 112 mmHg      Is BP treated: Yes      HDL Cholesterol: 44 mg/dL      Total Cholesterol: 130 mg/dL  Recent Labs   Lab Test 03/15/21  0904 01/05/21  0927 02/19/15  0959 01/10/14  1010   CHOL 130 125 142 160   HDL 44* 45* 45* 40*   LDL 46 36 63 84   TRIG 200* 222* 172* 175*   CHOLHDLRATIO  --   --  3.2 4.0     HIGH BLOOD PRESSURE/AFIB/HFpEF: current therapy includes lisinopril 40mg daily, metoprolol XL 200mg daily (4-50mg tablets), diltiazem 360mg daily (1 tablet of 120mg tab and 1 tablet of 240mg tab), hydrochlorothiazide 50mg daily (2-25mg tablets), furosemide 20mg every 48 hours, potassium chloride 20Meq every other day with furosemide, rivaroxaban 20mg daily. Has shortness of breath with exertion. Patient does check her blood pressures at home 120/60-70mmHg. Denies side effects. Patient does check weights but not daily. Usually 291-294lbs.     BP Readings from Last 3 Encounters:   07/21/21 112/65   04/14/21 117/72   03/29/21 134/69     Supplements: Currently taking garlic 1000mg daily (prevents bugs from biting her in the summer), MVI daily.. No reported issues at this time. .    Today's Vitals: LMP 10/02/2007   ----------------    I spent 33 minutes with this patient today (an extra 15 minutes was spent creating the Medication Action Plan). All changes were made via collaborative practice agreement with Marilou Arciniega. A copy of the visit note was provided to the patient's primary care provider.    The patient was sent via [employer] a summary of these recommendations.     Veronika Moore, Pharm.D, BCACP  Medication Therapy Management Pharmacist      Telemedicine Visit Details  Type of service:  Telephone  visit  Start Time: 9:38AM  End Time: 10:11 AM  Originating Location (patient location): Home  Distant Location (provider location):  [facility]     Medication Therapy Recommendations  Chronic diastolic heart failure (H)    Current Medication: diltiazem ER (DILT-XR) 120 MG 24 hr capsule   Rationale: More cost-effective medication available - Cost - Adherence   Recommendation: Change Medication Formulation  - diltiazem ER COATED BEADS 360 MG 24 hr capsule   Status: Contact Provider - Awaiting Response              Contact the [place] Prescription Assistance Program/Fund (Destini Ugarte and Meryl Sesay) at [phone].

## 2021-09-15 ENCOUNTER — VIRTUAL VISIT (OUTPATIENT)
Dept: PHARMACY | Facility: CLINIC | Age: 66
End: 2021-09-15
Payer: COMMERCIAL

## 2021-09-15 DIAGNOSIS — E03.9 HYPOTHYROIDISM, UNSPECIFIED TYPE: ICD-10-CM

## 2021-09-15 DIAGNOSIS — E78.5 HYPERLIPIDEMIA LDL GOAL <100: ICD-10-CM

## 2021-09-15 DIAGNOSIS — I48.0 PAROXYSMAL ATRIAL FIBRILLATION (H): Primary | ICD-10-CM

## 2021-09-15 DIAGNOSIS — E11.65 TYPE 2 DIABETES MELLITUS WITH HYPERGLYCEMIA, WITH LONG-TERM CURRENT USE OF INSULIN (H): ICD-10-CM

## 2021-09-15 DIAGNOSIS — Z79.4 TYPE 2 DIABETES MELLITUS WITH HYPERGLYCEMIA, WITH LONG-TERM CURRENT USE OF INSULIN (H): ICD-10-CM

## 2021-09-15 DIAGNOSIS — Z78.9 TAKES DIETARY SUPPLEMENTS: ICD-10-CM

## 2021-09-15 DIAGNOSIS — I50.32 CHRONIC DIASTOLIC HEART FAILURE (H): ICD-10-CM

## 2021-09-15 DIAGNOSIS — I10 HYPERTENSION GOAL BP (BLOOD PRESSURE) < 140/90: ICD-10-CM

## 2021-09-15 PROCEDURE — 99605 MTMS BY PHARM NP 15 MIN: CPT | Performed by: PHARMACIST

## 2021-09-15 PROCEDURE — 99607 MTMS BY PHARM ADDL 15 MIN: CPT | Performed by: PHARMACIST

## 2021-09-15 RX ORDER — FUROSEMIDE 20 MG
20 TABLET ORAL DAILY
COMMUNITY
End: 2021-10-25

## 2021-09-15 RX ORDER — MULTIVIT-MIN/IRON/FOLIC ACID/K 18-600-40
1 CAPSULE ORAL DAILY
COMMUNITY
End: 2022-07-20

## 2021-09-15 RX ORDER — POTASSIUM CHLORIDE 1500 MG/1
20 TABLET, EXTENDED RELEASE ORAL EVERY OTHER DAY
COMMUNITY
End: 2021-10-18

## 2021-09-15 NOTE — PATIENT INSTRUCTIONS
Recommendations from today's MTM visit:                                                    MTM (medication therapy management) is a service provided by a clinical pharmacist designed to help you get the most of out of your medicines.   Today we reviewed what your medicines are for, how to know if they are working, that your medicines are safe and how to make your medicine regimen as easy as possible.      Recommend contacting  the [place] Prescription Assistance Program/Fund (Destini Ugarte and Meryl Sesay) at [phone].  Could consider prescription for metoprolol XL 200mg daily in place of metorpolol XL 50mg 4 tablets daily  Could consider prescription for Ditliazem 360mg 1 daily in place of Diltaizam 120mg daily plus diltiazem 240mg daily.  Could consider prescription for hydrochlorothiazide 50mg daily in place of hydrochlorothiazide 25mg 2 tablets daily  Follow up with endocrinology for nevin review.    Follow-up:  1 month via [employer]    It was great to speak with you today.  I value your experience and would be very thankful for your time with providing feedback on our clinic survey. You may receive a survey via email or text message in the next few days.     To schedule another MTM appointment, please call the clinic directly or you may call the MTM scheduling line at [phone] or toll-free at [phone].     My Clinical Pharmacist's contact information:                                                      Please feel free to contact me with any questions or concerns you have.      Veronika Moore, Pharm.D, [facility]  Medication Therapy Management Pharmacist

## 2021-09-15 NOTE — Clinical Note
Hi Dr. Aranda, you are seeing Sherry on 9/25. If no changes to her medications are made could we consider simplifying the number of tablets she needs to take a day? Could consider prescription for metoprolol XL 200mg daily in place of metorpolol XL 50mg 4 tablets daily  Could consider prescription for Ditliazem 360mg 1 daily in place of Diltaizam 120mg daily plus diltiazem 240mg daily.  Could consider prescription for hydrochlorothiazide 50mg daily in place of hydrochlorothiazide 25mg 2 tablets daily    This would help lessen her pill burden and save her a copay on her diltiazem.    Thank you for considering,  Veronika Moore, Pharm.D, [facility]  Medication Therapy Management Pharmacist

## 2021-09-15 NOTE — LETTER
"        Date: 9/15/2021    Sherry Slaughter  [id number]  [place] [address]    Dear Ms. Slaughter,    Thank you for talking with me on September 15, 2021 about your health and medications. Medicare s MTM (Medication Therapy Management) program helps you understand your medications and use them safely.      This letter includes an action plan (Medication Action Plan) and medication list (Personal Medication List). The action plan has steps you should take to help you get the best results from your medications. The medication list will help you keep track of your medications and how to use them the right way.       Have your action plan and medication list with you when you talk with your doctors, pharmacists, and other healthcare providers in your care team.     Ask your doctors, pharmacists, and other healthcare providers to update the action plan and medication list at every visit.     Take your medication list with you if you go to the hospital or emergency room.     Give a copy of the action plan and medication list to your family or caregivers.     If you want to talk about this letter or any of the papers with it, please call   [phone].We look forward to working with you, your doctors, and other healthcare providers to help you stay healthy through the [facility] MTM program.    Sincerely,  Veronika Moore [facility]    Enclosed: Medication Action Plan and Personal Medication List    MEDICATION ACTION PLAN FOR Sherry Slaughter,  1955     This action plan will help you get the best results from your medications if you:   1. Read \"What we talked about.\"   2. Take the steps listed in the \"What I need to do\" boxes.   3. Fill in \"What I did and when I did it.\"   4. Fill in \"My follow-up plan\" and \"Questions I want to ask.\"     Have this action plan with you when you talk with your doctors, pharmacists, and other healthcare providers in your care team. Share this with your family or " caregivers too.  DATE PREPARED: 9/15/2021  What we talked about: Lessening the number of tablets you need to take every day.                                                  What I need to do: Talk with Dr. Aranda about prescription for metoprolol XL 200mg daily in place of metorpolol XL 50mg 4 tablets daily, prescription for Ditliazem 360mg 1 daily in place of Diltaizam 120mg daily plus diltiazem 240mg daily, prescription for hydrochlorothiazide 50mg daily in place of hydrochlorothiazide 25mg 2 tablets daily         What I did and when I did it:                                              What we talked about: Calling endocrinology to have them review your nevin results.  Contact  the [place] Prescription Assistance Program/Fund (Destini Ugarte and Meryl Sesay) at [phone].                                                  What I need to do: Call endocrinology and prescription assistance program       What I did and when I did it:                                                 My follow-up plan:                 Questions I want to ask:              If you have any questions about your action plan, call Veronika Moore [facility], Phone: [phone] , Monday-Friday 8-4:30pm.           PERSONAL MEDICATION LIST FOR Sherry Slaughter,  1955     This medication list was made for you after we talked. We also used information from your doctor's chart.      Use blank rows to add new medications. Then fill in the dates you started using them.    Cross out medications when you no longer use them. Then write the date and why you stopped using them.    Ask your doctors, pharmacists, and other healthcare providers to update this list at every visit. Keep this list up-to-date with:       Prescription medications    Over the counter drugs     Herbals    Vitamins    Minerals      If you go to the hospital or emergency room, take this list with you. Share this with your family or caregivers too.     DATE  PREPARED: 9/15/2021  Allergies or side effects: Lipitor [atorvastatin calcium], Pravachol [pravastatin sodium], and Simvastatin     Medication:  ADVANCED DIABETIC MULTIVITAMIN PO TABS      How I use it:  Take 1 tablet by mouth daily      Why I use it: General Health      Prescriber:  Over the counter      Date I started using it:       Date I stopped using it:         Why I stopped using it:            Medication:  AMOXICILLIN 500 MG PO CAPS      How I use it:  Take 2,000 mg by mouth Before dental procedures      Why I use it: Infection prevention      Prescriber:  Marilou Arciniega      Date I started using it:       Date I stopped using it:         Why I stopped using it:            Medication:  DILTIAZEM HCL  MG PO CP24      How I use it:  Take 1 capsule (120 mg) by mouth daily      Why I use it: Paroxysmal atrial fibrillation (H)    Prescriber:  Hilda Gupta CNP      Date I started using it:       Date I stopped using it:         Why I stopped using it:            Medication:  DILTIAZEM HCL ER COATED BEADS 240 MG PO CP24      How I use it:  Take 1 capsule (240 mg) by mouth daily Take in addition to 120mg tablet (total of 360mg)      Why I use it: Paroxysmal atrial fibrillation (H)    Prescriber:  Joel Aranda MD      Date I started using it:       Date I stopped using it:         Why I stopped using it:            Medication:  ECONAZOLE NITRATE 1 % EX CREA      How I use it:  Apply topically daily as needed To feet and toenails      Why I use it: Fungus      Prescriber:  Marilou Arciniega      Date I started using it:       Date I stopped using it:         Why I stopped using it:            Medication:  FREESTYLE MIKE 14 DAY READER KATIA      How I use it:  1 each daily      Why I use it: Controlled type 2 diabetes mellitus with diabetic nephropathy, with long-term current use of insulin (H)    Prescriber:  Rika Delgado MD      Date I started using it:       Date I stopped using it:         Why I  stopped using it:            Medication:  FREESTYLE MIKE 14 DAY SENSOR MISC      How I use it:  Change every 14 days.      Why I use it: Uncontrolled type 2 diabetes mellitus with hyperglycemia, with long-term current use of insulin (H)    Prescriber:  Rika Delgado MD      Date I started using it:       Date I stopped using it:         Why I stopped using it:            Medication:  FUROSEMIDE 20 MG PO TABS      How I use it:  Take 20 mg by mouth every other day      Why I use it: Heart Failure      Prescriber:  Joel Aranda      Date I started using it:       Date I stopped using it:         Why I stopped using it:            Medication:  GARLIC 1000 MG PO CAPS      How I use it:  Take 1 capsule by mouth daily Takes during summer months.  Done taking until next summer.      Why I use it: General Health      Prescriber:  Over the counter      Date I started using it:       Date I stopped using it:         Why I stopped using it:            Medication:  GLUCOSE BLOOD VI STRP      How I use it:  Use to test blood sugar 2 times daily or as directed. Patient requesting One Touch Ultra Strips      Why I use it: Type 2 diabetes mellitus without complication, with long-term current use of insulin (H)    Prescriber:  Rika Delgado MD      Date I started using it:       Date I stopped using it:         Why I stopped using it:            Medication:  GNP CLICKFINE PEN NEEDLES 31G X 8 MM MISC      How I use it:  Uses 3 times daily or as directed      Why I use it: Diabetes      Prescriber:  Rika Delgado MD      Date I started using it:       Date I stopped using it:         Why I stopped using it:            Medication:  HUMULIN R U-500 KWIKPEN 500 UNIT/ML SC SOPN      How I use it:  Administer 130 units at 7 in the morning, 100 units at 1 PM and 50 units at 10 PM.      Why I use it: Diabetes      Prescriber:  Rika Delgado MD      Date I started using it:       Date I stopped  using it:         Why I stopped using it:            Medication:  HYDROCHLOROTHIAZIDE 25 MG PO TABS      How I use it:  Take 2 tablets (50 mg) by mouth daily      Why I use it: Congestive heart failure, unspecified HF chronicity, unspecified heart failure type (H)    Prescriber:  JIM Foss CNP      Date I started using it:       Date I stopped using it:         Why I stopped using it:            Medication:  IBUPROFEN 200 MG PO CAPS      How I use it:  Take 600 mg by mouth every 6 hours as needed       Why I use it: pain      Prescriber:  Over the counter      Date I started using it:       Date I stopped using it:         Why I stopped using it:            Medication:  LEVOTHYROXINE SODIUM 75 MCG PO TABS      How I use it:  TAKE 1 TABLET (75 MCG) BY MOUTH DAILY      Why I use it: Hypothyroidism due to acquired atrophy of thyroid    Prescriber:  Rika Delgado MD      Date I started using it:       Date I stopped using it:         Why I stopped using it:            Medication:  LISINOPRIL 40 MG PO TABS      How I use it:  Take 1 tablet (40 mg) by mouth daily      Why I use it: Essential hypertension with goal blood pressure less than 140/90    Prescriber:  JIM Foss CNP      Date I started using it:       Date I stopped using it:         Why I stopped using it:            Medication:  METFORMIN HCL  MG PO TB24      How I use it:  Take 4 tablets (2,000 mg) by mouth At Bedtime TAKE 4 TABLETS AT BEDTIME      Why I use it: Uncontrolled type 2 diabetes mellitus with hyperglycemia, with long-term current use of insulin (H)    Prescriber:  Rika Delgado MD      Date I started using it:       Date I stopped using it:         Why I stopped using it:            Medication:  METOPROLOL SUCCINATE ER 50 MG PO TB24      How I use it:  Take 4 tablets (200 mg) by mouth daily      Why I use it: Paroxysmal atrial fibrillation (H)    Prescriber:  Joel Aranda MD      Date  I started using it:       Date I stopped using it:         Why I stopped using it:            Why I stopped using it:            Medication:  POTASSIUM CHLORIDE JARED ER 20 MEQ PO TBCR      How I use it:  Take 20 mEq by mouth every other day      Why I use it: Potassium replacement      Prescriber:  Joel Aranda      Date I started using it:       Date I stopped using it:         Why I stopped using it:            Medication:  RIVAROXABAN 20 MG PO TABS      How I use it:  Take 1 tablet (20 mg) by mouth daily (with dinner)      Why I use it: Paroxysmal atrial fibrillation (H)    Prescriber:  Joel Aranda MD      Date I started using it:       Date I stopped using it:         Why I stopped using it:            Medication:  ROSUVASTATIN CALCIUM 10 MG PO TABS      How I use it:  TAKE ONE TABLET BY MOUTH ONCE DAILY      Why I use it: Hyperlipidemia LDL goal <100    Prescriber:  Marilou Arciniega MD PhD      Date I started using it:       Date I stopped using it:         Why I stopped using it:            Medication:  VICTOZA 18 MG/3ML SC SOPN      How I use it:  Inject 1.8 mg Subcutaneous daily      Why I use it: Type 2 diabetes mellitus treated with insulin (H); Morbid obesity (H)    Prescriber:  Rika Delgado MD      Date I started using it:       Date I stopped using it:         Why I stopped using it:            Medication:  VITAMIN D (CHOLECALCIFEROL) 25 MCG (1000 UT) PO TABS      How I use it:  Take 1 tablet by mouth daily      Why I use it: General Health      Prescriber:  Over the Counter      Date I started using it:       Date I stopped using it:         Why I stopped using it:            Medication:         How I use it:         Why I use it:      Prescriber:         Date I started using it:       Date I stopped using it:         Why I stopped using it:            Medication:         How I use it:         Why I use it:      Prescriber:         Date I started using it:       Date I stopped using  it:         Why I stopped using it:            Medication:         How I use it:         Why I use it:      Prescriber:         Date I started using it:       Date I stopped using it:         Why I stopped using it:              Other Information:     If you have any questions about your medication list, call Veronika Moore [facility], Phone: [phone] , Monday-Friday 8-4:30pm.    According to the Paperwork Reduction Act of 1995, no persons are required to respond to a collection of information unless it displays a valid OMB control number. The valid OMB number for this information collection is 2961-6405. The time required to complete this information collection is estimated to average 40 minutes per response, including the time to review instructions, searching existing data resources, gather the data needed, and complete and review the information collection. If you have any comments concerning the accuracy of the time estimate(s) or suggestions for improving this form, please write to: CMS, Attn: [employer] Clearance Officer, [address] [place] [phone].

## 2021-09-20 ENCOUNTER — LAB (OUTPATIENT)
Dept: LAB | Facility: CLINIC | Age: 66
End: 2021-09-20
Payer: COMMERCIAL

## 2021-09-20 DIAGNOSIS — I10 HYPERTENSION, UNSPECIFIED TYPE: ICD-10-CM

## 2021-09-20 DIAGNOSIS — E78.1 HYPERTRIGLYCERIDEMIA: ICD-10-CM

## 2021-09-20 LAB
ANION GAP SERPL CALCULATED.3IONS-SCNC: 11 MMOL/L (ref 3–14)
BUN SERPL-MCNC: 22 MG/DL (ref 7–30)
CALCIUM SERPL-MCNC: 9.7 MG/DL (ref 8.5–10.1)
CHLORIDE BLD-SCNC: 104 MMOL/L (ref 94–109)
CHOLEST SERPL-MCNC: 133 MG/DL
CO2 SERPL-SCNC: 23 MMOL/L (ref 20–32)
CREAT SERPL-MCNC: 0.92 MG/DL (ref 0.52–1.04)
ERYTHROCYTE [DISTWIDTH] IN BLOOD BY AUTOMATED COUNT: 13.4 % (ref 10–15)
FASTING STATUS PATIENT QL REPORTED: YES
GFR SERPL CREATININE-BSD FRML MDRD: 64 ML/MIN/1.73M2
GLUCOSE BLD-MCNC: 185 MG/DL (ref 70–99)
HCT VFR BLD AUTO: 36.6 % (ref 35–47)
HDLC SERPL-MCNC: 37 MG/DL
HGB BLD-MCNC: 12.2 G/DL (ref 11.7–15.7)
LDLC SERPL CALC-MCNC: 48 MG/DL
MCH RBC QN AUTO: 31.8 PG (ref 26.5–33)
MCHC RBC AUTO-ENTMCNC: 33.3 G/DL (ref 31.5–36.5)
MCV RBC AUTO: 95 FL (ref 78–100)
NONHDLC SERPL-MCNC: 96 MG/DL
PLATELET # BLD AUTO: 256 10E3/UL (ref 150–450)
POTASSIUM BLD-SCNC: 4.1 MMOL/L (ref 3.4–5.3)
RBC # BLD AUTO: 3.84 10E6/UL (ref 3.8–5.2)
SODIUM SERPL-SCNC: 138 MMOL/L (ref 133–144)
TRIGL SERPL-MCNC: 240 MG/DL
TSH SERPL DL<=0.005 MIU/L-ACNC: 2.58 MU/L (ref 0.4–4)
WBC # BLD AUTO: 8.7 10E3/UL (ref 4–11)

## 2021-09-20 PROCEDURE — 80061 LIPID PANEL: CPT

## 2021-09-20 PROCEDURE — 84443 ASSAY THYROID STIM HORMONE: CPT

## 2021-09-20 PROCEDURE — 36415 COLL VENOUS BLD VENIPUNCTURE: CPT

## 2021-09-20 PROCEDURE — 85027 COMPLETE CBC AUTOMATED: CPT

## 2021-09-20 PROCEDURE — 80048 BASIC METABOLIC PNL TOTAL CA: CPT

## 2021-09-22 ENCOUNTER — MYC MEDICAL ADVICE (OUTPATIENT)
Dept: CARDIOLOGY | Facility: CLINIC | Age: 66
End: 2021-09-22

## 2021-09-23 ENCOUNTER — TELEPHONE (OUTPATIENT)
Dept: CARDIOLOGY | Facility: CLINIC | Age: 66
End: 2021-09-23

## 2021-09-23 NOTE — TELEPHONE ENCOUNTER
Called and left message for patient call was being returned and will try back later.     Patricia Bridges RN  Medical Speciality Care Coordinator  [facility], [place]  Phone: [phone]

## 2021-09-23 NOTE — TELEPHONE ENCOUNTER
[facility] Call [facility]    Phone Message    May a detailed message be left on voicemail: yes     Reason for Call: Patient called stating that she is back in a-fib in the 90's. She wants to know what she should do. Please advise. Thank you.    Action Taken: Message routed to:  Adult Clinics: Cardiology p [phone]    Travel Screening: Not Applicable

## 2021-09-24 ENCOUNTER — TELEPHONE (OUTPATIENT)
Dept: CARDIOLOGY | Facility: CLINIC | Age: 66
End: 2021-09-24

## 2021-09-24 DIAGNOSIS — I48.0 PAROXYSMAL ATRIAL FIBRILLATION (H): ICD-10-CM

## 2021-09-24 NOTE — TELEPHONE ENCOUNTER
See telephone encounter    Patricia Bridges RN  Medical Speciality Care Coordinator  [facility], [place]  Phone: [phone]

## 2021-09-24 NOTE — TELEPHONE ENCOUNTER
[facility] Call Center    Phone Message    May a detailed message be left on voicemail: yes     Reason for Call: Medication Refill Request    Has the patient contacted the pharmacy for the refill? Yes   Name of medication being requested:Xarelto  Provider who prescribed the medication: [facility]   Pharmacy: [facility]  Date medication is needed:Please advise. Thank you.      Action Taken: Message routed to:  Adult Clinics: Cardiology p [phone]    Travel Screening: Not Applicable

## 2021-09-24 NOTE — TELEPHONE ENCOUNTER
Joel Aranda MD Balcome, Patricia A, RN  Caller: Unspecified (Yesterday, 10:30 AM)  Brijesh Perez,     I'm seeing her in a few days.  Rates and BPs Ok.  Will assess and make plan on Tuesday.  Continue present meds.      Get Prompt Medical Attention  if any of the following occur together with palpitations:    Weakness, dizziness, light-headed or fainting    Chest pain or shortness of breath    Rapid heart rate (over 120 beats per minute, at rest)    Palpitations that lasts over 20 minutes    Weakness of an arm or leg or one side of the face    Difficulty with speech or vision    Reviewed above with her. If she develops any above symptoms she needs to go to ED. She is agreeable to plan. Also advised if she has questions during the weekend, she can call the hospital and ask for Cardiology on call.     Patricia Bridges, EMMY  Medical Speciality Care Coordinator  [facility]  Phone: [phone]

## 2021-09-24 NOTE — TELEPHONE ENCOUNTER
Called and spoke to patient. She is a retired cardiac nurse. She feels like she went into afib on Sept 21 st. Her pulse is irregular. She had been feeling more shortness of breath with activity. Also she feels more fatigues. Her pulse is . Her BP is running 110's/ 60-70's.     Verified she is taking Diltiazem 360 mg daily, Furosemide 20 mg (increased to every day), hydrochlorothiazide 50 mg daily, lisinopril 40 mg daily, toprol 200 mg daily, and xarelto 20 mg daily.     Advised will send message to Robinson and Dr Aranda and call her back with recommendations.     Patricia Bridges RN  Medical Speciality Care Coordinator  [facility], [place]  Phone: [phone]

## 2021-09-27 NOTE — TELEPHONE ENCOUNTER
New pharmacy calling for refill on Xarelto. Refill was sent for one year in Feb of this year. Sent refill to [facility] for the remaining two fills.     Patricia Bridges RN  Medical Speciality Care Coordinator  [facility], [place]  Phone: [phone]

## 2021-09-28 ENCOUNTER — VIRTUAL VISIT (OUTPATIENT)
Dept: CARDIOLOGY | Facility: CLINIC | Age: 66
End: 2021-09-28
Payer: COMMERCIAL

## 2021-09-28 DIAGNOSIS — I10 HYPERTENSION, UNSPECIFIED TYPE: ICD-10-CM

## 2021-09-28 DIAGNOSIS — I50.9 CONGESTIVE HEART FAILURE, UNSPECIFIED HF CHRONICITY, UNSPECIFIED HEART FAILURE TYPE (H): Primary | ICD-10-CM

## 2021-09-28 DIAGNOSIS — I48.0 PAROXYSMAL ATRIAL FIBRILLATION (H): ICD-10-CM

## 2021-09-28 PROCEDURE — 99215 OFFICE O/P EST HI 40 MIN: CPT | Mod: 95 | Performed by: INTERNAL MEDICINE

## 2021-09-28 RX ORDER — DILTIAZEM HYDROCHLORIDE 360 MG/1
360 CAPSULE, EXTENDED RELEASE ORAL DAILY
Qty: 90 CAPSULE | Refills: 3 | Status: SHIPPED | OUTPATIENT
Start: 2021-09-28 | End: 2022-01-06

## 2021-09-28 RX ORDER — METOPROLOL SUCCINATE 200 MG/1
200 TABLET, EXTENDED RELEASE ORAL DAILY
Qty: 90 TABLET | Refills: 3 | Status: SHIPPED | OUTPATIENT
Start: 2021-09-28 | End: 2021-11-09

## 2021-09-28 NOTE — PROGRESS NOTES
Visit Date: 2021      Marilou Arciniega MD  [facility]  [address] [place] [address]    Patient:  Sherry Slaughter  MRN:  [id number]  :  1955    Dear Dr. Arciniega:      It was a pleasure participating in the care of your patient, Ms. Sherry Slaughter.  As you know, she is a 66-year-old lady whom I saw today over a virtual video visit via [employer] for paroxysmal atrial fibrillation, hypertension, congestive heart failure.    PAST MEDICAL HISTORY:      1.  Type 2 diabetes.  2.  Hypertension.  3.  Hyperlipidemia.  4.  Depression.  5.  Obstructive sleep apnea, currently on CPAP.  6.  Hypothyroidism.  7.  Diverticulosis.  8.  Low back pain.  9.  Irritable bowel syndrome.    I last saw her 2021.  At that time, she was doing adequately.  She presents today for continuing care.    As you remember, she went to the emergency room on  for atrial fibrillation and was discovered to be in pulmonary edema in December, likely secondary to atrial fibrillation and having her hydrochlorothiazide discontinued.  She was diuresed with IV Lasix and eventually underwent ELIAS-guided cardioversion on 2021.  Apparently, her dry weight after diuresis in December was 290 pounds.    She saw [facility]. At that time, for some reason she had cut down Lasix and eventually discontinued it.  Subsequently, the patient did well until 2 weeks ago when she felt that she went back into atrial fibrillation.  Atrial fibrillation was only in the  beat per minute range and her blood pressures were steady in the 110/65 range, but she did not feel well when she exerted herself and was short of breath.    The patient denies any physical signs of fluid retention.  In terms of ankle swelling, bloating or any other real bodily changes other than an increase in weight up to 297 pounds and trouble breathing.    She does keep track of her heart rhythm at home  with 2 different programs, a cardiac program and another monitor, but the resolution is not ideal.    She feels that when she woke up on Monday, she felt much better and felt that she was back in normal sinus rhythm.  Her breathing had improved as well.    Since not feeling well and being more short of breath, she restarted her Lasix every day and she started to improve.    She otherwise denies current PND, orthopnea, edema, syncope or near syncope.  She denies other chest pain as well.    CURRENT MEDICATIONS:     1.  Diltiazem 360 mg a day.  2.  Hydrochlorothiazide 50 mg a day.  3.  Lisinopril 40 mg a day.  4.  Metformin.  5.  Metoprolol succinate 200 mg a day.  6.  Rivaroxaban 20 mg a day.  7.  Rosuvastatin 10 mg a day.    PHYSICAL EXAMINATION:      VITAL SIGNS:  Her blood pressures are running 110/65 with pulse in the 90s.  GENERAL:  She appears comfortable, well groomed.  PSYCHIATRIC:  She is alert and oriented x3.  HEENT:  Eyes do not appear grossly erythematous or have exudate.  RESPIRATORY:  She is breathing comfortably without gross cough.    The remainder of the comprehensive physical exam was deferred secondary to the COVID-19 pandemic and secondary to video visit restrictions.    LABORATORY:  On 09/20/2021, potassium 4.1, GFR normal.  Triglycerides were 240, LDL was 48.  Hemoglobin normal.  TSH normal.      IMPRESSION:      Sherry is a 66-year-old lady with several active cardiac issues:    1.  Paroxysmal atrial fibrillation.    The patient underwent ELIAS-guided cardioversion this past year and has a relatively structurally normal heart.  She has a CHADS-VASc2 score of 5 for congestive heart failure, hypertension, age, diabetes and female gender.  She is currently tolerating anticoagulation well and on significant doses of metoprolol and diltiazem.    It is presumed that the patient went back into atrial fibrillation about 2 weeks ago when the patient felt irregular palpitations and shortness of breath  with exertion. This past Monday, she felt that she went back into her normal rhythm after restarting her Lasix and her breathing improved.    We will perform further noninvasive evaluation for this.    2.  History of congestive heart failure.    We had estimated that her best dry weight is 290 pounds and she clearly requires her Lasix 20 mg a day along with hydrochlorothiazide 50 mg a day.    We have hypothesized that she had gone into atrial fibrillation secondary to stopping hydrochlorothiazide back in December and this last episode could have been triggered by decreasing her Lasix.  The patient does not display any physical manifestations of fluid accumulation and does not have gross edema or bloating as a result; however, she has objectively gained 7 pounds since stopping Lasix and has improved since restarting Lasix in terms of breathing and possibly even her rhythm.  Further noninvasive evaluation would be indicated.    3.  Hypertension, well controlled, currently running in the 110/65 range.  Continue to follow.    4.  Mild aortic stenosis, mean gradient 12 mmHg in the past.  Continue to follow.      PLAN:       1.  Check echocardiogram and 14-day Zio patch monitor in order to rule out significant new structural pathology and to check burden of atrial fibrillation and overall rate control.    2.  Virtual video visit followup in 1 month with lab work prior, earlier if needed.    Once again, we will emphasize that hopefully we can achieve her dry weight of 290 pounds and in the future not discontinue her diuretic therapy as it seems to lead to episodes of atrial fibrillation with which she is quite symptomatic.    Once again, it was a pleasure participating in the care of your patient, Ms. Sherry Slaughter.  Please feel free to contact me at any time if any questions regarding her care in the future.          Joel Aranda MD        D: 09/28/2021   T: 09/28/2021   MT: GARO    Name:     LOYDA  CONCEPCIÓN JENNINGS  MRN:      [id number]        Account:    [id number]   :      1955           Visit Date: 2021     Document: [id number]

## 2021-09-28 NOTE — PROGRESS NOTES
"Sherry Slaughter is a 66 year old who is being evaluated via a billable video visit.      How would you like to obtain your AVS? MyChart  If the video visit is dropped, the invitation should be resent by: Text to cell phone: [phone]  Will anyone else be joining your video visit? Yane Lindsey, EMT  Clinic Support  [facility]    [phone]    The patient has been notified of following:     \"This video visit will be conducted via a call between you and your physician/provider. We have found that certain health care needs can be provided without the need for an in-person physical exam.  This service lets us provide the care you need with a video conversation.  If a prescription is necessary we can send it directly to your pharmacy.  If lab work is needed we can place an order for that and you can then stop by our lab to have the test done at a later time.    Video visits are billed at different rates depending on your insurance coverage.  Please reach out to your insurance provider with any questions.    If during the course of the call the physician/provider feels a video visit is not appropriate, you will not be charged for this service.\"    Patient has given verbal consent for video visit? Yes    How would you like to obtain your AVS? Mail    Video-Visit Details    Type of service:  Video Visit    Video Start Time:230pm    Video End Time:258pm    Total visit time including video visit, chart review, charting, coordination of care =54min    Originating Location (pt. Location):patient home      Distant Location (provider location):  home office    Platform used for Video Visit: Yuliana Meyer dictation #[id number]    [facility]#:[id number]    "

## 2021-10-07 ENCOUNTER — TELEPHONE (OUTPATIENT)
Dept: CARDIOLOGY | Facility: CLINIC | Age: 66
End: 2021-10-07

## 2021-10-07 ENCOUNTER — ANCILLARY PROCEDURE (OUTPATIENT)
Dept: CARDIOLOGY | Facility: CLINIC | Age: 66
End: 2021-10-07
Attending: INTERNAL MEDICINE
Payer: COMMERCIAL

## 2021-10-07 DIAGNOSIS — I48.0 PAROXYSMAL ATRIAL FIBRILLATION (H): ICD-10-CM

## 2021-10-07 LAB — LVEF ECHO: NORMAL

## 2021-10-07 PROCEDURE — 93306 TTE W/DOPPLER COMPLETE: CPT | Performed by: INTERNAL MEDICINE

## 2021-10-07 PROCEDURE — 93245 EXT ECG>7D<15D REC SCAN A/R: CPT | Performed by: INTERNAL MEDICINE

## 2021-10-07 RX ADMIN — Medication 3 ML: at 09:52

## 2021-10-07 NOTE — TELEPHONE ENCOUNTER
Writer called and delivered Dr. Aranda's findings on the labs. Patient was already informed of the findings but was encouraged to hear the result. Patient will follow up as scheduled.     Dada Lindsey, EMT  Clinic Support  [facility]    [phone]    Employed by [facility] [employer]

## 2021-10-07 NOTE — PATIENT INSTRUCTIONS
Patient has been prescribed a ZioPatch holter for 14 days.  Patient was instructed regarding the indication, function, care and prompt return of the ZioPatch holter monitor. The monitor, with S/N [id number],  was placed on the patient with instructions regarding care of the skin, electrodes, and monitor, as well as documentation in the patient diary. Patient demonstrated understanding of this information and agreed to call [employer] with further questions or concerns.

## 2021-10-14 DIAGNOSIS — Z79.4 UNCONTROLLED TYPE 2 DIABETES MELLITUS WITH HYPERGLYCEMIA, WITH LONG-TERM CURRENT USE OF INSULIN (H): ICD-10-CM

## 2021-10-14 DIAGNOSIS — R06.02 SOB (SHORTNESS OF BREATH): Primary | ICD-10-CM

## 2021-10-14 DIAGNOSIS — E11.65 UNCONTROLLED TYPE 2 DIABETES MELLITUS WITH HYPERGLYCEMIA, WITH LONG-TERM CURRENT USE OF INSULIN (H): ICD-10-CM

## 2021-10-17 DIAGNOSIS — I10 ESSENTIAL HYPERTENSION WITH GOAL BLOOD PRESSURE LESS THAN 140/90: ICD-10-CM

## 2021-10-18 RX ORDER — LISINOPRIL 40 MG/1
40 TABLET ORAL DAILY
Qty: 90 TABLET | Refills: 3 | Status: SHIPPED | OUTPATIENT
Start: 2021-10-18 | End: 2022-01-06

## 2021-10-18 RX ORDER — METFORMIN HCL 500 MG
TABLET, EXTENDED RELEASE 24 HR ORAL
Qty: 360 TABLET | Refills: 1 | OUTPATIENT
Start: 2021-10-18

## 2021-10-18 NOTE — TELEPHONE ENCOUNTER
POT CHLORIDE JARED ER 20 TBCR    TAKE 4 PILLS (TOTAL 80MEQ)  BY AN HOUR EACH TODAY ONLY, THEN ONE PILL (20MEQ) EVERY DAY WITH LASIX ONLY    Last Written Prescription Date:  8/30/21  Last Fill Quantity: 96,   # refills: 0  Last Office Visit : 9/28/21  Future Office visit:  10/28/21    Routing refill request to provider for review/approval because:  Medication is reported/historical

## 2021-10-18 NOTE — TELEPHONE ENCOUNTER
Last Clinic Visit: 9/28/2021  [facility]

## 2021-10-19 RX ORDER — POTASSIUM CHLORIDE 1500 MG/1
20 TABLET, EXTENDED RELEASE ORAL DAILY
Qty: 90 TABLET | Refills: 1 | Status: SHIPPED | OUTPATIENT
Start: 2021-10-19 | End: 2022-01-06

## 2021-10-19 NOTE — TELEPHONE ENCOUNTER
"Per Dr Aranda's clinic visit on 12/15/20-  \"  Start K supplement 20meq X4 today only then 20meq/day for daily maintenance with Lasix only\"    It was removed from her med list on 9/15/21. Called and left a message asking her to call back and clarify how she is taking the medication.     Patricia Bridges RN  Medical Speciality Care Coordinator  [facility]  Phone: [phone]    Requested Prescriptions   Pending Prescriptions Disp Refills     potassium chloride ER (KLOR-CON M) 20 MEQ CR tablet [Pharmacy Med Name: POT CHLORIDE JARED ER 20 TBCR] 90 tablet 1     Sig: Take 1 tablet (20 mEq) by mouth daily       Potassium Supplements Protocol Passed - 10/19/2021  9:08 AM        Passed - Recent (12 mo) or future (30 days) visit within the authorizing provider's department     Patient has had an office visit with the authorizing provider or a provider within the authorizing providers department within the previous 12 mos or has a future within next 30 days. See \"Patient Info\" tab in inbasket, or \"Choose Columns\" in Meds & Orders section of the refill encounter.              Passed - Medication is active on med list        Passed - Patient is age 18 or older        Passed - Normal serum potassium in past 12 months     Recent Labs   Lab Test 09/20/21  0840   POTASSIUM 4.1                         Patient called back. She takes one pill along with one lasix daily.   Refill protocol passed.     Patricia Bridges RN    "

## 2021-10-20 DIAGNOSIS — Z79.4 UNCONTROLLED TYPE 2 DIABETES MELLITUS WITH HYPERGLYCEMIA, WITH LONG-TERM CURRENT USE OF INSULIN (H): ICD-10-CM

## 2021-10-20 DIAGNOSIS — E11.65 UNCONTROLLED TYPE 2 DIABETES MELLITUS WITH HYPERGLYCEMIA, WITH LONG-TERM CURRENT USE OF INSULIN (H): ICD-10-CM

## 2021-10-22 RX ORDER — METFORMIN HCL 500 MG
TABLET, EXTENDED RELEASE 24 HR ORAL
Qty: 360 TABLET | Refills: 1 | OUTPATIENT
Start: 2021-10-22

## 2021-10-25 DIAGNOSIS — I50.9 CONGESTIVE HEART FAILURE, UNSPECIFIED HF CHRONICITY, UNSPECIFIED HEART FAILURE TYPE (H): Primary | ICD-10-CM

## 2021-10-25 NOTE — TELEPHONE ENCOUNTER
Medication: Furosemide 20mg    Last Fill: 08/30/21       Qty: 90   Last Rx Date: 12/10/20     Last MD Visit: 09/28/21

## 2021-10-26 RX ORDER — FUROSEMIDE 20 MG
20 TABLET ORAL DAILY
Qty: 90 TABLET | Refills: 3 | Status: SHIPPED | OUTPATIENT
Start: 2021-10-26 | End: 2022-01-06

## 2021-10-27 ENCOUNTER — LAB (OUTPATIENT)
Dept: LAB | Facility: CLINIC | Age: 66
End: 2021-10-27
Payer: COMMERCIAL

## 2021-10-27 DIAGNOSIS — I10 HYPERTENSION GOAL BP (BLOOD PRESSURE) < 140/90: Chronic | ICD-10-CM

## 2021-10-27 LAB
ANION GAP SERPL CALCULATED.3IONS-SCNC: 6 MMOL/L (ref 3–14)
BUN SERPL-MCNC: 24 MG/DL (ref 7–30)
CALCIUM SERPL-MCNC: 9.5 MG/DL (ref 8.5–10.1)
CHLORIDE BLD-SCNC: 104 MMOL/L (ref 94–109)
CO2 SERPL-SCNC: 28 MMOL/L (ref 20–32)
CREAT SERPL-MCNC: 0.99 MG/DL (ref 0.52–1.04)
GFR SERPL CREATININE-BSD FRML MDRD: 59 ML/MIN/1.73M2
GLUCOSE BLD-MCNC: 194 MG/DL (ref 70–99)
POTASSIUM BLD-SCNC: 4.1 MMOL/L (ref 3.4–5.3)
SODIUM SERPL-SCNC: 138 MMOL/L (ref 133–144)

## 2021-10-27 PROCEDURE — 80048 BASIC METABOLIC PNL TOTAL CA: CPT

## 2021-10-27 PROCEDURE — 36415 COLL VENOUS BLD VENIPUNCTURE: CPT

## 2021-11-01 ENCOUNTER — OFFICE VISIT (OUTPATIENT)
Dept: VASCULAR SURGERY | Facility: CLINIC | Age: 66
End: 2021-11-01
Payer: COMMERCIAL

## 2021-11-01 DIAGNOSIS — I83.812 VARICOSE VEINS OF LEFT LOWER EXTREMITY WITH PAIN: Primary | ICD-10-CM

## 2021-11-01 PROCEDURE — 99203 OFFICE O/P NEW LOW 30 MIN: CPT | Performed by: SURGERY

## 2021-11-01 NOTE — PROGRESS NOTES
"[facility] CONSULTATION    HPI:    Sherry Slaughter is a pleasant 66 year old female who presents with complaints of left leg pain, varicose veins and swelling and right leg varicose veins with swelling.  She has had varicose veins since age of 16 on and have them enlarge over the years as she spends long hours on her feet at work.  She has worn compression hose for years during her working hours but describes pain as an aching, tiredness, heaviness and a cramping, located in the thighs and in the calves, improving with elevation of the legs and somewhat with compression hose.    The symptoms interfere with actives of daily living because the \" spasms\" that she gets in the area of her knees makes it difficult for her to be on her feet.  The symptoms have worsened over the last 6 months.  She uses ibuprofen on an as-needed basis for the pain.    She has no history of deep vein thrombosis, superficial thrombophlebitis or hemorrhage.  Her family history is significant for varicose veins in her younger brother who has had vein stripping on 2 occasions.  She is maintained on Xarelto anticoagulation for atrial fibrillation.      PAST MEDICAL HISTORY:   Past Medical History:   Diagnosis Date     Cataract      Congestive heart failure (H) 2019 NOVEMBER    AFIB     DEPRESSION     comes and goes - tried meds - unsuccessfully, certain times of the year, no psych intervention and no counselors in the past - and not interested      Diverticulosis of colon (without mention of hemorrhage) 4/04    colonoscopy     ECHO-mildLVH,tr MR,mild thick lflets w inc LA,trTR   12/03     Essential hypertension, benign 1990s    late 1990s - started medications at that time - not to difficult to control meds      Fam hx-cardiovas dis [facility]     father - CAD, and lipids/HTN - multiple members of the family      Family history of diabetes mellitus     sister and grandmother with DM      Family history of malignant neoplasm of breast     " mother - young age - 45, and maternal cousin and aunt as well - no BRCA testing done      Family history of stroke (cerebrovascular)     grandmother in early life in her 40s      FAMILY HX COLON CANCER     Pat uncles x 2     Heart disease     murmur/     Heart murmur      HYPERLIPIDEMIA 2000    fairly recent - in the last 5 years - high for DM levels  -cholesterol recent      Irritable bowel syndrome     goes between the 2 - nerve related - more stressed more problems      MICROALBUMINURIA     unsure how long - been on the lisinopril - for a few years at well      Nonspecific abnormal results of liver function study 2/3/2003    SGOT - has been high in the past - since the hepatitis b - borderline elevation - not really changing any      OBESITY      Obstructive sleep apnea      GENESIS (obstructive sleep apnea) 10/15/2006    psg 5/15 AHI 53 aPAP 8-15     OSTEOARTHRITIS L KNEE 2003    total knee on the left - and pain is gone since the knee replacement      PERS HX HEPATITIS B- RESOLVED] 1977    acute virus only - no chronic disease      PERS HX HEPATITIS B- RESOLVED]      Type II or unspecified type diabetes mellitus without mention of complication, not stated as uncontrolled 1991    oral meds frist, then insulin eventually later, difficult to control most of the time      Unspecified hypothyroidism 10/11/2006    TSH 3.36 - mild subclinical hypothyroidism - deciding on following or starting low dose meds - with dr Oreilly - following        PAST SURGICAL HISTORY:   Past Surgical History:   Procedure Laterality Date     ABDOMEN SURGERY      ovaian scope/ appendix removal     ANESTHESIA CARDIOVERSION N/A 12/4/2020    Procedure: ANESTHESIA, FOR CARDIOVERSION @1400;  Surgeon: GENERIC ANESTHESIA PROVIDER;  Location: [facility] OR     ARTHROPLASTY KNEE Right 9/23/2015    Procedure: ARTHROPLASTY KNEE;  Surgeon: Rufus Brown MD;  Location:  OR     BUNIONECTOMY ALBERTO, COMBINED Left 2/2/2021    Procedure: Left bunion  correction with bone cutting and screw fixation;  Surgeon: David Hoffman DPM;  Location: [facility] OR     C TOTAL KNEE ARTHROPLASTY  3/03    L knee     CATARACT IOL, RT/LT Left      COLONOSCOPY  4/04    diverticulosis, rec repeat 10 yrs(consider fam hx?)     COLONOSCOPY WITH CO2 INSUFFLATION N/A 6/19/2019    Procedure: COLONOSCOPY, WITH CO2 INSUFFLATION;  Surgeon: Zeb Duarte MD;  Location: [facility] OR     ORTHOPEDIC SURGERY      right ankle     PHACOEMULSIFICATION CLEAR CORNEA WITH STANDARD INTRAOCULAR LENS IMPLANT Left 3/15/2018    Procedure: PHACOEMULSIFICATION CLEAR CORNEA WITH STANDARD INTRAOCULAR LENS IMPLANT;  LEFT EYE PHACOEMULSIFICATION CLEAR CORNEA WITH STANDARD INTRAOCULAR LENS IMPLANT;  Surgeon: Bandar Linares MD;  Location:  [facility]     PHACOEMULSIFICATION CLEAR CORNEA WITH STANDARD INTRAOCULAR LENS IMPLANT Right 4/5/2018    Procedure: PHACOEMULSIFICATION CLEAR CORNEA WITH STANDARD INTRAOCULAR LENS IMPLANT;  RIGHT PHACOEMULSIFICATION CLEAR CORNEA WITH STANDARD INTRAOCULAR LENS IMPLANT ;  Surgeon: Bandar Linares MD;  Location:  [facility]     STRESS ECHO (METRO)  12/03    no ischemia, limited by fatigue     SURGICAL HISTORY OF -       EXP LAP- R OVARY CYSTS     SURGICAL HISTORY OF -   1981,1984,1985    CHILDBIRTH     ZZC NONSPECIFIC PROCEDURE  6/06    right triple arthrodecesis      ZZC NONSPECIFIC PROCEDURE  7/06     right bunion surgery        FAMILY HISTORY:   Family History   Problem Relation Age of Onset     Diabetes Maternal Grandmother         DM TYPE 2     Cerebrovascular Disease Maternal Grandmother      Hypertension Sister      Lipids Sister      Heart Disease Sister         Chronic AFib     Diabetes Sister      Coronary Artery Disease Sister         afib     Hypertension Sister      Lipids Sister      Gynecology Sister      Diabetes Sister      Asthma Sister      Blood Disease Paternal Grandmother         T CELL LEUKEMIA     Hypertension Brother      Lipids Brother      Congenital  "Anomalies Brother      Cardiovascular Brother      Heart Disease Brother         CABG/AFIB     Coronary Artery Disease Brother         cabg afib AAA     Hypertension Brother      Lipids Brother      Other Cancer Brother         multple myeloma     Obesity Brother      Hypertension Brother      Respiratory Brother         Sleep apnea     Heart Disease Brother         AFib     Coronary Artery Disease Brother         afib     Alzheimer Disease Mother      Asthma Mother      Hypertension Mother      Breast Cancer Mother 40        has mastectomy and lived to 84     Allergies Mother         Sulfa,     Arthritis Mother      Cardiovascular Mother      Depression Mother      Respiratory Mother      Lipids Mother      Cancer Mother         breast     Thyroid Disease Mother      Cerebrovascular Disease Mother         FROM  AFIB     Dementia Mother         Alzheimers     Other Cancer Mother         breast     Cancer - colorectal Other      Colon Cancer Other         2019     Cancer Father         lung     Aneurysm Father         brother, AAA     Other Cancer Father         lung ca     Coronary Artery Disease Father         cad AAA     Asthma Sister      Cancer - colorectal Paternal Uncle         late 50s to early 60s - great uncles      Breast Cancer Other         Maternal cousin     Arrhythmia Sister         2 brothers 1 sister Afib     Breast Cancer Maternal Aunt 62     Other Cancer Maternal Aunt 35        Ovarian cancer.  Survived despite late recurrence and is now 92.     Glaucoma No family hx of      Macular Degeneration No family hx of        SOCIAL HISTORY:   Social History     Tobacco Use     Smoking status: Never Smoker     Smokeless tobacco: Never Used     Tobacco comment: tried in early 20s only    Substance Use Topics     Alcohol use: Yes     Comment: rare       REVIEW OF SYSTEMS: Review Of Systems  Skin: \"Blisters\", finger/toenail problems  Eyes: negative  Ears/Nose/Throat: negative  Respiratory: Dry " cough  Cardiovascular: Irregular heartbeat, occasional racing heart (atrial fibrillation)  Gastrointestinal: Diarrhea, constipation  Genitourinary: negative  Musculoskeletal: Arthritis, back pain, left leg spasms, left greater than right leg pain, leg swelling  Neurologic: negative  Psychiatric: negative  Hematologic/Lymphatic/Immunologic: Easy bruising (on Xarelto)  Endocrine: Hypothyroidism, postmenopausal      Vital signs:  LMP 10/02/2007     Current Outpatient Medications   Medication Sig Dispense Refill     amoxicillin (AMOXIL) 500 MG capsule Take 2,000 mg by mouth Before dental procedures       blood glucose (NO BRAND SPECIFIED) test strip Use to test blood sugar 2 times daily or as directed. Patient requesting One Touch Ultra Strips 100 strip 3     Continuous Blood Gluc  (FREESTYLE MIKE 14 DAY READER) KATIA 1 each daily 1 Device 0     Continuous Blood Gluc Sensor (FREESTYLE MIKE 14 DAY SENSOR) MISC Change every 14 days. 6 each 3     diltiazem ER COATED BEADS (CARDIZEM CD) 360 MG 24 hr capsule Take 1 capsule (360 mg) by mouth daily 90 capsule 3     econazole nitrate 1 % external cream Apply topically daily as needed To feet and toenails       furosemide (LASIX) 20 MG tablet Take 1 tablet (20 mg) by mouth daily 90 tablet 3     Garlic 1000 MG CAPS Take 1 capsule by mouth daily Takes during summer months.  Done taking until next summer.       hydrochlorothiazide (HYDRODIURIL) 25 MG tablet Take 2 tablets (50 mg) by mouth daily 180 tablet 3     ibuprofen (ADVIL) 200 MG capsule Take 600 mg by mouth every 6 hours as needed        insulin pen needle (GNP CLICKFINE PEN NEEDLES) 31G X 8 MM miscellaneous Uses 3 times daily or as directed 300 each 3     insulin reg HIGH CONC (HUMULIN R U-500 KWIKPEN) 500 UNIT/ML PEN soln Administer 130 units at 7 in the morning, 100 units at 1 PM and 50 units at 10 PM. 54 mL 3     levothyroxine (SYNTHROID/LEVOTHROID) 75 MCG tablet TAKE 1 TABLET (75 MCG) BY MOUTH DAILY 90 tablet 1      liraglutide (VICTOZA PEN) 18 MG/3ML solution Inject 1.8 mg Subcutaneous daily 27 mL 3     lisinopril (ZESTRIL) 40 MG tablet Take 1 tablet (40 mg) by mouth daily 90 tablet 3     metFORMIN (GLUCOPHAGE-XR) 500 MG 24 hr tablet Take 4 tablets (2,000 mg) by mouth At Bedtime TAKE 4 TABLETS AT BEDTIME 360 tablet 1     metoprolol succinate ER (TOPROL-XL) 200 MG 24 hr tablet Take 1 tablet (200 mg) by mouth daily 90 tablet 3     Multiple Vitamins-Minerals (ADVANCED DIABETIC MULTIVITAMIN) TABS Take 1 tablet by mouth daily       ORDER FOR DME, SET TO LOCAL PRINT, Respironics REMSTAR 60 Series Auto CPAP 8-15cm H2O, Airfit P10 nasal pillow mask w/medium pillows       order for DME Equipment being ordered: [id number] $70  DH Shoe LG 1 Device 0     potassium chloride ER (KLOR-CON M) 20 MEQ CR tablet Take 1 tablet (20 mEq) by mouth daily 90 tablet 1     rivaroxaban ANTICOAGULANT (XARELTO ANTICOAGULANT) 20 MG TABS tablet Take 1 tablet (20 mg) by mouth daily (with dinner) 90 tablet 1     rosuvastatin (CRESTOR) 10 MG tablet TAKE ONE TABLET BY MOUTH ONCE DAILY 90 tablet 3     Vitamin D, Cholecalciferol, 25 MCG (1000 UT) TABS Take 1 tablet by mouth daily         PHYSICAL EXAM:  General: Pleasant, NAD.   HEENT: Normocephalic, atraumatic, external ears and nose normal.   Respiratory: Normal respiratory effort.   Cardiovascular: Pulse is regular.   Musculoskeletal: Gait and station normal.  The joints of her fingers and toes without deformity.  There is no cyanosis of her nailbeds.   EXTREMITIES: Right lower extremity: A few scattered reticular veins and small varicose veins.  1+ edema with scattered hyperpigmentation.  Some early firmness consistent with lipodermatosclerosis    Left lower extremity: 4 to 6 mm varicose veins over the left medial calf with 1+ edema and scattered hyperpigmentation.  Early firmness consistent with lipodermatosclerosis..    PULSES: R/L (3=normal pulse, 0=no palpable pulse) dorsalis pedis: 2/2;  posterior tibial: 0/0.      Neurologic: Grossly normal  Psychiatric: Mood, affect, judgment and insight are normal     ASSESSMENT:  CEAP 4 bilateral extremity venous disease.  She is essentially asymptomatic on the right lower extremity, other than the stasis changes and mild edema.  We discussed the anatomy of the lower extremity veins, the pathophysiology of venous insufficiency and the option of continued conservative measures.  Risks of progression of the disease process, superficial thrombophlebitis and bleeding were discussed.    Her symptoms are interfering with her actives daily living, especially the left lower extremity symptoms.  We will therefore obtain left lower extremity venous competency studies with video visit to discuss results.  We briefly discussed options for treating superficial venous insufficiency using endovenous, office-based techniques.  She understands that endovenous ablation could be performed while on oral anticoagulation.  She voiced understanding and her questions were answered.    PLAN:  Bilateral lower extremity venous competency study with video visit to discuss results     Sawyer Pride MD    Dictated using Dragon voice recognition software which may result in transcription errors        [facility] NEW PATIENT:

## 2021-11-01 NOTE — LETTER
"    11/1/2021         RE: Sherry Slaughter  [id number] [place] [address]        Dear Colleague,    Thank you for referring your patient, Sherry Slaughter, to the [facility] [place]. Please see a copy of my visit note below.    [facility] CONSULTATION    HPI:    Sherry Slaughter is a pleasant 66 year old female who presents with complaints of left leg pain, varicose veins and swelling and right leg varicose veins with swelling.  She has had varicose veins since age of 16 on and have them enlarge over the years as she spends long hours on her feet at work.  She has worn compression hose for years during her working hours but describes pain as an aching, tiredness, heaviness and a cramping, located in the thighs and in the calves, improving with elevation of the legs and somewhat with compression hose.    The symptoms interfere with actives of daily living because the \" spasms\" that she gets in the area of her knees makes it difficult for her to be on her feet.  The symptoms have worsened over the last 6 months.  She uses ibuprofen on an as-needed basis for the pain.    She has no history of deep vein thrombosis, superficial thrombophlebitis or hemorrhage.  Her family history is significant for varicose veins in her younger brother who has had vein stripping on 2 occasions.  She is maintained on Xarelto anticoagulation for atrial fibrillation.      PAST MEDICAL HISTORY:   Past Medical History:   Diagnosis Date     Cataract      Congestive heart failure (H) 2019 NOVEMBER    AFIB     DEPRESSION     comes and goes - tried meds - unsuccessfully, certain times of the year, no psych intervention and no counselors in the past - and not interested      Diverticulosis of colon (without mention of hemorrhage) 4/04    colonoscopy     ECHO-mildLVH,tr MR,mild thick lflets w inc LA,trTR   12/03     Essential hypertension, benign 1990s    late 1990s - started medications at " that time - not to difficult to control meds      Fam hx-cardiovas dis NEC     father - CAD, and lipids/HTN - multiple members of the family      Family history of diabetes mellitus     sister and grandmother with DM      Family history of malignant neoplasm of breast     mother - young age - 45, and maternal cousin and aunt as well - no BRCA testing done      Family history of stroke (cerebrovascular)     grandmother in early life in her 40s      FAMILY HX COLON CANCER     Pat uncles x 2     Heart disease     murmur/     Heart murmur      HYPERLIPIDEMIA 2000    fairly recent - in the last 5 years - high for DM levels  -cholesterol recent      Irritable bowel syndrome     goes between the 2 - nerve related - more stressed more problems      MICROALBUMINURIA     unsure how long - been on the lisinopril - for a few years at well      Nonspecific abnormal results of liver function study 2/3/2003    SGOT - has been high in the past - since the hepatitis b - borderline elevation - not really changing any      OBESITY      Obstructive sleep apnea      GENESIS (obstructive sleep apnea) 10/15/2006    psg 5/15 AHI 53 aPAP 8-15     OSTEOARTHRITIS L KNEE 2003    total knee on the left - and pain is gone since the knee replacement      PERS HX HEPATITIS B- RESOLVED] 1977    acute virus only - no chronic disease      PERS HX HEPATITIS B- RESOLVED]      Type II or unspecified type diabetes mellitus without mention of complication, not stated as uncontrolled 1991    oral meds frist, then insulin eventually later, difficult to control most of the time      Unspecified hypothyroidism 10/11/2006    TSH 3.36 - mild subclinical hypothyroidism - deciding on following or starting low dose meds - with dr Oreilly - following        PAST SURGICAL HISTORY:   Past Surgical History:   Procedure Laterality Date     ABDOMEN SURGERY      ovaian scope/ appendix removal     ANESTHESIA CARDIOVERSION N/A 12/4/2020    Procedure: ANESTHESIA, FOR  CARDIOVERSION @1400;  Surgeon: GENERIC ANESTHESIA PROVIDER;  Location: [facility] OR     ARTHROPLASTY KNEE Right 9/23/2015    Procedure: ARTHROPLASTY KNEE;  Surgeon: Rufus Brown MD;  Location:  OR     BUNIONECTOMY REN AND LEANDRO, COMBINED Left 2/2/2021    Procedure: Left bunion correction with bone cutting and screw fixation;  Surgeon: David Hoffman DPM;  Location: [facility] OR     C TOTAL KNEE ARTHROPLASTY  3/03    L knee     CATARACT IOL, RT/LT Left      COLONOSCOPY  4/04    diverticulosis, rec repeat 10 yrs(consider fam hx?)     COLONOSCOPY WITH CO2 INSUFFLATION N/A 6/19/2019    Procedure: COLONOSCOPY, WITH CO2 INSUFFLATION;  Surgeon: Zeb Duarte MD;  Location:  OR     ORTHOPEDIC SURGERY      right ankle     PHACOEMULSIFICATION CLEAR CORNEA WITH STANDARD INTRAOCULAR LENS IMPLANT Left 3/15/2018    Procedure: PHACOEMULSIFICATION CLEAR CORNEA WITH STANDARD INTRAOCULAR LENS IMPLANT;  LEFT EYE PHACOEMULSIFICATION CLEAR CORNEA WITH STANDARD INTRAOCULAR LENS IMPLANT;  Surgeon: Bandar Linares MD;  Location: [facility]     PHACOEMULSIFICATION CLEAR CORNEA WITH STANDARD INTRAOCULAR LENS IMPLANT Right 4/5/2018    Procedure: PHACOEMULSIFICATION CLEAR CORNEA WITH STANDARD INTRAOCULAR LENS IMPLANT;  RIGHT PHACOEMULSIFICATION CLEAR CORNEA WITH STANDARD INTRAOCULAR LENS IMPLANT ;  Surgeon: Bandar Linares MD;  Location: [facility]     STRESS ECHO (METRO)  12/03    no ischemia, limited by fatigue     SURGICAL HISTORY OF -       EXP LAP- R OVARY CYSTS     SURGICAL HISTORY OF -   1981,1984,1985    CHILDBIRTH     Z NONSPECIFIC PROCEDURE  6/06    right triple arthrodecesis      [facility] NONSPECIFIC PROCEDURE  7/06     right bunion surgery        FAMILY HISTORY:   Family History   Problem Relation Age of Onset     Diabetes Maternal Grandmother         DM TYPE 2     Cerebrovascular Disease Maternal Grandmother      Hypertension Sister      Lipids Sister      Heart Disease Sister         Chronic AFib     Diabetes Sister       Coronary Artery Disease Sister         afib     Hypertension Sister      Lipids Sister      Gynecology Sister      Diabetes Sister      Asthma Sister      Blood Disease Paternal Grandmother         T CELL LEUKEMIA     Hypertension Brother      Lipids Brother      Congenital Anomalies Brother      Cardiovascular Brother      Heart Disease Brother         CABG/AFIB     Coronary Artery Disease Brother         cabg afib AAA     Hypertension Brother      Lipids Brother      Other Cancer Brother         multple myeloma     Obesity Brother      Hypertension Brother      Respiratory Brother         Sleep apnea     Heart Disease Brother         AFib     Coronary Artery Disease Brother         afib     Alzheimer Disease Mother      Asthma Mother      Hypertension Mother      Breast Cancer Mother 40        has mastectomy and lived to 84     Allergies Mother         Sulfa,     Arthritis Mother      Cardiovascular Mother      Depression Mother      Respiratory Mother      Lipids Mother      Cancer Mother         breast     Thyroid Disease Mother      Cerebrovascular Disease Mother         FROM  AFIB     Dementia Mother         Alzheimers     Other Cancer Mother         breast     Cancer - colorectal Other      Colon Cancer Other         2019     Cancer Father         lung     Aneurysm Father         brother, AAA     Other Cancer Father         lung ca     Coronary Artery Disease Father         cad AAA     Asthma Sister      Cancer - colorectal Paternal Uncle         late 50s to early 60s - great uncles      Breast Cancer Other         Maternal cousin     Arrhythmia Sister         2 brothers 1 sister Afib     Breast Cancer Maternal Aunt 62     Other Cancer Maternal Aunt 35        Ovarian cancer.  Survived despite late recurrence and is now 92.     Glaucoma No family hx of      Macular Degeneration No family hx of        SOCIAL HISTORY:   Social History     Tobacco Use     Smoking status: Never Smoker     Smokeless tobacco:  "Never Used     Tobacco comment: tried in early 20s only    Substance Use Topics     Alcohol use: Yes     Comment: rare       REVIEW OF SYSTEMS: Review Of Systems  Skin: \"Blisters\", finger/toenail problems  Eyes: negative  Ears/Nose/Throat: negative  Respiratory: Dry cough  Cardiovascular: Irregular heartbeat, occasional racing heart (atrial fibrillation)  Gastrointestinal: Diarrhea, constipation  Genitourinary: negative  Musculoskeletal: Arthritis, back pain, left leg spasms, left greater than right leg pain, leg swelling  Neurologic: negative  Psychiatric: negative  Hematologic/Lymphatic/Immunologic: Easy bruising (on Xarelto)  Endocrine: Hypothyroidism, postmenopausal      Vital signs:  LMP 10/02/2007     Current Outpatient Medications   Medication Sig Dispense Refill     amoxicillin (AMOXIL) 500 MG capsule Take 2,000 mg by mouth Before dental procedures       blood glucose (NO BRAND SPECIFIED) test strip Use to test blood sugar 2 times daily or as directed. Patient requesting One Touch Ultra Strips 100 strip 3     Continuous Blood Gluc  (FREESTYLE MIKE 14 DAY READER) KATIA 1 each daily 1 Device 0     Continuous Blood Gluc Sensor (FREESTYLE MIKE 14 DAY SENSOR) MISC Change every 14 days. 6 each 3     diltiazem ER COATED BEADS (CARDIZEM CD) 360 MG 24 hr capsule Take 1 capsule (360 mg) by mouth daily 90 capsule 3     econazole nitrate 1 % external cream Apply topically daily as needed To feet and toenails       furosemide (LASIX) 20 MG tablet Take 1 tablet (20 mg) by mouth daily 90 tablet 3     Garlic 1000 MG CAPS Take 1 capsule by mouth daily Takes during summer months.  Done taking until next summer.       hydrochlorothiazide (HYDRODIURIL) 25 MG tablet Take 2 tablets (50 mg) by mouth daily 180 tablet 3     ibuprofen (ADVIL) 200 MG capsule Take 600 mg by mouth every 6 hours as needed        insulin pen needle (GNP CLICKFINE PEN NEEDLES) 31G X 8 MM miscellaneous Uses 3 times daily or as directed 300 each 3 "     insulin reg HIGH CONC (HUMULIN R U-500 KWIKPEN) 500 UNIT/ML PEN soln Administer 130 units at 7 in the morning, 100 units at 1 PM and 50 units at 10 PM. 54 mL 3     levothyroxine (SYNTHROID/LEVOTHROID) 75 MCG tablet TAKE 1 TABLET (75 MCG) BY MOUTH DAILY 90 tablet 1     liraglutide (VICTOZA PEN) 18 MG/3ML solution Inject 1.8 mg Subcutaneous daily 27 mL 3     lisinopril (ZESTRIL) 40 MG tablet Take 1 tablet (40 mg) by mouth daily 90 tablet 3     metFORMIN (GLUCOPHAGE-XR) 500 MG 24 hr tablet Take 4 tablets (2,000 mg) by mouth At Bedtime TAKE 4 TABLETS AT BEDTIME 360 tablet 1     metoprolol succinate ER (TOPROL-XL) 200 MG 24 hr tablet Take 1 tablet (200 mg) by mouth daily 90 tablet 3     Multiple Vitamins-Minerals (ADVANCED DIABETIC MULTIVITAMIN) TABS Take 1 tablet by mouth daily       ORDER FOR DME, SET TO LOCAL PRINT, Respironics REMSTAR 60 Series Auto CPAP 8-15cm H2O, Airfit P10 nasal pillow mask w/medium pillows       order for DME Equipment being ordered: [id number] $70   Shoe LG 1 Device 0     potassium chloride ER (KLOR-CON M) 20 MEQ CR tablet Take 1 tablet (20 mEq) by mouth daily 90 tablet 1     rivaroxaban ANTICOAGULANT (XARELTO ANTICOAGULANT) 20 MG TABS tablet Take 1 tablet (20 mg) by mouth daily (with dinner) 90 tablet 1     rosuvastatin (CRESTOR) 10 MG tablet TAKE ONE TABLET BY MOUTH ONCE DAILY 90 tablet 3     Vitamin D, Cholecalciferol, 25 MCG (1000 UT) TABS Take 1 tablet by mouth daily         PHYSICAL EXAM:  General: Pleasant, NAD.   HEENT: Normocephalic, atraumatic, external ears and nose normal.   Respiratory: Normal respiratory effort.   Cardiovascular: Pulse is regular.   Musculoskeletal: Gait and station normal.  The joints of her fingers and toes without deformity.  There is no cyanosis of her nailbeds.   EXTREMITIES: Right lower extremity: A few scattered reticular veins and small varicose veins.  1+ edema with scattered hyperpigmentation.  Some early firmness consistent with  lipodermatosclerosis    Left lower extremity: 4 to 6 mm varicose veins over the left medial calf with 1+ edema and scattered hyperpigmentation.  Early firmness consistent with lipodermatosclerosis..    PULSES: R/L (3=normal pulse, 0=no palpable pulse) dorsalis pedis: 2/2; posterior tibial: 0/0.      Neurologic: Grossly normal  Psychiatric: Mood, affect, judgment and insight are normal     ASSESSMENT:  CEAP 4 bilateral extremity venous disease.  She is essentially asymptomatic on the right lower extremity, other than the stasis changes and mild edema.  We discussed the anatomy of the lower extremity veins, the pathophysiology of venous insufficiency and the option of continued conservative measures.  Risks of progression of the disease process, superficial thrombophlebitis and bleeding were discussed.    Her symptoms are interfering with her actives daily living, especially the left lower extremity symptoms.  We will therefore obtain left lower extremity venous competency studies with video visit to discuss results.  We briefly discussed options for treating superficial venous insufficiency using endovenous, office-based techniques.  She understands that endovenous ablation could be performed while on oral anticoagulation.  She voiced understanding and her questions were answered.    PLAN:  Bilateral lower extremity venous competency study with video visit to discuss results     Sawyer Pride MD    Dictated using Dragon voice recognition software which may result in transcription errors        [facility] NEW PATIENT:                  Again, thank you for allowing me to participate in the care of your patient.        Sincerely,        Sawyer Pride MD

## 2021-11-02 ENCOUNTER — ANCILLARY PROCEDURE (OUTPATIENT)
Dept: ULTRASOUND IMAGING | Facility: CLINIC | Age: 66
End: 2021-11-02
Attending: SURGERY
Payer: COMMERCIAL

## 2021-11-02 DIAGNOSIS — I83.812 VARICOSE VEINS OF LEFT LOWER EXTREMITY WITH PAIN: ICD-10-CM

## 2021-11-02 PROCEDURE — 93971 EXTREMITY STUDY: CPT | Mod: LT | Performed by: SURGERY

## 2021-11-04 ENCOUNTER — VIRTUAL VISIT (OUTPATIENT)
Dept: VASCULAR SURGERY | Facility: CLINIC | Age: 66
End: 2021-11-04
Attending: SURGERY
Payer: COMMERCIAL

## 2021-11-04 DIAGNOSIS — I83.812 VARICOSE VEINS OF LEFT LOWER EXTREMITY WITH PAIN: Primary | ICD-10-CM

## 2021-11-04 PROCEDURE — 99213 OFFICE O/P EST LOW 20 MIN: CPT | Mod: 95 | Performed by: SURGERY

## 2021-11-04 NOTE — PROGRESS NOTES
Sherry is a 66 year old who is being evaluated via a billable video visit.      How would you like to obtain your AVS? MyChart  If the video visit is dropped, the invitation should be resent by: Text to cell phone: [phone]  Will anyone else be joining your video visit? No      Video Start Time: 4:03 PM      Video-Visit Details    Type of service:  Video Visit    Video End Time:4:12 PM    Originating Location (pt. Location): Home    Distant Location (provider location):  [facility]     Platform used for Video Visit: [employer]    [facility] [place] Progress Note    Sherry Slaughter presents in follow-up of left lower extremity pain, varicose veins and swelling.  Please see my consultation of 11/1/2021 for details.  She returned on 11/2/2021 for left lower extremity venous competency study, the results of which we will discuss on today's video visit.    Physical Exam  General: Pleasant female in no acute distress.  Blood pressure 138/66, pulse 79  Extremities: Left lower extremity: 4 to 6 mm varicose veins over the left medial calf with 1+ edema and scattered hyperpigmentation.  6 to 8 mm varicosity proximal posterior left calf extending medially. Early firmness consistent with lipodermatosclerosis..      Ultrasound:     INDICATION:  Left leg varicose vein with pain     EXAM TYPE  LEFT LOWER EXTREMITY VENOUS DUPLEX FOR VENOUS INSUFFICIENCY TECHNICAL SUMMARY     A duplex ultrasound study using color flow was performed, to evaluate the left lower extremity veins for valvular incompetence with the patient in a steep reversed trendelenberg.      LEFT:     The deep veins demonstrate phasic flow, compress and respond to augmentations.  There is no  DVT.  The common femoral vein is incompetent and free of thrombus. The remaining deep veins are competent and free of thrombus.      The GSV demonstrates phasic flow, compresses and responds to augmentations from  the saphenofemoral junction to the ankle with no evidence of thrombus. The great saphenous vein measures 13.0 mm at the saphenofemoral junction, 5.1 mm at the proximal thigh and 11.5 mm at the knee.  The GSV is incompetent from SFJ to Mid Thigh  and from the knee to ankle, with the greatest reflux time of 5229 milliseconds.    The GSV gives rise to multiple incompetent varicose veins, the largest measures 7.7 mm off the Knee that courses Posteromedial to communicate with SSV with a reflux time of 973 milliseconds.      The AASV is competent( 7.7 mm) draining into the saphenofemoral junction.      The Giacomini vein is competent( 4.6 mm) communicating with the small saphenous vein at the knee level.      The SSV demonstrates phasic flow, compresses and responds to augmentations from the popliteal space to the ankle.  No reflux or thrombus is seen. The saphenopopliteal junction is incompetent (4.8 mm). The SSV is incompetent from the SPJ to Proximal Calf with a reflux time of 3662 milliseconds.  The SSV gives rise to multiple incompetent varicose veins, the largest measuring 8.6 mm off the Proximal Calf that courses Medial to communicate with the GSV with a reflux time of 1617 milliseconds.     Perforators: There is an incompetent  vein ( 4.1 mm) at mid calf 14 cm from medial mallelous that communicates with the GSV.      RIGHT:     The CFV demonstrate phasic flow, compress and respond to augmentations.  There is no DVT.       FINAL SUMMARY:  1.         No evidence of left lower extremity deep vein thrombus.  2.         Left common femoral vein incompetence.  3.         Left great saphenous vein incompetence.  4.         Left small saphenous vein incompetence.  5.         Left incompetent  vein.  6.         Left incompetent varicose veins.   7.         The time of incompetence is greater than 500 milliseconds in the superficial and  veins and greater than 1000 milliseconds in the deep  veins.       Assessment:  Left leg varicose veins with pain and swelling.  She has significant left great saphenous and small saphenous vein incompetence contributing to her symptoms.  We discussed the option of continued conservative management with compression, leg elevation, dietary measures and exercise.  She has pursued these measures for years, therefore I consider her a failure of conservative management.    If she chooses treatment, she is a candidate for endovenous ablation of the left great saphenous and small saphenous veins.  We discussed thermal and nonthermal techniques to treat her superficial venous insufficiency.  Details of procedures including risks of bleeding, infection, nerve injury, scarring, hyperpigmentation, deep vein thrombosis, recanalization of the great saphenous or small saphenous veins, glue hypersensitivity response and recurrent varicose veins were discussed.    As she is on chronic anticoagulation for atrial fibrillation, we will perform endovenous ablation alone and treat her branch tributaries with ultrasound-guided sclerotherapy at a later date.  She voiced understanding and her questions were answered.    Plan:  Endovenous ablation of the left great saphenous and small saphenous veins to be followed at her 6-week postop visit with ultrasound-guided, medically necessary sclerotherapy of the great saphenous and small saphenous vein tributaries.    Sawyer Pride MD    Dictated using Dragon voice recognition software which may result in transcription errors

## 2021-11-04 NOTE — LETTER
11/4/2021         RE: Sherry Slaughter  [address]  [place] [address]        Dear Colleague,    Thank you for referring your patient, Sherry Slaughter, to the [facility]. Please see a copy of my visit note below.    Sherry is a 66 year old who is being evaluated via a billable video visit.      How would you like to obtain your AVS? MyChart  If the video visit is dropped, the invitation should be resent by: Text to cell phone: [phone]  Will anyone else be joining your video visit? No      Video Start Time: 4:03 PM      Video-Visit Details    Type of service:  Video Visit    Video End Time:4:12 PM    Originating Location (pt. Location): Home    Distant Location (provider location):  [facility]     Platform used for Video Visit: [employer]    [facility] Progress Note    Sherry Slaughter presents in follow-up of left lower extremity pain, varicose veins and swelling.  Please see my consultation of 11/1/2021 for details.  She returned on 11/2/2021 for left lower extremity venous competency study, the results of which we will discuss on today's video visit.    Physical Exam  General: Pleasant female in no acute distress.  Blood pressure 138/66, pulse 79  Extremities: Left lower extremity: 4 to 6 mm varicose veins over the left medial calf with 1+ edema and scattered hyperpigmentation.  6 to 8 mm varicosity proximal posterior left calf extending medially. Early firmness consistent with lipodermatosclerosis..      Ultrasound:     INDICATION:  Left leg varicose vein with pain     EXAM TYPE  LEFT LOWER EXTREMITY VENOUS DUPLEX FOR VENOUS INSUFFICIENCY TECHNICAL SUMMARY     A duplex ultrasound study using color flow was performed, to evaluate the left lower extremity veins for valvular incompetence with the patient in a steep reversed trendelenberg.      LEFT:     The deep veins demonstrate  phasic flow, compress and respond to augmentations.  There is no  DVT.  The common femoral vein is incompetent and free of thrombus. The remaining deep veins are competent and free of thrombus.      The GSV demonstrates phasic flow, compresses and responds to augmentations from the saphenofemoral junction to the ankle with no evidence of thrombus. The great saphenous vein measures 13.0 mm at the saphenofemoral junction, 5.1 mm at the proximal thigh and 11.5 mm at the knee.  The GSV is incompetent from SFJ to Mid Thigh  and from the knee to ankle, with the greatest reflux time of 5229 milliseconds.    The GSV gives rise to multiple incompetent varicose veins, the largest measures 7.7 mm off the Knee that courses Posteromedial to communicate with SSV with a reflux time of 973 milliseconds.      The AASV is competent( 7.7 mm) draining into the saphenofemoral junction.      The Giacomini vein is competent( 4.6 mm) communicating with the small saphenous vein at the knee level.      The SSV demonstrates phasic flow, compresses and responds to augmentations from the popliteal space to the ankle.  No reflux or thrombus is seen. The saphenopopliteal junction is incompetent (4.8 mm). The SSV is incompetent from the SPJ to Proximal Calf with a reflux time of 3662 milliseconds.  The SSV gives rise to multiple incompetent varicose veins, the largest measuring 8.6 mm off the Proximal Calf that courses Medial to communicate with the GSV with a reflux time of 1617 milliseconds.     Perforators: There is an incompetent  vein ( 4.1 mm) at mid calf 14 cm from medial mallelous that communicates with the GSV.      RIGHT:     The CFV demonstrate phasic flow, compress and respond to augmentations.  There is no DVT.       FINAL SUMMARY:  1.         No evidence of left lower extremity deep vein thrombus.  2.         Left common femoral vein incompetence.  3.         Left great saphenous vein incompetence.  4.         Left small  saphenous vein incompetence.  5.         Left incompetent  vein.  6.         Left incompetent varicose veins.   7.         The time of incompetence is greater than 500 milliseconds in the superficial and  veins and greater than 1000 milliseconds in the deep veins.       Assessment:  Left leg varicose veins with pain and swelling.  She has significant left great saphenous and small saphenous vein incompetence contributing to her symptoms.  We discussed the option of continued conservative management with compression, leg elevation, dietary measures and exercise.  She has pursued these measures for years, therefore I consider her a failure of conservative management.    If she chooses treatment, she is a candidate for endovenous ablation of the left great saphenous and small saphenous veins.  We discussed thermal and nonthermal techniques to treat her superficial venous insufficiency.  Details of procedures including risks of bleeding, infection, nerve injury, scarring, hyperpigmentation, deep vein thrombosis, recanalization of the great saphenous or small saphenous veins, glue hypersensitivity response and recurrent varicose veins were discussed.    As she is on chronic anticoagulation for atrial fibrillation, we will perform endovenous ablation alone and treat her branch tributaries with ultrasound-guided sclerotherapy at a later date.  She voiced understanding and her questions were answered.    Plan:  Endovenous ablation of the left great saphenous and small saphenous veins to be followed at her 6-week postop visit with ultrasound-guided, medically necessary sclerotherapy of the great saphenous and small saphenous vein tributaries.    Sawyer Pride MD    Dictated using Dragon voice recognition software which may result in transcription errors              Again, thank you for allowing me to participate in the care of your patient.        Sincerely,        Sawyer Pride  MD

## 2021-11-08 ENCOUNTER — TELEPHONE (OUTPATIENT)
Dept: VASCULAR SURGERY | Facility: CLINIC | Age: 66
End: 2021-11-08
Payer: COMMERCIAL

## 2021-11-08 NOTE — TELEPHONE ENCOUNTER
Called and spoke with patient regarding process of insurance submission. Informed patient this process could take up to 14 business days, but once approved, you will be contacted to schedule your procedure.    Further documentation of this process will be documented in the referral.    Corina Sparks  [facility]

## 2021-11-09 ENCOUNTER — VIRTUAL VISIT (OUTPATIENT)
Dept: CARDIOLOGY | Facility: CLINIC | Age: 66
End: 2021-11-09
Payer: COMMERCIAL

## 2021-11-09 DIAGNOSIS — I48.0 PAROXYSMAL ATRIAL FIBRILLATION (H): ICD-10-CM

## 2021-11-09 PROCEDURE — 99215 OFFICE O/P EST HI 40 MIN: CPT | Mod: 95 | Performed by: INTERNAL MEDICINE

## 2021-11-09 RX ORDER — METOPROLOL SUCCINATE 200 MG/1
TABLET, EXTENDED RELEASE ORAL
Qty: 180 TABLET | Refills: 3 | Status: SHIPPED | OUTPATIENT
Start: 2021-11-09 | End: 2022-01-06

## 2021-11-09 NOTE — PROGRESS NOTES
Visit Date: 2021    D: 2021   T: 2021   MT: mary    Name:     CONCEPCIÓN SCALES  MRN:      [id number]        Account:    [id number]   :      1955           Visit Date: 2021     Document: [id number]    Dear Dr. Arciniega:    It was a pleasure participating in the care of your patient, Ms. Concepción Scales.  As you know, she is a 66-year-old lady, who I saw over virtual video visit today via [employer] for paroxysmal AFib, congestive heart failure, hypertension, and mild aortic stenosis.    PAST MEDICAL HISTORY:      1.  Type 2 diabetes.  2.  Hypertension.  3.  Hyperlipidemia.  4.  Depression.  5.  Obstructive sleep apnea, on CPAP.  6.  Hypothyroidism.  7.  Diverticulosis.  8.  Low back pain.  9.  Irritable bowel syndrome.    I last saw her 2021.  At that time, she was doing adequately.  She presents today for continued care.  Since our last visit, she had an echo and a Zio patch monitor done.  The echo was unremarkable, and the Zio Patch monitor revealed that she was in AFib with a 43% burden, longest episode 6 days, average heart rate 97 beats per minute.    Since our last visit, she has been eating more and exercising less.  She has gained 10 pounds worth of regular weight.  She says that it is not fluid weight because she feels relatively euvolemic at present without swelling, shortness of breath, or feeling tired.    Notably, she does not feel very well in AFib at all.  She says that when she goes into AFib, she could immediately tell and she will become fluid overloaded as a result.  She does not tolerate the rhythm well, and she is very symptomatic.    Her blood pressures at home have been steady at 120/66 with a pulse in the 80 beat per minute range.    She otherwise denies new chest pain, new shortness of breath, PND, orthopnea, new edema, syncope, or near syncope.  She does get palpitations with the AFib.    CURRENT MEDICATIONS:     1.  Diltiazem 360 mg every  morning.  2.  200 mg Toprol-XL every night.  3.  Lasix 20 mg a day.  4.  Hydrochlorothiazide 50 mg a day.  5.  Lisinopril 40 mg a day.  6.  Levothyroxine.  7.  Victoza.  8.  Rivaroxaban 20 mg a day.  9.  Crestor 10 mg a day.    PHYSICAL EXAMINATION:  VITAL SIGNS:  Her blood pressure is 120/66, with a pulse of 78.  GENERAL:  She appears comfortable, well groomed.  PSYCHIATRIC:  She is alert and oriented x3.  HEENT:  Her eyes do not appear grossly erythematous or have exudate.  RESPIRATORY:  She is breathing comfortably without gross cough.    The remainder of the comprehensive physical exam was deferred secondary to the COVID-19 pandemic and secondary to video visit restrictions.    LABORATORY DATA:  On 09/20/2021, LDL 48, triglycerides 240.  On 10/27/2021, potassium 4.1, GFR 59.  On 09/20/2021, hemoglobin 12.2, TSH normal.    Echocardiogram 10/07/2021 reveals an ejection fraction of 55% to 60%, mild aortic stenosis, mean gradient 10 mmHg.    Zio patch monitor 10/07/2021 reveals AFib burden 43%, the longest episode 6 days, overall average heart rate during AFib 97 beats per minute.      IMPRESSION:      Sherry is a 66-year-old lady who has several active cardiac issues:    1.  Paroxysmal atrial fibrillation.    The patient underwent ELIAS-guided cardioversion this past year and has a relatively structurally normal heart.  She has a CHADS-VASc2 score of 5 for congestive heart failure, hypertension, age, diabetes and female gender.  She is currently tolerating anticoagulation as well as significant doses of p.m. metoprolol and a.m. diltiazem.    The most recent echo revealed normal LV systolic function with mild AS, unchanged from prior study.    The Zio patch monitor 10/07/2021 reveals an AFib burden of 43%, longest episode 6 days, average heart rate 97 beats per minute while in AFib.    Further optimization of her medical regimen to hopefully keep her out of AFib since she is quite symptomatic and does not tolerate  it well and possibly gets fluid overload secondary to this.    2.  History of congestive heart failure.    Her new dry weight is probably somewhere in the 300-pound range, according to her.  She is currently maintained on Lasix 20 mg a day along with hydrochlorothiazide 50 mg a day.  She claims to be currently euvolemic without ankle swelling, shortness of breath, or feeling fatigued, and her current weight of 300 pounds.  We will continue to monitor.    3.  Hypertension, well controlled.    Blood pressures are currently running 120/66 on current regimen.  Continue to follow.    4.  Mild aortic stenosis.    Mean gradient of 10 mmHg with an ejection fraction of 55-60%.  Continue to follow.      PLAN:    1.  Continue diltiazem 360 mg every morning, add an extra dose of 100 mg of metoprolol succinate, 100 mg in the morning and 12 hours later continued her metoprolol succinate 200 mg at night.    2.  Continue Lasix 20 mg a day and hydrochlorothiazide 50 mg a day.    3.  If she should get dizzy on increased dose of metoprolol, we could consider cutting back her lisinopril at that time.    4. Virtual video visit followup 2022 with labs prior, earlier if needed.      At that time, we can always consider maximizing her Toprol dose to 200 mg every 12 hours if needed for breakthrough episodes of AFib and cut down on her lisinopril concomitantly if needed in order to have her be able to tolerate this in the future. We can also consider adding digoxin if needed in the future as well.    Once again, it was a pleasure participating in the care of your patient, Ms. Concepción Scales.  Please feel free to contact me at any time if any questions regarding her care in the future.          Joel Aranda MD        D: 2021   T: 2021   MT: mary    Name:     CONCEPCIÓN SCALES  MRN:      [id number]        Account:    [id number]   :      1955           Visit Date: 2021     Document:  [id number]    cc:  Marilou Arciniega MD

## 2021-11-09 NOTE — PROGRESS NOTES
"Sherry is a 66 year old who is being evaluated via a billable video visit.      How would you like to obtain your AVS? MyChart  If the video visit is dropped, the invitation should be resent by: Text to cell phone: [phone]  Will anyone else be joining your video visit? No    The patient has been notified of following:     \"This video visit will be conducted via a call between you and your physician/provider. We have found that certain health care needs can be provided without the need for an in-person physical exam.  This service lets us provide the care you need with a video conversation.  If a prescription is necessary we can send it directly to your pharmacy.  If lab work is needed we can place an order for that and you can then stop by our lab to have the test done at a later time.    Video visits are billed at different rates depending on your insurance coverage.  Please reach out to your insurance provider with any questions.    If during the course of the call the physician/provider feels a video visit is not appropriate, you will not be charged for this service.\"    Patient has given verbal consent for video visit? Yes    How would you like to obtain your AVS? Mail    Video-Visit Details    Type of service:  Video Visit    Video Start Time:129pm    Video End Time:152pm    Total visit time including video visit, chart review, charting, coordination of care =48min    Originating Location (pt. Location):patient home      Distant Location (provider location):  home office    Platform used for Video Visit: Yuliana Meyer dictation #[id number]    [facility]#:[id number]    "

## 2021-11-09 NOTE — PATIENT INSTRUCTIONS
1.  Increase Toprol to 100mg po qam, and 200mg po qpm  2.  Virtual video visit followup Jan 6 with labs prior (both video and lab appts already scheduled and confirmed with patient)

## 2021-11-18 DIAGNOSIS — I50.9 CONGESTIVE HEART FAILURE, UNSPECIFIED HF CHRONICITY, UNSPECIFIED HEART FAILURE TYPE (H): Primary | ICD-10-CM

## 2021-11-18 NOTE — PROGRESS NOTES
Outcome for 11/18/21 9:50 AM :Patient is sharing data via clinic device website and BG data up-to-date LEV Zamora CMA  Adult Endocrinology  [facility]    Outcome for 11/23/21 4:09 PM : Tanya has been printed and placed in provider folder  Stephon BUCK MA   Adult Endocrine   [facility]

## 2021-11-22 ASSESSMENT — ENCOUNTER SYMPTOMS
ARTHRALGIAS: 1
ORTHOPNEA: 0
HYPERTENSION: 1
SYNCOPE: 0
EXERCISE INTOLERANCE: 1
MUSCLE CRAMPS: 1
PALPITATIONS: 1
MUSCLE WEAKNESS: 0
NECK PAIN: 0
LIGHT-HEADEDNESS: 0
LEG PAIN: 0
SLEEP DISTURBANCES DUE TO BREATHING: 0
BACK PAIN: 1
MYALGIAS: 0
HYPOTENSION: 0
JOINT SWELLING: 0

## 2021-11-23 DIAGNOSIS — I83.812 VARICOSE VEINS OF LEFT LOWER EXTREMITY WITH PAIN: Primary | ICD-10-CM

## 2021-11-24 ENCOUNTER — OFFICE VISIT (OUTPATIENT)
Dept: ENDOCRINOLOGY | Facility: CLINIC | Age: 66
End: 2021-11-24
Payer: COMMERCIAL

## 2021-11-24 ENCOUNTER — OFFICE VISIT (OUTPATIENT)
Dept: CARDIOLOGY | Facility: CLINIC | Age: 66
End: 2021-11-24
Payer: COMMERCIAL

## 2021-11-24 ENCOUNTER — LAB (OUTPATIENT)
Dept: LAB | Facility: CLINIC | Age: 66
End: 2021-11-24

## 2021-11-24 VITALS
SYSTOLIC BLOOD PRESSURE: 112 MMHG | DIASTOLIC BLOOD PRESSURE: 66 MMHG | HEART RATE: 75 BPM | WEIGHT: 293 LBS | BODY MASS INDEX: 44.73 KG/M2 | OXYGEN SATURATION: 97 %

## 2021-11-24 VITALS
SYSTOLIC BLOOD PRESSURE: 112 MMHG | OXYGEN SATURATION: 97 % | BODY MASS INDEX: 43.4 KG/M2 | DIASTOLIC BLOOD PRESSURE: 66 MMHG | HEART RATE: 75 BPM | WEIGHT: 293 LBS | HEIGHT: 69 IN

## 2021-11-24 DIAGNOSIS — G47.33 OSA (OBSTRUCTIVE SLEEP APNEA): ICD-10-CM

## 2021-11-24 DIAGNOSIS — E78.5 HYPERLIPIDEMIA LDL GOAL <100: ICD-10-CM

## 2021-11-24 DIAGNOSIS — E11.65 UNCONTROLLED TYPE 2 DIABETES MELLITUS WITH HYPERGLYCEMIA, WITH LONG-TERM CURRENT USE OF INSULIN (H): Primary | ICD-10-CM

## 2021-11-24 DIAGNOSIS — E11.65 UNCONTROLLED TYPE 2 DIABETES MELLITUS WITH HYPERGLYCEMIA, WITH LONG-TERM CURRENT USE OF INSULIN (H): ICD-10-CM

## 2021-11-24 DIAGNOSIS — I48.0 PAROXYSMAL ATRIAL FIBRILLATION (H): ICD-10-CM

## 2021-11-24 DIAGNOSIS — Z79.4 UNCONTROLLED TYPE 2 DIABETES MELLITUS WITH HYPERGLYCEMIA, WITH LONG-TERM CURRENT USE OF INSULIN (H): Primary | ICD-10-CM

## 2021-11-24 DIAGNOSIS — I10 HYPERTENSION GOAL BP (BLOOD PRESSURE) < 140/90: ICD-10-CM

## 2021-11-24 DIAGNOSIS — E03.4 HYPOTHYROIDISM DUE TO ACQUIRED ATROPHY OF THYROID: ICD-10-CM

## 2021-11-24 DIAGNOSIS — E11.65 TYPE 2 DIABETES MELLITUS WITH HYPERGLYCEMIA, UNSPECIFIED WHETHER LONG TERM INSULIN USE (H): ICD-10-CM

## 2021-11-24 DIAGNOSIS — I10 ESSENTIAL HYPERTENSION WITH GOAL BLOOD PRESSURE LESS THAN 140/90: ICD-10-CM

## 2021-11-24 DIAGNOSIS — Z79.4 UNCONTROLLED TYPE 2 DIABETES MELLITUS WITH HYPERGLYCEMIA, WITH LONG-TERM CURRENT USE OF INSULIN (H): ICD-10-CM

## 2021-11-24 DIAGNOSIS — I50.9 CONGESTIVE HEART FAILURE, UNSPECIFIED HF CHRONICITY, UNSPECIFIED HEART FAILURE TYPE (H): Primary | ICD-10-CM

## 2021-11-24 DIAGNOSIS — I50.9 CONGESTIVE HEART FAILURE, UNSPECIFIED HF CHRONICITY, UNSPECIFIED HEART FAILURE TYPE (H): ICD-10-CM

## 2021-11-24 LAB
ANION GAP SERPL CALCULATED.3IONS-SCNC: 7 MMOL/L (ref 3–14)
BUN SERPL-MCNC: 23 MG/DL (ref 7–30)
CALCIUM SERPL-MCNC: 10 MG/DL (ref 8.5–10.1)
CHLORIDE BLD-SCNC: 104 MMOL/L (ref 94–109)
CO2 SERPL-SCNC: 28 MMOL/L (ref 20–32)
CREAT SERPL-MCNC: 0.93 MG/DL (ref 0.52–1.04)
GFR SERPL CREATININE-BSD FRML MDRD: 63 ML/MIN/1.73M2
GLUCOSE BLD-MCNC: 132 MG/DL (ref 70–99)
HBA1C MFR BLD: 8.1 % (ref 0–5.6)
POTASSIUM BLD-SCNC: 3.6 MMOL/L (ref 3.4–5.3)
SODIUM SERPL-SCNC: 139 MMOL/L (ref 133–144)

## 2021-11-24 PROCEDURE — 99215 OFFICE O/P EST HI 40 MIN: CPT | Performed by: INTERNAL MEDICINE

## 2021-11-24 PROCEDURE — 99213 OFFICE O/P EST LOW 20 MIN: CPT | Performed by: NURSE PRACTITIONER

## 2021-11-24 PROCEDURE — 80048 BASIC METABOLIC PNL TOTAL CA: CPT

## 2021-11-24 PROCEDURE — 95251 CONT GLUC MNTR ANALYSIS I&R: CPT | Performed by: INTERNAL MEDICINE

## 2021-11-24 PROCEDURE — 36415 COLL VENOUS BLD VENIPUNCTURE: CPT

## 2021-11-24 PROCEDURE — 83036 HEMOGLOBIN GLYCOSYLATED A1C: CPT

## 2021-11-24 RX ORDER — LEVOTHYROXINE SODIUM 75 UG/1
75 TABLET ORAL DAILY
Qty: 90 TABLET | Refills: 1 | Status: SHIPPED | OUTPATIENT
Start: 2021-11-24 | End: 2022-05-25

## 2021-11-24 RX ORDER — METFORMIN HCL 500 MG
2000 TABLET, EXTENDED RELEASE 24 HR ORAL AT BEDTIME
Qty: 360 TABLET | Refills: 1 | Status: SHIPPED | OUTPATIENT
Start: 2021-11-24 | End: 2022-05-25

## 2021-11-24 RX ORDER — PROCHLORPERAZINE 25 MG/1
SUPPOSITORY RECTAL
Qty: 1 EACH | Refills: 3 | Status: SHIPPED | OUTPATIENT
Start: 2021-11-24 | End: 2022-11-21

## 2021-11-24 RX ORDER — PROCHLORPERAZINE 25 MG/1
SUPPOSITORY RECTAL
Qty: 3 EACH | Refills: 11 | Status: SHIPPED | OUTPATIENT
Start: 2021-11-24 | End: 2022-11-19

## 2021-11-24 RX ORDER — PROCHLORPERAZINE 25 MG/1
SUPPOSITORY RECTAL
Qty: 1 EACH | Refills: 0 | Status: SHIPPED | OUTPATIENT
Start: 2021-11-24 | End: 2022-08-17

## 2021-11-24 ASSESSMENT — MIFFLIN-ST. JEOR: SCORE: 1962.62

## 2021-11-24 NOTE — PROGRESS NOTES
"      1.  Type 2 diabetes.   2.  Hypertension.   3.  Hyperlipidemia.   4.  Depression.   5.  Obstructive sleep apnea, currently untreated.   6.  Hypothyroidism.   7.  Diverticulosis.   8.  Low back pain.   9.  Irritable bowel syndrome.     On 11/25/2020, Dr. Aranda saw the patient and sent her to the emergency room for further treatment of her AFib.  She ended up having her rate controlled and had an echocardiogram performed.  She then had ELIAS-guided cardioversion performed on 12/04/2020 successfully.  She was then hospitalized again on 12/09/2020 for pulmonary edema, likely secondary to being in rapid AFib for quite some time in the past in addition to having her hydrochlorothiazide discontinued.  She was in pulmonary edema on 12/09/2020, was diuresed with 40 mg of IV Lasix and had p.o. Lasix 20 started and she was sent here for further evaluation.     CORE appt was 12/23/20. At that appt we decreased the Lasix to 20 mg every other day. We had increased the hydrochlorothiazide to 50 mg daily.  Today she reports that her weight was 290 lbs pounds. She has been taking the lasix daily as she was up in weight for a few days. Her dry weight appears to be around 290 pounds.  She denies any swelling in her lower extremities.  She states her appetite has been fine.  Her blood pressures have been systolically in 110-120 systolic.  She denies having any significant chest pain or shortness of breath, PND or orthopnea. She says she can\" feel her heartbeat\"at times when she lays down. She denies racing or palpitations.  Her HR is in the 70's.     4/14/21- She has been feeling well. Her weights have 289-291 lbs.  She denies any swelling in her lower extremities.  She states her appetite has been fine.  Her blood pressures have been systolically in 110-120 systolic.  She denies having any significant chest pain or shortness of breath, PND or orthopnea. She denies racing or palpitations.  Her HR is in the 70's.     7/21/21- working " with Endocrine about elevated glucose. SR HR 70's. SOB with humidity. She goes to baseball games with her grandchildren . No swelling in the legs today. Weight 292 lbs today. She states her appetite has been fine. She doesn't add salt to her food. She doesn't eat much processed food. She tries to eat the Keto bread. BAEZ but baseline.     11/24/21- She feels better, she says she is in sinus rhythm.  She is now retired. COVID has been going around in her family , but she has been fine - no COVID. Weight at home 300 lbs. She has not been able to be active. Appetite has been fine. She has some swelling in the left leg but none in the right.     CURRENT MEDICATIONS:  She is taking diltiazem 360 mg a day, Lasix 20 mg a day, insulin, levothyroxine, Victoza, lisinopril 40 mg a day, metformin, hydrochlorothiazide 50 mg ,  metoprolol succinate 300 mg a day. rivaroxaban 20 mg a day, rosuvastatin 10 mg a day.       ROS:   Constitutional: No fever, chills, or sweats.  ENT: No visual disturbance, ear ache, epistaxis, sore throat.   Allergies/Immunologic: Negative  Respiratory: No cough, hemoptysis.   Cardiovascular: As per HPI.   GI: As per HPI.   : No urinary frequency, dysuria, or hematuria.   Integument: Negative.   Psychiatric: Negative.   Neuro: Negative.   Endocrinology: negative   Musculoskeletal: negative    EXAM:   NECK:  Exam reveals JVD between clavicle and mid neck at 90 degrees  LUNGS:  Clear to auscultation.  Respiratory effort is normal.   CARDIAC:  Exam reveals a regular rate and rhythm, no obvious murmur or gallop appreciated.   ABDOMEN:  Belly soft, nontender.   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 She appears to be hypervolemic. She will benefit with an increase of Lasix for a short time.     Continue diltiazem 360 mg every morning, add an extra dose of 100 mg of metoprolol succinate, 100 mg in the morning and 12 hours later  continued her metoprolol succinate 200 mg at night.      Continue Lasix 20 mg a day and hydrochlorothiazide 50 mg a day- we are increasing the lasix for a few days    HFpEF:     BP: controlled   Fluid status euvolemic  Aldosterone antagonist: no  Ischemia evaluation: (complete/pending/not within the goals of care)   ACEi/ARB - Lisinopril  BB - Metoprolol   SCD prophylaxis - N/A, EF is normal   NSAID use: Contraindicated, reviewed with patient   Sleep Apnea Evaluation: (yes uses CPAP/ yes refuses CPAP/ no/pending evaluation)      1.  Atrial fibrillation with rapid ventricular response. She underwent ELIAS-guided cardioversion on 12/04/2020.  She has a relatively structurally normal heart.  She is currently stable in normal sinus rhythm on significant doses of metoprolol and diltiazem.  We will continue to monitor.  She is tolerating anticoagulation well without gross bleeding.      2.  Hypertension.     Blood pressures do appear to be well controlled as they have been at 120 or lower systolic daily.      3.  Congestive heart failure.     Dr. Aranda suspects the  episode of congestive heart failure for which she was hospitalized recently on 12/09/2020 may have been related to her poor rate control previous to her cardioversion in addition to stopping hydrochlorothiazide.  Her dry weight is likely in the 290 pound range. LVEF 60-65%        4.  Sleep apnea. She has been trying to use the Bi-pap more.      5.  Mild aortic stenosis.    This was seen on recent echo and a mean gradient was only 12 mmHg. continue to follow-through time.       I reviewed patients pertinent clinical laboratory studies  I reviewed patients medications  I reviewed patients pertinent medical records      Plan:  40 mg Lasix - next 4 days  40 mEq potassium for 4 days   After the 4 days return to the normal dose.   CORE 6 months           Robinson FERNANDEZ NP-C  Advanced Heart Failure Nurse Practitioner  [facility]

## 2021-11-24 NOTE — PATIENT INSTRUCTIONS
Increase the morning dose of insulin to 140 U and decrease the bedtime dose to 40 U   Start 100 mg Invokana daily   Maintain a good hydration     [facility]  Devi Harkins RN, Diabetes Educator: [phone]  Clinic Nurses EMMY Montero; CMA's: Stephon Luevano Yang Mee   Clinic Fax: [phone]  On-Call Endocrine at [facility] (after hours/weekends): [phone] option 4  Scheduling Line: [phone]     Appointment Reminders:  * Please bring meter with for staff to download  * If you are due ONLY for an A1C, it is scheduled with the nurse and will be done in clinic. You do not need to schedule a lab appointment. Fasting is not required for an A1C.  * Refill request should be submitted to your pharmacy. They will contact clinic for approval.

## 2021-11-24 NOTE — PROGRESS NOTES
Sherry is a 66 year old female with a past medical history significant for obesity, hyperlipidemia, depression, obstructive sleep apnea, hypothyroidism, hypertension, osteoarthritis, hypercholesterolemia, atrial fibrillation (status post ELIAS cardioversion on 12/2020), seen in follow-up for type 2 diabetes. She was last seen in our clinic in 1/2021.     The patient was diagnosed with type 2 diabetes ~ 25 years after she was diagnosed with gestational diabetes, during both her pregnancies. Her weight before pregnancy was 190-200. Her current weight is around 300 pounds.  She was initially started on oral medications and then insulin was added around 2012.     Her A1C has been around 7% over the last years. She was on Byetta but she did not feel it helped. She was also on Actos but stopped due to 40 lbs weight gain and increased SOB.  For approximately 3 years, she was treated with Victoza.  It was resumed in March 2019, now at 3 mg daily.  She continues to take metformin, 2 g extended release daily.  In the beginning of July 2019, she was transitioned to U500 insulin. Jardiance was tried in 2019 and was discontinued due to genital fungal infections.     A1c was 9%, on 8/16/21, up from 6.8% on 1/5/21, in the absence of anemia. It was 8.1% today.     Current antidiabetic regimen:   U500 insulin: 130 units in am, 100 U at 1-2 PM and 50 units at bedtime (around 9 PM).  1.8 mg Victoza daily (3 mg daily was too expensive; Wegovy not covered by insurance)  2 gm ER metformin daily     She has been dealing with recurrent episodes of atrial fibrillation.  Approximately 2 or 3 times a month, she does wake up with hypoglycemic symptoms at night.  Denies experiencing hypoglycemic symptoms during daytime.  Whenever she is active, her blood glucose is better controlled.  The nevin sensor revealed an average glucose of 154, with a glucose variability of 39%, corresponding to a GMI of 7%.  62% of the glucose numbers are within  target of , 7% are above 250, 4% are in the mild hypoglycemic range and 4% are below 54.  Especially in the last 3 days, the sensor recorded to have persistent hypoglycemia throughout the day.  As per patient, the sensor was inaccurate and she had it replaced.  She does not always experience symptoms at the time the blood sugar is low the sensor.  The trend revealed by the sensor shows postprandial hyperglycemia and a tendency for blood sugar to drop around 1-2 AM.    DM complications  Retinopathy: last eye exam: spring 2021; no DR, S/P B/L surgery for cataract   Nephropathy: negative urine microalb 1/21; on lisinopril 60 mg; GFR in the 60s. Negative urine microalbumin 1/21.   Neuropathy: occasional burning sensation in her feet for the last couple of years. H/o left toe ulcer.  S/p bunion surgery. Last podiatry exam 7/21, with Dr. Hutson. Has a f/up apt next week.   She is symptomatic for hypoglycemia (able to recognize blood sugar numbers in the 90s - she gets sweaty, hot and lightheaded).  She corrects the hypoglycemic symptoms by having something to eat.  Aspirin - taking 325 mg  LDL 48, HDL 37,  (9/2021) on Crestor 10 mg   Using the CPAP   Exercise limited due to bilateral knee replacement and low back pain.    2.  Hypertension  Her blood pressure is managed with 40 mg lisinopril daily, 360 mg diltiazem,  300 mg metoprolol daily, 25 mg hydrochlorothiazide daily and 20 mg lasix daily. On 20 mEq K daily.     3. High normal calcium levels, documented in our records since 2009. Prior PTH levels have been low normal.  Most recent calcium was 10, on 11/24/2021.  The phosphorus levels have been normal, as well as Albumin.  She takes 1000 units vitamin D daily and a daily multivitamin.    4. Hypothyroidism    Most recent TSH was 2.58 on 9/20/21.  She remains on a constant dose of 75 mcg levothyroxine daily.    Past Medical History  Past Medical History:   Diagnosis Date     Cataract      Congestive heart  failure (H) 2019 NOVEMBER    AFIB     DEPRESSION     comes and goes - tried meds - unsuccessfully, certain times of the year, no psych intervention and no counselors in the past - and not interested      Diverticulosis of colon (without mention of hemorrhage) 4/04    colonoscopy     ECHO-mildLVH,tr MR,mild thick lflets w inc LA,trTR   12/03     Essential hypertension, benign 1990s    late 1990s - started medications at that time - not to difficult to control meds      Fam hx-cardiovas dis NEC     father - CAD, and lipids/HTN - multiple members of the family      Family history of diabetes mellitus     sister and grandmother with DM      Family history of malignant neoplasm of breast     mother - young age - 45, and maternal cousin and aunt as well - no BRCA testing done      Family history of stroke (cerebrovascular)     grandmother in early life in her 40s      FAMILY HX COLON CANCER     Pat uncles x 2     Heart disease     murmur/     Heart murmur      HYPERLIPIDEMIA 2000    fairly recent - in the last 5 years - high for DM levels  -cholesterol recent      Irritable bowel syndrome     goes between the 2 - nerve related - more stressed more problems      MICROALBUMINURIA     unsure how long - been on the lisinopril - for a few years at well      Nonspecific abnormal results of liver function study 2/3/2003    SGOT - has been high in the past - since the hepatitis b - borderline elevation - not really changing any      OBESITY      Obstructive sleep apnea      GENESIS (obstructive sleep apnea) 10/15/2006    psg 5/15 AHI 53 aPAP 8-15     OSTEOARTHRITIS L KNEE 2003    total knee on the left - and pain is gone since the knee replacement      PERS HX HEPATITIS B- RESOLVED] 1977    acute virus only - no chronic disease      PERS HX HEPATITIS B- RESOLVED]      Type II or unspecified type diabetes mellitus without mention of complication, not stated as uncontrolled 1991    oral meds frist, then insulin eventually later,  difficult to control most of the time      Unspecified hypothyroidism 10/11/2006    TSH 3.36 - mild subclinical hypothyroidism - deciding on following or starting low dose meds - with dr Oreilly - following    Hepatis B     Allergies  Allergies   Allergen Reactions     Lipitor [Atorvastatin Calcium]      Gas     Pravachol [Pravastatin Sodium]      Pravachol - dry cough      Simvastatin      Myalgia     Medications    Current Outpatient Medications:      amoxicillin (AMOXIL) 500 MG capsule, Take 2,000 mg by mouth Before dental procedures, Disp: , Rfl:      blood glucose (NO BRAND SPECIFIED) test strip, Use to test blood sugar 2 times daily or as directed. Patient requesting One Touch Ultra Strips, Disp: 100 strip, Rfl: 3     Continuous Blood Gluc  (FREESTYLE MIKE 14 DAY READER) KATIA, 1 each daily, Disp: 1 Device, Rfl: 0     Continuous Blood Gluc Sensor (FREESTYLE MIKE 14 DAY SENSOR) MIS, Change every 14 days., Disp: 6 each, Rfl: 3     diltiazem ER COATED BEADS (CARDIZEM CD) 360 MG 24 hr capsule, Take 1 capsule (360 mg) by mouth daily, Disp: 90 capsule, Rfl: 3     econazole nitrate 1 % external cream, Apply topically daily as needed To feet and toenails, Disp: , Rfl:      furosemide (LASIX) 20 MG tablet, Take 1 tablet (20 mg) by mouth daily, Disp: 90 tablet, Rfl: 3     Garlic 1000 MG CAPS, Take 1 capsule by mouth daily Takes during summer months.  Done taking until next summer., Disp: , Rfl:      hydrochlorothiazide (HYDRODIURIL) 25 MG tablet, Take 2 tablets (50 mg) by mouth daily, Disp: 180 tablet, Rfl: 3     ibuprofen (ADVIL) 200 MG capsule, Take 600 mg by mouth every 6 hours as needed , Disp: , Rfl:      insulin pen needle (GNP CLICKFINE PEN NEEDLES) 31G X 8 MM miscellaneous, Uses 3 times daily or as directed, Disp: 300 each, Rfl: 3     insulin reg HIGH CONC (HUMULIN R U-500 KWIKPEN) 500 UNIT/ML PEN soln, Administer 130 units at 7 in the morning, 100 units at 1 PM and 50 units at 10 PM., Disp: 54 mL, Rfl:  3     levothyroxine (SYNTHROID/LEVOTHROID) 75 MCG tablet, TAKE 1 TABLET (75 MCG) BY MOUTH DAILY, Disp: 90 tablet, Rfl: 1     liraglutide (VICTOZA PEN) 18 MG/3ML solution, Inject 1.8 mg Subcutaneous daily, Disp: 27 mL, Rfl: 3     lisinopril (ZESTRIL) 40 MG tablet, Take 1 tablet (40 mg) by mouth daily, Disp: 90 tablet, Rfl: 3     metFORMIN (GLUCOPHAGE-XR) 500 MG 24 hr tablet, Take 4 tablets (2,000 mg) by mouth At Bedtime TAKE 4 TABLETS AT BEDTIME, Disp: 360 tablet, Rfl: 1     metoprolol succinate ER (TOPROL-XL) 200 MG 24 hr tablet, Take one half tablet (100mg) every morning, and one full tablet (200mg) every evening, Disp: 180 tablet, Rfl: 3     Multiple Vitamins-Minerals (ADVANCED DIABETIC MULTIVITAMIN) TABS, Take 1 tablet by mouth daily, Disp: , Rfl:      ORDER FOR DME, SET TO LOCAL PRINT,, Respironics REMSTAR 60 Series Auto CPAP 8-15cm H2O, Airfit P10 nasal pillow mask w/medium pillows, Disp: , Rfl:      order for DME, Equipment being ordered: [id number] $70   Rosalinda , Disp: 1 Device, Rfl: 0     potassium chloride ER (KLOR-CON M) 20 MEQ CR tablet, Take 1 tablet (20 mEq) by mouth daily, Disp: 90 tablet, Rfl: 1     rivaroxaban ANTICOAGULANT (XARELTO ANTICOAGULANT) 20 MG TABS tablet, Take 1 tablet (20 mg) by mouth daily (with dinner), Disp: 90 tablet, Rfl: 1     rosuvastatin (CRESTOR) 10 MG tablet, TAKE ONE TABLET BY MOUTH ONCE DAILY, Disp: 90 tablet, Rfl: 3     Vitamin D, Cholecalciferol, 25 MCG (1000 UT) TABS, Take 1 tablet by mouth daily, Disp: , Rfl:     Family History  Maternal grandmother had type 2 diabetes  Daughter has GDM  Father had CAD s/p stent, lung cancer and abdominal aortic anuerysm  Older brother has CABG, and abdominal aortic anuerysm  Young brother has Afib, SVT  Another younger brother has multiple myeloma  Sister has SVT    Social History  No smoking, rarely drink EtOH  No illicit drug  Worked as a nurse in the ICU. Retired in 2019.   Lives by herself, has 3 daughters    Physical Exam  BP  "Readings from Last 6 Encounters:   11/24/21 112/66   11/24/21 112/66   07/21/21 112/65   04/14/21 117/72   03/29/21 134/69   03/17/21 102/65     Wt Readings from Last 10 Encounters:   11/24/21 137 kg (302 lb)   11/24/21 137 kg (302 lb)   07/21/21 132.9 kg (293 lb 1.6 oz)   04/14/21 131.5 kg (290 lb)   04/06/21 131.5 kg (290 lb)   01/21/21 132.5 kg (292 lb)   12/23/20 131.5 kg (290 lb)   12/09/20 131.5 kg (290 lb)   11/27/20 131.6 kg (290 lb 3.2 oz)   09/22/20 134.8 kg (297 lb 1.6 oz)     /66 (BP Location: Left arm, Patient Position: Chair, Cuff Size: Adult Large)   Pulse 75   Wt 137 kg (302 lb)   LMP 10/02/2007   SpO2 97%   BMI 44.73 kg/m    Body mass index is 44.73 kg/m .   Estimated body mass index is 44.73 kg/m  as calculated from the following:    Height as of an earlier encounter on 11/24/21: 1.75 m (5' 8.9\").    Weight as of this encounter: 137 kg (302 lb).    Constitutional: general obesity, no distress, comfortable, pleasant   Eyes: anicteric, normal extra-ocular movements, no lid lag or retraction  Neck: no thyromegaly; no palpable nodules  CVS: Regular rhythm, systolic murmur with radiation to the carotid arteries   Lungs: CTA B/L  Musculoskeletal: no edema, DP 2+; left lower extremity - mild atrophy compared with the right  GI: Abdomen soft, nontender, nondistended, positive bowel sounds  NS: no tremor, normal gait, knee reflexes absent  Skin: benign abd striae   Psychological: appropriate mood    RESULTS  I reviewed prior lab results documented in epic.  Lab Results   Component Value Date    A1C 8.1 (H) 11/24/2021    A1C 9.0 (H) 08/16/2021    A1C 6.8 (H) 01/05/2021    A1C 6.9 (H) 11/25/2020    A1C 7.5 (A) 07/22/2020    A1C 7.3 (H) 12/10/2019    A1C 7.5 (A) 08/21/2019       Hemoglobin   Date Value Ref Range Status   09/20/2021 12.2 11.7 - 15.7 g/dL Final   01/05/2021 13.9 11.7 - 15.7 g/dL Final     Hematocrit   Date Value Ref Range Status   09/20/2021 36.6 35.0 - 47.0 % Final   01/05/2021 40.4 " 35.0 - 47.0 % Final     Cholesterol   Date Value Ref Range Status   09/20/2021 133 <200 mg/dL Final   03/15/2021 130 <200 mg/dL Final     Cholesterol/HDL Ratio   Date Value Ref Range Status   02/19/2015 3.2 0.0 - 5.0 Final     HDL Cholesterol   Date Value Ref Range Status   03/15/2021 44 (L) >49 mg/dL Final     Direct Measure HDL   Date Value Ref Range Status   09/20/2021 37 (L) >=50 mg/dL Final     LDL Cholesterol Calculated   Date Value Ref Range Status   09/20/2021 48 <=100 mg/dL Final   03/15/2021 46 <100 mg/dL Final     Comment:     Desirable:       <100 mg/dl     VLDL-Cholesterol   Date Value Ref Range Status   02/19/2015 34 (H) 0 - 30 mg/dL Final     Triglycerides   Date Value Ref Range Status   09/20/2021 240 (H) <150 mg/dL Final   03/15/2021 200 (H) <150 mg/dL Final     Comment:     Borderline high:  150-199 mg/dl  High:             200-499 mg/dl  Very high:       >499 mg/dl  Fasting specimen       Albumin Urine mg/L   Date Value Ref Range Status   01/05/2021 <5 mg/L Final     TSH   Date Value Ref Range Status   09/20/2021 2.58 0.40 - 4.00 mU/L Final   01/05/2021 2.38 0.40 - 4.00 mU/L Final         Last Basic Metabolic Panel:    Sodium   Date Value Ref Range Status   11/24/2021 139 133 - 144 mmol/L Final   04/13/2021 137 133 - 144 mmol/L Final     Potassium   Date Value Ref Range Status   11/24/2021 3.6 3.4 - 5.3 mmol/L Final   04/13/2021 4.2 3.4 - 5.3 mmol/L Final     Chloride   Date Value Ref Range Status   11/24/2021 104 94 - 109 mmol/L Final   04/13/2021 106 94 - 109 mmol/L Final     Calcium   Date Value Ref Range Status   11/24/2021 10.0 8.5 - 10.1 mg/dL Final   04/13/2021 10.1 8.5 - 10.1 mg/dL Final     Carbon Dioxide   Date Value Ref Range Status   04/13/2021 28 20 - 32 mmol/L Final     Carbon Dioxide (CO2)   Date Value Ref Range Status   11/24/2021 28 20 - 32 mmol/L Final     Urea Nitrogen   Date Value Ref Range Status   11/24/2021 23 7 - 30 mg/dL Final   04/13/2021 22 7 - 30 mg/dL Final      Creatinine   Date Value Ref Range Status   11/24/2021 0.93 0.52 - 1.04 mg/dL Final   04/13/2021 0.81 0.52 - 1.04 mg/dL Final     GFR Estimate   Date Value Ref Range Status   11/24/2021 63 >60 mL/min/1.73m2 Final     Comment:     As of July 11, 2021, eGFR is calculated by the CKD-EPI creatinine equation, without race adjustment. eGFR can be influenced by muscle mass, exercise, and diet. The reported eGFR is an estimation only and is only applicable if the renal function is stable.   04/13/2021 75 >60 mL/min/[1.73_m2] Final     Comment:     Non  GFR Calc  Starting 12/18/2018, serum creatinine based estimated GFR (eGFR) will be   calculated using the Chronic Kidney Disease Epidemiology Collaboration   (CKD-EPI) equation.       Glucose   Date Value Ref Range Status   11/24/2021 132 (H) 70 - 99 mg/dL Final   04/13/2021 98 70 - 99 mg/dL Final     Comment:     Non Fasting       AST   Date Value Ref Range Status   11/25/2020 Unsatisfactory specimen - hemolyzed 0 - 45 U/L Final     Comment:     Canceled, Test credited  Critical Value called to and read back by  GUTIERREZ GOMEZ RN ER 1901 11/25/2020 BY AA       ALT   Date Value Ref Range Status   11/25/2020 42 0 - 50 U/L Final     Albumin   Date Value Ref Range Status   11/25/2020 3.9 3.4 - 5.0 g/dL Final     ASSESSMENT:      1. Type 2 diabetes  Sherry is a 66 year old pleasant female, with a history of type 2 diabetes in the setting of obesity, sleep apnea, hypertension, hypercholesterolemia.  Her diabetes is known to be complicated by neuropathy.  Recently, the glycemic control has improved, although it remains suboptimal.  The Tanya sensor has been inaccurate in identifying most of the hypoglycemic episodes.  Recommendations:  Transition to Dexcom G6 sensor.  I counseled the patient on the use of Dexcom.  Increase the morning dose of insulin to 140 units and decrease the bedtime dose of insulin to 40 units  Always confirm asymptomatic hypoglycemic  episodes noted on the sensor by checking the blood sugar using the glucometer  Continue to work on reducing the overall carbohydrate and caloric intake and losing weight  Continue Metformin and Victoza  Start treatment with Invokana at 100 mg daily.  Counseled on side effects, including the very rare risk of diabetes ketoacidosis.  Of note that Farxiga is not covered by her insurance.  Maintain a good hydration  Have the sensor downloaded for my review as needed    2. Hypothyroidism.  Clinically and biochemically, the patient is euthyroid. I recommended to continue to take the same dose of levothyroxine.    3.  Hypercholesterolemia, controlled on Crestor.    Orders Placed This Encounter   Procedures     Continuous Glucose Monitoring >=72 hours PHYS INTERP     Answers for HPI/ROS submitted by the patient on 11/22/2021  General Symptoms: No  Skin Symptoms: No  HENT Symptoms: No  EYE SYMPTOMS: No  HEART SYMPTOMS: Yes  LUNG SYMPTOMS: No  INTESTINAL SYMPTOMS: No  URINARY SYMPTOMS: No  GYNECOLOGIC SYMPTOMS: No  BREAST SYMPTOMS: No  SKELETAL SYMPTOMS: Yes  BLOOD SYMPTOMS: No  NERVOUS SYSTEM SYMPTOMS: No  MENTAL HEALTH SYMPTOMS: No  Chest pain or pressure: No  Fast or irregular heartbeat: Yes  Pain in legs with walking: No  Trouble breathing while lying down: No  Fingers or toes appear blue: No  High blood pressure: Yes  Low blood pressure: No  Fainting: No  Murmurs: Yes  Pacemaker: No  Varicose veins: Yes  Edema or swelling: Yes  Wake up at night with shortness of breath: No  Light-headedness: No  Exercise intolerance: Yes  Back pain: Yes  Muscle aches: No  Neck pain: No  Swollen joints: No  Joint pain: Yes  Bone pain: No  Muscle cramps: Yes  Muscle weakness: No  Bone fracture: No    42 minutes spent on the date of the encounter doing chart review, history and exam, documentation and further activities as noted above.

## 2021-11-24 NOTE — LETTER
11/24/2021         RE: Sherry Slaughter  [id number]  [place] [address]        Dear Colleague,    Thank you for referring your patient, Sherry Slaughter, to the [facility]. Please see a copy of my visit note below.    Outcome for 11/18/21 9:50 AM :Patient is sharing data via clinic device website and BG data up-to-date LEV Zamora CMA  Adult Endocrinology  [facility]    Outcome for 11/23/21 4:09 PM : Tanya has been printed and placed in provider folder  Stephon BUCK MA   Adult Endocrine   [facility]          Sherry is a 66 year old female with a past medical history significant for obesity, hyperlipidemia, depression, obstructive sleep apnea, hypothyroidism, hypertension, osteoarthritis, hypercholesterolemia, atrial fibrillation (status post ELIAS cardioversion on 12/2020), seen in follow-up for type 2 diabetes. She was last seen in our clinic in 1/2021.     The patient was diagnosed with type 2 diabetes ~ 25 years after she was diagnosed with gestational diabetes, during both her pregnancies. Her weight before pregnancy was 190-200. Her current weight is around 300 pounds.  She was initially started on oral medications and then insulin was added around 2012.     Her A1C has been around 7% over the last years. She was on Byetta but she did not feel it helped. She was also on Actos but stopped due to 40 lbs weight gain and increased SOB.  For approximately 3 years, she was treated with Victoza.  It was resumed in March 2019, now at 3 mg daily.  She continues to take metformin, 2 g extended release daily.  In the beginning of July 2019, she was transitioned to U500 insulin. Jardiance was tried in 2019 and was discontinued due to genital fungal infections.     A1c was 9%, on 8/16/21, up from 6.8% on 1/5/21, in the absence of anemia. It was 8.1% today.     Current antidiabetic regimen:   U500 insulin: 130 units in  am, 100 U at 1-2 PM and 50 units at bedtime (around 9 PM).  1.8 mg Victoza daily (3 mg daily was too expensive; Wegovy not covered by insurance)  2 gm ER metformin daily     She has been dealing with recurrent episodes of atrial fibrillation.  Approximately 2 or 3 times a month, she does wake up with hypoglycemic symptoms at night.  Denies experiencing hypoglycemic symptoms during daytime.  Whenever she is active, her blood glucose is better controlled.  The nevin sensor revealed an average glucose of 154, with a glucose variability of 39%, corresponding to a GMI of 7%.  62% of the glucose numbers are within target of , 7% are above 250, 4% are in the mild hypoglycemic range and 4% are below 54.  Especially in the last 3 days, the sensor recorded to have persistent hypoglycemia throughout the day.  As per patient, the sensor was inaccurate and she had it replaced.  She does not always experience symptoms at the time the blood sugar is low the sensor.  The trend revealed by the sensor shows postprandial hyperglycemia and a tendency for blood sugar to drop around 1-2 AM.    DM complications  Retinopathy: last eye exam: spring 2021; no DR, S/P B/L surgery for cataract   Nephropathy: negative urine microalb 1/21; on lisinopril 60 mg; GFR in the 60s. Negative urine microalbumin 1/21.   Neuropathy: occasional burning sensation in her feet for the last couple of years. H/o left toe ulcer.  S/p bunion surgery. Last podiatry exam 7/21, with Dr. Hutson. Has a f/up apt next week.   She is symptomatic for hypoglycemia (able to recognize blood sugar numbers in the 90s - she gets sweaty, hot and lightheaded).  She corrects the hypoglycemic symptoms by having something to eat.  Aspirin - taking 325 mg  LDL 48, HDL 37,  (9/2021) on Crestor 10 mg   Using the CPAP   Exercise limited due to bilateral knee replacement and low back pain.    2.  Hypertension  Her blood pressure is managed with 40 mg lisinopril daily, 360 mg  diltiazem,  300 mg metoprolol daily, 25 mg hydrochlorothiazide daily and 20 mg lasix daily. On 20 mEq K daily.     3. High normal calcium levels, documented in our records since 2009. Prior PTH levels have been low normal.  Most recent calcium was 10, on 11/24/2021.  The phosphorus levels have been normal, as well as Albumin.  She takes 1000 units vitamin D daily and a daily multivitamin.    4. Hypothyroidism    Most recent TSH was 2.58 on 9/20/21.  She remains on a constant dose of 75 mcg levothyroxine daily.    Past Medical History  Past Medical History:   Diagnosis Date     Cataract      Congestive heart failure (H) 2019 NOVEMBER    AFIB     DEPRESSION     comes and goes - tried meds - unsuccessfully, certain times of the year, no psych intervention and no counselors in the past - and not interested      Diverticulosis of colon (without mention of hemorrhage) 4/04    colonoscopy     ECHO-mildLVH,tr MR,mild thick lflets w inc LA,trTR   12/03     Essential hypertension, benign 1990s    late 1990s - started medications at that time - not to difficult to control meds      Fam hx-cardiovas dis NEC     father - CAD, and lipids/HTN - multiple members of the family      Family history of diabetes mellitus     sister and grandmother with DM      Family history of malignant neoplasm of breast     mother - young age - 45, and maternal cousin and aunt as well - no BRCA testing done      Family history of stroke (cerebrovascular)     grandmother in early life in her 40s      FAMILY HX COLON CANCER     Pat uncles x 2     Heart disease     murmur/     Heart murmur      HYPERLIPIDEMIA 2000    fairly recent - in the last 5 years - high for DM levels  -cholesterol recent      Irritable bowel syndrome     goes between the 2 - nerve related - more stressed more problems      MICROALBUMINURIA     unsure how long - been on the lisinopril - for a few years at well      Nonspecific abnormal results of liver function study 2/3/2003     SGOT - has been high in the past - since the hepatitis b - borderline elevation - not really changing any      OBESITY      Obstructive sleep apnea      GENESIS (obstructive sleep apnea) 10/15/2006    psg 5/15 AHI 53 aPAP 8-15     OSTEOARTHRITIS L KNEE 2003    total knee on the left - and pain is gone since the knee replacement      PERS HX HEPATITIS B- RESOLVED] 1977    acute virus only - no chronic disease      PERS HX HEPATITIS B- RESOLVED]      Type II or unspecified type diabetes mellitus without mention of complication, not stated as uncontrolled 1991    oral meds frist, then insulin eventually later, difficult to control most of the time      Unspecified hypothyroidism 10/11/2006    TSH 3.36 - mild subclinical hypothyroidism - deciding on following or starting low dose meds - with dr Oreilly - following    Hepatis B     Allergies  Allergies   Allergen Reactions     Lipitor [Atorvastatin Calcium]      Gas     Pravachol [Pravastatin Sodium]      Pravachol - dry cough      Simvastatin      Myalgia     Medications    Current Outpatient Medications:      amoxicillin (AMOXIL) 500 MG capsule, Take 2,000 mg by mouth Before dental procedures, Disp: , Rfl:      blood glucose (NO BRAND SPECIFIED) test strip, Use to test blood sugar 2 times daily or as directed. Patient requesting One Touch Ultra Strips, Disp: 100 strip, Rfl: 3     Continuous Blood Gluc  (FREESTYLE MIKE 14 DAY READER) KATIA, 1 each daily, Disp: 1 Device, Rfl: 0     Continuous Blood Gluc Sensor (FREESTYLE MIKE 14 DAY SENSOR) [facility], Change every 14 days., Disp: 6 each, Rfl: 3     diltiazem ER COATED BEADS (CARDIZEM CD) 360 MG 24 hr capsule, Take 1 capsule (360 mg) by mouth daily, Disp: 90 capsule, Rfl: 3     econazole nitrate 1 % external cream, Apply topically daily as needed To feet and toenails, Disp: , Rfl:      furosemide (LASIX) 20 MG tablet, Take 1 tablet (20 mg) by mouth daily, Disp: 90 tablet, Rfl: 3     Garlic 1000 MG CAPS, Take 1 capsule by  mouth daily Takes during summer months.  Done taking until next summer., Disp: , Rfl:      hydrochlorothiazide (HYDRODIURIL) 25 MG tablet, Take 2 tablets (50 mg) by mouth daily, Disp: 180 tablet, Rfl: 3     ibuprofen (ADVIL) 200 MG capsule, Take 600 mg by mouth every 6 hours as needed , Disp: , Rfl:      insulin pen needle (GNP CLICKFINE PEN NEEDLES) 31G X 8 MM miscellaneous, Uses 3 times daily or as directed, Disp: 300 each, Rfl: 3     insulin reg HIGH CONC (HUMULIN R U-500 KWIKPEN) 500 UNIT/ML PEN soln, Administer 130 units at 7 in the morning, 100 units at 1 PM and 50 units at 10 PM., Disp: 54 mL, Rfl: 3     levothyroxine (SYNTHROID/LEVOTHROID) 75 MCG tablet, TAKE 1 TABLET (75 MCG) BY MOUTH DAILY, Disp: 90 tablet, Rfl: 1     liraglutide (VICTOZA PEN) 18 MG/3ML solution, Inject 1.8 mg Subcutaneous daily, Disp: 27 mL, Rfl: 3     lisinopril (ZESTRIL) 40 MG tablet, Take 1 tablet (40 mg) by mouth daily, Disp: 90 tablet, Rfl: 3     metFORMIN (GLUCOPHAGE-XR) 500 MG 24 hr tablet, Take 4 tablets (2,000 mg) by mouth At Bedtime TAKE 4 TABLETS AT BEDTIME, Disp: 360 tablet, Rfl: 1     metoprolol succinate ER (TOPROL-XL) 200 MG 24 hr tablet, Take one half tablet (100mg) every morning, and one full tablet (200mg) every evening, Disp: 180 tablet, Rfl: 3     Multiple Vitamins-Minerals (ADVANCED DIABETIC MULTIVITAMIN) TABS, Take 1 tablet by mouth daily, Disp: , Rfl:      ORDER FOR DME, SET TO LOCAL PRINT,, Respironics REMSTAR 60 Series Auto CPAP 8-15cm H2O, Airfit P10 nasal pillow mask w/medium pillows, Disp: , Rfl:      order for DME, Equipment being ordered: [id number] $70   Rosalinda PHELPS, Disp: 1 Device, Rfl: 0     potassium chloride ER (KLOR-CON M) 20 MEQ CR tablet, Take 1 tablet (20 mEq) by mouth daily, Disp: 90 tablet, Rfl: 1     rivaroxaban ANTICOAGULANT (XARELTO ANTICOAGULANT) 20 MG TABS tablet, Take 1 tablet (20 mg) by mouth daily (with dinner), Disp: 90 tablet, Rfl: 1     rosuvastatin (CRESTOR) 10 MG tablet, TAKE ONE  "TABLET BY MOUTH ONCE DAILY, Disp: 90 tablet, Rfl: 3     Vitamin D, Cholecalciferol, 25 MCG (1000 UT) TABS, Take 1 tablet by mouth daily, Disp: , Rfl:     Family History  Maternal grandmother had type 2 diabetes  Daughter has GDM  Father had CAD s/p stent, lung cancer and abdominal aortic anuerysm  Older brother has CABG, and abdominal aortic anuerysm  Young brother has Afib, SVT  Another younger brother has multiple myeloma  Sister has SVT    Social History  No smoking, rarely drink EtOH  No illicit drug  Worked as a nurse in the ICU. Retired in 2019.   Lives by herself, has 3 daughters    Physical Exam  BP Readings from Last 6 Encounters:   11/24/21 112/66   11/24/21 112/66   07/21/21 112/65   04/14/21 117/72   03/29/21 134/69   03/17/21 102/65     Wt Readings from Last 10 Encounters:   11/24/21 137 kg (302 lb)   11/24/21 137 kg (302 lb)   07/21/21 132.9 kg (293 lb 1.6 oz)   04/14/21 131.5 kg (290 lb)   04/06/21 131.5 kg (290 lb)   01/21/21 132.5 kg (292 lb)   12/23/20 131.5 kg (290 lb)   12/09/20 131.5 kg (290 lb)   11/27/20 131.6 kg (290 lb 3.2 oz)   09/22/20 134.8 kg (297 lb 1.6 oz)     /66 (BP Location: Left arm, Patient Position: Chair, Cuff Size: Adult Large)   Pulse 75   Wt 137 kg (302 lb)   LMP 10/02/2007   SpO2 97%   BMI 44.73 kg/m    Body mass index is 44.73 kg/m .   Estimated body mass index is 44.73 kg/m  as calculated from the following:    Height as of an earlier encounter on 11/24/21: 1.75 m (5' 8.9\").    Weight as of this encounter: 137 kg (302 lb).    Constitutional: general obesity, no distress, comfortable, pleasant   Eyes: anicteric, normal extra-ocular movements, no lid lag or retraction  Neck: no thyromegaly; no palpable nodules  CVS: Regular rhythm, systolic murmur with radiation to the carotid arteries   Lungs: CTA B/L  Musculoskeletal: no edema, DP 2+; left lower extremity - mild atrophy compared with the right  GI: Abdomen soft, nontender, nondistended, positive bowel " sounds  NS: no tremor, normal gait, knee reflexes absent  Skin: benign abd striae   Psychological: appropriate mood    RESULTS  I reviewed prior lab results documented in epic.  Lab Results   Component Value Date    A1C 8.1 (H) 11/24/2021    A1C 9.0 (H) 08/16/2021    A1C 6.8 (H) 01/05/2021    A1C 6.9 (H) 11/25/2020    A1C 7.5 (A) 07/22/2020    A1C 7.3 (H) 12/10/2019    A1C 7.5 (A) 08/21/2019       Hemoglobin   Date Value Ref Range Status   09/20/2021 12.2 11.7 - 15.7 g/dL Final   01/05/2021 13.9 11.7 - 15.7 g/dL Final     Hematocrit   Date Value Ref Range Status   09/20/2021 36.6 35.0 - 47.0 % Final   01/05/2021 40.4 35.0 - 47.0 % Final     Cholesterol   Date Value Ref Range Status   09/20/2021 133 <200 mg/dL Final   03/15/2021 130 <200 mg/dL Final     Cholesterol/HDL Ratio   Date Value Ref Range Status   02/19/2015 3.2 0.0 - 5.0 Final     HDL Cholesterol   Date Value Ref Range Status   03/15/2021 44 (L) >49 mg/dL Final     Direct Measure HDL   Date Value Ref Range Status   09/20/2021 37 (L) >=50 mg/dL Final     LDL Cholesterol Calculated   Date Value Ref Range Status   09/20/2021 48 <=100 mg/dL Final   03/15/2021 46 <100 mg/dL Final     Comment:     Desirable:       <100 mg/dl     VLDL-Cholesterol   Date Value Ref Range Status   02/19/2015 34 (H) 0 - 30 mg/dL Final     Triglycerides   Date Value Ref Range Status   09/20/2021 240 (H) <150 mg/dL Final   03/15/2021 200 (H) <150 mg/dL Final     Comment:     Borderline high:  150-199 mg/dl  High:             200-499 mg/dl  Very high:       >499 mg/dl  Fasting specimen       Albumin Urine mg/L   Date Value Ref Range Status   01/05/2021 <5 mg/L Final     TSH   Date Value Ref Range Status   09/20/2021 2.58 0.40 - 4.00 mU/L Final   01/05/2021 2.38 0.40 - 4.00 mU/L Final         Last Basic Metabolic Panel:    Sodium   Date Value Ref Range Status   11/24/2021 139 133 - 144 mmol/L Final   04/13/2021 137 133 - 144 mmol/L Final     Potassium   Date Value Ref Range Status    11/24/2021 3.6 3.4 - 5.3 mmol/L Final   04/13/2021 4.2 3.4 - 5.3 mmol/L Final     Chloride   Date Value Ref Range Status   11/24/2021 104 94 - 109 mmol/L Final   04/13/2021 106 94 - 109 mmol/L Final     Calcium   Date Value Ref Range Status   11/24/2021 10.0 8.5 - 10.1 mg/dL Final   04/13/2021 10.1 8.5 - 10.1 mg/dL Final     Carbon Dioxide   Date Value Ref Range Status   04/13/2021 28 20 - 32 mmol/L Final     Carbon Dioxide (CO2)   Date Value Ref Range Status   11/24/2021 28 20 - 32 mmol/L Final     Urea Nitrogen   Date Value Ref Range Status   11/24/2021 23 7 - 30 mg/dL Final   04/13/2021 22 7 - 30 mg/dL Final     Creatinine   Date Value Ref Range Status   11/24/2021 0.93 0.52 - 1.04 mg/dL Final   04/13/2021 0.81 0.52 - 1.04 mg/dL Final     GFR Estimate   Date Value Ref Range Status   11/24/2021 63 >60 mL/min/1.73m2 Final     Comment:     As of July 11, 2021, eGFR is calculated by the CKD-EPI creatinine equation, without race adjustment. eGFR can be influenced by muscle mass, exercise, and diet. The reported eGFR is an estimation only and is only applicable if the renal function is stable.   04/13/2021 75 >60 mL/min/[1.73_m2] Final     Comment:     Non  GFR Calc  Starting 12/18/2018, serum creatinine based estimated GFR (eGFR) will be   calculated using the Chronic Kidney Disease Epidemiology Collaboration   (CKD-EPI) equation.       Glucose   Date Value Ref Range Status   11/24/2021 132 (H) 70 - 99 mg/dL Final   04/13/2021 98 70 - 99 mg/dL Final     Comment:     Non Fasting       AST   Date Value Ref Range Status   11/25/2020 Unsatisfactory specimen - hemolyzed 0 - 45 U/L Final     Comment:     Canceled, Test credited  Critical Value called to and read back by  GUTIERREZ GOMEZ RN ER 1901 11/25/2020 BY AA       ALT   Date Value Ref Range Status   11/25/2020 42 0 - 50 U/L Final     Albumin   Date Value Ref Range Status   11/25/2020 3.9 3.4 - 5.0 g/dL Final     ASSESSMENT:      1. Type 2  diabetes  Sherry is a 66 year old pleasant female, with a history of type 2 diabetes in the setting of obesity, sleep apnea, hypertension, hypercholesterolemia.  Her diabetes is known to be complicated by neuropathy.  Recently, the glycemic control has improved, although it remains suboptimal.  The Tanya sensor has been inaccurate in identifying most of the hypoglycemic episodes.  Recommendations:  Transition to Dexcom G6 sensor.  I counseled the patient on the use of Dexcom.  Increase the morning dose of insulin to 140 units and decrease the bedtime dose of insulin to 40 units  Always confirm asymptomatic hypoglycemic episodes noted on the sensor by checking the blood sugar using the glucometer  Continue to work on reducing the overall carbohydrate and caloric intake and losing weight  Continue Metformin and Victoza  Start treatment with Invokana at 100 mg daily.  Counseled on side effects, including the very rare risk of diabetes ketoacidosis.  Of note that Farxiga is not covered by her insurance.  Maintain a good hydration  Have the sensor downloaded for my review as needed    2. Hypothyroidism.  Clinically and biochemically, the patient is euthyroid. I recommended to continue to take the same dose of levothyroxine.    3.  Hypercholesterolemia, controlled on Crestor.    Orders Placed This Encounter   Procedures     Continuous Glucose Monitoring >=72 hours PHYS INTERP     Answers for HPI/ROS submitted by the patient on 11/22/2021  General Symptoms: No  Skin Symptoms: No  HENT Symptoms: No  EYE SYMPTOMS: No  HEART SYMPTOMS: Yes  LUNG SYMPTOMS: No  INTESTINAL SYMPTOMS: No  URINARY SYMPTOMS: No  GYNECOLOGIC SYMPTOMS: No  BREAST SYMPTOMS: No  SKELETAL SYMPTOMS: Yes  BLOOD SYMPTOMS: No  NERVOUS SYSTEM SYMPTOMS: No  MENTAL HEALTH SYMPTOMS: No  Chest pain or pressure: No  Fast or irregular heartbeat: Yes  Pain in legs with walking: No  Trouble breathing while lying down: No  Fingers or toes appear blue: No  High blood  pressure: Yes  Low blood pressure: No  Fainting: No  Murmurs: Yes  Pacemaker: No  Varicose veins: Yes  Edema or swelling: Yes  Wake up at night with shortness of breath: No  Light-headedness: No  Exercise intolerance: Yes  Back pain: Yes  Muscle aches: No  Neck pain: No  Swollen joints: No  Joint pain: Yes  Bone pain: No  Muscle cramps: Yes  Muscle weakness: No  Bone fracture: No    42 minutes spent on the date of the encounter doing chart review, history and exam, documentation and further activities as noted above.            Again, thank you for allowing me to participate in the care of your patient.        Sincerely,        Rika Delgado MD

## 2021-11-24 NOTE — NURSING NOTE
Sherry Slaughter's goals for this visit include:   Chief Complaint   Patient presents with     Diabetes       She requests these members of her care team be copied on today's visit information: yes    PCP: Marilou Arciniega    Referring Provider:  No referring provider defined for this encounter.    /66 (BP Location: Left arm, Patient Position: Chair, Cuff Size: Adult Large)   Pulse 75   Wt 137 kg (302 lb)   LMP 10/02/2007   SpO2 97%   BMI 44.73 kg/m      Do you need any medication refills at today's visit? Yes    Stephon BUCK MA   [facility]   [facility]

## 2021-11-24 NOTE — PROGRESS NOTES
"Sherry Slaughter's goals for this visit include: Would like to avoid AFIB.     She requests these members of her care team be copied on today's visit information: PCP    PCP: Marilou Arciniega    Referring Provider:  No referring provider defined for this encounter.    /66 (BP Location: Left arm, Patient Position: Sitting, Cuff Size: Adult Regular)   Pulse 75   Ht 1.75 m (5' 8.9\")   Wt 137 kg (302 lb)   LMP 10/02/2007   SpO2 97%   BMI 44.73 kg/m      Do you need any medication refills at today's visit? Unsure.     Dada Lindsey, EMT  Clinic Support  [facility]    [phone]    Employed by [employer]        "

## 2021-11-29 ENCOUNTER — OFFICE VISIT (OUTPATIENT)
Dept: PODIATRY | Facility: CLINIC | Age: 66
End: 2021-11-29
Payer: COMMERCIAL

## 2021-11-29 VITALS — OXYGEN SATURATION: 95 % | SYSTOLIC BLOOD PRESSURE: 124 MMHG | DIASTOLIC BLOOD PRESSURE: 75 MMHG | HEART RATE: 70 BPM

## 2021-11-29 DIAGNOSIS — E11.49 TYPE II OR UNSPECIFIED TYPE DIABETES MELLITUS WITH NEUROLOGICAL MANIFESTATIONS, NOT STATED AS UNCONTROLLED(250.60) (H): Primary | ICD-10-CM

## 2021-11-29 DIAGNOSIS — E11.69 TYPE 2 DIABETES MELLITUS WITH DIABETIC FOOT DEFORMITY (H): ICD-10-CM

## 2021-11-29 DIAGNOSIS — Z87.2 HISTORY OF FOOT ULCER: ICD-10-CM

## 2021-11-29 DIAGNOSIS — M21.969 TYPE 2 DIABETES MELLITUS WITH DIABETIC FOOT DEFORMITY (H): ICD-10-CM

## 2021-11-29 PROCEDURE — 99213 OFFICE O/P EST LOW 20 MIN: CPT | Performed by: PODIATRIST

## 2021-11-29 ASSESSMENT — PAIN SCALES - GENERAL: PAINLEVEL: NO PAIN (0)

## 2021-11-29 NOTE — PROGRESS NOTES
Past Medical History:   Diagnosis Date     Cataract      Congestive heart failure (H) 2019 NOVEMBER    AFIB     DEPRESSION     comes and goes - tried meds - unsuccessfully, certain times of the year, no psych intervention and no counselors in the past - and not interested      Diverticulosis of colon (without mention of hemorrhage) 4/04    colonoscopy     ECHO-mildLVH,tr MR,mild thick lflets w inc LA,trTR   12/03     Essential hypertension, benign 1990s    late 1990s - started medications at that time - not to difficult to control meds      Fam hx-cardiovas dis NEC     father - CAD, and lipids/HTN - multiple members of the family      Family history of diabetes mellitus     sister and grandmother with DM      Family history of malignant neoplasm of breast     mother - young age - 45, and maternal cousin and aunt as well - no BRCA testing done      Family history of stroke (cerebrovascular)     grandmother in early life in her 40s      FAMILY HX COLON CANCER     Pat uncles x 2     Heart disease     murmur/     Heart murmur      HYPERLIPIDEMIA 2000    fairly recent - in the last 5 years - high for DM levels  -cholesterol recent      Irritable bowel syndrome     goes between the 2 - nerve related - more stressed more problems      MICROALBUMINURIA     unsure how long - been on the lisinopril - for a few years at well      Nonspecific abnormal results of liver function study 2/3/2003    SGOT - has been high in the past - since the hepatitis b - borderline elevation - not really changing any      OBESITY      Obstructive sleep apnea      GENESIS (obstructive sleep apnea) 10/15/2006    psg 5/15 AHI 53 aPAP 8-15     OSTEOARTHRITIS L KNEE 2003    total knee on the left - and pain is gone since the knee replacement      PERS HX HEPATITIS B- RESOLVED] 1977    acute virus only - no chronic disease      PERS HX HEPATITIS B- RESOLVED]      Type II or unspecified type diabetes mellitus without mention of complication, not stated as  uncontrolled 1991    oral meds frist, then insulin eventually later, difficult to control most of the time      Unspecified hypothyroidism 10/11/2006    TSH 3.36 - mild subclinical hypothyroidism - deciding on following or starting low dose meds - with dr Oreilly - following      Patient Active Problem List   Diagnosis     Morbid obesity (H)     Diverticulosis of large intestine     Nonspecific abnormal results of cardiovascular function study     FAMILY HX COLON CANCER     Nonallopathic lesion of thoracic region     Hypothyroidism     GENESIS (obstructive sleep apnea)     Irritable bowel syndrome     Family history of malignant neoplasm of breast     Type 2 diabetes mellitus treated with insulin (H)     History of major depression     OSTEOARTHRITIS L KNEE  s/p knee replacement on the left      Hypertension goal BP (blood pressure) < 140/90     Family history of stroke (cerebrovascular)     Family history of other cardiovascular diseases     JOINT PAIN-ANKLE - right ankle      Mitral valve insufficiency     Hyperlipidemia LDL goal <100     Aortic sclerosis     Tubular adenoma of colon     History of viral hepatitis, type B     Chronic low back pain     Long-term insulin use (H)     S/P total knee replacement using cement, right     Lumbago     Type 2 diabetes mellitus with hyperglycemia (H)     Posterior vitreous detachment of right eye     Pseudophakia of both eyes     Paroxysmal atrial fibrillation (H)     SOB (shortness of breath)     Bunion     On continuous oral anticoagulation     Past Surgical History:   Procedure Laterality Date     ABDOMEN SURGERY      ovaian scope/ appendix removal     ANESTHESIA CARDIOVERSION N/A 12/4/2020    Procedure: ANESTHESIA, FOR CARDIOVERSION @1400;  Surgeon: GENERIC ANESTHESIA PROVIDER;  Location: [facility] OR     ARTHROPLASTY KNEE Right 9/23/2015    Procedure: ARTHROPLASTY KNEE;  Surgeon: Rufus Brown MD;  Location:  OR     BUNIONECTOMY REN AND LEANDRO, COMBINED Left 2/2/2021     Procedure: Left bunion correction with bone cutting and screw fixation;  Surgeon: David Hoffman DPM;  Location: [facility] OR     C TOTAL KNEE ARTHROPLASTY  3/03    L knee     CATARACT IOL, RT/LT Left      COLONOSCOPY  4/04    diverticulosis, rec repeat 10 yrs(consider fam hx?)     COLONOSCOPY WITH CO2 INSUFFLATION N/A 6/19/2019    Procedure: COLONOSCOPY, WITH CO2 INSUFFLATION;  Surgeon: Zeb Duarte MD;  Location: [facility] OR     ORTHOPEDIC SURGERY      right ankle     PHACOEMULSIFICATION CLEAR CORNEA WITH STANDARD INTRAOCULAR LENS IMPLANT Left 3/15/2018    Procedure: PHACOEMULSIFICATION CLEAR CORNEA WITH STANDARD INTRAOCULAR LENS IMPLANT;  LEFT EYE PHACOEMULSIFICATION CLEAR CORNEA WITH STANDARD INTRAOCULAR LENS IMPLANT;  Surgeon: Bandar Linares MD;  Location: [facility]     PHACOEMULSIFICATION CLEAR CORNEA WITH STANDARD INTRAOCULAR LENS IMPLANT Right 4/5/2018    Procedure: PHACOEMULSIFICATION CLEAR CORNEA WITH STANDARD INTRAOCULAR LENS IMPLANT;  RIGHT PHACOEMULSIFICATION CLEAR CORNEA WITH STANDARD INTRAOCULAR LENS IMPLANT ;  Surgeon: Bandar Linares MD;  Location: [facility]     STRESS ECHO (METRO)  12/03    no ischemia, limited by fatigue     SURGICAL HISTORY OF -       EXP LAP- R OVARY CYSTS     SURGICAL HISTORY OF -   1981,1984,1985    CHILDBIRTH     ZZC NONSPECIFIC PROCEDURE  6/06    right triple arthrodecesis      ZZC NONSPECIFIC PROCEDURE  7/06     right bunion surgery      Social History     Socioeconomic History     Marital status:      Spouse name: Not on file     Number of children: 3     Years of education: Not on file     Highest education level: Not on file   Occupational History     Occupation: RN     Employer: [employer]   Tobacco Use     Smoking status: Never Smoker     Smokeless tobacco: Never Used     Tobacco comment: tried in early 20s only    Substance and Sexual Activity     Alcohol use: Yes     Comment: rare     Drug use: No     Sexual activity: Not Currently      Birth control/protection: Post-menopausal     Comment:  - since for 20 years, no signficant other  > 5 years sexual activity    Other Topics Concern      Service No     Blood Transfusions No     Caffeine Concern No     Occupational Exposure Yes     Comment: Normal nursing exposures     Hobby Hazards No     Sleep Concern Yes     Stress Concern No     Comment: Stress level at HM=5, stress level at WK=6     Weight Concern Yes     Special Diet Yes     Comment: Diabetic diet (watching carbs)     Back Care No     Comment: Occassional back spasms     Exercise Yes     Comment: Pt joined a health club goes approx 3-4 times a week,half to one mile daily     Bike Helmet No     Seat Belt Yes     Comment: Sometimes     Self-Exams Yes     Parent/sibling w/ CABG, MI or angioplasty before 65F 55M? 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      Social Determinants of Health     Financial Resource Strain: Not on file   Food Insecurity: Not on file   Transportation Needs: Not on file   Physical Activity: Not on file   Stress: Not on file   Social Connections: Not on file   Intimate Partner Violence: Not on file   Housing Stability: Not on file     Family History   Problem Relation Age of Onset     Diabetes Maternal Grandmother         DM TYPE 2     Cerebrovascular Disease Maternal Grandmother      Hypertension Sister      Lipids Sister      Heart Disease Sister         Chronic AFib     Diabetes Sister      Coronary Artery Disease Sister         afib     Hypertension Sister      Lipids Sister      Gynecology Sister      Diabetes Sister      Asthma Sister      Blood Disease Paternal Grandmother         T CELL LEUKEMIA     Hypertension Brother      Lipids Brother      Congenital Anomalies Brother      Cardiovascular Brother      Heart Disease Brother         CABG/AFIB     Coronary Artery Disease Brother         cabg afib AAA     Hypertension Brother       Lipids Brother      Other Cancer Brother         multple myeloma     Obesity Brother      Hypertension Brother      Respiratory Brother         Sleep apnea     Heart Disease Brother         AFib     Coronary Artery Disease Brother         afib     Alzheimer Disease Mother      Asthma Mother      Hypertension Mother      Breast Cancer Mother 40        has mastectomy and lived to 84     Allergies Mother         Sulfa,     Arthritis Mother      Cardiovascular Mother      Depression Mother      Respiratory Mother      Lipids Mother      Cancer Mother         breast     Thyroid Disease Mother      Cerebrovascular Disease Mother         FROM  AFIB     Dementia Mother         Alzheimers     Other Cancer Mother         breast     Cancer - colorectal Other      Colon Cancer Other         2019     Cancer Father         lung     Aneurysm Father         brother, AAA     Other Cancer Father         lung ca     Coronary Artery Disease Father         cad AAA     Asthma Sister      Cancer - colorectal Paternal Uncle         late 50s to early 60s - great uncles      Breast Cancer Other         Maternal cousin     Arrhythmia Sister         2 brothers 1 sister Afib     Breast Cancer Maternal Aunt 62     Other Cancer Maternal Aunt 35        Ovarian cancer.  Survived despite late recurrence and is now 92.     Glaucoma No family hx of      Macular Degeneration No family hx of      Lab Results   Component Value Date    A1C 8.1 11/24/2021    A1C 9.0 08/16/2021    A1C 6.8 01/05/2021    A1C 6.9 11/25/2020    A1C 7.5 07/22/2020    A1C 7.3 12/10/2019    A1C 7.5 08/21/2019     Last Comprehensive Metabolic Panel:  Sodium   Date Value Ref Range Status   11/24/2021 139 133 - 144 mmol/L Final   04/13/2021 137 133 - 144 mmol/L Final     Potassium   Date Value Ref Range Status   11/24/2021 3.6 3.4 - 5.3 mmol/L Final   04/13/2021 4.2 3.4 - 5.3 mmol/L Final     Chloride   Date Value Ref Range Status   11/24/2021 104 94 - 109 mmol/L Final   04/13/2021  106 94 - 109 mmol/L Final     Carbon Dioxide   Date Value Ref Range Status   04/13/2021 28 20 - 32 mmol/L Final     Carbon Dioxide (CO2)   Date Value Ref Range Status   11/24/2021 28 20 - 32 mmol/L Final     Anion Gap   Date Value Ref Range Status   11/24/2021 7 3 - 14 mmol/L Final   04/13/2021 3 3 - 14 mmol/L Final     Glucose   Date Value Ref Range Status   11/24/2021 132 (H) 70 - 99 mg/dL Final   04/13/2021 98 70 - 99 mg/dL Final     Comment:     Non Fasting     Urea Nitrogen   Date Value Ref Range Status   11/24/2021 23 7 - 30 mg/dL Final   04/13/2021 22 7 - 30 mg/dL Final     Creatinine   Date Value Ref Range Status   11/24/2021 0.93 0.52 - 1.04 mg/dL Final   04/13/2021 0.81 0.52 - 1.04 mg/dL Final     GFR Estimate   Date Value Ref Range Status   11/24/2021 63 >60 mL/min/1.73m2 Final     Comment:     As of July 11, 2021, eGFR is calculated by the CKD-EPI creatinine equation, without race adjustment. eGFR can be influenced by muscle mass, exercise, and diet. The reported eGFR is an estimation only and is only applicable if the renal function is stable.   04/13/2021 75 >60 mL/min/[1.73_m2] Final     Comment:     Non  GFR Calc  Starting 12/18/2018, serum creatinine based estimated GFR (eGFR) will be   calculated using the Chronic Kidney Disease Epidemiology Collaboration   (CKD-EPI) equation.       Calcium   Date Value Ref Range Status   11/24/2021 10.0 8.5 - 10.1 mg/dL Final   04/13/2021 10.1 8.5 - 10.1 mg/dL Final     SUBJECTIVE FINDINGS:  A 66-year-old female returns to clinic for diabetic foot cares.  She is diabetic.  She relates she has no neuropathy and no numbness or tingling in feet.  She relates no ulcers or sores since we have seen her last.  She relates she did previously have a Hallux ulcer on the left foot.  Relates she is wearing her diabetic shoes.  Relates she is wearing compression socks and will be getting a vein procedure on left leg at the end of December at [facility].     OBJECTIVE FINDINGS:  DP and PT are 2/4 bilaterally.  She has dorsally contracted digits 2 through 5 bilaterally.  She has flat foot type bilaterally.  There is no erythema, no drainage, no odor, no calor bilaterally.  She has varicosities.  She has minimal hyperkeratotic tissue build up left plantar medial Hallux and 1st mpj.        ASSESSMENT AND PLAN:  Diabetes with peripheral neuropathy.  No symptoms of Neuropathy today.  Diabetes with foot deformity.  She has a history of a foot ulcer that is doing well.  Continue Diabetic shoes.  Diagnosis and treatment discussed with her.  She will return to clinic and see me in 3-4 months.  Previous notes reviewed.        Moderate level of medical decision making with Number and Complexity of  Problems Addressed- moderate,  Amount and/or Complexity of Data to be Reviewed  and Analyzed- limited, and the Risk of Complications and/or  Morbidity or Mortality of  Patient Management- moderate.

## 2021-11-29 NOTE — NURSING NOTE
Sherry Slaughter's chief complaint for this visit includes:  Chief Complaint   Patient presents with     Left Foot - RECHECK     RECHECK     follow up diabetic foot care       No chief complaint on file.    PCP: Marilou Arciniega    Referring Provider:  No referring provider defined for this encounter.    [facility] 10/02/2007   Data Unavailable       Do you need any medication refills at today's visit?no  Aravind Martin CMA  '

## 2021-11-29 NOTE — LETTER
11/29/2021         RE: Sherry Slaughter  [address]  [place] [address]        Dear Colleague,    Thank you for referring your patient, Sherry Slaughter, to the [facility]. Please see a copy of my visit note below.    Past Medical History:   Diagnosis Date     Cataract      Congestive heart failure (H) 2019 NOVEMBER    AFIB     DEPRESSION     comes and goes - tried meds - unsuccessfully, certain times of the year, no psych intervention and no counselors in the past - and not interested      Diverticulosis of colon (without mention of hemorrhage) 4/04    colonoscopy     ECHO-mildLVH,tr MR,mild thick lflets w inc LA,trTR   12/03     Essential hypertension, benign 1990s    late 1990s - started medications at that time - not to difficult to control meds      Fam hx-cardiovas dis [facility]     father - CAD, and lipids/HTN - multiple members of the family      Family history of diabetes mellitus     sister and grandmother with DM      Family history of malignant neoplasm of breast     mother - young age - 45, and maternal cousin and aunt as well - no BRCA testing done      Family history of stroke (cerebrovascular)     grandmother in early life in her 40s      FAMILY HX COLON CANCER     Pat uncles x 2     Heart disease     murmur/     Heart murmur      HYPERLIPIDEMIA 2000    fairly recent - in the last 5 years - high for DM levels  -cholesterol recent      Irritable bowel syndrome     goes between the 2 - nerve related - more stressed more problems      MICROALBUMINURIA     unsure how long - been on the lisinopril - for a few years at well      Nonspecific abnormal results of liver function study 2/3/2003    SGOT - has been high in the past - since the hepatitis b - borderline elevation - not really changing any      OBESITY      Obstructive sleep apnea      GENESIS (obstructive sleep apnea) 10/15/2006    psg 5/15 AHI 53 aPAP 8-15     OSTEOARTHRITIS L KNEE 2003    total knee on the  left - and pain is gone since the knee replacement      PERS HX HEPATITIS B- RESOLVED] 1977    acute virus only - no chronic disease      PERS HX HEPATITIS B- RESOLVED]      Type II or unspecified type diabetes mellitus without mention of complication, not stated as uncontrolled 1991    oral meds frist, then insulin eventually later, difficult to control most of the time      Unspecified hypothyroidism 10/11/2006    TSH 3.36 - mild subclinical hypothyroidism - deciding on following or starting low dose meds - with dr Oreilly - following      Patient Active Problem List   Diagnosis     Morbid obesity (H)     Diverticulosis of large intestine     Nonspecific abnormal results of cardiovascular function study     FAMILY HX COLON CANCER     Nonallopathic lesion of thoracic region     Hypothyroidism     GENESIS (obstructive sleep apnea)     Irritable bowel syndrome     Family history of malignant neoplasm of breast     Type 2 diabetes mellitus treated with insulin (H)     History of major depression     OSTEOARTHRITIS L KNEE  s/p knee replacement on the left      Hypertension goal BP (blood pressure) < 140/90     Family history of stroke (cerebrovascular)     Family history of other cardiovascular diseases     JOINT PAIN-ANKLE - right ankle      Mitral valve insufficiency     Hyperlipidemia LDL goal <100     Aortic sclerosis     Tubular adenoma of colon     History of viral hepatitis, type B     Chronic low back pain     Long-term insulin use (H)     S/P total knee replacement using cement, right     Lumbago     Type 2 diabetes mellitus with hyperglycemia (H)     Posterior vitreous detachment of right eye     Pseudophakia of both eyes     Paroxysmal atrial fibrillation (H)     SOB (shortness of breath)     Bunion     On continuous oral anticoagulation     Past Surgical History:   Procedure Laterality Date     ABDOMEN SURGERY      ovaian scope/ appendix removal     ANESTHESIA CARDIOVERSION N/A 12/4/2020    Procedure:  ANESTHESIA, FOR CARDIOVERSION @1400;  Surgeon: GENERIC ANESTHESIA PROVIDER;  Location: [facility]     ARTHROPLASTY KNEE Right 9/23/2015    Procedure: ARTHROPLASTY KNEE;  Surgeon: Rufus Brown MD;  Location:  OR     BUNIONECTOMY REN AND LEANDRO, COMBINED Left 2/2/2021    Procedure: Left bunion correction with bone cutting and screw fixation;  Surgeon: David Hoffman DPM;  Location: [facility] OR     C TOTAL KNEE ARTHROPLASTY  3/03    L knee     CATARACT IOL, RT/LT Left      COLONOSCOPY  4/04    diverticulosis, rec repeat 10 yrs(consider fam hx?)     COLONOSCOPY WITH CO2 INSUFFLATION N/A 6/19/2019    Procedure: COLONOSCOPY, WITH CO2 INSUFFLATION;  Surgeon: Zeb Duarte MD;  Location: [facility] OR     ORTHOPEDIC SURGERY      right ankle     PHACOEMULSIFICATION CLEAR CORNEA WITH STANDARD INTRAOCULAR LENS IMPLANT Left 3/15/2018    Procedure: PHACOEMULSIFICATION CLEAR CORNEA WITH STANDARD INTRAOCULAR LENS IMPLANT;  LEFT EYE PHACOEMULSIFICATION CLEAR CORNEA WITH STANDARD INTRAOCULAR LENS IMPLANT;  Surgeon: Bandar Linares MD;  Location:  [facility]     PHACOEMULSIFICATION CLEAR CORNEA WITH STANDARD INTRAOCULAR LENS IMPLANT Right 4/5/2018    Procedure: PHACOEMULSIFICATION CLEAR CORNEA WITH STANDARD INTRAOCULAR LENS IMPLANT;  RIGHT PHACOEMULSIFICATION CLEAR CORNEA WITH STANDARD INTRAOCULAR LENS IMPLANT ;  Surgeon: Bandar Linares MD;  Location:  [facility]     STRESS ECHO (METRO)  12/03    no ischemia, limited by fatigue     SURGICAL HISTORY OF -       EXP LAP- R OVARY CYSTS     SURGICAL HISTORY OF -   1981,1984,1985    CHILDBIRTH     ZZC NONSPECIFIC PROCEDURE  6/06    right triple arthrodecesis      ZZC NONSPECIFIC PROCEDURE  7/06     right bunion surgery      Social History     Socioeconomic History     Marital status:      Spouse name: Not on file     Number of children: 3     Years of education: Not on file     Highest education level: Not on file   Occupational History     Occupation: RN     Employer: [employer]   Tobacco Use     Smoking status: Never Smoker     Smokeless tobacco: Never Used     Tobacco comment: tried in early 20s only    Substance and Sexual Activity     Alcohol use: Yes     Comment: rare     Drug use: No     Sexual activity: Not Currently     Birth control/protection: Post-menopausal     Comment:  - since for 20 years, no signficant other  > 5 years sexual activity    Other Topics Concern      Service No     Blood Transfusions No     Caffeine Concern No     Occupational Exposure Yes     Comment: Normal nursing exposures     Hobby Hazards No     Sleep Concern Yes     Stress Concern No     Comment: Stress level at HM=5, stress level at WK=6     Weight Concern Yes     Special Diet Yes     Comment: Diabetic diet (watching carbs)     Back Care No     Comment: Occassional back spasms     Exercise Yes     Comment: Pt joined a health club goes approx 3-4 times a week,half to one mile daily     Bike Helmet No     Seat Belt Yes     Comment: Sometimes     Self-Exams Yes     Parent/sibling w/ CABG, MI or angioplasty before 65F 55M? 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      Social Determinants of Health     Financial Resource Strain: Not on file   Food Insecurity: Not on file   Transportation Needs: Not on file   Physical Activity: Not on file   Stress: Not on file   Social Connections: Not on file   Intimate Partner Violence: Not on file   Housing Stability: Not on file     Family History   Problem Relation Age of Onset     Diabetes Maternal Grandmother         DM TYPE 2     Cerebrovascular Disease Maternal Grandmother      Hypertension Sister      Lipids Sister      Heart Disease Sister         Chronic AFib     Diabetes Sister      Coronary Artery Disease Sister         afib     Hypertension Sister      Lipids Sister      Gynecology Sister      Diabetes Sister      Asthma Sister      Blood Disease Paternal  Grandmother         T CELL LEUKEMIA     Hypertension Brother      Lipids Brother      Congenital Anomalies Brother      Cardiovascular Brother      Heart Disease Brother         CABG/AFIB     Coronary Artery Disease Brother         cabg afib AAA     Hypertension Brother      Lipids Brother      Other Cancer Brother         multple myeloma     Obesity Brother      Hypertension Brother      Respiratory Brother         Sleep apnea     Heart Disease Brother         AFib     Coronary Artery Disease Brother         afib     Alzheimer Disease Mother      Asthma Mother      Hypertension Mother      Breast Cancer Mother 40        has mastectomy and lived to 84     Allergies Mother         Sulfa,     Arthritis Mother      Cardiovascular Mother      Depression Mother      Respiratory Mother      Lipids Mother      Cancer Mother         breast     Thyroid Disease Mother      Cerebrovascular Disease Mother         FROM  AFIB     Dementia Mother         Alzheimers     Other Cancer Mother         breast     Cancer - colorectal Other      Colon Cancer Other         2019     Cancer Father         lung     Aneurysm Father         brother, AAA     Other Cancer Father         lung ca     Coronary Artery Disease Father         cad AAA     Asthma Sister      Cancer - colorectal Paternal Uncle         late 50s to early 60s - great uncles      Breast Cancer Other         Maternal cousin     Arrhythmia Sister         2 brothers 1 sister Afib     Breast Cancer Maternal Aunt 62     Other Cancer Maternal Aunt 35        Ovarian cancer.  Survived despite late recurrence and is now 92.     Glaucoma No family hx of      Macular Degeneration No family hx of      Lab Results   Component Value Date    A1C 8.1 11/24/2021    A1C 9.0 08/16/2021    A1C 6.8 01/05/2021    A1C 6.9 11/25/2020    A1C 7.5 07/22/2020    A1C 7.3 12/10/2019    A1C 7.5 08/21/2019     Last Comprehensive Metabolic Panel:  Sodium   Date Value Ref Range Status   11/24/2021 139 133 -  144 mmol/L Final   04/13/2021 137 133 - 144 mmol/L Final     Potassium   Date Value Ref Range Status   11/24/2021 3.6 3.4 - 5.3 mmol/L Final   04/13/2021 4.2 3.4 - 5.3 mmol/L Final     Chloride   Date Value Ref Range Status   11/24/2021 104 94 - 109 mmol/L Final   04/13/2021 106 94 - 109 mmol/L Final     Carbon Dioxide   Date Value Ref Range Status   04/13/2021 28 20 - 32 mmol/L Final     Carbon Dioxide (CO2)   Date Value Ref Range Status   11/24/2021 28 20 - 32 mmol/L Final     Anion Gap   Date Value Ref Range Status   11/24/2021 7 3 - 14 mmol/L Final   04/13/2021 3 3 - 14 mmol/L Final     Glucose   Date Value Ref Range Status   11/24/2021 132 (H) 70 - 99 mg/dL Final   04/13/2021 98 70 - 99 mg/dL Final     Comment:     Non Fasting     Urea Nitrogen   Date Value Ref Range Status   11/24/2021 23 7 - 30 mg/dL Final   04/13/2021 22 7 - 30 mg/dL Final     Creatinine   Date Value Ref Range Status   11/24/2021 0.93 0.52 - 1.04 mg/dL Final   04/13/2021 0.81 0.52 - 1.04 mg/dL Final     GFR Estimate   Date Value Ref Range Status   11/24/2021 63 >60 mL/min/1.73m2 Final     Comment:     As of July 11, 2021, eGFR is calculated by the CKD-EPI creatinine equation, without race adjustment. eGFR can be influenced by muscle mass, exercise, and diet. The reported eGFR is an estimation only and is only applicable if the renal function is stable.   04/13/2021 75 >60 mL/min/[1.73_m2] Final     Comment:     Non  GFR Calc  Starting 12/18/2018, serum creatinine based estimated GFR (eGFR) will be   calculated using the Chronic Kidney Disease Epidemiology Collaboration   (CKD-EPI) equation.       Calcium   Date Value Ref Range Status   11/24/2021 10.0 8.5 - 10.1 mg/dL Final   04/13/2021 10.1 8.5 - 10.1 mg/dL Final     SUBJECTIVE FINDINGS:  A 66-year-old female returns to clinic for diabetic foot cares.  She is diabetic.  She relates she has no neuropathy and no numbness or tingling in feet.  She relates no ulcers or sores  since we have seen her last.  She relates she did previously have a Hallux ulcer on the left foot.  Relates she is wearing her diabetic shoes.  Relates she is wearing compression socks and will be getting a vein procedure on left leg at the end of December at [facility].     OBJECTIVE FINDINGS:  DP and PT are 2/4 bilaterally.  She has dorsally contracted digits 2 through 5 bilaterally.  She has flat foot type bilaterally.  There is no erythema, no drainage, no odor, no calor bilaterally.  She has varicosities.  She has minimal hyperkeratotic tissue build up left plantar medial Hallux and 1st mpj.        ASSESSMENT AND PLAN:  Diabetes with peripheral neuropathy.  No symptoms of Neuropathy today.  Diabetes with foot deformity.  She has a history of a foot ulcer that is doing well.  Continue Diabetic shoes.  Diagnosis and treatment discussed with her.  She will return to clinic and see me in 3-4 months.  Previous notes reviewed.        Moderate level of medical decision making with Number and Complexity of  Problems Addressed- moderate,  Amount and/or Complexity of Data to be Reviewed  and Analyzed- limited, and the Risk of Complications and/or  Morbidity or Mortality of  Patient Management- moderate.      Again, thank you for allowing me to participate in the care of your patient.        Sincerely,        Rufus Hutson DPM

## 2021-12-21 ENCOUNTER — TELEPHONE (OUTPATIENT)
Dept: VASCULAR SURGERY | Facility: CLINIC | Age: 66
End: 2021-12-21
Payer: COMMERCIAL

## 2021-12-21 DIAGNOSIS — I83.812 VARICOSE VEINS OF LEFT LOWER EXTREMITY WITH PAIN: Primary | ICD-10-CM

## 2021-12-21 NOTE — TELEPHONE ENCOUNTER
Vein Clinic Preoperative Nurse Call    Procedure: Left leg VenaSeal GSV(med nec)  Date: 12/30/2021  Surgeon: DAYANA Pride MD  Time: 1000  Check in time: 0930    Called and left detailed message. Informed patient: when to check in (0930) to sign consent, to take amoxicillin 2g @ 0900 (I hour prior to procedure) at home . Patient will stay on Xarelto for procedure. Instructed patient to wear a mask, wear loose-fitting comfortable clothing, and may drive herself. Visitors are allowed in the clinic, but they would need to stay in an exam room or wait in the parking lot or leave. If  does leave, IF POSSIBLE, we ask that they do not go more than 15-20 mins from our clinic. Once procedure is completed, we will keep patient in recovery for 30-45 mins, and call  with aftercare instructions.     Pt will need thigh-high compression hose  not for this procedure but at 6 week postop if U/S guided sclero is performed.  Status of the hose: unknown    Special instructions:   Pt to stay on Xarelto for procedure.  Pt to take Amoxicillin 2g 1 hour prior to procedure (take at 0900)   Take ASA 325mg 1 tablet every day following procedure for 72 hours.    Special COVID-19 instructions: Patient aware they need to wear a mask to our clinic and during their procedure. Patient aware that their  can come in with them, but would need to stay in an exam room during the procedure or wait in the parking lot or leave. Nurse will call pt's  when procedure is over.    Patient understands that if they have any of the following symptoms (fever, cough, shortness of breath, rash), they need to notify us immediately to cancel their procedure and will have to reschedule for a later date.    Patient is in agreement with preoperative information and has no further questions.    Chris Kat RN  12/21/2021

## 2021-12-22 RX ORDER — AMOXICILLIN 500 MG/1
CAPSULE ORAL
Qty: 4 CAPSULE | Refills: 0 | Status: SHIPPED | OUTPATIENT
Start: 2021-12-22 | End: 2022-01-06

## 2021-12-30 ENCOUNTER — OFFICE VISIT (OUTPATIENT)
Dept: VASCULAR SURGERY | Facility: CLINIC | Age: 66
End: 2021-12-30
Payer: COMMERCIAL

## 2021-12-30 VITALS — DIASTOLIC BLOOD PRESSURE: 53 MMHG | SYSTOLIC BLOOD PRESSURE: 93 MMHG | OXYGEN SATURATION: 96 % | HEART RATE: 66 BPM

## 2021-12-30 DIAGNOSIS — I83.812 VARICOSE VEINS OF LEFT LOWER EXTREMITY WITH PAIN: ICD-10-CM

## 2021-12-30 DIAGNOSIS — I83.892 VARICOSE VEINS OF LEG WITH SWELLING, LEFT: ICD-10-CM

## 2021-12-30 PROCEDURE — 36483 ENDOVEN THER CHEM ADHES SBSQ: CPT | Mod: LT | Performed by: SURGERY

## 2021-12-30 PROCEDURE — 36482 ENDOVEN THER CHEM ADHES 1ST: CPT | Mod: LT | Performed by: SURGERY

## 2021-12-30 NOTE — LETTER
12/30/2021         RE: Sherry Slaughter  [address]  [place] [address]        Dear Colleague,    Thank you for referring your patient, Sherry Slaughter, to the [facility] [place]. Please see a copy of my visit note below.        Vein Clinic Procedure Note    Indications:  Left leg varicose veins with pain and swelling; symptoms recalcitrant to conservative measures    Procedure:  1. Cyanoacrylate glue ablation left great saphenous vein (VenaSeal procedure)  2. Cyanoacrylate glue ablation left small saphenous vein (VenaSeal procedure)    Surgeon  LISA Pride MD    Procedure Description  Details of the procedure including risks of bleeding, infection, nerve injury, scarring, hyperpigmentation, deep vein thrombosis, recanalization of the left great saphenous or small saphenous veins, glue hypersensitivity response and failure of her varicose veins to resolve were all discussed.  The patient voiced understanding and wished to proceed.  Informed consent was obtained.     I  marked the left lower extremity, the operative site, with an indelible marker.  We proceeded the operating room, had the patient lie supine on the operating table, then prepped and draped the left lower extremity sterilely.    We took a timeout to confirm the appropriate operative site and procedure: Cyanoacrylate glue ablation left great saphenous and small saphenous veins.    VNUS Closure  I imaged the left lower extremity easily identifying the left great saphenous vein.  I infiltrated the skin overlying the vein about 6 to 8 cm cephalad of the malleolus as a large tributary course from the great saphenous vein just cephalad of this level. I placed a micropuncture needle into the vein followed by a micropuncture guidewire and a 7 Norwegian sheath.    I then passed the primed VenaSeal delivery catheter through the sheath up to the saphenofemoral junction then withdrew the delivery catheter 5 cm position  the tip 5 cm from the saphenofemoral junction under ultrasound guidance. I confirmed catheter tip position, identified the delivery catheter tip then slid the probe 1.5 cm cephalad. I occluded the vein, delivered 1 aliquot of glue, withdrew the catheter 1 cm, delivered another aliquot of glue, withdrew the catheter 3 cm then held compression for 3 minutes. After 3 minutes of compression, I confirmed that the proximal great saphenous vein, where treated, was closed and that the more cephalad portion of the vein was fully compressible and free of thrombus. I then reidentified the delivery catheter tip, slid my probe 1.5 cm cephalad, occluded the vein, delivered 1 aliquot of glue, withdrew the catheter 3 cm, delivered another aliquot of glue, withdrew the catheter 3 cm then held compression for 30 seconds. This latter sequence was repeated until we approached the introducer sheath. The last aliquot of glue was delivered approximately 3 cm cephalad of the introducer sheath and compression held for 30 seconds. We then removed the delivery catheter, leaving the introducer sheath in place. Lastly, we remove the introducer sheath and gain hemostasis with pressure.    There were a few segments of vein where we deliver 2 aliquots of glue, especially at more dilated segments of the vein or at the orifices of large branch tributaries.    After completing the pullback, I reimaged the vein and the vein to be non-compressible and closed.  The saphenofemoral junction and common femoral veins were fully compressible and free of thrombus. The sheath and the catheter were removed and hemostasis secured with pressure.    We then had the patient turned prone on the operating table and I imaged the posterior left calf identifying the small saphenous vein. There was a large tributary coursing from the small saphenous vein in the proximal calf. I accessed the vein just distal to the mid calf,  then directed the primed delivery catheter up  the small saphenous vein position the tip of the device 5 cm from a communication between the small saphenous vein and the popliteal vein. This did not represent the true saphenopopliteal junction, however. Even still, I was careful not to allow glue to enter this communicating vein so as to lower the risk of deep vein thrombosis. We treated the small saphenous vein in exactly the same fashion as the great saphenous vein, delivering additional aliquots of glue at the origin of the large tributary from the proximal small saphenous vein.    Because we treated the long segment of the great saphenous vein, we had used most of the glue. I treated the proximal segment of the small saphenous vein where the large tributary originated but did not treat the mid small saphenous vein because the delivery catheter became obstructed.    The patient tolerated this procedure well but evidence of lower tract of complications. We cleaned her leg, dressed vein access sites with small gauze and Tegaderm. Move the patient to recovery observed for 30 minutes to ensure excellent stasis then allowed her to walk to her car with her sister in attendance. Her sister drove her home.    Post procedure instructions were given to the patient and her sister in verbal and written form.  They both voiced understanding and their questions were answered.    The patient tolerated the procedure well without evidence of allergic reaction or other complications and will return in 72 hours for a left lower extremity venous ultrasound.    Venaseal Closure    Date/Time: 12/30/2021 2:30 PM  Performed by: Sawyer Pride MD  Authorized by: Sawyer Pride MD     Time out: Immediately prior to the procedure a time out was called    Preparation: Patient was prepped and draped in usual sterile fashion    1st Assist:  Soraida Lazo CST/YONNY  Circulator:  Chris Kat RN  Procedure:  Venaseal  Procedure side:  Left  Vein Treated:   GSV  Patient tolerance:  Patient tolerated the procedure well with no immediate complications  Venaseal Closure    Date/Time: 12/30/2021 2:30 PM  Performed by: Sawyer Pride MD  Authorized by: Sawyer Pride MD     Procedure:  Venaseal  Procedure side:  Left  Vein(s) (VenaSeal):     Second and Subsequent Vein    Vein Treated:  SSV  Patient tolerance:  Patient tolerated the procedure well with no immediate complications        Flowsheet Data 12/30/2021   Procedure Start Time: 11:19 AM   Prep: Chloraprep   Side: Left   Tx Length (cm): LT GSV: 65   Junction (cm): LT GSV: 5   How many additional vein treatments are being performed? 1   Tx Length (cm) - 2: LT SSV: 19   Junction 2 (cm): LT SSV: 5   Cyanoacrylate used (ml): LT: 5   Sedation taken: No   Pre Pt. Physical / Cognitive Limitations: WNL   TOTAL Local anesthesia Injected (ml): 7   Max Volume Local Anesthesia (ml): 11   Post Pt. Physical / Cognitive Limitations: WNL   Procedure End Time: 12:25 PM   D/C Instructions given, states readiness to leave and escorted to car: Yes       DAYANA Pride MD    Dictated using Dragon voice recognition software which may result in transcription errors    Pre-procedure Nursing Note    Sherry FLOREZ Sukhjinder presents to clinic for Vein Procedure  .   /Person Responsible for Patient:   Phone Number: here    Prophylactic Medication:Antibiotics, 2000mg,   Time Taken: 9:00am   Sedation Medication: N/A  Compression Stockings: n/a  The procedure is being performed on LLE.  Patient understanding of procedure matches consent? YES    Patient's pre-procedure medications verified by      Kailyn Ponce MA on 12/30/2021 at 9:38 AM      Again, thank you for allowing me to participate in the care of your patient.        Sincerely,        Sawyer Pride MD

## 2021-12-30 NOTE — PROGRESS NOTES
Vein Clinic Procedure Note    Indications:  Left leg varicose veins with pain and swelling; symptoms recalcitrant to conservative measures    Procedure:  1. Cyanoacrylate glue ablation left great saphenous vein (VenaSeal procedure)  2. Cyanoacrylate glue ablation left small saphenous vein (VenaSeal procedure)    Surgeon  LISA Pride MD    Procedure Description  Details of the procedure including risks of bleeding, infection, nerve injury, scarring, hyperpigmentation, deep vein thrombosis, recanalization of the left great saphenous or small saphenous veins, glue hypersensitivity response and failure of her varicose veins to resolve were all discussed.  The patient voiced understanding and wished to proceed.  Informed consent was obtained.     I  marked the left lower extremity, the operative site, with an indelible marker.  We proceeded the operating room, had the patient lie supine on the operating table, then prepped and draped the left lower extremity sterilely.    We took a timeout to confirm the appropriate operative site and procedure: Cyanoacrylate glue ablation left great saphenous and small saphenous veins.    VNUS Closure  I imaged the left lower extremity easily identifying the left great saphenous vein.  I infiltrated the skin overlying the vein about 6 to 8 cm cephalad of the malleolus as a large tributary course from the great saphenous vein just cephalad of this level. I placed a micropuncture needle into the vein followed by a micropuncture guidewire and a 7 Ukrainian sheath.    I then passed the primed VenaSeal delivery catheter through the sheath up to the saphenofemoral junction then withdrew the delivery catheter 5 cm position the tip 5 cm from the saphenofemoral junction under ultrasound guidance. I confirmed catheter tip position, identified the delivery catheter tip then slid the probe 1.5 cm cephalad. I occluded the vein, delivered 1 aliquot of glue, withdrew the catheter 1 cm, delivered  another aliquot of glue, withdrew the catheter 3 cm then held compression for 3 minutes. After 3 minutes of compression, I confirmed that the proximal great saphenous vein, where treated, was closed and that the more cephalad portion of the vein was fully compressible and free of thrombus. I then reidentified the delivery catheter tip, slid my probe 1.5 cm cephalad, occluded the vein, delivered 1 aliquot of glue, withdrew the catheter 3 cm, delivered another aliquot of glue, withdrew the catheter 3 cm then held compression for 30 seconds. This latter sequence was repeated until we approached the introducer sheath. The last aliquot of glue was delivered approximately 3 cm cephalad of the introducer sheath and compression held for 30 seconds. We then removed the delivery catheter, leaving the introducer sheath in place. Lastly, we remove the introducer sheath and gain hemostasis with pressure.    There were a few segments of vein where we deliver 2 aliquots of glue, especially at more dilated segments of the vein or at the orifices of large branch tributaries.    After completing the pullback, I reimaged the vein and the vein to be non-compressible and closed.  The saphenofemoral junction and common femoral veins were fully compressible and free of thrombus. The sheath and the catheter were removed and hemostasis secured with pressure.    We then had the patient turned prone on the operating table and I imaged the posterior left calf identifying the small saphenous vein. There was a large tributary coursing from the small saphenous vein in the proximal calf. I accessed the vein just distal to the mid calf,  then directed the primed delivery catheter up the small saphenous vein position the tip of the device 5 cm from a communication between the small saphenous vein and the popliteal vein. This did not represent the true saphenopopliteal junction, however. Even still, I was careful not to allow glue to enter this  communicating vein so as to lower the risk of deep vein thrombosis. We treated the small saphenous vein in exactly the same fashion as the great saphenous vein, delivering additional aliquots of glue at the origin of the large tributary from the proximal small saphenous vein.    Because we treated the long segment of the great saphenous vein, we had used most of the glue. I treated the proximal segment of the small saphenous vein where the large tributary originated but did not treat the mid small saphenous vein because the delivery catheter became obstructed.    The patient tolerated this procedure well but evidence of lower tract of complications. We cleaned her leg, dressed vein access sites with small gauze and Tegaderm. Move the patient to recovery observed for 30 minutes to ensure excellent stasis then allowed her to walk to her car with her sister in attendance. Her sister drove her home.    Post procedure instructions were given to the patient and her sister in verbal and written form.  They both voiced understanding and their questions were answered.    The patient tolerated the procedure well without evidence of allergic reaction or other complications and will return in 72 hours for a left lower extremity venous ultrasound.    Venaseal Closure    Date/Time: 12/30/2021 2:30 PM  Performed by: Sawyer Pride MD  Authorized by: Sawyer Pride MD     Time out: Immediately prior to the procedure a time out was called    Preparation: Patient was prepped and draped in usual sterile fashion    1st Assist:  Soraida Lazo CST/YONNY  Circulator:  Chris Kat RN  Procedure:  Venaseal  Procedure side:  Left  Vein Treated:  GSV  Patient tolerance:  Patient tolerated the procedure well with no immediate complications  Venaseal Closure    Date/Time: 12/30/2021 2:30 PM  Performed by: Sawyer Pride MD  Authorized by: Sawyer Pride MD     Procedure:  Venaseal  Procedure  side:  Left  Vein(s) (VenaSeal):     Second and Subsequent Vein    Vein Treated:  SSV  Patient tolerance:  Patient tolerated the procedure well with no immediate complications        Flowsheet Data 12/30/2021   Procedure Start Time: 11:19 AM   Prep: Chloraprep   Side: Left   Tx Length (cm): LT GSV: 65   Junction (cm): LT GSV: 5   How many additional vein treatments are being performed? 1   Tx Length (cm) - 2: LT SSV: 19   Junction 2 (cm): LT SSV: 5   Cyanoacrylate used (ml): LT: 5   Sedation taken: No   Pre Pt. Physical / Cognitive Limitations: WNL   TOTAL Local anesthesia Injected (ml): 7   Max Volume Local Anesthesia (ml): 11   Post Pt. Physical / Cognitive Limitations: WNL   Procedure End Time: 12:25 PM   D/C Instructions given, states readiness to leave and escorted to car: Yes       DAYANA Pride MD    Dictated using Dragon voice recognition software which may result in transcription errors    Pre-procedure Nursing Note    Sherry Slaughter presents to clinic for Vein Procedure  .   /Person Responsible for Patient:   Phone Number: here    Prophylactic Medication:Antibiotics, 2000mg,   Time Taken: 9:00am   Sedation Medication: N/A  Compression Stockings: n/a  The procedure is being performed on LLE.  Patient understanding of procedure matches consent? YES    Patient's pre-procedure medications verified by      Kailyn Ponce MA on 12/30/2021 at 9:38 AM

## 2022-01-03 ENCOUNTER — ANCILLARY PROCEDURE (OUTPATIENT)
Dept: ULTRASOUND IMAGING | Facility: CLINIC | Age: 67
End: 2022-01-03
Attending: SURGERY
Payer: COMMERCIAL

## 2022-01-03 ENCOUNTER — OFFICE VISIT (OUTPATIENT)
Dept: VASCULAR SURGERY | Facility: CLINIC | Age: 67
End: 2022-01-03
Attending: SURGERY
Payer: COMMERCIAL

## 2022-01-03 DIAGNOSIS — I83.812 VARICOSE VEINS OF LEFT LOWER EXTREMITY WITH PAIN: ICD-10-CM

## 2022-01-03 DIAGNOSIS — Z09 POSTOP CHECK: Primary | ICD-10-CM

## 2022-01-03 PROCEDURE — 93971 EXTREMITY STUDY: CPT | Mod: LT | Performed by: SURGERY

## 2022-01-03 PROCEDURE — 99207 PR NO CHARGE NURSE ONLY: CPT

## 2022-01-03 NOTE — LETTER
1/3/2022         RE: Sherry Slaughter  [address]  [place] [address]        Dear Colleague,    Thank you for referring your patient, Sherry Slaughter, to the [facility] [place]. Please see a copy of my visit note below.        Vein Clinic Postoperative Nurse Note    Patient is here for their 96 hour postoperative visit.    Procedure: Left leg VenaSeal GSV(med nec) and SSV (med nec) Procedure Date: 12/30/2021  Surgeon: Shannen    Ultrasound Result: Left lower leg negative for DVT. Left GSV closed 43 mm from SFJ to mid calf. Left SSV closed 34mm from SPJ to mid calf.     Physical Exam: Incisions are approximated without signs of infection.  Ecchymosis: mild bruising noted on posterior calf at insertion site.   Swelling: patient denies any swelling.   Paresthesia: patient denies any numbness or tingling.     Patient Questions or Concerns: none    Reviewed postoperative instructions with patient and provided them with written material of common things to expect from their procedure.     Patient's Next Vein Clinic Appointment: Mon Feb 14th 2022    Corina Jade RN      Again, thank you for allowing me to participate in the care of your patient.        Sincerely,         VEIN NURSE

## 2022-01-03 NOTE — PROGRESS NOTES
Vein Clinic Postoperative Nurse Note    Patient is here for their 96 hour postoperative visit.    Procedure: Left leg VenaSeal GSV(med nec) and SSV (med nec) Procedure Date: 12/30/2021  Surgeon: Shannen    Ultrasound Result: Left lower leg negative for DVT. Left GSV closed 43 mm from SFJ to mid calf. Left SSV closed 34mm from SPJ to mid calf.     Physical Exam: Incisions are approximated without signs of infection.  Ecchymosis: mild bruising noted on posterior calf at insertion site.   Swelling: patient denies any swelling.   Paresthesia: patient denies any numbness or tingling.     Patient Questions or Concerns: none    Reviewed postoperative instructions with patient and provided them with written material of common things to expect from their procedure.     Patient's Next Vein Clinic Appointment: Mon Feb 14th 2022    Corina Jade RN

## 2022-01-05 ENCOUNTER — LAB (OUTPATIENT)
Dept: LAB | Facility: CLINIC | Age: 67
End: 2022-01-05
Payer: COMMERCIAL

## 2022-01-05 DIAGNOSIS — I48.0 PAROXYSMAL ATRIAL FIBRILLATION (H): ICD-10-CM

## 2022-01-05 LAB
ANION GAP SERPL CALCULATED.3IONS-SCNC: 11 MMOL/L (ref 3–14)
BUN SERPL-MCNC: 43 MG/DL (ref 7–30)
CALCIUM SERPL-MCNC: 9.9 MG/DL (ref 8.5–10.1)
CHLORIDE BLD-SCNC: 106 MMOL/L (ref 94–109)
CO2 SERPL-SCNC: 22 MMOL/L (ref 20–32)
CREAT SERPL-MCNC: 1.09 MG/DL (ref 0.52–1.04)
GFR SERPL CREATININE-BSD FRML MDRD: 55 ML/MIN/1.73M2
GLUCOSE BLD-MCNC: 229 MG/DL (ref 70–99)
POTASSIUM BLD-SCNC: 4.2 MMOL/L (ref 3.4–5.3)
SODIUM SERPL-SCNC: 139 MMOL/L (ref 133–144)

## 2022-01-05 PROCEDURE — 36415 COLL VENOUS BLD VENIPUNCTURE: CPT

## 2022-01-05 PROCEDURE — 80048 BASIC METABOLIC PNL TOTAL CA: CPT

## 2022-01-06 ENCOUNTER — VIRTUAL VISIT (OUTPATIENT)
Dept: CARDIOLOGY | Facility: CLINIC | Age: 67
End: 2022-01-06
Payer: COMMERCIAL

## 2022-01-06 DIAGNOSIS — E78.5 HYPERLIPIDEMIA LDL GOAL <100: ICD-10-CM

## 2022-01-06 DIAGNOSIS — I48.0 PAROXYSMAL ATRIAL FIBRILLATION (H): ICD-10-CM

## 2022-01-06 DIAGNOSIS — I10 ESSENTIAL HYPERTENSION WITH GOAL BLOOD PRESSURE LESS THAN 140/90: ICD-10-CM

## 2022-01-06 DIAGNOSIS — I50.9 CONGESTIVE HEART FAILURE, UNSPECIFIED HF CHRONICITY, UNSPECIFIED HEART FAILURE TYPE (H): ICD-10-CM

## 2022-01-06 DIAGNOSIS — R06.02 SOB (SHORTNESS OF BREATH): ICD-10-CM

## 2022-01-06 PROCEDURE — 99214 OFFICE O/P EST MOD 30 MIN: CPT | Mod: 95 | Performed by: INTERNAL MEDICINE

## 2022-01-06 RX ORDER — ROSUVASTATIN CALCIUM 10 MG/1
10 TABLET, COATED ORAL DAILY
Qty: 90 TABLET | Refills: 3 | Status: SHIPPED | OUTPATIENT
Start: 2022-01-06 | End: 2023-01-24

## 2022-01-06 RX ORDER — HYDROCHLOROTHIAZIDE 25 MG/1
50 TABLET ORAL DAILY
Qty: 180 TABLET | Refills: 3 | Status: SHIPPED | OUTPATIENT
Start: 2022-01-06 | End: 2023-01-17

## 2022-01-06 RX ORDER — POTASSIUM CHLORIDE 1500 MG/1
20 TABLET, EXTENDED RELEASE ORAL DAILY
Qty: 90 TABLET | Refills: 3 | Status: SHIPPED | OUTPATIENT
Start: 2022-01-06 | End: 2023-03-29

## 2022-01-06 RX ORDER — FUROSEMIDE 20 MG
20 TABLET ORAL DAILY
Qty: 90 TABLET | Refills: 3 | Status: SHIPPED | OUTPATIENT
Start: 2022-01-06 | End: 2023-02-27

## 2022-01-06 RX ORDER — DILTIAZEM HYDROCHLORIDE 360 MG/1
360 CAPSULE, EXTENDED RELEASE ORAL DAILY
Qty: 90 CAPSULE | Refills: 3 | Status: SHIPPED | OUTPATIENT
Start: 2022-01-06 | End: 2023-01-17

## 2022-01-06 RX ORDER — METOPROLOL SUCCINATE 200 MG/1
TABLET, EXTENDED RELEASE ORAL
Qty: 180 TABLET | Refills: 3 | Status: SHIPPED | OUTPATIENT
Start: 2022-01-06 | End: 2022-07-22

## 2022-01-06 RX ORDER — LISINOPRIL 40 MG/1
40 TABLET ORAL DAILY
Qty: 90 TABLET | Refills: 3 | Status: SHIPPED | OUTPATIENT
Start: 2022-01-06 | End: 2022-02-08

## 2022-01-06 NOTE — PROGRESS NOTES
Visit Date: 2022    Marilou Arciniega MD  [facility]  [address] [place]  [address]    RE:  Sherry Slaughter  MRN:  [id number]  :  1955    Dear Dr. Arciniega:      It was a pleasure participating in the care of your patient, Ms. Sherry Slaughter.  As you know, she is a 66-year-old lady who I saw over virtual video visit via [employer] today for paroxysmal AFib, congestive heart failure, hypertension and mild aortic stenosis.    PAST MEDICAL HISTORY:      1.  Type 2 diabetes.  2.  Hypertension.  3.  Hyperlipidemia.  4.  Depression.  5.  Obstructive sleep apnea on CPAP.  6.  Hypothyroidism.  7.  Diverticulosis.  8.  Low back pain.  9.  Irritable bowel syndrome.    I last saw her on 2021.  At that time, she was doing adequately.  We had increased her metoprolol at that time for her paroxysmal AFib, and she presents today for continuing care.    Since our last visit, she has done extremely well.  She has had no recurrent episodes of AFib at all.  Her blood pressure has been very stable at 115/60 with a pulse steady in the high 60s/low 70s.  She has felt well and has been anxious to increase her exercise and activity level.  Her weight has been stable at 299 pounds without gross edema.  This is the best she has felt for quite some time, and she is quite excited as to her current progress.  She denies new chest pain, shortness of breath, PND, orthopnea, edema, palpitations, syncope or near syncope.  She has no other complaints.    CURRENT MEDICATIONS:  In terms of her present medications, she is taking diltiazem 360 mg a day, Lasix 20 mg a day, hydrochlorothiazide 50 mg a day, levothyroxine, Victoza, lisinopril 40 mg a day, metformin, metoprolol succinate 100 mg in the morning and 200 mg at night, rivaroxaban 20 mg a day, Crestor 10 mg a day.    PHYSICAL EXAMINATION:      VITAL SIGNS:  Blood pressure is 115/60, her pulse is 65.  Her weight is 299 pounds.  GENERAL:   She appears comfortable, well groomed.  PSYCHIATRIC:  She is alert and oriented x3.  HEENT:  Her eyes do not appear grossly erythematous or have exudate.  RESPIRATORY:  She is breathing comfortably without gross cough.    The remainder of the comprehensive physical exam was deferred secondary to the COVID-19 pandemic and secondary to video visit restrictions.    LABORATORY:     1.  Labs, 01/05/2022, potassium 4.2, GFR 55.  2.  On 09/20/2021, LDL 48, triglycerides 240.      IMPRESSION:      Sherry is a 66-year-old lady who has several active cardiac issues:    1.  Paroxysmal atrial fibrillation.    She underwent ELIAS-guided cardioversion this past year and has a relatively structurally normal heart.  CHADS-VASc 2 score is 5 for congestive heart failure, hypertension, age, diabetes and female gender.  She is currently anticoagulated with rivaroxaban and has rate control with diltiazem 360 and metoprolol 100 in the morning and 200 at night.    Zio patch monitor, 10/07/2021, reveals an AFib burden of 43%, longest 6 days, average heart rate 97 beats per minute while in atrial fibrillation.    Since increasing her metoprolol to twice a day dosing, she has not had any recurrent episodes, according to her.  She seems to be doing quite well from a clinical standpoint.  We will continue to follow.    2.  History of congestive heart failure.    Her dry weight is likely in the 300-pound range.  Currently, 299 pounds, maintained on Lasix 20 a day along with hydrochlorothiazide 50 mg a day.    She is without new ankle swelling, shortness of breath or fatigue.  She is anxious to increase her exercise and activity level.  Continue to monitor.    3.  Hypertension, well controlled.     Blood pressure is running 115/60 with a pulse in the mid 60s.  Continue to follow.    4.  Hyperlipidemia.    Her LDL is within goal range; however, her triglycerides are elevated.  We can consider Lovaza at our next appointment.    5.  Mild aortic  stenosis.      Echocardiogram, 10/07/2021, reveals a mean gradient of 10, ejection fraction normal at 55-60%.  Continue to follow.      PLAN:    1.  Continue present medications at present doses.    2.  We will follow up on 2022 with labs prior, earlier if needed.    At that appointment, we can consider adding Lovaza and rechecking an echo if needed.    We can always consider maximizing Toprol to 200 mg every 12 hours if needed for breakthrough episodes of AFib and cutting down lisinopril concomitantly, and also adding digoxin in the future as well, but at this point in time, she appears stable and hopefully she will continue her excellent progress.    Once again, it was a pleasure participating in the care of your patient, Ms. Concepción Scales.  Please feel free to contact me at any time if you have any questions regarding her care in the future.            Joel Aranda MD      Addendum 22:    Patient home SBPs running 100-105mmHg with mild lightheadedness    Plan:    1.  Decrease Lisinopril to 20mg po qd    (Due to ongoing issues with CHF would continue Lasix and hydrochlorothiazide as is.  Likewise continue Dilt and Metoprolol dosing for rate control for afib)    2.  Touch base via MyChart in 3 weeks regarding home BP/pulse readings  (taken 3 hours after morning meds and after resting quietly for 10 minutes), and clinical progress      Addendum 3/2/22:    Home SBPs running 122mmHg, pulse irregular around 86bpm, occasionally >100bpm    Denies complaints    Continue present course      D: 2022   T: 2022   MT: KENIAMT    Name:     CONCEPCIÓN SCALES  MRN:      [id number]        Account:    [id number]   :      1955           Visit Date: 2022     Document: [id number]    cc:  Marilou Arciniega MD

## 2022-01-06 NOTE — PATIENT INSTRUCTIONS
1.  Virtual video visit followup appointment July 5 with labs prior (both video and lab appointments already scheduled and confirmed with patient)

## 2022-01-06 NOTE — PROGRESS NOTES
"Sherry is a 66 year old who is being evaluated via a billable video visit.      How would you like to obtain your AVS? MyChart  If the video visit is dropped, the invitation should be resent by: Text to cell phone: [phone]  Will anyone else be joining your video visit? No    The patient has been notified of following:     \"This video visit will be conducted via a call between you and your physician/provider. We have found that certain health care needs can be provided without the need for an in-person physical exam.  This service lets us provide the care you need with a video conversation.  If a prescription is necessary we can send it directly to your pharmacy.  If lab work is needed we can place an order for that and you can then stop by our lab to have the test done at a later time.    Video visits are billed at different rates depending on your insurance coverage.  Please reach out to your insurance provider with any questions.    If during the course of the call the physician/provider feels a video visit is not appropriate, you will not be charged for this service.\"    Patient has given verbal consent for video visit? Yes    How would you like to obtain your AVS? Mail    Video-Visit Details    Type of service:  Video Visit    Video Start Time:1008am    Video End Time:1020am    Total visit time including video visit, chart review, charting, coordination of care =31min  Originating Location (pt. Location):patient home      Distant Location (provider location):  home office    Platform used for Video Visit: Yuliana Meyer dictation #[id number]    CSN#:[id number]  "

## 2022-01-31 ENCOUNTER — TELEPHONE (OUTPATIENT)
Dept: CARDIOLOGY | Facility: CLINIC | Age: 67
End: 2022-01-31
Payer: COMMERCIAL

## 2022-01-31 NOTE — TELEPHONE ENCOUNTER
Spoke with patient to discuss the plan. She states her BP today was 120/74 and her pulse was 94. She is taking her prescribed amount of Metoprolol 100 mg in the AM and 200 mg in the PM which is her current prescribed amount.     Maxine Torres RN   Cardiology Care Coordinator  [facility]   Phone: [phone]  Fax: [phone]            [facility], MD AHSAN Kimble [facility] Cardiology Adult [place]  Caller: Unspecified (Today,  9:46 AM)  Hi Maxine,       Have her send in her BP/pulse readings 2 hours after morning meds after resting quietly for 10-15min     Also, we are adjusting meds according to symptoms.  NOT diastolic.  She should not decrease her beta blocker if she is not dizzy or lightheaded from a symptom standpoint (which would more likely correspond to systolic BP)   We are trying to avoid having prolonged episodes of afib, not trying to preserve a diastolic BP     Would go back up to prescribed dose of Toprol if she can     Thanks!

## 2022-01-31 NOTE — TELEPHONE ENCOUNTER
[facility] Call Center    Phone Message    May a detailed message be left on voicemail: yes     Reason for Call: Other: pt has been in Afib for 1 week - she is trying to constrol it but woudl like to speak w/Dr. Aranda on what she can do next.     Action Taken: Message routed to:  [facility] ([facility] cardio    Travel Screening: Not Applicable

## 2022-02-08 ENCOUNTER — PATIENT OUTREACH (OUTPATIENT)
Dept: CARDIOLOGY | Facility: CLINIC | Age: 67
End: 2022-02-08
Payer: COMMERCIAL

## 2022-02-08 DIAGNOSIS — I10 ESSENTIAL HYPERTENSION WITH GOAL BLOOD PRESSURE LESS THAN 140/90: ICD-10-CM

## 2022-02-08 RX ORDER — LISINOPRIL 20 MG/1
20 TABLET ORAL DAILY
Qty: 90 TABLET | Refills: 3 | COMMUNITY
Start: 2022-02-08 | End: 2022-11-30

## 2022-02-08 NOTE — TELEPHONE ENCOUNTER
Last spoke to patient on 1/31/22 regarding her atrial fib and metoprolol. She states that she has intermittent dizziness only when her pulse is in the 50's and she just rests at that point. She has checked her BP at least 2 hours after she takes he morning medications which are diltiazem 360 mg, furosemide 20 mg , hydrochlorothiazide 50 mg, lisinopril 40 mg and metoprolol 100 mg. Takes 200 mg metoprolol in the evening.     2/1  109/76  2/3  99/57  2/4  104/61  93  2/5  104/60  83  2/6  99/56    80  2/7  116/68  88  Later-101/54  83  Some dizziness    Advised would send to Dr Aranda and call patient back.     Patricia Bridges RN  Medical Speciality Care Coordinator  [facility] [place]  Phone: [phone]

## 2022-02-08 NOTE — TELEPHONE ENCOUNTER
Addendum 2/8/22:     Patient home SBPs running 100-105mmHg with mild lightheadedness     Plan:     1.  Decrease Lisinopril to 20mg po qd     (Due to ongoing issues with CHF would continue Lasix and hydrochlorothiazide as is.  Likewise continue Dilt and Metoprolol dosing for rate control for afib)     2.  Touch base via [employer] in 3 weeks regarding home BP/pulse readings  (taken 3 hours after morning meds and after resting quietly for 10 minutes), and clinical progress    Called and reviewed above message from Dr Aranda with patient. She is ok to cut the lisinopril in half.     Patricia Bridges RN  Medical Speciality Care Coordinator  [facility], [place]  Phone: [phone]

## 2022-02-14 ENCOUNTER — OFFICE VISIT (OUTPATIENT)
Dept: VASCULAR SURGERY | Facility: CLINIC | Age: 67
End: 2022-02-14
Payer: COMMERCIAL

## 2022-02-14 DIAGNOSIS — I83.812 VARICOSE VEINS OF LEFT LOWER EXTREMITY WITH PAIN: Primary | ICD-10-CM

## 2022-02-14 DIAGNOSIS — Z09 POSTOP CHECK: ICD-10-CM

## 2022-02-14 PROCEDURE — 76942 ECHO GUIDE FOR BIOPSY: CPT | Performed by: SURGERY

## 2022-02-14 PROCEDURE — 36471 NJX SCLRSNT MLT INCMPTNT VN: CPT | Mod: LT | Performed by: SURGERY

## 2022-02-14 NOTE — PROGRESS NOTES
Vein Clinic Sclerotherapy Note     Indications:  Left leg varicose veins with pain; status post cyanoacrylate glue ablation left great saphenous and small saphenous veins 12/30/2021     Procedure:  Ultrasound-guided, medically necessary sclerotherapy multiple left great saphenous and small saphenous vein tributaries     Procedure description  Details of procedure including risks of allergic reaction, deep vein thrombosis, ulceration, superficial thrombophlebitis and hyperpigmentation were discussed.  The patient voiced understanding and wished to proceed.  Informed consent was obtained.      We had treated her left great saphenous and small saphenous veins on 12/30/2021 but the patient had residual bulging varicose veins with ongoing pain, though less significant than preop.    I imaged the left popliteal fossa with the patient prone on our treatment room table and I interrogated the left small saphenous vein noting that it was noncompressible in the proximal calf, the segment where the large tributary that was visible in the medial popliteal fossa communicated with the small saphenous vein.  I traced this large tributary cephalad and noted that it communicated with the great saphenous vein just cephalad of the knee.  There was a communicating branch on the more medial popliteal fossa that communicated with the popliteal vein through a circuitous course.  In an effort to minimize the risk of having polidocanol entering the popliteal vein, I chose to treat this tributary just distal to its origin with the great saphenous vein.     I isolated the vein with ultrasound guidance in the medial popliteal fossa with, directed a 27-gauge needle into it and injected 3 mL of 1% polidocanol foam filling the vein from its communication with the great saphenous to its communication with the small saphenous vein.  I had the patient flex her calf muscle with ankle pumps.    I then had the patient turned supine and imaged the  large varicosities on the medial calf that originated from the great saphenous vein in the proximal medial calf.  I injected the veins about 3 to 4 cm distal to its communication with the great saphenous vein with 3 mL of 1% polidocanol foam under ultrasound guidance.  I then imaged the cluster approximately 6 cm more distal, directed a 27-gauge needle into it and injected an additional 3 mL of 1% polidocanol foam.  I had the patient perform ankle pumps on the table.    We cleaned the leg, had her don compression, then had her walk for 10 minutes prior to leaving our office.  She tolerated procedure well without evidence of allergic reaction of complications and will return in 1 month in follow-up.  I encouraged her to remain active on returning home today and to walk at least 4-5 times this afternoon.     I discussed the fact that in approximately 7 to 10 days she will develop induration, erythema and tenderness along the course of the treated veins.  This is to be expected.  If she develops significant calf swelling or ankle swelling with significant posterior calf pain, she should contact us immediately.  She voiced understanding and her questions were answered.    Sclerotherapy    Date/Time: 2/14/2022 12:05 PM  Performed by: Sawyer Pride MD  Authorized by: Sawyer Pride MD     Time out: Immediately prior to the procedure a time out was called    Type:  Medically Necessary  Vein:  Multiple Veins  Yes    Procedure side:  Left  Solution/Amount:  1% POLIDOCANOL  Syringes:  3 syringes (9 mL 1% polidocanol foam  Patient tolerance:  Patient tolerated the procedure well with no immediate complications  Wrap/Hose:  Domo Pride MD    Dictated using Dragon voice recognition software which may result in transcription errors

## 2022-02-14 NOTE — LETTER
2/14/2022         RE: Sherry Slaughter  [address]  [place] [address]        Dear Colleague,    Thank you for referring your patient, Sherry Slaughter, to the [facility] [place]. Please see a copy of my visit note below.        Vein Clinic Sclerotherapy Note     Indications:  Left leg varicose veins with pain; status post cyanoacrylate glue ablation left great saphenous and small saphenous veins 12/30/2021     Procedure:  Ultrasound-guided, medically necessary sclerotherapy multiple left great saphenous and small saphenous vein tributaries     Procedure description  Details of procedure including risks of allergic reaction, deep vein thrombosis, ulceration, superficial thrombophlebitis and hyperpigmentation were discussed.  The patient voiced understanding and wished to proceed.  Informed consent was obtained.      We had treated her left great saphenous and small saphenous veins on 12/30/2021 but the patient had residual bulging varicose veins with ongoing pain, though less significant than preop.    I imaged the left popliteal fossa with the patient prone on our treatment room table and I interrogated the left small saphenous vein noting that it was noncompressible in the proximal calf, the segment where the large tributary that was visible in the medial popliteal fossa communicated with the small saphenous vein.  I traced this large tributary cephalad and noted that it communicated with the great saphenous vein just cephalad of the knee.  There was a communicating branch on the more medial popliteal fossa that communicated with the popliteal vein through a circuitous course.  In an effort to minimize the risk of having polidocanol entering the popliteal vein, I chose to treat this tributary just distal to its origin with the great saphenous vein.     I isolated the vein with ultrasound guidance in the medial popliteal fossa with, directed a 27-gauge needle into it  and injected 3 mL of 1% polidocanol foam filling the vein from its communication with the great saphenous to its communication with the small saphenous vein.  I had the patient flex her calf muscle with ankle pumps.    I then had the patient turned supine and imaged the large varicosities on the medial calf that originated from the great saphenous vein in the proximal medial calf.  I injected the veins about 3 to 4 cm distal to its communication with the great saphenous vein with 3 mL of 1% polidocanol foam under ultrasound guidance.  I then imaged the cluster approximately 6 cm more distal, directed a 27-gauge needle into it and injected an additional 3 mL of 1% polidocanol foam.  I had the patient perform ankle pumps on the table.    We cleaned the leg, had her don compression, then had her walk for 10 minutes prior to leaving our office.  She tolerated procedure well without evidence of allergic reaction of complications and will return in 1 month in follow-up.  I encouraged her to remain active on returning home today and to walk at least 4-5 times this afternoon.     I discussed the fact that in approximately 7 to 10 days she will develop induration, erythema and tenderness along the course of the treated veins.  This is to be expected.  If she develops significant calf swelling or ankle swelling with significant posterior calf pain, she should contact us immediately.  She voiced understanding and her questions were answered.    Sclerotherapy    Date/Time: 2/14/2022 12:05 PM  Performed by: Sawyer Pride MD  Authorized by: Sawyer Pride MD     Time out: Immediately prior to the procedure a time out was called    Type:  Medically Necessary  Vein:  Multiple Veins  Yes    Procedure side:  Left  Solution/Amount:  1% POLIDOCANOL  Syringes:  3 syringes (9 mL 1% polidocanol foam  Patient tolerance:  Patient tolerated the procedure well with no immediate complications  Wrap/Hose:  Hose        C  Vicente Pride MD    Dictated using Dragon voice recognition software which may result in transcription errors      Again, thank you for allowing me to participate in the care of your patient.        Sincerely,        Sawyer Pride MD

## 2022-03-01 DIAGNOSIS — Z79.4 TYPE 2 DIABETES MELLITUS TREATED WITH INSULIN (H): ICD-10-CM

## 2022-03-01 DIAGNOSIS — E11.9 TYPE 2 DIABETES MELLITUS TREATED WITH INSULIN (H): ICD-10-CM

## 2022-03-01 DIAGNOSIS — E66.01 MORBID OBESITY (H): ICD-10-CM

## 2022-03-02 ENCOUNTER — MYC MEDICAL ADVICE (OUTPATIENT)
Dept: CARDIOLOGY | Facility: CLINIC | Age: 67
End: 2022-03-02
Payer: COMMERCIAL

## 2022-03-03 NOTE — TELEPHONE ENCOUNTER
liraglutide (VICTOZA PEN) 18 MG/3ML solution      Last Written Prescription Date:  8/23/21  Last Fill Quantity: 27 ml,   # refills: 3  Last Office Visit : 11/24/21  Future Office visit:  5/25/22    Routing refill request to provider for review/approval because:  Abnormal A1C and creatinine  Lab Test 11/24/21  0952   A1C 8.1*     Lab Test 01/05/22  0912   CR 1.09

## 2022-03-04 RX ORDER — LIRAGLUTIDE 6 MG/ML
1.8 INJECTION SUBCUTANEOUS DAILY
Qty: 27 ML | Refills: 0 | Status: SHIPPED | OUTPATIENT
Start: 2022-03-04 | End: 2022-05-25

## 2022-03-28 ENCOUNTER — OFFICE VISIT (OUTPATIENT)
Dept: VASCULAR SURGERY | Facility: CLINIC | Age: 67
End: 2022-03-28
Payer: COMMERCIAL

## 2022-03-28 DIAGNOSIS — I83.812 VARICOSE VEINS OF LEFT LOWER EXTREMITY WITH PAIN: Primary | ICD-10-CM

## 2022-03-28 PROCEDURE — 36471 NJX SCLRSNT MLT INCMPTNT VN: CPT | Mod: LT | Performed by: SURGERY

## 2022-03-28 PROCEDURE — 76942 ECHO GUIDE FOR BIOPSY: CPT | Performed by: SURGERY

## 2022-03-28 NOTE — LETTER
3/28/2022         RE: Sherry Slaughter  [address]  [place] [address]        Dear Colleague,    Thank you for referring your patient, Sherry Slaughter, to the [facility] [place]. Please see a copy of my visit note below.        Vein Clinic Sclerotherapy Note     Indications:  1.  Residual left leg pain and varicose veins; status post cyanoacrylate glue ablation left great saphenous and small saphenous veins  2.  Status post medically necessary, ultrasound-guided sclerotherapy left great saphenous and small saphenous tributaries on 2/14/2022     Procedure:  Medically necessary, ultrasound-guided sclerotherapy multiple left great saphenous and small saphenous vein tributaries (second session)     Procedure description  Details of procedure including risks of allergic reaction, deep vein thrombosis, ulceration, superficial thrombophlebitis and hyperpigmentation were discussed.  The patient voiced understanding and wished to proceed.  Informed consent was obtained.    I imaged the left proximal medial calf and noted that the large tributary coursing from the below-knee great saphenous vein was still nearly fully compressible.  This vein was quite large, coursing from a segment of the small saphenous vein that had some flow in it.  Just cephalad of this tributary, the great saphenous vein was fully compressible at the crease of the knee and for about 4 to 5 cm cephalad rate was once again occluded.  There was a branch coursing posteriorly from the great saphenous vein at the level of the knee that communicated with a large varicosity in the proximal posterior left calf.  This tube was fully compressible.    I isolated the vein on the proximal medial left calf with ultrasound, directed a 27-gauge needle into the vein and injected 5 mL of 1% polidocanol foam in a 1 part polidocanol to 4 parts air mixture.  I had the patient perform ankle pumps.    I then had her turn prone  on the treatment table and imaged the large varicosity in the mid proximal posterior left calf.  This was just distal to the popliteal crease.  There was a tributary coursing laterally to communicate with a perforating vein and a large tributary coursing medially to communicate with the great saphenous vein.  I isolated the largest, most protruding segment of this vein in the proximal posterior left calf, directed a 27-gauge needle into it under ultrasound guidance and injected 5 mL of 1% polidocanol foam.  I had the patient perform ankle pumps.    Given the size of these treated veins and the fact that they have not closed with previous treatment, I fashioned a bolster out of ABD padding to keep compression over the treated veins.  I rolled the padding, place it over the injected veins and then secured with small pieces of Medipore tape.  We then wrapped conform gauze over it then placed a compression stocking.  I will asked her to keep these in place for 48 hours.    She tolerated procedure well but have some retraction of complications.  We had a walk for 10 minutes.  She will return in 4 to 6 weeks in follow-up.  I will plan no treatment at that time but we will simply check for trapped blood.  She may need to have further therapy but hopefully this will close the veins.    Sclerotherapy    Date/Time: 3/28/2022 2:38 PM  Performed by: Sawyer Pride MD  Authorized by: Sawyer Pride MD     Time out: Immediately prior to the procedure a time out was called    Type:  Medically Necessary  Vein:  Multiple Veins  Yes    Procedure side:  Left  Solution/Amount:  1% POLIDOCANOL  Syringes:  2  Patient tolerance:  Patient tolerated the procedure well with no immediate complications  Wrap/Hose:  Domo Pride MD    Dictated using Dragon voice recognition software which may result in transcription errors      Again, thank you for allowing me to participate in the care of your  patient.        Sincerely,        Sawyer Pride MD

## 2022-03-28 NOTE — PROGRESS NOTES
Vein Clinic Sclerotherapy Note     Indications:  1.  Residual left leg pain and varicose veins; status post cyanoacrylate glue ablation left great saphenous and small saphenous veins  2.  Status post medically necessary, ultrasound-guided sclerotherapy left great saphenous and small saphenous tributaries on 2/14/2022     Procedure:  Medically necessary, ultrasound-guided sclerotherapy multiple left great saphenous and small saphenous vein tributaries (second session)     Procedure description  Details of procedure including risks of allergic reaction, deep vein thrombosis, ulceration, superficial thrombophlebitis and hyperpigmentation were discussed.  The patient voiced understanding and wished to proceed.  Informed consent was obtained.    I imaged the left proximal medial calf and noted that the large tributary coursing from the below-knee great saphenous vein was still nearly fully compressible.  This vein was quite large, coursing from a segment of the small saphenous vein that had some flow in it.  Just cephalad of this tributary, the great saphenous vein was fully compressible at the crease of the knee and for about 4 to 5 cm cephalad rate was once again occluded.  There was a branch coursing posteriorly from the great saphenous vein at the level of the knee that communicated with a large varicosity in the proximal posterior left calf.  This tube was fully compressible.    I isolated the vein on the proximal medial left calf with ultrasound, directed a 27-gauge needle into the vein and injected 5 mL of 1% polidocanol foam in a 1 part polidocanol to 4 parts air mixture.  I had the patient perform ankle pumps.    I then had her turn prone on the treatment table and imaged the large varicosity in the mid proximal posterior left calf.  This was just distal to the popliteal crease.  There was a tributary coursing laterally to communicate with a perforating vein and a large tributary coursing medially to  communicate with the great saphenous vein.  I isolated the largest, most protruding segment of this vein in the proximal posterior left calf, directed a 27-gauge needle into it under ultrasound guidance and injected 5 mL of 1% polidocanol foam.  I had the patient perform ankle pumps.    Given the size of these treated veins and the fact that they have not closed with previous treatment, I fashioned a bolster out of ABD padding to keep compression over the treated veins.  I rolled the padding, place it over the injected veins and then secured with small pieces of Medipore tape.  We then wrapped conform gauze over it then placed a compression stocking.  I will asked her to keep these in place for 48 hours.    She tolerated procedure well but have some retraction of complications.  We had a walk for 10 minutes.  She will return in 4 to 6 weeks in follow-up.  I will plan no treatment at that time but we will simply check for trapped blood.  She may need to have further therapy but hopefully this will close the veins.    Sclerotherapy    Date/Time: 3/28/2022 2:38 PM  Performed by: Sawyer Pride MD  Authorized by: Sawyer Pride MD     Time out: Immediately prior to the procedure a time out was called    Type:  Medically Necessary  Vein:  Multiple Veins  Yes    Procedure side:  Left  Solution/Amount:  1% POLIDOCANOL  Syringes:  2  Patient tolerance:  Patient tolerated the procedure well with no immediate complications  Wrap/Hose:  Domo Pride MD    Dictated using Dragon voice recognition software which may result in transcription errors

## 2022-04-01 ENCOUNTER — OFFICE VISIT (OUTPATIENT)
Dept: PODIATRY | Facility: CLINIC | Age: 67
End: 2022-04-01
Payer: COMMERCIAL

## 2022-04-01 DIAGNOSIS — E11.49 TYPE II OR UNSPECIFIED TYPE DIABETES MELLITUS WITH NEUROLOGICAL MANIFESTATIONS, NOT STATED AS UNCONTROLLED(250.60) (H): Primary | ICD-10-CM

## 2022-04-01 DIAGNOSIS — G47.33 OBSTRUCTIVE SLEEP APNEA (ADULT) (PEDIATRIC): Primary | ICD-10-CM

## 2022-04-01 DIAGNOSIS — L97.511 ULCERATED, FOOT, RIGHT, LIMITED TO BREAKDOWN OF SKIN (H): ICD-10-CM

## 2022-04-01 DIAGNOSIS — E11.69 TYPE 2 DIABETES MELLITUS WITH DIABETIC FOOT DEFORMITY (H): ICD-10-CM

## 2022-04-01 DIAGNOSIS — M21.969 TYPE 2 DIABETES MELLITUS WITH DIABETIC FOOT DEFORMITY (H): ICD-10-CM

## 2022-04-01 PROCEDURE — 99214 OFFICE O/P EST MOD 30 MIN: CPT | Performed by: PODIATRIST

## 2022-04-01 NOTE — PROGRESS NOTES
Past Medical History:   Diagnosis Date     Cataract      Congestive heart failure (H) 2019 NOVEMBER    AFIB     DEPRESSION     comes and goes - tried meds - unsuccessfully, certain times of the year, no psych intervention and no counselors in the past - and not interested      Diverticulosis of colon (without mention of hemorrhage) 4/04    colonoscopy     ECHO-mildLVH,tr MR,mild thick lflets w inc LA,trTR   12/03     Essential hypertension, benign 1990s    late 1990s - started medications at that time - not to difficult to control meds      Fam hx-cardiovas dis [facility]     father - CAD, and lipids/HTN - multiple members of the family      Family history of diabetes mellitus     sister and grandmother with DM      Family history of malignant neoplasm of breast     mother - young age - 45, and maternal cousin and aunt as well - no BRCA testing done      Family history of stroke (cerebrovascular)     grandmother in early life in her 40s      FAMILY HX COLON CANCER     Pat uncles x 2     Heart disease     murmur/     Heart murmur      HYPERLIPIDEMIA 2000    fairly recent - in the last 5 years - high for DM levels  -cholesterol recent      Irritable bowel syndrome     goes between the 2 - nerve related - more stressed more problems      MICROALBUMINURIA     unsure how long - been on the lisinopril - for a few years at well      Nonspecific abnormal results of liver function study 2/3/2003    SGOT - has been high in the past - since the hepatitis b - borderline elevation - not really changing any      OBESITY      Obstructive sleep apnea      GENESIS (obstructive sleep apnea) 10/15/2006    psg 5/15 AHI 53 aPAP 8-15     OSTEOARTHRITIS L KNEE 2003    total knee on the left - and pain is gone since the knee replacement      PERS HX HEPATITIS B- RESOLVED] 1977    acute virus only - no chronic disease      PERS HX HEPATITIS B- RESOLVED]      Type II or unspecified type diabetes mellitus without mention of complication, not stated as  uncontrolled 1991    oral meds frist, then insulin eventually later, difficult to control most of the time      Unspecified hypothyroidism 10/11/2006    TSH 3.36 - mild subclinical hypothyroidism - deciding on following or starting low dose meds - with dr Oreilly - following      Patient Active Problem List   Diagnosis     Morbid obesity (H)     Diverticulosis of large intestine     Nonspecific abnormal results of cardiovascular function study     FAMILY HX COLON CANCER     Nonallopathic lesion of thoracic region     Hypothyroidism     GENESIS (obstructive sleep apnea)     Irritable bowel syndrome     Family history of malignant neoplasm of breast     Type 2 diabetes mellitus treated with insulin (H)     History of major depression     OSTEOARTHRITIS L KNEE  s/p knee replacement on the left      Hypertension goal BP (blood pressure) < 140/90     Family history of stroke (cerebrovascular)     Family history of other cardiovascular diseases     JOINT PAIN-ANKLE - right ankle      Mitral valve insufficiency     Hyperlipidemia LDL goal <100     Aortic sclerosis     Tubular adenoma of colon     History of viral hepatitis, type B     Chronic low back pain     Long-term insulin use (H)     S/P total knee replacement using cement, right     Lumbago     Type 2 diabetes mellitus with hyperglycemia (H)     Posterior vitreous detachment of right eye     Pseudophakia of both eyes     Paroxysmal atrial fibrillation (H)     SOB (shortness of breath)     Bunion     On continuous oral anticoagulation     Past Surgical History:   Procedure Laterality Date     ABDOMEN SURGERY      ovaian scope/ appendix removal     ANESTHESIA CARDIOVERSION N/A 12/4/2020    Procedure: ANESTHESIA, FOR CARDIOVERSION @1400;  Surgeon: GENERIC ANESTHESIA PROVIDER;  Location: [facility] OR     ARTHROPLASTY KNEE Right 9/23/2015    Procedure: ARTHROPLASTY KNEE;  Surgeon: Rufus Brown MD;  Location:  OR     BUNIONECTOMY REN AND LEANDRO, COMBINED Left 2/2/2021     Procedure: Left bunion correction with bone cutting and screw fixation;  Surgeon: David Hoffman DPM;  Location: [facility] OR     CATARACT IOL, RT/LT Left      COLONOSCOPY  4/04    diverticulosis, rec repeat 10 yrs(consider fam hx?)     COLONOSCOPY WITH CO2 INSUFFLATION N/A 6/19/2019    Procedure: COLONOSCOPY, WITH CO2 INSUFFLATION;  Surgeon: Zeb Duarte MD;  Location: [facility] OR     ORTHOPEDIC SURGERY      right ankle     PHACOEMULSIFICATION CLEAR CORNEA WITH STANDARD INTRAOCULAR LENS IMPLANT Left 3/15/2018    Procedure: PHACOEMULSIFICATION CLEAR CORNEA WITH STANDARD INTRAOCULAR LENS IMPLANT;  LEFT EYE PHACOEMULSIFICATION CLEAR CORNEA WITH STANDARD INTRAOCULAR LENS IMPLANT;  Surgeon: Bandar Linares MD;  Location:  [facility]     PHACOEMULSIFICATION CLEAR CORNEA WITH STANDARD INTRAOCULAR LENS IMPLANT Right 4/5/2018    Procedure: PHACOEMULSIFICATION CLEAR CORNEA WITH STANDARD INTRAOCULAR LENS IMPLANT;  RIGHT PHACOEMULSIFICATION CLEAR CORNEA WITH STANDARD INTRAOCULAR LENS IMPLANT ;  Surgeon: Bandar Linares MD;  Location:  [facility]     STRESS ECHO (METRO)  12/03    no ischemia, limited by fatigue     SURGICAL HISTORY OF -       EXP LAP- R OVARY CYSTS     SURGICAL HISTORY OF -   1981,1984,1985    CHILDBIRTH     ZZC NONSPECIFIC PROCEDURE  6/06    right triple arthrodecesis      ZZC NONSPECIFIC PROCEDURE  7/06     right bunion surgery      Z TOTAL KNEE ARTHROPLASTY  3/03    L knee     Social History     Socioeconomic History     Marital status:      Spouse name: Not on file     Number of children: 3     Years of education: Not on file     Highest education level: Not on file   Occupational History     Occupation: RN     Employer: [employer]   Tobacco Use     Smoking status: Never Smoker     Smokeless tobacco: Never Used     Tobacco comment: tried in early 20s only    Substance and Sexual Activity     Alcohol use: Yes     Comment: rare     Drug use: No     Sexual activity: Not Currently      Birth control/protection: Post-menopausal     Comment:  - since for 20 years, no signficant other  > 5 years sexual activity    Other Topics Concern      Service No     Blood Transfusions No     Caffeine Concern No     Occupational Exposure Yes     Comment: Normal nursing exposures     Hobby Hazards No     Sleep Concern Yes     Stress Concern No     Comment: Stress level at HM=5, stress level at WK=6     Weight Concern Yes     Special Diet Yes     Comment: Diabetic diet (watching carbs)     Back Care No     Comment: Occassional back spasms     Exercise Yes     Comment: Pt joined a health club goes approx 3-4 times a week,half to one mile daily     Bike Helmet No     Seat Belt Yes     Comment: Sometimes     Self-Exams Yes     Parent/sibling w/ CABG, MI or angioplasty before 65F 55M? 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      Social Determinants of Health     Financial Resource Strain: Not on file   Food Insecurity: Not on file   Transportation Needs: Not on file   Physical Activity: Not on file   Stress: Not on file   Social Connections: Not on file   Intimate Partner Violence: Not on file   Housing Stability: Not on file     Family History   Problem Relation Age of Onset     Diabetes Maternal Grandmother         DM TYPE 2     Cerebrovascular Disease Maternal Grandmother      Hypertension Sister      Lipids Sister      Heart Disease Sister         Chronic AFib     Diabetes Sister      Coronary Artery Disease Sister         afib     Hypertension Sister      Lipids Sister      Gynecology Sister      Diabetes Sister      Asthma Sister      Blood Disease Paternal Grandmother         T CELL LEUKEMIA     Hypertension Brother      Lipids Brother      Congenital Anomalies Brother      Cardiovascular Brother      Heart Disease Brother         CABG/AFIB     Coronary Artery Disease Brother         cabg afib AAA     Hypertension Brother       Lipids Brother      Other Cancer Brother         multple myeloma     Obesity Brother      Hypertension Brother      Respiratory Brother         Sleep apnea     Heart Disease Brother         AFib     Coronary Artery Disease Brother         afib     Alzheimer Disease Mother      Asthma Mother      Hypertension Mother      Breast Cancer Mother 40        has mastectomy and lived to 84     Allergies Mother         Sulfa,     Arthritis Mother      Cardiovascular Mother      Depression Mother      Respiratory Mother      Lipids Mother      Cancer Mother         breast     Thyroid Disease Mother      Cerebrovascular Disease Mother         FROM  AFIB     Dementia Mother         Alzheimers     Other Cancer Mother         breast     Cancer - colorectal Other      Colon Cancer Other         2019     Cancer Father         lung     Aneurysm Father         brother, AAA     Other Cancer Father         lung ca     Coronary Artery Disease Father         cad AAA     Asthma Sister      Cancer - colorectal Paternal Uncle         late 50s to early 60s - great uncles      Breast Cancer Other         Maternal cousin     Arrhythmia Sister         2 brothers 1 sister Afib     Breast Cancer Maternal Aunt 62     Other Cancer Maternal Aunt 35        Ovarian cancer.  Survived despite late recurrence and is now 92.     Glaucoma No family hx of      Macular Degeneration No family hx of      Lab Results   Component Value Date    A1C 8.1 11/24/2021    A1C 9.0 08/16/2021    A1C 6.8 01/05/2021    A1C 6.9 11/25/2020    A1C 7.5 07/22/2020    A1C 7.3 12/10/2019    A1C 7.5 08/21/2019     Last Comprehensive Metabolic Panel:  Sodium   Date Value Ref Range Status   01/05/2022 139 133 - 144 mmol/L Final   04/13/2021 137 133 - 144 mmol/L Final     Potassium   Date Value Ref Range Status   01/05/2022 4.2 3.4 - 5.3 mmol/L Final   04/13/2021 4.2 3.4 - 5.3 mmol/L Final     Chloride   Date Value Ref Range Status   01/05/2022 106 94 - 109 mmol/L Final   04/13/2021  106 94 - 109 mmol/L Final     Carbon Dioxide   Date Value Ref Range Status   04/13/2021 28 20 - 32 mmol/L Final     Carbon Dioxide (CO2)   Date Value Ref Range Status   01/05/2022 22 20 - 32 mmol/L Final     Anion Gap   Date Value Ref Range Status   01/05/2022 11 3 - 14 mmol/L Final   04/13/2021 3 3 - 14 mmol/L Final     Glucose   Date Value Ref Range Status   01/05/2022 229 (H) 70 - 99 mg/dL Final   04/13/2021 98 70 - 99 mg/dL Final     Comment:     Non Fasting     Urea Nitrogen   Date Value Ref Range Status   01/05/2022 43 (H) 7 - 30 mg/dL Final   04/13/2021 22 7 - 30 mg/dL Final     Creatinine   Date Value Ref Range Status   01/05/2022 1.09 (H) 0.52 - 1.04 mg/dL Final   04/13/2021 0.81 0.52 - 1.04 mg/dL Final     GFR Estimate   Date Value Ref Range Status   01/05/2022 55 (L) >60 mL/min/1.73m2 Final     Comment:     Effective December 21, 2021 eGFRcr in adults is calculated using the 2021 CKD-EPI creatinine equation which includes age and gender (Yashira et al., NEJM, DOI: 10.1056/QHHPvy4548455)   04/13/2021 75 >60 mL/min/[1.73_m2] Final     Comment:     Non  GFR Calc  Starting 12/18/2018, serum creatinine based estimated GFR (eGFR) will be   calculated using the Chronic Kidney Disease Epidemiology Collaboration   (CKD-EPI) equation.       Calcium   Date Value Ref Range Status   01/05/2022 9.9 8.5 - 10.1 mg/dL Final   04/13/2021 10.1 8.5 - 10.1 mg/dL Final             SUBJECTIVE FINDINGS:  66-year-old female returns to clinic for diabetic foot check.  She relates that she has gotten a little sore on her first MPJ that is getting better.  She relates she has been putting bacitracin and a Band-Aid on it and trying to stay off of it.  She wears stocking feet in the house.  She has diabetic shoes, but she does like she them so she does not wear them.  She has been wearing some tennis shoes.  Relates she has been getting some tingling and neuropathy in her feet.  No injuries.    OBJECTIVE FINDINGS:  DP  and PT are 2/4 bilaterally.  She has dorsally contracted digits 2 through 5 bilaterally.  She has hyperkeratotic tissue buildup that is mild, plantar medial left first MPJ.  She has a plantar medial right first MPJ hyperkeratotic tissue with ecchymosis.  There is no open lesion.  There is mild edema.  No erythema, no odor, no calor, no active drainage.  She relates she does get drainage on the dressing at times, although it could be the bacitracin as well.  Deep tendon reflexes are intact bilaterally.  Sharp/dull is intact with 5.07 Oconto-Andrez monofilament bilaterally.  There are no gross tendon voids bilaterally.  She has decreased ankle joint dorsiflexion bilaterally.    ASSESSMENT AND PLAN:  Ulcer, right first MPJ.  Diabetes with peripheral neuropathy.  She has hammertoes and hallux valgus present.  Diagnosis and treatment options discussed with her.  I am going to discontinue the bacitracin.  I am going to have her apply Betadine and she can continue the Band-Aid.  These are dispensed and use discussed with her.  She opted for no offloading shoe or boot.  In fact, she can bring in her diabetic insoles and shoes and we can take a look at those for adjustment or see if we can get them in a different pair of shoes.  She relates she is going on vacation next week to [place], so I will see her when she gets back in 1 month.  Previous notes reviewed.  She will call with any signs of infection.  Advised her to wear her shoes and socks in the house.      Moderate level of medical decision making with Number and Complexity of  Problems Addressed- moderate,  Amount and/or Complexity of Data to be Reviewed  and Analyzed- moderate, and the Risk of Complications and/or  Morbidity or Mortality of  Patient Management- moderate.

## 2022-04-01 NOTE — LETTER
4/1/2022         RE: Sherry Slaughter  [address]  [place] [address]        Dear Colleague,    Thank you for referring your patient, Sherry Slaughter, to the [facility]. Please see a copy of my visit note below.    Past Medical History:   Diagnosis Date     Cataract      Congestive heart failure (H) 2019 NOVEMBER    AFIB     DEPRESSION     comes and goes - tried meds - unsuccessfully, certain times of the year, no psych intervention and no counselors in the past - and not interested      Diverticulosis of colon (without mention of hemorrhage) 4/04    colonoscopy     ECHO-mildLVH,tr MR,mild thick lflets w inc LA,trTR   12/03     Essential hypertension, benign 1990s    late 1990s - started medications at that time - not to difficult to control meds      Fam hx-cardiovas dis [facility]     father - CAD, and lipids/HTN - multiple members of the family      Family history of diabetes mellitus     sister and grandmother with DM      Family history of malignant neoplasm of breast     mother - young age - 45, and maternal cousin and aunt as well - no BRCA testing done      Family history of stroke (cerebrovascular)     grandmother in early life in her 40s      FAMILY HX COLON CANCER     Pat uncles x 2     Heart disease     murmur/     Heart murmur      HYPERLIPIDEMIA 2000    fairly recent - in the last 5 years - high for DM levels  -cholesterol recent      Irritable bowel syndrome     goes between the 2 - nerve related - more stressed more problems      MICROALBUMINURIA     unsure how long - been on the lisinopril - for a few years at well      Nonspecific abnormal results of liver function study 2/3/2003    SGOT - has been high in the past - since the hepatitis b - borderline elevation - not really changing any      OBESITY      Obstructive sleep apnea      GENESIS (obstructive sleep apnea) 10/15/2006    psg 5/15 AHI 53 aPAP 8-15     OSTEOARTHRITIS L KNEE 2003    total knee on the  left - and pain is gone since the knee replacement      PERS HX HEPATITIS B- RESOLVED] 1977    acute virus only - no chronic disease      PERS HX HEPATITIS B- RESOLVED]      Type II or unspecified type diabetes mellitus without mention of complication, not stated as uncontrolled 1991    oral meds frist, then insulin eventually later, difficult to control most of the time      Unspecified hypothyroidism 10/11/2006    TSH 3.36 - mild subclinical hypothyroidism - deciding on following or starting low dose meds - with dr Oreilly - following      Patient Active Problem List   Diagnosis     Morbid obesity (H)     Diverticulosis of large intestine     Nonspecific abnormal results of cardiovascular function study     FAMILY HX COLON CANCER     Nonallopathic lesion of thoracic region     Hypothyroidism     GENESIS (obstructive sleep apnea)     Irritable bowel syndrome     Family history of malignant neoplasm of breast     Type 2 diabetes mellitus treated with insulin (H)     History of major depression     OSTEOARTHRITIS L KNEE  s/p knee replacement on the left      Hypertension goal BP (blood pressure) < 140/90     Family history of stroke (cerebrovascular)     Family history of other cardiovascular diseases     JOINT PAIN-ANKLE - right ankle      Mitral valve insufficiency     Hyperlipidemia LDL goal <100     Aortic sclerosis     Tubular adenoma of colon     History of viral hepatitis, type B     Chronic low back pain     Long-term insulin use (H)     S/P total knee replacement using cement, right     Lumbago     Type 2 diabetes mellitus with hyperglycemia (H)     Posterior vitreous detachment of right eye     Pseudophakia of both eyes     Paroxysmal atrial fibrillation (H)     SOB (shortness of breath)     Bunion     On continuous oral anticoagulation     Past Surgical History:   Procedure Laterality Date     ABDOMEN SURGERY      ovaian scope/ appendix removal     ANESTHESIA CARDIOVERSION N/A 12/4/2020    Procedure:  ANESTHESIA, FOR CARDIOVERSION @1400;  Surgeon: GENERIC ANESTHESIA PROVIDER;  Location: [facility] OR     ARTHROPLASTY KNEE Right 9/23/2015    Procedure: ARTHROPLASTY KNEE;  Surgeon: Rufus Brown MD;  Location:  OR     BUNIONECTOMY REN AND LEANDRO, COMBINED Left 2/2/2021    Procedure: Left bunion correction with bone cutting and screw fixation;  Surgeon: David Hoffman DPM;  Location: [facility] OR     CATARACT IOL, RT/LT Left      COLONOSCOPY  4/04    diverticulosis, rec repeat 10 yrs(consider fam hx?)     COLONOSCOPY WITH CO2 INSUFFLATION N/A 6/19/2019    Procedure: COLONOSCOPY, WITH CO2 INSUFFLATION;  Surgeon: Zeb Duarte MD;  Location: [facility] OR     ORTHOPEDIC SURGERY      right ankle     PHACOEMULSIFICATION CLEAR CORNEA WITH STANDARD INTRAOCULAR LENS IMPLANT Left 3/15/2018    Procedure: PHACOEMULSIFICATION CLEAR CORNEA WITH STANDARD INTRAOCULAR LENS IMPLANT;  LEFT EYE PHACOEMULSIFICATION CLEAR CORNEA WITH STANDARD INTRAOCULAR LENS IMPLANT;  Surgeon: Bandar Linares MD;  Location:  [facility]     PHACOEMULSIFICATION CLEAR CORNEA WITH STANDARD INTRAOCULAR LENS IMPLANT Right 4/5/2018    Procedure: PHACOEMULSIFICATION CLEAR CORNEA WITH STANDARD INTRAOCULAR LENS IMPLANT;  RIGHT PHACOEMULSIFICATION CLEAR CORNEA WITH STANDARD INTRAOCULAR LENS IMPLANT ;  Surgeon: Bandar Linares MD;  Location:  [facility]     STRESS ECHO (METRO)  12/03    no ischemia, limited by fatigue     SURGICAL HISTORY OF -       EXP LAP- R OVARY CYSTS     SURGICAL HISTORY OF -   1981,1984,1985    CHILDBIRTH     ZZC NONSPECIFIC PROCEDURE  6/06    right triple arthrodecesis      ZZ NONSPECIFIC PROCEDURE  7/06     right bunion surgery      [facility] TOTAL KNEE ARTHROPLASTY  3/03    L knee     Social History     Socioeconomic History     Marital status:      Spouse name: Not on file     Number of children: 3     Years of education: Not on file     Highest education level: Not on file   Occupational History     Occupation: RN     Employer:  [employer]   Tobacco Use     Smoking status: Never Smoker     Smokeless tobacco: Never Used     Tobacco comment: tried in early 20s only    Substance and Sexual Activity     Alcohol use: Yes     Comment: rare     Drug use: No     Sexual activity: Not Currently     Birth control/protection: Post-menopausal     Comment:  - since for 20 years, no signficant other  > 5 years sexual activity    Other Topics Concern      Service No     Blood Transfusions No     Caffeine Concern No     Occupational Exposure Yes     Comment: Normal nursing exposures     Hobby Hazards No     Sleep Concern Yes     Stress Concern No     Comment: Stress level at HM=5, stress level at WK=6     Weight Concern Yes     Special Diet Yes     Comment: Diabetic diet (watching carbs)     Back Care No     Comment: Occassional back spasms     Exercise Yes     Comment: Pt joined a health club goes approx 3-4 times a week,half to one mile daily     Bike Helmet No     Seat Belt Yes     Comment: Sometimes     Self-Exams Yes     Parent/sibling w/ CABG, MI or angioplasty before 65F 55M? 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      Social Determinants of Health     Financial Resource Strain: Not on file   Food Insecurity: Not on file   Transportation Needs: Not on file   Physical Activity: Not on file   Stress: Not on file   Social Connections: Not on file   Intimate Partner Violence: Not on file   Housing Stability: Not on file     Family History   Problem Relation Age of Onset     Diabetes Maternal Grandmother         DM TYPE 2     Cerebrovascular Disease Maternal Grandmother      Hypertension Sister      Lipids Sister      Heart Disease Sister         Chronic AFib     Diabetes Sister      Coronary Artery Disease Sister         afib     Hypertension Sister      Lipids Sister      Gynecology Sister      Diabetes Sister      Asthma Sister      Blood Disease  Paternal Grandmother         T CELL LEUKEMIA     Hypertension Brother      Lipids Brother      Congenital Anomalies Brother      Cardiovascular Brother      Heart Disease Brother         CABG/AFIB     Coronary Artery Disease Brother         cabg afib AAA     Hypertension Brother      Lipids Brother      Other Cancer Brother         multple myeloma     Obesity Brother      Hypertension Brother      Respiratory Brother         Sleep apnea     Heart Disease Brother         AFib     Coronary Artery Disease Brother         afib     Alzheimer Disease Mother      Asthma Mother      Hypertension Mother      Breast Cancer Mother 40        has mastectomy and lived to 84     Allergies Mother         Sulfa,     Arthritis Mother      Cardiovascular Mother      Depression Mother      Respiratory Mother      Lipids Mother      Cancer Mother         breast     Thyroid Disease Mother      Cerebrovascular Disease Mother         FROM  AFIB     Dementia Mother         Alzheimers     Other Cancer Mother         breast     Cancer - colorectal Other      Colon Cancer Other         2019     Cancer Father         lung     Aneurysm Father         brother, AAA     Other Cancer Father         lung ca     Coronary Artery Disease Father         cad AAA     Asthma Sister      Cancer - colorectal Paternal Uncle         late 50s to early 60s - great uncles      Breast Cancer Other         Maternal cousin     Arrhythmia Sister         2 brothers 1 sister Afib     Breast Cancer Maternal Aunt 62     Other Cancer Maternal Aunt 35        Ovarian cancer.  Survived despite late recurrence and is now 92.     Glaucoma No family hx of      Macular Degeneration No family hx of      Lab Results   Component Value Date    A1C 8.1 11/24/2021    A1C 9.0 08/16/2021    A1C 6.8 01/05/2021    A1C 6.9 11/25/2020    A1C 7.5 07/22/2020    A1C 7.3 12/10/2019    A1C 7.5 08/21/2019     Last Comprehensive Metabolic Panel:  Sodium   Date Value Ref Range Status   01/05/2022  139 133 - 144 mmol/L Final   04/13/2021 137 133 - 144 mmol/L Final     Potassium   Date Value Ref Range Status   01/05/2022 4.2 3.4 - 5.3 mmol/L Final   04/13/2021 4.2 3.4 - 5.3 mmol/L Final     Chloride   Date Value Ref Range Status   01/05/2022 106 94 - 109 mmol/L Final   04/13/2021 106 94 - 109 mmol/L Final     Carbon Dioxide   Date Value Ref Range Status   04/13/2021 28 20 - 32 mmol/L Final     Carbon Dioxide (CO2)   Date Value Ref Range Status   01/05/2022 22 20 - 32 mmol/L Final     Anion Gap   Date Value Ref Range Status   01/05/2022 11 3 - 14 mmol/L Final   04/13/2021 3 3 - 14 mmol/L Final     Glucose   Date Value Ref Range Status   01/05/2022 229 (H) 70 - 99 mg/dL Final   04/13/2021 98 70 - 99 mg/dL Final     Comment:     Non Fasting     Urea Nitrogen   Date Value Ref Range Status   01/05/2022 43 (H) 7 - 30 mg/dL Final   04/13/2021 22 7 - 30 mg/dL Final     Creatinine   Date Value Ref Range Status   01/05/2022 1.09 (H) 0.52 - 1.04 mg/dL Final   04/13/2021 0.81 0.52 - 1.04 mg/dL Final     GFR Estimate   Date Value Ref Range Status   01/05/2022 55 (L) >60 mL/min/1.73m2 Final     Comment:     Effective December 21, 2021 eGFRcr in adults is calculated using the 2021 CKD-EPI creatinine equation which includes age and gender (Yashira et al., NEJM, DOI: 10.1056/IQOBux9706980)   04/13/2021 75 >60 mL/min/[1.73_m2] Final     Comment:     Non  GFR Calc  Starting 12/18/2018, serum creatinine based estimated GFR (eGFR) will be   calculated using the Chronic Kidney Disease Epidemiology Collaboration   (CKD-EPI) equation.       Calcium   Date Value Ref Range Status   01/05/2022 9.9 8.5 - 10.1 mg/dL Final   04/13/2021 10.1 8.5 - 10.1 mg/dL Final             SUBJECTIVE FINDINGS:  66-year-old female returns to clinic for diabetic foot check.  She relates that she has gotten a little sore on her first MPJ that is getting better.  She relates she has been putting bacitracin and a Band-Aid on it and trying to stay  off of it.  She wears stocking feet in the house.  She has diabetic shoes, but she does like she them so she does not wear them.  She has been wearing some tennis shoes.  Relates she has been getting some tingling and neuropathy in her feet.  No injuries.    OBJECTIVE FINDINGS:  DP and PT are 2/4 bilaterally.  She has dorsally contracted digits 2 through 5 bilaterally.  She has hyperkeratotic tissue buildup that is mild, plantar medial left first MPJ.  She has a plantar medial right first MPJ hyperkeratotic tissue with ecchymosis.  There is no open lesion.  There is mild edema.  No erythema, no odor, no calor, no active drainage.  She relates she does get drainage on the dressing at times, although it could be the bacitracin as well.  Deep tendon reflexes are intact bilaterally.  Sharp/dull is intact with 5.07 Burlison-Andrez monofilament bilaterally.  There are no gross tendon voids bilaterally.  She has decreased ankle joint dorsiflexion bilaterally.    ASSESSMENT AND PLAN:  Ulcer, right first MPJ.  Diabetes with peripheral neuropathy.  She has hammertoes and hallux valgus present.  Diagnosis and treatment options discussed with her.  I am going to discontinue the bacitracin.  I am going to have her apply Betadine and she can continue the Band-Aid.  These are dispensed and use discussed with her.  She opted for no offloading shoe or boot.  In fact, she can bring in her diabetic insoles and shoes and we can take a look at those for adjustment or see if we can get them in a different pair of shoes.  She relates she is going on vacation next week to [place], so I will see her when she gets back in 1 month.  Previous notes reviewed.  She will call with any signs of infection.  Advised her to wear her shoes and socks in the house.      Moderate level of medical decision making with Number and Complexity of  Problems Addressed- moderate,  Amount and/or Complexity of Data to be Reviewed  and Analyzed- moderate, and  the Risk of Complications and/or  Morbidity or Mortality of  Patient Management- moderate.      Again, thank you for allowing me to participate in the care of your patient.        Sincerely,        Rufus Hutson DPM

## 2022-04-05 ENCOUNTER — TELEPHONE (OUTPATIENT)
Dept: PHARMACY | Facility: CLINIC | Age: 67
End: 2022-04-05
Payer: COMMERCIAL

## 2022-04-05 NOTE — TELEPHONE ENCOUNTER
Called patient to schedule a follow up MTM visit. LM for patient to return call to schedule.    Veronika Moore, Pharm.D, BCACP  Medication Therapy Management Pharmacist

## 2022-04-16 ENCOUNTER — HEALTH MAINTENANCE LETTER (OUTPATIENT)
Age: 67
End: 2022-04-16

## 2022-05-09 NOTE — TELEPHONE ENCOUNTER
No response from patient after multiple attempts. MTM will no longer be following. Routing to PCP as LEVAR.    Veronika Moore, Pharm.D, BCACP  Medication Therapy Management Pharmacist

## 2022-05-16 ENCOUNTER — OFFICE VISIT (OUTPATIENT)
Dept: PODIATRY | Facility: CLINIC | Age: 67
End: 2022-05-16
Payer: COMMERCIAL

## 2022-05-16 DIAGNOSIS — L97.511 ULCER OF RIGHT FOOT, LIMITED TO BREAKDOWN OF SKIN (H): ICD-10-CM

## 2022-05-16 DIAGNOSIS — M21.969 TYPE 2 DIABETES MELLITUS WITH DIABETIC FOOT DEFORMITY (H): ICD-10-CM

## 2022-05-16 DIAGNOSIS — E11.69 TYPE 2 DIABETES MELLITUS WITH DIABETIC FOOT DEFORMITY (H): ICD-10-CM

## 2022-05-16 DIAGNOSIS — E11.49 TYPE II OR UNSPECIFIED TYPE DIABETES MELLITUS WITH NEUROLOGICAL MANIFESTATIONS, NOT STATED AS UNCONTROLLED(250.60) (H): Primary | ICD-10-CM

## 2022-05-16 PROCEDURE — 99212 OFFICE O/P EST SF 10 MIN: CPT | Mod: 25 | Performed by: PODIATRIST

## 2022-05-16 PROCEDURE — 97597 DBRDMT OPN WND 1ST 20 CM/<: CPT | Performed by: PODIATRIST

## 2022-05-16 NOTE — PROGRESS NOTES
Past Medical History:   Diagnosis Date     Cataract      Congestive heart failure (H) 2019 NOVEMBER    AFIB     DEPRESSION     comes and goes - tried meds - unsuccessfully, certain times of the year, no psych intervention and no counselors in the past - and not interested      Diverticulosis of colon (without mention of hemorrhage) 4/04    colonoscopy     ECHO-mildLVH,tr MR,mild thick lflets w inc LA,trTR   12/03     Essential hypertension, benign 1990s    late 1990s - started medications at that time - not to difficult to control meds      Fam hx-cardiovas dis NEC     father - CAD, and lipids/HTN - multiple members of the family      Family history of diabetes mellitus     sister and grandmother with DM      Family history of malignant neoplasm of breast     mother - young age - 45, and maternal cousin and aunt as well - no BRCA testing done      Family history of stroke (cerebrovascular)     grandmother in early life in her 40s      FAMILY HX COLON CANCER     Pat uncles x 2     Heart disease     murmur/     Heart murmur      HYPERLIPIDEMIA 2000    fairly recent - in the last 5 years - high for DM levels  -cholesterol recent      Irritable bowel syndrome     goes between the 2 - nerve related - more stressed more problems      MICROALBUMINURIA     unsure how long - been on the lisinopril - for a few years at well      Nonspecific abnormal results of liver function study 2/3/2003    SGOT - has been high in the past - since the hepatitis b - borderline elevation - not really changing any      OBESITY      Obstructive sleep apnea      GENESIS (obstructive sleep apnea) 10/15/2006    psg 5/15 AHI 53 aPAP 8-15     OSTEOARTHRITIS L KNEE 2003    total knee on the left - and pain is gone since the knee replacement      PERS HX HEPATITIS B- RESOLVED] 1977    acute virus only - no chronic disease      PERS HX HEPATITIS B- RESOLVED]      Type II or unspecified type diabetes mellitus without mention of complication, not stated as  uncontrolled 1991    oral meds frist, then insulin eventually later, difficult to control most of the time      Unspecified hypothyroidism 10/11/2006    TSH 3.36 - mild subclinical hypothyroidism - deciding on following or starting low dose meds - with dr Oreilly - following      Patient Active Problem List   Diagnosis     Morbid obesity (H)     Diverticulosis of large intestine     Nonspecific abnormal results of cardiovascular function study     FAMILY HX COLON CANCER     Nonallopathic lesion of thoracic region     Hypothyroidism     GENESIS (obstructive sleep apnea)     Irritable bowel syndrome     Family history of malignant neoplasm of breast     Type 2 diabetes mellitus treated with insulin (H)     History of major depression     OSTEOARTHRITIS L KNEE  s/p knee replacement on the left      Hypertension goal BP (blood pressure) < 140/90     Family history of stroke (cerebrovascular)     Family history of other cardiovascular diseases     JOINT PAIN-ANKLE - right ankle      Mitral valve insufficiency     Hyperlipidemia LDL goal <100     Aortic sclerosis     Tubular adenoma of colon     History of viral hepatitis, type B     Chronic low back pain     Long-term insulin use (H)     S/P total knee replacement using cement, right     Lumbago     Type 2 diabetes mellitus with hyperglycemia (H)     Posterior vitreous detachment of right eye     Pseudophakia of both eyes     Paroxysmal atrial fibrillation (H)     SOB (shortness of breath)     Bunion     On continuous oral anticoagulation     Past Surgical History:   Procedure Laterality Date     ABDOMEN SURGERY      ovaian scope/ appendix removal     ANESTHESIA CARDIOVERSION N/A 12/4/2020    Procedure: ANESTHESIA, FOR CARDIOVERSION @1400;  Surgeon: GENERIC ANESTHESIA PROVIDER;  Location: [facility]     ARTHROPLASTY KNEE Right 9/23/2015    Procedure: ARTHROPLASTY KNEE;  Surgeon: Rufus Brown MD;  Location:  OR     BUNIONECTOMY REN AND LEANDRO, COMBINED Left 2/2/2021     Procedure: Left bunion correction with bone cutting and screw fixation;  Surgeon: David Hoffman DPM;  Location: [facility]     CATARACT IOL, RT/LT Left      COLONOSCOPY  4/04    diverticulosis, rec repeat 10 yrs(consider fam hx?)     COLONOSCOPY WITH CO2 INSUFFLATION N/A 6/19/2019    Procedure: COLONOSCOPY, WITH CO2 INSUFFLATION;  Surgeon: Zeb Duarte MD;  Location: [facility]     ORTHOPEDIC SURGERY      right ankle     PHACOEMULSIFICATION CLEAR CORNEA WITH STANDARD INTRAOCULAR LENS IMPLANT Left 3/15/2018    Procedure: PHACOEMULSIFICATION CLEAR CORNEA WITH STANDARD INTRAOCULAR LENS IMPLANT;  LEFT EYE PHACOEMULSIFICATION CLEAR CORNEA WITH STANDARD INTRAOCULAR LENS IMPLANT;  Surgeon: Bandar Linares MD;  [facility]     PHACOEMULSIFICATION CLEAR CORNEA WITH STANDARD INTRAOCULAR LENS IMPLANT Right 4/5/2018    Procedure: PHACOEMULSIFICATION CLEAR CORNEA WITH STANDARD INTRAOCULAR LENS IMPLANT;  RIGHT PHACOEMULSIFICATION CLEAR CORNEA WITH STANDARD INTRAOCULAR LENS IMPLANT ;  Surgeon: Bandar Linares MD;  Location:  [facility]     STRESS ECHO (METRO)  12/03    no ischemia, limited by fatigue     SURGICAL HISTORY OF -       EXP LAP- R OVARY CYSTS     SURGICAL HISTORY OF -   1981,1984,1985    CHILDBIRTH     ZZC NONSPECIFIC PROCEDURE  6/06    right triple arthrodecesis      ZZC NONSPECIFIC PROCEDURE  7/06     right bunion surgery      Z TOTAL KNEE ARTHROPLASTY  3/03    L knee     Social History     Socioeconomic History     Marital status:      Spouse name: Not on file     Number of children: 3     Years of education: Not on file     Highest education level: Not on file   Occupational History     Occupation: RN     Employer: [employer]   Tobacco Use     Smoking status: Never Smoker     Smokeless tobacco: Never Used     Tobacco comment: tried in early 20s only    Substance and Sexual Activity     Alcohol use: Yes     Comment: rare     Drug use: No     Sexual activity: Not Currently      Birth control/protection: Post-menopausal     Comment:  - since for 20 years, no signficant other  > 5 years sexual activity    Other Topics Concern      Service No     Blood Transfusions No     Caffeine Concern No     Occupational Exposure Yes     Comment: Normal nursing exposures     Hobby Hazards No     Sleep Concern Yes     Stress Concern No     Comment: Stress level at HM=5, stress level at WK=6     Weight Concern Yes     Special Diet Yes     Comment: Diabetic diet (watching carbs)     Back Care No     Comment: Occassional back spasms     Exercise Yes     Comment: Pt joined a health club goes approx 3-4 times a week,half to one mile daily     Bike Helmet No     Seat Belt Yes     Comment: Sometimes     Self-Exams Yes     Parent/sibling w/ CABG, MI or angioplasty before 65F 55M? 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      Social Determinants of Health     Financial Resource Strain: Not on file   Food Insecurity: Not on file   Transportation Needs: Not on file   Physical Activity: Not on file   Stress: Not on file   Social Connections: Not on file   Intimate Partner Violence: Not on file   Housing Stability: Not on file     Family History   Problem Relation Age of Onset     Diabetes Maternal Grandmother         DM TYPE 2     Cerebrovascular Disease Maternal Grandmother      Hypertension Sister      Lipids Sister      Heart Disease Sister         Chronic AFib     Diabetes Sister      Coronary Artery Disease Sister         afib     Hypertension Sister      Lipids Sister      Gynecology Sister      Diabetes Sister      Asthma Sister      Blood Disease Paternal Grandmother         T CELL LEUKEMIA     Hypertension Brother      Lipids Brother      Congenital Anomalies Brother      Cardiovascular Brother      Heart Disease Brother         CABG/AFIB     Coronary Artery Disease Brother         cabg afib AAA     Hypertension Brother       Lipids Brother      Other Cancer Brother         multple myeloma     Obesity Brother      Hypertension Brother      Respiratory Brother         Sleep apnea     Heart Disease Brother         AFib     Coronary Artery Disease Brother         afib     Alzheimer Disease Mother      Asthma Mother      Hypertension Mother      Breast Cancer Mother 40        has mastectomy and lived to 84     Allergies Mother         Sulfa,     Arthritis Mother      Cardiovascular Mother      Depression Mother      Respiratory Mother      Lipids Mother      Cancer Mother         breast     Thyroid Disease Mother      Cerebrovascular Disease Mother         FROM  AFIB     Dementia Mother         Alzheimers     Other Cancer Mother         breast     Cancer - colorectal Other      Colon Cancer Other         2019     Cancer Father         lung     Aneurysm Father         brother, AAA     Other Cancer Father         lung ca     Coronary Artery Disease Father         cad AAA     Asthma Sister      Cancer - colorectal Paternal Uncle         late 50s to early 60s - great uncles      Breast Cancer Other         Maternal cousin     Arrhythmia Sister         2 brothers 1 sister Afib     Breast Cancer Maternal Aunt 62     Other Cancer Maternal Aunt 35        Ovarian cancer.  Survived despite late recurrence and is now 92.     Glaucoma No family hx of      Macular Degeneration No family hx of      Lab Results   Component Value Date    A1C 8.1 11/24/2021    A1C 9.0 08/16/2021    A1C 6.8 01/05/2021    A1C 6.9 11/25/2020    A1C 7.5 07/22/2020    A1C 7.3 12/10/2019    A1C 7.5 08/21/2019         SUBJECTIVE FINDINGS:  A 67-year-old female returns to clinic for ulcer, right first MPJ, hammertoes and diabetes with peripheral neuropathy.  She relates she has been using the Betadine and filing it, keeping a Band-Aid on it.  She relates she cannot wear diabetic shoes or insoles.  It is uncomfortable, but she has some New Balance shoes.    OBJECTIVE FINDINGS:  JOE  and PT are 2/4, right.  She has a right plantar first MPJ ulcer that is 1 x 0.4 cm, mostly eschared.  There is minimal drainage, no erythema, minimal edema, no odor, no calor.    ASSESSMENT AND PLAN:  Ulcer, right first MPJ, diabetes with peripheral neuropathy and hammertoes, hallux valgus present.  Diagnosis and treatment options discussed with the patient.  She opted for no surgical referral today.  The ulcer was sharp debrided through the epidermis with a 10 blade.  No anesthesia needed and local wound care done upon consent today.  I applied Betadine and a Primapore dressing.  She will continue this.  I added a metatarsal pad to her shoes.  She opted for no further offloading.  Return to clinic and see me in 2 weeks.  Previous notes reviewed.              Moderate level of medical decision making with Number and Complexity of  Problems Addressed- moderate,  Amount and/or Complexity of Data to be Reviewed  and Analyzed- limited, and the Risk of Complications and/or  Morbidity or Mortality of  Patient Management- moderate.

## 2022-05-16 NOTE — LETTER
5/16/2022         RE: Sherry Slaughter  [address]  [place] [address]        Dear Colleague,    Thank you for referring your patient, Sherry Slaughter, to the [facility]. Please see a copy of my visit note below.    Past Medical History:   Diagnosis Date     Cataract      Congestive heart failure (H) 2019 NOVEMBER    AFIB     DEPRESSION     comes and goes - tried meds - unsuccessfully, certain times of the year, no psych intervention and no counselors in the past - and not interested      Diverticulosis of colon (without mention of hemorrhage) 4/04    colonoscopy     ECHO-mildLVH,tr MR,mild thick lflets w inc LA,trTR   12/03     Essential hypertension, benign 1990s    late 1990s - started medications at that time - not to difficult to control meds      Fam hx-cardiovas dis NEC     father - CAD, and lipids/HTN - multiple members of the family      Family history of diabetes mellitus     sister and grandmother with DM      Family history of malignant neoplasm of breast     mother - young age - 45, and maternal cousin and aunt as well - no BRCA testing done      Family history of stroke (cerebrovascular)     grandmother in early life in her 40s      FAMILY HX COLON CANCER     Pat uncles x 2     Heart disease     murmur/     Heart murmur      HYPERLIPIDEMIA 2000    fairly recent - in the last 5 years - high for DM levels  -cholesterol recent      Irritable bowel syndrome     goes between the 2 - nerve related - more stressed more problems      MICROALBUMINURIA     unsure how long - been on the lisinopril - for a few years at well      Nonspecific abnormal results of liver function study 2/3/2003    SGOT - has been high in the past - since the hepatitis b - borderline elevation - not really changing any      OBESITY      Obstructive sleep apnea      GENESIS (obstructive sleep apnea) 10/15/2006    psg 5/15 AHI 53 aPAP 8-15     OSTEOARTHRITIS L KNEE 2003    total knee on the  left - and pain is gone since the knee replacement      PERS HX HEPATITIS B- RESOLVED] 1977    acute virus only - no chronic disease      PERS HX HEPATITIS B- RESOLVED]      Type II or unspecified type diabetes mellitus without mention of complication, not stated as uncontrolled 1991    oral meds frist, then insulin eventually later, difficult to control most of the time      Unspecified hypothyroidism 10/11/2006    TSH 3.36 - mild subclinical hypothyroidism - deciding on following or starting low dose meds - with dr Oreilly - following      Patient Active Problem List   Diagnosis     Morbid obesity (H)     Diverticulosis of large intestine     Nonspecific abnormal results of cardiovascular function study     FAMILY HX COLON CANCER     Nonallopathic lesion of thoracic region     Hypothyroidism     GENESIS (obstructive sleep apnea)     Irritable bowel syndrome     Family history of malignant neoplasm of breast     Type 2 diabetes mellitus treated with insulin (H)     History of major depression     OSTEOARTHRITIS L KNEE  s/p knee replacement on the left      Hypertension goal BP (blood pressure) < 140/90     Family history of stroke (cerebrovascular)     Family history of other cardiovascular diseases     JOINT PAIN-ANKLE - right ankle      Mitral valve insufficiency     Hyperlipidemia LDL goal <100     Aortic sclerosis     Tubular adenoma of colon     History of viral hepatitis, type B     Chronic low back pain     Long-term insulin use (H)     S/P total knee replacement using cement, right     Lumbago     Type 2 diabetes mellitus with hyperglycemia (H)     Posterior vitreous detachment of right eye     Pseudophakia of both eyes     Paroxysmal atrial fibrillation (H)     SOB (shortness of breath)     Bunion     On continuous oral anticoagulation     Past Surgical History:   Procedure Laterality Date     ABDOMEN SURGERY      ovaian scope/ appendix removal     ANESTHESIA CARDIOVERSION N/A 12/4/2020    Procedure:  ANESTHESIA, FOR CARDIOVERSION @1400;  Surgeon: GENERIC ANESTHESIA PROVIDER;  Location: [facility]     ARTHROPLASTY KNEE Right 9/23/2015    Procedure: ARTHROPLASTY KNEE;  Surgeon: Rufus Brown MD;  Location:  OR     BUNIONECTOMY REN AND LEANDRO, COMBINED Left 2/2/2021    Procedure: Left bunion correction with bone cutting and screw fixation;  Surgeon: David Hoffman DPM;  Location: [facility]     CATARACT IOL, RT/LT Left      COLONOSCOPY  4/04    diverticulosis, rec repeat 10 yrs(consider fam hx?)     COLONOSCOPY WITH CO2 INSUFFLATION N/A 6/19/2019    Procedure: COLONOSCOPY, WITH CO2 INSUFFLATION;  Surgeon: Zeb Duarte MD;  Location: [facility]     ORTHOPEDIC SURGERY      right ankle     PHACOEMULSIFICATION CLEAR CORNEA WITH STANDARD INTRAOCULAR LENS IMPLANT Left 3/15/2018    Procedure: PHACOEMULSIFICATION CLEAR CORNEA WITH STANDARD INTRAOCULAR LENS IMPLANT;  LEFT EYE PHACOEMULSIFICATION CLEAR CORNEA WITH STANDARD INTRAOCULAR LENS IMPLANT;  Surgeon: Bandar Linares MD;  Location:  [facility]     PHACOEMULSIFICATION CLEAR CORNEA WITH STANDARD INTRAOCULAR LENS IMPLANT Right 4/5/2018    Procedure: PHACOEMULSIFICATION CLEAR CORNEA WITH STANDARD INTRAOCULAR LENS IMPLANT;  RIGHT PHACOEMULSIFICATION CLEAR CORNEA WITH STANDARD INTRAOCULAR LENS IMPLANT ;  Surgeon: Bandar Linares MD;  Location:  [facility]     STRESS ECHO (METRO)  12/03    no ischemia, limited by fatigue     SURGICAL HISTORY OF -       EXP LAP- R OVARY CYSTS     SURGICAL HISTORY OF -   1981,1984,1985    CHILDBIRTH     ZZC NONSPECIFIC PROCEDURE  6/06    right triple arthrodecesis      ZZ NONSPECIFIC PROCEDURE  7/06     right bunion surgery      [facility] TOTAL KNEE ARTHROPLASTY  3/03    L knee     Social History     Socioeconomic History     Marital status:      Spouse name: Not on file     Number of children: 3     Years of education: Not on file     Highest education level: Not on file   Occupational History     Occupation: RN     Employer:  [employer]   Tobacco Use     Smoking status: Never Smoker     Smokeless tobacco: Never Used     Tobacco comment: tried in early 20s only    Substance and Sexual Activity     Alcohol use: Yes     Comment: rare     Drug use: No     Sexual activity: Not Currently     Birth control/protection: Post-menopausal     Comment:  - since for 20 years, no signficant other  > 5 years sexual activity    Other Topics Concern      Service No     Blood Transfusions No     Caffeine Concern No     Occupational Exposure Yes     Comment: Normal nursing exposures     Hobby Hazards No     Sleep Concern Yes     Stress Concern No     Comment: Stress level at HM=5, stress level at WK=6     Weight Concern Yes     Special Diet Yes     Comment: Diabetic diet (watching carbs)     Back Care No     Comment: Occassional back spasms     Exercise Yes     Comment: Pt joined a health club goes approx 3-4 times a week,half to one mile daily     Bike Helmet No     Seat Belt Yes     Comment: Sometimes     Self-Exams Yes     Parent/sibling w/ CABG, MI or angioplasty before 65F 55M? 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      Social Determinants of Health     Financial Resource Strain: Not on file   Food Insecurity: Not on file   Transportation Needs: Not on file   Physical Activity: Not on file   Stress: Not on file   Social Connections: Not on file   Intimate Partner Violence: Not on file   Housing Stability: Not on file     Family History   Problem Relation Age of Onset     Diabetes Maternal Grandmother         DM TYPE 2     Cerebrovascular Disease Maternal Grandmother      Hypertension Sister      Lipids Sister      Heart Disease Sister         Chronic AFib     Diabetes Sister      Coronary Artery Disease Sister         afib     Hypertension Sister      Lipids Sister      Gynecology Sister      Diabetes Sister      Asthma Sister      Blood Disease  Paternal Grandmother         T CELL LEUKEMIA     Hypertension Brother      Lipids Brother      Congenital Anomalies Brother      Cardiovascular Brother      Heart Disease Brother         CABG/AFIB     Coronary Artery Disease Brother         cabg afib AAA     Hypertension Brother      Lipids Brother      Other Cancer Brother         multple myeloma     Obesity Brother      Hypertension Brother      Respiratory Brother         Sleep apnea     Heart Disease Brother         AFib     Coronary Artery Disease Brother         afib     Alzheimer Disease Mother      Asthma Mother      Hypertension Mother      Breast Cancer Mother 40        has mastectomy and lived to 84     Allergies Mother         Sulfa,     Arthritis Mother      Cardiovascular Mother      Depression Mother      Respiratory Mother      Lipids Mother      Cancer Mother         breast     Thyroid Disease Mother      Cerebrovascular Disease Mother         FROM  AFIB     Dementia Mother         Alzheimers     Other Cancer Mother         breast     Cancer - colorectal Other      Colon Cancer Other         2019     Cancer Father         lung     Aneurysm Father         brother, AAA     Other Cancer Father         lung ca     Coronary Artery Disease Father         cad AAA     Asthma Sister      Cancer - colorectal Paternal Uncle         late 50s to early 60s - great uncles      Breast Cancer Other         Maternal cousin     Arrhythmia Sister         2 brothers 1 sister Afib     Breast Cancer Maternal Aunt 62     Other Cancer Maternal Aunt 35        Ovarian cancer.  Survived despite late recurrence and is now 92.     Glaucoma No family hx of      Macular Degeneration No family hx of      Lab Results   Component Value Date    A1C 8.1 11/24/2021    A1C 9.0 08/16/2021    A1C 6.8 01/05/2021    A1C 6.9 11/25/2020    A1C 7.5 07/22/2020    A1C 7.3 12/10/2019    A1C 7.5 08/21/2019         SUBJECTIVE FINDINGS:  A 67-year-old female returns to clinic for ulcer, right first  MPJ, hammertoes and diabetes with peripheral neuropathy.  She relates she has been using the Betadine and filing it, keeping a Band-Aid on it.  She relates she cannot wear diabetic shoes or insoles.  It is uncomfortable, but she has some New Balance shoes.    OBJECTIVE FINDINGS:  DP and PT are 2/4, right.  She has a right plantar first MPJ ulcer that is 1 x 0.4 cm, mostly eschared.  There is minimal drainage, no erythema, minimal edema, no odor, no calor.    ASSESSMENT AND PLAN:  Ulcer, right first MPJ, diabetes with peripheral neuropathy and hammertoes, hallux valgus present.  Diagnosis and treatment options discussed with the patient.  She opted for no surgical referral today.  The ulcer was sharp debrided through the epidermis with a 10 blade.  No anesthesia needed and local wound care done upon consent today.  I applied Betadine and a Primapore dressing.  She will continue this.  I added a metatarsal pad to her shoes.  She opted for no further offloading.  Return to clinic and see me in 2 weeks.  Previous notes reviewed.              Moderate level of medical decision making with Number and Complexity of  Problems Addressed- moderate,  Amount and/or Complexity of Data to be Reviewed  and Analyzed- limited, and the Risk of Complications and/or  Morbidity or Mortality of  Patient Management- moderate.        Again, thank you for allowing me to participate in the care of your patient.        Sincerely,        Rufus Hutson DPM

## 2022-05-20 NOTE — PROGRESS NOTES
Outcome for 05/24/22 9:21 AM: Data uploaded on Dexcom, the reports have been printed for provider review.  Outcome for 05/20/22 3:36 PM: Device upload instructions sent to patient via [employer] for Dexcom  Chloé Sparks CMA  Adult Endocrinology  [facility] [place]

## 2022-05-22 ASSESSMENT — ENCOUNTER SYMPTOMS
SHORTNESS OF BREATH: 0
POSTURAL DYSPNEA: 0
SNORES LOUDLY: 0
SKIN CHANGES: 0
SPUTUM PRODUCTION: 0
NECK PAIN: 0
STIFFNESS: 1
DYSPNEA ON EXERTION: 1
POOR WOUND HEALING: 1
COUGH: 0
COUGH DISTURBING SLEEP: 0
JOINT SWELLING: 0
HEMOPTYSIS: 0
MUSCLE WEAKNESS: 1
WHEEZING: 0
BACK PAIN: 1
MUSCLE CRAMPS: 1
MYALGIAS: 1
NAIL CHANGES: 0
ARTHRALGIAS: 0

## 2022-05-23 ENCOUNTER — OFFICE VISIT (OUTPATIENT)
Dept: VASCULAR SURGERY | Facility: CLINIC | Age: 67
End: 2022-05-23
Payer: COMMERCIAL

## 2022-05-23 DIAGNOSIS — I83.892 VARICOSE VEINS OF LEG WITH SWELLING, LEFT: Primary | ICD-10-CM

## 2022-05-23 PROCEDURE — 99213 OFFICE O/P EST LOW 20 MIN: CPT | Performed by: SURGERY

## 2022-05-23 NOTE — LETTER
5/23/2022         RE: Sherry Slaughter  [address]  [place] [address]        Dear Colleague,    Thank you for referring your patient, Sherry Slaughter, to the [facility] [place]. Please see a copy of my visit note below.    [facility] sclerotherapy follow-up  Sherry Slaughter returns in follow-up of a second session of ultrasound-guided, medically necessary sclerotherapy of left popliteal space and posterior calf varicose veins.  We performed cyanoacrylate glue ablation of the left small saphenous vein and Vein of Giacomini on 12/30/2021 with good relief of left posterior thigh and upper calf pain.    Because she had persistent, sizable varicosities remaining, I felt that attempts at sclerotherapy were reasonable.  The second session was performed on 3/28/2022.    Exam  The veins in the left popliteal crease and the proximal posterior left calf remain patent.  I do not feel any trapped blood nor is there any inflammation in this area.    ASSESSMENT/PLAN  Good pain relief following cyanoacrylate glue ablation of the left small saphenous vein and Vein of Giacomini.  The veins in the popliteal crease are much smaller than preoperatively but still remained patent.  The patient feels her symptoms are much improved and she is quite happy, therefore no further treatment is indicated.    We will have her return for a 6-month post procedure venous ultrasound.  If the varicosities in the popliteal space become more symptomatic, 1 could consider medically necessary phlebectomies.    DAYANA Pride MD    Dictated using Dragon voice recognition software which may result in transcription errors      Again, thank you for allowing me to participate in the care of your patient.        Sincerely,        Sawyer Pride MD

## 2022-05-23 NOTE — PROGRESS NOTES
[facility] sclerotherapy follow-up  Sherry Slaughter returns in follow-up of a second session of ultrasound-guided, medically necessary sclerotherapy of left popliteal space and posterior calf varicose veins.  We performed cyanoacrylate glue ablation of the left small saphenous vein and Vein of Giacomini on 12/30/2021 with good relief of left posterior thigh and upper calf pain.    Because she had persistent, sizable varicosities remaining, I felt that attempts at sclerotherapy were reasonable.  The second session was performed on 3/28/2022.    Exam  The veins in the left popliteal crease and the proximal posterior left calf remain patent.  I do not feel any trapped blood nor is there any inflammation in this area.    ASSESSMENT/PLAN  Good pain relief following cyanoacrylate glue ablation of the left small saphenous vein and Vein of Giacomini.  The veins in the popliteal crease are much smaller than preoperatively but still remained patent.  The patient feels her symptoms are much improved and she is quite happy, therefore no further treatment is indicated.    We will have her return for a 6-month post procedure venous ultrasound.  If the varicosities in the popliteal space become more symptomatic, 1 could consider medically necessary phlebectomies.    DAYANA Pride MD    Dictated using Dragon voice recognition software which may result in transcription errors

## 2022-05-25 ENCOUNTER — LAB (OUTPATIENT)
Dept: LAB | Facility: CLINIC | Age: 67
End: 2022-05-25

## 2022-05-25 ENCOUNTER — OFFICE VISIT (OUTPATIENT)
Dept: ENDOCRINOLOGY | Facility: CLINIC | Age: 67
End: 2022-05-25
Payer: COMMERCIAL

## 2022-05-25 ENCOUNTER — TELEPHONE (OUTPATIENT)
Dept: ENDOCRINOLOGY | Facility: CLINIC | Age: 67
End: 2022-05-25

## 2022-05-25 ENCOUNTER — OFFICE VISIT (OUTPATIENT)
Dept: CARDIOLOGY | Facility: CLINIC | Age: 67
End: 2022-05-25

## 2022-05-25 VITALS
HEART RATE: 69 BPM | DIASTOLIC BLOOD PRESSURE: 71 MMHG | SYSTOLIC BLOOD PRESSURE: 129 MMHG | OXYGEN SATURATION: 97 % | BODY MASS INDEX: 44.21 KG/M2 | WEIGHT: 293 LBS

## 2022-05-25 VITALS
SYSTOLIC BLOOD PRESSURE: 107 MMHG | WEIGHT: 293 LBS | DIASTOLIC BLOOD PRESSURE: 66 MMHG | OXYGEN SATURATION: 97 % | HEART RATE: 68 BPM | BODY MASS INDEX: 44.26 KG/M2

## 2022-05-25 DIAGNOSIS — Z79.4 TYPE 2 DIABETES MELLITUS TREATED WITH INSULIN (H): Primary | ICD-10-CM

## 2022-05-25 DIAGNOSIS — E11.65 TYPE 2 DIABETES MELLITUS WITH HYPERGLYCEMIA, UNSPECIFIED WHETHER LONG TERM INSULIN USE (H): ICD-10-CM

## 2022-05-25 DIAGNOSIS — E83.52 HYPERCALCEMIA: ICD-10-CM

## 2022-05-25 DIAGNOSIS — I10 HYPERTENSION GOAL BP (BLOOD PRESSURE) < 140/90: ICD-10-CM

## 2022-05-25 DIAGNOSIS — E03.4 HYPOTHYROIDISM DUE TO ACQUIRED ATROPHY OF THYROID: ICD-10-CM

## 2022-05-25 DIAGNOSIS — I50.9 CONGESTIVE HEART FAILURE, UNSPECIFIED HF CHRONICITY, UNSPECIFIED HEART FAILURE TYPE (H): ICD-10-CM

## 2022-05-25 DIAGNOSIS — E11.9 TYPE 2 DIABETES MELLITUS TREATED WITH INSULIN (H): Primary | ICD-10-CM

## 2022-05-25 DIAGNOSIS — I48.0 PAROXYSMAL ATRIAL FIBRILLATION (H): ICD-10-CM

## 2022-05-25 DIAGNOSIS — E66.01 MORBID OBESITY (H): ICD-10-CM

## 2022-05-25 DIAGNOSIS — I50.9 CONGESTIVE HEART FAILURE, UNSPECIFIED HF CHRONICITY, UNSPECIFIED HEART FAILURE TYPE (H): Primary | ICD-10-CM

## 2022-05-25 DIAGNOSIS — I10 ESSENTIAL HYPERTENSION WITH GOAL BLOOD PRESSURE LESS THAN 140/90: ICD-10-CM

## 2022-05-25 LAB
ANION GAP SERPL CALCULATED.3IONS-SCNC: 8 MMOL/L (ref 3–14)
BUN SERPL-MCNC: 28 MG/DL (ref 7–30)
CALCIUM SERPL-MCNC: 10.2 MG/DL (ref 8.5–10.1)
CHLORIDE BLD-SCNC: 105 MMOL/L (ref 94–109)
CO2 SERPL-SCNC: 28 MMOL/L (ref 20–32)
CREAT SERPL-MCNC: 1.04 MG/DL (ref 0.52–1.04)
GFR SERPL CREATININE-BSD FRML MDRD: 58 ML/MIN/1.73M2
GLUCOSE BLD-MCNC: 213 MG/DL (ref 70–99)
HBA1C MFR BLD: 7.1 % (ref 0–5.7)
POTASSIUM BLD-SCNC: 4.1 MMOL/L (ref 3.4–5.3)
SODIUM SERPL-SCNC: 141 MMOL/L (ref 133–144)

## 2022-05-25 PROCEDURE — 95251 CONT GLUC MNTR ANALYSIS I&R: CPT | Performed by: INTERNAL MEDICINE

## 2022-05-25 PROCEDURE — 83036 HEMOGLOBIN GLYCOSYLATED A1C: CPT | Performed by: INTERNAL MEDICINE

## 2022-05-25 PROCEDURE — 99214 OFFICE O/P EST MOD 30 MIN: CPT | Performed by: NURSE PRACTITIONER

## 2022-05-25 PROCEDURE — 80048 BASIC METABOLIC PNL TOTAL CA: CPT

## 2022-05-25 PROCEDURE — 36415 COLL VENOUS BLD VENIPUNCTURE: CPT

## 2022-05-25 PROCEDURE — 99214 OFFICE O/P EST MOD 30 MIN: CPT | Performed by: INTERNAL MEDICINE

## 2022-05-25 PROCEDURE — 82306 VITAMIN D 25 HYDROXY: CPT | Performed by: INTERNAL MEDICINE

## 2022-05-25 RX ORDER — INSULIN HUMAN 500 [IU]/ML
INJECTION, SOLUTION SUBCUTANEOUS
Qty: 40 ML | Refills: 3 | Status: SHIPPED | OUTPATIENT
Start: 2022-05-25 | End: 2023-02-17

## 2022-05-25 RX ORDER — LEVOTHYROXINE SODIUM 75 UG/1
75 TABLET ORAL DAILY
Qty: 90 TABLET | Refills: 3 | Status: SHIPPED | OUTPATIENT
Start: 2022-05-25 | End: 2023-06-23

## 2022-05-25 RX ORDER — METFORMIN HCL 500 MG
2000 TABLET, EXTENDED RELEASE 24 HR ORAL AT BEDTIME
Qty: 360 TABLET | Refills: 3 | Status: SHIPPED | OUTPATIENT
Start: 2022-05-25 | End: 2023-06-07

## 2022-05-25 RX ORDER — GLUCAGON INJECTION, SOLUTION 1 MG/.2ML
1 INJECTION, SOLUTION SUBCUTANEOUS
Qty: 0.2 ML | Refills: 3 | Status: SHIPPED | OUTPATIENT
Start: 2022-05-25

## 2022-05-25 RX ORDER — LIRAGLUTIDE 6 MG/ML
1.8 INJECTION SUBCUTANEOUS DAILY
Qty: 27 ML | Refills: 3 | Status: SHIPPED | OUTPATIENT
Start: 2022-05-25 | End: 2022-12-04

## 2022-05-25 RX ORDER — GLUCAGON 3 MG/1
3 POWDER NASAL
Qty: 1 EACH | Refills: 3 | OUTPATIENT
Start: 2022-05-25 | End: 2022-05-25

## 2022-05-25 RX ORDER — OMEGA-3 FATTY ACIDS/FISH OIL 300-1000MG
CAPSULE ORAL
COMMUNITY
End: 2023-04-17

## 2022-05-25 NOTE — PROGRESS NOTES
Sherry Slaughter's goals for this visit include:     She requests these members of her care team be copied on today's visit information: PCP    PCP: Marilou Arciniega    Referring Provider:  No referring provider defined for this encounter.    /71 (BP Location: Left arm, Patient Position: Sitting, Cuff Size: Adult Regular)   Pulse 69   Wt 135.4 kg (298 lb 8 oz)   LMP 10/02/2007   SpO2 97%   BMI 44.21 kg/m      Do you need any medication refills at today's visit? No.    Dada Lindsey, EMT  Clinic Support  [facility]    [phone]    Employed by [facility] [employer]

## 2022-05-25 NOTE — LETTER
5/25/2022         RE: Sherry Slaughter  [address]  [place] [address]        Dear Colleague,    Thank you for referring your patient, Shrery Slaughter, to the [facility]. Please see a copy of my visit note below.    Outcome for 05/24/22 9:21 AM: Data uploaded on Dexcom, the reports have been printed for provider review.  Outcome for 05/20/22 3:36 PM: Device upload instructions sent to patient via [employer] for Dexcom  Chloé Sparks, [facility]  Adult Endocrinology  [facility] [place]          Sherry is a 67 year old female with a past medical history significant for obesity, hyperlipidemia, depression, obstructive sleep apnea, hypothyroidism, hypertension, osteoarthritis, hypercholesterolemia, atrial fibrillation (status post ELIAS cardioversion on 12/2020), seen in follow-up for type 2 diabetes. She was last seen in our clinic in 11/2021.     The patient was diagnosed with type 2 diabetes ~ 25 years after she was diagnosed with gestational diabetes, during both her pregnancies. She was initially started on oral medications and then insulin was added around 2012.     Her A1C has been around 7% over the last years. She was on Byetta but she did not feel it helped. She was also on Actos but stopped due to weight gain and increased SOB.  For approximately 3 years, she was treated with Victoza.  It was resumed in March 2019.  In the beginning of July 2019, she was transitioned to U500 insulin. Jardiance was tried in 2019 and was discontinued due to genital fungal infections.     A1c was 7.1% today, down from 8.1 in November 2021.    She has been compliant with a Golo diet since March this year and, subsequently, the blood glucose numbers have improved.  She has decreased the insulin dose.    Current antidiabetic regimen:   U500 insulin: 120 units in am, 100 U at 3-4 PM   1.8 mg Victoza daily (3 mg daily was too expensive; Wegovy not covered by insurance)  2 gm ER metformin  daily   100 mg Invokana, started in November 2021.  The patient has been tolerating it with no side effects.    I reviewed the sensor records.  Average glucose is 150, with a standard deviation of 42, corresponding to a GMI of 6.9%.  Overall, she maintains a good blood glucose control.  Intermittently, she has been experiencing hypoglycemic episodes at night.    DM complications  Retinopathy: last eye exam: spring 2021; no DR, S/P B/L surgery for cataract.  Next eye exam is scheduled in June.  Nephropathy: negative urine microalb 1/21; on lisinopril 60 mg; GFR in the 60s. Negative urine microalbumin 1/21.   Neuropathy: occasional burning sensation in her feet for the last couple of years. H/o left toe ulcer.  S/p bunion surgery. Last podiatry exam.  She has been having a right first right metatarsal phalangeal joint ulcer, for which she follows up with Dr. Hutson.   She is symptomatic for hypoglycemia (able to recognize blood sugar numbers in the 90s - she gets sweaty, hot and lightheaded).  She corrects the hypoglycemic symptoms by having something to eat.  Aspirin - taking 325 mg  LDL 48, HDL 37,  (9/2021) on Crestor 10 mg   Using the CPAP   Exercise limited due to bilateral knee replacement and low back pain.    2.  Hypertension  Her blood pressure is managed with 40 mg lisinopril daily, 360 mg diltiazem,  300 mg metoprolol daily, 25 mg hydrochlorothiazide daily and 20 mg lasix daily. On 20 mEq K daily.     3. High normal calcium levels, documented in our records since 2009. Prior PTH levels have been low normal.  Most recent calcium was 10.2, on 5/25/22.  The phosphorus levels have been normal in the past, as well as Albumin.  She takes a supplement of vitamin D daily, omega 3 supplement which also contains vitamin D, and a daily multivitamin.  The patient is not sure of the total amount of vitamin D she takes from daily supplements.    4. Hypothyroidism    Most recent TSH was 2.58 on 9/20/21.  She  remains on a constant dose of 75 mcg levothyroxine daily.    Past Medical History  Past Medical History:   Diagnosis Date     Cataract      Congestive heart failure (H) 2019 NOVEMBER    AFIB     DEPRESSION     comes and goes - tried meds - unsuccessfully, certain times of the year, no psych intervention and no counselors in the past - and not interested      Diverticulosis of colon (without mention of hemorrhage) 4/04    colonoscopy     ECHO-mildLVH,tr MR,mild thick lflets w inc LA,trTR   12/03     Essential hypertension, benign 1990s    late 1990s - started medications at that time - not to difficult to control meds      Fam hx-cardiovas dis NEC     father - CAD, and lipids/HTN - multiple members of the family      Family history of diabetes mellitus     sister and grandmother with DM      Family history of malignant neoplasm of breast     mother - young age - 45, and maternal cousin and aunt as well - no BRCA testing done      Family history of stroke (cerebrovascular)     grandmother in early life in her 40s      FAMILY HX COLON CANCER     Pat uncles x 2     Heart disease     murmur/     Heart murmur      HYPERLIPIDEMIA 2000    fairly recent - in the last 5 years - high for DM levels  -cholesterol recent      Irritable bowel syndrome     goes between the 2 - nerve related - more stressed more problems      MICROALBUMINURIA     unsure how long - been on the lisinopril - for a few years at well      Nonspecific abnormal results of liver function study 2/3/2003    SGOT - has been high in the past - since the hepatitis b - borderline elevation - not really changing any      OBESITY      Obstructive sleep apnea      GENESIS (obstructive sleep apnea) 10/15/2006    psg 5/15 AHI 53 aPAP 8-15     OSTEOARTHRITIS L KNEE 2003    total knee on the left - and pain is gone since the knee replacement      PERS HX HEPATITIS B- RESOLVED] 1977    acute virus only - no chronic disease      PERS HX HEPATITIS B- RESOLVED]      Type II or  unspecified type diabetes mellitus without mention of complication, not stated as uncontrolled 1991    oral meds frist, then insulin eventually later, difficult to control most of the time      Unspecified hypothyroidism 10/11/2006    TSH 3.36 - mild subclinical hypothyroidism - deciding on following or starting low dose meds - with dr Oreilly - following    Hepatis B     Allergies  Allergies   Allergen Reactions     Lipitor [Atorvastatin Calcium]      Gas     Pravachol [Pravastatin Sodium]      Pravachol - dry cough      Simvastatin      Myalgia     Medications    Current Outpatient Medications:      blood glucose (NO BRAND SPECIFIED) test strip, Use to test blood sugar 2 times daily or as directed. Patient requesting One Touch Ultra Strips, Disp: 100 strip, Rfl: 3     canagliflozin (INVOKANA) 100 MG tablet, Take 1 tablet (100 mg) by mouth every morning (before breakfast), Disp: 90 tablet, Rfl: 3     Continuous Blood Gluc  (DEXCOM G6 ) KATIA, Use to read blood sugars as per 's instructions., Disp: 1 each, Rfl: 0     Continuous Blood Gluc Sensor (DEXCOM G6 SENSOR) MISC, Change every 10 days., Disp: 3 each, Rfl: 11     Continuous Blood Gluc Transmit (DEXCOM G6 TRANSMITTER) MISC, Change every 3 months., Disp: 1 each, Rfl: 3     diltiazem ER COATED BEADS (CARDIZEM CD) 360 MG 24 hr capsule, Take 1 capsule (360 mg) by mouth daily, Disp: 90 capsule, Rfl: 3     econazole nitrate 1 % external cream, Apply topically daily as needed To feet and toenails, Disp: , Rfl:      furosemide (LASIX) 20 MG tablet, Take 1 tablet (20 mg) by mouth daily, Disp: 90 tablet, Rfl: 3     Garlic 1000 MG CAPS, Take 1 capsule by mouth daily Takes during summer months.  Done taking until next summer., Disp: , Rfl:      hydrochlorothiazide (HYDRODIURIL) 25 MG tablet, Take 2 tablets (50 mg) by mouth daily, Disp: 180 tablet, Rfl: 3     ibuprofen (ADVIL/MOTRIN) 200 MG capsule, Take 600 mg by mouth every 6 hours as needed ,  Disp: , Rfl:      insulin pen needle (GNP CLICKFINE PEN NEEDLES) 31G X 8 MM miscellaneous, Uses 3 times daily or as directed, Disp: 300 each, Rfl: 3     insulin reg HIGH CONC (HUMULIN R U-500 KWIKPEN) 500 UNIT/ML PEN soln, Administer 130 units at 7 in the morning, 100 units at 1 PM and 50 units at 10 PM. (Patient taking differently: Administer 120 units at 7 in the morning, 100 units at 1 PM), Disp: 54 mL, Rfl: 3     levothyroxine (SYNTHROID/LEVOTHROID) 75 MCG tablet, Take 1 tablet (75 mcg) by mouth daily, Disp: 90 tablet, Rfl: 1     liraglutide (VICTOZA PEN) 18 MG/3ML solution, Inject 1.8 mg Subcutaneous daily, Disp: 27 mL, Rfl: 0     lisinopril (ZESTRIL) 20 MG tablet, Take 0.5 tablets (20 mg) by mouth daily, Disp: 90 tablet, Rfl: 3     metFORMIN (GLUCOPHAGE-XR) 500 MG 24 hr tablet, Take 4 tablets (2,000 mg) by mouth At Bedtime TAKE 4 TABLETS AT BEDTIME, Disp: 360 tablet, Rfl: 1     metoprolol succinate ER (TOPROL-XL) 200 MG 24 hr tablet, Take one half tablet (100mg) every morning, and one full tablet (200mg) every evening, Disp: 180 tablet, Rfl: 3     Multiple Vitamins-Minerals (ADVANCED DIABETIC MULTIVITAMIN) TABS, Take 1 tablet by mouth daily, Disp: , Rfl:      omega 3 1000 MG CAPS, , Disp: , Rfl:      ORDER FOR DME, SET TO LOCAL PRINT,, Respironics REMSTAR 60 Series Auto CPAP 8-15cm H2O, Airfit P10 nasal pillow mask w/medium pillows, Disp: , Rfl:      order for DME, Equipment being ordered: [id number] $70   Rosalinda , Disp: 1 Device, Rfl: 0     potassium chloride ER (KLOR-CON M) 20 MEQ CR tablet, Take 1 tablet (20 mEq) by mouth daily, Disp: 90 tablet, Rfl: 3     rivaroxaban ANTICOAGULANT (XARELTO ANTICOAGULANT) 20 MG TABS tablet, Take 1 tablet (20 mg) by mouth daily (with dinner), Disp: 90 tablet, Rfl: 3     rosuvastatin (CRESTOR) 10 MG tablet, Take 1 tablet (10 mg) by mouth daily, Disp: 90 tablet, Rfl: 3     Vitamin D, Cholecalciferol, 25 MCG (1000 UT) TABS, Take 1 tablet by mouth daily, Disp: , Rfl:      " amoxicillin (AMOXIL) 500 MG capsule, Take 2,000 mg by mouth Before dental procedures (Patient not taking: Reported on 5/25/2022), Disp: , Rfl:     Family History  Maternal grandmother had type 2 diabetes  Daughter has GDM  Father had CAD s/p stent, lung cancer and abdominal aortic anuerysm  Older brother has CABG, and abdominal aortic anuerysm  Young brother has Afib, SVT  Another younger brother has multiple myeloma  Sister has SVT    Social History  No smoking, rarely drink EtOH  No illicit drug  Worked as a nurse in the ICU. Retired in 2019.   Lives by herself, has 3 daughters    ROS  She has been hearing her heart pounding in her years, when racing.      Physical Exam  BP Readings from Last 6 Encounters:   05/25/22 107/66   05/25/22 129/71   12/30/21 93/53   11/29/21 124/75   11/24/21 112/66   11/24/21 112/66     Wt Readings from Last 10 Encounters:   05/25/22 135.5 kg (298 lb 12.8 oz)   05/25/22 135.4 kg (298 lb 8 oz)   11/24/21 137 kg (302 lb)   11/24/21 137 kg (302 lb)   07/21/21 132.9 kg (293 lb 1.6 oz)   04/14/21 131.5 kg (290 lb)   04/06/21 131.5 kg (290 lb)   01/21/21 132.5 kg (292 lb)   12/23/20 131.5 kg (290 lb)   12/09/20 131.5 kg (290 lb)     /66 (BP Location: Left arm, Patient Position: Sitting, Cuff Size: Adult Large)   Pulse 68   Wt 135.5 kg (298 lb 12.8 oz)   LMP 10/02/2007   SpO2 97%   BMI 44.26 kg/m    Body mass index is 44.26 kg/m .   Estimated body mass index is 44.26 kg/m  as calculated from the following:    Height as of 11/24/21: 1.75 m (5' 8.9\").    Weight as of this encounter: 135.5 kg (298 lb 12.8 oz).    Constitutional: general obesity, no distress, comfortable, pleasant   Eyes: anicteric, normal extra-ocular movements, no lid lag or retraction  Neck: no thyromegaly; no palpable nodules  CVS: Regular rhythm, systolic murmur with radiation to the carotid arteries   Lungs: CTA B/L  Musculoskeletal: no edema, DP 2+  NS: no tremor, normal gait, knee reflexes " absent  Psychological: appropriate mood    RESULTS  I reviewed prior lab results documented in epic.  Lab Results   Component Value Date    A1C 7.1 (A) 05/25/2022    A1C 8.1 (H) 11/24/2021    A1C 9.0 (H) 08/16/2021    A1C 6.8 (H) 01/05/2021    A1C 6.9 (H) 11/25/2020    A1C 7.5 (A) 07/22/2020    A1C 7.3 (H) 12/10/2019       Hemoglobin   Date Value Ref Range Status   09/20/2021 12.2 11.7 - 15.7 g/dL Final   01/05/2021 13.9 11.7 - 15.7 g/dL Final     Hematocrit   Date Value Ref Range Status   09/20/2021 36.6 35.0 - 47.0 % Final   01/05/2021 40.4 35.0 - 47.0 % Final     Cholesterol   Date Value Ref Range Status   09/20/2021 133 <200 mg/dL Final   03/15/2021 130 <200 mg/dL Final     Cholesterol/HDL Ratio   Date Value Ref Range Status   02/19/2015 3.2 0.0 - 5.0 Final     HDL Cholesterol   Date Value Ref Range Status   03/15/2021 44 (L) >49 mg/dL Final     Direct Measure HDL   Date Value Ref Range Status   09/20/2021 37 (L) >=50 mg/dL Final     LDL Cholesterol Calculated   Date Value Ref Range Status   09/20/2021 48 <=100 mg/dL Final   03/15/2021 46 <100 mg/dL Final     Comment:     Desirable:       <100 mg/dl     VLDL-Cholesterol   Date Value Ref Range Status   02/19/2015 34 (H) 0 - 30 mg/dL Final     Triglycerides   Date Value Ref Range Status   09/20/2021 240 (H) <150 mg/dL Final   03/15/2021 200 (H) <150 mg/dL Final     Comment:     Borderline high:  150-199 mg/dl  High:             200-499 mg/dl  Very high:       >499 mg/dl  Fasting specimen       Albumin Urine mg/L   Date Value Ref Range Status   01/05/2021 <5 mg/L Final     TSH   Date Value Ref Range Status   09/20/2021 2.58 0.40 - 4.00 mU/L Final   01/05/2021 2.38 0.40 - 4.00 mU/L Final         Last Basic Metabolic Panel:    Sodium   Date Value Ref Range Status   05/25/2022 141 133 - 144 mmol/L Final   04/13/2021 137 133 - 144 mmol/L Final     Potassium   Date Value Ref Range Status   05/25/2022 4.1 3.4 - 5.3 mmol/L Final   04/13/2021 4.2 3.4 - 5.3 mmol/L Final      Chloride   Date Value Ref Range Status   05/25/2022 105 94 - 109 mmol/L Final   04/13/2021 106 94 - 109 mmol/L Final     Calcium   Date Value Ref Range Status   05/25/2022 10.2 (H) 8.5 - 10.1 mg/dL Final   04/13/2021 10.1 8.5 - 10.1 mg/dL Final     Carbon Dioxide   Date Value Ref Range Status   04/13/2021 28 20 - 32 mmol/L Final     Carbon Dioxide (CO2)   Date Value Ref Range Status   05/25/2022 28 20 - 32 mmol/L Final     Urea Nitrogen   Date Value Ref Range Status   05/25/2022 28 7 - 30 mg/dL Final   04/13/2021 22 7 - 30 mg/dL Final     Creatinine   Date Value Ref Range Status   05/25/2022 1.04 0.52 - 1.04 mg/dL Final   04/13/2021 0.81 0.52 - 1.04 mg/dL Final     GFR Estimate   Date Value Ref Range Status   05/25/2022 58 (L) >60 mL/min/1.73m2 Final     Comment:     Effective December 21, 2021 eGFRcr in adults is calculated using the 2021 CKD-EPI creatinine equation which includes age and gender (Yashira et al., NEJ, DOI: 10.1056/WMQRvw5736052)   04/13/2021 75 >60 mL/min/[1.73_m2] Final     Comment:     Non  GFR Calc  Starting 12/18/2018, serum creatinine based estimated GFR (eGFR) will be   calculated using the Chronic Kidney Disease Epidemiology Collaboration   (CKD-EPI) equation.       Glucose   Date Value Ref Range Status   05/25/2022 213 (H) 70 - 99 mg/dL Final   04/13/2021 98 70 - 99 mg/dL Final     Comment:     Non Fasting       AST   Date Value Ref Range Status   11/25/2020 Unsatisfactory specimen - hemolyzed 0 - 45 U/L Final     Comment:     Canceled, Test credited  Critical Value called to and read back by  GUTIERREZ GOMEZ RN ER 1901 11/25/2020 BY AA       ALT   Date Value Ref Range Status   11/25/2020 42 0 - 50 U/L Final     Albumin   Date Value Ref Range Status   11/25/2020 3.9 3.4 - 5.0 g/dL Final     ASSESSMENT:      1. Type 2 diabetes  Sherry is a 67 year old pleasant female, with a history of type 2 diabetes in the setting of obesity, sleep apnea, hypertension,  hypercholesterolemia.  Her diabetes is known to be complicated by neuropathy.  Recently, the glycemic control has improved, although it remains suboptimal.  The Tanya sensor has been inaccurate in identifying most of the hypoglycemic episodes.    Recommendations:  Decrease the dose of afternoon insulin to 90 units  Increase the dose of Invokana to 300 mg daily  Number Baqsimi entered and the patient instructed on its use.  Follow-up lab work prior to her next appointment.    2. Hypothyroidism.  Clinically and biochemically, the patient is euthyroid. I recommended to continue to take the same dose of levothyroxine.    3.  Hypercholesterolemia, on Crestor.  Follow-up lipid panel    4.  Mild hypercalcemia, of unclear etiology  Might be related to hydrochlorothiazide.  Follow-up vitamin D level.    Orders Placed This Encounter   Procedures     25 Hydroxyvitamin D2 and D3     Comprehensive metabolic panel     Lipid panel reflex to direct LDL Fasting     Hematocrit     Albumin Random Urine Quantitative with Creat Ratio     Hemoglobin A1c     TSH with free T4 reflex     Hemoglobin A1c POCT   Answers for HPI/ROS submitted by the patient on 5/22/2022  General Symptoms: No  Skin Symptoms: Yes  HENT Symptoms: Yes  EYE SYMPTOMS: No  HEART SYMPTOMS: No  LUNG SYMPTOMS: Yes  INTESTINAL SYMPTOMS: No  URINARY SYMPTOMS: No  GYNECOLOGIC SYMPTOMS: No  BREAST SYMPTOMS: No  SKELETAL SYMPTOMS: Yes  BLOOD SYMPTOMS: No  NERVOUS SYSTEM SYMPTOMS: No  MENTAL HEALTH SYMPTOMS: No  Ear pain: Yes  Changes in hair: No  Changes in moles/birth marks: No  Itching: No  Rashes: No  Changes in nails: No  Acne: No  Hair in places you don't want it: No  Change in facial hair: No  Warts: No  Non-healing sores: Yes  Scarring: No  Flaking of skin: Yes  Color changes of hands/feet in cold : No  Sun sensitivity: No  Skin thickening: No  Cough: No  Sputum or phlegm: No  Coughing up blood: No  Difficulty breating or shortness of breath: No  Snoring: No  Wheezing:  No  Difficulty breathing on exertion: Yes  Nighttime Cough: No  Difficulty breathing when lying flat: No  Back pain: Yes  Muscle aches: Yes  Neck pain: No  Swollen joints: No  Joint pain: No  Bone pain: No  Muscle cramps: Yes  Muscle weakness: Yes  Joint stiffness: Yes  Bone fracture: No          Again, thank you for allowing me to participate in the care of your patient.        Sincerely,        Rika Delgado MD

## 2022-05-25 NOTE — PATIENT INSTRUCTIONS
[facility]  Diabetes Educators:   Devi Harkins, RN and Radha Quinonez RN  Clinic Nurse: EMMY Montero  CMA's: Penelope   Scheduling/Clinic phone number : [phone]   Clinic Fax: [phone]  [facility] (after hours/weekends): [phone] option 4    Please call the number below to schedule your labs.      Appointment Reminders:  * Please bring meter with for staff to download  * If you are due ONLY for an A1C, it is scheduled with the nurse and will be done in clinic. You do not need to schedule a lab appointment. Fasting is not required for an A1C.  * Refill request should be submitted to your pharmacy. They will contact clinic for approval.

## 2022-05-25 NOTE — TELEPHONE ENCOUNTER
Baqsimi is not covered on patients plan  Non formulary  Gvoke and Glucagon are both covered, if you want to change please send new rx

## 2022-05-25 NOTE — NURSING NOTE
Sherry Slaughter's goals for this visit include:   Chief Complaint   Patient presents with     Diabetes     She requests these members of her care team be copied on today's visit information: Yes    PCP: Marilou Arciniega    Referring Provider:  Marilou Arciniega MD PhD  [address]  [place]  [place] [address]    /66 (BP Location: Left arm, Patient Position: Sitting, Cuff Size: Adult Large)   Pulse 68   Wt 135.5 kg (298 lb 12.8 oz)   LMP 10/02/2007   SpO2 97%   BMI 44.26 kg/m      Do you need any medication refills at today's visit? Yes

## 2022-05-25 NOTE — PROGRESS NOTES
Sherry is a 67 year old female with a past medical history significant for obesity, hyperlipidemia, depression, obstructive sleep apnea, hypothyroidism, hypertension, osteoarthritis, hypercholesterolemia, atrial fibrillation (status post ELIAS cardioversion on 12/2020), seen in follow-up for type 2 diabetes. She was last seen in our clinic in 11/2021.     The patient was diagnosed with type 2 diabetes ~ 25 years after she was diagnosed with gestational diabetes, during both her pregnancies. She was initially started on oral medications and then insulin was added around 2012.     Her A1C has been around 7% over the last years. She was on Byetta but she did not feel it helped. She was also on Actos but stopped due to weight gain and increased SOB.  For approximately 3 years, she was treated with Victoza.  It was resumed in March 2019.  In the beginning of July 2019, she was transitioned to U500 insulin. Jardiance was tried in 2019 and was discontinued due to genital fungal infections.     A1c was 7.1% today, down from 8.1 in November 2021.    She has been compliant with a Golo diet since March this year and, subsequently, the blood glucose numbers have improved.  She has decreased the insulin dose.    Current antidiabetic regimen:   U500 insulin: 120 units in am, 100 U at 3-4 PM   1.8 mg Victoza daily (3 mg daily was too expensive; Wegovy not covered by insurance)  2 gm ER metformin daily   100 mg Invokana, started in November 2021.  The patient has been tolerating it with no side effects.    I reviewed the sensor records.  Average glucose is 150, with a standard deviation of 42, corresponding to a GMI of 6.9%.  Overall, she maintains a good blood glucose control.  Intermittently, she has been experiencing hypoglycemic episodes at night.    DM complications  Retinopathy: last eye exam: spring 2021; no DR, S/P B/L surgery for cataract.  Next eye exam is scheduled in June.  Nephropathy: negative urine microalb 1/21;  on lisinopril 60 mg; GFR in the 60s. Negative urine microalbumin 1/21.   Neuropathy: occasional burning sensation in her feet for the last couple of years. H/o left toe ulcer.  S/p bunion surgery. Last podiatry exam.  She has been having a right first right metatarsal phalangeal joint ulcer, for which she follows up with Dr. Hutson.   She is symptomatic for hypoglycemia (able to recognize blood sugar numbers in the 90s - she gets sweaty, hot and lightheaded).  She corrects the hypoglycemic symptoms by having something to eat.  Aspirin - taking 325 mg  LDL 48, HDL 37,  (9/2021) on Crestor 10 mg   Using the CPAP   Exercise limited due to bilateral knee replacement and low back pain.    2.  Hypertension  Her blood pressure is managed with 40 mg lisinopril daily, 360 mg diltiazem,  300 mg metoprolol daily, 25 mg hydrochlorothiazide daily and 20 mg lasix daily. On 20 mEq K daily.     3. High normal calcium levels, documented in our records since 2009. Prior PTH levels have been low normal.  Most recent calcium was 10.2, on 5/25/22.  The phosphorus levels have been normal in the past, as well as Albumin.  She takes a supplement of vitamin D daily, omega 3 supplement which also contains vitamin D, and a daily multivitamin.  The patient is not sure of the total amount of vitamin D she takes from daily supplements.    4. Hypothyroidism    Most recent TSH was 2.58 on 9/20/21.  She remains on a constant dose of 75 mcg levothyroxine daily.    Past Medical History  Past Medical History:   Diagnosis Date     Cataract      Congestive heart failure (H) 2019 NOVEMBER    AFIB     DEPRESSION     comes and goes - tried meds - unsuccessfully, certain times of the year, no psych intervention and no counselors in the past - and not interested      Diverticulosis of colon (without mention of hemorrhage) 4/04    colonoscopy     ECHO-mildLVH,tr MR,mild thick lflets w inc LA,trTR   12/03     Essential hypertension, benign 1990s     late 1990s - started medications at that time - not to difficult to control meds      Fam hx-cardiovas dis NEC     father - CAD, and lipids/HTN - multiple members of the family      Family history of diabetes mellitus     sister and grandmother with DM      Family history of malignant neoplasm of breast     mother - young age - 45, and maternal cousin and aunt as well - no BRCA testing done      Family history of stroke (cerebrovascular)     grandmother in early life in her 40s      FAMILY HX COLON CANCER     Pat uncles x 2     Heart disease     murmur/     Heart murmur      HYPERLIPIDEMIA 2000    fairly recent - in the last 5 years - high for DM levels  -cholesterol recent      Irritable bowel syndrome     goes between the 2 - nerve related - more stressed more problems      MICROALBUMINURIA     unsure how long - been on the lisinopril - for a few years at well      Nonspecific abnormal results of liver function study 2/3/2003    SGOT - has been high in the past - since the hepatitis b - borderline elevation - not really changing any      OBESITY      Obstructive sleep apnea      GENESIS (obstructive sleep apnea) 10/15/2006    psg 5/15 AHI 53 aPAP 8-15     OSTEOARTHRITIS L KNEE 2003    total knee on the left - and pain is gone since the knee replacement      PERS HX HEPATITIS B- RESOLVED] 1977    acute virus only - no chronic disease      PERS HX HEPATITIS B- RESOLVED]      Type II or unspecified type diabetes mellitus without mention of complication, not stated as uncontrolled 1991    oral meds frist, then insulin eventually later, difficult to control most of the time      Unspecified hypothyroidism 10/11/2006    TSH 3.36 - mild subclinical hypothyroidism - deciding on following or starting low dose meds - with dr Oreilly - following    Hepatis B     Allergies  Allergies   Allergen Reactions     Lipitor [Atorvastatin Calcium]      Gas     Pravachol [Pravastatin Sodium]      Pravachol - dry cough      Simvastatin       Myalgia     Medications    Current Outpatient Medications:      blood glucose (NO BRAND SPECIFIED) test strip, Use to test blood sugar 2 times daily or as directed. Patient requesting One Touch Ultra Strips, Disp: 100 strip, Rfl: 3     canagliflozin (INVOKANA) 100 MG tablet, Take 1 tablet (100 mg) by mouth every morning (before breakfast), Disp: 90 tablet, Rfl: 3     Continuous Blood Gluc  (DEXCOM G6 ) KATIA, Use to read blood sugars as per 's instructions., Disp: 1 each, Rfl: 0     Continuous Blood Gluc Sensor (DEXCOM G6 SENSOR) MISC, Change every 10 days., Disp: 3 each, Rfl: 11     Continuous Blood Gluc Transmit (DEXCOM G6 TRANSMITTER) MISC, Change every 3 months., Disp: 1 each, Rfl: 3     diltiazem ER COATED BEADS (CARDIZEM CD) 360 MG 24 hr capsule, Take 1 capsule (360 mg) by mouth daily, Disp: 90 capsule, Rfl: 3     econazole nitrate 1 % external cream, Apply topically daily as needed To feet and toenails, Disp: , Rfl:      furosemide (LASIX) 20 MG tablet, Take 1 tablet (20 mg) by mouth daily, Disp: 90 tablet, Rfl: 3     Garlic 1000 MG CAPS, Take 1 capsule by mouth daily Takes during summer months.  Done taking until next summer., Disp: , Rfl:      hydrochlorothiazide (HYDRODIURIL) 25 MG tablet, Take 2 tablets (50 mg) by mouth daily, Disp: 180 tablet, Rfl: 3     ibuprofen (ADVIL/MOTRIN) 200 MG capsule, Take 600 mg by mouth every 6 hours as needed , Disp: , Rfl:      insulin pen needle (GNP CLICKFINE PEN NEEDLES) 31G X 8 MM miscellaneous, Uses 3 times daily or as directed, Disp: 300 each, Rfl: 3     insulin reg HIGH CONC (HUMULIN R U-500 KWIKPEN) 500 UNIT/ML PEN soln, Administer 130 units at 7 in the morning, 100 units at 1 PM and 50 units at 10 PM. (Patient taking differently: Administer 120 units at 7 in the morning, 100 units at 1 PM), Disp: 54 mL, Rfl: 3     levothyroxine (SYNTHROID/LEVOTHROID) 75 MCG tablet, Take 1 tablet (75 mcg) by mouth daily, Disp: 90 tablet, Rfl: 1      liraglutide (VICTOZA PEN) 18 MG/3ML solution, Inject 1.8 mg Subcutaneous daily, Disp: 27 mL, Rfl: 0     lisinopril (ZESTRIL) 20 MG tablet, Take 0.5 tablets (20 mg) by mouth daily, Disp: 90 tablet, Rfl: 3     metFORMIN (GLUCOPHAGE-XR) 500 MG 24 hr tablet, Take 4 tablets (2,000 mg) by mouth At Bedtime TAKE 4 TABLETS AT BEDTIME, Disp: 360 tablet, Rfl: 1     metoprolol succinate ER (TOPROL-XL) 200 MG 24 hr tablet, Take one half tablet (100mg) every morning, and one full tablet (200mg) every evening, Disp: 180 tablet, Rfl: 3     Multiple Vitamins-Minerals (ADVANCED DIABETIC MULTIVITAMIN) TABS, Take 1 tablet by mouth daily, Disp: , Rfl:      omega 3 1000 MG CAPS, , Disp: , Rfl:      ORDER FOR DME, SET TO LOCAL PRINT,, Respironics REMSTAR 60 Series Auto CPAP 8-15cm H2O, Airfit P10 nasal pillow mask w/medium pillows, Disp: , Rfl:      order for DME, Equipment being ordered: [id number] $70   Rosalinda , Disp: 1 Device, Rfl: 0     potassium chloride ER (KLOR-CON M) 20 MEQ CR tablet, Take 1 tablet (20 mEq) by mouth daily, Disp: 90 tablet, Rfl: 3     rivaroxaban ANTICOAGULANT (XARELTO ANTICOAGULANT) 20 MG TABS tablet, Take 1 tablet (20 mg) by mouth daily (with dinner), Disp: 90 tablet, Rfl: 3     rosuvastatin (CRESTOR) 10 MG tablet, Take 1 tablet (10 mg) by mouth daily, Disp: 90 tablet, Rfl: 3     Vitamin D, Cholecalciferol, 25 MCG (1000 UT) TABS, Take 1 tablet by mouth daily, Disp: , Rfl:      amoxicillin (AMOXIL) 500 MG capsule, Take 2,000 mg by mouth Before dental procedures (Patient not taking: Reported on 5/25/2022), Disp: , Rfl:     Family History  Maternal grandmother had type 2 diabetes  Daughter has GDM  Father had CAD s/p stent, lung cancer and abdominal aortic anuerysm  Older brother has CABG, and abdominal aortic anuerysm  Young brother has Afib, SVT  Another younger brother has multiple myeloma  Sister has SVT    Social History  No smoking, rarely drink EtOH  No illicit drug  Worked as a nurse in the ICU.  "Retired in 2019.   Lives by herself, has 3 daughters    ROS  She has been hearing her heart pounding in her years, when racing.      Physical Exam  BP Readings from Last 6 Encounters:   05/25/22 107/66   05/25/22 129/71   12/30/21 93/53   11/29/21 124/75   11/24/21 112/66   11/24/21 112/66     Wt Readings from Last 10 Encounters:   05/25/22 135.5 kg (298 lb 12.8 oz)   05/25/22 135.4 kg (298 lb 8 oz)   11/24/21 137 kg (302 lb)   11/24/21 137 kg (302 lb)   07/21/21 132.9 kg (293 lb 1.6 oz)   04/14/21 131.5 kg (290 lb)   04/06/21 131.5 kg (290 lb)   01/21/21 132.5 kg (292 lb)   12/23/20 131.5 kg (290 lb)   12/09/20 131.5 kg (290 lb)     /66 (BP Location: Left arm, Patient Position: Sitting, Cuff Size: Adult Large)   Pulse 68   Wt 135.5 kg (298 lb 12.8 oz)   LMP 10/02/2007   SpO2 97%   BMI 44.26 kg/m    Body mass index is 44.26 kg/m .   Estimated body mass index is 44.26 kg/m  as calculated from the following:    Height as of 11/24/21: 1.75 m (5' 8.9\").    Weight as of this encounter: 135.5 kg (298 lb 12.8 oz).    Constitutional: general obesity, no distress, comfortable, pleasant   Eyes: anicteric, normal extra-ocular movements, no lid lag or retraction  Neck: no thyromegaly; no palpable nodules  CVS: Regular rhythm, systolic murmur with radiation to the carotid arteries   Lungs: CTA B/L  Musculoskeletal: no edema, DP 2+  NS: no tremor, normal gait, knee reflexes absent  Psychological: appropriate mood    RESULTS  I reviewed prior lab results documented in epic.  Lab Results   Component Value Date    A1C 7.1 (A) 05/25/2022    A1C 8.1 (H) 11/24/2021    A1C 9.0 (H) 08/16/2021    A1C 6.8 (H) 01/05/2021    A1C 6.9 (H) 11/25/2020    A1C 7.5 (A) 07/22/2020    A1C 7.3 (H) 12/10/2019       Hemoglobin   Date Value Ref Range Status   09/20/2021 12.2 11.7 - 15.7 g/dL Final   01/05/2021 13.9 11.7 - 15.7 g/dL Final     Hematocrit   Date Value Ref Range Status   09/20/2021 36.6 35.0 - 47.0 % Final   01/05/2021 40.4 35.0 - " 47.0 % Final     Cholesterol   Date Value Ref Range Status   09/20/2021 133 <200 mg/dL Final   03/15/2021 130 <200 mg/dL Final     Cholesterol/HDL Ratio   Date Value Ref Range Status   02/19/2015 3.2 0.0 - 5.0 Final     HDL Cholesterol   Date Value Ref Range Status   03/15/2021 44 (L) >49 mg/dL Final     Direct Measure HDL   Date Value Ref Range Status   09/20/2021 37 (L) >=50 mg/dL Final     LDL Cholesterol Calculated   Date Value Ref Range Status   09/20/2021 48 <=100 mg/dL Final   03/15/2021 46 <100 mg/dL Final     Comment:     Desirable:       <100 mg/dl     VLDL-Cholesterol   Date Value Ref Range Status   02/19/2015 34 (H) 0 - 30 mg/dL Final     Triglycerides   Date Value Ref Range Status   09/20/2021 240 (H) <150 mg/dL Final   03/15/2021 200 (H) <150 mg/dL Final     Comment:     Borderline high:  150-199 mg/dl  High:             200-499 mg/dl  Very high:       >499 mg/dl  Fasting specimen       Albumin Urine mg/L   Date Value Ref Range Status   01/05/2021 <5 mg/L Final     TSH   Date Value Ref Range Status   09/20/2021 2.58 0.40 - 4.00 mU/L Final   01/05/2021 2.38 0.40 - 4.00 mU/L Final         Last Basic Metabolic Panel:    Sodium   Date Value Ref Range Status   05/25/2022 141 133 - 144 mmol/L Final   04/13/2021 137 133 - 144 mmol/L Final     Potassium   Date Value Ref Range Status   05/25/2022 4.1 3.4 - 5.3 mmol/L Final   04/13/2021 4.2 3.4 - 5.3 mmol/L Final     Chloride   Date Value Ref Range Status   05/25/2022 105 94 - 109 mmol/L Final   04/13/2021 106 94 - 109 mmol/L Final     Calcium   Date Value Ref Range Status   05/25/2022 10.2 (H) 8.5 - 10.1 mg/dL Final   04/13/2021 10.1 8.5 - 10.1 mg/dL Final     Carbon Dioxide   Date Value Ref Range Status   04/13/2021 28 20 - 32 mmol/L Final     Carbon Dioxide (CO2)   Date Value Ref Range Status   05/25/2022 28 20 - 32 mmol/L Final     Urea Nitrogen   Date Value Ref Range Status   05/25/2022 28 7 - 30 mg/dL Final   04/13/2021 22 7 - 30 mg/dL Final     Creatinine    Date Value Ref Range Status   05/25/2022 1.04 0.52 - 1.04 mg/dL Final   04/13/2021 0.81 0.52 - 1.04 mg/dL Final     GFR Estimate   Date Value Ref Range Status   05/25/2022 58 (L) >60 mL/min/1.73m2 Final     Comment:     Effective December 21, 2021 eGFRcr in adults is calculated using the 2021 CKD-EPI creatinine equation which includes age and gender (Yashira et al., [place], DOI: 10.1056/RHABeq6621704)   04/13/2021 75 >60 mL/min/[1.73_m2] Final     Comment:     Non  GFR Calc  Starting 12/18/2018, serum creatinine based estimated GFR (eGFR) will be   calculated using the Chronic Kidney Disease Epidemiology Collaboration   (CKD-EPI) equation.       Glucose   Date Value Ref Range Status   05/25/2022 213 (H) 70 - 99 mg/dL Final   04/13/2021 98 70 - 99 mg/dL Final     Comment:     Non Fasting       AST   Date Value Ref Range Status   11/25/2020 Unsatisfactory specimen - hemolyzed 0 - 45 U/L Final     Comment:     Canceled, Test credited  Critical Value called to and read back by  GUTIERREZ GOMEZ RN ER 1901 11/25/2020 BY BRISEYDA       ALT   Date Value Ref Range Status   11/25/2020 42 0 - 50 U/L Final     Albumin   Date Value Ref Range Status   11/25/2020 3.9 3.4 - 5.0 g/dL Final     ASSESSMENT:      1. Type 2 diabetes  Sherry is a 67 year old pleasant female, with a history of type 2 diabetes in the setting of obesity, sleep apnea, hypertension, hypercholesterolemia.  Her diabetes is known to be complicated by neuropathy.  Recently, the glycemic control has improved, although it remains suboptimal.  The Tanya sensor has been inaccurate in identifying most of the hypoglycemic episodes.    Recommendations:  Decrease the dose of afternoon insulin to 90 units  Increase the dose of Invokana to 300 mg daily  Number Baqsimi entered and the patient instructed on its use.  Follow-up lab work prior to her next appointment.    2. Hypothyroidism.  Clinically and biochemically, the patient is euthyroid. I recommended to  continue to take the same dose of levothyroxine.    3.  Hypercholesterolemia, on Crestor.  Follow-up lipid panel    4.  Mild hypercalcemia, of unclear etiology  Might be related to hydrochlorothiazide.  Follow-up vitamin D level.    Orders Placed This Encounter   Procedures     25 Hydroxyvitamin D2 and D3     Comprehensive metabolic panel     Lipid panel reflex to direct LDL Fasting     Hematocrit     Albumin Random Urine Quantitative with Creat Ratio     Hemoglobin A1c     TSH with free T4 reflex     Hemoglobin A1c POCT   Answers for HPI/ROS submitted by the patient on 5/22/2022  General Symptoms: No  Skin Symptoms: Yes  HENT Symptoms: Yes  EYE SYMPTOMS: No  HEART SYMPTOMS: No  LUNG SYMPTOMS: Yes  INTESTINAL SYMPTOMS: No  URINARY SYMPTOMS: No  GYNECOLOGIC SYMPTOMS: No  BREAST SYMPTOMS: No  SKELETAL SYMPTOMS: Yes  BLOOD SYMPTOMS: No  NERVOUS SYSTEM SYMPTOMS: No  MENTAL HEALTH SYMPTOMS: No  Ear pain: Yes  Changes in hair: No  Changes in moles/birth marks: No  Itching: No  Rashes: No  Changes in nails: No  Acne: No  Hair in places you don't want it: No  Change in facial hair: No  Warts: No  Non-healing sores: Yes  Scarring: No  Flaking of skin: Yes  Color changes of hands/feet in cold : No  Sun sensitivity: No  Skin thickening: No  Cough: No  Sputum or phlegm: No  Coughing up blood: No  Difficulty breating or shortness of breath: No  Snoring: No  Wheezing: No  Difficulty breathing on exertion: Yes  Nighttime Cough: No  Difficulty breathing when lying flat: No  Back pain: Yes  Muscle aches: Yes  Neck pain: No  Swollen joints: No  Joint pain: No  Bone pain: No  Muscle cramps: Yes  Muscle weakness: Yes  Joint stiffness: Yes  Bone fracture: No

## 2022-05-25 NOTE — PROGRESS NOTES
1.  Type 2 diabetes.   2.  Hypertension.   3.  Hyperlipidemia.   4.  Depression.   5.  Obstructive sleep apnea, currently untreated.   6.  Hypothyroidism.   7.  Diverticulosis.   8.  Low back pain.   9.  Irritable bowel syndrome.     On 11/25/2020, Dr. Aranda saw the patient and sent her to the emergency room for further treatment of her AFib.  She ended up having her rate controlled and had an echocardiogram performed.  She then had ELIAS-guided cardioversion performed on 12/04/2020 successfully.  She was then hospitalized again on 12/09/2020 for pulmonary edema, likely secondary to being in rapid AFib for quite some time in the past in addition to having her hydrochlorothiazide discontinued.  She was in pulmonary edema on 12/09/2020, was diuresed with 40 mg of IV Lasix and had p.o. Lasix 20 started and she was sent here for further evaluation.        7/21/21- working with Endocrine about elevated glucose. SR HR 70's. SOB with humidity. She goes to baseball games with her grandchildren . No swelling in the legs today. Weight 292 lbs today. She states her appetite has been fine. She doesn't add salt to her food. She doesn't eat much processed food. She tries to eat the Keto bread. BAEZ but baseline.     11/24/21- She feels better, she says she is in sinus rhythm.  She is now retired. COVID has been going around in her family , but she has been fine - no COVID. Weight at home 300 lbs. She has not been able to be active. Appetite has been fine. She has some swelling in the left leg but none in the right.     5/25/22- In February and March she had Afib and felt worse. She is getting better. She says her Lisinopril was decreased with Dr. Aranda as her BP was running low. Weight at home 298 lbs. Appetite has been good. Occasional swelling but not too bad today. Has some BAEZ. Had COVID early May. Would like some rehab to know what she can do and can't do.      CURRENT MEDICATIONS:  She is taking diltiazem 360 mg a day, Lasix  20 mg a day, insulin, levothyroxine, Victoza, lisinopril 20 mg a day, metformin, hydrochlorothiazide 50 mg ,  metoprolol succinate 300 mg a day. rivaroxaban 20 mg a day, rosuvastatin 10 mg a day.       ROS:   Constitutional: No fever, chills, or sweats.  ENT: No visual disturbance, ear ache, epistaxis, sore throat.   Allergies/Immunologic: Negative  Respiratory: No cough, hemoptysis.   Cardiovascular: As per HPI.   GI: As per HPI.   : No urinary frequency, dysuria, or hematuria.   Integument: Negative.   Psychiatric: Negative.   Neuro: Negative.   Endocrinology: negative   Musculoskeletal: negative    EXAM:   Constitutional - WDWN, no acute distress   Eyes - no redness or discharge, nonicteric sclera  Respiratory - nonlabored breathing, able to speak in full sentences without difficulty  CV: no visible edema of visualized upper extremities.  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 She appears to be euvolemic.     Continue diltiazem 360 mg every morning, add an extra dose of 100 mg of metoprolol succinate, 100 mg in the morning and 12 hours later continued her metoprolol succinate 200 mg at night.      Continue Lasix 20 mg a day and hydrochlorothiazide 50 mg a day-    HFpEF:     BP: controlled   Fluid status euvolemic  Aldosterone antagonist: no  Ischemia evaluation: (complete/pending/not within the goals of care)   ACEi/ARB - Lisinopril  BB - Metoprolol   SCD prophylaxis - N/A, EF is normal   NSAID use: Contraindicated, reviewed with patient   Sleep Apnea Evaluation: (yes uses CPAP/ yes refuses CPAP/ no/pending evaluation)      1.  Atrial fibrillation with rapid ventricular response. She underwent ELIAS-guided cardioversion on 12/04/2020.  She has a relatively structurally normal heart.  She is currently stable  in normal sinus rhythm on significant doses of metoprolol and diltiazem.  We will continue to monitor.  She is tolerating anticoagulation well without gross bleeding.      2.  Hypertension.     Blood pressures do appear to be well controlled as they have been at 120 or lower systolic daily.      3.  Congestive heart failure.     Her dry weight is likely in the 299-300 pound range. LVEF 60-65%        4.  Sleep apnea. She has been trying to use the Bi-pap more.      5.  Mild aortic stenosis.    This was seen on recent echo and a mean gradient was only 12 mmHg. continue to follow-through time.       I reviewed patients pertinent clinical laboratory studies  I reviewed patients medications  I reviewed patients pertinent medical records      Plan:  No med changes  Cardiac rehab -  CORE in 6 months         Robinson FERNANDEZ NP-C  Advanced Heart Failure Nurse Practitioner  [facility]

## 2022-05-25 NOTE — PATIENT INSTRUCTIONS
Take your medicines every day, as directed    Changes made today:  none     Monitor Your Weight and Symptoms    Contact us if you:    Gain 2 pounds in one day or 5 pounds in one week  Feel more short of breath  Notice more leg swelling  Feel lightheadeded   Change your lifestyle    Limit Salt or Sodium:  2000 mg  Limit Fluids:  2000 mL or approximately 64 ounces  Eat a Heart Healthy Diet  Low in saturated fats  Stay Active:  Aim to move at least 150 minutes every  week         To Contact us    During Business Hours:  [phone], option # 1      After hours, weekends or holidays:   [phone], Option #4  Ask to speak to the On-Call Cardiologist. Inform them you are a CORE/heart failure patient at the [facility].     Use [employer] allows you to communicate directly with your heart team through secure messaging.  [employer]Accounts can be accessed any time on your phone, computer, or tablet.  If you need assistance, we'd be happy to help!         Keep your Heart Appointments:    scheduling office within 2 business days, please call [phone]     CORE in 6 months

## 2022-05-26 DIAGNOSIS — Z86.16 HISTORY OF COVID-19: Primary | ICD-10-CM

## 2022-05-27 ENCOUNTER — TELEPHONE (OUTPATIENT)
Dept: FAMILY MEDICINE | Facility: CLINIC | Age: 67
End: 2022-05-27
Payer: COMMERCIAL

## 2022-05-27 LAB
DEPRECATED CALCIDIOL+CALCIFEROL SERPL-MC: <75 UG/L (ref 20–75)
VITAMIN D2 SERPL-MCNC: <5 UG/L
VITAMIN D3 SERPL-MCNC: 70 UG/L

## 2022-06-01 ENCOUNTER — OFFICE VISIT (OUTPATIENT)
Dept: PODIATRY | Facility: CLINIC | Age: 67
End: 2022-06-01
Payer: COMMERCIAL

## 2022-06-01 DIAGNOSIS — M21.969 TYPE 2 DIABETES MELLITUS WITH DIABETIC FOOT DEFORMITY (H): ICD-10-CM

## 2022-06-01 DIAGNOSIS — L97.511 ULCER OF RIGHT FOOT, LIMITED TO BREAKDOWN OF SKIN (H): ICD-10-CM

## 2022-06-01 DIAGNOSIS — E11.49 TYPE II OR UNSPECIFIED TYPE DIABETES MELLITUS WITH NEUROLOGICAL MANIFESTATIONS, NOT STATED AS UNCONTROLLED(250.60) (H): Primary | ICD-10-CM

## 2022-06-01 DIAGNOSIS — Z86.16 HISTORY OF COVID-19: Primary | ICD-10-CM

## 2022-06-01 DIAGNOSIS — E11.69 TYPE 2 DIABETES MELLITUS WITH DIABETIC FOOT DEFORMITY (H): ICD-10-CM

## 2022-06-01 PROCEDURE — 99213 OFFICE O/P EST LOW 20 MIN: CPT | Performed by: PODIATRIST

## 2022-06-01 ASSESSMENT — PAIN SCALES - GENERAL: PAINLEVEL: NO PAIN (0)

## 2022-06-01 NOTE — NURSING NOTE
Sherry Slaughter's chief complaint for this visit includes:  Chief Complaint   Patient presents with     Right Foot - WOUND CARE     PCP: Marilou Arciniega    Referring Provider:  No referring provider defined for this encounter.    LMP 10/02/2007   No Pain (0)     Do you need any medication refills at today's visit? NO    Allergies   Allergen Reactions     Lipitor [Atorvastatin Calcium]      Gas     Pravachol [Pravastatin Sodium]      Pravachol - dry cough      Simvastatin      Myalgia       Bandar Kelley, EMT

## 2022-06-01 NOTE — PROGRESS NOTES
Past Medical History:   Diagnosis Date     Cataract      Congestive heart failure (H) 2019 NOVEMBER    AFIB     DEPRESSION     comes and goes - tried meds - unsuccessfully, certain times of the year, no psych intervention and no counselors in the past - and not interested      Diverticulosis of colon (without mention of hemorrhage) 4/04    colonoscopy     ECHO-mildLVH,tr MR,mild thick lflets w inc LA,trTR   12/03     Essential hypertension, benign 1990s    late 1990s - started medications at that time - not to difficult to control meds      Fam hx-cardiovas dis [facility]     father - CAD, and lipids/HTN - multiple members of the family      Family history of diabetes mellitus     sister and grandmother with DM      Family history of malignant neoplasm of breast     mother - young age - 45, and maternal cousin and aunt as well - no BRCA testing done      Family history of stroke (cerebrovascular)     grandmother in early life in her 40s      FAMILY HX COLON CANCER     Pat uncles x 2     Heart disease     murmur/     Heart murmur      HYPERLIPIDEMIA 2000    fairly recent - in the last 5 years - high for DM levels  -cholesterol recent      Irritable bowel syndrome     goes between the 2 - nerve related - more stressed more problems      MICROALBUMINURIA     unsure how long - been on the lisinopril - for a few years at well      Nonspecific abnormal results of liver function study 2/3/2003    SGOT - has been high in the past - since the hepatitis b - borderline elevation - not really changing any      OBESITY      Obstructive sleep apnea      GENESIS (obstructive sleep apnea) 10/15/2006    psg 5/15 AHI 53 aPAP 8-15     OSTEOARTHRITIS L KNEE 2003    total knee on the left - and pain is gone since the knee replacement      PERS HX HEPATITIS B- RESOLVED] 1977    acute virus only - no chronic disease      PERS HX HEPATITIS B- RESOLVED]      Type II or unspecified type diabetes mellitus without mention of complication, not stated as  uncontrolled 1991    oral meds frist, then insulin eventually later, difficult to control most of the time      Unspecified hypothyroidism 10/11/2006    TSH 3.36 - mild subclinical hypothyroidism - deciding on following or starting low dose meds - with dr Oreilly - following      Patient Active Problem List   Diagnosis     Morbid obesity (H)     Diverticulosis of large intestine     Nonspecific abnormal results of cardiovascular function study     FAMILY HX COLON CANCER     Nonallopathic lesion of thoracic region     Hypothyroidism     GENESIS (obstructive sleep apnea)     Irritable bowel syndrome     Family history of malignant neoplasm of breast     Type 2 diabetes mellitus treated with insulin (H)     History of major depression     OSTEOARTHRITIS L KNEE  s/p knee replacement on the left      Hypertension goal BP (blood pressure) < 140/90     Family history of stroke (cerebrovascular)     Family history of other cardiovascular diseases     JOINT PAIN-ANKLE - right ankle      Mitral valve insufficiency     Hyperlipidemia LDL goal <100     Aortic sclerosis     Tubular adenoma of colon     History of viral hepatitis, type B     Chronic low back pain     Long-term insulin use (H)     S/P total knee replacement using cement, right     Lumbago     Type 2 diabetes mellitus with hyperglycemia (H)     Posterior vitreous detachment of right eye     Pseudophakia of both eyes     Paroxysmal atrial fibrillation (H)     SOB (shortness of breath)     Bunion     On continuous oral anticoagulation     Past Surgical History:   Procedure Laterality Date     ABDOMEN SURGERY      ovaian scope/ appendix removal     ANESTHESIA CARDIOVERSION N/A 12/4/2020    Procedure: ANESTHESIA, FOR CARDIOVERSION @1400;  Surgeon: GENERIC ANESTHESIA PROVIDER;  Location: [facility] OR     ARTHROPLASTY KNEE Right 9/23/2015    Procedure: ARTHROPLASTY KNEE;  Surgeon: Rufus Brown MD;  Location:  OR     BUNIONECTOMY REN AND LEANDRO, COMBINED Left 2/2/2021     Procedure: Left bunion correction with bone cutting and screw fixation;  Surgeon: David Hoffman DPM;  Location: [facility] OR     CATARACT IOL, RT/LT Left      COLONOSCOPY  4/04    diverticulosis, rec repeat 10 yrs(consider fam hx?)     COLONOSCOPY WITH CO2 INSUFFLATION N/A 6/19/2019    Procedure: COLONOSCOPY, WITH CO2 INSUFFLATION;  Surgeon: Zeb Duarte MD;  Location: [facility] OR     ORTHOPEDIC SURGERY      right ankle     PHACOEMULSIFICATION CLEAR CORNEA WITH STANDARD INTRAOCULAR LENS IMPLANT Left 3/15/2018    Procedure: PHACOEMULSIFICATION CLEAR CORNEA WITH STANDARD INTRAOCULAR LENS IMPLANT;  LEFT EYE PHACOEMULSIFICATION CLEAR CORNEA WITH STANDARD INTRAOCULAR LENS IMPLANT;  Surgeon: Bandar Linares MD;  Location:  [facility]     PHACOEMULSIFICATION CLEAR CORNEA WITH STANDARD INTRAOCULAR LENS IMPLANT Right 4/5/2018    Procedure: PHACOEMULSIFICATION CLEAR CORNEA WITH STANDARD INTRAOCULAR LENS IMPLANT;  RIGHT PHACOEMULSIFICATION CLEAR CORNEA WITH STANDARD INTRAOCULAR LENS IMPLANT ;  Surgeon: Bandar Linares MD;  Location:  [facility]     STRESS ECHO (METRO)  12/03    no ischemia, limited by fatigue     SURGICAL HISTORY OF -       EXP LAP- R OVARY CYSTS     SURGICAL HISTORY OF -   1981,1984,1985    CHILDBIRTH     ZZC NONSPECIFIC PROCEDURE  6/06    right triple arthrodecesis      ZZC NONSPECIFIC PROCEDURE  7/06     right bunion surgery      Z TOTAL KNEE ARTHROPLASTY  3/03    L knee     Social History     Socioeconomic History     Marital status:      Spouse name: Not on file     Number of children: 3     Years of education: Not on file     Highest education level: Not on file   Occupational History     Occupation: RN     Employer: [employer]   Tobacco Use     Smoking status: Never Smoker     Smokeless tobacco: Never Used     Tobacco comment: tried in early 20s only    Substance and Sexual Activity     Alcohol use: Yes     Comment: rare     Drug use: No     Sexual activity: Not Currently      Birth control/protection: Post-menopausal     Comment:  - since for 20 years, no signficant other  > 5 years sexual activity    Other Topics Concern      Service No     Blood Transfusions No     Caffeine Concern No     Occupational Exposure Yes     Comment: Normal nursing exposures     Hobby Hazards No     Sleep Concern Yes     Stress Concern No     Comment: Stress level at HM=5, stress level at WK=6     Weight Concern Yes     Special Diet Yes     Comment: Diabetic diet (watching carbs)     Back Care No     Comment: Occassional back spasms     Exercise Yes     Comment: Pt joined a health club goes approx 3-4 times a week,half to one mile daily     Bike Helmet No     Seat Belt Yes     Comment: Sometimes     Self-Exams Yes     Parent/sibling w/ CABG, MI or angioplasty before 65F 55M? 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      Social Determinants of Health     Financial Resource Strain: Not on file   Food Insecurity: Not on file   Transportation Needs: Not on file   Physical Activity: Not on file   Stress: Not on file   Social Connections: Not on file   Intimate Partner Violence: Not on file   Housing Stability: Not on file     Family History   Problem Relation Age of Onset     Diabetes Maternal Grandmother         DM TYPE 2     Cerebrovascular Disease Maternal Grandmother      Hypertension Sister      Lipids Sister      Heart Disease Sister         Chronic AFib     Diabetes Sister      Coronary Artery Disease Sister         afib     Hypertension Sister      Lipids Sister      Gynecology Sister      Diabetes Sister      Asthma Sister      Blood Disease Paternal Grandmother         T CELL LEUKEMIA     Hypertension Brother      Lipids Brother      Congenital Anomalies Brother      Cardiovascular Brother      Heart Disease Brother         CABG/AFIB     Coronary Artery Disease Brother         cabg afib AAA     Hypertension Brother       Lipids Brother      Other Cancer Brother         multple myeloma     Obesity Brother      Hypertension Brother      Respiratory Brother         Sleep apnea     Heart Disease Brother         AFib     Coronary Artery Disease Brother         afib     Alzheimer Disease Mother      Asthma Mother      Hypertension Mother      Breast Cancer Mother 40        has mastectomy and lived to 84     Allergies Mother         Sulfa,     Arthritis Mother      Cardiovascular Mother      Depression Mother      Respiratory Mother      Lipids Mother      Cancer Mother         breast     Thyroid Disease Mother      Cerebrovascular Disease Mother         FROM  AFIB     Dementia Mother         Alzheimers     Other Cancer Mother         breast     Cancer - colorectal Other      Colon Cancer Other         2019     Cancer Father         lung     Aneurysm Father         brother, AAA     Other Cancer Father         lung ca     Coronary Artery Disease Father         cad AAA     Asthma Sister      Cancer - colorectal Paternal Uncle         late 50s to early 60s - great uncles      Breast Cancer Other         Maternal cousin     Arrhythmia Sister         2 brothers 1 sister Afib     Breast Cancer Maternal Aunt 62     Other Cancer Maternal Aunt 35        Ovarian cancer.  Survived despite late recurrence and is now 92.     Glaucoma No family hx of      Macular Degeneration No family hx of      Lab Results   Component Value Date    A1C 7.1 05/25/2022    A1C 8.1 11/24/2021    A1C 9.0 08/16/2021    A1C 6.8 01/05/2021    A1C 6.9 11/25/2020    A1C 7.5 07/22/2020    A1C 7.3 12/10/2019     Last Comprehensive Metabolic Panel:  Sodium   Date Value Ref Range Status   05/25/2022 141 133 - 144 mmol/L Final   04/13/2021 137 133 - 144 mmol/L Final     Potassium   Date Value Ref Range Status   05/25/2022 4.1 3.4 - 5.3 mmol/L Final   04/13/2021 4.2 3.4 - 5.3 mmol/L Final     Chloride   Date Value Ref Range Status   05/25/2022 105 94 - 109 mmol/L Final   04/13/2021  106 94 - 109 mmol/L Final     Carbon Dioxide   Date Value Ref Range Status   04/13/2021 28 20 - 32 mmol/L Final     Carbon Dioxide (CO2)   Date Value Ref Range Status   05/25/2022 28 20 - 32 mmol/L Final     Anion Gap   Date Value Ref Range Status   05/25/2022 8 3 - 14 mmol/L Final   04/13/2021 3 3 - 14 mmol/L Final     Glucose   Date Value Ref Range Status   05/25/2022 213 (H) 70 - 99 mg/dL Final   04/13/2021 98 70 - 99 mg/dL Final     Comment:     Non Fasting     Urea Nitrogen   Date Value Ref Range Status   05/25/2022 28 7 - 30 mg/dL Final   04/13/2021 22 7 - 30 mg/dL Final     Creatinine   Date Value Ref Range Status   05/25/2022 1.04 0.52 - 1.04 mg/dL Final   04/13/2021 0.81 0.52 - 1.04 mg/dL Final     GFR Estimate   Date Value Ref Range Status   05/25/2022 58 (L) >60 mL/min/1.73m2 Final     Comment:     Effective December 21, 2021 eGFRcr in adults is calculated using the 2021 CKD-EPI creatinine equation which includes age and gender (Yashira et al., NEJ, DOI: 10.1056/AXERqg7474598)   04/13/2021 75 >60 mL/min/[1.73_m2] Final     Comment:     Non  GFR Calc  Starting 12/18/2018, serum creatinine based estimated GFR (eGFR) will be   calculated using the Chronic Kidney Disease Epidemiology Collaboration   (CKD-EPI) equation.       Calcium   Date Value Ref Range Status   05/25/2022 10.2 (H) 8.5 - 10.1 mg/dL Final   04/13/2021 10.1 8.5 - 10.1 mg/dL Final             SUBJECTIVE FINDINGS:  A 67-year-old female returns to clinic for ulcer, right 1st MPJ, and diabetes with peripheral neuropathy and hammertoes, hallux valgus present.  She relates it is doing better.  She is doing the wound cares with iodine, using a donut pad and a Band-Aid and the metatarsal pad in her shoes.    OBJECTIVE FINDINGS:  DP and PT are 2/4, right.  She has a right plantar 1st MPJ eschar with ecchymosis.  There is no active drainage, no erythema, no gross edema, no odor, no calor.  Decreased hyperkeratosis.    ASSESSMENT AND  PLAN:  Ulcer, right 1st MPJ.  Diabetes with peripheral neuropathy.  Hammertoes with hallux valgus present.  Diagnosis and treatment options discussed with her.  She opted for no further offloading.  We will continue the Betadine and Band-Aid with a donut pad and a metatarsal pad in her shoe.  This is improving.  Return to clinic and see me in 4-6 weeks.  Previous notes reviewed.          Moderate level of medical decision making.

## 2022-06-01 NOTE — LETTER
6/1/2022         RE: Sherry Slaughter  [address]  [place] [address]        Dear Colleague,    Thank you for referring your patient, Sherry Slaughter, to the [facility]. Please see a copy of my visit note below.    Past Medical History:   Diagnosis Date     Cataract      Congestive heart failure (H) 2019 NOVEMBER    AFIB     DEPRESSION     comes and goes - tried meds - unsuccessfully, certain times of the year, no psych intervention and no counselors in the past - and not interested      Diverticulosis of colon (without mention of hemorrhage) 4/04    colonoscopy     ECHO-mildLVH,tr MR,mild thick lflets w inc LA,trTR   12/03     Essential hypertension, benign 1990s    late 1990s - started medications at that time - not to difficult to control meds      Fam hx-cardiovas dis [facility]     father - CAD, and lipids/HTN - multiple members of the family      Family history of diabetes mellitus     sister and grandmother with DM      Family history of malignant neoplasm of breast     mother - young age - 45, and maternal cousin and aunt as well - no BRCA testing done      Family history of stroke (cerebrovascular)     grandmother in early life in her 40s      FAMILY HX COLON CANCER     Pat uncles x 2     Heart disease     murmur/     Heart murmur      HYPERLIPIDEMIA 2000    fairly recent - in the last 5 years - high for DM levels  -cholesterol recent      Irritable bowel syndrome     goes between the 2 - nerve related - more stressed more problems      MICROALBUMINURIA     unsure how long - been on the lisinopril - for a few years at well      Nonspecific abnormal results of liver function study 2/3/2003    SGOT - has been high in the past - since the hepatitis b - borderline elevation - not really changing any      OBESITY      Obstructive sleep apnea      GENESIS (obstructive sleep apnea) 10/15/2006    psg 5/15 AHI 53 aPAP 8-15     OSTEOARTHRITIS L KNEE 2003    total knee on the  left - and pain is gone since the knee replacement      PERS HX HEPATITIS B- RESOLVED] 1977    acute virus only - no chronic disease      PERS HX HEPATITIS B- RESOLVED]      Type II or unspecified type diabetes mellitus without mention of complication, not stated as uncontrolled 1991    oral meds frist, then insulin eventually later, difficult to control most of the time      Unspecified hypothyroidism 10/11/2006    TSH 3.36 - mild subclinical hypothyroidism - deciding on following or starting low dose meds - with dr Oreilly - following      Patient Active Problem List   Diagnosis     Morbid obesity (H)     Diverticulosis of large intestine     Nonspecific abnormal results of cardiovascular function study     FAMILY HX COLON CANCER     Nonallopathic lesion of thoracic region     Hypothyroidism     GENESIS (obstructive sleep apnea)     Irritable bowel syndrome     Family history of malignant neoplasm of breast     Type 2 diabetes mellitus treated with insulin (H)     History of major depression     OSTEOARTHRITIS L KNEE  s/p knee replacement on the left      Hypertension goal BP (blood pressure) < 140/90     Family history of stroke (cerebrovascular)     Family history of other cardiovascular diseases     JOINT PAIN-ANKLE - right ankle      Mitral valve insufficiency     Hyperlipidemia LDL goal <100     Aortic sclerosis     Tubular adenoma of colon     History of viral hepatitis, type B     Chronic low back pain     Long-term insulin use (H)     S/P total knee replacement using cement, right     Lumbago     Type 2 diabetes mellitus with hyperglycemia (H)     Posterior vitreous detachment of right eye     Pseudophakia of both eyes     Paroxysmal atrial fibrillation (H)     SOB (shortness of breath)     Bunion     On continuous oral anticoagulation     Past Surgical History:   Procedure Laterality Date     ABDOMEN SURGERY      ovaian scope/ appendix removal     ANESTHESIA CARDIOVERSION N/A 12/4/2020    Procedure:  ANESTHESIA, FOR CARDIOVERSION @1400;  Surgeon: GENERIC ANESTHESIA PROVIDER;  Location: [facility] OR     ARTHROPLASTY KNEE Right 9/23/2015    Procedure: ARTHROPLASTY KNEE;  Surgeon: Rufus Brown MD;  Location:  OR     BUNIONECTOMY REN AND LEANDRO, COMBINED Left 2/2/2021    Procedure: Left bunion correction with bone cutting and screw fixation;  Surgeon: David Hoffman DPM;  Location: [facility] OR     CATARACT IOL, RT/LT Left      COLONOSCOPY  4/04    diverticulosis, rec repeat 10 yrs(consider fam hx?)     COLONOSCOPY WITH CO2 INSUFFLATION N/A 6/19/2019    Procedure: COLONOSCOPY, WITH CO2 INSUFFLATION;  Surgeon: Zeb Duarte MD;  Location: [facility] OR     ORTHOPEDIC SURGERY      right ankle     PHACOEMULSIFICATION CLEAR CORNEA WITH STANDARD INTRAOCULAR LENS IMPLANT Left 3/15/2018    Procedure: PHACOEMULSIFICATION CLEAR CORNEA WITH STANDARD INTRAOCULAR LENS IMPLANT;  LEFT EYE PHACOEMULSIFICATION CLEAR CORNEA WITH STANDARD INTRAOCULAR LENS IMPLANT;  Surgeon: Bandar Linares MD;  Location:  [facility]     PHACOEMULSIFICATION CLEAR CORNEA WITH STANDARD INTRAOCULAR LENS IMPLANT Right 4/5/2018    Procedure: PHACOEMULSIFICATION CLEAR CORNEA WITH STANDARD INTRAOCULAR LENS IMPLANT;  RIGHT PHACOEMULSIFICATION CLEAR CORNEA WITH STANDARD INTRAOCULAR LENS IMPLANT ;  Surgeon: Bandar Linares MD;  Location:  [facility]     STRESS ECHO (METRO)  12/03    no ischemia, limited by fatigue     SURGICAL HISTORY OF -       EXP LAP- R OVARY CYSTS     SURGICAL HISTORY OF -   1981,1984,1985    CHILDBIRTH     ZZC NONSPECIFIC PROCEDURE  6/06    right triple arthrodecesis      ZZ NONSPECIFIC PROCEDURE  7/06     right bunion surgery      [facility] TOTAL KNEE ARTHROPLASTY  3/03    L knee     Social History     Socioeconomic History     Marital status:      Spouse name: Not on file     Number of children: 3     Years of education: Not on file     Highest education level: Not on file   Occupational History     Occupation: RN     Employer:  [employer]   Tobacco Use     Smoking status: Never Smoker     Smokeless tobacco: Never Used     Tobacco comment: tried in early 20s only    Substance and Sexual Activity     Alcohol use: Yes     Comment: rare     Drug use: No     Sexual activity: Not Currently     Birth control/protection: Post-menopausal     Comment:  - since for 20 years, no signficant other  > 5 years sexual activity    Other Topics Concern      Service No     Blood Transfusions No     Caffeine Concern No     Occupational Exposure Yes     Comment: Normal nursing exposures     Hobby Hazards No     Sleep Concern Yes     Stress Concern No     Comment: Stress level at HM=5, stress level at WK=6     Weight Concern Yes     Special Diet Yes     Comment: Diabetic diet (watching carbs)     Back Care No     Comment: Occassional back spasms     Exercise Yes     Comment: Pt joined a health club goes approx 3-4 times a week,half to one mile daily     Bike Helmet No     Seat Belt Yes     Comment: Sometimes     Self-Exams Yes     Parent/sibling w/ CABG, MI or angioplasty before 65F 55M? 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      Social Determinants of Health     Financial Resource Strain: Not on file   Food Insecurity: Not on file   Transportation Needs: Not on file   Physical Activity: Not on file   Stress: Not on file   Social Connections: Not on file   Intimate Partner Violence: Not on file   Housing Stability: Not on file     Family History   Problem Relation Age of Onset     Diabetes Maternal Grandmother         DM TYPE 2     Cerebrovascular Disease Maternal Grandmother      Hypertension Sister      Lipids Sister      Heart Disease Sister         Chronic AFib     Diabetes Sister      Coronary Artery Disease Sister         afib     Hypertension Sister      Lipids Sister      Gynecology Sister      Diabetes Sister      Asthma Sister      Blood Disease  Paternal Grandmother         T CELL LEUKEMIA     Hypertension Brother      Lipids Brother      Congenital Anomalies Brother      Cardiovascular Brother      Heart Disease Brother         CABG/AFIB     Coronary Artery Disease Brother         cabg afib AAA     Hypertension Brother      Lipids Brother      Other Cancer Brother         multple myeloma     Obesity Brother      Hypertension Brother      Respiratory Brother         Sleep apnea     Heart Disease Brother         AFib     Coronary Artery Disease Brother         afib     Alzheimer Disease Mother      Asthma Mother      Hypertension Mother      Breast Cancer Mother 40        has mastectomy and lived to 84     Allergies Mother         Sulfa,     Arthritis Mother      Cardiovascular Mother      Depression Mother      Respiratory Mother      Lipids Mother      Cancer Mother         breast     Thyroid Disease Mother      Cerebrovascular Disease Mother         FROM  AFIB     Dementia Mother         Alzheimers     Other Cancer Mother         breast     Cancer - colorectal Other      Colon Cancer Other         2019     Cancer Father         lung     Aneurysm Father         brother, AAA     Other Cancer Father         lung ca     Coronary Artery Disease Father         cad AAA     Asthma Sister      Cancer - colorectal Paternal Uncle         late 50s to early 60s - great uncles      Breast Cancer Other         Maternal cousin     Arrhythmia Sister         2 brothers 1 sister Afib     Breast Cancer Maternal Aunt 62     Other Cancer Maternal Aunt 35        Ovarian cancer.  Survived despite late recurrence and is now 92.     Glaucoma No family hx of      Macular Degeneration No family hx of      Lab Results   Component Value Date    A1C 7.1 05/25/2022    A1C 8.1 11/24/2021    A1C 9.0 08/16/2021    A1C 6.8 01/05/2021    A1C 6.9 11/25/2020    A1C 7.5 07/22/2020    A1C 7.3 12/10/2019     Last Comprehensive Metabolic Panel:  Sodium   Date Value Ref Range Status   05/25/2022  141 133 - 144 mmol/L Final   04/13/2021 137 133 - 144 mmol/L Final     Potassium   Date Value Ref Range Status   05/25/2022 4.1 3.4 - 5.3 mmol/L Final   04/13/2021 4.2 3.4 - 5.3 mmol/L Final     Chloride   Date Value Ref Range Status   05/25/2022 105 94 - 109 mmol/L Final   04/13/2021 106 94 - 109 mmol/L Final     Carbon Dioxide   Date Value Ref Range Status   04/13/2021 28 20 - 32 mmol/L Final     Carbon Dioxide (CO2)   Date Value Ref Range Status   05/25/2022 28 20 - 32 mmol/L Final     Anion Gap   Date Value Ref Range Status   05/25/2022 8 3 - 14 mmol/L Final   04/13/2021 3 3 - 14 mmol/L Final     Glucose   Date Value Ref Range Status   05/25/2022 213 (H) 70 - 99 mg/dL Final   04/13/2021 98 70 - 99 mg/dL Final     Comment:     Non Fasting     Urea Nitrogen   Date Value Ref Range Status   05/25/2022 28 7 - 30 mg/dL Final   04/13/2021 22 7 - 30 mg/dL Final     Creatinine   Date Value Ref Range Status   05/25/2022 1.04 0.52 - 1.04 mg/dL Final   04/13/2021 0.81 0.52 - 1.04 mg/dL Final     GFR Estimate   Date Value Ref Range Status   05/25/2022 58 (L) >60 mL/min/1.73m2 Final     Comment:     Effective December 21, 2021 eGFRcr in adults is calculated using the 2021 CKD-EPI creatinine equation which includes age and gender (Yashira et al., NEJM, DOI: 10.1056/RCQLyz6796167)   04/13/2021 75 >60 mL/min/[1.73_m2] Final     Comment:     Non  GFR Calc  Starting 12/18/2018, serum creatinine based estimated GFR (eGFR) will be   calculated using the Chronic Kidney Disease Epidemiology Collaboration   (CKD-EPI) equation.       Calcium   Date Value Ref Range Status   05/25/2022 10.2 (H) 8.5 - 10.1 mg/dL Final   04/13/2021 10.1 8.5 - 10.1 mg/dL Final             SUBJECTIVE FINDINGS:  A 67-year-old female returns to clinic for ulcer, right 1st MPJ, and diabetes with peripheral neuropathy and hammertoes, hallux valgus present.  She relates it is doing better.  She is doing the wound cares with iodine, using a donut pad  and a Band-Aid and the metatarsal pad in her shoes.    OBJECTIVE FINDINGS:  DP and PT are 2/4, right.  She has a right plantar 1st MPJ eschar with ecchymosis.  There is no active drainage, no erythema, no gross edema, no odor, no calor.  Decreased hyperkeratosis.    ASSESSMENT AND PLAN:  Ulcer, right 1st MPJ.  Diabetes with peripheral neuropathy.  Hammertoes with hallux valgus present.  Diagnosis and treatment options discussed with her.  She opted for no further offloading.  We will continue the Betadine and Band-Aid with a donut pad and a metatarsal pad in her shoe.  This is improving.  Return to clinic and see me in 4-6 weeks.  Previous notes reviewed.          Moderate level of medical decision making.        Again, thank you for allowing me to participate in the care of your patient.        Sincerely,        Rufus Hutson DPM

## 2022-06-14 ENCOUNTER — HOSPITAL ENCOUNTER (OUTPATIENT)
Dept: CARDIAC REHAB | Facility: CLINIC | Age: 67
Setting detail: THERAPIES SERIES
Discharge: HOME | End: 2022-06-14
Attending: INTERNAL MEDICINE
Payer: COMMERCIAL

## 2022-06-14 DIAGNOSIS — Z86.16 HISTORY OF COVID-19: ICD-10-CM

## 2022-06-14 PROCEDURE — 94625 PHY/QHP OP PULM RHB W/O MNTR: CPT | Performed by: CLINICAL EXERCISE PHYSIOLOGIST

## 2022-06-22 ENCOUNTER — HOSPITAL ENCOUNTER (OUTPATIENT)
Dept: CARDIAC REHAB | Facility: CLINIC | Age: 67
Discharge: HOME | End: 2022-06-22
Attending: INTERNAL MEDICINE
Payer: COMMERCIAL

## 2022-06-22 PROCEDURE — 94625 PHY/QHP OP PULM RHB W/O MNTR: CPT

## 2022-06-23 ENCOUNTER — HOSPITAL ENCOUNTER (OUTPATIENT)
Dept: CARDIAC REHAB | Facility: CLINIC | Age: 67
Discharge: HOME | End: 2022-06-23
Attending: INTERNAL MEDICINE
Payer: COMMERCIAL

## 2022-06-23 PROCEDURE — 94625 PHY/QHP OP PULM RHB W/O MNTR: CPT | Performed by: CLINICAL EXERCISE PHYSIOLOGIST

## 2022-07-01 ENCOUNTER — LAB (OUTPATIENT)
Dept: LAB | Facility: CLINIC | Age: 67
End: 2022-07-01
Payer: COMMERCIAL

## 2022-07-01 DIAGNOSIS — I10 ESSENTIAL HYPERTENSION WITH GOAL BLOOD PRESSURE LESS THAN 140/90: ICD-10-CM

## 2022-07-01 LAB
ANION GAP SERPL CALCULATED.3IONS-SCNC: 9 MMOL/L (ref 3–14)
BUN SERPL-MCNC: 27 MG/DL (ref 7–30)
CALCIUM SERPL-MCNC: 10.4 MG/DL (ref 8.5–10.1)
CHLORIDE BLD-SCNC: 102 MMOL/L (ref 94–109)
CO2 SERPL-SCNC: 29 MMOL/L (ref 20–32)
CREAT SERPL-MCNC: 0.94 MG/DL (ref 0.52–1.04)
ERYTHROCYTE [DISTWIDTH] IN BLOOD BY AUTOMATED COUNT: 13.6 % (ref 10–15)
GFR SERPL CREATININE-BSD FRML MDRD: 65 ML/MIN/1.73M2
GLUCOSE BLD-MCNC: 132 MG/DL (ref 70–99)
HCT VFR BLD AUTO: 38.7 % (ref 35–47)
HGB BLD-MCNC: 13.2 G/DL (ref 11.7–15.7)
MCH RBC QN AUTO: 32.8 PG (ref 26.5–33)
MCHC RBC AUTO-ENTMCNC: 34.1 G/DL (ref 31.5–36.5)
MCV RBC AUTO: 96 FL (ref 78–100)
PLATELET # BLD AUTO: 213 10E3/UL (ref 150–450)
POTASSIUM BLD-SCNC: 3.8 MMOL/L (ref 3.4–5.3)
RBC # BLD AUTO: 4.02 10E6/UL (ref 3.8–5.2)
SODIUM SERPL-SCNC: 140 MMOL/L (ref 133–144)
WBC # BLD AUTO: 9 10E3/UL (ref 4–11)

## 2022-07-01 PROCEDURE — 85027 COMPLETE CBC AUTOMATED: CPT

## 2022-07-01 PROCEDURE — 36415 COLL VENOUS BLD VENIPUNCTURE: CPT

## 2022-07-01 PROCEDURE — 80048 BASIC METABOLIC PNL TOTAL CA: CPT

## 2022-07-01 NOTE — RESULT ENCOUNTER NOTE
The vitamin D level is at the upper end of normal range. You might want to decrease a little the total amount of vitamin D you take from supplements.

## 2022-07-05 ENCOUNTER — VIRTUAL VISIT (OUTPATIENT)
Dept: CARDIOLOGY | Facility: CLINIC | Age: 67
End: 2022-07-05
Payer: COMMERCIAL

## 2022-07-05 ENCOUNTER — HOSPITAL ENCOUNTER (OUTPATIENT)
Dept: CARDIAC REHAB | Facility: CLINIC | Age: 67
Discharge: HOME | End: 2022-07-05
Attending: INTERNAL MEDICINE
Payer: COMMERCIAL

## 2022-07-05 DIAGNOSIS — E78.5 DYSLIPIDEMIA: ICD-10-CM

## 2022-07-05 DIAGNOSIS — I48.0 PAROXYSMAL ATRIAL FIBRILLATION (H): Primary | ICD-10-CM

## 2022-07-05 DIAGNOSIS — I10 HYPERTENSION, UNSPECIFIED TYPE: ICD-10-CM

## 2022-07-05 PROCEDURE — 94625 PHY/QHP OP PULM RHB W/O MNTR: CPT

## 2022-07-05 PROCEDURE — 99214 OFFICE O/P EST MOD 30 MIN: CPT | Mod: 95 | Performed by: INTERNAL MEDICINE

## 2022-07-05 NOTE — PROGRESS NOTES
"Sherry is a 67 year old who is being evaluated via a billable video visit.      How would you like to obtain your AVS? MyChart  If the video visit is dropped, the invitation should be resent by: Text to cell phone: [phone]   Will anyone else be joining your video visit? HOLLAND Alcaraz/VIKAS    The patient has been notified of following:     \"This video visit will be conducted via a call between you and your physician/provider. We have found that certain health care needs can be provided without the need for an in-person physical exam.  This service lets us provide the care you need with a video conversation.  If a prescription is necessary we can send it directly to your pharmacy.  If lab work is needed we can place an order for that and you can then stop by our lab to have the test done at a later time.    Video visits are billed at different rates depending on your insurance coverage.  Please reach out to your insurance provider with any questions.    If during the course of the call the physician/provider feels a video visit is not appropriate, you will not be charged for this service.\"    Patient has given verbal consent for video visit? Yes    How would you like to obtain your AVS? Mail    Video-Visit Details    Type of service:  Video Visit    Video Start Time:944am    Video End Time:956am    Total visit time including video visit, chart review, charting, coordination of care =37min    Originating Location (pt. Location):patient home      Distant Location (provider location):  home office    Platform used for Video Visit: Yuliana Meyer dictation #[id number]    [facility]#:[phone]  "

## 2022-07-05 NOTE — NURSING NOTE
Chief Complaint   Patient presents with     Follow Up     Afib, no questions while rooming, pt requested removal of a few medications during e check in      Patient denies any changes since echeck-in regarding medication and allergies and states all information entered during echeck-in remains accurate.    Allegra Kimball, VF/CMA

## 2022-07-05 NOTE — PROGRESS NOTES
Visit Date: 07/05/2022 July 5, 2022    Marilou Arciniega M.D.  [facility]  [address]  [facility] [address]    Dear Dr. Arciniega:    It was a pleasure participating in the care of your patient, Ms. Sherry Slaughter.  As you know, she is a 67-year-old lady who I saw over virtual video visit via [employer] today for paroxysmal AFib, congestive heart failure, hypertension, hyperlipidemia and mild aortic stenosis.    PAST MEDICAL HISTORY:      1.  Type 2 diabetes.  2.  Hypertension.  3.  Dyslipidemia.  4.  Depression.  5.  Obstructive sleep apnea, on CPAP.  6.  Hypothyroidism.  7.  Diverticulosis.  8.  Low back pain.   9.  Irritable bowel syndrome.    I last saw her 01/06/2022.  At that time, she was doing adequately.  She presents today for continuing care.    Since our last visit, she says that since April, her heart rates settled down quite a bit and has been stable in the 70s.  Since it stabilized in the 70s, she has felt much better and has felt quite good.    She is attending pulmonary rehabilitation and riding an exercise bike for 30 minutes at a time and her only real exertional limitation is sores on her feet from some medicines she is taking, according to her.    She denies any new chest pain.  She denies any shortness of breath.  She denies PND, orthopnea, edema, palpitations, syncope or near syncope.    She is tolerating her rivaroxaban well without gross bleeding or new neurologic symptoms.  Her weight has been stable in the 297 pound range.  She denies new edema or swelling.  She feels that she is quite stable on her current regimen and she is no longer dizzy after cutting back her lisinopril.    A 10-point review of systems is positive for some foot wounds or sores on her feet, otherwise unremarkable.    CURRENT MEDICATIONS:      1.  Diltiazem 360 mg a day  2.  Lasix 20 mg a day.  3.  Hydrochlorothiazide 50 mg a day.  4.  Insulin.  5.  Levothyroxine.  6.  Victoza.  7.  Lisinopril 20 mg a day.  8.   Metformin.  9.  Metoprolol succinate 100 in the morning and 200 at night.  10.  Potassium 20 mEq a day.  11.  Rivaroxaban 20 mg a day.  12.  Rosuvastatin 10 mg a day.    PHYSICAL EXAMINATION:      VITAL SIGNS:  Her blood pressures at home have been running in the 115 mmHg range.  Pulse in the 70s.  Weight is 297 pounds.  GENERAL:  She appears comfortable, well groomed.  PSYCHIATRIC:  She is alert and oriented x3.  HEENT:  Eyes do not appear grossly erythematous or have exudate.  RESPIRATORY:  She is breathing comfortably without gross cough.    The remainder of the comprehensive physical exam was deferred secondary to the COVID-19 pandemic and secondary to video visit restrictions.    LABORATORY:  Labs 09/20/2021:  LDL 48.  07/01/2022 potassium 3.8, GFR 65.  Hemoglobin 13.2.    Her last echocardiogram 10/07/2021 revealed an ejection fraction of 55%-60%, mild aortic stenosis with a peak velocity of 2.1 meters per second, mean gradient of 10 mmHg.      IMPRESSION:      Sherry is a 67-year-old lady who has several active cardiac issues:    1.  Paroxysmal atrial fibrillation.    She is in and out of AFib and says that she does rhythm strips at home showing irregular heartbeat without P waves.  CHADS-VASc 2 score is 5 for congestive heart failure, hypertension, age, diabetes and female gender.    She is currently anticoagulated with rivaroxaban and rate control is provided through diltiazem 360 mg and metoprolol 100 mg in the morning and 200 mg at night.    Since increasing her metoprolol to twice a day dosing she has been relatively stable.  We will continue to follow.    2.  History of congestive heart failure.    Her dry weight is likely somewhere in the 298-300 pound range.  Currently, around 298 pounds maintained on Lasix 20 mg a day along with hydrochlorothiazide 50 mg a day.    She denies any new swelling, shortness of breath, PND and appears to be stable from a fluid standpoint.  We will continue to  monitor.    3.  Hypertension, well controlled.      Systolic blood pressure is running in the 115 mmHg range with a pulse in the mid 70s.  Continue to follow.    4.  Hyperlipidemia.      LDL is within goal range.  Continue to follow.    5.  Mild aortic stenosis.    Echocardiogram 10/07/2021 reveals a mean gradient of 10 mmHg, peak velocity 2.1 meters per second and ejection fraction normal.  Currently asymptomatic.  Continue to follow.      PLAN:       1.  Continue present medications at present doses.    2.  Virtual video visit followup 6 months with echo and labs prior, earlier if needed.    3.  If needed, we can always consider maximizing Toprol to 200 mg every 12 hours for additional rate control and cutting down lisinopril concomitantly and possibly even adding digoxin in the future as well.  However, at present she seems to be stable on her current regimen.    Once again, it was a pleasure participating in the care of your patient, Ms. Concepción Scales.  Please feel free to contact me at any time if any questions regarding her care in the future.             Joel Aranda MD    Addendum 7/22/22:    Patient complaining of afib daily over past 4-5 days, resting , up to 140s if exerts.  Only SOB with activity no dizziness.  No BPs reported    Impression:    Parox afib CAHDS-VASc2 score 5 on anticoagulation, with more frequent recent episodes, unclear etiology    Plan:    1.  Increase Toprol XL to 200mg po every 12 hours    2.  Record home BP/pulse readings  (taken 3 hours after morning meds and after resting quietly for 10 minutes)  May consider decreasing Lisinopril if needed to prevent orthostasis    3.  Virtual video visit followup next Thursday at 8:15am if possible with echo, ekg and labs prior (CBC, chem7, TSH)    May consider adding Dig for additional rate control or SGLT2 inh if fluid balance issues        D: 07/05/2022   T: 07/05/2022   MT: MINDY    Name:     CONCEPCIÓN SCALES  MRN:       [id number]        Account:    [id number]   :      1955           Visit Date: 2022     Document: [id number]

## 2022-07-07 ENCOUNTER — HOSPITAL ENCOUNTER (OUTPATIENT)
Dept: CARDIAC REHAB | Facility: CLINIC | Age: 67
Discharge: HOME | End: 2022-07-07
Attending: INTERNAL MEDICINE
Payer: COMMERCIAL

## 2022-07-07 PROCEDURE — 94625 PHY/QHP OP PULM RHB W/O MNTR: CPT | Performed by: CLINICAL EXERCISE PHYSIOLOGIST

## 2022-07-12 ENCOUNTER — HOSPITAL ENCOUNTER (OUTPATIENT)
Dept: CARDIAC REHAB | Facility: CLINIC | Age: 67
Discharge: HOME | End: 2022-07-12
Attending: INTERNAL MEDICINE
Payer: COMMERCIAL

## 2022-07-13 ENCOUNTER — OFFICE VISIT (OUTPATIENT)
Dept: PODIATRY | Facility: CLINIC | Age: 67
End: 2022-07-13
Payer: COMMERCIAL

## 2022-07-13 DIAGNOSIS — S90.822S BLISTER OF LEFT HEEL, SEQUELA: ICD-10-CM

## 2022-07-13 DIAGNOSIS — E11.49 TYPE II OR UNSPECIFIED TYPE DIABETES MELLITUS WITH NEUROLOGICAL MANIFESTATIONS, NOT STATED AS UNCONTROLLED(250.60) (H): Primary | ICD-10-CM

## 2022-07-13 DIAGNOSIS — E11.69 TYPE 2 DIABETES MELLITUS WITH DIABETIC FOOT DEFORMITY (H): ICD-10-CM

## 2022-07-13 DIAGNOSIS — M21.969 TYPE 2 DIABETES MELLITUS WITH DIABETIC FOOT DEFORMITY (H): ICD-10-CM

## 2022-07-13 DIAGNOSIS — L97.512 ULCER OF RIGHT FOOT WITH FAT LAYER EXPOSED (H): ICD-10-CM

## 2022-07-13 PROCEDURE — 99215 OFFICE O/P EST HI 40 MIN: CPT | Mod: 25 | Performed by: PODIATRIST

## 2022-07-13 PROCEDURE — 97597 DBRDMT OPN WND 1ST 20 CM/<: CPT | Performed by: PODIATRIST

## 2022-07-13 RX ORDER — CEPHALEXIN 500 MG/1
500 CAPSULE ORAL 2 TIMES DAILY
Qty: 28 CAPSULE | Refills: 0 | Status: SHIPPED | OUTPATIENT
Start: 2022-07-13 | End: 2022-11-30

## 2022-07-13 ASSESSMENT — PAIN SCALES - GENERAL: PAINLEVEL: MILD PAIN (2)

## 2022-07-13 NOTE — NURSING NOTE
Sherry Slaughter's chief complaint for this visit includes:  Chief Complaint   Patient presents with     Right Foot - WOUND CARE     PCP: Marilou Arciniega    Referring Provider:  No referring provider defined for this encounter.    LMP 10/02/2007   Mild Pain (2)     Do you need any medication refills at today's visit? NO    Allergies   Allergen Reactions     Lipitor [Atorvastatin Calcium]      Gas     Pravachol [Pravastatin Sodium]      Pravachol - dry cough      Simvastatin      Myalgia       Bandar Kelley, EMT

## 2022-07-13 NOTE — LETTER
7/13/2022         RE: Sherry Slaughter  [address]  [place] [address]        Dear Colleague,    Thank you for referring your patient, Sherry Slaughter, to the [facility]. Please see a copy of my visit note below.    Past Medical History:   Diagnosis Date     Cataract      Congestive heart failure (H) 2019 NOVEMBER    AFIB     DEPRESSION     comes and goes - tried meds - unsuccessfully, certain times of the year, no psych intervention and no counselors in the past - and not interested      Diverticulosis of colon (without mention of hemorrhage) 4/04    colonoscopy     ECHO-mildLVH,tr MR,mild thick lflets w inc LA,trTR   12/03     Essential hypertension, benign 1990s    late 1990s - started medications at that time - not to difficult to control meds      Fam hx-cardiovas dis [facility]     father - CAD, and lipids/HTN - multiple members of the family      Family history of diabetes mellitus     sister and grandmother with DM      Family history of malignant neoplasm of breast     mother - young age - 45, and maternal cousin and aunt as well - no BRCA testing done      Family history of stroke (cerebrovascular)     grandmother in early life in her 40s      FAMILY HX COLON CANCER     Pat uncles x 2     Heart disease     murmur/     Heart murmur      HYPERLIPIDEMIA 2000    fairly recent - in the last 5 years - high for DM levels  -cholesterol recent      Irritable bowel syndrome     goes between the 2 - nerve related - more stressed more problems      MICROALBUMINURIA     unsure how long - been on the lisinopril - for a few years at well      Nonspecific abnormal results of liver function study 2/3/2003    SGOT - has been high in the past - since the hepatitis b - borderline elevation - not really changing any      OBESITY      Obstructive sleep apnea      GENESIS (obstructive sleep apnea) 10/15/2006    psg 5/15 AHI 53 aPAP 8-15     OSTEOARTHRITIS L KNEE 2003    total knee on the  left - and pain is gone since the knee replacement      PERS HX HEPATITIS B- RESOLVED] 1977    acute virus only - no chronic disease      PERS HX HEPATITIS B- RESOLVED]      Type II or unspecified type diabetes mellitus without mention of complication, not stated as uncontrolled 1991    oral meds frist, then insulin eventually later, difficult to control most of the time      Unspecified hypothyroidism 10/11/2006    TSH 3.36 - mild subclinical hypothyroidism - deciding on following or starting low dose meds - with dr Oreilly - following      Patient Active Problem List   Diagnosis     Morbid obesity (H)     Diverticulosis of large intestine     Nonspecific abnormal results of cardiovascular function study     FAMILY HX COLON CANCER     Nonallopathic lesion of thoracic region     Hypothyroidism     GENESIS (obstructive sleep apnea)     Irritable bowel syndrome     Family history of malignant neoplasm of breast     Type 2 diabetes mellitus treated with insulin (H)     History of major depression     OSTEOARTHRITIS L KNEE  s/p knee replacement on the left      Hypertension goal BP (blood pressure) < 140/90     Family history of stroke (cerebrovascular)     Family history of other cardiovascular diseases     JOINT PAIN-ANKLE - right ankle      Mitral valve insufficiency     Hyperlipidemia LDL goal <100     Aortic sclerosis     Tubular adenoma of colon     History of viral hepatitis, type B     Chronic low back pain     Long-term insulin use (H)     S/P total knee replacement using cement, right     Lumbago     Type 2 diabetes mellitus with hyperglycemia (H)     Posterior vitreous detachment of right eye     Pseudophakia of both eyes     Paroxysmal atrial fibrillation (H)     SOB (shortness of breath)     Bunion     On continuous oral anticoagulation     Past Surgical History:   Procedure Laterality Date     ABDOMEN SURGERY      ovaian scope/ appendix removal     ANESTHESIA CARDIOVERSION N/A 12/4/2020    Procedure:  ANESTHESIA, FOR CARDIOVERSION @1400;  Surgeon: GENERIC ANESTHESIA PROVIDER;  Location: [facility] OR     ARTHROPLASTY KNEE Right 9/23/2015    Procedure: ARTHROPLASTY KNEE;  Surgeon: Rufus Brown MD;  Location:  OR     BUNIONECTOMY REN AND LEANDRO, COMBINED Left 2/2/2021    Procedure: Left bunion correction with bone cutting and screw fixation;  Surgeon: David Hoffman DPM;  Location: [facility] OR     CATARACT IOL, RT/LT Left      COLONOSCOPY  4/04    diverticulosis, rec repeat 10 yrs(consider fam hx?)     COLONOSCOPY WITH CO2 INSUFFLATION N/A 6/19/2019    Procedure: COLONOSCOPY, WITH CO2 INSUFFLATION;  Surgeon: Zeb Duarte MD;  Location: [facility] OR     ORTHOPEDIC SURGERY      right ankle     PHACOEMULSIFICATION CLEAR CORNEA WITH STANDARD INTRAOCULAR LENS IMPLANT Left 3/15/2018    Procedure: PHACOEMULSIFICATION CLEAR CORNEA WITH STANDARD INTRAOCULAR LENS IMPLANT;  LEFT EYE PHACOEMULSIFICATION CLEAR CORNEA WITH STANDARD INTRAOCULAR LENS IMPLANT;  Surgeon: Bandar Linares MD;  Location:  [facility]     PHACOEMULSIFICATION CLEAR CORNEA WITH STANDARD INTRAOCULAR LENS IMPLANT Right 4/5/2018    Procedure: PHACOEMULSIFICATION CLEAR CORNEA WITH STANDARD INTRAOCULAR LENS IMPLANT;  RIGHT PHACOEMULSIFICATION CLEAR CORNEA WITH STANDARD INTRAOCULAR LENS IMPLANT ;  Surgeon: Bandar Linares MD;  Location:  [facility]     STRESS ECHO (METRO)  12/03    no ischemia, limited by fatigue     SURGICAL HISTORY OF -       EXP LAP- R OVARY CYSTS     SURGICAL HISTORY OF -   1981,1984,1985    CHILDBIRTH     ZZC NONSPECIFIC PROCEDURE  6/06    right triple arthrodecesis      ZZ NONSPECIFIC PROCEDURE  7/06     right bunion surgery      [facility] TOTAL KNEE ARTHROPLASTY  3/03    L knee     Social History     Socioeconomic History     Marital status:      Spouse name: Not on file     Number of children: 3     Years of education: Not on file     Highest education level: Not on file   Occupational History     Occupation: RN     Employer:  [employer]   Tobacco Use     Smoking status: Never Smoker     Smokeless tobacco: Never Used     Tobacco comment: tried in early 20s only    Substance and Sexual Activity     Alcohol use: Yes     Comment: rare     Drug use: No     Sexual activity: Not Currently     Birth control/protection: Post-menopausal     Comment:  - since for 20 years, no signficant other  > 5 years sexual activity    Other Topics Concern      Service No     Blood Transfusions No     Caffeine Concern No     Occupational Exposure Yes     Comment: Normal nursing exposures     Hobby Hazards No     Sleep Concern Yes     Stress Concern No     Comment: Stress level at HM=5, stress level at WK=6     Weight Concern Yes     Special Diet Yes     Comment: Diabetic diet (watching carbs)     Back Care No     Comment: Occassional back spasms     Exercise Yes     Comment: Pt joined a health club goes approx 3-4 times a week,half to one mile daily     Bike Helmet No     Seat Belt Yes     Comment: Sometimes     Self-Exams Yes     Parent/sibling w/ CABG, MI or angioplasty before 65F 55M? 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      Social Determinants of Health     Financial Resource Strain: Not on file   Food Insecurity: Not on file   Transportation Needs: Not on file   Physical Activity: Not on file   Stress: Not on file   Social Connections: Not on file   Intimate Partner Violence: Not on file   Housing Stability: Not on file     Family History   Problem Relation Age of Onset     Diabetes Maternal Grandmother         DM TYPE 2     Cerebrovascular Disease Maternal Grandmother      Hypertension Sister      Lipids Sister      Heart Disease Sister         Chronic AFib     Diabetes Sister      Coronary Artery Disease Sister         afib     Hypertension Sister      Lipids Sister      Gynecology Sister      Diabetes Sister      Asthma Sister      Blood Disease  Paternal Grandmother         T CELL LEUKEMIA     Hypertension Brother      Lipids Brother      Congenital Anomalies Brother      Cardiovascular Brother      Heart Disease Brother         CABG/AFIB     Coronary Artery Disease Brother         cabg afib AAA     Hypertension Brother      Lipids Brother      Other Cancer Brother         multple myeloma     Obesity Brother      Hypertension Brother      Respiratory Brother         Sleep apnea     Heart Disease Brother         AFib     Coronary Artery Disease Brother         afib     Alzheimer Disease Mother      Asthma Mother      Hypertension Mother      Breast Cancer Mother 40        has mastectomy and lived to 84     Allergies Mother         Sulfa,     Arthritis Mother      Cardiovascular Mother      Depression Mother      Respiratory Mother      Lipids Mother      Cancer Mother         breast     Thyroid Disease Mother      Cerebrovascular Disease Mother         FROM  AFIB     Dementia Mother         Alzheimers     Other Cancer Mother         breast     Cancer - colorectal Other      Colon Cancer Other         2019     Cancer Father         lung     Aneurysm Father         brother, AAA     Other Cancer Father         lung ca     Coronary Artery Disease Father         cad AAA     Asthma Sister      Cancer - colorectal Paternal Uncle         late 50s to early 60s - great uncles      Breast Cancer Other         Maternal cousin     Arrhythmia Sister         2 brothers 1 sister Afib     Breast Cancer Maternal Aunt 62     Other Cancer Maternal Aunt 35        Ovarian cancer.  Survived despite late recurrence and is now 92.     Glaucoma No family hx of      Macular Degeneration No family hx of      Lab Results   Component Value Date    A1C 7.1 05/25/2022    A1C 8.1 11/24/2021    A1C 9.0 08/16/2021    A1C 6.8 01/05/2021    A1C 6.9 11/25/2020    A1C 7.5 07/22/2020    A1C 7.3 12/10/2019     Lab Results   Component Value Date    WBC 9.0 07/01/2022    WBC 7.3 01/05/2021     Lab  Results   Component Value Date    RBC 4.02 07/01/2022    RBC 4.31 01/05/2021     Lab Results   Component Value Date    HGB 13.2 07/01/2022    HGB 13.9 01/05/2021     Lab Results   Component Value Date    HCT 38.7 07/01/2022    HCT 40.4 01/05/2021     No components found for: MCT  Lab Results   Component Value Date    MCV 96 07/01/2022    MCV 94 01/05/2021     Lab Results   Component Value Date    MCH 32.8 07/01/2022    MCH 32.3 01/05/2021     Lab Results   Component Value Date    MCHC 34.1 07/01/2022    MCHC 34.4 01/05/2021     Lab Results   Component Value Date    RDW 13.6 07/01/2022    RDW 13.3 01/05/2021     Lab Results   Component Value Date     07/01/2022     01/05/2021     Last Comprehensive Metabolic Panel:  Sodium   Date Value Ref Range Status   07/01/2022 140 133 - 144 mmol/L Final   04/13/2021 137 133 - 144 mmol/L Final     Potassium   Date Value Ref Range Status   07/01/2022 3.8 3.4 - 5.3 mmol/L Final   04/13/2021 4.2 3.4 - 5.3 mmol/L Final     Chloride   Date Value Ref Range Status   07/01/2022 102 94 - 109 mmol/L Final   04/13/2021 106 94 - 109 mmol/L Final     Carbon Dioxide   Date Value Ref Range Status   04/13/2021 28 20 - 32 mmol/L Final     Carbon Dioxide (CO2)   Date Value Ref Range Status   07/01/2022 29 20 - 32 mmol/L Final     Anion Gap   Date Value Ref Range Status   07/01/2022 9 3 - 14 mmol/L Final   04/13/2021 3 3 - 14 mmol/L Final     Glucose   Date Value Ref Range Status   07/01/2022 132 (H) 70 - 99 mg/dL Final   04/13/2021 98 70 - 99 mg/dL Final     Comment:     Non Fasting     Urea Nitrogen   Date Value Ref Range Status   07/01/2022 27 7 - 30 mg/dL Final   04/13/2021 22 7 - 30 mg/dL Final     Creatinine   Date Value Ref Range Status   07/01/2022 0.94 0.52 - 1.04 mg/dL Final   04/13/2021 0.81 0.52 - 1.04 mg/dL Final     GFR Estimate   Date Value Ref Range Status   07/01/2022 65 >60 mL/min/1.73m2 Final     Comment:     Effective December 21, 2021 eGFRcr in adults is  calculated using the 2021 CKD-EPI creatinine equation which includes age and gender (Yashira kim al., NEJ, DOI: 10.1056/QBNJsu5323179)   04/13/2021 75 >60 mL/min/[1.73_m2] Final     Comment:     Non  GFR Calc  Starting 12/18/2018, serum creatinine based estimated GFR (eGFR) will be   calculated using the Chronic Kidney Disease Epidemiology Collaboration   (CKD-EPI) equation.       Calcium   Date Value Ref Range Status   07/01/2022 10.4 (H) 8.5 - 10.1 mg/dL Final   04/13/2021 10.1 8.5 - 10.1 mg/dL Final                   SUBJECTIVE FINDINGS:  A 67 year old returns to clinic for ulcer, right 1st MPJ.  She relates last week it blistered, and she got a new ulcer next to the previous one, which was healing well.  She is using the Betadine and Band-Aid on it, and she is wearing her tennis shoes.  She relates she was using her diabetic insoles, but she stopped using those when the blister started.  She also relates she has a blister on her left heel.    OBJECTIVE FINDINGS:  DP and PT are 2/4 bilaterally.  She has a left posterior heel blood blister that is intact.  There is no erythema, no drainage, no odor, no calor there.  She has a right plantar medial 1st MPJ ulceration with hyperkeratotic tissue buildup.  It is 3 x 2 cm at its widest margins.  It is deep through the dermis into the subcutaneous tissues.  There is positive erythema and edema.  No odor, no calor.  Positive serosanguineous drainage.    ASSESSMENT AND PLAN:  Ulcer, right 1st MPJ, with new blistering and ulceration. Blistering, left heel.  Diabetes with peripheral neuropathy.  She has hallux valgus and hammertoes present and venous stasis.  She relates she is seeing her Vascular doctor again in another week or 2.  They have done some procedures on her veins, but they are going to do some more.  Diagnosis and treatment options discussed with her.  Sharp ulcer debridement through the dermis of the 1st MPJ lesion, no anesthesia needed and local  wound care done upon consent today.  The ulcer was clean upon debridement.  I am going to DC the Betadine.  I am going to have her clean this daily with Wound Vashe, apply Biatain Silver and a sterile dressing.  We will use Kerlix and Coban.  The left heel lesion she can just clean with Wound Vashe daily and keep an eye on this.  I discussed with her offloading, but she opted for no further offloading shoes or boots.  She will bring in her diabetic insoles to see if we can adjust that.  She opted for no surgical referral today.  Prescription for Keflex given and use discussed with her.  Return to clinic and see me in 1 week.  Previous notes reviewed.          High level of medical decision making.        Again, thank you for allowing me to participate in the care of your patient.        Sincerely,        Rufus Hutson DPM

## 2022-07-13 NOTE — PROGRESS NOTES
Past Medical History:   Diagnosis Date     Cataract      Congestive heart failure (H) 2019 NOVEMBER    AFIB     DEPRESSION     comes and goes - tried meds - unsuccessfully, certain times of the year, no psych intervention and no counselors in the past - and not interested      Diverticulosis of colon (without mention of hemorrhage) 4/04    colonoscopy     ECHO-mildLVH,tr MR,mild thick lflets w inc LA,trTR   12/03     Essential hypertension, benign 1990s    late 1990s - started medications at that time - not to difficult to control meds      Fam hx-cardiovas dis [facility]     father - CAD, and lipids/HTN - multiple members of the family      Family history of diabetes mellitus     sister and grandmother with DM      Family history of malignant neoplasm of breast     mother - young age - 45, and maternal cousin and aunt as well - no BRCA testing done      Family history of stroke (cerebrovascular)     grandmother in early life in her 40s      FAMILY HX COLON CANCER     Pat uncles x 2     Heart disease     murmur/     Heart murmur      HYPERLIPIDEMIA 2000    fairly recent - in the last 5 years - high for DM levels  -cholesterol recent      Irritable bowel syndrome     goes between the 2 - nerve related - more stressed more problems      MICROALBUMINURIA     unsure how long - been on the lisinopril - for a few years at well      Nonspecific abnormal results of liver function study 2/3/2003    SGOT - has been high in the past - since the hepatitis b - borderline elevation - not really changing any      OBESITY      Obstructive sleep apnea      GENESIS (obstructive sleep apnea) 10/15/2006    psg 5/15 AHI 53 aPAP 8-15     OSTEOARTHRITIS L KNEE 2003    total knee on the left - and pain is gone since the knee replacement      PERS HX HEPATITIS B- RESOLVED] 1977    acute virus only - no chronic disease      PERS HX HEPATITIS B- RESOLVED]      Type II or unspecified type diabetes mellitus without mention of complication, not stated as  uncontrolled 1991    oral meds frist, then insulin eventually later, difficult to control most of the time      Unspecified hypothyroidism 10/11/2006    TSH 3.36 - mild subclinical hypothyroidism - deciding on following or starting low dose meds - with dr Oreilly - following      Patient Active Problem List   Diagnosis     Morbid obesity (H)     Diverticulosis of large intestine     Nonspecific abnormal results of cardiovascular function study     FAMILY HX COLON CANCER     Nonallopathic lesion of thoracic region     Hypothyroidism     GENESIS (obstructive sleep apnea)     Irritable bowel syndrome     Family history of malignant neoplasm of breast     Type 2 diabetes mellitus treated with insulin (H)     History of major depression     OSTEOARTHRITIS L KNEE  s/p knee replacement on the left      Hypertension goal BP (blood pressure) < 140/90     Family history of stroke (cerebrovascular)     Family history of other cardiovascular diseases     JOINT PAIN-ANKLE - right ankle      Mitral valve insufficiency     Hyperlipidemia LDL goal <100     Aortic sclerosis     Tubular adenoma of colon     History of viral hepatitis, type B     Chronic low back pain     Long-term insulin use (H)     S/P total knee replacement using cement, right     Lumbago     Type 2 diabetes mellitus with hyperglycemia (H)     Posterior vitreous detachment of right eye     Pseudophakia of both eyes     Paroxysmal atrial fibrillation (H)     SOB (shortness of breath)     Bunion     On continuous oral anticoagulation     Past Surgical History:   Procedure Laterality Date     ABDOMEN SURGERY      ovaian scope/ appendix removal     ANESTHESIA CARDIOVERSION N/A 12/4/2020    Procedure: ANESTHESIA, FOR CARDIOVERSION @1400;  Surgeon: GENERIC ANESTHESIA PROVIDER;  Location: [facility] OR     ARTHROPLASTY KNEE Right 9/23/2015    Procedure: ARTHROPLASTY KNEE;  Surgeon: Rufus Brown MD;  Location:  OR     BUNIONECTOMY REN AND LEANDRO, COMBINED Left 2/2/2021     Procedure: Left bunion correction with bone cutting and screw fixation;  Surgeon: David Hoffman DPM;  Location: [facility] OR     CATARACT IOL, RT/LT Left      COLONOSCOPY  4/04    diverticulosis, rec repeat 10 yrs(consider fam hx?)     COLONOSCOPY WITH CO2 INSUFFLATION N/A 6/19/2019    Procedure: COLONOSCOPY, WITH CO2 INSUFFLATION;  Surgeon: Zeb Duarte MD;  Location: [facility] OR     ORTHOPEDIC SURGERY      right ankle     PHACOEMULSIFICATION CLEAR CORNEA WITH STANDARD INTRAOCULAR LENS IMPLANT Left 3/15/2018    Procedure: PHACOEMULSIFICATION CLEAR CORNEA WITH STANDARD INTRAOCULAR LENS IMPLANT;  LEFT EYE PHACOEMULSIFICATION CLEAR CORNEA WITH STANDARD INTRAOCULAR LENS IMPLANT;  Surgeon: Bandar Linares MD;  Location:  [facility]     PHACOEMULSIFICATION CLEAR CORNEA WITH STANDARD INTRAOCULAR LENS IMPLANT Right 4/5/2018    Procedure: PHACOEMULSIFICATION CLEAR CORNEA WITH STANDARD INTRAOCULAR LENS IMPLANT;  RIGHT PHACOEMULSIFICATION CLEAR CORNEA WITH STANDARD INTRAOCULAR LENS IMPLANT ;  Surgeon: Bandar Linares MD;  Location:  [facility]     STRESS ECHO (METRO)  12/03    no ischemia, limited by fatigue     SURGICAL HISTORY OF -       EXP LAP- R OVARY CYSTS     SURGICAL HISTORY OF -   1981,1984,1985    CHILDBIRTH     ZZC NONSPECIFIC PROCEDURE  6/06    right triple arthrodecesis      ZZC NONSPECIFIC PROCEDURE  7/06     right bunion surgery      Z TOTAL KNEE ARTHROPLASTY  3/03    L knee     Social History     Socioeconomic History     Marital status:      Spouse name: Not on file     Number of children: 3     Years of education: Not on file     Highest education level: Not on file   Occupational History     Occupation: RN     Employer: [employer]   Tobacco Use     Smoking status: Never Smoker     Smokeless tobacco: Never Used     Tobacco comment: tried in early 20s only    Substance and Sexual Activity     Alcohol use: Yes     Comment: rare     Drug use: No     Sexual activity: Not Currently      Birth control/protection: Post-menopausal     Comment:  - since for 20 years, no signficant other  > 5 years sexual activity    Other Topics Concern      Service No     Blood Transfusions No     Caffeine Concern No     Occupational Exposure Yes     Comment: Normal nursing exposures     Hobby Hazards No     Sleep Concern Yes     Stress Concern No     Comment: Stress level at HM=5, stress level at WK=6     Weight Concern Yes     Special Diet Yes     Comment: Diabetic diet (watching carbs)     Back Care No     Comment: Occassional back spasms     Exercise Yes     Comment: Pt joined a health club goes approx 3-4 times a week,half to one mile daily     Bike Helmet No     Seat Belt Yes     Comment: Sometimes     Self-Exams Yes     Parent/sibling w/ CABG, MI or angioplasty before 65F 55M? 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      Social Determinants of Health     Financial Resource Strain: Not on file   Food Insecurity: Not on file   Transportation Needs: Not on file   Physical Activity: Not on file   Stress: Not on file   Social Connections: Not on file   Intimate Partner Violence: Not on file   Housing Stability: Not on file     Family History   Problem Relation Age of Onset     Diabetes Maternal Grandmother         DM TYPE 2     Cerebrovascular Disease Maternal Grandmother      Hypertension Sister      Lipids Sister      Heart Disease Sister         Chronic AFib     Diabetes Sister      Coronary Artery Disease Sister         afib     Hypertension Sister      Lipids Sister      Gynecology Sister      Diabetes Sister      Asthma Sister      Blood Disease Paternal Grandmother         T CELL LEUKEMIA     Hypertension Brother      Lipids Brother      Congenital Anomalies Brother      Cardiovascular Brother      Heart Disease Brother         CABG/AFIB     Coronary Artery Disease Brother         cabg afib AAA     Hypertension Brother       Lipids Brother      Other Cancer Brother         multple myeloma     Obesity Brother      Hypertension Brother      Respiratory Brother         Sleep apnea     Heart Disease Brother         AFib     Coronary Artery Disease Brother         afib     Alzheimer Disease Mother      Asthma Mother      Hypertension Mother      Breast Cancer Mother 40        has mastectomy and lived to 84     Allergies Mother         Sulfa,     Arthritis Mother      Cardiovascular Mother      Depression Mother      Respiratory Mother      Lipids Mother      Cancer Mother         breast     Thyroid Disease Mother      Cerebrovascular Disease Mother         FROM  AFIB     Dementia Mother         Alzheimers     Other Cancer Mother         breast     Cancer - colorectal Other      Colon Cancer Other         2019     Cancer Father         lung     Aneurysm Father         brother, AAA     Other Cancer Father         lung ca     Coronary Artery Disease Father         cad AAA     Asthma Sister      Cancer - colorectal Paternal Uncle         late 50s to early 60s - great uncles      Breast Cancer Other         Maternal cousin     Arrhythmia Sister         2 brothers 1 sister Afib     Breast Cancer Maternal Aunt 62     Other Cancer Maternal Aunt 35        Ovarian cancer.  Survived despite late recurrence and is now 92.     Glaucoma No family hx of      Macular Degeneration No family hx of      Lab Results   Component Value Date    A1C 7.1 05/25/2022    A1C 8.1 11/24/2021    A1C 9.0 08/16/2021    A1C 6.8 01/05/2021    A1C 6.9 11/25/2020    A1C 7.5 07/22/2020    A1C 7.3 12/10/2019     Lab Results   Component Value Date    WBC 9.0 07/01/2022    WBC 7.3 01/05/2021     Lab Results   Component Value Date    RBC 4.02 07/01/2022    RBC 4.31 01/05/2021     Lab Results   Component Value Date    HGB 13.2 07/01/2022    HGB 13.9 01/05/2021     Lab Results   Component Value Date    HCT 38.7 07/01/2022    HCT 40.4 01/05/2021     No components found for: MCT  Lab  Results   Component Value Date    MCV 96 07/01/2022    MCV 94 01/05/2021     Lab Results   Component Value Date    MCH 32.8 07/01/2022    MCH 32.3 01/05/2021     Lab Results   Component Value Date    MCHC 34.1 07/01/2022    MCHC 34.4 01/05/2021     Lab Results   Component Value Date    RDW 13.6 07/01/2022    RDW 13.3 01/05/2021     Lab Results   Component Value Date     07/01/2022     01/05/2021     Last Comprehensive Metabolic Panel:  Sodium   Date Value Ref Range Status   07/01/2022 140 133 - 144 mmol/L Final   04/13/2021 137 133 - 144 mmol/L Final     Potassium   Date Value Ref Range Status   07/01/2022 3.8 3.4 - 5.3 mmol/L Final   04/13/2021 4.2 3.4 - 5.3 mmol/L Final     Chloride   Date Value Ref Range Status   07/01/2022 102 94 - 109 mmol/L Final   04/13/2021 106 94 - 109 mmol/L Final     Carbon Dioxide   Date Value Ref Range Status   04/13/2021 28 20 - 32 mmol/L Final     Carbon Dioxide (CO2)   Date Value Ref Range Status   07/01/2022 29 20 - 32 mmol/L Final     Anion Gap   Date Value Ref Range Status   07/01/2022 9 3 - 14 mmol/L Final   04/13/2021 3 3 - 14 mmol/L Final     Glucose   Date Value Ref Range Status   07/01/2022 132 (H) 70 - 99 mg/dL Final   04/13/2021 98 70 - 99 mg/dL Final     Comment:     Non Fasting     Urea Nitrogen   Date Value Ref Range Status   07/01/2022 27 7 - 30 mg/dL Final   04/13/2021 22 7 - 30 mg/dL Final     Creatinine   Date Value Ref Range Status   07/01/2022 0.94 0.52 - 1.04 mg/dL Final   04/13/2021 0.81 0.52 - 1.04 mg/dL Final     GFR Estimate   Date Value Ref Range Status   07/01/2022 65 >60 mL/min/1.73m2 Final     Comment:     Effective December 21, 2021 eGFRcr in adults is calculated using the 2021 CKD-EPI creatinine equation which includes age and gender (Yashira et al., NEJM, DOI: 10.1056/JTYZwb4303353)   04/13/2021 75 >60 mL/min/[1.73_m2] Final     Comment:     Non  GFR Calc  Starting 12/18/2018, serum creatinine based estimated GFR (eGFR) will  be   calculated using the Chronic Kidney Disease Epidemiology Collaboration   (CKD-EPI) equation.       Calcium   Date Value Ref Range Status   07/01/2022 10.4 (H) 8.5 - 10.1 mg/dL Final   04/13/2021 10.1 8.5 - 10.1 mg/dL Final                   SUBJECTIVE FINDINGS:  A 67 year old returns to clinic for ulcer, right 1st MPJ.  She relates last week it blistered, and she got a new ulcer next to the previous one, which was healing well.  She is using the Betadine and Band-Aid on it, and she is wearing her tennis shoes.  She relates she was using her diabetic insoles, but she stopped using those when the blister started.  She also relates she has a blister on her left heel.    OBJECTIVE FINDINGS:  DP and PT are 2/4 bilaterally.  She has a left posterior heel blood blister that is intact.  There is no erythema, no drainage, no odor, no calor there.  She has a right plantar medial 1st MPJ ulceration with hyperkeratotic tissue buildup.  It is 3 x 2 cm at its widest margins.  It is deep through the dermis into the subcutaneous tissues.  There is positive erythema and edema.  No odor, no calor.  Positive serosanguineous drainage.    ASSESSMENT AND PLAN:  Ulcer, right 1st MPJ, with new blistering and ulceration. Blistering, left heel.  Diabetes with peripheral neuropathy.  She has hallux valgus and hammertoes present and venous stasis.  She relates she is seeing her Vascular doctor again in another week or 2.  They have done some procedures on her veins, but they are going to do some more.  Diagnosis and treatment options discussed with her.  Sharp ulcer debridement through the dermis of the 1st MPJ lesion, no anesthesia needed and local wound care done upon consent today.  The ulcer was clean upon debridement.  I am going to DC the Betadine.  I am going to have her clean this daily with Wound Vashe, apply Biatain Silver and a sterile dressing.  We will use Kerlix and Coban.  The left heel lesion she can just clean with Wound  Vashe daily and keep an eye on this.  I discussed with her offloading, but she opted for no further offloading shoes or boots.  She will bring in her diabetic insoles to see if we can adjust that.  She opted for no surgical referral today.  Prescription for Keflex given and use discussed with her.  Return to clinic and see me in 1 week.  Previous notes reviewed.          High level of medical decision making.

## 2022-07-14 ENCOUNTER — HOSPITAL ENCOUNTER (OUTPATIENT)
Dept: CARDIAC REHAB | Facility: CLINIC | Age: 67
Discharge: HOME | End: 2022-07-14
Attending: INTERNAL MEDICINE
Payer: COMMERCIAL

## 2022-07-14 PROCEDURE — 94625 PHY/QHP OP PULM RHB W/O MNTR: CPT

## 2022-07-14 PROCEDURE — 94625 PHY/QHP OP PULM RHB W/O MNTR: CPT | Performed by: REHABILITATION PRACTITIONER

## 2022-07-18 ENCOUNTER — OFFICE VISIT (OUTPATIENT)
Dept: VASCULAR SURGERY | Facility: CLINIC | Age: 67
End: 2022-07-18
Attending: SURGERY
Payer: COMMERCIAL

## 2022-07-18 ENCOUNTER — ANCILLARY PROCEDURE (OUTPATIENT)
Dept: ULTRASOUND IMAGING | Facility: CLINIC | Age: 67
End: 2022-07-18
Attending: SURGERY
Payer: COMMERCIAL

## 2022-07-18 DIAGNOSIS — I83.812 VARICOSE VEINS OF LEFT LOWER EXTREMITY WITH PAIN: Primary | ICD-10-CM

## 2022-07-18 DIAGNOSIS — I83.812 VARICOSE VEINS OF LEFT LOWER EXTREMITY WITH PAIN: ICD-10-CM

## 2022-07-18 DIAGNOSIS — I83.892 VARICOSE VEINS OF LEG WITH SWELLING, LEFT: ICD-10-CM

## 2022-07-18 PROCEDURE — 99213 OFFICE O/P EST LOW 20 MIN: CPT | Performed by: SURGERY

## 2022-07-18 PROCEDURE — 93971 EXTREMITY STUDY: CPT | Mod: LT | Performed by: SURGERY

## 2022-07-18 NOTE — PROGRESS NOTES
[facility] [place] 6-month post procedure follow-up  Sherry Slaughter returns in follow-up of cyanoacrylate glue ablation of her left great saphenous vein and left small saphenous vein.  This was followed by 2 sessions of medically necessary sclerotherapy of varicosities coursing between the small saphenous and great saphenous veins in the proximal calf.    The patient has significant back pain, left knee pain and therefore has difficulty discerning whether the discomfort she is feeling in the proximal leg/calf is from varicosities or another source.    Exam  8 to 10 mm varicosities coursing from the proximal medial calf posteriorly and somewhat cephalad to near the popliteal crease laterally.  Stasis hyperpigmentation of the distal third of the left leg.    Ultrasound  Left lower extremity deep veins  No evidence of deep vein thrombosis    The left great saphenous vein is closed from just distal to the saphenofemoral junction to the distal thigh, is patent for a segment from the distal thigh to the proximal calf and then is occluded down to just distal to the mid calf.    The left small saphenous vein is partially closed in the proximal calf but is closed in the mid calf.  An 8.6 mm diameter incompetent vein branch courses from the small saphenous vein medially to the great saphenous vein.    There is an 8.7 mm diameter vein branch coursing from the great saphenous vein just below the knee extending posteriorly.    Impression  Recanalization of segments of the great saphenous and small saphenous vein with a large varicosity coursing between the 2 of them.  If she remains symptomatic, I would recommend redo ablation of the segments of vein that have recanalized and then would perform phlebectomies to remove the large, bulging varicose veins.    She is on chronic warfarin anticoagulation for atrial fibrillation.  I would discontinue the warfarin for the procedure and restart it the night of the  procedure.    Risks of procedure including bleeding, infection, nerve injury, scarring, hyperpigmentation, deep antibiosis and recanalization of the ablated veins were discussed.  The patient voiced understanding and her questions were answered.      DAYANA Pride MD    Dictated using Dragon voice recognition software which may result in transcription errors  She will think about her options as to how she like to proceed.

## 2022-07-18 NOTE — LETTER
7/18/2022         RE: Sherry Slaughter  [address]  [place] [address]        Dear Colleague,    Thank you for referring your patient, Sherry Slaughter, to the [facility] [place]. Please see a copy of my visit note below.    [facility] 6-month post procedure follow-up  Sherry Slaughter returns in follow-up of cyanoacrylate glue ablation of her left great saphenous vein and left small saphenous vein.  This was followed by 2 sessions of medically necessary sclerotherapy of varicosities coursing between the small saphenous and great saphenous veins in the proximal calf.    The patient has significant back pain, left knee pain and therefore has difficulty discerning whether the discomfort she is feeling in the proximal leg/calf is from varicosities or another source.    Exam  8 to 10 mm varicosities coursing from the proximal medial calf posteriorly and somewhat cephalad to near the popliteal crease laterally.  Stasis hyperpigmentation of the distal third of the left leg.    Ultrasound  Left lower extremity deep veins  No evidence of deep vein thrombosis    The left great saphenous vein is closed from just distal to the saphenofemoral junction to the distal thigh, is patent for a segment from the distal thigh to the proximal calf and then is occluded down to just distal to the mid calf.    The left small saphenous vein is partially closed in the proximal calf but is closed in the mid calf.  An 8.6 mm diameter incompetent vein branch courses from the small saphenous vein medially to the great saphenous vein.    There is an 8.7 mm diameter vein branch coursing from the great saphenous vein just below the knee extending posteriorly.    Impression  Recanalization of segments of the great saphenous and small saphenous vein with a large varicosity coursing between the 2 of them.  If she remains symptomatic, I would recommend redo ablation of the  segments of vein that have recanalized and then would perform phlebectomies to remove the large, bulging varicose veins.    She is on chronic warfarin anticoagulation for atrial fibrillation.  I would discontinue the warfarin for the procedure and restart it the night of the procedure.    Risks of procedure including bleeding, infection, nerve injury, scarring, hyperpigmentation, deep antibiosis and recanalization of the ablated veins were discussed.  The patient voiced understanding and her questions were answered.      DAYANA Pride MD    Dictated using Dragon voice recognition software which may result in transcription errors  She will think about her options as to how she like to proceed.      Again, thank you for allowing me to participate in the care of your patient.        Sincerely,        Sawyer Pride MD

## 2022-07-19 NOTE — PATIENT INSTRUCTIONS
Pre-Procedure Instructions:                           VNUS Closure and Phlebectomies  You are having a Closure(s) - where one or more veins are closed and Phlebectomies - where one or more veins are removed.    Insurance  Precertification and/or referral authorization may be required by your insurance company.  We will call your insurance company to verify benefits for the medically necessary part of your procedure.    Your Current Medications and Allergies  To reduce bruising, please do not take aspirin-type medications (Motrin, Aleve, Ibuprofen, Advil, etc.) for three days before your procedure. You may take Tylenol if you need a pain reliever.  Are you on blood thinner medications? (Plavix, Coumadin, Eliquis, Xarelto) Please discuss this with your surgeon. You may resume taking your blood thinner medication after your procedure.  Are you sensitive to latex or adhesives used for fake fingernails? Please let us know!    Driving Escort and   Please arrange to have a trusted adult (18 years old or older) drive you to and from the clinic.  For your safety, we recommend you have a trusted adult to stay with you until the next morning.    Your Health  If you have a change in your health before the procedure, contact our office immediately.   (For example: cold symptoms, cough, urinary tract infection, fever, flu symptoms).  A pre-procedure physical is not required.    Note  It is sometimes necessary to adjust the procedure schedule due to emergencies. We greatly appreciate your flexibility and understanding in this matter.  Compression hose are needed following this procedure.                   Check List: The Morning of Your Procedure  ___1. Please do not put anything on your leg(s) or shave the day of your procedure.  ___2. You may take your normal medications the day of your procedure.  ___3. It is recommended you eat a light breakfast or lunch the day of your procedure.  ___4. Wear comfortable  loose-fitting clothing and wide-fitting shoes (i.e. tennis shoes, slip-ons).  ___5. Please arrive at our clinic at the specified time given by the nurse.  ___6. You will sign an affirmation of informed consent.  ___7. Bring your pre-procedure sedation medication (lorazepam and clonidine) with you to the clinic. One hour              before your procedure, you will be instructed to take these medications. The lorazepam (Ativan) lowers              anxiety and sedates you; the clonidine makes the lorazepam more effective. Everyone's body processes              these medications differently. Therefore, reactions to these medications vary. Some people stay awake and              some people sleep through the whole procedure. You may not remember everything about the procedure              or the day. You do not want to make any big decisions for the rest of the day.     The Day of Your Procedure:       VNUS Closure and Phlebectomies  In the Exam Room  A nurse will bring you back to an exam room with your family member or friend. This is when your informed consent will be signed, and you will take your pre-procedure medications.  You will be asked to remove everything from the waist down, including undergarments. You will then put on a hospital gown or shorts and blue booties.  Your surgeon will come in to answer any questions and ratna any bulging varicose veins to be removed.  You will be taken to the restroom to empty your bladder before going into the procedure room.    In the Procedure Room  You will be escorted to the procedure room. You will lie on a procedure table covered with a sheet or blanket.  A nurse will put a blood pressure cuff on your arm and a pulse/oxygen monitor on a finger. Your vital signs will be monitored every 15 minutes.  Your gown will be pulled up slightly and the groin exposed for a short period of time. The surgeon's assistant will clean your foot, leg, and groin with an antibacterial  solution. We will get you covered up as quickly as possible!  Sterile towels and blue drapes will be used to cover you and the table. You will be asked to keep your hands under the blue drapes during the procedure.  The lights will be turned down. The table will be tipped so your head is higher than your feet. You may feel like you're going to slide off, but you won't.    The Procedure  The surgeon will visualize your veins with an ultrasound machine. He or she will then numb your skin and access the vein. A catheter is passed up the vein and positioned with ultrasound guidance. The table will then be tipped head down.  Once the catheter is in the correct position, medication will be injected to numb your leg. You will feel some needle sticks and may feel discomfort as the medication goes in. Once this is done, you should not experience significant discomfort. But if you do, please let us know and more numbing medication can be injected. As the catheter sends out heat, the vein closes off and the catheter is withdrawn.  For the phlebectomy part of the procedure, small incisions are made where the bulging varicose veins have been marked on your leg(s) and these veins will be removed using a small emiliana hook instrument.                    VNUS closure                  Phlebectomy    Post-Procedure  Once the procedure is done, your leg(s) will be washed with warm water and dried. Your leg(s) will be bandaged with large soft dressings and a large ace bandage wrapped from toes to groin.   You will be offered something to drink and a light snack.  You will rest with your leg(s) elevated for approximately 30 minutes. Your friend or family member may join you.  For your safety, you will be taken to your car in a wheelchair. If you are able to, it is good to keep your leg(s) elevated on the car ride home.    Post-Procedure Instructions:             VNUS Closure and Phlebectomies    Post-Op Day Zero - The Day of Your  Procedure:0  1. Medication for Pain Control and Inflammation Control   - The numbing medication injected during your procedure will last for several hours. The pre-procedure                 tablets may make you very sleepy and you might not remember everything from the procedure or from                 the day. This will usually wear off by the next day.   - Ibuprofen:  If tolerated, take ibuprofen (e.g., Advil) to reduce inflammation whether or not you have                 pain. For three days, take two tablets (200 mg each) with every meal and at bedtime with a snack. If                 your pain is not controlled with ibuprofen, you may take prescription pain medication (such as Norco),                 if prescribed.   - You may resume taking any medications you were taking before your procedure.  2. Activity   - Rest with your leg(s) elevated above your heart. This will prevent swelling and bleeding.                 You do not need to elevate your leg(s) while sleeping at night. You may go upstairs, sit up to                 eat, use the bathroom, and take several five-minute walks. Otherwise, keep your leg(s) elevated.                 Minimize the amount of time you are up on your feet to about 30 minutes at a time.  3. Bandages   - The incision sites will be covered with soft bandages and an ACE wrap. Keep your bandages on and       dry for 48 hours. The ACE should provide  snug  compression but should not cause pain or numbness       in the toes. If you have significant discomfort or your toes become cold or numb, unwrap your ACE and       rewrap with less tension starting at the toes wrapping upward.  4. Incisions   - Bleeding: You may see some incision sites that are oozing through the bandages. This is not unusual       and can be managed with Rest, Ice, Compression and Elevation (RICE). Apply ice and firm pressure       directly to the site that is bleeding and rest with your leg(s) elevated above your  heart for 20-30 minutes.    Post-Op Day One:  1. Medication   - Ibuprofen: Continue the same as the Day of Your Procedure. If your pain is not controlled with       ibuprofen, you may take prescription pain medication (such as Norco), if prescribed.   2. Activity   - We would like you to get up at least six times and walk around for short periods of time, unless it is       causing you pain. You should not be on your feet more than 90 minutes at a time. Elevate your leg       above your heart when you are not walking.  3. Bandages   - Your bandages must be kept on and dry for 48 hours.  4. Driving   - You may resume driving when you can do so safely. Do not drive if you are taking narcotic pain       medication.    Post-Op Day Two:   1. Medication  - Ibuprofen: Continue the same as the Day of Your Procedure.  2.  Activity  - Walk as tolerated. Elevate as much as possible when not walking.  3. Bandages and Compression  - Remove ACE wrap and padding. Shower and put on your compression hose during waking hours only for at least five days.    (Your doctor may instruct you to keep your bandages on until your return appointment; please follow your doctor's instructions.)  4. Incisions  - Your leg(s) will be bruised; there may be swelling, hard knots under the skin and possibly some numbness. These will likely resolve over time.   If you see  hair-like  strings coming out of your incisions, do not pull them (this will only cause pain/discomfort). We will trim them when you come back for your follow-up appointment.  5. Call Us If:   - You see any areas on your leg that are red and angry in appearance.   - You notice any drainage that is milky or cloudy in appearance or has a foul odor.   - You run a temperature of 100.5 or greater.    Post-Op Day Three:  You will have a follow up appointment 2-4 days post-procedure. At this appointment, you will have an ultrasound and we will check your incisions.     __________________________________________________________________________________________    The Two Weeks Following Your Procedure  1.  Skin Care   - Do not use any lotions, creams or powders on your incision(s) for 14 days or until the incisions have               healed.   - Do not soak in a bathtub, hot tub or go swimming for 14 days or until your incisions have healed.  2.  Medications   - You may use ibuprofen or acetaminophen (e.g., Tylenol) as needed for pain or discomfort.  3.  Activity   - Do not lift over 25 pounds. After about two weeks you may resume exercise such as aerobics, running,               tennis or weightlifting. Use your common sense and ease back into your exercise routine slowly.   - You may feel a cord-like tightness along the inside of your leg. Gentle stretching can be helpful.  4. Compression Hose   - Your doctor may instruct you to wear compression for longer than seven days; please    follow your doctor's instructions. As a comfort measure, you may choose to wear compression for    longer than required.  5.  Travel   - Do not fly in an airplane for 14 days after your procedure. If you have a long car trip planned within    two to three weeks following your procedure, stop and walk for a few minutes every two hours.    Periodic ankle pumps during the ride may be helpful.    Six Week Appointment  - At your six-week appointment, you will see your surgeon for an exam and evaluation. This office visit   will be scheduled when you return for post-op day three return appointment.     Return to Work  1.  If you work outside the home, you may return to work in a few days depending on the extent of your        procedure, how you tolerate it, and the type of work you perform.  2.  Paperwork: If your employer requires paperwork or you would like a letter written to your employer, please        let us know. We will complete disability type forms at no charge. Please allow five business days  for forms        to be completed.      [employer] last reviewed this educational content on 11/1/2019 (RFA photo), 12/1/2019 (Phlebectomy photo)    [id number] [employer]. All rights reserved. This information is not intended as a substitute for professional medical care. Always follow your healthcare professional's instructions.

## 2022-07-19 NOTE — PROGRESS NOTES
Patient informed that procedure education has been provided via [employer], patient preference. Patient stated she would read through it and call with any questions. Patient was initially unsure about submitting to insurance and was going to think about it, but decided to submit to insurance and then make the final decision once patient hears back from insurance. Informed patient the insurance process could take up to 14 business days, but once approved, the patient will be contacted by our surgery scheduler to schedule procedure. Patient verbalized understanding of information. No further questions or concerns.

## 2022-07-20 ENCOUNTER — OFFICE VISIT (OUTPATIENT)
Dept: OPTOMETRY | Facility: CLINIC | Age: 67
End: 2022-07-20
Payer: COMMERCIAL

## 2022-07-20 DIAGNOSIS — H52.4 PRESBYOPIA: ICD-10-CM

## 2022-07-20 DIAGNOSIS — H52.223 REGULAR ASTIGMATISM OF BOTH EYES: ICD-10-CM

## 2022-07-20 DIAGNOSIS — Z96.1 PSEUDOPHAKIA OF BOTH EYES: ICD-10-CM

## 2022-07-20 DIAGNOSIS — Z01.00 EXAMINATION OF EYES AND VISION: Primary | ICD-10-CM

## 2022-07-20 DIAGNOSIS — E11.65 UNCONTROLLED TYPE 2 DIABETES MELLITUS WITH HYPERGLYCEMIA (H): ICD-10-CM

## 2022-07-20 DIAGNOSIS — H02.889 MEIBOMIAN GLAND DYSFUNCTION: ICD-10-CM

## 2022-07-20 PROCEDURE — 92014 COMPRE OPH EXAM EST PT 1/>: CPT | Performed by: OPTOMETRIST

## 2022-07-20 ASSESSMENT — REFRACTION_MANIFEST
OD_CYLINDER: +1.50
OD_AXIS: 010
OD_ADD: +2.50
OD_SPHERE: -1.00
OS_CYLINDER: +1.00
OS_AXIS: 008
OS_ADD: +2.50
OS_SPHERE: -0.75

## 2022-07-20 ASSESSMENT — REFRACTION_WEARINGRX
SPECS_TYPE: NEAR
OS_AXIS: 008
OD_AXIS: 010
OD_SPHERE: +1.50
OD_CYLINDER: +1.50
OS_CYLINDER: +1.00
OS_SPHERE: +1.75

## 2022-07-20 ASSESSMENT — VISUAL ACUITY
OS_SC: 20/25
OS_CC: 20/20-1
OD_CC: 20/20
OD_SC: 20/40
METHOD: SNELLEN - LINEAR

## 2022-07-20 ASSESSMENT — TONOMETRY
OS_IOP_MMHG: 14
OD_IOP_MMHG: 13
IOP_METHOD: TONOPEN

## 2022-07-20 ASSESSMENT — CONF VISUAL FIELD
OS_NORMAL: 1
METHOD: COUNTING FINGERS
OD_NORMAL: 1

## 2022-07-20 ASSESSMENT — EXTERNAL EXAM - RIGHT EYE: OD_EXAM: ROSACEA

## 2022-07-20 ASSESSMENT — EXTERNAL EXAM - LEFT EYE: OS_EXAM: ROSACEA

## 2022-07-20 ASSESSMENT — CUP TO DISC RATIO
OD_RATIO: 0.3
OS_RATIO: 0.3

## 2022-07-20 NOTE — LETTER
7/20/2022         RE: Sherry Slaughter  [id number]  [place] [address]        Dear Colleague,    Thank you for referring your patient, Sherry Slaughter, to the [facility]. Please see a copy of my visit note below.    Chief Complaint   Patient presents with     Annual Eye Exam        Chief Complaint(s) and History of Present Illness(es)     No visit information to display         Lab Results   Component Value Date    A1C 7.1 05/25/2022    A1C 8.1 11/24/2021    A1C 9.0 08/16/2021    A1C 6.8 01/05/2021    A1C 6.9 11/25/2020    A1C 7.5 07/22/2020    A1C 7.3 12/10/2019       Last Eye Exam: 3/17/2021  Dilated Previously: Yes    What are you currently using to see?  readers    Distance Vision Acuity: Satisfied with vision    Near Vision Acuity: Satisfied with vision while reading  with readers    Eye Comfort: good  Do you use eye drops? : No  Occupation or Hobbies: retired nurse    History of cataract surgery with Dr. Patrick Linares  Date of surgery 4-5-2018 - right eye                             3- - left eye        Medical, surgical and family histories reviewed and updated 7/20/2022.       OBJECTIVE: See Ophthalmology exam    ASSESSMENT:    ICD-10-CM    1. Examination of eyes and vision  Z01.00    2. Uncontrolled type 2 diabetes mellitus with hyperglycemia (H)  E11.65     Negative diabetic retinopathy   3. Meibomian gland dysfunction  H02.889    4. Presbyopia  H52.4    5. Regular astigmatism of both eyes  H52.223    6. Pseudophakia of both eyes  Z96.1        PLAN:    Sherry Slaughter aware  eye exam results will be sent to Marilou Arciniega.  Patient Instructions   There are not any signs of the diabetes affecting the eyes today.  It is important that you get your eyes dilated once yearly and keep good control of your diabetes.     Eyeglass prescription given.  Your options are a bifocal which would allow you to see distance and near vision or separate glasses  for distance and reading.     Heat to the eyes daily for 10-15 minutes nightly with warm washcloth or reusable gel masks from the pharmacy or  [employer] heat masks can be purchased at [employer].     Refresh tears 1 drop 2-4x daily.     Ocusoft lid scrubs at night.      Return in 1 year for a complete eye exam or sooner if needed.    Kameron Pedraza, OD               Again, thank you for allowing me to participate in the care of your patient.        Sincerely,        Kameron Pedraza, OD

## 2022-07-20 NOTE — PROGRESS NOTES
Chief Complaint   Patient presents with     Annual Eye Exam        Chief Complaint(s) and History of Present Illness(es)     No visit information to display         Lab Results   Component Value Date    A1C 7.1 05/25/2022    A1C 8.1 11/24/2021    A1C 9.0 08/16/2021    A1C 6.8 01/05/2021    A1C 6.9 11/25/2020    A1C 7.5 07/22/2020    A1C 7.3 12/10/2019       Last Eye Exam: 3/17/2021  Dilated Previously: Yes    What are you currently using to see?  readers    Distance Vision Acuity: Satisfied with vision    Near Vision Acuity: Satisfied with vision while reading  with readers    Eye Comfort: good  Do you use eye drops? : No  Occupation or Hobbies: retired nurse    History of cataract surgery with Dr. Patrick Linares  Date of surgery 4-5-2018 - right eye                             3- - left eye        Medical, surgical and family histories reviewed and updated 7/20/2022.       OBJECTIVE: See Ophthalmology exam    ASSESSMENT:    ICD-10-CM    1. Examination of eyes and vision  Z01.00    2. Uncontrolled type 2 diabetes mellitus with hyperglycemia (H)  E11.65     Negative diabetic retinopathy   3. Meibomian gland dysfunction  H02.889    4. Presbyopia  H52.4    5. Regular astigmatism of both eyes  H52.223    6. Pseudophakia of both eyes  Z96.1        PLAN:    Sherry Slaughter aware  eye exam results will be sent to Marilou Arciniega.  Patient Instructions   There are not any signs of the diabetes affecting the eyes today.  It is important that you get your eyes dilated once yearly and keep good control of your diabetes.     Eyeglass prescription given.  Your options are a bifocal which would allow you to see distance and near vision or separate glasses for distance and reading.     Heat to the eyes daily for 10-15 minutes nightly with warm washcloth or reusable gel masks from the pharmacy or  [employer] heat masks can be purchased at [employer].     Refresh tears 1 drop 2-4x daily.     Ocusoft lid scrubs at night.       Return in 1 year for a complete eye exam or sooner if needed.    Kameron Pedraza, OD

## 2022-07-20 NOTE — PATIENT INSTRUCTIONS
There are not any signs of the diabetes affecting the eyes today.  It is important that you get your eyes dilated once yearly and keep good control of your diabetes.     Eyeglass prescription given.  Your options are a bifocal which would allow you to see distance and near vision or separate glasses for distance and reading.     Heat to the eyes daily for 10-15 minutes nightly with warm washcloth or reusable gel masks from the pharmacy or  [employer] heat masks can be purchased at [employer].     Refresh tears 1 drop 2-4x daily.     Ocusoft lid scrubs at night.      Return in 1 year for a complete eye exam or sooner if needed.    Kameron Pedraza, OD    The affects of the dilating drops last for 4- 6 hours.  You will be more sensitive to light and vision will be blurry up close.  Do not drive if you do not feel comfortable.  Mydriatic sunglasses were given if needed.    Patient Education   Diabetes weakens the blood vessels all over the body, including the eyes. Damage to the blood vessels in the eyes can cause swelling or bleeding into part of the eye (called the retina). This is called diabetic retinopathy (YOSELIN-tin--pu-thee). If not treated, this disease can cause vision loss or blindness.   Symptoms may include blurred or distorted vision, but many people have no symptoms. It's important to see your eye doctor regularly to check for problems.   Early treatment and good control can help protect your vision. Here are the things you can do to help prevent vision loss:      1. Keep your blood sugar levels under tight control.      2. Bring high blood pressure under control.      3. No smoking.      4. Have yearly dilated eye exams.       Optometry Providers       Clinic Locations                                 Telephone Number   Dr. Sanam Ellison   [facility] [phone]     [facility] Hours:                 [facility] Hours:       [facility] Hours:   [address]   [address] [address]     [address] [place] [address]   [place] Lopez [address]    TED[place] Ellison [address]  Phone: [phone]                    Phone: [phone]     Phone: [phone]                      Monday 8:00-6:00                          Monday 8:00-6:00                          Monday 8:00-6:00              Tuesday 8:00-6:00                          Tuesday 8:00-6:00                          Tuesday 8:00-6:00              Wednesday 8:00-6:00                  Wednesday 8:00-6:00                   Wednesday 8:00-6:00      Thursday 8:00-6:00                        Thursday 8:00-6:00                         Thursday 8:00-6:00            Friday 8:00-5:00                              Friday 8:00-5:00                              Friday 8:00-5:00    [facility]:   [address] [place] [address]  [phone]    Monday 9:00-6:00  Tuesday 9:00-6:00  Wednesday 9:00-6:00  Thursday 9:00-6:00  Friday 9:00-5:00  Please log on to [email or web address] to order your contact lenses.  The link is found on the [facility] page.  As always, Thank you for trusting us with your health care needs!

## 2022-07-21 ENCOUNTER — HOSPITAL ENCOUNTER (OUTPATIENT)
Dept: CARDIAC REHAB | Facility: CLINIC | Age: 67
Discharge: HOME | End: 2022-07-21
Attending: INTERNAL MEDICINE
Payer: COMMERCIAL

## 2022-07-21 ENCOUNTER — OFFICE VISIT (OUTPATIENT)
Dept: PODIATRY | Facility: CLINIC | Age: 67
End: 2022-07-21
Payer: COMMERCIAL

## 2022-07-21 DIAGNOSIS — M21.969 TYPE 2 DIABETES MELLITUS WITH DIABETIC FOOT DEFORMITY (H): ICD-10-CM

## 2022-07-21 DIAGNOSIS — E11.49 TYPE II OR UNSPECIFIED TYPE DIABETES MELLITUS WITH NEUROLOGICAL MANIFESTATIONS, NOT STATED AS UNCONTROLLED(250.60) (H): Primary | ICD-10-CM

## 2022-07-21 DIAGNOSIS — E11.69 TYPE 2 DIABETES MELLITUS WITH DIABETIC FOOT DEFORMITY (H): ICD-10-CM

## 2022-07-21 DIAGNOSIS — S90.822S BLISTER OF LEFT HEEL, SEQUELA: ICD-10-CM

## 2022-07-21 DIAGNOSIS — L97.512 ULCER OF RIGHT FOOT WITH FAT LAYER EXPOSED (H): ICD-10-CM

## 2022-07-21 PROCEDURE — 94625 PHY/QHP OP PULM RHB W/O MNTR: CPT

## 2022-07-21 PROCEDURE — 99214 OFFICE O/P EST MOD 30 MIN: CPT | Performed by: PODIATRIST

## 2022-07-21 NOTE — LETTER
7/21/2022         RE: Sherry Slaughter  [address]  [place] [address]        Dear Colleague,    Thank you for referring your patient, Sherry Slaughter, to the [facility]. Please see a copy of my visit note below.    Past Medical History:   Diagnosis Date     Cataract      Congestive heart failure (H) 2019 NOVEMBER    AFIB     DEPRESSION     comes and goes - tried meds - unsuccessfully, certain times of the year, no psych intervention and no counselors in the past - and not interested      Diverticulosis of colon (without mention of hemorrhage) 4/04    colonoscopy     ECHO-mildLVH,tr MR,mild thick lflets w inc LA,trTR   12/03     Essential hypertension, benign 1990s    late 1990s - started medications at that time - not to difficult to control meds      Fam hx-cardiovas dis NEC     father - CAD, and lipids/HTN - multiple members of the family      Family history of diabetes mellitus     sister and grandmother with DM      Family history of malignant neoplasm of breast     mother - young age - 45, and maternal cousin and aunt as well - no BRCA testing done      Family history of stroke (cerebrovascular)     grandmother in early life in her 40s      FAMILY HX COLON CANCER     Pat uncles x 2     Heart disease     murmur/     Heart murmur      HYPERLIPIDEMIA 2000    fairly recent - in the last 5 years - high for DM levels  -cholesterol recent      Irritable bowel syndrome     goes between the 2 - nerve related - more stressed more problems      MICROALBUMINURIA     unsure how long - been on the lisinopril - for a few years at well      Nonspecific abnormal results of liver function study 2/3/2003    SGOT - has been high in the past - since the hepatitis b - borderline elevation - not really changing any      OBESITY      Obstructive sleep apnea      GENESIS (obstructive sleep apnea) 10/15/2006    psg 5/15 AHI 53 aPAP 8-15     OSTEOARTHRITIS L KNEE 2003    total knee on the  left - and pain is gone since the knee replacement      PERS HX HEPATITIS B- RESOLVED] 1977    acute virus only - no chronic disease      PERS HX HEPATITIS B- RESOLVED]      Type II or unspecified type diabetes mellitus without mention of complication, not stated as uncontrolled 1991    oral meds frist, then insulin eventually later, difficult to control most of the time      Unspecified hypothyroidism 10/11/2006    TSH 3.36 - mild subclinical hypothyroidism - deciding on following or starting low dose meds - with dr Oreilly - following      Patient Active Problem List   Diagnosis     Morbid obesity (H)     Diverticulosis of large intestine     Nonspecific abnormal results of cardiovascular function study     FAMILY HX COLON CANCER     Nonallopathic lesion of thoracic region     Hypothyroidism     GENESIS (obstructive sleep apnea)     Irritable bowel syndrome     Family history of malignant neoplasm of breast     Type 2 diabetes mellitus treated with insulin (H)     History of major depression     OSTEOARTHRITIS L KNEE  s/p knee replacement on the left      Hypertension goal BP (blood pressure) < 140/90     Family history of stroke (cerebrovascular)     Family history of other cardiovascular diseases     JOINT PAIN-ANKLE - right ankle      Mitral valve insufficiency     Hyperlipidemia LDL goal <100     Aortic sclerosis     Tubular adenoma of colon     History of viral hepatitis, type B     Chronic low back pain     Long-term insulin use (H)     S/P total knee replacement using cement, right     Lumbago     Type 2 diabetes mellitus with hyperglycemia (H)     Posterior vitreous detachment of right eye     Pseudophakia of both eyes     Paroxysmal atrial fibrillation (H)     SOB (shortness of breath)     Bunion     On continuous oral anticoagulation     Past Surgical History:   Procedure Laterality Date     ABDOMEN SURGERY      ovaian scope/ appendix removal     ANESTHESIA CARDIOVERSION N/A 12/4/2020    Procedure:  ANESTHESIA, FOR CARDIOVERSION @1400;  Surgeon: GENERIC ANESTHESIA PROVIDER;  Location: [facility]     ARTHROPLASTY KNEE Right 9/23/2015    Procedure: ARTHROPLASTY KNEE;  Surgeon: Rufus Brown MD;  Location:  OR     BUNIONECTOMY REN AND LEANDRO, COMBINED Left 2/2/2021    Procedure: Left bunion correction with bone cutting and screw fixation;  Surgeon: David Hoffman DPM;  Location: [facility] OR     CATARACT IOL, RT/LT Left      COLONOSCOPY  4/04    diverticulosis, rec repeat 10 yrs(consider fam hx?)     COLONOSCOPY WITH CO2 INSUFFLATION N/A 6/19/2019    Procedure: COLONOSCOPY, WITH CO2 INSUFFLATION;  Surgeon: Zeb Duarte MD;  Location: [facility] OR     ORTHOPEDIC SURGERY      right ankle     PHACOEMULSIFICATION CLEAR CORNEA WITH STANDARD INTRAOCULAR LENS IMPLANT Left 3/15/2018    Procedure: PHACOEMULSIFICATION CLEAR CORNEA WITH STANDARD INTRAOCULAR LENS IMPLANT;  LEFT EYE PHACOEMULSIFICATION CLEAR CORNEA WITH STANDARD INTRAOCULAR LENS IMPLANT;  Surgeon: Bandar Linares MD;  Location:  [facility]     PHACOEMULSIFICATION CLEAR CORNEA WITH STANDARD INTRAOCULAR LENS IMPLANT Right 4/5/2018    Procedure: PHACOEMULSIFICATION CLEAR CORNEA WITH STANDARD INTRAOCULAR LENS IMPLANT;  RIGHT PHACOEMULSIFICATION CLEAR CORNEA WITH STANDARD INTRAOCULAR LENS IMPLANT ;  Surgeon: Bandar Linares MD;  Location:  [facility]     STRESS ECHO (METRO)  12/03    no ischemia, limited by fatigue     SURGICAL HISTORY OF -       EXP LAP- R OVARY CYSTS     SURGICAL HISTORY OF -   1981,1984,1985    CHILDBIRTH     ZZC NONSPECIFIC PROCEDURE  6/06    right triple arthrodecesis      ZZ NONSPECIFIC PROCEDURE  7/06     right bunion surgery      [facility] TOTAL KNEE ARTHROPLASTY  3/03    L knee     Social History     Socioeconomic History     Marital status:      Spouse name: Not on file     Number of children: 3     Years of education: Not on file     Highest education level: Not on file   Occupational History     Occupation: RN     Employer:  [employer]   Tobacco Use     Smoking status: Never Smoker     Smokeless tobacco: Never Used     Tobacco comment: tried in early 20s only    Substance and Sexual Activity     Alcohol use: Yes     Comment: rare     Drug use: No     Sexual activity: Not Currently     Birth control/protection: Post-menopausal     Comment:  - since for 20 years, no signficant other  > 5 years sexual activity    Other Topics Concern      Service No     Blood Transfusions No     Caffeine Concern No     Occupational Exposure Yes     Comment: Normal nursing exposures     Hobby Hazards No     Sleep Concern Yes     Stress Concern No     Comment: Stress level at HM=5, stress level at WK=6     Weight Concern Yes     Special Diet Yes     Comment: Diabetic diet (watching carbs)     Back Care No     Comment: Occassional back spasms     Exercise Yes     Comment: Pt joined a health club goes approx 3-4 times a week,half to one mile daily     Bike Helmet No     Seat Belt Yes     Comment: Sometimes     Self-Exams Yes     Parent/sibling w/ CABG, MI or angioplasty before 65F 55M? 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      Social Determinants of Health     Financial Resource Strain: Not on file   Food Insecurity: Not on file   Transportation Needs: Not on file   Physical Activity: Not on file   Stress: Not on file   Social Connections: Not on file   Intimate Partner Violence: Not on file   Housing Stability: Not on file     Family History   Problem Relation Age of Onset     Diabetes Maternal Grandmother         DM TYPE 2     Cerebrovascular Disease Maternal Grandmother      Hypertension Sister      Lipids Sister      Heart Disease Sister         Chronic AFib     Diabetes Sister      Coronary Artery Disease Sister         afib     Hypertension Sister      Lipids Sister      Gynecology Sister      Diabetes Sister      Asthma Sister      Blood Disease  Paternal Grandmother         T CELL LEUKEMIA     Hypertension Brother      Lipids Brother      Congenital Anomalies Brother      Cardiovascular Brother      Heart Disease Brother         CABG/AFIB     Coronary Artery Disease Brother         cabg afib AAA     Hypertension Brother      Lipids Brother      Other Cancer Brother         multple myeloma     Obesity Brother      Hypertension Brother      Respiratory Brother         Sleep apnea     Heart Disease Brother         AFib     Coronary Artery Disease Brother         afib     Alzheimer Disease Mother      Asthma Mother      Hypertension Mother      Breast Cancer Mother 40        has mastectomy and lived to 84     Allergies Mother         Sulfa,     Arthritis Mother      Cardiovascular Mother      Depression Mother      Respiratory Mother      Lipids Mother      Cancer Mother         breast     Thyroid Disease Mother      Cerebrovascular Disease Mother         FROM  AFIB     Dementia Mother         Alzheimers     Other Cancer Mother         breast     Cancer - colorectal Other      Colon Cancer Other         2019     Cancer Father         lung     Aneurysm Father         brother, AAA     Other Cancer Father         lung ca     Coronary Artery Disease Father         cad AAA     Asthma Sister      Cancer - colorectal Paternal Uncle         late 50s to early 60s - great uncles      Breast Cancer Other         Maternal cousin     Arrhythmia Sister         2 brothers 1 sister Afib     Breast Cancer Maternal Aunt 62     Other Cancer Maternal Aunt 35        Ovarian cancer.  Survived despite late recurrence and is now 92.     Glaucoma No family hx of      Macular Degeneration No family hx of      Lab Results   Component Value Date    A1C 7.1 05/25/2022    A1C 8.1 11/24/2021    A1C 9.0 08/16/2021    A1C 6.8 01/05/2021    A1C 6.9 11/25/2020    A1C 7.5 07/22/2020    A1C 7.3 12/10/2019         Previous notes reviewed.      SUBJECTIVE FINDINGS:  A 67 year old returns to clinic  for ulcer, right 1st MPJ.  Blister ulceration, posterior left heel.  She relates the heel is doing well.  The 1st MPJ is doing much better.  She relates that it is not draining much.  She is using the Biatain Silver and her diabetic tennis shoe.    OBJECTIVE FINDINGS:  Vascular status is intact, right.  She has a right plantar 1st MPJ hyperkeratotic eschar.  There is no active drainage, no erythema, no odor, no calor.  She opted to have me not look at her left posterior heel.  She relates that is doing well.    ASSESSMENT AND PLAN:  Ulcer, right 1st MPJ. Blistering ulcer, left posterior heel.  Diabetes with peripheral neuropathy.  Diagnosis and treatment options discussed with her.  She has followed up with Vascular.  She is using Betadine on the right 1st MPJ, and I am going to have her continue that, applying that daily with Biatain Silver and a Band-Aid.  Continue Betadine to the left heel.  Return to clinic and see me in 2 weeks.  Finish the Keflex; she relates no problems with this.          Moderate level of medical decision making.          Again, thank you for allowing me to participate in the care of your patient.        Sincerely,        Rufus Hutson DPM

## 2022-07-21 NOTE — PROGRESS NOTES
Past Medical History:   Diagnosis Date     Cataract      Congestive heart failure (H) 2019 NOVEMBER    AFIB     DEPRESSION     comes and goes - tried meds - unsuccessfully, certain times of the year, no psych intervention and no counselors in the past - and not interested      Diverticulosis of colon (without mention of hemorrhage) 4/04    colonoscopy     ECHO-mildLVH,tr MR,mild thick lflets w inc LA,trTR   12/03     Essential hypertension, benign 1990s    late 1990s - started medications at that time - not to difficult to control meds      Fam hx-cardiovas dis NEC     father - CAD, and lipids/HTN - multiple members of the family      Family history of diabetes mellitus     sister and grandmother with DM      Family history of malignant neoplasm of breast     mother - young age - 45, and maternal cousin and aunt as well - no BRCA testing done      Family history of stroke (cerebrovascular)     grandmother in early life in her 40s      FAMILY HX COLON CANCER     Pat uncles x 2     Heart disease     murmur/     Heart murmur      HYPERLIPIDEMIA 2000    fairly recent - in the last 5 years - high for DM levels  -cholesterol recent      Irritable bowel syndrome     goes between the 2 - nerve related - more stressed more problems      MICROALBUMINURIA     unsure how long - been on the lisinopril - for a few years at well      Nonspecific abnormal results of liver function study 2/3/2003    SGOT - has been high in the past - since the hepatitis b - borderline elevation - not really changing any      OBESITY      Obstructive sleep apnea      GENESIS (obstructive sleep apnea) 10/15/2006    psg 5/15 AHI 53 aPAP 8-15     OSTEOARTHRITIS L KNEE 2003    total knee on the left - and pain is gone since the knee replacement      PERS HX HEPATITIS B- RESOLVED] 1977    acute virus only - no chronic disease      PERS HX HEPATITIS B- RESOLVED]      Type II or unspecified type diabetes mellitus without mention of complication, not stated as  uncontrolled 1991    oral meds frist, then insulin eventually later, difficult to control most of the time      Unspecified hypothyroidism 10/11/2006    TSH 3.36 - mild subclinical hypothyroidism - deciding on following or starting low dose meds - with dr Oreilly - following      Patient Active Problem List   Diagnosis     Morbid obesity (H)     Diverticulosis of large intestine     Nonspecific abnormal results of cardiovascular function study     FAMILY HX COLON CANCER     Nonallopathic lesion of thoracic region     Hypothyroidism     GENESIS (obstructive sleep apnea)     Irritable bowel syndrome     Family history of malignant neoplasm of breast     Type 2 diabetes mellitus treated with insulin (H)     History of major depression     OSTEOARTHRITIS L KNEE  s/p knee replacement on the left      Hypertension goal BP (blood pressure) < 140/90     Family history of stroke (cerebrovascular)     Family history of other cardiovascular diseases     JOINT PAIN-ANKLE - right ankle      Mitral valve insufficiency     Hyperlipidemia LDL goal <100     Aortic sclerosis     Tubular adenoma of colon     History of viral hepatitis, type B     Chronic low back pain     Long-term insulin use (H)     S/P total knee replacement using cement, right     Lumbago     Type 2 diabetes mellitus with hyperglycemia (H)     Posterior vitreous detachment of right eye     Pseudophakia of both eyes     Paroxysmal atrial fibrillation (H)     SOB (shortness of breath)     Bunion     On continuous oral anticoagulation     Past Surgical History:   Procedure Laterality Date     ABDOMEN SURGERY      ovaian scope/ appendix removal     ANESTHESIA CARDIOVERSION N/A 12/4/2020    Procedure: ANESTHESIA, FOR CARDIOVERSION @1400;  Surgeon: GENERIC ANESTHESIA PROVIDER;  Location: [facility]     ARTHROPLASTY KNEE Right 9/23/2015    Procedure: ARTHROPLASTY KNEE;  Surgeon: Rufus Brown MD;  Location:  OR     BUNIONECTOMY REN AND LEANDRO, COMBINED Left 2/2/2021     Procedure: Left bunion correction with bone cutting and screw fixation;  Surgeon: David Hoffman DPM;  Location: [facility]     CATARACT IOL, RT/LT Left      COLONOSCOPY  4/04    diverticulosis, rec repeat 10 yrs(consider fam hx?)     COLONOSCOPY WITH CO2 INSUFFLATION N/A 6/19/2019    Procedure: COLONOSCOPY, WITH CO2 INSUFFLATION;  Surgeon: Zeb Duarte MD;  Location: [facility]     ORTHOPEDIC SURGERY      right ankle     PHACOEMULSIFICATION CLEAR CORNEA WITH STANDARD INTRAOCULAR LENS IMPLANT Left 3/15/2018    Procedure: PHACOEMULSIFICATION CLEAR CORNEA WITH STANDARD INTRAOCULAR LENS IMPLANT;  LEFT EYE PHACOEMULSIFICATION CLEAR CORNEA WITH STANDARD INTRAOCULAR LENS IMPLANT;  Surgeon: Bandar Linares MD;  Location:  [facility]     PHACOEMULSIFICATION CLEAR CORNEA WITH STANDARD INTRAOCULAR LENS IMPLANT Right 4/5/2018    Procedure: PHACOEMULSIFICATION CLEAR CORNEA WITH STANDARD INTRAOCULAR LENS IMPLANT;  RIGHT PHACOEMULSIFICATION CLEAR CORNEA WITH STANDARD INTRAOCULAR LENS IMPLANT ;  Surgeon: Bandar Linares MD;  Location:  [facility]     STRESS ECHO (METRO)  12/03    no ischemia, limited by fatigue     SURGICAL HISTORY OF -       EXP LAP- R OVARY CYSTS     SURGICAL HISTORY OF -   1981,1984,1985    CHILDBIRTH     ZZC NONSPECIFIC PROCEDURE  6/06    right triple arthrodecesis      ZZC NONSPECIFIC PROCEDURE  7/06     right bunion surgery      Z TOTAL KNEE ARTHROPLASTY  3/03    L knee     Social History     Socioeconomic History     Marital status:      Spouse name: Not on file     Number of children: 3     Years of education: Not on file     Highest education level: Not on file   Occupational History     Occupation: RN     Employer: [employer]   Tobacco Use     Smoking status: Never Smoker     Smokeless tobacco: Never Used     Tobacco comment: tried in early 20s only    Substance and Sexual Activity     Alcohol use: Yes     Comment: rare     Drug use: No     Sexual activity: Not Currently      Birth control/protection: Post-menopausal     Comment:  - since for 20 years, no signficant other  > 5 years sexual activity    Other Topics Concern      Service No     Blood Transfusions No     Caffeine Concern No     Occupational Exposure Yes     Comment: Normal nursing exposures     Hobby Hazards No     Sleep Concern Yes     Stress Concern No     Comment: Stress level at HM=5, stress level at WK=6     Weight Concern Yes     Special Diet Yes     Comment: Diabetic diet (watching carbs)     Back Care No     Comment: Occassional back spasms     Exercise Yes     Comment: Pt joined a health club goes approx 3-4 times a week,half to one mile daily     Bike Helmet No     Seat Belt Yes     Comment: Sometimes     Self-Exams Yes     Parent/sibling w/ CABG, MI or angioplasty before 65F 55M? 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      Social Determinants of Health     Financial Resource Strain: Not on file   Food Insecurity: Not on file   Transportation Needs: Not on file   Physical Activity: Not on file   Stress: Not on file   Social Connections: Not on file   Intimate Partner Violence: Not on file   Housing Stability: Not on file     Family History   Problem Relation Age of Onset     Diabetes Maternal Grandmother         DM TYPE 2     Cerebrovascular Disease Maternal Grandmother      Hypertension Sister      Lipids Sister      Heart Disease Sister         Chronic AFib     Diabetes Sister      Coronary Artery Disease Sister         afib     Hypertension Sister      Lipids Sister      Gynecology Sister      Diabetes Sister      Asthma Sister      Blood Disease Paternal Grandmother         T CELL LEUKEMIA     Hypertension Brother      Lipids Brother      Congenital Anomalies Brother      Cardiovascular Brother      Heart Disease Brother         CABG/AFIB     Coronary Artery Disease Brother         cabg afib AAA     Hypertension Brother       Lipids Brother      Other Cancer Brother         multple myeloma     Obesity Brother      Hypertension Brother      Respiratory Brother         Sleep apnea     Heart Disease Brother         AFib     Coronary Artery Disease Brother         afib     Alzheimer Disease Mother      Asthma Mother      Hypertension Mother      Breast Cancer Mother 40        has mastectomy and lived to 84     Allergies Mother         Sulfa,     Arthritis Mother      Cardiovascular Mother      Depression Mother      Respiratory Mother      Lipids Mother      Cancer Mother         breast     Thyroid Disease Mother      Cerebrovascular Disease Mother         FROM  AFIB     Dementia Mother         Alzheimers     Other Cancer Mother         breast     Cancer - colorectal Other      Colon Cancer Other         2019     Cancer Father         lung     Aneurysm Father         brother, AAA     Other Cancer Father         lung ca     Coronary Artery Disease Father         cad AAA     Asthma Sister      Cancer - colorectal Paternal Uncle         late 50s to early 60s - great uncles      Breast Cancer Other         Maternal cousin     Arrhythmia Sister         2 brothers 1 sister Afib     Breast Cancer Maternal Aunt 62     Other Cancer Maternal Aunt 35        Ovarian cancer.  Survived despite late recurrence and is now 92.     Glaucoma No family hx of      Macular Degeneration No family hx of      Lab Results   Component Value Date    A1C 7.1 05/25/2022    A1C 8.1 11/24/2021    A1C 9.0 08/16/2021    A1C 6.8 01/05/2021    A1C 6.9 11/25/2020    A1C 7.5 07/22/2020    A1C 7.3 12/10/2019         Previous notes reviewed.      SUBJECTIVE FINDINGS:  A 67 year old returns to clinic for ulcer, right 1st MPJ.  Blister ulceration, posterior left heel.  She relates the heel is doing well.  The 1st MPJ is doing much better.  She relates that it is not draining much.  She is using the Biatain Silver and her diabetic tennis shoe.    OBJECTIVE FINDINGS:  Vascular status  is intact, right.  She has a right plantar 1st MPJ hyperkeratotic eschar.  There is no active drainage, no erythema, no odor, no calor.  She opted to have me not look at her left posterior heel.  She relates that is doing well.    ASSESSMENT AND PLAN:  Ulcer, right 1st MPJ. Blistering ulcer, left posterior heel.  Diabetes with peripheral neuropathy.  Diagnosis and treatment options discussed with her.  She has followed up with Vascular.  She is using Betadine on the right 1st MPJ, and I am going to have her continue that, applying that daily with Biatain Silver and a Band-Aid.  Continue Betadine to the left heel.  Return to clinic and see me in 2 weeks.  Finish the Keflex; she relates no problems with this.          Moderate level of medical decision making.

## 2022-07-21 NOTE — NURSING NOTE
Sherry Slaughter's chief complaint for this visit includes:  Chief Complaint   Patient presents with     Follow Up     Right foot wound     PCP: Marilou Arciniega    Referring Provider:  No referring provider defined for this encounter.    [facility] 10/02/2007   Data Unavailable        Allergies   Allergen Reactions     Lipitor [Atorvastatin Calcium]      Gas     Pravachol [Pravastatin Sodium]      Pravachol - dry cough      Simvastatin      Myalgia         Do you need any medication refills at today's visit?

## 2022-07-22 ENCOUNTER — TELEPHONE (OUTPATIENT)
Dept: CARDIOLOGY | Facility: CLINIC | Age: 67
End: 2022-07-22

## 2022-07-22 DIAGNOSIS — I48.0 PAROXYSMAL ATRIAL FIBRILLATION (H): Primary | ICD-10-CM

## 2022-07-22 RX ORDER — METOPROLOL SUCCINATE 200 MG/1
200 TABLET, EXTENDED RELEASE ORAL 2 TIMES DAILY
Qty: 180 TABLET | Refills: 3 | Status: SHIPPED | OUTPATIENT
Start: 2022-07-22 | End: 2023-01-31

## 2022-07-22 NOTE — TELEPHONE ENCOUNTER
[facility] Call Center    Phone Message    May a detailed message be left on voicemail: yes     Reason for Call: Other: Pt called in stating that she has been having a lot more afib recently. Please c/b to discuss     Action Taken: Message routed to:  Other: cardio    Travel Screening: Not Applicable

## 2022-07-22 NOTE — TELEPHONE ENCOUNTER
Called and spoke to patient. She states that she has had no problems with her afib for the last 4 months, but in the last five days has had daily incidents. She is fine in the mornings but after lunch her heart rates start to climb into the 100's. They fluctuate into the 140's at times, but then go back down to the 100's. Usually her heart rate is 60-70's. She has taken her evening dose of metoprolol early, which has helped lower her heart rate down to the 100's.   Her only symptoms with the increased pulse is shortness of breath.   Confirmed she is taking her diltiazem 360 mg in the morning, and metoprolol 100 mg in the am, 200 mg in pm.   She is able to download her fitbit and email it if needed.     Advised will route to Dr Aranda and call her back     Patricia Bridges RN  Cardiology Care Coordinator  [facility]  Phone: [phone]

## 2022-07-22 NOTE — TELEPHONE ENCOUNTER
Plan:     1.  Increase Toprol XL to 200mg po every 12 hours     2.  Record home BP/pulse readings  (taken 3 hours after morning meds and after resting quietly for 10 minutes)  May consider decreasing Lisinopril if needed to prevent orthostasis     3.  Virtual video visit followup next Thursday at 8:15am if possible with echo, ekg and labs prior (CBC, chem7, TSH)     May consider adding Dig for additional rate control or SGLT2 inh if fluid balance issues         Patient has orders in for labs and echo from visit this month. EKG order placed. Explained Dr Aranda's recommendations. New directions sent to pharmacy. Appt made.   We will try and get echo done next week, but lately openings are not for a few weeks. She is agreeable to get the EKG and labs done next week prior to the appt.   However she hung up before I transferred her over.     Patricia Bridges, RN  Cardiology Care Coordinator  [facility]  Phone: [phone]

## 2022-07-25 ENCOUNTER — TELEPHONE (OUTPATIENT)
Dept: CARDIOLOGY | Facility: CLINIC | Age: 67
End: 2022-07-25

## 2022-07-25 NOTE — TELEPHONE ENCOUNTER
Called patient per request, unable to reach patient at this time, left voicemail to have patient call back.      FRANKO Dixon   Cardiology Team  [facility]

## 2022-07-25 NOTE — TELEPHONE ENCOUNTER
[facility] Call Center    Phone Message    May a detailed message be left on voicemail: yes     Reason for Call: Other: please call patient to arrange echo, labs and ekg prior to 7/28 . Patient is returning same day call, I can not find anything prior to her appointment     Action Taken: Other: routed to cardiology    Travel Screening: Not Applicable

## 2022-07-25 NOTE — TELEPHONE ENCOUNTER
Attempted to call patient to schedule Echo, labs and EKG this week, unable to reach patient at this time. Left voicemail with call back number [phone] to call back and schedule those this week.    Will try to call patient back again later,  FRANKO Dixon   Cardiology Team  [facility]

## 2022-07-25 NOTE — TELEPHONE ENCOUNTER
Per patient both EKG and labs scheduled for tomorrow already, closing encounter.      FRANKO Dixon   Cardiology Team  [facility]

## 2022-07-25 NOTE — TELEPHONE ENCOUNTER
Called patient to schedule EKG and fasting labs, per patient already have appointments scheduled for tomorrow.      Closing encounter,  FRANKO Dixon   Cardiology Team  [facility]

## 2022-07-26 ENCOUNTER — HOSPITAL ENCOUNTER (OUTPATIENT)
Dept: CARDIAC REHAB | Facility: CLINIC | Age: 67
Discharge: HOME | End: 2022-07-26
Attending: INTERNAL MEDICINE
Payer: COMMERCIAL

## 2022-07-26 ENCOUNTER — LAB (OUTPATIENT)
Dept: LAB | Facility: CLINIC | Age: 67
End: 2022-07-26

## 2022-07-26 DIAGNOSIS — E78.5 DYSLIPIDEMIA: ICD-10-CM

## 2022-07-26 DIAGNOSIS — I10 HYPERTENSION, UNSPECIFIED TYPE: ICD-10-CM

## 2022-07-26 DIAGNOSIS — I48.0 PAROXYSMAL ATRIAL FIBRILLATION (H): ICD-10-CM

## 2022-07-26 LAB
ANION GAP SERPL CALCULATED.3IONS-SCNC: 10 MMOL/L (ref 3–14)
BUN SERPL-MCNC: 32 MG/DL (ref 7–30)
CALCIUM SERPL-MCNC: 9.5 MG/DL (ref 8.5–10.1)
CHLORIDE BLD-SCNC: 108 MMOL/L (ref 94–109)
CHOLEST SERPL-MCNC: 109 MG/DL
CO2 SERPL-SCNC: 24 MMOL/L (ref 20–32)
CREAT SERPL-MCNC: 0.98 MG/DL (ref 0.52–1.04)
ERYTHROCYTE [DISTWIDTH] IN BLOOD BY AUTOMATED COUNT: 13.9 % (ref 10–15)
FASTING STATUS PATIENT QL REPORTED: YES
GFR SERPL CREATININE-BSD FRML MDRD: 62 ML/MIN/1.73M2
GLUCOSE BLD-MCNC: 159 MG/DL (ref 70–99)
HCT VFR BLD AUTO: 38.1 % (ref 35–47)
HDLC SERPL-MCNC: 39 MG/DL
HGB BLD-MCNC: 12.7 G/DL (ref 11.7–15.7)
LDLC SERPL CALC-MCNC: 30 MG/DL
MCH RBC QN AUTO: 32.7 PG (ref 26.5–33)
MCHC RBC AUTO-ENTMCNC: 33.3 G/DL (ref 31.5–36.5)
MCV RBC AUTO: 98 FL (ref 78–100)
NONHDLC SERPL-MCNC: 70 MG/DL
PLATELET # BLD AUTO: 204 10E3/UL (ref 150–450)
POTASSIUM BLD-SCNC: 4.2 MMOL/L (ref 3.4–5.3)
RBC # BLD AUTO: 3.88 10E6/UL (ref 3.8–5.2)
SODIUM SERPL-SCNC: 142 MMOL/L (ref 133–144)
TRIGL SERPL-MCNC: 199 MG/DL
TSH SERPL DL<=0.005 MIU/L-ACNC: 2.02 MU/L (ref 0.4–4)
WBC # BLD AUTO: 7.7 10E3/UL (ref 4–11)

## 2022-07-26 PROCEDURE — 80061 LIPID PANEL: CPT

## 2022-07-26 PROCEDURE — 84443 ASSAY THYROID STIM HORMONE: CPT

## 2022-07-26 PROCEDURE — 36415 COLL VENOUS BLD VENIPUNCTURE: CPT

## 2022-07-26 PROCEDURE — 85027 COMPLETE CBC AUTOMATED: CPT

## 2022-07-26 PROCEDURE — 80048 BASIC METABOLIC PNL TOTAL CA: CPT

## 2022-07-26 PROCEDURE — 94625 PHY/QHP OP PULM RHB W/O MNTR: CPT

## 2022-07-26 NOTE — PROGRESS NOTES
Sherry came in today for an EKG ordered by Joel Aranda.  Sent message to [facility] team for review.  Instructed patient if any changes needed to be made in her medications, she would be notified by the [facility] clinic.     María Humphreys CMA ([facility])

## 2022-07-27 NOTE — PROGRESS NOTES
"Sherry is a 67 year old who is being evaluated via a billable video visit.      How would you like to obtain your AVS? MyChart  If the video visit is dropped, the invitation should be resent by: Text to cell phone: [phone]  Will anyone else be joining your video visit? No    King Norris, Visit Facilitator/MA.    The patient has been notified of following:     \"This video visit will be conducted via a call between you and your physician/provider. We have found that certain health care needs can be provided without the need for an in-person physical exam.  This service lets us provide the care you need with a video conversation.  If a prescription is necessary we can send it directly to your pharmacy.  If lab work is needed we can place an order for that and you can then stop by our lab to have the test done at a later time.    Video visits are billed at different rates depending on your insurance coverage.  Please reach out to your insurance provider with any questions.    If during the course of the call the physician/provider feels a video visit is not appropriate, you will not be charged for this service.\"    Patient has given verbal consent for video visit? Yes    How would you like to obtain your AVS? Mail    Video-Visit Details    Type of service:  Video Visit    Video Start Time:817am    Video End Time:830am    Total visit time including video visit, chart review, charting, coordination of care =35min    Originating Location (pt. Location):patient home      Distant Location (provider location):  home office    Platform used for Video Visit: Yuliana    See dictation #[id number]    [facility]#:[id number]    "

## 2022-07-28 ENCOUNTER — VIRTUAL VISIT (OUTPATIENT)
Dept: CARDIOLOGY | Facility: CLINIC | Age: 67
End: 2022-07-28
Payer: COMMERCIAL

## 2022-07-28 ENCOUNTER — HOSPITAL ENCOUNTER (OUTPATIENT)
Dept: CARDIAC REHAB | Facility: CLINIC | Age: 67
Discharge: HOME | End: 2022-07-28
Attending: INTERNAL MEDICINE
Payer: COMMERCIAL

## 2022-07-28 DIAGNOSIS — I48.0 PAROXYSMAL ATRIAL FIBRILLATION (H): Primary | ICD-10-CM

## 2022-07-28 PROCEDURE — 94625 PHY/QHP OP PULM RHB W/O MNTR: CPT | Performed by: REHABILITATION PRACTITIONER

## 2022-07-28 PROCEDURE — 99214 OFFICE O/P EST MOD 30 MIN: CPT | Mod: 95 | Performed by: INTERNAL MEDICINE

## 2022-07-28 NOTE — PROGRESS NOTES
Visit Date: 2022      Marilou Arciniega MD  [facility]  [address] [place] [address]    Patient:  Sherry Slaughter  MRN:  [id number]  :  1955      Dear Dr. Arciniega:    It was a pleasure participating in the care of your patient, Ms. Sherry Slaughter.  As you know, she is a 67-year-old lady who I saw over a virtual video visit via Veeqo today for paroxysmal atrial fibrillation, congestive heart failure, hypertension, hyperlipidemia and mild aortic stenosis.    PAST MEDICAL HISTORY:      1.  Type 2 diabetes.  2.  Hypertension.  3.  Hyperlipidemia.  4.  Depression.  5.  Obstructive sleep apnea, currently using CPAP.  6.  Hypothyroidism.  7.  Diverticulosis.  8.  Low back pain.  9.  Irritable bowel syndrome.    I last saw her 2022.  At that time, she was doing adequately.  She presents today with new symptoms.    Since our last visit last week ago, Monday, she began to experience some rapid heart rates.  She simply did not feel well and felt tired and had a stiff back.  She looked at her Fitbit and noted that her heart rate was going up to the 140-160 beat-per-minute range.  She was short of breath with exertion, but not lightheaded, syncopal or near-syncopal.  She did not feel well most of the time and although she did experience some lower heart rates, she simply did not feel well when her heart rate increased.  This went on until she contacted us and we recommended increasing her metoprolol succinate to 200 mg every 12 hours.  That evening, she felt better, on Friday.  Saturday and , she felt better and better.  On Monday, she had no further episodes.  Tuesday, she had an EKG performed, which showed she went back into normal sinus rhythm.  Her lab work was unremarkable, and she did not get her echocardiogram.    She says she is back to feeling normal now.  She has taken her blood pressures at home and found that the average is about 115/60 with a  pulse of 75.  Her weight is 300 pounds.  She feels that she is in good fluid balance without any edema and her weight has been stable.    She otherwise denies any new chest pain.  Her shortness of breath has resolved,as her A-fib resolved.  She denies any PND, orthopnea, edema.  Her palpitations are gone.  She denies syncope or near-syncope.    In terms of her cardiac medications, she is taking diltiazem 360 mg a day, Lasix 20 mg a day, hydrochlorothiazide 50 mg a day, levothyroxine, Victoza, lisinopril 20 mg a day, metformin, metoprolol succinate 200 twice daily, rivaroxaban, rosuvastatin 10 mg a day, and potassium 20 mEq a day.    PHYSICAL EXAMINATION:      VITAL SIGNS:  Her blood pressure is 115/60 with a pulse of 75.  Her weight is 300 pounds.  GENERAL:  She appears comfortable, well groomed.  PSYCHIATRIC:  She is alert and oriented x 3.  HEENT:  Eyes do not appear grossly erythematous or have exudate.  RESPIRATORY:  She is breathing comfortably without gross cough.    The remainder of the comprehensive physical exam was deferred secondary to the COVID-19 pandemic and secondary to video visit restrictions.    LABORATORY:  07/26/2022:  LDL 30, triglycerides 199, potassium 4.2, GFR normal, hemoglobin and TSH normal.    EKG 07/26/2022 reveals normal sinus rhythm with first-degree AV block.      IMPRESSION:      Sherry is a 67-year-old lady who has several active cardiac issues:    1.  Paroxysmal atrial fibrillation:      She had a recent episode of A-fib last week and says that her Fitbit recorded heart rates of up to 140-160 beats per minute when she felt short of breath and fatigued.  She correlated these symptoms to heart rates in the 140-160 beat-per-minute range on her Fitbit.    After increasing her metoprolol succinate to 200 mg every 12 hours, she converted back to normal sinus rhythm by EKG 07/26/2022.    Her CHADS-VASc2 score remains 5 for congestive heart failure, hypertension, age, diabetes and  female gender.    She continues on anticoagulation with rivaroxaban and is on relatively maximum doses of diltiazem 360 mg and metoprolol succinate 200 twice daily.    We are fortunate that she converted back to normal sinus rhythm, and we will continue to monitor her progress.    2.  History of congestive heart failure:      Her dry weight is likely in the 298-300 pound range.  She is currently at 300 pounds on Lasix 20 mg a day along with hydrochlorothiazide 50 mg a day.    She is intolerant to SGLT2 inhibitors, which gave her yeast infections.  However, she currently denies any new swelling, shortness of breath, PND, or edema.  We will continue to monitor her progress.    3.  Hypertension, well controlled:      Systolic blood pressures running at 115 mmHg with a pulse in the 75 beat-per-minute range.  Continue to follow.    4.  Hyperlipidemia:  LDL in goal range.  Continue dietary modification for triglycerides.    5.  Mild aortic stenosis:      Echocardiogram 10/07/2021 reveals mean gradient 10 mmHg, peak velocity 2.1 meters per second, normal ejection fraction.  Currently asymptomatic.  Continue to follow.        PLAN:       1.  Continue present medications at present doses.    2.  If she continues to have breakthrough episodes of A-fib despite maximum dose of diltiazem and metoprolol, we can consider adding digoxin in the future.    If she breaks through despite this, then we would consider referring her on to one of our electrophysiologists for other treatment options.    3.  Virtual video visit already scheduled for 01/2023 with echo and labs prior, earlier if needed.    Once again, it was a pleasure participating in the care of your patient, Ms. Concepción Scales.  Please feel free to contact me at any time if there are any questions regarding her care in the future.          Joel Aranda MD        D: 07/28/2022   T: 07/28/2022   MT: JOVITA    Name:     CONCEPCIÓN SCALESNancy  MRN:       [id number]        Account:    [id number]   :      1955           Visit Date: 2022     Document: [id number]    cc:  Marilou Arciniega MD

## 2022-07-28 NOTE — NURSING NOTE
Chief Complaint   Patient presents with     Follow Up     Follow up regarding some additional symptoms per pt. Medications not reviewed with pt, pt stated they were all up to date in Mychart. Allergies reviewed with pt.      Patient declined individual medication review by support staff because patient denies any changes since echeck-in regarding medication and allergies and states all information entered during echeck-in remains accurate.    King Norris, Visit Facilitator/MA.

## 2022-08-02 ENCOUNTER — HOSPITAL ENCOUNTER (OUTPATIENT)
Dept: CARDIAC REHAB | Facility: CLINIC | Age: 67
Discharge: HOME | End: 2022-08-02
Attending: INTERNAL MEDICINE
Payer: COMMERCIAL

## 2022-08-02 PROCEDURE — 94625 PHY/QHP OP PULM RHB W/O MNTR: CPT

## 2022-08-03 ENCOUNTER — OFFICE VISIT (OUTPATIENT)
Dept: PODIATRY | Facility: CLINIC | Age: 67
End: 2022-08-03
Payer: COMMERCIAL

## 2022-08-03 DIAGNOSIS — E11.69 TYPE 2 DIABETES MELLITUS WITH DIABETIC FOOT DEFORMITY (H): ICD-10-CM

## 2022-08-03 DIAGNOSIS — E11.49 TYPE II OR UNSPECIFIED TYPE DIABETES MELLITUS WITH NEUROLOGICAL MANIFESTATIONS, NOT STATED AS UNCONTROLLED(250.60) (H): Primary | ICD-10-CM

## 2022-08-03 DIAGNOSIS — L97.511 ULCER OF RIGHT FOOT, LIMITED TO BREAKDOWN OF SKIN (H): ICD-10-CM

## 2022-08-03 DIAGNOSIS — M21.969 TYPE 2 DIABETES MELLITUS WITH DIABETIC FOOT DEFORMITY (H): ICD-10-CM

## 2022-08-03 PROCEDURE — 99214 OFFICE O/P EST MOD 30 MIN: CPT | Performed by: PODIATRIST

## 2022-08-03 NOTE — LETTER
8/3/2022         RE: Sherry Slaughter  [address]  [place] [address]        Dear Colleague,    Thank you for referring your patient, Sherry Slaughter, to the [facility]. Please see a copy of my visit note below.    Past Medical History:   Diagnosis Date     Cataract      Congestive heart failure (H) 2019 NOVEMBER    AFIB     DEPRESSION     comes and goes - tried meds - unsuccessfully, certain times of the year, no psych intervention and no counselors in the past - and not interested      Diverticulosis of colon (without mention of hemorrhage) 4/04    colonoscopy     ECHO-mildLVH,tr MR,mild thick lflets w inc LA,trTR   12/03     Essential hypertension, benign 1990s    late 1990s - started medications at that time - not to difficult to control meds      Fam hx-cardiovas dis [facility]     father - CAD, and lipids/HTN - multiple members of the family      Family history of diabetes mellitus     sister and grandmother with DM      Family history of malignant neoplasm of breast     mother - young age - 45, and maternal cousin and aunt as well - no BRCA testing done      Family history of stroke (cerebrovascular)     grandmother in early life in her 40s      FAMILY HX COLON CANCER     Pat uncles x 2     Heart disease     murmur/     Heart murmur      HYPERLIPIDEMIA 2000    fairly recent - in the last 5 years - high for DM levels  -cholesterol recent      Irritable bowel syndrome     goes between the 2 - nerve related - more stressed more problems      MICROALBUMINURIA     unsure how long - been on the lisinopril - for a few years at well      Nonspecific abnormal results of liver function study 2/3/2003    SGOT - has been high in the past - since the hepatitis b - borderline elevation - not really changing any      OBESITY      Obstructive sleep apnea      GENESIS (obstructive sleep apnea) 10/15/2006    psg 5/15 AHI 53 aPAP 8-15     OSTEOARTHRITIS L KNEE 2003    total knee on the  left - and pain is gone since the knee replacement      PERS HX HEPATITIS B- RESOLVED] 1977    acute virus only - no chronic disease      PERS HX HEPATITIS B- RESOLVED]      Type II or unspecified type diabetes mellitus without mention of complication, not stated as uncontrolled 1991    oral meds frist, then insulin eventually later, difficult to control most of the time      Unspecified hypothyroidism 10/11/2006    TSH 3.36 - mild subclinical hypothyroidism - deciding on following or starting low dose meds - with dr Oreilly - following      Patient Active Problem List   Diagnosis     Morbid obesity (H)     Diverticulosis of large intestine     Nonspecific abnormal results of cardiovascular function study     FAMILY HX COLON CANCER     Nonallopathic lesion of thoracic region     Hypothyroidism     GENESIS (obstructive sleep apnea)     Irritable bowel syndrome     Family history of malignant neoplasm of breast     Type 2 diabetes mellitus treated with insulin (H)     History of major depression     OSTEOARTHRITIS L KNEE  s/p knee replacement on the left      Hypertension goal BP (blood pressure) < 140/90     Family history of stroke (cerebrovascular)     Family history of other cardiovascular diseases     JOINT PAIN-ANKLE - right ankle      Mitral valve insufficiency     Hyperlipidemia LDL goal <100     Aortic sclerosis     Tubular adenoma of colon     History of viral hepatitis, type B     Chronic low back pain     Long-term insulin use (H)     S/P total knee replacement using cement, right     Lumbago     Type 2 diabetes mellitus with hyperglycemia (H)     Posterior vitreous detachment of right eye     Pseudophakia of both eyes     Paroxysmal atrial fibrillation (H)     SOB (shortness of breath)     Bunion     On continuous oral anticoagulation     Past Surgical History:   Procedure Laterality Date     ABDOMEN SURGERY      ovaian scope/ appendix removal     ANESTHESIA CARDIOVERSION N/A 12/4/2020    Procedure:  ANESTHESIA, FOR CARDIOVERSION @1400;  Surgeon: GENERIC ANESTHESIA PROVIDER;  Location: [facility] OR     ARTHROPLASTY KNEE Right 9/23/2015    Procedure: ARTHROPLASTY KNEE;  Surgeon: Rufus Brown MD;  Location:  OR     BUNIONECTOMY REN AND LEANDRO, COMBINED Left 2/2/2021    Procedure: Left bunion correction with bone cutting and screw fixation;  Surgeon: David Hoffman DPM;  Location: [facility] OR     CATARACT IOL, RT/LT Left      COLONOSCOPY  4/04    diverticulosis, rec repeat 10 yrs(consider fam hx?)     COLONOSCOPY WITH CO2 INSUFFLATION N/A 6/19/2019    Procedure: COLONOSCOPY, WITH CO2 INSUFFLATION;  Surgeon: Zeb Duarte MD;  Location: [facility] OR     ORTHOPEDIC SURGERY      right ankle     PHACOEMULSIFICATION CLEAR CORNEA WITH STANDARD INTRAOCULAR LENS IMPLANT Left 3/15/2018    Procedure: PHACOEMULSIFICATION CLEAR CORNEA WITH STANDARD INTRAOCULAR LENS IMPLANT;  LEFT EYE PHACOEMULSIFICATION CLEAR CORNEA WITH STANDARD INTRAOCULAR LENS IMPLANT;  Surgeon: Bandar Linares MD;  Location:  [facility]     PHACOEMULSIFICATION CLEAR CORNEA WITH STANDARD INTRAOCULAR LENS IMPLANT Right 4/5/2018    Procedure: PHACOEMULSIFICATION CLEAR CORNEA WITH STANDARD INTRAOCULAR LENS IMPLANT;  RIGHT PHACOEMULSIFICATION CLEAR CORNEA WITH STANDARD INTRAOCULAR LENS IMPLANT ;  Surgeon: Bandar Linares MD;  Location:  [facility]     STRESS ECHO (METRO)  12/03    no ischemia, limited by fatigue     SURGICAL HISTORY OF -       EXP LAP- R OVARY CYSTS     SURGICAL HISTORY OF -   1981,1984,1985    CHILDBIRTH     ZZC NONSPECIFIC PROCEDURE  6/06    right triple arthrodecesis      ZZ NONSPECIFIC PROCEDURE  7/06     right bunion surgery      [facility] TOTAL KNEE ARTHROPLASTY  3/03    L knee     Social History     Socioeconomic History     Marital status:      Spouse name: Not on file     Number of children: 3     Years of education: Not on file     Highest education level: Not on file   Occupational History     Occupation: RN     Employer:  [employer]   Tobacco Use     Smoking status: Never Smoker     Smokeless tobacco: Never Used     Tobacco comment: tried in early 20s only    Substance and Sexual Activity     Alcohol use: Yes     Comment: rare     Drug use: No     Sexual activity: Not Currently     Birth control/protection: Post-menopausal     Comment:  - since for 20 years, no signficant other  > 5 years sexual activity    Other Topics Concern      Service No     Blood Transfusions No     Caffeine Concern No     Occupational Exposure Yes     Comment: Normal nursing exposures     Hobby Hazards No     Sleep Concern Yes     Stress Concern No     Comment: Stress level at HM=5, stress level at WK=6     Weight Concern Yes     Special Diet Yes     Comment: Diabetic diet (watching carbs)     Back Care No     Comment: Occassional back spasms     Exercise Yes     Comment: Pt joined a health club goes approx 3-4 times a week,half to one mile daily     Bike Helmet No     Seat Belt Yes     Comment: Sometimes     Self-Exams Yes     Parent/sibling w/ CABG, MI or angioplasty before 65F 55M? 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      Social Determinants of Health     Financial Resource Strain: Not on file   Food Insecurity: Not on file   Transportation Needs: Not on file   Physical Activity: Not on file   Stress: Not on file   Social Connections: Not on file   Intimate Partner Violence: Not on file   Housing Stability: Not on file     Family History   Problem Relation Age of Onset     Diabetes Maternal Grandmother         DM TYPE 2     Cerebrovascular Disease Maternal Grandmother      Hypertension Sister      Lipids Sister      Heart Disease Sister         Chronic AFib     Diabetes Sister      Coronary Artery Disease Sister         afib     Hypertension Sister      Lipids Sister      Gynecology Sister      Diabetes Sister      Asthma Sister      Blood Disease  Paternal Grandmother         T CELL LEUKEMIA     Hypertension Brother      Lipids Brother      Congenital Anomalies Brother      Cardiovascular Brother      Heart Disease Brother         CABG/AFIB     Coronary Artery Disease Brother         cabg afib AAA     Hypertension Brother      Lipids Brother      Other Cancer Brother         multple myeloma     Obesity Brother      Hypertension Brother      Respiratory Brother         Sleep apnea     Heart Disease Brother         AFib     Coronary Artery Disease Brother         afib     Alzheimer Disease Mother      Asthma Mother      Hypertension Mother      Breast Cancer Mother 40        has mastectomy and lived to 84     Allergies Mother         Sulfa,     Arthritis Mother      Cardiovascular Mother      Depression Mother      Respiratory Mother      Lipids Mother      Cancer Mother         breast     Thyroid Disease Mother      Cerebrovascular Disease Mother         FROM  AFIB     Dementia Mother         Alzheimers     Other Cancer Mother         breast     Cancer - colorectal Other      Colon Cancer Other         2019     Cancer Father         lung     Aneurysm Father         brother, AAA     Other Cancer Father         lung ca     Coronary Artery Disease Father         cad AAA     Asthma Sister      Cancer - colorectal Paternal Uncle         late 50s to early 60s - great uncles      Breast Cancer Other         Maternal cousin     Arrhythmia Sister         2 brothers 1 sister Afib     Breast Cancer Maternal Aunt 62     Other Cancer Maternal Aunt 35        Ovarian cancer.  Survived despite late recurrence and is now 92.     Glaucoma No family hx of      Macular Degeneration No family hx of      Lab Results   Component Value Date    A1C 7.1 05/25/2022    A1C 8.1 11/24/2021    A1C 9.0 08/16/2021    A1C 6.8 01/05/2021    A1C 6.9 11/25/2020    A1C 7.5 07/22/2020    A1C 7.3 12/10/2019             SUBJECTIVE FINDINGS:  67-year-old returns to clinic for ulcer, right first MPJ.   She relates it is doing a little bit better.  It is still draining a little bit, but she is using her metatarsal pads.  She relates she cannot use the insoles in shoes because it makes it too tight.  She relates the left heel is doing well.    OBJECTIVE FINDINGS:  Right plantar first MPJ, she has hyperkeratotic eschar.  There is no active drainage, no erythema, no odor, no calor.    ASSESSMENT AND PLAN:  Ulcer, right first MPJ.  She is diabetic with peripheral neuropathy.  Diagnosis and treatment options discussed with her.  This is improving.  I am going to have her continue the Betadine and a Band-Aid.  I discussed with her offloading. Return to clinic and see me in 1 month.  Previous notes reviewed.        Moderate level of medical decision making.        Again, thank you for allowing me to participate in the care of your patient.        Sincerely,        Rufus Hutson DPM

## 2022-08-03 NOTE — PROGRESS NOTES
Past Medical History:   Diagnosis Date     Cataract      Congestive heart failure (H) 2019 NOVEMBER    AFIB     DEPRESSION     comes and goes - tried meds - unsuccessfully, certain times of the year, no psych intervention and no counselors in the past - and not interested      Diverticulosis of colon (without mention of hemorrhage) 4/04    colonoscopy     ECHO-mildLVH,tr MR,mild thick lflets w inc LA,trTR   12/03     Essential hypertension, benign 1990s    late 1990s - started medications at that time - not to difficult to control meds      Fam hx-cardiovas dis NEC     father - CAD, and lipids/HTN - multiple members of the family      Family history of diabetes mellitus     sister and grandmother with DM      Family history of malignant neoplasm of breast     mother - young age - 45, and maternal cousin and aunt as well - no BRCA testing done      Family history of stroke (cerebrovascular)     grandmother in early life in her 40s      FAMILY HX COLON CANCER     Pat uncles x 2     Heart disease     murmur/     Heart murmur      HYPERLIPIDEMIA 2000    fairly recent - in the last 5 years - high for DM levels  -cholesterol recent      Irritable bowel syndrome     goes between the 2 - nerve related - more stressed more problems      MICROALBUMINURIA     unsure how long - been on the lisinopril - for a few years at well      Nonspecific abnormal results of liver function study 2/3/2003    SGOT - has been high in the past - since the hepatitis b - borderline elevation - not really changing any      OBESITY      Obstructive sleep apnea      GENESIS (obstructive sleep apnea) 10/15/2006    psg 5/15 AHI 53 aPAP 8-15     OSTEOARTHRITIS L KNEE 2003    total knee on the left - and pain is gone since the knee replacement      PERS HX HEPATITIS B- RESOLVED] 1977    acute virus only - no chronic disease      PERS HX HEPATITIS B- RESOLVED]      Type II or unspecified type diabetes mellitus without mention of complication, not stated as  uncontrolled 1991    oral meds frist, then insulin eventually later, difficult to control most of the time      Unspecified hypothyroidism 10/11/2006    TSH 3.36 - mild subclinical hypothyroidism - deciding on following or starting low dose meds - with dr Oreilly - following      Patient Active Problem List   Diagnosis     Morbid obesity (H)     Diverticulosis of large intestine     Nonspecific abnormal results of cardiovascular function study     FAMILY HX COLON CANCER     Nonallopathic lesion of thoracic region     Hypothyroidism     GENESIS (obstructive sleep apnea)     Irritable bowel syndrome     Family history of malignant neoplasm of breast     Type 2 diabetes mellitus treated with insulin (H)     History of major depression     OSTEOARTHRITIS L KNEE  s/p knee replacement on the left      Hypertension goal BP (blood pressure) < 140/90     Family history of stroke (cerebrovascular)     Family history of other cardiovascular diseases     JOINT PAIN-ANKLE - right ankle      Mitral valve insufficiency     Hyperlipidemia LDL goal <100     Aortic sclerosis     Tubular adenoma of colon     History of viral hepatitis, type B     Chronic low back pain     Long-term insulin use (H)     S/P total knee replacement using cement, right     Lumbago     Type 2 diabetes mellitus with hyperglycemia (H)     Posterior vitreous detachment of right eye     Pseudophakia of both eyes     Paroxysmal atrial fibrillation (H)     SOB (shortness of breath)     Bunion     On continuous oral anticoagulation     Past Surgical History:   Procedure Laterality Date     ABDOMEN SURGERY      ovaian scope/ appendix removal     ANESTHESIA CARDIOVERSION N/A 12/4/2020    Procedure: ANESTHESIA, FOR CARDIOVERSION @1400;  Surgeon: GENERIC ANESTHESIA PROVIDER;  Location: [facility]     ARTHROPLASTY KNEE Right 9/23/2015    Procedure: ARTHROPLASTY KNEE;  Surgeon: Rufus Brown MD;  Location:  OR     BUNIONECTOMY REN AND LEANDRO, COMBINED Left 2/2/2021     Procedure: Left bunion correction with bone cutting and screw fixation;  Surgeon: David Hoffman DPM;  Location: [facility]     CATARACT IOL, RT/LT Left      COLONOSCOPY  4/04    diverticulosis, rec repeat 10 yrs(consider fam hx?)     COLONOSCOPY WITH CO2 INSUFFLATION N/A 6/19/2019    Procedure: COLONOSCOPY, WITH CO2 INSUFFLATION;  Surgeon: Zeb Duarte MD;  Location: [facility]     ORTHOPEDIC SURGERY      right ankle     PHACOEMULSIFICATION CLEAR CORNEA WITH STANDARD INTRAOCULAR LENS IMPLANT Left 3/15/2018    Procedure: PHACOEMULSIFICATION CLEAR CORNEA WITH STANDARD INTRAOCULAR LENS IMPLANT;  LEFT EYE PHACOEMULSIFICATION CLEAR CORNEA WITH STANDARD INTRAOCULAR LENS IMPLANT;  Surgeon: Bandar Linares MD;  Location:  [facility]     PHACOEMULSIFICATION CLEAR CORNEA WITH STANDARD INTRAOCULAR LENS IMPLANT Right 4/5/2018    Procedure: PHACOEMULSIFICATION CLEAR CORNEA WITH STANDARD INTRAOCULAR LENS IMPLANT;  RIGHT PHACOEMULSIFICATION CLEAR CORNEA WITH STANDARD INTRAOCULAR LENS IMPLANT ;  Surgeon: Bandar Linares MD;  Location:  [facility]     STRESS ECHO (METRO)  12/03    no ischemia, limited by fatigue     SURGICAL HISTORY OF -       EXP LAP- R OVARY CYSTS     SURGICAL HISTORY OF -   1981,1984,1985    CHILDBIRTH     ZZC NONSPECIFIC PROCEDURE  6/06    right triple arthrodecesis      ZZC NONSPECIFIC PROCEDURE  7/06     right bunion surgery      Z TOTAL KNEE ARTHROPLASTY  3/03    L knee     Social History     Socioeconomic History     Marital status:      Spouse name: Not on file     Number of children: 3     Years of education: Not on file     Highest education level: Not on file   Occupational History     Occupation: RN     Employer: [employer]   Tobacco Use     Smoking status: Never Smoker     Smokeless tobacco: Never Used     Tobacco comment: tried in early 20s only    Substance and Sexual Activity     Alcohol use: Yes     Comment: rare     Drug use: No     Sexual activity: Not Currently      Birth control/protection: Post-menopausal     Comment:  - since for 20 years, no signficant other  > 5 years sexual activity    Other Topics Concern      Service No     Blood Transfusions No     Caffeine Concern No     Occupational Exposure Yes     Comment: Normal nursing exposures     Hobby Hazards No     Sleep Concern Yes     Stress Concern No     Comment: Stress level at HM=5, stress level at WK=6     Weight Concern Yes     Special Diet Yes     Comment: Diabetic diet (watching carbs)     Back Care No     Comment: Occassional back spasms     Exercise Yes     Comment: Pt joined a health club goes approx 3-4 times a week,half to one mile daily     Bike Helmet No     Seat Belt Yes     Comment: Sometimes     Self-Exams Yes     Parent/sibling w/ CABG, MI or angioplasty before 65F 55M? 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      Social Determinants of Health     Financial Resource Strain: Not on file   Food Insecurity: Not on file   Transportation Needs: Not on file   Physical Activity: Not on file   Stress: Not on file   Social Connections: Not on file   Intimate Partner Violence: Not on file   Housing Stability: Not on file     Family History   Problem Relation Age of Onset     Diabetes Maternal Grandmother         DM TYPE 2     Cerebrovascular Disease Maternal Grandmother      Hypertension Sister      Lipids Sister      Heart Disease Sister         Chronic AFib     Diabetes Sister      Coronary Artery Disease Sister         afib     Hypertension Sister      Lipids Sister      Gynecology Sister      Diabetes Sister      Asthma Sister      Blood Disease Paternal Grandmother         T CELL LEUKEMIA     Hypertension Brother      Lipids Brother      Congenital Anomalies Brother      Cardiovascular Brother      Heart Disease Brother         CABG/AFIB     Coronary Artery Disease Brother         cabg afib AAA     Hypertension Brother       Lipids Brother      Other Cancer Brother         multple myeloma     Obesity Brother      Hypertension Brother      Respiratory Brother         Sleep apnea     Heart Disease Brother         AFib     Coronary Artery Disease Brother         afib     Alzheimer Disease Mother      Asthma Mother      Hypertension Mother      Breast Cancer Mother 40        has mastectomy and lived to 84     Allergies Mother         Sulfa,     Arthritis Mother      Cardiovascular Mother      Depression Mother      Respiratory Mother      Lipids Mother      Cancer Mother         breast     Thyroid Disease Mother      Cerebrovascular Disease Mother         FROM  AFIB     Dementia Mother         Alzheimers     Other Cancer Mother         breast     Cancer - colorectal Other      Colon Cancer Other         2019     Cancer Father         lung     Aneurysm Father         brother, AAA     Other Cancer Father         lung ca     Coronary Artery Disease Father         cad AAA     Asthma Sister      Cancer - colorectal Paternal Uncle         late 50s to early 60s - great uncles      Breast Cancer Other         Maternal cousin     Arrhythmia Sister         2 brothers 1 sister Afib     Breast Cancer Maternal Aunt 62     Other Cancer Maternal Aunt 35        Ovarian cancer.  Survived despite late recurrence and is now 92.     Glaucoma No family hx of      Macular Degeneration No family hx of      Lab Results   Component Value Date    A1C 7.1 05/25/2022    A1C 8.1 11/24/2021    A1C 9.0 08/16/2021    A1C 6.8 01/05/2021    A1C 6.9 11/25/2020    A1C 7.5 07/22/2020    A1C 7.3 12/10/2019             SUBJECTIVE FINDINGS:  67-year-old returns to clinic for ulcer, right first MPJ.  She relates it is doing a little bit better.  It is still draining a little bit, but she is using her metatarsal pads.  She relates she cannot use the insoles in shoes because it makes it too tight.  She relates the left heel is doing well.    OBJECTIVE FINDINGS:  Right plantar first  MPJ, she has hyperkeratotic eschar.  There is no active drainage, no erythema, no odor, no calor.    ASSESSMENT AND PLAN:  Ulcer, right first MPJ.  She is diabetic with peripheral neuropathy.  Diagnosis and treatment options discussed with her.  This is improving.  I am going to have her continue the Betadine and a Band-Aid.  I discussed with her offloading. Return to clinic and see me in 1 month.  Previous notes reviewed.        Moderate level of medical decision making.

## 2022-08-03 NOTE — NURSING NOTE
Sherry Slaughter's chief complaint for this visit includes:  Chief Complaint   Patient presents with     Follow Up     Right foot wound     PCP: Marilou Arciniega    Referring Provider:  No referring provider defined for this encounter.    [facility] 10/02/2007   Data Unavailable        Allergies   Allergen Reactions     Empagliflozin      Yeast infections     Lipitor [Atorvastatin Calcium]      Gas     Pravachol [Pravastatin Sodium]      Pravachol - dry cough      Simvastatin      Myalgia         Do you need any medication refills at today's visit?

## 2022-08-04 ENCOUNTER — HOSPITAL ENCOUNTER (OUTPATIENT)
Dept: CARDIAC REHAB | Facility: CLINIC | Age: 67
Discharge: HOME | End: 2022-08-04
Attending: INTERNAL MEDICINE
Payer: COMMERCIAL

## 2022-08-04 PROCEDURE — 94625 PHY/QHP OP PULM RHB W/O MNTR: CPT

## 2022-08-09 ENCOUNTER — HOSPITAL ENCOUNTER (OUTPATIENT)
Dept: CARDIAC REHAB | Facility: CLINIC | Age: 67
Discharge: HOME | End: 2022-08-09
Attending: INTERNAL MEDICINE
Payer: COMMERCIAL

## 2022-08-09 PROCEDURE — 94625 PHY/QHP OP PULM RHB W/O MNTR: CPT

## 2022-08-11 ENCOUNTER — HOSPITAL ENCOUNTER (OUTPATIENT)
Dept: CARDIAC REHAB | Facility: CLINIC | Age: 67
Discharge: HOME | End: 2022-08-11
Attending: INTERNAL MEDICINE
Payer: COMMERCIAL

## 2022-08-11 PROCEDURE — 94625 PHY/QHP OP PULM RHB W/O MNTR: CPT

## 2022-08-15 DIAGNOSIS — E11.65 UNCONTROLLED TYPE 2 DIABETES MELLITUS WITH HYPERGLYCEMIA, WITH LONG-TERM CURRENT USE OF INSULIN (H): ICD-10-CM

## 2022-08-15 DIAGNOSIS — Z79.4 UNCONTROLLED TYPE 2 DIABETES MELLITUS WITH HYPERGLYCEMIA, WITH LONG-TERM CURRENT USE OF INSULIN (H): ICD-10-CM

## 2022-08-16 ENCOUNTER — HOSPITAL ENCOUNTER (OUTPATIENT)
Dept: CARDIAC REHAB | Facility: CLINIC | Age: 67
Discharge: HOME | End: 2022-08-16
Attending: INTERNAL MEDICINE
Payer: COMMERCIAL

## 2022-08-16 PROCEDURE — 94625 PHY/QHP OP PULM RHB W/O MNTR: CPT

## 2022-08-17 RX ORDER — PROCHLORPERAZINE 25 MG/1
SUPPOSITORY RECTAL
Qty: 1 EACH | Refills: 0 | Status: SHIPPED | OUTPATIENT
Start: 2022-08-17 | End: 2023-06-23 | Stop reason: ALTCHOICE

## 2022-08-18 ENCOUNTER — HOSPITAL ENCOUNTER (OUTPATIENT)
Dept: CARDIAC REHAB | Facility: CLINIC | Age: 67
Discharge: HOME | End: 2022-08-18
Attending: INTERNAL MEDICINE
Payer: COMMERCIAL

## 2022-08-18 PROCEDURE — 94625 PHY/QHP OP PULM RHB W/O MNTR: CPT | Performed by: CLINICAL EXERCISE PHYSIOLOGIST

## 2022-08-23 ENCOUNTER — HOSPITAL ENCOUNTER (OUTPATIENT)
Dept: CARDIAC REHAB | Facility: CLINIC | Age: 67
Discharge: HOME | End: 2022-08-23
Attending: INTERNAL MEDICINE
Payer: COMMERCIAL

## 2022-08-23 PROCEDURE — 94625 PHY/QHP OP PULM RHB W/O MNTR: CPT

## 2022-08-25 ENCOUNTER — HOSPITAL ENCOUNTER (OUTPATIENT)
Dept: CARDIAC REHAB | Facility: CLINIC | Age: 67
Discharge: HOME | End: 2022-08-25
Attending: INTERNAL MEDICINE
Payer: COMMERCIAL

## 2022-08-25 PROCEDURE — 94625 PHY/QHP OP PULM RHB W/O MNTR: CPT | Performed by: CLINICAL EXERCISE PHYSIOLOGIST

## 2022-08-30 ENCOUNTER — HOSPITAL ENCOUNTER (OUTPATIENT)
Dept: CARDIAC REHAB | Facility: CLINIC | Age: 67
Discharge: HOME | End: 2022-08-30
Attending: INTERNAL MEDICINE
Payer: COMMERCIAL

## 2022-08-30 PROCEDURE — 94625 PHY/QHP OP PULM RHB W/O MNTR: CPT

## 2022-09-01 ENCOUNTER — HOSPITAL ENCOUNTER (OUTPATIENT)
Dept: CARDIAC REHAB | Facility: CLINIC | Age: 67
Discharge: HOME | End: 2022-09-01
Attending: INTERNAL MEDICINE
Payer: COMMERCIAL

## 2022-09-01 PROCEDURE — 94625 PHY/QHP OP PULM RHB W/O MNTR: CPT

## 2022-09-06 ENCOUNTER — OFFICE VISIT (OUTPATIENT)
Dept: PODIATRY | Facility: CLINIC | Age: 67
End: 2022-09-06
Payer: COMMERCIAL

## 2022-09-06 ENCOUNTER — HOSPITAL ENCOUNTER (OUTPATIENT)
Dept: CARDIAC REHAB | Facility: CLINIC | Age: 67
Discharge: HOME | End: 2022-09-06
Attending: INTERNAL MEDICINE
Payer: COMMERCIAL

## 2022-09-06 DIAGNOSIS — L97.511 ULCER OF RIGHT FOOT, LIMITED TO BREAKDOWN OF SKIN (H): ICD-10-CM

## 2022-09-06 DIAGNOSIS — E11.69 TYPE 2 DIABETES MELLITUS WITH DIABETIC FOOT DEFORMITY (H): ICD-10-CM

## 2022-09-06 DIAGNOSIS — E11.49 TYPE II OR UNSPECIFIED TYPE DIABETES MELLITUS WITH NEUROLOGICAL MANIFESTATIONS, NOT STATED AS UNCONTROLLED(250.60) (H): Primary | ICD-10-CM

## 2022-09-06 DIAGNOSIS — M21.969 TYPE 2 DIABETES MELLITUS WITH DIABETIC FOOT DEFORMITY (H): ICD-10-CM

## 2022-09-06 PROCEDURE — 99214 OFFICE O/P EST MOD 30 MIN: CPT | Performed by: PODIATRIST

## 2022-09-06 PROCEDURE — 94625 PHY/QHP OP PULM RHB W/O MNTR: CPT

## 2022-09-06 ASSESSMENT — PAIN SCALES - GENERAL: PAINLEVEL: NO PAIN (0)

## 2022-09-06 NOTE — LETTER
9/6/2022         RE: Sherry Slaughter  [address]  [place] [address]        Dear Colleague,    Thank you for referring your patient, Sherry Slaughter, to the [facility]. Please see a copy of my visit note below.    Past Medical History:   Diagnosis Date     Cataract      Congestive heart failure (H) 2019 NOVEMBER    AFIB     DEPRESSION     comes and goes - tried meds - unsuccessfully, certain times of the year, no psych intervention and no counselors in the past - and not interested      Diverticulosis of colon (without mention of hemorrhage) 4/04    colonoscopy     ECHO-mildLVH,tr MR,mild thick lflets w inc LA,trTR   12/03     Essential hypertension, benign 1990s    late 1990s - started medications at that time - not to difficult to control meds      Fam hx-cardiovas dis NEC     father - CAD, and lipids/HTN - multiple members of the family      Family history of diabetes mellitus     sister and grandmother with DM      Family history of malignant neoplasm of breast     mother - young age - 45, and maternal cousin and aunt as well - no BRCA testing done      Family history of stroke (cerebrovascular)     grandmother in early life in her 40s      FAMILY HX COLON CANCER     Pat uncles x 2     Heart disease     murmur/     Heart murmur      HYPERLIPIDEMIA 2000    fairly recent - in the last 5 years - high for DM levels  -cholesterol recent      Irritable bowel syndrome     goes between the 2 - nerve related - more stressed more problems      MICROALBUMINURIA     unsure how long - been on the lisinopril - for a few years at well      Nonspecific abnormal results of liver function study 2/3/2003    SGOT - has been high in the past - since the hepatitis b - borderline elevation - not really changing any      OBESITY      Obstructive sleep apnea      GENESIS (obstructive sleep apnea) 10/15/2006    psg 5/15 AHI 53 aPAP 8-15     OSTEOARTHRITIS L KNEE 2003    total knee on the  left - and pain is gone since the knee replacement      PERS HX HEPATITIS B- RESOLVED] 1977    acute virus only - no chronic disease      PERS HX HEPATITIS B- RESOLVED]      Type II or unspecified type diabetes mellitus without mention of complication, not stated as uncontrolled 1991    oral meds frist, then insulin eventually later, difficult to control most of the time      Unspecified hypothyroidism 10/11/2006    TSH 3.36 - mild subclinical hypothyroidism - deciding on following or starting low dose meds - with dr Oreilly - following      Patient Active Problem List   Diagnosis     Morbid obesity (H)     Diverticulosis of large intestine     Nonspecific abnormal results of cardiovascular function study     FAMILY HX COLON CANCER     Nonallopathic lesion of thoracic region     Hypothyroidism     GENESIS (obstructive sleep apnea)     Irritable bowel syndrome     Family history of malignant neoplasm of breast     Type 2 diabetes mellitus treated with insulin (H)     History of major depression     OSTEOARTHRITIS L KNEE  s/p knee replacement on the left      Hypertension goal BP (blood pressure) < 140/90     Family history of stroke (cerebrovascular)     Family history of other cardiovascular diseases     JOINT PAIN-ANKLE - right ankle      Mitral valve insufficiency     Hyperlipidemia LDL goal <100     Aortic sclerosis     Tubular adenoma of colon     History of viral hepatitis, type B     Chronic low back pain     Long-term insulin use (H)     S/P total knee replacement using cement, right     Lumbago     Type 2 diabetes mellitus with hyperglycemia (H)     Posterior vitreous detachment of right eye     Pseudophakia of both eyes     Paroxysmal atrial fibrillation (H)     SOB (shortness of breath)     Bunion     On continuous oral anticoagulation     Past Surgical History:   Procedure Laterality Date     ABDOMEN SURGERY      ovaian scope/ appendix removal     ANESTHESIA CARDIOVERSION N/A 12/4/2020    Procedure:  ANESTHESIA, FOR CARDIOVERSION @1400;  Surgeon: GENERIC ANESTHESIA PROVIDER;  Location: [facility]     ARTHROPLASTY KNEE Right 9/23/2015    Procedure: ARTHROPLASTY KNEE;  Surgeon: Rufus Brown MD;  Location:  OR     BUNIONECTOMY REN AND LEANDRO, COMBINED Left 2/2/2021    Procedure: Left bunion correction with bone cutting and screw fixation;  Surgeon: David Hoffman DPM;  Location: [facility]     CATARACT IOL, RT/LT Left      COLONOSCOPY  4/04    diverticulosis, rec repeat 10 yrs(consider fam hx?)     COLONOSCOPY WITH CO2 INSUFFLATION N/A 6/19/2019    Procedure: COLONOSCOPY, WITH CO2 INSUFFLATION;  Surgeon: Zeb Duarte MD;  Location: [facility]     ORTHOPEDIC SURGERY      right ankle     PHACOEMULSIFICATION CLEAR CORNEA WITH STANDARD INTRAOCULAR LENS IMPLANT Left 3/15/2018    Procedure: PHACOEMULSIFICATION CLEAR CORNEA WITH STANDARD INTRAOCULAR LENS IMPLANT;  LEFT EYE PHACOEMULSIFICATION CLEAR CORNEA WITH STANDARD INTRAOCULAR LENS IMPLANT;  Surgeon: Bandar Linares MD;  Location:  [facility]     PHACOEMULSIFICATION CLEAR CORNEA WITH STANDARD INTRAOCULAR LENS IMPLANT Right 4/5/2018    Procedure: PHACOEMULSIFICATION CLEAR CORNEA WITH STANDARD INTRAOCULAR LENS IMPLANT;  RIGHT PHACOEMULSIFICATION CLEAR CORNEA WITH STANDARD INTRAOCULAR LENS IMPLANT ;  Surgeon: Bandar Linares MD;  Location:  [facility]     STRESS ECHO (METRO)  12/03    no ischemia, limited by fatigue     SURGICAL HISTORY OF -       EXP LAP- R OVARY CYSTS     SURGICAL HISTORY OF -   1981,1984,1985    CHILDBIRTH     ZZC NONSPECIFIC PROCEDURE  6/06    right triple arthrodecesis      ZZ NONSPECIFIC PROCEDURE  7/06     right bunion surgery      [facility] TOTAL KNEE ARTHROPLASTY  3/03    L knee     Social History     Socioeconomic History     Marital status:      Spouse name: Not on file     Number of children: 3     Years of education: Not on file     Highest education level: Not on file   Occupational History     Occupation: RN     Employer:  [employer]   Tobacco Use     Smoking status: Never Smoker     Smokeless tobacco: Never Used     Tobacco comment: tried in early 20s only    Substance and Sexual Activity     Alcohol use: Yes     Comment: rare     Drug use: No     Sexual activity: Not Currently     Birth control/protection: Post-menopausal     Comment:  - since for 20 years, no signficant other  > 5 years sexual activity    Other Topics Concern      Service No     Blood Transfusions No     Caffeine Concern No     Occupational Exposure Yes     Comment: Normal nursing exposures     Hobby Hazards No     Sleep Concern Yes     Stress Concern No     Comment: Stress level at HM=5, stress level at WK=6     Weight Concern Yes     Special Diet Yes     Comment: Diabetic diet (watching carbs)     Back Care No     Comment: Occassional back spasms     Exercise Yes     Comment: Pt joined a health club goes approx 3-4 times a week,half to one mile daily     Bike Helmet No     Seat Belt Yes     Comment: Sometimes     Self-Exams Yes     Parent/sibling w/ CABG, MI or angioplasty before 65F 55M? 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      Social Determinants of Health     Financial Resource Strain: Not on file   Food Insecurity: Not on file   Transportation Needs: Not on file   Physical Activity: Not on file   Stress: Not on file   Social Connections: Not on file   Intimate Partner Violence: Not on file   Housing Stability: Not on file     Family History   Problem Relation Age of Onset     Diabetes Maternal Grandmother         DM TYPE 2     Cerebrovascular Disease Maternal Grandmother      Hypertension Sister      Lipids Sister      Heart Disease Sister         Chronic AFib     Diabetes Sister      Coronary Artery Disease Sister         afib     Hypertension Sister      Lipids Sister      Gynecology Sister      Diabetes Sister      Asthma Sister      Blood Disease  Paternal Grandmother         T CELL LEUKEMIA     Hypertension Brother      Lipids Brother      Congenital Anomalies Brother      Cardiovascular Brother      Heart Disease Brother         CABG/AFIB     Coronary Artery Disease Brother         cabg afib AAA     Hypertension Brother      Lipids Brother      Other Cancer Brother         multple myeloma     Obesity Brother      Hypertension Brother      Respiratory Brother         Sleep apnea     Heart Disease Brother         AFib     Coronary Artery Disease Brother         afib     Alzheimer Disease Mother      Asthma Mother      Hypertension Mother      Breast Cancer Mother 40        has mastectomy and lived to 84     Allergies Mother         Sulfa,     Arthritis Mother      Cardiovascular Mother      Depression Mother      Respiratory Mother      Lipids Mother      Cancer Mother         breast     Thyroid Disease Mother      Cerebrovascular Disease Mother         FROM  AFIB     Dementia Mother         Alzheimers     Other Cancer Mother         breast     Cancer - colorectal Other      Colon Cancer Other         2019     Cancer Father         lung     Aneurysm Father         brother, AAA     Other Cancer Father         lung ca     Coronary Artery Disease Father         cad AAA     Asthma Sister      Cancer - colorectal Paternal Uncle         late 50s to early 60s - great uncles      Breast Cancer Other         Maternal cousin     Arrhythmia Sister         2 brothers 1 sister Afib     Breast Cancer Maternal Aunt 62     Other Cancer Maternal Aunt 35        Ovarian cancer.  Survived despite late recurrence and is now 92.     Glaucoma No family hx of      Macular Degeneration No family hx of      Lab Results   Component Value Date    A1C 7.1 05/25/2022    A1C 8.1 11/24/2021    A1C 9.0 08/16/2021    A1C 6.8 01/05/2021    A1C 6.9 11/25/2020    A1C 7.5 07/22/2020    A1C 7.3 12/10/2019           SUBJECTIVE FINDINGS:  67-year-old returns to clinic for ulcer, right first MPJ.   She relates it is doing better.  It is still intermittently draining a little bit.  She relates she cannot use the insoles in shoes because it makes shoe too tight.  Wearing slip on sandals today but she wears her shoes as much as she can.       OBJECTIVE FINDINGS:  Right plantar first MPJ, she has hyperkeratotic eschar.  There is no active drainage, no erythema, no odor, no calor.  Left foot with mild hyperkeratosis on plantar 1st mpj, no erythema, no drainage.     ASSESSMENT AND PLAN:  Ulcer, right first MPJ.  She is diabetic with peripheral neuropathy.  Diagnosis and treatment options discussed with her.  This is slowly improving.  I am going to have her continue the Betadine and a Band-Aid.  I discussed with her offloading. Prescription for new Diabetic shoes and orthotics given and she is given the phone number and address to orthotics and prosthetics to get these.  Return to clinic and see me in 1 month.  Previous notes reviewed.             Moderate level of medical decision making.          Again, thank you for allowing me to participate in the care of your patient.        Sincerely,        Rufus Hutson DPM

## 2022-09-06 NOTE — NURSING NOTE
Sherry Slaughter's chief complaint for this visit includes:  Chief Complaint   Patient presents with     Right Foot - WOUND CARE     PCP: Marilou Arciniega    Referring Provider:  No referring provider defined for this encounter.    [facility]MP 10/02/2007   No Pain (0)     Do you need any medication refills at today's visit? NO    Allergies   Allergen Reactions     Empagliflozin      Yeast infections     Lipitor [Atorvastatin Calcium]      Gas     Pravachol [Pravastatin Sodium]      Pravachol - dry cough      Simvastatin      Myalgia       Bandar Kelley, EMT

## 2022-09-06 NOTE — PROGRESS NOTES
Past Medical History:   Diagnosis Date     Cataract      Congestive heart failure (H) 2019 NOVEMBER    AFIB     DEPRESSION     comes and goes - tried meds - unsuccessfully, certain times of the year, no psych intervention and no counselors in the past - and not interested      Diverticulosis of colon (without mention of hemorrhage) 4/04    colonoscopy     ECHO-mildLVH,tr MR,mild thick lflets w inc LA,trTR   12/03     Essential hypertension, benign 1990s    late 1990s - started medications at that time - not to difficult to control meds      Fam hx-cardiovas dis NEC     father - CAD, and lipids/HTN - multiple members of the family      Family history of diabetes mellitus     sister and grandmother with DM      Family history of malignant neoplasm of breast     mother - young age - 45, and maternal cousin and aunt as well - no BRCA testing done      Family history of stroke (cerebrovascular)     grandmother in early life in her 40s      FAMILY HX COLON CANCER     Pat uncles x 2     Heart disease     murmur/     Heart murmur      HYPERLIPIDEMIA 2000    fairly recent - in the last 5 years - high for DM levels  -cholesterol recent      Irritable bowel syndrome     goes between the 2 - nerve related - more stressed more problems      MICROALBUMINURIA     unsure how long - been on the lisinopril - for a few years at well      Nonspecific abnormal results of liver function study 2/3/2003    SGOT - has been high in the past - since the hepatitis b - borderline elevation - not really changing any      OBESITY      Obstructive sleep apnea      GENESIS (obstructive sleep apnea) 10/15/2006    psg 5/15 AHI 53 aPAP 8-15     OSTEOARTHRITIS L KNEE 2003    total knee on the left - and pain is gone since the knee replacement      PERS HX HEPATITIS B- RESOLVED] 1977    acute virus only - no chronic disease      PERS HX HEPATITIS B- RESOLVED]      Type II or unspecified type diabetes mellitus without mention of complication, not stated as  uncontrolled 1991    oral meds frist, then insulin eventually later, difficult to control most of the time      Unspecified hypothyroidism 10/11/2006    TSH 3.36 - mild subclinical hypothyroidism - deciding on following or starting low dose meds - with dr Oreilly - following      Patient Active Problem List   Diagnosis     Morbid obesity (H)     Diverticulosis of large intestine     Nonspecific abnormal results of cardiovascular function study     FAMILY HX COLON CANCER     Nonallopathic lesion of thoracic region     Hypothyroidism     GENESIS (obstructive sleep apnea)     Irritable bowel syndrome     Family history of malignant neoplasm of breast     Type 2 diabetes mellitus treated with insulin (H)     History of major depression     OSTEOARTHRITIS L KNEE  s/p knee replacement on the left      Hypertension goal BP (blood pressure) < 140/90     Family history of stroke (cerebrovascular)     Family history of other cardiovascular diseases     JOINT PAIN-ANKLE - right ankle      Mitral valve insufficiency     Hyperlipidemia LDL goal <100     Aortic sclerosis     Tubular adenoma of colon     History of viral hepatitis, type B     Chronic low back pain     Long-term insulin use (H)     S/P total knee replacement using cement, right     Lumbago     Type 2 diabetes mellitus with hyperglycemia (H)     Posterior vitreous detachment of right eye     Pseudophakia of both eyes     Paroxysmal atrial fibrillation (H)     SOB (shortness of breath)     Bunion     On continuous oral anticoagulation     Past Surgical History:   Procedure Laterality Date     ABDOMEN SURGERY      ovaian scope/ appendix removal     ANESTHESIA CARDIOVERSION N/A 12/4/2020    Procedure: ANESTHESIA, FOR CARDIOVERSION @1400;  Surgeon: GENERIC ANESTHESIA PROVIDER;  Location: [facility]     ARTHROPLASTY KNEE Right 9/23/2015    Procedure: ARTHROPLASTY KNEE;  Surgeon: Rufus Brown MD;  Location:  OR     BUNIONECTOMY REN AND LEANDRO, COMBINED Left 2/2/2021     Procedure: Left bunion correction with bone cutting and screw fixation;  Surgeon: David Hoffman DPM;  Location: [facility]     CATARACT IOL, RT/LT Left      COLONOSCOPY  4/04    diverticulosis, rec repeat 10 yrs(consider fam hx?)     COLONOSCOPY WITH CO2 INSUFFLATION N/A 6/19/2019    Procedure: COLONOSCOPY, WITH CO2 INSUFFLATION;  Surgeon: Zeb Duarte MD;  Location: [facility]     ORTHOPEDIC SURGERY      right ankle     PHACOEMULSIFICATION CLEAR CORNEA WITH STANDARD INTRAOCULAR LENS IMPLANT Left 3/15/2018    Procedure: PHACOEMULSIFICATION CLEAR CORNEA WITH STANDARD INTRAOCULAR LENS IMPLANT;  LEFT EYE PHACOEMULSIFICATION CLEAR CORNEA WITH STANDARD INTRAOCULAR LENS IMPLANT;  Surgeon: Bandar Linares MD;  Location:  [facility]     PHACOEMULSIFICATION CLEAR CORNEA WITH STANDARD INTRAOCULAR LENS IMPLANT Right 4/5/2018    Procedure: PHACOEMULSIFICATION CLEAR CORNEA WITH STANDARD INTRAOCULAR LENS IMPLANT;  RIGHT PHACOEMULSIFICATION CLEAR CORNEA WITH STANDARD INTRAOCULAR LENS IMPLANT ;  Surgeon: Bandar Linarse MD;  Location:  [facility]     STRESS ECHO (METRO)  12/03    no ischemia, limited by fatigue     SURGICAL HISTORY OF -       EXP LAP- R OVARY CYSTS     SURGICAL HISTORY OF -   1981,1984,1985    CHILDBIRTH     ZZC NONSPECIFIC PROCEDURE  6/06    right triple arthrodecesis      ZZC NONSPECIFIC PROCEDURE  7/06     right bunion surgery      Z TOTAL KNEE ARTHROPLASTY  3/03    L knee     Social History     Socioeconomic History     Marital status:      Spouse name: Not on file     Number of children: 3     Years of education: Not on file     Highest education level: Not on file   Occupational History     Occupation: RN     Employer: [employer]   Tobacco Use     Smoking status: Never Smoker     Smokeless tobacco: Never Used     Tobacco comment: tried in early 20s only    Substance and Sexual Activity     Alcohol use: Yes     Comment: rare     Drug use: No     Sexual activity: Not Currently      Birth control/protection: Post-menopausal     Comment:  - since for 20 years, no signficant other  > 5 years sexual activity    Other Topics Concern      Service No     Blood Transfusions No     Caffeine Concern No     Occupational Exposure Yes     Comment: Normal nursing exposures     Hobby Hazards No     Sleep Concern Yes     Stress Concern No     Comment: Stress level at HM=5, stress level at WK=6     Weight Concern Yes     Special Diet Yes     Comment: Diabetic diet (watching carbs)     Back Care No     Comment: Occassional back spasms     Exercise Yes     Comment: Pt joined a health club goes approx 3-4 times a week,half to one mile daily     Bike Helmet No     Seat Belt Yes     Comment: Sometimes     Self-Exams Yes     Parent/sibling w/ CABG, MI or angioplasty before 65F 55M? 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      Social Determinants of Health     Financial Resource Strain: Not on file   Food Insecurity: Not on file   Transportation Needs: Not on file   Physical Activity: Not on file   Stress: Not on file   Social Connections: Not on file   Intimate Partner Violence: Not on file   Housing Stability: Not on file     Family History   Problem Relation Age of Onset     Diabetes Maternal Grandmother         DM TYPE 2     Cerebrovascular Disease Maternal Grandmother      Hypertension Sister      Lipids Sister      Heart Disease Sister         Chronic AFib     Diabetes Sister      Coronary Artery Disease Sister         afib     Hypertension Sister      Lipids Sister      Gynecology Sister      Diabetes Sister      Asthma Sister      Blood Disease Paternal Grandmother         T CELL LEUKEMIA     Hypertension Brother      Lipids Brother      Congenital Anomalies Brother      Cardiovascular Brother      Heart Disease Brother         CABG/AFIB     Coronary Artery Disease Brother         cabg afib AAA     Hypertension Brother       Lipids Brother      Other Cancer Brother         multple myeloma     Obesity Brother      Hypertension Brother      Respiratory Brother         Sleep apnea     Heart Disease Brother         AFib     Coronary Artery Disease Brother         afib     Alzheimer Disease Mother      Asthma Mother      Hypertension Mother      Breast Cancer Mother 40        has mastectomy and lived to 84     Allergies Mother         Sulfa,     Arthritis Mother      Cardiovascular Mother      Depression Mother      Respiratory Mother      Lipids Mother      Cancer Mother         breast     Thyroid Disease Mother      Cerebrovascular Disease Mother         FROM  AFIB     Dementia Mother         Alzheimers     Other Cancer Mother         breast     Cancer - colorectal Other      Colon Cancer Other         2019     Cancer Father         lung     Aneurysm Father         brother, AAA     Other Cancer Father         lung ca     Coronary Artery Disease Father         cad AAA     Asthma Sister      Cancer - colorectal Paternal Uncle         late 50s to early 60s - great uncles      Breast Cancer Other         Maternal cousin     Arrhythmia Sister         2 brothers 1 sister Afib     Breast Cancer Maternal Aunt 62     Other Cancer Maternal Aunt 35        Ovarian cancer.  Survived despite late recurrence and is now 92.     Glaucoma No family hx of      Macular Degeneration No family hx of      Lab Results   Component Value Date    A1C 7.1 05/25/2022    A1C 8.1 11/24/2021    A1C 9.0 08/16/2021    A1C 6.8 01/05/2021    A1C 6.9 11/25/2020    A1C 7.5 07/22/2020    A1C 7.3 12/10/2019           SUBJECTIVE FINDINGS:  67-year-old returns to clinic for ulcer, right first MPJ.  She relates it is doing better.  It is still intermittently draining a little bit.  She relates she cannot use the insoles in shoes because it makes shoe too tight.  Wearing slip on sandals today but she wears her shoes as much as she can.       OBJECTIVE FINDINGS:  Right plantar first  MPJ, she has hyperkeratotic eschar.  There is no active drainage, no erythema, no odor, no calor.  Left foot with mild hyperkeratosis on plantar 1st mpj, no erythema, no drainage.     ASSESSMENT AND PLAN:  Ulcer, right first MPJ.  She is diabetic with peripheral neuropathy.  Diagnosis and treatment options discussed with her.  This is slowly improving.  I am going to have her continue the Betadine and a Band-Aid.  I discussed with her offloading. Prescription for new Diabetic shoes and orthotics given and she is given the phone number and address to orthotics and prosthetics to get these.  Return to clinic and see me in 1 month.  Previous notes reviewed.             Moderate level of medical decision making.

## 2022-09-08 ENCOUNTER — HOSPITAL ENCOUNTER (OUTPATIENT)
Dept: CARDIAC REHAB | Facility: CLINIC | Age: 67
Discharge: HOME | End: 2022-09-08
Attending: INTERNAL MEDICINE
Payer: COMMERCIAL

## 2022-09-08 PROCEDURE — 94625 PHY/QHP OP PULM RHB W/O MNTR: CPT | Performed by: CLINICAL EXERCISE PHYSIOLOGIST

## 2022-09-13 ENCOUNTER — HOSPITAL ENCOUNTER (OUTPATIENT)
Dept: CARDIAC REHAB | Facility: CLINIC | Age: 67
Discharge: HOME | End: 2022-09-13
Attending: INTERNAL MEDICINE
Payer: COMMERCIAL

## 2022-09-13 PROCEDURE — 94625 PHY/QHP OP PULM RHB W/O MNTR: CPT

## 2022-09-15 ENCOUNTER — HOSPITAL ENCOUNTER (OUTPATIENT)
Dept: CARDIAC REHAB | Facility: CLINIC | Age: 67
Discharge: HOME | End: 2022-09-15
Attending: INTERNAL MEDICINE
Payer: COMMERCIAL

## 2022-09-15 PROCEDURE — 94626 PHY/QHP OP PULM RHB W/MNTR: CPT

## 2022-09-21 NOTE — PROGRESS NOTES
"Sherry Slaughter  :  1955  DOS: 2022  MRN: [id number]    Sports Medicine Clinic Visit    PCP: Marilou Arciniega    Sherry Slaughter is a 67 year old female who is seen as a self referral presenting with right thumb clicking and pain.     HPI  Mechanism of Injury: On the background of arthritis, is now noting a right thumb triggering for the past 3 weeks with pain and clicking of the MCP and clicking of the IP.     Duration and progression of pain: frequency of clicking increasing for the past few weeks.   Location of pain: volar surface of the base of the thumb/MCP.  Radiation: denies    Swelling: no specific swelling, but notes a palpable \"knot\" near the first digit A1 pulley  Instability: no  Mechanical symptoms: clicking and popping frequently, no locking yet    Numbness/Tingling: numbness/tingling of all digits chronically from undiagnosed carpal tunnel syndrome bilaterally.   Radicular pain: denies  Weakness: antalgic weakness during handgrip due to chronic CMC joint pain. No new weakness.     Alleviating factors: advil, rest, thumb spica brace  Aggravating factors: thumb flexion, flexed position during sleep.    Treatments tried (medications, injections, bracing, therapy, modalities): advil, thumb spica brace    Prior medical visits related to complaint:   Dr. Jennings in 2019 for bilateral CMC OA. Gave left CMC CSI, thumb spica braces, and lidocaine cream. Discussed hand therapy at that time, did not attend.     Prior imaging:   Bilateral thumb XR in 2019 showed severe degenerative changes of bilateral CMC joints    Social History: retired ICU nurse    Review of Systems  Musculoskeletal: as above  Remainder of review of systems is negative including constitutional, CV, pulmonary, GI, Skin and Neurologic except as noted in HPI or medical history.    Past Medical History:   Diagnosis Date     Cataract      Congestive heart failure (H) 2019    AFIB     DEPRESSION     comes and goes - " tried meds - unsuccessfully, certain times of the year, no psych intervention and no counselors in the past - and not interested      Diverticulosis of colon (without mention of hemorrhage) 4/04    colonoscopy     ECHO-mildLVH,tr MR,mild thick lflets w inc LA,trTR   12/03     Essential hypertension, benign 1990s    late 1990s - started medications at that time - not to difficult to control meds      Fam hx-cardiovas dis NEC     father - CAD, and lipids/HTN - multiple members of the family      Family history of diabetes mellitus     sister and grandmother with DM      Family history of malignant neoplasm of breast     mother - young age - 45, and maternal cousin and aunt as well - no BRCA testing done      Family history of stroke (cerebrovascular)     grandmother in early life in her 40s      FAMILY HX COLON CANCER     Pat uncles x 2     Heart disease     murmur/     Heart murmur      HYPERLIPIDEMIA 2000    fairly recent - in the last 5 years - high for DM levels  -cholesterol recent      Irritable bowel syndrome     goes between the 2 - nerve related - more stressed more problems      MICROALBUMINURIA     unsure how long - been on the lisinopril - for a few years at well      Nonspecific abnormal results of liver function study 2/3/2003    SGOT - has been high in the past - since the hepatitis b - borderline elevation - not really changing any      OBESITY      Obstructive sleep apnea      GENESIS (obstructive sleep apnea) 10/15/2006    psg 5/15 AHI 53 aPAP 8-15     OSTEOARTHRITIS L KNEE 2003    total knee on the left - and pain is gone since the knee replacement      PERS HX HEPATITIS B- RESOLVED] 1977    acute virus only - no chronic disease      PERS HX HEPATITIS B- RESOLVED]      Type II or unspecified type diabetes mellitus without mention of complication, not stated as uncontrolled 1991    oral meds frist, then insulin eventually later, difficult to control most of the time      Unspecified hypothyroidism  10/11/2006    TSH 3.36 - mild subclinical hypothyroidism - deciding on following or starting low dose meds - with dr Oreilly - following      Past Surgical History:   Procedure Laterality Date     ABDOMEN SURGERY      ovaian scope/ appendix removal     ANESTHESIA CARDIOVERSION N/A 12/4/2020    Procedure: ANESTHESIA, FOR CARDIOVERSION @1400;  Surgeon: GENERIC ANESTHESIA PROVIDER;  Location: [facility]     ARTHROPLASTY KNEE Right 9/23/2015    Procedure: ARTHROPLASTY KNEE;  Surgeon: Rufus Brown MD;  Location:  OR     BUNIONECTOMY REN AND LEANDRO, COMBINED Left 2/2/2021    Procedure: Left bunion correction with bone cutting and screw fixation;  Surgeon: David Hoffman DPM;  Location: [facility]     CATARACT IOL, RT/LT Left      COLONOSCOPY  4/04    diverticulosis, rec repeat 10 yrs(consider fam hx?)     COLONOSCOPY WITH CO2 INSUFFLATION N/A 6/19/2019    Procedure: COLONOSCOPY, WITH CO2 INSUFFLATION;  Surgeon: Zeb Duarte MD;  Location:  OR     ORTHOPEDIC SURGERY      right ankle     PHACOEMULSIFICATION CLEAR CORNEA WITH STANDARD INTRAOCULAR LENS IMPLANT Left 3/15/2018    Procedure: PHACOEMULSIFICATION CLEAR CORNEA WITH STANDARD INTRAOCULAR LENS IMPLANT;  LEFT EYE PHACOEMULSIFICATION CLEAR CORNEA WITH STANDARD INTRAOCULAR LENS IMPLANT;  Surgeon: Bandar Linares MD;  Location: [facility]     PHACOEMULSIFICATION CLEAR CORNEA WITH STANDARD INTRAOCULAR LENS IMPLANT Right 4/5/2018    Procedure: PHACOEMULSIFICATION CLEAR CORNEA WITH STANDARD INTRAOCULAR LENS IMPLANT;  RIGHT PHACOEMULSIFICATION CLEAR CORNEA WITH STANDARD INTRAOCULAR LENS IMPLANT ;  Surgeon: Bandar Linares MD;  Location:  [facility]     STRESS ECHO (METRO)  12/03    no ischemia, limited by fatigue     SURGICAL HISTORY OF -       EXP LAP- R OVARY CYSTS     SURGICAL HISTORY OF -   1981,1984,1985    CHILDBIRTH     ZZC NONSPECIFIC PROCEDURE  6/06    right triple arthrodecesis      [facility] NONSPECIFIC PROCEDURE  7/06     right bunion surgery      [facility]  TOTAL KNEE ARTHROPLASTY  3/03    L knee     Family History   Problem Relation Age of Onset     Diabetes Maternal Grandmother         DM TYPE 2     Cerebrovascular Disease Maternal Grandmother      Hypertension Sister      Lipids Sister      Heart Disease Sister         Chronic AFib     Diabetes Sister      Coronary Artery Disease Sister         afib     Hypertension Sister      Lipids Sister      Gynecology Sister      Diabetes Sister      Asthma Sister      Blood Disease Paternal Grandmother         T CELL LEUKEMIA     Hypertension Brother      Lipids Brother      Congenital Anomalies Brother      Cardiovascular Brother      Heart Disease Brother         CABG/AFIB     Coronary Artery Disease Brother         cabg afib AAA     Hypertension Brother      Lipids Brother      Other Cancer Brother         multple myeloma     Obesity Brother      Hypertension Brother      Respiratory Brother         Sleep apnea     Heart Disease Brother         AFib     Coronary Artery Disease Brother         afib     Alzheimer Disease Mother      Asthma Mother      Hypertension Mother      Breast Cancer Mother 40        has mastectomy and lived to 84     Allergies Mother         Sulfa,     Arthritis Mother      Cardiovascular Mother      Depression Mother      Respiratory Mother      Lipids Mother      Cancer Mother         breast     Thyroid Disease Mother      Cerebrovascular Disease Mother         FROM  AFIB     Dementia Mother         Alzheimers     Other Cancer Mother         breast     Cancer - colorectal Other      Colon Cancer Other         2019     Cancer Father         lung     Aneurysm Father         brother, AAA     Other Cancer Father         lung ca     Coronary Artery Disease Father         cad AAA     Asthma Sister      Cancer - colorectal Paternal Uncle         late 50s to early 60s - great uncles      Breast Cancer Other         Maternal cousin     Arrhythmia Sister         2 brothers 1 sister Afib     Breast Cancer  Maternal Aunt 62     Other Cancer Maternal Aunt 35        Ovarian cancer.  Survived despite late recurrence and is now 92.     Glaucoma No family hx of      Macular Degeneration No family hx of        Objective  /66   LMP 10/02/2007       General: healthy, alert and in no acute distress      HEENT: no scleral icterus or conjunctival erythema     Skin: no suspicious lesions or rash. No jaundice.     CV: regular rhythm by palpation, 2+ distal pulses, no pedal edema      Resp: normal respiratory effort without conversational dyspnea     Psych: normal mood and affect      Gait: nonantalgic, appropriate coordination and balance     Neuro:        - Sensation to light touch:  Intact throughout the BUE including all peripheral nerve distributions.        - MSR:  2+ in bilateral biceps, triceps, brachioradialis.        - Special tests:   - Tinel's at the wrist:  Pos   - Stressed Phalen's:  Pos    MSK - Wrist/Hand:       - Inspection:    - slight flexion deformity of the right thumb with MCP and IP arthritic change. No intrinsic atrophy, ecchymosis, lesion, rash.        - ROM:    - Full ROM with visible and palpable click and catch of the IP and MCP joints over the A1 pulley.        - Palpation:    - TTP at the volar MCP over the A1 pulley, FPL tendon.   - NTTP at the IP or elsewhere.        - Strength:    - 5/5 in BUE including WE, WF, FDI, ADM, FPL, APB, EPL.        - Special tests:        - CMC grind:  Pos   - Trigger:  Pos for pain and click with 1st digit flexion/FPL   - Cheema's:  Neg      Radiology  I personally reviewed the available relevant imaging, including XR Finger bilateral (2019), and agree with the interpretation of degenerative changes in bilateral CMC joints.     I personally reviewed today's new relevant imaging, including XR R Thumb and my interpretation is degenerative changes of the CMC, calcific deposits in the ulnar base of the MCP, possibly interfering with flexor tendon sheath/A1 pulley  function.       Hand / Upper Extremity Injection/Arthrocentesis: R thumb A1    Date/Time: 9/28/2022 2:45 PM  Performed by: Errol Ardon DO  Authorized by: Errol Ardon DO     Indications:  Pain  Needle Size:  27 G  Guidance: landmark    Approach:  Ulnar  Condition: trigger finger    Location:  Thumb    Site:  R thumb A1  Medications:  20 mg triamcinolone 40 MG/ML; 0.5 mL lidocaine 1 %  Outcome:  Tolerated well, no immediate complications  Procedure discussed: discussed risks, benefits, and alternatives    Consent Given by:  Patient  Prep: patient was prepped and draped in usual sterile fashion        Procedure - Trigger Finger Injection  After risks, benefits and complications of steroid injection were discussed with the patient, a consent form was signed and the patient elected to proceed.  Using sterile technique, the injection site was prepped with an alcohol swab and chlorhexidine.  An injectate solution containing 0.5 mL of 1 lidocaine and 0.5 mL of Kenalog (40 mg/mL) was drawn up into 3 ml syringe. The injection was performed using a 1.5 inch 27g needle to place the injectate solution between the A1 pulley and flexor tendon sheath of the 1st finger. Care was taken to not inject into the tendon itself. The needle was removed, area cleansed, and bandage applied. Patient tolerated the procedure well without complications.       Assessment  1. Trigger thumb of right hand    2. Pain of right thumb        Plan  Sherry Slaughter is a 67 year old female that presents with right thumb pain and clicking. History and physical exam appear most consistent with trigger finger in the setting of chronic CMC OA. She also has evidence of some volar/ulnar calcifications near the MCP joint, which may be interfering with proper thumb flexion and may be contributing to her pain/clicking. Discussed the nature of the condition and treatment options and mutually agreed upon the following plan:    - Imaging:         -  Reviewed relevant imaging in the chart, including XR R Thumb ordered today pre-clinic. Results above. Reviewed imaging with patient.   - Medications:         - Discussed pharmacologic options for pain relief. May use NSAIDs as needed for pain control. May also use topical creams such as IcyHot, BioFreeze, or Voltaren gel PRN.   - Injections:         - Performed a corticosteroid injection of the right thumb flexor tendon sheath/A1 pulley (trigger finger) today in clinic. Patient tolerated well without complications. See procedure note above for details.   - Therapy:         - Discussed the benefits of hand therapy with patient. She has a HEP available at home, encouraged to continue with her HEP.   - Modalities:         - Ice, heat, massage PRN.   - Alternatives:         - May consider ESWT in the future for the thumb base calcifications if symptoms persist.   - Bracing:         - She may continue to use her night splints for her carpal tunnel symptoms. Thumb spica may or may not help with her thumb pain, wear if it is helping symptoms.   - Activity:         - Encouraged to remain active and participate in regular activities as symptoms allow.  - Follow up:         - In 3 months for re-evaluation and update to treatment plan. Patient has clinic contact information for questions or concerns.       Errol Ardon DO  [facility] - Sports Medicine  [facility] - Department of Orthopedic Surgery

## 2022-09-28 ENCOUNTER — OFFICE VISIT (OUTPATIENT)
Dept: ORTHOPEDICS | Facility: OTHER | Age: 67
End: 2022-09-28
Payer: COMMERCIAL

## 2022-09-28 ENCOUNTER — ANCILLARY PROCEDURE (OUTPATIENT)
Dept: GENERAL RADIOLOGY | Facility: OTHER | Age: 67
End: 2022-09-28
Attending: STUDENT IN AN ORGANIZED HEALTH CARE EDUCATION/TRAINING PROGRAM
Payer: COMMERCIAL

## 2022-09-28 VITALS — DIASTOLIC BLOOD PRESSURE: 66 MMHG | SYSTOLIC BLOOD PRESSURE: 108 MMHG

## 2022-09-28 DIAGNOSIS — M79.644 PAIN OF RIGHT THUMB: ICD-10-CM

## 2022-09-28 DIAGNOSIS — M18.11 PRIMARY OSTEOARTHRITIS OF FIRST CARPOMETACARPAL JOINT OF RIGHT HAND: ICD-10-CM

## 2022-09-28 DIAGNOSIS — M65.311 TRIGGER THUMB OF RIGHT HAND: Primary | ICD-10-CM

## 2022-09-28 PROCEDURE — 99204 OFFICE O/P NEW MOD 45 MIN: CPT | Mod: 25 | Performed by: STUDENT IN AN ORGANIZED HEALTH CARE EDUCATION/TRAINING PROGRAM

## 2022-09-28 PROCEDURE — 20550 NJX 1 TENDON SHEATH/LIGAMENT: CPT | Mod: RT | Performed by: STUDENT IN AN ORGANIZED HEALTH CARE EDUCATION/TRAINING PROGRAM

## 2022-09-28 PROCEDURE — 73140 X-RAY EXAM OF FINGER(S): CPT | Mod: TC | Performed by: RADIOLOGY

## 2022-09-28 RX ORDER — LIDOCAINE HYDROCHLORIDE 10 MG/ML
0.5 INJECTION, SOLUTION INFILTRATION; PERINEURAL
Status: DISCONTINUED | OUTPATIENT
Start: 2022-09-28 | End: 2023-04-17

## 2022-09-28 RX ORDER — TRIAMCINOLONE ACETONIDE 40 MG/ML
20 INJECTION, SUSPENSION INTRA-ARTICULAR; INTRAMUSCULAR
Status: DISCONTINUED | OUTPATIENT
Start: 2022-09-28 | End: 2023-04-17

## 2022-09-28 RX ADMIN — TRIAMCINOLONE ACETONIDE 20 MG: 40 INJECTION, SUSPENSION INTRA-ARTICULAR; INTRAMUSCULAR at 14:45

## 2022-09-28 RX ADMIN — LIDOCAINE HYDROCHLORIDE 0.5 ML: 10 INJECTION, SOLUTION INFILTRATION; PERINEURAL at 14:45

## 2022-09-28 ASSESSMENT — PAIN SCALES - GENERAL: PAINLEVEL: MODERATE PAIN (4)

## 2022-09-28 NOTE — LETTER
"    2022         RE: Sherry Slaughter  [address]  [place] [address]        Dear Colleague,    Thank you for referring your patient, Sherry Slaughter, to the [facility] [place]. Please see a copy of my visit note below.    Sherry Slaughter  :  1955  DOS: 2022  MRN: [id number]    Sports Medicine Clinic Visit    PCP: Marilou Arciniega    Sherry Slaughter is a 67 year old female who is seen as a self referral presenting with right thumb clicking and pain.     HPI  Mechanism of Injury: On the background of arthritis, is now noting a right thumb triggering for the past 3 weeks with pain and clicking of the MCP and clicking of the IP.     Duration and progression of pain: frequency of clicking increasing for the past few weeks.   Location of pain: volar surface of the base of the thumb/MCP.  Radiation: denies    Swelling: no specific swelling, but notes a palpable \"knot\" near the first digit A1 pulley  Instability: no  Mechanical symptoms: clicking and popping frequently, no locking yet    Numbness/Tingling: numbness/tingling of all digits chronically from undiagnosed carpal tunnel syndrome bilaterally.   Radicular pain: denies  Weakness: antalgic weakness during handgrip due to chronic CMC joint pain. No new weakness.     Alleviating factors: advil, rest, thumb spica brace  Aggravating factors: thumb flexion, flexed position during sleep.    Treatments tried (medications, injections, bracing, therapy, modalities): advil, thumb spica brace    Prior medical visits related to complaint:   Dr. Jennings in 2019 for bilateral CMC OA. Gave left CMC CSI, thumb spica braces, and lidocaine cream. Discussed hand therapy at that time, did not attend.     Prior imaging:   Bilateral thumb XR in 2019 showed severe degenerative changes of bilateral CMC joints    Social History: retired ICU nurse    Review of Systems  Musculoskeletal: as above  Remainder of " review of systems is negative including constitutional, CV, pulmonary, GI, Skin and Neurologic except as noted in HPI or medical history.    Past Medical History:   Diagnosis Date     Cataract      Congestive heart failure (H) 2019 NOVEMBER    AFIB     DEPRESSION     comes and goes - tried meds - unsuccessfully, certain times of the year, no psych intervention and no counselors in the past - and not interested      Diverticulosis of colon (without mention of hemorrhage) 4/04    colonoscopy     ECHO-mildLVH,tr MR,mild thick lflets w inc LA,trTR   12/03     Essential hypertension, benign 1990s    late 1990s - started medications at that time - not to difficult to control meds      Fam hx-cardiovas dis [facility]     father - CAD, and lipids/HTN - multiple members of the family      Family history of diabetes mellitus     sister and grandmother with DM      Family history of malignant neoplasm of breast     mother - young age - 45, and maternal cousin and aunt as well - no BRCA testing done      Family history of stroke (cerebrovascular)     grandmother in early life in her 40s      FAMILY HX COLON CANCER     Pat uncles x 2     Heart disease     murmur/     Heart murmur      HYPERLIPIDEMIA 2000    fairly recent - in the last 5 years - high for DM levels  -cholesterol recent      Irritable bowel syndrome     goes between the 2 - nerve related - more stressed more problems      MICROALBUMINURIA     unsure how long - been on the lisinopril - for a few years at well      Nonspecific abnormal results of liver function study 2/3/2003    SGOT - has been high in the past - since the hepatitis b - borderline elevation - not really changing any      OBESITY      Obstructive sleep apnea      GENESIS (obstructive sleep apnea) 10/15/2006    psg 5/15 AHI 53 aPAP 8-15     OSTEOARTHRITIS L KNEE 2003    total knee on the left - and pain is gone since the knee replacement      PERS HX HEPATITIS B- RESOLVED] 1977    acute virus only - no chronic  disease      PERS HX HEPATITIS B- RESOLVED]      Type II or unspecified type diabetes mellitus without mention of complication, not stated as uncontrolled 1991    oral meds frist, then insulin eventually later, difficult to control most of the time      Unspecified hypothyroidism 10/11/2006    TSH 3.36 - mild subclinical hypothyroidism - deciding on following or starting low dose meds - with dr Oreilly - following      Past Surgical History:   Procedure Laterality Date     ABDOMEN SURGERY      ovaian scope/ appendix removal     ANESTHESIA CARDIOVERSION N/A 12/4/2020    Procedure: ANESTHESIA, FOR CARDIOVERSION @1400;  Surgeon: GENERIC ANESTHESIA PROVIDER;  Location: [facility] OR     ARTHROPLASTY KNEE Right 9/23/2015    Procedure: ARTHROPLASTY KNEE;  Surgeon: Rufus Brown MD;  Location:  OR     BUNIONECTOMY REN AND LEANDRO, COMBINED Left 2/2/2021    Procedure: Left bunion correction with bone cutting and screw fixation;  Surgeon: David Hoffman DPM;  Location: [facility] OR     CATARACT IOL, RT/LT Left      COLONOSCOPY  4/04    diverticulosis, rec repeat 10 yrs(consider fam hx?)     COLONOSCOPY WITH CO2 INSUFFLATION N/A 6/19/2019    Procedure: COLONOSCOPY, WITH CO2 INSUFFLATION;  Surgeon: Zeb Duarte MD;  Location:  OR     ORTHOPEDIC SURGERY      right ankle     PHACOEMULSIFICATION CLEAR CORNEA WITH STANDARD INTRAOCULAR LENS IMPLANT Left 3/15/2018    Procedure: PHACOEMULSIFICATION CLEAR CORNEA WITH STANDARD INTRAOCULAR LENS IMPLANT;  LEFT EYE PHACOEMULSIFICATION CLEAR CORNEA WITH STANDARD INTRAOCULAR LENS IMPLANT;  Surgeon: Bandar Linares MD;  Location[facility]     PHACOEMULSIFICATION CLEAR CORNEA WITH STANDARD INTRAOCULAR LENS IMPLANT Right 4/5/2018    Procedure: PHACOEMULSIFICATION CLEAR CORNEA WITH STANDARD INTRAOCULAR LENS IMPLANT;  RIGHT PHACOEMULSIFICATION CLEAR CORNEA WITH STANDARD INTRAOCULAR LENS IMPLANT ;  Surgeon: Bandar Linares MD;  Location:  [facility]     STRESS ECHO (METRO)  12/03    no  ischemia, limited by fatigue     SURGICAL HISTORY OF -       EXP LAP- R OVARY CYSTS     SURGICAL HISTORY OF -   1981,1984,1985    CHILDBIRTH     Z NONSPECIFIC PROCEDURE  6/06    right triple arthrodecesis      Z NONSPECIFIC PROCEDURE  7/06     right bunion surgery      [facility] TOTAL KNEE ARTHROPLASTY  3/03    L knee     Family History   Problem Relation Age of Onset     Diabetes Maternal Grandmother         DM TYPE 2     Cerebrovascular Disease Maternal Grandmother      Hypertension Sister      Lipids Sister      Heart Disease Sister         Chronic AFib     Diabetes Sister      Coronary Artery Disease Sister         afib     Hypertension Sister      Lipids Sister      Gynecology Sister      Diabetes Sister      Asthma Sister      Blood Disease Paternal Grandmother         T CELL LEUKEMIA     Hypertension Brother      Lipids Brother      Congenital Anomalies Brother      Cardiovascular Brother      Heart Disease Brother         CABG/AFIB     Coronary Artery Disease Brother         cabg afib AAA     Hypertension Brother      Lipids Brother      Other Cancer Brother         multple myeloma     Obesity Brother      Hypertension Brother      Respiratory Brother         Sleep apnea     Heart Disease Brother         AFib     Coronary Artery Disease Brother         afib     Alzheimer Disease Mother      Asthma Mother      Hypertension Mother      Breast Cancer Mother 40        has mastectomy and lived to 84     Allergies Mother         Sulfa,     Arthritis Mother      Cardiovascular Mother      Depression Mother      Respiratory Mother      Lipids Mother      Cancer Mother         breast     Thyroid Disease Mother      Cerebrovascular Disease Mother         FROM  AFIB     Dementia Mother         Alzheimers     Other Cancer Mother         breast     Cancer - colorectal Other      Colon Cancer Other         2019     Cancer Father         lung     Aneurysm Father         brother, AAA     Other Cancer Father         lung ca      Coronary Artery Disease Father         cad AAA     Asthma Sister      Cancer - colorectal Paternal Uncle         late 50s to early 60s - great uncles      Breast Cancer Other         Maternal cousin     Arrhythmia Sister         2 brothers 1 sister Afib     Breast Cancer Maternal Aunt 62     Other Cancer Maternal Aunt 35        Ovarian cancer.  Survived despite late recurrence and is now 92.     Glaucoma No family hx of      Macular Degeneration No family hx of        Objective  /66   LMP 10/02/2007       General: healthy, alert and in no acute distress      HEENT: no scleral icterus or conjunctival erythema     Skin: no suspicious lesions or rash. No jaundice.     CV: regular rhythm by palpation, 2+ distal pulses, no pedal edema      Resp: normal respiratory effort without conversational dyspnea     Psych: normal mood and affect      Gait: nonantalgic, appropriate coordination and balance     Neuro:        - Sensation to light touch:  Intact throughout the BUE including all peripheral nerve distributions.        - MSR:  2+ in bilateral biceps, triceps, brachioradialis.        - Special tests:   - Tinel's at the wrist:  Pos   - Stressed Phalen's:  Pos    MSK - Wrist/Hand:       - Inspection:    - slight flexion deformity of the right thumb with MCP and IP arthritic change. No intrinsic atrophy, ecchymosis, lesion, rash.        - ROM:    - Full ROM with visible and palpable click and catch of the IP and MCP joints over the A1 pulley.        - Palpation:    - TTP at the volar MCP over the A1 pulley, FPL tendon.   - NTTP at the IP or elsewhere.        - Strength:    - 5/5 in BUE including WE, WF, FDI, ADM, FPL, APB, EPL.        - Special tests:        - CMC grind:  Pos   - Trigger:  Pos for pain and click with 1st digit flexion/FPL   - Cheema's:  Neg      Radiology  I personally reviewed the available relevant imaging, including XR Finger bilateral (2019), and agree with the interpretation of degenerative  changes in bilateral CMC joints.     I personally reviewed today's new relevant imaging, including XR R Thumb and my interpretation is degenerative changes of the CMC, calcific deposits in the ulnar base of the MCP, possibly interfering with flexor tendon sheath/A1 pulley function.       Hand / Upper Extremity Injection/Arthrocentesis: R thumb A1    Date/Time: 9/28/2022 2:45 PM  Performed by: Errol Ardon DO  Authorized by: Errol Ardon DO     Indications:  Pain  Needle Size:  27 G  Guidance: landmark    Approach:  Ulnar  Condition: trigger finger    Location:  Thumb    Site:  R thumb A1  Medications:  20 mg triamcinolone 40 MG/ML; 0.5 mL lidocaine 1 %  Outcome:  Tolerated well, no immediate complications  Procedure discussed: discussed risks, benefits, and alternatives    Consent Given by:  Patient  Prep: patient was prepped and draped in usual sterile fashion        Procedure - Trigger Finger Injection  After risks, benefits and complications of steroid injection were discussed with the patient, a consent form was signed and the patient elected to proceed.  Using sterile technique, the injection site was prepped with an alcohol swab and chlorhexidine.  An injectate solution containing 0.5 mL of 1 lidocaine and 0.5 mL of Kenalog (40 mg/mL) was drawn up into 3 ml syringe. The injection was performed using a 1.5 inch 27g needle to place the injectate solution between the A1 pulley and flexor tendon sheath of the 1st finger. Care was taken to not inject into the tendon itself. The needle was removed, area cleansed, and bandage applied. Patient tolerated the procedure well without complications.       Assessment  1. Trigger thumb of right hand    2. Pain of right thumb        Darío  Sherry Slaughter is a 67 year old female that presents with right thumb pain and clicking. History and physical exam appear most consistent with trigger finger in the setting of chronic CMC OA. She also has evidence of some  volar/ulnar calcifications near the MCP joint, which may be interfering with proper thumb flexion and may be contributing to her pain/clicking. Discussed the nature of the condition and treatment options and mutually agreed upon the following plan:    - Imaging:         - Reviewed relevant imaging in the chart, including XR R Thumb ordered today pre-clinic. Results above. Reviewed imaging with patient.   - Medications:         - Discussed pharmacologic options for pain relief. May use NSAIDs as needed for pain control. May also use topical creams such as IcyHot, BioFreeze, or Voltaren gel PRN.   - Injections:         - Performed a corticosteroid injection of the right thumb flexor tendon sheath/A1 pulley (trigger finger) today in clinic. Patient tolerated well without complications. See procedure note above for details.   - Therapy:         - Discussed the benefits of hand therapy with patient. She has a HEP available at home, encouraged to continue with her HEP.   - Modalities:         - Ice, heat, massage PRN.   - Alternatives:         - May consider ESWT in the future for the thumb base calcifications if symptoms persist.   - Bracing:         - She may continue to use her night splints for her carpal tunnel symptoms. Thumb spica may or may not help with her thumb pain, wear if it is helping symptoms.   - Activity:         - Encouraged to remain active and participate in regular activities as symptoms allow.  - Follow up:         - In 3 months for re-evaluation and update to treatment plan. Patient has clinic contact information for questions or concerns.       Errol Ardon DO  [facility] - Sports Medicine  [facility] - Department of Orthopedic Surgery         Again, thank you for allowing me to participate in the care of your patient.        Sincerely,        Errol Ardon DO

## 2022-09-28 NOTE — PATIENT INSTRUCTIONS
You were seen today for your right thumb pian and clicking secondary to trigger thumb.   You may use non-steroidal anti-inflammatory (NSAID) medications as needed for pain relief. You may try topical medications such as IcyHot, BioFreeze, Bengay, or Voltaren gel as well, which may help your symptoms.   We performed a corticosteroid injection of the right thumb A1 pulley/flexor tendon sheath (trigger finger) today in clinic. It may take 2-3 days for the medication to start to provide significant relief. Please keep the injection site clean and dry, do not submerge the site (no baths, pools) for 24 hours. Rest the area for the next 24-48 hours, then you may resume normal activities.   May continue to use your brace at night for pain and numbness/tingling symptoms.   Follow up in 3 months for re-evaluation and update to treatment plan. Sooner if symptoms worsen.   Please call the clinic for any questions or concerns.

## 2022-10-04 ENCOUNTER — OFFICE VISIT (OUTPATIENT)
Dept: PODIATRY | Facility: CLINIC | Age: 67
End: 2022-10-04
Payer: COMMERCIAL

## 2022-10-04 DIAGNOSIS — L97.511 ULCER OF RIGHT FOOT, LIMITED TO BREAKDOWN OF SKIN (H): ICD-10-CM

## 2022-10-04 DIAGNOSIS — E11.49 TYPE II OR UNSPECIFIED TYPE DIABETES MELLITUS WITH NEUROLOGICAL MANIFESTATIONS, NOT STATED AS UNCONTROLLED(250.60) (H): Primary | ICD-10-CM

## 2022-10-04 DIAGNOSIS — E11.69 TYPE 2 DIABETES MELLITUS WITH DIABETIC FOOT DEFORMITY (H): ICD-10-CM

## 2022-10-04 DIAGNOSIS — M21.969 TYPE 2 DIABETES MELLITUS WITH DIABETIC FOOT DEFORMITY (H): ICD-10-CM

## 2022-10-04 PROCEDURE — 99214 OFFICE O/P EST MOD 30 MIN: CPT | Performed by: PODIATRIST

## 2022-10-04 NOTE — LETTER
10/4/2022         RE: Sherry Slaughter  [id number]  [place] [address]        Dear Colleague,    Thank you for referring your patient, Sherry Slaughter, to the [facility]. Please see a copy of my visit note below.    Past Medical History:   Diagnosis Date     Cataract      Congestive heart failure (H) 2019 NOVEMBER    AFIB     DEPRESSION     comes and goes - tried meds - unsuccessfully, certain times of the year, no psych intervention and no counselors in the past - and not interested      Diverticulosis of colon (without mention of hemorrhage) 4/04    colonoscopy     ECHO-mildLVH,tr MR,mild thick lflets w inc LA,trTR   12/03     Essential hypertension, benign 1990s    late 1990s - started medications at that time - not to difficult to control meds      Fam hx-cardiovas dis NEC     father - CAD, and lipids/HTN - multiple members of the family      Family history of diabetes mellitus     sister and grandmother with DM      Family history of malignant neoplasm of breast     mother - young age - 45, and maternal cousin and aunt as well - no BRCA testing done      Family history of stroke (cerebrovascular)     grandmother in early life in her 40s      FAMILY HX COLON CANCER     Pat uncles x 2     Heart disease     murmur/     Heart murmur      HYPERLIPIDEMIA 2000    fairly recent - in the last 5 years - high for DM levels  -cholesterol recent      Irritable bowel syndrome     goes between the 2 - nerve related - more stressed more problems      MICROALBUMINURIA     unsure how long - been on the lisinopril - for a few years at well      Nonspecific abnormal results of liver function study 2/3/2003    SGOT - has been high in the past - since the hepatitis b - borderline elevation - not really changing any      OBESITY      Obstructive sleep apnea      GENESIS (obstructive sleep apnea) 10/15/2006    psg 5/15 AHI 53 aPAP 8-15     OSTEOARTHRITIS L KNEE 2003    total knee on the  left - and pain is gone since the knee replacement      PERS HX HEPATITIS B- RESOLVED] 1977    acute virus only - no chronic disease      PERS HX HEPATITIS B- RESOLVED]      Type II or unspecified type diabetes mellitus without mention of complication, not stated as uncontrolled 1991    oral meds frist, then insulin eventually later, difficult to control most of the time      Unspecified hypothyroidism 10/11/2006    TSH 3.36 - mild subclinical hypothyroidism - deciding on following or starting low dose meds - with dr Oreilly - following      Patient Active Problem List   Diagnosis     Morbid obesity (H)     Diverticulosis of large intestine     Nonspecific abnormal results of cardiovascular function study     FAMILY HX COLON CANCER     Nonallopathic lesion of thoracic region     Hypothyroidism     GENESIS (obstructive sleep apnea)     Irritable bowel syndrome     Family history of malignant neoplasm of breast     Type 2 diabetes mellitus treated with insulin (H)     History of major depression     OSTEOARTHRITIS L KNEE  s/p knee replacement on the left      Hypertension goal BP (blood pressure) < 140/90     Family history of stroke (cerebrovascular)     Family history of other cardiovascular diseases     JOINT PAIN-ANKLE - right ankle      Mitral valve insufficiency     Hyperlipidemia LDL goal <100     Aortic sclerosis     Tubular adenoma of colon     History of viral hepatitis, type B     Chronic low back pain     Long-term insulin use (H)     S/P total knee replacement using cement, right     Lumbago     Type 2 diabetes mellitus with hyperglycemia (H)     Posterior vitreous detachment of right eye     Pseudophakia of both eyes     Paroxysmal atrial fibrillation (H)     SOB (shortness of breath)     Bunion     On continuous oral anticoagulation     Past Surgical History:   Procedure Laterality Date     ABDOMEN SURGERY      ovaian scope/ appendix removal     ANESTHESIA CARDIOVERSION N/A 12/4/2020    Procedure:  ANESTHESIA, FOR CARDIOVERSION @1400;  Surgeon: GENERIC ANESTHESIA PROVIDER;  Location: [facility]     ARTHROPLASTY KNEE Right 9/23/2015    Procedure: ARTHROPLASTY KNEE;  Surgeon: Rufus Brown MD;  Location:  OR     BUNIONECTOMY REN AND LEANDRO, COMBINED Left 2/2/2021    Procedure: Left bunion correction with bone cutting and screw fixation;  Surgeon: David Hoffman DPM;  Location: [facility] OR     CATARACT IOL, RT/LT Left      COLONOSCOPY  4/04    diverticulosis, rec repeat 10 yrs(consider fam hx?)     COLONOSCOPY WITH CO2 INSUFFLATION N/A 6/19/2019    Procedure: COLONOSCOPY, WITH CO2 INSUFFLATION;  Surgeon: Zeb Duarte MD;  Location: [facility] OR     ORTHOPEDIC SURGERY      right ankle     PHACOEMULSIFICATION CLEAR CORNEA WITH STANDARD INTRAOCULAR LENS IMPLANT Left 3/15/2018    Procedure: PHACOEMULSIFICATION CLEAR CORNEA WITH STANDARD INTRAOCULAR LENS IMPLANT;  LEFT EYE PHACOEMULSIFICATION CLEAR CORNEA WITH STANDARD INTRAOCULAR LENS IMPLANT;  Surgeon: Bandar Linares MD;  Location:  [facility]     PHACOEMULSIFICATION CLEAR CORNEA WITH STANDARD INTRAOCULAR LENS IMPLANT Right 4/5/2018    Procedure: PHACOEMULSIFICATION CLEAR CORNEA WITH STANDARD INTRAOCULAR LENS IMPLANT;  RIGHT PHACOEMULSIFICATION CLEAR CORNEA WITH STANDARD INTRAOCULAR LENS IMPLANT ;  Surgeon: Bandar Linares MD;  Location:  [facility]     STRESS ECHO (METRO)  12/03    no ischemia, limited by fatigue     SURGICAL HISTORY OF -       EXP LAP- R OVARY CYSTS     SURGICAL HISTORY OF -   1981,1984,1985    CHILDBIRTH     ZZC NONSPECIFIC PROCEDURE  6/06    right triple arthrodecesis      ZZ NONSPECIFIC PROCEDURE  7/06     right bunion surgery      [facility] TOTAL KNEE ARTHROPLASTY  3/03    L knee     Social History     Socioeconomic History     Marital status:      Spouse name: Not on file     Number of children: 3     Years of education: Not on file     Highest education level: Not on file   Occupational History     Occupation: RN     Employer:  [employer]   Tobacco Use     Smoking status: Never Smoker     Smokeless tobacco: Never Used     Tobacco comment: tried in early 20s only    Substance and Sexual Activity     Alcohol use: Yes     Comment: rare     Drug use: No     Sexual activity: Not Currently     Birth control/protection: Post-menopausal     Comment:  - since for 20 years, no signficant other  > 5 years sexual activity    Other Topics Concern      Service No     Blood Transfusions No     Caffeine Concern No     Occupational Exposure Yes     Comment: Normal nursing exposures     Hobby Hazards No     Sleep Concern Yes     Stress Concern No     Comment: Stress level at HM=5, stress level at WK=6     Weight Concern Yes     Special Diet Yes     Comment: Diabetic diet (watching carbs)     Back Care No     Comment: Occassional back spasms     Exercise Yes     Comment: Pt joined a health club goes approx 3-4 times a week,half to one mile daily     Bike Helmet No     Seat Belt Yes     Comment: Sometimes     Self-Exams Yes     Parent/sibling w/ CABG, MI or angioplasty before 65F 55M? 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      Social Determinants of Health     Financial Resource Strain: Not on file   Food Insecurity: Not on file   Transportation Needs: Not on file   Physical Activity: Not on file   Stress: Not on file   Social Connections: Not on file   Intimate Partner Violence: Not on file   Housing Stability: Not on file     Family History   Problem Relation Age of Onset     Diabetes Maternal Grandmother         DM TYPE 2     Cerebrovascular Disease Maternal Grandmother      Hypertension Sister      Lipids Sister      Heart Disease Sister         Chronic AFib     Diabetes Sister      Coronary Artery Disease Sister         afib     Hypertension Sister      Lipids Sister      Gynecology Sister      Diabetes Sister      Asthma Sister      Blood Disease  Paternal Grandmother         T CELL LEUKEMIA     Hypertension Brother      Lipids Brother      Congenital Anomalies Brother      Cardiovascular Brother      Heart Disease Brother         CABG/AFIB     Coronary Artery Disease Brother         cabg afib AAA     Hypertension Brother      Lipids Brother      Other Cancer Brother         multple myeloma     Obesity Brother      Hypertension Brother      Respiratory Brother         Sleep apnea     Heart Disease Brother         AFib     Coronary Artery Disease Brother         afib     Alzheimer Disease Mother      Asthma Mother      Hypertension Mother      Breast Cancer Mother 40        has mastectomy and lived to 84     Allergies Mother         Sulfa,     Arthritis Mother      Cardiovascular Mother      Depression Mother      Respiratory Mother      Lipids Mother      Cancer Mother         breast     Thyroid Disease Mother      Cerebrovascular Disease Mother         FROM  AFIB     Dementia Mother         Alzheimers     Other Cancer Mother         breast     Cancer - colorectal Other      Colon Cancer Other         2019     Cancer Father         lung     Aneurysm Father         brother, AAA     Other Cancer Father         lung ca     Coronary Artery Disease Father         cad AAA     Asthma Sister      Cancer - colorectal Paternal Uncle         late 50s to early 60s - great uncles      Breast Cancer Other         Maternal cousin     Arrhythmia Sister         2 brothers 1 sister Afib     Breast Cancer Maternal Aunt 62     Other Cancer Maternal Aunt 35        Ovarian cancer.  Survived despite late recurrence and is now 92.     Glaucoma No family hx of      Macular Degeneration No family hx of      Lab Results   Component Value Date    A1C 7.1 05/25/2022    A1C 8.1 11/24/2021    A1C 9.0 08/16/2021    A1C 6.8 01/05/2021    A1C 6.9 11/25/2020    A1C 7.5 07/22/2020    A1C 7.3 12/10/2019           SUBJECTIVE FINDINGS:  A 67-year-old returns to clinic for ulcer, right 1st MPJ.   She is diabetic with peripheral neuropathy.  She relates she is doing the wound care with Betadine and a Band-Aid.  She is leaving it open at night.  She is still waiting to get her diabetic shoes and insoles.  She just has not gotten to that.    OBJECTIVE FINDINGS:  Right plantar 1st MPJ:  She has hyperkeratotic eschar.  There is no active drainage, no erythema, no odor, no calor and no pain on palpation.    ASSESSMENT AND PLAN:  Ulcer, right 1st MPJ.  She is diabetic with peripheral neuropathy.  Diagnosis and treatment options discussed with her.  I am going to continue the Betadine and the Band-Aid and we will add Aquacel Ag under the Band-Aid as well.  I discussed with her offloading.  She opted for no further offloading.  I did advise her to get her diabetic shoes and inserts.  Return to clinic in 1 month.  Previous notes reviewed.          Moderate level of medical decision making      Again, thank you for allowing me to participate in the care of your patient.        Sincerely,        Rufus Hutson DPM

## 2022-10-04 NOTE — PROGRESS NOTES
Past Medical History:   Diagnosis Date     Cataract      Congestive heart failure (H) 2019 NOVEMBER    AFIB     DEPRESSION     comes and goes - tried meds - unsuccessfully, certain times of the year, no psych intervention and no counselors in the past - and not interested      Diverticulosis of colon (without mention of hemorrhage) 4/04    colonoscopy     ECHO-mildLVH,tr MR,mild thick lflets w inc LA,trTR   12/03     Essential hypertension, benign 1990s    late 1990s - started medications at that time - not to difficult to control meds      Fam hx-cardiovas dis NEC     father - CAD, and lipids/HTN - multiple members of the family      Family history of diabetes mellitus     sister and grandmother with DM      Family history of malignant neoplasm of breast     mother - young age - 45, and maternal cousin and aunt as well - no BRCA testing done      Family history of stroke (cerebrovascular)     grandmother in early life in her 40s      FAMILY HX COLON CANCER     Pat uncles x 2     Heart disease     murmur/     Heart murmur      HYPERLIPIDEMIA 2000    fairly recent - in the last 5 years - high for DM levels  -cholesterol recent      Irritable bowel syndrome     goes between the 2 - nerve related - more stressed more problems      MICROALBUMINURIA     unsure how long - been on the lisinopril - for a few years at well      Nonspecific abnormal results of liver function study 2/3/2003    SGOT - has been high in the past - since the hepatitis b - borderline elevation - not really changing any      OBESITY      Obstructive sleep apnea      GENESIS (obstructive sleep apnea) 10/15/2006    psg 5/15 AHI 53 aPAP 8-15     OSTEOARTHRITIS L KNEE 2003    total knee on the left - and pain is gone since the knee replacement      PERS HX HEPATITIS B- RESOLVED] 1977    acute virus only - no chronic disease      PERS HX HEPATITIS B- RESOLVED]      Type II or unspecified type diabetes mellitus without mention of complication, not stated as  uncontrolled 1991    oral meds frist, then insulin eventually later, difficult to control most of the time      Unspecified hypothyroidism 10/11/2006    TSH 3.36 - mild subclinical hypothyroidism - deciding on following or starting low dose meds - with dr Oreilly - following      Patient Active Problem List   Diagnosis     Morbid obesity (H)     Diverticulosis of large intestine     Nonspecific abnormal results of cardiovascular function study     FAMILY HX COLON CANCER     Nonallopathic lesion of thoracic region     Hypothyroidism     GENESIS (obstructive sleep apnea)     Irritable bowel syndrome     Family history of malignant neoplasm of breast     Type 2 diabetes mellitus treated with insulin (H)     History of major depression     OSTEOARTHRITIS L KNEE  s/p knee replacement on the left      Hypertension goal BP (blood pressure) < 140/90     Family history of stroke (cerebrovascular)     Family history of other cardiovascular diseases     JOINT PAIN-ANKLE - right ankle      Mitral valve insufficiency     Hyperlipidemia LDL goal <100     Aortic sclerosis     Tubular adenoma of colon     History of viral hepatitis, type B     Chronic low back pain     Long-term insulin use (H)     S/P total knee replacement using cement, right     Lumbago     Type 2 diabetes mellitus with hyperglycemia (H)     Posterior vitreous detachment of right eye     Pseudophakia of both eyes     Paroxysmal atrial fibrillation (H)     SOB (shortness of breath)     Bunion     On continuous oral anticoagulation     Past Surgical History:   Procedure Laterality Date     ABDOMEN SURGERY      ovaian scope/ appendix removal     ANESTHESIA CARDIOVERSION N/A 12/4/2020    Procedure: ANESTHESIA, FOR CARDIOVERSION @1400;  Surgeon: GENERIC ANESTHESIA PROVIDER;  Location: [facility]     ARTHROPLASTY KNEE Right 9/23/2015    Procedure: ARTHROPLASTY KNEE;  Surgeon: Rufus Brown MD;  Location:  OR     BUNIONECTOMY REN AND LEANDRO, COMBINED Left 2/2/2021     Procedure: Left bunion correction with bone cutting and screw fixation;  Surgeon: David Hoffman DPM;  Location: [facility]     CATARACT IOL, RT/LT Left      COLONOSCOPY  4/04    diverticulosis, rec repeat 10 yrs(consider fam hx?)     COLONOSCOPY WITH CO2 INSUFFLATION N/A 6/19/2019    Procedure: COLONOSCOPY, WITH CO2 INSUFFLATION;  Surgeon: Zeb Duarte MD;  Location: [facility]     ORTHOPEDIC SURGERY      right ankle     PHACOEMULSIFICATION CLEAR CORNEA WITH STANDARD INTRAOCULAR LENS IMPLANT Left 3/15/2018    Procedure: PHACOEMULSIFICATION CLEAR CORNEA WITH STANDARD INTRAOCULAR LENS IMPLANT;  LEFT EYE PHACOEMULSIFICATION CLEAR CORNEA WITH STANDARD INTRAOCULAR LENS IMPLANT;  Surgeon: Bandar Linares MD;  Location:  [facility]     PHACOEMULSIFICATION CLEAR CORNEA WITH STANDARD INTRAOCULAR LENS IMPLANT Right 4/5/2018    Procedure: PHACOEMULSIFICATION CLEAR CORNEA WITH STANDARD INTRAOCULAR LENS IMPLANT;  RIGHT PHACOEMULSIFICATION CLEAR CORNEA WITH STANDARD INTRAOCULAR LENS IMPLANT ;  Surgeon: Bandar Linares MD;  Location:  [facility]     STRESS ECHO (METRO)  12/03    no ischemia, limited by fatigue     SURGICAL HISTORY OF -       EXP LAP- R OVARY CYSTS     SURGICAL HISTORY OF -   1981,1984,1985    CHILDBIRTH     ZZC NONSPECIFIC PROCEDURE  6/06    right triple arthrodecesis      ZZC NONSPECIFIC PROCEDURE  7/06     right bunion surgery      Z TOTAL KNEE ARTHROPLASTY  3/03    L knee     Social History     Socioeconomic History     Marital status:      Spouse name: Not on file     Number of children: 3     Years of education: Not on file     Highest education level: Not on file   Occupational History     Occupation: RN     Employer: [employer]   Tobacco Use     Smoking status: Never Smoker     Smokeless tobacco: Never Used     Tobacco comment: tried in early 20s only    Substance and Sexual Activity     Alcohol use: Yes     Comment: rare     Drug use: No     Sexual activity: Not Currently      Birth control/protection: Post-menopausal     Comment:  - since for 20 years, no signficant other  > 5 years sexual activity    Other Topics Concern      Service No     Blood Transfusions No     Caffeine Concern No     Occupational Exposure Yes     Comment: Normal nursing exposures     Hobby Hazards No     Sleep Concern Yes     Stress Concern No     Comment: Stress level at HM=5, stress level at WK=6     Weight Concern Yes     Special Diet Yes     Comment: Diabetic diet (watching carbs)     Back Care No     Comment: Occassional back spasms     Exercise Yes     Comment: Pt joined a health club goes approx 3-4 times a week,half to one mile daily     Bike Helmet No     Seat Belt Yes     Comment: Sometimes     Self-Exams Yes     Parent/sibling w/ CABG, MI or angioplasty before 65F 55M? 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      Social Determinants of Health     Financial Resource Strain: Not on file   Food Insecurity: Not on file   Transportation Needs: Not on file   Physical Activity: Not on file   Stress: Not on file   Social Connections: Not on file   Intimate Partner Violence: Not on file   Housing Stability: Not on file     Family History   Problem Relation Age of Onset     Diabetes Maternal Grandmother         DM TYPE 2     Cerebrovascular Disease Maternal Grandmother      Hypertension Sister      Lipids Sister      Heart Disease Sister         Chronic AFib     Diabetes Sister      Coronary Artery Disease Sister         afib     Hypertension Sister      Lipids Sister      Gynecology Sister      Diabetes Sister      Asthma Sister      Blood Disease Paternal Grandmother         T CELL LEUKEMIA     Hypertension Brother      Lipids Brother      Congenital Anomalies Brother      Cardiovascular Brother      Heart Disease Brother         CABG/AFIB     Coronary Artery Disease Brother         cabg afib AAA     Hypertension Brother       Lipids Brother      Other Cancer Brother         multple myeloma     Obesity Brother      Hypertension Brother      Respiratory Brother         Sleep apnea     Heart Disease Brother         AFib     Coronary Artery Disease Brother         afib     Alzheimer Disease Mother      Asthma Mother      Hypertension Mother      Breast Cancer Mother 40        has mastectomy and lived to 84     Allergies Mother         Sulfa,     Arthritis Mother      Cardiovascular Mother      Depression Mother      Respiratory Mother      Lipids Mother      Cancer Mother         breast     Thyroid Disease Mother      Cerebrovascular Disease Mother         FROM  AFIB     Dementia Mother         Alzheimers     Other Cancer Mother         breast     Cancer - colorectal Other      Colon Cancer Other         2019     Cancer Father         lung     Aneurysm Father         brother, AAA     Other Cancer Father         lung ca     Coronary Artery Disease Father         cad AAA     Asthma Sister      Cancer - colorectal Paternal Uncle         late 50s to early 60s - great uncles      Breast Cancer Other         Maternal cousin     Arrhythmia Sister         2 brothers 1 sister Afib     Breast Cancer Maternal Aunt 62     Other Cancer Maternal Aunt 35        Ovarian cancer.  Survived despite late recurrence and is now 92.     Glaucoma No family hx of      Macular Degeneration No family hx of      Lab Results   Component Value Date    A1C 7.1 05/25/2022    A1C 8.1 11/24/2021    A1C 9.0 08/16/2021    A1C 6.8 01/05/2021    A1C 6.9 11/25/2020    A1C 7.5 07/22/2020    A1C 7.3 12/10/2019           SUBJECTIVE FINDINGS:  A 67-year-old returns to clinic for ulcer, right 1st MPJ.  She is diabetic with peripheral neuropathy.  She relates she is doing the wound care with Betadine and a Band-Aid.  She is leaving it open at night.  She is still waiting to get her diabetic shoes and insoles.  She just has not gotten to that.    OBJECTIVE FINDINGS:  Right plantar 1st  MPJ:  She has hyperkeratotic eschar.  There is no active drainage, no erythema, no odor, no calor and no pain on palpation.    ASSESSMENT AND PLAN:  Ulcer, right 1st MPJ.  She is diabetic with peripheral neuropathy.  Diagnosis and treatment options discussed with her.  I am going to continue the Betadine and the Band-Aid and we will add Aquacel Ag under the Band-Aid as well.  I discussed with her offloading.  She opted for no further offloading.  I did advise her to get her diabetic shoes and inserts.  Return to clinic in 1 month.  Previous notes reviewed.          Moderate level of medical decision making

## 2022-10-04 NOTE — NURSING NOTE
Sherry Slaughter's chief complaint for this visit includes:  Chief Complaint   Patient presents with     Follow Up     Right foot wound     PCP: Marilou Arciniega    Referring Provider:  No referring provider defined for this encounter.    [facility] 10/02/2007   Data Unavailable        Allergies   Allergen Reactions     Empagliflozin      Yeast infections     Lipitor [Atorvastatin Calcium]      Gas     Pravachol [Pravastatin Sodium]      Pravachol - dry cough      Simvastatin      Myalgia         Do you need any medication refills at today's visit?

## 2022-10-14 DIAGNOSIS — I83.812 VARICOSE VEINS OF LEFT LOWER EXTREMITY WITH PAIN: Primary | ICD-10-CM

## 2022-10-16 ENCOUNTER — HEALTH MAINTENANCE LETTER (OUTPATIENT)
Age: 67
End: 2022-10-16

## 2022-10-31 ENCOUNTER — DOCUMENTATION ONLY (OUTPATIENT)
Dept: ENDOCRINOLOGY | Facility: CLINIC | Age: 67
End: 2022-10-31

## 2022-10-31 NOTE — PROGRESS NOTES
[facility] and Dr. Velasco note received from [facility]. Forms have been placed in file  for Dr. Delgado to complete and sign.    Chloé Sparks CMA  Adult Endocrinology  [facility], [place]

## 2022-11-02 ENCOUNTER — MEDICAL CORRESPONDENCE (OUTPATIENT)
Dept: HEALTH INFORMATION MANAGEMENT | Facility: CLINIC | Age: 67
End: 2022-11-02

## 2022-11-02 ENCOUNTER — TELEPHONE (OUTPATIENT)
Dept: VASCULAR SURGERY | Facility: CLINIC | Age: 67
End: 2022-11-02

## 2022-11-02 ENCOUNTER — OFFICE VISIT (OUTPATIENT)
Dept: FAMILY MEDICINE | Facility: CLINIC | Age: 67
End: 2022-11-02
Payer: COMMERCIAL

## 2022-11-02 VITALS
OXYGEN SATURATION: 96 % | RESPIRATION RATE: 17 BRPM | HEART RATE: 69 BPM | BODY MASS INDEX: 45.99 KG/M2 | WEIGHT: 293 LBS | DIASTOLIC BLOOD PRESSURE: 64 MMHG | TEMPERATURE: 98 F | HEIGHT: 67 IN | SYSTOLIC BLOOD PRESSURE: 132 MMHG

## 2022-11-02 DIAGNOSIS — G47.33 OSA (OBSTRUCTIVE SLEEP APNEA): Chronic | ICD-10-CM

## 2022-11-02 DIAGNOSIS — N18.31 STAGE 3A CHRONIC KIDNEY DISEASE (H): ICD-10-CM

## 2022-11-02 DIAGNOSIS — L89.893 PRESSURE INJURY OF TOE OF LEFT FOOT, STAGE 3 (H): ICD-10-CM

## 2022-11-02 DIAGNOSIS — E78.5 HYPERLIPIDEMIA LDL GOAL <100: ICD-10-CM

## 2022-11-02 DIAGNOSIS — Z23 NEED FOR PROPHYLACTIC VACCINATION AND INOCULATION AGAINST INFLUENZA: ICD-10-CM

## 2022-11-02 DIAGNOSIS — I83.812 VARICOSE VEINS OF LEFT LOWER EXTREMITY WITH PAIN: Primary | ICD-10-CM

## 2022-11-02 DIAGNOSIS — Z00.00 ENCOUNTER FOR MEDICARE ANNUAL WELLNESS EXAM: Primary | ICD-10-CM

## 2022-11-02 DIAGNOSIS — Z12.31 VISIT FOR SCREENING MAMMOGRAM: ICD-10-CM

## 2022-11-02 DIAGNOSIS — E66.01 MORBID OBESITY (H): ICD-10-CM

## 2022-11-02 DIAGNOSIS — Z12.31 ENCOUNTER FOR SCREENING MAMMOGRAM FOR BREAST CANCER: ICD-10-CM

## 2022-11-02 DIAGNOSIS — I50.9 CONGESTIVE HEART FAILURE, UNSPECIFIED HF CHRONICITY, UNSPECIFIED HEART FAILURE TYPE (H): Primary | ICD-10-CM

## 2022-11-02 DIAGNOSIS — Z79.899 ENCOUNTER FOR LONG-TERM (CURRENT) USE OF MEDICATIONS: ICD-10-CM

## 2022-11-02 PROCEDURE — 90662 IIV NO PRSV INCREASED AG IM: CPT | Performed by: INTERNAL MEDICINE

## 2022-11-02 PROCEDURE — G0008 ADMIN INFLUENZA VIRUS VAC: HCPCS | Performed by: INTERNAL MEDICINE

## 2022-11-02 PROCEDURE — G0439 PPPS, SUBSEQ VISIT: HCPCS | Performed by: INTERNAL MEDICINE

## 2022-11-02 ASSESSMENT — ENCOUNTER SYMPTOMS
DYSURIA: 0
SHORTNESS OF BREATH: 0
ARTHRALGIAS: 1
FEVER: 0
PARESTHESIAS: 0
HEMATOCHEZIA: 0
CHILLS: 0
JOINT SWELLING: 0
DIZZINESS: 0
FREQUENCY: 0
HEADACHES: 0
NERVOUS/ANXIOUS: 0
BREAST MASS: 0
DIARRHEA: 0
COUGH: 0
NAUSEA: 0
WEAKNESS: 1
CONSTIPATION: 0
MYALGIAS: 1
ABDOMINAL PAIN: 0
HEARTBURN: 0
SORE THROAT: 0
PALPITATIONS: 0
HEMATURIA: 0
EYE PAIN: 0

## 2022-11-02 ASSESSMENT — ACTIVITIES OF DAILY LIVING (ADL): CURRENT_FUNCTION: NO ASSISTANCE NEEDED

## 2022-11-02 ASSESSMENT — PAIN SCALES - GENERAL: PAINLEVEL: NO PAIN (0)

## 2022-11-02 NOTE — PROGRESS NOTES
"SUBJECTIVE:   Sherry is a 67 year old who presents for Preventive Visit.      Patient has been advised of split billing requirements and indicates understanding: Yes  Are you in the first 12 months of your Medicare coverage?  No    With with [facility], had sleep evaluation last year. Had order for cpap but due to recall at the time, she couldn't get any supply. She got a notice it was time to order supply but she can't get hold of provider at the sleep center.     She follows with endo, cardiology, and vascular medicine.     Healthy Habits:     In general, how would you rate your overall health?  Fair    Frequency of exercise:  2-3 days/week    Duration of exercise:  15-30 minutes    Do you usually eat at least 4 servings of fruit and vegetables a day, include whole grains    & fiber and avoid regularly eating high fat or \"junk\" foods?  Yes    Taking medications regularly:  Yes    Medication side effects:  Not applicable    Ability to successfully perform activities of daily living:  No assistance needed    Home Safety:  No safety concerns identified    Hearing Impairment:  No hearing concerns    In the past 6 months, have you been bothered by leaking of urine? Yes    In general, how would you rate your overall mental or emotional health?  Good      PHQ-2 Total Score: 0    Additional concerns today:  Yes    Do you feel safe in your environment? Yes    Have you ever done Advance Care Planning? (For example, a Health Directive, POLST, or a discussion with a medical provider or your loved ones about your wishes): No, advance care planning information given to patient to review.  Patient declined advance care planning discussion at this time.       Fall risk  Fallen 2 or more times in the past year?: No  Any fall with injury in the past year?: No    Cognitive Screening   1) Repeat 3 items (Leader, Season, Table)     2) Clock draw: NORMAL  3) 3 item recall: Recalls 3 objects  Results: 3 items recalled: COGNITIVE IMPAIRMENT " LESS LIKELY    Mini-CogTM Copyright PASHA Hernandez. Licensed by the author for use in [facility]; reprinted with permission (david@.[facility]). All rights reserved.      Do you have sleep apnea, excessive snoring or daytime drowsiness?: yes    Reviewed and updated as needed this visit by clinical staff   Tobacco  Allergies  Meds   Med Hx  Surg Hx  Fam Hx  Soc Hx        Reviewed and updated as needed this visit by Provider                 Social History     Tobacco Use     Smoking status: Never     Smokeless tobacco: Never     Tobacco comments:     tried in early 20s only    Substance Use Topics     Alcohol use: Yes     Comment: rare         Alcohol Use 11/2/2022   Prescreen: >3 drinks/day or >7 drinks/week? No   Prescreen: >3 drinks/day or >7 drinks/week? -       Current providers sharing in care for this patient include:   Patient Care Team:  Marilou Arciniega MD PhD as PCP - General (Internal Medicine)  Rika Delgado MD as Assigned Endocrinology Provider  Rufus Hutson DPM as Assigned Musculoskeletal Provider  Kameron Pedraza OD as Assigned Surgical Provider  Rubens Hughes EP as Cardiac Rehabilitation Therapist  Sawyer Pride MD as Assigned Heart and Vascular Provider  Patricia Bridges RN as Specialty Care Coordinator (Cardiology)  Marilou Arciniega MD PhD as Assigned PCP  Veronika Moore RP as Assigned MTM Pharmacist  Errol Ardon DO as Assigned Neuroscience Provider    The following health maintenance items are reviewed in Epic and correct as of today:  Health Maintenance   Topic Date Due     HF ACTION PLAN  Never done     MAMMO SCREENING  01/14/2021     ALT  11/25/2021     MICROALBUMIN  01/05/2022     A1C  08/25/2022     EYE EXAM  07/20/2023     BMP  07/26/2023     LIPID  07/26/2023     TSH W/FREE T4 REFLEX  07/26/2023     CBC  07/26/2023     HEMOGLOBIN  07/26/2023     DIABETIC FOOT EXAM  10/04/2023     MEDICARE ANNUAL WELLNESS VISIT  11/02/2023     ANNUAL REVIEW OF   ORDERS  11/02/2023     FALL RISK ASSESSMENT  11/02/2023     COLORECTAL CANCER SCREENING  06/19/2024     DTAP/TDAP/TD IMMUNIZATION (4 - Td or Tdap) 10/13/2027     ADVANCE CARE PLANNING  11/03/2027     DEXA  02/11/2029     HEPATITIS C SCREENING  Completed     PHQ-2 (once per calendar year)  Completed     INFLUENZA VACCINE  Completed     Pneumococcal Vaccine: 65+ Years  Completed     URINALYSIS  Completed     ZOSTER IMMUNIZATION  Completed     COVID-19 Vaccine  Completed     IPV IMMUNIZATION  Aged Out     MENINGITIS IMMUNIZATION  Aged Out     URINE DRUG SCREEN  Discontinued         FHS-7:   Breast CA Risk Assessment (FHS-7) 11/2/2022   Did any of your first-degree relatives have breast or ovarian cancer? Yes   Did any of your relatives have bilateral breast cancer? No         Pertinent mammograms are reviewed under the imaging tab.    Review of Systems   Constitutional: Negative for chills and fever.   HENT: Positive for congestion and ear pain. Negative for hearing loss and sore throat.    Eyes: Negative for pain and visual disturbance.   Respiratory: Negative for cough and shortness of breath.    Cardiovascular: Negative for chest pain, palpitations and peripheral edema.   Gastrointestinal: Negative for abdominal pain, constipation, diarrhea, heartburn, hematochezia and nausea.   Breasts:  Negative for tenderness, breast mass and discharge.   Genitourinary: Negative for dysuria, frequency, genital sores, hematuria, pelvic pain, urgency, vaginal bleeding and vaginal discharge.   Musculoskeletal: Positive for arthralgias (shoulder, neck, hands. follows with sports med) and myalgias. Negative for joint swelling.   Skin: Negative for rash.   Neurological: Positive for weakness (arthritis of the hands.). Negative for dizziness, headaches and paresthesias.   Psychiatric/Behavioral: Negative for mood changes. The patient is not nervous/anxious.        OBJECTIVE:   /64 (BP Location: Right arm, Patient Position:  "Sitting, Cuff Size: Adult Large)   Pulse 69   Temp 98  F (36.7  C) (Oral)   Resp 17   Ht 1.705 m (5' 7.13\")   Wt 136.9 kg (301 lb 12.8 oz)   LMP 10/02/2007   SpO2 96%   BMI 47.09 kg/m   Estimated body mass index is 47.09 kg/m  as calculated from the following:    Height as of this encounter: 1.705 m (5' 7.13\").    Weight as of this encounter: 136.9 kg (301 lb 12.8 oz).  Physical Exam  GENERAL APPEARANCE: healthy, alert and no distress  EYES: Eyes grossly normal to inspection, PERRL and conjunctivae and sclerae normal  HENT: ear canals and TM's normal, nose and mouth without ulcers or lesions, oropharynx clear and oral mucous membranes moist  NECK: no adenopathy, no asymmetry, masses, or scars and thyroid normal to palpation  RESP: lungs clear to auscultation - no rales, rhonchi or wheezes  CV: regular rate and rhythm, normal S1 S2, no S3 or S4, no murmur, click or rub, no peripheral edema and peripheral pulses strong  ABDOMEN: soft, nontender, no hepatosplenomegaly, no masses and bowel sounds normal  MS: no musculoskeletal defects are noted and gait is age appropriate without ataxia  SKIN: no suspicious lesions or rashes  NEURO: Normal strength and tone, sensory exam grossly normal, mentation intact and speech normal  PSYCH: mentation appears normal and affect normal/bright    Diagnostic Test Results:  Lab on 07/26/2022   Component Date Value Ref Range Status     Sodium 07/26/2022 142  133 - 144 mmol/L Final     Potassium 07/26/2022 4.2  3.4 - 5.3 mmol/L Final     Chloride 07/26/2022 108  94 - 109 mmol/L Final     Carbon Dioxide (CO2) 07/26/2022 24  20 - 32 mmol/L Final     Anion Gap 07/26/2022 10  3 - 14 mmol/L Final     Urea Nitrogen 07/26/2022 32 (H)  7 - 30 mg/dL Final     Creatinine 07/26/2022 0.98  0.52 - 1.04 mg/dL Final     Calcium 07/26/2022 9.5  8.5 - 10.1 mg/dL Final     Glucose 07/26/2022 159 (H)  70 - 99 mg/dL Final     GFR Estimate 07/26/2022 62  >60 mL/min/1.73m2 Final    Effective December " 21, 2021 eGFRcr in adults is calculated using the 2021 CKD-EPI creatinine equation which includes age and gender (Yashira et al., NE, DOI: 10.1056/GJQCsf2917795)     Cholesterol 07/26/2022 109  <200 mg/dL Final     Triglycerides 07/26/2022 199 (H)  <150 mg/dL Final     Direct Measure HDL 07/26/2022 39 (L)  >=50 mg/dL Final     LDL Cholesterol Calculated 07/26/2022 30  <=100 mg/dL Final     Non HDL Cholesterol 07/26/2022 70  <130 mg/dL Final     Patient Fasting > 8hrs? 07/26/2022 Yes   Final     TSH 07/26/2022 2.02  0.40 - 4.00 mU/L Final     WBC Count 07/26/2022 7.7  4.0 - 11.0 10e3/uL Final     RBC Count 07/26/2022 3.88  3.80 - 5.20 10e6/uL Final     Hemoglobin 07/26/2022 12.7  11.7 - 15.7 g/dL Final     Hematocrit 07/26/2022 38.1  35.0 - 47.0 % Final     MCV 07/26/2022 98  78 - 100 fL Final     MCH 07/26/2022 32.7  26.5 - 33.0 pg Final     MCHC 07/26/2022 33.3  31.5 - 36.5 g/dL Final     RDW 07/26/2022 13.9  10.0 - 15.0 % Final     Platelet Count 07/26/2022 204  150 - 450 10e3/uL Final         ASSESSMENT / PLAN:   Sherry was seen today for wellness visit and imm/inj.    Diagnoses and all orders for this visit:    Encounter for Medicare annual wellness exam    Encounter for long-term (current) use of medications    Visit for screening mammogram  -     MA SCREENING DIGITAL BILAT - Future  (s+30); Future    Encounter for screening mammogram for breast cancer  -     MA SCREENING DIGITAL BILAT - Future  (s+30); Future    Hyperlipidemia LDL goal <100  -     ALT; Future    Pressure injury of toe of left foot, stage 3 (H)    Stage 3a chronic kidney disease (H)    Morbid obesity (H)    Need for prophylactic vaccination and inoculation against influenza  -     INFLUENZA, QUAD, HIGH DOSE, PF, 65YR + (FLUZONE HD)  -     ADMIN INFLUENZA (For MEDICARE Patients ONLY) []    GENESIS (obstructive sleep apnea)    Other orders  -     REVIEW OF HEALTH MAINTENANCE PROTOCOL ORDERS      Pt with multiple chronic health issues that are  "stable except untreated GENESIS. I will help her connect with sleep center to help her get the CPAP.       Patient has been advised of split billing requirements and indicates understanding: Yes      COUNSELING:  Reviewed preventive health counseling, as reflected in patient instructions    Estimated body mass index is 47.09 kg/m  as calculated from the following:    Height as of this encounter: 1.705 m (5' 7.13\").    Weight as of this encounter: 136.9 kg (301 lb 12.8 oz).    Weight management plan: Discussed healthy diet and exercise guidelines    She reports that she has never smoked. She has never used smokeless tobacco.      Appropriate preventive services were discussed with this patient, including applicable screening as appropriate for cardiovascular disease, diabetes, osteopenia/osteoporosis, and glaucoma.  As appropriate for age/gender, discussed screening for colorectal cancer, prostate cancer, breast cancer, and cervical cancer. Checklist reviewing preventive services available has been given to the patient.    Reviewed patients plan of care and provided an AVS. The Complex Care Plan (for patients with higher acuity and needing more deliberate coordination of services) for Sherry meets the Care Plan requirement. This Care Plan has been established and reviewed with the Patient.    Counseling Resources:  ATP IV Guidelines  Pooled Cohorts Equation Calculator  Breast Cancer Risk Calculator  Breast Cancer: Medication to Reduce Risk  FRAX Risk Assessment  ICSI Preventive Guidelines  Dietary Guidelines for Americans, 2010  USDA's MyPlate  ASA Prophylaxis  Lung CA Screening    Marilou Arciniega MD PhD  [facility]    Identified Health Risks:  "

## 2022-11-02 NOTE — Clinical Note
This patient has been trying to get to someone in the sleep center to order her CPAP. Can someone help her with this? Thanks! Marilou Arciniega MD PhD

## 2022-11-02 NOTE — TELEPHONE ENCOUNTER
Compression Hose Order  Pt would like 1 pair(s) of eige, closed-toe, thigh high, 20-30mmhg compression hose.    [facility].    Patient has procedure on 12/1/22 with Dr. Pride.    Corina Sparks  [facility]

## 2022-11-02 NOTE — PROGRESS NOTES
SMN form completed and signed by Dr. Delgado. The forms have been faxed back to [facility] at [phone].    Chloé Sparks [facility]  Adult Endocrinology  [facility], [place]

## 2022-11-02 NOTE — PATIENT INSTRUCTIONS
Additional instructions:  -- Call [phone] to schedule at [facility]  to schedule mammogram.    Patient Education   Personalized Prevention Plan  You are due for the preventive services outlined below.  Your care team is available to assist you in scheduling these services.  If you have already completed any of these items, please share that information with your care team to update in your medical record.  Health Maintenance Due   Topic Date Due    Heart Failure Action Plan  Never done    URINE DRUG SCREEN  Never done    ANNUAL REVIEW OF HM ORDERS  Never done    Mammogram  01/14/2021    Liver Monitoring Lab  11/25/2021    Kidney Microalbumin Urine Test  01/05/2022    Diabetic Foot Exam  07/28/2022    A1C Lab  08/25/2022    Flu Vaccine (1) 09/01/2022

## 2022-11-02 NOTE — TELEPHONE ENCOUNTER
After Visit Follow Up  Per pt request, faxed their compression hose Rx to [facility] at [phone].   Pt would like 1 pair(s) of beige, closed-toe, thigh high, 20-30mmhg compression hose. Fax confirmed.    Pt aware they will be shipped to their home address.    Chris Kat RN

## 2022-11-04 ENCOUNTER — OFFICE VISIT (OUTPATIENT)
Dept: PODIATRY | Facility: CLINIC | Age: 67
End: 2022-11-04
Payer: COMMERCIAL

## 2022-11-04 DIAGNOSIS — E11.49 TYPE II OR UNSPECIFIED TYPE DIABETES MELLITUS WITH NEUROLOGICAL MANIFESTATIONS, NOT STATED AS UNCONTROLLED(250.60) (H): Primary | ICD-10-CM

## 2022-11-04 DIAGNOSIS — E11.69 TYPE 2 DIABETES MELLITUS WITH DIABETIC FOOT DEFORMITY (H): ICD-10-CM

## 2022-11-04 DIAGNOSIS — L97.512 ULCER OF RIGHT FOOT WITH FAT LAYER EXPOSED (H): ICD-10-CM

## 2022-11-04 DIAGNOSIS — M21.969 TYPE 2 DIABETES MELLITUS WITH DIABETIC FOOT DEFORMITY (H): ICD-10-CM

## 2022-11-04 PROCEDURE — 99214 OFFICE O/P EST MOD 30 MIN: CPT | Performed by: PODIATRIST

## 2022-11-04 NOTE — LETTER
11/4/2022         RE: Sherry Slaughter  [address]  [place] [address]        Dear Colleague,    Thank you for referring your patient, Sherry Slaughter, to the [facility]. Please see a copy of my visit note below.    Past Medical History:   Diagnosis Date     Cataract      Congestive heart failure (H) 2019 NOVEMBER    AFIB     DEPRESSION     comes and goes - tried meds - unsuccessfully, certain times of the year, no psych intervention and no counselors in the past - and not interested      Diverticulosis of colon (without mention of hemorrhage) 4/04    colonoscopy     ECHO-mildLVH,tr MR,mild thick lflets w inc LA,trTR   12/03     Essential hypertension, benign 1990s    late 1990s - started medications at that time - not to difficult to control meds      Fam hx-cardiovas dis [facility]     father - CAD, and lipids/HTN - multiple members of the family      Family history of diabetes mellitus     sister and grandmother with DM      Family history of malignant neoplasm of breast     mother - young age - 45, and maternal cousin and aunt as well - no BRCA testing done      Family history of stroke (cerebrovascular)     grandmother in early life in her 40s      FAMILY HX COLON CANCER     Pat uncles x 2     Heart disease     murmur/     Heart murmur      HYPERLIPIDEMIA 2000    fairly recent - in the last 5 years - high for DM levels  -cholesterol recent      Irritable bowel syndrome     goes between the 2 - nerve related - more stressed more problems      MICROALBUMINURIA     unsure how long - been on the lisinopril - for a few years at well      Nonspecific abnormal results of liver function study 2/3/2003    SGOT - has been high in the past - since the hepatitis b - borderline elevation - not really changing any      OBESITY      Obstructive sleep apnea      GENESIS (obstructive sleep apnea) 10/15/2006    psg 5/15 AHI 53 aPAP 8-15     OSTEOARTHRITIS L KNEE 2003    total knee on the  left - and pain is gone since the knee replacement      PERS HX HEPATITIS B- RESOLVED] 1977    acute virus only - no chronic disease      PERS HX HEPATITIS B- RESOLVED]      Type II or unspecified type diabetes mellitus without mention of complication, not stated as uncontrolled 1991    oral meds frist, then insulin eventually later, difficult to control most of the time      Unspecified hypothyroidism 10/11/2006    TSH 3.36 - mild subclinical hypothyroidism - deciding on following or starting low dose meds - with dr Oreilly - following      Patient Active Problem List   Diagnosis     Morbid obesity (H)     Diverticulosis of large intestine     Nonspecific abnormal results of cardiovascular function study     FAMILY HX COLON CANCER     Nonallopathic lesion of thoracic region     Hypothyroidism     GENESIS (obstructive sleep apnea)     Irritable bowel syndrome     Family history of malignant neoplasm of breast     Type 2 diabetes mellitus treated with insulin (H)     History of major depression     OSTEOARTHRITIS L KNEE  s/p knee replacement on the left      Hypertension goal BP (blood pressure) < 140/90     Family history of stroke (cerebrovascular)     Family history of other cardiovascular diseases     JOINT PAIN-ANKLE - right ankle      Mitral valve insufficiency     Hyperlipidemia LDL goal <100     Aortic sclerosis     Tubular adenoma of colon     History of viral hepatitis, type B     Chronic low back pain     Long-term insulin use (H)     S/P total knee replacement using cement, right     Lumbago     Type 2 diabetes mellitus with hyperglycemia (H)     Posterior vitreous detachment of right eye     Pseudophakia of both eyes     Paroxysmal atrial fibrillation (H)     SOB (shortness of breath)     Bunion     On continuous oral anticoagulation     Pressure injury of toe of left foot, stage 3 (H)     Stage 3a chronic kidney disease (H)     Past Surgical History:   Procedure Laterality Date     ABDOMEN SURGERY       ovaian scope/ appendix removal     ANESTHESIA CARDIOVERSION N/A 12/4/2020    Procedure: ANESTHESIA, FOR CARDIOVERSION @1400;  Surgeon: GENERIC ANESTHESIA PROVIDER;  Location: [facility] OR     ARTHROPLASTY KNEE Right 9/23/2015    Procedure: ARTHROPLASTY KNEE;  Surgeon: Rufus Brown MD;  Location:  OR     BUNIONECTOMY REN AND LEANDRO, COMBINED Left 2/2/2021    Procedure: Left bunion correction with bone cutting and screw fixation;  Surgeon: David Hoffman DPM;  Location: [facility] OR     CATARACT IOL, RT/LT Left      COLONOSCOPY  4/04    diverticulosis, rec repeat 10 yrs(consider fam hx?)     COLONOSCOPY WITH CO2 INSUFFLATION N/A 6/19/2019    Procedure: COLONOSCOPY, WITH CO2 INSUFFLATION;  Surgeon: Zeb Duarte MD;  Location: [facility] OR     ORTHOPEDIC SURGERY      right ankle     PHACOEMULSIFICATION CLEAR CORNEA WITH STANDARD INTRAOCULAR LENS IMPLANT Left 3/15/2018    Procedure: PHACOEMULSIFICATION CLEAR CORNEA WITH STANDARD INTRAOCULAR LENS IMPLANT;  LEFT EYE PHACOEMULSIFICATION CLEAR CORNEA WITH STANDARD INTRAOCULAR LENS IMPLANT;  Surgeon: Bandar Linares MD;  Location:  [facility]     PHACOEMULSIFICATION CLEAR CORNEA WITH STANDARD INTRAOCULAR LENS IMPLANT Right 4/5/2018    Procedure: PHACOEMULSIFICATION CLEAR CORNEA WITH STANDARD INTRAOCULAR LENS IMPLANT;  RIGHT PHACOEMULSIFICATION CLEAR CORNEA WITH STANDARD INTRAOCULAR LENS IMPLANT ;  Surgeon: Bandar Linares MD;  Location:  [facility]     STRESS ECHO (METRO)  12/03    no ischemia, limited by fatigue     SURGICAL HISTORY OF -       EXP LAP- R OVARY CYSTS     SURGICAL HISTORY OF -   1981,1984,1985    CHILDBIRTH     ZZC NONSPECIFIC PROCEDURE  6/06    right triple arthrodecesis      ZZ NONSPECIFIC PROCEDURE  7/06     right bunion surgery      [facility] TOTAL KNEE ARTHROPLASTY  3/03    L knee     Social History     Socioeconomic History     Marital status:      Spouse name: Not on file     Number of children: 3     Years of education: Not on file     Highest  education level: Not on file   Occupational History     Occupation: RN     Employer: [employer]   Tobacco Use     Smoking status: Never     Smokeless tobacco: Never     Tobacco comments:     tried in early 20s only    Vaping Use     Vaping Use: Never used   Substance and Sexual Activity     Alcohol use: Yes     Comment: rare     Drug use: No     Sexual activity: Not Currently     Birth control/protection: Post-menopausal     Comment:  - since for 20 years, no signficant other  > 5 years sexual activity    Other Topics Concern      Service No     Blood Transfusions No     Caffeine Concern No     Occupational Exposure Yes     Comment: Normal nursing exposures     Hobby Hazards No     Sleep Concern Yes     Stress Concern No     Comment: Stress level at HM=5, stress level at WK=6     Weight Concern Yes     Special Diet Yes     Comment: Diabetic diet (watching carbs)     Back Care No     Comment: Occassional back spasms     Exercise Yes     Comment: Pt joined a health club goes approx 3-4 times a week,half to one mile daily     Bike Helmet No     Seat Belt Yes     Comment: Sometimes     Self-Exams Yes     Parent/sibling w/ CABG, MI or angioplasty before 65F 55M? 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      Social Determinants of Health     Financial Resource Strain: Not on file   Food Insecurity: Not on file   Transportation Needs: Not on file   Physical Activity: Not on file   Stress: Not on file   Social Connections: Not on file   Intimate Partner Violence: Not on file   Housing Stability: Not on file     Family History   Problem Relation Age of Onset     Diabetes Maternal Grandmother         DM TYPE 2     Cerebrovascular Disease Maternal Grandmother      Hypertension Sister      Lipids Sister      Heart Disease Sister         Chronic AFib     Diabetes Sister      Coronary Artery Disease Sister         afib      Hypertension Sister      Lipids Sister      Gynecology Sister      Diabetes Sister      Asthma Sister      Blood Disease Paternal Grandmother         T CELL LEUKEMIA     Hypertension Brother      Lipids Brother      Congenital Anomalies Brother      Cardiovascular Brother      Heart Disease Brother         CABG/AFIB     Coronary Artery Disease Brother         cabg afib AAA     Hypertension Brother      Lipids Brother      Other Cancer Brother         multple myeloma     Obesity Brother      Hypertension Brother      Respiratory Brother         Sleep apnea     Heart Disease Brother         AFib     Coronary Artery Disease Brother         afib     Alzheimer Disease Mother      Asthma Mother      Hypertension Mother      Breast Cancer Mother 40        has mastectomy and lived to 84     Allergies Mother         Sulfa,     Arthritis Mother      Cardiovascular Mother      Depression Mother      Respiratory Mother      Lipids Mother      Cancer Mother         breast     Thyroid Disease Mother      Cerebrovascular Disease Mother         FROM  AFIB     Dementia Mother         Alzheimers     Other Cancer Mother         breast     Cancer - colorectal Other      Colon Cancer Other         2019     Cancer Father         lung     Aneurysm Father         brother, AAA     Other Cancer Father         lung ca     Coronary Artery Disease Father         cad AAA     Asthma Sister      Cancer - colorectal Paternal Uncle         late 50s to early 60s - great uncles      Breast Cancer Other         Maternal cousin     Arrhythmia Sister         2 brothers 1 sister Afib     Breast Cancer Maternal Aunt 62     Other Cancer Maternal Aunt 35        Ovarian cancer.  Survived despite late recurrence and is now 92.     Glaucoma No family hx of      Macular Degeneration No family hx of      Lab Results   Component Value Date    A1C 7.1 05/25/2022    A1C 8.1 11/24/2021    A1C 9.0 08/16/2021    A1C 6.8 01/05/2021    A1C 6.9 11/25/2020    A1C 7.5  07/22/2020    A1C 7.3 12/10/2019           SUBJECTIVE FINDINGS:  A 67-year-old returns to clinic for ulcer, right first MPJ.  She is diabetic with peripheral neuropathy.  Relates that she feels it is much better.  She relates that there was a crack there.  She followed that back and kind of opened it up and it has felt better since.      She relates she did see [facility] to adjust her insole and casted her for new diabetic shoes and insoles.  She relates she is using the Aquacel and Betadine.    OBJECTIVE FINDINGS:  Right plantar first MPJ.  She has hyperkeratotic tissue buildup with a central area of ulceration that is a 5 x 4 mm.  It is through the dermis into the subcutaneous tissues.  There is no gross erythema, minimal edema.  Minimal drainage on the dressing.  No odor, no calor.    ASSESSMENT AND PLAN:  Ulcer, right first MPJ.  She is diabetic with peripheral neuropathy.  Diagnosis and treatment options discussed with her.  Local wound care done upon consent today.  I am going to have her continue the Betadine, Aquacel Ag and a light dressing.  These are dispensed and use discussed with her.  I advised her on offloading.  She opted for no further offloading options.  She will get her new insoles.  Return to clinic and see me in 1 month.  Previous notes reviewed.          Moderate level of medical decision making.          Again, thank you for allowing me to participate in the care of your patient.        Sincerely,        Rufus Hutson DPM

## 2022-11-04 NOTE — NURSING NOTE
Sherry Slaughter's chief complaint for this visit includes:  Chief Complaint   Patient presents with     Follow Up     Right foot wound     PCP: Marilou Arciniega    Referring Provider:  No referring provider defined for this encounter.    [facility] 10/02/2007   Data Unavailable        Allergies   Allergen Reactions     Empagliflozin      Yeast infections     Lipitor [Atorvastatin Calcium]      Gas     Pravachol [Pravastatin Sodium]      Pravachol - dry cough      Simvastatin      Myalgia         Do you need any medication refills at today's visit?

## 2022-11-09 ENCOUNTER — TELEPHONE (OUTPATIENT)
Dept: SLEEP MEDICINE | Facility: CLINIC | Age: 67
End: 2022-11-09

## 2022-11-09 DIAGNOSIS — G47.33 OSA (OBSTRUCTIVE SLEEP APNEA): Primary | ICD-10-CM

## 2022-11-09 NOTE — TELEPHONE ENCOUNTER
----- Message from Marilou Arciniega MD PhD sent at 11/8/2022  4:16 PM CST -----  Regarding: equipment order  Hi,   This patient told me she has not been able to get hold of anyone from sleep clinic to get her CPAP. Can someone help her with this?     Marilou Arciniega MD PhD

## 2022-11-09 NOTE — TELEPHONE ENCOUNTER
Please advise Tammy Caban PA-C,     Patient states she needs new tubing, nasal mask and water reservoir. Patient did not remember the names of her current supplies off hand.     Patient states she still has not gotten a new CPAP from [facility].     Okay to sign DME order form for new CPAP from [facility]?     Okay to send new Rx for those supplies?    LOV: 4/7/2021.

## 2022-11-10 NOTE — TELEPHONE ENCOUNTER
Writer left detail message stating the DME order form has been signed and sent to [facility]. Writer faxed and scanned order form in this Encounter.     Writer is waiting for patient to call me back. Writer needs to know which DME location patient would like the cpap supplies to be faxed to.     Upon call back please ask patient which DME she goes to.

## 2022-11-11 NOTE — TELEPHONE ENCOUNTER
Patient called back and stated she was okay with [facility] because she's been there before. Writer fax[facility] the cpap d[facility] orders [facility] in [place], per patient's request.     No further actions needed.

## 2022-11-15 DIAGNOSIS — Z79.4 UNCONTROLLED TYPE 2 DIABETES MELLITUS WITH HYPERGLYCEMIA, WITH LONG-TERM CURRENT USE OF INSULIN (H): ICD-10-CM

## 2022-11-15 DIAGNOSIS — E11.65 UNCONTROLLED TYPE 2 DIABETES MELLITUS WITH HYPERGLYCEMIA, WITH LONG-TERM CURRENT USE OF INSULIN (H): ICD-10-CM

## 2022-11-19 RX ORDER — PROCHLORPERAZINE 25 MG/1
SUPPOSITORY RECTAL
Qty: 3 EACH | Refills: 2 | Status: SHIPPED | OUTPATIENT
Start: 2022-11-19 | End: 2023-02-10

## 2022-11-21 DIAGNOSIS — E11.65 UNCONTROLLED TYPE 2 DIABETES MELLITUS WITH HYPERGLYCEMIA, WITH LONG-TERM CURRENT USE OF INSULIN (H): ICD-10-CM

## 2022-11-21 DIAGNOSIS — Z79.4 UNCONTROLLED TYPE 2 DIABETES MELLITUS WITH HYPERGLYCEMIA, WITH LONG-TERM CURRENT USE OF INSULIN (H): ICD-10-CM

## 2022-11-21 RX ORDER — PROCHLORPERAZINE 25 MG/1
SUPPOSITORY RECTAL
Qty: 1 EACH | Refills: 3 | Status: SHIPPED | OUTPATIENT
Start: 2022-11-21 | End: 2023-06-23 | Stop reason: ALTCHOICE

## 2022-11-23 ENCOUNTER — TELEPHONE (OUTPATIENT)
Dept: VASCULAR SURGERY | Facility: CLINIC | Age: 67
End: 2022-11-23

## 2022-11-23 NOTE — TELEPHONE ENCOUNTER
11/23/2022     Vein Clinic Preoperative Nurse Call    Procedure: Left leg VNUS closure GSV, SSV(med nec), 10-20 stab phlebs(med nec)   Date: 12/1/2022  Surgeon: Dr. Pride  Time: 1300  Check in time: 1200      **CALLED 11/23: PATIENT WANTS CALL BACK ON Friday 11/25**  Called and spoke to patient. Informed patient: when to check in (1200) to sign consent, to bring their preop medications in their original bottle with them (1mg ativan, 0.1mg clonidine, 2g Amoxicillin). Patient will take the medications after signing the consent to the procedure. Instructed patient to wear a mask, wear loose-fitting comfortable clothing, and bring their compression hose. Ensured patient has a /someone that will be responsible for them the rest of the day. Visitors are allowed in the clinic, but they would need to stay in an exam room or wait in the parking lot or leave. If  does leave, IF POSSIBLE, we ask that they do not go more than 15-20 mins from our clinic. Once procedure is completed, we will keep patient in recovery for 30-45 mins, and call  with aftercare instructions. Informed patient, that if possible, they should sit in the backseat to elevate their leg on the ride home.    Pt needs Thigh High compression hose for procedure. Status of the hose: patient has thigh compression hose.    Special instructions: Patient takes Xarelto. Instructed patient to take last dose on Sunday 11/27/22. Patient verbalized understanding.     Patient Pre-op Questions:  Preferred Pharmacy: [facility] in [place] .  Anticoagulant/ASA: Xarelto daily  Artificial Joint or Heart Valve: bilateral knee  Open ulcer: right leg small ankle wound  Sedation nausea: no    Special COVID-19 instructions: Patient aware they need to wear a mask to our clinic and during their procedure. Patient aware their  can come in with them, but would need to stay in an exam room during the procedure or wait in the parking lot or leave. Nurse will  call patient's  when procedure is over.    Patient understands if they have any of the following symptoms (fever, cough, shortness of breath, rash), they need to notify us immediately to cancel their procedure and will have to reschedule for a later date.    Patient is in agreement with all of the above and has no further questions at this time.    Corina Jade RN  [facility]

## 2022-11-25 DIAGNOSIS — I83.812 VARICOSE VEINS OF LEFT LOWER EXTREMITY WITH PAIN: Primary | ICD-10-CM

## 2022-11-25 DIAGNOSIS — I83.813 PAIN DUE TO VARICOSE VEINS OF BOTH LOWER EXTREMITIES: ICD-10-CM

## 2022-11-25 RX ORDER — LORAZEPAM 1 MG/1
TABLET ORAL
Qty: 1 TABLET | Refills: 0 | Status: SHIPPED | OUTPATIENT
Start: 2022-11-25 | End: 2022-12-05

## 2022-11-25 RX ORDER — CLONIDINE HYDROCHLORIDE 0.1 MG/1
TABLET ORAL
Qty: 1 TABLET | Refills: 0 | Status: SHIPPED | OUTPATIENT
Start: 2022-11-25 | End: 2022-12-05

## 2022-11-25 RX ORDER — AMOXICILLIN 500 MG/1
CAPSULE ORAL
Qty: 4 CAPSULE | Refills: 0 | Status: SHIPPED | OUTPATIENT
Start: 2022-11-25 | End: 2022-12-05

## 2022-11-25 NOTE — PROGRESS NOTES
Outcome for 11/25/22 1:24 PM: Dexcom printed for provider to review.  Chloé Sparks [facility]  Adult Endocrinology  [facility] [place]  Outcome for 11/25/22 12:20 PM: Data uploaded on Dexcom  Arthur Zamora [facility]  Adult Endocrinology  [facility]

## 2022-11-28 ENCOUNTER — LAB (OUTPATIENT)
Dept: LAB | Facility: CLINIC | Age: 67
End: 2022-11-28
Payer: COMMERCIAL

## 2022-11-28 DIAGNOSIS — E11.9 TYPE 2 DIABETES MELLITUS TREATED WITH INSULIN (H): ICD-10-CM

## 2022-11-28 DIAGNOSIS — E03.4 HYPOTHYROIDISM DUE TO ACQUIRED ATROPHY OF THYROID: ICD-10-CM

## 2022-11-28 DIAGNOSIS — E78.5 HYPERLIPIDEMIA LDL GOAL <100: ICD-10-CM

## 2022-11-28 DIAGNOSIS — Z79.4 TYPE 2 DIABETES MELLITUS TREATED WITH INSULIN (H): ICD-10-CM

## 2022-11-28 DIAGNOSIS — I50.9 CONGESTIVE HEART FAILURE, UNSPECIFIED HF CHRONICITY, UNSPECIFIED HEART FAILURE TYPE (H): ICD-10-CM

## 2022-11-28 LAB
ALBUMIN SERPL-MCNC: 3.8 G/DL (ref 3.4–5)
ALP SERPL-CCNC: 55 U/L (ref 40–150)
ALT SERPL W P-5'-P-CCNC: 35 U/L (ref 0–50)
ANION GAP SERPL CALCULATED.3IONS-SCNC: 8 MMOL/L (ref 3–14)
AST SERPL W P-5'-P-CCNC: 41 U/L (ref 0–45)
BILIRUB SERPL-MCNC: 0.6 MG/DL (ref 0.2–1.3)
BUN SERPL-MCNC: 20 MG/DL (ref 7–30)
CALCIUM SERPL-MCNC: 9.7 MG/DL (ref 8.5–10.1)
CHLORIDE BLD-SCNC: 105 MMOL/L (ref 94–109)
CHOLEST SERPL-MCNC: 122 MG/DL
CO2 SERPL-SCNC: 26 MMOL/L (ref 20–32)
CREAT SERPL-MCNC: 0.84 MG/DL (ref 0.52–1.04)
CREAT UR-MCNC: 117 MG/DL
FASTING STATUS PATIENT QL REPORTED: YES
GFR SERPL CREATININE-BSD FRML MDRD: 75 ML/MIN/1.73M2
GLUCOSE BLD-MCNC: 189 MG/DL (ref 70–99)
HBA1C MFR BLD: 7 % (ref 0–5.6)
HCT VFR BLD AUTO: 40.9 % (ref 35–47)
HDLC SERPL-MCNC: 41 MG/DL
LDLC SERPL CALC-MCNC: 53 MG/DL
MICROALBUMIN UR-MCNC: 12 MG/L
MICROALBUMIN/CREAT UR: 10.26 MG/G CR (ref 0–25)
NONHDLC SERPL-MCNC: 81 MG/DL
POTASSIUM BLD-SCNC: 4 MMOL/L (ref 3.4–5.3)
PROT SERPL-MCNC: 7.5 G/DL (ref 6.8–8.8)
SODIUM SERPL-SCNC: 139 MMOL/L (ref 133–144)
TRIGL SERPL-MCNC: 142 MG/DL
TSH SERPL DL<=0.005 MIU/L-ACNC: 2.22 MU/L (ref 0.4–4)

## 2022-11-28 PROCEDURE — 80053 COMPREHEN METABOLIC PANEL: CPT

## 2022-11-28 PROCEDURE — 80061 LIPID PANEL: CPT

## 2022-11-28 PROCEDURE — 85014 HEMATOCRIT: CPT

## 2022-11-28 PROCEDURE — 83036 HEMOGLOBIN GLYCOSYLATED A1C: CPT

## 2022-11-28 PROCEDURE — 84443 ASSAY THYROID STIM HORMONE: CPT

## 2022-11-28 PROCEDURE — 36415 COLL VENOUS BLD VENIPUNCTURE: CPT

## 2022-11-28 PROCEDURE — 82043 UR ALBUMIN QUANTITATIVE: CPT

## 2022-11-28 ASSESSMENT — ENCOUNTER SYMPTOMS
POOR WOUND HEALING: 1
MUSCLE CRAMPS: 0
EXERCISE INTOLERANCE: 0
NAIL CHANGES: 0
HYPERTENSION: 0
STIFFNESS: 0
PALPITATIONS: 1
LIGHT-HEADEDNESS: 0
SNORES LOUDLY: 0
SLEEP DISTURBANCES DUE TO BREATHING: 0
SYNCOPE: 0
ARTHRALGIAS: 1
WHEEZING: 0
SHORTNESS OF BREATH: 1
JOINT SWELLING: 0
HEMOPTYSIS: 0
MYALGIAS: 1
SPUTUM PRODUCTION: 0
COUGH DISTURBING SLEEP: 0
POSTURAL DYSPNEA: 0
COUGH: 1
MUSCLE WEAKNESS: 1
HYPOTENSION: 0
LEG PAIN: 0
BACK PAIN: 1
NECK PAIN: 0
SKIN CHANGES: 0
ORTHOPNEA: 0
DYSPNEA ON EXERTION: 0

## 2022-11-29 NOTE — PATIENT INSTRUCTIONS
Post-Procedure Instructions:             VNUS Closure and Phlebectomies      Post-Op Day Zero - The Day of Your Procedure:0  1. Medication for Pain Control and Inflammation Control   - The numbing medication injected during your procedure will last for several hours. The pre-procedure                 tablets may make you very sleepy and you might not remember everything from the procedure or from                 the day. This will usually wear off by the next day.   - Acetaminophen (Tylenol) as directed on bottle   - You may resume taking any medications you were taking before your procedure.   You can resume Xarelto tonight   2. Activity   - Rest with your leg(s) elevated above your heart. This will prevent from a lot of swelling and                 bleeding. You do not need to elevate your leg(s) while sleeping at night. You may go upstairs, sit up to                 eat, use the bathroom, and take several five minute walks. Otherwise, keep your leg(s) elevated.                 Minimize the amount of time you are up on your feet to about 30 minutes at a time.  3. Bandages   - The incision sites will be covered with soft bandages and an ACE wrap. Keep your bandages on and    dry for 48 hours. The ACE should provide  snug  compression, but should not cause pain or numbness    in the toes. If you have significant discomfort or your toes become cold or numb, unwrap your ACE and    rewrap with less tension starting at the toes wrapping upward.  4. Incisions   - Bleeding: You may see some incision sites that are oozing through the bandages. This is not unusual    and can be managed with Rest, Ice, Compression and Elevation (RICE). Apply ice and firm pressure    directly to the site that is bleeding and rest with your leg(s) elevated above your heart for 20-30 minutes.    Post-Op Day One:  1. Medication   - Acetaminophen (Tylenol) as directed on bottle  2. Activity   - We would like you to get up at least six times and  walk around for short periods of time, unless it is    causing you pain. You should not be on your feet more than 90 minutes at a time. Elevate your leg    above your heart when you are not walking.  3. Bandages   - Your bandages must be kept on and dry for 48 hours.  4. Driving   - You may resume driving when you can do so safely. Do not drive if you are taking narcotic pain    medication.    Post-Op Day Two:   1. Medication  - Acetaminophen (Tylenol) as directed on bottle  2.  Activity   - Walk as tolerated. Elevate as much as possible when not walking.  3. Bandages and Compression  - Remove ACE wrap and padding. Shower and put on your compression hose during waking hours only for at least 5 days. (Your doctor may instruct you to keep your bandages on until your return appointment; please follow your doctor's instructions.)  4. Incisions   - Your leg(s) will be bruised; there may be swelling, hard knots under the skin and possibly some    numbness. These will likely resolve over time. If you see  hair-like  strings coming out of your    incisions, do not pull them (this will only cause pain/discomfort). We will trim them when you come   back for your follow-up appointment.  5. Call Us If:   - You see any areas on your leg that are red and angry in appearance.   - You notice any drainage that is milky or cloudy in appearance or that has a foul odor.   - You run a temperature of 100.5 or greater.    Post-Op Day Three:  You will have a follow up appointment 2-4 days post-procedure. At this appointment, you will have an ultrasound and we will check your incisions.    _______________________________________________________________________________________________    The Two Weeks Following Your Procedure  1.  Skin Care   - Do not use any lotions, creams or powders on your leg for 14 days or until the incisions have healed.   - Do not soak in a bathtub, whirlpool, or hot tub or go swimming for 14 days or until your  incisions have  healed.  2.  Medications   - You may use acetaminophen (e.g., Tylenol) as needed for pain or discomfort.  3.  Activity   - Do not lift over 25 pounds. After about two weeks you may resume exercise such as aerobics, running,    tennis or weight lifting. Use your common sense and ease back into your exercise routine slowly.   - You may feel a cord-like tightness along the inside of your leg. Gentle stretching can be helpful.  4. Compression Hose   - Your doctor may instruct you to wear compression for longer than seven days; please    follow your doctor's instructions. As a comfort measure, you may choose to wear compression for    longer than required.  5.  Travel   - Do not fly in an airplane for 14 days after your procedure. If you have a long car trip planned within    two to three weeks following your procedure, stop and walk for a few minutes every two hours.    Periodic ankle pumps during the ride may be helpful.    Six Week Appointment   At your six week appointment, you will see your surgeon for an exam and evaluation. This office visit    will be scheduled when you return for Post-op Day Three Return Appointment.     Return to Work  1.  If you work outside the home, you may return to work in a few days depending on the extent of your procedure, how you tolerate it, and the type of work you perform.  2.  Paperwork: If your employer requires paperwork or you would like a letter written to your employer, please let us know. We will complete disability type forms at no charge. Please allow five business days for forms to be completed.

## 2022-11-30 ENCOUNTER — OFFICE VISIT (OUTPATIENT)
Dept: CARDIOLOGY | Facility: CLINIC | Age: 67
End: 2022-11-30
Payer: COMMERCIAL

## 2022-11-30 ENCOUNTER — OFFICE VISIT (OUTPATIENT)
Dept: ENDOCRINOLOGY | Facility: CLINIC | Age: 67
End: 2022-11-30
Payer: COMMERCIAL

## 2022-11-30 VITALS
OXYGEN SATURATION: 98 % | WEIGHT: 293 LBS | BODY MASS INDEX: 46.5 KG/M2 | HEART RATE: 71 BPM | SYSTOLIC BLOOD PRESSURE: 104 MMHG | DIASTOLIC BLOOD PRESSURE: 65 MMHG

## 2022-11-30 VITALS
WEIGHT: 293 LBS | SYSTOLIC BLOOD PRESSURE: 104 MMHG | BODY MASS INDEX: 46.51 KG/M2 | OXYGEN SATURATION: 96 % | DIASTOLIC BLOOD PRESSURE: 59 MMHG | HEART RATE: 72 BPM

## 2022-11-30 DIAGNOSIS — E66.01 MORBID OBESITY (H): ICD-10-CM

## 2022-11-30 DIAGNOSIS — I10 ESSENTIAL HYPERTENSION WITH GOAL BLOOD PRESSURE LESS THAN 140/90: ICD-10-CM

## 2022-11-30 DIAGNOSIS — I10 HYPERTENSION, UNSPECIFIED TYPE: ICD-10-CM

## 2022-11-30 DIAGNOSIS — Z79.4 TYPE 2 DIABETES MELLITUS TREATED WITH INSULIN (H): Primary | ICD-10-CM

## 2022-11-30 DIAGNOSIS — I48.0 PAROXYSMAL ATRIAL FIBRILLATION (H): ICD-10-CM

## 2022-11-30 DIAGNOSIS — E83.52 HYPERCALCEMIA: ICD-10-CM

## 2022-11-30 DIAGNOSIS — I50.9 CONGESTIVE HEART FAILURE, UNSPECIFIED HF CHRONICITY, UNSPECIFIED HEART FAILURE TYPE (H): ICD-10-CM

## 2022-11-30 DIAGNOSIS — G47.33 OSA (OBSTRUCTIVE SLEEP APNEA): ICD-10-CM

## 2022-11-30 DIAGNOSIS — E78.5 HYPERLIPIDEMIA LDL GOAL <100: ICD-10-CM

## 2022-11-30 DIAGNOSIS — E03.4 HYPOTHYROIDISM DUE TO ACQUIRED ATROPHY OF THYROID: ICD-10-CM

## 2022-11-30 DIAGNOSIS — E11.65 TYPE 2 DIABETES MELLITUS WITH HYPERGLYCEMIA, UNSPECIFIED WHETHER LONG TERM INSULIN USE (H): ICD-10-CM

## 2022-11-30 DIAGNOSIS — E11.9 TYPE 2 DIABETES MELLITUS TREATED WITH INSULIN (H): Primary | ICD-10-CM

## 2022-11-30 DIAGNOSIS — I50.32 CHRONIC DIASTOLIC CONGESTIVE HEART FAILURE (H): Primary | ICD-10-CM

## 2022-11-30 PROCEDURE — 99214 OFFICE O/P EST MOD 30 MIN: CPT | Performed by: NURSE PRACTITIONER

## 2022-11-30 PROCEDURE — 95251 CONT GLUC MNTR ANALYSIS I&R: CPT | Performed by: INTERNAL MEDICINE

## 2022-11-30 PROCEDURE — 99214 OFFICE O/P EST MOD 30 MIN: CPT | Performed by: INTERNAL MEDICINE

## 2022-11-30 RX ORDER — LISINOPRIL 20 MG/1
20 TABLET ORAL DAILY
Qty: 90 TABLET | Refills: 3 | Status: SHIPPED | OUTPATIENT
Start: 2022-11-30 | End: 2023-12-06

## 2022-11-30 NOTE — LETTER
11/30/2022         RE: Sherry Slaughter  [id number]  [place] [address]        Dear Colleague,    Thank you for referring your patient, Sherry Slaughter, to the [facility]. Please see a copy of my visit note below.    Outcome for 11/25/22 1:24 PM: Dexcom printed for provider to review.  Chloé Sparks, [facility]  Adult Endocrinology  [facility]  Outcome for 11/25/22 12:20 PM: Data uploaded on Dexcom  Arthur Zamoar [facility]  Adult Endocrinology  [facility]        Sherry is a 67 year old female with a past medical history significant for obesity, hyperlipidemia, depression, obstructive sleep apnea, hypothyroidism, hypertension, osteoarthritis, hypercholesterolemia, atrial fibrillation (status post ELIAS cardioversion on 12/2020), seen in follow-up for type 2 diabetes. She was last seen in our clinic in 5/2022.     The patient was diagnosed with type 2 diabetes ~ 25 years after she was diagnosed with gestational diabetes, during both her pregnancies. She was initially started on oral medications and then insulin was added around 2012.     Her A1C has been around 7% over the last years. She was on Byetta but she did not feel it helped. She was also on Actos but stopped due to weight gain and increased SOB.  For approximately 3 years, she was treated with Victoza.  It was resumed in March 2019.  In the beginning of July 2019, she was transitioned to U500 insulin. Jardiance was tried in 2019 and was discontinued due to genital fungal infections.      A1c was 7% on 11/28/22, stable since May 2022.  Invokana was started in 11/2021 and it was discontinued in July 2022, as there was concern over a nonhealing ulcer of the right MPJ, associated with blue discoloration of the toes. The discoloration resolved following the discontinuation of Invokana. She had her insoles adjusted and got new diabetic shoes. Continues to f/up with Dr. Hutson and a f/up  apt is scheduled next week.     Current antidiabetic regimen:   U500 insulin: 120 units in am, 90 U at 3-4 PM   1.8 mg Victoza daily (3 mg daily was too expensive; Wegovy not covered by insurance)  2 gm ER metformin daily     I reviewed the sensor records.  Average glucose is 178, with a standard deviation of 55, corresponding to a GMI of 7.6%.  51% of the glucose numbers are within target of , 9% are above 250 and 1% are in the hypoglycemic range.  The sensor reveals mild postprandial spikes with all meals.  During nighttime, the blood glucose is quite variable, with no identifiable pattern.    DM complications:  Retinopathy: last eye exam: July 2022; no DR, S/P B/L surgery for cataract. Note reviewed.   Nephropathy: No history of microalbuminuria.  Negative urine microalb 11/22;  GFR in the 70s. On 20 mg lisinopril daily.   Neuropathy: occasional burning sensation in her feet for the last couple of years. H/o left toe ulcer.  S/p bunion surgery. Right first right metatarsal phalangeal joint ulcer, for which she follows up with Dr. Hutson.   She is symptomatic for hypoglycemia (able to recognize blood sugar numbers in the 90s - she gets sweaty, hot and lightheaded).  She corrects the hypoglycemic symptoms by having something to eat. Has glucagon at home.   Aspirin - taking 325 mg  LDL 53, HDL 41,  (11/28/22) on Crestor 10 mg   Using the CPAP   Exercise limited due to bilateral knee replacement and low back pain.    2.  Hypertension  Her blood pressure is managed with 20 mg lisinopril daily, 360 mg diltiazem,  200 mg metoprolol BID, 25 mg hydrochlorothiazide daily, 20 mg lasix daily. On 20 mEq K daily.     3. High normal calcium levels, documented in our records since 2009. Prior PTH levels have been low normal, as well as phosphorus.  Most recent calcium was 9.7, on 11/28/22. Most recent vitamin D 75 in May 2022, when the patient was instructed to minimally decrease the dose.      4. Hypothyroidism     Most recent TSH was 2.22 on 11/28/22.  She remains on a constant dose of 75 mcg levothyroxine daily.    Past Medical History  Past Medical History:   Diagnosis Date     Cataract      Congestive heart failure (H) 2019 NOVEMBER    AFIB     DEPRESSION     comes and goes - tried meds - unsuccessfully, certain times of the year, no psych intervention and no counselors in the past - and not interested      Diverticulosis of colon (without mention of hemorrhage) 4/04    colonoscopy     ECHO-mildLVH,tr MR,mild thick lflets w inc LA,trTR   12/03     Essential hypertension, benign 1990s    late 1990s - started medications at that time - not to difficult to control meds      Fam hx-cardiovas dis NEC     father - CAD, and lipids/HTN - multiple members of the family      Family history of diabetes mellitus     sister and grandmother with DM      Family history of malignant neoplasm of breast     mother - young age - 45, and maternal cousin and aunt as well - no BRCA testing done      Family history of stroke (cerebrovascular)     grandmother in early life in her 40s      FAMILY HX COLON CANCER     Pat uncles x 2     Heart disease     murmur/     Heart murmur      HYPERLIPIDEMIA 2000    fairly recent - in the last 5 years - high for DM levels  -cholesterol recent      Irritable bowel syndrome     goes between the 2 - nerve related - more stressed more problems      MICROALBUMINURIA     unsure how long - been on the lisinopril - for a few years at well      Nonspecific abnormal results of liver function study 2/3/2003    SGOT - has been high in the past - since the hepatitis b - borderline elevation - not really changing any      OBESITY      Obstructive sleep apnea      GENESIS (obstructive sleep apnea) 10/15/2006    psg 5/15 AHI 53 aPAP 8-15     OSTEOARTHRITIS L KNEE 2003    total knee on the left - and pain is gone since the knee replacement      PERS HX HEPATITIS B- RESOLVED] 1977    acute virus only - no chronic disease       PERS HX HEPATITIS B- RESOLVED]      Type II or unspecified type diabetes mellitus without mention of complication, not stated as uncontrolled 1991    oral meds frist, then insulin eventually later, difficult to control most of the time      Unspecified hypothyroidism 10/11/2006    TSH 3.36 - mild subclinical hypothyroidism - deciding on following or starting low dose meds - with dr Oreilly - following    Hepatis B     Allergies  Allergies   Allergen Reactions     Empagliflozin      Yeast infections     Lipitor [Atorvastatin Calcium]      Gas     Pravachol [Pravastatin Sodium]      Pravachol - dry cough      Simvastatin      Myalgia     Medications    Current Outpatient Medications:      amoxicillin (AMOXIL) 500 MG capsule, Bring to clinic one hour prior to procedure where you will be instructed to take 4 capsules (2g)., Disp: 4 capsule, Rfl: 0     blood glucose (NO BRAND SPECIFIED) test strip, Use to test blood sugar 2 times daily or as directed. Patient requesting One Touch Ultra Strips, Disp: 100 strip, Rfl: 3     cephALEXin (KEFLEX) 500 MG capsule, Take 1 capsule (500 mg) by mouth 2 times daily, Disp: 28 capsule, Rfl: 0     cloNIDine (CATAPRES) 0.1 MG tablet, Bring to clinic, in original bottle, one hour prior to procedure where you will be instructed to take 1 tablet (0.1mg), Disp: 1 tablet, Rfl: 0     Continuous Blood Gluc  (DEXCOM G6 ) KATIA, USE TO READ BLOOD SUGARS AS PER 'S INSTRUCTIONS., Disp: 1 each, Rfl: 0     Continuous Blood Gluc Sensor (DEXCOM G6 SENSOR) MISC, CHANGE EVERY 10 DAYS., Disp: 3 each, Rfl: 2     Continuous Blood Gluc Transmit (DEXCOM G6 TRANSMITTER) MISC, Change every 3 months., Disp: 1 each, Rfl: 3     diltiazem ER COATED BEADS (CARDIZEM CD) 360 MG 24 hr capsule, Take 1 capsule (360 mg) by mouth daily, Disp: 90 capsule, Rfl: 3     econazole nitrate 1 % external cream, Apply topically daily as needed To feet and toenails, Disp: , Rfl:      furosemide (LASIX) 20 MG  tablet, Take 1 tablet (20 mg) by mouth daily, Disp: 90 tablet, Rfl: 3     Garlic 1000 MG CAPS, Take 1 capsule by mouth daily Takes during summer months.  Done taking until next summer., Disp: , Rfl:      Glucagon (GVOKE HYPOPEN 1-PACK) 1 MG/0.2ML SOAJ, Inject 1 mg Subcutaneous once as needed Administer the injection in the lower abdomen, outer thigh, or outer upperarm, Disp: 0.2 mL, Rfl: 3     hydrochlorothiazide (HYDRODIURIL) 25 MG tablet, Take 2 tablets (50 mg) by mouth daily, Disp: 180 tablet, Rfl: 3     ibuprofen (ADVIL/MOTRIN) 200 MG capsule, Take 600 mg by mouth every 6 hours as needed , Disp: , Rfl:      insulin pen needle (GNP CLICKFINE PEN NEEDLES) 31G X 8 MM miscellaneous, Uses 3 times daily or as directed, Disp: 300 each, Rfl: 3     insulin reg HIGH CONC (HUMULIN R U-500 KWIKPEN) 500 UNIT/ML PEN soln, Administer 120 units at 7 in the morning, and 90 units at 3-4 pm, Disp: 40 mL, Rfl: 3     levothyroxine (SYNTHROID/LEVOTHROID) 75 MCG tablet, Take 1 tablet (75 mcg) by mouth daily, Disp: 90 tablet, Rfl: 3     liraglutide (VICTOZA PEN) 18 MG/3ML solution, Inject 1.8 mg Subcutaneous daily, Disp: 27 mL, Rfl: 3     lisinopril (ZESTRIL) 20 MG tablet, Take 0.5 tablets (20 mg) by mouth daily, Disp: 90 tablet, Rfl: 3     LORazepam (ATIVAN) 1 MG tablet, Bring to clinic, in original bottle, one hour prior to procedure where you will be instructed to take 1 tablet(s) (1mg)., Disp: 1 tablet, Rfl: 0     metFORMIN (GLUCOPHAGE XR) 500 MG 24 hr tablet, Take 4 tablets (2,000 mg) by mouth At Bedtime TAKE 4 TABLETS AT BEDTIME, Disp: 360 tablet, Rfl: 3     metoprolol succinate ER (TOPROL XL) 200 MG 24 hr tablet, Take 1 tablet (200 mg) by mouth 2 times daily Dose increase, Disp: 180 tablet, Rfl: 3     Multiple Vitamins-Minerals (ADVANCED DIABETIC MULTIVITAMIN) TABS, Take 1 tablet by mouth daily, Disp: , Rfl:      omega 3 1000 MG CAPS, , Disp: , Rfl:      ORDER FOR DME, SET TO LOCAL PRINT,, Respironics REMSTAR 60 Series Auto CPAP  8-15cm H2O, Airfit P10 nasal pillow mask w/medium pillows, Disp: , Rfl:      order for DME, Equipment being ordered: [id number] $70   Rosalinda , Disp: 1 Device, Rfl: 0     potassium chloride ER (KLOR-CON M) 20 MEQ CR tablet, Take 1 tablet (20 mEq) by mouth daily, Disp: 90 tablet, Rfl: 3     rivaroxaban ANTICOAGULANT (XARELTO ANTICOAGULANT) 20 MG TABS tablet, Take 1 tablet (20 mg) by mouth daily (with dinner), Disp: 90 tablet, Rfl: 3     rosuvastatin (CRESTOR) 10 MG tablet, Take 1 tablet (10 mg) by mouth daily, Disp: 90 tablet, Rfl: 3    Current Facility-Administered Medications:      lidocaine 1 % injection 0.5 mL, 0.5 mL, , , Errol Ardon, , 0.5 mL at 09/28/22 1445     triamcinolone (KENALOG-40) injection 20 mg, 20 mg, , , Errol Ardon, , 20 mg at 09/28/22 1445    Family History  Maternal grandmother had type 2 diabetes  Daughter has GDM  Father had CAD s/p stent, lung cancer and abdominal aortic anuerysm  Older brother has CABG, and abdominal aortic anuerysm  Young brother has Afib, SVT  Another younger brother has multiple myeloma  Sister has SVT    Social History  No smoking, rarely drink EtOH  No illicit drug  Worked as a nurse in the ICU. Retired in 2019.   Lives by herself, has 3 daughters    ROS  She has been hearing her heart pounding in her years, when racing.      Physical Exam  BP Readings from Last 6 Encounters:   11/30/22 104/65   11/30/22 104/59   11/02/22 132/64   09/28/22 108/66   05/25/22 107/66   05/25/22 129/71     Wt Readings from Last 10 Encounters:   11/30/22 135.2 kg (298 lb)   11/30/22 135.2 kg (298 lb 1.6 oz)   11/02/22 136.9 kg (301 lb 12.8 oz)   05/25/22 135.5 kg (298 lb 12.8 oz)   05/25/22 135.4 kg (298 lb 8 oz)   11/24/21 137 kg (302 lb)   11/24/21 137 kg (302 lb)   07/21/21 132.9 kg (293 lb 1.6 oz)   04/14/21 131.5 kg (290 lb)   04/06/21 131.5 kg (290 lb)     LMP 10/02/2007   There is no height or weight on file to calculate BMI.   Estimated body mass index is 47.09 kg/m   "as calculated from the following:    Height as of 11/2/22: 1.705 m (5' 7.13\").    Weight as of 11/2/22: 136.9 kg (301 lb 12.8 oz).    Constitutional: general obesity, no distress, comfortable, pleasant   Eyes: anicteric, normal extra-ocular movements, no lid lag or retraction  Neck: no thyromegaly; no palpable nodules  CVS: Regular rhythm, systolic murmur with radiation to the carotid arteries   Lungs: CTA B/L  Musculoskeletal: no edema, DP 2+  NS: no tremor, normal gait, knee reflexes absent  Psychological: appropriate mood    RESULTS  I reviewed prior lab results documented in epic.  Lab Results   Component Value Date    A1C 7.0 (H) 11/28/2022    A1C 7.1 (A) 05/25/2022    A1C 8.1 (H) 11/24/2021    A1C 9.0 (H) 08/16/2021    A1C 6.8 (H) 01/05/2021    A1C 6.9 (H) 11/25/2020    A1C 7.5 (A) 07/22/2020    A1C 7.3 (H) 12/10/2019       Hemoglobin   Date Value Ref Range Status   07/26/2022 12.7 11.7 - 15.7 g/dL Final   01/05/2021 13.9 11.7 - 15.7 g/dL Final     Hematocrit   Date Value Ref Range Status   11/28/2022 40.9 35.0 - 47.0 % Final   01/05/2021 40.4 35.0 - 47.0 % Final     Cholesterol   Date Value Ref Range Status   11/28/2022 122 <200 mg/dL Final   03/15/2021 130 <200 mg/dL Final     Cholesterol/HDL Ratio   Date Value Ref Range Status   02/19/2015 3.2 0.0 - 5.0 Final     HDL Cholesterol   Date Value Ref Range Status   03/15/2021 44 (L) >49 mg/dL Final     Direct Measure HDL   Date Value Ref Range Status   11/28/2022 41 (L) >=50 mg/dL Final     LDL Cholesterol Calculated   Date Value Ref Range Status   11/28/2022 53 <=100 mg/dL Final   03/15/2021 46 <100 mg/dL Final     Comment:     Desirable:       <100 mg/dl     VLDL-Cholesterol   Date Value Ref Range Status   02/19/2015 34 (H) 0 - 30 mg/dL Final     Triglycerides   Date Value Ref Range Status   11/28/2022 142 <150 mg/dL Final   03/15/2021 200 (H) <150 mg/dL Final     Comment:     Borderline high:  150-199 mg/dl  High:             200-499 mg/dl  Very high:       " >499 mg/dl  Fasting specimen       Albumin Urine mg/L   Date Value Ref Range Status   11/28/2022 12 mg/L Final   01/05/2021 <5 mg/L Final     TSH   Date Value Ref Range Status   11/28/2022 2.22 0.40 - 4.00 mU/L Final   01/05/2021 2.38 0.40 - 4.00 mU/L Final         Last Basic Metabolic Panel:    Sodium   Date Value Ref Range Status   11/28/2022 139 133 - 144 mmol/L Final   04/13/2021 137 133 - 144 mmol/L Final     Potassium   Date Value Ref Range Status   11/28/2022 4.0 3.4 - 5.3 mmol/L Final   04/13/2021 4.2 3.4 - 5.3 mmol/L Final     Chloride   Date Value Ref Range Status   11/28/2022 105 94 - 109 mmol/L Final   04/13/2021 106 94 - 109 mmol/L Final     Calcium   Date Value Ref Range Status   11/28/2022 9.7 8.5 - 10.1 mg/dL Final   04/13/2021 10.1 8.5 - 10.1 mg/dL Final     Carbon Dioxide   Date Value Ref Range Status   04/13/2021 28 20 - 32 mmol/L Final     Carbon Dioxide (CO2)   Date Value Ref Range Status   11/28/2022 26 20 - 32 mmol/L Final     Urea Nitrogen   Date Value Ref Range Status   11/28/2022 20 7 - 30 mg/dL Final   04/13/2021 22 7 - 30 mg/dL Final     Creatinine   Date Value Ref Range Status   11/28/2022 0.84 0.52 - 1.04 mg/dL Final   04/13/2021 0.81 0.52 - 1.04 mg/dL Final     GFR Estimate   Date Value Ref Range Status   11/28/2022 75 >60 mL/min/1.73m2 Final     Comment:     Effective December 21, 2021 eGFRcr in adults is calculated using the 2021 CKD-EPI creatinine equation which includes age and gender (Yashira kim al., NEJM, DOI: 10.1056/FRTDkl2404867)   04/13/2021 75 >60 mL/min/[1.73_m2] Final     Comment:     Non  GFR Calc  Starting 12/18/2018, serum creatinine based estimated GFR (eGFR) will be   calculated using the Chronic Kidney Disease Epidemiology Collaboration   (CKD-EPI) equation.       Glucose   Date Value Ref Range Status   11/28/2022 189 (H) 70 - 99 mg/dL Final   04/13/2021 98 70 - 99 mg/dL Final     Comment:     Non Fasting       AST   Date Value Ref Range Status    11/28/2022 41 0 - 45 U/L Final   11/25/2020 Unsatisfactory specimen - hemolyzed 0 - 45 U/L Final     Comment:     Canceled, Test credited  Critical Value called to and read back by  GUTIERREZ GOMEZ RN ER 1901 11/25/2020 BY AA       ALT   Date Value Ref Range Status   11/28/2022 35 0 - 50 U/L Final   11/25/2020 42 0 - 50 U/L Final     Albumin   Date Value Ref Range Status   11/28/2022 3.8 3.4 - 5.0 g/dL Final   11/25/2020 3.9 3.4 - 5.0 g/dL Final     ASSESSMENT:      1. Type 2 diabetes/obesity  Sherry is a 67 year old pleasant female, with a history of type 2 diabetes in the setting of obesity, sleep apnea, hypertension, hypercholesterolemia.  Her diabetes is known to be complicated by neuropathy.  Recently, the glycemic control has improved, although it remains suboptimal.      Recommendations:  Replace Victoza by Ozempic, if approved by her insurance, at 2 mg weekly.    2. Hypothyroidism.  Clinically and biochemically, the patient is euthyroid. I recommended to continue to take the same dose of levothyroxine.    3.  Hypercholesterolemia, on Crestor.  Controlled    4.  Mild hypercalcemia, most likely due to vitamin D levels  Resolved on recent labs.    Orders Placed This Encounter   Procedures     Continuous Glucose Monitoring >=72 hours PHYS INTERP     Answers for HPI/ROS submitted by the patient on 11/28/2022  General Symptoms: No  Skin Symptoms: Yes  HENT Symptoms: No  EYE SYMPTOMS: No  HEART SYMPTOMS: Yes  LUNG SYMPTOMS: Yes  INTESTINAL SYMPTOMS: No  URINARY SYMPTOMS: No  GYNECOLOGIC SYMPTOMS: No  BREAST SYMPTOMS: No  SKELETAL SYMPTOMS: Yes  BLOOD SYMPTOMS: No  NERVOUS SYSTEM SYMPTOMS: No  MENTAL HEALTH SYMPTOMS: No  Changes in hair: No  Changes in moles/birth marks: No  Itching: No  Rashes: No  Changes in nails: No  Acne: No  Hair in places you don't want it: No  Change in facial hair: No  Warts: No  Non-healing sores: Yes  Scarring: No  Flaking of skin: No  Color changes of hands/feet in cold : Yes  Sun  sensitivity: No  Skin thickening: No  Chest pain or pressure: No  Fast or irregular heartbeat: Yes  Pain in legs with walking: No  Trouble breathing while lying down: No  Fingers or toes appear blue: Yes  High blood pressure: No  Low blood pressure: No  Fainting: No  Murmurs: No  Pacemaker: No  Varicose veins: Yes  Edema or swelling: No  Wake up at night with shortness of breath: No  Light-headedness: No  Exercise intolerance: No  Cough: Yes  Sputum or phlegm: No  Coughing up blood: No  Difficulty breating or shortness of breath: Yes  Snoring: No  Wheezing: No  Difficulty breathing on exertion: No  Nighttime Cough: No  Difficulty breathing when lying flat: No  Back pain: Yes  Muscle aches: Yes  Neck pain: No  Swollen joints: No  Joint pain: Yes  Bone pain: No  Muscle cramps: No  Muscle weakness: Yes  Joint stiffness: No  Bone fracture: No        Again, thank you for allowing me to participate in the care of your patient.        Sincerely,        Rika Delgado MD

## 2022-11-30 NOTE — PATIENT INSTRUCTIONS
Take your medicines every day, as directed    Changes made today:  none     Monitor Your Weight and Symptoms    Contact us if you:    Gain 2 pounds in one day or 5 pounds in one week  Feel more short of breath  Notice more leg swelling  Feel lightheadeded   Change your lifestyle    Limit Salt or Sodium:  2000 mg  Limit Fluids:  2000 mL or approximately 64 ounces  Eat a Heart Healthy Diet  Low in saturated fats  Stay Active:  Aim to move at least 150 minutes every  week         To Contact us    During Business Hours:  [phone], option # 1      After hours, weekends or holidays:   [phone], Option #4  Ask to speak to the On-Call Cardiologist. Inform them you are a CORE/heart failure patient at the [facility].     Use [employer] allows you to communicate directly with your heart team through secure messaging.  [employer] can be accessed any time on your phone, computer, or tablet.  If you need assistance, we'd be happy to help!         Keep your Heart Appointments:    6 months in [facility]      Please consider attending our virtual support group which is held monthly. Please reach out to Geraldo at [phone] for more information if you are interested in attending.     2022 dates:   - Monday, December 5th, 1-2pm: topic: How to thrive during winter and the holiday season with heart failure    2023 dates:  Monday, January 2nd , 1-2pm  Monday, February 6th , 1-2pm  Monday, March 6th , 1-2pm  Monday, April 3rd, 1-2pm  Monday, May 1st, 1-2pm  Monday, June 5th, 1-2pm  Monday, July 3rd, 1-2pm  Monday, August 7th, 1-2pm  Monday, September 11th, 1-2pm  Monday, October 2nd, 1-2pm  Monday, November 6th, 1-2pm  Monday, December 4th, 1-2pm

## 2022-11-30 NOTE — PROGRESS NOTES
1.  Type 2 diabetes.   2.  Hypertension.   3.  Hyperlipidemia.   4.  Depression.   5.  Obstructive sleep apnea, currently untreated.   6.  Hypothyroidism.   7.  Diverticulosis.   8.  Low back pain.   9.  Irritable bowel syndrome.     On 11/25/2020, Dr. Aranda saw the patient and sent her to the emergency room for further treatment of her AFib.  She ended up having her rate controlled and had an echocardiogram performed.  She then had ELIAS-guided cardioversion performed on 12/04/2020 successfully.  She was then hospitalized again on 12/09/2020 for pulmonary edema, likely secondary to being in rapid AFib for quite some time in the past in addition to having her hydrochlorothiazide discontinued.  She was in pulmonary edema on 12/09/2020, was diuresed with 40 mg of IV Lasix and had p.o. Lasix 20 started and she was sent here for further evaluation.        7/21/21- working with Endocrine about elevated glucose. SR HR 70's. SOB with humidity. She goes to baseball games with her grandchildren . No swelling in the legs today. Weight 292 lbs today. She states her appetite has been fine. She doesn't add salt to her food. She doesn't eat much processed food. She tries to eat the Keto bread. BAEZ but baseline.     11/24/21- She feels better, she says she is in sinus rhythm.  She is now retired. COVID has been going around in her family , but she has been fine - no COVID. Weight at home 300 lbs. She has not been able to be active. Appetite has been fine. She has some swelling in the left leg but none in the right.     5/25/22- In February and March she had Afib and felt worse. She is getting better. She says her Lisinopril was decreased with Dr. Aranda as her BP was running low. Weight at home 298 lbs. Appetite has been good. Occasional swelling but not too bad today. Has some BAEZ. Had COVID early May. Would like some rehab to know what she can do and can't do.      11/30/22- on the 11 th of November her heart rate on the fit bit  was 133- she had a stressful day. On 11/19- she jumped up again. She has been doing better now- hasn't noted faster HR's.  Today she is back in normal range. She did have virus with a cough. Weight at home 298 lbs. No swelling in the Legs/feet. No BAEZ or noticeable SOB.     CURRENT MEDICATIONS:  She is taking diltiazem 360 mg a day, Lasix 20 mg a day, insulin, levothyroxine, Victoza, lisinopril 20 mg a day, metformin, hydrochlorothiazide 50 mg ,  metoprolol succinate 200 mg a day. rivaroxaban 20 mg a day, rosuvastatin 10 mg a day.       ROS:   Constitutional: No fever, chills, or sweats.  ENT: No visual disturbance, ear ache, epistaxis, sore throat.   Allergies/Immunologic: Negative  Respiratory: No cough, hemoptysis.   Cardiovascular: As per HPI.   GI: As per HPI.   : No urinary frequency, dysuria, or hematuria.   Integument: Negative.   Psychiatric: Negative.   Neuro: Negative.   Endocrinology: negative   Musculoskeletal: negative    EXAM:   Constitutional - WDWN, no acute distress   Eyes - no redness or discharge, nonicteric sclera  Respiratory - nonlabored breathing, able to speak in full sentences without difficulty  CV: no visible edema of visualized upper extremities.  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 She appears to be euvolemic.     Continue diltiazem 360 mg every morning,continue Metoprolol       Continue Lasix 20 mg a day and hydrochlorothiazide 50 mg a day-    HFpEF:     BP: controlled   Fluid status euvolemic  Aldosterone antagonist: no  Ischemia evaluation: (complete/pending/not within the goals of care)   ACEi/ARB - Lisinopril  BB - Metoprolol   SCD prophylaxis - N/A, EF is normal   NSAID use: Contraindicated, reviewed with patient   Sleep Apnea Evaluation: (yes uses CPAP/ yes refuses  CPAP/ no/pending evaluation)      1.  Atrial fibrillation with rapid ventricular response. She underwent ELIAS-guided cardioversion on 12/04/2020.  She has a relatively structurally normal heart.  She is currently stable in normal sinus rhythm on significant doses of metoprolol and diltiazem.  We will continue to monitor.  She is tolerating anticoagulation well without gross bleeding.      2.  Hypertension.     Blood pressures do appear to be well controlled as they have been at 120 or lower systolic daily.      3.  Congestive heart failure.     Her dry weight is likely in the 299-300 pound range. LVEF 60-65%        4.  Sleep apnea. She has been trying to use the Bi-pap more.      5.  Mild aortic stenosis.    This was seen on recent echo and a mean gradient was only 12 mmHg. continue to follow-through time.       I reviewed patients pertinent clinical laboratory studies  I reviewed patients medications  I reviewed patients pertinent medical records      Plan:  No med changes  CORE in 6 months         Robinson FERNANDEZ NP-C  Advanced Heart Failure Nurse Practitioner  [facility]

## 2022-11-30 NOTE — NURSING NOTE
Sherry Slaughter's goals for this visit include:   Chief Complaint   Patient presents with     Diabetes     She requests these members of her care team be copied on today's visit information: Yes    PCP: Marilou Arciniega    Referring Provider:  Marilou Arciniega MD PhD  [address]  [place]  [place] [address]    /65 (BP Location: Left arm, Patient Position: Sitting, Cuff Size: Adult Large)   Pulse 71   Wt 135.2 kg (298 lb)   LMP 10/02/2007   SpO2 98%   BMI 46.50 kg/m      Do you need any medication refills at today's visit? No

## 2022-11-30 NOTE — PROGRESS NOTES
Sherry Slaughter's goals for this visit include: Refill the Lisinopril.     She requests these members of her care team be copied on today's visit information: PCP    PCP: Marilou Arciniega    Referring Provider:  No referring provider defined for this encounter.    LMP 10/02/2007     Do you need any medication refills at today's visit? Yes. Lisinopril.     Dada Lindsey, EMT  Clinic Support  [facility]    [phone]    Employed by [facility] [employer]

## 2022-11-30 NOTE — PROGRESS NOTES
Sherry is a 67 year old female with a past medical history significant for obesity, hyperlipidemia, depression, obstructive sleep apnea, hypothyroidism, hypertension, osteoarthritis, hypercholesterolemia, atrial fibrillation (status post ELIAS cardioversion on 12/2020), seen in follow-up for type 2 diabetes. She was last seen in our clinic in 5/2022.     The patient was diagnosed with type 2 diabetes ~ 25 years after she was diagnosed with gestational diabetes, during both her pregnancies. She was initially started on oral medications and then insulin was added around 2012.     Her A1C has been around 7% over the last years. She was on Byetta but she did not feel it helped. She was also on Actos but stopped due to weight gain and increased SOB.  For approximately 3 years, she was treated with Victoza.  It was resumed in March 2019.  In the beginning of July 2019, she was transitioned to U500 insulin. Jardiance was tried in 2019 and was discontinued due to genital fungal infections.      A1c was 7% on 11/28/22, stable since May 2022.  Invokana was started in 11/2021 and it was discontinued in July 2022, as there was concern over a nonhealing ulcer of the right MPJ, associated with blue discoloration of the toes. The discoloration resolved following the discontinuation of Invokana. She had her insoles adjusted and got new diabetic shoes. Continues to f/up with Dr. Hutson and a f/up apt is scheduled next week.     Current antidiabetic regimen:   U500 insulin: 120 units in am, 90 U at 3-4 PM   1.8 mg Victoza daily (3 mg daily was too expensive; Wegovy not covered by insurance)  2 gm ER metformin daily     I reviewed the sensor records.  Average glucose is 178, with a standard deviation of 55, corresponding to a GMI of 7.6%.  51% of the glucose numbers are within target of , 9% are above 250 and 1% are in the hypoglycemic range.  The sensor reveals mild postprandial spikes with all meals.  During nighttime,  the blood glucose is quite variable, with no identifiable pattern.    DM complications:  Retinopathy: last eye exam: July 2022; no DR, S/P B/L surgery for cataract. Note reviewed.   Nephropathy: No history of microalbuminuria.  Negative urine microalb 11/22;  GFR in the 70s. On 20 mg lisinopril daily.   Neuropathy: occasional burning sensation in her feet for the last couple of years. H/o left toe ulcer.  S/p bunion surgery. Right first right metatarsal phalangeal joint ulcer, for which she follows up with Dr. Hutson.   She is symptomatic for hypoglycemia (able to recognize blood sugar numbers in the 90s - she gets sweaty, hot and lightheaded).  She corrects the hypoglycemic symptoms by having something to eat. Has glucagon at home.   Aspirin - taking 325 mg  LDL 53, HDL 41,  (11/28/22) on Crestor 10 mg   Using the CPAP   Exercise limited due to bilateral knee replacement and low back pain.    2.  Hypertension  Her blood pressure is managed with 20 mg lisinopril daily, 360 mg diltiazem,  200 mg metoprolol BID, 25 mg hydrochlorothiazide daily, 20 mg lasix daily. On 20 mEq K daily.     3. High normal calcium levels, documented in our records since 2009. Prior PTH levels have been low normal, as well as phosphorus.  Most recent calcium was 9.7, on 11/28/22. Most recent vitamin D 75 in May 2022, when the patient was instructed to minimally decrease the dose.      4. Hypothyroidism    Most recent TSH was 2.22 on 11/28/22.  She remains on a constant dose of 75 mcg levothyroxine daily.    Past Medical History  Past Medical History:   Diagnosis Date     Cataract      Congestive heart failure (H) 2019 NOVEMBER    AFIB     DEPRESSION     comes and goes - tried meds - unsuccessfully, certain times of the year, no psych intervention and no counselors in the past - and not interested      Diverticulosis of colon (without mention of hemorrhage) 4/04    colonoscopy     ECHO-mildLVH,tr MR,mild thick lflets w inc LA,trTR    12/03     Essential hypertension, benign 1990s    late 1990s - started medications at that time - not to difficult to control meds      Fam hx-cardiovas dis NEC     father - CAD, and lipids/HTN - multiple members of the family      Family history of diabetes mellitus     sister and grandmother with DM      Family history of malignant neoplasm of breast     mother - young age - 45, and maternal cousin and aunt as well - no BRCA testing done      Family history of stroke (cerebrovascular)     grandmother in early life in her 40s      FAMILY HX COLON CANCER     Pat uncles x 2     Heart disease     murmur/     Heart murmur      HYPERLIPIDEMIA 2000    fairly recent - in the last 5 years - high for DM levels  -cholesterol recent      Irritable bowel syndrome     goes between the 2 - nerve related - more stressed more problems      MICROALBUMINURIA     unsure how long - been on the lisinopril - for a few years at well      Nonspecific abnormal results of liver function study 2/3/2003    SGOT - has been high in the past - since the hepatitis b - borderline elevation - not really changing any      OBESITY      Obstructive sleep apnea      GENESIS (obstructive sleep apnea) 10/15/2006    psg 5/15 AHI 53 aPAP 8-15     OSTEOARTHRITIS L KNEE 2003    total knee on the left - and pain is gone since the knee replacement      PERS HX HEPATITIS B- RESOLVED] 1977    acute virus only - no chronic disease      PERS HX HEPATITIS B- RESOLVED]      Type II or unspecified type diabetes mellitus without mention of complication, not stated as uncontrolled 1991    oral meds frist, then insulin eventually later, difficult to control most of the time      Unspecified hypothyroidism 10/11/2006    TSH 3.36 - mild subclinical hypothyroidism - deciding on following or starting low dose meds - with dr Oreilly - following    Hepatis B     Allergies  Allergies   Allergen Reactions     Empagliflozin      Yeast infections     Lipitor [Atorvastatin Calcium]       Gas     Pravachol [Pravastatin Sodium]      Pravachol - dry cough      Simvastatin      Myalgia     Medications    Current Outpatient Medications:      amoxicillin (AMOXIL) 500 MG capsule, Bring to clinic one hour prior to procedure where you will be instructed to take 4 capsules (2g)., Disp: 4 capsule, Rfl: 0     blood glucose (NO BRAND SPECIFIED) test strip, Use to test blood sugar 2 times daily or as directed. Patient requesting One Touch Ultra Strips, Disp: 100 strip, Rfl: 3     cephALEXin (KEFLEX) 500 MG capsule, Take 1 capsule (500 mg) by mouth 2 times daily, Disp: 28 capsule, Rfl: 0     cloNIDine (CATAPRES) 0.1 MG tablet, Bring to clinic, in original bottle, one hour prior to procedure where you will be instructed to take 1 tablet (0.1mg), Disp: 1 tablet, Rfl: 0     Continuous Blood Gluc  (DEXCOM G6 ) KATIA, USE TO READ BLOOD SUGARS AS PER 'S INSTRUCTIONS., Disp: 1 each, Rfl: 0     Continuous Blood Gluc Sensor (DEXCOM G6 SENSOR) MISC, CHANGE EVERY 10 DAYS., Disp: 3 each, Rfl: 2     Continuous Blood Gluc Transmit (DEXCOM G6 TRANSMITTER) MISC, Change every 3 months., Disp: 1 each, Rfl: 3     diltiazem ER COATED BEADS (CARDIZEM CD) 360 MG 24 hr capsule, Take 1 capsule (360 mg) by mouth daily, Disp: 90 capsule, Rfl: 3     econazole nitrate 1 % external cream, Apply topically daily as needed To feet and toenails, Disp: , Rfl:      furosemide (LASIX) 20 MG tablet, Take 1 tablet (20 mg) by mouth daily, Disp: 90 tablet, Rfl: 3     Garlic 1000 MG CAPS, Take 1 capsule by mouth daily Takes during summer months.  Done taking until next summer., Disp: , Rfl:      Glucagon (GVOKE HYPOPEN 1-PACK) 1 MG/0.2ML SOAJ, Inject 1 mg Subcutaneous once as needed Administer the injection in the lower abdomen, outer thigh, or outer upperarm, Disp: 0.2 mL, Rfl: 3     hydrochlorothiazide (HYDRODIURIL) 25 MG tablet, Take 2 tablets (50 mg) by mouth daily, Disp: 180 tablet, Rfl: 3     ibuprofen (ADVIL/MOTRIN) 200  MG capsule, Take 600 mg by mouth every 6 hours as needed , Disp: , Rfl:      insulin pen needle (GNP CLICKFINE PEN NEEDLES) 31G X 8 MM miscellaneous, Uses 3 times daily or as directed, Disp: 300 each, Rfl: 3     insulin reg HIGH CONC (HUMULIN R U-500 KWIKPEN) 500 UNIT/ML PEN soln, Administer 120 units at 7 in the morning, and 90 units at 3-4 pm, Disp: 40 mL, Rfl: 3     levothyroxine (SYNTHROID/LEVOTHROID) 75 MCG tablet, Take 1 tablet (75 mcg) by mouth daily, Disp: 90 tablet, Rfl: 3     liraglutide (VICTOZA PEN) 18 MG/3ML solution, Inject 1.8 mg Subcutaneous daily, Disp: 27 mL, Rfl: 3     lisinopril (ZESTRIL) 20 MG tablet, Take 0.5 tablets (20 mg) by mouth daily, Disp: 90 tablet, Rfl: 3     LORazepam (ATIVAN) 1 MG tablet, Bring to clinic, in original bottle, one hour prior to procedure where you will be instructed to take 1 tablet(s) (1mg)., Disp: 1 tablet, Rfl: 0     metFORMIN (GLUCOPHAGE XR) 500 MG 24 hr tablet, Take 4 tablets (2,000 mg) by mouth At Bedtime TAKE 4 TABLETS AT BEDTIME, Disp: 360 tablet, Rfl: 3     metoprolol succinate ER (TOPROL XL) 200 MG 24 hr tablet, Take 1 tablet (200 mg) by mouth 2 times daily Dose increase, Disp: 180 tablet, Rfl: 3     Multiple Vitamins-Minerals (ADVANCED DIABETIC MULTIVITAMIN) TABS, Take 1 tablet by mouth daily, Disp: , Rfl:      omega 3 1000 MG CAPS, , Disp: , Rfl:      ORDER FOR DME, SET TO LOCAL PRINT,, Respironics REMSTAR 60 Series Auto CPAP 8-15cm H2O, Airfit P10 nasal pillow mask w/medium pillows, Disp: , Rfl:      order for DME, Equipment being ordered: [id number] $70   Rosalinda PHELPS, Disp: 1 Device, Rfl: 0     potassium chloride ER (KLOR-CON M) 20 MEQ CR tablet, Take 1 tablet (20 mEq) by mouth daily, Disp: 90 tablet, Rfl: 3     rivaroxaban ANTICOAGULANT (XARELTO ANTICOAGULANT) 20 MG TABS tablet, Take 1 tablet (20 mg) by mouth daily (with dinner), Disp: 90 tablet, Rfl: 3     rosuvastatin (CRESTOR) 10 MG tablet, Take 1 tablet (10 mg) by mouth daily, Disp: 90 tablet,  "Rfl: 3    Current Facility-Administered Medications:      lidocaine 1 % injection 0.5 mL, 0.5 mL, , , Errol Ardon, , 0.5 mL at 09/28/22 1445     triamcinolone (KENALOG-40) injection 20 mg, 20 mg, , , Errol Ardon, , 20 mg at 09/28/22 1445    Family History  Maternal grandmother had type 2 diabetes  Daughter has GDM  Father had CAD s/p stent, lung cancer and abdominal aortic anuerysm  Older brother has CABG, and abdominal aortic anuerysm  Young brother has Afib, SVT  Another younger brother has multiple myeloma  Sister has SVT    Social History  No smoking, rarely drink EtOH  No illicit drug  Worked as a nurse in the ICU. Retired in 2019.   Lives by herself, has 3 daughters    ROS  She has been hearing her heart pounding in her years, when racing.      Physical Exam  BP Readings from Last 6 Encounters:   11/30/22 104/65   11/30/22 104/59   11/02/22 132/64   09/28/22 108/66   05/25/22 107/66   05/25/22 129/71     Wt Readings from Last 10 Encounters:   11/30/22 135.2 kg (298 lb)   11/30/22 135.2 kg (298 lb 1.6 oz)   11/02/22 136.9 kg (301 lb 12.8 oz)   05/25/22 135.5 kg (298 lb 12.8 oz)   05/25/22 135.4 kg (298 lb 8 oz)   11/24/21 137 kg (302 lb)   11/24/21 137 kg (302 lb)   07/21/21 132.9 kg (293 lb 1.6 oz)   04/14/21 131.5 kg (290 lb)   04/06/21 131.5 kg (290 lb)     LMP 10/02/2007   There is no height or weight on file to calculate BMI.   Estimated body mass index is 47.09 kg/m  as calculated from the following:    Height as of 11/2/22: 1.705 m (5' 7.13\").    Weight as of 11/2/22: 136.9 kg (301 lb 12.8 oz).    Constitutional: general obesity, no distress, comfortable, pleasant   Eyes: anicteric, normal extra-ocular movements, no lid lag or retraction  Neck: no thyromegaly; no palpable nodules  CVS: Regular rhythm, systolic murmur with radiation to the carotid arteries   Lungs: CTA B/L  Musculoskeletal: no edema, DP 2+  NS: no tremor, normal gait, knee reflexes absent  Psychological: appropriate " mood    RESULTS  I reviewed prior lab results documented in epic.  Lab Results   Component Value Date    A1C 7.0 (H) 11/28/2022    A1C 7.1 (A) 05/25/2022    A1C 8.1 (H) 11/24/2021    A1C 9.0 (H) 08/16/2021    A1C 6.8 (H) 01/05/2021    A1C 6.9 (H) 11/25/2020    A1C 7.5 (A) 07/22/2020    A1C 7.3 (H) 12/10/2019       Hemoglobin   Date Value Ref Range Status   07/26/2022 12.7 11.7 - 15.7 g/dL Final   01/05/2021 13.9 11.7 - 15.7 g/dL Final     Hematocrit   Date Value Ref Range Status   11/28/2022 40.9 35.0 - 47.0 % Final   01/05/2021 40.4 35.0 - 47.0 % Final     Cholesterol   Date Value Ref Range Status   11/28/2022 122 <200 mg/dL Final   03/15/2021 130 <200 mg/dL Final     Cholesterol/HDL Ratio   Date Value Ref Range Status   02/19/2015 3.2 0.0 - 5.0 Final     HDL Cholesterol   Date Value Ref Range Status   03/15/2021 44 (L) >49 mg/dL Final     Direct Measure HDL   Date Value Ref Range Status   11/28/2022 41 (L) >=50 mg/dL Final     LDL Cholesterol Calculated   Date Value Ref Range Status   11/28/2022 53 <=100 mg/dL Final   03/15/2021 46 <100 mg/dL Final     Comment:     Desirable:       <100 mg/dl     VLDL-Cholesterol   Date Value Ref Range Status   02/19/2015 34 (H) 0 - 30 mg/dL Final     Triglycerides   Date Value Ref Range Status   11/28/2022 142 <150 mg/dL Final   03/15/2021 200 (H) <150 mg/dL Final     Comment:     Borderline high:  150-199 mg/dl  High:             200-499 mg/dl  Very high:       >499 mg/dl  Fasting specimen       Albumin Urine mg/L   Date Value Ref Range Status   11/28/2022 12 mg/L Final   01/05/2021 <5 mg/L Final     TSH   Date Value Ref Range Status   11/28/2022 2.22 0.40 - 4.00 mU/L Final   01/05/2021 2.38 0.40 - 4.00 mU/L Final         Last Basic Metabolic Panel:    Sodium   Date Value Ref Range Status   11/28/2022 139 133 - 144 mmol/L Final   04/13/2021 137 133 - 144 mmol/L Final     Potassium   Date Value Ref Range Status   11/28/2022 4.0 3.4 - 5.3 mmol/L Final   04/13/2021 4.2 3.4 - 5.3  mmol/L Final     Chloride   Date Value Ref Range Status   11/28/2022 105 94 - 109 mmol/L Final   04/13/2021 106 94 - 109 mmol/L Final     Calcium   Date Value Ref Range Status   11/28/2022 9.7 8.5 - 10.1 mg/dL Final   04/13/2021 10.1 8.5 - 10.1 mg/dL Final     Carbon Dioxide   Date Value Ref Range Status   04/13/2021 28 20 - 32 mmol/L Final     Carbon Dioxide (CO2)   Date Value Ref Range Status   11/28/2022 26 20 - 32 mmol/L Final     Urea Nitrogen   Date Value Ref Range Status   11/28/2022 20 7 - 30 mg/dL Final   04/13/2021 22 7 - 30 mg/dL Final     Creatinine   Date Value Ref Range Status   11/28/2022 0.84 0.52 - 1.04 mg/dL Final   04/13/2021 0.81 0.52 - 1.04 mg/dL Final     GFR Estimate   Date Value Ref Range Status   11/28/2022 75 >60 mL/min/1.73m2 Final     Comment:     Effective December 21, 2021 eGFRcr in adults is calculated using the 2021 CKD-EPI creatinine equation which includes age and gender (Yashira et al., NEJ, DOI: 10.1056/BSUWyb4662787)   04/13/2021 75 >60 mL/min/[1.73_m2] Final     Comment:     Non  GFR Calc  Starting 12/18/2018, serum creatinine based estimated GFR (eGFR) will be   calculated using the Chronic Kidney Disease Epidemiology Collaboration   (CKD-EPI) equation.       Glucose   Date Value Ref Range Status   11/28/2022 189 (H) 70 - 99 mg/dL Final   04/13/2021 98 70 - 99 mg/dL Final     Comment:     Non Fasting       AST   Date Value Ref Range Status   11/28/2022 41 0 - 45 U/L Final   11/25/2020 Unsatisfactory specimen - hemolyzed 0 - 45 U/L Final     Comment:     Canceled, Test credited  Critical Value called to and read back by  GUTIERREZ GOMEZ RN ER 1901 11/25/2020 BY AA       ALT   Date Value Ref Range Status   11/28/2022 35 0 - 50 U/L Final   11/25/2020 42 0 - 50 U/L Final     Albumin   Date Value Ref Range Status   11/28/2022 3.8 3.4 - 5.0 g/dL Final   11/25/2020 3.9 3.4 - 5.0 g/dL Final     ASSESSMENT:      1. Type 2 diabetes/obesity  Sherry is a 67 year old pleasant  female, with a history of type 2 diabetes in the setting of obesity, sleep apnea, hypertension, hypercholesterolemia.  Her diabetes is known to be complicated by neuropathy.  Recently, the glycemic control has improved, although it remains suboptimal.      Recommendations:  Replace Victoza by Ozempic, if approved by her insurance, at 2 mg weekly.    2. Hypothyroidism.  Clinically and biochemically, the patient is euthyroid. I recommended to continue to take the same dose of levothyroxine.    3.  Hypercholesterolemia, on Crestor.  Controlled    4.  Mild hypercalcemia, most likely due to vitamin D levels  Resolved on recent labs.    Orders Placed This Encounter   Procedures     Continuous Glucose Monitoring >=72 hours PHYS INTERP     Answers for HPI/ROS submitted by the patient on 11/28/2022  General Symptoms: No  Skin Symptoms: Yes  HENT Symptoms: No  EYE SYMPTOMS: No  HEART SYMPTOMS: Yes  LUNG SYMPTOMS: Yes  INTESTINAL SYMPTOMS: No  URINARY SYMPTOMS: No  GYNECOLOGIC SYMPTOMS: No  BREAST SYMPTOMS: No  SKELETAL SYMPTOMS: Yes  BLOOD SYMPTOMS: No  NERVOUS SYSTEM SYMPTOMS: No  MENTAL HEALTH SYMPTOMS: No  Changes in hair: No  Changes in moles/birth marks: No  Itching: No  Rashes: No  Changes in nails: No  Acne: No  Hair in places you don't want it: No  Change in facial hair: No  Warts: No  Non-healing sores: Yes  Scarring: No  Flaking of skin: No  Color changes of hands/feet in cold : Yes  Sun sensitivity: No  Skin thickening: No  Chest pain or pressure: No  Fast or irregular heartbeat: Yes  Pain in legs with walking: No  Trouble breathing while lying down: No  Fingers or toes appear blue: Yes  High blood pressure: No  Low blood pressure: No  Fainting: No  Murmurs: No  Pacemaker: No  Varicose veins: Yes  Edema or swelling: No  Wake up at night with shortness of breath: No  Light-headedness: No  Exercise intolerance: No  Cough: Yes  Sputum or phlegm: No  Coughing up blood: No  Difficulty breating or shortness of breath:  Yes  Snoring: No  Wheezing: No  Difficulty breathing on exertion: No  Nighttime Cough: No  Difficulty breathing when lying flat: No  Back pain: Yes  Muscle aches: Yes  Neck pain: No  Swollen joints: No  Joint pain: Yes  Bone pain: No  Muscle cramps: No  Muscle weakness: Yes  Joint stiffness: No  Bone fracture: No

## 2022-12-01 ENCOUNTER — OFFICE VISIT (OUTPATIENT)
Dept: VASCULAR SURGERY | Facility: CLINIC | Age: 67
End: 2022-12-01
Payer: COMMERCIAL

## 2022-12-01 VITALS — HEART RATE: 72 BPM | DIASTOLIC BLOOD PRESSURE: 64 MMHG | SYSTOLIC BLOOD PRESSURE: 109 MMHG | OXYGEN SATURATION: 96 %

## 2022-12-01 DIAGNOSIS — I83.812 VARICOSE VEINS OF LEFT LOWER EXTREMITY WITH PAIN: Primary | ICD-10-CM

## 2022-12-01 PROCEDURE — 36476 ENDOVENOUS RF VEIN ADD-ON: CPT | Mod: LT | Performed by: SURGERY

## 2022-12-01 PROCEDURE — 36475 ENDOVENOUS RF 1ST VEIN: CPT | Mod: LT | Performed by: SURGERY

## 2022-12-01 PROCEDURE — 37799 UNLISTED PX VASCULAR SURGERY: CPT | Mod: LT | Performed by: SURGERY

## 2022-12-01 RX ORDER — HYDROCODONE BITARTRATE AND ACETAMINOPHEN 5; 325 MG/1; MG/1
1 TABLET ORAL EVERY 6 HOURS PRN
Qty: 2 TABLET | Refills: 0 | Status: SHIPPED | OUTPATIENT
Start: 2022-12-01 | End: 2023-01-30

## 2022-12-01 RX ORDER — HYDROCODONE BITARTRATE AND ACETAMINOPHEN 5; 325 MG/1; MG/1
1 TABLET ORAL EVERY 6 HOURS PRN
Refills: 0 | Status: CANCELLED | OUTPATIENT
Start: 2022-12-01

## 2022-12-01 NOTE — LETTER
12/1/2022         RE: Sherry Slaughter  [address]  [place] [address]        Dear Colleague,    Thank you for referring your patient, Sherry Slaughter, to the [facility] [place]. Please see a copy of my visit note below.    Pre-procedure Nursing Note    Sherry Slaughter presents to clinic for Vein Procedure  .   /Person Responsible for Patient: Sanam (sister)  Phone Number: [phone]    Prophylactic Medication:Antibiotics, Amoxicillin, 2gm  Time Taken: 1202   Sedation Medication: Ativan, 1mg ,   Time Taken: 1202 and Clonidine, 0.1mg,   Time Taken: 1202  Compression Stockings: Patient left hose at home.  The procedure is being performed on E.  Patient understanding of procedure matches consent? YES    Patient's pre-procedure medications verified by Chris Kat RN  .    Chris Kat RN on 12/1/2022 at 12:02 PM        Vein Clinic Procedure Note    Indications:  Recurrent left leg varicose veins and pain secondary to recanalization of the left great saphenous and small saphenous veins; status post endovenous ablation using cyanoacrylate glue 12/30/2021    Procedure:  1. Radiofrequency ablation recanalized segment left great saphenous vein  2. Radiofrequency ablation recanalized segment left small saphenous vein  3. Medically necessary phlebectomies left lower extremity (8 stabs)    Surgeon  LISA Pride MD    Procedure Description  Details of the procedure including risks of bleeding, infection, nerve injury, scarring, hyperpigmentation, deep vein thrombosis, recanalization of the great saphenous or small saphenous veins and recurrent varicose veins all discussed.  The patient voiced understanding and wished to proceed.  Informed consent was obtained.     I had the patient stand and marked varicosities coursing from the medial calf posteriorly to the proximal, posterior calf with an indelible marker.  We then proceeded the operating room, had  the patient lie supine on the operating table, then prepped and draped the left lower extremity sterilely.    We took a timeout to confirm the appropriate operative site and procedure: Radiofrequency ablation of the left great saphenous and small saphenous veins    VNUS Closure  Great saphenous vein  I imaged the left lower extremity easily identifying the left great saphenous vein.  The vein had recanalized from the proximal calf to the distal thigh with a large varicosity coursing from the calf great saphenous vein posteriorly to communicate with the small saphenous vein.  I infiltrated the skin overlying the great saphenous vein just cephalad of the mid calf, placed a micropuncture needle into the vein followed by a micropuncture guidewire and a 7 Vietnamese sheath.    We passed the closure fast device through the sheath, positioning of the tip of the device approximately 8 to 10 cm cephalad of the knee under ultrasound guidance.  Tumescent anesthetic injected along the course of the great saphenous vein under ultrasound guidance.    After allowing the block to take effect and after confirming appropriate position, I applied compression to the distal thigh great saphenous vein with the ultrasound probe and additional width 2 fingerbreadths treating the first 7 cm increment of the vein with 3 RF sessions.  The second increment of the vein was treated with 2 RF sessions.  We treated down to the just cephalad of the mid calf.  The patient was comfortable throughout.  I confirmed that the vein was closed then removed the sheath and the catheter again hemostasis with pressure.    Small saphenous vein ablation  We had the patient turned prone on our operating table and imaged the posterior left calf.  I tried to access the vein just cephalad of the mid calf but due to scarring I could not get the wire to thread.  I therefore accessed the vein in a retrograde fashion from the distal thigh passing the guidewire to just  cephalad of the mid calf.  We placed a 7 Uzbek sheath follow-up passes of the closure fast device.  Tumescent anesthetic was injected along the course vein with care to copiously surround the small saphenous vein, especially in the proximal calf.  There did not appear to be a true saphenopopliteal junction but there was a communication between the small saphenous vein and the popliteal vein near the popliteal crease.  I was careful to to mess well in this area.    I allow the block to take effect, confirmed catheter to position the treated a single 7 cm clear the vein with 2 RF sessions.  The patient was comfortable.  After confirming that the vein was closed I remove the sheath and the catheter again hemostasis with pressure.    Phlebectomies  Tumescent anesthetic was injected along the course of each of the marked varicosities and the block allowed to take effect. We made stab wounds beside each of the marked varicosities with 11 scalpel, retrieved the veins with either vein hooks or emiliana hooks, clamped them with mosquito clamps and avulsed them.  Hemostasis was secured with pressure.    After completing the phlebectomies, the leg was cleaned with saline solution and petroleum jelly was applied to the skin.  The leg was then dressed with ABD pads, cast padding and an Ace bandage from the toes to the groin.   We observed the patient for 30 minutes to ensure excellent hemostasis then took the patient to their ride in a wheelchair.  Post procedure instructions were given to the patient and her sister in verbal and written form.  They both voiced understanding and their questions were answered.    The patient tolerated the procedure well without evidence of allergic reaction or other complications and will return in 72 hours for a left lower extremity venous ultrasound.    Reyna Closure    Date/Time: 12/1/2022 5:19 PM  Performed by: Sawyer Pride MD  Authorized by: Sawyer Pride MD      Time out: Immediately prior to the procedure a time out was called    Preparation: Patient was prepped and draped in usual sterile fashion    1st Assist: Izabel Alonso, CST/CSFA    Circulator: Corina Jade RN    Procedure:  VNUS  Procedure side:  Left  Vein Treated:  GSV  Patient tolerance:  Patient tolerated the procedure well with no immediate complications  Vista Closure    Date/Time: 12/1/2022 5:19 PM  Performed by: Sawyer Pride MD  Authorized by: Sawyer Pride MD     Procedure:  VNUS  Procedure side:  Left  Second and Subsequent Vein    Vein Treated:  SSV  Patient tolerance:  Patient tolerated the procedure well with no immediate complications  Phlebectomy    Date/Time: 12/1/2022 5:19 PM  Performed by: Sawyer Pride MD  Authorized by: Sawyer Pride MD     Procedure:  Phlebectomies  Type:  Medically Necessary  Procedure side:  Left  Stabs:  <10  Patient tolerance:  Patient tolerated the procedure well with no immediate complications  Wrap/Hose:  Wraps        Flowsheet Data 12/1/2022   Procedure Start Time:  1:07 PM   Prep: Chloraprep   Side: Left   Tx Length (cm): LEFT GSV: 12   Junction (cm): LEFT GSV: no junction   RF Cycles: LEFT GSV:5   RF TX Time (Minutes): LEFT GSV:1:40   How many additional vein treatments are being performed? 1   Tx Length (cm) - 2: LEFT SSV:7   Junction 2 (cm): LEFT SSV: 2.0   RF Cycles 2 : LEFT SSV:2   RF TX Time (Minutes) 2: LEFT SSV: 0.40   # PHLEB Sites: LEFT: 8   Cyanoacrylate used (ml): -   Sedation taken: Yes   Pre Pt. Physical / Cognitive Limitations: WNL   TOTAL Local anesthesia Injected (ml): 6   Max Volume Local Anesthesia (ml): 11   TOTAL Tumescent Injected volume (ml): 350   Max Volume Tumescent (ml): 586   Post Pt. Physical / Cognitive Limitations: WNL   Procedure End Time:  2:22 PM   D/C Instructions given, states readiness to leave and escorted to car: Yes       DAYANA Pride MD    Dictated using [employer]  voice recognition software which may result in transcription errors      Again, thank you for allowing me to participate in the care of your patient.        Sincerely,        Sawyer Pride MD

## 2022-12-01 NOTE — PROGRESS NOTES
Pre-procedure Nursing Note    Sherry Slaughter presents to clinic for Vein Procedure  .   /Person Responsible for Patient: Sanam (sister)  Phone Number: [phone]    Prophylactic Medication:Antibiotics, Amoxicillin, 2gm  Time Taken: 1202   Sedation Medication: Ativan, 1mg ,   Time Taken: 1202 and Clonidine, 0.1mg,   Time Taken: 1202  Compression Stockings: Patient left hose at home.  The procedure is being performed on LLE.  Patient understanding of procedure matches consent? YES    Patient's pre-procedure medications verified by Chris Kat RN  .    Chris Kat RN on 12/1/2022 at 12:02 PM

## 2022-12-01 NOTE — PROGRESS NOTES
Vein Clinic Procedure Note    Indications:  Recurrent left leg varicose veins and pain secondary to recanalization of the left great saphenous and small saphenous veins; status post endovenous ablation using cyanoacrylate glue 12/30/2021    Procedure:  1. Radiofrequency ablation recanalized segment left great saphenous vein  2. Radiofrequency ablation recanalized segment left small saphenous vein  3. Medically necessary phlebectomies left lower extremity (8 stabs)    Surgeon  LISA Pride MD    Procedure Description  Details of the procedure including risks of bleeding, infection, nerve injury, scarring, hyperpigmentation, deep vein thrombosis, recanalization of the great saphenous or small saphenous veins and recurrent varicose veins all discussed.  The patient voiced understanding and wished to proceed.  Informed consent was obtained.     I had the patient stand and marked varicosities coursing from the medial calf posteriorly to the proximal, posterior calf with an indelible marker.  We then proceeded the operating room, had the patient lie supine on the operating table, then prepped and draped the left lower extremity sterilely.    We took a timeout to confirm the appropriate operative site and procedure: Radiofrequency ablation of the left great saphenous and small saphenous veins    VNUS Closure  Great saphenous vein  I imaged the left lower extremity easily identifying the left great saphenous vein.  The vein had recanalized from the proximal calf to the distal thigh with a large varicosity coursing from the calf great saphenous vein posteriorly to communicate with the small saphenous vein.  I infiltrated the skin overlying the great saphenous vein just cephalad of the mid calf, placed a micropuncture needle into the vein followed by a micropuncture guidewire and a 7 Lithuanian sheath.    We passed the closure fast device through the sheath, positioning of the tip of the device approximately 8 to 10 cm  cephalad of the knee under ultrasound guidance.  Tumescent anesthetic injected along the course of the great saphenous vein under ultrasound guidance.    After allowing the block to take effect and after confirming appropriate position, I applied compression to the distal thigh great saphenous vein with the ultrasound probe and additional width 2 fingerbreadths treating the first 7 cm increment of the vein with 3 RF sessions.  The second increment of the vein was treated with 2 RF sessions.  We treated down to the just cephalad of the mid calf.  The patient was comfortable throughout.  I confirmed that the vein was closed then removed the sheath and the catheter again hemostasis with pressure.    Small saphenous vein ablation  We had the patient turned prone on our operating table and imaged the posterior left calf.  I tried to access the vein just cephalad of the mid calf but due to scarring I could not get the wire to thread.  I therefore accessed the vein in a retrograde fashion from the distal thigh passing the guidewire to just cephalad of the mid calf.  We placed a 7 Chadian sheath follow-up passes of the closure fast device.  Tumescent anesthetic was injected along the course vein with care to copiously surround the small saphenous vein, especially in the proximal calf.  There did not appear to be a true saphenopopliteal junction but there was a communication between the small saphenous vein and the popliteal vein near the popliteal crease.  I was careful to to place copious tumescent anesthetic in this area.    I allow the block to take effect, confirmed catheter to position the treated a single 7 cm clear the vein with 2 RF sessions.  The patient was comfortable.  After confirming that the vein was closed I remove the sheath and the catheter again hemostasis with pressure.    Phlebectomies  Tumescent anesthetic was injected along the course of each of the marked varicosities and the block allowed to take  effect. We made stab wounds beside each of the marked varicosities with 11 scalpel, retrieved the veins with either vein hooks or emiliana hooks, clamped them with mosquito clamps and avulsed them.  Hemostasis was secured with pressure.    After completing the phlebectomies, the leg was cleaned with saline solution and petroleum jelly was applied to the skin.  The leg was then dressed with ABD pads, cast padding and an Ace bandage from the toes to the groin.   We observed the patient for 30 minutes to ensure excellent hemostasis then took the patient to their ride in a wheelchair.  Post procedure instructions were given to the patient and her sister in verbal and written form.  They both voiced understanding and their questions were answered.    The patient tolerated the procedure well without evidence of allergic reaction or other complications and will return in 72 hours for a left lower extremity venous ultrasound.    Garfield Closure    Date/Time: 12/1/2022 5:19 PM  Performed by: Sawyer Pride MD  Authorized by: Sawyer Pride MD     Time out: Immediately prior to the procedure a time out was called    Preparation: Patient was prepped and draped in usual sterile fashion    1st Assist: Izabel Alonso, CST/CSFA    Circulator: Corina Jade RN    Procedure:  VNUS  Procedure side:  Left  Vein Treated:  GSV  Patient tolerance:  Patient tolerated the procedure well with no immediate complications  Garfield Closure    Date/Time: 12/1/2022 5:19 PM  Performed by: Sawyer Pride MD  Authorized by: Sawyer Pride MD     Procedure:  VNUS  Procedure side:  Left  Second and Subsequent Vein    Vein Treated:  SSV  Patient tolerance:  Patient tolerated the procedure well with no immediate complications  Phlebectomy    Date/Time: 12/1/2022 5:19 PM  Performed by: Sawyer Pride MD  Authorized by: Sawyer Pride MD     Procedure:  Phlebectomies  Type:  Medically  Necessary  Procedure side:  Left  Stabs:  <10  Patient tolerance:  Patient tolerated the procedure well with no immediate complications  Wrap/Hose:  Wraps        Flowsheet Data 12/1/2022   Procedure Start Time:  1:07 PM   Prep: Chloraprep   Side: Left   Tx Length (cm): LEFT GSV: 12   Junction (cm): LEFT GSV: no junction   RF Cycles: LEFT GSV:5   RF TX Time (Minutes): LEFT GSV:1:40   How many additional vein treatments are being performed? 1   Tx Length (cm) - 2: LEFT SSV:7   Junction 2 (cm): LEFT SSV: 2.0   RF Cycles 2 : LEFT SSV:2   RF TX Time (Minutes) 2: LEFT SSV: 0.40   # PHLEB Sites: LEFT: 8   Cyanoacrylate used (ml): -   Sedation taken: Yes   Pre Pt. Physical / Cognitive Limitations: WNL   TOTAL Local anesthesia Injected (ml): 6   Max Volume Local Anesthesia (ml): 11   TOTAL Tumescent Injected volume (ml): 350   Max Volume Tumescent (ml): 586   Post Pt. Physical / Cognitive Limitations: WNL   Procedure End Time:  2:22 PM   D/C Instructions given, states readiness to leave and escorted to car: Yes       C Vicente Pride MD    Dictated using Dragon voice recognition software which may result in transcription errors

## 2022-12-05 ENCOUNTER — OFFICE VISIT (OUTPATIENT)
Dept: VASCULAR SURGERY | Facility: CLINIC | Age: 67
End: 2022-12-05
Attending: SURGERY
Payer: COMMERCIAL

## 2022-12-05 ENCOUNTER — ANCILLARY PROCEDURE (OUTPATIENT)
Dept: ULTRASOUND IMAGING | Facility: CLINIC | Age: 67
End: 2022-12-05
Attending: SURGERY
Payer: COMMERCIAL

## 2022-12-05 DIAGNOSIS — I83.812 VARICOSE VEINS OF LEFT LOWER EXTREMITY WITH PAIN: ICD-10-CM

## 2022-12-05 DIAGNOSIS — Z09 POSTOP CHECK: Primary | ICD-10-CM

## 2022-12-05 PROCEDURE — 93971 EXTREMITY STUDY: CPT | Mod: LT | Performed by: SURGERY

## 2022-12-05 PROCEDURE — 99207 PR NO CHARGE NURSE ONLY: CPT

## 2022-12-05 NOTE — PROGRESS NOTES
December 5, 2022    Vein Clinic Postoperative Nurse Note    Patient is here for her 89 postoperative visit.    Procedure: Left leg VNUS closure GSV, SSV(med nec), 10-20 stab phlebs(med [facility])   Procedure Date: 12/1/22  Surgeon: Dr. Pride    Ultrasound Result: Left lower extremity negative for DVT. Left GSV is closed 38MM from SFJ to mid calf. Acute thrombus extending into varicose vein off proximal calf. Left SSV is closed 10mm from SPJ to mid calf. Acute occlusive thrombus extending into varicose vein off proximal calf.     Physical Exam: Incisions are approximated without signs of infection.  Ecchymosis: moderate bruising noted left posterior lower leg-behind knee, and left medial calf.  Swelling: behind knee and medial calf on left leg  Paresthesia: patient denies    Patient Questions or Concerns: none    Reviewed postoperative instructions with patient and provided her with written material of common things to expect from her procedure.    Patient's Next Vein Clinic Appointment: 6 week follow up Jan 30th 2023.    Corina Jade RN  [facility]

## 2022-12-05 NOTE — LETTER
12/5/2022         RE: Sherry Slaughter  [address]  [place] [address]        Dear Colleague,    Thank you for referring your patient, Sherry Slaughter, to the [facility]IN [facility] [place]. Please see a copy of my visit note below.      December 5, 2022    Vein Clinic Postoperative Nurse Note    Patient is here for her 96 postoperative visit.    Procedure: Left leg VNUS closure GSV, SSV(med nec), 10-20 stab phlebs(med nec)   Procedure Date: 12/1/22  Surgeon: Dr. Pride    Ultrasound Result: Left lower extremity negative for DVT. Left GSV is closed 38MM from SFJ to mid calf. Acute thrombus extending into varicose vein off proximal calf. Left SSV is closed 10mm from SPJ to mid calf. Acute occlusive thrombus extending into varicose vein off proximal calf.     Physical Exam: Incisions are approximated without signs of infection.  Ecchymosis: moderate bruising noted left posterior lower leg-behind knee, and left medial calf.  Swelling: behind knee and medial calf on left leg  Paresthesia: patient denies    Patient Questions or Concerns: none    Reviewed postoperative instructions with patient and provided her with written material of common things to expect from her procedure.    Patient's Next Vein Clinic Appointment: 6 week follow up Jan 30th 2023.    Corina Jade RN  [facility]      Again, thank you for allowing me to participate in the care of your patient.        Sincerely,        No name on file

## 2022-12-13 ENCOUNTER — OFFICE VISIT (OUTPATIENT)
Dept: PODIATRY | Facility: CLINIC | Age: 67
End: 2022-12-13
Payer: COMMERCIAL

## 2022-12-13 DIAGNOSIS — E11.49 TYPE II OR UNSPECIFIED TYPE DIABETES MELLITUS WITH NEUROLOGICAL MANIFESTATIONS, NOT STATED AS UNCONTROLLED(250.60) (H): Primary | ICD-10-CM

## 2022-12-13 DIAGNOSIS — E11.69 TYPE 2 DIABETES MELLITUS WITH DIABETIC FOOT DEFORMITY (H): ICD-10-CM

## 2022-12-13 DIAGNOSIS — L97.512 ULCER OF RIGHT FOOT WITH FAT LAYER EXPOSED (H): ICD-10-CM

## 2022-12-13 DIAGNOSIS — M21.969 TYPE 2 DIABETES MELLITUS WITH DIABETIC FOOT DEFORMITY (H): ICD-10-CM

## 2022-12-13 PROCEDURE — 99214 OFFICE O/P EST MOD 30 MIN: CPT | Performed by: PODIATRIST

## 2022-12-13 NOTE — LETTER
12/13/2022         RE: Sherry Slaughter  [address]  [place] [address]        Dear Colleague,    Thank you for referring your patient, Sherry Slaughter, to the [facility]. Please see a copy of my visit note below.    Past Medical History:   Diagnosis Date     Cataract      Congestive heart failure (H) 2019 NOVEMBER    AFIB     DEPRESSION     comes and goes - tried meds - unsuccessfully, certain times of the year, no psych intervention and no counselors in the past - and not interested      Diverticulosis of colon (without mention of hemorrhage) 4/04    colonoscopy     ECHO-mildLVH,tr MR,mild thick lflets w inc LA,trTR   12/03     Essential hypertension, benign 1990s    late 1990s - started medications at that time - not to difficult to control meds      Fam hx-cardiovas dis [facility]     father - CAD, and lipids/HTN - multiple members of the family      Family history of diabetes mellitus     sister and grandmother with DM      Family history of malignant neoplasm of breast     mother - young age - 45, and maternal cousin and aunt as well - no BRCA testing done      Family history of stroke (cerebrovascular)     grandmother in early life in her 40s      FAMILY HX COLON CANCER     Pat uncles x 2     Heart disease     murmur/     Heart murmur      HYPERLIPIDEMIA 2000    fairly recent - in the last 5 years - high for DM levels  -cholesterol recent      Irritable bowel syndrome     goes between the 2 - nerve related - more stressed more problems      MICROALBUMINURIA     unsure how long - been on the lisinopril - for a few years at well      Nonspecific abnormal results of liver function study 2/3/2003    SGOT - has been high in the past - since the hepatitis b - borderline elevation - not really changing any      OBESITY      Obstructive sleep apnea      GENESIS (obstructive sleep apnea) 10/15/2006    psg 5/15 AHI 53 aPAP 8-15     OSTEOARTHRITIS L KNEE 2003    total knee on the  left - and pain is gone since the knee replacement      PERS HX HEPATITIS B- RESOLVED] 1977    acute virus only - no chronic disease      PERS HX HEPATITIS B- RESOLVED]      Type II or unspecified type diabetes mellitus without mention of complication, not stated as uncontrolled 1991    oral meds frist, then insulin eventually later, difficult to control most of the time      Unspecified hypothyroidism 10/11/2006    TSH 3.36 - mild subclinical hypothyroidism - deciding on following or starting low dose meds - with dr Oreilly - following      Patient Active Problem List   Diagnosis     Morbid obesity (H)     Diverticulosis of large intestine     Nonspecific abnormal results of cardiovascular function study     FAMILY HX COLON CANCER     Nonallopathic lesion of thoracic region     Hypothyroidism     GENESIS (obstructive sleep apnea)     Irritable bowel syndrome     Family history of malignant neoplasm of breast     Type 2 diabetes mellitus treated with insulin (H)     History of major depression     OSTEOARTHRITIS L KNEE  s/p knee replacement on the left      Hypertension goal BP (blood pressure) < 140/90     Family history of stroke (cerebrovascular)     Family history of other cardiovascular diseases     JOINT PAIN-ANKLE - right ankle      Mitral valve insufficiency     Hyperlipidemia LDL goal <100     Aortic sclerosis     Tubular adenoma of colon     History of viral hepatitis, type B     Chronic low back pain     Long-term insulin use (H)     S/P total knee replacement using cement, right     Lumbago     Type 2 diabetes mellitus with hyperglycemia (H)     Posterior vitreous detachment of right eye     Pseudophakia of both eyes     Paroxysmal atrial fibrillation (H)     SOB (shortness of breath)     Bunion     On continuous oral anticoagulation     Pressure injury of toe of left foot, stage 3 (H)     Stage 3a chronic kidney disease (H)     Past Surgical History:   Procedure Laterality Date     ABDOMEN SURGERY       ovaian scope/ appendix removal     ANESTHESIA CARDIOVERSION N/A 12/4/2020    Procedure: ANESTHESIA, FOR CARDIOVERSION @1400;  Surgeon: GENERIC ANESTHESIA PROVIDER;  Location: [facility] OR     ARTHROPLASTY KNEE Right 9/23/2015    Procedure: ARTHROPLASTY KNEE;  Surgeon: Rufus Brown MD;  Location:  OR     BUNIONECTOMY REN AND LEANDRO, COMBINED Left 2/2/2021    Procedure: Left bunion correction with bone cutting and screw fixation;  Surgeon: David Hoffman DPM;  Location: [facility] OR     CATARACT IOL, RT/LT Left      COLONOSCOPY  4/04    diverticulosis, rec repeat 10 yrs(consider fam hx?)     COLONOSCOPY WITH CO2 INSUFFLATION N/A 6/19/2019    Procedure: COLONOSCOPY, WITH CO2 INSUFFLATION;  Surgeon: Zeb Duarte MD;  Location: [facility] OR     ORTHOPEDIC SURGERY      right ankle     PHACOEMULSIFICATION CLEAR CORNEA WITH STANDARD INTRAOCULAR LENS IMPLANT Left 3/15/2018    Procedure: PHACOEMULSIFICATION CLEAR CORNEA WITH STANDARD INTRAOCULAR LENS IMPLANT;  LEFT EYE PHACOEMULSIFICATION CLEAR CORNEA WITH STANDARD INTRAOCULAR LENS IMPLANT;  Surgeon: Bandar Linares MD;  Location:  [facility]     PHACOEMULSIFICATION CLEAR CORNEA WITH STANDARD INTRAOCULAR LENS IMPLANT Right 4/5/2018    Procedure: PHACOEMULSIFICATION CLEAR CORNEA WITH STANDARD INTRAOCULAR LENS IMPLANT;  RIGHT PHACOEMULSIFICATION CLEAR CORNEA WITH STANDARD INTRAOCULAR LENS IMPLANT ;  Surgeon: Bandar Linares MD;  Location:  [facility]     STRESS ECHO (METRO)  12/03    no ischemia, limited by fatigue     SURGICAL HISTORY OF -       EXP LAP- R OVARY CYSTS     SURGICAL HISTORY OF -   1981,1984,1985    CHILDBIRTH     ZZC NONSPECIFIC PROCEDURE  6/06    right triple arthrodecesis      ZZ NONSPECIFIC PROCEDURE  7/06     right bunion surgery      [facility] TOTAL KNEE ARTHROPLASTY  3/03    L knee     Social History     Socioeconomic History     Marital status:      Spouse name: Not on file     Number of children: 3     Years of education: Not on file     Highest  education level: Not on file   Occupational History     Occupation: RN     Employer: [employer]   Tobacco Use     Smoking status: Never     Passive exposure: Never     Smokeless tobacco: Never     Tobacco comments:     tried in early 20s only    Vaping Use     Vaping Use: Never used   Substance and Sexual Activity     Alcohol use: Yes     Comment: rare     Drug use: No     Sexual activity: Not Currently     Birth control/protection: Post-menopausal     Comment:  - since for 20 years, no signficant other  > 5 years sexual activity    Other Topics Concern      Service No     Blood Transfusions No     Caffeine Concern No     Occupational Exposure Yes     Comment: Normal nursing exposures     Hobby Hazards No     Sleep Concern Yes     Stress Concern No     Comment: Stress level at HM=5, stress level at WK=6     Weight Concern Yes     Special Diet Yes     Comment: Diabetic diet (watching carbs)     Back Care No     Comment: Occassional back spasms     Exercise Yes     Comment: Pt joined a health club goes approx 3-4 times a week,half to one mile daily     Bike Helmet No     Seat Belt Yes     Comment: Sometimes     Self-Exams Yes     Parent/sibling w/ CABG, MI or angioplasty before 65F 55M? 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      Social Determinants of Health     Financial Resource Strain: Not on file   Food Insecurity: Not on file   Transportation Needs: Not on file   Physical Activity: Not on file   Stress: Not on file   Social Connections: Not on file   Intimate Partner Violence: Not on file   Housing Stability: Not on file     Family History   Problem Relation Age of Onset     Diabetes Maternal Grandmother         DM TYPE 2     Cerebrovascular Disease Maternal Grandmother      Hypertension Sister      Lipids Sister      Heart Disease Sister         Chronic AFib     Diabetes Sister      Coronary Artery Disease  Sister         afib     Hypertension Sister      Lipids Sister      Gynecology Sister      Diabetes Sister      Asthma Sister      Blood Disease Paternal Grandmother         T CELL LEUKEMIA     Hypertension Brother      Lipids Brother      Congenital Anomalies Brother      Cardiovascular Brother      Heart Disease Brother         CABG/AFIB     Coronary Artery Disease Brother         cabg afib AAA     Hypertension Brother      Lipids Brother      Other Cancer Brother         multple myeloma     Obesity Brother      Hypertension Brother      Respiratory Brother         Sleep apnea     Heart Disease Brother         AFib     Coronary Artery Disease Brother         afib     Alzheimer Disease Mother      Asthma Mother      Hypertension Mother      Breast Cancer Mother 40        has mastectomy and lived to 84     Allergies Mother         Sulfa,     Arthritis Mother      Cardiovascular Mother      Depression Mother      Respiratory Mother      Lipids Mother      Cancer Mother         breast     Thyroid Disease Mother      Cerebrovascular Disease Mother         FROM  AFIB     Dementia Mother         Alzheimers     Other Cancer Mother         breast     Cancer - colorectal Other      Colon Cancer Other         2019     Cancer Father         lung     Aneurysm Father         brother, AAA     Other Cancer Father         lung ca     Coronary Artery Disease Father         cad AAA     Asthma Sister      Cancer - colorectal Paternal Uncle         late 50s to early 60s - great uncles      Breast Cancer Other         Maternal cousin     Arrhythmia Sister         2 brothers 1 sister Afib     Breast Cancer Maternal Aunt 62     Other Cancer Maternal Aunt 35        Ovarian cancer.  Survived despite late recurrence and is now 92.     Glaucoma No family hx of      Macular Degeneration No family hx of      Lab Results   Component Value Date    A1C 7.0 11/28/2022    A1C 7.1 05/25/2022    A1C 8.1 11/24/2021    A1C 9.0 08/16/2021    A1C 6.8  01/05/2021    A1C 6.9 11/25/2020    A1C 7.5 07/22/2020    A1C 7.3 12/10/2019     Last Comprehensive Metabolic Panel:  Sodium   Date Value Ref Range Status   11/28/2022 139 133 - 144 mmol/L Final   04/13/2021 137 133 - 144 mmol/L Final     Potassium   Date Value Ref Range Status   11/28/2022 4.0 3.4 - 5.3 mmol/L Final   04/13/2021 4.2 3.4 - 5.3 mmol/L Final     Chloride   Date Value Ref Range Status   11/28/2022 105 94 - 109 mmol/L Final   04/13/2021 106 94 - 109 mmol/L Final     Carbon Dioxide   Date Value Ref Range Status   04/13/2021 28 20 - 32 mmol/L Final     Carbon Dioxide (CO2)   Date Value Ref Range Status   11/28/2022 26 20 - 32 mmol/L Final     Anion Gap   Date Value Ref Range Status   11/28/2022 8 3 - 14 mmol/L Final   04/13/2021 3 3 - 14 mmol/L Final     Glucose   Date Value Ref Range Status   11/28/2022 189 (H) 70 - 99 mg/dL Final   04/13/2021 98 70 - 99 mg/dL Final     Comment:     Non Fasting     Urea Nitrogen   Date Value Ref Range Status   11/28/2022 20 7 - 30 mg/dL Final   04/13/2021 22 7 - 30 mg/dL Final     Creatinine   Date Value Ref Range Status   11/28/2022 0.84 0.52 - 1.04 mg/dL Final   04/13/2021 0.81 0.52 - 1.04 mg/dL Final     GFR Estimate   Date Value Ref Range Status   11/28/2022 75 >60 mL/min/1.73m2 Final     Comment:     Effective December 21, 2021 eGFRcr in adults is calculated using the 2021 CKD-EPI creatinine equation which includes age and gender (Yashira et al., NEJM, DOI: 10.1056/ADUXhz3395871)   04/13/2021 75 >60 mL/min/[1.73_m2] Final     Comment:     Non  GFR Calc  Starting 12/18/2018, serum creatinine based estimated GFR (eGFR) will be   calculated using the Chronic Kidney Disease Epidemiology Collaboration   (CKD-EPI) equation.       Calcium   Date Value Ref Range Status   11/28/2022 9.7 8.5 - 10.1 mg/dL Final   04/13/2021 10.1 8.5 - 10.1 mg/dL Final     Lab Results   Component Value Date    AST 41 11/28/2022    AST Unsatisfactory specimen - hemolyzed 11/25/2020      Lab Results   Component Value Date    ALT 35 11/28/2022    ALT 42 11/25/2020     Lab Results   Component Value Date    BILICONJ 0.0 03/07/2014      Lab Results   Component Value Date    BILITOTAL 0.6 11/28/2022    BILITOTAL 0.5 11/25/2020     Lab Results   Component Value Date    ALBUMIN 3.8 11/28/2022    ALBUMIN 3.9 11/25/2020     Lab Results   Component Value Date    PROTTOTAL 7.5 11/28/2022    PROTTOTAL 7.4 11/25/2020      Lab Results   Component Value Date    ALKPHOS 55 11/28/2022    ALKPHOS 47 11/25/2020               SUBJECTIVE FINDINGS:  A 67-year-old returns to clinic for ulcer, right first MPJ, diabetes with peripheral neuropathy.  She relates she is doing well.  She relates she has seen [facility]ascular for venous disease on her left leg.  I reviewed their notes.  She relates that she is using the Aquacel Ag and a Band-Aid and Betadine and that seems to be working well.  She got her new insoles about 3 weeks or so ago.  She has been wearing those.    OBJECTIVE FINDINGS:  DP and PT are 2/4, right.  She has a right plantar first MPJ ulcer that is 0.5 x 0.6 cm prior to debridement.  There is hyperkeratotic tissue buildup with some undermining.  Serosanguineous drainage, mild edema.  No erythema, no odor, no calor.    ASSESSMENT AND PLAN:  Ulcer, right first MPJ.  She is diabetic with peripheral neuropathy.  Diagnosis and treatment options discussed with her.  Sharp ulcer debridement through the dermis with a #15 blade.  No anesthesia needed and local wound care done upon consent today.  I applied Cassandra, Betadine and Aquacel Ag and a light dressing.  I am going to have her continue this.  These are dispensed and use discussed with her.  She will continue this daily.  I discussed with her offloading again return to clinic and see me in 2 weeks.  Previous notes reviewed.            Moderate level of medical decision making.          Again, thank you for allowing me to participate in the care of your patient.         Sincerely,        Rufus Hutson DPM

## 2022-12-13 NOTE — PROGRESS NOTES
Past Medical History:   Diagnosis Date     Cataract      Congestive heart failure (H) 2019 NOVEMBER    AFIB     DEPRESSION     comes and goes - tried meds - unsuccessfully, certain times of the year, no psych intervention and no counselors in the past - and not interested      Diverticulosis of colon (without mention of hemorrhage) 4/04    colonoscopy     ECHO-mildLVH,tr MR,mild thick lflets w inc LA,trTR   12/03     Essential hypertension, benign 1990s    late 1990s - started medications at that time - not to difficult to control meds      Fam hx-cardiovas dis [facility]     father - CAD, and lipids/HTN - multiple members of the family      Family history of diabetes mellitus     sister and grandmother with DM      Family history of malignant neoplasm of breast     mother - young age - 45, and maternal cousin and aunt as well - no BRCA testing done      Family history of stroke (cerebrovascular)     grandmother in early life in her 40s      FAMILY HX COLON CANCER     Pat uncles x 2     Heart disease     murmur/     Heart murmur      HYPERLIPIDEMIA 2000    fairly recent - in the last 5 years - high for DM levels  -cholesterol recent      Irritable bowel syndrome     goes between the 2 - nerve related - more stressed more problems      MICROALBUMINURIA     unsure how long - been on the lisinopril - for a few years at well      Nonspecific abnormal results of liver function study 2/3/2003    SGOT - has been high in the past - since the hepatitis b - borderline elevation - not really changing any      OBESITY      Obstructive sleep apnea      GENESIS (obstructive sleep apnea) 10/15/2006    psg 5/15 AHI 53 aPAP 8-15     OSTEOARTHRITIS L KNEE 2003    total knee on the left - and pain is gone since the knee replacement      PERS HX HEPATITIS B- RESOLVED] 1977    acute virus only - no chronic disease      PERS HX HEPATITIS B- RESOLVED]      Type II or unspecified type diabetes mellitus without mention of complication, not stated as  uncontrolled 1991    oral meds frist, then insulin eventually later, difficult to control most of the time      Unspecified hypothyroidism 10/11/2006    TSH 3.36 - mild subclinical hypothyroidism - deciding on following or starting low dose meds - with dr Oreilly - following      Patient Active Problem List   Diagnosis     Morbid obesity (H)     Diverticulosis of large intestine     Nonspecific abnormal results of cardiovascular function study     FAMILY HX COLON CANCER     Nonallopathic lesion of thoracic region     Hypothyroidism     GENESIS (obstructive sleep apnea)     Irritable bowel syndrome     Family history of malignant neoplasm of breast     Type 2 diabetes mellitus treated with insulin (H)     History of major depression     OSTEOARTHRITIS L KNEE  s/p knee replacement on the left      Hypertension goal BP (blood pressure) < 140/90     Family history of stroke (cerebrovascular)     Family history of other cardiovascular diseases     JOINT PAIN-ANKLE - right ankle      Mitral valve insufficiency     Hyperlipidemia LDL goal <100     Aortic sclerosis     Tubular adenoma of colon     History of viral hepatitis, type B     Chronic low back pain     Long-term insulin use (H)     S/P total knee replacement using cement, right     Lumbago     Type 2 diabetes mellitus with hyperglycemia (H)     Posterior vitreous detachment of right eye     Pseudophakia of both eyes     Paroxysmal atrial fibrillation (H)     SOB (shortness of breath)     Bunion     On continuous oral anticoagulation     Pressure injury of toe of left foot, stage 3 (H)     Stage 3a chronic kidney disease (H)     Past Surgical History:   Procedure Laterality Date     ABDOMEN SURGERY      ovaian scope/ appendix removal     ANESTHESIA CARDIOVERSION N/A 12/4/2020    Procedure: ANESTHESIA, FOR CARDIOVERSION @1400;  Surgeon: GENERIC ANESTHESIA PROVIDER;  Location: [facility] OR     ARTHROPLASTY KNEE Right 9/23/2015    Procedure: ARTHROPLASTY KNEE;  Surgeon: Stephanie  Rufus VELEZ MD;  Location:  OR     BUNIONECTOMY REN AND LEANDRO, COMBINED Left 2/2/2021    Procedure: Left bunion correction with bone cutting and screw fixation;  Surgeon: David Hoffman DPM;  Location: [facility] OR     CATARACT IOL, RT/LT Left      COLONOSCOPY  4/04    diverticulosis, rec repeat 10 yrs(consider fam hx?)     COLONOSCOPY WITH CO2 INSUFFLATION N/A 6/19/2019    Procedure: COLONOSCOPY, WITH CO2 INSUFFLATION;  Surgeon: Zeb Duarte MD;  Location:  OR     ORTHOPEDIC SURGERY      right ankle     PHACOEMULSIFICATION CLEAR CORNEA WITH STANDARD INTRAOCULAR LENS IMPLANT Left 3/15/2018    Procedure: PHACOEMULSIFICATION CLEAR CORNEA WITH STANDARD INTRAOCULAR LENS IMPLANT;  LEFT EYE PHACOEMULSIFICATION CLEAR CORNEA WITH STANDARD INTRAOCULAR LENS IMPLANT;  Surgeon: Bandar Linares MD;  Location:  [facility]     PHACOEMULSIFICATION CLEAR CORNEA WITH STANDARD INTRAOCULAR LENS IMPLANT Right 4/5/2018    Procedure: PHACOEMULSIFICATION CLEAR CORNEA WITH STANDARD INTRAOCULAR LENS IMPLANT;  RIGHT PHACOEMULSIFICATION CLEAR CORNEA WITH STANDARD INTRAOCULAR LENS IMPLANT ;  Surgeon: Bandar Linares MD;  Location:  [facility]     STRESS ECHO (METRO)  12/03    no ischemia, limited by fatigue     SURGICAL HISTORY OF -       EXP LAP- R OVARY CYSTS     SURGICAL HISTORY OF -   1981,1984,1985    CHILDBIRTH     ZZC NONSPECIFIC PROCEDURE  6/06    right triple arthrodecesis      Z NONSPECIFIC PROCEDURE  7/06     right bunion surgery      [facility] TOTAL KNEE ARTHROPLASTY  3/03    L knee     Social History     Socioeconomic History     Marital status:      Spouse name: Not on file     Number of children: 3     Years of education: Not on file     Highest education level: Not on file   Occupational History     Occupation: RN     Employer: [employer]   Tobacco Use     Smoking status: Never     Passive exposure: Never     Smokeless tobacco: Never     Tobacco comments:     tried in early 20s only    Vaping  Use     Vaping Use: Never used   Substance and Sexual Activity     Alcohol use: Yes     Comment: rare     Drug use: No     Sexual activity: Not Currently     Birth control/protection: Post-menopausal     Comment:  - since for 20 years, no signficant other  > 5 years sexual activity    Other Topics Concern      Service No     Blood Transfusions No     Caffeine Concern No     Occupational Exposure Yes     Comment: Normal nursing exposures     Hobby Hazards No     Sleep Concern Yes     Stress Concern No     Comment: Stress level at HM=5, stress level at WK=6     Weight Concern Yes     Special Diet Yes     Comment: Diabetic diet (watching carbs)     Back Care No     Comment: Occassional back spasms     Exercise Yes     Comment: Pt joined a health club goes approx 3-4 times a week,half to one mile daily     Bike Helmet No     Seat Belt Yes     Comment: Sometimes     Self-Exams Yes     Parent/sibling w/ CABG, MI or angioplasty before 65F 55M? 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      Social Determinants of Health     Financial Resource Strain: Not on file   Food Insecurity: Not on file   Transportation Needs: Not on file   Physical Activity: Not on file   Stress: Not on file   Social Connections: Not on file   Intimate Partner Violence: Not on file   Housing Stability: Not on file     Family History   Problem Relation Age of Onset     Diabetes Maternal Grandmother         DM TYPE 2     Cerebrovascular Disease Maternal Grandmother      Hypertension Sister      Lipids Sister      Heart Disease Sister         Chronic AFib     Diabetes Sister      Coronary Artery Disease Sister         afib     Hypertension Sister      Lipids Sister      Gynecology Sister      Diabetes Sister      Asthma Sister      Blood Disease Paternal Grandmother         T CELL LEUKEMIA     Hypertension Brother      Lipids Brother      Congenital Anomalies Brother       Cardiovascular Brother      Heart Disease Brother         CABG/AFIB     Coronary Artery Disease Brother         cabg afib AAA     Hypertension Brother      Lipids Brother      Other Cancer Brother         multple myeloma     Obesity Brother      Hypertension Brother      Respiratory Brother         Sleep apnea     Heart Disease Brother         AFib     Coronary Artery Disease Brother         afib     Alzheimer Disease Mother      Asthma Mother      Hypertension Mother      Breast Cancer Mother 40        has mastectomy and lived to 84     Allergies Mother         Sulfa,     Arthritis Mother      Cardiovascular Mother      Depression Mother      Respiratory Mother      Lipids Mother      Cancer Mother         breast     Thyroid Disease Mother      Cerebrovascular Disease Mother         FROM  AFIB     Dementia Mother         Alzheimers     Other Cancer Mother         breast     Cancer - colorectal Other      Colon Cancer Other         2019     Cancer Father         lung     Aneurysm Father         brother, AAA     Other Cancer Father         lung ca     Coronary Artery Disease Father         cad AAA     Asthma Sister      Cancer - colorectal Paternal Uncle         late 50s to early 60s - great uncles      Breast Cancer Other         Maternal cousin     Arrhythmia Sister         2 brothers 1 sister Afib     Breast Cancer Maternal Aunt 62     Other Cancer Maternal Aunt 35        Ovarian cancer.  Survived despite late recurrence and is now 92.     Glaucoma No family hx of      Macular Degeneration No family hx of      Lab Results   Component Value Date    A1C 7.0 11/28/2022    A1C 7.1 05/25/2022    A1C 8.1 11/24/2021    A1C 9.0 08/16/2021    A1C 6.8 01/05/2021    A1C 6.9 11/25/2020    A1C 7.5 07/22/2020    A1C 7.3 12/10/2019     Last Comprehensive Metabolic Panel:  Sodium   Date Value Ref Range Status   11/28/2022 139 133 - 144 mmol/L Final   04/13/2021 137 133 - 144 mmol/L Final     Potassium   Date Value Ref Range  Status   11/28/2022 4.0 3.4 - 5.3 mmol/L Final   04/13/2021 4.2 3.4 - 5.3 mmol/L Final     Chloride   Date Value Ref Range Status   11/28/2022 105 94 - 109 mmol/L Final   04/13/2021 106 94 - 109 mmol/L Final     Carbon Dioxide   Date Value Ref Range Status   04/13/2021 28 20 - 32 mmol/L Final     Carbon Dioxide (CO2)   Date Value Ref Range Status   11/28/2022 26 20 - 32 mmol/L Final     Anion Gap   Date Value Ref Range Status   11/28/2022 8 3 - 14 mmol/L Final   04/13/2021 3 3 - 14 mmol/L Final     Glucose   Date Value Ref Range Status   11/28/2022 189 (H) 70 - 99 mg/dL Final   04/13/2021 98 70 - 99 mg/dL Final     Comment:     Non Fasting     Urea Nitrogen   Date Value Ref Range Status   11/28/2022 20 7 - 30 mg/dL Final   04/13/2021 22 7 - 30 mg/dL Final     Creatinine   Date Value Ref Range Status   11/28/2022 0.84 0.52 - 1.04 mg/dL Final   04/13/2021 0.81 0.52 - 1.04 mg/dL Final     GFR Estimate   Date Value Ref Range Status   11/28/2022 75 >60 mL/min/1.73m2 Final     Comment:     Effective December 21, 2021 eGFRcr in adults is calculated using the 2021 CKD-EPI creatinine equation which includes age and gender (Yashira kim al., [place], DOI: 10.1056/GJCOpl0540883)   04/13/2021 75 >60 mL/min/[1.73_m2] Final     Comment:     Non  GFR Calc  Starting 12/18/2018, serum creatinine based estimated GFR (eGFR) will be   calculated using the Chronic Kidney Disease Epidemiology Collaboration   (CKD-EPI) equation.       Calcium   Date Value Ref Range Status   11/28/2022 9.7 8.5 - 10.1 mg/dL Final   04/13/2021 10.1 8.5 - 10.1 mg/dL Final     Lab Results   Component Value Date    AST 41 11/28/2022    AST Unsatisfactory specimen - hemolyzed 11/25/2020     Lab Results   Component Value Date    ALT 35 11/28/2022    ALT 42 11/25/2020     Lab Results   Component Value Date    BILICONJ 0.0 03/07/2014      Lab Results   Component Value Date    BILITOTAL 0.6 11/28/2022    BILITOTAL 0.5 11/25/2020     Lab Results   Component  Value Date    ALBUMIN 3.8 11/28/2022    ALBUMIN 3.9 11/25/2020     Lab Results   Component Value Date    PROTTOTAL 7.5 11/28/2022    PROTTOTAL 7.4 11/25/2020      Lab Results   Component Value Date    ALKPHOS 55 11/28/2022    ALKPHOS 47 11/25/2020               SUBJECTIVE FINDINGS:  A 67-year-old returns to clinic for ulcer, right first MPJ, diabetes with peripheral neuropathy.  She relates she is doing well.  She relates she has seen Vascular for venous disease on her left leg.  I reviewed their notes.  She relates that she is using the Aquacel Ag and a Band-Aid and Betadine and that seems to be working well.  She got her new insoles about 3 weeks or so ago.  She has been wearing those.    OBJECTIVE FINDINGS:  DP and PT are 2/4, right.  She has a right plantar first MPJ ulcer that is 0.5 x 0.6 cm prior to debridement.  There is hyperkeratotic tissue buildup with some undermining.  Serosanguineous drainage, mild edema.  No erythema, no odor, no calor.    ASSESSMENT AND PLAN:  Ulcer, right first MPJ.  She is diabetic with peripheral neuropathy.  Diagnosis and treatment options discussed with her.  Sharp ulcer debridement through the dermis with a #15 blade.  No anesthesia needed and local wound care done upon consent today.  I applied Cassandra, Betadine and Aquacel Ag and a light dressing.  I am going to have her continue this.  These are dispensed and use discussed with her.  She will continue this daily.  I discussed with her offloading again return to clinic and see me in 2 weeks.  Previous notes reviewed.            Moderate level of medical decision making.

## 2022-12-13 NOTE — NURSING NOTE
Sherry Slaughter's chief complaint for this visit includes:  Chief Complaint   Patient presents with     Follow Up     Right foot wound     PCP: Marilou Arciniega    Referring Provider:  No referring provider defined for this encounter.    [facility] 10/02/2007   Data Unavailable        Allergies   Allergen Reactions     Empagliflozin      Yeast infections     Lipitor [Atorvastatin Calcium]      Gas     Pravachol [Pravastatin Sodium]      Pravachol - dry cough      Simvastatin      Myalgia         Do you need any medication refills at today's visit?

## 2022-12-27 ENCOUNTER — OFFICE VISIT (OUTPATIENT)
Dept: PODIATRY | Facility: CLINIC | Age: 67
End: 2022-12-27
Payer: COMMERCIAL

## 2022-12-27 DIAGNOSIS — L97.512 ULCER OF RIGHT FOOT WITH FAT LAYER EXPOSED (H): ICD-10-CM

## 2022-12-27 DIAGNOSIS — E11.69 TYPE 2 DIABETES MELLITUS WITH DIABETIC FOOT DEFORMITY (H): ICD-10-CM

## 2022-12-27 DIAGNOSIS — B35.1 ONYCHOMYCOSIS: ICD-10-CM

## 2022-12-27 DIAGNOSIS — M21.969 TYPE 2 DIABETES MELLITUS WITH DIABETIC FOOT DEFORMITY (H): ICD-10-CM

## 2022-12-27 DIAGNOSIS — E11.49 TYPE II OR UNSPECIFIED TYPE DIABETES MELLITUS WITH NEUROLOGICAL MANIFESTATIONS, NOT STATED AS UNCONTROLLED(250.60) (H): Primary | ICD-10-CM

## 2022-12-27 PROCEDURE — 99214 OFFICE O/P EST MOD 30 MIN: CPT | Performed by: PODIATRIST

## 2022-12-27 RX ORDER — ECONAZOLE NITRATE 10 MG/G
CREAM TOPICAL DAILY
Qty: 85 G | Refills: 5 | Status: SHIPPED | OUTPATIENT
Start: 2022-12-27 | End: 2024-04-24

## 2022-12-27 NOTE — NURSING NOTE
Sherry Slaughter's chief complaint for this visit includes:  Chief Complaint   Patient presents with     Follow Up     Right foot ulcer     PCP: Marilou Arciniega    Referring Provider:  No referring provider defined for this encounter.    [facility] 10/02/2007   Data Unavailable        Allergies   Allergen Reactions     Empagliflozin      Yeast infections     Lipitor [Atorvastatin Calcium]      Gas     Pravachol [Pravastatin Sodium]      Pravachol - dry cough      Simvastatin      Myalgia         Do you need any medication refills at today's visit?

## 2022-12-27 NOTE — LETTER
12/27/2022         RE: Sherry Slaughter  [address]  [place] [address]        Dear Colleague,    Thank you for referring your patient, Sherry Slaughter, to the [facility]. Please see a copy of my visit note below.    Past Medical History:   Diagnosis Date     Cataract      Congestive heart failure (H) 2019 NOVEMBER    AFIB     DEPRESSION     comes and goes - tried meds - unsuccessfully, certain times of the year, no psych intervention and no counselors in the past - and not interested      Diverticulosis of colon (without mention of hemorrhage) 4/04    colonoscopy     ECHO-mildLVH,tr MR,mild thick lflets w inc LA,trTR   12/03     Essential hypertension, benign 1990s    late 1990s - started medications at that time - not to difficult to control meds      Fam hx-cardiovas dis NEC     father - CAD, and lipids/HTN - multiple members of the family      Family history of diabetes mellitus     sister and grandmother with DM      Family history of malignant neoplasm of breast     mother - young age - 45, and maternal cousin and aunt as well - no BRCA testing done      Family history of stroke (cerebrovascular)     grandmother in early life in her 40s      FAMILY HX COLON CANCER     Pat uncles x 2     Heart disease     murmur/     Heart murmur      HYPERLIPIDEMIA 2000    fairly recent - in the last 5 years - high for DM levels  -cholesterol recent      Irritable bowel syndrome     goes between the 2 - nerve related - more stressed more problems      MICROALBUMINURIA     unsure how long - been on the lisinopril - for a few years at well      Nonspecific abnormal results of liver function study 2/3/2003    SGOT - has been high in the past - since the hepatitis b - borderline elevation - not really changing any      OBESITY      Obstructive sleep apnea      GENESIS (obstructive sleep apnea) 10/15/2006    psg 5/15 AHI 53 aPAP 8-15     OSTEOARTHRITIS L KNEE 2003    total knee on the  left - and pain is gone since the knee replacement      PERS HX HEPATITIS B- RESOLVED] 1977    acute virus only - no chronic disease      PERS HX HEPATITIS B- RESOLVED]      Type II or unspecified type diabetes mellitus without mention of complication, not stated as uncontrolled 1991    oral meds frist, then insulin eventually later, difficult to control most of the time      Unspecified hypothyroidism 10/11/2006    TSH 3.36 - mild subclinical hypothyroidism - deciding on following or starting low dose meds - with dr Oreilly - following      Patient Active Problem List   Diagnosis     Morbid obesity (H)     Diverticulosis of large intestine     Nonspecific abnormal results of cardiovascular function study     FAMILY HX COLON CANCER     Nonallopathic lesion of thoracic region     Hypothyroidism     GENESIS (obstructive sleep apnea)     Irritable bowel syndrome     Family history of malignant neoplasm of breast     Type 2 diabetes mellitus treated with insulin (H)     History of major depression     OSTEOARTHRITIS L KNEE  s/p knee replacement on the left      Hypertension goal BP (blood pressure) < 140/90     Family history of stroke (cerebrovascular)     Family history of other cardiovascular diseases     JOINT PAIN-ANKLE - right ankle      Mitral valve insufficiency     Hyperlipidemia LDL goal <100     Aortic sclerosis     Tubular adenoma of colon     History of viral hepatitis, type B     Chronic low back pain     Long-term insulin use (H)     S/P total knee replacement using cement, right     Lumbago     Type 2 diabetes mellitus with hyperglycemia (H)     Posterior vitreous detachment of right eye     Pseudophakia of both eyes     Paroxysmal atrial fibrillation (H)     SOB (shortness of breath)     Bunion     On continuous oral anticoagulation     Pressure injury of toe of left foot, stage 3 (H)     Stage 3a chronic kidney disease (H)     Past Surgical History:   Procedure Laterality Date     ABDOMEN SURGERY       ovaian scope/ appendix removal     ANESTHESIA CARDIOVERSION N/A 12/4/2020    Procedure: ANESTHESIA, FOR CARDIOVERSION @1400;  Surgeon: GENERIC ANESTHESIA PROVIDER;  Location: [facility]     ARTHROPLASTY KNEE Right 9/23/2015    Procedure: ARTHROPLASTY KNEE;  Surgeon: Rufus Brown MD;  Location:  OR     BUNIONECTOMY REN AND LEANDRO, COMBINED Left 2/2/2021    Procedure: Left bunion correction with bone cutting and screw fixation;  Surgeon: David Hoffman DPM;  Location: [facility] OR     CATARACT IOL, RT/LT Left      COLONOSCOPY  4/04    diverticulosis, rec repeat 10 yrs(consider fam hx?)     COLONOSCOPY WITH CO2 INSUFFLATION N/A 6/19/2019    Procedure: COLONOSCOPY, WITH CO2 INSUFFLATION;  Surgeon: Zeb Duarte MD;  Location: [facility] OR     ORTHOPEDIC SURGERY      right ankle     PHACOEMULSIFICATION CLEAR CORNEA WITH STANDARD INTRAOCULAR LENS IMPLANT Left 3/15/2018    Procedure: PHACOEMULSIFICATION CLEAR CORNEA WITH STANDARD INTRAOCULAR LENS IMPLANT;  LEFT EYE PHACOEMULSIFICATION CLEAR CORNEA WITH STANDARD INTRAOCULAR LENS IMPLANT;  Surgeon: Bandar Linares MD;  [facility]     PHACOEMULSIFICATION CLEAR CORNEA WITH STANDARD INTRAOCULAR LENS IMPLANT Right 4/5/2018    Procedure: PHACOEMULSIFICATION CLEAR CORNEA WITH STANDARD INTRAOCULAR LENS IMPLANT;  RIGHT PHACOEMULSIFICATION CLEAR CORNEA WITH STANDARD INTRAOCULAR LENS IMPLANT ;  Surgeon: Bandar Linares MD;  Location:  [facility]     STRESS ECHO (METRO)  12/03    no ischemia, limited by fatigue     SURGICAL HISTORY OF -       EXP LAP- R OVARY CYSTS     SURGICAL HISTORY OF -   1981,1984,1985    CHILDBIRTH     ZZC NONSPECIFIC PROCEDURE  6/06    right triple arthrodecesis      ZZ NONSPECIFIC PROCEDURE  7/06     right bunion surgery      [facility] TOTAL KNEE ARTHROPLASTY  3/03    L knee     Social History     Socioeconomic History     Marital status:      Spouse name: Not on file     Number of children: 3     Years of education: Not on file     Highest  education level: Not on file   Occupational History     Occupation: RN     Employer: [employer]   Tobacco Use     Smoking status: Never     Passive exposure: Never     Smokeless tobacco: Never     Tobacco comments:     tried in early 20s only    Vaping Use     Vaping Use: Never used   Substance and Sexual Activity     Alcohol use: Yes     Comment: rare     Drug use: No     Sexual activity: Not Currently     Birth control/protection: Post-menopausal     Comment:  - since for 20 years, no signficant other  > 5 years sexual activity    Other Topics Concern      Service No     Blood Transfusions No     Caffeine Concern No     Occupational Exposure Yes     Comment: Normal nursing exposures     Hobby Hazards No     Sleep Concern Yes     Stress Concern No     Comment: Stress level at HM=5, stress level at WK=6     Weight Concern Yes     Special Diet Yes     Comment: Diabetic diet (watching carbs)     Back Care No     Comment: Occassional back spasms     Exercise Yes     Comment: Pt joined a health club goes approx 3-4 times a week,half to one mile daily     Bike Helmet No     Seat Belt Yes     Comment: Sometimes     Self-Exams Yes     Parent/sibling w/ CABG, MI or angioplasty before 65F 55M? 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      Social Determinants of Health     Financial Resource Strain: Not on file   Food Insecurity: Not on file   Transportation Needs: Not on file   Physical Activity: Not on file   Stress: Not on file   Social Connections: Not on file   Intimate Partner Violence: Not on file   Housing Stability: Not on file     Family History   Problem Relation Age of Onset     Diabetes Maternal Grandmother         DM TYPE 2     Cerebrovascular Disease Maternal Grandmother      Hypertension Sister      Lipids Sister      Heart Disease Sister         Chronic AFib     Diabetes Sister      Coronary Artery Disease  Sister         afib     Hypertension Sister      Lipids Sister      Gynecology Sister      Diabetes Sister      Asthma Sister      Blood Disease Paternal Grandmother         T CELL LEUKEMIA     Hypertension Brother      Lipids Brother      Congenital Anomalies Brother      Cardiovascular Brother      Heart Disease Brother         CABG/AFIB     Coronary Artery Disease Brother         cabg afib AAA     Hypertension Brother      Lipids Brother      Other Cancer Brother         multple myeloma     Obesity Brother      Hypertension Brother      Respiratory Brother         Sleep apnea     Heart Disease Brother         AFib     Coronary Artery Disease Brother         afib     Alzheimer Disease Mother      Asthma Mother      Hypertension Mother      Breast Cancer Mother 40        has mastectomy and lived to 84     Allergies Mother         Sulfa,     Arthritis Mother      Cardiovascular Mother      Depression Mother      Respiratory Mother      Lipids Mother      Cancer Mother         breast     Thyroid Disease Mother      Cerebrovascular Disease Mother         FROM  AFIB     Dementia Mother         Alzheimers     Other Cancer Mother         breast     Cancer - colorectal Other      Colon Cancer Other         2019     Cancer Father         lung     Aneurysm Father         brother, AAA     Other Cancer Father         lung ca     Coronary Artery Disease Father         cad AAA     Asthma Sister      Cancer - colorectal Paternal Uncle         late 50s to early 60s - great uncles      Breast Cancer Other         Maternal cousin     Arrhythmia Sister         2 brothers 1 sister Afib     Breast Cancer Maternal Aunt 62     Other Cancer Maternal Aunt 35        Ovarian cancer.  Survived despite late recurrence and is now 92.     Glaucoma No family hx of      Macular Degeneration No family hx of      Lab Results   Component Value Date    A1C 7.0 11/28/2022    A1C 7.1 05/25/2022    A1C 8.1 11/24/2021    A1C 9.0 08/16/2021    A1C 6.8  01/05/2021    A1C 6.9 11/25/2020    A1C 7.5 07/22/2020    A1C 7.3 12/10/2019               SUBJECTIVE FINDINGS:  A 67 year old returns to clinic for ulcer, plantar 1st MPJ.  She relates it is doing better.  She has been using the Betadine and Aquacel, and she was using the Cassandra and a light dressing over it.  She feels this is not draining much anymore.    OBJECTIVE FINDINGS:  DP and PT are 2/4, right.  She has a plantar right 1st MPJ ulcer through the dermis.  There is minimal drainage, no erythema, no odor, no calor.  Some hyperkeratotic eschar that is mild.    ASSESSMENT AND PLAN:  Ulcer, right 1st MPJ.  She is diabetic with peripheral neuropathy and foot deformities present.  Diagnosis and treatment options discussed with her.  Local wound care done upon consent today.  We applied Betadine, Aquacel Ag and a light dressing.  She will continue this.  This is improving.  I advised her on offloading.  Return to clinic and see me in 2-3 weeks.  Previous notes reviewed.  Also she relates she needs a refill of the econazole cream.  I refilled the econazole cream.  She does have onychomycosis present.  Some mild tinea pedis changes on the heel.          Moderate level of medical decision making.        Again, thank you for allowing me to participate in the care of your patient.        Sincerely,        Rufus Hutson DPM

## 2022-12-27 NOTE — PROGRESS NOTES
Past Medical History:   Diagnosis Date     Cataract      Congestive heart failure (H) 2019 NOVEMBER    AFIB     DEPRESSION     comes and goes - tried meds - unsuccessfully, certain times of the year, no psych intervention and no counselors in the past - and not interested      Diverticulosis of colon (without mention of hemorrhage) 4/04    colonoscopy     ECHO-mildLVH,tr MR,mild thick lflets w inc LA,trTR   12/03     Essential hypertension, benign 1990s    late 1990s - started medications at that time - not to difficult to control meds      Fam hx-cardiovas dis NEC     father - CAD, and lipids/HTN - multiple members of the family      Family history of diabetes mellitus     sister and grandmother with DM      Family history of malignant neoplasm of breast     mother - young age - 45, and maternal cousin and aunt as well - no BRCA testing done      Family history of stroke (cerebrovascular)     grandmother in early life in her 40s      FAMILY HX COLON CANCER     Pat uncles x 2     Heart disease     murmur/     Heart murmur      HYPERLIPIDEMIA 2000    fairly recent - in the last 5 years - high for DM levels  -cholesterol recent      Irritable bowel syndrome     goes between the 2 - nerve related - more stressed more problems      MICROALBUMINURIA     unsure how long - been on the lisinopril - for a few years at well      Nonspecific abnormal results of liver function study 2/3/2003    SGOT - has been high in the past - since the hepatitis b - borderline elevation - not really changing any      OBESITY      Obstructive sleep apnea      GENESIS (obstructive sleep apnea) 10/15/2006    psg 5/15 AHI 53 aPAP 8-15     OSTEOARTHRITIS L KNEE 2003    total knee on the left - and pain is gone since the knee replacement      PERS HX HEPATITIS B- RESOLVED] 1977    acute virus only - no chronic disease      PERS HX HEPATITIS B- RESOLVED]      Type II or unspecified type diabetes mellitus without mention of complication, not stated as  uncontrolled 1991    oral meds frist, then insulin eventually later, difficult to control most of the time      Unspecified hypothyroidism 10/11/2006    TSH 3.36 - mild subclinical hypothyroidism - deciding on following or starting low dose meds - with dr Oreilly - following      Patient Active Problem List   Diagnosis     Morbid obesity (H)     Diverticulosis of large intestine     Nonspecific abnormal results of cardiovascular function study     FAMILY HX COLON CANCER     Nonallopathic lesion of thoracic region     Hypothyroidism     GENESIS (obstructive sleep apnea)     Irritable bowel syndrome     Family history of malignant neoplasm of breast     Type 2 diabetes mellitus treated with insulin (H)     History of major depression     OSTEOARTHRITIS L KNEE  s/p knee replacement on the left      Hypertension goal BP (blood pressure) < 140/90     Family history of stroke (cerebrovascular)     Family history of other cardiovascular diseases     JOINT PAIN-ANKLE - right ankle      Mitral valve insufficiency     Hyperlipidemia LDL goal <100     Aortic sclerosis     Tubular adenoma of colon     History of viral hepatitis, type B     Chronic low back pain     Long-term insulin use (H)     S/P total knee replacement using cement, right     Lumbago     Type 2 diabetes mellitus with hyperglycemia (H)     Posterior vitreous detachment of right eye     Pseudophakia of both eyes     Paroxysmal atrial fibrillation (H)     SOB (shortness of breath)     Bunion     On continuous oral anticoagulation     Pressure injury of toe of left foot, stage 3 (H)     Stage 3a chronic kidney disease (H)     Past Surgical History:   Procedure Laterality Date     ABDOMEN SURGERY      ovaian scope/ appendix removal     ANESTHESIA CARDIOVERSION N/A 12/4/2020    Procedure: ANESTHESIA, FOR CARDIOVERSION @1400;  Surgeon: GENERIC ANESTHESIA PROVIDER;  Location: [facility]     ARTHROPLASTY KNEE Right 9/23/2015    Procedure: ARTHROPLASTY KNEE;  Surgeon: Stephanie  Rufus VELEZ MD;  Location:  OR     BUNIONECTOMY REN AND LEANDRO, COMBINED Left 2/2/2021    Procedure: Left bunion correction with bone cutting and screw fixation;  Surgeon: David Hoffman DPM;  Location: [facility] OR     CATARACT IOL, RT/LT Left      COLONOSCOPY  4/04    diverticulosis, rec repeat 10 yrs(consider fam hx?)     COLONOSCOPY WITH CO2 INSUFFLATION N/A 6/19/2019    Procedure: COLONOSCOPY, WITH CO2 INSUFFLATION;  Surgeon: Zeb Duarte MD;  Location:  OR     ORTHOPEDIC SURGERY      right ankle     PHACOEMULSIFICATION CLEAR CORNEA WITH STANDARD INTRAOCULAR LENS IMPLANT Left 3/15/2018    Procedure: PHACOEMULSIFICATION CLEAR CORNEA WITH STANDARD INTRAOCULAR LENS IMPLANT;  LEFT EYE PHACOEMULSIFICATION CLEAR CORNEA WITH STANDARD INTRAOCULAR LENS IMPLANT;  Surgeon: Bandar Linares MD;  Location:  [facility]     PHACOEMULSIFICATION CLEAR CORNEA WITH STANDARD INTRAOCULAR LENS IMPLANT Right 4/5/2018    Procedure: PHACOEMULSIFICATION CLEAR CORNEA WITH STANDARD INTRAOCULAR LENS IMPLANT;  RIGHT PHACOEMULSIFICATION CLEAR CORNEA WITH STANDARD INTRAOCULAR LENS IMPLANT ;  Surgeon: Bandar Linares MD;  Location:  [facility]     STRESS ECHO (METRO)  12/03    no ischemia, limited by fatigue     SURGICAL HISTORY OF -       EXP LAP- R OVARY CYSTS     SURGICAL HISTORY OF -   1981,1984,1985    CHILDBIRTH     ZZC NONSPECIFIC PROCEDURE  6/06    right triple arthrodecesis      Z NONSPECIFIC PROCEDURE  7/06     right bunion surgery      [facility] TOTAL KNEE ARTHROPLASTY  3/03    L knee     Social History     Socioeconomic History     Marital status:      Spouse name: Not on file     Number of children: 3     Years of education: Not on file     Highest education level: Not on file   Occupational History     Occupation: RN     Employer: [employer]   Tobacco Use     Smoking status: Never     Passive exposure: Never     Smokeless tobacco: Never     Tobacco comments:     tried in early 20s only    Vaping  Use     Vaping Use: Never used   Substance and Sexual Activity     Alcohol use: Yes     Comment: rare     Drug use: No     Sexual activity: Not Currently     Birth control/protection: Post-menopausal     Comment:  - since for 20 years, no signficant other  > 5 years sexual activity    Other Topics Concern      Service No     Blood Transfusions No     Caffeine Concern No     Occupational Exposure Yes     Comment: Normal nursing exposures     Hobby Hazards No     Sleep Concern Yes     Stress Concern No     Comment: Stress level at HM=5, stress level at WK=6     Weight Concern Yes     Special Diet Yes     Comment: Diabetic diet (watching carbs)     Back Care No     Comment: Occassional back spasms     Exercise Yes     Comment: Pt joined a health club goes approx 3-4 times a week,half to one mile daily     Bike Helmet No     Seat Belt Yes     Comment: Sometimes     Self-Exams Yes     Parent/sibling w/ CABG, MI or angioplasty before 65F 55M? 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      Social Determinants of Health     Financial Resource Strain: Not on file   Food Insecurity: Not on file   Transportation Needs: Not on file   Physical Activity: Not on file   Stress: Not on file   Social Connections: Not on file   Intimate Partner Violence: Not on file   Housing Stability: Not on file     Family History   Problem Relation Age of Onset     Diabetes Maternal Grandmother         DM TYPE 2     Cerebrovascular Disease Maternal Grandmother      Hypertension Sister      Lipids Sister      Heart Disease Sister         Chronic AFib     Diabetes Sister      Coronary Artery Disease Sister         afib     Hypertension Sister      Lipids Sister      Gynecology Sister      Diabetes Sister      Asthma Sister      Blood Disease Paternal Grandmother         T CELL LEUKEMIA     Hypertension Brother      Lipids Brother      Congenital Anomalies Brother       Cardiovascular Brother      Heart Disease Brother         CABG/AFIB     Coronary Artery Disease Brother         cabg afib AAA     Hypertension Brother      Lipids Brother      Other Cancer Brother         multple myeloma     Obesity Brother      Hypertension Brother      Respiratory Brother         Sleep apnea     Heart Disease Brother         AFib     Coronary Artery Disease Brother         afib     Alzheimer Disease Mother      Asthma Mother      Hypertension Mother      Breast Cancer Mother 40        has mastectomy and lived to 84     Allergies Mother         Sulfa,     Arthritis Mother      Cardiovascular Mother      Depression Mother      Respiratory Mother      Lipids Mother      Cancer Mother         breast     Thyroid Disease Mother      Cerebrovascular Disease Mother         FROM  AFIB     Dementia Mother         Alzheimers     Other Cancer Mother         breast     Cancer - colorectal Other      Colon Cancer Other         2019     Cancer Father         lung     Aneurysm Father         brother, AAA     Other Cancer Father         lung ca     Coronary Artery Disease Father         cad AAA     Asthma Sister      Cancer - colorectal Paternal Uncle         late 50s to early 60s - great uncles      Breast Cancer Other         Maternal cousin     Arrhythmia Sister         2 brothers 1 sister Afib     Breast Cancer Maternal Aunt 62     Other Cancer Maternal Aunt 35        Ovarian cancer.  Survived despite late recurrence and is now 92.     Glaucoma No family hx of      Macular Degeneration No family hx of      Lab Results   Component Value Date    A1C 7.0 11/28/2022    A1C 7.1 05/25/2022    A1C 8.1 11/24/2021    A1C 9.0 08/16/2021    A1C 6.8 01/05/2021    A1C 6.9 11/25/2020    A1C 7.5 07/22/2020    A1C 7.3 12/10/2019               SUBJECTIVE FINDINGS:  A 67 year old returns to clinic for ulcer, plantar 1st MPJ.  She relates it is doing better.  She has been using the Betadine and Aquacel, and she was using the  Cassandra and a light dressing over it.  She feels this is not draining much anymore.    OBJECTIVE FINDINGS:  DP and PT are 2/4, right.  She has a plantar right 1st MPJ ulcer through the dermis.  There is minimal drainage, no erythema, no odor, no calor.  Some hyperkeratotic eschar that is mild.    ASSESSMENT AND PLAN:  Ulcer, right 1st MPJ.  She is diabetic with peripheral neuropathy and foot deformities present.  Diagnosis and treatment options discussed with her.  Local wound care done upon consent today.  We applied Betadine, Aquacel Ag and a light dressing.  She will continue this.  This is improving.  I advised her on offloading.  Return to clinic and see me in 2-3 weeks.  Previous notes reviewed.  Also she relates she needs a refill of the econazole cream.  I refilled the econazole cream.  She does have onychomycosis present.  Some mild tinea pedis changes on the heel.          Moderate level of medical decision making.

## 2023-01-09 ENCOUNTER — LAB (OUTPATIENT)
Dept: LAB | Facility: CLINIC | Age: 68
End: 2023-01-09
Payer: COMMERCIAL

## 2023-01-09 ENCOUNTER — ANCILLARY PROCEDURE (OUTPATIENT)
Dept: CARDIOLOGY | Facility: CLINIC | Age: 68
End: 2023-01-09
Attending: INTERNAL MEDICINE
Payer: COMMERCIAL

## 2023-01-09 DIAGNOSIS — I10 HYPERTENSION, UNSPECIFIED TYPE: Primary | ICD-10-CM

## 2023-01-09 DIAGNOSIS — I48.0 PAROXYSMAL ATRIAL FIBRILLATION (H): ICD-10-CM

## 2023-01-09 DIAGNOSIS — I10 HYPERTENSION, UNSPECIFIED TYPE: ICD-10-CM

## 2023-01-09 LAB
ANION GAP SERPL CALCULATED.3IONS-SCNC: 10 MMOL/L (ref 3–14)
BUN SERPL-MCNC: 27 MG/DL (ref 7–30)
CALCIUM SERPL-MCNC: 9.8 MG/DL (ref 8.5–10.1)
CHLORIDE BLD-SCNC: 104 MMOL/L (ref 94–109)
CO2 SERPL-SCNC: 27 MMOL/L (ref 20–32)
CREAT SERPL-MCNC: 0.94 MG/DL (ref 0.52–1.04)
ERYTHROCYTE [DISTWIDTH] IN BLOOD BY AUTOMATED COUNT: 13.5 % (ref 10–15)
GFR SERPL CREATININE-BSD FRML MDRD: 65 ML/MIN/1.73M2
GLUCOSE BLD-MCNC: 159 MG/DL (ref 70–99)
HCT VFR BLD AUTO: 41.6 % (ref 35–47)
HGB BLD-MCNC: 13.7 G/DL (ref 11.7–15.7)
HOLD SPECIMEN: NORMAL
LVEF ECHO: NORMAL
MCH RBC QN AUTO: 31.9 PG (ref 26.5–33)
MCHC RBC AUTO-ENTMCNC: 32.9 G/DL (ref 31.5–36.5)
MCV RBC AUTO: 97 FL (ref 78–100)
PLATELET # BLD AUTO: 218 10E3/UL (ref 150–450)
POTASSIUM BLD-SCNC: 3.8 MMOL/L (ref 3.4–5.3)
RBC # BLD AUTO: 4.3 10E6/UL (ref 3.8–5.2)
SODIUM SERPL-SCNC: 141 MMOL/L (ref 133–144)
WBC # BLD AUTO: 7.5 10E3/UL (ref 4–11)

## 2023-01-09 PROCEDURE — 85027 COMPLETE CBC AUTOMATED: CPT

## 2023-01-09 PROCEDURE — 80048 BASIC METABOLIC PNL TOTAL CA: CPT

## 2023-01-09 PROCEDURE — 93306 TTE W/DOPPLER COMPLETE: CPT | Performed by: INTERNAL MEDICINE

## 2023-01-09 PROCEDURE — 36415 COLL VENOUS BLD VENIPUNCTURE: CPT

## 2023-01-09 RX ADMIN — Medication 5 ML: at 08:57

## 2023-01-10 ENCOUNTER — VIRTUAL VISIT (OUTPATIENT)
Dept: CARDIOLOGY | Facility: CLINIC | Age: 68
End: 2023-01-10
Payer: COMMERCIAL

## 2023-01-10 DIAGNOSIS — I48.0 PAROXYSMAL ATRIAL FIBRILLATION (H): ICD-10-CM

## 2023-01-10 DIAGNOSIS — I10 HYPERTENSION, UNSPECIFIED TYPE: Primary | ICD-10-CM

## 2023-01-10 DIAGNOSIS — E78.5 DYSLIPIDEMIA: ICD-10-CM

## 2023-01-10 PROCEDURE — 99214 OFFICE O/P EST MOD 30 MIN: CPT | Mod: 95 | Performed by: INTERNAL MEDICINE

## 2023-01-10 NOTE — PROGRESS NOTES
"Sherry is a 67 year old who is being evaluated via a billable video visit.      How would you like to obtain your AVS? MyChart  If the video visit is dropped, the invitation should be resent by: Text to cell phone: [phone]  Will anyone else be joining your video visit? Yane LINO    The patient has been notified of following:     \"This video visit will be conducted via a call between you and your physician/provider. We have found that certain health care needs can be provided without the need for an in-person physical exam.  This service lets us provide the care you need with a video conversation.  If a prescription is necessary we can send it directly to your pharmacy.  If lab work is needed we can place an order for that and you can then stop by our lab to have the test done at a later time.    Video visits are billed at different rates depending on your insurance coverage.  Please reach out to your insurance provider with any questions.    If during the course of the call the physician/provider feels a video visit is not appropriate, you will not be charged for this service.\"    Patient has given verbal consent for video visit? Yes    How would you like to obtain your AVS? Mail    Video-Visit Details    Type of service:  Video Visit    Video Start Time:1032am    Video End Time:1046am    Total visit time including video visit, chart review, charting, coordination of care =35min    Originating Location (pt. Location):patient home      Distant Location (provider location):   Off site home office    Platform used for Video Visit: Yuliana    See dictation #[id number]    CSN#:[id number]  "

## 2023-01-10 NOTE — PATIENT INSTRUCTIONS
EP referral to Dr. Roslyn Mccracken at  for management of afib  Virtual video visit followup 6mo with labs prior

## 2023-01-10 NOTE — PROGRESS NOTES
Visit Date: 01/10/2023    January 10, 2023      Marilou Arciniega MD  [facility]  [address] [place] [address]    Patient: Sherry Slaughter  MRN: [id number]  : 1955    Dear Dr. Arciniega:    It was a pleasure participating in the care of your patient, Ms. Sherry Slaughter.  As you know, she is a 67-year-old lady who I saw over virtual video visit via [employer] today for paroxysmal AFib, congestive heart failure, hypertension, hyperlipidemia.    PAST MEDICAL HISTORY:      1.  Type 2 diabetes.  2.  Hypertension.  3.  Hyperlipidemia.  4.  Depression.  5.  Obstructive sleep apnea, on CPAP.  6.  Hypothyroidism.  7.  Diverticulosis.  8.  Low back pain.  9.  Irritable bowel syndrome.    I last saw her on 2022.  At that time, she was doing adequately.  She presents today for continuing care.    Since our last visit, she has had multiple episodes of breakthrough AFib.  The first occurred around Thanksgiving time and she noted that her Fitbit had reported high rates in the 120 beat per minute range.  It lasted on and off for about 3-4 days and resolved spontaneously.      The next occurred after she acquired an upper respiratory infection, and after that was nearly resolved she went into AFib and felt a pressure between her shoulder blades, which told her that her heart was going fast and her Fitbit feet once again confirmed high rates at that time in the 120-140 beats per minute range.  She says that usually when she goes to sleep and wakes up it feels better and she is often converted back into normal sinus rhythm.    She otherwise is able to accomplish her daily activities without chest pain or shortness of breath.  She denies PND, orthopnea or edema.  Her weight has been stable around 300 pounds, her blood pressures have been stable around 115/65 or in that neighborhood, with a pulse in the 70s.    She has no other complaints.    CURRENT MEDICATIONS:      1.  Diltiazem 360 mg a day.  2.   Lasix 20 mg a day.  3.  Hydrochlorothiazide 50 mg a day.  4.  Insulin.  5.  Levothyroxine.  6.  Lisinopril 20 mg a day.  7.  Metformin.  8.  Metoprolol succinate 200 mg twice daily.  9.  Rivaroxaban 20 mg a day.  10.  Rosuvastatin 10 mg a day.    PHYSICAL EXAMINATION:      VITAL SIGNS:  Her blood pressure is 115/65 with a pulse of 68.  Her weight is 300 pounds.  GENERAL:  She appears comfortable, well groomed.  PSYCHIATRIC:  She is alert and oriented x 3.  HEENT:  Her eyes do not appear grossly erythematous or have exudate.  RESPIRATORY:  She is breathing comfortably without gross cough.    The remainder of the comprehensive physical exam was deferred secondary to the COVID-19 pandemic and secondary to video visit restrictions.    LABORATORY:  On 11/28/2022, LDL 53.  On 01/19/2023, potassium 3.8, GFR normal.  Hemoglobin normal.    Echocardiogram 01/09/2023 reveals an ejection fraction of 60% to 65% without significant valvular pathology identified.  Grade 2 diastolic dysfunction noted.  Echo unchanged from 10/07/2021.      IMPRESSION:      Sherry is a 67-year-old lady who has several active cardiac issues:    1.  Paroxysmal atrial fibrillation.    She has had recent episodes of breakthrough AFib, around Thanksgiving time and in mid December, where her Fitbit had recorded increased rates in the 120 beat per minute range.  She does feel a certain pressure between her shoulder blades, which notifies her that she is back in AFib.  She spontaneously converts back to normal sinus rhythm after a night's sleep, but her symptoms can persist on and off for 3-5 days at a time.     Her CHADS-VASc 2 score is 5 for congestive heart failure, hypertension, age, diabetes and female gender.    She is maintained on rivaroxaban and relatively maximal doses of diltiazem 360 mg and metoprolol succinate 200 mg twice daily.    Due to these recurrences and likelihood of continuation of this trend and her significant symptoms while she is  in rapid AFib, further consultation would be reasonable.    2.  History of congestive heart failure.    Her dry weight is in the approximately 298-300 pound range.  She is currently at 300 pounds on Lasix 20 mg a day, along with hydrochlorothiazide 50 mg a day.    Again, she is intolerant to SGLT2 inhibitors, which gave her yeast infections.  However, her weight has been stable and she denies any shortness of breath or swelling.  We will continue to monitor clinically.    3.  Hypertension, well controlled.      Blood pressures are running 116/65.  Continue to follow.    4.  Hyperlipidemia.      LDL is within the goal range.  Continue dietary modifications.    5.  Mild aortic stenosis.    Echocardiogram 10/07/2021 revealed a mean gradient of 10 mmHg with a peak velocity of 2.1 meters per second with a normal ejection fraction.  Latest echo yesterday did not comment on the aortic valve and reported maximal velocity of 1.97 meters per second with no mean gradient.      PLAN:    1.  We will have Concepción meet with Dr. Roslyn Mccracken to discuss future treatment options for her AFib, of which relatively frequent breakthrough episodes have been refractory to relatively maximum doses of diltiazem and beta blocker.    2.  She already has a [facility] appointment scheduled in May with [facility] and she can follow up with me in approximately 6 months with labs prior, earlier if needed.    Once again, it was a pleasure participating in the care of your patient, Ms. Concepción Scales.  Please feel free to contact me at any time if you have any questions regarding her care in the future.        Joel Aranda MD        D: 01/10/2023   T: 01/10/2023   MT: [id number]    Name:     CONCEPCIÓN SCALES  MRN:      [id number]        Account:    [id number]   :      1955           Visit Date: 01/10/2023     Document: [id number]

## 2023-01-11 ENCOUNTER — TELEPHONE (OUTPATIENT)
Dept: CARDIOLOGY | Facility: CLINIC | Age: 68
End: 2023-01-11

## 2023-01-11 NOTE — TELEPHONE ENCOUNTER
Left Voicemail (1st Attempt) for the patient to call back and schedule the following:    Appointment type: return  Provider:   Return date: 07/18/2023  Specialty phone number: [phone]  Additional appointment(s) needed: Labs prior  Additonal Notes: Follow up around 07/18/2023 using a video visit.      Whit sullivan Procedure   Cardiology, Nephrology, Rheumatology, GI, Pulmonology, Infectious Disease Specialties   [facility] and [facility]

## 2023-01-12 DIAGNOSIS — I48.0 PAROXYSMAL ATRIAL FIBRILLATION (H): ICD-10-CM

## 2023-01-12 DIAGNOSIS — I50.9 CONGESTIVE HEART FAILURE, UNSPECIFIED HF CHRONICITY, UNSPECIFIED HEART FAILURE TYPE (H): Primary | ICD-10-CM

## 2023-01-17 ENCOUNTER — OFFICE VISIT (OUTPATIENT)
Dept: PODIATRY | Facility: CLINIC | Age: 68
End: 2023-01-17
Payer: COMMERCIAL

## 2023-01-17 DIAGNOSIS — M21.969 TYPE 2 DIABETES MELLITUS WITH DIABETIC FOOT DEFORMITY (H): ICD-10-CM

## 2023-01-17 DIAGNOSIS — E11.49 TYPE II OR UNSPECIFIED TYPE DIABETES MELLITUS WITH NEUROLOGICAL MANIFESTATIONS, NOT STATED AS UNCONTROLLED(250.60) (H): Primary | ICD-10-CM

## 2023-01-17 DIAGNOSIS — E11.69 TYPE 2 DIABETES MELLITUS WITH DIABETIC FOOT DEFORMITY (H): ICD-10-CM

## 2023-01-17 DIAGNOSIS — L97.512 ULCER OF RIGHT FOOT WITH FAT LAYER EXPOSED (H): ICD-10-CM

## 2023-01-17 PROCEDURE — 99214 OFFICE O/P EST MOD 30 MIN: CPT | Performed by: PODIATRIST

## 2023-01-17 RX ORDER — HYDROCHLOROTHIAZIDE 25 MG/1
50 TABLET ORAL DAILY
Qty: 180 TABLET | Refills: 3 | Status: SHIPPED | OUTPATIENT
Start: 2023-01-17 | End: 2023-09-15

## 2023-01-17 RX ORDER — DILTIAZEM HYDROCHLORIDE 360 MG/1
360 CAPSULE, EXTENDED RELEASE ORAL DAILY
Qty: 90 CAPSULE | Refills: 3 | Status: SHIPPED | OUTPATIENT
Start: 2023-01-17 | End: 2023-12-05

## 2023-01-17 NOTE — LETTER
1/17/2023         RE: Sherry Slaughter  [address]  [place] [address]        Dear Colleague,    Thank you for referring your patient, Sherry Slaughter, to the [facility]. Please see a copy of my visit note below.    Past Medical History:   Diagnosis Date     Cataract      Congestive heart failure (H) 2019 NOVEMBER    AFIB     DEPRESSION     comes and goes - tried meds - unsuccessfully, certain times of the year, no psych intervention and no counselors in the past - and not interested      Diverticulosis of colon (without mention of hemorrhage) 4/04    colonoscopy     ECHO-mildLVH,tr MR,mild thick lflets w inc LA,trTR   12/03     Essential hypertension, benign 1990s    late 1990s - started medications at that time - not to difficult to control meds      Fam hx-cardiovas dis [facility]     father - CAD, and lipids/HTN - multiple members of the family      Family history of diabetes mellitus     sister and grandmother with DM      Family history of malignant neoplasm of breast     mother - young age - 45, and maternal cousin and aunt as well - no BRCA testing done      Family history of stroke (cerebrovascular)     grandmother in early life in her 40s      FAMILY HX COLON CANCER     Pat uncles x 2     Heart disease     murmur/     Heart murmur      HYPERLIPIDEMIA 2000    fairly recent - in the last 5 years - high for DM levels  -cholesterol recent      Irritable bowel syndrome     goes between the 2 - nerve related - more stressed more problems      MICROALBUMINURIA     unsure how long - been on the lisinopril - for a few years at well      Nonspecific abnormal results of liver function study 2/3/2003    SGOT - has been high in the past - since the hepatitis b - borderline elevation - not really changing any      OBESITY      Obstructive sleep apnea      GENESIS (obstructive sleep apnea) 10/15/2006    psg 5/15 AHI 53 aPAP 8-15     OSTEOARTHRITIS L KNEE 2003    total knee on the  left - and pain is gone since the knee replacement      PERS HX HEPATITIS B- RESOLVED] 1977    acute virus only - no chronic disease      PERS HX HEPATITIS B- RESOLVED]      Type II or unspecified type diabetes mellitus without mention of complication, not stated as uncontrolled 1991    oral meds frist, then insulin eventually later, difficult to control most of the time      Unspecified hypothyroidism 10/11/2006    TSH 3.36 - mild subclinical hypothyroidism - deciding on following or starting low dose meds - with dr Oreilly - following      Patient Active Problem List   Diagnosis     Morbid obesity (H)     Diverticulosis of large intestine     Nonspecific abnormal results of cardiovascular function study     FAMILY HX COLON CANCER     Nonallopathic lesion of thoracic region     Hypothyroidism     GENESIS (obstructive sleep apnea)     Irritable bowel syndrome     Family history of malignant neoplasm of breast     Type 2 diabetes mellitus treated with insulin (H)     History of major depression     OSTEOARTHRITIS L KNEE  s/p knee replacement on the left      Hypertension goal BP (blood pressure) < 140/90     Family history of stroke (cerebrovascular)     Family history of other cardiovascular diseases     JOINT PAIN-ANKLE - right ankle      Mitral valve insufficiency     Hyperlipidemia LDL goal <100     Aortic sclerosis     Tubular adenoma of colon     History of viral hepatitis, type B     Chronic low back pain     Long-term insulin use (H)     S/P total knee replacement using cement, right     Lumbago     Type 2 diabetes mellitus with hyperglycemia (H)     Posterior vitreous detachment of right eye     Pseudophakia of both eyes     Paroxysmal atrial fibrillation (H)     SOB (shortness of breath)     Bunion     On continuous oral anticoagulation     Pressure injury of toe of left foot, stage 3 (H)     Stage 3a chronic kidney disease (H)     Past Surgical History:   Procedure Laterality Date     ABDOMEN SURGERY       ovaian scope/ appendix removal     ANESTHESIA CARDIOVERSION N/A 12/4/2020    Procedure: ANESTHESIA, FOR CARDIOVERSION @1400;  Surgeon: GENERIC ANESTHESIA PROVIDER;  Location: [facility] OR     ARTHROPLASTY KNEE Right 9/23/2015    Procedure: ARTHROPLASTY KNEE;  Surgeon: Rufus Brown MD;  Location:  OR     BUNIONECTOMY REN AND LEANDRO, COMBINED Left 2/2/2021    Procedure: Left bunion correction with bone cutting and screw fixation;  Surgeon: David Hoffman DPM;  Location: [facility] OR     CATARACT IOL, RT/LT Left      COLONOSCOPY  4/04    diverticulosis, rec repeat 10 yrs(consider fam hx?)     COLONOSCOPY WITH CO2 INSUFFLATION N/A 6/19/2019    Procedure: COLONOSCOPY, WITH CO2 INSUFFLATION;  Surgeon: Zeb Duarte MD;  Location: [facility] OR     ORTHOPEDIC SURGERY      right ankle     PHACOEMULSIFICATION CLEAR CORNEA WITH STANDARD INTRAOCULAR LENS IMPLANT Left 3/15/2018    Procedure: PHACOEMULSIFICATION CLEAR CORNEA WITH STANDARD INTRAOCULAR LENS IMPLANT;  LEFT EYE PHACOEMULSIFICATION CLEAR CORNEA WITH STANDARD INTRAOCULAR LENS IMPLANT;  Surgeon: Bandar Linares MD;  Location:  [facility]     PHACOEMULSIFICATION CLEAR CORNEA WITH STANDARD INTRAOCULAR LENS IMPLANT Right 4/5/2018    Procedure: PHACOEMULSIFICATION CLEAR CORNEA WITH STANDARD INTRAOCULAR LENS IMPLANT;  RIGHT PHACOEMULSIFICATION CLEAR CORNEA WITH STANDARD INTRAOCULAR LENS IMPLANT ;  Surgeon: Bandar Linares MD;  Location:  [facility]     STRESS ECHO (METRO)  12/03    no ischemia, limited by fatigue     SURGICAL HISTORY OF -       EXP LAP- R OVARY CYSTS     SURGICAL HISTORY OF -   1981,1984,1985    CHILDBIRTH     ZZC NONSPECIFIC PROCEDURE  6/06    right triple arthrodecesis      ZZ NONSPECIFIC PROCEDURE  7/06     right bunion surgery      [facility] TOTAL KNEE ARTHROPLASTY  3/03    L knee     Social History     Socioeconomic History     Marital status:      Spouse name: Not on file     Number of children: 3     Years of education: Not on file     Highest  education level: Not on file   Occupational History     Occupation: RN     Employer: [employer]   Tobacco Use     Smoking status: Never     Passive exposure: Never     Smokeless tobacco: Never     Tobacco comments:     tried in early 20s only    Vaping Use     Vaping Use: Never used   Substance and Sexual Activity     Alcohol use: Yes     Comment: rare     Drug use: No     Sexual activity: Not Currently     Birth control/protection: Post-menopausal     Comment:  - since for 20 years, no signficant other  > 5 years sexual activity    Other Topics Concern      Service No     Blood Transfusions No     Caffeine Concern No     Occupational Exposure Yes     Comment: Normal nursing exposures     Hobby Hazards No     Sleep Concern Yes     Stress Concern No     Comment: Stress level at HM=5, stress level at WK=6     Weight Concern Yes     Special Diet Yes     Comment: Diabetic diet (watching carbs)     Back Care No     Comment: Occassional back spasms     Exercise Yes     Comment: Pt joined a health club goes approx 3-4 times a week,half to one mile daily     Bike Helmet No     Seat Belt Yes     Comment: Sometimes     Self-Exams Yes     Parent/sibling w/ CABG, MI or angioplasty before 65F 55M? 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      Social Determinants of Health     Financial Resource Strain: Not on file   Food Insecurity: Not on file   Transportation Needs: Not on file   Physical Activity: Not on file   Stress: Not on file   Social Connections: Not on file   Intimate Partner Violence: Not on file   Housing Stability: Not on file     Family History   Problem Relation Age of Onset     Diabetes Maternal Grandmother         DM TYPE 2     Cerebrovascular Disease Maternal Grandmother      Hypertension Sister      Lipids Sister      Heart Disease Sister         Chronic AFib     Diabetes Sister      Coronary Artery Disease  Sister         afib     Hypertension Sister      Lipids Sister      Gynecology Sister      Diabetes Sister      Asthma Sister      Blood Disease Paternal Grandmother         T CELL LEUKEMIA     Hypertension Brother      Lipids Brother      Congenital Anomalies Brother      Cardiovascular Brother      Heart Disease Brother         CABG/AFIB     Coronary Artery Disease Brother         cabg afib AAA     Hypertension Brother      Lipids Brother      Other Cancer Brother         multple myeloma     Obesity Brother      Hypertension Brother      Respiratory Brother         Sleep apnea     Heart Disease Brother         AFib     Coronary Artery Disease Brother         afib     Alzheimer Disease Mother      Asthma Mother      Hypertension Mother      Breast Cancer Mother 40        has mastectomy and lived to 84     Allergies Mother         Sulfa,     Arthritis Mother      Cardiovascular Mother      Depression Mother      Respiratory Mother      Lipids Mother      Cancer Mother         breast     Thyroid Disease Mother      Cerebrovascular Disease Mother         FROM  AFIB     Dementia Mother         Alzheimers     Other Cancer Mother         breast     Cancer - colorectal Other      Colon Cancer Other         2019     Cancer Father         lung     Aneurysm Father         brother, AAA     Other Cancer Father         lung ca     Coronary Artery Disease Father         cad AAA     Asthma Sister      Cancer - colorectal Paternal Uncle         late 50s to early 60s - great uncles      Breast Cancer Other         Maternal cousin     Arrhythmia Sister         2 brothers 1 sister Afib     Breast Cancer Maternal Aunt 62     Other Cancer Maternal Aunt 35        Ovarian cancer.  Survived despite late recurrence and is now 92.     Glaucoma No family hx of      Macular Degeneration No family hx of      Lab Results   Component Value Date    A1C 7.0 11/28/2022    A1C 7.1 05/25/2022    A1C 8.1 11/24/2021    A1C 9.0 08/16/2021    A1C 6.8  01/05/2021    A1C 6.9 11/25/2020    A1C 7.5 07/22/2020    A1C 7.3 12/10/2019     Last Comprehensive Metabolic Panel:  Sodium   Date Value Ref Range Status   01/09/2023 141 133 - 144 mmol/L Final   04/13/2021 137 133 - 144 mmol/L Final     Potassium   Date Value Ref Range Status   01/09/2023 3.8 3.4 - 5.3 mmol/L Final   04/13/2021 4.2 3.4 - 5.3 mmol/L Final     Chloride   Date Value Ref Range Status   01/09/2023 104 94 - 109 mmol/L Final   04/13/2021 106 94 - 109 mmol/L Final     Carbon Dioxide   Date Value Ref Range Status   04/13/2021 28 20 - 32 mmol/L Final     Carbon Dioxide (CO2)   Date Value Ref Range Status   01/09/2023 27 20 - 32 mmol/L Final     Anion Gap   Date Value Ref Range Status   01/09/2023 10 3 - 14 mmol/L Final   04/13/2021 3 3 - 14 mmol/L Final     Glucose   Date Value Ref Range Status   01/09/2023 159 (H) 70 - 99 mg/dL Final   04/13/2021 98 70 - 99 mg/dL Final     Comment:     Non Fasting     Urea Nitrogen   Date Value Ref Range Status   01/09/2023 27 7 - 30 mg/dL Final   04/13/2021 22 7 - 30 mg/dL Final     Creatinine   Date Value Ref Range Status   01/09/2023 0.94 0.52 - 1.04 mg/dL Final   04/13/2021 0.81 0.52 - 1.04 mg/dL Final     GFR Estimate   Date Value Ref Range Status   01/09/2023 65 >60 mL/min/1.73m2 Final     Comment:     Effective December 21, 2021 eGFRcr in adults is calculated using the 2021 CKD-EPI creatinine equation which includes age and gender (Yashira et al., NEJM, DOI: 10.1056/VNDLnn4322126)   04/13/2021 75 >60 mL/min/[1.73_m2] Final     Comment:     Non  GFR Calc  Starting 12/18/2018, serum creatinine based estimated GFR (eGFR) will be   calculated using the Chronic Kidney Disease Epidemiology Collaboration   (CKD-EPI) equation.       Calcium   Date Value Ref Range Status   01/09/2023 9.8 8.5 - 10.1 mg/dL Final   04/13/2021 10.1 8.5 - 10.1 mg/dL Final     Lab Results   Component Value Date    WBC 7.5 01/09/2023    WBC 7.3 01/05/2021     Lab Results   Component  Value Date    RBC 4.30 01/09/2023    RBC 4.31 01/05/2021     Lab Results   Component Value Date    HGB 13.7 01/09/2023    HGB 13.9 01/05/2021     Lab Results   Component Value Date    HCT 41.6 01/09/2023    HCT 40.4 01/05/2021     No components found for: MCT  Lab Results   Component Value Date    MCV 97 01/09/2023    MCV 94 01/05/2021     Lab Results   Component Value Date    MCH 31.9 01/09/2023    MCH 32.3 01/05/2021     Lab Results   Component Value Date    MCHC 32.9 01/09/2023    MCHC 34.4 01/05/2021     Lab Results   Component Value Date    RDW 13.5 01/09/2023    RDW 13.3 01/05/2021     Lab Results   Component Value Date     01/09/2023     01/05/2021               SUBJECTIVE FINDINGS:  A 67-year-old returns to clinic for ulcer, right first MPJ.  She is diabetic with peripheral neuropathy and foot deformities.  She relates she has got a new area of cracking next to the previous area.  She relates it is draining a little bit.  No injuries.  She is wearing her diabetic insoles.  She is using the Aquacel Ag and the Betadine.    OBJECTIVE FINDINGS:  DP and PT are 2/4, right.  She has a right plantar first MPJ.  New area of ulceration that is 0.5 cm in diameter.  There is some hyperkeratotic tissue buildup with ecchymosis.  There is positive serosanguineous drainage, no erythema, no odor, no calor.  She has dorsally contracted digits present.    ASSESSMENT AND PLAN:  Ulcer, right first MPJ.  Diabetes with peripheral neuropathy and foot deformities.  Diagnosis and treatment options discussed with her.  Sharp ulcer debridement through the epidermis with a #15 blade, no anesthesia needed and local wound care done upon consent today.  The ulcer was clean upon debridement.  I applied Betadine and Aquacel Ag and added a metatarsal pad to her diabetic insole.  She does have a recessed first MPJ cutout on the insole as well and use discussed with her.  She relates she has been using a gel metatarsal pad and she  has metatarsal pads in some of her other shoes as well.  Continue the econazole cream for the onychomycosis.  Return to clinic and see me in 2-3 weeks.  Previous notes reviewed.  I discussed with her a surgical referral.  She does not want to do that again today.  I also discussed with her further offloading options.            Moderate to high level of medical decision making.        Again, thank you for allowing me to participate in the care of your patient.        Sincerely,        Rufus Hutson DPM

## 2023-01-17 NOTE — PROGRESS NOTES
Past Medical History:   Diagnosis Date     Cataract      Congestive heart failure (H) 2019 NOVEMBER    AFIB     DEPRESSION     comes and goes - tried meds - unsuccessfully, certain times of the year, no psych intervention and no counselors in the past - and not interested      Diverticulosis of colon (without mention of hemorrhage) 4/04    colonoscopy     ECHO-mildLVH,tr MR,mild thick lflets w inc LA,trTR   12/03     Essential hypertension, benign 1990s    late 1990s - started medications at that time - not to difficult to control meds      Fam hx-cardiovas dis [facility]     father - CAD, and lipids/HTN - multiple members of the family      Family history of diabetes mellitus     sister and grandmother with DM      Family history of malignant neoplasm of breast     mother - young age - 45, and maternal cousin and aunt as well - no BRCA testing done      Family history of stroke (cerebrovascular)     grandmother in early life in her 40s      FAMILY HX COLON CANCER     Pat uncles x 2     Heart disease     murmur/     Heart murmur      HYPERLIPIDEMIA 2000    fairly recent - in the last 5 years - high for DM levels  -cholesterol recent      Irritable bowel syndrome     goes between the 2 - nerve related - more stressed more problems      MICROALBUMINURIA     unsure how long - been on the lisinopril - for a few years at well      Nonspecific abnormal results of liver function study 2/3/2003    SGOT - has been high in the past - since the hepatitis b - borderline elevation - not really changing any      OBESITY      Obstructive sleep apnea      GENESIS (obstructive sleep apnea) 10/15/2006    psg 5/15 AHI 53 aPAP 8-15     OSTEOARTHRITIS L KNEE 2003    total knee on the left - and pain is gone since the knee replacement      PERS HX HEPATITIS B- RESOLVED] 1977    acute virus only - no chronic disease      PERS HX HEPATITIS B- RESOLVED]      Type II or unspecified type diabetes mellitus without mention of complication, not stated as  uncontrolled 1991    oral meds frist, then insulin eventually later, difficult to control most of the time      Unspecified hypothyroidism 10/11/2006    TSH 3.36 - mild subclinical hypothyroidism - deciding on following or starting low dose meds - with dr Oreilly - following      Patient Active Problem List   Diagnosis     Morbid obesity (H)     Diverticulosis of large intestine     Nonspecific abnormal results of cardiovascular function study     FAMILY HX COLON CANCER     Nonallopathic lesion of thoracic region     Hypothyroidism     GENESIS (obstructive sleep apnea)     Irritable bowel syndrome     Family history of malignant neoplasm of breast     Type 2 diabetes mellitus treated with insulin (H)     History of major depression     OSTEOARTHRITIS L KNEE  s/p knee replacement on the left      Hypertension goal BP (blood pressure) < 140/90     Family history of stroke (cerebrovascular)     Family history of other cardiovascular diseases     JOINT PAIN-ANKLE - right ankle      Mitral valve insufficiency     Hyperlipidemia LDL goal <100     Aortic sclerosis     Tubular adenoma of colon     History of viral hepatitis, type B     Chronic low back pain     Long-term insulin use (H)     S/P total knee replacement using cement, right     Lumbago     Type 2 diabetes mellitus with hyperglycemia (H)     Posterior vitreous detachment of right eye     Pseudophakia of both eyes     Paroxysmal atrial fibrillation (H)     SOB (shortness of breath)     Bunion     On continuous oral anticoagulation     Pressure injury of toe of left foot, stage 3 (H)     Stage 3a chronic kidney disease (H)     Past Surgical History:   Procedure Laterality Date     ABDOMEN SURGERY      ovaian scope/ appendix removal     ANESTHESIA CARDIOVERSION N/A 12/4/2020    Procedure: ANESTHESIA, FOR CARDIOVERSION @1400;  Surgeon: GENERIC ANESTHESIA PROVIDER;  Location: [facility] OR     ARTHROPLASTY KNEE Right 9/23/2015    Procedure: ARTHROPLASTY KNEE;  Surgeon: Stephanie  Rufus VELEZ MD;  Location:  OR     BUNIONECTOMY REN AND LEANDRO, COMBINED Left 2/2/2021    Procedure: Left bunion correction with bone cutting and screw fixation;  Surgeon: David Hoffman DPM;  Location: [facility] OR     CATARACT IOL, RT/LT Left      COLONOSCOPY  4/04    diverticulosis, rec repeat 10 yrs(consider fam hx?)     COLONOSCOPY WITH CO2 INSUFFLATION N/A 6/19/2019    Procedure: COLONOSCOPY, WITH CO2 INSUFFLATION;  Surgeon: Zeb Duarte MD;  Location:  OR     ORTHOPEDIC SURGERY      right ankle     PHACOEMULSIFICATION CLEAR CORNEA WITH STANDARD INTRAOCULAR LENS IMPLANT Left 3/15/2018    Procedure: PHACOEMULSIFICATION CLEAR CORNEA WITH STANDARD INTRAOCULAR LENS IMPLANT;  LEFT EYE PHACOEMULSIFICATION CLEAR CORNEA WITH STANDARD INTRAOCULAR LENS IMPLANT;  Surgeon: Bandar Linares MD;  Location:  [facility]     PHACOEMULSIFICATION CLEAR CORNEA WITH STANDARD INTRAOCULAR LENS IMPLANT Right 4/5/2018    Procedure: PHACOEMULSIFICATION CLEAR CORNEA WITH STANDARD INTRAOCULAR LENS IMPLANT;  RIGHT PHACOEMULSIFICATION CLEAR CORNEA WITH STANDARD INTRAOCULAR LENS IMPLANT ;  Surgeon: Bandar Linares MD;  Location:  [facility]     STRESS ECHO (METRO)  12/03    no ischemia, limited by fatigue     SURGICAL HISTORY OF -       EXP LAP- R OVARY CYSTS     SURGICAL HISTORY OF -   1981,1984,1985    CHILDBIRTH     ZZC NONSPECIFIC PROCEDURE  6/06    right triple arthrodecesis      Z NONSPECIFIC PROCEDURE  7/06     right bunion surgery      [facility] TOTAL KNEE ARTHROPLASTY  3/03    L knee     Social History     Socioeconomic History     Marital status:      Spouse name: Not on file     Number of children: 3     Years of education: Not on file     Highest education level: Not on file   Occupational History     Occupation: RN     Employer: [employer]   Tobacco Use     Smoking status: Never     Passive exposure: Never     Smokeless tobacco: Never     Tobacco comments:     tried in early 20s only    Vaping  Use     Vaping Use: Never used   Substance and Sexual Activity     Alcohol use: Yes     Comment: rare     Drug use: No     Sexual activity: Not Currently     Birth control/protection: Post-menopausal     Comment:  - since for 20 years, no signficant other  > 5 years sexual activity    Other Topics Concern      Service No     Blood Transfusions No     Caffeine Concern No     Occupational Exposure Yes     Comment: Normal nursing exposures     Hobby Hazards No     Sleep Concern Yes     Stress Concern No     Comment: Stress level at HM=5, stress level at WK=6     Weight Concern Yes     Special Diet Yes     Comment: Diabetic diet (watching carbs)     Back Care No     Comment: Occassional back spasms     Exercise Yes     Comment: Pt joined a health club goes approx 3-4 times a week,half to one mile daily     Bike Helmet No     Seat Belt Yes     Comment: Sometimes     Self-Exams Yes     Parent/sibling w/ CABG, MI or angioplasty before 65F 55M? 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      Social Determinants of Health     Financial Resource Strain: Not on file   Food Insecurity: Not on file   Transportation Needs: Not on file   Physical Activity: Not on file   Stress: Not on file   Social Connections: Not on file   Intimate Partner Violence: Not on file   Housing Stability: Not on file     Family History   Problem Relation Age of Onset     Diabetes Maternal Grandmother         DM TYPE 2     Cerebrovascular Disease Maternal Grandmother      Hypertension Sister      Lipids Sister      Heart Disease Sister         Chronic AFib     Diabetes Sister      Coronary Artery Disease Sister         afib     Hypertension Sister      Lipids Sister      Gynecology Sister      Diabetes Sister      Asthma Sister      Blood Disease Paternal Grandmother         T CELL LEUKEMIA     Hypertension Brother      Lipids Brother      Congenital Anomalies Brother       Cardiovascular Brother      Heart Disease Brother         CABG/AFIB     Coronary Artery Disease Brother         cabg afib AAA     Hypertension Brother      Lipids Brother      Other Cancer Brother         multple myeloma     Obesity Brother      Hypertension Brother      Respiratory Brother         Sleep apnea     Heart Disease Brother         AFib     Coronary Artery Disease Brother         afib     Alzheimer Disease Mother      Asthma Mother      Hypertension Mother      Breast Cancer Mother 40        has mastectomy and lived to 84     Allergies Mother         Sulfa,     Arthritis Mother      Cardiovascular Mother      Depression Mother      Respiratory Mother      Lipids Mother      Cancer Mother         breast     Thyroid Disease Mother      Cerebrovascular Disease Mother         FROM  AFIB     Dementia Mother         Alzheimers     Other Cancer Mother         breast     Cancer - colorectal Other      Colon Cancer Other         2019     Cancer Father         lung     Aneurysm Father         brother, AAA     Other Cancer Father         lung ca     Coronary Artery Disease Father         cad AAA     Asthma Sister      Cancer - colorectal Paternal Uncle         late 50s to early 60s - great uncles      Breast Cancer Other         Maternal cousin     Arrhythmia Sister         2 brothers 1 sister Afib     Breast Cancer Maternal Aunt 62     Other Cancer Maternal Aunt 35        Ovarian cancer.  Survived despite late recurrence and is now 92.     Glaucoma No family hx of      Macular Degeneration No family hx of      Lab Results   Component Value Date    A1C 7.0 11/28/2022    A1C 7.1 05/25/2022    A1C 8.1 11/24/2021    A1C 9.0 08/16/2021    A1C 6.8 01/05/2021    A1C 6.9 11/25/2020    A1C 7.5 07/22/2020    A1C 7.3 12/10/2019     Last Comprehensive Metabolic Panel:  Sodium   Date Value Ref Range Status   01/09/2023 141 133 - 144 mmol/L Final   04/13/2021 137 133 - 144 mmol/L Final     Potassium   Date Value Ref Range  Status   01/09/2023 3.8 3.4 - 5.3 mmol/L Final   04/13/2021 4.2 3.4 - 5.3 mmol/L Final     Chloride   Date Value Ref Range Status   01/09/2023 104 94 - 109 mmol/L Final   04/13/2021 106 94 - 109 mmol/L Final     Carbon Dioxide   Date Value Ref Range Status   04/13/2021 28 20 - 32 mmol/L Final     Carbon Dioxide (CO2)   Date Value Ref Range Status   01/09/2023 27 20 - 32 mmol/L Final     Anion Gap   Date Value Ref Range Status   01/09/2023 10 3 - 14 mmol/L Final   04/13/2021 3 3 - 14 mmol/L Final     Glucose   Date Value Ref Range Status   01/09/2023 159 (H) 70 - 99 mg/dL Final   04/13/2021 98 70 - 99 mg/dL Final     Comment:     Non Fasting     Urea Nitrogen   Date Value Ref Range Status   01/09/2023 27 7 - 30 mg/dL Final   04/13/2021 22 7 - 30 mg/dL Final     Creatinine   Date Value Ref Range Status   01/09/2023 0.94 0.52 - 1.04 mg/dL Final   04/13/2021 0.81 0.52 - 1.04 mg/dL Final     GFR Estimate   Date Value Ref Range Status   01/09/2023 65 >60 mL/min/1.73m2 Final     Comment:     Effective December 21, 2021 eGFRcr in adults is calculated using the 2021 CKD-EPI creatinine equation which includes age and gender (Yashira kim al., [place], DOI: 10.1056/EKMNvk4564616)   04/13/2021 75 >60 mL/min/[1.73_m2] Final     Comment:     Non  GFR Calc  Starting 12/18/2018, serum creatinine based estimated GFR (eGFR) will be   calculated using the Chronic Kidney Disease Epidemiology Collaboration   (CKD-EPI) equation.       Calcium   Date Value Ref Range Status   01/09/2023 9.8 8.5 - 10.1 mg/dL Final   04/13/2021 10.1 8.5 - 10.1 mg/dL Final     Lab Results   Component Value Date    WBC 7.5 01/09/2023    WBC 7.3 01/05/2021     Lab Results   Component Value Date    RBC 4.30 01/09/2023    RBC 4.31 01/05/2021     Lab Results   Component Value Date    HGB 13.7 01/09/2023    HGB 13.9 01/05/2021     Lab Results   Component Value Date    HCT 41.6 01/09/2023    HCT 40.4 01/05/2021     No components found for: MCT  Lab Results    Component Value Date    MCV 97 01/09/2023    MCV 94 01/05/2021     Lab Results   Component Value Date    MCH 31.9 01/09/2023    MCH 32.3 01/05/2021     Lab Results   Component Value Date    MCHC 32.9 01/09/2023    MCHC 34.4 01/05/2021     Lab Results   Component Value Date    RDW 13.5 01/09/2023    RDW 13.3 01/05/2021     Lab Results   Component Value Date     01/09/2023     01/05/2021               SUBJECTIVE FINDINGS:  A 67-year-old returns to clinic for ulcer, right first MPJ.  She is diabetic with peripheral neuropathy and foot deformities.  She relates she has got a new area of cracking next to the previous area.  She relates it is draining a little bit.  No injuries.  She is wearing her diabetic insoles.  She is using the Aquacel Ag and the Betadine.    OBJECTIVE FINDINGS:  DP and PT are 2/4, right.  She has a right plantar first MPJ.  New area of ulceration that is 0.5 cm in diameter.  There is some hyperkeratotic tissue buildup with ecchymosis.  There is positive serosanguineous drainage, no erythema, no odor, no calor.  She has dorsally contracted digits present.    ASSESSMENT AND PLAN:  Ulcer, right first MPJ.  Diabetes with peripheral neuropathy and foot deformities.  Diagnosis and treatment options discussed with her.  Sharp ulcer debridement through the epidermis with a #15 blade, no anesthesia needed and local wound care done upon consent today.  The ulcer was clean upon debridement.  I applied Betadine and Aquacel Ag and added a metatarsal pad to her diabetic insole.  She does have a recessed first MPJ cutout on the insole as well and use discussed with her.  She relates she has been using a gel metatarsal pad and she has metatarsal pads in some of her other shoes as well.  Continue the econazole cream for the onychomycosis.  Return to clinic and see me in 2-3 weeks.  Previous notes reviewed.  I discussed with her a surgical referral.  She does not want to do that again today.  I also  discussed with her further offloading options.            Moderate to high level of medical decision making.

## 2023-01-19 DIAGNOSIS — I48.0 PAROXYSMAL ATRIAL FIBRILLATION (H): ICD-10-CM

## 2023-01-19 DIAGNOSIS — E78.5 HYPERLIPIDEMIA LDL GOAL <100: ICD-10-CM

## 2023-01-24 RX ORDER — ROSUVASTATIN CALCIUM 10 MG/1
10 TABLET, COATED ORAL DAILY
Qty: 90 TABLET | Refills: 3 | Status: SHIPPED | OUTPATIENT
Start: 2023-01-24 | End: 2023-12-06

## 2023-01-24 NOTE — TELEPHONE ENCOUNTER
XARELTO 20MG TAB  Refilled #90, 3 refills  Creatinine Clearance of: 121 ml/mincalculated using:    Serum Creatinine  of: 0.94from: 1/9/23  Weight of: 300 lbs from: 1/10/23 (clinic note)

## 2023-01-30 ENCOUNTER — OFFICE VISIT (OUTPATIENT)
Dept: VASCULAR SURGERY | Facility: CLINIC | Age: 68
End: 2023-01-30
Payer: COMMERCIAL

## 2023-01-30 DIAGNOSIS — I83.812 VARICOSE VEINS OF LEFT LOWER EXTREMITY WITH PAIN: Primary | ICD-10-CM

## 2023-01-30 PROCEDURE — 99207 PR VEINSOLUTIONS POST OPERATIVE VISIT: CPT | Performed by: SURGERY

## 2023-01-30 NOTE — LETTER
1/30/2023         RE: Sherry Slaughter  [address]  [place] [address]        Dear Colleague,    Thank you for referring your patient, Sherry Slaughter, to the [facility] [place]. Please see a copy of my visit note below.    [facility] 6-week post VNUS Closure follow-up  Sherry Slaughter returns in follow-up of left great saphenous vein ablation and left small saphenous vein ablation with phlebectomies on 12/1/2022.  Overall she is doing well and states that the large, bulging vein behind her left knee is gone.  She admits to some firmness in the area of the phlebectomies but otherwise has no complaints.    Exam  Left lower extremity: No residual varicose veins are appreciated.  There is induration just distal to the popliteal crease and on the medial left calf extending somewhat anteromedially along the sides of phlebectomies.  No compressible veins are appreciated.    Assessment  Overall doing well.  Hopefully this has taken care of the problem for her.  She will continue to wear compression on a regular basis.    Plan  Return for 6-month post procedure left lower extremity ultrasound    C Vicente Pride MD, FACS    Dictated using Dragon voice recognition software which may result in transcription errors      Again, thank you for allowing me to participate in the care of your patient.        Sincerely,        Sawyer Pride MD

## 2023-01-30 NOTE — PROGRESS NOTES
[facility] [place] 6-week post VNUS Closure follow-up  Sherry Slaughter returns in follow-up of left great saphenous vein ablation and left small saphenous vein ablation with phlebectomies on 12/1/2022.  Overall she is doing well and states that the large, bulging vein behind her left knee is gone.  She admits to some firmness in the area of the phlebectomies but otherwise has no complaints.    Exam  Left lower extremity: No residual varicose veins are appreciated.  There is induration just distal to the popliteal crease and on the medial left calf extending somewhat anteromedially along the sides of phlebectomies.  No compressible veins are appreciated.    Assessment  Overall doing well.  Hopefully this has taken care of the problem for her.  She will continue to wear compression on a regular basis.    Plan  Return for 6-month post procedure left lower extremity ultrasound    C Vicente Pride MD, FACS    Dictated using Dragon voice recognition software which may result in transcription errors

## 2023-01-31 ENCOUNTER — OFFICE VISIT (OUTPATIENT)
Dept: CARDIOLOGY | Facility: CLINIC | Age: 68
End: 2023-01-31
Attending: INTERNAL MEDICINE
Payer: COMMERCIAL

## 2023-01-31 VITALS
HEART RATE: 68 BPM | BODY MASS INDEX: 47.14 KG/M2 | WEIGHT: 293 LBS | SYSTOLIC BLOOD PRESSURE: 119 MMHG | DIASTOLIC BLOOD PRESSURE: 72 MMHG | OXYGEN SATURATION: 98 %

## 2023-01-31 DIAGNOSIS — I10 BENIGN ESSENTIAL HYPERTENSION: Primary | ICD-10-CM

## 2023-01-31 DIAGNOSIS — I48.0 PAROXYSMAL ATRIAL FIBRILLATION (H): ICD-10-CM

## 2023-01-31 DIAGNOSIS — E66.01 MORBID OBESITY (H): Chronic | ICD-10-CM

## 2023-01-31 DIAGNOSIS — Z79.4 TYPE 2 DIABETES MELLITUS WITHOUT COMPLICATION, WITH LONG-TERM CURRENT USE OF INSULIN (H): ICD-10-CM

## 2023-01-31 DIAGNOSIS — G47.33 OSA (OBSTRUCTIVE SLEEP APNEA): Chronic | ICD-10-CM

## 2023-01-31 DIAGNOSIS — E11.9 TYPE 2 DIABETES MELLITUS WITHOUT COMPLICATION, WITH LONG-TERM CURRENT USE OF INSULIN (H): ICD-10-CM

## 2023-01-31 PROCEDURE — 93000 ELECTROCARDIOGRAM COMPLETE: CPT | Performed by: INTERNAL MEDICINE

## 2023-01-31 PROCEDURE — 99215 OFFICE O/P EST HI 40 MIN: CPT | Performed by: INTERNAL MEDICINE

## 2023-01-31 RX ORDER — SOTALOL HYDROCHLORIDE 80 MG/1
80 TABLET ORAL 2 TIMES DAILY
Qty: 60 TABLET | Refills: 1 | Status: SHIPPED | OUTPATIENT
Start: 2023-01-31 | End: 2023-02-13 | Stop reason: DRUGHIGH

## 2023-01-31 RX ORDER — METOPROLOL SUCCINATE 200 MG/1
200 TABLET, EXTENDED RELEASE ORAL DAILY
Qty: 180 TABLET | Refills: 3 | Status: SHIPPED | OUTPATIENT
Start: 2023-01-31 | End: 2024-01-26

## 2023-01-31 NOTE — NURSING NOTE
Sherry Slaughter's goals for this visit include:   Chief Complaint   Patient presents with     New Patient     Referred by Dr. Aranda  Paroxysmal Atrial Fibrillation        She requests these members of her care team be copied on today's visit information: yes     PCP: Marilou Arciniega    Referring Provider:  Joel Aranda MD  [address] [place] [address] [address] [place] [id number]    /72 (BP Location: Left arm, Patient Position: Chair, Cuff Size: Adult Large)   Pulse 68   Wt 137 kg (302 lb 1.6 oz)   LMP 10/02/2007   SpO2 98%   BMI 47.14 kg/m      Do you need any medication refills at today's visit? No     FRANKO Dixon   Cardiology Team  [facility]

## 2023-01-31 NOTE — PATIENT INSTRUCTIONS
The following is a summary of your office visit today:    Atrial fibrillation    Begin Sotalol 80 mg twice a day  Reduce Metoprolol succinate to 200 mg once a day  Send Jeff janie daily    Return to clinic 1 month    Nurse contact information:  Cardiology Care Coordinators:  Patricia Bridges RN and Maxine Torres RN   Phone  [phone]    Fax [phone]       HOW TO CHECK YOUR BLOOD PRESSURE AT HOME:     Avoid eating, smoking, and exercising for at least 30 minutes before taking a reading.    Be sure you have taken your BP medication at least 2-3 hours before you check it.     Sit quietly for 10 minutes before a reading.     Sit in a chair with your feet flat on the floor. Rest your  arm on a table so that the arm cuff is at the same level as your heart.    Remain still during the reading.    Record your blood pressure and pulse in a log and bring to your next appointment.     If you have had any blood work, imaging or other testing completed we will be in touch within 1-2 weeks regarding the results. If you have any questions, concerns or need to schedule a follow up, please contact us at [phone]. If you are needing refills please contact your pharmacy. For urgent after hour care please call the [facility] at [phone] or the [facility] at [phone] and ask to speak to the cardiologist on call.    It was a pleasure meeting with you today. Please let us know if there is anything else we can do for you so that we can be sure you are leaving completely satisfied with your care experience.     Your Cardiology Team at [facility]  RN Care Coordinators: Osvaldo  Support Staff: Elise

## 2023-01-31 NOTE — PROGRESS NOTES
I am delighted to see Sherry Slaughter as a new patient in [place] cardiology clinic for evaluation of atrial fibrillation.    History of Present Illness:  The patient is a 67 year old  Female with AF which was diagnosed in Dec 2020. She underwent successful cardioversion to sinus rhythm and has been on rivaroxaban, metoprolol, and diltiazem. Over the last few years she's had intermittent recurrent episodes with symptoms of fatigue and shoulder pain. Her metoprolol and diltiazem doses have been increased without much effect. She has never been on an antiarrhythmic drug. She monitors her HR via a Fitbit watch and has an [employer] Kardia at home. She usually feels that AF when rate is fast. She thinks she's in AF several times a month with episodes lasting few hours at a time on average, although sometimes duration can be up to a day.    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 She keeps busy with 5 grandkids. She is able to do all routine ADLs but mobility is limited due to multiple orthopedic/arthritic problems.     Past Medical History:  1. Atrial fibrillation, diagnosed 11/2020, persistent, s/p ELIAS/CV 12/4/2020 to sinus. Recurrent AF 12/9/2020 when admitted with hypertensive urgency, ventricular rates 80-90s, spontaneously converted.   2. Diabetes type II  3. Hypertension  4 . GENESIS, on BiPAP  5. Hypothyroidism  6. BMI 47  7. HFpEF     Medications:   Diltiazem  mg every day  Metoprolol succinate 200 mg bid  Lasix 20 mg every day  KCL 20 meq qd  hydrochlorothiazide 50 mg qd  Lisinopril 20 mg every day  Xarelto 20 mg every day  Rosuvastatin 10 mg every day    Insulin  Semaglutide  Levothyroxine 75 mcg every day  Metformin XR 2000 qhs    Allergies:    Allergies   Allergen Reactions     Empagliflozin      Yeast infections     Lipitor [Atorvastatin Calcium]      Gas     Pravachol [Pravastatin Sodium]      Pravachol - dry cough      Simvastatin      Myalgia        Physical examination  Vitals: 119/72, HR 68  BMI= 47 (137 kg)    Constitutional: In general, the patient is a pleasant female in no acute distress.    Cardiovascular: Carotids +2/2 bilaterally without bruits.  No jugular venous distension. Regular rate and rhythm. Normal S1, S2. No murmur, rub, click, or gallop.   Extremities: Pulses are normal bilaterally throughout. No peripheral edema.  Respiratory: Clear to asculation.  No ronchi, wheezes, rales.  No dullness to percussion.     I have personally and independently reviewed the following:  Labs:   2023: cr 0.94, K 3.8, hgb 13.7, plt 218K, A1C 7    Echo 2023: EF 60-65%, normal RV, grade II diastolic dysfunction, no valve disease, OSMAR 33.5    EK2023: sinus 68 bpm,  ms, QTc 410 ms.    Patch monitor 10/7-10/21/2021: PAF, AF burden 43% average rage 97 bpm, longest episode 6 days.     Assessment :  Atrial fibrillation, paroxysmal. AF burden as high as 43% in , average rate acceptable at 97 bpm. She is symptomatic usually when HRs are higher. [id number] score is 4 for age, HTN, DM, female. She is on the appropriate dose of rivaroxaban at 20 mg every day. She is interested in better rhythm control, which would require antiarrhythmic drug. I recommend starting sotalol given her normal QTc and creatinine. Will try to taper metoprolol.    Plan:  1. Begin sotalol 80 bid, anticipate to increase to 120 bid after about a week.  2. Reduce metoprolol succinate to 200 mg every day, anticipate ultimately stopping (to be replaced with sotalol)  3. Monitor HR and QT with her [employer] device at home - she will send a tracing to me daily via [employer]. She will send a test tracing first to make sure she can upload tracing before starting sotalol  4. I will see her in clinic in 1 month for 12 lead EKG.          I spent a total of 30 minutes face to face with  Sherry Slaughter during today's office visit. I have spend an additional 30 minutes today  on chart review and documentation.        The patient is to return as above . The patient understood the treatment plan as outlined above.  There were no barriers to learning.      Roslyn Mccracken MD

## 2023-02-03 ENCOUNTER — MYC MEDICAL ADVICE (OUTPATIENT)
Dept: CARDIOLOGY | Facility: CLINIC | Age: 68
End: 2023-02-03
Payer: COMMERCIAL

## 2023-02-09 DIAGNOSIS — Z79.4 UNCONTROLLED TYPE 2 DIABETES MELLITUS WITH HYPERGLYCEMIA, WITH LONG-TERM CURRENT USE OF INSULIN (H): ICD-10-CM

## 2023-02-09 DIAGNOSIS — E11.65 UNCONTROLLED TYPE 2 DIABETES MELLITUS WITH HYPERGLYCEMIA, WITH LONG-TERM CURRENT USE OF INSULIN (H): ICD-10-CM

## 2023-02-09 NOTE — NURSING NOTE
Pharmacy calling to review U500 prescription (dose and unit amount). 6 boxes=30 units=169 units/day=$12,000. Patient changes to Medicare July 1st. OK to continue with Lantus/Novolog until new insurance per Dr. Delgado.    Pharmacy and patient updated.    Maya Soler LPN  Diabetes Clinic Coordinator   Adult Endocrinology and Pediatric Specialty Clinics  [facility]         Right arm; Attending Attestation (For Attendings USE Only)...

## 2023-02-10 RX ORDER — PROCHLORPERAZINE 25 MG/1
SUPPOSITORY RECTAL
Qty: 3 EACH | Refills: 2 | Status: SHIPPED | OUTPATIENT
Start: 2023-02-10 | End: 2023-06-23 | Stop reason: ALTCHOICE

## 2023-02-17 DIAGNOSIS — Z79.4 TYPE 2 DIABETES MELLITUS TREATED WITH INSULIN (H): ICD-10-CM

## 2023-02-17 DIAGNOSIS — E11.9 TYPE 2 DIABETES MELLITUS TREATED WITH INSULIN (H): ICD-10-CM

## 2023-02-17 RX ORDER — INSULIN HUMAN 500 [IU]/ML
INJECTION, SOLUTION SUBCUTANEOUS
Qty: 40 ML | Refills: 3 | Status: SHIPPED | OUTPATIENT
Start: 2023-02-17 | End: 2023-03-20

## 2023-02-23 DIAGNOSIS — I50.9 CONGESTIVE HEART FAILURE, UNSPECIFIED HF CHRONICITY, UNSPECIFIED HEART FAILURE TYPE (H): ICD-10-CM

## 2023-02-27 RX ORDER — FUROSEMIDE 20 MG
20 TABLET ORAL DAILY
Qty: 90 TABLET | Refills: 3 | Status: SHIPPED | OUTPATIENT
Start: 2023-02-27 | End: 2023-12-06

## 2023-02-27 NOTE — TELEPHONE ENCOUNTER
Last Clinic Visit: 1/31/2023 [facility]

## 2023-02-28 ENCOUNTER — OFFICE VISIT (OUTPATIENT)
Dept: CARDIOLOGY | Facility: CLINIC | Age: 68
End: 2023-02-28
Payer: COMMERCIAL

## 2023-02-28 VITALS
SYSTOLIC BLOOD PRESSURE: 111 MMHG | HEART RATE: 72 BPM | OXYGEN SATURATION: 95 % | DIASTOLIC BLOOD PRESSURE: 62 MMHG | WEIGHT: 293 LBS | BODY MASS INDEX: 46.79 KG/M2

## 2023-02-28 DIAGNOSIS — E66.01 MORBID OBESITY (H): Chronic | ICD-10-CM

## 2023-02-28 DIAGNOSIS — I48.0 PAROXYSMAL ATRIAL FIBRILLATION (H): Primary | ICD-10-CM

## 2023-02-28 DIAGNOSIS — G47.33 OSA (OBSTRUCTIVE SLEEP APNEA): Chronic | ICD-10-CM

## 2023-02-28 PROCEDURE — 93000 ELECTROCARDIOGRAM COMPLETE: CPT | Performed by: INTERNAL MEDICINE

## 2023-02-28 PROCEDURE — 99214 OFFICE O/P EST MOD 30 MIN: CPT | Performed by: INTERNAL MEDICINE

## 2023-02-28 NOTE — PATIENT INSTRUCTIONS
The following is a summary of your office visit today:    Atrial fibrillation, paroxysmal.    Return to clinic 6 months      Nurse and staff contact information:    Cardiology Care Coordinators:  Patricia TAMAYO, Rachell GARCIA, Love SCHWARTZ  Rooming staff: Stephon Chi     Phone: [phone]      Fax: [phone]       HOW TO CHECK YOUR BLOOD PRESSURE AT HOME:    Avoid eating, smoking, and exercising for at least 30 minutes before taking a reading.    Be sure you have taken your BP medication at least 2-3 hours before you check it.     Sit quietly for 10 minutes before a reading.     Sit in a chair with your feet flat on the floor. Rest your  arm on a table so that the arm cuff is at the same level as your heart.    Remain still during the reading.    Record your blood pressure and pulse in a log and bring to your next appointment.     If you have had any blood work, imaging or other testing completed we will be in touch within 1-2 weeks regarding the results. If you have any questions, concerns or need to schedule a follow up, please contact us at [phone]. If you are needing refills please contact your pharmacy. For urgent after hour care please call the [facility] at [phone] or the [facility] at [phone] and ask to speak to the cardiologist on call.    It was a pleasure meeting with you today. Please let us know if there is anything else we can do for you so that we can be sure you are leaving completely satisfied with your care experience.     Your Cardiology Team at [facility]

## 2023-02-28 NOTE — NURSING NOTE
Sherry Slaughter's goals for this visit include:   Chief Complaint   Patient presents with     Follow Up     1 month follow up        She requests these members of her care team be copied on today's visit information: yes     PCP: Marilou Arciniega    Referring Provider:  No referring provider defined for this encounter.    /62 (BP Location: Left arm, Patient Position: Chair, Cuff Size: Adult Large)   Pulse 72   Wt 136 kg (299 lb 14.4 oz)   LMP 10/02/2007   SpO2 95%   BMI 46.79 kg/m      Do you need any medication refills at today's visit? No       FRANKO Dixon   Cardiology Team  [facility]

## 2023-02-28 NOTE — PROGRESS NOTES
I am delighted to see Sherry Slaughter at the [facility] cardiology clinic for follow up of atrial fibrillation.     The patient is a 67 year old  female whom I met 1/31/2023. She had persistent AF initially diagnosed December 2020, underwent successful cardioversion to sinus rhythm and has been on rivaroxaban, metoprolol, and diltiazem. Over the last few years she's had intermittent recurrent episodes with symptoms of fatigue and shoulder pain. Her metoprolol and diltiazem doses have been increased without much effect. She had never been on an antiarrhythmic drug. She monitors her HR via a Fitbit watch and has an [employer]ive[employer] Kardia at home. She usually feels that AF when rate is fast. She thinks she's in AF several times a month with episodes lasting few hours at a time on average, although sometimes duration can be up to a day.  At that visit, I had suggested a trial of sotalol. I had been monitoring her with daily tracings from her [employer] that sent over via [employer]. She did have initially quite a bit of AF, and I had gradually increased her sotalol from 80 bid to 120 and then 160 bid. She feels that initially there were several days when she had been in persistent AF but episodes seem to be improved on the higher doses of sotalol.    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 She keeps busy with 5 grandkids. She is able to do all routine ADLs but mobility is limited due to multiple orthopedic/arthritic problems.       Past Medical History:  1. Atrial fibrillation, diagnosed 11/2020, persistent, s/p ELIAS/CV 12/4/2020 to sinus. Recurrent AF 12/9/2020 when admitted with hypertensive urgency, ventricular rates 80-90s, spontaneously converted. Recurrent PAF, sotalol started Feb 2023.  2. Diabetes type II  3. Hypertension  4. GENESIS, on BiPAP  5. Hypothyroidism  6. BMI 47  7. HFpEF      Allergies:    Allergies   Allergen Reactions     Empagliflozin      Yeast  infections     Lipitor [Atorvastatin Calcium]      Gas     Pravachol [Pravastatin Sodium]      Pravachol - dry cough      Simvastatin      Myalgia       Medications:   Sotalol 160 bid  Diltiazem  mg every day  Metoprolol succinate 200 mg qd  Lasix 20 mg every day  KCL 20 meq qd  hydrochlorothiazide 50 mg qd  Lisinopril 20 mg every day  Xarelto 20 mg every day  Rosuvastatin 10 mg every day    Insulin  Semaglutide  Levothyroxine 75 mcg every day  Metformin XR 2000 qhs    Physical examination  Vitals: /62 (BP Location: Left arm, Patient Position: Chair, Cuff Size: Adult Large)   Pulse 72   Wt 136 kg (299 lb 14.4 oz)   LMP 10/02/2007   SpO2 95%   BMI 46.79 kg/m    BMI= Body mass index is 46.79 kg/m .    Constitutional: In general, the patient is a pleasant female in no acute distress.    Targeted cardiovascular exam:  Regular rate and rhythm. Normal S1, S2. No murmur, rub, click, or gallop.   Extremities:Pulses are normal bilaterally throughout. No peripheral edema.  Respiratory: Clear to asculation.      I have personally and independently reviewed the following:  Labs:   2023: cr 0.94, K 3.8, hgb 13.7, plt 218K, A1C 7     Echo 2023: EF 60-65%, normal RV, grade II diastolic dysfunction, no valve disease, OSMAR 33.5    Patch monitor 10/7-10/21/2021: PAF, AF burden 43% average rage 97 bpm, longest episode 6 days.     EK2023: sinus 68 bpm,  ms, QTc 410 ms.  Today 2023: sinus 71 bpm, , QTc 451 ms    Sample of AF:        Assessment :  Paroxysmal atrial fibrillation with RVR. Tolerating sotalol, EKG with acceptable QTc on sotalol 160 bid.  She is currently doing well, however is understandably wary about how long she can maintain sinus rhythm.   We did discuss ablation procedure is sotalol proves to be ineffective.    Plan:  Continue current meds. Follow up 6 months or sooner if she should have frequent PAF.      I spent a total of 20 minutes face to face with  Sherry FLOREZ  Sukhjinder during today's office visit. I have spent an additional 15 minutes today on chart review and documentation.      The patient is to return  as above. The patient understood the treatment plan as outlined above.  There were no barriers to learning.      Roslyn Mccracken MD

## 2023-03-07 ENCOUNTER — OFFICE VISIT (OUTPATIENT)
Dept: PODIATRY | Facility: CLINIC | Age: 68
End: 2023-03-07
Payer: COMMERCIAL

## 2023-03-07 DIAGNOSIS — E11.69 TYPE 2 DIABETES MELLITUS WITH DIABETIC FOOT DEFORMITY (H): ICD-10-CM

## 2023-03-07 DIAGNOSIS — L97.512 ULCER OF RIGHT FOOT WITH FAT LAYER EXPOSED (H): ICD-10-CM

## 2023-03-07 DIAGNOSIS — M21.969 TYPE 2 DIABETES MELLITUS WITH DIABETIC FOOT DEFORMITY (H): ICD-10-CM

## 2023-03-07 DIAGNOSIS — E11.49 TYPE II OR UNSPECIFIED TYPE DIABETES MELLITUS WITH NEUROLOGICAL MANIFESTATIONS, NOT STATED AS UNCONTROLLED(250.60) (H): Primary | ICD-10-CM

## 2023-03-07 PROCEDURE — 97597 DBRDMT OPN WND 1ST 20 CM/<: CPT | Performed by: PODIATRIST

## 2023-03-07 PROCEDURE — 99213 OFFICE O/P EST LOW 20 MIN: CPT | Mod: 25 | Performed by: PODIATRIST

## 2023-03-07 NOTE — NURSING NOTE
Sherry Slaughter's chief complaint for this visit includes:  Chief Complaint   Patient presents with     RECHECK     R Foot ulcer check - ones healing up, has new one starting.      PCP: Marilou Arciniega    Referring Provider:  No referring provider defined for this encounter.    [facility] 10/02/2007   Data Unavailable        Allergies   Allergen Reactions     Empagliflozin      Yeast infections     Lipitor [Atorvastatin Calcium]      Gas     Pravachol [Pravastatin Sodium]      Pravachol - dry cough      Simvastatin      Myalgia         Do you need any medication refills at today's visit?

## 2023-03-07 NOTE — PROGRESS NOTES
Past Medical History:   Diagnosis Date     Cataract      Congestive heart failure (H) 2019 NOVEMBER    AFIB     DEPRESSION     comes and goes - tried meds - unsuccessfully, certain times of the year, no psych intervention and no counselors in the past - and not interested      Diverticulosis of colon (without mention of hemorrhage) 4/04    colonoscopy     ECHO-mildLVH,tr MR,mild thick lflets w inc LA,trTR   12/03     Essential hypertension, benign 1990s    late 1990s - started medications at that time - not to difficult to control meds      Fam hx-cardiovas dis [facility]     father - CAD, and lipids/HTN - multiple members of the family      Family history of diabetes mellitus     sister and grandmother with DM      Family history of malignant neoplasm of breast     mother - young age - 45, and maternal cousin and aunt as well - no BRCA testing done      Family history of stroke (cerebrovascular)     grandmother in early life in her 40s      FAMILY HX COLON CANCER     Pat uncles x 2     Heart disease     murmur/     Heart murmur      HYPERLIPIDEMIA 2000    fairly recent - in the last 5 years - high for DM levels  -cholesterol recent      Irritable bowel syndrome     goes between the 2 - nerve related - more stressed more problems      MICROALBUMINURIA     unsure how long - been on the lisinopril - for a few years at well      Nonspecific abnormal results of liver function study 2/3/2003    SGOT - has been high in the past - since the hepatitis b - borderline elevation - not really changing any      OBESITY      Obstructive sleep apnea      GENESIS (obstructive sleep apnea) 10/15/2006    psg 5/15 AHI 53 aPAP 8-15     OSTEOARTHRITIS L KNEE 2003    total knee on the left - and pain is gone since the knee replacement      PERS HX HEPATITIS B- RESOLVED] 1977    acute virus only - no chronic disease      PERS HX HEPATITIS B- RESOLVED]      Type II or unspecified type diabetes mellitus without mention of complication, not stated as  uncontrolled 1991    oral meds frist, then insulin eventually later, difficult to control most of the time      Unspecified hypothyroidism 10/11/2006    TSH 3.36 - mild subclinical hypothyroidism - deciding on following or starting low dose meds - with dr Oreilly - following      Patient Active Problem List   Diagnosis     Morbid obesity (H)     Diverticulosis of large intestine     Nonspecific abnormal results of cardiovascular function study     FAMILY HX COLON CANCER     Nonallopathic lesion of thoracic region     Hypothyroidism     GENESIS (obstructive sleep apnea)     Irritable bowel syndrome     Family history of malignant neoplasm of breast     Type 2 diabetes mellitus treated with insulin (H)     History of major depression     OSTEOARTHRITIS L KNEE  s/p knee replacement on the left      Hypertension goal BP (blood pressure) < 140/90     Family history of stroke (cerebrovascular)     Family history of other cardiovascular diseases     JOINT PAIN-ANKLE - right ankle      Mitral valve insufficiency     Hyperlipidemia LDL goal <100     Aortic sclerosis     Tubular adenoma of colon     History of viral hepatitis, type B     Chronic low back pain     Long-term insulin use (H)     S/P total knee replacement using cement, right     Lumbago     Type 2 diabetes mellitus with hyperglycemia (H)     Posterior vitreous detachment of right eye     Pseudophakia of both eyes     Paroxysmal atrial fibrillation (H)     SOB (shortness of breath)     Bunion     On continuous oral anticoagulation     Pressure injury of toe of left foot, stage 3 (H)     Stage 3a chronic kidney disease (H)     Past Surgical History:   Procedure Laterality Date     ABDOMEN SURGERY      ovaian scope/ appendix removal     ANESTHESIA CARDIOVERSION N/A 12/4/2020    Procedure: ANESTHESIA, FOR CARDIOVERSION @1400;  Surgeon: GENERIC ANESTHESIA PROVIDER;  Location: [facility] OR     ARTHROPLASTY KNEE Right 9/23/2015    Procedure: ARTHROPLASTY KNEE;  Surgeon: Stephanie  Rufus VELEZ MD;  Location:  OR     BUNIONECTOMY REN AND LEANDRO, COMBINED Left 2/2/2021    Procedure: Left bunion correction with bone cutting and screw fixation;  Surgeon: David Hoffman DPM;  Location: [facility] OR     CATARACT IOL, RT/LT Left      COLONOSCOPY  4/04    diverticulosis, rec repeat 10 yrs(consider fam hx?)     COLONOSCOPY WITH CO2 INSUFFLATION N/A 6/19/2019    Procedure: COLONOSCOPY, WITH CO2 INSUFFLATION;  Surgeon: Zeb Duarte MD;  Location:  OR     ORTHOPEDIC SURGERY      right ankle     PHACOEMULSIFICATION CLEAR CORNEA WITH STANDARD INTRAOCULAR LENS IMPLANT Left 3/15/2018    Procedure: PHACOEMULSIFICATION CLEAR CORNEA WITH STANDARD INTRAOCULAR LENS IMPLANT;  LEFT EYE PHACOEMULSIFICATION CLEAR CORNEA WITH STANDARD INTRAOCULAR LENS IMPLANT;  Surgeon: Bandar Linares MD;  Location:  [facility]     PHACOEMULSIFICATION CLEAR CORNEA WITH STANDARD INTRAOCULAR LENS IMPLANT Right 4/5/2018    Procedure: PHACOEMULSIFICATION CLEAR CORNEA WITH STANDARD INTRAOCULAR LENS IMPLANT;  RIGHT PHACOEMULSIFICATION CLEAR CORNEA WITH STANDARD INTRAOCULAR LENS IMPLANT ;  Surgeon: Bandar Linares MD;  Location:  [facility]     STRESS ECHO (METRO)  12/03    no ischemia, limited by fatigue     SURGICAL HISTORY OF -       EXP LAP- R OVARY CYSTS     SURGICAL HISTORY OF -   1981,1984,1985    CHILDBIRTH     ZZC NONSPECIFIC PROCEDURE  6/06    right triple arthrodecesis      Z NONSPECIFIC PROCEDURE  7/06     right bunion surgery      [facility] TOTAL KNEE ARTHROPLASTY  3/03    L knee     Social History     Socioeconomic History     Marital status:      Spouse name: Not on file     Number of children: 3     Years of education: Not on file     Highest education level: Not on file   Occupational History     Occupation: RN     Employer: [employer]   Tobacco Use     Smoking status: Never     Passive exposure: Never     Smokeless tobacco: Never     Tobacco comments:     tried in early 20s only    Vaping  Use     Vaping Use: Never used   Substance and Sexual Activity     Alcohol use: Yes     Comment: rare     Drug use: No     Sexual activity: Not Currently     Birth control/protection: Post-menopausal     Comment:  - since for 20 years, no signficant other  > 5 years sexual activity    Other Topics Concern      Service No     Blood Transfusions No     Caffeine Concern No     Occupational Exposure Yes     Comment: Normal nursing exposures     Hobby Hazards No     Sleep Concern Yes     Stress Concern No     Comment: Stress level at HM=5, stress level at WK=6     Weight Concern Yes     Special Diet Yes     Comment: Diabetic diet (watching carbs)     Back Care No     Comment: Occassional back spasms     Exercise Yes     Comment: Pt joined a health club goes approx 3-4 times a week,half to one mile daily     Bike Helmet No     Seat Belt Yes     Comment: Sometimes     Self-Exams Yes     Parent/sibling w/ CABG, MI or angioplasty before 65F 55M? 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      Social Determinants of Health     Financial Resource Strain: Not on file   Food Insecurity: Not on file   Transportation Needs: Not on file   Physical Activity: Not on file   Stress: Not on file   Social Connections: Not on file   Intimate Partner Violence: Not on file   Housing Stability: Not on file     Family History   Problem Relation Age of Onset     Diabetes Maternal Grandmother         DM TYPE 2     Cerebrovascular Disease Maternal Grandmother      Hypertension Sister      Lipids Sister      Heart Disease Sister         Chronic AFib     Diabetes Sister      Coronary Artery Disease Sister         afib     Hypertension Sister      Lipids Sister      Gynecology Sister      Diabetes Sister      Asthma Sister      Blood Disease Paternal Grandmother         T CELL LEUKEMIA     Hypertension Brother      Lipids Brother      Congenital Anomalies Brother       Cardiovascular Brother      Heart Disease Brother         CABG/AFIB     Coronary Artery Disease Brother         cabg afib AAA     Hypertension Brother      Lipids Brother      Other Cancer Brother         multple myeloma     Obesity Brother      Hypertension Brother      Respiratory Brother         Sleep apnea     Heart Disease Brother         AFib     Coronary Artery Disease Brother         afib     Alzheimer Disease Mother      Asthma Mother      Hypertension Mother      Breast Cancer Mother 40        has mastectomy and lived to 84     Allergies Mother         Sulfa,     Arthritis Mother      Cardiovascular Mother      Depression Mother      Respiratory Mother      Lipids Mother      Cancer Mother         breast     Thyroid Disease Mother      Cerebrovascular Disease Mother         FROM  AFIB     Dementia Mother         Alzheimers     Other Cancer Mother         breast     Cancer - colorectal Other      Colon Cancer Other         2019     Cancer Father         lung     Aneurysm Father         brother, AAA     Other Cancer Father         lung ca     Coronary Artery Disease Father         cad AAA     Asthma Sister      Cancer - colorectal Paternal Uncle         late 50s to early 60s - great uncles      Breast Cancer Other         Maternal cousin     Arrhythmia Sister         2 brothers 1 sister Afib     Breast Cancer Maternal Aunt 62     Other Cancer Maternal Aunt 35        Ovarian cancer.  Survived despite late recurrence and is now 92.     Glaucoma No family hx of      Macular Degeneration No family hx of      Lab Results   Component Value Date    A1C 7.0 11/28/2022    A1C 7.1 05/25/2022    A1C 8.1 11/24/2021    A1C 9.0 08/16/2021    A1C 6.8 01/05/2021    A1C 6.9 11/25/2020    A1C 7.5 07/22/2020    A1C 7.3 12/10/2019     Lab Results   Component Value Date    WBC 7.5 01/09/2023    WBC 7.3 01/05/2021     Lab Results   Component Value Date    RBC 4.30 01/09/2023    RBC 4.31 01/05/2021     Lab Results   Component  Value Date    HGB 13.7 01/09/2023    HGB 13.9 01/05/2021     Lab Results   Component Value Date    HCT 41.6 01/09/2023    HCT 40.4 01/05/2021     No components found for: MCT  Lab Results   Component Value Date    MCV 97 01/09/2023    MCV 94 01/05/2021     Lab Results   Component Value Date    MCH 31.9 01/09/2023    MCH 32.3 01/05/2021     Lab Results   Component Value Date    MCHC 32.9 01/09/2023    MCHC 34.4 01/05/2021     Lab Results   Component Value Date    RDW 13.5 01/09/2023    RDW 13.3 01/05/2021     Lab Results   Component Value Date     01/09/2023     01/05/2021     Last Comprehensive Metabolic Panel:  Lab Results   Component Value Date     01/09/2023    POTASSIUM 3.8 01/09/2023    CHLORIDE 104 01/09/2023    CO2 27 01/09/2023    ANIONGAP 10 01/09/2023     (H) 01/09/2023    BUN 27 01/09/2023    CR 0.94 01/09/2023    GFRESTIMATED 65 01/09/2023    CASSIDY 9.8 01/09/2023               SUBJECTIVE FINDINGS:  A 67-year-old returns to clinic for ulcer, right plantar first MPJ.  She relates the original ulcer is healed, but she has got a new one.  She relates 10 days ago, she was shopping and it broke down.  She has been using the Cassandra and Betadine, but she is almost out of the Betadine, and a light dressing.  She has been wearing her insoles.  She relates she has been wearing winter boots.  She does have her diabetic insoles with her today.  It does not have a metatarsal pad on it.  She relates she only has that on 1 pair.    OBJECTIVE FINDINGS:  DP and PT are 2/4, right.  She has a right plantar first MPJ ulcer that is through the dermis into the subcutaneous tissues.  It is 1.1 x 0.7 cm prior to debridement.  There is hyperkeratotic tissue buildup with some undermining.  The ulcer is deep through the dermis into the subcutaneous tissues.  There is minimal serosanguineous drainage, minimal edema, no erythema, no odor, no calor.  She has dorsally-contracted digits present.    ASSESSMENT  AND PLAN:  Ulcer, right first MPJ, diabetes with peripheral neuropathy and foot deformities present.  Diagnosis and treatment options discussed with her.  Sharp ulcer debridement through the dermis with a tissue cutter.  No anesthesia needed and local wound care done upon consent today.  The ulcer was clean upon debridement.  We applied Betadine and Cassandra, Aquacel Ag and a light dressing.  These are dispensed and use discussed with her.  I discussed with her offloading.  She wants to continue with the insoles.  I added a metatarsal pad to her diabetic shoe insole.  I advised her on elevation and rest.  Return to clinic and see me in 1-2 weeks.  Previous notes reviewed.            Moderate to high level of medical decision making.

## 2023-03-07 NOTE — LETTER
3/7/2023         RE: Sherry Slaughter  [address]  [place] [address]        Dear Colleague,    Thank you for referring your patient, Sherry Slaughter, to the [facility]. Please see a copy of my visit note below.    Past Medical History:   Diagnosis Date     Cataract      Congestive heart failure (H) 2019 NOVEMBER    AFIB     DEPRESSION     comes and goes - tried meds - unsuccessfully, certain times of the year, no psych intervention and no counselors in the past - and not interested      Diverticulosis of colon (without mention of hemorrhage) 4/04    colonoscopy     ECHO-mildLVH,tr MR,mild thick lflets w inc LA,trTR   12/03     Essential hypertension, benign 1990s    late 1990s - started medications at that time - not to difficult to control meds      Fam hx-cardiovas dis [facility]     father - CAD, and lipids/HTN - multiple members of the family      Family history of diabetes mellitus     sister and grandmother with DM      Family history of malignant neoplasm of breast     mother - young age - 45, and maternal cousin and aunt as well - no BRCA testing done      Family history of stroke (cerebrovascular)     grandmother in early life in her 40s      FAMILY HX COLON CANCER     Pat uncles x 2     Heart disease     murmur/     Heart murmur      HYPERLIPIDEMIA 2000    fairly recent - in the last 5 years - high for DM levels  -cholesterol recent      Irritable bowel syndrome     goes between the 2 - nerve related - more stressed more problems      MICROALBUMINURIA     unsure how long - been on the lisinopril - for a few years at well      Nonspecific abnormal results of liver function study 2/3/2003    SGOT - has been high in the past - since the hepatitis b - borderline elevation - not really changing any      OBESITY      Obstructive sleep apnea      GENESIS (obstructive sleep apnea) 10/15/2006    psg 5/15 AHI 53 aPAP 8-15     OSTEOARTHRITIS L KNEE 2003    total knee on the  left - and pain is gone since the knee replacement      PERS HX HEPATITIS B- RESOLVED] 1977    acute virus only - no chronic disease      PERS HX HEPATITIS B- RESOLVED]      Type II or unspecified type diabetes mellitus without mention of complication, not stated as uncontrolled 1991    oral meds frist, then insulin eventually later, difficult to control most of the time      Unspecified hypothyroidism 10/11/2006    TSH 3.36 - mild subclinical hypothyroidism - deciding on following or starting low dose meds - with dr Oreilly - following      Patient Active Problem List   Diagnosis     Morbid obesity (H)     Diverticulosis of large intestine     Nonspecific abnormal results of cardiovascular function study     FAMILY HX COLON CANCER     Nonallopathic lesion of thoracic region     Hypothyroidism     GENESIS (obstructive sleep apnea)     Irritable bowel syndrome     Family history of malignant neoplasm of breast     Type 2 diabetes mellitus treated with insulin (H)     History of major depression     OSTEOARTHRITIS L KNEE  s/p knee replacement on the left      Hypertension goal BP (blood pressure) < 140/90     Family history of stroke (cerebrovascular)     Family history of other cardiovascular diseases     JOINT PAIN-ANKLE - right ankle      Mitral valve insufficiency     Hyperlipidemia LDL goal <100     Aortic sclerosis     Tubular adenoma of colon     History of viral hepatitis, type B     Chronic low back pain     Long-term insulin use (H)     S/P total knee replacement using cement, right     Lumbago     Type 2 diabetes mellitus with hyperglycemia (H)     Posterior vitreous detachment of right eye     Pseudophakia of both eyes     Paroxysmal atrial fibrillation (H)     SOB (shortness of breath)     Bunion     On continuous oral anticoagulation     Pressure injury of toe of left foot, stage 3 (H)     Stage 3a chronic kidney disease (H)     Past Surgical History:   Procedure Laterality Date     ABDOMEN SURGERY       ovaian scope/ appendix removal     ANESTHESIA CARDIOVERSION N/A 12/4/2020    Procedure: ANESTHESIA, FOR CARDIOVERSION @1400;  Surgeon: GENERIC ANESTHESIA PROVIDER;  Location: [facility] OR     ARTHROPLASTY KNEE Right 9/23/2015    Procedure: ARTHROPLASTY KNEE;  Surgeon: Rufus Brown MD;  Location:  OR     BUNIONECTOMY REN AND LEANDRO, COMBINED Left 2/2/2021    Procedure: Left bunion correction with bone cutting and screw fixation;  Surgeon: David Hoffman DPM;  Location: [facility] OR     CATARACT IOL, RT/LT Left      COLONOSCOPY  4/04    diverticulosis, rec repeat 10 yrs(consider fam hx?)     COLONOSCOPY WITH CO2 INSUFFLATION N/A 6/19/2019    Procedure: COLONOSCOPY, WITH CO2 INSUFFLATION;  Surgeon: Zeb Duarte MD;  Location: [facility] OR     ORTHOPEDIC SURGERY      right ankle     PHACOEMULSIFICATION CLEAR CORNEA WITH STANDARD INTRAOCULAR LENS IMPLANT Left 3/15/2018    Procedure: PHACOEMULSIFICATION CLEAR CORNEA WITH STANDARD INTRAOCULAR LENS IMPLANT;  LEFT EYE PHACOEMULSIFICATION CLEAR CORNEA WITH STANDARD INTRAOCULAR LENS IMPLANT;  Surgeon: Bandar Linares MD;  Location:  [facility]     PHACOEMULSIFICATION CLEAR CORNEA WITH STANDARD INTRAOCULAR LENS IMPLANT Right 4/5/2018    Procedure: PHACOEMULSIFICATION CLEAR CORNEA WITH STANDARD INTRAOCULAR LENS IMPLANT;  RIGHT PHACOEMULSIFICATION CLEAR CORNEA WITH STANDARD INTRAOCULAR LENS IMPLANT ;  Surgeon: Bandar Linares MD;  Location:  [facility]     STRESS ECHO (METRO)  12/03    no ischemia, limited by fatigue     SURGICAL HISTORY OF -       EXP LAP- R OVARY CYSTS     SURGICAL HISTORY OF -   1981,1984,1985    CHILDBIRTH     ZZC NONSPECIFIC PROCEDURE  6/06    right triple arthrodecesis      ZZ NONSPECIFIC PROCEDURE  7/06     right bunion surgery      [facility] TOTAL KNEE ARTHROPLASTY  3/03    L knee     Social History     Socioeconomic History     Marital status:      Spouse name: Not on file     Number of children: 3     Years of education: Not on file     Highest  education level: Not on file   Occupational History     Occupation: RN     Employer: [employer]   Tobacco Use     Smoking status: Never     Passive exposure: Never     Smokeless tobacco: Never     Tobacco comments:     tried in early 20s only    Vaping Use     Vaping Use: Never used   Substance and Sexual Activity     Alcohol use: Yes     Comment: rare     Drug use: No     Sexual activity: Not Currently     Birth control/protection: Post-menopausal     Comment:  - since for 20 years, no signficant other  > 5 years sexual activity    Other Topics Concern      Service No     Blood Transfusions No     Caffeine Concern No     Occupational Exposure Yes     Comment: Normal nursing exposures     Hobby Hazards No     Sleep Concern Yes     Stress Concern No     Comment: Stress level at HM=5, stress level at WK=6     Weight Concern Yes     Special Diet Yes     Comment: Diabetic diet (watching carbs)     Back Care No     Comment: Occassional back spasms     Exercise Yes     Comment: Pt joined a health club goes approx 3-4 times a week,half to one mile daily     Bike Helmet No     Seat Belt Yes     Comment: Sometimes     Self-Exams Yes     Parent/sibling w/ CABG, MI or angioplasty before 65F 55M? 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      Social Determinants of Health     Financial Resource Strain: Not on file   Food Insecurity: Not on file   Transportation Needs: Not on file   Physical Activity: Not on file   Stress: Not on file   Social Connections: Not on file   Intimate Partner Violence: Not on file   Housing Stability: Not on file     Family History   Problem Relation Age of Onset     Diabetes Maternal Grandmother         DM TYPE 2     Cerebrovascular Disease Maternal Grandmother      Hypertension Sister      Lipids Sister      Heart Disease Sister         Chronic AFib     Diabetes Sister      Coronary Artery Disease  Sister         afib     Hypertension Sister      Lipids Sister      Gynecology Sister      Diabetes Sister      Asthma Sister      Blood Disease Paternal Grandmother         T CELL LEUKEMIA     Hypertension Brother      Lipids Brother      Congenital Anomalies Brother      Cardiovascular Brother      Heart Disease Brother         CABG/AFIB     Coronary Artery Disease Brother         cabg afib AAA     Hypertension Brother      Lipids Brother      Other Cancer Brother         multple myeloma     Obesity Brother      Hypertension Brother      Respiratory Brother         Sleep apnea     Heart Disease Brother         AFib     Coronary Artery Disease Brother         afib     Alzheimer Disease Mother      Asthma Mother      Hypertension Mother      Breast Cancer Mother 40        has mastectomy and lived to 84     Allergies Mother         Sulfa,     Arthritis Mother      Cardiovascular Mother      Depression Mother      Respiratory Mother      Lipids Mother      Cancer Mother         breast     Thyroid Disease Mother      Cerebrovascular Disease Mother         FROM  AFIB     Dementia Mother         Alzheimers     Other Cancer Mother         breast     Cancer - colorectal Other      Colon Cancer Other         2019     Cancer Father         lung     Aneurysm Father         brother, AAA     Other Cancer Father         lung ca     Coronary Artery Disease Father         cad AAA     Asthma Sister      Cancer - colorectal Paternal Uncle         late 50s to early 60s - great uncles      Breast Cancer Other         Maternal cousin     Arrhythmia Sister         2 brothers 1 sister Afib     Breast Cancer Maternal Aunt 62     Other Cancer Maternal Aunt 35        Ovarian cancer.  Survived despite late recurrence and is now 92.     Glaucoma No family hx of      Macular Degeneration No family hx of      Lab Results   Component Value Date    A1C 7.0 11/28/2022    A1C 7.1 05/25/2022    A1C 8.1 11/24/2021    A1C 9.0 08/16/2021    A1C 6.8  01/05/2021    A1C 6.9 11/25/2020    A1C 7.5 07/22/2020    A1C 7.3 12/10/2019     Lab Results   Component Value Date    WBC 7.5 01/09/2023    WBC 7.3 01/05/2021     Lab Results   Component Value Date    RBC 4.30 01/09/2023    RBC 4.31 01/05/2021     Lab Results   Component Value Date    HGB 13.7 01/09/2023    HGB 13.9 01/05/2021     Lab Results   Component Value Date    HCT 41.6 01/09/2023    HCT 40.4 01/05/2021     No components found for: MCT  Lab Results   Component Value Date    MCV 97 01/09/2023    MCV 94 01/05/2021     Lab Results   Component Value Date    MCH 31.9 01/09/2023    MCH 32.3 01/05/2021     Lab Results   Component Value Date    MCHC 32.9 01/09/2023    MCHC 34.4 01/05/2021     Lab Results   Component Value Date    RDW 13.5 01/09/2023    RDW 13.3 01/05/2021     Lab Results   Component Value Date     01/09/2023     01/05/2021     Last Comprehensive Metabolic Panel:  Lab Results   Component Value Date     01/09/2023    POTASSIUM 3.8 01/09/2023    CHLORIDE 104 01/09/2023    CO2 27 01/09/2023    ANIONGAP 10 01/09/2023     (H) 01/09/2023    BUN 27 01/09/2023    CR 0.94 01/09/2023    GFRESTIMATED 65 01/09/2023    CASSIDY 9.8 01/09/2023               SUBJECTIVE FINDINGS:  A 67-year-old returns to clinic for ulcer, right plantar first MPJ.  She relates the original ulcer is healed, but she has got a new one.  She relates 10 days ago, she was shopping and it broke down.  She has been using the Cassandra and Betadine, but she is almost out of the Betadine, and a light dressing.  She has been wearing her insoles.  She relates she has been wearing winter boots.  She does have her diabetic insoles with her today.  It does not have a metatarsal pad on it.  She relates she only has that on 1 pair.    OBJECTIVE FINDINGS:  DP and PT are 2/4, right.  She has a right plantar first MPJ ulcer that is through the dermis into the subcutaneous tissues.  It is 1.1 x 0.7 cm prior to debridement.  There is  hyperkeratotic tissue buildup with some undermining.  The ulcer is deep through the dermis into the subcutaneous tissues.  There is minimal serosanguineous drainage, minimal edema, no erythema, no odor, no calor.  She has dorsally-contracted digits present.    ASSESSMENT AND PLAN:  Ulcer, right first MPJ, diabetes with peripheral neuropathy and foot deformities present.  Diagnosis and treatment options discussed with her.  Sharp ulcer debridement through the dermis with a tissue cutter.  No anesthesia needed and local wound care done upon consent today.  The ulcer was clean upon debridement.  We applied Betadine and Cassandra, Aquacel Ag and a light dressing.  These are dispensed and use discussed with her.  I discussed with her offloading.  She wants to continue with the insoles.  I added a metatarsal pad to her diabetic shoe insole.  I advised her on elevation and rest.  Return to clinic and see me in 1-2 weeks.  Previous notes reviewed.            Moderate to high level of medical decision making.      Again, thank you for allowing me to participate in the care of your patient.        Sincerely,        Rufus Hutson DPM

## 2023-03-08 ENCOUNTER — TELEPHONE (OUTPATIENT)
Dept: CARDIOLOGY | Facility: CLINIC | Age: 68
End: 2023-03-08
Payer: COMMERCIAL

## 2023-03-08 ENCOUNTER — MYC MEDICAL ADVICE (OUTPATIENT)
Dept: CARDIOLOGY | Facility: CLINIC | Age: 68
End: 2023-03-08
Payer: COMMERCIAL

## 2023-03-08 DIAGNOSIS — I48.0 PAROXYSMAL ATRIAL FIBRILLATION (H): Primary | ICD-10-CM

## 2023-03-08 RX ORDER — AMIODARONE HYDROCHLORIDE 400 MG/1
400 TABLET ORAL 2 TIMES DAILY
Qty: 60 TABLET | Refills: 1 | Status: SHIPPED | OUTPATIENT
Start: 2023-03-08 | End: 2023-03-22

## 2023-03-08 NOTE — TELEPHONE ENCOUNTER
Called patient to set up with EP provider per Dr Villalobos instructions. Patient agreeable to plan.     Dada Lindsey, EMT  Clinic Support  [facility]    [phone]    Employed by [employer] [facility] [employer]

## 2023-03-08 NOTE — TELEPHONE ENCOUNTER
Received a message from patient - AF occurring more frequent, up to 6 to 12 hours per day, symptoms of fatigue. She is on sotalol 160 bid and metoprolol succinate 200 every day.    I spoke with her personally on the phone. She does not feel that she needs to be admitted to hospital.  Suggest the followin. Stop sotalol today  2. OK to increase metoprolol succinate to 200 bid which was what she was taking before sotalol  3. On Saturday 3/11/2023 start amiodarone 400 bid and reduce metoprolol succinate to 200 every day  4. She will send me daily Kardia tracings and monitor her heart rate  5. Continue amiodarone 400 bid x 7 days, then 400 mg every day until seen in clinic      Labs 2022: AST 41, ALT 35, TSH 2.22    She understands the above which I will also sent to her in [employer].  Continue Xarelto at 20 at bedtime.    I will see her in clinic ~ 2 weeks after starting amiodarone.    Roslyn Mccracken MD

## 2023-03-20 DIAGNOSIS — E11.9 TYPE 2 DIABETES MELLITUS TREATED WITH INSULIN (H): ICD-10-CM

## 2023-03-20 DIAGNOSIS — Z79.4 TYPE 2 DIABETES MELLITUS TREATED WITH INSULIN (H): ICD-10-CM

## 2023-03-20 RX ORDER — INSULIN HUMAN 500 [IU]/ML
INJECTION, SOLUTION SUBCUTANEOUS
Qty: 40 ML | Refills: 3 | Status: SHIPPED | OUTPATIENT
Start: 2023-03-20 | End: 2023-04-19

## 2023-03-20 NOTE — TELEPHONE ENCOUNTER
Refill request was sent directly to Dr. Delgado from pharmacy team. Dr. Delgado is out of the office until next week.    Requested Prescriptions     insulin reg HIGH CONC (HUMULIN R U-500 KWIKPEN) 500 UNIT/ML PEN soln         Sig: Administer 120 units at 7 in the morning, and 90 units at 3-4 pm    Disp:  40 mL    Refills:  3    Start: 3/20/2023    Class: E-Prescribe    For: Type 2 diabetes mellitus treated with insulin (H)    Last ordered: 1 month ago by Rika Delgado MD        To be filled at: [place], [place] - [address], [address]            Last office visit with Dr. Delgado was on 11/30/22. Future office visit with Dr. Delgado on 5/24/23.      Will send to covering provider, Dr. Taylor, for refill review/signature.         Kylah Katz RN  Endocrine Care Coordinator  [facility]

## 2023-03-21 ENCOUNTER — OFFICE VISIT (OUTPATIENT)
Dept: PODIATRY | Facility: CLINIC | Age: 68
End: 2023-03-21
Payer: COMMERCIAL

## 2023-03-21 DIAGNOSIS — L97.512 ULCER OF RIGHT FOOT WITH FAT LAYER EXPOSED (H): ICD-10-CM

## 2023-03-21 DIAGNOSIS — M21.969 TYPE 2 DIABETES MELLITUS WITH DIABETIC FOOT DEFORMITY (H): ICD-10-CM

## 2023-03-21 DIAGNOSIS — E11.49 TYPE II OR UNSPECIFIED TYPE DIABETES MELLITUS WITH NEUROLOGICAL MANIFESTATIONS, NOT STATED AS UNCONTROLLED(250.60) (H): Primary | ICD-10-CM

## 2023-03-21 DIAGNOSIS — E11.69 TYPE 2 DIABETES MELLITUS WITH DIABETIC FOOT DEFORMITY (H): ICD-10-CM

## 2023-03-21 PROCEDURE — 99214 OFFICE O/P EST MOD 30 MIN: CPT | Performed by: PODIATRIST

## 2023-03-21 NOTE — LETTER
3/21/2023         RE: Sherry Slaughter  [address]  [place] [address]        Dear Colleague,    Thank you for referring your patient, Sherry Slaughter, to the [facility]. Please see a copy of my visit note below.    Past Medical History:   Diagnosis Date     Cataract      Congestive heart failure (H) 2019 NOVEMBER    AFIB     DEPRESSION     comes and goes - tried meds - unsuccessfully, certain times of the year, no psych intervention and no counselors in the past - and not interested      Diverticulosis of colon (without mention of hemorrhage) 4/04    colonoscopy     ECHO-mildLVH,tr MR,mild thick lflets w inc LA,trTR   12/03     Essential hypertension, benign 1990s    late 1990s - started medications at that time - not to difficult to control meds      Fam hx-cardiovas dis NEC     father - CAD, and lipids/HTN - multiple members of the family      Family history of diabetes mellitus     sister and grandmother with DM      Family history of malignant neoplasm of breast     mother - young age - 45, and maternal cousin and aunt as well - no BRCA testing done      Family history of stroke (cerebrovascular)     grandmother in early life in her 40s      FAMILY HX COLON CANCER     Pat uncles x 2     Heart disease     murmur/     Heart murmur      HYPERLIPIDEMIA 2000    fairly recent - in the last 5 years - high for DM levels  -cholesterol recent      Irritable bowel syndrome     goes between the 2 - nerve related - more stressed more problems      MICROALBUMINURIA     unsure how long - been on the lisinopril - for a few years at well      Nonspecific abnormal results of liver function study 2/3/2003    SGOT - has been high in the past - since the hepatitis b - borderline elevation - not really changing any      OBESITY      Obstructive sleep apnea      GENESIS (obstructive sleep apnea) 10/15/2006    psg 5/15 AHI 53 aPAP 8-15     OSTEOARTHRITIS L KNEE 2003    total knee on the  left - and pain is gone since the knee replacement      PERS HX HEPATITIS B- RESOLVED] 1977    acute virus only - no chronic disease      PERS HX HEPATITIS B- RESOLVED]      Type II or unspecified type diabetes mellitus without mention of complication, not stated as uncontrolled 1991    oral meds frist, then insulin eventually later, difficult to control most of the time      Unspecified hypothyroidism 10/11/2006    TSH 3.36 - mild subclinical hypothyroidism - deciding on following or starting low dose meds - with dr Oreilly - following      Patient Active Problem List   Diagnosis     Morbid obesity (H)     Diverticulosis of large intestine     Nonspecific abnormal results of cardiovascular function study     FAMILY HX COLON CANCER     Nonallopathic lesion of thoracic region     Hypothyroidism     GENESIS (obstructive sleep apnea)     Irritable bowel syndrome     Family history of malignant neoplasm of breast     Type 2 diabetes mellitus treated with insulin (H)     History of major depression     OSTEOARTHRITIS L KNEE  s/p knee replacement on the left      Hypertension goal BP (blood pressure) < 140/90     Family history of stroke (cerebrovascular)     Family history of other cardiovascular diseases     JOINT PAIN-ANKLE - right ankle      Mitral valve insufficiency     Hyperlipidemia LDL goal <100     Aortic sclerosis     Tubular adenoma of colon     History of viral hepatitis, type B     Chronic low back pain     Long-term insulin use (H)     S/P total knee replacement using cement, right     Lumbago     Type 2 diabetes mellitus with hyperglycemia (H)     Posterior vitreous detachment of right eye     Pseudophakia of both eyes     Paroxysmal atrial fibrillation (H)     SOB (shortness of breath)     Bunion     On continuous oral anticoagulation     Pressure injury of toe of left foot, stage 3 (H)     Stage 3a chronic kidney disease (H)     Past Surgical History:   Procedure Laterality Date     ABDOMEN SURGERY       ovaian scope/ appendix removal     ANESTHESIA CARDIOVERSION N/A 12/4/2020    Procedure: ANESTHESIA, FOR CARDIOVERSION @1400;  Surgeon: GENERIC ANESTHESIA PROVIDER;  Location: [facility]     ARTHROPLASTY KNEE Right 9/23/2015    Procedure: ARTHROPLASTY KNEE;  Surgeon: Rufus Brown MD;  Location:  OR     BUNIONECTOMY REN AND LEANDRO, COMBINED Left 2/2/2021    Procedure: Left bunion correction with bone cutting and screw fixation;  Surgeon: David Hoffman DPM;  Location: [facility] OR     CATARACT IOL, RT/LT Left      COLONOSCOPY  4/04    diverticulosis, rec repeat 10 yrs(consider fam hx?)     COLONOSCOPY WITH CO2 INSUFFLATION N/A 6/19/2019    Procedure: COLONOSCOPY, WITH CO2 INSUFFLATION;  Surgeon: Zeb Duarte MD;  Location: [facility] OR     ORTHOPEDIC SURGERY      right ankle     PHACOEMULSIFICATION CLEAR CORNEA WITH STANDARD INTRAOCULAR LENS IMPLANT Left 3/15/2018    Procedure: PHACOEMULSIFICATION CLEAR CORNEA WITH STANDARD INTRAOCULAR LENS IMPLANT;  LEFT EYE PHACOEMULSIFICATION CLEAR CORNEA WITH STANDARD INTRAOCULAR LENS IMPLANT;  Surgeon: Bnadar Linares MD;  Location:  [facility]     PHACOEMULSIFICATION CLEAR CORNEA WITH STANDARD INTRAOCULAR LENS IMPLANT Right 4/5/2018    Procedure: PHACOEMULSIFICATION CLEAR CORNEA WITH STANDARD INTRAOCULAR LENS IMPLANT;  RIGHT PHACOEMULSIFICATION CLEAR CORNEA WITH STANDARD INTRAOCULAR LENS IMPLANT ;  Surgeon: Bandar Linares MD;  Location:  [facility]     STRESS ECHO (METRO)  12/03    no ischemia, limited by fatigue     SURGICAL HISTORY OF -       EXP LAP- R OVARY CYSTS     SURGICAL HISTORY OF -   1981,1984,1985    CHILDBIRTH     ZZC NONSPECIFIC PROCEDURE  6/06    right triple arthrodecesis      ZZ NONSPECIFIC PROCEDURE  7/06     right bunion surgery      [facility] TOTAL KNEE ARTHROPLASTY  3/03    L knee     Social History     Socioeconomic History     Marital status:      Spouse name: Not on file     Number of children: 3     Years of education: Not on file     Highest  education level: Not on file   Occupational History     Occupation: RN     Employer: [employer]   Tobacco Use     Smoking status: Never     Passive exposure: Never     Smokeless tobacco: Never     Tobacco comments:     tried in early 20s only    Vaping Use     Vaping Use: Never used   Substance and Sexual Activity     Alcohol use: Yes     Comment: rare     Drug use: No     Sexual activity: Not Currently     Birth control/protection: Post-menopausal     Comment:  - since for 20 years, no signficant other  > 5 years sexual activity    Other Topics Concern      Service No     Blood Transfusions No     Caffeine Concern No     Occupational Exposure Yes     Comment: Normal nursing exposures     Hobby Hazards No     Sleep Concern Yes     Stress Concern No     Comment: Stress level at HM=5, stress level at WK=6     Weight Concern Yes     Special Diet Yes     Comment: Diabetic diet (watching carbs)     Back Care No     Comment: Occassional back spasms     Exercise Yes     Comment: Pt joined a health club goes approx 3-4 times a week,half to one mile daily     Bike Helmet No     Seat Belt Yes     Comment: Sometimes     Self-Exams Yes     Parent/sibling w/ CABG, MI or angioplasty before 65F 55M? 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      Social Determinants of Health     Financial Resource Strain: Not on file   Food Insecurity: Not on file   Transportation Needs: Not on file   Physical Activity: Not on file   Stress: Not on file   Social Connections: Not on file   Intimate Partner Violence: Not on file   Housing Stability: Not on file     Family History   Problem Relation Age of Onset     Diabetes Maternal Grandmother         DM TYPE 2     Cerebrovascular Disease Maternal Grandmother      Hypertension Sister      Lipids Sister      Heart Disease Sister         Chronic AFib     Diabetes Sister      Coronary Artery Disease  Sister         afib     Hypertension Sister      Lipids Sister      Gynecology Sister      Diabetes Sister      Asthma Sister      Blood Disease Paternal Grandmother         T CELL LEUKEMIA     Hypertension Brother      Lipids Brother      Congenital Anomalies Brother      Cardiovascular Brother      Heart Disease Brother         CABG/AFIB     Coronary Artery Disease Brother         cabg afib AAA     Hypertension Brother      Lipids Brother      Other Cancer Brother         multple myeloma     Obesity Brother      Hypertension Brother      Respiratory Brother         Sleep apnea     Heart Disease Brother         AFib     Coronary Artery Disease Brother         afib     Alzheimer Disease Mother      Asthma Mother      Hypertension Mother      Breast Cancer Mother 40        has mastectomy and lived to 84     Allergies Mother         Sulfa,     Arthritis Mother      Cardiovascular Mother      Depression Mother      Respiratory Mother      Lipids Mother      Cancer Mother         breast     Thyroid Disease Mother      Cerebrovascular Disease Mother         FROM  AFIB     Dementia Mother         Alzheimers     Other Cancer Mother         breast     Cancer - colorectal Other      Colon Cancer Other         2019     Cancer Father         lung     Aneurysm Father         brother, AAA     Other Cancer Father         lung ca     Coronary Artery Disease Father         cad AAA     Asthma Sister      Cancer - colorectal Paternal Uncle         late 50s to early 60s - great uncles      Breast Cancer Other         Maternal cousin     Arrhythmia Sister         2 brothers 1 sister Afib     Breast Cancer Maternal Aunt 62     Other Cancer Maternal Aunt 35        Ovarian cancer.  Survived despite late recurrence and is now 92.     Glaucoma No family hx of      Macular Degeneration No family hx of      Lab Results   Component Value Date    A1C 7.0 11/28/2022    A1C 7.1 05/25/2022    A1C 8.1 11/24/2021    A1C 9.0 08/16/2021    A1C 6.8  01/05/2021    A1C 6.9 11/25/2020    A1C 7.5 07/22/2020    A1C 7.3 12/10/2019           SUBJECTIVE FINDINGS:  A 67 year old returns to clinic for ulcer, right 1st MPJ, diabetes with peripheral neuropathy and foot deformities.  She relates she is doing much better.  She is not using the Cassandra anymore, and there is really no drainage.  She is using the Betadine and Aquacel and a light dressing.  She has her diabetic shoes with insoles.    OBJECTIVE FINDINGS:  DP and PT are 2/4, right.  She has a right plantar 1st MPJ ulcer that is eschared.  There is no active drainage, no erythema, no odor, no calor.    ASSESSMENT AND PLAN:  Ulcer, right 1st MPJ, diabetes with peripheral neuropathy and foot deformities.  Diagnosis and treatment options discussed with her.  Local wound care done upon consent today with Betadine, and we applied Aquacel Ag and a light dressing.  She will continue this.  I advised her on offloading.  Return to clinic and see me in 2-3 weeks.  Previous notes reviewed.                    Moderate to high level of medical decision making.        Again, thank you for allowing me to participate in the care of your patient.        Sincerely,        Rufus Hutson DPM

## 2023-03-21 NOTE — NURSING NOTE
Sherry Slaughter's chief complaint for this visit includes:  Chief Complaint   Patient presents with     Wound Check     R foot ulcer follow up- doing well and improving.      PCP: Marilou Arciniega    Referring Provider:  No referring provider defined for this encounter.    [facility] 10/02/2007   Data Unavailable        Allergies   Allergen Reactions     Empagliflozin      Yeast infections     Lipitor [Atorvastatin Calcium]      Gas     Pravachol [Pravastatin Sodium]      Pravachol - dry cough      Simvastatin      Myalgia         Do you need any medication refills at today's visit?

## 2023-03-21 NOTE — PROGRESS NOTES
Past Medical History:   Diagnosis Date     Cataract      Congestive heart failure (H) 2019 NOVEMBER    AFIB     DEPRESSION     comes and goes - tried meds - unsuccessfully, certain times of the year, no psych intervention and no counselors in the past - and not interested      Diverticulosis of colon (without mention of hemorrhage) 4/04    colonoscopy     ECHO-mildLVH,tr MR,mild thick lflets w inc LA,trTR   12/03     Essential hypertension, benign 1990s    late 1990s - started medications at that time - not to difficult to control meds      Fam hx-cardiovas dis NEC     father - CAD, and lipids/HTN - multiple members of the family      Family history of diabetes mellitus     sister and grandmother with DM      Family history of malignant neoplasm of breast     mother - young age - 45, and maternal cousin and aunt as well - no BRCA testing done      Family history of stroke (cerebrovascular)     grandmother in early life in her 40s      FAMILY HX COLON CANCER     Pat uncles x 2     Heart disease     murmur/     Heart murmur      HYPERLIPIDEMIA 2000    fairly recent - in the last 5 years - high for DM levels  -cholesterol recent      Irritable bowel syndrome     goes between the 2 - nerve related - more stressed more problems      MICROALBUMINURIA     unsure how long - been on the lisinopril - for a few years at well      Nonspecific abnormal results of liver function study 2/3/2003    SGOT - has been high in the past - since the hepatitis b - borderline elevation - not really changing any      OBESITY      Obstructive sleep apnea      GENESIS (obstructive sleep apnea) 10/15/2006    psg 5/15 AHI 53 aPAP 8-15     OSTEOARTHRITIS L KNEE 2003    total knee on the left - and pain is gone since the knee replacement      PERS HX HEPATITIS B- RESOLVED] 1977    acute virus only - no chronic disease      PERS HX HEPATITIS B- RESOLVED]      Type II or unspecified type diabetes mellitus without mention of complication, not stated as  uncontrolled 1991    oral meds frist, then insulin eventually later, difficult to control most of the time      Unspecified hypothyroidism 10/11/2006    TSH 3.36 - mild subclinical hypothyroidism - deciding on following or starting low dose meds - with dr Oreilly - following      Patient Active Problem List   Diagnosis     Morbid obesity (H)     Diverticulosis of large intestine     Nonspecific abnormal results of cardiovascular function study     FAMILY HX COLON CANCER     Nonallopathic lesion of thoracic region     Hypothyroidism     GENESIS (obstructive sleep apnea)     Irritable bowel syndrome     Family history of malignant neoplasm of breast     Type 2 diabetes mellitus treated with insulin (H)     History of major depression     OSTEOARTHRITIS L KNEE  s/p knee replacement on the left      Hypertension goal BP (blood pressure) < 140/90     Family history of stroke (cerebrovascular)     Family history of other cardiovascular diseases     JOINT PAIN-ANKLE - right ankle      Mitral valve insufficiency     Hyperlipidemia LDL goal <100     Aortic sclerosis     Tubular adenoma of colon     History of viral hepatitis, type B     Chronic low back pain     Long-term insulin use (H)     S/P total knee replacement using cement, right     Lumbago     Type 2 diabetes mellitus with hyperglycemia (H)     Posterior vitreous detachment of right eye     Pseudophakia of both eyes     Paroxysmal atrial fibrillation (H)     SOB (shortness of breath)     Bunion     On continuous oral anticoagulation     Pressure injury of toe of left foot, stage 3 (H)     Stage 3a chronic kidney disease (H)     Past Surgical History:   Procedure Laterality Date     ABDOMEN SURGERY      ovaian scope/ appendix removal     ANESTHESIA CARDIOVERSION N/A 12/4/2020    Procedure: ANESTHESIA, FOR CARDIOVERSION @1400;  Surgeon: GENERIC ANESTHESIA PROVIDER;  Location: [facility] OR     ARTHROPLASTY KNEE Right 9/23/2015    Procedure: ARTHROPLASTY KNEE;  Surgeon: Stephanie  Rufus VELEZ MD;  Location:  OR     BUNIONECTOMY REN AND LEANDRO, COMBINED Left 2/2/2021    Procedure: Left bunion correction with bone cutting and screw fixation;  Surgeon: David Hoffman DPM;  Location: [facility] OR     CATARACT IOL, RT/LT Left      COLONOSCOPY  4/04    diverticulosis, rec repeat 10 yrs(consider fam hx?)     COLONOSCOPY WITH CO2 INSUFFLATION N/A 6/19/2019    Procedure: COLONOSCOPY, WITH CO2 INSUFFLATION;  Surgeon: Zeb Duarte MD;  Location:  OR     ORTHOPEDIC SURGERY      right ankle     PHACOEMULSIFICATION CLEAR CORNEA WITH STANDARD INTRAOCULAR LENS IMPLANT Left 3/15/2018    Procedure: PHACOEMULSIFICATION CLEAR CORNEA WITH STANDARD INTRAOCULAR LENS IMPLANT;  LEFT EYE PHACOEMULSIFICATION CLEAR CORNEA WITH STANDARD INTRAOCULAR LENS IMPLANT;  Surgeon: Bandar Linares MD;  Location:  [facility]     PHACOEMULSIFICATION CLEAR CORNEA WITH STANDARD INTRAOCULAR LENS IMPLANT Right 4/5/2018    Procedure: PHACOEMULSIFICATION CLEAR CORNEA WITH STANDARD INTRAOCULAR LENS IMPLANT;  RIGHT PHACOEMULSIFICATION CLEAR CORNEA WITH STANDARD INTRAOCULAR LENS IMPLANT ;  Surgeon: Bandar Linares MD;  Location:  [facility]     STRESS ECHO (METRO)  12/03    no ischemia, limited by fatigue     SURGICAL HISTORY OF -       EXP LAP- R OVARY CYSTS     SURGICAL HISTORY OF -   1981,1984,1985    CHILDBIRTH     ZZC NONSPECIFIC PROCEDURE  6/06    right triple arthrodecesis      Z NONSPECIFIC PROCEDURE  7/06     right bunion surgery      [facility] TOTAL KNEE ARTHROPLASTY  3/03    L knee     Social History     Socioeconomic History     Marital status:      Spouse name: Not on file     Number of children: 3     Years of education: Not on file     Highest education level: Not on file   Occupational History     Occupation: RN     Employer: [employer]   Tobacco Use     Smoking status: Never     Passive exposure: Never     Smokeless tobacco: Never     Tobacco comments:     tried in early 20s only    Vaping  Use     Vaping Use: Never used   Substance and Sexual Activity     Alcohol use: Yes     Comment: rare     Drug use: No     Sexual activity: Not Currently     Birth control/protection: Post-menopausal     Comment:  - since for 20 years, no signficant other  > 5 years sexual activity    Other Topics Concern      Service No     Blood Transfusions No     Caffeine Concern No     Occupational Exposure Yes     Comment: Normal nursing exposures     Hobby Hazards No     Sleep Concern Yes     Stress Concern No     Comment: Stress level at HM=5, stress level at WK=6     Weight Concern Yes     Special Diet Yes     Comment: Diabetic diet (watching carbs)     Back Care No     Comment: Occassional back spasms     Exercise Yes     Comment: Pt joined a health club goes approx 3-4 times a week,half to one mile daily     Bike Helmet No     Seat Belt Yes     Comment: Sometimes     Self-Exams Yes     Parent/sibling w/ CABG, MI or angioplasty before 65F 55M? 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      Social Determinants of Health     Financial Resource Strain: Not on file   Food Insecurity: Not on file   Transportation Needs: Not on file   Physical Activity: Not on file   Stress: Not on file   Social Connections: Not on file   Intimate Partner Violence: Not on file   Housing Stability: Not on file     Family History   Problem Relation Age of Onset     Diabetes Maternal Grandmother         DM TYPE 2     Cerebrovascular Disease Maternal Grandmother      Hypertension Sister      Lipids Sister      Heart Disease Sister         Chronic AFib     Diabetes Sister      Coronary Artery Disease Sister         afib     Hypertension Sister      Lipids Sister      Gynecology Sister      Diabetes Sister      Asthma Sister      Blood Disease Paternal Grandmother         T CELL LEUKEMIA     Hypertension Brother      Lipids Brother      Congenital Anomalies Brother       Cardiovascular Brother      Heart Disease Brother         CABG/AFIB     Coronary Artery Disease Brother         cabg afib AAA     Hypertension Brother      Lipids Brother      Other Cancer Brother         multple myeloma     Obesity Brother      Hypertension Brother      Respiratory Brother         Sleep apnea     Heart Disease Brother         AFib     Coronary Artery Disease Brother         afib     Alzheimer Disease Mother      Asthma Mother      Hypertension Mother      Breast Cancer Mother 40        has mastectomy and lived to 84     Allergies Mother         Sulfa,     Arthritis Mother      Cardiovascular Mother      Depression Mother      Respiratory Mother      Lipids Mother      Cancer Mother         breast     Thyroid Disease Mother      Cerebrovascular Disease Mother         FROM  AFIB     Dementia Mother         Alzheimers     Other Cancer Mother         breast     Cancer - colorectal Other      Colon Cancer Other         2019     Cancer Father         lung     Aneurysm Father         brother, AAA     Other Cancer Father         lung ca     Coronary Artery Disease Father         cad AAA     Asthma Sister      Cancer - colorectal Paternal Uncle         late 50s to early 60s - great uncles      Breast Cancer Other         Maternal cousin     Arrhythmia Sister         2 brothers 1 sister Afib     Breast Cancer Maternal Aunt 62     Other Cancer Maternal Aunt 35        Ovarian cancer.  Survived despite late recurrence and is now 92.     Glaucoma No family hx of      Macular Degeneration No family hx of      Lab Results   Component Value Date    A1C 7.0 11/28/2022    A1C 7.1 05/25/2022    A1C 8.1 11/24/2021    A1C 9.0 08/16/2021    A1C 6.8 01/05/2021    A1C 6.9 11/25/2020    A1C 7.5 07/22/2020    A1C 7.3 12/10/2019           SUBJECTIVE FINDINGS:  A 67 year old returns to clinic for ulcer, right 1st MPJ, diabetes with peripheral neuropathy and foot deformities.  She relates she is doing much better.  She is  not using the Cassandra anymore, and there is really no drainage.  She is using the Betadine and Aquacel and a light dressing.  She has her diabetic shoes with insoles.    OBJECTIVE FINDINGS:  DP and PT are 2/4, right.  She has a right plantar 1st MPJ ulcer that is eschared.  There is no active drainage, no erythema, no odor, no calor.    ASSESSMENT AND PLAN:  Ulcer, right 1st MPJ, diabetes with peripheral neuropathy and foot deformities.  Diagnosis and treatment options discussed with her.  Local wound care done upon consent today with Betadine, and we applied Aquacel Ag and a light dressing.  She will continue this.  I advised her on offloading.  Return to clinic and see me in 2-3 weeks.  Previous notes reviewed.                    Moderate to high level of medical decision making.

## 2023-03-25 NOTE — PROGRESS NOTES
I am delighted to see Sherry Slaughter at [facility] for follow up of atrial fibrillation.    History of Present Illness:  She is a 66 yo female who had persistent AF initially diagnosed in December 2020, underwent successful cardioversion to sinus rhythm and has been on rivaroxaban, metoprolol, and diltiazem. Over the last few years she's had intermittent recurrent episodes with symptoms of fatigue and shoulder pain. Her metoprolol and diltiazem doses were increased without much effect. She had never been on an antiarrhythmic drug. She monitors her HR via a Fitbit watch and has an AliveCor Kardia at home. Most symptomatic in AF when rate is fast. She thinks she's in AF several times a month with episodes lasting few hours at a time on average, although sometimes duration can be up to a day. Dr Mccracken had suggested Sotalol. Initially started at 80 mg bid and titrated dose up to 160 bid. During titration to higher dose, there were several days she was in persistent AF, however, on higher doses this continued to improve.    She then recently wrote in via [employer] that she had continued to have episdoes of afib on higher dose of sotalol. She was given instruction from Dr CHRISTOPHER Mccracken to stop the sotalol and start amio 400 BID on 3/11/23 and send daily tracings in to Dr Mccracken to review. On 3/13, she sent tracing in that showed SR and she reported improvement in her symptoms. Daily tracings continue to show NSR. She reduced her amiodarone dose to 400 mg daily on 3/22/23. Today, she continues to report her daily Kardia tracings have showed sinus rhythm. She has not had any more symptoms of Afib.     Past Medical History:  1. Atrial fibrillation, diagnosed 11/2020, persistent, s/p ELIAS/CV 12/4/2020 to sinus. Recurrent AF 12/9/2020 when admitted with hypertensive urgency, ventricular rates 80-90s, spontaneously converted.   2. Diabetes type II  3. Hypertension  4 . GENESIS, on BiPAP  5. Hypothyroidism  6. BMI  47  7. HFpEF     Medications:   Amiodarone 400 mg daily   Diltiazem  mg daily  Metoprolol succinate 200 mg twice daily   Lasix 20 mg every day  KCL 20 meq daily  hydrochlorothiazide 50 mg daily  Lisinopril 20 mg daily  Xarelto 20 mg daily  Rosuvastatin 10 mg daily    Insulin  Semaglutide  Levothyroxine    Metformin XR     Allergies:    Allergies   Allergen Reactions     Empagliflozin      Yeast infections     Lipitor [Atorvastatin Calcium]      Gas     Pravachol [Pravastatin Sodium]      Pravachol - dry cough      Simvastatin      Myalgia       Physical examination  Vitals: 112/64 HR 68  BMI= 46 (135 kg)    Constitutional: In general, the patient is a pleasant female in no acute distress.    Cardiovascular: Carotids +2/2 bilaterally without bruits.  No jugular venous distension. Regular rate and rhythm. Normal S1, S2. No murmur, rub, click, or gallop.   Extremities: Pulses are normal bilaterally throughout. No peripheral edema.  Respiratory: Clear to asculation.  No ronchi, wheezes, rales.  No dullness to percussion.     I have personally and independently reviewed the following:  Labs:   22: TSH 2.22, AST 41, ALT 35  2023: cr 0.94, K 3.8, hgb 13.7, plt 218K, A1C 7    Echo 2023: EF 60-65%, normal RV, grade II diastolic dysfunction, no valve disease, OSMAR 33.5    EK2023: sinus 68 bpm,  ms, QTc 410 ms.  TODAY 3/28/2023: sinus 68 bpm, , QTc 460 ms.     Patch monitor 10/7-10/21/2021: PAF, AF burden 43% average rage 97 bpm, longest episode 6 days.     Assessment :  Atrial fibrillation, persistent. AF burden as high as 43% in . [id number] score is 4 for age, HTN, DM, female. She is on the appropriate dose of rivaroxaban at 20 mg every day. She has been without recurrence since conversion on amiodarone. Will decrease her amio from 400 mg daily to 200 mg daily. Continue follow up at apt schedule with Dr Mccracken in August. PFTs completed prior to apt. TFTs and LFTs completed in  November without abnormality.     Plan:  Follow up at scheduled apt in August with PFTs completed prior.       The patient states understanding and is agreeable with the plan.     Natacha Schroeder PA-C  [facility]  Electrophysiology Consult Service  Pager: [phone]    I spent a total of 30 minutes face to face with  Sherry Slaughter during today's office visit. I have spend an additional 30 minutes today on chart review and documentation.

## 2023-03-28 ENCOUNTER — OFFICE VISIT (OUTPATIENT)
Dept: CARDIOLOGY | Facility: CLINIC | Age: 68
End: 2023-03-28
Payer: COMMERCIAL

## 2023-03-28 VITALS
OXYGEN SATURATION: 97 % | DIASTOLIC BLOOD PRESSURE: 64 MMHG | WEIGHT: 293 LBS | HEART RATE: 73 BPM | BODY MASS INDEX: 46.42 KG/M2 | SYSTOLIC BLOOD PRESSURE: 112 MMHG

## 2023-03-28 DIAGNOSIS — R06.02 SOB (SHORTNESS OF BREATH): ICD-10-CM

## 2023-03-28 DIAGNOSIS — I48.0 PAROXYSMAL ATRIAL FIBRILLATION (H): Primary | ICD-10-CM

## 2023-03-28 PROCEDURE — 99215 OFFICE O/P EST HI 40 MIN: CPT

## 2023-03-28 PROCEDURE — 93000 ELECTROCARDIOGRAM COMPLETE: CPT

## 2023-03-28 NOTE — PATIENT INSTRUCTIONS
Take your medicines every day, as directed     Changes made today:  Decrease amiodarone to 200 mg daily.   Completed pulmonary function testing prior to visit with Dr Mccracken in August.    Cardiology Care Coordinators:      Patricia Bridges, RN  Rachell Rocha, RN     Love Rondon, EMMY     Cardiology Rooming staff:  KEREN Lee    Phone  [phone]      Fax [phone]    To Contact us     During Business Hours:  [phone]     If you are needing refills please contact your pharmacy.     For urgent after hour care please call the [facility] at [phone] or the [facility] at [phone] and ask to speak to the cardiologist on call.            HOW TO CHECK YOUR BLOOD PRESSURE AT HOME:     Avoid eating, smoking, and exercising for at least 30 minutes before taking a reading.     Be sure you have taken your BP medication at least 2-3 hours before you check it.      Sit quietly for 10 minutes before a reading.      Sit in a chair with your feet flat on the floor. Rest your  arm on a table so that the arm cuff is at the same level as your heart.     Remain still during the reading.  Record your blood pressure and pulse in a log and bring to your next appointment.       Use [employer] allows you to communicate directly with your heart team through secure messaging.  [employer] can be accessed any time on your phone, computer, or tablet.  If you need assistance, we'd be happy to help!             Keep your Heart Appointments:     - 8/29/23 with Dr Mccracken

## 2023-03-28 NOTE — TELEPHONE ENCOUNTER
Patient was told she had refills on this potassium, but I only see 90 w/ 3 refills sent in 2022, so it's .

## 2023-03-28 NOTE — NURSING NOTE
hSerry Slaughter's goals for this visit include:   Chief Complaint   Patient presents with     Follow Up     Atrial Fib       She requests these members of her care team be copied on today's visit information: yes     PCP: Marilou Arciniega    Referring Provider:  No referring provider defined for this encounter.    /64 (BP Location: Left arm, Patient Position: Chair, Cuff Size: Adult Large)   Pulse 73   Wt 134.9 kg (297 lb 8 oz)   LMP 10/02/2007   SpO2 97%   BMI 46.42 kg/m      Do you need any medication refills at today's visit? No     FRANKO Dixon   Cardiology Team  [facility]

## 2023-03-29 RX ORDER — POTASSIUM CHLORIDE 1500 MG/1
20 TABLET, EXTENDED RELEASE ORAL DAILY
Qty: 90 TABLET | Refills: 3 | Status: SHIPPED | OUTPATIENT
Start: 2023-03-29 | End: 2023-12-06

## 2023-03-29 NOTE — TELEPHONE ENCOUNTER
POTASSIUM CHLORIDE JARED ER 20 TBCR  Last Written Prescription Date:   1/6/2022  Last Fill Quantity: 90,   # refills: 3  Last Office Visit :  3/28/2023  Future Office visit:   5/31/2023  90 Tabs, 3 Refills sent to pharm       Rika Ndiaye RN  Central Triage Red Flags/Med Refills

## 2023-04-01 ENCOUNTER — HEALTH MAINTENANCE LETTER (OUTPATIENT)
Age: 68
End: 2023-04-01

## 2023-04-11 ENCOUNTER — OFFICE VISIT (OUTPATIENT)
Dept: PODIATRY | Facility: CLINIC | Age: 68
End: 2023-04-11
Payer: COMMERCIAL

## 2023-04-11 DIAGNOSIS — L97.512 ULCER OF RIGHT FOOT WITH FAT LAYER EXPOSED (H): ICD-10-CM

## 2023-04-11 DIAGNOSIS — E11.69 TYPE 2 DIABETES MELLITUS WITH DIABETIC FOOT DEFORMITY (H): ICD-10-CM

## 2023-04-11 DIAGNOSIS — E11.49 TYPE II OR UNSPECIFIED TYPE DIABETES MELLITUS WITH NEUROLOGICAL MANIFESTATIONS, NOT STATED AS UNCONTROLLED(250.60) (H): Primary | ICD-10-CM

## 2023-04-11 DIAGNOSIS — M21.969 TYPE 2 DIABETES MELLITUS WITH DIABETIC FOOT DEFORMITY (H): ICD-10-CM

## 2023-04-11 PROCEDURE — 99214 OFFICE O/P EST MOD 30 MIN: CPT | Performed by: PODIATRIST

## 2023-04-11 NOTE — PROGRESS NOTES
Past Medical History:   Diagnosis Date     Cataract      Congestive heart failure (H) 2019 NOVEMBER    AFIB     DEPRESSION     comes and goes - tried meds - unsuccessfully, certain times of the year, no psych intervention and no counselors in the past - and not interested      Diverticulosis of colon (without mention of hemorrhage) 4/04    colonoscopy     ECHO-mildLVH,tr MR,mild thick lflets w inc LA,trTR   12/03     Essential hypertension, benign 1990s    late 1990s - started medications at that time - not to difficult to control meds      Fam hx-cardiovas dis [facility]     father - CAD, and lipids/HTN - multiple members of the family      Family history of diabetes mellitus     sister and grandmother with DM      Family history of malignant neoplasm of breast     mother - young age - 45, and maternal cousin and aunt as well - no BRCA testing done      Family history of stroke (cerebrovascular)     grandmother in early life in her 40s      FAMILY HX COLON CANCER     Pat uncles x 2     Heart disease     murmur/     Heart murmur      HYPERLIPIDEMIA 2000    fairly recent - in the last 5 years - high for DM levels  -cholesterol recent      Irritable bowel syndrome     goes between the 2 - nerve related - more stressed more problems      MICROALBUMINURIA     unsure how long - been on the lisinopril - for a few years at well      Nonspecific abnormal results of liver function study 2/3/2003    SGOT - has been high in the past - since the hepatitis b - borderline elevation - not really changing any      OBESITY      Obstructive sleep apnea      GENESIS (obstructive sleep apnea) 10/15/2006    psg 5/15 AHI 53 aPAP 8-15     OSTEOARTHRITIS L KNEE 2003    total knee on the left - and pain is gone since the knee replacement      PERS HX HEPATITIS B- RESOLVED] 1977    acute virus only - no chronic disease      PERS HX HEPATITIS B- RESOLVED]      Type II or unspecified type diabetes mellitus without mention of complication, not stated as  uncontrolled 1991    oral meds frist, then insulin eventually later, difficult to control most of the time      Unspecified hypothyroidism 10/11/2006    TSH 3.36 - mild subclinical hypothyroidism - deciding on following or starting low dose meds - with dr Oreilly - following      Patient Active Problem List   Diagnosis     Morbid obesity (H)     Diverticulosis of large intestine     Nonspecific abnormal results of cardiovascular function study     FAMILY HX COLON CANCER     Nonallopathic lesion of thoracic region     Hypothyroidism     GENESIS (obstructive sleep apnea)     Irritable bowel syndrome     Family history of malignant neoplasm of breast     Type 2 diabetes mellitus treated with insulin (H)     History of major depression     OSTEOARTHRITIS L KNEE  s/p knee replacement on the left      Hypertension goal BP (blood pressure) < 140/90     Family history of stroke (cerebrovascular)     Family history of other cardiovascular diseases     JOINT PAIN-ANKLE - right ankle      Mitral valve insufficiency     Hyperlipidemia LDL goal <100     Aortic sclerosis     Tubular adenoma of colon     History of viral hepatitis, type B     Chronic low back pain     Long-term insulin use (H)     S/P total knee replacement using cement, right     Lumbago     Type 2 diabetes mellitus with hyperglycemia (H)     Posterior vitreous detachment of right eye     Pseudophakia of both eyes     Paroxysmal atrial fibrillation (H)     SOB (shortness of breath)     Bunion     On continuous oral anticoagulation     Pressure injury of toe of left foot, stage 3 (H)     Stage 3a chronic kidney disease (H)     Past Surgical History:   Procedure Laterality Date     ABDOMEN SURGERY      ovaian scope/ appendix removal     ANESTHESIA CARDIOVERSION N/A 12/4/2020    Procedure: ANESTHESIA, FOR CARDIOVERSION @1400;  Surgeon: GENERIC ANESTHESIA PROVIDER;  Location: [facility] OR     ARTHROPLASTY KNEE Right 9/23/2015    Procedure: ARTHROPLASTY KNEE;  Surgeon: Stephanie  Rufus VELEZ MD;  Location:  OR     BUNIONECTOMY REN AND LEANDRO, COMBINED Left 2/2/2021    Procedure: Left bunion correction with bone cutting and screw fixation;  Surgeon: David Hoffman DPM;  Location: [facility] OR     CATARACT IOL, RT/LT Left      COLONOSCOPY  4/04    diverticulosis, rec repeat 10 yrs(consider fam hx?)     COLONOSCOPY WITH CO2 INSUFFLATION N/A 6/19/2019    Procedure: COLONOSCOPY, WITH CO2 INSUFFLATION;  Surgeon: Zeb Duarte MD;  Location:  OR     ORTHOPEDIC SURGERY      right ankle     PHACOEMULSIFICATION CLEAR CORNEA WITH STANDARD INTRAOCULAR LENS IMPLANT Left 3/15/2018    Procedure: PHACOEMULSIFICATION CLEAR CORNEA WITH STANDARD INTRAOCULAR LENS IMPLANT;  LEFT EYE PHACOEMULSIFICATION CLEAR CORNEA WITH STANDARD INTRAOCULAR LENS IMPLANT;  Surgeon: Bandar Linares MD;  Location:  [facility]     PHACOEMULSIFICATION CLEAR CORNEA WITH STANDARD INTRAOCULAR LENS IMPLANT Right 4/5/2018    Procedure: PHACOEMULSIFICATION CLEAR CORNEA WITH STANDARD INTRAOCULAR LENS IMPLANT;  RIGHT PHACOEMULSIFICATION CLEAR CORNEA WITH STANDARD INTRAOCULAR LENS IMPLANT ;  Surgeon: Bandar Linares MD;  Location:  [facility]     STRESS ECHO (METRO)  12/03    no ischemia, limited by fatigue     SURGICAL HISTORY OF -       EXP LAP- R OVARY CYSTS     SURGICAL HISTORY OF -   1981,1984,1985    CHILDBIRTH     ZZC NONSPECIFIC PROCEDURE  6/06    right triple arthrodecesis      Z NONSPECIFIC PROCEDURE  7/06     right bunion surgery      [facility] TOTAL KNEE ARTHROPLASTY  3/03    L knee     Social History     Socioeconomic History     Marital status:      Spouse name: Not on file     Number of children: 3     Years of education: Not on file     Highest education level: Not on file   Occupational History     Occupation: RN     Employer: [employer]   Tobacco Use     Smoking status: Never     Passive exposure: Never     Smokeless tobacco: Never     Tobacco comments:     tried in early 20s only    Vaping  Use     Vaping status: Never Used   Substance and Sexual Activity     Alcohol use: Yes     Comment: rare     Drug use: No     Sexual activity: Not Currently     Birth control/protection: Post-menopausal     Comment:  - since for 20 years, no signficant other  > 5 years sexual activity    Other Topics Concern      Service No     Blood Transfusions No     Caffeine Concern No     Occupational Exposure Yes     Comment: Normal nursing exposures     Hobby Hazards No     Sleep Concern Yes     Stress Concern No     Comment: Stress level at HM=5, stress level at WK=6     Weight Concern Yes     Special Diet Yes     Comment: Diabetic diet (watching carbs)     Back Care No     Comment: Occassional back spasms     Exercise Yes     Comment: Pt joined a health club goes approx 3-4 times a week,half to one mile daily     Bike Helmet No     Seat Belt Yes     Comment: Sometimes     Self-Exams Yes     Parent/sibling w/ CABG, MI or angioplasty before 65F 55M? 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      Social Determinants of Health     Financial Resource Strain: Not on file   Food Insecurity: Not on file   Transportation Needs: Not on file   Physical Activity: Not on file   Stress: Not on file   Social Connections: Not on file   Intimate Partner Violence: Not on file   Housing Stability: Not on file     Family History   Problem Relation Age of Onset     Diabetes Maternal Grandmother         DM TYPE 2     Cerebrovascular Disease Maternal Grandmother      Hypertension Sister      Lipids Sister      Heart Disease Sister         Chronic AFib     Diabetes Sister      Coronary Artery Disease Sister         afib     Hypertension Sister      Lipids Sister      Gynecology Sister      Diabetes Sister      Asthma Sister      Blood Disease Paternal Grandmother         T CELL LEUKEMIA     Hypertension Brother      Lipids Brother      Congenital Anomalies Brother       Cardiovascular Brother      Heart Disease Brother         CABG/AFIB     Coronary Artery Disease Brother         cabg afib AAA     Hypertension Brother      Lipids Brother      Other Cancer Brother         multple myeloma     Obesity Brother      Hypertension Brother      Respiratory Brother         Sleep apnea     Heart Disease Brother         AFib     Coronary Artery Disease Brother         afib     Alzheimer Disease Mother      Asthma Mother      Hypertension Mother      Breast Cancer Mother 40        has mastectomy and lived to 84     Allergies Mother         Sulfa,     Arthritis Mother      Cardiovascular Mother      Depression Mother      Respiratory Mother      Lipids Mother      Cancer Mother         breast     Thyroid Disease Mother      Cerebrovascular Disease Mother         FROM  AFIB     Dementia Mother         Alzheimers     Other Cancer Mother         breast     Cancer - colorectal Other      Colon Cancer Other         2019     Cancer Father         lung     Aneurysm Father         brother, AAA     Other Cancer Father         lung ca     Coronary Artery Disease Father         cad AAA     Asthma Sister      Cancer - colorectal Paternal Uncle         late 50s to early 60s - great uncles      Breast Cancer Other         Maternal cousin     Arrhythmia Sister         2 brothers 1 sister Afib     Breast Cancer Maternal Aunt 62     Other Cancer Maternal Aunt 35        Ovarian cancer.  Survived despite late recurrence and is now 92.     Glaucoma No family hx of      Macular Degeneration No family hx of                SUBJECTIVE FINDINGS:  68-year-old returns to clinic for ulcer, right first MPJ, diabetes, peripheral neuropathy and foot deformities.  She relates it is still draining a little bit, but it is filling in.  She is using the Aquacel Ag and the Band-Aid and the Betadine.  She is wearing her diabetic shoes as much as she can, but if she wears her shoes too much, they are just  uncomfortable.    OBJECTIVE FINDINGS:  DP and PT are 2/4, right.  She has a right plantar first MPJ hyperkeratotic eschar.  There is no active drainage, no erythema, no odor, no calor. Decreased hyperkeratosis.    ASSESSMENT AND PLAN:  Ulcer, right first MPJ. Diabetes with peripheral neuropathy and foot deformities.  Diagnosis and treatment options discussed with her.  Local wound care done upon consent today.  We cleaned this with Betadine.  I applied Aquacel Ag and a light dressing.  I am going to have her continue doing this daily.  I discussed with her again offloading options.  She does not want to do any further offloading options at this time.  She opted for no surgical referral at this time as well.  Previous notes reviewed.  Overall, this is improving.  Return to clinic and see me in 2 weeks.              Moderate level of medical decision making.

## 2023-04-11 NOTE — NURSING NOTE
Sherry Slaughter's chief complaint for this visit includes:  Chief Complaint   Patient presents with     Follow Up     Ulcer follow up     PCP: Marilou Arciniega    Referring Provider:  No referring provider defined for this encounter.    [facility] 10/02/2007   Data Unavailable        Allergies   Allergen Reactions     Empagliflozin      Yeast infections     Lipitor [Atorvastatin Calcium]      Gas     Pravachol [Pravastatin Sodium]      Pravachol - dry cough      Simvastatin      Myalgia         Do you need any medication refills at today's visit? No    Mallory S. EMT

## 2023-04-11 NOTE — LETTER
4/11/2023         RE: Sherry Slaughter  [address]  [place] [address]        Dear Colleague,    Thank you for referring your patient, Sherry Slaughter, to the [facility]. Please see a copy of my visit note below.    Past Medical History:   Diagnosis Date     Cataract      Congestive heart failure (H) 2019 NOVEMBER    AFIB     DEPRESSION     comes and goes - tried meds - unsuccessfully, certain times of the year, no psych intervention and no counselors in the past - and not interested      Diverticulosis of colon (without mention of hemorrhage) 4/04    colonoscopy     ECHO-mildLVH,tr MR,mild thick lflets w inc LA,trTR   12/03     Essential hypertension, benign 1990s    late 1990s - started medications at that time - not to difficult to control meds      Fam hx-cardiovas dis [facility]     father - CAD, and lipids/HTN - multiple members of the family      Family history of diabetes mellitus     sister and grandmother with DM      Family history of malignant neoplasm of breast     mother - young age - 45, and maternal cousin and aunt as well - no BRCA testing done      Family history of stroke (cerebrovascular)     grandmother in early life in her 40s      FAMILY HX COLON CANCER     Pat uncles x 2     Heart disease     murmur/     Heart murmur      HYPERLIPIDEMIA 2000    fairly recent - in the last 5 years - high for DM levels  -cholesterol recent      Irritable bowel syndrome     goes between the 2 - nerve related - more stressed more problems      MICROALBUMINURIA     unsure how long - been on the lisinopril - for a few years at well      Nonspecific abnormal results of liver function study 2/3/2003    SGOT - has been high in the past - since the hepatitis b - borderline elevation - not really changing any      OBESITY      Obstructive sleep apnea      GENESIS (obstructive sleep apnea) 10/15/2006    psg 5/15 AHI 53 aPAP 8-15     OSTEOARTHRITIS L KNEE 2003    total knee on the  left - and pain is gone since the knee replacement      PERS HX HEPATITIS B- RESOLVED] 1977    acute virus only - no chronic disease      PERS HX HEPATITIS B- RESOLVED]      Type II or unspecified type diabetes mellitus without mention of complication, not stated as uncontrolled 1991    oral meds frist, then insulin eventually later, difficult to control most of the time      Unspecified hypothyroidism 10/11/2006    TSH 3.36 - mild subclinical hypothyroidism - deciding on following or starting low dose meds - with dr Oreilly - following      Patient Active Problem List   Diagnosis     Morbid obesity (H)     Diverticulosis of large intestine     Nonspecific abnormal results of cardiovascular function study     FAMILY HX COLON CANCER     Nonallopathic lesion of thoracic region     Hypothyroidism     GENESIS (obstructive sleep apnea)     Irritable bowel syndrome     Family history of malignant neoplasm of breast     Type 2 diabetes mellitus treated with insulin (H)     History of major depression     OSTEOARTHRITIS L KNEE  s/p knee replacement on the left      Hypertension goal BP (blood pressure) < 140/90     Family history of stroke (cerebrovascular)     Family history of other cardiovascular diseases     JOINT PAIN-ANKLE - right ankle      Mitral valve insufficiency     Hyperlipidemia LDL goal <100     Aortic sclerosis     Tubular adenoma of colon     History of viral hepatitis, type B     Chronic low back pain     Long-term insulin use (H)     S/P total knee replacement using cement, right     Lumbago     Type 2 diabetes mellitus with hyperglycemia (H)     Posterior vitreous detachment of right eye     Pseudophakia of both eyes     Paroxysmal atrial fibrillation (H)     SOB (shortness of breath)     Bunion     On continuous oral anticoagulation     Pressure injury of toe of left foot, stage 3 (H)     Stage 3a chronic kidney disease (H)     Past Surgical History:   Procedure Laterality Date     ABDOMEN SURGERY       ovaian scope/ appendix removal     ANESTHESIA CARDIOVERSION N/A 12/4/2020    Procedure: ANESTHESIA, FOR CARDIOVERSION @1400;  Surgeon: GENERIC ANESTHESIA PROVIDER;  Location: [facility] OR     ARTHROPLASTY KNEE Right 9/23/2015    Procedure: ARTHROPLASTY KNEE;  Surgeon: Rufus Brown MD;  Location:  OR     BUNIONECTOMY REN AND LEANDRO, COMBINED Left 2/2/2021    Procedure: Left bunion correction with bone cutting and screw fixation;  Surgeon: David Hoffman DPM;  Location: [facility] OR     CATARACT IOL, RT/LT Left      COLONOSCOPY  4/04    diverticulosis, rec repeat 10 yrs(consider fam hx?)     COLONOSCOPY WITH CO2 INSUFFLATION N/A 6/19/2019    Procedure: COLONOSCOPY, WITH CO2 INSUFFLATION;  Surgeon: Zeb Duarte MD;  Location: [facility] OR     ORTHOPEDIC SURGERY      right ankle     PHACOEMULSIFICATION CLEAR CORNEA WITH STANDARD INTRAOCULAR LENS IMPLANT Left 3/15/2018    Procedure: PHACOEMULSIFICATION CLEAR CORNEA WITH STANDARD INTRAOCULAR LENS IMPLANT;  LEFT EYE PHACOEMULSIFICATION CLEAR CORNEA WITH STANDARD INTRAOCULAR LENS IMPLANT;  Surgeon: Bandar Linares MD;  Location:  [facility]     PHACOEMULSIFICATION CLEAR CORNEA WITH STANDARD INTRAOCULAR LENS IMPLANT Right 4/5/2018    Procedure: PHACOEMULSIFICATION CLEAR CORNEA WITH STANDARD INTRAOCULAR LENS IMPLANT;  RIGHT PHACOEMULSIFICATION CLEAR CORNEA WITH STANDARD INTRAOCULAR LENS IMPLANT ;  Surgeon: Bandar Linares MD;  Location:  [facility]     STRESS ECHO (METRO)  12/03    no ischemia, limited by fatigue     SURGICAL HISTORY OF -       EXP LAP- R OVARY CYSTS     SURGICAL HISTORY OF -   1981,1984,1985    CHILDBIRTH     ZZC NONSPECIFIC PROCEDURE  6/06    right triple arthrodecesis      ZZ NONSPECIFIC PROCEDURE  7/06     right bunion surgery      RUST TOTAL KNEE ARTHROPLASTY  3/03    L knee     Social History     Socioeconomic History     Marital status:      Spouse name: Not on file     Number of children: 3     Years of education: Not on file     Highest  education level: Not on file   Occupational History     Occupation: RN     Employer: [employer]   Tobacco Use     Smoking status: Never     Passive exposure: Never     Smokeless tobacco: Never     Tobacco comments:     tried in early 20s only    Vaping Use     Vaping status: Never Used   Substance and Sexual Activity     Alcohol use: Yes     Comment: rare     Drug use: No     Sexual activity: Not Currently     Birth control/protection: Post-menopausal     Comment:  - since for 20 years, no signficant other  > 5 years sexual activity    Other Topics Concern      Service No     Blood Transfusions No     Caffeine Concern No     Occupational Exposure Yes     Comment: Normal nursing exposures     Hobby Hazards No     Sleep Concern Yes     Stress Concern No     Comment: Stress level at HM=5, stress level at WK=6     Weight Concern Yes     Special Diet Yes     Comment: Diabetic diet (watching carbs)     Back Care No     Comment: Occassional back spasms     Exercise Yes     Comment: Pt joined a health club goes approx 3-4 times a week,half to one mile daily     Bike Helmet No     Seat Belt Yes     Comment: Sometimes     Self-Exams Yes     Parent/sibling w/ CABG, MI or angioplasty before 65F 55M? 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      Social Determinants of Health     Financial Resource Strain: Not on file   Food Insecurity: Not on file   Transportation Needs: Not on file   Physical Activity: Not on file   Stress: Not on file   Social Connections: Not on file   Intimate Partner Violence: Not on file   Housing Stability: Not on file     Family History   Problem Relation Age of Onset     Diabetes Maternal Grandmother         DM TYPE 2     Cerebrovascular Disease Maternal Grandmother      Hypertension Sister      Lipids Sister      Heart Disease Sister         Chronic AFib     Diabetes Sister      Coronary Artery  Disease Sister         afib     Hypertension Sister      Lipids Sister      Gynecology Sister      Diabetes Sister      Asthma Sister      Blood Disease Paternal Grandmother         T CELL LEUKEMIA     Hypertension Brother      Lipids Brother      Congenital Anomalies Brother      Cardiovascular Brother      Heart Disease Brother         CABG/AFIB     Coronary Artery Disease Brother         cabg afib AAA     Hypertension Brother      Lipids Brother      Other Cancer Brother         multple myeloma     Obesity Brother      Hypertension Brother      Respiratory Brother         Sleep apnea     Heart Disease Brother         AFib     Coronary Artery Disease Brother         afib     Alzheimer Disease Mother      Asthma Mother      Hypertension Mother      Breast Cancer Mother 40        has mastectomy and lived to 84     Allergies Mother         Sulfa,     Arthritis Mother      Cardiovascular Mother      Depression Mother      Respiratory Mother      Lipids Mother      Cancer Mother         breast     Thyroid Disease Mother      Cerebrovascular Disease Mother         FROM  AFIB     Dementia Mother         Alzheimers     Other Cancer Mother         breast     Cancer - colorectal Other      Colon Cancer Other         2019     Cancer Father         lung     Aneurysm Father         brother, AAA     Other Cancer Father         lung ca     Coronary Artery Disease Father         cad AAA     Asthma Sister      Cancer - colorectal Paternal Uncle         late 50s to early 60s - great uncles      Breast Cancer Other         Maternal cousin     Arrhythmia Sister         2 brothers 1 sister Afib     Breast Cancer Maternal Aunt 62     Other Cancer Maternal Aunt 35        Ovarian cancer.  Survived despite late recurrence and is now 92.     Glaucoma No family hx of      Macular Degeneration No family hx of                SUBJECTIVE FINDINGS:  68-year-old returns to clinic for ulcer, right first MPJ, diabetes, peripheral neuropathy and  foot deformities.  She relates it is still draining a little bit, but it is filling in.  She is using the Aquacel Ag and the Band-Aid and the Betadine.  She is wearing her diabetic shoes as much as she can, but if she wears her shoes too much, they are just uncomfortable.    OBJECTIVE FINDINGS:  DP and PT are 2/4, right.  She has a right plantar first MPJ hyperkeratotic eschar.  There is no active drainage, no erythema, no odor, no calor. Decreased hyperkeratosis.    ASSESSMENT AND PLAN:  Ulcer, right first MPJ. Diabetes with peripheral neuropathy and foot deformities.  Diagnosis and treatment options discussed with her.  Local wound care done upon consent today.  We cleaned this with Betadine.  I applied Aquacel Ag and a light dressing.  I am going to have her continue doing this daily.  I discussed with her again offloading options.  She does not want to do any further offloading options at this time.  She opted for no surgical referral at this time as well.  Previous notes reviewed.  Overall, this is improving.  Return to clinic and see me in 2 weeks.              Moderate level of medical decision making.    Again, thank you for allowing me to participate in the care of your patient.        Sincerely,        Rufus Hutson DPM

## 2023-04-17 ENCOUNTER — VIRTUAL VISIT (OUTPATIENT)
Dept: PHARMACY | Facility: CLINIC | Age: 68
End: 2023-04-17
Payer: COMMERCIAL

## 2023-04-17 DIAGNOSIS — I50.32 CHRONIC DIASTOLIC HEART FAILURE (H): ICD-10-CM

## 2023-04-17 DIAGNOSIS — E11.65 TYPE 2 DIABETES MELLITUS WITH HYPERGLYCEMIA, WITH LONG-TERM CURRENT USE OF INSULIN (H): ICD-10-CM

## 2023-04-17 DIAGNOSIS — E78.5 HYPERLIPIDEMIA LDL GOAL <100: ICD-10-CM

## 2023-04-17 DIAGNOSIS — I10 HYPERTENSION GOAL BP (BLOOD PRESSURE) < 140/90: ICD-10-CM

## 2023-04-17 DIAGNOSIS — E03.9 HYPOTHYROIDISM, UNSPECIFIED TYPE: ICD-10-CM

## 2023-04-17 DIAGNOSIS — I48.0 PAROXYSMAL ATRIAL FIBRILLATION (H): Primary | ICD-10-CM

## 2023-04-17 DIAGNOSIS — Z79.4 TYPE 2 DIABETES MELLITUS WITH HYPERGLYCEMIA, WITH LONG-TERM CURRENT USE OF INSULIN (H): ICD-10-CM

## 2023-04-17 PROCEDURE — 99605 MTMS BY PHARM NP 15 MIN: CPT | Performed by: PHARMACIST

## 2023-04-17 RX ORDER — AMIODARONE HYDROCHLORIDE 200 MG/1
200 TABLET ORAL DAILY
COMMUNITY
Start: 2023-03-08 | End: 2023-06-13

## 2023-04-17 NOTE — PATIENT INSTRUCTIONS
"Recommendations from today's MTM visit:                                                         No medication changes recommended today. Will send message to endocrinology to update  prescription    Follow-up: yearly or as needed    It was great speaking with you today.  I value your experience and would be very thankful for your time in providing feedback in our clinic survey. In the next few days, you may receive an email or text message from [facility] with a link to a survey related to your  clinical pharmacist.\"     To schedule another MTM appointment, please call the clinic directly or you may call the MTM scheduling line at [phone] or toll-free at [phone].     My Clinical Pharmacist's contact information:                                                      Please feel free to contact me with any questions or concerns you have.      Veronika Moore, Pharm.D, BCACP  Medication Therapy Management Pharmacist          "

## 2023-04-17 NOTE — LETTER
"Recommended To-Do List      Prepared on: Apr 17, 2023       You can get the best results from your medications by completing the items on this \"To-Do List.\"      Bring your To-Do List when you go to your doctor. And, share it with your family or caregivers.    My To-Do List:  What we talked about: What I should do:    What my medicines are for, how to know if my medicines are working, made sure my medicines are safe for me and reviewed how to take my medicines.     Take my medicines every day               "

## 2023-04-17 NOTE — LETTER
_  Medication List        Prepared on: Apr 17, 2023     Bring your Medication List when you go to the doctor, hospital, or   emergency room. And, share it with your family or caregivers.     Note any changes to how you take your medications.  Cross out medications when you no longer use them.    Medication How I take it Why I use it Prescriber   amiodarone (PACERONE) 200 MG tablet Take 200 mg by mouth daily  Atrial Fibrillation Roslyn Mccracken MD   diltiazem ER COATED BEADS (CARDIZEM CD) 360 MG 24 hr capsule Take 1 capsule (360 mg) by mouth daily Paroxysmal Atrial Fibrillation (H) Joel Aranda MD   econazole nitrate 1 % external cream Apply topically daily To feet and toenails. Onychomycosis Rufus Hutson DPM   furosemide (LASIX) 20 MG tablet Take 1 tablet (20 mg) by mouth daily Congestive heart failure, unspecified HF chronicity, unspecified heart failure type (H) Joel Aranda MD   Garlic 1000 MG CAPS Take 1 capsule by mouth daily Takes during summer months.  Done taking until next summer.  General Health  Over the counter   Glucagon (GVOKE HYPOPEN 1-PACK) 1 MG/0.2ML SOAJ Inject 1 mg Subcutaneous once as needed Administer the injection in the lower abdomen, outer thigh, or outer upperarm Type 2 diabetes mellitus treated with insulin (H) Rika Delgado MD   hydrochlorothiazide (HYDRODIURIL) 25 MG tablet Take 2 tablets (50 mg) by mouth daily Congestive heart failure, unspecified HF chronicity, unspecified heart failure type (H) Joel Aranda MD   ibuprofen (ADVIL/MOTRIN) 200 MG capsule Take 600 mg by mouth every 6 hours as needed   Pain Over the counter   insulin reg HIGH CONC (HUMULIN R U-500 KWIKPEN) 500 UNIT/ML PEN soln Administer 120 units at 7 in the morning, and 90 units at 3-4 pm Type 2 diabetes mellitus treated with insulin (H) Steph Taylor MD   levothyroxine (SYNTHROID/LEVOTHROID) 75 MCG tablet Take 1 tablet (75 mcg) by mouth daily Hypothyroidism due to acquired atrophy of  thyroid Rika Delgado MD   lisinopril (ZESTRIL) 20 MG tablet Take 1 tablet (20 mg) by mouth daily Essential hypertension with goal blood pressure less than 140/90 JIM Foss CNP   metFORMIN (GLUCOPHAGE XR) 500 MG 24 hr tablet Take 4 tablets (2,000 mg) by mouth At Bedtime TAKE 4 TABLETS AT BEDTIME  Diabetes Rika Delgado MD   metoprolol succinate ER (TOPROL XL) 200 MG 24 hr tablet Take 1 tablet (200 mg) by mouth daily Dose increase Paroxysmal Atrial Fibrillation (H) Roslyn Mccracken MD   Multiple Vitamins-Minerals (ADVANCED DIABETIC MULTIVITAMIN) TABS Take 1 tablet by mouth daily  General Health  Over the counter   potassium chloride ER (KLOR-CON M) 20 MEQ CR tablet Take 1 tablet (20 mEq) by mouth daily SOB (Shortness of Breath) Joel Aranda MD   rivaroxaban ANTICOAGULANT (XARELTO ANTICOAGULANT) 20 MG TABS tablet Take 1 tablet (20 mg) by mouth daily (with dinner) Paroxysmal Atrial Fibrillation (H) Joel Aranda MD   rosuvastatin (CRESTOR) 10 MG tablet Take 1 tablet (10 mg) by mouth daily Hyperlipidemia LDL Goal <100 Joel Aranda MD   Semaglutide, 2 MG/DOSE, 8 MG/3ML SOPN Inject 2 mg Subcutaneous once a week Type 2 diabetes mellitus treated with insulin (H); Morbid Obesity (H) Rika Delgado MD         Add new medications, over-the-counter drugs, herbals, vitamins, or  minerals in the blank rows below.    Medication How I take it Why I use it Prescriber                                      Allergies:      empagliflozin; lipitor [atorvastatin calcium]; pravachol [pravastatin sodium]; simvastatin        Side effects I have had:               Other Information:              My notes and questions:

## 2023-04-17 NOTE — PROGRESS NOTES
Medication Therapy Management (MTM) Encounter    ASSESSMENT:                            Medication Adherence/Access: No issues identified    Type 2 Diabetes: Patient is not meeting A1c goal of < 7%.    Hypothyroidism: Stable. Last TSH is within normal limits.     Hyperlipidemia: Stable.    Hypertension/Atrial Fibrillation/HFpEF: Stable. Meeting blood pressure <140/90mmHg.    PLAN:                            1. No medication changes recommended today. Will send message to endocrinology to update directions on prescription for U500.    Follow-up: yearly or as needed    SUBJECTIVE/OBJECTIVE:                          Sherry Slaughter is a 68 year old female called for an initial visit. She was referred to me from her insurance plan.      Reason for visit: Medication Review. Question about insulin-has increased her dose of insulin and now is running out sooner and not covered by insurance.    Allergies/ADRs: Reviewed in chart  Past Medical History: Reviewed in chart  Tobacco: She reports that she has never smoked. She has never been exposed to tobacco smoke. She has never used smokeless tobacco.  Alcohol: rarely  Other Substance Use: No  Caffeine: None  Activity: None, has sore on her foot that is not healing, arthritis    Medication Adherence/Access: Patient uses pill box(es).  Patient takes medications 0 time(s) per day.   The patient fills medications at [facility]: YES.    Type 2 Diabetes:  Currently taking R-U 500 130-140 units in the morning and 120 units in the afternoon (if above 200 will take 140 units, increased her dose back in January), metformin XR 2000mg daily, Ozempic 2mg weekly. Patient is not experiencing side effects.  Has not noticed weight loss on Ozempic.  Concerned she cannot treat her high blood sugars with U500.  Blood sugar monitoring: Continuous Glucose Monitor.  Has been running in the 200's so she increased her insulin doses.   Was not able to locate patient in the [employer] Portal  for more detailed information.  Symptoms of low blood sugar? Will wake up in the middle of the night, just knows when she is low  Symptoms of high blood sugar? fatigue  Eye exam: up to date  Foot exam: up to date  Diet/Exercise: Has been trying to stick to a keto diet. Was strict on keto but now has backed off it. Has stopped drinking soda. Mostly eats protein and vegetables. Does like a potato every now and then. Knows her diet is not perfect but still feels like she has been doing well with her carb intake.  Aspirin: Not taking due to Xarelto  Statin: Yes: Rosuvastatin.   ACEi/ARB: Yes: Lisinopril.   Urine Albumin:   Lab Results   Component Value Date    UMALCR 10.26 11/28/2022      Lab Results   Component Value Date    A1C 7.0 11/28/2022    A1C 7.1 05/25/2022    A1C 8.1 11/24/2021    A1C 9.0 08/16/2021    A1C 6.8 01/05/2021    A1C 6.9 11/25/2020    A1C 7.5 07/22/2020    A1C 7.3 12/10/2019     Has a follow up with endocrinology in June.     Hypothyroidism: Patient is taking Levothyroxine 75 mcg daily. Patient is having the following symptoms: none.   TSH   Date Value Ref Range Status   11/28/2022 2.22 0.40 - 4.00 mU/L Final   01/05/2021 2.38 0.40 - 4.00 mU/L Final     Hyperlipidemia: Current therapy includes rosuvastatin 10mg daily.  Patient reports no significant myalgias or other side effects.  The ASCVD Risk score (Clarence DK, et al., 2019) failed to calculate for the following reasons:    The valid total cholesterol range is 130 to 320 mg/dL  Recent Labs   Lab Test 11/28/22  0904 07/26/22  0820 03/16/16  0809 02/19/15  0959   CHOL 122 109   < > 142   HDL 41* 39*   < > 45*   LDL 53 30   < > 63   TRIG 142 199*   < > 172*   CHOLHDLRATIO  --   --   --  3.2    < > = values in this interval not displayed.     Hypertension/Atrial Fibrillation/HFpEF: current medications include Xarelto 20mg daily, potassium chloride 20mEq daily, metoprolol XL 200mg daily, lisinopril 20mg daily, hydrochlorothiazide 50mg daily,  furosemide 20mg daily, diltiazem CD 360mg daily, amiodarone 200mg daily.  Knows when she is in atrial fibrillation. Amiodarone dose was decreased recently. Sotalol was not effective. Will be having PFTs at next follow up. Patient does self-monitor blood pressure.   BP Readings from Last 3 Encounters:   03/28/23 112/64   02/28/23 111/62   01/31/23 119/72     Today's Vitals: LMP 10/02/2007   ----------------    I spent 44 minutes with this patient today. All changes were made via collaborative practice agreement with Marilou Arciniega. A copy of the visit note was provided to the patient's provider(s).    A summary of these recommendations was sent via [employer].    Veronika Moore, Pharm.D, [facility]  Medication Therapy Management Pharmacist    Telemedicine Visit Details  Type of service:  Telephone visit  Start Time: 3:10PM  End Time: 3:45 PM     Medication Therapy Recommendations  No medication therapy recommendations to display

## 2023-04-17 NOTE — LETTER
April 17, 2023  Sherry Slaughter  [address]  [place] [address]    Dear Ms. Slaughter, [facility] [place]     Thank you for talking with me on Apr 17, 2023 about your health and medications. As a follow-up to our conversation, I have included two documents:      1. Your Recommended To-Do List has steps you should take to get the best results from your medications.  2. Your Medication List will help you keep track of your medications and how to take them.    If you want to talk about these documents, please call Veronika Moore RPH at phone: [phone], Monday-Friday 8-4:30pm.    I look forward to working with you and your doctors to make sure your medications work well for you.    Sincerely,  Veronika Moore RPH  [facility] Pharmacist, [facility]

## 2023-04-19 ENCOUNTER — TELEPHONE (OUTPATIENT)
Dept: ENDOCRINOLOGY | Facility: CLINIC | Age: 68
End: 2023-04-19
Payer: COMMERCIAL

## 2023-04-19 DIAGNOSIS — E11.9 TYPE 2 DIABETES MELLITUS TREATED WITH INSULIN (H): ICD-10-CM

## 2023-04-19 DIAGNOSIS — Z79.4 TYPE 2 DIABETES MELLITUS TREATED WITH INSULIN (H): ICD-10-CM

## 2023-04-19 RX ORDER — INSULIN HUMAN 500 [IU]/ML
INJECTION, SOLUTION SUBCUTANEOUS
Qty: 40 ML | Refills: 1 | Status: SHIPPED | OUTPATIENT
Start: 2023-04-19 | End: 2023-06-23

## 2023-04-19 NOTE — TELEPHONE ENCOUNTER
Patient was able to get in with Millicent Rocha this Friday, 4/21/23.      Kylah Katz RN  Endocrine Care Coordinator  [facility]

## 2023-04-19 NOTE — TELEPHONE ENCOUNTER
Writer received the following message below from Melodie Moore RPH:    ----- Message from Veronika Moore RPH sent at 4/17/2023  5:53 PM CDT -----  Regarding:  rx  Hi Kylah,  I'm not sure if Dr. Delgado is in so I am sending you this message. Sherry increased her  dose to 130-140units in the morning and 120 units in the afternoon and is requesting a new prescription for this dose. Would you be able to get a new prescription sent for her? Also, she has some concerns about what to do when her blood sugars are running high since she can't treat them with her U500. She does have a visit coming up in June but sounds like she may need someone to reach out to her to address?    Thanks Kylah,  Let me know if you need anything from me.  Melodie        Please also refer back to Melodie's 4/17/23 progress note for further details .        Dr. Delgado is out of the office until end of June. Will send updated refill on U-500 insulin to covering provider for review/signature. Will also send note to [facility] to reach out to patient to to review and/or schedule appointment in regards to patient's blood sugar concerns. Will also try and get patient in sooner with Millicent Rocha.        Kylah Katz, RN  Endocrine Care Coordinator  [facility] [place]

## 2023-04-20 ASSESSMENT — ENCOUNTER SYMPTOMS
BACK PAIN: 1
ARTHRALGIAS: 1

## 2023-04-21 ENCOUNTER — OFFICE VISIT (OUTPATIENT)
Dept: ENDOCRINOLOGY | Facility: CLINIC | Age: 68
End: 2023-04-21
Payer: COMMERCIAL

## 2023-04-21 VITALS
DIASTOLIC BLOOD PRESSURE: 73 MMHG | OXYGEN SATURATION: 97 % | WEIGHT: 293 LBS | RESPIRATION RATE: 18 BRPM | SYSTOLIC BLOOD PRESSURE: 132 MMHG | HEART RATE: 69 BPM | BODY MASS INDEX: 45.95 KG/M2

## 2023-04-21 DIAGNOSIS — Z79.4 UNCONTROLLED TYPE 2 DIABETES MELLITUS WITH HYPERGLYCEMIA, WITH LONG-TERM CURRENT USE OF INSULIN (H): Primary | ICD-10-CM

## 2023-04-21 DIAGNOSIS — N18.31 STAGE 3A CHRONIC KIDNEY DISEASE (H): ICD-10-CM

## 2023-04-21 DIAGNOSIS — Z79.4 TYPE 2 DIABETES MELLITUS TREATED WITH INSULIN (H): ICD-10-CM

## 2023-04-21 DIAGNOSIS — E11.9 TYPE 2 DIABETES MELLITUS TREATED WITH INSULIN (H): ICD-10-CM

## 2023-04-21 DIAGNOSIS — E66.01 MORBID OBESITY (H): ICD-10-CM

## 2023-04-21 DIAGNOSIS — E11.65 UNCONTROLLED TYPE 2 DIABETES MELLITUS WITH HYPERGLYCEMIA, WITH LONG-TERM CURRENT USE OF INSULIN (H): Primary | ICD-10-CM

## 2023-04-21 PROCEDURE — 99214 OFFICE O/P EST MOD 30 MIN: CPT | Performed by: PHYSICIAN ASSISTANT

## 2023-04-21 ASSESSMENT — PAIN SCALES - GENERAL: PAINLEVEL: MODERATE PAIN (4)

## 2023-04-21 NOTE — LETTER
4/21/2023         RE: Sherry Slaugther  [address]  [place] [address]        Dear Colleague,    Thank you for referring your patient, Sherry Slaughter, to the [facility]. Please see a copy of my visit note below.    Assessment/Plan :   1. Type 2 DM, uncontrolled. We had a long discuss regarding Sherry's current medications. She is still struggling with the use of U500. She doesn't feel like there is anything that she can do when her blood sugar spikes. We discussed the importance of not letting her blood sugar spike. We also discussed the impact of Ozempic. She just started on the increased dose and she has already lost a few pounds. I encouraged her to keep up the good work with her diet and to stick with the Ozempic 2 mg dose.    We also discussed options regarding the U500. We are going to try switching to 3x daily dosing instead of 2x daily. Instead of taking 140 units am and 120 units pm, she is going to take 100 units with breakfast and 80 units with lunch and dinner. She will continue using the Dexcom sensor and if she has any problems with the change, she can contact our office.    We will follow-up in 2 mos.      I have independently reviewed and interpreted labs, imaging as indicated.      Chief complaint:  Sherry is a 68 year old female seen who returns for follow-up of Type 2 DM.    I have reviewed Care Everywhere including [facility], [facility], [facility], [facility], [facility], [facility] , [facility], [facility] lab reports, imaging reports and provider notes as indicated.      HISTORY OF PRESENT ILLNESS  Sherry has had diabetes for over 20 yrs. She was originally given oral medications to manage her blood sugars but was eventually started on insulin around 2012. She has a large amount of insulin resistance and she was transitioned to U500 insulin in  2019. Her diabetes is complicated by GENESIS, obesity, HTN, hyperlipidemia,  osteoarthritis, and atrial fibrillation.    She remains on U500 along with Ozempic 2 mg weekly. She has been frustrated by the occasional post-prandial spikes. She doesn't feel like there is anything that she can do when her blood sugar spikes to 300 mg/dl. She admits that she does cheat on her diet and these spikes often occur after eating ice cream. However, she feels horrible when her blood sugars are that elevated. She is also worried about the risk of hypoglycemia at night. She feels like she is already taking a large amount of U500 before bed and she is worried about increasing the dose.    Endocrine relevant labs are as follows:    PCOT hemoglobin A1C 7.8%    REVIEW OF SYSTEMS  Answers for HPI/ROS submitted by the patient on 4/20/2023  General Symptoms: No  Skin Symptoms: No  HENT Symptoms: No  EYE SYMPTOMS: No  HEART SYMPTOMS: No  LUNG SYMPTOMS: No  INTESTINAL SYMPTOMS: No  URINARY SYMPTOMS: No  GYNECOLOGIC SYMPTOMS: No  BREAST SYMPTOMS: No  SKELETAL SYMPTOMS: Yes  BLOOD SYMPTOMS: No  NERVOUS SYSTEM SYMPTOMS: No  MENTAL HEALTH SYMPTOMS: No  Back pain: Yes  Joint pain: Yes    Endocrine: positive for thyroid disorder and diabetes  Skin: negative  Eyes: positive for visual blurring  Ears/Nose/Throat: negative  Respiratory: No shortness of breath, dyspnea on exertion, cough, or hemoptysis  Cardiovascular: positive for palpitations, negative for, chest pain and dyspnea on exertion  Gastrointestinal: negative for, nausea, vomiting, constipation and diarrhea  Genitourinary: negative for, nocturia, dysuria, frequency and urgency  Musculoskeletal: positive for arthritis, joint stiffness and nocturnal cramping  Neurologic: negative for, numbness or tingling of feet and tremor  Psychiatric: negative  Hematologic/Lymphatic/Immunologic: negative    Past Medical History  Past Medical History:   Diagnosis Date     Cataract      Congestive heart failure (H) 2019 NOVEMBER    AFIB     DEPRESSION     comes and goes - tried  meds - unsuccessfully, certain times of the year, no psych intervention and no counselors in the past - and not interested      Diverticulosis of colon (without mention of hemorrhage) 4/04    colonoscopy     ECHO-mildLVH,tr MR,mild thick lflets w inc LA,trTR   12/03     Essential hypertension, benign 1990s    late 1990s - started medications at that time - not to difficult to control meds      Fam hx-cardiovas dis NEC     father - CAD, and lipids/HTN - multiple members of the family      Family history of diabetes mellitus     sister and grandmother with DM      Family history of malignant neoplasm of breast     mother - young age - 45, and maternal cousin and aunt as well - no BRCA testing done      Family history of stroke (cerebrovascular)     grandmother in early life in her 40s      FAMILY HX COLON CANCER     Pat uncles x 2     Heart disease     murmur/     Heart murmur      HYPERLIPIDEMIA 2000    fairly recent - in the last 5 years - high for DM levels  -cholesterol recent      Irritable bowel syndrome     goes between the 2 - nerve related - more stressed more problems      MICROALBUMINURIA     unsure how long - been on the lisinopril - for a few years at well      Nonspecific abnormal results of liver function study 2/3/2003    SGOT - has been high in the past - since the hepatitis b - borderline elevation - not really changing any      OBESITY      Obstructive sleep apnea      GENESIS (obstructive sleep apnea) 10/15/2006    psg 5/15 AHI 53 aPAP 8-15     OSTEOARTHRITIS L KNEE 2003    total knee on the left - and pain is gone since the knee replacement      PERS HX HEPATITIS B- RESOLVED] 1977    acute virus only - no chronic disease      PERS HX HEPATITIS B- RESOLVED]      Type II or unspecified type diabetes mellitus without mention of complication, not stated as uncontrolled 1991    oral meds frist, then insulin eventually later, difficult to control most of the time      Unspecified hypothyroidism 10/11/2006     TSH 3.36 - mild subclinical hypothyroidism - deciding on following or starting low dose meds - with dr Oreilly - following        Medications  Current Outpatient Medications   Medication Sig Dispense Refill     amiodarone (PACERONE) 200 MG tablet Take 200 mg by mouth daily       blood glucose (NO BRAND SPECIFIED) test strip Use to test blood sugar 2 times daily or as directed. Patient requesting One Touch Ultra Strips 100 strip 3     Continuous Blood Gluc  (DEXCOM G6 ) KATIA USE TO READ BLOOD SUGARS AS PER 'S INSTRUCTIONS. 1 each 0     Continuous Blood Gluc Sensor (DEXCOM G6 SENSOR) MISC Change every 10 days. 3 each 2     Continuous Blood Gluc Transmit (DEXCOM G6 TRANSMITTER) MISC Change every 3 months. 1 each 3     diltiazem ER COATED BEADS (CARDIZEM CD) 360 MG 24 hr capsule Take 1 capsule (360 mg) by mouth daily 90 capsule 3     econazole nitrate 1 % external cream Apply topically daily To feet and toenails. 85 g 5     furosemide (LASIX) 20 MG tablet Take 1 tablet (20 mg) by mouth daily 90 tablet 3     Garlic 1000 MG CAPS Take 1 capsule by mouth daily Takes during summer months.  Done taking until next summer.       Glucagon (GVOKE HYPOPEN 1-PACK) 1 MG/0.2ML SOAJ Inject 1 mg Subcutaneous once as needed Administer the injection in the lower abdomen, outer thigh, or outer upperarm 0.2 mL 3     hydrochlorothiazide (HYDRODIURIL) 25 MG tablet Take 2 tablets (50 mg) by mouth daily 180 tablet 3     ibuprofen (ADVIL/MOTRIN) 200 MG capsule Take 600 mg by mouth every 6 hours as needed        insulin pen needle (GNP CLICKFINE PEN NEEDLES) 31G X 8 MM miscellaneous Uses 3 times daily or as directed 300 each 3     insulin reg HIGH CONC (HUMULIN R U-500 KWIKPEN) 500 UNIT/ML PEN soln Administer 130-140 units at 7 in the morning, and 120 units at 3-4 pm 40 mL 1     levothyroxine (SYNTHROID/LEVOTHROID) 75 MCG tablet Take 1 tablet (75 mcg) by mouth daily 90 tablet 3     lisinopril (ZESTRIL) 20 MG tablet  Take 1 tablet (20 mg) by mouth daily 90 tablet 3     metFORMIN (GLUCOPHAGE XR) 500 MG 24 hr tablet Take 4 tablets (2,000 mg) by mouth At Bedtime TAKE 4 TABLETS AT BEDTIME 360 tablet 3     metoprolol succinate ER (TOPROL XL) 200 MG 24 hr tablet Take 1 tablet (200 mg) by mouth daily Dose increase 180 tablet 3     Multiple Vitamins-Minerals (ADVANCED DIABETIC MULTIVITAMIN) TABS Take 1 tablet by mouth daily       ORDER FOR DME, SET TO LOCAL PRINT, Respironics REMSTAR 60 Series Auto CPAP 8-15cm H2O, Airfit P10 nasal pillow mask w/medium pillows       order for DME Equipment being ordered: [id number] $70   Shoe LG 1 Device 0     potassium chloride ER (KLOR-CON M) 20 MEQ CR tablet Take 1 tablet (20 mEq) by mouth daily 90 tablet 3     rivaroxaban ANTICOAGULANT (XARELTO ANTICOAGULANT) 20 MG TABS tablet Take 1 tablet (20 mg) by mouth daily (with dinner) 90 tablet 3     rosuvastatin (CRESTOR) 10 MG tablet Take 1 tablet (10 mg) by mouth daily 90 tablet 3     Semaglutide, 2 MG/DOSE, 8 MG/3ML SOPN Inject 2 mg Subcutaneous once a week 9 mL 3       Allergies  Allergies   Allergen Reactions     Empagliflozin      Yeast infections     Lipitor [Atorvastatin Calcium]      Gas     Pravachol [Pravastatin Sodium]      Pravachol - dry cough      Simvastatin      Myalgia         Family History  family history includes Allergies in her mother; Alzheimer Disease in her mother; Aneurysm in her father; Arrhythmia in her sister; Arthritis in her mother; Asthma in her mother, sister, and sister; Blood Disease in her paternal grandmother; Breast Cancer in an other family member; Breast Cancer (age of onset: 40) in her mother; Breast Cancer (age of onset: 62) in her maternal aunt; Cancer in her father and mother; Cancer - colorectal in her paternal uncle and another family member; Cardiovascular in her brother and mother; Cerebrovascular Disease in her maternal grandmother and mother; Colon Cancer in an other family member; Congenital  Anomalies in her brother; Coronary Artery Disease in her brother, brother, father, and sister; Dementia in her mother; Depression in her mother; Diabetes in her maternal grandmother, sister, and sister; Gynecology in her sister; Heart Disease in her brother, brother, and sister; Hypertension in her brother, brother, brother, mother, sister, and sister; Lipids in her brother, brother, mother, sister, and sister; Obesity in her brother; Other Cancer in her brother, father, and mother; Other Cancer (age of onset: 35) in her maternal aunt; Respiratory in her brother and mother; Thyroid Disease in her mother.    Social History  Social History     Tobacco Use     Smoking status: Never     Passive exposure: Never     Smokeless tobacco: Never     Tobacco comments:     tried in early 20s only    Vaping Use     Vaping status: Never Used   Substance Use Topics     Alcohol use: Yes     Comment: rare     Drug use: No       Physical Exam  /73 (BP Location: Left arm, Patient Position: Sitting, Cuff Size: Adult Large)   Pulse 69   Resp 18   Wt 133.6 kg (294 lb 8 oz)   LMP 10/02/2007   SpO2 97%   BMI 45.95 kg/m    Body mass index is 45.95 kg/m .  GENERAL :  In no apparent distress  SKIN: Normal color, normal temperature, texture.  No hirsutism, alopecia or purple striae.     EYES: PERRL, EOMI, No scleral icterus,  No proptosis, conjunctival redness, stare, retraction  MOUTH: Moist, pink; pharynx clear  NECK: No visible masses. No palpable adenopathy, or masses. No carotid bruits.   THYROID:  Normal, nontender, smooth / firm texture,  no nodules, no Bruit   RESP: Lungs clear to auscultation bilaterally  CARDIAC: Regular rate and rhythm, normal S1 S2, without murmurs, rubs or gallops    NEURO: awake, alert, responds appropriately to questions.  Cranial nerves intact.   Moves all extremities; Gait normal.  No tremor of the outstretched hand.    EXTREMITIES: No clubbing, cyanosis or edema.    DATA REVIEW    Dexcom  Clarity  Time in target range 38%  <1% very low, <1% low, 47% high, 14% very high  Current Ave  mg/dl        Again, thank you for allowing me to participate in the care of your patient.        Sincerely,        Millicent Rocha PA-C

## 2023-04-21 NOTE — NURSING NOTE
Sherry Slaughter's goals for this visit include: glucose control  She requests these members of her care team be copied on today's visit information: YES    PCP: Marilou Arciniega    Referring Provider:  Marilou Arciniega MD PhD   [address]  [place]  [place] [address]    /73 (BP Location: Left arm, Patient Position: Sitting, Cuff Size: Adult Large)   Pulse 69   Resp 18   Wt 133.6 kg (294 lb 8 oz)   LMP 10/02/2007   SpO2 97%   BMI 45.95 kg/m      Do you need any medication refills at today's visit? NONE    Sj Short, EMT

## 2023-04-21 NOTE — PROGRESS NOTES
Assessment/Plan :   1. Type 2 DM, uncontrolled. We had a long discuss regarding Sherry's current medications. She is still struggling with the use of U500. She doesn't feel like there is anything that she can do when her blood sugar spikes. We discussed the importance of not letting her blood sugar spike. We also discussed the impact of Ozempic. She just started on the increased dose and she has already lost a few pounds. I encouraged her to keep up the good work with her diet and to stick with the Ozempic 2 mg dose.    We also discussed options regarding the U500. We are going to try switching to 3x daily dosing instead of 2x daily. Instead of taking 140 units am and 120 units pm, she is going to take 100 units with breakfast and 80 units with lunch and dinner. She will continue using the Dexcom sensor and if she has any problems with the change, she can contact our office.    We will follow-up in 2 mos.      I have independently reviewed and interpreted labs, imaging as indicated.      Chief complaint:  Sherry is a 68 year old female seen who returns for follow-up of Type 2 DM.    I have reviewed Care Everywhere including [facility], [facility], [facility], [place], [facility], [facility] , [facility], [place] lab reports, imaging reports and provider notes as indicated.      HISTORY OF PRESENT ILLNESS  Sherry has had diabetes for over 20 yrs. She was originally given oral medications to manage her blood sugars but was eventually started on insulin around 2012. She has a large amount of insulin resistance and she was transitioned to U500 insulin in  2019. Her diabetes is complicated by GENESIS, obesity, HTN, hyperlipidemia, osteoarthritis, and atrial fibrillation.    She remains on U500 along with Ozempic 2 mg weekly. She has been frustrated by the occasional post-prandial spikes. She doesn't feel like there is anything that she can do when her blood sugar spikes to 300 mg/dl. She admits that she  does cheat on her diet and these spikes often occur after eating ice cream. However, she feels horrible when her blood sugars are that elevated. She is also worried about the risk of hypoglycemia at night. She feels like she is already taking a large amount of U500 before bed and she is worried about increasing the dose.    Endocrine relevant labs are as follows:    PCOT hemoglobin A1C 7.8%    REVIEW OF SYSTEMS  Answers for HPI/ROS submitted by the patient on 4/20/2023  General Symptoms: No  Skin Symptoms: No  HENT Symptoms: No  EYE SYMPTOMS: No  HEART SYMPTOMS: No  LUNG SYMPTOMS: No  INTESTINAL SYMPTOMS: No  URINARY SYMPTOMS: No  GYNECOLOGIC SYMPTOMS: No  BREAST SYMPTOMS: No  SKELETAL SYMPTOMS: Yes  BLOOD SYMPTOMS: No  NERVOUS SYSTEM SYMPTOMS: No  MENTAL HEALTH SYMPTOMS: No  Back pain: Yes  Joint pain: Yes    Endocrine: positive for thyroid disorder and diabetes  Skin: negative  Eyes: positive for visual blurring  Ears/Nose/Throat: negative  Respiratory: No shortness of breath, dyspnea on exertion, cough, or hemoptysis  Cardiovascular: positive for palpitations, negative for, chest pain and dyspnea on exertion  Gastrointestinal: negative for, nausea, vomiting, constipation and diarrhea  Genitourinary: negative for, nocturia, dysuria, frequency and urgency  Musculoskeletal: positive for arthritis, joint stiffness and nocturnal cramping  Neurologic: negative for, numbness or tingling of feet and tremor  Psychiatric: negative  Hematologic/Lymphatic/Immunologic: negative    Past Medical History  Past Medical History:   Diagnosis Date     Cataract      Congestive heart failure (H) 2019 NOVEMBER    AFIB     DEPRESSION     comes and goes - tried meds - unsuccessfully, certain times of the year, no psych intervention and no counselors in the past - and not interested      Diverticulosis of colon (without mention of hemorrhage) 4/04    colonoscopy     ECHO-mildLVH,tr MR,mild thick lflets w inc LA,trTR   12/03     Essential  hypertension, benign 1990s    late 1990s - started medications at that time - not to difficult to control meds      Fam hx-cardiovas dis NEC     father - CAD, and lipids/HTN - multiple members of the family      Family history of diabetes mellitus     sister and grandmother with DM      Family history of malignant neoplasm of breast     mother - young age - 45, and maternal cousin and aunt as well - no BRCA testing done      Family history of stroke (cerebrovascular)     grandmother in early life in her 40s      FAMILY HX COLON CANCER     Pat uncles x 2     Heart disease     murmur/     Heart murmur      HYPERLIPIDEMIA 2000    fairly recent - in the last 5 years - high for DM levels  -cholesterol recent      Irritable bowel syndrome     goes between the 2 - nerve related - more stressed more problems      MICROALBUMINURIA     unsure how long - been on the lisinopril - for a few years at well      Nonspecific abnormal results of liver function study 2/3/2003    SGOT - has been high in the past - since the hepatitis b - borderline elevation - not really changing any      OBESITY      Obstructive sleep apnea      GENESIS (obstructive sleep apnea) 10/15/2006    psg 5/15 AHI 53 aPAP 8-15     OSTEOARTHRITIS L KNEE 2003    total knee on the left - and pain is gone since the knee replacement      PERS HX HEPATITIS B- RESOLVED] 1977    acute virus only - no chronic disease      PERS HX HEPATITIS B- RESOLVED]      Type II or unspecified type diabetes mellitus without mention of complication, not stated as uncontrolled 1991    oral meds frist, then insulin eventually later, difficult to control most of the time      Unspecified hypothyroidism 10/11/2006    TSH 3.36 - mild subclinical hypothyroidism - deciding on following or starting low dose meds - with dr Oreilly - following        Medications  Current Outpatient Medications   Medication Sig Dispense Refill     amiodarone (PACERONE) 200 MG tablet Take 200 mg by mouth daily        blood glucose (NO BRAND SPECIFIED) test strip Use to test blood sugar 2 times daily or as directed. Patient requesting One Touch Ultra Strips 100 strip 3     Continuous Blood Gluc  (DEXCOM G6 ) KATIA USE TO READ BLOOD SUGARS AS PER 'S INSTRUCTIONS. 1 each 0     Continuous Blood Gluc Sensor (DEXCOM G6 SENSOR) MISC Change every 10 days. 3 each 2     Continuous Blood Gluc Transmit (DEXCOM G6 TRANSMITTER) MISC Change every 3 months. 1 each 3     diltiazem ER COATED BEADS (CARDIZEM CD) 360 MG 24 hr capsule Take 1 capsule (360 mg) by mouth daily 90 capsule 3     econazole nitrate 1 % external cream Apply topically daily To feet and toenails. 85 g 5     furosemide (LASIX) 20 MG tablet Take 1 tablet (20 mg) by mouth daily 90 tablet 3     Garlic 1000 MG CAPS Take 1 capsule by mouth daily Takes during summer months.  Done taking until next summer.       Glucagon (GVOKE HYPOPEN 1-PACK) 1 MG/0.2ML SOAJ Inject 1 mg Subcutaneous once as needed Administer the injection in the lower abdomen, outer thigh, or outer upperarm 0.2 mL 3     hydrochlorothiazide (HYDRODIURIL) 25 MG tablet Take 2 tablets (50 mg) by mouth daily 180 tablet 3     ibuprofen (ADVIL/MOTRIN) 200 MG capsule Take 600 mg by mouth every 6 hours as needed        insulin pen needle (GNP CLICKFINE PEN NEEDLES) 31G X 8 MM miscellaneous Uses 3 times daily or as directed 300 each 3     insulin reg HIGH CONC (HUMULIN R U-500 KWIKPEN) 500 UNIT/ML PEN soln Administer 130-140 units at 7 in the morning, and 120 units at 3-4 pm 40 mL 1     levothyroxine (SYNTHROID/LEVOTHROID) 75 MCG tablet Take 1 tablet (75 mcg) by mouth daily 90 tablet 3     lisinopril (ZESTRIL) 20 MG tablet Take 1 tablet (20 mg) by mouth daily 90 tablet 3     metFORMIN (GLUCOPHAGE XR) 500 MG 24 hr tablet Take 4 tablets (2,000 mg) by mouth At Bedtime TAKE 4 TABLETS AT BEDTIME 360 tablet 3     metoprolol succinate ER (TOPROL XL) 200 MG 24 hr tablet Take 1 tablet (200 mg) by mouth daily  Dose increase 180 tablet 3     Multiple Vitamins-Minerals (ADVANCED DIABETIC MULTIVITAMIN) TABS Take 1 tablet by mouth daily       ORDER FOR DME, SET TO LOCAL PRINT, Respironics REMSTAR 60 Series Auto CPAP 8-15cm H2O, Airfit P10 nasal pillow mask w/medium pillows       order for DME Equipment being ordered: [id number] $70   Shoe LG 1 Device 0     potassium chloride ER (KLOR-CON M) 20 MEQ CR tablet Take 1 tablet (20 mEq) by mouth daily 90 tablet 3     rivaroxaban ANTICOAGULANT (XARELTO ANTICOAGULANT) 20 MG TABS tablet Take 1 tablet (20 mg) by mouth daily (with dinner) 90 tablet 3     rosuvastatin (CRESTOR) 10 MG tablet Take 1 tablet (10 mg) by mouth daily 90 tablet 3     Semaglutide, 2 MG/DOSE, 8 MG/3ML SOPN Inject 2 mg Subcutaneous once a week 9 mL 3       Allergies  Allergies   Allergen Reactions     Empagliflozin      Yeast infections     Lipitor [Atorvastatin Calcium]      Gas     Pravachol [Pravastatin Sodium]      Pravachol - dry cough      Simvastatin      Myalgia         Family History  family history includes Allergies in her mother; Alzheimer Disease in her mother; Aneurysm in her father; Arrhythmia in her sister; Arthritis in her mother; Asthma in her mother, sister, and sister; Blood Disease in her paternal grandmother; Breast Cancer in an other family member; Breast Cancer (age of onset: 40) in her mother; Breast Cancer (age of onset: 62) in her maternal aunt; Cancer in her father and mother; Cancer - colorectal in her paternal uncle and another family member; Cardiovascular in her brother and mother; Cerebrovascular Disease in her maternal grandmother and mother; Colon Cancer in an other family member; Congenital Anomalies in her brother; Coronary Artery Disease in her brother, brother, father, and sister; Dementia in her mother; Depression in her mother; Diabetes in her maternal grandmother, sister, and sister; Gynecology in her sister; Heart Disease in her brother, brother, and sister;  Hypertension in her brother, brother, brother, mother, sister, and sister; Lipids in her brother, brother, mother, sister, and sister; Obesity in her brother; Other Cancer in her brother, father, and mother; Other Cancer (age of onset: 35) in her maternal aunt; Respiratory in her brother and mother; Thyroid Disease in her mother.    Social History  Social History     Tobacco Use     Smoking status: Never     Passive exposure: Never     Smokeless tobacco: Never     Tobacco comments:     tried in early 20s only    Vaping Use     Vaping status: Never Used   Substance Use Topics     Alcohol use: Yes     Comment: rare     Drug use: No       Physical Exam  /73 (BP Location: Left arm, Patient Position: Sitting, Cuff Size: Adult Large)   Pulse 69   Resp 18   Wt 133.6 kg (294 lb 8 oz)   LMP 10/02/2007   SpO2 97%   BMI 45.95 kg/m    Body mass index is 45.95 kg/m .  GENERAL :  In no apparent distress  SKIN: Normal color, normal temperature, texture.  No hirsutism, alopecia or purple striae.     EYES: PERRL, EOMI, No scleral icterus,  No proptosis, conjunctival redness, stare, retraction  MOUTH: Moist, pink; pharynx clear  NECK: No visible masses. No palpable adenopathy, or masses. No carotid bruits.   THYROID:  Normal, nontender, smooth / firm texture,  no nodules, no Bruit   RESP: Lungs clear to auscultation bilaterally  CARDIAC: Regular rate and rhythm, normal S1 S2, without murmurs, rubs or gallops    NEURO: awake, alert, responds appropriately to questions.  Cranial nerves intact.   Moves all extremities; Gait normal.  No tremor of the outstretched hand.    EXTREMITIES: No clubbing, cyanosis or edema.    DATA REVIEW    Dexcom Clarity  Time in target range 38%  <1% very low, <1% low, 47% high, 14% very high  Current Ave  mg/dl

## 2023-04-21 NOTE — PATIENT INSTRUCTIONS
Let's try adjusting the U-500 to 3x daily dosing.    Start with 100 units with breakfast and then 80 units with lunch and dinner.     Please contact me if you have any problems.

## 2023-04-25 ENCOUNTER — OFFICE VISIT (OUTPATIENT)
Dept: PODIATRY | Facility: CLINIC | Age: 68
End: 2023-04-25
Payer: COMMERCIAL

## 2023-04-25 DIAGNOSIS — L97.512 ULCER OF RIGHT FOOT WITH FAT LAYER EXPOSED (H): ICD-10-CM

## 2023-04-25 DIAGNOSIS — M21.969 TYPE 2 DIABETES MELLITUS WITH DIABETIC FOOT DEFORMITY (H): ICD-10-CM

## 2023-04-25 DIAGNOSIS — E11.49 TYPE II OR UNSPECIFIED TYPE DIABETES MELLITUS WITH NEUROLOGICAL MANIFESTATIONS, NOT STATED AS UNCONTROLLED(250.60) (H): Primary | ICD-10-CM

## 2023-04-25 DIAGNOSIS — E11.69 TYPE 2 DIABETES MELLITUS WITH DIABETIC FOOT DEFORMITY (H): ICD-10-CM

## 2023-04-25 PROCEDURE — 99214 OFFICE O/P EST MOD 30 MIN: CPT | Performed by: PODIATRIST

## 2023-04-25 NOTE — PROGRESS NOTES
Past Medical History:   Diagnosis Date     Cataract      Congestive heart failure (H) 2019 NOVEMBER    AFIB     DEPRESSION     comes and goes - tried meds - unsuccessfully, certain times of the year, no psych intervention and no counselors in the past - and not interested      Diverticulosis of colon (without mention of hemorrhage) 4/04    colonoscopy     ECHO-mildLVH,tr MR,mild thick lflets w inc LA,trTR   12/03     Essential hypertension, benign 1990s    late 1990s - started medications at that time - not to difficult to control meds      Fam hx-cardiovas dis NEC     father - CAD, and lipids/HTN - multiple members of the family      Family history of diabetes mellitus     sister and grandmother with DM      Family history of malignant neoplasm of breast     mother - young age - 45, and maternal cousin and aunt as well - no BRCA testing done      Family history of stroke (cerebrovascular)     grandmother in early life in her 40s      FAMILY HX COLON CANCER     Pat uncles x 2     Heart disease     murmur/     Heart murmur      HYPERLIPIDEMIA 2000    fairly recent - in the last 5 years - high for DM levels  -cholesterol recent      Irritable bowel syndrome     goes between the 2 - nerve related - more stressed more problems      MICROALBUMINURIA     unsure how long - been on the lisinopril - for a few years at well      Nonspecific abnormal results of liver function study 2/3/2003    SGOT - has been high in the past - since the hepatitis b - borderline elevation - not really changing any      OBESITY      Obstructive sleep apnea      GENESIS (obstructive sleep apnea) 10/15/2006    psg 5/15 AHI 53 aPAP 8-15     OSTEOARTHRITIS L KNEE 2003    total knee on the left - and pain is gone since the knee replacement      PERS HX HEPATITIS B- RESOLVED] 1977    acute virus only - no chronic disease      PERS HX HEPATITIS B- RESOLVED]      Type II or unspecified type diabetes mellitus without mention of complication, not stated as  uncontrolled 1991    oral meds frist, then insulin eventually later, difficult to control most of the time      Unspecified hypothyroidism 10/11/2006    TSH 3.36 - mild subclinical hypothyroidism - deciding on following or starting low dose meds - with dr Oreilly - following      Patient Active Problem List   Diagnosis     Morbid obesity (H)     Diverticulosis of large intestine     Nonspecific abnormal results of cardiovascular function study     FAMILY HX COLON CANCER     Nonallopathic lesion of thoracic region     Hypothyroidism     GENESIS (obstructive sleep apnea)     Irritable bowel syndrome     Family history of malignant neoplasm of breast     Type 2 diabetes mellitus treated with insulin (H)     History of major depression     OSTEOARTHRITIS L KNEE  s/p knee replacement on the left      Hypertension goal BP (blood pressure) < 140/90     Family history of stroke (cerebrovascular)     Family history of other cardiovascular diseases     JOINT PAIN-ANKLE - right ankle      Mitral valve insufficiency     Hyperlipidemia LDL goal <100     Aortic sclerosis     Tubular adenoma of colon     History of viral hepatitis, type B     Chronic low back pain     Long-term insulin use (H)     S/P total knee replacement using cement, right     Lumbago     Type 2 diabetes mellitus with hyperglycemia (H)     Posterior vitreous detachment of right eye     Pseudophakia of both eyes     Paroxysmal atrial fibrillation (H)     SOB (shortness of breath)     Bunion     On continuous oral anticoagulation     Pressure injury of toe of left foot, stage 3 (H)     Stage 3a chronic kidney disease (H)     Past Surgical History:   Procedure Laterality Date     ABDOMEN SURGERY      ovaian scope/ appendix removal     ANESTHESIA CARDIOVERSION N/A 12/4/2020    Procedure: ANESTHESIA, FOR CARDIOVERSION @1400;  Surgeon: GENERIC ANESTHESIA PROVIDER;  Location: [facility] OR     ARTHROPLASTY KNEE Right 9/23/2015    Procedure: ARTHROPLASTY KNEE;  Surgeon: Stephanie  Rufus VELEZ MD;  Location:  OR     BUNIONECTOMY REN AND LEANDRO, COMBINED Left 2/2/2021    Procedure: Left bunion correction with bone cutting and screw fixation;  Surgeon: David Hoffman DPM;  Location: [facility] OR     CATARACT IOL, RT/LT Left      COLONOSCOPY  4/04    diverticulosis, rec repeat 10 yrs(consider fam hx?)     COLONOSCOPY WITH CO2 INSUFFLATION N/A 6/19/2019    Procedure: COLONOSCOPY, WITH CO2 INSUFFLATION;  Surgeon: Zeb Duarte MD;  Location:  OR     ORTHOPEDIC SURGERY      right ankle     PHACOEMULSIFICATION CLEAR CORNEA WITH STANDARD INTRAOCULAR LENS IMPLANT Left 3/15/2018    Procedure: PHACOEMULSIFICATION CLEAR CORNEA WITH STANDARD INTRAOCULAR LENS IMPLANT;  LEFT EYE PHACOEMULSIFICATION CLEAR CORNEA WITH STANDARD INTRAOCULAR LENS IMPLANT;  Surgeon: Bandar Linares MD;  Location:  [facility]     PHACOEMULSIFICATION CLEAR CORNEA WITH STANDARD INTRAOCULAR LENS IMPLANT Right 4/5/2018    Procedure: PHACOEMULSIFICATION CLEAR CORNEA WITH STANDARD INTRAOCULAR LENS IMPLANT;  RIGHT PHACOEMULSIFICATION CLEAR CORNEA WITH STANDARD INTRAOCULAR LENS IMPLANT ;  Surgeon: Bandar Linares MD;  Location:  [facility]     STRESS ECHO (METRO)  12/03    no ischemia, limited by fatigue     SURGICAL HISTORY OF -       EXP LAP- R OVARY CYSTS     SURGICAL HISTORY OF -   1981,1984,1985    CHILDBIRTH     ZZC NONSPECIFIC PROCEDURE  6/06    right triple arthrodecesis      Z NONSPECIFIC PROCEDURE  7/06     right bunion surgery      RUST TOTAL KNEE ARTHROPLASTY  3/03    L knee     Social History     Socioeconomic History     Marital status:      Spouse name: Not on file     Number of children: 3     Years of education: Not on file     Highest education level: Not on file   Occupational History     Occupation: RN     Employer: [employer]   Tobacco Use     Smoking status: Never     Passive exposure: Never     Smokeless tobacco: Never     Tobacco comments:     tried in early 20s only    Vaping  Use     Vaping status: Never Used   Substance and Sexual Activity     Alcohol use: Yes     Comment: rare     Drug use: No     Sexual activity: Not Currently     Birth control/protection: Post-menopausal     Comment:  - since for 20 years, no signficant other  > 5 years sexual activity    Other Topics Concern      Service No     Blood Transfusions No     Caffeine Concern No     Occupational Exposure Yes     Comment: Normal nursing exposures     Hobby Hazards No     Sleep Concern Yes     Stress Concern No     Comment: Stress level at HM=5, stress level at WK=6     Weight Concern Yes     Special Diet Yes     Comment: Diabetic diet (watching carbs)     Back Care No     Comment: Occassional back spasms     Exercise Yes     Comment: Pt joined a health club goes approx 3-4 times a week,half to one mile daily     Bike Helmet No     Seat Belt Yes     Comment: Sometimes     Self-Exams Yes     Parent/sibling w/ CABG, MI or angioplasty before 65F 55M? 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      Social Determinants of Health     Financial Resource Strain: Not on file   Food Insecurity: Not on file   Transportation Needs: Not on file   Physical Activity: Not on file   Stress: Not on file   Social Connections: Not on file   Intimate Partner Violence: Not on file   Housing Stability: Not on file     Family History   Problem Relation Age of Onset     Diabetes Maternal Grandmother         DM TYPE 2     Cerebrovascular Disease Maternal Grandmother      Hypertension Sister      Lipids Sister      Heart Disease Sister         Chronic AFib     Diabetes Sister      Coronary Artery Disease Sister         afib     Hypertension Sister      Lipids Sister      Gynecology Sister      Diabetes Sister      Asthma Sister      Blood Disease Paternal Grandmother         T CELL LEUKEMIA     Hypertension Brother      Lipids Brother      Congenital Anomalies Brother       Cardiovascular Brother      Heart Disease Brother         CABG/AFIB     Coronary Artery Disease Brother         cabg afib AAA     Hypertension Brother      Lipids Brother      Other Cancer Brother         multple myeloma     Obesity Brother      Hypertension Brother      Respiratory Brother         Sleep apnea     Heart Disease Brother         AFib     Coronary Artery Disease Brother         afib     Alzheimer Disease Mother      Asthma Mother      Hypertension Mother      Breast Cancer Mother 40        has mastectomy and lived to 84     Allergies Mother         Sulfa,     Arthritis Mother      Cardiovascular Mother      Depression Mother      Respiratory Mother      Lipids Mother      Cancer Mother         breast     Thyroid Disease Mother      Cerebrovascular Disease Mother         FROM  AFIB     Dementia Mother         Alzheimers     Other Cancer Mother         breast     Cancer - colorectal Other      Colon Cancer Other         2019     Cancer Father         lung     Aneurysm Father         brother, AAA     Other Cancer Father         lung ca     Coronary Artery Disease Father         cad AAA     Asthma Sister      Cancer - colorectal Paternal Uncle         late 50s to early 60s - great uncles      Breast Cancer Other         Maternal cousin     Arrhythmia Sister         2 brothers 1 sister Afib     Breast Cancer Maternal Aunt 62     Other Cancer Maternal Aunt 35        Ovarian cancer.  Survived despite late recurrence and is now 92.     Glaucoma No family hx of      Macular Degeneration No family hx of      Lab Results   Component Value Date    A1C 7.0 11/28/2022    A1C 7.1 05/25/2022    A1C 8.1 11/24/2021    A1C 9.0 08/16/2021    A1C 6.8 01/05/2021    A1C 6.9 11/25/2020    A1C 7.5 07/22/2020    A1C 7.3 12/10/2019     Last Comprehensive Metabolic Panel:  Lab Results   Component Value Date     01/09/2023    POTASSIUM 3.8 01/09/2023    CHLORIDE 104 01/09/2023    CO2 27 01/09/2023    ANIONGAP 10  01/09/2023     (H) 01/09/2023    BUN 27 01/09/2023    CR 0.94 01/09/2023    GFRESTIMATED 65 01/09/2023    CASSIDY 9.8 01/09/2023               SUBJECTIVE FINDINGS:  68-year-old returns to clinic for ulcer, right first MPJ, Diabetes, peripheral Neuropathy and foot deformities.  She relates it is still draining a little bit.  She is using the Aquacel Ag and the Band-Aid and the Betadine.  She is wearing her Diabetic insoles.     OBJECTIVE FINDINGS:  DP and PT are 2/4, right.  She has a right plantar first MPJ ulcer with hyperkeratotic eschar that measures 0.7cm and is through the Dermis into the subcutaneous tissues at its deepest margins.  There is minimal drainage, no erythema, no odor, no calor.     ASSESSMENT AND PLAN:  Ulcer, right first MPJ. Diabetes with peripheral neuropathy and foot deformities.  Diagnosis and treatment options discussed with her.    The right plantar first MPJ ulcer was sharp debrided with a 10 blade through the Dermis, no anesthesia needed, and local wound care done upon consent today.  We cleaned this with wound Vashe.  I applied Cassandra, Aquacel Ag and a light dressing.  I am going to have her clean this daily with wound Vashe apply Betadine Aquacel Ag and a light dressing daily.  I discussed with her again offloading options.  She does not want to do any further offloading options at this time.  Previous notes reviewed.  Overall, this is improving.  Return to clinic and see me in 2 weeks.                    Moderate level of medical decision making.

## 2023-04-25 NOTE — LETTER
4/25/2023         RE: Sherry Slaughter  [address]  [place] [address]        Dear Colleague,    Thank you for referring your patient, Sherry Slaughter, to the [facility]. Please see a copy of my visit note below.    Past Medical History:   Diagnosis Date     Cataract      Congestive heart failure (H) 2019 NOVEMBER    AFIB     DEPRESSION     comes and goes - tried meds - unsuccessfully, certain times of the year, no psych intervention and no counselors in the past - and not interested      Diverticulosis of colon (without mention of hemorrhage) 4/04    colonoscopy     ECHO-mildLVH,tr MR,mild thick lflets w inc LA,trTR   12/03     Essential hypertension, benign 1990s    late 1990s - started medications at that time - not to difficult to control meds      Fam hx-cardiovas dis NEC     father - CAD, and lipids/HTN - multiple members of the family      Family history of diabetes mellitus     sister and grandmother with DM      Family history of malignant neoplasm of breast     mother - young age - 45, and maternal cousin and aunt as well - no BRCA testing done      Family history of stroke (cerebrovascular)     grandmother in early life in her 40s      FAMILY HX COLON CANCER     Pat uncles x 2     Heart disease     murmur/     Heart murmur      HYPERLIPIDEMIA 2000    fairly recent - in the last 5 years - high for DM levels  -cholesterol recent      Irritable bowel syndrome     goes between the 2 - nerve related - more stressed more problems      MICROALBUMINURIA     unsure how long - been on the lisinopril - for a few years at well      Nonspecific abnormal results of liver function study 2/3/2003    SGOT - has been high in the past - since the hepatitis b - borderline elevation - not really changing any      OBESITY      Obstructive sleep apnea      GENESIS (obstructive sleep apnea) 10/15/2006    psg 5/15 AHI 53 aPAP 8-15     OSTEOARTHRITIS L KNEE 2003    total knee on the  left - and pain is gone since the knee replacement      PERS HX HEPATITIS B- RESOLVED] 1977    acute virus only - no chronic disease      PERS HX HEPATITIS B- RESOLVED]      Type II or unspecified type diabetes mellitus without mention of complication, not stated as uncontrolled 1991    oral meds frist, then insulin eventually later, difficult to control most of the time      Unspecified hypothyroidism 10/11/2006    TSH 3.36 - mild subclinical hypothyroidism - deciding on following or starting low dose meds - with dr Oreilly - following      Patient Active Problem List   Diagnosis     Morbid obesity (H)     Diverticulosis of large intestine     Nonspecific abnormal results of cardiovascular function study     FAMILY HX COLON CANCER     Nonallopathic lesion of thoracic region     Hypothyroidism     GENESIS (obstructive sleep apnea)     Irritable bowel syndrome     Family history of malignant neoplasm of breast     Type 2 diabetes mellitus treated with insulin (H)     History of major depression     OSTEOARTHRITIS L KNEE  s/p knee replacement on the left      Hypertension goal BP (blood pressure) < 140/90     Family history of stroke (cerebrovascular)     Family history of other cardiovascular diseases     JOINT PAIN-ANKLE - right ankle      Mitral valve insufficiency     Hyperlipidemia LDL goal <100     Aortic sclerosis     Tubular adenoma of colon     History of viral hepatitis, type B     Chronic low back pain     Long-term insulin use (H)     S/P total knee replacement using cement, right     Lumbago     Type 2 diabetes mellitus with hyperglycemia (H)     Posterior vitreous detachment of right eye     Pseudophakia of both eyes     Paroxysmal atrial fibrillation (H)     SOB (shortness of breath)     Bunion     On continuous oral anticoagulation     Pressure injury of toe of left foot, stage 3 (H)     Stage 3a chronic kidney disease (H)     Past Surgical History:   Procedure Laterality Date     ABDOMEN SURGERY       ovaian scope/ appendix removal     ANESTHESIA CARDIOVERSION N/A 12/4/2020    Procedure: ANESTHESIA, FOR CARDIOVERSION @1400;  Surgeon: GENERIC ANESTHESIA PROVIDER;  Location: [facility]     ARTHROPLASTY KNEE Right 9/23/2015    Procedure: ARTHROPLASTY KNEE;  Surgeon: Rufus Brown MD;  Location:  OR     BUNIONECTOMY REN AND LEANDRO, COMBINED Left 2/2/2021    Procedure: Left bunion correction with bone cutting and screw fixation;  Surgeon: David Hoffman DPM;  Location: [facility]     CATARACT IOL, RT/LT Left      COLONOSCOPY  4/04    diverticulosis, rec repeat 10 yrs(consider fam hx?)     COLONOSCOPY WITH CO2 INSUFFLATION N/A 6/19/2019    Procedure: COLONOSCOPY, WITH CO2 INSUFFLATION;  Surgeon: Zeb Duarte MD;  Location: [facility]     ORTHOPEDIC SURGERY      right ankle     PHACOEMULSIFICATION CLEAR CORNEA WITH STANDARD INTRAOCULAR LENS IMPLANT Left 3/15/2018    Procedure: PHACOEMULSIFICATION CLEAR CORNEA WITH STANDARD INTRAOCULAR LENS IMPLANT;  LEFT EYE PHACOEMULSIFICATION CLEAR CORNEA WITH STANDARD INTRAOCULAR LENS IMPLANT;  Surgeon: Bandar Linares MD;  [facility]     PHACOEMULSIFICATION CLEAR CORNEA WITH STANDARD INTRAOCULAR LENS IMPLANT Right 4/5/2018    Procedure: PHACOEMULSIFICATION CLEAR CORNEA WITH STANDARD INTRAOCULAR LENS IMPLANT;  RIGHT PHACOEMULSIFICATION CLEAR CORNEA WITH STANDARD INTRAOCULAR LENS IMPLANT ;  Surgeon: Bandar Linares MD;  Location:  [facility]     STRESS ECHO (METRO)  12/03    no ischemia, limited by fatigue     SURGICAL HISTORY OF -       EXP LAP- R OVARY CYSTS     SURGICAL HISTORY OF -   1981,1984,1985    CHILDBIRTH     ZZC NONSPECIFIC PROCEDURE  6/06    right triple arthrodecesis      ZZ NONSPECIFIC PROCEDURE  7/06     right bunion surgery      [facility] TOTAL KNEE ARTHROPLASTY  3/03    L knee     Social History     Socioeconomic History     Marital status:      Spouse name: Not on file     Number of children: 3     Years of education: Not on file     Highest  education level: Not on file   Occupational History     Occupation: RN     Employer: [employer]   Tobacco Use     Smoking status: Never     Passive exposure: Never     Smokeless tobacco: Never     Tobacco comments:     tried in early 20s only    Vaping Use     Vaping status: Never Used   Substance and Sexual Activity     Alcohol use: Yes     Comment: rare     Drug use: No     Sexual activity: Not Currently     Birth control/protection: Post-menopausal     Comment:  - since for 20 years, no signficant other  > 5 years sexual activity    Other Topics Concern      Service No     Blood Transfusions No     Caffeine Concern No     Occupational Exposure Yes     Comment: Normal nursing exposures     Hobby Hazards No     Sleep Concern Yes     Stress Concern No     Comment: Stress level at HM=5, stress level at WK=6     Weight Concern Yes     Special Diet Yes     Comment: Diabetic diet (watching carbs)     Back Care No     Comment: Occassional back spasms     Exercise Yes     Comment: Pt joined a health club goes approx 3-4 times a week,half to one mile daily     Bike Helmet No     Seat Belt Yes     Comment: Sometimes     Self-Exams Yes     Parent/sibling w/ CABG, MI or angioplasty before 65F 55M? 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      Social Determinants of Health     Financial Resource Strain: Not on file   Food Insecurity: Not on file   Transportation Needs: Not on file   Physical Activity: Not on file   Stress: Not on file   Social Connections: Not on file   Intimate Partner Violence: Not on file   Housing Stability: Not on file     Family History   Problem Relation Age of Onset     Diabetes Maternal Grandmother         DM TYPE 2     Cerebrovascular Disease Maternal Grandmother      Hypertension Sister      Lipids Sister      Heart Disease Sister         Chronic AFib     Diabetes Sister      Coronary Artery  Disease Sister         afib     Hypertension Sister      Lipids Sister      Gynecology Sister      Diabetes Sister      Asthma Sister      Blood Disease Paternal Grandmother         T CELL LEUKEMIA     Hypertension Brother      Lipids Brother      Congenital Anomalies Brother      Cardiovascular Brother      Heart Disease Brother         CABG/AFIB     Coronary Artery Disease Brother         cabg afib AAA     Hypertension Brother      Lipids Brother      Other Cancer Brother         multple myeloma     Obesity Brother      Hypertension Brother      Respiratory Brother         Sleep apnea     Heart Disease Brother         AFib     Coronary Artery Disease Brother         afib     Alzheimer Disease Mother      Asthma Mother      Hypertension Mother      Breast Cancer Mother 40        has mastectomy and lived to 84     Allergies Mother         Sulfa,     Arthritis Mother      Cardiovascular Mother      Depression Mother      Respiratory Mother      Lipids Mother      Cancer Mother         breast     Thyroid Disease Mother      Cerebrovascular Disease Mother         FROM  AFIB     Dementia Mother         Alzheimers     Other Cancer Mother         breast     Cancer - colorectal Other      Colon Cancer Other         2019     Cancer Father         lung     Aneurysm Father         brother, AAA     Other Cancer Father         lung ca     Coronary Artery Disease Father         cad AAA     Asthma Sister      Cancer - colorectal Paternal Uncle         late 50s to early 60s - great uncles      Breast Cancer Other         Maternal cousin     Arrhythmia Sister         2 brothers 1 sister Afib     Breast Cancer Maternal Aunt 62     Other Cancer Maternal Aunt 35        Ovarian cancer.  Survived despite late recurrence and is now 92.     Glaucoma No family hx of      Macular Degeneration No family hx of      Lab Results   Component Value Date    A1C 7.0 11/28/2022    A1C 7.1 05/25/2022    A1C 8.1 11/24/2021    A1C 9.0 08/16/2021    A1C  6.8 01/05/2021    A1C 6.9 11/25/2020    A1C 7.5 07/22/2020    A1C 7.3 12/10/2019     Last Comprehensive Metabolic Panel:  Lab Results   Component Value Date     01/09/2023    POTASSIUM 3.8 01/09/2023    CHLORIDE 104 01/09/2023    CO2 27 01/09/2023    ANIONGAP 10 01/09/2023     (H) 01/09/2023    BUN 27 01/09/2023    CR 0.94 01/09/2023    GFRESTIMATED 65 01/09/2023    CASSIDY 9.8 01/09/2023               SUBJECTIVE FINDINGS:  68-year-old returns to clinic for ulcer, right first MPJ, Diabetes, peripheral Neuropathy and foot deformities.  She relates it is still draining a little bit.  She is using the Aquacel Ag and the Band-Aid and the Betadine.  She is wearing her Diabetic insoles.     OBJECTIVE FINDINGS:  DP and PT are 2/4, right.  She has a right plantar first MPJ ulcer with hyperkeratotic eschar that measures 0.7cm and is through the Dermis into the subcutaneous tissues at its deepest margins.  There is minimal drainage, no erythema, no odor, no calor.     ASSESSMENT AND PLAN:  Ulcer, right first MPJ. Diabetes with peripheral neuropathy and foot deformities.  Diagnosis and treatment options discussed with her.    The right plantar first MPJ ulcer was sharp debrided with a 10 blade through the Dermis, no anesthesia needed, and local wound care done upon consent today.  We cleaned this with wound Vashe.  I applied Cassandra, Aquacel Ag and a light dressing.  I am going to have her clean this daily with wound Vashe apply Betadine Aquacel Ag and a light dressing daily.  I discussed with her again offloading options.  She does not want to do any further offloading options at this time.  Previous notes reviewed.  Overall, this is improving.  Return to clinic and see me in 2 weeks.                    Moderate level of medical decision making.      Again, thank you for allowing me to participate in the care of your patient.        Sincerely,        Rufus Hutson DPM

## 2023-05-10 ENCOUNTER — OFFICE VISIT (OUTPATIENT)
Dept: PODIATRY | Facility: CLINIC | Age: 68
End: 2023-05-10
Payer: COMMERCIAL

## 2023-05-10 DIAGNOSIS — E11.69 TYPE 2 DIABETES MELLITUS WITH DIABETIC FOOT DEFORMITY (H): ICD-10-CM

## 2023-05-10 DIAGNOSIS — M21.969 TYPE 2 DIABETES MELLITUS WITH DIABETIC FOOT DEFORMITY (H): ICD-10-CM

## 2023-05-10 DIAGNOSIS — L97.512 ULCER OF RIGHT FOOT WITH FAT LAYER EXPOSED (H): ICD-10-CM

## 2023-05-10 DIAGNOSIS — E11.49 TYPE II OR UNSPECIFIED TYPE DIABETES MELLITUS WITH NEUROLOGICAL MANIFESTATIONS, NOT STATED AS UNCONTROLLED(250.60) (H): Primary | ICD-10-CM

## 2023-05-10 PROCEDURE — 99214 OFFICE O/P EST MOD 30 MIN: CPT | Performed by: PODIATRIST

## 2023-05-10 NOTE — LETTER
5/10/2023         RE: Sherry Slaughter  [address]  [place] [address]        Dear Colleague,    Thank you for referring your patient, Sherry Slaughter, to the [facility]. Please see a copy of my visit note below.    Past Medical History:   Diagnosis Date     Cataract      Congestive heart failure (H) 2019 NOVEMBER    AFIB     DEPRESSION     comes and goes - tried meds - unsuccessfully, certain times of the year, no psych intervention and no counselors in the past - and not interested      Diverticulosis of colon (without mention of hemorrhage) 4/04    colonoscopy     ECHO-mildLVH,tr MR,mild thick lflets w inc LA,trTR   12/03     Essential hypertension, benign 1990s    late 1990s - started medications at that time - not to difficult to control meds      Fam hx-cardiovas dis NEC     father - CAD, and lipids/HTN - multiple members of the family      Family history of diabetes mellitus     sister and grandmother with DM      Family history of malignant neoplasm of breast     mother - young age - 45, and maternal cousin and aunt as well - no BRCA testing done      Family history of stroke (cerebrovascular)     grandmother in early life in her 40s      FAMILY HX COLON CANCER     Pat uncles x 2     Heart disease     murmur/     Heart murmur      HYPERLIPIDEMIA 2000    fairly recent - in the last 5 years - high for DM levels  -cholesterol recent      Irritable bowel syndrome     goes between the 2 - nerve related - more stressed more problems      MICROALBUMINURIA     unsure how long - been on the lisinopril - for a few years at well      Nonspecific abnormal results of liver function study 2/3/2003    SGOT - has been high in the past - since the hepatitis b - borderline elevation - not really changing any      OBESITY      Obstructive sleep apnea      GENESIS (obstructive sleep apnea) 10/15/2006    psg 5/15 AHI 53 aPAP 8-15     OSTEOARTHRITIS L KNEE 2003    total knee on the  left - and pain is gone since the knee replacement      PERS HX HEPATITIS B- RESOLVED] 1977    acute virus only - no chronic disease      PERS HX HEPATITIS B- RESOLVED]      Type II or unspecified type diabetes mellitus without mention of complication, not stated as uncontrolled 1991    oral meds frist, then insulin eventually later, difficult to control most of the time      Unspecified hypothyroidism 10/11/2006    TSH 3.36 - mild subclinical hypothyroidism - deciding on following or starting low dose meds - with dr Oreilly - following      Patient Active Problem List   Diagnosis     Morbid obesity (H)     Diverticulosis of large intestine     Nonspecific abnormal results of cardiovascular function study     FAMILY HX COLON CANCER     Nonallopathic lesion of thoracic region     Hypothyroidism     GENESIS (obstructive sleep apnea)     Irritable bowel syndrome     Family history of malignant neoplasm of breast     Type 2 diabetes mellitus treated with insulin (H)     History of major depression     OSTEOARTHRITIS L KNEE  s/p knee replacement on the left      Hypertension goal BP (blood pressure) < 140/90     Family history of stroke (cerebrovascular)     Family history of other cardiovascular diseases     JOINT PAIN-ANKLE - right ankle      Mitral valve insufficiency     Hyperlipidemia LDL goal <100     Aortic sclerosis     Tubular adenoma of colon     History of viral hepatitis, type B     Chronic low back pain     Long-term insulin use (H)     S/P total knee replacement using cement, right     Lumbago     Type 2 diabetes mellitus with hyperglycemia (H)     Posterior vitreous detachment of right eye     Pseudophakia of both eyes     Paroxysmal atrial fibrillation (H)     SOB (shortness of breath)     Bunion     On continuous oral anticoagulation     Pressure injury of toe of left foot, stage 3 (H)     Stage 3a chronic kidney disease (H)     Past Surgical History:   Procedure Laterality Date     ABDOMEN SURGERY       ovaian scope/ appendix removal     ANESTHESIA CARDIOVERSION N/A 12/4/2020    Procedure: ANESTHESIA, FOR CARDIOVERSION @1400;  Surgeon: GENERIC ANESTHESIA PROVIDER;  Location: [facility]     ARTHROPLASTY KNEE Right 9/23/2015    Procedure: ARTHROPLASTY KNEE;  Surgeon: Rufus Brown MD;  Location:  OR     BUNIONECTOMY REN AND LEANDRO, COMBINED Left 2/2/2021    Procedure: Left bunion correction with bone cutting and screw fixation;  Surgeon: David Hoffman DPM;  Location: [facility]     CATARACT IOL, RT/LT Left      COLONOSCOPY  4/04    diverticulosis, rec repeat 10 yrs(consider fam hx?)     COLONOSCOPY WITH CO2 INSUFFLATION N/A 6/19/2019    Procedure: COLONOSCOPY, WITH CO2 INSUFFLATION;  Surgeon: Zeb Duarte MD;  Location: [facility]     ORTHOPEDIC SURGERY      right ankle     PHACOEMULSIFICATION CLEAR CORNEA WITH STANDARD INTRAOCULAR LENS IMPLANT Left 3/15/2018    Procedure: PHACOEMULSIFICATION CLEAR CORNEA WITH STANDARD INTRAOCULAR LENS IMPLANT;  LEFT EYE PHACOEMULSIFICATION CLEAR CORNEA WITH STANDARD INTRAOCULAR LENS IMPLANT;  Surgeon: Bandar Linares MD;  Location:  [facility]     PHACOEMULSIFICATION CLEAR CORNEA WITH STANDARD INTRAOCULAR LENS IMPLANT Right 4/5/2018    Procedure: PHACOEMULSIFICATION CLEAR CORNEA WITH STANDARD INTRAOCULAR LENS IMPLANT;  RIGHT PHACOEMULSIFICATION CLEAR CORNEA WITH STANDARD INTRAOCULAR LENS IMPLANT ;  Surgeon: Bandar Linares MD;  Location:  [facility]     STRESS ECHO (METRO)  12/03    no ischemia, limited by fatigue     SURGICAL HISTORY OF -       EXP LAP- R OVARY CYSTS     SURGICAL HISTORY OF -   1981,1984,1985    CHILDBIRTH     ZZC NONSPECIFIC PROCEDURE  6/06    right triple arthrodecesis      ZZ NONSPECIFIC PROCEDURE  7/06     right bunion surgery      [facility] TOTAL KNEE ARTHROPLASTY  3/03    L knee     Social History     Socioeconomic History     Marital status:      Spouse name: Not on file     Number of children: 3     Years of education: Not on file     Highest  education level: Not on file   Occupational History     Occupation: RN     Employer: [employer]   Tobacco Use     Smoking status: Never     Passive exposure: Never     Smokeless tobacco: Never     Tobacco comments:     tried in early 20s only    Vaping Use     Vaping status: Never Used   Substance and Sexual Activity     Alcohol use: Yes     Comment: rare     Drug use: No     Sexual activity: Not Currently     Birth control/protection: Post-menopausal     Comment:  - since for 20 years, no signficant other  > 5 years sexual activity    Other Topics Concern      Service No     Blood Transfusions No     Caffeine Concern No     Occupational Exposure Yes     Comment: Normal nursing exposures     Hobby Hazards No     Sleep Concern Yes     Stress Concern No     Comment: Stress level at HM=5, stress level at WK=6     Weight Concern Yes     Special Diet Yes     Comment: Diabetic diet (watching carbs)     Back Care No     Comment: Occassional back spasms     Exercise Yes     Comment: Pt joined a health club goes approx 3-4 times a week,half to one mile daily     Bike Helmet No     Seat Belt Yes     Comment: Sometimes     Self-Exams Yes     Parent/sibling w/ CABG, MI or angioplasty before 65F 55M? 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      Social Determinants of Health     Financial Resource Strain: Not on file   Food Insecurity: Not on file   Transportation Needs: Not on file   Physical Activity: Not on file   Stress: Not on file   Social Connections: Not on file   Intimate Partner Violence: Not on file   Housing Stability: Not on file     Family History   Problem Relation Age of Onset     Diabetes Maternal Grandmother         DM TYPE 2     Cerebrovascular Disease Maternal Grandmother      Hypertension Sister      Lipids Sister      Heart Disease Sister         Chronic AFib     Diabetes Sister      Coronary Artery  Disease Sister         afib     Hypertension Sister      Lipids Sister      Gynecology Sister      Diabetes Sister      Asthma Sister      Blood Disease Paternal Grandmother         T CELL LEUKEMIA     Hypertension Brother      Lipids Brother      Congenital Anomalies Brother      Cardiovascular Brother      Heart Disease Brother         CABG/AFIB     Coronary Artery Disease Brother         cabg afib AAA     Hypertension Brother      Lipids Brother      Other Cancer Brother         multple myeloma     Obesity Brother      Hypertension Brother      Respiratory Brother         Sleep apnea     Heart Disease Brother         AFib     Coronary Artery Disease Brother         afib     Alzheimer Disease Mother      Asthma Mother      Hypertension Mother      Breast Cancer Mother 40        has mastectomy and lived to 84     Allergies Mother         Sulfa,     Arthritis Mother      Cardiovascular Mother      Depression Mother      Respiratory Mother      Lipids Mother      Cancer Mother         breast     Thyroid Disease Mother      Cerebrovascular Disease Mother         FROM  AFIB     Dementia Mother         Alzheimers     Other Cancer Mother         breast     Cancer - colorectal Other      Colon Cancer Other         2019     Cancer Father         lung     Aneurysm Father         brother, AAA     Other Cancer Father         lung ca     Coronary Artery Disease Father         cad AAA     Asthma Sister      Cancer - colorectal Paternal Uncle         late 50s to early 60s - great uncles      Breast Cancer Other         Maternal cousin     Arrhythmia Sister         2 brothers 1 sister Afib     Breast Cancer Maternal Aunt 62     Other Cancer Maternal Aunt 35        Ovarian cancer.  Survived despite late recurrence and is now 92.     Glaucoma No family hx of      Macular Degeneration No family hx of      Lab Results   Component Value Date    A1C 7.0 11/28/2022    A1C 7.1 05/25/2022    A1C 8.1 11/24/2021    A1C 9.0 08/16/2021    A1C  6.8 01/05/2021    A1C 6.9 11/25/2020    A1C 7.5 07/22/2020    A1C 7.3 12/10/2019               SUBJECTIVE FINDINGS:  68-year-old returns to clinic for ulcer, right first MPJ, Diabetes, peripheral Neuropathy and foot deformities.  She relates it has not been draining.  She is using the Aquacel Ag and the Band-Aid and the Betadine.  She is wearing her Diabetic insoles.  Relates left foot is doing well with no problems.     OBJECTIVE FINDINGS:  DP and PT are 2/4, right.  She has a right plantar first MPJ ulcer with hyperkeratotic eschar and is through the Dermis at its deepest margins.  There is no drainage, no erythema, no odor, no calor.     ASSESSMENT AND PLAN:  Ulcer, right first MPJ. Diabetes with peripheral Neuropathy and foot deformities.  Diagnosis and treatment options discussed with her.    The right plantar first MPJ ulcer was sharp debrided with a 10 blade through the Dermis, no anesthesia needed, and local wound care done upon consent today.  The ulcer bled upon debridement.  We cleaned this with Betadine and applied Aquacel Ag and a light dressing.  I am going to have her clean this daily with wound Vashe apply Betadine Aquacel Ag and a light dressing daily.  Previous notes reviewed.  Overall, this is improving.  Return to clinic and see me in 2 weeks.                    Moderate level of medical decision making.    Again, thank you for allowing me to participate in the care of your patient.        Sincerely,        Rufus Hutson DPM

## 2023-05-10 NOTE — PROGRESS NOTES
Past Medical History:   Diagnosis Date     Cataract      Congestive heart failure (H) 2019 NOVEMBER    AFIB     DEPRESSION     comes and goes - tried meds - unsuccessfully, certain times of the year, no psych intervention and no counselors in the past - and not interested      Diverticulosis of colon (without mention of hemorrhage) 4/04    colonoscopy     ECHO-mildLVH,tr MR,mild thick lflets w inc LA,trTR   12/03     Essential hypertension, benign 1990s    late 1990s - started medications at that time - not to difficult to control meds      Fam hx-cardiovas dis [facility]     father - CAD, and lipids/HTN - multiple members of the family      Family history of diabetes mellitus     sister and grandmother with DM      Family history of malignant neoplasm of breast     mother - young age - 45, and maternal cousin and aunt as well - no BRCA testing done      Family history of stroke (cerebrovascular)     grandmother in early life in her 40s      FAMILY HX COLON CANCER     Pat uncles x 2     Heart disease     murmur/     Heart murmur      HYPERLIPIDEMIA 2000    fairly recent - in the last 5 years - high for DM levels  -cholesterol recent      Irritable bowel syndrome     goes between the 2 - nerve related - more stressed more problems      MICROALBUMINURIA     unsure how long - been on the lisinopril - for a few years at well      Nonspecific abnormal results of liver function study 2/3/2003    SGOT - has been high in the past - since the hepatitis b - borderline elevation - not really changing any      OBESITY      Obstructive sleep apnea      GENESIS (obstructive sleep apnea) 10/15/2006    psg 5/15 AHI 53 aPAP 8-15     OSTEOARTHRITIS L KNEE 2003    total knee on the left - and pain is gone since the knee replacement      PERS HX HEPATITIS B- RESOLVED] 1977    acute virus only - no chronic disease      PERS HX HEPATITIS B- RESOLVED]      Type II or unspecified type diabetes mellitus without mention of complication, not stated as  uncontrolled 1991    oral meds frist, then insulin eventually later, difficult to control most of the time      Unspecified hypothyroidism 10/11/2006    TSH 3.36 - mild subclinical hypothyroidism - deciding on following or starting low dose meds - with dr Oreilly - following      Patient Active Problem List   Diagnosis     Morbid obesity (H)     Diverticulosis of large intestine     Nonspecific abnormal results of cardiovascular function study     FAMILY HX COLON CANCER     Nonallopathic lesion of thoracic region     Hypothyroidism     GENESIS (obstructive sleep apnea)     Irritable bowel syndrome     Family history of malignant neoplasm of breast     Type 2 diabetes mellitus treated with insulin (H)     History of major depression     OSTEOARTHRITIS L KNEE  s/p knee replacement on the left      Hypertension goal BP (blood pressure) < 140/90     Family history of stroke (cerebrovascular)     Family history of other cardiovascular diseases     JOINT PAIN-ANKLE - right ankle      Mitral valve insufficiency     Hyperlipidemia LDL goal <100     Aortic sclerosis     Tubular adenoma of colon     History of viral hepatitis, type B     Chronic low back pain     Long-term insulin use (H)     S/P total knee replacement using cement, right     Lumbago     Type 2 diabetes mellitus with hyperglycemia (H)     Posterior vitreous detachment of right eye     Pseudophakia of both eyes     Paroxysmal atrial fibrillation (H)     SOB (shortness of breath)     Bunion     On continuous oral anticoagulation     Pressure injury of toe of left foot, stage 3 (H)     Stage 3a chronic kidney disease (H)     Past Surgical History:   Procedure Laterality Date     ABDOMEN SURGERY      ovaian scope/ appendix removal     ANESTHESIA CARDIOVERSION N/A 12/4/2020    Procedure: ANESTHESIA, FOR CARDIOVERSION @1400;  Surgeon: GENERIC ANESTHESIA PROVIDER;  Location: [facility] OR     ARTHROPLASTY KNEE Right 9/23/2015    Procedure: ARTHROPLASTY KNEE;  Surgeon: Stephanie  Rufus VELEZ MD;  Location:  OR     BUNIONECTOMY REN AND LEANDRO, COMBINED Left 2/2/2021    Procedure: Left bunion correction with bone cutting and screw fixation;  Surgeon: David Hoffman DPM;  Location: [facility] OR     CATARACT IOL, RT/LT Left      COLONOSCOPY  4/04    diverticulosis, rec repeat 10 yrs(consider fam hx?)     COLONOSCOPY WITH CO2 INSUFFLATION N/A 6/19/2019    Procedure: COLONOSCOPY, WITH CO2 INSUFFLATION;  Surgeon: Zeb Duarte MD;  Location:  OR     ORTHOPEDIC SURGERY      right ankle     PHACOEMULSIFICATION CLEAR CORNEA WITH STANDARD INTRAOCULAR LENS IMPLANT Left 3/15/2018    Procedure: PHACOEMULSIFICATION CLEAR CORNEA WITH STANDARD INTRAOCULAR LENS IMPLANT;  LEFT EYE PHACOEMULSIFICATION CLEAR CORNEA WITH STANDARD INTRAOCULAR LENS IMPLANT;  Surgeon: Bandar Linares MD;  Location:  [facility]     PHACOEMULSIFICATION CLEAR CORNEA WITH STANDARD INTRAOCULAR LENS IMPLANT Right 4/5/2018    Procedure: PHACOEMULSIFICATION CLEAR CORNEA WITH STANDARD INTRAOCULAR LENS IMPLANT;  RIGHT PHACOEMULSIFICATION CLEAR CORNEA WITH STANDARD INTRAOCULAR LENS IMPLANT ;  Surgeon: Bandar Linares MD;  Location:  [facility]     STRESS ECHO (METRO)  12/03    no ischemia, limited by fatigue     SURGICAL HISTORY OF -       EXP LAP- R OVARY CYSTS     SURGICAL HISTORY OF -   1981,1984,1985    CHILDBIRTH     ZZC NONSPECIFIC PROCEDURE  6/06    right triple arthrodecesis      Z NONSPECIFIC PROCEDURE  7/06     right bunion surgery      [facility] TOTAL KNEE ARTHROPLASTY  3/03    L knee     Social History     Socioeconomic History     Marital status:      Spouse name: Not on file     Number of children: 3     Years of education: Not on file     Highest education level: Not on file   Occupational History     Occupation: RN     Employer: [employer]   Tobacco Use     Smoking status: Never     Passive exposure: Never     Smokeless tobacco: Never     Tobacco comments:     tried in early 20s only    Vaping  Use     Vaping status: Never Used   Substance and Sexual Activity     Alcohol use: Yes     Comment: rare     Drug use: No     Sexual activity: Not Currently     Birth control/protection: Post-menopausal     Comment:  - since for 20 years, no signficant other  > 5 years sexual activity    Other Topics Concern      Service No     Blood Transfusions No     Caffeine Concern No     Occupational Exposure Yes     Comment: Normal nursing exposures     Hobby Hazards No     Sleep Concern Yes     Stress Concern No     Comment: Stress level at HM=5, stress level at WK=6     Weight Concern Yes     Special Diet Yes     Comment: Diabetic diet (watching carbs)     Back Care No     Comment: Occassional back spasms     Exercise Yes     Comment: Pt joined a health club goes approx 3-4 times a week,half to one mile daily     Bike Helmet No     Seat Belt Yes     Comment: Sometimes     Self-Exams Yes     Parent/sibling w/ CABG, MI or angioplasty before 65F 55M? 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      Social Determinants of Health     Financial Resource Strain: Not on file   Food Insecurity: Not on file   Transportation Needs: Not on file   Physical Activity: Not on file   Stress: Not on file   Social Connections: Not on file   Intimate Partner Violence: Not on file   Housing Stability: Not on file     Family History   Problem Relation Age of Onset     Diabetes Maternal Grandmother         DM TYPE 2     Cerebrovascular Disease Maternal Grandmother      Hypertension Sister      Lipids Sister      Heart Disease Sister         Chronic AFib     Diabetes Sister      Coronary Artery Disease Sister         afib     Hypertension Sister      Lipids Sister      Gynecology Sister      Diabetes Sister      Asthma Sister      Blood Disease Paternal Grandmother         T CELL LEUKEMIA     Hypertension Brother      Lipids Brother      Congenital Anomalies Brother       Cardiovascular Brother      Heart Disease Brother         CABG/AFIB     Coronary Artery Disease Brother         cabg afib AAA     Hypertension Brother      Lipids Brother      Other Cancer Brother         multple myeloma     Obesity Brother      Hypertension Brother      Respiratory Brother         Sleep apnea     Heart Disease Brother         AFib     Coronary Artery Disease Brother         afib     Alzheimer Disease Mother      Asthma Mother      Hypertension Mother      Breast Cancer Mother 40        has mastectomy and lived to 84     Allergies Mother         Sulfa,     Arthritis Mother      Cardiovascular Mother      Depression Mother      Respiratory Mother      Lipids Mother      Cancer Mother         breast     Thyroid Disease Mother      Cerebrovascular Disease Mother         FROM  AFIB     Dementia Mother         Alzheimers     Other Cancer Mother         breast     Cancer - colorectal Other      Colon Cancer Other         2019     Cancer Father         lung     Aneurysm Father         brother, AAA     Other Cancer Father         lung ca     Coronary Artery Disease Father         cad AAA     Asthma Sister      Cancer - colorectal Paternal Uncle         late 50s to early 60s - great uncles      Breast Cancer Other         Maternal cousin     Arrhythmia Sister         2 brothers 1 sister Afib     Breast Cancer Maternal Aunt 62     Other Cancer Maternal Aunt 35        Ovarian cancer.  Survived despite late recurrence and is now 92.     Glaucoma No family hx of      Macular Degeneration No family hx of      Lab Results   Component Value Date    A1C 7.0 11/28/2022    A1C 7.1 05/25/2022    A1C 8.1 11/24/2021    A1C 9.0 08/16/2021    A1C 6.8 01/05/2021    A1C 6.9 11/25/2020    A1C 7.5 07/22/2020    A1C 7.3 12/10/2019               SUBJECTIVE FINDINGS:  68-year-old returns to clinic for ulcer, right first MPJ, Diabetes, peripheral Neuropathy and foot deformities.  She relates it has not been draining.  She is  using the Aquacel Ag and the Band-Aid and the Betadine.  She is wearing her Diabetic insoles.  Relates left foot is doing well with no problems.     OBJECTIVE FINDINGS:  DP and PT are 2/4, right.  She has a right plantar first MPJ ulcer with hyperkeratotic eschar and is through the Dermis at its deepest margins.  There is no drainage, no erythema, no odor, no calor.     ASSESSMENT AND PLAN:  Ulcer, right first MPJ. Diabetes with peripheral Neuropathy and foot deformities.  Diagnosis and treatment options discussed with her.    The right plantar first MPJ ulcer was sharp debrided with a 10 blade through the Dermis, no anesthesia needed, and local wound care done upon consent today.  The ulcer bled upon debridement.  We cleaned this with Betadine and applied Aquacel Ag and a light dressing.  I am going to have her clean this daily with wound Vashe apply Betadine Aquacel Ag and a light dressing daily.  Previous notes reviewed.  Overall, this is improving.  Return to clinic and see me in 2 weeks.                    Moderate level of medical decision making.

## 2023-05-15 ENCOUNTER — MYC MEDICAL ADVICE (OUTPATIENT)
Dept: ENDOCRINOLOGY | Facility: CLINIC | Age: 68
End: 2023-05-15
Payer: COMMERCIAL

## 2023-05-15 ENCOUNTER — TELEPHONE (OUTPATIENT)
Dept: ENDOCRINOLOGY | Facility: CLINIC | Age: 68
End: 2023-05-15
Payer: COMMERCIAL

## 2023-05-15 DIAGNOSIS — Z79.4 TYPE 2 DIABETES MELLITUS TREATED WITH INSULIN (H): Primary | ICD-10-CM

## 2023-05-15 DIAGNOSIS — E11.9 TYPE 2 DIABETES MELLITUS TREATED WITH INSULIN (H): Primary | ICD-10-CM

## 2023-05-15 RX ORDER — ACYCLOVIR 400 MG/1
TABLET ORAL
Qty: 1 EACH | Refills: 0 | Status: SHIPPED | OUTPATIENT
Start: 2023-05-15

## 2023-05-15 RX ORDER — ACYCLOVIR 400 MG/1
1 TABLET ORAL
Qty: 9 EACH | Refills: 3 | Status: SHIPPED | OUTPATIENT
Start: 2023-05-15 | End: 2024-05-10

## 2023-05-15 NOTE — TELEPHONE ENCOUNTER
Patient requesting Dexcom G7 continuous glucose monitor instead of the Dexcom G6 which she has had previously.  Submitted prescription to [facility] in [place].    Radha Quinonez RN, [facility]

## 2023-05-15 NOTE — TELEPHONE ENCOUNTER
Duplicate message. See [employer] message from 5/15/23.      Kylah Katz, RN  Endocrine Care Coordinator  [facility]

## 2023-05-15 NOTE — TELEPHONE ENCOUNTER
[facility] Call Center    Phone Message    May a detailed message be left on voicemail: yes     Reason for Call: Other: Pharmacy called and stated patient wants the Dexacom G7 system. PLease call patient to discuss and send over prescription to the [facility] in [place].     Action Taken: Other: Endo    Travel Screening: Not Applicable

## 2023-05-16 DIAGNOSIS — I50.9 CONGESTIVE HEART FAILURE, UNSPECIFIED HF CHRONICITY, UNSPECIFIED HEART FAILURE TYPE (H): Primary | ICD-10-CM

## 2023-05-31 ENCOUNTER — OFFICE VISIT (OUTPATIENT)
Dept: CARDIOLOGY | Facility: CLINIC | Age: 68
End: 2023-05-31
Payer: COMMERCIAL

## 2023-05-31 ENCOUNTER — LAB (OUTPATIENT)
Dept: LAB | Facility: CLINIC | Age: 68
End: 2023-05-31
Payer: COMMERCIAL

## 2023-05-31 VITALS
OXYGEN SATURATION: 96 % | HEART RATE: 71 BPM | BODY MASS INDEX: 45.75 KG/M2 | DIASTOLIC BLOOD PRESSURE: 54 MMHG | WEIGHT: 293 LBS | SYSTOLIC BLOOD PRESSURE: 104 MMHG

## 2023-05-31 DIAGNOSIS — I10 BENIGN ESSENTIAL HYPERTENSION: ICD-10-CM

## 2023-05-31 DIAGNOSIS — E11.9 TYPE 2 DIABETES MELLITUS WITHOUT COMPLICATION, WITH LONG-TERM CURRENT USE OF INSULIN (H): ICD-10-CM

## 2023-05-31 DIAGNOSIS — I50.9 CONGESTIVE HEART FAILURE, UNSPECIFIED HF CHRONICITY, UNSPECIFIED HEART FAILURE TYPE (H): ICD-10-CM

## 2023-05-31 DIAGNOSIS — I50.9 CONGESTIVE HEART FAILURE, UNSPECIFIED HF CHRONICITY, UNSPECIFIED HEART FAILURE TYPE (H): Primary | ICD-10-CM

## 2023-05-31 DIAGNOSIS — I48.0 PAROXYSMAL ATRIAL FIBRILLATION (H): ICD-10-CM

## 2023-05-31 DIAGNOSIS — G47.33 OSA (OBSTRUCTIVE SLEEP APNEA): ICD-10-CM

## 2023-05-31 DIAGNOSIS — Z79.4 TYPE 2 DIABETES MELLITUS WITHOUT COMPLICATION, WITH LONG-TERM CURRENT USE OF INSULIN (H): ICD-10-CM

## 2023-05-31 DIAGNOSIS — I10 HYPERTENSION GOAL BP (BLOOD PRESSURE) < 140/90: ICD-10-CM

## 2023-05-31 LAB
ANION GAP SERPL CALCULATED.3IONS-SCNC: 7 MMOL/L (ref 3–14)
BUN SERPL-MCNC: 26 MG/DL (ref 7–30)
CALCIUM SERPL-MCNC: 9.7 MG/DL (ref 8.5–10.1)
CHLORIDE BLD-SCNC: 104 MMOL/L (ref 94–109)
CO2 SERPL-SCNC: 28 MMOL/L (ref 20–32)
CREAT SERPL-MCNC: 1.16 MG/DL (ref 0.52–1.04)
GFR SERPL CREATININE-BSD FRML MDRD: 50 ML/MIN/1.73M2
GLUCOSE BLD-MCNC: 220 MG/DL (ref 70–99)
POTASSIUM BLD-SCNC: 3.7 MMOL/L (ref 3.4–5.3)
SODIUM SERPL-SCNC: 139 MMOL/L (ref 133–144)

## 2023-05-31 PROCEDURE — 80048 BASIC METABOLIC PNL TOTAL CA: CPT

## 2023-05-31 PROCEDURE — 36415 COLL VENOUS BLD VENIPUNCTURE: CPT

## 2023-05-31 PROCEDURE — 99214 OFFICE O/P EST MOD 30 MIN: CPT | Performed by: NURSE PRACTITIONER

## 2023-05-31 NOTE — PROGRESS NOTES
1.  Type 2 diabetes.   2.  Hypertension.   3.  Hyperlipidemia.   4.  Depression.   5.  Obstructive sleep apnea, currently untreated.   6.  Hypothyroidism.   7.  Diverticulosis.   8.  Low back pain.   9.  Irritable bowel syndrome.     On 11/25/2020, Dr. Aranda saw the patient and sent her to the emergency room for further treatment of her AFib.  She ended up having her rate controlled and had an echocardiogram performed.  She then had EILAS-guided cardioversion performed on 12/04/2020 successfully.  She was then hospitalized again on 12/09/2020 for pulmonary edema, likely secondary to being in rapid AFib for quite some time in the past in addition to having her hydrochlorothiazide discontinued.  She was in pulmonary edema on 12/09/2020, was diuresed with 40 mg of IV Lasix and had p.o. Lasix 20 started and she was sent here for further evaluation.        7/21/21- working with Endocrine about elevated glucose. SR HR 70's. SOB with humidity. She goes to baseball games with her grandchildren . No swelling in the legs today. Weight 292 lbs today. She states her appetite has been fine. She doesn't add salt to her food. She doesn't eat much processed food. She tries to eat the Keto bread. BAEZ but baseline.     11/24/21- She feels better, she says she is in sinus rhythm.  She is now retired. COVID has been going around in her family , but she has been fine - no COVID. Weight at home 300 lbs. She has not been able to be active. Appetite has been fine. She has some swelling in the left leg but none in the right.     5/25/22- In February and March she had Afib and felt worse. She is getting better. She says her Lisinopril was decreased with Dr. Aranda as her BP was running low. Weight at home 298 lbs. Appetite has been good. Occasional swelling but not too bad today. Has some BAEZ. Had COVID early May. Would like some rehab to know what she can do and can't do.      11/30/22- on the 11 th of November her heart rate on the fit bit  was 133- she had a stressful day. On 11/19- she jumped up again. She has been doing better now- hasn't noted faster HR's.  Today she is back in normal range. She did have virus with a cough. Weight at home 298 lbs. No swelling in the Legs/feet. No BAEZ or noticeable SOB.     5/31/23- she is on amiodarone and feels so much better. HR regular and in the 60's. No swelling in the LE. She has no BAEZ or SOB. She has a good appetite.  Weight at home 292 lbs.  She is feeling well overall.     CURRENT MEDICATIONS:  She is taking diltiazem 360 mg a day, Lasix 20 mg a day, insulin, levothyroxine, Victoza, lisinopril 20 mg a day, metformin, hydrochlorothiazide 50 mg  metoprolol succinate 200 mg a day. rivaroxaban 20 mg a day, rosuvastatin 10 mg a day.       ROS:   Constitutional: No fever, chills, or sweats.  ENT: No visual disturbance, ear ache, epistaxis, sore throat.   Allergies/Immunologic: Negative  Respiratory: No cough, hemoptysis.   Cardiovascular: As per HPI.   GI: As per HPI.   : No urinary frequency, dysuria, or hematuria.   Integument: Negative.   Psychiatric: Negative.   Neuro: Negative.   Endocrinology: negative   Musculoskeletal: negative    EXAM:   Constitutional - WDWN, no acute distress   Eyes - no redness or discharge, nonicteric sclera  Respiratory - nonlabored breathing, able to speak in full sentences without difficulty  CV: no visible edema of visualized upper extremities.  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 She appears to be euvolemic.     Continue diltiazem 360 mg every morning,continue Metoprolol       Continue Lasix 20 mg a day and hydrochlorothiazide 50 mg a day-    HFpEF:     BP: controlled   Fluid status euvolemic  Aldosterone antagonist: no  Ischemia evaluation: (complete/pending/not  within the goals of care)   ACEi/ARB - Lisinopril  BB - Metoprolol   SCD prophylaxis - N/A, EF is normal   NSAID use: Contraindicated, reviewed with patient   Sleep Apnea Evaluation: (yes uses CPAP/ yes refuses CPAP/ no/pending evaluation)      1.  Atrial fibrillation with rapid ventricular response. She underwent ELIAS-guided cardioversion on 12/04/2020.  She has a relatively structurally normal heart.  She is currently stable in normal sinus rhythm on significant doses of metoprolol and diltiazem.  We will continue to monitor.  She is tolerating anticoagulation well without gross bleeding.      2.  Hypertension.     Blood pressures do appear to be well controlled as they have been at 120 or lower systolic daily.      3.  Congestive heart failure.     Her dry weight is likely in the 299-300 pound range. LVEF 60-65%        4.  Sleep apnea. She has been trying to use the Bi-pap more.      5.  Mild aortic stenosis.    This was seen on recent echo and a mean gradient was only 12 mmHg. continue to follow-through time.       I reviewed patients pertinent clinical laboratory studies  I reviewed patients medications  I reviewed patients pertinent medical records      Plan:  No med changes  CORE in 6 months         Robinson FERNANDEZ NP-C  Advanced Heart Failure Nurse Practitioner  [facility]

## 2023-05-31 NOTE — PATIENT INSTRUCTIONS
Take your medicines every day, as directed    Changes made today:  No med changes  CORE in 6 months      Monitor Your Weight and Symptoms    Contact us if you:    Gain 2 pounds in one day or 5 pounds in one week  Feel more short of breath  Notice more leg swelling  Feel lightheadeded   Change your lifestyle    Limit Salt or Sodium:  2000 mg  Limit Fluids:  2000 mL or approximately 64 ounces  Eat a Heart Healthy Diet  Low in saturated fats  Stay Active:  Aim to move at least 150 minutes every  week         To Contact us    During Business Hours:  [phone], option # 1      After hours, weekends or holidays:   [phone], Option #4  Ask to speak to the On-Call Cardiologist. Inform them you are a CORE/heart failure patient at the [facility].     Use [facility] allows you to communicate directly with your heart team through secure messaging.  [facility] can be accessed any time on your phone, computer, or tablet.  If you need assistance, we'd be happy to help!         Keep your Heart Appointments:    CORE 6 months     Please consider attending our virtual support group which is held monthly. Please reach out to Geraldo at [phone] for more information if you are interested in attending.       2023 dates:    Monday, May 1st, 1-2pm (Topic: CORE clinic)  Monday, June 5th, 1-2pm (Topic: Exercise and Cardiac Rehab: An Essential Tool in Managing Heart Failure)  Monday, July 3rd, 1-2pm  Monday, August 7th, 1-2pm  Monday, September 11th, 1-2pm  Monday, October 2nd, 1-2pm  Monday, November 6th, 1-2pm  Monday, December 4th, 1-2pm

## 2023-05-31 NOTE — PROGRESS NOTES
Sherry Slaughter's goals for this visit include:     She requests these members of her care team be copied on today's visit information: PCP    PCP: Marilou Arciniega    Referring Provider:  No referring provider defined for this encounter.    /54 (BP Location: Left arm, Patient Position: Sitting, Cuff Size: Adult Large)   Pulse 71   Wt 133 kg (293 lb 3.2 oz)   LMP 10/02/2007   SpO2 96%   BMI 45.75 kg/m      Do you need any medication refills at today's visit? No.    Dada Lindsey, EMT  Clinic Support  [facility]    [phone]    Employed by [facility]

## 2023-06-01 ENCOUNTER — HEALTH MAINTENANCE LETTER (OUTPATIENT)
Age: 68
End: 2023-06-01

## 2023-06-02 DIAGNOSIS — E11.65 TYPE 2 DIABETES MELLITUS WITH HYPERGLYCEMIA, WITH LONG-TERM CURRENT USE OF INSULIN (H): Primary | ICD-10-CM

## 2023-06-02 DIAGNOSIS — Z79.4 TYPE 2 DIABETES MELLITUS WITH HYPERGLYCEMIA, WITH LONG-TERM CURRENT USE OF INSULIN (H): Primary | ICD-10-CM

## 2023-06-07 ENCOUNTER — OFFICE VISIT (OUTPATIENT)
Dept: PODIATRY | Facility: CLINIC | Age: 68
End: 2023-06-07
Payer: COMMERCIAL

## 2023-06-07 DIAGNOSIS — E11.69 TYPE 2 DIABETES MELLITUS WITH DIABETIC FOOT DEFORMITY (H): ICD-10-CM

## 2023-06-07 DIAGNOSIS — Z87.2 HISTORY OF FOOT ULCER: ICD-10-CM

## 2023-06-07 DIAGNOSIS — E11.49 TYPE II OR UNSPECIFIED TYPE DIABETES MELLITUS WITH NEUROLOGICAL MANIFESTATIONS, NOT STATED AS UNCONTROLLED(250.60) (H): Primary | ICD-10-CM

## 2023-06-07 DIAGNOSIS — M21.969 TYPE 2 DIABETES MELLITUS WITH DIABETIC FOOT DEFORMITY (H): ICD-10-CM

## 2023-06-07 PROCEDURE — 99213 OFFICE O/P EST LOW 20 MIN: CPT | Performed by: PODIATRIST

## 2023-06-07 RX ORDER — METFORMIN HCL 500 MG
2000 TABLET, EXTENDED RELEASE 24 HR ORAL AT BEDTIME
Qty: 360 TABLET | Refills: 3 | Status: SHIPPED | OUTPATIENT
Start: 2023-06-07 | End: 2024-02-22

## 2023-06-07 NOTE — LETTER
6/7/2023         RE: Sherry Slaughter  [address]  [place] [address]        Dear Colleague,    Thank you for referring your patient, Sherry Slaughter, to the [facility]. Please see a copy of my visit note below.    Past Medical History:   Diagnosis Date     Cataract      Congestive heart failure (H) 2019 NOVEMBER    AFIB     DEPRESSION     comes and goes - tried meds - unsuccessfully, certain times of the year, no psych intervention and no counselors in the past - and not interested      Diverticulosis of colon (without mention of hemorrhage) 4/04    colonoscopy     ECHO-mildLVH,tr MR,mild thick lflets w inc LA,trTR   12/03     Essential hypertension, benign 1990s    late 1990s - started medications at that time - not to difficult to control meds      Fam hx-cardiovas dis NEC     father - CAD, and lipids/HTN - multiple members of the family      Family history of diabetes mellitus     sister and grandmother with DM      Family history of malignant neoplasm of breast     mother - young age - 45, and maternal cousin and aunt as well - no BRCA testing done      Family history of stroke (cerebrovascular)     grandmother in early life in her 40s      FAMILY HX COLON CANCER     Pat uncles x 2     Heart disease     murmur/     Heart murmur      HYPERLIPIDEMIA 2000    fairly recent - in the last 5 years - high for DM levels  -cholesterol recent      Irritable bowel syndrome     goes between the 2 - nerve related - more stressed more problems      MICROALBUMINURIA     unsure how long - been on the lisinopril - for a few years at well      Nonspecific abnormal results of liver function study 2/3/2003    SGOT - has been high in the past - since the hepatitis b - borderline elevation - not really changing any      OBESITY      Obstructive sleep apnea      GENESIS (obstructive sleep apnea) 10/15/2006    psg 5/15 AHI 53 aPAP 8-15     OSTEOARTHRITIS L KNEE 2003    total knee on the  left - and pain is gone since the knee replacement      PERS HX HEPATITIS B- RESOLVED] 1977    acute virus only - no chronic disease      PERS HX HEPATITIS B- RESOLVED]      Type II or unspecified type diabetes mellitus without mention of complication, not stated as uncontrolled 1991    oral meds frist, then insulin eventually later, difficult to control most of the time      Unspecified hypothyroidism 10/11/2006    TSH 3.36 - mild subclinical hypothyroidism - deciding on following or starting low dose meds - with dr Oreilly - following      Patient Active Problem List   Diagnosis     Morbid obesity (H)     Diverticulosis of large intestine     Nonspecific abnormal results of cardiovascular function study     FAMILY HX COLON CANCER     Nonallopathic lesion of thoracic region     Hypothyroidism     GENESIS (obstructive sleep apnea)     Irritable bowel syndrome     Family history of malignant neoplasm of breast     Type 2 diabetes mellitus treated with insulin (H)     History of major depression     OSTEOARTHRITIS L KNEE  s/p knee replacement on the left      Hypertension goal BP (blood pressure) < 140/90     Family history of stroke (cerebrovascular)     Family history of other cardiovascular diseases     JOINT PAIN-ANKLE - right ankle      Mitral valve insufficiency     Hyperlipidemia LDL goal <100     Aortic sclerosis     Tubular adenoma of colon     History of viral hepatitis, type B     Chronic low back pain     Long-term insulin use (H)     S/P total knee replacement using cement, right     Lumbago     Type 2 diabetes mellitus with hyperglycemia (H)     Posterior vitreous detachment of right eye     Pseudophakia of both eyes     Paroxysmal atrial fibrillation (H)     SOB (shortness of breath)     Bunion     On continuous oral anticoagulation     Pressure injury of toe of left foot, stage 3 (H)     Stage 3a chronic kidney disease (H)     Past Surgical History:   Procedure Laterality Date     ABDOMEN SURGERY       ovaian scope/ appendix removal     ANESTHESIA CARDIOVERSION N/A 12/4/2020    Procedure: ANESTHESIA, FOR CARDIOVERSION @1400;  Surgeon: GENERIC ANESTHESIA PROVIDER;  Location: [facility]     ARTHROPLASTY KNEE Right 9/23/2015    Procedure: ARTHROPLASTY KNEE;  Surgeon: Rufus Brown MD;  Location:  OR     BUNIONECTOMY REN AND LEANDRO, COMBINED Left 2/2/2021    Procedure: Left bunion correction with bone cutting and screw fixation;  Surgeon: David Hoffman DPM;  Location: [facility] OR     CATARACT IOL, RT/LT Left      COLONOSCOPY  4/04    diverticulosis, rec repeat 10 yrs(consider fam hx?)     COLONOSCOPY WITH CO2 INSUFFLATION N/A 6/19/2019    Procedure: COLONOSCOPY, WITH CO2 INSUFFLATION;  Surgeon: Zeb Duarte MD;  Location: [facility] OR     ORTHOPEDIC SURGERY      right ankle     PHACOEMULSIFICATION CLEAR CORNEA WITH STANDARD INTRAOCULAR LENS IMPLANT Left 3/15/2018    Procedure: PHACOEMULSIFICATION CLEAR CORNEA WITH STANDARD INTRAOCULAR LENS IMPLANT;  LEFT EYE PHACOEMULSIFICATION CLEAR CORNEA WITH STANDARD INTRAOCULAR LENS IMPLANT;  Surgeon: Bandar Linares MD;  [facility]     PHACOEMULSIFICATION CLEAR CORNEA WITH STANDARD INTRAOCULAR LENS IMPLANT Right 4/5/2018    Procedure: PHACOEMULSIFICATION CLEAR CORNEA WITH STANDARD INTRAOCULAR LENS IMPLANT;  RIGHT PHACOEMULSIFICATION CLEAR CORNEA WITH STANDARD INTRAOCULAR LENS IMPLANT ;  Surgeon: Bandar Linares MD;  Location:  [facility]     STRESS ECHO (METRO)  12/03    no ischemia, limited by fatigue     SURGICAL HISTORY OF -       EXP LAP- R OVARY CYSTS     SURGICAL HISTORY OF -   1981,1984,1985    CHILDBIRTH     ZZC NONSPECIFIC PROCEDURE  6/06    right triple arthrodecesis      ZZ NONSPECIFIC PROCEDURE  7/06     right bunion surgery      [facility] TOTAL KNEE ARTHROPLASTY  3/03    L knee     Social History     Socioeconomic History     Marital status:      Spouse name: Not on file     Number of children: 3     Years of education: Not on file     Highest  education level: Not on file   Occupational History     Occupation: RN     Employer: [employer]   Tobacco Use     Smoking status: Never     Passive exposure: Never     Smokeless tobacco: Never     Tobacco comments:     tried in early 20s only    Vaping Use     Vaping status: Never Used   Substance and Sexual Activity     Alcohol use: Yes     Comment: rare     Drug use: No     Sexual activity: Not Currently     Birth control/protection: Post-menopausal     Comment:  - since for 20 years, no signficant other  > 5 years sexual activity    Other Topics Concern      Service No     Blood Transfusions No     Caffeine Concern No     Occupational Exposure Yes     Comment: Normal nursing exposures     Hobby Hazards No     Sleep Concern Yes     Stress Concern No     Comment: Stress level at HM=5, stress level at WK=6     Weight Concern Yes     Special Diet Yes     Comment: Diabetic diet (watching carbs)     Back Care No     Comment: Occassional back spasms     Exercise Yes     Comment: Pt joined a health club goes approx 3-4 times a week,half to one mile daily     Bike Helmet No     Seat Belt Yes     Comment: Sometimes     Self-Exams Yes     Parent/sibling w/ CABG, MI or angioplasty before 65F 55M? 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      Social Determinants of Health     Financial Resource Strain: Not on file   Food Insecurity: Not on file   Transportation Needs: Not on file   Physical Activity: Not on file   Stress: Not on file   Social Connections: Not on file   Intimate Partner Violence: Not on file   Housing Stability: Not on file     Family History   Problem Relation Age of Onset     Diabetes Maternal Grandmother         DM TYPE 2     Cerebrovascular Disease Maternal Grandmother      Hypertension Sister      Lipids Sister      Heart Disease Sister         Chronic AFib     Diabetes Sister      Coronary Artery  Disease Sister         afib     Hypertension Sister      Lipids Sister      Gynecology Sister      Diabetes Sister      Asthma Sister      Blood Disease Paternal Grandmother         T CELL LEUKEMIA     Hypertension Brother      Lipids Brother      Congenital Anomalies Brother      Cardiovascular Brother      Heart Disease Brother         CABG/AFIB     Coronary Artery Disease Brother         cabg afib AAA     Hypertension Brother      Lipids Brother      Other Cancer Brother         multple myeloma     Obesity Brother      Hypertension Brother      Respiratory Brother         Sleep apnea     Heart Disease Brother         AFib     Coronary Artery Disease Brother         afib     Alzheimer Disease Mother      Asthma Mother      Hypertension Mother      Breast Cancer Mother 40        has mastectomy and lived to 84     Allergies Mother         Sulfa,     Arthritis Mother      Cardiovascular Mother      Depression Mother      Respiratory Mother      Lipids Mother      Cancer Mother         breast     Thyroid Disease Mother      Cerebrovascular Disease Mother         FROM  AFIB     Dementia Mother         Alzheimers     Other Cancer Mother         breast     Cancer - colorectal Other      Colon Cancer Other         2019     Cancer Father         lung     Aneurysm Father         brother, AAA     Other Cancer Father         lung ca     Coronary Artery Disease Father         cad AAA     Asthma Sister      Cancer - colorectal Paternal Uncle         late 50s to early 60s - great uncles      Breast Cancer Other         Maternal cousin     Arrhythmia Sister         2 brothers 1 sister Afib     Breast Cancer Maternal Aunt 62     Other Cancer Maternal Aunt 35        Ovarian cancer.  Survived despite late recurrence and is now 92.     Glaucoma No family hx of      Macular Degeneration No family hx of      Lab Results   Component Value Date    A1C 7.0 11/28/2022    A1C 7.1 05/25/2022    A1C 8.1 11/24/2021    A1C 9.0 08/16/2021    A1C  6.8 01/05/2021    A1C 6.9 11/25/2020    A1C 7.5 07/22/2020    A1C 7.3 12/10/2019     Last Comprehensive Metabolic Panel:  Lab Results   Component Value Date     05/31/2023    POTASSIUM 3.7 05/31/2023    CHLORIDE 104 05/31/2023    CO2 28 05/31/2023    ANIONGAP 7 05/31/2023     (H) 05/31/2023    BUN 26 05/31/2023    CR 1.16 (H) 05/31/2023    GFRESTIMATED 50 (L) 05/31/2023    CASSIDY 9.7 05/31/2023                 SUBJECTIVE FINDINGS:  68-year-old returns to clinic for ulcer, right first MPJ, Diabetes, peripheral Neuropathy and foot deformities.  She relates it has not been draining.  She is using the Aquacel Ag and the Band-Aid and intermittently the Betadine.  She is wearing her Diabetic insoles.  Relates she is using moisturizing lotion on her feet.  Relates left foot is doing well with no problems.     OBJECTIVE FINDINGS:  DP and PT are 2/4, right.  She has a right plantar first MPJ ulcer with mild hyperkeratotic tissue with ecchymosis, there is no drainage, no erythema, no odor, no calor.     ASSESSMENT AND PLAN:  Ulcer, right first MPJ. This is closed.  Diabetes with peripheral Neuropathy and foot deformities.  Diagnosis and treatment options discussed with her.  We applied Aquacel Ag and a light dressing upon consent.  I am going to have her continue the Aquacel Ag and a light dressing daily as needed.  Continue Diabetic shoes and orthotics.  Previous notes reviewed.  She relates she sees her primary care physician and Endocrinologist for foot checks.  Follow-up with me as needed.                    Moderate level of medical decision making.    Again, thank you for allowing me to participate in the care of your patient.        Sincerely,        Rufus Hutson DPM

## 2023-06-07 NOTE — PROGRESS NOTES
Past Medical History:   Diagnosis Date     Cataract      Congestive heart failure (H) 2019 NOVEMBER    AFIB     DEPRESSION     comes and goes - tried meds - unsuccessfully, certain times of the year, no psych intervention and no counselors in the past - and not interested      Diverticulosis of colon (without mention of hemorrhage) 4/04    colonoscopy     ECHO-mildLVH,tr MR,mild thick lflets w inc LA,trTR   12/03     Essential hypertension, benign 1990s    late 1990s - started medications at that time - not to difficult to control meds      Fam hx-cardiovas dis NEC     father - CAD, and lipids/HTN - multiple members of the family      Family history of diabetes mellitus     sister and grandmother with DM      Family history of malignant neoplasm of breast     mother - young age - 45, and maternal cousin and aunt as well - no BRCA testing done      Family history of stroke (cerebrovascular)     grandmother in early life in her 40s      FAMILY HX COLON CANCER     Pat uncles x 2     Heart disease     murmur/     Heart murmur      HYPERLIPIDEMIA 2000    fairly recent - in the last 5 years - high for DM levels  -cholesterol recent      Irritable bowel syndrome     goes between the 2 - nerve related - more stressed more problems      MICROALBUMINURIA     unsure how long - been on the lisinopril - for a few years at well      Nonspecific abnormal results of liver function study 2/3/2003    SGOT - has been high in the past - since the hepatitis b - borderline elevation - not really changing any      OBESITY      Obstructive sleep apnea      GENESIS (obstructive sleep apnea) 10/15/2006    psg 5/15 AHI 53 aPAP 8-15     OSTEOARTHRITIS L KNEE 2003    total knee on the left - and pain is gone since the knee replacement      PERS HX HEPATITIS B- RESOLVED] 1977    acute virus only - no chronic disease      PERS HX HEPATITIS B- RESOLVED]      Type II or unspecified type diabetes mellitus without mention of complication, not stated as  uncontrolled 1991    oral meds frist, then insulin eventually later, difficult to control most of the time      Unspecified hypothyroidism 10/11/2006    TSH 3.36 - mild subclinical hypothyroidism - deciding on following or starting low dose meds - with dr Oreilly - following      Patient Active Problem List   Diagnosis     Morbid obesity (H)     Diverticulosis of large intestine     Nonspecific abnormal results of cardiovascular function study     FAMILY HX COLON CANCER     Nonallopathic lesion of thoracic region     Hypothyroidism     GENESIS (obstructive sleep apnea)     Irritable bowel syndrome     Family history of malignant neoplasm of breast     Type 2 diabetes mellitus treated with insulin (H)     History of major depression     OSTEOARTHRITIS L KNEE  s/p knee replacement on the left      Hypertension goal BP (blood pressure) < 140/90     Family history of stroke (cerebrovascular)     Family history of other cardiovascular diseases     JOINT PAIN-ANKLE - right ankle      Mitral valve insufficiency     Hyperlipidemia LDL goal <100     Aortic sclerosis     Tubular adenoma of colon     History of viral hepatitis, type B     Chronic low back pain     Long-term insulin use (H)     S/P total knee replacement using cement, right     Lumbago     Type 2 diabetes mellitus with hyperglycemia (H)     Posterior vitreous detachment of right eye     Pseudophakia of both eyes     Paroxysmal atrial fibrillation (H)     SOB (shortness of breath)     Bunion     On continuous oral anticoagulation     Pressure injury of toe of left foot, stage 3 (H)     Stage 3a chronic kidney disease (H)     Past Surgical History:   Procedure Laterality Date     ABDOMEN SURGERY      ovaian scope/ appendix removal     ANESTHESIA CARDIOVERSION N/A 12/4/2020    Procedure: ANESTHESIA, FOR CARDIOVERSION @1400;  Surgeon: GENERIC ANESTHESIA PROVIDER;  Location: [facility]     ARTHROPLASTY KNEE Right 9/23/2015    Procedure: ARTHROPLASTY KNEE;  Surgeon: Stephanie  Rufus VELEZ MD;  Location:  OR     BUNIONECTOMY REN AND LEANDRO, COMBINED Left 2/2/2021    Procedure: Left bunion correction with bone cutting and screw fixation;  Surgeon: David Hoffman DPM;  Location: [facility] OR     CATARACT IOL, RT/LT Left      COLONOSCOPY  4/04    diverticulosis, rec repeat 10 yrs(consider fam hx?)     COLONOSCOPY WITH CO2 INSUFFLATION N/A 6/19/2019    Procedure: COLONOSCOPY, WITH CO2 INSUFFLATION;  Surgeon: Zeb Duarte MD;  Location:  OR     ORTHOPEDIC SURGERY      right ankle     PHACOEMULSIFICATION CLEAR CORNEA WITH STANDARD INTRAOCULAR LENS IMPLANT Left 3/15/2018    Procedure: PHACOEMULSIFICATION CLEAR CORNEA WITH STANDARD INTRAOCULAR LENS IMPLANT;  LEFT EYE PHACOEMULSIFICATION CLEAR CORNEA WITH STANDARD INTRAOCULAR LENS IMPLANT;  Surgeon: Bandar Linares MD;  Location:  [facility]     PHACOEMULSIFICATION CLEAR CORNEA WITH STANDARD INTRAOCULAR LENS IMPLANT Right 4/5/2018    Procedure: PHACOEMULSIFICATION CLEAR CORNEA WITH STANDARD INTRAOCULAR LENS IMPLANT;  RIGHT PHACOEMULSIFICATION CLEAR CORNEA WITH STANDARD INTRAOCULAR LENS IMPLANT ;  Surgeon: Bandar Linares MD;  Location:  [facility]     STRESS ECHO (METRO)  12/03    no ischemia, limited by fatigue     SURGICAL HISTORY OF -       EXP LAP- R OVARY CYSTS     SURGICAL HISTORY OF -   1981,1984,1985    CHILDBIRTH     ZZC NONSPECIFIC PROCEDURE  6/06    right triple arthrodecesis      Z NONSPECIFIC PROCEDURE  7/06     right bunion surgery      [facility] TOTAL KNEE ARTHROPLASTY  3/03    L knee     Social History     Socioeconomic History     Marital status:      Spouse name: Not on file     Number of children: 3     Years of education: Not on file     Highest education level: Not on file   Occupational History     Occupation: RN     Employer: [employer]   Tobacco Use     Smoking status: Never     Passive exposure: Never     Smokeless tobacco: Never     Tobacco comments:     tried in early 20s only    Vaping  Use     Vaping status: Never Used   Substance and Sexual Activity     Alcohol use: Yes     Comment: rare     Drug use: No     Sexual activity: Not Currently     Birth control/protection: Post-menopausal     Comment:  - since for 20 years, no signficant other  > 5 years sexual activity    Other Topics Concern      Service No     Blood Transfusions No     Caffeine Concern No     Occupational Exposure Yes     Comment: Normal nursing exposures     Hobby Hazards No     Sleep Concern Yes     Stress Concern No     Comment: Stress level at HM=5, stress level at WK=6     Weight Concern Yes     Special Diet Yes     Comment: Diabetic diet (watching carbs)     Back Care No     Comment: Occassional back spasms     Exercise Yes     Comment: Pt joined a health club goes approx 3-4 times a week,half to one mile daily     Bike Helmet No     Seat Belt Yes     Comment: Sometimes     Self-Exams Yes     Parent/sibling w/ CABG, MI or angioplasty before 65F 55M? 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      Social Determinants of Health     Financial Resource Strain: Not on file   Food Insecurity: Not on file   Transportation Needs: Not on file   Physical Activity: Not on file   Stress: Not on file   Social Connections: Not on file   Intimate Partner Violence: Not on file   Housing Stability: Not on file     Family History   Problem Relation Age of Onset     Diabetes Maternal Grandmother         DM TYPE 2     Cerebrovascular Disease Maternal Grandmother      Hypertension Sister      Lipids Sister      Heart Disease Sister         Chronic AFib     Diabetes Sister      Coronary Artery Disease Sister         afib     Hypertension Sister      Lipids Sister      Gynecology Sister      Diabetes Sister      Asthma Sister      Blood Disease Paternal Grandmother         T CELL LEUKEMIA     Hypertension Brother      Lipids Brother      Congenital Anomalies Brother       Cardiovascular Brother      Heart Disease Brother         CABG/AFIB     Coronary Artery Disease Brother         cabg afib AAA     Hypertension Brother      Lipids Brother      Other Cancer Brother         multple myeloma     Obesity Brother      Hypertension Brother      Respiratory Brother         Sleep apnea     Heart Disease Brother         AFib     Coronary Artery Disease Brother         afib     Alzheimer Disease Mother      Asthma Mother      Hypertension Mother      Breast Cancer Mother 40        has mastectomy and lived to 84     Allergies Mother         Sulfa,     Arthritis Mother      Cardiovascular Mother      Depression Mother      Respiratory Mother      Lipids Mother      Cancer Mother         breast     Thyroid Disease Mother      Cerebrovascular Disease Mother         FROM  AFIB     Dementia Mother         Alzheimers     Other Cancer Mother         breast     Cancer - colorectal Other      Colon Cancer Other         2019     Cancer Father         lung     Aneurysm Father         brother, AAA     Other Cancer Father         lung ca     Coronary Artery Disease Father         cad AAA     Asthma Sister      Cancer - colorectal Paternal Uncle         late 50s to early 60s - great uncles      Breast Cancer Other         Maternal cousin     Arrhythmia Sister         2 brothers 1 sister Afib     Breast Cancer Maternal Aunt 62     Other Cancer Maternal Aunt 35        Ovarian cancer.  Survived despite late recurrence and is now 92.     Glaucoma No family hx of      Macular Degeneration No family hx of      Lab Results   Component Value Date    A1C 7.0 11/28/2022    A1C 7.1 05/25/2022    A1C 8.1 11/24/2021    A1C 9.0 08/16/2021    A1C 6.8 01/05/2021    A1C 6.9 11/25/2020    A1C 7.5 07/22/2020    A1C 7.3 12/10/2019     Last Comprehensive Metabolic Panel:  Lab Results   Component Value Date     05/31/2023    POTASSIUM 3.7 05/31/2023    CHLORIDE 104 05/31/2023    CO2 28 05/31/2023    ANIONGAP 7  05/31/2023     (H) 05/31/2023    BUN 26 05/31/2023    CR 1.16 (H) 05/31/2023    GFRESTIMATED 50 (L) 05/31/2023    CASSIDY 9.7 05/31/2023                 SUBJECTIVE FINDINGS:  68-year-old returns to clinic for ulcer, right first MPJ, Diabetes, peripheral Neuropathy and foot deformities.  She relates it has not been draining.  She is using the Aquacel Ag and the Band-Aid and intermittently the Betadine.  She is wearing her Diabetic insoles.  Relates she is using moisturizing lotion on her feet.  Relates left foot is doing well with no problems.     OBJECTIVE FINDINGS:  DP and PT are 2/4, right.  She has a right plantar first MPJ ulcer with mild hyperkeratotic tissue with ecchymosis, there is no drainage, no erythema, no odor, no calor.     ASSESSMENT AND PLAN:  Ulcer, right first MPJ. This is closed.  Diabetes with peripheral Neuropathy and foot deformities.  Diagnosis and treatment options discussed with her.  We applied Aquacel Ag and a light dressing upon consent.  I am going to have her continue the Aquacel Ag and a light dressing daily as needed.  Continue Diabetic shoes and orthotics.  Previous notes reviewed.  She relates she sees her primary care physician and Endocrinologist for foot checks.  Follow-up with me as needed.                    Moderate level of medical decision making.

## 2023-06-13 DIAGNOSIS — I48.0 PAROXYSMAL ATRIAL FIBRILLATION (H): Primary | ICD-10-CM

## 2023-06-13 RX ORDER — AMIODARONE HYDROCHLORIDE 200 MG/1
200 TABLET ORAL DAILY
Qty: 90 TABLET | Refills: 3 | Status: SHIPPED | OUTPATIENT
Start: 2023-06-13 | End: 2024-06-06

## 2023-06-13 NOTE — CONFIDENTIAL NOTE
Pending Prescriptions:                       Disp   Refills    amiodarone (PACERONE) 200 MG tablet       90 tab*3            Sig: Take 1 tablet (200 mg) by mouth daily

## 2023-06-13 NOTE — TELEPHONE ENCOUNTER
See other mychart encounter. Patient requesting refill for amiodarone.     Rachell Rocha, RN, BSN  Cardiology RN Care Coordinator   [place]   Phone: [phone]  Fax: [phone] ([place]) [phone] ([place])

## 2023-06-19 ASSESSMENT — ENCOUNTER SYMPTOMS
BACK PAIN: 1
ARTHRALGIAS: 1

## 2023-06-20 ENCOUNTER — MYC MEDICAL ADVICE (OUTPATIENT)
Dept: ENDOCRINOLOGY | Facility: CLINIC | Age: 68
End: 2023-06-20
Payer: COMMERCIAL

## 2023-06-20 NOTE — PROGRESS NOTES
Outcome for 06/20/23 2:22 PM: 42Networkst message sent  VIKAS Gibson  Adult Endocrinology  [facility]

## 2023-06-23 ENCOUNTER — OFFICE VISIT (OUTPATIENT)
Dept: ENDOCRINOLOGY | Facility: CLINIC | Age: 68
End: 2023-06-23
Payer: COMMERCIAL

## 2023-06-23 VITALS
HEART RATE: 69 BPM | SYSTOLIC BLOOD PRESSURE: 112 MMHG | BODY MASS INDEX: 46.25 KG/M2 | OXYGEN SATURATION: 97 % | WEIGHT: 293 LBS | RESPIRATION RATE: 16 BRPM | DIASTOLIC BLOOD PRESSURE: 70 MMHG

## 2023-06-23 DIAGNOSIS — Z79.4 TYPE 2 DIABETES MELLITUS TREATED WITH INSULIN (H): ICD-10-CM

## 2023-06-23 DIAGNOSIS — E03.4 HYPOTHYROIDISM DUE TO ACQUIRED ATROPHY OF THYROID: Primary | ICD-10-CM

## 2023-06-23 DIAGNOSIS — N18.31 STAGE 3A CHRONIC KIDNEY DISEASE (H): ICD-10-CM

## 2023-06-23 DIAGNOSIS — E11.9 TYPE 2 DIABETES MELLITUS TREATED WITH INSULIN (H): ICD-10-CM

## 2023-06-23 DIAGNOSIS — E66.01 MORBID OBESITY (H): ICD-10-CM

## 2023-06-23 LAB — HBA1C MFR BLD: 6.8 % (ref 4.3–?)

## 2023-06-23 PROCEDURE — 83036 HEMOGLOBIN GLYCOSYLATED A1C: CPT | Performed by: PHYSICIAN ASSISTANT

## 2023-06-23 PROCEDURE — 99213 OFFICE O/P EST LOW 20 MIN: CPT | Performed by: PHYSICIAN ASSISTANT

## 2023-06-23 RX ORDER — INSULIN HUMAN 500 [IU]/ML
INJECTION, SOLUTION SUBCUTANEOUS
Qty: 40 ML | Refills: 1 | Status: SHIPPED | OUTPATIENT
Start: 2023-06-23 | End: 2023-10-23

## 2023-06-23 RX ORDER — LEVOTHYROXINE SODIUM 75 UG/1
75 TABLET ORAL DAILY
Qty: 90 TABLET | Refills: 3 | Status: SHIPPED | OUTPATIENT
Start: 2023-06-23 | End: 2024-06-14

## 2023-06-23 NOTE — NURSING NOTE
Sherry Slaughter's goals for this visit include: YES  She requests these members of her care team be copied on today's visit information: YES    PCP: Marilou Arciniega    Referring Provider:  Marilou Arciniega MD PhD  [address]  [facility] [place] [address]    /70 (BP Location: Left arm, Patient Position: Sitting, Cuff Size: Adult Large)   Pulse 69   Resp 16   Wt 134.4 kg (296 lb 6.4 oz)   LMP 10/02/2007   SpO2 97%   BMI 46.25 kg/m      Do you need any medication refills at today's visit? Synthroid / humalin (maybe)    Sj Short, EMT

## 2023-06-23 NOTE — PROGRESS NOTES
Assessment/Plan :   1. Type 2 DM. Sherry is feeling much better. She is not sure what changed but her blood sugars seem to be stabilizing. She was also relieved to hear that her A1C is at 6.8%. She has not had any problems with severe hyperglycemia and/or hypoglycemia and she feels like the combination of U-500 and Ozempic is working well. We will follow-up in 3 mos.    2. Hypothyroidism. Sherry is overdue for repeat thyroid testing. She is scheduled to have labs drawn next week with cardiology and she would like to have everything drawn at that time. I will put in an order for TSH. I will send in a new prescription for 75 mcg of levothyroxine today. She remains stable on that dose. If she has any problems, she is to contact our office.      I have independently reviewed and interpreted labs, imaging as indicated.      Chief complaint:  Sherry is a 68 year old female who returns for follow-up of Type 2 DM.    I have reviewed Care Everywhere including [facility], [facility], [facility], [facility], [facility] , [facility], [place] lab reports, imaging reports and provider notes as indicated.      HISTORY OF PRESENT ILLNESS  Sherry is doing much better. She feels like the Ozempic finally started working and she her blood sugars seem to be stabilizing. She has had diabetes for over 20 yrs. She has taken a variety of oral medications and insulin since diagnosis. She has a large amount of insulin resistance and she was transitioned from U-100 insulin to U-500 insulin in 2019. She is currently taking Humulin U-500 100 unit(s) with breakfast and 80 unit(s) with lunch and dinner. She also takes 1 mg of Ozempic once weekly. She is monitoring her blood sugars with the Dexcom G6.    At the last visit, she was very concerned about her blood sugars. She was frequently experiencing post-prandial spikes into the 300s. She is not sure what happened but a few weeks after our last appt, her blood  sugars seemed to stabilize. She is still getting a bit of a post-prandial spike after dinner but the numbers are usually under 250 mg/dl. She has not had any problems with severe hypoglycemia but she does not always take her evening U-500 dose. Overall, she feels significantly better.    She also needs a refill of levothyroxine. She has been stable on 75 mcg of levothyroxine for many years. She feels like her energy is stable and she has not had any problems with worsening anxiousness or irritability. She has had some lower leg swelling and muscle cramps but she attributes that to the hot weather. She has been watching her grand kids at various sporting events and it has been hot.    Endocrine relevant labs are as follows:   Latest Reference Range & Units 06/23/23 10:26   Hemoglobin A1C POCT 4.3 - <5.7 % 6.8 !   !: Data is abnormal    REVIEW OF SYSTEMS  Answers for HPI/ROS submitted by the patient on 6/19/2023  General Symptoms: No  Skin Symptoms: No  HENT Symptoms: No  EYE SYMPTOMS: No  HEART SYMPTOMS: No  LUNG SYMPTOMS: No  INTESTINAL SYMPTOMS: No  URINARY SYMPTOMS: No  GYNECOLOGIC SYMPTOMS: No  BREAST SYMPTOMS: No  SKELETAL SYMPTOMS: Yes  BLOOD SYMPTOMS: No  NERVOUS SYSTEM SYMPTOMS: No  MENTAL HEALTH SYMPTOMS: No  Back pain: Yes  Joint pain: Yes    Endocrine: positive for thyroid disorder and diabetes  Skin: negative  Eyes: negative for, visual blurring  Ears/Nose/Throat: negative for, nasal congestion, hoarseness  Respiratory: No shortness of breath, dyspnea on exertion, cough, or hemoptysis  Cardiovascular: negative for, chest pain, lower extremity edema and exercise intolerance  Gastrointestinal: negative for, nausea, vomiting, constipation and diarrhea  Genitourinary: negative for, nocturia, dysuria, frequency and urgency  Musculoskeletal: positive for back pain and nocturnal cramping, negative for and muscular weakness  Neurologic: negative for and numbness or tingling of feet  Psychiatric:  negative  Hematologic/Lymphatic/Immunologic: negative    Past Medical History  Past Medical History:   Diagnosis Date     Cataract      Congestive heart failure (H) 2019 NOVEMBER    AFIB     DEPRESSION     comes and goes - tried meds - unsuccessfully, certain times of the year, no psych intervention and no counselors in the past - and not interested      Diverticulosis of colon (without mention of hemorrhage) 4/04    colonoscopy     ECHO-mildLVH,tr MR,mild thick lflets w inc LA,trTR   12/03     Essential hypertension, benign 1990s    late 1990s - started medications at that time - not to difficult to control meds      Fam hx-cardiovas dis NEC     father - CAD, and lipids/HTN - multiple members of the family      Family history of diabetes mellitus     sister and grandmother with DM      Family history of malignant neoplasm of breast     mother - young age - 45, and maternal cousin and aunt as well - no BRCA testing done      Family history of stroke (cerebrovascular)     grandmother in early life in her 40s      FAMILY HX COLON CANCER     Pat uncles x 2     Heart disease     murmur/     Heart murmur      HYPERLIPIDEMIA 2000    fairly recent - in the last 5 years - high for DM levels  -cholesterol recent      Irritable bowel syndrome     goes between the 2 - nerve related - more stressed more problems      MICROALBUMINURIA     unsure how long - been on the lisinopril - for a few years at well      Nonspecific abnormal results of liver function study 2/3/2003    SGOT - has been high in the past - since the hepatitis b - borderline elevation - not really changing any      OBESITY      Obstructive sleep apnea      GENESIS (obstructive sleep apnea) 10/15/2006    psg 5/15 AHI 53 aPAP 8-15     OSTEOARTHRITIS L KNEE 2003    total knee on the left - and pain is gone since the knee replacement      PERS HX HEPATITIS B- RESOLVED] 1977    acute virus only - no chronic disease      PERS HX HEPATITIS B- RESOLVED]      Type II or  unspecified type diabetes mellitus without mention of complication, not stated as uncontrolled 1991    oral meds frist, then insulin eventually later, difficult to control most of the time      Unspecified hypothyroidism 10/11/2006    TSH 3.36 - mild subclinical hypothyroidism - deciding on following or starting low dose meds - with dr Oreilly - following        Medications  Current Outpatient Medications   Medication Sig Dispense Refill     amiodarone (PACERONE) 200 MG tablet Take 1 tablet (200 mg) by mouth daily 90 tablet 3     blood glucose (NO BRAND SPECIFIED) test strip Use to test blood sugar 2 times daily or as directed. Patient requesting One Touch Ultra Strips 100 strip 3     Continuous Blood Gluc  (DEXCOM G7 ) KATIA Use to read blood sugars as per 's instructions. 1 each 0     Continuous Blood Gluc Sensor (DEXCOM G7 SENSOR) MISC 1 each every 10 days 9 each 3     diltiazem ER COATED BEADS (CARDIZEM CD) 360 MG 24 hr capsule Take 1 capsule (360 mg) by mouth daily 90 capsule 3     econazole nitrate 1 % external cream Apply topically daily To feet and toenails. 85 g 5     furosemide (LASIX) 20 MG tablet Take 1 tablet (20 mg) by mouth daily 90 tablet 3     Garlic 1000 MG CAPS Take 1 capsule by mouth daily Takes during summer months.  Done taking until next summer.       Glucagon (GVOKE HYPOPEN 1-PACK) 1 MG/0.2ML SOAJ Inject 1 mg Subcutaneous once as needed Administer the injection in the lower abdomen, outer thigh, or outer upperarm 0.2 mL 3     hydrochlorothiazide (HYDRODIURIL) 25 MG tablet Take 2 tablets (50 mg) by mouth daily 180 tablet 3     ibuprofen (ADVIL/MOTRIN) 200 MG capsule Take 600 mg by mouth every 6 hours as needed        insulin pen needle (GNP CLICKFINE PEN NEEDLES) 31G X 8 MM miscellaneous Uses 3 times daily or as directed 300 each 3     insulin reg HIGH CONC (HUMULIN R U-500 KWIKPEN) 500 UNIT/ML PEN soln Administer 130-140 units at 7 in the morning, and 120 units at  3-4 pm 40 mL 1     levothyroxine (SYNTHROID/LEVOTHROID) 75 MCG tablet Take 1 tablet (75 mcg) by mouth daily 90 tablet 3     lisinopril (ZESTRIL) 20 MG tablet Take 1 tablet (20 mg) by mouth daily 90 tablet 3     metFORMIN (GLUCOPHAGE XR) 500 MG 24 hr tablet Take 4 tablets (2,000 mg) by mouth At Bedtime TAKE 4 TABLETS AT BEDTIME 360 tablet 3     metoprolol succinate ER (TOPROL XL) 200 MG 24 hr tablet Take 1 tablet (200 mg) by mouth daily Dose increase 180 tablet 3     Multiple Vitamins-Minerals (ADVANCED DIABETIC MULTIVITAMIN) TABS Take 1 tablet by mouth daily       ORDER FOR DME, SET TO LOCAL PRINT, Respironics REMSTAR 60 Series Auto CPAP 8-15cm H2O, Airfit P10 nasal pillow mask w/medium pillows       order for DME Equipment being ordered: [id number] $70  DH Shoe LG 1 Device 0     potassium chloride ER (KLOR-CON M) 20 MEQ CR tablet Take 1 tablet (20 mEq) by mouth daily 90 tablet 3     rivaroxaban ANTICOAGULANT (XARELTO ANTICOAGULANT) 20 MG TABS tablet Take 1 tablet (20 mg) by mouth daily (with dinner) 90 tablet 3     rosuvastatin (CRESTOR) 10 MG tablet Take 1 tablet (10 mg) by mouth daily 90 tablet 3     Semaglutide, 2 MG/DOSE, (OZEMPIC) 8 MG/3ML pen Inject 2 mg Subcutaneous once a week 9 mL 3     Continuous Blood Gluc  (DEXCOM G6 ) KATIA USE TO READ BLOOD SUGARS AS PER 'S INSTRUCTIONS. 1 each 0     Continuous Blood Gluc Sensor (DEXCOM G6 SENSOR) MISC Change every 10 days. 3 each 2     Continuous Blood Gluc Transmit (DEXCOM G6 TRANSMITTER) MISC Change every 3 months. 1 each 3       Allergies  Allergies   Allergen Reactions     Empagliflozin      Yeast infections     Lipitor [Atorvastatin Calcium]      Gas     Pravachol [Pravastatin Sodium]      Pravachol - dry cough      Simvastatin      Myalgia         Family History  family history includes Allergies in her mother; Alzheimer Disease in her mother; Aneurysm in her father; Arrhythmia in her sister; Arthritis in her mother; Asthma in  her mother, sister, and sister; Blood Disease in her paternal grandmother; Breast Cancer in an other family member; Breast Cancer (age of onset: 40) in her mother; Breast Cancer (age of onset: 62) in her maternal aunt; Cancer in her father and mother; Cancer - colorectal in her paternal uncle and another family member; Cardiovascular in her brother and mother; Cerebrovascular Disease in her maternal grandmother and mother; Colon Cancer in an other family member; Congenital Anomalies in her brother; Coronary Artery Disease in her brother, brother, father, and sister; Dementia in her mother; Depression in her mother; Diabetes in her maternal grandmother, sister, and sister; Gynecology in her sister; Heart Disease in her brother, brother, and sister; Hypertension in her brother, brother, brother, mother, sister, and sister; Lipids in her brother, brother, mother, sister, and sister; Obesity in her brother; Other Cancer in her brother, father, and mother; Other Cancer (age of onset: 35) in her maternal aunt; Respiratory in her brother and mother; Thyroid Disease in her mother.    Social History  Social History     Tobacco Use     Smoking status: Never     Passive exposure: Never     Smokeless tobacco: Never     Tobacco comments:     tried in early 20s only    Vaping Use     Vaping Use: Never used   Substance Use Topics     Alcohol use: Yes     Comment: rare     Drug use: No       Physical Exam  /70 (BP Location: Left arm, Patient Position: Sitting, Cuff Size: Adult Large)   Pulse 69   Resp 16   Wt 134.4 kg (296 lb 6.4 oz)   [facility] 10/02/2007   SpO2 97%   BMI 46.25 kg/m    Body mass index is 46.25 kg/m .  GENERAL :  In no apparent distress  SKIN: Normal color, normal temperature, texture.  No hirsutism, alopecia or purple striae.     EYES: PERRL, EOMI, No scleral icterus,  No proptosis, conjunctival redness, stare, retraction  NECK: No visible masses. No palpable adenopathy, or masses. No carotid bruits.    THYROID:  Normal, nontender, smooth / firm texture,  no nodules, no Bruit   RESP: Lungs clear to auscultation bilaterally  CARDIAC: Regular rate and rhythm, normal S1 S2, without murmurs, rubs or gallops    NEURO: awake, alert, responds appropriately to questions.  Cranial nerves intact.   Moves all extremities; Gait normal.  No tremor of the outstretched hand.    EXTREMITIES: No clubbing, cyanosis or edema.    DATA REVIEW  Dexcom Clarity  Time in target range 43%  <1% Very low and Low  42% High, 14% Very high  Current Ave  mg/dl

## 2023-06-23 NOTE — LETTER
6/23/2023         RE: Sherry Slaughter  [address]  [place] [address]        Dear Colleague,    Thank you for referring your patient, Sherry Slaughter, to the [facility]. Please see a copy of my visit note below.    Outcome for 06/20/23 2:22 PM: Syrmo message sent  VIKAS Gibson  Adult Endocrinology  [facility]      Assessment/Plan :   1. Type 2 DM. Sherry is feeling much better. She is not sure what changed but her blood sugars seem to be stabilizing. She was also relieved to hear that her A1C is at 6.8%. She has not had any problems with severe hyperglycemia and/or hypoglycemia and she feels like the combination of U-500 and Ozempic is working well. We will follow-up in 3 mos.    2. Hypothyroidism. Sherry is overdue for repeat thyroid testing. She is scheduled to have labs drawn next week with cardiology and she would like to have everything drawn at that time. I will put in an order for TSH. I will send in a new prescription for 75 mcg of levothyroxine today. She remains stable on that dose. If she has any problems, she is to contact our office.      I have independently reviewed and interpreted labs, imaging as indicated.      Chief complaint:  Sherry is a 68 year old female who returns for follow-up of Type 2 DM.    I have reviewed Care Everywhere including [facility], [facility], [facility], [facility], [facility] , [facility], [place] lab reports, imaging reports and provider notes as indicated.      HISTORY OF PRESENT ILLNESS  Sherry is doing much better. She feels like the Ozempic finally started working and she her blood sugars seem to be stabilizing. She has had diabetes for over 20 yrs. She has taken a variety of oral medications and insulin since diagnosis. She has a large amount of insulin resistance and she was transitioned from U-100 insulin to U-500 insulin in 2019. She is currently  taking Humulin U-500 100 unit(s) with breakfast and 80 unit(s) with lunch and dinner. She also takes 1 mg of Ozempic once weekly. She is monitoring her blood sugars with the Dexcom G6.    At the last visit, she was very concerned about her blood sugars. She was frequently experiencing post-prandial spikes into the 300s. She is not sure what happened but a few weeks after our last appt, her blood sugars seemed to stabilize. She is still getting a bit of a post-prandial spike after dinner but the numbers are usually under 250 mg/dl. She has not had any problems with severe hypoglycemia but she does not always take her evening U-500 dose. Overall, she feels significantly better.    She also needs a refill of levothyroxine. She has been stable on 75 mcg of levothyroxine for many years. She feels like her energy is stable and she has not had any problems with worsening anxiousness or irritability. She has had some lower leg swelling and muscle cramps but she attributes that to the hot weather. She has been watching her grand kids at various sporting events and it has been hot.    Endocrine relevant labs are as follows:   Latest Reference Range & Units 06/23/23 10:26   Hemoglobin A1C POCT 4.3 - <5.7 % 6.8 !   !: Data is abnormal    REVIEW OF SYSTEMS  Answers for HPI/ROS submitted by the patient on 6/19/2023  General Symptoms: No  Skin Symptoms: No  HENT Symptoms: No  EYE SYMPTOMS: No  HEART SYMPTOMS: No  LUNG SYMPTOMS: No  INTESTINAL SYMPTOMS: No  URINARY SYMPTOMS: No  GYNECOLOGIC SYMPTOMS: No  BREAST SYMPTOMS: No  SKELETAL SYMPTOMS: Yes  BLOOD SYMPTOMS: No  NERVOUS SYSTEM SYMPTOMS: No  MENTAL HEALTH SYMPTOMS: No  Back pain: Yes  Joint pain: Yes    Endocrine: positive for thyroid disorder and diabetes  Skin: negative  Eyes: negative for, visual blurring  Ears/Nose/Throat: negative for, nasal congestion, hoarseness  Respiratory: No shortness of breath, dyspnea on exertion, cough, or hemoptysis  Cardiovascular: negative  for, chest pain, lower extremity edema and exercise intolerance  Gastrointestinal: negative for, nausea, vomiting, constipation and diarrhea  Genitourinary: negative for, nocturia, dysuria, frequency and urgency  Musculoskeletal: positive for back pain and nocturnal cramping, negative for and muscular weakness  Neurologic: negative for and numbness or tingling of feet  Psychiatric: negative  Hematologic/Lymphatic/Immunologic: negative    Past Medical History  Past Medical History:   Diagnosis Date     Cataract      Congestive heart failure (H) 2019 NOVEMBER    AFIB     DEPRESSION     comes and goes - tried meds - unsuccessfully, certain times of the year, no psych intervention and no counselors in the past - and not interested      Diverticulosis of colon (without mention of hemorrhage) 4/04    colonoscopy     ECHO-mildLVH,tr MR,mild thick lflets w inc LA,trTR   12/03     Essential hypertension, benign 1990s    late 1990s - started medications at that time - not to difficult to control meds      Fam hx-cardiovas dis NEC     father - CAD, and lipids/HTN - multiple members of the family      Family history of diabetes mellitus     sister and grandmother with DM      Family history of malignant neoplasm of breast     mother - young age - 45, and maternal cousin and aunt as well - no BRCA testing done      Family history of stroke (cerebrovascular)     grandmother in early life in her 40s      FAMILY HX COLON CANCER     Pat uncles x 2     Heart disease     murmur/     Heart murmur      HYPERLIPIDEMIA 2000    fairly recent - in the last 5 years - high for DM levels  -cholesterol recent      Irritable bowel syndrome     goes between the 2 - nerve related - more stressed more problems      MICROALBUMINURIA     unsure how long - been on the lisinopril - for a few years at well      Nonspecific abnormal results of liver function study 2/3/2003    SGOT - has been high in the past - since the hepatitis b - borderline elevation  - not really changing any      OBESITY      Obstructive sleep apnea      GENESIS (obstructive sleep apnea) 10/15/2006    psg 5/15 AHI 53 aPAP 8-15     OSTEOARTHRITIS L KNEE 2003    total knee on the left - and pain is gone since the knee replacement      PERS HX HEPATITIS B- RESOLVED] 1977    acute virus only - no chronic disease      PERS HX HEPATITIS B- RESOLVED]      Type II or unspecified type diabetes mellitus without mention of complication, not stated as uncontrolled 1991    oral meds frist, then insulin eventually later, difficult to control most of the time      Unspecified hypothyroidism 10/11/2006    TSH 3.36 - mild subclinical hypothyroidism - deciding on following or starting low dose meds - with dr Oreilly - following        Medications  Current Outpatient Medications   Medication Sig Dispense Refill     amiodarone (PACERONE) 200 MG tablet Take 1 tablet (200 mg) by mouth daily 90 tablet 3     blood glucose (NO BRAND SPECIFIED) test strip Use to test blood sugar 2 times daily or as directed. Patient requesting One Touch Ultra Strips 100 strip 3     Continuous Blood Gluc  (DEXCOM G7 ) KATIA Use to read blood sugars as per 's instructions. 1 each 0     Continuous Blood Gluc Sensor (DEXCOM G7 SENSOR) MISC 1 each every 10 days 9 each 3     diltiazem ER COATED BEADS (CARDIZEM CD) 360 MG 24 hr capsule Take 1 capsule (360 mg) by mouth daily 90 capsule 3     econazole nitrate 1 % external cream Apply topically daily To feet and toenails. 85 g 5     furosemide (LASIX) 20 MG tablet Take 1 tablet (20 mg) by mouth daily 90 tablet 3     Garlic 1000 MG CAPS Take 1 capsule by mouth daily Takes during summer months.  Done taking until next summer.       Glucagon (GVOKE HYPOPEN 1-PACK) 1 MG/0.2ML SOAJ Inject 1 mg Subcutaneous once as needed Administer the injection in the lower abdomen, outer thigh, or outer upperarm 0.2 mL 3     hydrochlorothiazide (HYDRODIURIL) 25 MG tablet Take 2 tablets (50 mg)  by mouth daily 180 tablet 3     ibuprofen (ADVIL/MOTRIN) 200 MG capsule Take 600 mg by mouth every 6 hours as needed        insulin pen needle (GNP CLICKFINE PEN NEEDLES) 31G X 8 MM miscellaneous Uses 3 times daily or as directed 300 each 3     insulin reg HIGH CONC (HUMULIN R U-500 KWIKPEN) 500 UNIT/ML PEN soln Administer 130-140 units at 7 in the morning, and 120 units at 3-4 pm 40 mL 1     levothyroxine (SYNTHROID/LEVOTHROID) 75 MCG tablet Take 1 tablet (75 mcg) by mouth daily 90 tablet 3     lisinopril (ZESTRIL) 20 MG tablet Take 1 tablet (20 mg) by mouth daily 90 tablet 3     metFORMIN (GLUCOPHAGE XR) 500 MG 24 hr tablet Take 4 tablets (2,000 mg) by mouth At Bedtime TAKE 4 TABLETS AT BEDTIME 360 tablet 3     metoprolol succinate ER (TOPROL XL) 200 MG 24 hr tablet Take 1 tablet (200 mg) by mouth daily Dose increase 180 tablet 3     Multiple Vitamins-Minerals (ADVANCED DIABETIC MULTIVITAMIN) TABS Take 1 tablet by mouth daily       ORDER FOR DME, SET TO LOCAL PRINT, Respironics REMSTAR 60 Series Auto CPAP 8-15cm H2O, Airfit P10 nasal pillow mask w/medium pillows       order for DME Equipment being ordered: [id number] $70   Shoe LG 1 Device 0     potassium chloride ER (KLOR-CON M) 20 MEQ CR tablet Take 1 tablet (20 mEq) by mouth daily 90 tablet 3     rivaroxaban ANTICOAGULANT (XARELTO ANTICOAGULANT) 20 MG TABS tablet Take 1 tablet (20 mg) by mouth daily (with dinner) 90 tablet 3     rosuvastatin (CRESTOR) 10 MG tablet Take 1 tablet (10 mg) by mouth daily 90 tablet 3     Semaglutide, 2 MG/DOSE, (OZEMPIC) 8 MG/3ML pen Inject 2 mg Subcutaneous once a week 9 mL 3     Continuous Blood Gluc  (DEXCOM G6 ) KATIA USE TO READ BLOOD SUGARS AS PER 'S INSTRUCTIONS. 1 each 0     Continuous Blood Gluc Sensor (DEXCOM G6 SENSOR) MISC Change every 10 days. 3 each 2     Continuous Blood Gluc Transmit (DEXCOM G6 TRANSMITTER) MISC Change every 3 months. 1 each 3       Allergies  Allergies   Allergen  Reactions     Empagliflozin      Yeast infections     Lipitor [Atorvastatin Calcium]      Gas     Pravachol [Pravastatin Sodium]      Pravachol - dry cough      Simvastatin      Myalgia         Family History  family history includes Allergies in her mother; Alzheimer Disease in her mother; Aneurysm in her father; Arrhythmia in her sister; Arthritis in her mother; Asthma in her mother, sister, and sister; Blood Disease in her paternal grandmother; Breast Cancer in an other family member; Breast Cancer (age of onset: 40) in her mother; Breast Cancer (age of onset: 62) in her maternal aunt; Cancer in her father and mother; Cancer - colorectal in her paternal uncle and another family member; Cardiovascular in her brother and mother; Cerebrovascular Disease in her maternal grandmother and mother; Colon Cancer in an other family member; Congenital Anomalies in her brother; Coronary Artery Disease in her brother, brother, father, and sister; Dementia in her mother; Depression in her mother; Diabetes in her maternal grandmother, sister, and sister; Gynecology in her sister; Heart Disease in her brother, brother, and sister; Hypertension in her brother, brother, brother, mother, sister, and sister; Lipids in her brother, brother, mother, sister, and sister; Obesity in her brother; Other Cancer in her brother, father, and mother; Other Cancer (age of onset: 35) in her maternal aunt; Respiratory in her brother and mother; Thyroid Disease in her mother.    Social History  Social History     Tobacco Use     Smoking status: Never     Passive exposure: Never     Smokeless tobacco: Never     Tobacco comments:     tried in early 20s only    Vaping Use     Vaping Use: Never used   Substance Use Topics     Alcohol use: Yes     Comment: rare     Drug use: No       Physical Exam  /70 (BP Location: Left arm, Patient Position: Sitting, Cuff Size: Adult Large)   Pulse 69   Resp 16   Wt 134.4 kg (296 lb 6.4 oz)   [facility] 10/02/2007    SpO2 97%   BMI 46.25 kg/m    Body mass index is 46.25 kg/m .  GENERAL :  In no apparent distress  SKIN: Normal color, normal temperature, texture.  No hirsutism, alopecia or purple striae.     EYES: PERRL, EOMI, No scleral icterus,  No proptosis, conjunctival redness, stare, retraction  NECK: No visible masses. No palpable adenopathy, or masses. No carotid bruits.   THYROID:  Normal, nontender, smooth / firm texture,  no nodules, no Bruit   RESP: Lungs clear to auscultation bilaterally  CARDIAC: Regular rate and rhythm, normal S1 S2, without murmurs, rubs or gallops    NEURO: awake, alert, responds appropriately to questions.  Cranial nerves intact.   Moves all extremities; Gait normal.  No tremor of the outstretched hand.    EXTREMITIES: No clubbing, cyanosis or edema.    DATA REVIEW  Dexcom Clarity  Time in target range 43%  <1% Very low and Low  42% High, 14% Very high  Current Ave  mg/dl            Again, thank you for allowing me to participate in the care of your patient.        Sincerely,        Millicent Rocha PA-C

## 2023-07-13 ENCOUNTER — OFFICE VISIT (OUTPATIENT)
Dept: NURSING | Facility: CLINIC | Age: 68
End: 2023-07-13
Payer: COMMERCIAL

## 2023-07-13 VITALS — OXYGEN SATURATION: 97 % | BODY MASS INDEX: 46.2 KG/M2 | HEART RATE: 66 BPM | WEIGHT: 293 LBS

## 2023-07-13 DIAGNOSIS — I48.0 PAROXYSMAL ATRIAL FIBRILLATION (H): ICD-10-CM

## 2023-07-13 DIAGNOSIS — Z79.899 ON AMIODARONE THERAPY: Primary | ICD-10-CM

## 2023-07-13 LAB
DLCOUNC-%PRED-PRE: 90 %
DLCOUNC-PRE: 18.87 ML/MIN/MMHG
DLCOUNC-PRED: 20.82 ML/MIN/MMHG
ERV-%PRED-PRE: 43 %
ERV-PRE: 0.37 L
ERV-PRED: 0.85 L
EXPTIME-PRE: 6.97 SEC
FEF2575-%PRED-PRE: 106 %
FEF2575-PRE: 2.05 L/SEC
FEF2575-PRED: 1.92 L/SEC
FEFMAX-%PRED-PRE: 130 %
FEFMAX-PRE: 8.15 L/SEC
FEFMAX-PRED: 6.24 L/SEC
FEV1-%PRED-PRE: 94 %
FEV1-PRE: 2.2 L
FEV1FEV6-PRE: 79 %
FEV1FEV6-PRED: 79 %
FEV1FVC-PRE: 80 %
FEV1FVC-PRED: 78 %
FEV1SVC-PRE: 77 %
FEV1SVC-PRED: 69 %
FIFMAX-PRE: 4.29 L/SEC
FRCPLETH-%PRED-PRE: 89 %
FRCPLETH-PRE: 2.9 L
FRCPLETH-PRED: 3.22 L
FVC-%PRED-PRE: 92 %
FVC-PRE: 2.76 L
FVC-PRED: 2.98 L
IC-%PRED-PRE: 100 %
IC-PRE: 2.51 L
IC-PRED: 2.49 L
RVPLETH-%PRED-PRE: 113 %
RVPLETH-PRE: 2.53 L
RVPLETH-PRED: 2.22 L
TLCPLETH-%PRED-PRE: 95 %
TLCPLETH-PRE: 5.4 L
TLCPLETH-PRED: 5.66 L
VA-%PRED-PRE: 89 %
VA-PRE: 4.61 L
VC-%PRED-PRE: 84 %
VC-PRE: 2.88 L
VC-PRED: 3.39 L

## 2023-07-13 PROCEDURE — 94729 DIFFUSING CAPACITY: CPT | Performed by: INTERNAL MEDICINE

## 2023-07-13 PROCEDURE — 94375 RESPIRATORY FLOW VOLUME LOOP: CPT | Performed by: INTERNAL MEDICINE

## 2023-07-13 PROCEDURE — 94726 PLETHYSMOGRAPHY LUNG VOLUMES: CPT | Performed by: INTERNAL MEDICINE

## 2023-07-17 ENCOUNTER — ANCILLARY PROCEDURE (OUTPATIENT)
Dept: MAMMOGRAPHY | Facility: CLINIC | Age: 68
End: 2023-07-17
Attending: INTERNAL MEDICINE
Payer: COMMERCIAL

## 2023-07-17 DIAGNOSIS — Z12.31 ENCOUNTER FOR SCREENING MAMMOGRAM FOR BREAST CANCER: ICD-10-CM

## 2023-07-17 DIAGNOSIS — Z12.31 VISIT FOR SCREENING MAMMOGRAM: ICD-10-CM

## 2023-07-17 PROCEDURE — 77067 SCR MAMMO BI INCL CAD: CPT | Mod: GC | Performed by: RADIOLOGY

## 2023-07-24 ENCOUNTER — OFFICE VISIT (OUTPATIENT)
Dept: OPTOMETRY | Facility: CLINIC | Age: 68
End: 2023-07-24
Payer: COMMERCIAL

## 2023-07-24 ENCOUNTER — ANCILLARY PROCEDURE (OUTPATIENT)
Dept: ULTRASOUND IMAGING | Facility: CLINIC | Age: 68
End: 2023-07-24
Attending: SURGERY
Payer: COMMERCIAL

## 2023-07-24 ENCOUNTER — OFFICE VISIT (OUTPATIENT)
Dept: VASCULAR SURGERY | Facility: CLINIC | Age: 68
End: 2023-07-24
Attending: SURGERY
Payer: COMMERCIAL

## 2023-07-24 DIAGNOSIS — Z01.00 EXAMINATION OF EYES AND VISION: Primary | ICD-10-CM

## 2023-07-24 DIAGNOSIS — I83.812 VARICOSE VEINS OF LEFT LOWER EXTREMITY WITH PAIN: ICD-10-CM

## 2023-07-24 DIAGNOSIS — E11.9 TYPE 2 DIABETES MELLITUS TREATED WITH INSULIN (H): Chronic | ICD-10-CM

## 2023-07-24 DIAGNOSIS — I83.812 VARICOSE VEINS OF LEFT LOWER EXTREMITY WITH PAIN: Primary | ICD-10-CM

## 2023-07-24 DIAGNOSIS — Z79.4 TYPE 2 DIABETES MELLITUS TREATED WITH INSULIN (H): Chronic | ICD-10-CM

## 2023-07-24 DIAGNOSIS — H52.223 REGULAR ASTIGMATISM OF BOTH EYES: ICD-10-CM

## 2023-07-24 DIAGNOSIS — H52.4 PRESBYOPIA: ICD-10-CM

## 2023-07-24 DIAGNOSIS — Z96.1 PSEUDOPHAKIA OF BOTH EYES: ICD-10-CM

## 2023-07-24 DIAGNOSIS — H02.889 MEIBOMIAN GLAND DYSFUNCTION: ICD-10-CM

## 2023-07-24 PROCEDURE — 93971 EXTREMITY STUDY: CPT | Mod: LT | Performed by: SURGERY

## 2023-07-24 PROCEDURE — 99212 OFFICE O/P EST SF 10 MIN: CPT | Performed by: SURGERY

## 2023-07-24 PROCEDURE — 92015 DETERMINE REFRACTIVE STATE: CPT | Performed by: OPTOMETRIST

## 2023-07-24 PROCEDURE — 92014 COMPRE OPH EXAM EST PT 1/>: CPT | Performed by: OPTOMETRIST

## 2023-07-24 ASSESSMENT — KERATOMETRY
OS_AXISANGLE2_DEGREES: 085
OD_AXISANGLE2_DEGREES: 103
OD_K2POWER_DIOPTERS: 45.75
OS_K1POWER_DIOPTERS: 45.00
OS_K2POWER_DIOPTERS: 46.00
OD_K1POWER_DIOPTERS: 44.75
OS_AXISANGLE_DEGREES: 175
OD_AXISANGLE_DEGREES: 013

## 2023-07-24 ASSESSMENT — REFRACTION_WEARINGRX
SPECS_TYPE: NEAR
OS_AXIS: 008
OS_CYLINDER: +1.00
OS_SPHERE: +1.75
OD_AXIS: 010
OD_SPHERE: +1.50
OD_CYLINDER: +1.50

## 2023-07-24 ASSESSMENT — VISUAL ACUITY
OD_SC+: 2
OD_PH_SC: 20/20
OS_CC: 20/25
OS_SC: 20/50
OD_CC: 20/20
METHOD: SNELLEN - LINEAR
OS_PH_SC: 20/20
OD_SC: 20/40
OS_SC+: -2
CORRECTION_TYPE: GLASSES

## 2023-07-24 ASSESSMENT — CONF VISUAL FIELD
OD_NORMAL: 1
OD_SUPERIOR_TEMPORAL_RESTRICTION: 0
OD_SUPERIOR_NASAL_RESTRICTION: 0
OS_INFERIOR_TEMPORAL_RESTRICTION: 0
OS_INFERIOR_NASAL_RESTRICTION: 0
OD_INFERIOR_TEMPORAL_RESTRICTION: 0
OS_SUPERIOR_NASAL_RESTRICTION: 0
OD_INFERIOR_NASAL_RESTRICTION: 0
OS_SUPERIOR_TEMPORAL_RESTRICTION: 0
OS_NORMAL: 1

## 2023-07-24 ASSESSMENT — EXTERNAL EXAM - LEFT EYE: OS_EXAM: ROSACEA

## 2023-07-24 ASSESSMENT — REFRACTION_MANIFEST
OD_SPHERE: -1.00
OD_ADD: +2.50
OS_SPHERE: -0.75
OD_AXIS: 010
OS_AXIS: 008
OD_CYLINDER: +1.50
OS_ADD: +2.50
OS_CYLINDER: +1.50

## 2023-07-24 ASSESSMENT — EXTERNAL EXAM - RIGHT EYE: OD_EXAM: ROSACEA

## 2023-07-24 ASSESSMENT — CUP TO DISC RATIO
OD_RATIO: 0.25
OS_RATIO: 0.25

## 2023-07-24 ASSESSMENT — TONOMETRY
OD_IOP_MMHG: 20
OS_IOP_MMHG: 21
IOP_METHOD: TONOPEN

## 2023-07-24 NOTE — PROGRESS NOTES
"Chief Complaint   Patient presents with    Diabetic Eye Exam        Chief Complaint(s) and History of Present Illness(es)       Diabetic Eye Exam              Vision: is stable    Diabetes Type: Type 2, on insulin and taking oral medications    Duration: 30 years    Blood Sugars: fluctuates                   Lab Results   Component Value Date    A1C 7.0 11/28/2022    A1C 7.1 05/25/2022    A1C 8.1 11/24/2021    A1C 9.0 08/16/2021    A1C 6.8 01/05/2021    A1C 6.9 11/25/2020    A1C 7.5 07/22/2020    A1C 7.3 12/10/2019     Last Eye Exam: 7-20-22  Dilated Previously: Yes    What are you currently using to see?  Rx readers    Distance Vision Acuity: Satisfied with vision    Near Vision Acuity: Satisfied with vision while reading  with glasses    Eye Comfort: good  Do you use eye drops? : No  Occupation or Hobbies: retired nurse    History of cataract surgery with Dr. Patrick Linares  Date of surgery 4-5-2018 - right eye                             3- - left eye     Plan recommended at last visit 7/20/2022    \"Heat to the eyes daily for 10-15 minutes nightly with warm washcloth or reusable gel masks from the pharmacy or  Violet Grey heat masks can be purchased at [employer].     Refresh tears 1 drop 2-4x daily.     Ocusoft lid scrubs at night.\"     Mona Herbert Optometric Assistant, A.B.O.C.     Medical, surgical and family histories reviewed and updated 7/24/2023.       OBJECTIVE: See Ophthalmology exam    ASSESSMENT:    ICD-10-CM    1. Examination of eyes and vision  Z01.00 EYE EXAM (SIMPLE-NONBILLABLE)      2. Type 2 diabetes mellitus treated with insulin (H)  E11.9 EYE EXAM (SIMPLE-NONBILLABLE)    Z79.4       3. Meibomian gland dysfunction  H02.889 EYE EXAM (SIMPLE-NONBILLABLE)      4. Pseudophakia of both eyes  Z96.1 EYE EXAM (SIMPLE-NONBILLABLE)      5. Presbyopia  H52.4 REFRACTION      6. Regular astigmatism of both eyes  H52.223 REFRACTION          PLAN:    Sherry Slaughter aware  eye exam results will be sent " to Marilou Arciniega.  Patient Instructions   There are not any signs of the diabetes affecting the eyes today.  It is important that you get your eyes dilated once yearly and keep good control of your diabetes.    Heat to the eyes daily for 10-15 minutes nightly with warm washcloth or reusable gel masks from the pharmacy or  DimensionU (formerly [employer]) heat masks can be purchased at [employer].     Refresh tears 1 drop 2-4x daily, or other artificial tear.    Cleanse eyelids/eyelashes with warm washcloth at night or Ocusoft lid scrubs at night.      Eyeglass prescription given.     Return in 1 year for a complete eye exam or sooner if needed.    Kameron Pedraza, OD

## 2023-07-24 NOTE — PATIENT INSTRUCTIONS
There are not any signs of the diabetes affecting the eyes today.  It is important that you get your eyes dilated once yearly and keep good control of your diabetes.    Heat to the eyes daily for 10-15 minutes nightly with warm washcloth or reusable gel masks from the pharmacy or  [employer] heat masks can be purchased at [employer].     Refresh tears 1 drop 2-4x daily, or other artificial tear.    Cleanse eyelids/eyelashes with warm washcloth at night or Ocusoft lid scrubs at night.      Eyeglass prescription given.     Return in 1 year for a complete eye exam or sooner if needed.    Kameron Pedraza, OD    The affects of the dilating drops last for 4- 6 hours.  You will be more sensitive to light and vision will be blurry up close.  Do not drive if you do not feel comfortable.  Mydriatic sunglasses were given if needed.      Optometry Providers       Clinic Locations                                 Telephone Number   Dr. Sanam Rodriguez [facility] [phone]     [place] [facility] Hours:                [place] [facility] Hours:       [place] [facility] Hours:   [address]   [address]     [address] [place] [address]   [place] [address]    [place] [address]  Phone: [phone]                    Phone: [phone]     Phone: [phone]                      Monday 8:00-6:00                          Monday 8:00-6:00                          Monday 8:00-6:00              Tuesday 8:00-6:00                          Tuesday 8:00-6:00                          Tuesday 8:00-6:00              Wednesday 8:00-6:00                  Wednesday 8:00-6:00                   Wednesday 8:00-6:00      Thursday 8:00-6:00                        Thursday 8:00-6:00                         Thursday 8:00-6:00            Friday 8:00-5:00                               Friday 8:00-5:00                              Friday 8:00-5:00    [facility] Hours:   [address] [place] [address] [phone]    Monday 9:00-6:00  Tuesday 9:00-6:00  Wednesday 9:00-6:00  Thursday 9:00-6:00  Friday 9:00-5:00  As always, Thank you for trusting us with your health care needs!  There is a combination of three treatments which can greatly improve symptoms of dry eyes.     Artificial tears  Heat (eyes closed)  Eyelid and eyelash cleansing (eyes closed)     Use one drop of artificial tears both eyes 4 x daily.  Once in the morning, lunch, dinner and bedtime. Continue to use the drops regardless if your eyes are comfortable or not.  Artificial tears work best as a preventative and not as well after your eyes are starting to bother you.  It may take 4- 6 weeks of using the drops before you notice improvement.  If after that time you are still having problems schedule an appointment for an evaluation and discussion of different treatments such as Restasis or Xiidra.  Dry eyes are a chronic condition and you may have more symptoms at certain times of the year.    Excess tearing can be due to the right tears not working properly or a blockage in the tear drainage system.  You can try using artificial tears 1 drop both eyes 4 x day.  If the excess tearing is bothersome after 4-6 weeks of treatment then we can send you for further testing.  This would entail a referral to our oculoplastic specialist Dr. Maxine Quinteros at the [facility]-[phone].    Recommended brands are:    Systane Complete  Systane Ultra  Systane Balance  Refresh Advanced Optive  Refresh Relieva  Blink    Recommended brands for contact lens wearers are:    Systane contacts  Refresh contacts  Blink contacts    If you are using drops more than 4 x day or have sensitivities to preservatives I recommend non preserved artificial tears.  These come in 1 use vials.  They can be used every 1-2 hours.  Do not  reuse the vials.    Recommended brands are:    Refresh Optive [place]-3  Systane- preservative free  Refresh-  preservative free  Blink- preservative free    Gels or ointment can be used at night.    Recommended brands are:    Systane Gel  Refresh Gel  Blink Gel  Genteal Gel    Systane night time (ointment)  Refresh Celluvisc  Refresh PM (ointment)    Optase dry eye spray.  Spray to eyelids 3-4 x daily.  This can be purchased on Amazon.      Visine, Clear Eyes or Murine (drops that get the red out) can irritate the eyes and cause a rebound effect where the eyes become more red and you end up using more drops.  Avoid drops containing tetrahydrozoline, naphazoline, phenylephrine, oxymetazoline.      OTC Lumify is a newer product that gives immediate redness relief without the rebound effect.  Use as needed to take the redness out.    Artificial tears may be used with other drops (such as allergy, glaucoma, antibiotics) around the same time.  Be sure to wait 5 minutes in between drops.    Heat to the eyelids can also improve your symptoms of dry eyes.  Leslye heat masks can be purchased at [employer] to be used nightly for 10-15 minutes.  Other options are gel masks that can be put in the microwave and purchased at most pharmacies.      Tea Tree Oil eyelid cleansers recommended are Ocusoft Oust foam cleanser to cleanse eyelids/lashes at night and in the am. Other options are Blephadex or Cliradex eyelid wipes.  KEEP EYES CLOSED when using these products.  These can be purchased on amazon.com   A good product for make up remover with tea tree oil is Splice.  This can be found at www.[employer] or [employer].    Other good eyelid cleansers have hypochlorous which removes excess bacteria and is safe around the eyes. Products are Avenova, Ocusoft Hypochlor or Heyedrate. Spray solution onto cotton pad, close eyes and gently apply to eyelids and eyelashes using side to side motion.  You can also KEEP EYES CLOSED spray and  rub into eyelashes.  You do not need to rinse it off. Use morning and evening. These products can be found on Amazon.  You can check with your local pharmacy and see if they can order if for you if they don't have it.    Other brands of eyelid cleansing wipes are:    Ocusoft wipes  Systane wipes    A great eye make up line is [email or web address]

## 2023-07-24 NOTE — LETTER
2023         RE: Sherry Slaughter  [address]  [place] [address]        Dear Colleague,    Thank you for referring your patient, Sherry Slaughter, to the [facility] [place]. Please see a copy of my visit note below.    [facility] 6-month post procedure note    Sherry Slaughter returns in follow-up of radiofrequency ablation recanalized segments of her left great saphenous and small saphenous veins for a persistent, large varicosity which coursed along the proximal posterior medial and posterior left calf.  She is very happy with relief of the symptoms and eradication of the painful vein.  She has no concerns.    Exam  Left lower extremity: Hyperpigmentation in the proximal medial left calf.  There is excess tissue deposition just below the knee, just cephalad to this area of hyperpigmentation.  No visible varicose veins in this area.  The large varicosity which coursed from the posterior medial calf posteriorly to the area of the small saphenous vein, just distal to the popliteal crease is no longer present.    Ultrasound    Steph Hitchcock on 2023  2:49 PM   Name:  Sherry Slaughter                                                       Patient ID: [id number]  Date: 2023                                                     : 1955  Sex: female                                                                 Examined by: MARIA ESTHER Hitchcock RVT  Age:  68 year old                                                         Reading MD: EFREN Pride MD     INDICATIONS:    Post VNUS Closure      EXAM TYPE  LEFT LOWER EXTREMITY VENOUS DUPLEX   6 MONTH POST VNUS CLOSURE    GSV and SSV     TECHNICAL SUMMARY     Multiple transverse and longitudinal images of left lower extremity were obtained        LEFT:     The CFV and Popliteal vein demonstrate phasic flow, compress and responds to augmentations.  No evidence of DVT at this  time.       The GSV is closed 33 mm from the SFJ to the mid calf with evidence of thrombus seen throughout. Multiple varicose veins are seen arising off proximal and distal calf.      The SSV is closed 20 mm from the SPJ to the mid calf with evidence of thrombus seen throughout. Multiple varicose veins are seen arising off mid calf.         FINAL SUMMARY:  1.         Left CFV and popliteal vein are patent.  2.         Left GSV is closed.  3.         Left SSV is closed.  4.         Multiple left leg varicose veins.         Assessment  Doing well with pain relief and resolution of the varicosities.  The veins remain closed.    Plan  Return on an as-needed basis    She will be [place] to [place] in the near future.  I strongly encouraged her to wear compression during her flight.    DAYANA Pride MD, FACS    Dictated using Dragon voice recognition software which may result in transcription errors      Again, thank you for allowing me to participate in the care of your patient.        Sincerely,        Sawyer Pride MD

## 2023-07-24 NOTE — PROGRESS NOTES
[facility] [place] 6-month post procedure note    Sherry Slaughter returns in follow-up of radiofrequency ablation recanalized segments of her left great saphenous and small saphenous veins for a persistent, large varicosity which coursed along the proximal posterior medial and posterior left calf.  She is very happy with relief of the symptoms and eradication of the painful vein.  She has no concerns.    Exam  Left lower extremity: Hyperpigmentation in the proximal medial left calf.  There is excess tissue deposition just below the knee, just cephalad to this area of hyperpigmentation.  No visible varicose veins in this area.  The large varicosity which coursed from the posterior medial calf posteriorly to the area of the small saphenous vein, just distal to the popliteal crease is no longer present.    Ultrasound    Steph Hitchcock on 2023  2:49 PM   Name:  Sherry Slaughter                                                       Patient ID: [id number]  Date: 2023                                                     : 1955  Sex: female                                                                 Examined by: MARIA ESTHER Hitchcock RVT  Age:  68 year old                                                         Reading MD: EFREN Pride MD     INDICATIONS:    Post VNUS Closure      EXAM TYPE  LEFT LOWER EXTREMITY VENOUS DUPLEX   6 MONTH POST VNUS CLOSURE    GSV and SSV     TECHNICAL SUMMARY     Multiple transverse and longitudinal images of left lower extremity were obtained        LEFT:     The CFV and Popliteal vein demonstrate phasic flow, compress and responds to augmentations.  No evidence of DVT at this time.       The GSV is closed 33 mm from the SFJ to the mid calf with evidence of thrombus seen throughout. Multiple varicose veins are seen arising off proximal and distal calf.      The SSV is closed 20 mm from the SPJ to the mid calf with evidence of thrombus seen  throughout. Multiple varicose veins are seen arising off mid calf.         FINAL SUMMARY:  1.         Left CFV and popliteal vein are patent.  2.         Left GSV is closed.  3.         Left SSV is closed.  4.         Multiple left leg varicose veins.         Assessment  Doing well with pain relief and resolution of the varicosities.  The veins remain closed.    Plan  Return on an as-needed basis    She will be traveling to [facility] and the [facility] in the near future.  I strongly encouraged her to wear compression during her flight.    C Vicente Pride MD, FACS    Dictated using Dragon voice recognition software which may result in transcription errors

## 2023-07-24 NOTE — LETTER
"    7/24/2023         RE: Sherry Slaughter  [address]  [place] [address]        Dear Colleague,    Thank you for referring your patient, Sherry Slaughter, to the [facility]. Please see a copy of my visit note below.    Chief Complaint   Patient presents with     Diabetic Eye Exam        Chief Complaint(s) and History of Present Illness(es)       Diabetic Eye Exam              Vision: is stable    Diabetes Type: Type 2, on insulin and taking oral medications    Duration: 30 years    Blood Sugars: fluctuates                   Lab Results   Component Value Date    A1C 7.0 11/28/2022    A1C 7.1 05/25/2022    A1C 8.1 11/24/2021    A1C 9.0 08/16/2021    A1C 6.8 01/05/2021    A1C 6.9 11/25/2020    A1C 7.5 07/22/2020    A1C 7.3 12/10/2019     Last Eye Exam: 7-20-22  Dilated Previously: Yes    What are you currently using to see?  Rx readers    Distance Vision Acuity: Satisfied with vision    Near Vision Acuity: Satisfied with vision while reading  with glasses    Eye Comfort: good  Do you use eye drops? : No  Occupation or Hobbies: retired nurse    History of cataract surgery with Dr. Patrick Linares  Date of surgery 4-5-2018 - right eye                             3- - left eye     Plan recommended at last visit 7/20/2022    \"Heat to the eyes daily for 10-15 minutes nightly with warm washcloth or reusable gel masks from the pharmacy or  [employer] heat masks can be purchased at [place].     Refresh tears 1 drop 2-4x daily.     Ocusoft lid scrubs at night.\"     Mona Herbert Optometric Assistant, A.B.O.C.     Medical, surgical and family histories reviewed and updated 7/24/2023.       OBJECTIVE: See Ophthalmology exam    ASSESSMENT:    ICD-10-CM    1. Examination of eyes and vision  Z01.00 EYE EXAM (SIMPLE-NONBILLABLE)      2. Type 2 diabetes mellitus treated with insulin (H)  E11.9 EYE EXAM (SIMPLE-NONBILLABLE)    Z79.4       3. Meibomian gland dysfunction  H02.889 EYE " EXAM (SIMPLE-NONBILLABLE)      4. Pseudophakia of both eyes  Z96.1 EYE EXAM (SIMPLE-NONBILLABLE)      5. Presbyopia  H52.4 REFRACTION      6. Regular astigmatism of both eyes  H52.223 REFRACTION          PLAN:    Sherry Slaughter aware  eye exam results will be sent to Marilou Arciniega.  Patient Instructions   There are not any signs of the diabetes affecting the eyes today.  It is important that you get your eyes dilated once yearly and keep good control of your diabetes.    Heat to the eyes daily for 10-15 minutes nightly with warm washcloth or reusable gel masks from the pharmacy or  [employer] heat masks can be purchased at [place].     Refresh tears 1 drop 2-4x daily, or other artificial tear.    Cleanse eyelids/eyelashes with warm washcloth at night or O[employer] lid scrubs at night.      Eyeglass prescription given.     Return in 1 year for a complete eye exam or sooner if needed.    Kameron Pedraza, KULDEEP               Again, thank you for allowing me to participate in the care of your patient.        Sincerely,        Kameron Pedraza, OD

## 2023-09-13 NOTE — PROGRESS NOTES
I am delighted to see Sherry Slaughter at the [facility] for follow up of atrial fibrillation.       The patient is a 68 year old  female with persistent AF initially diagnosed December 2020, underwent successful cardioversion to sinus rhythm and had been on rivaroxaban, metoprolol, and diltiazem. Over the last few years she's had intermittent recurrent episodes with symptoms of fatigue and shoulder pain, all paroxysmal. Her metoprolol and diltiazem doses have been increased without much effect. Sotalol was started Jan 2023, dose gradually increased to 160 bid with improvement for only about a month before increasing episodes of AF.  Sotalol was stopped and amiodarone started. She's felt great since being on amiodarone; dose reduced to 200 every day in March 2023. Monitor was done with her home [facility] tracings.     She comes in today for a routine visit. She's not had any AF episodes. She spent a month in the [facility] with a friend, able to walk as much as she liked, although she had some dyspnea climbing stairs and hills, and she couldn't go as fast as the 30 year-olds in the group. Had some mild swelling left ankle on the way to the [facility] but none on the way back. She doesn't wear compression stockings regularly.     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 She keeps busy with 5 grandkids. She is able to do all routine ADLs but mobility is limited due to multiple orthopedic/arthritic problems.     Past Medical History:  1. Atrial fibrillation, diagnosed 11/2020, persistent, s/p ELIAS/CV 12/4/2020 to sinus. Recurrent AF 12/9/2020 when admitted with hypertensive urgency, ventricular rates 80-90s, spontaneously converted. Recurrent PAF, sotalol started Jan 2023. Changed to amiodarone by March 2023.  2. Diabetes type II  3. Hypertension  4. GENESIS, on BiPAP  5. Hypothyroidism  6. BMI 44  7. HFpEF    Allergies:    Allergies   Allergen Reactions     Empagliflozin      Yeast infections    Lipitor [Atorvastatin Calcium]      Gas    Pravachol [Pravastatin Sodium]      Pravachol - dry cough     Simvastatin      Myalgia       Medications:   Rivaroxaban 20 qd  Amiodarone 200 every day  Diltiazem  mg every day  Metoprolol succinate 200 qd  Lisinopril 20 qd  Lasix 20 every day  KCL 20 meq qd  Hydrochlorothiazide 50 every day  Rosuvastatin 10 every day    Insulin  Metformin XR 2000 at bedtime  Semaglutide  Levothyroxine 75 mcg every day      Physical examination  Vitals: 131/63, HR 65  BMI= 44 (128.6 kg, down from 133.6 in April)    Constitutional: In general, the patient is a pleasant female in no acute distress.    Targeted cardiovascular exam:  Regular rate and rhythm. Normal S1, S2. 2/6 systolic murmur, rub, click, or gallop.   Extremities:Pulses are normal bilaterally throughout. Trace right ankle peripheral edema.  Respiratory: Clear to auscultation.     I have personally and independently reviewed the following:  Labs:  9/14/2023: cr 1.22, AST 36, ALT 29, TSH 2.85    Echo1/9/2023: EF 60-65%, normal RV, grade II diastolic dysfunction, no valve disease, OSMAR 33.5    EKG:   TODAY 9/14/2023: sinus 66 bpm,  ms  3/28/2023: sinus 68 bpm,  ms    Patch monitor 10/7-10/21/2021: PAF, AF burden 43% average rage 97 bpm, longest episode 6 days.     PFTs 7/13/2023: DLCO 90% predicted      Assessment :  Atrial fibrillation, paroxysmal, minimal episodes since amiodarone started march 2023. Thyroid/Liver function tests stable.  Will continue amiodarone 200 every day, repeat labs and EKG in 6 months. [id number] score is 4 for HTN, DM, female, age >65.  She is on the appropriate dose of rivaroxaban 20 mg every day  HFpEF, follows with [facility] and Dr. Aranda. Euvolemic today. She is on both lasix and hydrochlorothiazide. Creatinine today elevated - will notify [facility].      Plan:  Return 6 months for EKG, AST/ALT/TSH.        I spent a total of 20 minutes  face to face with  Sherry Slaughter during today's office visit. I have spent an additional 15 minutes today on chart review and documentation.        The patient is to return  as above. The patient understood the treatment plan as outlined above.  There were no barriers to learning.      Roslyn Mccracken MD

## 2023-09-14 ENCOUNTER — OFFICE VISIT (OUTPATIENT)
Dept: CARDIOLOGY | Facility: CLINIC | Age: 68
End: 2023-09-14
Payer: COMMERCIAL

## 2023-09-14 ENCOUNTER — MYC MEDICAL ADVICE (OUTPATIENT)
Dept: CARDIOLOGY | Facility: CLINIC | Age: 68
End: 2023-09-14

## 2023-09-14 VITALS
SYSTOLIC BLOOD PRESSURE: 131 MMHG | DIASTOLIC BLOOD PRESSURE: 63 MMHG | HEART RATE: 65 BPM | WEIGHT: 283.5 LBS | OXYGEN SATURATION: 97 % | BODY MASS INDEX: 44.24 KG/M2

## 2023-09-14 DIAGNOSIS — I50.9 CONGESTIVE HEART FAILURE, UNSPECIFIED HF CHRONICITY, UNSPECIFIED HEART FAILURE TYPE (H): Primary | ICD-10-CM

## 2023-09-14 DIAGNOSIS — I10 BENIGN ESSENTIAL HYPERTENSION: ICD-10-CM

## 2023-09-14 DIAGNOSIS — I48.0 PAROXYSMAL ATRIAL FIBRILLATION (H): Primary | ICD-10-CM

## 2023-09-14 LAB
ALBUMIN SERPL BCG-MCNC: 4.5 G/DL (ref 3.5–5.2)
ALP SERPL-CCNC: 49 U/L (ref 35–104)
ALT SERPL W P-5'-P-CCNC: 29 U/L (ref 0–50)
ANION GAP SERPL CALCULATED.3IONS-SCNC: 15 MMOL/L (ref 7–15)
AST SERPL W P-5'-P-CCNC: 36 U/L (ref 0–45)
BILIRUB SERPL-MCNC: 0.4 MG/DL
BUN SERPL-MCNC: 17.7 MG/DL (ref 8–23)
CALCIUM SERPL-MCNC: 10.5 MG/DL (ref 8.8–10.2)
CHLORIDE SERPL-SCNC: 98 MMOL/L (ref 98–107)
CREAT SERPL-MCNC: 1.22 MG/DL (ref 0.51–0.95)
DEPRECATED HCO3 PLAS-SCNC: 25 MMOL/L (ref 22–29)
EGFRCR SERPLBLD CKD-EPI 2021: 48 ML/MIN/1.73M2
GLUCOSE SERPL-MCNC: 172 MG/DL (ref 70–99)
POTASSIUM SERPL-SCNC: 3.9 MMOL/L (ref 3.4–5.3)
PROT SERPL-MCNC: 7.1 G/DL (ref 6.4–8.3)
SODIUM SERPL-SCNC: 138 MMOL/L (ref 136–145)
TSH SERPL DL<=0.005 MIU/L-ACNC: 2.85 UIU/ML (ref 0.3–4.2)

## 2023-09-14 PROCEDURE — 84443 ASSAY THYROID STIM HORMONE: CPT | Performed by: INTERNAL MEDICINE

## 2023-09-14 PROCEDURE — 36415 COLL VENOUS BLD VENIPUNCTURE: CPT | Performed by: INTERNAL MEDICINE

## 2023-09-14 PROCEDURE — 93000 ELECTROCARDIOGRAM COMPLETE: CPT | Performed by: INTERNAL MEDICINE

## 2023-09-14 PROCEDURE — 99214 OFFICE O/P EST MOD 30 MIN: CPT | Performed by: INTERNAL MEDICINE

## 2023-09-14 PROCEDURE — 80053 COMPREHEN METABOLIC PANEL: CPT | Performed by: INTERNAL MEDICINE

## 2023-09-14 NOTE — NURSING NOTE
Sherry Slaughter's goals for this visit include:   Chief Complaint   Patient presents with    Follow Up     6 month follow up        She requests these members of her care team be copied on today's visit information: yes     PCP: Marilou Arciniega    Referring Provider:  No referring provider defined for this encounter.    /63 (BP Location: Right arm, Patient Position: Chair, Cuff Size: Adult Large)   Pulse 65   Wt 128.6 kg (283 lb 8 oz)   LMP 10/02/2007   SpO2 97%   BMI 44.24 kg/m      Do you need any medication refills at today's visit? No    FRANKO Dixon   Neph/Pulm [facility]

## 2023-09-14 NOTE — TELEPHONE ENCOUNTER
Called and spoke with patient. Patient reports that she is feeling better now that her atrial fibrillation has improved.     Most recent weights: 238 lbs in clinic. She says that she has lot about 10 pounds since last visit but that it is now staying pretty steady. Last visit was on 5/31/23 and weight was around 292 lbs.     Most recent BPs: Patient did not update on recent blood pressures.    Patient states their shortness of breath is better. Patient states that she still can get shortness of breath. However she has noticed that she is able to go on walks and move about now which she had struggled with in the past.     Patient states their swelling is same. Patient reports no issues with swelling at this time.     Other Symptoms/Concerns: Patient denies any lightheadedness or dizziness. Patient reports that she is feeling good.     Follow up Appointments: [facility] in December.     Message will be sent to Robinson Elizalde CNP to review.     Rachell Rocha RN, BSN  Cardiology RN Care Coordinator   [place]   Phone: [phone]  Fax: [phone] ([place]) [phone] ([place])

## 2023-09-14 NOTE — TELEPHONE ENCOUNTER
Robinson Elizalde APRN CNP Krist, Chana RN  Replies will be sent to [facility] Cardiology Adult Milady Lovett  Can you reach out to get her weights at home- also just to see what her symptoms have been like please.          Previous Messages       ----- Message -----  From: Roslyn Mccracken MD  Sent: 9/14/2023  10:47 AM CDT  To: JIM Foss CNP saw Sherry today, she's fine from an AF standpoint. I got labs for amiodarone follow up and saw that her creatinine has gone up even more since May. She doesn't have an appt with you until Dec.

## 2023-09-14 NOTE — PATIENT INSTRUCTIONS
Take your medicines every day, as directed     Changes made today:  Continue all meds  Labs today: TSH/free T4, CMP  Return in 6 months with labs       Cardiology Care Coordinators:      Rachell SCHWARTZ RN     Cardiology Rooming staff:  Dada VELIZ      Phone  [phone]      Fax [phone]    To Contact us     During Business Hours:  [phone]     If you are needing refills please contact your pharmacy.     For urgent after hour care please call the [facility] at [phone] or the [facility] at [phone] and ask to speak to the cardiologist on call.            HOW TO CHECK YOUR BLOOD PRESSURE AT HOME:     Avoid eating, smoking, and exercising for at least 30 minutes before taking a reading.     Be sure you have taken your BP medication at least 2-3 hours before you check it.      Sit quietly for 10 minutes before a reading.      Sit in a chair with your feet flat on the floor. Rest your  arm on a table so that the arm cuff is at the same level as your heart.     Remain still during the reading.  Record your blood pressure and pulse in a log and bring to your next appointment.       Use [employer] allows you to communicate directly with your heart team through secure messaging.  [facility] can be accessed any time on your phone, computer, or tablet.  If you need assistance, we'd be happy to help!             Keep your Heart Appointments:

## 2023-09-15 RX ORDER — HYDROCHLOROTHIAZIDE 25 MG/1
25 TABLET ORAL DAILY
Qty: 90 TABLET | Refills: 3 | Status: SHIPPED | OUTPATIENT
Start: 2023-09-15 | End: 2024-04-25

## 2023-09-15 NOTE — TELEPHONE ENCOUNTER
Per Robinson,    Please ask the patient to decrease hydrochlorothiazide to 25 mg and lets get labs in a week.  Since she has lost weight and has been feeling well.  We can maybe decrease the hydrochlorothiazide to see if it will help the kidney function .       Pt phoned with this information and she is agreeable to plan. BMP ordered and lab appt made for pt.     EMMY Martines

## 2023-09-26 ENCOUNTER — MYC MEDICAL ADVICE (OUTPATIENT)
Dept: ENDOCRINOLOGY | Facility: CLINIC | Age: 68
End: 2023-09-26
Payer: COMMERCIAL

## 2023-09-26 NOTE — PROGRESS NOTES
Outcome for 09/26/23 10:50 AM: [employer] message sent upload to dexcom - patient is linked.  Chloé Sparks CMA  Adult Endocrinology  [facility]

## 2023-09-28 ENCOUNTER — OFFICE VISIT (OUTPATIENT)
Dept: ENDOCRINOLOGY | Facility: CLINIC | Age: 68
End: 2023-09-28
Payer: COMMERCIAL

## 2023-09-28 ENCOUNTER — LAB (OUTPATIENT)
Dept: LAB | Facility: CLINIC | Age: 68
End: 2023-09-28
Payer: COMMERCIAL

## 2023-09-28 VITALS
HEART RATE: 64 BPM | OXYGEN SATURATION: 97 % | SYSTOLIC BLOOD PRESSURE: 138 MMHG | DIASTOLIC BLOOD PRESSURE: 78 MMHG | RESPIRATION RATE: 16 BRPM | WEIGHT: 285 LBS | BODY MASS INDEX: 42.21 KG/M2 | HEIGHT: 69 IN

## 2023-09-28 DIAGNOSIS — Z79.4 TYPE 2 DIABETES MELLITUS TREATED WITH INSULIN (H): ICD-10-CM

## 2023-09-28 DIAGNOSIS — E11.9 TYPE 2 DIABETES MELLITUS TREATED WITH INSULIN (H): ICD-10-CM

## 2023-09-28 DIAGNOSIS — I50.9 CONGESTIVE HEART FAILURE, UNSPECIFIED HF CHRONICITY, UNSPECIFIED HEART FAILURE TYPE (H): ICD-10-CM

## 2023-09-28 DIAGNOSIS — Z23 NEED FOR PROPHYLACTIC VACCINATION AND INOCULATION AGAINST INFLUENZA: Primary | ICD-10-CM

## 2023-09-28 LAB
ANION GAP SERPL CALCULATED.3IONS-SCNC: 16 MMOL/L (ref 7–15)
BUN SERPL-MCNC: 22.3 MG/DL (ref 8–23)
CALCIUM SERPL-MCNC: 9.6 MG/DL (ref 8.8–10.2)
CHLORIDE SERPL-SCNC: 102 MMOL/L (ref 98–107)
CREAT SERPL-MCNC: 1.07 MG/DL (ref 0.51–0.95)
EGFRCR SERPLBLD CKD-EPI 2021: 56 ML/MIN/1.73M2
GLUCOSE SERPL-MCNC: 240 MG/DL (ref 70–99)
HBA1C MFR BLD: 6.9 % (ref 4.3–?)
HCO3 SERPL-SCNC: 21 MMOL/L (ref 22–29)
POTASSIUM SERPL-SCNC: 3.9 MMOL/L (ref 3.4–5.3)
SODIUM SERPL-SCNC: 139 MMOL/L (ref 135–145)

## 2023-09-28 PROCEDURE — 36415 COLL VENOUS BLD VENIPUNCTURE: CPT

## 2023-09-28 PROCEDURE — G0008 ADMIN INFLUENZA VIRUS VAC: HCPCS | Performed by: PHYSICIAN ASSISTANT

## 2023-09-28 PROCEDURE — 83036 HEMOGLOBIN GLYCOSYLATED A1C: CPT | Performed by: PHYSICIAN ASSISTANT

## 2023-09-28 PROCEDURE — 80048 BASIC METABOLIC PNL TOTAL CA: CPT

## 2023-09-28 PROCEDURE — 90662 IIV NO PRSV INCREASED AG IM: CPT | Performed by: PHYSICIAN ASSISTANT

## 2023-09-28 PROCEDURE — 99213 OFFICE O/P EST LOW 20 MIN: CPT | Mod: 25 | Performed by: PHYSICIAN ASSISTANT

## 2023-09-28 NOTE — CONFIDENTIAL NOTE
Patient arrived in clinic early and able to reset link with patient for data sharing with [employer].       Report obtained and printed for provider to review with patient during visit.       Alejandra Castro, VIKAS  Adult Endocrinology   [facility]

## 2023-09-28 NOTE — PROGRESS NOTES
Assessment/Plan :   Type 2 DM. Sherry is doing great. Her blood sugars remain stable and she has not had any problems with her medication. She continues to work on eating healthy and being active, and she is feeling great. We discussed her recent laboratory results. We also reviewed her recent Dexcom report. Her blood sugars are very stable and her A1C is currently to goal. I encouraged her to keep up the good work. We will follow-up in 3 mos.      I have independently reviewed and interpreted labs, imaging as indicated.      Chief complaint:  Sherry is a 68 year old female who returns for follow-up of Type 2 DM with long term use of insulin.    I have reviewed Care Everywhere including [facility], [facility], [facility], [facility], [facility], [facility] , [facility], [place] lab reports, imaging reports and provider notes as indicated.      HISTORY OF PRESENT ILLNESS  Sherry is feeling great. She recently returned from a month long trip to the [facility] and [facility] and her blood sugars were stable throughout the trip. She continues to take Ozempic 2 mg weekly, metformin 2000 mg daily, and Humulin R U500 140 unit(s) in the morning and 120 unit(s) in the evening. She is monitoring her blood sugars with the Dexcom G7 and her blood sugar are very stable.    She has not had any problems with severe hyperglycemia and/or hypoglycemia. She did have a couple low blood sugars while traveling. She feels like they were due to a combination of the change in diet and increased activity. She could feel the low blood sugars and the sensor alerted her. She was able to correct with a small amount of juice. She has not had any problems with blurred vision or worsening numbness/tingling in her feet. She also has no history of microalbuminuria. She did experience some leg swelling after the flight but it was manageable. She was very active during her trip and she felt great.    Sherry has had diabetes for over  20 yrs. She has used a variety of medications over the years to manage her blood sugars and, previously, she struggled to get her A1C to goal. She feels like her current medication combination is working really well. Her diabetes is complicated by hyperlipidemia, obesity, CKD stage 3a, paroxysmal atrial fibrillatin s/p pacemaker, GENESIS, osteoarthritis, and hypothyroidism. She remains stable on levothyroxine 75 mcg daily. She has not had any problems with worsening anxiousness, irritability, or fatigue.    Endocrine relevant labs are as follows:   Latest Reference Range & Units 11/28/22 09:04   Albumin Urine mg/g Cr 0.00 - 25.00 mg/g Cr 10.26      Latest Reference Range & Units 11/28/22 09:04   Albumin Urine mg/L mg/L 12      Latest Reference Range & Units 09/28/23 09:38   Hemoglobin A1C POCT 4.3 - <5.7 % 6.9 !   !: Data is abnormal     Latest Reference Range & Units 09/14/23 09:58   TSH 0.30 - 4.20 uIU/mL 2.85     REVIEW OF SYSTEMS    Endocrine: positive for diabetes and obesity  Skin: negative  Eyes: negative for, visual blurring, redness, tearing  Ears/Nose/Throat: negative  Respiratory: No shortness of breath, dyspnea on exertion, cough, or hemoptysis  Cardiovascular: negative for, chest pain, lower extremity edema, and exercise intolerance  Gastrointestinal: negative for, nausea, vomiting, constipation, and diarrhea  Genitourinary: negative for, nocturia, dysuria, frequency, and urgency  Musculoskeletal: negative for, muscular weakness, and nocturnal cramping  Neurologic: negative for, local weakness, and numbness or tingling of feet  Psychiatric: negative  Hematologic/Lymphatic/Immunologic: negative    Past Medical History  Past Medical History:   Diagnosis Date    Cataract     Congestive heart failure (H) 2019 NOVEMBER    AFIB    DEPRESSION     comes and goes - tried meds - unsuccessfully, certain times of the year, no psych intervention and no counselors in the past - and not interested     Diabetic retinopathy  associated with diabetes mellitus due to underlying condition (H)     Diverticulosis of colon (without mention of hemorrhage) 04/2004    colonoscopy    ECHO-mildLVH,tr MR,mild thick lflets w inc LA,trTR   12/2003    Essential hypertension, benign 1990s    late 1990s - started medications at that time - not to difficult to control meds     Fam hx-cardiovas dis NEC     father - CAD, and lipids/HTN - multiple members of the family     Family history of diabetes mellitus     sister and grandmother with DM     Family history of malignant neoplasm of breast     mother - young age - 45, and maternal cousin and aunt as well - no BRCA testing done     Family history of stroke (cerebrovascular)     grandmother in early life in her 40s     FAMILY HX COLON CANCER     Pat uncles x 2    Heart disease     murmur/    Heart murmur     HYPERLIPIDEMIA 2000    fairly recent - in the last 5 years - high for DM levels  -cholesterol recent     Irritable bowel syndrome     goes between the 2 - nerve related - more stressed more problems     MICROALBUMINURIA     unsure how long - been on the lisinopril - for a few years at well     Nonspecific abnormal results of liver function study 02/03/2003    SGOT - has been high in the past - since the hepatitis b - borderline elevation - not really changing any     OBESITY     Obstructive sleep apnea     GENESIS (obstructive sleep apnea) 10/15/2006    psg 5/15 AHI 53 aPAP 8-15    OSTEOARTHRITIS L KNEE 2003    total knee on the left - and pain is gone since the knee replacement     PERS HX HEPATITIS B- RESOLVED] 1977    acute virus only - no chronic disease     PERS HX HEPATITIS B- RESOLVED]     Type II or unspecified type diabetes mellitus without mention of complication, not stated as uncontrolled 1991    oral meds frist, then insulin eventually later, difficult to control most of the time     Unspecified hypothyroidism 10/11/2006    TSH 3.36 - mild subclinical hypothyroidism - deciding on following or  starting low dose meds - with dr Oreilly - following        Medications  Current Outpatient Medications   Medication Sig Dispense Refill    amiodarone (PACERONE) 200 MG tablet Take 1 tablet (200 mg) by mouth daily 90 tablet 3    blood glucose (NO BRAND SPECIFIED) test strip Use to test blood sugar 2 times daily or as directed. Patient requesting One Touch Ultra Strips 100 strip 3    Continuous Blood Gluc  (DEXCOM G7 ) KATIA Use to read blood sugars as per 's instructions. 1 each 0    Continuous Blood Gluc Sensor (DEXCOM G7 SENSOR) MISC 1 each every 10 days 9 each 3    diltiazem ER COATED BEADS (CARDIZEM CD) 360 MG 24 hr capsule Take 1 capsule (360 mg) by mouth daily 90 capsule 3    econazole nitrate 1 % external cream Apply topically daily To feet and toenails. 85 g 5    furosemide (LASIX) 20 MG tablet Take 1 tablet (20 mg) by mouth daily 90 tablet 3    Garlic 1000 MG CAPS Take 1 capsule by mouth daily Takes during summer months.  Done taking until next summer.      Glucagon (GVOKE HYPOPEN 1-PACK) 1 MG/0.2ML SOAJ Inject 1 mg Subcutaneous once as needed Administer the injection in the lower abdomen, outer thigh, or outer upperarm 0.2 mL 3    hydrochlorothiazide (HYDRODIURIL) 25 MG tablet Take 1 tablet (25 mg) by mouth daily 90 tablet 3    ibuprofen (ADVIL/MOTRIN) 200 MG capsule Take 600 mg by mouth every 6 hours as needed       insulin pen needle (GNP CLICKFINE PEN NEEDLES) 31G X 8 MM miscellaneous Uses 3 times daily or as directed 300 each 3    insulin reg HIGH CONC (HUMULIN R U-500 KWIKPEN) 500 UNIT/ML PEN soln Administer 130-140 units at 7 in the morning, and 120 units at 3-4 pm 40 mL 1    levothyroxine (SYNTHROID/LEVOTHROID) 75 MCG tablet Take 1 tablet (75 mcg) by mouth daily 90 tablet 3    lisinopril (ZESTRIL) 20 MG tablet Take 1 tablet (20 mg) by mouth daily 90 tablet 3    metFORMIN (GLUCOPHAGE XR) 500 MG 24 hr tablet Take 4 tablets (2,000 mg) by mouth At Bedtime TAKE 4 TABLETS AT  BEDTIME 360 tablet 3    metoprolol succinate ER (TOPROL XL) 200 MG 24 hr tablet Take 1 tablet (200 mg) by mouth daily Dose increase 180 tablet 3    Multiple Vitamins-Minerals (ADVANCED DIABETIC MULTIVITAMIN) TABS Take 1 tablet by mouth daily      ORDER FOR DME, SET TO LOCAL PRINT, Respironics REMSTAR 60 Series Auto CPAP 8-15cm H2O, Airfit P10 nasal pillow mask w/medium pillows      order for DME Equipment being ordered: [id number] $70   Shoe LG 1 Device 0    potassium chloride ER (KLOR-CON M) 20 MEQ CR tablet Take 1 tablet (20 mEq) by mouth daily 90 tablet 3    rivaroxaban ANTICOAGULANT (XARELTO ANTICOAGULANT) 20 MG TABS tablet Take 1 tablet (20 mg) by mouth daily (with dinner) 90 tablet 3    rosuvastatin (CRESTOR) 10 MG tablet Take 1 tablet (10 mg) by mouth daily 90 tablet 3    Semaglutide, 2 MG/DOSE, (OZEMPIC) 8 MG/3ML pen Inject 2 mg Subcutaneous once a week 9 mL 3       Allergies  Allergies   Allergen Reactions    Empagliflozin      Yeast infections    Lipitor [Atorvastatin Calcium]      Gas    Pravachol [Pravastatin Sodium]      Pravachol - dry cough     Simvastatin      Myalgia         Family History  family history includes Allergies in her mother; Alzheimer Disease in her mother; Aneurysm in her father; Arrhythmia in her sister; Arthritis in her mother; Asthma in her mother, sister, and sister; Blood Disease in her paternal grandmother; Breast Cancer in an other family member; Breast Cancer (age of onset: 40) in her mother; Breast Cancer (age of onset: 62) in her maternal aunt; Cancer in her father and mother; Cancer - colorectal in her paternal uncle and another family member; Cardiovascular in her brother and mother; Cerebrovascular Disease in her maternal grandmother and mother; Colon Cancer in an other family member; Congenital Anomalies in her brother; Coronary Artery Disease in her brother, brother, father, and sister; Dementia in her mother; Depression in her mother; Diabetes in her maternal  "grandmother, sister, and sister; Gynecology in her sister; Heart Disease in her brother, brother, and sister; Hypertension in her brother, brother, brother, mother, sister, and sister; Lipids in her brother, brother, mother, sister, and sister; Obesity in her brother; Other Cancer in her brother, father, and mother; Other Cancer (age of onset: 35) in her maternal aunt; Respiratory in her brother and mother; Thyroid Disease in her mother.    Social History  Social History     Tobacco Use    Smoking status: Never     Passive exposure: Never    Smokeless tobacco: Never    Tobacco comments:     tried in early 20s only    Vaping Use    Vaping Use: Never used   Substance Use Topics    Alcohol use: Yes     Comment: rare    Drug use: No       Physical Exam  /78 (BP Location: Left arm, Patient Position: Sitting, Cuff Size: Adult Large)   Pulse 64   Resp 16   Ht 1.74 m (5' 8.5\")   Wt 129.3 kg (285 lb)   LMP 10/02/2007   SpO2 97%   BMI 42.70 kg/m    Body mass index is 42.7 kg/m .  GENERAL :  In no apparent distress  SKIN: Normal color, normal temperature, texture.  No hirsutism, alopecia or purple striae.     EYES: PERRL, EOMI, No scleral icterus,  No proptosis, conjunctival redness, stare, retraction  RESP: Lungs clear to auscultation bilaterally  CARDIAC: Regular rate and rhythm, normal S1 S2, without murmurs, rubs or gallops    NEURO: awake, alert, responds appropriately to questions.  Cranial nerves intact.   Moves all extremities; Gait normal.  No tremor of the outstretched hand.    EXTREMITIES: No clubbing, cyanosis or edema.    DATA REVIEW  Dexcom Clarity  Time in target range 70%  Very Low <1%, Low 1%  High 25% and Very High 3%  Current Ave  mg/dl      "

## 2023-09-28 NOTE — LETTER
9/28/2023         RE: Sherry Slaughter  [address]  [place] [address]        Dear Colleague,    Thank you for referring your patient, Sherry Slaughter, to the [facility]. Please see a copy of my visit note below.    Outcome for 09/26/23 10:50 AM: HelpAround message sent upload to dexcom - patient is linked.  Chloé Sparks, [facility]  Adult Endocrinology  [facility] [place]      Assessment/Plan :   Type 2 DM. Sherry is doing great. Her blood sugars remain stable and she has not had any problems with her medication. She continues to work on eating healthy and being active, and she is feeling great. We discussed her recent laboratory results. We also reviewed her recent Dexcom report. Her blood sugars are very stable and her A1C is currently to goal. I encouraged her to keep up the good work. We will follow-up in 3 mos.      I have independently reviewed and interpreted labs, imaging as indicated.      Chief complaint:  Sherry is a 68 year old female who returns for follow-up of Type 2 DM with long term use of insulin.    I have reviewed Care Everywhere including [facility], [facility], [place],[facility], [facility], [facility] , [facility], [place] lab reports, imaging reports and provider notes as indicated.      HISTORY OF PRESENT ILLNESS  Sherry is feeling great. She recently returned from a month long trip to the [facility] and [facility] and her blood sugars were stable throughout the trip. She continues to take Ozempic 2 mg weekly, metformin 2000 mg daily, and Humulin R U500 140 unit(s) in the morning and 120 unit(s) in the evening. She is monitoring her blood sugars with the Dexcom G7 and her blood sugar are very stable.    She has not had any problems with severe hyperglycemia and/or hypoglycemia. She did have a couple low blood sugars while traveling. She feels like they were due to a combination of the change in diet and increased activity. She  could feel the low blood sugars and the sensor alerted her. She was able to correct with a small amount of juice. She has not had any problems with blurred vision or worsening numbness/tingling in her feet. She also has no history of microalbuminuria. She did experience some leg swelling after the flight but it was manageable. She was very active during her trip and she felt great.    Sherry has had diabetes for over 20 yrs. She has used a variety of medications over the years to manage her blood sugars and, previously, she struggled to get her A1C to goal. She feels like her current medication combination is working really well. Her diabetes is complicated by hyperlipidemia, obesity, CKD stage 3a, paroxysmal atrial fibrillatin s/p pacemaker, GENESIS, osteoarthritis, and hypothyroidism. She remains stable on levothyroxine 75 mcg daily. She has not had any problems with worsening anxiousness, irritability, or fatigue.    Endocrine relevant labs are as follows:   Latest Reference Range & Units 11/28/22 09:04   Albumin Urine mg/g Cr 0.00 - 25.00 mg/g Cr 10.26      Latest Reference Range & Units 11/28/22 09:04   Albumin Urine mg/L mg/L 12      Latest Reference Range & Units 09/28/23 09:38   Hemoglobin A1C POCT 4.3 - <5.7 % 6.9 !   !: Data is abnormal     Latest Reference Range & Units 09/14/23 09:58   TSH 0.30 - 4.20 uIU/mL 2.85     REVIEW OF SYSTEMS    Endocrine: positive for diabetes and obesity  Skin: negative  Eyes: negative for, visual blurring, redness, tearing  Ears/Nose/Throat: negative  Respiratory: No shortness of breath, dyspnea on exertion, cough, or hemoptysis  Cardiovascular: negative for, chest pain, lower extremity edema, and exercise intolerance  Gastrointestinal: negative for, nausea, vomiting, constipation, and diarrhea  Genitourinary: negative for, nocturia, dysuria, frequency, and urgency  Musculoskeletal: negative for, muscular weakness, and nocturnal cramping  Neurologic: negative for, local  weakness, and numbness or tingling of feet  Psychiatric: negative  Hematologic/Lymphatic/Immunologic: negative    Past Medical History  Past Medical History:   Diagnosis Date     Cataract      Congestive heart failure (H) 2019 NOVEMBER    AFIB     DEPRESSION     comes and goes - tried meds - unsuccessfully, certain times of the year, no psych intervention and no counselors in the past - and not interested      Diabetic retinopathy associated with diabetes mellitus due to underlying condition (H)      Diverticulosis of colon (without mention of hemorrhage) 04/2004    colonoscopy     ECHO-mildLVH,tr MR,mild thick lflets w inc LA,trTR   12/2003     Essential hypertension, benign 1990s    late 1990s - started medications at that time - not to difficult to control meds      Fam hx-cardiovas dis NEC     father - CAD, and lipids/HTN - multiple members of the family      Family history of diabetes mellitus     sister and grandmother with DM      Family history of malignant neoplasm of breast     mother - young age - 45, and maternal cousin and aunt as well - no BRCA testing done      Family history of stroke (cerebrovascular)     grandmother in early life in her 40s      FAMILY HX COLON CANCER     Pat uncles x 2     Heart disease     murmur/     Heart murmur      HYPERLIPIDEMIA 2000    fairly recent - in the last 5 years - high for DM levels  -cholesterol recent      Irritable bowel syndrome     goes between the 2 - nerve related - more stressed more problems      MICROALBUMINURIA     unsure how long - been on the lisinopril - for a few years at well      Nonspecific abnormal results of liver function study 02/03/2003    SGOT - has been high in the past - since the hepatitis b - borderline elevation - not really changing any      OBESITY      Obstructive sleep apnea      GENESIS (obstructive sleep apnea) 10/15/2006    psg 5/15 AHI 53 aPAP 8-15     OSTEOARTHRITIS L KNEE 2003    total knee on the left - and pain is gone since the  knee replacement      PERS HX HEPATITIS B- RESOLVED] 1977    acute virus only - no chronic disease      PERS HX HEPATITIS B- RESOLVED]      Type II or unspecified type diabetes mellitus without mention of complication, not stated as uncontrolled 1991    oral meds frist, then insulin eventually later, difficult to control most of the time      Unspecified hypothyroidism 10/11/2006    TSH 3.36 - mild subclinical hypothyroidism - deciding on following or starting low dose meds - with dr Oreilly - following        Medications  Current Outpatient Medications   Medication Sig Dispense Refill     amiodarone (PACERONE) 200 MG tablet Take 1 tablet (200 mg) by mouth daily 90 tablet 3     blood glucose (NO BRAND SPECIFIED) test strip Use to test blood sugar 2 times daily or as directed. Patient requesting One Touch Ultra Strips 100 strip 3     Continuous Blood Gluc  (DEXCOM G7 ) KATIA Use to read blood sugars as per 's instructions. 1 each 0     Continuous Blood Gluc Sensor (DEXCOM G7 SENSOR) MISC 1 each every 10 days 9 each 3     diltiazem ER COATED BEADS (CARDIZEM CD) 360 MG 24 hr capsule Take 1 capsule (360 mg) by mouth daily 90 capsule 3     econazole nitrate 1 % external cream Apply topically daily To feet and toenails. 85 g 5     furosemide (LASIX) 20 MG tablet Take 1 tablet (20 mg) by mouth daily 90 tablet 3     Garlic 1000 MG CAPS Take 1 capsule by mouth daily Takes during summer months.  Done taking until next summer.       Glucagon (GVOKE HYPOPEN 1-PACK) 1 MG/0.2ML SOAJ Inject 1 mg Subcutaneous once as needed Administer the injection in the lower abdomen, outer thigh, or outer upperarm 0.2 mL 3     hydrochlorothiazide (HYDRODIURIL) 25 MG tablet Take 1 tablet (25 mg) by mouth daily 90 tablet 3     ibuprofen (ADVIL/MOTRIN) 200 MG capsule Take 600 mg by mouth every 6 hours as needed        insulin pen needle (GNP CLICKFINE PEN NEEDLES) 31G X 8 MM miscellaneous Uses 3 times daily or as directed  300 each 3     insulin reg HIGH CONC (HUMULIN R U-500 KWIKPEN) 500 UNIT/ML PEN soln Administer 130-140 units at 7 in the morning, and 120 units at 3-4 pm 40 mL 1     levothyroxine (SYNTHROID/LEVOTHROID) 75 MCG tablet Take 1 tablet (75 mcg) by mouth daily 90 tablet 3     lisinopril (ZESTRIL) 20 MG tablet Take 1 tablet (20 mg) by mouth daily 90 tablet 3     metFORMIN (GLUCOPHAGE XR) 500 MG 24 hr tablet Take 4 tablets (2,000 mg) by mouth At Bedtime TAKE 4 TABLETS AT BEDTIME 360 tablet 3     metoprolol succinate ER (TOPROL XL) 200 MG 24 hr tablet Take 1 tablet (200 mg) by mouth daily Dose increase 180 tablet 3     Multiple Vitamins-Minerals (ADVANCED DIABETIC MULTIVITAMIN) TABS Take 1 tablet by mouth daily       ORDER FOR DME, SET TO LOCAL PRINT, Respironics REMSTAR 60 Series Auto CPAP 8-15cm H2O, Airfit P10 nasal pillow mask w/medium pillows       order for DME Equipment being ordered: [id number] $70   Shoe LG 1 Device 0     potassium chloride ER (KLOR-CON M) 20 MEQ CR tablet Take 1 tablet (20 mEq) by mouth daily 90 tablet 3     rivaroxaban ANTICOAGULANT (XARELTO ANTICOAGULANT) 20 MG TABS tablet Take 1 tablet (20 mg) by mouth daily (with dinner) 90 tablet 3     rosuvastatin (CRESTOR) 10 MG tablet Take 1 tablet (10 mg) by mouth daily 90 tablet 3     Semaglutide, 2 MG/DOSE, (OZEMPIC) 8 MG/3ML pen Inject 2 mg Subcutaneous once a week 9 mL 3       Allergies  Allergies   Allergen Reactions     Empagliflozin      Yeast infections     Lipitor [Atorvastatin Calcium]      Gas     Pravachol [Pravastatin Sodium]      Pravachol - dry cough      Simvastatin      Myalgia         Family History  family history includes Allergies in her mother; Alzheimer Disease in her mother; Aneurysm in her father; Arrhythmia in her sister; Arthritis in her mother; Asthma in her mother, sister, and sister; Blood Disease in her paternal grandmother; Breast Cancer in an other family member; Breast Cancer (age of onset: 40) in her mother;  "Breast Cancer (age of onset: 62) in her maternal aunt; Cancer in her father and mother; Cancer - colorectal in her paternal uncle and another family member; Cardiovascular in her brother and mother; Cerebrovascular Disease in her maternal grandmother and mother; Colon Cancer in an other family member; Congenital Anomalies in her brother; Coronary Artery Disease in her brother, brother, father, and sister; Dementia in her mother; Depression in her mother; Diabetes in her maternal grandmother, sister, and sister; Gynecology in her sister; Heart Disease in her brother, brother, and sister; Hypertension in her brother, brother, brother, mother, sister, and sister; Lipids in her brother, brother, mother, sister, and sister; Obesity in her brother; Other Cancer in her brother, father, and mother; Other Cancer (age of onset: 35) in her maternal aunt; Respiratory in her brother and mother; Thyroid Disease in her mother.    Social History  Social History     Tobacco Use     Smoking status: Never     Passive exposure: Never     Smokeless tobacco: Never     Tobacco comments:     tried in early 20s only    Vaping Use     Vaping Use: Never used   Substance Use Topics     Alcohol use: Yes     Comment: rare     Drug use: No       Physical Exam  /78 (BP Location: Left arm, Patient Position: Sitting, Cuff Size: Adult Large)   Pulse 64   Resp 16   Ht 1.74 m (5' 8.5\")   Wt 129.3 kg (285 lb)   LMP 10/02/2007   SpO2 97%   BMI 42.70 kg/m    Body mass index is 42.7 kg/m .  GENERAL :  In no apparent distress  SKIN: Normal color, normal temperature, texture.  No hirsutism, alopecia or purple striae.     EYES: PERRL, EOMI, No scleral icterus,  No proptosis, conjunctival redness, stare, retraction  RESP: Lungs clear to auscultation bilaterally  CARDIAC: Regular rate and rhythm, normal S1 S2, without murmurs, rubs or gallops    NEURO: awake, alert, responds appropriately to questions.  Cranial nerves intact.   Moves all " extremities; Gait normal.  No tremor of the outstretched hand.    EXTREMITIES: No clubbing, cyanosis or edema.    DATA REVIEW  Dexcom Clarity  Time in target range 70%  Very Low <1%, Low 1%  High 25% and Very High 3%  Current Ave  mg/dl        Again, thank you for allowing me to participate in the care of your patient.        Sincerely,        Millicent Rocha PA-C

## 2023-09-28 NOTE — PATIENT INSTRUCTIONS
[facility]-Department of Endocrinology  Diabetes Educators:   Maya Soler, RN and Radha Quinonez RN  Clinic Nurse: EMMY Montero  CMA's: Alejandra Luevano and Eyad   EMT: Sj  Scheduling/Clinic phone number : [phone]   Clinic Fax: [phone]  On-Call Endocrine at the [place] (after hours/weekends): [phone] option 4    Please call the number below to schedule your labs.  [facility] [phone]   [facility] [phone]   [place] [phone]   [facility]  [phone]   [facility] [phone]   [place] [phone]   [facility]) [phone]   [facility] Walk-In Only   [place] [phone]   [place] [phone]   [place] [phone]   [place] [phone]     Please reach out to the following centers to schedule your imaging appointment:  Imaging (DEXA, CT, MRI, XRAY)    [facility] ([facility], [facility], [place]) [phone]   [facility] ([place], [place]) [phone]   [facility] ([facility]) [phone]   [facility] ([facility]) [phone]     Appointment Reminders:  * Please bring meter with for staff to download  * If you are due ONLY for an A1C, it is scheduled with the nurse and will be done in clinic. You do not need to schedule a lab appointment. Fasting is not required for an A1C.  * Refill request should be submitted to your pharmacy. They will contact clinic for approval.

## 2023-09-28 NOTE — NURSING NOTE
"Sherry Slaughter's goals for this visit include: NONE  She requests these members of her care team be copied on today's visit information: YES    PCP: Marilou Arciniega    Referring Provider:  No referring provider defined for this encounter.    /78 (BP Location: Left arm, Patient Position: Sitting, Cuff Size: Adult Large)   Pulse 64   Resp 16   Ht 1.74 m (5' 8.5\")   Wt 129.3 kg (285 lb)   LMP 10/02/2007   SpO2 97%   BMI 42.70 kg/m      Do you need any medication refills at today's visit? NONE    Sj Short, EMT      "

## 2023-10-03 ENCOUNTER — PATIENT OUTREACH (OUTPATIENT)
Dept: CARE COORDINATION | Facility: CLINIC | Age: 68
End: 2023-10-03
Payer: COMMERCIAL

## 2023-10-17 ENCOUNTER — PATIENT OUTREACH (OUTPATIENT)
Dept: CARE COORDINATION | Facility: CLINIC | Age: 68
End: 2023-10-17
Payer: COMMERCIAL

## 2023-10-21 DIAGNOSIS — E11.9 TYPE 2 DIABETES MELLITUS TREATED WITH INSULIN (H): ICD-10-CM

## 2023-10-21 DIAGNOSIS — Z79.4 TYPE 2 DIABETES MELLITUS TREATED WITH INSULIN (H): ICD-10-CM

## 2023-10-23 RX ORDER — INSULIN HUMAN 500 [IU]/ML
INJECTION, SOLUTION SUBCUTANEOUS
Qty: 40 ML | Refills: 1 | Status: SHIPPED | OUTPATIENT
Start: 2023-10-23

## 2023-10-30 ENCOUNTER — PATIENT OUTREACH (OUTPATIENT)
Dept: GASTROENTEROLOGY | Facility: CLINIC | Age: 68
End: 2023-10-30
Payer: COMMERCIAL

## 2023-10-31 ASSESSMENT — ENCOUNTER SYMPTOMS
CONSTIPATION: 0
DIARRHEA: 0
CHILLS: 0
HEMATOCHEZIA: 0
ARTHRALGIAS: 1
DIZZINESS: 0
DYSURIA: 0
MYALGIAS: 1
JOINT SWELLING: 0
FEVER: 0
HEADACHES: 0
FREQUENCY: 0
COUGH: 0
PALPITATIONS: 0
NERVOUS/ANXIOUS: 0
HEMATURIA: 0
WEAKNESS: 0
PARESTHESIAS: 0
BREAST MASS: 0
SORE THROAT: 0
SHORTNESS OF BREATH: 1
NAUSEA: 0
HEARTBURN: 0
EYE PAIN: 0
ABDOMINAL PAIN: 0

## 2023-10-31 ASSESSMENT — ACTIVITIES OF DAILY LIVING (ADL): CURRENT_FUNCTION: NO ASSISTANCE NEEDED

## 2023-11-03 ENCOUNTER — OFFICE VISIT (OUTPATIENT)
Dept: FAMILY MEDICINE | Facility: CLINIC | Age: 68
End: 2023-11-03
Payer: COMMERCIAL

## 2023-11-03 VITALS
HEART RATE: 64 BPM | OXYGEN SATURATION: 98 % | SYSTOLIC BLOOD PRESSURE: 112 MMHG | BODY MASS INDEX: 44.26 KG/M2 | TEMPERATURE: 98.1 F | RESPIRATION RATE: 18 BRPM | HEIGHT: 67 IN | DIASTOLIC BLOOD PRESSURE: 70 MMHG | WEIGHT: 282 LBS

## 2023-11-03 DIAGNOSIS — Z29.11 NEED FOR VACCINATION AGAINST RESPIRATORY SYNCYTIAL VIRUS: ICD-10-CM

## 2023-11-03 DIAGNOSIS — N18.31 STAGE 3A CHRONIC KIDNEY DISEASE (H): ICD-10-CM

## 2023-11-03 DIAGNOSIS — L89.893 PRESSURE INJURY OF TOE OF LEFT FOOT, STAGE 3 (H): ICD-10-CM

## 2023-11-03 DIAGNOSIS — E11.65 TYPE 2 DIABETES MELLITUS WITH HYPERGLYCEMIA, WITHOUT LONG-TERM CURRENT USE OF INSULIN (H): ICD-10-CM

## 2023-11-03 DIAGNOSIS — Z00.00 ENCOUNTER FOR MEDICARE ANNUAL WELLNESS EXAM: Primary | ICD-10-CM

## 2023-11-03 PROCEDURE — G0439 PPPS, SUBSEQ VISIT: HCPCS | Performed by: INTERNAL MEDICINE

## 2023-11-03 PROCEDURE — 90480 ADMN SARSCOV2 VAC 1/ONLY CMP: CPT | Performed by: INTERNAL MEDICINE

## 2023-11-03 PROCEDURE — 91320 SARSCV2 VAC 30MCG TRS-SUC IM: CPT | Performed by: INTERNAL MEDICINE

## 2023-11-03 PROCEDURE — 99213 OFFICE O/P EST LOW 20 MIN: CPT | Mod: 25 | Performed by: INTERNAL MEDICINE

## 2023-11-03 RX ORDER — RESPIRATORY SYNCYTIAL VIRUS VACCINE 120MCG/0.5
0.5 KIT INTRAMUSCULAR ONCE
Qty: 1 EACH | Refills: 0 | Status: SHIPPED | OUTPATIENT
Start: 2023-11-03 | End: 2023-11-03

## 2023-11-03 ASSESSMENT — ENCOUNTER SYMPTOMS
MYALGIAS: 1
SORE THROAT: 0
COUGH: 0
HEADACHES: 0
BREAST MASS: 0
ABDOMINAL PAIN: 0
DYSURIA: 0
FEVER: 0
PALPITATIONS: 0
FREQUENCY: 0
DIARRHEA: 0
DIZZINESS: 0
ARTHRALGIAS: 1
PARESTHESIAS: 0
NERVOUS/ANXIOUS: 0
JOINT SWELLING: 0
CHILLS: 0
EYE PAIN: 0
HEARTBURN: 0
HEMATURIA: 0
WEAKNESS: 0
HEMATOCHEZIA: 0
SHORTNESS OF BREATH: 1
NAUSEA: 0
CONSTIPATION: 0

## 2023-11-03 ASSESSMENT — ACTIVITIES OF DAILY LIVING (ADL): CURRENT_FUNCTION: NO ASSISTANCE NEEDED

## 2023-11-03 ASSESSMENT — PAIN SCALES - GENERAL: PAINLEVEL: MODERATE PAIN (4)

## 2023-11-03 NOTE — PATIENT INSTRUCTIONS
"Patient Education   Personalized Prevention Plan  You are due for the preventive services outlined below.  Your care team is available to assist you in scheduling these services.  If you have already completed any of these items, please share that information with your care team to update in your medical record.  Health Maintenance Due   Topic Date Due    Heart Failure Action Plan  Never done    RSV VACCINE (Pregnancy & 60+) (1 - 1-dose 60+ series) Never done    COVID-19 Vaccine (6 - 2023-24 season) 09/01/2023    Diabetic Foot Exam  10/04/2023    Cholesterol Lab  11/28/2023    Kidney Microalbumin Urine Test  11/28/2023    Hemoglobin  01/09/2024     Learning About Being Physically Active  What is physical activity?     Being physically active means doing any kind of activity that gets your body moving.  The types of physical activity that can help you get fit and stay healthy include:  Aerobic or \"cardio\" activities. These make your heart beat faster and make you breathe harder, such as brisk walking, riding a bike, or running. They strengthen your heart and lungs and build up your endurance.  Strength training activities. These make your muscles work against, or \"resist,\" something. Examples include lifting weights or doing push-ups. These activities help tone and strengthen your muscles and bones.  Stretches. These let you move your joints and muscles through their full range of motion. Stretching helps you be more flexible.  Reaching a balance between these three types of physical activity is important because each one contributes to your overall fitness.  What are the benefits of being active?  Being active is one of the best things you can do for your health. It helps you to:  Feel stronger and have more energy to do all the things you like to do.  Focus better at school or work.  Feel, think, and sleep better.  Reach and stay at a healthy weight.  Lose fat and build lean muscle.  Lower your risk for serious " "health problems, including diabetes, heart attack, high blood pressure, and some cancers.  Keep your heart, lungs, bones, muscles, and joints strong and healthy.  How can you make being active part of your life?  Start slowly. Make it your long-term goal to get at least 30 minutes of exercise on most days of the week. Walking is a good choice. You also may want to do other activities, such as running, swimming, cycling, or playing tennis or team sports.  Pick activities that you like--ones that make your heart beat faster, your muscles stronger, and your muscles and joints more flexible. If you find more than one thing you like doing, do them all. You don't have to do the same thing every day.  Get your heart pumping every day. Any activity that makes your heart beat faster and keeps it at that rate for a while counts.  Here are some great ways to get your heart beating faster:  Go for a brisk walk, run, or bike ride.  Go for a hike or swim.  Go in-line skating.  Play a game of touch football, basketball, or soccer.  Ride a bike.  Play tennis or racquetball.  Climb stairs.  Even some household chores can be aerobic--just do them at a faster pace. Vacuuming, raking or mowing the lawn, sweeping the garage, and washing and waxing the car all can help get your heart rate up.  Strengthen your muscles during the week. You don't have to lift heavy weights or grow big, bulky muscles to get stronger. Doing a few simple activities that make your muscles work against, or \"resist,\" something can help you get stronger.  For example, you can:  Do push-ups or sit-ups, which use your own body weight as resistance.  Lift weights or dumbbells or use stretch bands at home or in a gym or community center.  Stretch your muscles often. Stretching will help you as you become more active. It can help you stay flexible, loosen tight muscles, and avoid injury. It can also help improve your balance and posture and can be a great way to " "relax.  Be sure to stretch the muscles you'll be using when you work out. It's best to warm your muscles slightly before you stretch them. Walk or do some other light aerobic activity for a few minutes, and then start stretching.  When you stretch your muscles:  Do it slowly. Stretching is not about going fast or making sudden movements.  Don't push or bounce during a stretch.  Hold each stretch for at least 15 to 30 seconds, if you can. You should feel a stretch in the muscle, but not pain.  Breathe out as you do the stretch. Then breathe in as you hold the stretch. Don't hold your breath.  If you're worried about how more activity might affect your health, have a checkup before you start. Follow any special advice your doctor gives you for getting a smart start.  Where can you learn more?  Go to https://www.[employer]/patiented  Enter W332 in the search box to learn more about \"Learning About Being Physically Active.\"  Current as of: October 10, 2022               Content Version: 13.7    8538-9042 [employer].   Care instructions adapted under license by your healthcare professional. If you have questions about a medical condition or this instruction, always ask your healthcare professional. [employer] disclaims any warranty or liability for your use of this information.      Bladder Training: Care Instructions  Your Care Instructions     Bladder training is used to treat urge incontinence and stress incontinence. Urge incontinence means that the need to urinate comes on so fast that you can't get to a toilet in time. Stress incontinence means that you leak urine because of pressure on your bladder. For example, it may happen when you laugh, cough, or lift something heavy.  Bladder training can increase how long you can wait before you have to urinate. It can also help your bladder hold more urine. And it can give you better control over the urge to urinate.  It is important to " remember that bladder training takes a few weeks to a few months to make a difference. You may not see results right away, but don't give up.  Follow-up care is a key part of your treatment and safety. Be sure to make and go to all appointments, and call your doctor if you are having problems. It's also a good idea to know your test results and keep a list of the medicines you take.  How can you care for yourself at home?  Work with your doctor to come up with a bladder training program that is right for you. You may use one or more of the following methods.  Delayed urination  In the beginning, try to keep from urinating for 5 minutes after you first feel the need to go.  While you wait, take deep, slow breaths to relax. Kegel exercises can also help you delay the need to go to the bathroom.  After some practice, when you can easily wait 5 minutes to urinate, try to wait 10 minutes before you urinate.  Slowly increase the waiting period until you are able to control when you have to urinate.  Scheduled urination  Empty your bladder when you first wake up in the morning.  Schedule times throughout the day when you will urinate.  Start by going to the bathroom every hour, even if you don't need to go.  Slowly increase the time between trips to the bathroom.  When you have found a schedule that works well for you, keep doing it.  If you wake up during the night and have to urinate, do it. Apply your schedule to waking hours only.  Kegel exercises  These tighten and strengthen pelvic muscles, which can help you control the flow of urine. (If doing these exercises causes pain, stop doing them and talk with your doctor.) To do Kegel exercises:  Squeeze your muscles as if you were trying not to pass gas. Or squeeze your muscles as if you were stopping the flow of urine. Your belly, legs, and buttocks shouldn't move.  Hold the squeeze for 3 seconds, then relax for 5 to 10 seconds.  Start with 3 seconds, then add 1 second  "each week until you are able to squeeze for 10 seconds.  Repeat the exercise 10 times a session. Do 3 to 8 sessions a day.  When should you call for help?  Watch closely for changes in your health, and be sure to contact your doctor if:    Your incontinence is getting worse.     You do not get better as expected.   Where can you learn more?  Go to https://www.[employer].net/patiented  Enter V684 in the search box to learn more about \"Bladder Training: Care Instructions.\"  Current as of: March 1, 2023               Content Version: 13.7    5643-2512 [email or web address].   Care instructions adapted under license by your healthcare professional. If you have questions about a medical condition or this instruction, always ask your healthcare professional. [email or web address] disclaims any warranty or liability for your use of this information.         "

## 2023-11-03 NOTE — PROGRESS NOTES
"SUBJECTIVE:   Sherry is a 68 year old who presents for Preventive Visit.      11/3/2023     1:26 PM   Additional Questions   Roomed by Rehana ANNE       Are you in the first 12 months of your Medicare coverage?  No      Sherry Slaughter is a 68 year old female who has Morbid obesity (H); Hypothyroidism; GENESIS (obstructive sleep apnea); Hypertension goal BP (blood pressure) < 140/90; Mitral valve insufficiency; Tubular adenoma of colon; Long-term insulin use (H); Paroxysmal atrial fibrillation (H); and On continuous oral anticoagulation on their pertinent problem list.     Patient follows with endocrinology and cardiology for the above issues.  Her chronic health issues have been stable    Healthy Habits:     In general, how would you rate your overall health?  Good    Frequency of exercise:  1 day/week    Duration of exercise:  Less than 15 minutes    Do you usually eat at least 4 servings of fruit and vegetables a day, include whole grains    & fiber and avoid regularly eating high fat or \"junk\" foods?  Yes    Taking medications regularly:  Yes    Medication side effects:  Muscle aches    Ability to successfully perform activities of daily living:  No assistance needed    Home Safety:  No safety concerns identified    Hearing Impairment:  No hearing concerns    In the past 6 months, have you been bothered by leaking of urine? Yes    In general, how would you rate your overall mental or emotional health?  Good    Additional concerns today:  No      Today's PHQ-2 Score:       11/3/2023     1:06 PM   PHQ-2 ( 1999 [employer])   Q1: Little interest or pleasure in doing things 0   Q2: Feeling down, depressed or hopeless 0   PHQ-2 Score 0   Q1: Little interest or pleasure in doing things Not at all   Q2: Feeling down, depressed or hopeless Not at all   PHQ-2 Score 0           Have you ever done Advance Care Planning? (For example, a Health Directive, POLST, or a discussion with a medical provider or your loved ones about " your wishes): No, advance care planning information given to patient to review.  Patient declined advance care planning discussion at this time.      Fall risk  Fallen 2 or more times in the past year?: No  Any fall with injury in the past year?: No  click delete button to remove this line now  Cognitive Screening   1) Repeat 3 items (Leader, Season, Table)    2) Clock draw: NORMAL  3) 3 item recall: Recalls 3 objects  Results: 3 items recalled: COGNITIVE IMPAIRMENT LESS LIKELY    Mini-CogTM Copyright S Mary. Licensed by the author for use in [facility] [employer]; reprinted with permission (soob@[facility]). All rights reserved.      Do you have sleep apnea, excessive snoring or daytime drowsiness? : yes    Reviewed and updated as needed this visit by clinical staff   Tobacco  Allergies  Meds              Reviewed and updated as needed this visit by Provider                 Social History     Tobacco Use    Smoking status: Never     Passive exposure: Never    Smokeless tobacco: Never    Tobacco comments:     tried in early 20s only    Substance Use Topics    Alcohol use: Yes     Comment: rare             10/31/2023     3:18 PM   Alcohol Use   Prescreen: >3 drinks/day or >7 drinks/week? Not Applicable     Do you have a current opioid prescription? No  Do you use any other controlled substances or medications that are not prescribed by a provider? None        Current providers sharing in care for this patient include:   Patient Care Team:  Marilou Arciniega MD PhD as PCP - General (Internal Medicine)  Rika Delgado MD as Assigned Endocrinology Provider  Rufus Hutson DPM as Assigned Musculoskeletal Provider  Kameron Pedraza OD as Assigned Surgical Provider  Patricia Bridges RN as Specialty Care Coordinator (Cardiology)  Marilou Arciniega MD PhD as Assigned PCP  Errol Ardon DO as Assigned Neuroscience Provider  Roslyn Mccracken MD as MD (Cardiovascular Disease)  Veronika Moore [facility] as Assigned MT  Pharmacist  Sawyer Pride MD as Assigned Heart and Vascular Provider    The following health maintenance items are reviewed in Epic and correct as of today:  Health Maintenance   Topic Date Due    HF ACTION PLAN  Never done    RSV VACCINE (Pregnancy & 60+) (1 - 1-dose 60+ series) Never done    LIPID  11/28/2023    MICROALBUMIN  11/28/2023    HEMOGLOBIN  01/09/2024    CBC  01/09/2024    A1C  03/28/2024    COLORECTAL CANCER SCREENING  06/19/2024    MAMMO SCREENING  07/17/2024    EYE EXAM  07/24/2024    ALT  09/14/2024    TSH W/FREE T4 REFLEX  09/14/2024    BMP  09/28/2024    MEDICARE ANNUAL WELLNESS VISIT  11/03/2024    ANNUAL REVIEW OF HM ORDERS  11/03/2024    FALL RISK ASSESSMENT  11/03/2024    DIABETIC FOOT EXAM  11/16/2024    DTAP/TDAP/TD IMMUNIZATION (5 - Td or Tdap) 10/13/2027    ADVANCE CARE PLANNING  11/03/2028    DEXA  02/11/2029    HEPATITIS C SCREENING  Completed    PHQ-2 (once per calendar year)  Completed    INFLUENZA VACCINE  Completed    Pneumococcal Vaccine: 65+ Years  Completed    URINALYSIS  Completed    ZOSTER IMMUNIZATION  Completed    COVID-19 Vaccine  Completed    IPV IMMUNIZATION  Aged Out    HPV IMMUNIZATION  Aged Out    MENINGITIS IMMUNIZATION  Aged Out    RSV MONOCLONAL ANTIBODY  Aged Out    URINE DRUG SCREEN  Discontinued       Review of Systems   Constitutional:  Negative for chills and fever.   HENT:  Positive for ear pain (intermittent ear canal irritation. hydrocortisone helps.). Negative for congestion, hearing loss and sore throat.    Eyes:  Negative for pain and visual disturbance.   Respiratory:  Positive for shortness of breath (deconditioning). Negative for cough.    Cardiovascular:  Negative for chest pain, palpitations and peripheral edema.   Gastrointestinal:  Negative for abdominal pain, constipation, diarrhea, heartburn, hematochezia and nausea.   Breasts:  Negative for tenderness, breast mass and discharge.   Genitourinary:  Positive for urgency (diuretic,  "overactive bladder). Negative for dysuria, frequency, genital sores, hematuria, pelvic pain, vaginal bleeding and vaginal discharge.   Musculoskeletal:  Positive for arthralgias (right shoulder pain, had injections. low back pain.) and myalgias. Negative for joint swelling.   Skin:  Negative for rash.   Neurological:  Negative for dizziness, weakness, headaches and paresthesias.   Psychiatric/Behavioral:  Negative for mood changes. The patient is not nervous/anxious.          OBJECTIVE:   /70 (BP Location: Right arm, Patient Position: Sitting, Cuff Size: Adult Large)   Pulse 64   Temp 98.1  F (36.7  C) (Oral)   Resp 18   Ht 1.709 m (5' 7.28\")   Wt 127.9 kg (282 lb)   LMP 10/02/2007   SpO2 98%   BMI 43.80 kg/m   Estimated body mass index is 43.8 kg/m  as calculated from the following:    Height as of this encounter: 1.709 m (5' 7.28\").    Weight as of this encounter: 127.9 kg (282 lb).  Physical Exam  GENERAL APPEARANCE: healthy, alert and no distress  EYES: Eyes grossly normal to inspection, PERRL and conjunctivae and sclerae normal  HENT: ear canals and TM's normal, nose and mouth without ulcers or lesions, oropharynx clear and oral mucous membranes moist  NECK: no adenopathy, no asymmetry, masses, or scars and thyroid normal to palpation  RESP: lungs clear to auscultation - no rales, rhonchi or wheezes  CV: regular rate and rhythm, normal S1 S2, no S3 or S4, no murmur, click or rub, no peripheral edema and peripheral pulses strong  ABDOMEN: soft, nontender, no hepatosplenomegaly, no masses and bowel sounds normal  MS: no musculoskeletal defects are noted and gait is age appropriate without ataxia  SKIN: no suspicious lesions or rashes  NEURO: Normal strength and tone, sensory exam grossly normal, mentation intact and speech normal  PSYCH: mentation appears normal and affect normal/bright    Diagnostic Test Results:    Component      Latest Ref Rng 9/14/2023  9:58 AM 9/28/2023  9:10 AM 9/28/2023  9:38 " AM   Sodium      135 - 145 mmol/L 138  139     Potassium      3.4 - 5.3 mmol/L 3.9  3.9     Chloride      98 - 107 mmol/L 98  102     Carbon Dioxide (CO2)      22 - 29 mmol/L 25  21 (L)     Anion Gap      7 - 15 mmol/L 15  16 (H)     Urea Nitrogen      8.0 - 23.0 mg/dL 17.7  22.3     Creatinine      0.51 - 0.95 mg/dL 1.22 (H)  1.07 (H)     Calcium      8.8 - 10.2 mg/dL 10.5 (H)  9.6     Glucose      70 - 99 mg/dL 172 (H)  240 (H)     Alkaline Phosphatase      35 - 104 U/L 49      AST      0 - 45 U/L 36      ALT      0 - 50 U/L 29      Protein Total      6.4 - 8.3 g/dL 7.1      Albumin      3.5 - 5.2 g/dL 4.5      Bilirubin Total      <=1.2 mg/dL 0.4      GFR Estimate      >60 mL/min/1.73m2 48 (L)  56 (L)     TSH      0.30 - 4.20 uIU/mL 2.85      Hemoglobin A1C POCT      4.3 - <5.7 %   6.9 !       Legend:  (H) High  (L) Low  ! Abnormal  ASSESSMENT / PLAN:   Sherry was seen today for medicare visit.    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    Weight as of this encounter: 127.9 kg (282 lb).   Weight management plan: Discussed healthy diet and exercise guidelines exercise is limited by foot pain.      She reports that she has never smoked. She has never been exposed to tobacco smoke. She has never used smokeless tobacco.      Appropriate preventive services were discussed with this patient, including applicable screening as appropriate for fall prevention, nutrition, physical activity, Tobacco-use cessation, weight loss and cognition.  Checklist reviewing preventive services available has been given to the patient.    Reviewed patients plan of care and provided an AVS. The Complex Care Plan (for patients with higher acuity and needing more deliberate coordination of services) for Sherry meets the Care Plan requirement. This Care Plan has been established and reviewed with the Patient.        Marilou Arciniega MD PhD  [facility]    Identified Health Risks:  I have reviewed Opioid Use Disorder and Substance Use Disorder risk factors and made any needed referrals. She is at risk for lack of exercise and has been provided with information to increase physical activity for the benefit of her well-being.  Information on urinary incontinence and treatment options given to patient.  "

## 2023-11-03 NOTE — PROGRESS NOTES
Prior to immunization administration, verified patients identity using patient s name and date of birth. Please see Immunization Activity for additional information.     Screening Questionnaire for Adult Immunization    Are you sick today?   No   Do you have allergies to medications, food, a vaccine component or latex?   No   Have you ever had a serious reaction after receiving a vaccination?   No   Do you have a long-term health problem with heart, lung, kidney, or metabolic disease (e.g., diabetes), asthma, a blood disorder, no spleen, complement component deficiency, a cochlear implant, or a spinal fluid leak?  Are you on long-term aspirin therapy?   Yes   Do you have cancer, leukemia, HIV/AIDS, or any other immune system problem?   No   Do you have a parent, brother, or sister with an immune system problem?   No   In the past 3 months, have you taken medications that affect  your immune system, such as prednisone, other steroids, or anticancer drugs; drugs for the treatment of rheumatoid arthritis, Crohn s disease, or psoriasis; or have you had radiation treatments?   No   Have you had a seizure, or a brain or other nervous system problem?   No   During the past year, have you received a transfusion of blood or blood    products, or been given immune (gamma) globulin or antiviral drug?   No   For women: Are you pregnant or is there a chance you could become       pregnant during the next month?   No   Have you received any vaccinations in the past 4 weeks?   No     Immunization questionnaire was positive for at least one answer.  Notified Dr. Arciniega.      Patient instructed to remain in clinic for 15 minutes afterwards, and to report any adverse reactions.     Screening performed by Jeny Baldwin MA on 11/3/2023 at 2:29 PM.

## 2023-11-16 ENCOUNTER — OFFICE VISIT (OUTPATIENT)
Dept: PODIATRY | Facility: CLINIC | Age: 68
End: 2023-11-16
Payer: COMMERCIAL

## 2023-11-16 DIAGNOSIS — M20.41 HAMMERTOE OF RIGHT FOOT: ICD-10-CM

## 2023-11-16 DIAGNOSIS — E11.69 TYPE 2 DIABETES MELLITUS WITH DIABETIC FOOT DEFORMITY (H): ICD-10-CM

## 2023-11-16 DIAGNOSIS — L97.512 ULCER OF RIGHT FOOT WITH FAT LAYER EXPOSED (H): ICD-10-CM

## 2023-11-16 DIAGNOSIS — E11.49 TYPE II OR UNSPECIFIED TYPE DIABETES MELLITUS WITH NEUROLOGICAL MANIFESTATIONS, NOT STATED AS UNCONTROLLED(250.60) (H): Primary | ICD-10-CM

## 2023-11-16 DIAGNOSIS — M21.969 TYPE 2 DIABETES MELLITUS WITH DIABETIC FOOT DEFORMITY (H): ICD-10-CM

## 2023-11-16 PROCEDURE — 99214 OFFICE O/P EST MOD 30 MIN: CPT | Performed by: PODIATRIST

## 2023-11-16 NOTE — LETTER
11/16/2023         RE: Sherry Slaughter  [address]  [place] [address]        Dear Colleague,    Thank you for referring your patient, Sherry Slaughter, to the [facility]. Please see a copy of my visit note below.    Past Medical History:   Diagnosis Date     Cataract      Congestive heart failure (H) 2019 NOVEMBER    AFIB     DEPRESSION     comes and goes - tried meds - unsuccessfully, certain times of the year, no psych intervention and no counselors in the past - and not interested      Diabetic retinopathy associated with diabetes mellitus due to underlying condition (H)      Diverticulosis of colon (without mention of hemorrhage) 04/2004    colonoscopy     ECHO-mildLVH,tr MR,mild thick lflets w inc LA,trTR   12/2003     Essential hypertension, benign 1990s    late 1990s - started medications at that time - not to difficult to control meds      Fam hx-cardiovas dis [facility]     father - CAD, and lipids/HTN - multiple members of the family      Family history of diabetes mellitus     sister and grandmother with DM      Family history of malignant neoplasm of breast     mother - young age - 45, and maternal cousin and aunt as well - no BRCA testing done      Family history of stroke (cerebrovascular)     grandmother in early life in her 40s      FAMILY HX COLON CANCER     Pat uncles x 2     Heart disease     murmur/     Heart murmur      HYPERLIPIDEMIA 2000    fairly recent - in the last 5 years - high for DM levels  -cholesterol recent      Irritable bowel syndrome     goes between the 2 - nerve related - more stressed more problems      MICROALBUMINURIA     unsure how long - been on the lisinopril - for a few years at well      Nonspecific abnormal results of liver function study 02/03/2003    SGOT - has been high in the past - since the hepatitis b - borderline elevation - not really changing any      OBESITY      Obstructive sleep apnea      GENESIS (obstructive sleep  apnea) 10/15/2006    psg 5/15 AHI 53 aPAP 8-15     OSTEOARTHRITIS L KNEE 2003    total knee on the left - and pain is gone since the knee replacement      PERS HX HEPATITIS B- RESOLVED] 1977    acute virus only - no chronic disease      PERS HX HEPATITIS B- RESOLVED]      Type II or unspecified type diabetes mellitus without mention of complication, not stated as uncontrolled 1991    oral meds frist, then insulin eventually later, difficult to control most of the time      Unspecified hypothyroidism 10/11/2006    TSH 3.36 - mild subclinical hypothyroidism - deciding on following or starting low dose meds - with dr Oreilly - following      Patient Active Problem List   Diagnosis     Morbid obesity (H)     Diverticulosis of large intestine     Nonspecific abnormal results of cardiovascular function study     FAMILY HX COLON CANCER     Nonallopathic lesion of thoracic region     Hypothyroidism     GENESIS (obstructive sleep apnea)     Irritable bowel syndrome     Family history of malignant neoplasm of breast     Type 2 diabetes mellitus treated with insulin (H)     History of major depression     OSTEOARTHRITIS L KNEE  s/p knee replacement on the left      Hypertension goal BP (blood pressure) < 140/90     Family history of stroke (cerebrovascular)     Family history of other cardiovascular diseases     JOINT PAIN-ANKLE - right ankle      Mitral valve insufficiency     Hyperlipidemia LDL goal <100     Aortic sclerosis     Tubular adenoma of colon     History of viral hepatitis, type B     Chronic low back pain     Long-term insulin use (H)     S/P total knee replacement using cement, right     Lumbago     Type 2 diabetes mellitus with hyperglycemia (H)     Posterior vitreous detachment of right eye     Pseudophakia of both eyes     Paroxysmal atrial fibrillation (H)     SOB (shortness of breath)     Bunion     On continuous oral anticoagulation     Pressure injury of toe of left foot, stage 3 (H)     Stage 3a chronic  kidney disease (H)     Past Surgical History:   Procedure Laterality Date     ABDOMEN SURGERY      ovaian scope/ appendix removal     ANESTHESIA CARDIOVERSION N/A 12/4/2020    Procedure: ANESTHESIA, FOR CARDIOVERSION @1400;  Surgeon: GENERIC ANESTHESIA PROVIDER;  Location: [facility] OR     ARTHROPLASTY KNEE Right 9/23/2015    Procedure: ARTHROPLASTY KNEE;  Surgeon: Rufus Brown MD;  Location:  OR     BUNIONECTOMY REN AND LEANDRO, COMBINED Left 2/2/2021    Procedure: Left bunion correction with bone cutting and screw fixation;  Surgeon: David Hoffman DPM;  Location: [facility] OR     CATARACT IOL, RT/LT Left      COLONOSCOPY  4/04    diverticulosis, rec repeat 10 yrs(consider fam hx?)     COLONOSCOPY WITH CO2 INSUFFLATION N/A 6/19/2019    Procedure: COLONOSCOPY, WITH CO2 INSUFFLATION;  Surgeon: Zeb Duarte MD;  Location: [facility] OR     ORTHOPEDIC SURGERY      right ankle     PHACOEMULSIFICATION CLEAR CORNEA WITH STANDARD INTRAOCULAR LENS IMPLANT Left 3/15/2018    Procedure: PHACOEMULSIFICATION CLEAR CORNEA WITH STANDARD INTRAOCULAR LENS IMPLANT;  LEFT EYE PHACOEMULSIFICATION CLEAR CORNEA WITH STANDARD INTRAOCULAR LENS IMPLANT;  Surgeon: Bandar Linares MD;  Location:  [facility]     PHACOEMULSIFICATION CLEAR CORNEA WITH STANDARD INTRAOCULAR LENS IMPLANT Right 4/5/2018    Procedure: PHACOEMULSIFICATION CLEAR CORNEA WITH STANDARD INTRAOCULAR LENS IMPLANT;  RIGHT PHACOEMULSIFICATION CLEAR CORNEA WITH STANDARD INTRAOCULAR LENS IMPLANT ;  Surgeon: Bandar Linares MD;  Location:  [facility]     STRESS ECHO (METRO)  12/03    no ischemia, limited by fatigue     SURGICAL HISTORY OF -       EXP LAP- R OVARY CYSTS     SURGICAL HISTORY OF -   1981,1984,1985    CHILDBIRTH     [facility] NONSPECIFIC PROCEDURE  6/06    right triple arthrodecesis      [facility] NONSPECIFIC PROCEDURE  7/06     right bunion surgery      [facility] TOTAL KNEE ARTHROPLASTY  3/03    L knee     Social History     Socioeconomic History     Marital status:       Spouse name: Not on file     Number of children: 3     Years of education: Not on file     Highest education level: Not on file   Occupational History     Occupation: RN     Employer: [employer]   Tobacco Use     Smoking status: Never     Passive exposure: Never     Smokeless tobacco: Never     Tobacco comments:     tried in early 20s only    Vaping Use     Vaping Use: Never used   Substance and Sexual Activity     Alcohol use: Yes     Comment: rare     Drug use: No     Sexual activity: Not Currently     Birth control/protection: Post-menopausal     Comment:  - since for 20 years, no signficant other  > 5 years sexual activity    Other Topics Concern      Service No     Blood Transfusions No     Caffeine Concern No     Occupational Exposure Yes     Comment: Normal nursing exposures     Hobby Hazards No     Sleep Concern Yes     Stress Concern No     Comment: Stress level at HM=5, stress level at WK=6     Weight Concern Yes     Special Diet Yes     Comment: Diabetic diet (watching carbs)     Back Care No     Comment: Occassional back spasms     Exercise Yes     Comment: Pt joined a health club goes approx 3-4 times a week,half to one mile daily     Bike Helmet No     Seat Belt Yes     Comment: Sometimes     Self-Exams Yes     Parent/sibling w/ CABG, MI or angioplasty before 65F 55M? 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      Social Determinants of Health     Financial Resource Strain: Low Risk  (10/31/2023)    Financial Resource Strain      Within the past 12 months, have you or your family members you live with been unable to get utilities (heat, electricity) when it was really needed?: No   Food Insecurity: Low Risk  (10/31/2023)    Food Insecurity      Within the past 12 months, did you worry that your food would run out before you got money to buy more?: No      Within the past 12 months, did the food you  bought just not last and you didn t have money to get more?: No   Transportation Needs: Low Risk  (10/31/2023)    Transportation Needs      Within the past 12 months, has lack of transportation kept you from medical appointments, getting your medicines, non-medical meetings or appointments, work, or from getting things that you need?: No   Physical Activity: Not on file   Stress: Not on file   Social Connections: Not on file   Interpersonal Safety: Low Risk  (11/3/2023)    Interpersonal Safety      Do you feel physically and emotionally safe where you currently live?: Yes      Within the past 12 months, have you been hit, slapped, kicked or otherwise physically hurt by someone?: No      Within the past 12 months, have you been humiliated or emotionally abused in other ways by your partner or ex-partner?: No   Housing Stability: Low Risk  (10/31/2023)    Housing Stability      Do you have housing? : Yes      Are you worried about losing your housing?: No     Family History   Problem Relation Age of Onset     Diabetes Maternal Grandmother         DM TYPE 2     Cerebrovascular Disease Maternal Grandmother      Hypertension Sister      Lipids Sister      Heart Disease Sister         Chronic AFib     Diabetes Sister      Coronary Artery Disease Sister         afib     Hypertension Sister      Lipids Sister      Gynecology Sister      Diabetes Sister      Asthma Sister      Blood Disease Paternal Grandmother         T CELL LEUKEMIA     Hypertension Brother      Lipids Brother      Congenital Anomalies Brother      Cardiovascular Brother      Heart Disease Brother         CABG/AFIB     Coronary Artery Disease Brother         cabg afib AAA     Hypertension Brother      Lipids Brother      Other Cancer Brother         multple myeloma     Obesity Brother      Hypertension Brother      Respiratory Brother         Sleep apnea     Heart Disease Brother         AFib     Coronary Artery Disease Brother         afib     Alzheimer  Disease Mother      Asthma Mother      Hypertension Mother      Breast Cancer Mother 40        has mastectomy and lived to 84     Allergies Mother         Sulfa,     Arthritis Mother      Cardiovascular Mother      Depression Mother      Respiratory Mother      Lipids Mother      Cancer Mother         breast     Thyroid Disease Mother      Cerebrovascular Disease Mother         FROM  AFIB     Dementia Mother         Alzheimers     Other Cancer Mother         breast     Cancer - colorectal Other      Colon Cancer Other         2019     Cancer Father         lung     Aneurysm Father         brother, AAA     Other Cancer Father         lung ca     Coronary Artery Disease Father         cad AAA     Asthma Sister      Cancer - colorectal Paternal Uncle         late 50s to early 60s - great uncles      Breast Cancer Other         Maternal cousin     Arrhythmia Sister         2 brothers 1 sister Afib     Breast Cancer Maternal Aunt 62     Other Cancer Maternal Aunt 35        Ovarian cancer.  Survived despite late recurrence and is now 92.     Glaucoma No family hx of      Macular Degeneration No family hx of      A1c       6.8       6/23/23    Lab Results   Component Value Date    A1C 7.0 11/28/2022    A1C 7.1 05/25/2022    A1C 8.1 11/24/2021    A1C 9.0 08/16/2021    A1C 6.8 01/05/2021    A1C 6.9 11/25/2020    A1C 7.5 07/22/2020    A1C 7.3 12/10/2019   Last Comprehensive Metabolic Panel:  Lab Results   Component Value Date     09/28/2023    POTASSIUM 3.9 09/28/2023    CHLORIDE 102 09/28/2023    CO2 21 (L) 09/28/2023    ANIONGAP 16 (H) 09/28/2023     (H) 09/28/2023    BUN 22.3 09/28/2023    CR 1.07 (H) 09/28/2023    GFRESTIMATED 56 (L) 09/28/2023    CASSIDY 9.6 09/28/2023     Lab Results   Component Value Date    AST 36 09/14/2023    AST Unsatisfactory specimen - hemolyzed 11/25/2020     Lab Results   Component Value Date    ALT 29 09/14/2023    ALT 42 11/25/2020     Lab Results   Component Value Date    BILICONJ  0.0 03/07/2014      Lab Results   Component Value Date    BILITOTAL 0.4 09/14/2023    BILITOTAL 0.5 11/25/2020     Lab Results   Component Value Date    ALBUMIN 4.5 09/14/2023    ALBUMIN 3.8 11/28/2022    ALBUMIN 3.9 11/25/2020     Lab Results   Component Value Date    PROTTOTAL 7.1 09/14/2023    PROTTOTAL 7.4 11/25/2020      Lab Results   Component Value Date    ALKPHOS 49 09/14/2023    ALKPHOS 47 11/25/2020               Subjective findings- 68-year-old returns clinic for ulcer right first MPJ.  Relates she was doing well, and she does well when she is in her summer flip-flops but then she goes back to her close shoe with her diabetic insole and it breaks down again.  Relates to no injuries.  Relates that she does not smoke and she does have some numbness tingling neuropathy in her feet.    Objective findings -DP PT are 2 out of 4 right.  Has dorsal contracted digits 1 through 5 on the right.  Has a right plantar first MPJ ulcer that is through the dermis with hyperkeratotic eschar, mild edema, no erythema, no odor, no calor, no pain on palpation.  No gross tendon voids.    Assessment and plan- Ulcer right first MPJ.  Diabetes with peripheral Neuropathy and hammertoes.  Diagnosis and treatment options discussed with the patient.  Continue Betadine and a light dressing to this.  Referral to orthopedic surgery given for any surgical options and use discussed with her.  We discussed offloading options and will continue the Diabetic insoles and she is wearing a tennis shoe.  I discussed with her offloading boots she wants to hold on that for now.  Return to clinic and see me pending her surgical referral.                  Low to moderate level of medical decision making.      Again, thank you for allowing me to participate in the care of your patient.        Sincerely,        Rufus Hutson DPM

## 2023-11-16 NOTE — NURSING NOTE
Sherry Slaughter's chief complaint for this visit includes:  Chief Complaint   Patient presents with    Consult     New foot ulcer on the right foot, started about a week ago     PCP: Marilou Arciniega    Referring Provider:  No referring provider defined for this encounter.    [facility] 10/02/2007   Data Unavailable     Do you need any medication refills at today's visit? NO    Allergies   Allergen Reactions    Empagliflozin      Yeast infections    Lipitor [Atorvastatin Calcium]      Gas    Pravachol [Pravastatin Sodium]      Pravachol - dry cough     Simvastatin      Myalgia       Hollie Salazar LPN

## 2023-11-16 NOTE — PROGRESS NOTES
Past Medical History:   Diagnosis Date    Cataract     Congestive heart failure (H) 2019 NOVEMBER    AFIB    DEPRESSION     comes and goes - tried meds - unsuccessfully, certain times of the year, no psych intervention and no counselors in the past - and not interested     Diabetic retinopathy associated with diabetes mellitus due to underlying condition (H)     Diverticulosis of colon (without mention of hemorrhage) 04/2004    colonoscopy    ECHO-mildLVH,tr MR,mild thick lflets w inc LA,trTR   12/2003    Essential hypertension, benign 1990s    late 1990s - started medications at that time - not to difficult to control meds     Fam hx-cardiovas dis [facility]     father - CAD, and lipids/HTN - multiple members of the family     Family history of diabetes mellitus     sister and grandmother with DM     Family history of malignant neoplasm of breast     mother - young age - 45, and maternal cousin and aunt as well - no BRCA testing done     Family history of stroke (cerebrovascular)     grandmother in early life in her 40s     FAMILY HX COLON CANCER     Pat uncles x 2    Heart disease     murmur/    Heart murmur     HYPERLIPIDEMIA 2000    fairly recent - in the last 5 years - high for DM levels  -cholesterol recent     Irritable bowel syndrome     goes between the 2 - nerve related - more stressed more problems     MICROALBUMINURIA     unsure how long - been on the lisinopril - for a few years at well     Nonspecific abnormal results of liver function study 02/03/2003    SGOT - has been high in the past - since the hepatitis b - borderline elevation - not really changing any     OBESITY     Obstructive sleep apnea     GENESIS (obstructive sleep apnea) 10/15/2006    psg 5/15 AHI 53 aPAP 8-15    OSTEOARTHRITIS L KNEE 2003    total knee on the left - and pain is gone since the knee replacement     PERS HX HEPATITIS B- RESOLVED] 1977    acute virus only - no chronic disease     PERS HX HEPATITIS B- RESOLVED]     Type II or  unspecified type diabetes mellitus without mention of complication, not stated as uncontrolled 1991    oral meds frist, then insulin eventually later, difficult to control most of the time     Unspecified hypothyroidism 10/11/2006    TSH 3.36 - mild subclinical hypothyroidism - deciding on following or starting low dose meds - with dr Oreilly - following      Patient Active Problem List   Diagnosis    Morbid obesity (H)    Diverticulosis of large intestine    Nonspecific abnormal results of cardiovascular function study    FAMILY HX COLON CANCER    Nonallopathic lesion of thoracic region    Hypothyroidism    GENESIS (obstructive sleep apnea)    Irritable bowel syndrome    Family history of malignant neoplasm of breast    Type 2 diabetes mellitus treated with insulin (H)    History of major depression    OSTEOARTHRITIS L KNEE  s/p knee replacement on the left     Hypertension goal BP (blood pressure) < 140/90    Family history of stroke (cerebrovascular)    Family history of other cardiovascular diseases    JOINT PAIN-ANKLE - right ankle     Mitral valve insufficiency    Hyperlipidemia LDL goal <100    Aortic sclerosis    Tubular adenoma of colon    History of viral hepatitis, type B    Chronic low back pain    Long-term insulin use (H)    S/P total knee replacement using cement, right    Lumbago    Type 2 diabetes mellitus with hyperglycemia (H)    Posterior vitreous detachment of right eye    Pseudophakia of both eyes    Paroxysmal atrial fibrillation (H)    SOB (shortness of breath)    Bunion    On continuous oral anticoagulation    Pressure injury of toe of left foot, stage 3 (H)    Stage 3a chronic kidney disease (H)     Past Surgical History:   Procedure Laterality Date    ABDOMEN SURGERY      ovaian scope/ appendix removal    ANESTHESIA CARDIOVERSION N/A 12/4/2020    Procedure: ANESTHESIA, FOR CARDIOVERSION @1400;  Surgeon: GENERIC ANESTHESIA PROVIDER;  Location: [facility]    ARTHROPLASTY KNEE Right 9/23/2015     Procedure: ARTHROPLASTY KNEE;  Surgeon: Rufus Brown MD;  Location:  OR    BUNIONECTOMY REN AND LEANDRO, COMBINED Left 2/2/2021    Procedure: Left bunion correction with bone cutting and screw fixation;  Surgeon: David Hoffman DPM;  Location: [facility] OR    CATARACT IOL, RT/LT Left     COLONOSCOPY  4/04    diverticulosis, rec repeat 10 yrs(consider fam hx?)    COLONOSCOPY WITH CO2 INSUFFLATION N/A 6/19/2019    Procedure: COLONOSCOPY, WITH CO2 INSUFFLATION;  Surgeon: Zeb Duarte MD;  Location: [facility] OR    ORTHOPEDIC SURGERY      right ankle    PHACOEMULSIFICATION CLEAR CORNEA WITH STANDARD INTRAOCULAR LENS IMPLANT Left 3/15/2018    Procedure: PHACOEMULSIFICATION CLEAR CORNEA WITH STANDARD INTRAOCULAR LENS IMPLANT;  LEFT EYE PHACOEMULSIFICATION CLEAR CORNEA WITH STANDARD INTRAOCULAR LENS IMPLANT;  Surgeon: Bandar Linares MD;  Location:  [facility]    PHACOEMULSIFICATION CLEAR CORNEA WITH STANDARD INTRAOCULAR LENS IMPLANT Right 4/5/2018    Procedure: PHACOEMULSIFICATION CLEAR CORNEA WITH STANDARD INTRAOCULAR LENS IMPLANT;  RIGHT PHACOEMULSIFICATION CLEAR CORNEA WITH STANDARD INTRAOCULAR LENS IMPLANT ;  Surgeon: Bandar Linares MD;  Location:  [facility]    STRESS ECHO (METRO)  12/03    no ischemia, limited by fatigue    SURGICAL HISTORY OF -       EXP LAP- R OVARY CYSTS    SURGICAL HISTORY OF -   1981,1984,1985    CHILDBIRTH    ZZC NONSPECIFIC PROCEDURE  6/06    right triple arthrodecesis     Z NONSPECIFIC PROCEDURE  7/06     right bunion surgery     [facility] TOTAL KNEE ARTHROPLASTY  3/03    L knee     Social History     Socioeconomic History    Marital status:      Spouse name: Not on file    Number of children: 3    Years of education: Not on file    Highest education level: Not on file   Occupational History    Occupation: RN     Employer: [employer]   Tobacco Use    Smoking status: Never     Passive exposure: Never    Smokeless tobacco: Never    Tobacco comments:     tried in  early 20s only    Vaping Use    Vaping Use: Never used   Substance and Sexual Activity    Alcohol use: Yes     Comment: rare    Drug use: No    Sexual activity: Not Currently     Birth control/protection: Post-menopausal     Comment:  - since for 20 years, no signficant other  > 5 years sexual activity    Other Topics Concern     Service No    Blood Transfusions No    Caffeine Concern No    Occupational Exposure Yes     Comment: Normal nursing exposures    Hobby Hazards No    Sleep Concern Yes    Stress Concern No     Comment: Stress level at HM=5, stress level at WK=6    Weight Concern Yes    Special Diet Yes     Comment: Diabetic diet (watching carbs)    Back Care No     Comment: Occassional back spasms    Exercise Yes     Comment: Pt joined a health club goes approx 3-4 times a week,half to one mile daily    Bike Helmet No    Seat Belt Yes     Comment: Sometimes    Self-Exams Yes    Parent/sibling w/ CABG, MI or angioplasty before 65F 55M? 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      Social Determinants of Health     Financial Resource Strain: Low Risk  (10/31/2023)    Financial Resource Strain     Within the past 12 months, have you or your family members you live with been unable to get utilities (heat, electricity) when it was really needed?: No   Food Insecurity: Low Risk  (10/31/2023)    Food Insecurity     Within the past 12 months, did you worry that your food would run out before you got money to buy more?: No     Within the past 12 months, did the food you bought just not last and you didn t have money to get more?: No   Transportation Needs: Low Risk  (10/31/2023)    Transportation Needs     Within the past 12 months, has lack of transportation kept you from medical appointments, getting your medicines, non-medical meetings or appointments, work, or from getting things that you need?: No   Physical Activity: Not on  file   Stress: Not on file   Social Connections: Not on file   Interpersonal Safety: Low Risk  (11/3/2023)    Interpersonal Safety     Do you feel physically and emotionally safe where you currently live?: Yes     Within the past 12 months, have you been hit, slapped, kicked or otherwise physically hurt by someone?: No     Within the past 12 months, have you been humiliated or emotionally abused in other ways by your partner or ex-partner?: No   Housing Stability: Low Risk  (10/31/2023)    Housing Stability     Do you have housing? : Yes     Are you worried about losing your housing?: No     Family History   Problem Relation Age of Onset    Diabetes Maternal Grandmother         DM TYPE 2    Cerebrovascular Disease Maternal Grandmother     Hypertension Sister     Lipids Sister     Heart Disease Sister         Chronic AFib    Diabetes Sister     Coronary Artery Disease Sister         afib    Hypertension Sister     Lipids Sister     Gynecology Sister     Diabetes Sister     Asthma Sister     Blood Disease Paternal Grandmother         T CELL LEUKEMIA    Hypertension Brother     Lipids Brother     Congenital Anomalies Brother     Cardiovascular Brother     Heart Disease Brother         CABG/AFIB    Coronary Artery Disease Brother         cabg afib AAA    Hypertension Brother     Lipids Brother     Other Cancer Brother         multple myeloma    Obesity Brother     Hypertension Brother     Respiratory Brother         Sleep apnea    Heart Disease Brother         AFib    Coronary Artery Disease Brother         afib    Alzheimer Disease Mother     Asthma Mother     Hypertension Mother     Breast Cancer Mother 40        has mastectomy and lived to 84    Allergies Mother         Sulfa,    Arthritis Mother     Cardiovascular Mother     Depression Mother     Respiratory Mother     Lipids Mother     Cancer Mother         breast    Thyroid Disease Mother     Cerebrovascular Disease Mother         FROM  AFIB    Dementia Mother          Alzheimers    Other Cancer Mother         breast    Cancer - colorectal Other     Colon Cancer Other         2019    Cancer Father         lung    Aneurysm Father         brother, AAA    Other Cancer Father         lung ca    Coronary Artery Disease Father         cad AAA    Asthma Sister     Cancer - colorectal Paternal Uncle         late 50s to early 60s - great uncles     Breast Cancer Other         Maternal cousin    Arrhythmia Sister         2 brothers 1 sister Afib    Breast Cancer Maternal Aunt 62    Other Cancer Maternal Aunt 35        Ovarian cancer.  Survived despite late recurrence and is now 92.    Glaucoma No family hx of     Macular Degeneration No family hx of      A1c       6.8       6/23/23    Lab Results   Component Value Date    A1C 7.0 11/28/2022    A1C 7.1 05/25/2022    A1C 8.1 11/24/2021    A1C 9.0 08/16/2021    A1C 6.8 01/05/2021    A1C 6.9 11/25/2020    A1C 7.5 07/22/2020    A1C 7.3 12/10/2019   Last Comprehensive Metabolic Panel:  Lab Results   Component Value Date     09/28/2023    POTASSIUM 3.9 09/28/2023    CHLORIDE 102 09/28/2023    CO2 21 (L) 09/28/2023    ANIONGAP 16 (H) 09/28/2023     (H) 09/28/2023    BUN 22.3 09/28/2023    CR 1.07 (H) 09/28/2023    GFRESTIMATED 56 (L) 09/28/2023    CASSIDY 9.6 09/28/2023     Lab Results   Component Value Date    AST 36 09/14/2023    AST Unsatisfactory specimen - hemolyzed 11/25/2020     Lab Results   Component Value Date    ALT 29 09/14/2023    ALT 42 11/25/2020     Lab Results   Component Value Date    BILICONJ 0.0 03/07/2014      Lab Results   Component Value Date    BILITOTAL 0.4 09/14/2023    BILITOTAL 0.5 11/25/2020     Lab Results   Component Value Date    ALBUMIN 4.5 09/14/2023    ALBUMIN 3.8 11/28/2022    ALBUMIN 3.9 11/25/2020     Lab Results   Component Value Date    PROTTOTAL 7.1 09/14/2023    PROTTOTAL 7.4 11/25/2020      Lab Results   Component Value Date    ALKPHOS 49 09/14/2023    ALKPHOS 47 11/25/2020                Subjective findings- 68-year-old returns clinic for ulcer right first MPJ.  Relates she was doing well, and she does well when she is in her summer flip-flops but then she goes back to her close shoe with her diabetic insole and it breaks down again.  Relates to no injuries.  Relates that she does not smoke and she does have some numbness tingling neuropathy in her feet.    Objective findings -DP PT are 2 out of 4 right.  Has dorsal contracted digits 1 through 5 on the right.  Has a right plantar first MPJ ulcer that is through the dermis with hyperkeratotic eschar, mild edema, no erythema, no odor, no calor, no pain on palpation.  No gross tendon voids.    Assessment and plan- Ulcer right first MPJ.  Diabetes with peripheral Neuropathy and hammertoes.  Diagnosis and treatment options discussed with the patient.  Continue Betadine and a light dressing to this.  Referral to orthopedic surgery given for any surgical options and use discussed with her.  We discussed offloading options and will continue the Diabetic insoles and she is wearing a tennis shoe.  I discussed with her offloading boots she wants to hold on that for now.  Return to clinic and see me pending her surgical referral.                  Low to moderate level of medical decision making.

## 2023-11-22 NOTE — TELEPHONE ENCOUNTER
DIAGNOSIS:   Type II or unspecified type diabetes mellitus with neurological manifestations, not stated as uncontrolled(250.60) (H) [E11.49]  - Primary  Type 2 diabetes mellitus with diabetic foot deformity (H) [E11.69, M21.969]  Ulcer of right foot with fat layer exposed (H) [L97.512]  Hammertoe of right foot [M20.41]   APPOINTMENT DATE: 11/28/2023   NOTES STATUS DETAILS   OFFICE NOTE from referring provider Internal 11/16/2023 - Rufus Hutson DPM - [facility]   OPERATIVE REPORT Internal 02/02/2021 -   1. Left Dale Bunionectomy    07/17/2006 -   1.  Right distal biplanar chevron osteotomy.    2.  Distal soft tissue release.      06/05/2006 -   1.  Left ankle arthroscopy with extensive debridement of arthrofibrotic scar. 2.  Left ankle arthroscopic excision of osteochondral defect with drilling of medial talar         dome.  3.  Proximal tibial bone graft.  4.  Tendo-Achilles lengthening, percutaneous triple hemisection.  5.  Triple arthrodesis.   MEDICATION LIST Internal    IMPLANT RECORD/STICKER Internal    LABS     PHOTOGRAPHS Media Tab    XRAYS (IMAGES & REPORTS) Internal 06/05/2006 - RT Ankle C-ARM

## 2023-11-27 DIAGNOSIS — M21.619 BUNION: Primary | ICD-10-CM

## 2023-11-28 ENCOUNTER — OFFICE VISIT (OUTPATIENT)
Dept: ORTHOPEDICS | Facility: CLINIC | Age: 68
End: 2023-11-28
Payer: COMMERCIAL

## 2023-11-28 ENCOUNTER — ANCILLARY PROCEDURE (OUTPATIENT)
Dept: GENERAL RADIOLOGY | Facility: CLINIC | Age: 68
End: 2023-11-28
Attending: ORTHOPAEDIC SURGERY
Payer: COMMERCIAL

## 2023-11-28 ENCOUNTER — PRE VISIT (OUTPATIENT)
Dept: ORTHOPEDICS | Facility: CLINIC | Age: 68
End: 2023-11-28

## 2023-11-28 VITALS — BODY MASS INDEX: 42.74 KG/M2 | WEIGHT: 282 LBS | HEIGHT: 68 IN

## 2023-11-28 DIAGNOSIS — M21.619 BUNION: ICD-10-CM

## 2023-11-28 DIAGNOSIS — M25.571 PAIN IN JOINT, ANKLE AND FOOT, RIGHT: Primary | ICD-10-CM

## 2023-11-28 PROCEDURE — 73630 X-RAY EXAM OF FOOT: CPT | Mod: RT | Performed by: RADIOLOGY

## 2023-11-28 PROCEDURE — 99203 OFFICE O/P NEW LOW 30 MIN: CPT | Performed by: ORTHOPAEDIC SURGERY

## 2023-11-28 NOTE — LETTER
11/28/2023         RE: Sherry Slaughter  [address]  [place] [address]        Dear Colleague,    Thank you for referring your patient, Sherry Slaughter, to the [facility] [place]. Please see a copy of my visit note below.    Chief complaint: Recurrent right foot plantar ulcer first MTP joint    Patient is a 68-year-old female who presents today for evaluation of her right foot.  Patient reports to have developed a recurrent ulcer along the plantar aspect of the first MTP joint which she is able to accomplish healing through summertime but as soon as she carries some shoes during wintertime the ulcer will recur.  She has tried this for a total of 3 years.    Patient has been referred to us to have an evaluation and to understand what we could do in order to decrease the chances for reopening the ulcer.    Patient carries a diagnosis of insulin-dependent diabetes which currently has an A1c of 6.8 which is down from 6.9.    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    Incidentally at the same right foot has undergone a triple arthrodesis in the past which was quite successful from a recovery perspective.    We reviewed today her past medical and surgical history current medications and drug allergies.    On today's visit she presents as a pleasant female in nonapparent distress denies to have any constitutional symptoms reports to have a weight of 282 pounds with a BMI of 42.8    On today's exam she presents with full range of motion of the right ankle hindfoot and midfoot joints and is intact skin is intact there is a well-healed ulcer on the plantar aspect the first MTP joint with a slight hematoma.  There is no drainage there is no odor.  Presents with overall excellent alignment of the first MTP joint  and the great toe but there is a slight tear in clawing of the toe.    Plain x-rays of the foot were reviewed today which are grossly nondiagnostic.    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  I am not certain if she will require first MTP joint fusion and given the fact that he has recovered significantly longer for the time being we came to the common agreement to undergo a sesamoidectomy and see how she responds to it.  The recovery will imply to be minimally ambulatory with a cam walker for the first 2 weeks and after that on the skin is healed she will proceed with activities as tolerated    I discussed with the patient the most likely postoperative course and complications from undergoing a sesamoidectomy of the right foot which included but not limited to infection bleeding nerve damage residual pain and recurrence of the ulcer    All questions were answered.  Patient was pleased with discussion.  She will schedule surgery at base of her convenience.  I encouraged her to control the ulcer make sure that is not open prior to surgery which if that is the case we will have to postpone the surgery.    TT 30 minutes      Jeevan Gray MD

## 2023-11-28 NOTE — PROGRESS NOTES
Chief complaint: Recurrent right foot plantar ulcer first MTP joint    Patient is a 68-year-old female who presents today for evaluation of her right foot.  Patient reports to have developed a recurrent ulcer along the plantar aspect of the first MTP joint which she is able to accomplish healing through summertime but as soon as she carries some shoes during wintertime the ulcer will recur.  She has tried this for a total of 3 years.    Patient has been referred to us to have an evaluation and to understand what we could do in order to decrease the chances for reopening the ulcer.    Patient carries a diagnosis of insulin-dependent diabetes which currently has an A1c of 6.8 which is down from 6.9.    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    Incidentally at the same right foot has undergone a triple arthrodesis in the past which was quite successful from a recovery perspective.    We reviewed today her past medical and surgical history current medications and drug allergies.    On today's visit she presents as a pleasant female in nonapparent distress denies to have any constitutional symptoms reports to have a weight of 282 pounds with a BMI of 42.8    On today's exam she presents with full range of motion of the right ankle hindfoot and midfoot joints and is intact skin is intact there is a well-healed ulcer on the plantar aspect the first MTP joint with a slight hematoma.  There is no drainage there is no odor.  Presents with overall excellent alignment of the first MTP joint and the great toe but there is a slight tear in clawing of the toe.    Plain x-rays of the foot were reviewed today which are grossly nondiagnostic.    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  I am not certain if she will require first MTP joint fusion and given the fact that he has recovered significantly longer for the time being we came to the common agreement to undergo a sesamoidectomy and see how she responds to it.  The recovery will imply to be minimally ambulatory with a cam walker for the first 2 weeks and after that on the skin is healed she will proceed with activities as tolerated    I discussed with the patient the most likely postoperative course and complications from undergoing a sesamoidectomy of the right foot which included but not limited to infection bleeding nerve damage residual pain and recurrence of the ulcer    All questions were answered.  Patient was pleased with discussion.  She will schedule surgery at base of her convenience.  I encouraged her to control the ulcer make sure that is not open prior to surgery which if that is the case we will have to postpone the surgery.    TT 30 minutes

## 2023-11-28 NOTE — NURSING NOTE
"Reason For Visit:   Chief Complaint   Patient presents with    Consult     Dr Hutson refer for right foot ulcer. Healed up over the summer while she was able to wear sandals. Going back to regular shoes for the colder weather has brought the sore back. Has tried boots and inserts. Hx of triple arthrodesis and bunion surgery on left - [place] and Dr Austin.        Ht 1.727 m (5' 8\")   Wt 127.9 kg (282 lb)   LMP 10/02/2007   BMI 42.88 kg/m           Rajni Espinosa ATC    "

## 2023-11-28 NOTE — NURSING NOTE
Pre-Op Teaching was done in person at the clinic.    Teaching Flowsheet   Relevant Diagnosis: Right sesmoidectomy  Teaching Topic: Pre-Operative Teaching     Person(s) involved in teaching:   Patient     Motivation Level:  Asks Questions: Yes  Eager to Learn: Yes  Cooperative: Yes  Receptive (willing/able to accept information): Yes  Any cultural factors/Sabianism beliefs that may influence understanding or compliance? No     Patient demonstrates understanding of the following:  Reason for the appointment, diagnosis and treatment plan: Yes  Knowledge of proper use of medications and conditions for which they are ordered (with special attention to potential side effects or drug interactions): Yes  Which situations necessitate calling provider and whom to contact: Yes- discussed the stoplight tool to help assist with this.      Teaching Concerns Addressed:      Proper use of surgical scrub explain and provided to patient.    Nutritional needs and diet plan: Yes  Pain management techniques: Yes  Wound Care: Yes  How and/when to access community resources: Yes     Instructional Materials Used/Given: surgical packet and surgical soap  received in clinic.       - Important contact info/ phone numbers  - Map/ location of surgery  - Showering instructions  - Stop light tool    Additionally the following was discussed with patient:  - Patient informed responsible adult is required to drive her home and stay with her for 24 hours after surgery.        -Next step: Perioperative  to contact patient and schedule surgery/post ops., Patient to be evaluated by PAC.    Time spent with patient: 15 minutes.     Tara Holter, RNCC

## 2023-11-29 ENCOUNTER — TELEPHONE (OUTPATIENT)
Dept: ORTHOPEDICS | Facility: CLINIC | Age: 68
End: 2023-11-29
Payer: COMMERCIAL

## 2023-11-29 DIAGNOSIS — I48.0 PAROXYSMAL ATRIAL FIBRILLATION (H): Primary | ICD-10-CM

## 2023-11-29 NOTE — TELEPHONE ENCOUNTER
FUTURE VISIT INFORMATION      SURGERY INFORMATION:  Date: 23  Location: [facility] OR  Surgeon:  Jeevan Gray MD  Anesthesia Type:  Choice  Procedure: Medial and lateral sesamoidectomy, Right  Consult: 23    RECORDS REQUESTED FROM:       Primary Care Provider: Marilou Arciniega MD PhD - [facility]     Pertinent Medical History: Mitral Valve insufficiency; CKD Stage 3; Type 2 Diabetes with hyperglycemia and long term insulin use; paroxsymal atrial fibrillation; hx colon cancer; aortic stenosis; hypertension; Hyperlipidemia ; hypothyroidism     Most recent EKG+ Tracin23     Most recent ECHO: 23    Most recent Cardiac Stress Test: 03    Most recent PFT's:  23    Most recent Sleep Study: 8/7/15

## 2023-11-29 NOTE — TELEPHONE ENCOUNTER
Patient is scheduled for surgery with Dr. Gray    Spoke with: Patient    Date of Surgery: 12/27/23    Location: [place]    Post ops: 2 & 6 weeks    Pre op with Provider: Complete    H&P: Scheduled with PAC 12/7/23    Additional imaging/appointments: N/A    Surgery packet: Received in clinic     Additional comments: N/A        Lauren Orr MA on 11/29/2023 at 12:18 PM

## 2023-11-30 DIAGNOSIS — I50.9 CONGESTIVE HEART FAILURE, UNSPECIFIED HF CHRONICITY, UNSPECIFIED HEART FAILURE TYPE (H): ICD-10-CM

## 2023-11-30 DIAGNOSIS — I48.0 PAROXYSMAL ATRIAL FIBRILLATION (H): Primary | ICD-10-CM

## 2023-12-05 RX ORDER — DILTIAZEM HYDROCHLORIDE 360 MG/1
360 CAPSULE, EXTENDED RELEASE ORAL DAILY
Qty: 90 CAPSULE | Refills: 0 | Status: SHIPPED | OUTPATIENT
Start: 2023-12-05 | End: 2024-04-08

## 2023-12-05 NOTE — TELEPHONE ENCOUNTER
"Ginny refill sent . Pt is scheduled for lab tomorrow prior to CORE appointment.       Requested Prescriptions   Pending Prescriptions Disp Refills    diltiazem ER COATED BEADS (CARDIZEM CD) 360 MG 24 hr capsule [Pharmacy Med Name: DILTIAZEM HCL ER COATED  CP24] 90 capsule      Sig: Take 1 capsule (360 mg) by mouth daily       Calcium Channel Blockers Protocol  Failed - 12/5/2023 11:01 AM        Failed - Normal serum creatinine on file in past 12 months     Recent Labs   Lab Test 09/28/23  0910   CR 1.07*       Ok to refill medication if creatinine is low          Passed - Blood pressure under 140/90 in past 12 months     BP Readings from Last 3 Encounters:   11/03/23 112/70   09/28/23 138/78   09/14/23 131/63                 Passed - Normal ALT in past 12 months     Recent Labs   Lab Test 09/14/23  0958   ALT 29             Passed - Recent (12 mo) or future (30 days) visit within the authorizing provider's specialty     Patient has had an office visit with the authorizing provider or a provider within the authorizing providers department within the previous 12 mos or has a future within next 30 days. See \"Patient Info\" tab in inbasket, or \"Choose Columns\" in Meds & Orders section of the refill encounter.              Passed - Medication is active on med list        Passed - Patient is age 18 or older        Passed - No active pregnancy on record        Passed - No positive pregnancy test in past 12 months            "

## 2023-12-05 NOTE — TELEPHONE ENCOUNTER
diltiazem ER COATED BEADS (CARDIZEM CD) 360 MG 24 hr capsule 90 capsule 3 1/17/2023     Last Office Visit: 9/14/23  Future Office visit:  3/14/24  Creatinine   Date Value Ref Range Status   09/28/2023 1.07 (H) 0.51 - 0.95 mg/dL Final   04/13/2021 0.81 0.52 - 1.04 mg/dL Final       Routing refill request to provider for review/approval because:  Abnormal lab    Patricia Bridges RN     r/o appendicitis r/o appendicitis

## 2023-12-06 ENCOUNTER — OFFICE VISIT (OUTPATIENT)
Dept: CARDIOLOGY | Facility: CLINIC | Age: 68
End: 2023-12-06
Payer: COMMERCIAL

## 2023-12-06 ENCOUNTER — LAB (OUTPATIENT)
Dept: LAB | Facility: CLINIC | Age: 68
End: 2023-12-06
Payer: COMMERCIAL

## 2023-12-06 VITALS
HEIGHT: 67 IN | HEART RATE: 63 BPM | BODY MASS INDEX: 44.15 KG/M2 | OXYGEN SATURATION: 99 % | SYSTOLIC BLOOD PRESSURE: 119 MMHG | WEIGHT: 281.3 LBS | DIASTOLIC BLOOD PRESSURE: 57 MMHG

## 2023-12-06 DIAGNOSIS — E03.4 HYPOTHYROIDISM DUE TO ACQUIRED ATROPHY OF THYROID: ICD-10-CM

## 2023-12-06 DIAGNOSIS — I10 ESSENTIAL HYPERTENSION WITH GOAL BLOOD PRESSURE LESS THAN 140/90: ICD-10-CM

## 2023-12-06 DIAGNOSIS — N18.31 STAGE 3A CHRONIC KIDNEY DISEASE (H): ICD-10-CM

## 2023-12-06 DIAGNOSIS — I48.0 PAROXYSMAL ATRIAL FIBRILLATION (H): ICD-10-CM

## 2023-12-06 DIAGNOSIS — E78.5 HYPERLIPIDEMIA LDL GOAL <100: ICD-10-CM

## 2023-12-06 DIAGNOSIS — R06.02 SOB (SHORTNESS OF BREATH): ICD-10-CM

## 2023-12-06 DIAGNOSIS — I50.9 CONGESTIVE HEART FAILURE, UNSPECIFIED HF CHRONICITY, UNSPECIFIED HEART FAILURE TYPE (H): Primary | ICD-10-CM

## 2023-12-06 DIAGNOSIS — E11.65 TYPE 2 DIABETES MELLITUS WITH HYPERGLYCEMIA, WITHOUT LONG-TERM CURRENT USE OF INSULIN (H): ICD-10-CM

## 2023-12-06 DIAGNOSIS — I50.9 CONGESTIVE HEART FAILURE, UNSPECIFIED HF CHRONICITY, UNSPECIFIED HEART FAILURE TYPE (H): ICD-10-CM

## 2023-12-06 DIAGNOSIS — R73.09 ELEVATED HEMOGLOBIN A1C: ICD-10-CM

## 2023-12-06 LAB
ANION GAP SERPL CALCULATED.3IONS-SCNC: 11 MMOL/L (ref 7–15)
BUN SERPL-MCNC: 17.8 MG/DL (ref 8–23)
CALCIUM SERPL-MCNC: 10 MG/DL (ref 8.8–10.2)
CHLORIDE SERPL-SCNC: 103 MMOL/L (ref 98–107)
CHOLEST SERPL-MCNC: 119 MG/DL
CREAT SERPL-MCNC: 0.97 MG/DL (ref 0.51–0.95)
CREAT UR-MCNC: 24.9 MG/DL
DEPRECATED HCO3 PLAS-SCNC: 28 MMOL/L (ref 22–29)
EGFRCR SERPLBLD CKD-EPI 2021: 63 ML/MIN/1.73M2
FASTING STATUS PATIENT QL REPORTED: NO
GLUCOSE SERPL-MCNC: 132 MG/DL (ref 70–99)
HDLC SERPL-MCNC: 55 MG/DL
HGB BLD-MCNC: 13.3 G/DL (ref 11.7–15.7)
LDLC SERPL CALC-MCNC: 47 MG/DL
MICROALBUMIN UR-MCNC: <12 MG/L
MICROALBUMIN/CREAT UR: NORMAL MG/G{CREAT}
NONHDLC SERPL-MCNC: 64 MG/DL
POTASSIUM SERPL-SCNC: 3.6 MMOL/L (ref 3.4–5.3)
SODIUM SERPL-SCNC: 142 MMOL/L (ref 135–145)
TRIGL SERPL-MCNC: 87 MG/DL
TSH SERPL DL<=0.005 MIU/L-ACNC: 2.17 UIU/ML (ref 0.3–4.2)

## 2023-12-06 PROCEDURE — 82043 UR ALBUMIN QUANTITATIVE: CPT

## 2023-12-06 PROCEDURE — 80048 BASIC METABOLIC PNL TOTAL CA: CPT

## 2023-12-06 PROCEDURE — 84443 ASSAY THYROID STIM HORMONE: CPT

## 2023-12-06 PROCEDURE — 80061 LIPID PANEL: CPT

## 2023-12-06 PROCEDURE — 85018 HEMOGLOBIN: CPT

## 2023-12-06 PROCEDURE — 36415 COLL VENOUS BLD VENIPUNCTURE: CPT

## 2023-12-06 PROCEDURE — 83036 HEMOGLOBIN GLYCOSYLATED A1C: CPT

## 2023-12-06 PROCEDURE — 99214 OFFICE O/P EST MOD 30 MIN: CPT | Performed by: NURSE PRACTITIONER

## 2023-12-06 PROCEDURE — 82570 ASSAY OF URINE CREATININE: CPT

## 2023-12-06 RX ORDER — ROSUVASTATIN CALCIUM 10 MG/1
10 TABLET, COATED ORAL DAILY
Qty: 90 TABLET | Refills: 3 | Status: SHIPPED | OUTPATIENT
Start: 2023-12-06

## 2023-12-06 RX ORDER — LISINOPRIL 20 MG/1
20 TABLET ORAL DAILY
Qty: 90 TABLET | Refills: 3 | Status: SHIPPED | OUTPATIENT
Start: 2023-12-06

## 2023-12-06 RX ORDER — POTASSIUM CHLORIDE 1500 MG/1
20 TABLET, EXTENDED RELEASE ORAL DAILY
Qty: 90 TABLET | Refills: 3 | Status: SHIPPED | OUTPATIENT
Start: 2023-12-06

## 2023-12-06 RX ORDER — FUROSEMIDE 20 MG
20 TABLET ORAL DAILY
Qty: 90 TABLET | Refills: 3 | Status: SHIPPED | OUTPATIENT
Start: 2023-12-06 | End: 2024-02-22

## 2023-12-06 NOTE — PATIENT INSTRUCTIONS
Take your medicines every day, as directed     Changes made today:  No med changes       Cardiology Care Coordinators:      Rachell JENNINGS RN     Cardiology Rooming staff:  María TAMAYO CMA    Phone  [phone]      Fax [phone]    To Contact us     During Business Hours:  [phone]     If you are needing refills please contact your pharmacy.     For urgent after hour care please call the [facility] at [phone] or the [facility] at [phone] and ask to speak to the cardiologist on call.            HOW TO CHECK YOUR BLOOD PRESSURE AT HOME:     Avoid eating, smoking, and exercising for at least 30 minutes before taking a reading.     Be sure you have taken your BP medication at least 2-3 hours before you check it.      Sit quietly for 10 minutes before a reading.      Sit in a chair with your feet flat on the floor. Rest your  arm on a table so that the arm cuff is at the same level as your heart.     Remain still during the reading.  Record your blood pressure and pulse in a log and bring to your next appointment.       Use [employer] allows you to communicate directly with your heart team through secure messaging.  [employer] can be accessed any time on your phone, computer, or tablet.  If you need assistance, we'd be happy to help!             Keep your Heart Appointments:     Liz next available  CORE in 12 months

## 2023-12-06 NOTE — PROGRESS NOTES
1.  Type 2 diabetes.   2.  Hypertension.   3.  Hyperlipidemia.   4.  Depression.   5.  Obstructive sleep apnea, currently untreated.   6.  Hypothyroidism.   7.  Diverticulosis.   8.  Low back pain.   9.  Irritable bowel syndrome.     On 11/25/2020, Dr. Aranda saw the patient and sent her to the emergency room for further treatment of her AFib.  She ended up having her rate controlled and had an echocardiogram performed.  She then had ELIAS-guided cardioversion performed on 12/04/2020 successfully.  She was then hospitalized again on 12/09/2020 for pulmonary edema, likely secondary to being in rapid AFib for quite some time in the past in addition to having her hydrochlorothiazide discontinued.  She was in pulmonary edema on 12/09/2020, was diuresed with 40 mg of IV Lasix and had p.o. Lasix 20 started and she was sent here for further evaluation.       5/25/22- In February and March she had Afib and felt worse. She is getting better. She says her Lisinopril was decreased with Dr. Aranda as her BP was running low. Weight at home 298 lbs. Appetite has been good. Occasional swelling but not too bad today. Has some BAEZ. Had COVID early May. Would like some rehab to know what she can do and can't do.      11/30/22- on the 11 th of November her heart rate on the fit bit was 133- she had a stressful day. On 11/19- she jumped up again. She has been doing better now- hasn't noted faster HR's.  Today she is back in normal range. She did have virus with a cough. Weight at home 298 lbs. No swelling in the Legs/feet. No BAEZ or noticeable SOB.     5/31/23- she is on amiodarone and feels so much better. HR regular and in the 60's. No swelling in the LE. She has no BAEZ or SOB. She has a good appetite.  Weight at home 292 lbs.  She is feeling well overall.     12/6/23- she has been traveling and states she has been feeling so much better. No swelling in the LE.  Appetite is good. Scale at home 281 lbs. On Ozempic.  No BAEZ or SOB.  HR 60's.  She has no other concerns today.     CURRENT MEDICATIONS:  She is taking diltiazem 360 mg a day, Lasix 20 mg a day, insulin, levothyroxine, Victoza, lisinopril 20 mg a day, metformin, hydrochlorothiazide 50 mg  metoprolol succinate 200 mg a day. rivaroxaban 20 mg a day, rosuvastatin 10 mg a day.       ROS:   Constitutional: No fever, chills, or sweats.  ENT: No visual disturbance, ear ache, epistaxis, sore throat.   Allergies/Immunologic: Negative  Respiratory: No cough, hemoptysis.   Cardiovascular: As per HPI.   GI: As per HPI.   : No urinary frequency, dysuria, or hematuria.   Integument: Negative.   Psychiatric: Negative.   Neuro: Negative.   Endocrinology: negative   Musculoskeletal: negative    EXAM:   Constitutional - WDWN, no acute distress   Eyes - no redness or discharge, nonicteric sclera  Respiratory - nonlabored breathing, able to speak in full sentences without difficulty  CV: no visible edema of visualized upper extremities.  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 She appears to be euvolemic.     Continue diltiazem 360 mg every morning,continue Metoprolol       Continue Lasix 20 mg a day and hydrochlorothiazide 25 mg a day-    HFpEF:  BP: controlled   Fluid status euvolemic  Aldosterone antagonist: no  Ischemia evaluation: (complete/pending/not within the goals of care)   ACEi/ARB - Lisinopril  BB - Metoprolol   SCD prophylaxis - N/A, EF is normal   NSAID use: Contraindicated, reviewed with patient   Sleep Apnea Evaluation: (yes uses CPAP/ yes refuses CPAP/ no/pending evaluation)      1.  Atrial fibrillation with rapid ventricular response. She underwent ELIAS-guided cardioversion on 12/04/2020.  She has a relatively structurally normal heart.  She is currently stable in normal sinus  rhythm on significant doses of metoprolol and diltiazem.  We will continue to monitor.  She is tolerating anticoagulation well without gross bleeding.      2.  Hypertension.     Blood pressures do appear to be well controlled as they have been at 120 or lower systolic daily.      3.  Congestive heart failure.     Her dry weight is likely in the 280's pound range. LVEF 60-65%        4.  Sleep apnea. She has been trying to use the Bi-pap more.      5.  Mild aortic stenosis.    This was seen on recent echo and a mean gradient was only 12 mmHg. continue to follow-through time.       I reviewed patients pertinent clinical laboratory studies  I reviewed patients medications  I reviewed patients pertinent medical records      Plan:  No med changes  Needs to set up with a new cardiologist  CORE in 12 months         Robinson FERNANDEZ NP-C  Advanced Heart Failure Nurse Practitioner  [facility]

## 2023-12-06 NOTE — NURSING NOTE
"Chief Complaint   Patient presents with    CORE       Initial /57   Pulse 63   Ht 1.702 m (5' 7\")   Wt 127.6 kg (281 lb 4.8 oz)   LMP 10/02/2007   SpO2 99%   BMI 44.06 kg/m   Estimated body mass index is 44.06 kg/m  as calculated from the following:    Height as of this encounter: 1.702 m (5' 7\").    Weight as of this encounter: 127.6 kg (281 lb 4.8 oz)..  BP completed using cuff size: kalpana Humphreys CMA (AAMA)    "

## 2023-12-07 ENCOUNTER — OFFICE VISIT (OUTPATIENT)
Dept: SURGERY | Facility: CLINIC | Age: 68
End: 2023-12-07
Payer: COMMERCIAL

## 2023-12-07 ENCOUNTER — PRE VISIT (OUTPATIENT)
Dept: SURGERY | Facility: CLINIC | Age: 68
End: 2023-12-07

## 2023-12-07 ENCOUNTER — TELEPHONE (OUTPATIENT)
Dept: SURGERY | Facility: CLINIC | Age: 68
End: 2023-12-07

## 2023-12-07 ENCOUNTER — ANESTHESIA EVENT (OUTPATIENT)
Dept: SURGERY | Facility: CLINIC | Age: 68
End: 2023-12-07
Payer: COMMERCIAL

## 2023-12-07 VITALS
HEART RATE: 65 BPM | HEIGHT: 67 IN | SYSTOLIC BLOOD PRESSURE: 138 MMHG | TEMPERATURE: 97.6 F | DIASTOLIC BLOOD PRESSURE: 74 MMHG | BODY MASS INDEX: 44.42 KG/M2 | OXYGEN SATURATION: 99 % | WEIGHT: 283 LBS

## 2023-12-07 DIAGNOSIS — M25.571 PAIN IN JOINT, ANKLE AND FOOT, RIGHT: ICD-10-CM

## 2023-12-07 DIAGNOSIS — Z01.818 PREOP EXAMINATION: Primary | ICD-10-CM

## 2023-12-07 DIAGNOSIS — R73.09 ELEVATED HEMOGLOBIN A1C: ICD-10-CM

## 2023-12-07 LAB — HBA1C MFR BLD: 6.1 % (ref 0–5.6)

## 2023-12-07 PROCEDURE — 99205 OFFICE O/P NEW HI 60 MIN: CPT | Performed by: NURSE PRACTITIONER

## 2023-12-07 ASSESSMENT — ENCOUNTER SYMPTOMS: DYSRHYTHMIAS: 1

## 2023-12-07 ASSESSMENT — LIFESTYLE VARIABLES: TOBACCO_USE: 0

## 2023-12-07 ASSESSMENT — PAIN SCALES - GENERAL: PAINLEVEL: NO PAIN (0)

## 2023-12-07 NOTE — H&P
Pre-Operative H & P     CC:  Preoperative exam to assess for increased cardiopulmonary risk while undergoing surgery and anesthesia.    Date of Encounter: 12/7/2023  Primary Care Physician:  Marilou Arciniega     Reason for visit:   Encounter Diagnoses   Name Primary?    Preop examination Yes    Pain in joint, ankle and foot, right        HPI  Sherry Slaughter is a 68 year old female who presents for pre-operative H & P in preparation for  Procedure Information       Case: [id number] Date/Time: 12/27/23 1450    Procedure: Medial and lateral sesamoidectomy (Right: Ankle)    Anesthesia type: Choice    Diagnosis: Pain in joint, ankle and foot, right [M25.571]    Pre-op diagnosis: Pain in joint, ankle and foot, right [M25.571]    Location:  OR  /  OR    Providers: Jeevan Gray MD            The patient presents to the PAC in person today in preparation for the above scheduled procedure with comorbid conditions including HFpEF, HTN, HLD, PAF, GENESIS, obesity, anticoagulated, osteoarthritis, T2DM insulin dependent, hypothyroid and IBS.     The patient was seen by Dr. Gray in the orthopedic surgery clinic for further evaluation of recurrent right foot plantar ulcer first MTP joint.  Through Dr. 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 Dr. Gray counseled the patient of the findings and treatment options. The patient has now been scheduled for the procedure as listed above.            History is obtained from the patient and chart review    Hx of abnormal bleeding or anti-platelet use: Xarelto    Menstrual history: Patient's last menstrual period was 10/02/2007.:      Past Medical History  Past Medical History:   Diagnosis Date    Cataract     Congestive heart failure (H) 2019 NOVEMBER    AFIB    DEPRESSION     comes and goes - tried meds - unsuccessfully, certain times of the year, no psych intervention and no counselors in the past - and not  interested     Diabetic retinopathy associated with diabetes mellitus due to underlying condition (H)     Diverticulosis of colon (without mention of hemorrhage) 04/2004    colonoscopy    ECHO-mildLVH,tr MR,mild thick lflets w inc LA,trTR   12/2003    Essential hypertension, benign 1990s    late 1990s - started medications at that time - not to difficult to control meds     Fam hx-cardiovas dis [facility]     father - CAD, and lipids/HTN - multiple members of the family     Family history of diabetes mellitus     sister and grandmother with DM     Family history of malignant neoplasm of breast     mother - young age - 45, and maternal cousin and aunt as well - no BRCA testing done     Family history of stroke (cerebrovascular)     grandmother in early life in her 40s     FAMILY HX COLON CANCER     Pat uncles x 2    Heart disease     murmur/    Heart murmur     HYPERLIPIDEMIA 2000    fairly recent - in the last 5 years - high for DM levels  -cholesterol recent     Irritable bowel syndrome     goes between the 2 - nerve related - more stressed more problems     MICROALBUMINURIA     unsure how long - been on the lisinopril - for a few years at well     Nonspecific abnormal results of liver function study 02/03/2003    SGOT - has been high in the past - since the hepatitis b - borderline elevation - not really changing any     OBESITY     Obstructive sleep apnea     GENESIS (obstructive sleep apnea) 10/15/2006    psg 5/15 AHI 53 aPAP 8-15    OSTEOARTHRITIS L KNEE 2003    total knee on the left - and pain is gone since the knee replacement     PERS HX HEPATITIS B- RESOLVED] 1977    acute virus only - no chronic disease     PERS HX HEPATITIS B- RESOLVED]     Type II or unspecified type diabetes mellitus without mention of complication, not stated as uncontrolled 1991    oral meds frist, then insulin eventually later, difficult to control most of the time     Unspecified hypothyroidism 10/11/2006    TSH 3.36 - mild subclinical  hypothyroidism - deciding on following or starting low dose meds - with dr Oreilly - following        Past Surgical History  Past Surgical History:   Procedure Laterality Date    ABDOMEN SURGERY      ovaian scope/ appendix removal    ANESTHESIA CARDIOVERSION N/A 12/4/2020    Procedure: ANESTHESIA, FOR CARDIOVERSION @1400;  Surgeon: GENERIC ANESTHESIA PROVIDER;  Location: [facility] OR    ARTHROPLASTY KNEE Right 9/23/2015    Procedure: ARTHROPLASTY KNEE;  Surgeon: Rufus Brown MD;  Location:  OR    BUNIONECTOMY RNE AND LEANDRO, COMBINED Left 2/2/2021    Procedure: Left bunion correction with bone cutting and screw fixation;  Surgeon: David Hoffman DPM;  Location: [facility] OR    CATARACT IOL, RT/LT Left     COLONOSCOPY  4/04    diverticulosis, rec repeat 10 yrs(consider fam hx?)    COLONOSCOPY WITH CO2 INSUFFLATION N/A 6/19/2019    Procedure: COLONOSCOPY, WITH CO2 INSUFFLATION;  Surgeon: Zeb Duarte MD;  Location: [facility] OR    ORTHOPEDIC SURGERY      right ankle    PHACOEMULSIFICATION CLEAR CORNEA WITH STANDARD INTRAOCULAR LENS IMPLANT Left 3/15/2018    Procedure: PHACOEMULSIFICATION CLEAR CORNEA WITH STANDARD INTRAOCULAR LENS IMPLANT;  LEFT EYE PHACOEMULSIFICATION CLEAR CORNEA WITH STANDARD INTRAOCULAR LENS IMPLANT;  Surgeon: Bandar Linares MD;  Location:  [facility]    PHACOEMULSIFICATION CLEAR CORNEA WITH STANDARD INTRAOCULAR LENS IMPLANT Right 4/5/2018    Procedure: PHACOEMULSIFICATION CLEAR CORNEA WITH STANDARD INTRAOCULAR LENS IMPLANT;  RIGHT PHACOEMULSIFICATION CLEAR CORNEA WITH STANDARD INTRAOCULAR LENS IMPLANT ;  Surgeon: Bandar Linares MD;  Location:  [facility]    STRESS ECHO (METRO)  12/03    no ischemia, limited by fatigue    SURGICAL HISTORY OF -       EXP LAP- R OVARY CYSTS    SURGICAL HISTORY OF -   1981,1984,1985    CHILDBIRTH    ZZC NONSPECIFIC PROCEDURE  6/06    right triple arthrodecesis     ZZC NONSPECIFIC PROCEDURE  7/06     right bunion surgery     [facility] TOTAL KNEE ARTHROPLASTY  3/03    L  knee       Prior to Admission Medications  Current Outpatient Medications   Medication Sig Dispense Refill    amiodarone (PACERONE) 200 MG tablet Take 1 tablet (200 mg) by mouth daily (Patient taking differently: Take 200 mg by mouth every morning) 90 tablet 3    diltiazem ER COATED BEADS (CARDIZEM CD) 360 MG 24 hr capsule Take 1 capsule (360 mg) by mouth daily (Patient taking differently: Take 360 mg by mouth every morning) 90 capsule 0    furosemide (LASIX) 20 MG tablet Take 1 tablet (20 mg) by mouth daily (Patient taking differently: Take 20 mg by mouth every morning) 90 tablet 3    Glucagon (GVOKE HYPOPEN 1-PACK) 1 MG/0.2ML SOAJ Inject 1 mg Subcutaneous once as needed Administer the injection in the lower abdomen, outer thigh, or outer upperarm 0.2 mL 3    hydrochlorothiazide (HYDRODIURIL) 25 MG tablet Take 1 tablet (25 mg) by mouth daily (Patient taking differently: Take 25 mg by mouth every morning) 90 tablet 3    ibuprofen (ADVIL/MOTRIN) 200 MG capsule Take 600 mg by mouth every 6 hours as needed       insulin reg HIGH CONC (HUMULIN R U-500 KWIKPEN) 500 UNIT/ML PEN soln ADMINISTER 130-140 UNITS AT 7 IN THE MORNING,  UNITS AT 3-4 IN THE EVENING (Patient taking differently: 2 times daily (before meals) ADMINISTER 130-140 UNITS AT 7 IN THE MORNING,  UNITS AT 3-4 IN THE EVENING) 40 mL 1    levothyroxine (SYNTHROID/LEVOTHROID) 75 MCG tablet Take 1 tablet (75 mcg) by mouth daily (Patient taking differently: Take 75 mcg by mouth every morning) 90 tablet 3    lisinopril (ZESTRIL) 20 MG tablet Take 1 tablet (20 mg) by mouth daily (Patient taking differently: Take 20 mg by mouth every morning) 90 tablet 3    metFORMIN (GLUCOPHAGE XR) 500 MG 24 hr tablet Take 4 tablets (2,000 mg) by mouth At Bedtime TAKE 4 TABLETS AT BEDTIME 360 tablet 3    metoprolol succinate ER (TOPROL XL) 200 MG 24 hr tablet Take 1 tablet (200 mg) by mouth daily Dose increase (Patient taking differently: Take 200 mg by mouth every  morning Dose increase) 180 tablet 3    Multiple Vitamins-Minerals (ADVANCED DIABETIC MULTIVITAMIN) TABS Take 1 tablet by mouth every morning      potassium chloride ER (KLOR-CON M) 20 MEQ CR tablet Take 1 tablet (20 mEq) by mouth daily (Patient taking differently: Take 20 mEq by mouth every morning) 90 tablet 3    rivaroxaban ANTICOAGULANT (XARELTO ANTICOAGULANT) 20 MG TABS tablet Take 1 tablet (20 mg) by mouth daily (with dinner) 90 tablet 3    rosuvastatin (CRESTOR) 10 MG tablet Take 1 tablet (10 mg) by mouth daily (Patient taking differently: Take 10 mg by mouth every evening) 90 tablet 3    Semaglutide, 2 MG/DOSE, (OZEMPIC) 8 MG/3ML pen Inject 2 mg Subcutaneous once a week (Patient taking differently: Inject 2 mg Subcutaneous once a week Tue) 9 mL 3    blood glucose (NO BRAND SPECIFIED) test strip Use to test blood sugar 2 times daily or as directed. Patient requesting One Touch Ultra Strips 100 strip 3    Continuous Blood Gluc  (DEXCOM G7 ) KATIA Use to read blood sugars as per 's instructions. 1 each 0    Continuous Blood Gluc Sensor (DEXCOM G7 SENSOR) MISC 1 each every 10 days 9 each 3    econazole nitrate 1 % external cream Apply topically daily To feet and toenails. 85 g 5    Garlic 1000 MG CAPS Take 1 capsule by mouth daily Takes during summer months.  Done taking until next summer. (Patient not taking: Reported on 12/7/2023)      insulin pen needle (GNP CLICKFINE PEN NEEDLES) 31G X 8 MM miscellaneous Uses 3 times daily or as directed 300 each 3    ORDER FOR DME, SET TO LOCAL PRINT, Respironics REMSTAR 60 Series Auto CPAP 8-15cm H2O, Airfit P10 nasal pillow mask w/medium pillows      order for DME Equipment being ordered: [id number] $70   Shoe LG 1 Device 0       Allergies  Allergies   Allergen Reactions    Empagliflozin      Yeast infections    Lipitor [Atorvastatin Calcium]      Gas    Pravachol [Pravastatin Sodium]      Pravachol - dry cough     Simvastatin       Myalgia       Social History  Social History     Socioeconomic History    Marital status:      Spouse name: Not on file    Number of children: 3    Years of education: Not on file    Highest education level: Not on file   Occupational History    Occupation: RN     Employer: [employer]   Tobacco Use    Smoking status: Former     Types: Cigarettes     Passive exposure: Past    Smokeless tobacco: Never    Tobacco comments:     tried in early 20s only    Vaping Use    Vaping Use: Never used   Substance and Sexual Activity    Alcohol use: Yes     Comment: 1 drink 1-2 year    Drug use: No    Sexual activity: Not Currently     Birth control/protection: Post-menopausal     Comment:  - since for 20 years, no signficant other  > 5 years sexual activity    Other Topics Concern     Service No    Blood Transfusions No    Caffeine Concern No    Occupational Exposure Yes     Comment: Normal nursing exposures    Hobby Hazards No    Sleep Concern Yes    Stress Concern No     Comment: Stress level at HM=5, stress level at WK=6    Weight Concern Yes    Special Diet Yes     Comment: Diabetic diet (watching carbs)    Back Care No     Comment: Occassional back spasms    Exercise Yes     Comment: Pt joined a health club goes approx 3-4 times a week,half to one mile daily    Bike Helmet No    Seat Belt Yes     Comment: Sometimes    Self-Exams Yes    Parent/sibling w/ CABG, MI or angioplasty before 65F 55M? 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      Social Determinants of Health     Financial Resource Strain: Low Risk  (10/31/2023)    Financial Resource Strain     Within the past 12 months, have you or your family members you live with been unable to get utilities (heat, electricity) when it was really needed?: No   Food Insecurity: Low Risk  (10/31/2023)    Food Insecurity     Within the past 12 months, did you worry that your  food would run out before you got money to buy more?: No     Within the past 12 months, did the food you bought just not last and you didn t have money to get more?: No   Transportation Needs: Low Risk  (10/31/2023)    Transportation Needs     Within the past 12 months, has lack of transportation kept you from medical appointments, getting your medicines, non-medical meetings or appointments, work, or from getting things that you need?: No   Physical Activity: Not on file   Stress: Not on file   Social Connections: Not on file   Interpersonal Safety: Low Risk  (11/3/2023)    Interpersonal Safety     Do you feel physically and emotionally safe where you currently live?: Yes     Within the past 12 months, have you been hit, slapped, kicked or otherwise physically hurt by someone?: No     Within the past 12 months, have you been humiliated or emotionally abused in other ways by your partner or ex-partner?: No   Housing Stability: Low Risk  (10/31/2023)    Housing Stability     Do you have housing? : Yes     Are you worried about losing your housing?: No       Family History  Family History   Problem Relation Age of Onset    Diabetes Maternal Grandmother         DM TYPE 2    Cerebrovascular Disease Maternal Grandmother     Hypertension Sister     Lipids Sister     Heart Disease Sister         Chronic AFib    Diabetes Sister     Coronary Artery Disease Sister         afib    Hypertension Sister     Lipids Sister     Gynecology Sister     Diabetes Sister     Asthma Sister     Blood Disease Paternal Grandmother         T CELL LEUKEMIA    Hypertension Brother     Lipids Brother     Congenital Anomalies Brother     Cardiovascular Brother     Heart Disease Brother         CABG/AFIB    Coronary Artery Disease Brother         cabg afib AAA    Hypertension Brother     Lipids Brother     Other Cancer Brother         multple myeloma    Obesity Brother     Hypertension Brother     Respiratory Brother         Sleep apnea    Heart  Disease Brother         AFib    Coronary Artery Disease Brother         afib    Alzheimer Disease Mother     Asthma Mother     Hypertension Mother     Breast Cancer Mother 40        has mastectomy and lived to 84    Allergies Mother         Sulfa,    Arthritis Mother     Cardiovascular Mother     Depression Mother     Respiratory Mother     Lipids Mother     Cancer Mother         breast    Thyroid Disease Mother     Cerebrovascular Disease Mother         FROM  AFIB    Dementia Mother         Alzheimers    Other Cancer Mother         breast    Cancer - colorectal Other     Colon Cancer Other         2019    Cancer Father         lung    Aneurysm Father         brother, AAA    Other Cancer Father         lung ca    Coronary Artery Disease Father         cad AAA    Asthma Sister     Cancer - colorectal Paternal Uncle         late 50s to early 60s - great uncles     Breast Cancer Other         Maternal cousin    Arrhythmia Sister         2 brothers 1 sister Afib    Breast Cancer Maternal Aunt 62    Other Cancer Maternal Aunt 35        Ovarian cancer.  Survived despite late recurrence and is now 92.    Glaucoma No family hx of     Macular Degeneration No family hx of        Review of Systems  The complete review of systems is negative other than noted in the HPI or here.   Anesthesia Evaluation   Pt has had prior anesthetic. Type: Regional, MAC and General.    History of anesthetic complications (GENESIS)  - .      ROS/MED HX  ENT/Pulmonary: Comment: S/p b/l cataract surgery     (+) sleep apnea, uses CPAP,                                  (-) tobacco use   Neurologic:  - neg neurologic ROS     Cardiovascular: Comment: Denies cardiac symptoms including chest pain, SOB, palpitations, syncope, BAEZ, orthopnea, or PND.        (+)  hypertension- -   -  - -   Taking blood thinners                     dysrhythmias (h/o paroxysam atrial fibrillation s/p cardioversion 2020),   valvular problems/murmurs  MItral valve insufficiency and  "aortic sclerosis.    Previous cardiac testing     METS/Exercise Tolerance:  Comment: Rides a stationary bike at home a couple times a month.  Does toning exercises with weights.  Able to walk two blocks.     Hematologic:  - neg hematologic  ROS     Musculoskeletal:   (+)  arthritis (s/p b/l TKA and osteoarthritis in shoulders.),             GI/Hepatic: Comment: IBS>>well controlled    Diverticulosis    (+)         appendicitis,        Inflammatory bowel disease: /p appendectomy.   Renal/Genitourinary:     (+) renal disease, type: CRI,            Endo:     (+)  type II DM, Last HgA1c: 6.9, date: 9/28/2023, Using insulin, - not using insulin pump. Normal glucose range: G7 continuous monitor, not previously admitted for DM/DKA.  thyroid problem, hypothyroidism,    Obesity,       Psychiatric/Substance Use:  - neg psychiatric ROS     Infectious Disease:  - neg infectious disease ROS     Malignancy:  - neg malignancy ROS     Other:  - neg other ROS    (+)  , H/O Chronic Pain (DJD),         /74 (BP Location: Right arm, Patient Position: Sitting, Cuff Size: Adult Large)   Pulse 65   Temp 97.6  F (36.4  C) (Oral)   Ht 1.702 m (5' 7\")   Wt 128.4 kg (283 lb)   LMP 10/02/2007   SpO2 99%   BMI 44.32 kg/m      Physical Exam   Constitutional: Awake, alert, cooperative, no apparent distress, and appears stated age.  Eyes: Pupils equal, round and reactive to light, extra ocular muscles intact, sclera clear, conjunctiva normal.  HENT: Normocephalic, oral pharynx with moist mucus membranes, dentition in fair repair with evidence of some crowns. No goiter appreciated.   Respiratory: Clear to auscultation bilaterally, no crackles or wheezing.  Cardiovascular: Regular rate and rhythm, normal S1 and S2, and III/VI systolic murmur.  Carotids +2, no bruits. No edema. Palpable pulses to radial  DP and PT arteries.   GI: Normal bowel sounds, soft and non-tender. Unable to adequately assess for hepatosplenomegaly given obese " abdomen. No superficial masses noted.   Lymph/Hematologic: No cervical lymphadenopathy and no supraclavicular lymphadenopathy.  Skin: Warm and dry.  Right  foot plantar ulcer first MTP joint measuring <1 cm in diameter.Well scabbed over ulcer.   No evidence of infection.  Musculoskeletal: Full ROM of neck. There is no redness, warmth, or swelling of the joints. Gross motor strength is normal.    Neurologic: Awake, alert, oriented to name, place and time. Cranial nerves II-XII are grossly intact. Gait is normal.   Neuropsychiatric: Calm, cooperative. Normal affect.     Prior Labs/Diagnostic Studies   All labs and imaging personally reviewed      Latest Reference Range & Units 12/06/23 10:07   Sodium 135 - 145 mmol/L 142   Potassium 3.4 - 5.3 mmol/L 3.6   Chloride 98 - 107 mmol/L 103   Carbon Dioxide (CO2) 22 - 29 mmol/L 28   Urea Nitrogen 8.0 - 23.0 mg/dL 17.8   Creatinine 0.51 - 0.95 mg/dL 0.97 (H)   GFR Estimate >60 mL/min/1.73m2 63   Calcium 8.8 - 10.2 mg/dL 10.0   Anion Gap 7 - 15 mmol/L 11   Albumin Urine mg/g Cr  See Comment   Albumin Urine mg/L mg/L <12.0   Cholesterol <200 mg/dL 119   Creatinine Urine mg/dL 24.9   Patient Fasting?  No   Glucose 70 - 99 mg/dL 132 (H)   HDL Cholesterol >=50 mg/dL 55   LDL Cholesterol Calculated <=100 mg/dL 47   Non HDL Cholesterol <130 mg/dL 64   Triglycerides <150 mg/dL 87   TSH 0.30 - 4.20 uIU/mL 2.17   Hemoglobin 11.7 - 15.7 g/dL 13.3   (H): Data is abnormally high      PROCEDURES  ECHOCARDIOGRAM 1/9/23  Interpretation Summary   Global and regional left ventricular function is normal with an EF of 60-65%.  Right ventricular function, chamber size, wall motion, and thickness are  normal.  Grade II or moderate diastolic dysfunction.  No significant valvular abnormalities were noted.  This study was compared with the study from 10/7/21 .  No significant changes noted.    CARDIOVERSION  PROCEDURE:  direct current cardioversion  PROCEDURE DATE: 12/4/2020      Pre-procedure  diagnosis:  Atrial fibrillation  Post-procedure diagnosis: s/p direct current cardioversion  Complications:  none     BRIEF CLINICAL HISTORY:  Ms. Slaughter is a 65 year old female who has a past medical history significant for DM2, HTN, HLD, GENESIS, hypothyroidism, diverticulosis, IBS, and depression. She was recently admitted for AF with RVR. She reported palpitations, chest discomfort, and BAEZ 2 weeks prior. She did a ECG from her home alejandra and it showed AF. She was seen by Dr. Reyna in clinic and then admitted.  She was started on Diltiazem, Xarelto, and continued on Toprol XL. She was then arranged for ELIAS/DCCV for which she presents today.        PROCEDURE:  The patient arrived at the Echo Laboratory in a fasting, non-sedated state.  Informed consent was previously obtained from patient, who understood indications, risks, and benefits of the procedure.  Transesophageal echo was performed by the echo lab team to rule out intracardiac thrombus.  With help from Anesthesia, patient was deeply sedated.  150J of synchronized biphasic shock was delivered through the cardiac monitor, and the patient was successfully converted to normal sinus rhythm.  After sedation wore off, patient awoke and remained neurologically intact.  The patient tolerated the procedure well from a hemodynamic and respiratory standpoint without any complications.  She was observed in the Echo Laboratory and then discharged to home after sedation wore off and she was ambulatory.     IMPRESSION:  1.  Successful direct current cardioversion with 150J biphasic shock.         Latest Ref Rng & Units 7/13/2023     8:58 AM   PFT   FVC L 2.76    FEV1 L 2.20    FVC% % 92    FEV1% % 94        EKG 2020  Atrial fibrillation ST & T wave abnormality, consider inferolateral ischemia Abnormal ECG Unconfirmed report - interpretation of this ECG is computer generated -      NM MPI ADENOSINE 2011  Impression:   1. Normal myocardial SPECT study with a summed stress  score of 10. A   summed stress score of 9-13 is associated with an annual event rate of   2.9% and 2.3% for myocardial infarction and cardiac death,   respectively (Marita. Circulation 1998;98:535-43).   2. No significant perfusion abnormalities.   3. Normal left ventricular systolic function with no regional wall   motion abnormalities.   4. No prior study available for comparison.       The patient's records and results personally reviewed by this provider.     Outside records reviewed from: Care Everywhere    LAB/DIAGNOSTIC STUDIES TODAY:    Not indicated as labs just done yesterday    Assessment    Sherry Slaughter is a 68 year old female seen as a PAC referral for risk assessment and optimization for anesthesia.    Plan/Recommendations  Pt will be optimized for the proposed procedure.  See below for details on the assessment, risk, and preoperative recommendations    NEUROLOGY  - No history of TIA, CVA or seizure      -Post Op delirium risk factors:  High co-morbid index    ENT  - Physical exam is concerning for complicated airway.  Mallampati: II  TM: > 3    CARDIAC  - HFpEF/diastolic dysfunction gr II   ~ lisinopril, metoprolol, diuretic and K+ replacement   ~ followed [facility] by Dr Aranda. And Ms. Elizalde, AAKASH, with last visit yesterday, 12/6/2023.   ~ denies cardiac symptoms and appears euvolemic on exam.    ~ LVEF 60-65%    - HTN   ~ /74   ~ stable on lisinopril, metoprolol, diuretic and K+ replacement    - Paroxysmal atrial fibrillation s/p ELIAS-guided cardioversion 12/4/2020   ~ [id number] score is 4 for HTN, DM, female, age >65    ~ amiodarone, Diltiazem and Xarelto    Continue amiodarone and Diltizem DOS.  Hold Xarelto for 24 hours prior to surgery.    ~ follows with Dr. Mccracken's team and has done well since commencing amiodarone 3/2023. Notified of upcoming surgery and Xarelto hold.     - Mild aortic sclerosis   ~ per recent echocardiogram, Trileaflet aortic sclerosis without  "stenosis.       - METS (Metabolic Equivalents)  Patient performs 4 or more METS exercise without symptoms            Total Score: 0      - RCRI-Moderate risk: Class 3  6.6% complication rate            Total Score: 2    RCRI: CHF    RCRI: Diabetes        PULMONARY  - Obstructive Sleep Apnea  GENESIS with home CPAP.  Patient will be instructed to bring their home CPAP device to the hospital with them.  GENESIS Medium Risk            Total Score: 4    GENESIS: Hypertension    GENESIS: BMI over 35 kg/m2    GENESIS: Over 50 ys old    GENESIS: Neck Circum >16 in      - Denies asthma or inhaler use  - Tobacco History    History   Smoking Status    Former    Types: Cigarettes   Smokeless Tobacco    Never       GI  - IBS   ~ stable     - PONV High Risk  Total Score: 3           1 AN PONV: Pt is Female    1 AN PONV: Patient is not a current smoker    1 AN PONV: Intended Post Op Opioids        /RENAL  - Baseline Creatinine  0.97    ENDOCRINE    - BMI: Estimated body mass index is 44.32 kg/m  as calculated from the following:    Height as of this encounter: 1.702 m (5' 7\").    Weight as of this encounter: 128.4 kg (283 lb).  Class 3 Obesity (BMI > 40)    - Diabetes, insulin dependent with last A1C of 6.9    ~ Dexcom G7   ~ Diabetic medications per  [facility] Preoperative guidelines >>please see PAC RN's AVS from today for instructions  ~ Recommend close monitoring of the patient's blood glucose levels throughout the perioperative period and treat per [place] guidelines.     HEME  - VTE Low Risk 0.5%            Total Score: 2    VTE: Greater than 59 yrs old    VTE: BMI greater than 39      - Coagulopathy second to Rivaroxaban (Xarelto)      MSK  - Recurrent right foot plantar ulcer first MTP joint    ~ evaluated by Dr. Gray with above procedure scheduled     Different anesthesia methods/types have been discussed with the patient, but they are aware that the final plan will be decided by the assigned anesthesia provider on the date of " service.  Patient was discussed with Dr. Rojas    The patient is optimized for their procedure. AVS with information on surgery time/arrival time, meds and NPO status given by nursing staff. No further diagnostic testing indicated.      On the day of service:     Prep time: 16 minutes  Visit time: 21 minutes  Documentation time: 20 minutes    Coordinating care:  5 minutes  ------------------------------------------  Total time: 62 minutes      JIM Gasca CNP  Preoperative Assessment Center  [facility]  Clinic and Surgery Center  Phone: [phone]  Fax: [phone]

## 2023-12-07 NOTE — TELEPHONE ENCOUNTER
----- Message from Roslyn Mccracken MD sent at 12/7/2023  1:09 PM CST -----  Regarding: RE: Anticoagulation for upci[place] right foot surgery  Yes      ----- Message -----  From: Karla Peres APRN CNP  Sent: 12/7/2023  12:16 PM CST  To: Jeevan Gray MD; #  Subject: Anticoagulation for upcioming right foot chris#    Today I had the pleasure of seeing your patient, Ms. Slaughter, in the PAC for her upcoming Medial and lateral sesamoidectomy on Right: Ankle on 12/27/23 at [facility] under [facility].  As you recall she has a past medical history for HFpEF, HTN, HLD, PAF, GENESIS, obesity, anticoagulated, osteoarthritis, T2DM insulin dependent, hypothyroid and IBS.     She is on Xarelto for her history of paroxysmal atrial fibrillation.  This appears to be a low bleed risk procedure with a [id number] score is 4 for HTN, DM, female, age >65.  She has normal renal function.  It would would seem appropriate to hold the Xarelto for 24 hours prior to her upcoming surgery. Please let me know if you are okay with this.     Thanks so much,  Karla

## 2023-12-07 NOTE — PATIENT INSTRUCTIONS
Preparing for Your Surgery      Name:  Sherry Slaughter   MRN:  [id number]   :  1955   Today's Date:  2023       Arriving for surgery:  Surgery date:  23  Arrival time:  12.20PM    Please come to:     Please come to:      [facility] [address]  [address] [place]  [address]  The Green Ramp for patients and visitors is located beneath the [facility]. The parking facility entrance is at the intersection of [address] [place] [address] and [address]. Patients and visitors who self-park will receive the reduced hospital parking rate (no ticket validation needed).  [employer] parking, located at the [facility] main entrance on [address] [place] [address], is available Monday - Friday from 7 am to 3:30 pm.  Discounted parking pass options can be purchased from  attendants during business hours.  -Check in at the security desk in the [facility] ([facility]) Lobby. They will direct you to the correct elevators.  -Proceed to the 3rd floor, check in at the Adult Surgery Waiting Lounge. [phone]  If you are in need of directions, a wheelchair or escort please stop at the Information Desk in the lobby.  Inform the information person that you are here for surgery; a wheelchair and escort to [address] will be provided.   An escort to the Adult Surgery Waiting Lounge will be provided.    What can I eat or drink?  -  You may eat and drink normally up to 8 hours prior to arrival time. (Until 4.20AM)  -  You may have clear liquids until 2 hours prior to arrival time. (Until 10.20AM)    Examples of clear liquids:  Water  Clear broth  Juices (apple, white grape, white cranberry  and cider) without pulp  Noncarbonated, powder based beverages  (lemonade and Richy-Aid)  Sodas (Sprite, 7-Up, ginger ale and seltzer)  Coffee or tea (without milk or cream)  Gatorade    -  No Alcohol or  cannabis products for at least 24 hours before surgery.     Which medicines can I take?    Hold Aspirin for 7 days before surgery.   Hold Multivitamins for 7 days before surgery.  Hold Supplements for 7 days before surgery.  Hold Ibuprofen (Advil, Motrin) for 3 day(s) before surgery--unless otherwise directed by surgeon.  Hold Naproxen (Aleve) for 4 days before surgery.    Take ONLY 112 units Insulin Humulin the morning of your surgery.  Hold your Xarelto 24 hours before surgery  Hold Ozempic injection on 12/26/23. Your last dose is on 12/19/23.      -  DO NOT take these medications the day of surgery:  Furosemide (Lasix), Hydrochlorothiazide (Hydrodiuril), Lisinopril (Zestril), Metformin, Potassium.    -  PLEASE TAKE these medications the day of surgery:  Morning medications per usual except for medications listed above.    How do I prepare myself?  - Please take 2 showers (one the night prior to surgery and one the morning of surgery) using Scrubcare or Hibiclens soap.    Use this soap only from the neck to your toes.     Leave the soap on your skin for one minute--then rinse thoroughly.      You may use your own shampoo and conditioner. No other hair products.   - Please remove all jewelry and body piercings.  - No lotions, deodorants or fragrance.  - No makeup or fingernail polish.   - Bring your ID and insurance card.    -If you use a CPAP machine, please bring the CPAP machine, tubing, and mask to hospital.    -If you have a Deep Brain Stimulator, Spinal Cord Stimulator, or any Neuro Stimulator device---you must bring the remote control to the hospital.      ALL PATIENTS GOING HOME THE SAME DAY OF SURGERY ARE REQUIRED TO HAVE A RESPONSIBLE ADULT TO DRIVE AND BE IN ATTENDANCE WITH THEM FOR 24 HOURS FOLLOWING SURGERY.    Covid testing policy as of 12/06/2022  Your surgeon will notify and schedule you for a COVID test if one is needed before surgery--please direct any questions or COVID symptoms to your  surgeon      Questions or Concerns:    - For any questions regarding the day of surgery or your hospital stay, please contact the Pre Admission Nursing Office at [phone].       - If you have health changes between today and your surgery, please call your surgeon.       - For questions after surgery, please call your surgeons office.           Current Visitor Guidelines    You may have 2 visitors in the pre op area.    Visiting hours: 8 a.m. to 8:30 p.m.    You may have four visitors during your inpatient hospital stay.    Patients confirmed or suspected to have symptoms of COVID 19 or flu:     No visitors allowed for adult patients.   Children (under age 18) can have 1 named visitor.     People who are sick or showing symptoms of COVID 19 or flu:    Are not allowed to visit patients--we can only make exceptions in special situations.       Please follow these guidelines for your visit:          Please maintain social distance          Masking is optional--however at times you may be asked to wear a mask for the safety of yourself and others     Clean your hands with alcohol hand . Do this when you arrive at and leave the building and patient room,    And again after you touch your mask or anything in the room.     Go directly to and from the room you are visiting.     Stay in the patient s room during your visit. Limit going to other places in the hospital as much as possible     Leave bags and jackets at home or in the car.     For everyone s health, please don t come and go during your visit. That includes for smoking   during your visit.

## 2023-12-26 ASSESSMENT — LIFESTYLE VARIABLES: TOBACCO_USE: 0

## 2023-12-26 ASSESSMENT — ENCOUNTER SYMPTOMS: DYSRHYTHMIAS: 1

## 2023-12-26 NOTE — ANESTHESIA PREPROCEDURE EVALUATION
Anesthesia Pre-Procedure Evaluation    Patient: Sherry Slaughter   MRN: [id number] : 1955        Procedure : Procedure(s):  Medial and Lateral Sesamoidectomy Right          Past Medical History:   Diagnosis Date     Cataract      Congestive heart failure (H) 2019    AFIB     DEPRESSION     comes and goes - tried meds - unsuccessfully, certain times of the year, no psych intervention and no counselors in the past - and not interested      Diabetic retinopathy associated with diabetes mellitus due to underlying condition (H)      Diverticulosis of colon (without mention of hemorrhage) 2004    colonoscopy     ECHO-mildLVH,tr MR,mild thick lflets w inc LA,trTR   2003     Essential hypertension, benign 1990s    late  - started medications at that time - not to difficult to control meds      Fam hx-cardiovas dis [facility]     father - CAD, and lipids/HTN - multiple members of the family      Family history of diabetes mellitus     sister and grandmother with DM      Family history of malignant neoplasm of breast     mother - young age - 45, and maternal cousin and aunt as well - no BRCA testing done      Family history of stroke (cerebrovascular)     grandmother in early life in her 40s      FAMILY HX COLON CANCER     Pat uncles x 2     Heart disease     murmur/     Heart murmur      HYPERLIPIDEMIA     fairly recent - in the last 5 years - high for DM levels  -cholesterol recent      Irritable bowel syndrome     goes between the 2 - nerve related - more stressed more problems      MICROALBUMINURIA     unsure how long - been on the lisinopril - for a few years at well      Nonspecific abnormal results of liver function study 2003    SGOT - has been high in the past - since the hepatitis b - borderline elevation - not really changing any      OBESITY      Obstructive sleep apnea      GENESIS (obstructive sleep apnea) 10/15/2006    psg 5/15 AHI 51 aPAP 8-15     OSTEOARTHRITIS L KNEE      total knee on the left - and pain is gone since the knee replacement      PERS HX HEPATITIS B- RESOLVED] 1977    acute virus only - no chronic disease      PERS HX HEPATITIS B- RESOLVED]      Type II or unspecified type diabetes mellitus without mention of complication, not stated as uncontrolled 1991    oral meds frist, then insulin eventually later, difficult to control most of the time      Unspecified hypothyroidism 10/11/2006    TSH 3.36 - mild subclinical hypothyroidism - deciding on following or starting low dose meds - with dr Oreilly - following       Past Surgical History:   Procedure Laterality Date     ABDOMEN SURGERY      ovaian scope/ appendix removal     ANESTHESIA CARDIOVERSION N/A 12/4/2020    Procedure: ANESTHESIA, FOR CARDIOVERSION @1400;  Surgeon: GENERIC ANESTHESIA PROVIDER;  Location: [facility] OR     ARTHROPLASTY KNEE Right 9/23/2015    Procedure: ARTHROPLASTY KNEE;  Surgeon: Rufus Brown MD;  Location:  OR     BUNIONECTOMY REN AND LEANDRO, COMBINED Left 2/2/2021    Procedure: Left bunion correction with bone cutting and screw fixation;  Surgeon: David Hoffman DPM;  Location:  OR     CATARACT IOL, RT/LT Left      COLONOSCOPY  4/04    diverticulosis, rec repeat 10 yrs(consider fam hx?)     COLONOSCOPY WITH CO2 INSUFFLATION N/A 6/19/2019    Procedure: COLONOSCOPY, WITH CO2 INSUFFLATION;  Surgeon: Zeb Duarte MD;  Location:  OR     ORTHOPEDIC SURGERY      right ankle     PHACOEMULSIFICATION CLEAR CORNEA WITH STANDARD INTRAOCULAR LENS IMPLANT Left 3/15/2018    Procedure: PHACOEMULSIFICATION CLEAR CORNEA WITH STANDARD INTRAOCULAR LENS IMPLANT;  LEFT EYE PHACOEMULSIFICATION CLEAR CORNEA WITH STANDARD INTRAOCULAR LENS IMPLANT;  Surgeon: Bandar Linares MD;  Location:  [facility]     PHACOEMULSIFICATION CLEAR CORNEA WITH STANDARD INTRAOCULAR LENS IMPLANT Right 4/5/2018    Procedure: PHACOEMULSIFICATION CLEAR CORNEA WITH STANDARD INTRAOCULAR LENS IMPLANT;  RIGHT PHACOEMULSIFICATION  CLEAR CORNEA WITH STANDARD INTRAOCULAR LENS IMPLANT ;  Surgeon: Bandar Linares MD;  Location: [facility]     STRESS ECHO (METRO)  12/03    no ischemia, limited by fatigue     SURGICAL HISTORY OF -       EXP LAP- R OVARY CYSTS     SURGICAL HISTORY OF -   1981,1984,1985    CHILDBIRTH     Z NONSPECIFIC PROCEDURE  6/06    right triple arthrodecesis      Z NONSPECIFIC PROCEDURE  7/06     right bunion surgery      Z TOTAL KNEE ARTHROPLASTY  3/03    L knee      Allergies   Allergen Reactions     Empagliflozin      Yeast infections     Lipitor [Atorvastatin Calcium]      Gas     Pravachol [Pravastatin Sodium]      Pravachol - dry cough      Simvastatin      Myalgia      Social History     Tobacco Use     Smoking status: Former     Types: Cigarettes     Passive exposure: Past     Smokeless tobacco: Never     Tobacco comments:     tried in early 20s only    Substance Use Topics     Alcohol use: Yes     Comment: 1 drink 1-2 year      Wt Readings from Last 1 Encounters:   12/07/23 128.4 kg (283 lb)        Anesthesia Evaluation   Pt has had prior anesthetic. Type: Regional, MAC and General.    History of anesthetic complications (GENESIS)  - .      ROS/MED HX  ENT/Pulmonary: Comment: S/p b/l cataract surgery     (+) sleep apnea, uses CPAP,                                   (-) tobacco use   Neurologic:  - neg neurologic ROS     Cardiovascular: Comment: Denies cardiac symptoms including chest pain, SOB, palpitations, syncope, BAEZ, orthopnea, or PND.        (+)  hypertension- -   -  - -   Taking blood thinners   CHF  Last EF: 60-65                dysrhythmias (h/o paroxysam atrial fibrillation s/p cardioversion 2020),   valvular problems/murmurs  MItral valve insufficiency and aortic sclerosis.    Previous cardiac testing     METS/Exercise Tolerance:  Comment: Rides a stationary bike at home a couple times a month.  Does toning exercises with weights.  Able to walk two blocks.     Hematologic:  - neg hematologic  ROS      Musculoskeletal:   (+)  arthritis (s/p b/l TKA and osteoarthritis in shoulders.),             GI/Hepatic: Comment: IBS>>well controlled    Diverticulosis    (+)         appendicitis,        Inflammatory bowel disease: /p appendectomy.   Renal/Genitourinary:     (+) renal disease, type: CRI,            Endo:     (+)  type II DM, Last HgA1c: 6.9, date: 9/28/2023, Using insulin, - not using insulin pump. Normal glucose range: G7 continuous monitor, not previously admitted for DM/DKA.  thyroid problem, hypothyroidism,    Obesity,       Psychiatric/Substance Use:  - neg psychiatric ROS     Infectious Disease:  - neg infectious disease ROS     Malignancy:  - neg malignancy ROS     Other:  - neg other ROS    (+)  , H/O Chronic Pain (DJD),         Physical Exam    Airway        Mallampati: II   TM distance: > 3 FB   Neck ROM: full   Mouth opening: > 3 cm    Respiratory Devices and Support         Dental           Cardiovascular             Pulmonary               OUTSIDE LABS:  CBC:   Lab Results   Component Value Date    WBC 7.5 01/09/2023    WBC 7.7 07/26/2022    HGB 13.3 12/06/2023    HGB 13.7 01/09/2023    HCT 41.6 01/09/2023    HCT 40.9 11/28/2022     01/09/2023     07/26/2022     BMP:   Lab Results   Component Value Date     12/06/2023     09/28/2023    POTASSIUM 3.6 12/06/2023    POTASSIUM 3.9 09/28/2023    CHLORIDE 103 12/06/2023    CHLORIDE 102 09/28/2023    CO2 28 12/06/2023    CO2 21 (L) 09/28/2023    BUN 17.8 12/06/2023    BUN 22.3 09/28/2023    CR 0.97 (H) 12/06/2023    CR 1.07 (H) 09/28/2023     (H) 12/06/2023     (H) 09/28/2023     COAGS:   Lab Results   Component Value Date    PTT 31 06/05/2006    INR 1.52 (H) 12/04/2020     POC:   Lab Results   Component Value Date     (H) 02/02/2021     HEPATIC:   Lab Results   Component Value Date    ALBUMIN 4.5 09/14/2023    PROTTOTAL 7.1 09/14/2023    ALT 29 09/14/2023    AST 36 09/14/2023    ALKPHOS 49 09/14/2023     "BILITOTAL 0.4 09/14/2023     OTHER:   Lab Results   Component Value Date    PH 7.437 05/07/2015    LACT 1.7 11/27/2020    A1C 6.1 (H) 12/06/2023    CASSIDY 10.0 12/06/2023    PHOS 3.5 12/09/2020    MAG 2.1 12/10/2020    TSH 2.17 12/06/2023    T4 1.08@ 08/11/2009    CRP 1.5 11/14/2005    SED 16 11/14/2005       Anesthesia Plan    ASA Status:  3       Anesthesia Type: General.     - Airway: LMA   Induction: Intravenous.   Maintenance: Balanced.        Consents    Anesthesia Plan(s) and associated risks, benefits, and realistic alternatives discussed. Questions answered and patient/representative(s) expressed understanding.     - Discussed: Risks, Benefits and Alternatives for BOTH SEDATION and the PROCEDURE were discussed     - Discussed with:  Patient       Use of blood products discussed: No .     Postoperative Care    Pain management: IV analgesics, Oral pain medications, Multi-modal analgesia.   PONV prophylaxis: Ondansetron (or other 5HT-3), Dexamethasone or Solumedrol     Comments:               Geraldo Paul MD    I have reviewed the pertinent notes and labs in the chart from the past 30 days and (re)examined the patient.  Any updates or changes from those notes are reflected in this note.              # Severe Obesity: Estimated body mass index is 44.32 kg/m  as calculated from the following:    Height as of 12/7/23: 1.702 m (5' 7\").    Weight as of 12/7/23: 128.4 kg (283 lb).      "

## 2023-12-27 ENCOUNTER — ANESTHESIA (OUTPATIENT)
Dept: SURGERY | Facility: CLINIC | Age: 68
End: 2023-12-27
Payer: COMMERCIAL

## 2023-12-27 ENCOUNTER — HOSPITAL ENCOUNTER (OUTPATIENT)
Facility: CLINIC | Age: 68
Discharge: HOME | End: 2023-12-27
Attending: ORTHOPAEDIC SURGERY | Admitting: ORTHOPAEDIC SURGERY
Payer: COMMERCIAL

## 2023-12-27 VITALS
BODY MASS INDEX: 44.05 KG/M2 | HEIGHT: 67 IN | RESPIRATION RATE: 20 BRPM | TEMPERATURE: 97.9 F | DIASTOLIC BLOOD PRESSURE: 55 MMHG | HEART RATE: 57 BPM | WEIGHT: 280.65 LBS | OXYGEN SATURATION: 100 % | SYSTOLIC BLOOD PRESSURE: 113 MMHG

## 2023-12-27 DIAGNOSIS — M79.671 RIGHT FOOT PAIN: Primary | ICD-10-CM

## 2023-12-27 DIAGNOSIS — I48.0 PAROXYSMAL ATRIAL FIBRILLATION (H): ICD-10-CM

## 2023-12-27 LAB
GLUCOSE BLDC GLUCOMTR-MCNC: 113 MG/DL (ref 70–99)
GLUCOSE BLDC GLUCOMTR-MCNC: 135 MG/DL (ref 70–99)

## 2023-12-27 PROCEDURE — 250N000013 HC RX MED GY IP 250 OP 250 PS 637

## 2023-12-27 PROCEDURE — 370N000017 HC ANESTHESIA TECHNICAL FEE, PER MIN: Performed by: ORTHOPAEDIC SURGERY

## 2023-12-27 PROCEDURE — 258N000003 HC RX IP 258 OP 636

## 2023-12-27 PROCEDURE — 250N000009 HC RX 250: Performed by: ORTHOPAEDIC SURGERY

## 2023-12-27 PROCEDURE — 250N000011 HC RX IP 250 OP 636: Performed by: PHYSICIAN ASSISTANT

## 2023-12-27 PROCEDURE — 710N000012 HC RECOVERY PHASE 2, PER MINUTE: Performed by: ORTHOPAEDIC SURGERY

## 2023-12-27 PROCEDURE — 999N000141 HC STATISTIC PRE-PROCEDURE NURSING ASSESSMENT: Performed by: ORTHOPAEDIC SURGERY

## 2023-12-27 PROCEDURE — 250N000011 HC RX IP 250 OP 636

## 2023-12-27 PROCEDURE — 82962 GLUCOSE BLOOD TEST: CPT

## 2023-12-27 PROCEDURE — 710N000010 HC RECOVERY PHASE 1, LEVEL 2, PER MIN: Performed by: ORTHOPAEDIC SURGERY

## 2023-12-27 PROCEDURE — 360N000075 HC SURGERY LEVEL 2, PER MIN: Performed by: ORTHOPAEDIC SURGERY

## 2023-12-27 PROCEDURE — 250N000011 HC RX IP 250 OP 636: Performed by: ORTHOPAEDIC SURGERY

## 2023-12-27 PROCEDURE — 272N000001 HC OR GENERAL SUPPLY STERILE: Performed by: ORTHOPAEDIC SURGERY

## 2023-12-27 PROCEDURE — 271N000001 HC OR GENERAL SUPPLY NON-STERILE: Performed by: ORTHOPAEDIC SURGERY

## 2023-12-27 PROCEDURE — 250N000025 HC SEVOFLURANE, PER MIN: Performed by: ORTHOPAEDIC SURGERY

## 2023-12-27 PROCEDURE — 250N000009 HC RX 250

## 2023-12-27 RX ORDER — FENTANYL CITRATE 50 UG/ML
25 INJECTION, SOLUTION INTRAMUSCULAR; INTRAVENOUS EVERY 5 MIN PRN
Status: DISCONTINUED | OUTPATIENT
Start: 2023-12-27 | End: 2023-12-27 | Stop reason: HOSPADM

## 2023-12-27 RX ORDER — PROPOFOL 10 MG/ML
INJECTION, EMULSION INTRAVENOUS PRN
Status: DISCONTINUED | OUTPATIENT
Start: 2023-12-27 | End: 2023-12-27

## 2023-12-27 RX ORDER — OXYCODONE HYDROCHLORIDE 5 MG/1
5 TABLET ORAL
Status: COMPLETED | OUTPATIENT
Start: 2023-12-27 | End: 2023-12-27

## 2023-12-27 RX ORDER — HYDROMORPHONE HYDROCHLORIDE 1 MG/ML
0.4 INJECTION, SOLUTION INTRAMUSCULAR; INTRAVENOUS; SUBCUTANEOUS EVERY 5 MIN PRN
Status: DISCONTINUED | OUTPATIENT
Start: 2023-12-27 | End: 2023-12-27 | Stop reason: HOSPADM

## 2023-12-27 RX ORDER — HYDROMORPHONE HYDROCHLORIDE 1 MG/ML
0.2 INJECTION, SOLUTION INTRAMUSCULAR; INTRAVENOUS; SUBCUTANEOUS EVERY 5 MIN PRN
Status: DISCONTINUED | OUTPATIENT
Start: 2023-12-27 | End: 2023-12-27 | Stop reason: HOSPADM

## 2023-12-27 RX ORDER — HYDROCODONE BITARTRATE AND ACETAMINOPHEN 5; 325 MG/1; MG/1
1 TABLET ORAL
Status: DISCONTINUED | OUTPATIENT
Start: 2023-12-27 | End: 2023-12-27 | Stop reason: HOSPADM

## 2023-12-27 RX ORDER — LIDOCAINE 40 MG/G
CREAM TOPICAL
Status: DISCONTINUED | OUTPATIENT
Start: 2023-12-27 | End: 2023-12-27 | Stop reason: HOSPADM

## 2023-12-27 RX ORDER — SODIUM CHLORIDE, SODIUM LACTATE, POTASSIUM CHLORIDE, CALCIUM CHLORIDE 600; 310; 30; 20 MG/100ML; MG/100ML; MG/100ML; MG/100ML
INJECTION, SOLUTION INTRAVENOUS CONTINUOUS
Status: DISCONTINUED | OUTPATIENT
Start: 2023-12-27 | End: 2023-12-27 | Stop reason: HOSPADM

## 2023-12-27 RX ORDER — HYDROCODONE BITARTRATE AND ACETAMINOPHEN 5; 325 MG/1; MG/1
1-2 TABLET ORAL EVERY 4 HOURS PRN
Qty: 15 TABLET | Refills: 0 | Status: SHIPPED | OUTPATIENT
Start: 2023-12-27 | End: 2024-04-24

## 2023-12-27 RX ORDER — ONDANSETRON 4 MG/1
4 TABLET, ORALLY DISINTEGRATING ORAL EVERY 30 MIN PRN
Status: DISCONTINUED | OUTPATIENT
Start: 2023-12-27 | End: 2023-12-27 | Stop reason: HOSPADM

## 2023-12-27 RX ORDER — ACETAMINOPHEN 325 MG/1
975 TABLET ORAL ONCE
Status: COMPLETED | OUTPATIENT
Start: 2023-12-27 | End: 2023-12-27

## 2023-12-27 RX ORDER — OXYCODONE HYDROCHLORIDE 10 MG/1
10 TABLET ORAL
Status: DISCONTINUED | OUTPATIENT
Start: 2023-12-27 | End: 2023-12-27 | Stop reason: HOSPADM

## 2023-12-27 RX ORDER — LIDOCAINE HYDROCHLORIDE 20 MG/ML
INJECTION, SOLUTION INFILTRATION; PERINEURAL PRN
Status: DISCONTINUED | OUTPATIENT
Start: 2023-12-27 | End: 2023-12-27

## 2023-12-27 RX ORDER — HYDROXYZINE HYDROCHLORIDE 25 MG/1
25 TABLET, FILM COATED ORAL 3 TIMES DAILY PRN
Qty: 15 TABLET | Refills: 0 | Status: SHIPPED | OUTPATIENT
Start: 2023-12-27 | End: 2024-04-24

## 2023-12-27 RX ORDER — MAGNESIUM HYDROXIDE 1200 MG/15ML
LIQUID ORAL PRN
Status: DISCONTINUED | OUTPATIENT
Start: 2023-12-27 | End: 2023-12-27 | Stop reason: HOSPADM

## 2023-12-27 RX ORDER — CEFAZOLIN SODIUM/WATER 3 G/30 ML
3 SYRINGE (ML) INTRAVENOUS
Status: COMPLETED | OUTPATIENT
Start: 2023-12-27 | End: 2023-12-27

## 2023-12-27 RX ORDER — FENTANYL CITRATE 50 UG/ML
50 INJECTION, SOLUTION INTRAMUSCULAR; INTRAVENOUS EVERY 5 MIN PRN
Status: DISCONTINUED | OUTPATIENT
Start: 2023-12-27 | End: 2023-12-27 | Stop reason: HOSPADM

## 2023-12-27 RX ORDER — BUPIVACAINE HYDROCHLORIDE 5 MG/ML
INJECTION, SOLUTION PERINEURAL PRN
Status: DISCONTINUED | OUTPATIENT
Start: 2023-12-27 | End: 2023-12-27 | Stop reason: HOSPADM

## 2023-12-27 RX ORDER — ONDANSETRON 2 MG/ML
4 INJECTION INTRAMUSCULAR; INTRAVENOUS EVERY 30 MIN PRN
Status: DISCONTINUED | OUTPATIENT
Start: 2023-12-27 | End: 2023-12-27 | Stop reason: HOSPADM

## 2023-12-27 RX ORDER — CEFAZOLIN SODIUM/WATER 3 G/30 ML
3 SYRINGE (ML) INTRAVENOUS SEE ADMIN INSTRUCTIONS
Status: DISCONTINUED | OUTPATIENT
Start: 2023-12-27 | End: 2023-12-27 | Stop reason: HOSPADM

## 2023-12-27 RX ORDER — HYDROXYZINE HYDROCHLORIDE 10 MG/1
10 TABLET, FILM COATED ORAL
Status: DISCONTINUED | OUTPATIENT
Start: 2023-12-27 | End: 2023-12-27 | Stop reason: HOSPADM

## 2023-12-27 RX ADMIN — ACETAMINOPHEN 975 MG: 325 TABLET, FILM COATED ORAL at 09:04

## 2023-12-27 RX ADMIN — PHENYLEPHRINE HYDROCHLORIDE 100 MCG: 10 INJECTION INTRAVENOUS at 10:48

## 2023-12-27 RX ADMIN — OXYCODONE HYDROCHLORIDE 5 MG: 5 TABLET ORAL at 11:43

## 2023-12-27 RX ADMIN — SODIUM CHLORIDE, POTASSIUM CHLORIDE, SODIUM LACTATE AND CALCIUM CHLORIDE: 600; 310; 30; 20 INJECTION, SOLUTION INTRAVENOUS at 10:31

## 2023-12-27 RX ADMIN — LIDOCAINE HYDROCHLORIDE 100 MG: 20 INJECTION, SOLUTION INFILTRATION; PERINEURAL at 10:32

## 2023-12-27 RX ADMIN — PROPOFOL 200 MG: 10 INJECTION, EMULSION INTRAVENOUS at 10:31

## 2023-12-27 RX ADMIN — Medication 3 G: at 10:15

## 2023-12-27 ASSESSMENT — ACTIVITIES OF DAILY LIVING (ADL)
ADLS_ACUITY_SCORE: 37
ADLS_ACUITY_SCORE: 37
ADLS_ACUITY_SCORE: 35

## 2023-12-27 NOTE — DISCHARGE INSTRUCTIONS
Same-Day Surgery   Adult Discharge Orders & Instructions      For 24 hours after surgery:  Get plenty of rest.  A responsible adult must stay with you for at least 24 hours after you leave the hospital.   Pain medication can slow your reflexes. Do not drive or use heavy equipment.  If you have weakness or tingling, don't drive or use heavy equipment until this feeling goes away.  Mixing alcohol and pain medication can cause dizziness and slow your breathing. It can even be fatal. Do not drink alcohol while taking pain medication.  Avoid strenuous or risky activities.  Ask for help when climbing stairs.   You may feel lightheaded.  If so, sit for a few minutes before standing.  Have someone help you get up.   If you have nausea (feel sick to your stomach), drink only clear liquids such as apple juice, ginger ale, broth or 7-Up.  Rest may also help.  Be sure to drink enough fluids.  Move to a regular diet as you feel able. Take pain medications with a small amount of solid food, such as toast or crackers, to avoid nausea.   A slight fever is normal. Call the doctor if your fever is over 100 F (37.7 C) (taken under the tongue) or lasts longer than 24 hours.  You may have a dry mouth, muscle aches, trouble sleeping or a sore throat.  These symptoms should go away after 24 hours.  Do not make important or legal decisions.   Pain Management:      1. Take pain medication (if prescribed) for pain as directed by your physician.         2. WARNING: If the pain medication you have been prescribed contains Tylenol(acetaminophen), DO NOT take additional doses of Tylenol (acetaminophen).     PER DR OSHEA pt can start taking Advil on Sat -   If pt is having bleeding though dressing call clinic      Call your doctor for any of the followin.  Signs of infection (fever, growing tenderness at the surgery site, severe pain, a large amount of drainage or bleeding, foul-smelling drainage, redness, swelling).      2.  It has been over 8 to 10 hours since surgery and you are still not able to urinate (pee).     3.  Headache for over 24 hours.     4.  Numbness, tingling or weakness the day after surgery (if you had spinal anesthesia).  To contact a doctor, call or:  ' [phone] and ask for the Resident On Call for: Orthopedics         (answered 24 hours a day)  '   Emergency Department:  [place] [facility]: [phone]  [facility] [facility]: [phone]               Rev. 10/2014

## 2023-12-27 NOTE — BRIEF OP NOTE
[facility] Brief Operative Note    Pre-operative diagnosis: Pain in joint, ankle and foot, right [M25.571]   Post-operative diagnosis Right foot and ankle pain   Procedure: Procedure(s):  Medial and Lateral Sesamoidectomy Right   Surgeon(s): Surgeon(s) and Role:     * Jeevan Gray MD - Primary     * Sj Miradna PA-C - Assisting   Estimated blood loss: 20 cc   Specimens: * No specimens in log *   Findings: See post op note     Plan:  Same Day surgery discharge to home once criteria met.  CAM boot to remain on right  lower extremity and NWB at all times.  Norco for pain.  No dressing change on own.  Leave dressing on until 2 weeks follow up appointment.  F/U in clinic in 2 weeks    I was asked by Dr. Gray to assist with surgery. I positioned and prepped the patient. I retracted soft tissue.   I suctioned fluids when needed. I provided traction for dissection. I helped to ligate blood vessels. I helped Dr. Gray identify and protect important structures. The procedure was medically necessary for an assistant because Dr. Gray needed the operative exposure and assistance that I provided. This allowed him to safely and efficiently operate. It was also important that I help ligate blood vessels to maintain hemostasis and reduce the bleeding risk. I helped with the closure of the operative incisions as well as helping with the boot/cast/splint.  The assistance that I provided reduced operative time which meant less general anesthetic for the patient. No qualified residents were available to assist.    Sj Miranda PA-C

## 2023-12-27 NOTE — ANESTHESIA PROCEDURE NOTES
Airway       Patient location during procedure: OR  Staff -        Anesthesiologist:  Agus Wells MD       Resident/Fellow: Geraldo Paul MD       Performed By: with residents and resident       Procedure performed by resident/fellow/CRNA in presence of a teaching physician.  Indications and Patient Condition       Indications for airway management: arthur-procedural       Induction type:intravenous       Mask difficulty assessment: 0 - not attempted    Final Airway Details       Final airway type: supraglottic airway    Supraglottic Airway Details        Type: LMA       Brand: Air-Q       LMA size: 5    Post intubation assessment        Placement verified by: capnometry, equal breath sounds and chest rise        Number of attempts at approach: 1       Number of other approaches attempted: 0       Secured with: pink tape       Ease of procedure: easy       Dentition: Intact

## 2023-12-27 NOTE — OP NOTE
Date of surgery: 12/27/2023      Preoperative diagnosis: Chronic recurrent ulcer plantar aspect right first MTP joint     Postoperative diagnosis: Chronic recurrent ulcer plantar aspect right first MTP joint     Procedure: Right first MTP joint medial and lateral sesamoidectomy     Surgeons: Jeevan Gray MD     Assistants: Nathanael Miranda PA-C     Complications: None     Drains: None     EBL: Less than 20 cc     Anesthesia: General endotracheal     Indications: Please refer to clinic note for further details     Procedure note: On 12/27/2023 patient was taken to surgery.  Preoperative antibiotics were administered to the patient prior to arrival to the OR     After successful induction of general endotracheal anesthesia she  was placed supine on the table.  The right lower extremity was prepped and draped in sterile fashion.  After exsanguination by gravity, tourniquet cuff was inflated to 300 mmHg on the proximal third of the right thigh.      The pause for the cause was performed according to our institutional policy which confirmed laterality of the procedure.     An incision was made on the medial aspect of the foot.  Potentials were dissected.  The capsule was identified and it was incised in a longitudinal fashion.  This allowed us to proceed with exposure of the medial sesamoid which was extracted in 1 single piece.  Through the same approach we had full visualization of the FHL tendon which was protected plantarly we will proceed with a sesamoidectomy of the lateral sesamoid as well.    Copious irrigation was applied.  Will pay special attention to the hemostasis along the lateral aspect of the first MTP joint.    The capsule was repaired in a side-to-side fashion in order to avoid any valgus deviation of the great toe.     Tourniquet was deflated.  Satisfactory hemostasis was accomplished.  Wound was closed in layers.  Sterile dressings were applied.  Patient was placed in short cam walker and transferred in  stable condition to PACU.      Plan: Patient will remain nonweightbearing for 2 weeks.  She will minimize walking as much as possible for the first 2 weeks from surgery.  The cam walker will utilize at all times except for hygiene for the first 2 weeks from surgery.    At the 2-week appointment sutures were removed indicated and she will proceed with the use of the cam walker at all times except for hygiene rest and sitting and sleeping activities.  Patient will be weightbearing as tolerated.    Patient will be reevaluated at 6 weeks from surgery at that time no x-rays will be obtained.  Patient will not pursue any physical therapy for the first 6 weeks from surgery.    Jeevan Gray MD

## 2023-12-27 NOTE — ANESTHESIA CARE TRANSFER NOTE
Patient: Sherry Slaughter    Procedure: Procedure(s):  Medial and Lateral Sesamoidectomy Right       Diagnosis: Pain in joint, ankle and foot, right [M25.571]  Diagnosis Additional Information: No value filed.    Anesthesia Type:   General     Note:      Level of Consciousness: awake  Oxygen Supplementation: room air    Independent Airway: airway patency satisfactory and stable        Patient transferred to: PACU    Handoff Report: Identifed the Patient, Identified the Reponsible Provider, Reviewed the pertinent medical history, Discussed the surgical course, Reviewed Intra-OP anesthesia mangement and issues during anesthesia, Set expectations for post-procedure period and Allowed opportunity for questions and acknowledgement of understanding      Vitals:  Vitals Value Taken Time   /60 12/27/23 1145   Temp 36.6  C (97.9  F) 12/27/23 1130   Pulse 57 12/27/23 1145   Resp 20 12/27/23 1145   SpO2 95 % 12/27/23 1148   Vitals shown include unfiled device data.    Electronically Signed By: Daniel Rodríguez MD  December 27, 2023  11:10 AM

## 2023-12-27 NOTE — ANESTHESIA POSTPROCEDURE EVALUATION
Patient: Sherry Slaughter    Procedure: Procedure(s):  Medial and Lateral Sesamoidectomy Right       Anesthesia Type:  General    Note:  Disposition: Outpatient   Postop Pain Control: Uneventful            Sign Out: Well controlled pain   PONV: No   Neuro/Psych: Uneventful            Sign Out: Acceptable/Baseline neuro status   Airway/Respiratory: Uneventful            Sign Out: Acceptable/Baseline resp. status   CV/Hemodynamics: Uneventful            Sign Out: Acceptable CV status; No obvious hypovolemia; No obvious fluid overload   Other NRE:    DID A NON-ROUTINE EVENT OCCUR?        Last vitals:  Vitals Value Taken Time   /60 12/27/23 1145   Temp 36.6  C (97.9  F) 12/27/23 1130   Pulse 57 12/27/23 1145   Resp 20 12/27/23 1145   SpO2 95 % 12/27/23 1148   Vitals shown include unfiled device data.    Electronically Signed By: Daniel Rodríguez MD  December 27, 2023  11:49 AM

## 2024-01-11 ENCOUNTER — OFFICE VISIT (OUTPATIENT)
Dept: ORTHOPEDICS | Facility: CLINIC | Age: 69
End: 2024-01-11
Payer: COMMERCIAL

## 2024-01-11 DIAGNOSIS — Z98.890 STATUS POST FOOT SURGERY: Primary | ICD-10-CM

## 2024-01-11 PROCEDURE — 99024 POSTOP FOLLOW-UP VISIT: CPT

## 2024-01-11 NOTE — PROGRESS NOTES
Reason for visit:    Sherry Slaughter came in to the clinic for a two week post op check.    Her surgery was done 12/27/2023 by Dr Gray.  She had a Right first MTP joint medial and lateral sesamoidectomy      Assessment:    Sherry came into the clinic in a CAM boot Non-WB    The Surgical wounds were exposed and found to be well-healed; so the sutures were removed. Skin was c/d/I. Steri strips were applied. Minimal swelling noted. Sherry is doing very well.    Plan:     She was placed back into her CAM boot.  She was told to she may transition to WBAT in the boot. She may remove the boot for resting, sitting, sleeping and hygiene.     She has an appointment to see Dr. Gray at that time Dr. Gray will determine further restrictions.    All questions were answered. She has our phone number and will call with additional questions or problems.    Rajni Nickerson is the overseeing provider on site today.

## 2024-01-18 ENCOUNTER — OFFICE VISIT (OUTPATIENT)
Dept: ENDOCRINOLOGY | Facility: CLINIC | Age: 69
End: 2024-01-18
Payer: COMMERCIAL

## 2024-01-18 VITALS
DIASTOLIC BLOOD PRESSURE: 77 MMHG | HEART RATE: 65 BPM | OXYGEN SATURATION: 98 % | SYSTOLIC BLOOD PRESSURE: 134 MMHG | RESPIRATION RATE: 16 BRPM

## 2024-01-18 DIAGNOSIS — Z79.4 TYPE 2 DIABETES MELLITUS TREATED WITH INSULIN (H): Primary | ICD-10-CM

## 2024-01-18 DIAGNOSIS — E11.65 TYPE 2 DIABETES MELLITUS WITH HYPERGLYCEMIA, WITH LONG-TERM CURRENT USE OF INSULIN (H): ICD-10-CM

## 2024-01-18 DIAGNOSIS — E11.9 TYPE 2 DIABETES MELLITUS TREATED WITH INSULIN (H): Primary | ICD-10-CM

## 2024-01-18 DIAGNOSIS — E66.01 MORBID OBESITY (H): ICD-10-CM

## 2024-01-18 DIAGNOSIS — Z79.4 TYPE 2 DIABETES MELLITUS WITH HYPERGLYCEMIA, WITH LONG-TERM CURRENT USE OF INSULIN (H): ICD-10-CM

## 2024-01-18 DIAGNOSIS — E03.4 HYPOTHYROIDISM DUE TO ACQUIRED ATROPHY OF THYROID: ICD-10-CM

## 2024-01-18 DIAGNOSIS — N18.31 STAGE 3A CHRONIC KIDNEY DISEASE (H): ICD-10-CM

## 2024-01-18 PROBLEM — N18.32 CHRONIC KIDNEY DISEASE, STAGE 3B (H): Status: ACTIVE | Noted: 2024-01-18

## 2024-01-18 PROCEDURE — 99214 OFFICE O/P EST MOD 30 MIN: CPT | Mod: 24 | Performed by: PHYSICIAN ASSISTANT

## 2024-01-18 NOTE — LETTER
1/18/2024         RE: Sherry Slaughter  [id number] [place] [address]        Dear Colleague,    Thank you for referring your patient, Sherry Slaughter, to the [facility]. Please see a copy of my visit note below.                Assessment/Plan :   Type 2 DM. Sherry is doing great. Her blood sugars are very stable and she reports that she is feeling good. She remains stable on the 2 mg weekly dose of Ozempic and she feels like that has really helped her get better control over her diabetes. She has been able to decrease her daily Humulin R U500 dose, significantly. We reviewed her recent CGMS report and I do not see any reason to make adjustments today. I encouraged her to keep up the good work. We will follow-up in 3 mos.  Hypothyroidism. Sherry has not had any problems with worsening fatigue, anxiousness, or irritability. We discussed her recent laboratory results and her thyroid level looks great. We will continue with 75 mcg daily.      I have independently reviewed and interpreted labs, imaging as indicated.      Chief complaint:  Sherry is a 68 year old female who returns for follow-up of Type 2 DM.    I have reviewed Care Everywhere including [facility], [facility], [facility], [facility], [place], [facility], [facility] , [facility], [facility] lab reports, imaging reports and provider notes as indicated.      HISTORY OF PRESENT ILLNESS  Sherry is doing great. She has been working hard to manage her blood sugars and her numbers are very stable. At present time she is taking Ozempic 2 mg every week, metformin 2000 mg daily, and Humulin R U500 60 unit(s) in the morning and 30 unit(s) in the evening. She feels like the Ozempic has really made a large impact on her overall blood sugar control. She has been able to lower her Humulin R dose from 130 unit(s) in the morning and 120 unit(s) in evening, since starting Ozempic. She continues to monitor her  blood sugars closely with the Dexcom G7 sensor.    Sherry has not had any problems with severe hyperglycemia and/or hypoglycemia. She also denies any problems with blurred vision. She has been struggling with neuropathy and ongoing right foot plantar ulcers. This limits her ability to exercise and to simply walk. She has been working closely with Dr. Gray and underwent a medial and lateral sesamoidectomy of her right foot on December 27th. She is in a walking boot today and she reports that the recovery process is going well.    Sherry was diagnosed with diabetes over 20 yrs ago. She has used a variety of medications over the years and struggled to get her hemoglobin A1C to goal. Her diabetes is complicated by hyperlipidemia, obesity, CKD stage 3, paroxysmal atrial fibrillation s/p pacemaker, GENESIS, osteoarthritis, and hypothyroidism.     Endocrine relevant labs are as follows:   Latest Reference Range & Units 12/06/23 10:07   Albumin Urine mg/L mg/L <12.0      Latest Reference Range & Units 12/06/23 10:07   Hemoglobin A1C 0.0 - 5.6 % 6.1 (H)   (H): Data is abnormally high   Latest Reference Range & Units 12/06/23 10:07   TSH 0.30 - 4.20 uIU/mL 2.17     REVIEW OF SYSTEMS    Endocrine: positive for thyroid disorder, diabetes, and obesity  Skin: negative  Eyes: negative for, visual blurring, redness, tearing  Ears/Nose/Throat: negative for, nasal congestion, persistent sore throat, hoarseness  Respiratory: No shortness of breath, dyspnea on exertion, cough, or hemoptysis  Cardiovascular: negative for, irregular heart beat, chest pain, dyspnea on exertion, lower extremity edema, and exercise intolerance  Gastrointestinal: negative for, nausea, vomiting, constipation, and diarrhea  Genitourinary: negative for, nocturia, dysuria, frequency, and urgency  Musculoskeletal: positive for joint pain, joint swelling, and foot pain, negative for, muscular weakness, and nocturnal cramping  Neurologic: positive for numbness or  tingling of feet, negative for, local weakness, and numbness or tingling of hands  Psychiatric: negative  Hematologic/Lymphatic/Immunologic: negative    Past Medical History  Past Medical History:   Diagnosis Date     Cataract      Congestive heart failure (H) 2019 NOVEMBER    AFIB     DEPRESSION     comes and goes - tried meds - unsuccessfully, certain times of the year, no psych intervention and no counselors in the past - and not interested      Diabetic retinopathy associated with diabetes mellitus due to underlying condition (H)      Diverticulosis of colon (without mention of hemorrhage) 04/2004    colonoscopy     ECHO-mildLVH,tr MR,mild thick lflets w inc LA,trTR   12/2003     Essential hypertension, benign 1990s    late 1990s - started medications at that time - not to difficult to control meds      Fam hx-cardiovas dis NEC     father - CAD, and lipids/HTN - multiple members of the family      Family history of diabetes mellitus     sister and grandmother with DM      Family history of malignant neoplasm of breast     mother - young age - 45, and maternal cousin and aunt as well - no BRCA testing done      Family history of stroke (cerebrovascular)     grandmother in early life in her 40s      FAMILY HX COLON CANCER     Pat uncles x 2     Heart disease     murmur/     Heart murmur      HYPERLIPIDEMIA 2000    fairly recent - in the last 5 years - high for DM levels  -cholesterol recent      Irritable bowel syndrome     goes between the 2 - nerve related - more stressed more problems      MICROALBUMINURIA     unsure how long - been on the lisinopril - for a few years at well      Nonspecific abnormal results of liver function study 02/03/2003    SGOT - has been high in the past - since the hepatitis b - borderline elevation - not really changing any      OBESITY      Obstructive sleep apnea      GENESIS (obstructive sleep apnea) 10/15/2006    psg 5/15 AHI 53 aPAP 8-15     OSTEOARTHRITIS L KNEE 2003    total knee  on the left - and pain is gone since the knee replacement      PERS HX HEPATITIS B- RESOLVED] 1977    acute virus only - no chronic disease      PERS HX HEPATITIS B- RESOLVED]      Type II or unspecified type diabetes mellitus without mention of complication, not stated as uncontrolled 1991    oral meds frist, then insulin eventually later, difficult to control most of the time      Unspecified hypothyroidism 10/11/2006    TSH 3.36 - mild subclinical hypothyroidism - deciding on following or starting low dose meds - with dr Oreilly - following        Medications  Current Outpatient Medications   Medication Sig Dispense Refill     amiodarone (PACERONE) 200 MG tablet Take 1 tablet (200 mg) by mouth daily (Patient taking differently: Take 200 mg by mouth every morning) 90 tablet 3     blood glucose (NO BRAND SPECIFIED) test strip Use to test blood sugar 2 times daily or as directed. Patient requesting One Touch Ultra Strips 100 strip 3     Continuous Blood Gluc  (DEXCOM G7 ) KATIA Use to read blood sugars as per 's instructions. 1 each 0     Continuous Blood Gluc Sensor (DEXCOM G7 SENSOR) MISC 1 each every 10 days 9 each 3     diltiazem ER COATED BEADS (CARDIZEM CD) 360 MG 24 hr capsule Take 1 capsule (360 mg) by mouth daily (Patient taking differently: Take 360 mg by mouth every morning) 90 capsule 0     econazole nitrate 1 % external cream Apply topically daily To feet and toenails. 85 g 5     furosemide (LASIX) 20 MG tablet Take 1 tablet (20 mg) by mouth daily (Patient taking differently: Take 20 mg by mouth every morning) 90 tablet 3     Garlic 1000 MG CAPS Take 1 capsule by mouth daily Takes during summer months.  Done taking until next summer.       Glucagon (GVOKE HYPOPEN 1-PACK) 1 MG/0.2ML SOAJ Inject 1 mg Subcutaneous once as needed Administer the injection in the lower abdomen, outer thigh, or outer upperarm 0.2 mL 3     hydrochlorothiazide (HYDRODIURIL) 25 MG tablet Take 1 tablet  (25 mg) by mouth daily (Patient taking differently: Take 25 mg by mouth every morning) 90 tablet 3     HYDROcodone-acetaminophen (NORCO) 5-325 MG tablet Take 1-2 tablets by mouth every 4 hours as needed for moderate to severe pain 15 tablet 0     hydrOXYzine HCl (ATARAX) 25 MG tablet Take 1 tablet (25 mg) by mouth 3 times daily as needed for itching, anxiety or other (pain) 15 tablet 0     insulin pen needle (GNP CLICKFINE PEN NEEDLES) 31G X 8 MM miscellaneous Uses 3 times daily or as directed 300 each 3     insulin reg HIGH CONC (HUMULIN R U-500 KWIKPEN) 500 UNIT/ML PEN soln ADMINISTER 130-140 UNITS AT 7 IN THE MORNING,  UNITS AT 3-4 IN THE EVENING (Patient taking differently: 2 times daily (before meals) ADMINISTER 130-140 UNITS AT 7 IN THE MORNING,  UNITS AT 3-4 IN THE EVENING) 40 mL 1     levothyroxine (SYNTHROID/LEVOTHROID) 75 MCG tablet Take 1 tablet (75 mcg) by mouth daily (Patient taking differently: Take 75 mcg by mouth every morning) 90 tablet 3     lisinopril (ZESTRIL) 20 MG tablet Take 1 tablet (20 mg) by mouth daily (Patient taking differently: Take 20 mg by mouth every morning) 90 tablet 3     metFORMIN (GLUCOPHAGE XR) 500 MG 24 hr tablet Take 4 tablets (2,000 mg) by mouth At Bedtime TAKE 4 TABLETS AT BEDTIME 360 tablet 3     metoprolol succinate ER (TOPROL XL) 200 MG 24 hr tablet Take 1 tablet (200 mg) by mouth daily Dose increase (Patient taking differently: Take 200 mg by mouth every morning Dose increase) 180 tablet 3     Multiple Vitamins-Minerals (ADVANCED DIABETIC MULTIVITAMIN) TABS Take 1 tablet by mouth every morning       ORDER FOR DME, SET TO LOCAL PRINT, Respironics REMSTAR 60 Series Auto CPAP 8-15cm H2O, Airfit P10 nasal pillow mask w/medium pillows       order for DME Equipment being ordered: [id number] $70  DH Shoe LG 1 Device 0     potassium chloride ER (KLOR-CON M) 20 MEQ CR tablet Take 1 tablet (20 mEq) by mouth daily (Patient taking differently: Take 20 mEq by  mouth every morning) 90 tablet 3     rivaroxaban ANTICOAGULANT (XARELTO ANTICOAGULANT) 20 MG TABS tablet Take 1 tablet (20 mg) by mouth daily (with dinner) 90 tablet 3     rosuvastatin (CRESTOR) 10 MG tablet Take 1 tablet (10 mg) by mouth daily (Patient taking differently: Take 10 mg by mouth every evening) 90 tablet 3     Semaglutide, 2 MG/DOSE, (OZEMPIC) 8 MG/3ML pen Inject 2 mg Subcutaneous once a week (Patient taking differently: Inject 2 mg Subcutaneous once a week Tue) 9 mL 3       Allergies  Allergies   Allergen Reactions     Empagliflozin      Yeast infections     Lipitor [Atorvastatin Calcium]      Gas     Pravachol [Pravastatin Sodium]      Pravachol - dry cough      Simvastatin      Myalgia         Family History  family history includes Allergies in her mother; Alzheimer Disease in her mother; Aneurysm in her father; Arrhythmia in her sister; Arthritis in her mother; Asthma in her mother, sister, and sister; Blood Disease in her paternal grandmother; Breast Cancer in an other family member; Breast Cancer (age of onset: 40) in her mother; Breast Cancer (age of onset: 62) in her maternal aunt; Cancer in her father and mother; Cancer - colorectal in her paternal uncle and another family member; Cardiovascular in her brother and mother; Cerebrovascular Disease in her maternal grandmother and mother; Colon Cancer in an other family member; Congenital Anomalies in her brother; Coronary Artery Disease in her brother, brother, father, and sister; Dementia in her mother; Depression in her mother; Diabetes in her maternal grandmother, sister, and sister; Gynecology in her sister; Heart Disease in her brother, brother, and sister; Hypertension in her brother, brother, brother, mother, sister, and sister; Lipids in her brother, brother, mother, sister, and sister; Obesity in her brother; Other Cancer in her brother, father, and mother; Other Cancer (age of onset: 35) in her maternal aunt; Respiratory in her brother  and mother; Thyroid Disease in her mother.    Social History  Social History     Tobacco Use     Smoking status: Former     Types: Cigarettes     Passive exposure: Past     Smokeless tobacco: Never     Tobacco comments:     tried in early 20s only    Vaping Use     Vaping Use: Never used   Substance Use Topics     Alcohol use: Yes     Comment: 1 drink 1-2 year     Drug use: No       Physical Exam  /77 (BP Location: Left arm, Patient Position: Sitting, Cuff Size: Adult Large)   Pulse 65   Resp 16   LMP 10/02/2007   SpO2 98%   There is no height or weight on file to calculate BMI.  GENERAL :  In no apparent distress  SKIN: Normal color, normal temperature, texture.  No hirsutism, alopecia or purple striae.     EYES: PERRL, EOMI, No scleral icterus,  No proptosis, conjunctival redness, stare, retraction  RESP: Lungs clear to auscultation bilaterally  CARDIAC: Regular rate and rhythm, normal S1 S2, without murmurs, rubs or gallops    NEURO: awake, alert, responds appropriately to questions.  Cranial nerves intact.   Moves all extremities; Gait abnormal due to walking boot.  No tremor of the outstretched hand.   EXTREMITIES: No clubbing, cyanosis or edema.    DATA REVIEW  Dexcom Clarity  Time in target range 86%  Very Low <1%, Low 2%  High 11% and Very High <1%  Current Ave  mg/dl        Again, thank you for allowing me to participate in the care of your patient.        Sincerely,        Millicent Rocha PA-C

## 2024-01-18 NOTE — PROGRESS NOTES
Assessment/Plan :   Type 2 DM. Sherry is doing great. Her blood sugars are very stable and she reports that she is feeling good. She remains stable on the 2 mg weekly dose of Ozempic and she feels like that has really helped her get better control over her diabetes. She has been able to decrease her daily Humulin R U500 dose, significantly. We reviewed her recent CGMS report and I do not see any reason to make adjustments today. I encouraged her to keep up the good work. We will follow-up in 3 mos.  Hypothyroidism. Sherry has not had any problems with worsening fatigue, anxiousness, or irritability. We discussed her recent laboratory results and her thyroid level looks great. We will continue with 75 mcg daily.      I have independently reviewed and interpreted labs, imaging as indicated.      Chief complaint:  Sherry is a 68 year old female who returns for follow-up of Type 2 DM.    I have reviewed Care Everywhere including [facility], [facility], [facility], [facility], [place], [facility], [facility] , [facility], [place] lab reports, imaging reports and provider notes as indicated.      HISTORY OF PRESENT ILLNESS  Sherry is doing great. She has been working hard to manage her blood sugars and her numbers are very stable. At present time she is taking Ozempic 2 mg every week, metformin 2000 mg daily, and Humulin R U500 60 unit(s) in the morning and 30 unit(s) in the evening. She feels like the Ozempic has really made a large impact on her overall blood sugar control. She has been able to lower her Humulin R dose from 130 unit(s) in the morning and 120 unit(s) in evening, since starting Ozempic. She continues to monitor her blood sugars closely with the Dexcom G7 sensor.    Sherry has not had any problems with severe hyperglycemia and/or hypoglycemia. She also denies any problems with blurred vision. She has been struggling with neuropathy and ongoing right foot plantar ulcers. This limits her  ability to exercise and to simply walk. She has been working closely with Dr. Gray and underwent a medial and lateral sesamoidectomy of her right foot on December 27th. She is in a walking boot today and she reports that the recovery process is going well.    Sherry was diagnosed with diabetes over 20 yrs ago. She has used a variety of medications over the years and struggled to get her hemoglobin A1C to goal. Her diabetes is complicated by hyperlipidemia, obesity, CKD stage 3, paroxysmal atrial fibrillation s/p pacemaker, GENESIS, osteoarthritis, and hypothyroidism.     Endocrine relevant labs are as follows:   Latest Reference Range & Units 12/06/23 10:07   Albumin Urine mg/L mg/L <12.0      Latest Reference Range & Units 12/06/23 10:07   Hemoglobin A1C 0.0 - 5.6 % 6.1 (H)   (H): Data is abnormally high   Latest Reference Range & Units 12/06/23 10:07   TSH 0.30 - 4.20 uIU/mL 2.17     REVIEW OF SYSTEMS    Endocrine: positive for thyroid disorder, diabetes, and obesity  Skin: negative  Eyes: negative for, visual blurring, redness, tearing  Ears/Nose/Throat: negative for, nasal congestion, persistent sore throat, hoarseness  Respiratory: No shortness of breath, dyspnea on exertion, cough, or hemoptysis  Cardiovascular: negative for, irregular heart beat, chest pain, dyspnea on exertion, lower extremity edema, and exercise intolerance  Gastrointestinal: negative for, nausea, vomiting, constipation, and diarrhea  Genitourinary: negative for, nocturia, dysuria, frequency, and urgency  Musculoskeletal: positive for joint pain, joint swelling, and foot pain, negative for, muscular weakness, and nocturnal cramping  Neurologic: positive for numbness or tingling of feet, negative for, local weakness, and numbness or tingling of hands  Psychiatric: negative  Hematologic/Lymphatic/Immunologic: negative    Past Medical History  Past Medical History:   Diagnosis Date    Cataract     Congestive heart failure (H) 2019 NOVEMBER     AFIB    DEPRESSION     comes and goes - tried meds - unsuccessfully, certain times of the year, no psych intervention and no counselors in the past - and not interested     Diabetic retinopathy associated with diabetes mellitus due to underlying condition (H)     Diverticulosis of colon (without mention of hemorrhage) 04/2004    colonoscopy    ECHO-mildLVH,tr MR,mild thick lflets w inc LA,trTR   12/2003    Essential hypertension, benign 1990s    late 1990s - started medications at that time - not to difficult to control meds     Fam hx-cardiovas dis NEC     father - CAD, and lipids/HTN - multiple members of the family     Family history of diabetes mellitus     sister and grandmother with DM     Family history of malignant neoplasm of breast     mother - young age - 45, and maternal cousin and aunt as well - no BRCA testing done     Family history of stroke (cerebrovascular)     grandmother in early life in her 40s     FAMILY HX COLON CANCER     Pat uncles x 2    Heart disease     murmur/    Heart murmur     HYPERLIPIDEMIA 2000    fairly recent - in the last 5 years - high for DM levels  -cholesterol recent     Irritable bowel syndrome     goes between the 2 - nerve related - more stressed more problems     MICROALBUMINURIA     unsure how long - been on the lisinopril - for a few years at well     Nonspecific abnormal results of liver function study 02/03/2003    SGOT - has been high in the past - since the hepatitis b - borderline elevation - not really changing any     OBESITY     Obstructive sleep apnea     GENESIS (obstructive sleep apnea) 10/15/2006    psg 5/15 AHI 53 aPAP 8-15    OSTEOARTHRITIS L KNEE 2003    total knee on the left - and pain is gone since the knee replacement     PERS HX HEPATITIS B- RESOLVED] 1977    acute virus only - no chronic disease     PERS HX HEPATITIS B- RESOLVED]     Type II or unspecified type diabetes mellitus without mention of complication, not stated as uncontrolled 1991    oral  meds frist, then insulin eventually later, difficult to control most of the time     Unspecified hypothyroidism 10/11/2006    TSH 3.36 - mild subclinical hypothyroidism - deciding on following or starting low dose meds - with dr Oreilly - following        Medications  Current Outpatient Medications   Medication Sig Dispense Refill    amiodarone (PACERONE) 200 MG tablet Take 1 tablet (200 mg) by mouth daily (Patient taking differently: Take 200 mg by mouth every morning) 90 tablet 3    blood glucose (NO BRAND SPECIFIED) test strip Use to test blood sugar 2 times daily or as directed. Patient requesting One Touch Ultra Strips 100 strip 3    Continuous Blood Gluc  (DEXCOM G7 ) KATIA Use to read blood sugars as per 's instructions. 1 each 0    Continuous Blood Gluc Sensor (DEXCOM G7 SENSOR) MISC 1 each every 10 days 9 each 3    diltiazem ER COATED BEADS (CARDIZEM CD) 360 MG 24 hr capsule Take 1 capsule (360 mg) by mouth daily (Patient taking differently: Take 360 mg by mouth every morning) 90 capsule 0    econazole nitrate 1 % external cream Apply topically daily To feet and toenails. 85 g 5    furosemide (LASIX) 20 MG tablet Take 1 tablet (20 mg) by mouth daily (Patient taking differently: Take 20 mg by mouth every morning) 90 tablet 3    Garlic 1000 MG CAPS Take 1 capsule by mouth daily Takes during summer months.  Done taking until next summer.      Glucagon (GVOKE HYPOPEN 1-PACK) 1 MG/0.2ML SOAJ Inject 1 mg Subcutaneous once as needed Administer the injection in the lower abdomen, outer thigh, or outer upperarm 0.2 mL 3    hydrochlorothiazide (HYDRODIURIL) 25 MG tablet Take 1 tablet (25 mg) by mouth daily (Patient taking differently: Take 25 mg by mouth every morning) 90 tablet 3    HYDROcodone-acetaminophen (NORCO) 5-325 MG tablet Take 1-2 tablets by mouth every 4 hours as needed for moderate to severe pain 15 tablet 0    hydrOXYzine HCl (ATARAX) 25 MG tablet Take 1 tablet (25 mg) by mouth  3 times daily as needed for itching, anxiety or other (pain) 15 tablet 0    insulin pen needle (GNP CLICKFINE PEN NEEDLES) 31G X 8 MM miscellaneous Uses 3 times daily or as directed 300 each 3    insulin reg HIGH CONC (HUMULIN R U-500 KWIKPEN) 500 UNIT/ML PEN soln ADMINISTER 130-140 UNITS AT 7 IN THE MORNING,  UNITS AT 3-4 IN THE EVENING (Patient taking differently: 2 times daily (before meals) ADMINISTER 130-140 UNITS AT 7 IN THE MORNING,  UNITS AT 3-4 IN THE EVENING) 40 mL 1    levothyroxine (SYNTHROID/LEVOTHROID) 75 MCG tablet Take 1 tablet (75 mcg) by mouth daily (Patient taking differently: Take 75 mcg by mouth every morning) 90 tablet 3    lisinopril (ZESTRIL) 20 MG tablet Take 1 tablet (20 mg) by mouth daily (Patient taking differently: Take 20 mg by mouth every morning) 90 tablet 3    metFORMIN (GLUCOPHAGE XR) 500 MG 24 hr tablet Take 4 tablets (2,000 mg) by mouth At Bedtime TAKE 4 TABLETS AT BEDTIME 360 tablet 3    metoprolol succinate ER (TOPROL XL) 200 MG 24 hr tablet Take 1 tablet (200 mg) by mouth daily Dose increase (Patient taking differently: Take 200 mg by mouth every morning Dose increase) 180 tablet 3    Multiple Vitamins-Minerals (ADVANCED DIABETIC MULTIVITAMIN) TABS Take 1 tablet by mouth every morning      ORDER FOR DME, SET TO LOCAL PRINT, Respironics REMSTAR 60 Series Auto CPAP 8-15cm H2O, Airfit P10 nasal pillow mask w/medium pillows      order for DME Equipment being ordered: [id number] $70   Shoe LG 1 Device 0    potassium chloride ER (KLOR-CON M) 20 MEQ CR tablet Take 1 tablet (20 mEq) by mouth daily (Patient taking differently: Take 20 mEq by mouth every morning) 90 tablet 3    rivaroxaban ANTICOAGULANT (XARELTO ANTICOAGULANT) 20 MG TABS tablet Take 1 tablet (20 mg) by mouth daily (with dinner) 90 tablet 3    rosuvastatin (CRESTOR) 10 MG tablet Take 1 tablet (10 mg) by mouth daily (Patient taking differently: Take 10 mg by mouth every evening) 90 tablet 3     Semaglutide, 2 MG/DOSE, (OZEMPIC) 8 MG/3ML pen Inject 2 mg Subcutaneous once a week (Patient taking differently: Inject 2 mg Subcutaneous once a week Tue) 9 mL 3       Allergies  Allergies   Allergen Reactions    Empagliflozin      Yeast infections    Lipitor [Atorvastatin Calcium]      Gas    Pravachol [Pravastatin Sodium]      Pravachol - dry cough     Simvastatin      Myalgia         Family History  family history includes Allergies in her mother; Alzheimer Disease in her mother; Aneurysm in her father; Arrhythmia in her sister; Arthritis in her mother; Asthma in her mother, sister, and sister; Blood Disease in her paternal grandmother; Breast Cancer in an other family member; Breast Cancer (age of onset: 40) in her mother; Breast Cancer (age of onset: 62) in her maternal aunt; Cancer in her father and mother; Cancer - colorectal in her paternal uncle and another family member; Cardiovascular in her brother and mother; Cerebrovascular Disease in her maternal grandmother and mother; Colon Cancer in an other family member; Congenital Anomalies in her brother; Coronary Artery Disease in her brother, brother, father, and sister; Dementia in her mother; Depression in her mother; Diabetes in her maternal grandmother, sister, and sister; Gynecology in her sister; Heart Disease in her brother, brother, and sister; Hypertension in her brother, brother, brother, mother, sister, and sister; Lipids in her brother, brother, mother, sister, and sister; Obesity in her brother; Other Cancer in her brother, father, and mother; Other Cancer (age of onset: 35) in her maternal aunt; Respiratory in her brother and mother; Thyroid Disease in her mother.    Social History  Social History     Tobacco Use    Smoking status: Former     Types: Cigarettes     Passive exposure: Past    Smokeless tobacco: Never    Tobacco comments:     tried in early 20s only    Vaping Use    Vaping Use: Never used   Substance Use Topics    Alcohol use: Yes      Comment: 1 drink 1-2 year    Drug use: No       Physical Exam  /77 (BP Location: Left arm, Patient Position: Sitting, Cuff Size: Adult Large)   Pulse 65   Resp 16   LMP 10/02/2007   SpO2 98%   There is no height or weight on file to calculate BMI.  GENERAL :  In no apparent distress  SKIN: Normal color, normal temperature, texture.  No hirsutism, alopecia or purple striae.     EYES: PERRL, EOMI, No scleral icterus,  No proptosis, conjunctival redness, stare, retraction  RESP: Lungs clear to auscultation bilaterally  CARDIAC: Regular rate and rhythm, normal S1 S2, without murmurs, rubs or gallops    NEURO: awake, alert, responds appropriately to questions.  Cranial nerves intact.   Moves all extremities; Gait abnormal due to walking boot.  No tremor of the outstretched hand.   EXTREMITIES: No clubbing, cyanosis or edema.    DATA REVIEW  Dexcom Clarity  Time in target range 86%  Very Low <1%, Low 2%  High 11% and Very High <1%  Current Ave  mg/dl

## 2024-01-18 NOTE — NURSING NOTE
Sherry Slaughter's goals for this visit include:   Chief Complaint   Patient presents with    Follow Up     DM       She requests these members of her care team be copied on today's visit information: yes    PCP: Marilou Arciniega    Referring Provider:  No referring provider defined for this encounter.    /77 (BP Location: Left arm, Patient Position: Sitting, Cuff Size: Adult Large)   Pulse 65   Resp 16   LMP 10/02/2007   SpO2 98%     Do you need any medication refills at today's visit? None    Sj Short, EMT

## 2024-01-26 DIAGNOSIS — I48.0 PAROXYSMAL ATRIAL FIBRILLATION (H): ICD-10-CM

## 2024-01-26 RX ORDER — METOPROLOL SUCCINATE 200 MG/1
200 TABLET, EXTENDED RELEASE ORAL DAILY
Qty: 180 TABLET | Refills: 3 | Status: SHIPPED | OUTPATIENT
Start: 2024-01-26 | End: 2024-05-29

## 2024-02-13 ENCOUNTER — OFFICE VISIT (OUTPATIENT)
Dept: ORTHOPEDICS | Facility: CLINIC | Age: 69
End: 2024-02-13
Payer: COMMERCIAL

## 2024-02-13 DIAGNOSIS — M25.571 PAIN IN JOINT, ANKLE AND FOOT, RIGHT: Primary | ICD-10-CM

## 2024-02-13 PROCEDURE — 99024 POSTOP FOLLOW-UP VISIT: CPT | Performed by: ORTHOPAEDIC SURGERY

## 2024-02-13 NOTE — LETTER
2/13/2024         RE: Sherry Slaughter  [address]  [place] [address]        Dear Colleague,    Thank you for referring your patient, Sherry Slaughter, to the [facility] [place]. Please see a copy of my visit note below.    Chief complaint: Status post right medial and lateral sesamoidectomy performed December 27, 2023    Patient presents today for further follow-up.  Reports to be doing well denies to have any problems reports to be very pleased with the function of her foot    On today's visit she presents with a well-healed surgical incision with no pressure points plantarly.  There is approximately 30 degrees of flexion extension across the first MTP joint.    Assessment: Status post right medial and lateral sesamoidectomy    Plan: Discussed with patient to proceed with activity as tolerated to proceed with the use of the regular shoes and a cam walker for comfort purposes    Patient will follow-up with us if she develops any recurrence of her plantar ulcer or has any problems.  Otherwise she will follow-up on as needed basis      Jeevan Gray MD

## 2024-02-13 NOTE — PROGRESS NOTES
Chief complaint: Status post right medial and lateral sesamoidectomy performed December 27, 2023    Patient presents today for further follow-up.  Reports to be doing well denies to have any problems reports to be very pleased with the function of her foot    On today's visit she presents with a well-healed surgical incision with no pressure points plantarly.  There is approximately 30 degrees of flexion extension across the first MTP joint.    Assessment: Status post right medial and lateral sesamoidectomy    Plan: Discussed with patient to proceed with activity as tolerated to proceed with the use of the regular shoes and a cam walker for comfort purposes    Patient will follow-up with us if she develops any recurrence of her plantar ulcer or has any problems.  Otherwise she will follow-up on as needed basis

## 2024-02-13 NOTE — NURSING NOTE
Reason For Visit:   Chief Complaint   Patient presents with    Surgical Followup     6 wk post-op right medial and lateral sesamoidectomy Dos 12/27/23        68 year old  1955    Primary MD: Marilou Arciniega      [facility] 10/02/2007     Pain Assessment  Patient Currently in Pain: Jordana Sorto, ATC

## 2024-02-22 ENCOUNTER — MYC REFILL (OUTPATIENT)
Dept: CARDIOLOGY | Facility: CLINIC | Age: 69
End: 2024-02-22
Payer: COMMERCIAL

## 2024-02-22 ENCOUNTER — MYC REFILL (OUTPATIENT)
Dept: FAMILY MEDICINE | Facility: CLINIC | Age: 69
End: 2024-02-22
Payer: COMMERCIAL

## 2024-02-22 DIAGNOSIS — Z79.4 TYPE 2 DIABETES MELLITUS WITH HYPERGLYCEMIA, WITH LONG-TERM CURRENT USE OF INSULIN (H): ICD-10-CM

## 2024-02-22 DIAGNOSIS — I50.9 CONGESTIVE HEART FAILURE, UNSPECIFIED HF CHRONICITY, UNSPECIFIED HEART FAILURE TYPE (H): ICD-10-CM

## 2024-02-22 DIAGNOSIS — E11.65 TYPE 2 DIABETES MELLITUS WITH HYPERGLYCEMIA, WITH LONG-TERM CURRENT USE OF INSULIN (H): ICD-10-CM

## 2024-02-22 RX ORDER — FUROSEMIDE 20 MG
20 TABLET ORAL DAILY
Qty: 90 TABLET | Refills: 3 | Status: SHIPPED | OUTPATIENT
Start: 2024-02-22

## 2024-02-22 RX ORDER — METFORMIN HCL 500 MG
2000 TABLET, EXTENDED RELEASE 24 HR ORAL AT BEDTIME
Qty: 360 TABLET | Refills: 3 | Status: SHIPPED | OUTPATIENT
Start: 2024-02-22

## 2024-02-22 NOTE — TELEPHONE ENCOUNTER
furosemide (LASIX) 20 MG tablet  90/ # RF 12/26/23     LVD:  12/6/2023  [facility] Robinson Velasquez APRN CNP  Cardiovascular Disease     Refilled per protocol.

## 2024-02-22 NOTE — TELEPHONE ENCOUNTER
LVD: 1/18/2024  [facility] Millicent Chapa PA-C  Endocrinology, Diabetes, and Metabolism      metFORMIN (GLUCOPHAGE XR) 500 MG 24 hr tablet     Lab Results   Component Value Date    A1C 6.1 12/06/2023    A1C 7.0 11/28/2022    A1C 7.1 05/25/2022    A1C 8.1 11/24/2021    A1C 9.0 08/16/2021    A1C 6.8 01/05/2021    A1C 6.9 11/25/2020    A1C 7.5 07/22/2020    A1C 7.3 12/10/2019       Refilled per protocol.

## 2024-03-04 ENCOUNTER — OFFICE VISIT (OUTPATIENT)
Dept: NURSING | Facility: CLINIC | Age: 69
End: 2024-03-04
Payer: COMMERCIAL

## 2024-03-04 VITALS — OXYGEN SATURATION: 99 % | WEIGHT: 283.3 LBS | HEART RATE: 63 BPM | BODY MASS INDEX: 44.36 KG/M2

## 2024-03-04 DIAGNOSIS — Z79.899 ON AMIODARONE THERAPY: Primary | ICD-10-CM

## 2024-03-04 PROCEDURE — 94375 RESPIRATORY FLOW VOLUME LOOP: CPT | Performed by: INTERNAL MEDICINE

## 2024-03-04 PROCEDURE — 94729 DIFFUSING CAPACITY: CPT | Performed by: INTERNAL MEDICINE

## 2024-03-05 LAB
DLCOUNC-%PRED-PRE: 99 %
DLCOUNC-PRE: 20.7 ML/MIN/MMHG
DLCOUNC-PRED: 20.75 ML/MIN/MMHG
ERV-%PRED-PRE: 18 %
ERV-PRE: 0.21 L
ERV-PRED: 1.13 L
EXPTIME-PRE: 7.06 SEC
FEF2575-%PRED-PRE: 110 %
FEF2575-PRE: 2.09 L/SEC
FEF2575-PRED: 1.89 L/SEC
FEFMAX-%PRED-PRE: 114 %
FEFMAX-PRE: 7.04 L/SEC
FEFMAX-PRED: 6.17 L/SEC
FEV1-%PRED-PRE: 90 %
FEV1-PRE: 2.09 L
FEV1FEV6-PRE: 81 %
FEV1FEV6-PRED: 79 %
FEV1FVC-PRE: 81 %
FEV1FVC-PRED: 78 %
FEV1SVC-PRE: 77 %
FEV1SVC-PRED: 66 %
FIFMAX-PRE: 4.49 L/SEC
FVC-%PRED-PRE: 87 %
FVC-PRE: 2.57 L
FVC-PRED: 2.95 L
IC-%PRED-PRE: 111 %
IC-PRE: 2.52 L
IC-PRED: 2.27 L
VA-%PRED-PRE: 87 %
VA-PRE: 4.5 L
VC-%PRED-PRE: 78 %
VC-PRE: 2.73 L
VC-PRED: 3.49 L

## 2024-03-12 ENCOUNTER — DOCUMENTATION ONLY (OUTPATIENT)
Dept: CARDIOLOGY | Facility: CLINIC | Age: 69
End: 2024-03-12
Payer: COMMERCIAL

## 2024-03-12 DIAGNOSIS — I48.0 PAROXYSMAL ATRIAL FIBRILLATION (H): Primary | Chronic | ICD-10-CM

## 2024-03-12 DIAGNOSIS — I50.9 CONGESTIVE HEART FAILURE, UNSPECIFIED HF CHRONICITY, UNSPECIFIED HEART FAILURE TYPE (H): ICD-10-CM

## 2024-03-12 NOTE — PROGRESS NOTES
Roslyn Mccracken MD Meyers, Suzanne; Love Rondon, RN  Caller: Unspecified (Today,  8:46 AM)  Allan Aleman I got this request - I believe this is her 6 months amio lab follow up. Please also add on a CBC >>       Noted the above. Lab orders placed in Epic.

## 2024-03-12 NOTE — PROGRESS NOTES
Sherry Slaughter has an upcoming lab appointment:    Future Appointments   Date Time Provider Department Center   3/14/2024 10:30 AM LAB FIRST FLOOR [facility]   3/14/2024 11:00 AM [facility]   4/24/2024 10:30 AM [facility] [place]   4/25/2024  2:00 PM [facility]   12/11/2024 10:40 [facility] [place]   12/11/2024 11:15 AM Robinson Elizalde APRN CNP [facility]     Patient is scheduled for the following lab(s): Patient is requesting labs. Please order if necessary, thank you.        Jillian Russo

## 2024-03-14 ENCOUNTER — OFFICE VISIT (OUTPATIENT)
Dept: CARDIOLOGY | Facility: CLINIC | Age: 69
End: 2024-03-14
Payer: COMMERCIAL

## 2024-03-14 ENCOUNTER — LAB (OUTPATIENT)
Dept: LAB | Facility: CLINIC | Age: 69
End: 2024-03-14
Payer: COMMERCIAL

## 2024-03-14 VITALS
BODY MASS INDEX: 42.74 KG/M2 | SYSTOLIC BLOOD PRESSURE: 137 MMHG | WEIGHT: 282 LBS | HEART RATE: 71 BPM | OXYGEN SATURATION: 98 % | DIASTOLIC BLOOD PRESSURE: 72 MMHG | HEIGHT: 68 IN

## 2024-03-14 DIAGNOSIS — I50.9 CONGESTIVE HEART FAILURE, UNSPECIFIED HF CHRONICITY, UNSPECIFIED HEART FAILURE TYPE (H): ICD-10-CM

## 2024-03-14 DIAGNOSIS — I48.0 PAROXYSMAL ATRIAL FIBRILLATION (H): Primary | ICD-10-CM

## 2024-03-14 DIAGNOSIS — I48.0 PAROXYSMAL ATRIAL FIBRILLATION (H): Chronic | ICD-10-CM

## 2024-03-14 DIAGNOSIS — I50.30 HEART FAILURE WITH PRESERVED EJECTION FRACTION, NYHA CLASS I (H): ICD-10-CM

## 2024-03-14 LAB
ALT SERPL W P-5'-P-CCNC: 25 U/L (ref 0–50)
AST SERPL W P-5'-P-CCNC: 46 U/L (ref 0–45)
BASOPHILS # BLD AUTO: 0.1 10E3/UL (ref 0–0.2)
BASOPHILS NFR BLD AUTO: 1 %
EOSINOPHIL # BLD AUTO: 0.2 10E3/UL (ref 0–0.7)
EOSINOPHIL NFR BLD AUTO: 2 %
ERYTHROCYTE [DISTWIDTH] IN BLOOD BY AUTOMATED COUNT: 13.8 % (ref 10–15)
HCT VFR BLD AUTO: 38.4 % (ref 35–47)
HGB BLD-MCNC: 12.7 G/DL (ref 11.7–15.7)
IMM GRANULOCYTES # BLD: 0.2 10E3/UL
IMM GRANULOCYTES NFR BLD: 2 %
LYMPHOCYTES # BLD AUTO: 2.4 10E3/UL (ref 0.8–5.3)
LYMPHOCYTES NFR BLD AUTO: 25 %
MCH RBC QN AUTO: 31.7 PG (ref 26.5–33)
MCHC RBC AUTO-ENTMCNC: 33.1 G/DL (ref 31.5–36.5)
MCV RBC AUTO: 96 FL (ref 78–100)
MONOCYTES # BLD AUTO: 0.9 10E3/UL (ref 0–1.3)
MONOCYTES NFR BLD AUTO: 9 %
NEUTROPHILS # BLD AUTO: 5.8 10E3/UL (ref 1.6–8.3)
NEUTROPHILS NFR BLD AUTO: 61 %
NRBC # BLD AUTO: 0 10E3/UL
NRBC BLD AUTO-RTO: 0 /100
PLATELET # BLD AUTO: 259 10E3/UL (ref 150–450)
RBC # BLD AUTO: 4.01 10E6/UL (ref 3.8–5.2)
TSH SERPL DL<=0.005 MIU/L-ACNC: 2.63 UIU/ML (ref 0.3–4.2)
WBC # BLD AUTO: 9.5 10E3/UL (ref 4–11)

## 2024-03-14 PROCEDURE — 84443 ASSAY THYROID STIM HORMONE: CPT

## 2024-03-14 PROCEDURE — 84450 TRANSFERASE (AST) (SGOT): CPT

## 2024-03-14 PROCEDURE — 36415 COLL VENOUS BLD VENIPUNCTURE: CPT

## 2024-03-14 PROCEDURE — 99214 OFFICE O/P EST MOD 30 MIN: CPT | Mod: 24 | Performed by: INTERNAL MEDICINE

## 2024-03-14 PROCEDURE — 93000 ELECTROCARDIOGRAM COMPLETE: CPT | Performed by: INTERNAL MEDICINE

## 2024-03-14 PROCEDURE — 84460 ALANINE AMINO (ALT) (SGPT): CPT

## 2024-03-14 PROCEDURE — 85025 COMPLETE CBC W/AUTO DIFF WBC: CPT

## 2024-03-14 NOTE — PROGRESS NOTES
[facility] Metrics    Blood Pressure   BP < 140/90 Yes    On Aspirin  No: Contraindicated due to: no indication    On Statin  Yes    Tobacco use  No

## 2024-03-14 NOTE — NURSING NOTE
Med Reconcile: Reviewed and verified all current medications with the patient. The updated medication list was printed and given to the patient./ No medication changes.       Return Appointment  -Follow-up in 6 months with EP/AAKASH with Amiodarone labs prior.

## 2024-03-14 NOTE — PATIENT INSTRUCTIONS
Take your medicines every day, as directed     Changes made today:  None    Follow up: 6 months with Natacha with amiodarone labs       Cardiology Care Coordinators:      Rachell JENNINGS RN     Cardiology Rooming staff:  Loulou TAMAYO CNA    Phone  [phone]      Fax [phone]    To Contact us     During Business Hours:  [phone]     If you are needing refills please contact your pharmacy.     For urgent after hour care please call the [facility] at [phone] or the [facility] at [phone] and ask to speak to the cardiologist on call.            HOW TO CHECK YOUR BLOOD PRESSURE AT HOME:     Avoid eating, smoking, and exercising for at least 30 minutes before taking a reading.     Be sure you have taken your BP medication at least 2-3 hours before you check it.      Sit quietly for 10 minutes before a reading.      Sit in a chair with your feet flat on the floor. Rest your  arm on a table so that the arm cuff is at the same level as your heart.     Remain still during the reading.  Record your blood pressure and pulse in a log and bring to your next appointment.       Use [facility] allows you to communicate directly with your heart team through secure messaging.  [employer] can be accessed any time on your phone, computer, or tablet.  If you need assistance, we'd be happy to help!             Keep your Heart Appointments:

## 2024-03-14 NOTE — PROGRESS NOTES
I am delighted to see Sherry Slaughter for follow up of atrial fibrillation.     The patient is a 68 year old  female with persistent AF initially diagnosed December 2020, underwent successful cardioversion to sinus rhythm and had been on rivaroxaban, metoprolol, and diltiazem. Over the last few years she's had intermittent recurrent episodes with symptoms of fatigue and shoulder pain, all paroxysmal. Her metoprolol and diltiazem doses have been increased without much effect. Sotalol was started Jan 2023, dose gradually increased to 160 bid with improvement for only about a month before increasing episodes of AF.  Sotalol was stopped and amiodarone started. She's felt great since being on amiodarone; dose reduced to 200 every day in March 2023. Monitor was done with her home Kardia tracings.      She comes in today for a routine visit. She's not had any AF episodes. She is starting to walk more. She continues to keep busy with 5 grandkids and their various activities.  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     Past Medical History:  1. Atrial fibrillation, diagnosed 11/2020, persistent, s/p ELIAS/CV 12/4/2020 to sinus. Recurrent AF 12/9/2020 when admitted with hypertensive urgency, ventricular rates 80-90s, spontaneously converted. Recurrent PAF, sotalol started Jan 2023. Changed to amiodarone by March 2023.  2. Diabetes type II  3. Hypertension  4. GENESIS, on BiPAP  5. Hypothyroidism  6. BMI 44  7. HFpEF    Allergies:    Allergies   Allergen Reactions    Empagliflozin      Yeast infections    Lipitor [Atorvastatin Calcium]      Gas    Pravachol [Pravastatin Sodium]      Pravachol - dry cough     Simvastatin      Myalgia       Medications:   Rivaroxaban 20 qd  Amiodarone 200 every day  Diltiazem  mg every day  Metoprolol succinate 200 qd  Lisinopril 20 qd  Lasix 20 every day  KCL 20 meq qd  Hydrochlorothiazide 25 every day  Rosuvastatin 10 every  "day    Insulin  Metformin XR 2000 at bedtime  Semaglutide  Levothyroxine 75 mcg every day       Physical examination  Vitals: /72 (BP Location: Right arm, Patient Position: Chair, Cuff Size: Adult Regular)   Pulse 71   Ht 1.727 m (5' 8\")   Wt 127.9 kg (282 lb)   LMP 10/02/2007   SpO2 98%   BMI 42.88 kg/m    BMI= Body mass index is 42.88 kg/m .    Constitutional: In general, the patient is a pleasant female in no acute distress.    Targeted cardiovascular exam:  Regular rate and rhythm. Normal S1, S2. No murmur, rub, click, or gallop.   Extremities:Pulses are normal bilaterally throughout. No peripheral edema.  Respiratory: Clear to asculation.      I have personally and independently reviewed the following:    Labs:  3/14/2024: hgb 12.7, plt 259K, ALT 25, AST, AST 46, TSH 2.63  12/6/2023: cr 0.97, chol 119, TG 87, HDL 55, LDL 47  9/28/2023: A1C 6.9  9/14/2023: cr 1.22, AST 36, ALT 29, TSH 2.85     Echo1/9/2023: EF 60-65%, normal RV, grade II diastolic dysfunction, no valve disease, OSMAR 33.5     EKG:   TODAY 3/14/2024: sinus 69 bpm,  ms  9/14/2023: sinus 66 bpm,  ms  3/28/2023: sinus 68 bpm,  ms     Patch monitor 10/7-10/21/2021: PAF, AF burden 43% average rage 97 bpm, longest episode 6 days.      PFTs 7/13/2023: DLCO 90% predicted      Assessment :  Atrial fibrillation, paroxysmal, minimal episodes since amiodarone started march 2023. Thyroid/Liver function tests stable.  Will continue amiodarone 200 every day, repeat labs and EKG in 6 months. [id number] score is 4 for HTN, DM, female, age >65.  She is on the appropriate dose of rivaroxaban 20 mg every day  HFpEF, follows with [facility]. Euvolemic today. She is on both lasix and hydrochlorothiazide. Creatinine today elevated - will notify [facility]. Establishing care with Dr. Hill in April 2024.           I spent a total of 20 minutes face to face with  Sherry Slaughter during today's office visit. I have spent an " additional 15 minutes today on chart review and documentation.      The patient is to return  in 6 months for EKG and amiodarone labs. . The patient understood the treatment plan as outlined above.  There were no barriers to learning.      Roslyn Mccracken MD

## 2024-03-14 NOTE — NURSING NOTE
"Chief Complaint   Patient presents with    Follow Up     Paroxysmal atrial fibrillation       Initial /72 (BP Location: Right arm, Patient Position: Chair, Cuff Size: Adult Regular)   Pulse 71   Ht 1.727 m (5' 8\")   Wt 127.9 kg (282 lb)   LMP 10/02/2007   SpO2 98%   BMI 42.88 kg/m   Estimated body mass index is 42.88 kg/m  as calculated from the following:    Height as of this encounter: 1.727 m (5' 8\").    Weight as of this encounter: 127.9 kg (282 lb)..  BP completed using cuff size: kalpana SCHMIDT CNA  "

## 2024-03-19 ENCOUNTER — PATIENT OUTREACH (OUTPATIENT)
Dept: GASTROENTEROLOGY | Facility: CLINIC | Age: 69
End: 2024-03-19
Payer: COMMERCIAL

## 2024-03-19 DIAGNOSIS — Z12.11 SPECIAL SCREENING FOR MALIGNANT NEOPLASMS, COLON: Primary | ICD-10-CM

## 2024-03-19 NOTE — PROGRESS NOTES
"CRC Screening Colonoscopy Referral Review    Patient meets the inclusion criteria for screening colonoscopy standing order.    Ordering/Referring Provider:  Marilou Arciniega      BMI: Estimated body mass index is 42.88 kg/m  as calculated from the following:    Height as of 3/14/24: 1.727 m (5' 8\").    Weight as of 3/14/24: 127.9 kg (282 lb).     Sedation:  Does patient have any of the following conditions affecting sedation?  Chronic or scheduled pain/narcotic medication use: MAC sedation recommended    Previous Scopes:  Any previous recommendations or follow up needs based on previous scope?  na / No recommendations.    Medical Concerns to Postpone Order:  Does patient have any of the following medical concerns that should postpone/delay colonoscopy referral?  No medical conditions affecting colonoscopy referral.    Final Referral Details:  Based on patient's medical history patient is appropriate for referral order with MAC/deep sedation.   BMI<= 45 45 < BMI <= 48 48 < BMI < = 50  BMI > 50   No Restrictions No MG ASC  No ESSC  [facility] with exceptions Hospital Only OR Only     "

## 2024-04-02 DIAGNOSIS — I48.0 PAROXYSMAL ATRIAL FIBRILLATION (H): Primary | ICD-10-CM

## 2024-04-08 RX ORDER — DILTIAZEM HYDROCHLORIDE 360 MG/1
360 CAPSULE, EXTENDED RELEASE ORAL DAILY
Qty: 90 CAPSULE | Refills: 1 | Status: SHIPPED | OUTPATIENT
Start: 2024-04-08 | End: 2024-10-04

## 2024-04-08 NOTE — TELEPHONE ENCOUNTER
diltiazem ER COATED BEADS (CARDIZEM CD) 360 MG 24 hr capsule 90 capsule 0 12/5/2023       Last Office Visit: 3/14/24  Future Office visit:   9/26/24    Calcium Channel Blockers Protocol  [id number]/02/2024 08:06 AM   Protocol Details Normal serum creatinine on file in past 12 months     Creatinine   Date Value Ref Range Status   12/06/2023 0.97 (H) 0.51 - 0.95 mg/dL Final   04/13/2021 0.81 0.52 - 1.04 mg/dL Final       Routing refill request to provider for review/approval because:  Abnormal lab    Patricia Bridges RN  P Red Flag Triage/MRT

## 2024-04-08 NOTE — TELEPHONE ENCOUNTER
Refill sent until next EP appointment in September 2024 with lab prior.     Requested Prescriptions   Pending Prescriptions Disp Refills    diltiazem ER COATED BEADS (CARDIZEM CD) 360 MG 24 hr capsule [Pharmacy Med Name: DILTIAZEM HCL ER COATED  CP24] 90 capsule 0     Sig: TAKE ONE CAPSULE BY MOUTH ONCE DAILY       Calcium Channel Blockers Protocol  Failed - 4/8/2024  4:19 PM        Failed - Normal serum creatinine on file in past 12 months     Recent Labs   Lab Test 12/06/23  1007   CR 0.97*                 Passed - Blood pressure under 140/90 in past 12 months     BP Readings from Last 3 Encounters:   03/14/24 137/72   01/18/24 134/77   12/27/23 113/55       No data recorded            Passed - Normal ALT in past 12 months     Recent Labs   Lab Test 03/14/24  1023   ALT 25             Passed - Recent (12 mo) or future (30 days) visit within the authorizing provider's specialty     The patient must have completed an in-person or virtual visit within the past 12 months or has a future visit scheduled within the next 90 days with the authorizing provider s specialty.  Urgent care and e-visits do not quality as an office visit for this protocol.          Passed - Medication is active on med list        Passed - Patient is age 18 or older        Passed - No active pregnancy on record        Passed - No positive pregnancy test in past 12 months

## 2024-04-17 ENCOUNTER — TELEPHONE (OUTPATIENT)
Dept: PHARMACY | Facility: OTHER | Age: 69
End: 2024-04-17
Payer: COMMERCIAL

## 2024-04-17 NOTE — TELEPHONE ENCOUNTER
MTM Recruitment: [facility] insurance     Referral outreach attempt #1 on April 17, 2024      Outcome: left voicemail- Call back number [phone] and [employer] message sent    Bushra Brown PharmD  Medication Therapy Management Pharmacist   [facility]

## 2024-04-22 ENCOUNTER — TELEPHONE (OUTPATIENT)
Dept: PHARMACY | Facility: OTHER | Age: 69
End: 2024-04-22
Payer: COMMERCIAL

## 2024-04-22 NOTE — TELEPHONE ENCOUNTER
MTM referral from: Patient's insurance ([employer])    MTM referral outreach attempt #2 on April 22, 2024      Outcome: Left Message with phone number that is     [phone] for patient to call and schedule an appointment with specially trained pharmacist to go over her medications to assess if they are indicated, effective, safe, convenient and affordable for you.       To schedule an appointment, please call the clinic MTM services phone number, [phone].  Also you can call the scheduling line at [phone].      I hope to see you soon!     Sincerely,         Ahmet Romero, PharmD     Medication Therapy Management (MTM) Pharmacist

## 2024-04-24 ENCOUNTER — VIRTUAL VISIT (OUTPATIENT)
Dept: ENDOCRINOLOGY | Facility: CLINIC | Age: 69
End: 2024-04-24
Payer: COMMERCIAL

## 2024-04-24 DIAGNOSIS — E11.9 TYPE 2 DIABETES MELLITUS TREATED WITH INSULIN (H): Primary | ICD-10-CM

## 2024-04-24 DIAGNOSIS — E66.01 MORBID OBESITY (H): ICD-10-CM

## 2024-04-24 DIAGNOSIS — Z79.4 TYPE 2 DIABETES MELLITUS TREATED WITH INSULIN (H): Primary | ICD-10-CM

## 2024-04-24 DIAGNOSIS — E03.4 HYPOTHYROIDISM DUE TO ACQUIRED ATROPHY OF THYROID: ICD-10-CM

## 2024-04-24 PROCEDURE — 99214 OFFICE O/P EST MOD 30 MIN: CPT | Mod: 95 | Performed by: PHYSICIAN ASSISTANT

## 2024-04-24 NOTE — PROGRESS NOTES
Assessment/Plan :   Type 2 DM. Sherry is doing great. We reviewed her recent CGMS report and she is currently at 90% TIR. She has also been able to significantly reduce her daily insulin needs. She is due for a repeat A1C and I will place a lab order today. I encouraged her to keep up the good work. We will plan on a follow-up visit in about 6 mos.   Hypothyroidism. Sherry has not had any problems with worsening anxiousness, irritability, or fatigue. We discussed her recent laboratory results and her thyroid level is very stable. We will repeat testing in follow-up.     Due to the COVID 19 pandemic this visit was a telephone/video visit in order to help prevent spread of infection in this patient and the general population. The patient gave verbal consent for the visit today. I have independently reviewed and interpreted labs, imaging as indicated.       Distant Location (provider location):  Off-site  Mode of Communication:  Video Conference via @Pay  Chart review/prep time 5   Joined the call at 4/24/2024, 10:28:49 am.  Left the call at 4/24/2024, 10:36:16 am.  You were on the call for 7 minutes 26 seconds .  16 minutes spent on the date of the encounter doing chart review, history and exam, documentation and further activities as noted above.      Chief complaint:  Sherry is a 69 year old female who returns for follow-up of Type 2 DM and hypothyroidism.    I have reviewed Care Everywhere including [facility], [facility], [facility], [facility], [facility] , [facility], [place] lab reports, imaging reports and provider notes as indicated.      HISTORY OF PRESENT ILLNESS  Sherry is doing well. She has continued to work on making healthy lifestyle changes and she has been able to significantly lower her total daily insulin needs. At present time, she is taking Ozempic 2 mg weekly along with metformin XR 2000 mg daily. She has been able to decreased her Humulin R U500 from  140 unit(s) in the morning and 120 unit(s) in the evening down to 40 unit(s) in the morning and 30 unit(s) in the evening. She continues to monitor her blood sugars with the Dexcom G7 and her blood sugars are very stable.    Sherry has not had any problems with severe hyperglycemia and/or hypoglycemia since our last visit. She has had a few overnight lows, as well as the occasional afternoon low. She continues to make small adjustments to her insulin dosing, which seems to help. She denies any problems with blurred vision or worsening numbness/tingling in her feet. She does have a history of neuropathy but she does not feel that this is worsening. She has had a lot of joint pain that she attributes to arthritis.    Sherry was diagnosed with diabetes over 20 yrs ago. She has struggled with insulin resistance throughout the years which has led to weight gain and poor control. She has also taken a variety of medications throughout the years. Her diabetes is complicated by hyperlipidemia, obesity, CKD stage 3, paroxysmal atrial fibrillation s/p pacemaker, GENESIS, osteoarthritis, and hypothyroidism.    Endocrine relevant labs are as follows:   Latest Reference Range & Units 12/06/23 10:07   Hemoglobin A1C 0.0 - 5.6 % 6.1 (H)   (H): Data is abnormally high   Latest Reference Range & Units 09/28/23 09:38   Hemoglobin A1C POCT 4.3 - <5.7 % 6.9 !   !: Data is abnormal   Latest Reference Range & Units 03/14/24 10:23   TSH 0.30 - 4.20 uIU/mL 2.63     REVIEW OF SYSTEMS    Endocrine: positive for positive for diabetes and obesity  Skin: negative  Eyes: negative for, visual blurring, redness, tearing  Ears/Nose/Throat: negative  Respiratory: No shortness of breath, dyspnea on exertion, cough, or hemoptysis  Cardiovascular: negative for, chest pain, dyspnea on exertion, lower extremity edema, and exercise intolerance  Gastrointestinal: negative for, nausea, vomiting, constipation, and diarrhea  Genitourinary: negative for,  nocturia, dysuria, frequency, and urgency  Musculoskeletal: positive for arthritis and joint stiffness, negative for, muscular weakness, nocturnal cramping, and foot pain  Neurologic: negative for, local weakness, numbness or tingling of hands, and numbness or tingling of feet  Psychiatric: negative  Hematologic/Lymphatic/Immunologic: negative    Past Medical History  Past Medical History:   Diagnosis Date    Cataract     Congestive heart failure (H) 2019 NOVEMBER    AFIB    DEPRESSION     comes and goes - tried meds - unsuccessfully, certain times of the year, no psych intervention and no counselors in the past - and not interested     Diabetic retinopathy associated with diabetes mellitus due to underlying condition (H)     Diverticulosis of colon (without mention of hemorrhage) 04/2004    colonoscopy    ECHO-mildLVH,tr MR,mild thick lflets w inc LA,trTR   12/2003    Essential hypertension, benign 1990s    late 1990s - started medications at that time - not to difficult to control meds     Fam hx-cardiovas dis NEC     father - CAD, and lipids/HTN - multiple members of the family     Family history of diabetes mellitus     sister and grandmother with DM     Family history of malignant neoplasm of breast     mother - young age - 45, and maternal cousin and aunt as well - no BRCA testing done     Family history of stroke (cerebrovascular)     grandmother in early life in her 40s     FAMILY HX COLON CANCER     Pat uncles x 2    Heart disease     murmur/    Heart murmur     HYPERLIPIDEMIA 2000    fairly recent - in the last 5 years - high for DM levels  -cholesterol recent     Irritable bowel syndrome     goes between the 2 - nerve related - more stressed more problems     MICROALBUMINURIA     unsure how long - been on the lisinopril - for a few years at well     Nonspecific abnormal results of liver function study 02/03/2003    SGOT - has been high in the past - since the hepatitis b - borderline elevation - not  really changing any     OBESITY     Obstructive sleep apnea     GENESIS (obstructive sleep apnea) 10/15/2006    psg 5/15 AHI 53 aPAP 8-15    OSTEOARTHRITIS L KNEE 2003    total knee on the left - and pain is gone since the knee replacement     PERS HX HEPATITIS B- RESOLVED] 1977    acute virus only - no chronic disease     PERS HX HEPATITIS B- RESOLVED]     Type II or unspecified type diabetes mellitus without mention of complication, not stated as uncontrolled 1991    oral meds frist, then insulin eventually later, difficult to control most of the time     Unspecified hypothyroidism 10/11/2006    TSH 3.36 - mild subclinical hypothyroidism - deciding on following or starting low dose meds - with dr Oreilly - following        Medications  Current Outpatient Medications   Medication Sig Dispense Refill    amiodarone (PACERONE) 200 MG tablet Take 1 tablet (200 mg) by mouth daily (Patient taking differently: Take 200 mg by mouth every morning) 90 tablet 3    blood glucose (NO BRAND SPECIFIED) test strip Use to test blood sugar 2 times daily or as directed. Patient requesting One Touch Ultra Strips 100 strip 3    Continuous Blood Gluc  (DEXCOM G7 ) KATIA Use to read blood sugars as per 's instructions. 1 each 0    Continuous Blood Gluc Sensor (DEXCOM G7 SENSOR) MISC 1 each every 10 days 9 each 3    diltiazem ER COATED BEADS (CARDIZEM CD) 360 MG 24 hr capsule TAKE ONE CAPSULE BY MOUTH ONCE DAILY 90 capsule 1    furosemide (LASIX) 20 MG tablet Take 1 tablet (20 mg) by mouth daily 90 tablet 3    Garlic 1000 MG CAPS Take 1 capsule by mouth daily Takes during summer months.  Done taking until next summer.      Glucagon (GVOKE HYPOPEN 1-PACK) 1 MG/0.2ML SOAJ Inject 1 mg Subcutaneous once as needed Administer the injection in the lower abdomen, outer thigh, or outer upperarm 0.2 mL 3    hydrochlorothiazide (HYDRODIURIL) 25 MG tablet Take 1 tablet (25 mg) by mouth daily (Patient taking differently: Take 25  mg by mouth every morning) 90 tablet 3    insulin pen needle (GNP CLICKFINE PEN NEEDLES) 31G X 8 MM miscellaneous Uses 3 times daily or as directed 300 each 3    insulin reg HIGH CONC (HUMULIN R U-500 KWIKPEN) 500 UNIT/ML PEN soln ADMINISTER 130-140 UNITS AT 7 IN THE MORNING,  UNITS AT 3-4 IN THE EVENING (Patient taking differently: 2 times daily (before meals) ADMINISTER 130-140 UNITS AT 7 IN THE MORNING,  UNITS AT 3-4 IN THE EVENING) 40 mL 1    levothyroxine (SYNTHROID/LEVOTHROID) 75 MCG tablet Take 1 tablet (75 mcg) by mouth daily (Patient taking differently: Take 75 mcg by mouth every morning) 90 tablet 3    lisinopril (ZESTRIL) 20 MG tablet Take 1 tablet (20 mg) by mouth daily (Patient taking differently: Take 20 mg by mouth every morning) 90 tablet 3    metFORMIN (GLUCOPHAGE XR) 500 MG 24 hr tablet Take 4 tablets (2,000 mg) by mouth at bedtime TAKE 4 TABLETS AT BEDTIME 360 tablet 3    metoprolol succinate ER (TOPROL XL) 200 MG 24 hr tablet Take 1 tablet (200 mg) by mouth daily Dose increase 180 tablet 3    Multiple Vitamins-Minerals (ADVANCED DIABETIC MULTIVITAMIN) TABS Take 1 tablet by mouth every morning      ORDER FOR DME, SET TO LOCAL PRINT, Respironics REMSTAR 60 Series Auto CPAP 8-15cm H2O, Airfit P10 nasal pillow mask w/medium pillows      order for DME Equipment being ordered: [id number] $70   Shoe LG 1 Device 0    potassium chloride ER (KLOR-CON M) 20 MEQ CR tablet Take 1 tablet (20 mEq) by mouth daily (Patient taking differently: Take 20 mEq by mouth every morning) 90 tablet 3    rivaroxaban ANTICOAGULANT (XARELTO ANTICOAGULANT) 20 MG TABS tablet Take 1 tablet (20 mg) by mouth daily (with dinner) 90 tablet 3    rosuvastatin (CRESTOR) 10 MG tablet Take 1 tablet (10 mg) by mouth daily (Patient taking differently: Take 10 mg by mouth every evening) 90 tablet 3    Semaglutide, 2 MG/DOSE, (OZEMPIC) 8 MG/3ML pen Inject 2 mg Subcutaneous once a week (Patient taking differently: Inject  2 mg Subcutaneous once a week Tue) 9 mL 3       Allergies  Allergies   Allergen Reactions    Empagliflozin      Yeast infections    Lipitor [Atorvastatin Calcium]      Gas    Pravachol [Pravastatin Sodium]      Pravachol - dry cough     Simvastatin      Myalgia       Family History  family history includes Allergies in her mother; Alzheimer Disease in her mother; Aneurysm in her father; Arrhythmia in her sister; Arthritis in her mother; Asthma in her mother, sister, and sister; Blood Disease in her paternal grandmother; Breast Cancer in an other family member; Breast Cancer (age of onset: 40) in her mother; Breast Cancer (age of onset: 62) in her maternal aunt; Cancer in her father and mother; Cancer - colorectal in her paternal uncle and another family member; Cardiovascular in her brother and mother; Cerebrovascular Disease in her maternal grandmother and mother; Colon Cancer in an other family member; Congenital Anomalies in her brother; Coronary Artery Disease in her brother, brother, father, and sister; Dementia in her mother; Depression in her mother; Diabetes in her maternal grandmother, sister, and sister; Gynecology in her sister; Heart Disease in her brother, brother, and sister; Hypertension in her brother, brother, brother, mother, sister, and sister; Lipids in her brother, brother, mother, sister, and sister; Obesity in her brother; Other Cancer in her brother, father, and mother; Other Cancer (age of onset: 35) in her maternal aunt; Respiratory in her brother and mother; Thyroid Disease in her mother.    Social History  Social History     Tobacco Use    Smoking status: Former     Types: Cigarettes     Passive exposure: Past    Smokeless tobacco: Never    Tobacco comments:     tried in early 20s only    Vaping Use    Vaping status: Never Used   Substance Use Topics    Alcohol use: Yes     Comment: 1 drink 1-2 year    Drug use: No       Physical Exam  There is no height or weight on file to calculate  BMI.  GENERAL: no distress  SKIN: Visible skin clear. No significant rash, abnormal pigmentation or lesions.  EYES: Eyes grossly normal to inspection.  No discharge or erythema, or obvious scleral/conjunctival abnormalities.  NECK: visible goiter is not present; no visible neck masses  RESP: No audible wheeze, cough, or visible cyanosis.  No visible retractions or increased work of breathing.    NEURO: Awake, alert, responds appropriately to questions.  Mentation and speech fluent.  PSYCH:affect normal and appearance well-groomed.      DATA REVIEW  Dexcom Clarity  Time in target range 90%  Very Low<1%, Low 4%  High 6%  Current Ave  mg/dl

## 2024-04-24 NOTE — NURSING NOTE
Is the patient currently in the state of [place]? YES    Visit mode:VIDEO    If the visit is dropped, the patient can be reconnected by: VIDEO VISIT: Text to cell phone:   Telephone Information:   Mobile [phone]       Will anyone else be joining the visit? NO  (If patient encounters technical issues they should call [phone]    How would you like to obtain your AVS? MyChart    Are changes needed to the allergy or medication list? No patient reported no changes to e-check in information for visit. VF did not review e-check in information again with patient due to this.    Are refills needed on medications prescribed by this physician? Unknown    Reason for visit: RECHECK    Zoila MAGALLON

## 2024-04-24 NOTE — PROGRESS NOTES
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    She was previously followed by Dr. Aranda and follows with Robinson Elizalde NP in [facility] and with Dr. Roslyn Mccracken for AF.  Dry weight has been felt to be ~299 lbs.    AF has been well-controlled on amiodarone. Her home weights have been relatively stable. She does have exertional dyspnea walking up 1 flight of stairs. This is greatly improved compared with when she was in AF. She has historically been limited from foot/knee problems more than breathing.     Cardiac medications:  Rivaroxaban 20 daily  Amiodarone 200 every day  Diltiazem  mg every day  Metoprolol succinate 200 qd  Lisinopril 20 qd  Lasix 20 every day  KCL 20 meq qd  Hydrochlorothiazide 25 every day  Rosuvastatin 10 every day    TTE 1/9/23 (read by me)  Global and regional left ventricular function is normal with an EF of 60-65%.  Right ventricular function, chamber size, wall motion, and thickness are  normal.  Grade II or moderate diastolic dysfunction.  No significant valvular abnormalities were noted.  This study was compared with the study from 10/7/21 .  No significant changes noted.    PAST MEDICAL HISTORY:  1. Atrial fibrillation, diagnosed 11/2020, persistent, s/p ELIAS/CV 12/4/2020 to sinus. Recurrent AF 12/9/2020 when admitted with hypertensive urgency, ventricular rates 80-90s, spontaneously converted. Recurrent PAF, sotalol started Jan 2023. Changed to amiodarone by March 2023.  2. Diabetes type II  3. Hypertension  4. GENESIS, on BiPAP  5. Hypothyroidism  6. BMI 44  7. HFpEF    CURRENT MEDICATIONS:  Current Outpatient Medications   Medication Sig Dispense Refill    amiodarone (PACERONE) 200 MG tablet Take 1 tablet (200 mg) by mouth daily (Patient taking differently: Take 200 mg by mouth  every morning) 90 tablet 3    blood glucose (NO BRAND SPECIFIED) test strip Use to test blood sugar 2 times daily or as directed. Patient requesting One Touch Ultra Strips 100 strip 3    Continuous Blood Gluc  (DEXCOM G7 ) KATIA Use to read blood sugars as per 's instructions. 1 each 0    Continuous Blood Gluc Sensor (DEXCOM G7 SENSOR) MISC 1 each every 10 days 9 each 3    diltiazem ER COATED BEADS (CARDIZEM CD) 360 MG 24 hr capsule TAKE ONE CAPSULE BY MOUTH ONCE DAILY 90 capsule 1    furosemide (LASIX) 20 MG tablet Take 1 tablet (20 mg) by mouth daily 90 tablet 3    Garlic 1000 MG CAPS Take 1 capsule by mouth daily Takes during summer months.  Done taking until next summer.      Glucagon (GVOKE HYPOPEN 1-PACK) 1 MG/0.2ML SOAJ Inject 1 mg Subcutaneous once as needed Administer the injection in the lower abdomen, outer thigh, or outer upperarm 0.2 mL 3    hydrochlorothiazide (HYDRODIURIL) 25 MG tablet Take 1 tablet (25 mg) by mouth daily (Patient taking differently: Take 25 mg by mouth every morning) 90 tablet 3    insulin pen needle (GNP CLICKFINE PEN NEEDLES) 31G X 8 MM miscellaneous Uses 3 times daily or as directed 300 each 3    insulin reg HIGH CONC (HUMULIN R U-500 KWIKPEN) 500 UNIT/ML PEN soln ADMINISTER 130-140 UNITS AT 7 IN THE MORNING,  UNITS AT 3-4 IN THE EVENING (Patient taking differently: 2 times daily (before meals) ADMINISTER 130-140 UNITS AT 7 IN THE MORNING,  UNITS AT 3-4 IN THE EVENING) 40 mL 1    levothyroxine (SYNTHROID/LEVOTHROID) 75 MCG tablet Take 1 tablet (75 mcg) by mouth daily (Patient taking differently: Take 75 mcg by mouth every morning) 90 tablet 3    lisinopril (ZESTRIL) 20 MG tablet Take 1 tablet (20 mg) by mouth daily (Patient taking differently: Take 20 mg by mouth every morning) 90 tablet 3    metFORMIN (GLUCOPHAGE XR) 500 MG 24 hr tablet Take 4 tablets (2,000 mg) by mouth at bedtime TAKE 4 TABLETS AT BEDTIME 360 tablet 3    metoprolol  succinate ER (TOPROL XL) 200 MG 24 hr tablet Take 1 tablet (200 mg) by mouth daily Dose increase 180 tablet 3    Multiple Vitamins-Minerals (ADVANCED DIABETIC MULTIVITAMIN) TABS Take 1 tablet by mouth every morning      ORDER FOR DME, SET TO LOCAL PRINT, Respironics REMSTAR 60 Series Auto CPAP 8-15cm H2O, Airfit P10 nasal pillow mask w/medium pillows      order for DME Equipment being ordered: [id number] $70   Shoe LG 1 Device 0    potassium chloride ER (KLOR-CON M) 20 MEQ CR tablet Take 1 tablet (20 mEq) by mouth daily (Patient taking differently: Take 20 mEq by mouth every morning) 90 tablet 3    rivaroxaban ANTICOAGULANT (XARELTO ANTICOAGULANT) 20 MG TABS tablet Take 1 tablet (20 mg) by mouth daily (with dinner) 90 tablet 3    rosuvastatin (CRESTOR) 10 MG tablet Take 1 tablet (10 mg) by mouth daily (Patient taking differently: Take 10 mg by mouth every evening) 90 tablet 3    Semaglutide, 2 MG/DOSE, (OZEMPIC) 8 MG/3ML pen Inject 2 mg Subcutaneous once a week (Patient taking differently: Inject 2 mg Subcutaneous once a week Tue) 9 mL 3       ALLERGIES:     Allergies   Allergen Reactions    Empagliflozin      Yeast infections    Lipitor [Atorvastatin Calcium]      Gas    Pravachol [Pravastatin Sodium]      Pravachol - dry cough     Simvastatin      Myalgia       FAMILY HISTORY:  Family History   Problem Relation Age of Onset    Diabetes Maternal Grandmother         DM TYPE 2    Cerebrovascular Disease Maternal Grandmother     Hypertension Sister     Lipids Sister     Heart Disease Sister         Chronic AFib    Diabetes Sister     Coronary Artery Disease Sister         afib    Hypertension Sister     Lipids Sister     Gynecology Sister     Diabetes Sister     Asthma Sister     Blood Disease Paternal Grandmother         T CELL LEUKEMIA    Hypertension Brother     Lipids Brother     Congenital Anomalies Brother     Cardiovascular Brother     Heart Disease Brother         CABG/AFIB    Coronary Artery Disease  Brother         cabg afib AAA    Hypertension Brother     Lipids Brother     Other Cancer Brother         multple myeloma    Obesity Brother     Hypertension Brother     Respiratory Brother         Sleep apnea    Heart Disease Brother         AFib    Coronary Artery Disease Brother         afib    Alzheimer Disease Mother     Asthma Mother     Hypertension Mother     Breast Cancer Mother 40        has mastectomy and lived to 84    Allergies Mother         Sulfa,    Arthritis Mother     Cardiovascular Mother     Depression Mother     Respiratory Mother     Lipids Mother     Cancer Mother         breast    Thyroid Disease Mother     Cerebrovascular Disease Mother         FROM  AFIB    Dementia Mother         Alzheimers    Other Cancer Mother         breast    Cancer - colorectal Other     Colon Cancer Other         2019    Cancer Father         lung    Aneurysm Father         brother, AAA    Other Cancer Father         lung ca    Coronary Artery Disease Father         cad AAA    Asthma Sister     Cancer - colorectal Paternal Uncle         late 50s to early 60s - great uncles     Breast Cancer Other         Maternal cousin    Arrhythmia Sister         2 brothers 1 sister Afib    Breast Cancer Maternal Aunt 62    Other Cancer Maternal Aunt 35        Ovarian cancer.  Survived despite late recurrence and is now 92.    Glaucoma No family hx of     Macular Degeneration No family hx of      SOCIAL HISTORY:  Social History     Tobacco Use    Smoking status: Former     Types: Cigarettes     Passive exposure: Past    Smokeless tobacco: Never    Tobacco comments:     tried in early 20s only    Vaping Use    Vaping status: Never Used   Substance Use Topics    Alcohol use: Yes     Comment: 1 drink 1-2 year    Drug use: No       ROS:   A comprehensive 14 point review of systems is negative other than as mentioned in HPI.    Exam:  /72 (BP Location: Right arm, Patient Position: Sitting, Cuff Size: Adult Regular)   Pulse 66    LMP 10/02/2007   SpO2 97%   GENERAL APPEARANCE: healthy, alert and no distress  EYES: no icterus, no xanthelasmas  ENT: normal palate, mucosa moist, no central cyanosis  NECK: JVP not elevated  RESPIRATORY: lungs clear to auscultation - no rales, rhonchi or wheezes, no use of accessory muscles, no retractions, respirations are unlabored, normal respiratory rate  CARDIOVASCULAR: regular rhythm, normal S1 with physiologic split S2, no S3 or S4 and no murmur, click or rub.  GI: soft, non tender, bowel sounds normal,no abdominal bruits  EXTREMITIES: no edema, no bruits  NEURO: alert and oriented to person/place/time, normal speech, gait and affect  VASC: Radial, dorsalis pedis and posterior tibialis pulses 2+ bilaterally.  SKIN: no ecchymoses, no rashes.  PSYCH: cooperative, affect appropriate.     Labs:  Reviewed.     Testing/Procedures:  I personally visualized and interpreted:  TTE 1/9/23 (read by me)  Global and regional left ventricular function is normal with an EF of 60-65%.  Right ventricular function, chamber size, wall motion, and thickness are  normal.  Grade II or moderate diastolic dysfunction.  No significant valvular abnormalities were noted.  This study was compared with the study from 10/7/21 .  No significant changes noted.    Assessment and Plan:   HFpEF, currently euvolemic  HTN  -continue furosemide at present dose  -continue metoprolol and diltiazem and present doses  -stop HCTZ  -start spironolactone 25 mg daily; BMP and check home BPs in 2 weeks  -continue [facility] follow up as planned    3. Paroxysmal AF  -followed by Dr. Mccracken  -continue metoprolol and diltiazem  -continue amiodarone  -continue DOAC      4. Mild aortic stenosis: repeat TTE 2026 (3y from prior) or for change in symptoms      Follow up:  Follow up with me in 6 months, if stable at that time can increase to 1y.  Follow up with EP and [facility] as planned    The patient states understanding and is agreeable with plan.     Ismael COULTER  MD Liz  Cardiology    CC

## 2024-04-24 NOTE — TELEPHONE ENCOUNTER
MTM Recruitment: [facility] insurance     Referral outreach attempt #3 on April 24, 2024      Outcome: patient opted out    Cris Edwards PharmD, BCACP  Medication Therapy Management Pharmacist

## 2024-04-24 NOTE — LETTER
4/24/2024         RE: Sherry Slaughter  [address] [place] [address]        Dear Colleague,    Thank you for referring your patient, Sherry Slaughter, to the [facility]. Please see a copy of my visit note below.    Outcome for 04/24/24 9:45 AM: Data obtained via Dexcom website  Bobbi Bell LPN             Assessment/Plan :   Type 2 DM. Sherry is doing great. We reviewed her recent CGMS report and she is currently at 90% TIR. She has also been able to significantly reduce her daily insulin needs. She is due for a repeat A1C and I will place a lab order today. I encouraged her to keep up the good work. We will plan on a follow-up visit in about 6 mos.   Hypothyroidism. Sherry has not had any problems with worsening anxiousness, irritability, or fatigue. We discussed her recent laboratory results and her thyroid level is very stable. We will repeat testing in follow-up.     Due to the COVID 19 pandemic this visit was a telephone/video visit in order to help prevent spread of infection in this patient and the general population. The patient gave verbal consent for the visit today. I have independently reviewed and interpreted labs, imaging as indicated.       Distant Location (provider location):  Off-site  Mode of Communication:  Video Conference via [employer]  Chart review/prep time 5   Joined the call at 4/24/2024, 10:28:49 am.  Left the call at 4/24/2024, 10:36:16 am.  You were on the call for 7 minutes 26 seconds .  16 minutes spent on the date of the encounter doing chart review, history and exam, documentation and further activities as noted above.      Chief complaint:  Sherry is a 69 year old female who returns for follow-up of Type 2 DM and hypothyroidism.    I have reviewed Care Everywhere including [facility], [facility], [facility], [facility], [place], [facility], [facility] , [facility], [place] lab reports, imaging reports and provider  notes as indicated.      HISTORY OF PRESENT ILLNESS  Sherry is doing well. She has continued to work on making healthy lifestyle changes and she has been able to significantly lower her total daily insulin needs. At present time, she is taking Ozempic 2 mg weekly along with metformin XR 2000 mg daily. She has been able to decreased her Humulin R U500 from 140 unit(s) in the morning and 120 unit(s) in the evening down to 40 unit(s) in the morning and 30 unit(s) in the evening. She continues to monitor her blood sugars with the Dexcom G7 and her blood sugars are very stable.    Sherry has not had any problems with severe hyperglycemia and/or hypoglycemia since our last visit. She has had a few overnight lows, as well as the occasional afternoon low. She continues to make small adjustments to her insulin dosing, which seems to help. She denies any problems with blurred vision or worsening numbness/tingling in her feet. She does have a history of neuropathy but she does not feel that this is worsening. She has had a lot of joint pain that she attributes to arthritis.    Sherry was diagnosed with diabetes over 20 yrs ago. She has struggled with insulin resistance throughout the years which has led to weight gain and poor control. She has also taken a variety of medications throughout the years. Her diabetes is complicated by hyperlipidemia, obesity, CKD stage 3, paroxysmal atrial fibrillation s/p pacemaker, GENESIS, osteoarthritis, and hypothyroidism.    Endocrine relevant labs are as follows:   Latest Reference Range & Units 12/06/23 10:07   Hemoglobin A1C 0.0 - 5.6 % 6.1 (H)   (H): Data is abnormally high   Latest Reference Range & Units 09/28/23 09:38   Hemoglobin A1C POCT 4.3 - <5.7 % 6.9 !   !: Data is abnormal   Latest Reference Range & Units 03/14/24 10:23   TSH 0.30 - 4.20 uIU/mL 2.63     REVIEW OF SYSTEMS    Endocrine: positive for positive for diabetes and obesity  Skin: negative  Eyes: negative for,  visual blurring, redness, tearing  Ears/Nose/Throat: negative  Respiratory: No shortness of breath, dyspnea on exertion, cough, or hemoptysis  Cardiovascular: negative for, chest pain, dyspnea on exertion, lower extremity edema, and exercise intolerance  Gastrointestinal: negative for, nausea, vomiting, constipation, and diarrhea  Genitourinary: negative for, nocturia, dysuria, frequency, and urgency  Musculoskeletal: positive for arthritis and joint stiffness, negative for, muscular weakness, nocturnal cramping, and foot pain  Neurologic: negative for, local weakness, numbness or tingling of hands, and numbness or tingling of feet  Psychiatric: negative  Hematologic/Lymphatic/Immunologic: negative    Past Medical History  Past Medical History:   Diagnosis Date     Cataract      Congestive heart failure (H) 2019 NOVEMBER    AFIB     DEPRESSION     comes and goes - tried meds - unsuccessfully, certain times of the year, no psych intervention and no counselors in the past - and not interested      Diabetic retinopathy associated with diabetes mellitus due to underlying condition (H)      Diverticulosis of colon (without mention of hemorrhage) 04/2004    colonoscopy     ECHO-mildLVH,tr MR,mild thick lflets w inc LA,trTR   12/2003     Essential hypertension, benign 1990s    late 1990s - started medications at that time - not to difficult to control meds      Fam hx-cardiovas dis NEC     father - CAD, and lipids/HTN - multiple members of the family      Family history of diabetes mellitus     sister and grandmother with DM      Family history of malignant neoplasm of breast     mother - young age - 45, and maternal cousin and aunt as well - no BRCA testing done      Family history of stroke (cerebrovascular)     grandmother in early life in her 40s      FAMILY HX COLON CANCER     Pat uncles x 2     Heart disease     murmur/     Heart murmur      HYPERLIPIDEMIA 2000    fairly recent - in the last 5 years - high for DM  levels  -cholesterol recent      Irritable bowel syndrome     goes between the 2 - nerve related - more stressed more problems      MICROALBUMINURIA     unsure how long - been on the lisinopril - for a few years at well      Nonspecific abnormal results of liver function study 02/03/2003    SGOT - has been high in the past - since the hepatitis b - borderline elevation - not really changing any      OBESITY      Obstructive sleep apnea      GENESIS (obstructive sleep apnea) 10/15/2006    psg 5/15 AHI 53 aPAP 8-15     OSTEOARTHRITIS L KNEE 2003    total knee on the left - and pain is gone since the knee replacement      PERS HX HEPATITIS B- RESOLVED] 1977    acute virus only - no chronic disease      PERS HX HEPATITIS B- RESOLVED]      Type II or unspecified type diabetes mellitus without mention of complication, not stated as uncontrolled 1991    oral meds frist, then insulin eventually later, difficult to control most of the time      Unspecified hypothyroidism 10/11/2006    TSH 3.36 - mild subclinical hypothyroidism - deciding on following or starting low dose meds - with dr Oreilly - following        Medications  Current Outpatient Medications   Medication Sig Dispense Refill     amiodarone (PACERONE) 200 MG tablet Take 1 tablet (200 mg) by mouth daily (Patient taking differently: Take 200 mg by mouth every morning) 90 tablet 3     blood glucose (NO BRAND SPECIFIED) test strip Use to test blood sugar 2 times daily or as directed. Patient requesting One Touch Ultra Strips 100 strip 3     Continuous Blood Gluc  (DEXCOM G7 ) KATIA Use to read blood sugars as per 's instructions. 1 each 0     Continuous Blood Gluc Sensor (DEXCOM G7 SENSOR) MISC 1 each every 10 days 9 each 3     diltiazem ER COATED BEADS (CARDIZEM CD) 360 MG 24 hr capsule TAKE ONE CAPSULE BY MOUTH ONCE DAILY 90 capsule 1     furosemide (LASIX) 20 MG tablet Take 1 tablet (20 mg) by mouth daily 90 tablet 3     Garlic 1000 MG CAPS  Take 1 capsule by mouth daily Takes during summer months.  Done taking until next summer.       Glucagon (GVOKE HYPOPEN 1-PACK) 1 MG/0.2ML SOAJ Inject 1 mg Subcutaneous once as needed Administer the injection in the lower abdomen, outer thigh, or outer upperarm 0.2 mL 3     hydrochlorothiazide (HYDRODIURIL) 25 MG tablet Take 1 tablet (25 mg) by mouth daily (Patient taking differently: Take 25 mg by mouth every morning) 90 tablet 3     insulin pen needle (GNP CLICKFINE PEN NEEDLES) 31G X 8 MM miscellaneous Uses 3 times daily or as directed 300 each 3     insulin reg HIGH CONC (HUMULIN R U-500 KWIKPEN) 500 UNIT/ML PEN soln ADMINISTER 130-140 UNITS AT 7 IN THE MORNING,  UNITS AT 3-4 IN THE EVENING (Patient taking differently: 2 times daily (before meals) ADMINISTER 130-140 UNITS AT 7 IN THE MORNING,  UNITS AT 3-4 IN THE EVENING) 40 mL 1     levothyroxine (SYNTHROID/LEVOTHROID) 75 MCG tablet Take 1 tablet (75 mcg) by mouth daily (Patient taking differently: Take 75 mcg by mouth every morning) 90 tablet 3     lisinopril (ZESTRIL) 20 MG tablet Take 1 tablet (20 mg) by mouth daily (Patient taking differently: Take 20 mg by mouth every morning) 90 tablet 3     metFORMIN (GLUCOPHAGE XR) 500 MG 24 hr tablet Take 4 tablets (2,000 mg) by mouth at bedtime TAKE 4 TABLETS AT BEDTIME 360 tablet 3     metoprolol succinate ER (TOPROL XL) 200 MG 24 hr tablet Take 1 tablet (200 mg) by mouth daily Dose increase 180 tablet 3     Multiple Vitamins-Minerals (ADVANCED DIABETIC MULTIVITAMIN) TABS Take 1 tablet by mouth every morning       ORDER FOR DME, SET TO LOCAL PRINT, Respironics REMSTAR 60 Series Auto CPAP 8-15cm H2O, Airfit P10 nasal pillow mask w/medium pillows       order for DME Equipment being ordered: [id number] $70  DH Shoe LG 1 Device 0     potassium chloride ER (KLOR-CON M) 20 MEQ CR tablet Take 1 tablet (20 mEq) by mouth daily (Patient taking differently: Take 20 mEq by mouth every morning) 90 tablet 3      rivaroxaban ANTICOAGULANT (XARELTO ANTICOAGULANT) 20 MG TABS tablet Take 1 tablet (20 mg) by mouth daily (with dinner) 90 tablet 3     rosuvastatin (CRESTOR) 10 MG tablet Take 1 tablet (10 mg) by mouth daily (Patient taking differently: Take 10 mg by mouth every evening) 90 tablet 3     Semaglutide, 2 MG/DOSE, (OZEMPIC) 8 MG/3ML pen Inject 2 mg Subcutaneous once a week (Patient taking differently: Inject 2 mg Subcutaneous once a week Tue) 9 mL 3       Allergies  Allergies   Allergen Reactions     Empagliflozin      Yeast infections     Lipitor [Atorvastatin Calcium]      Gas     Pravachol [Pravastatin Sodium]      Pravachol - dry cough      Simvastatin      Myalgia       Family History  family history includes Allergies in her mother; Alzheimer Disease in her mother; Aneurysm in her father; Arrhythmia in her sister; Arthritis in her mother; Asthma in her mother, sister, and sister; Blood Disease in her paternal grandmother; Breast Cancer in an other family member; Breast Cancer (age of onset: 40) in her mother; Breast Cancer (age of onset: 62) in her maternal aunt; Cancer in her father and mother; Cancer - colorectal in her paternal uncle and another family member; Cardiovascular in her brother and mother; Cerebrovascular Disease in her maternal grandmother and mother; Colon Cancer in an other family member; Congenital Anomalies in her brother; Coronary Artery Disease in her brother, brother, father, and sister; Dementia in her mother; Depression in her mother; Diabetes in her maternal grandmother, sister, and sister; Gynecology in her sister; Heart Disease in her brother, brother, and sister; Hypertension in her brother, brother, brother, mother, sister, and sister; Lipids in her brother, brother, mother, sister, and sister; Obesity in her brother; Other Cancer in her brother, father, and mother; Other Cancer (age of onset: 35) in her maternal aunt; Respiratory in her brother and mother; Thyroid Disease in her  mother.    Social History  Social History     Tobacco Use     Smoking status: Former     Types: Cigarettes     Passive exposure: Past     Smokeless tobacco: Never     Tobacco comments:     tried in early 20s only    Vaping Use     Vaping status: Never Used   Substance Use Topics     Alcohol use: Yes     Comment: 1 drink 1-2 year     Drug use: No       Physical Exam  There is no height or weight on file to calculate BMI.  GENERAL: no distress  SKIN: Visible skin clear. No significant rash, abnormal pigmentation or lesions.  EYES: Eyes grossly normal to inspection.  No discharge or erythema, or obvious scleral/conjunctival abnormalities.  NECK: visible goiter is not present; no visible neck masses  RESP: No audible wheeze, cough, or visible cyanosis.  No visible retractions or increased work of breathing.    NEURO: Awake, alert, responds appropriately to questions.  Mentation and speech fluent.  PSYCH:affect normal and appearance well-groomed.      DATA REVIEW  Dexcom Clarity  Time in target range 90%  Very Low<1%, Low 4%  High 6%  Current Ave  mg/dl        Again, thank you for allowing me to participate in the care of your patient.        Sincerely,        Millicent Rcoha PA-C

## 2024-04-25 ENCOUNTER — OFFICE VISIT (OUTPATIENT)
Dept: CARDIOLOGY | Facility: CLINIC | Age: 69
End: 2024-04-25
Payer: COMMERCIAL

## 2024-04-25 VITALS — OXYGEN SATURATION: 97 % | DIASTOLIC BLOOD PRESSURE: 72 MMHG | SYSTOLIC BLOOD PRESSURE: 136 MMHG | HEART RATE: 66 BPM

## 2024-04-25 DIAGNOSIS — I50.30 HEART FAILURE WITH PRESERVED EJECTION FRACTION, NYHA CLASS I (H): ICD-10-CM

## 2024-04-25 DIAGNOSIS — G47.33 OSA (OBSTRUCTIVE SLEEP APNEA): ICD-10-CM

## 2024-04-25 DIAGNOSIS — I10 BENIGN ESSENTIAL HYPERTENSION: ICD-10-CM

## 2024-04-25 DIAGNOSIS — E78.5 HYPERLIPIDEMIA LDL GOAL <100: ICD-10-CM

## 2024-04-25 DIAGNOSIS — I48.0 PAROXYSMAL ATRIAL FIBRILLATION (H): Primary | ICD-10-CM

## 2024-04-25 PROCEDURE — 99215 OFFICE O/P EST HI 40 MIN: CPT | Performed by: INTERNAL MEDICINE

## 2024-04-25 RX ORDER — SPIRONOLACTONE 25 MG/1
25 TABLET ORAL DAILY
Qty: 90 TABLET | Refills: 3 | Status: SHIPPED | OUTPATIENT
Start: 2024-04-25

## 2024-04-25 NOTE — PATIENT INSTRUCTIONS
Take your medicines every day, as directed     Changes made today:  Stop hydrochlorothiazide  Start spironolactone 25 mg daily         Cardiology Care Coordinators:      Rachell JENNINGS RN     Cardiology Rooming staff:  Loulou TAMAYO CNA    Phone  [phone]      Fax [phone]    To Contact us     During Business Hours:  [phone]     If you are needing refills please contact your pharmacy.     For urgent after hour care please call the [facility] at [phone] or the [facility] at [phone] and ask to speak to the cardiologist on call.            HOW TO CHECK YOUR BLOOD PRESSURE AT HOME:     Avoid eating, smoking, and exercising for at least 30 minutes before taking a reading.     Be sure you have taken your BP medication at least 2-3 hours before you check it.      Sit quietly for 10 minutes before a reading.      Sit in a chair with your feet flat on the floor. Rest your  arm on a table so that the arm cuff is at the same level as your heart.     Remain still during the reading.  Record your blood pressure and pulse in a log and bring to your next appointment.       Use [employer] allows you to communicate directly with your heart team through secure messaging.  [employer] can be accessed any time on your phone, computer, or tablet.  If you need assistance, we'd be happy to help!             Keep your Heart Appointments:     Labs in 2 weeks  RN check in 2 weeks for BP report  CORE enroll with labs  EP next avail  6 months with Dr Hill with labs prior

## 2024-04-25 NOTE — NURSING NOTE
Sherry Slaughter's goals for this visit include:   Chief Complaint   Patient presents with    Follow Up     Referred by Robinson Phipps        She requests these members of her care team be copied on today's visit information: yes     PCP: Marilou Arciniega    Referring Provider:  JIM Foss CNP  [address] [place] [address]    BP (!) 151/72 (BP Location: Right arm, Patient Position: Chair, Cuff Size: Adult Large)   Pulse 66   LMP 10/02/2007   SpO2 97%     Do you need any medication refills at today's visit? No       FRANKO Dixon   Neph/Pulm Teams  [facility]

## 2024-04-29 NOTE — NURSING NOTE
To Do  -Stop hydrochlorothiazide  -Start spironolactone 25 mg daily    -Labs in 2 weeks  -RN check in 2 weeks for BP report  -CORE enroll with labs  -EP next avail  -6 months with Dr Hill with labs prior    Bobbi Avendano RN

## 2024-05-09 ENCOUNTER — PATIENT OUTREACH (OUTPATIENT)
Dept: CARDIOLOGY | Facility: CLINIC | Age: 69
End: 2024-05-09

## 2024-05-09 ENCOUNTER — LAB (OUTPATIENT)
Dept: LAB | Facility: CLINIC | Age: 69
End: 2024-05-09
Payer: COMMERCIAL

## 2024-05-09 VITALS — SYSTOLIC BLOOD PRESSURE: 120 MMHG | DIASTOLIC BLOOD PRESSURE: 70 MMHG

## 2024-05-09 DIAGNOSIS — Z79.4 TYPE 2 DIABETES MELLITUS TREATED WITH INSULIN (H): ICD-10-CM

## 2024-05-09 DIAGNOSIS — I50.30 HEART FAILURE WITH PRESERVED EJECTION FRACTION, NYHA CLASS I (H): Primary | ICD-10-CM

## 2024-05-09 DIAGNOSIS — E78.5 HYPERLIPIDEMIA LDL GOAL <100: ICD-10-CM

## 2024-05-09 DIAGNOSIS — I48.0 PAROXYSMAL ATRIAL FIBRILLATION (H): ICD-10-CM

## 2024-05-09 DIAGNOSIS — E11.9 TYPE 2 DIABETES MELLITUS TREATED WITH INSULIN (H): ICD-10-CM

## 2024-05-09 DIAGNOSIS — I10 BENIGN ESSENTIAL HYPERTENSION: ICD-10-CM

## 2024-05-09 DIAGNOSIS — I50.30 HEART FAILURE WITH PRESERVED EJECTION FRACTION, NYHA CLASS I (H): ICD-10-CM

## 2024-05-09 DIAGNOSIS — G47.33 OSA (OBSTRUCTIVE SLEEP APNEA): ICD-10-CM

## 2024-05-09 LAB
ANION GAP SERPL CALCULATED.3IONS-SCNC: 13 MMOL/L (ref 7–15)
BUN SERPL-MCNC: 20.4 MG/DL (ref 8–23)
CALCIUM SERPL-MCNC: 10.3 MG/DL (ref 8.8–10.2)
CHLORIDE SERPL-SCNC: 104 MMOL/L (ref 98–107)
CREAT SERPL-MCNC: 1.02 MG/DL (ref 0.51–0.95)
DEPRECATED HCO3 PLAS-SCNC: 21 MMOL/L (ref 22–29)
EGFRCR SERPLBLD CKD-EPI 2021: 59 ML/MIN/1.73M2
GLUCOSE SERPL-MCNC: 218 MG/DL (ref 70–99)
HBA1C MFR BLD: 6.3 % (ref 0–5.6)
POTASSIUM SERPL-SCNC: 5.2 MMOL/L (ref 3.4–5.3)
SODIUM SERPL-SCNC: 138 MMOL/L (ref 135–145)

## 2024-05-09 PROCEDURE — 36415 COLL VENOUS BLD VENIPUNCTURE: CPT

## 2024-05-09 PROCEDURE — 83036 HEMOGLOBIN GLYCOSYLATED A1C: CPT

## 2024-05-09 PROCEDURE — 80048 BASIC METABOLIC PNL TOTAL CA: CPT

## 2024-05-09 NOTE — TELEPHONE ENCOUNTER
RN call to patient after change was made from hydrochlorothiazide to spironolactone. Per Dr Hill, potassium 5.2. We can recheck BMP in a week to make sure it's not continuing to rise. If BP still high, could add back 12.5 mg hydrochlorothiazide.     Pt states she is feeling well and BP on average 120/70. She is agreeable to repeat BMP next week, lab scheduled    Bobbi Avendano RN

## 2024-05-10 ENCOUNTER — TELEPHONE (OUTPATIENT)
Dept: GASTROENTEROLOGY | Facility: CLINIC | Age: 69
End: 2024-05-10
Payer: COMMERCIAL

## 2024-05-10 DIAGNOSIS — Z86.0100 HISTORY OF COLONIC POLYPS: Primary | ICD-10-CM

## 2024-05-10 NOTE — TELEPHONE ENCOUNTER
"Pre Assessment RN Review    Focused Assessments      GENESIS Hx  Estimated body mass index is 42.88 kg/m  as calculated from the following:    Height as of 3/14/24: 1.727 m (5' 8\").    Weight as of 3/14/24: 127.9 kg (282 lb).     Patient has reported / documented history GENESIS and reports she does use a a device for sleep.     Device: CPAP    Severity Assessment    Sleep Study:   Diagnosis/Severity: Severe    AHI: 52          Scheduling Status & Recommendations    Location Type: Hospital - Per exclusion criteria.   " Please call the patient regarding her normal result.

## 2024-05-10 NOTE — TELEPHONE ENCOUNTER
"Endoscopy Scheduling Screen    Have you had a positive Covid test in the last 14 days?  No    What is your communication preference for Instructions and/or Bowel Prep?   MyChart    What insurance is in the chart?  Other:  [employer] MED[employer]ARE    Ordering/Referring Provider: MADELYN OSWALD   (If ordering provider performs procedure, schedule with ordering provider unless otherwise instructed. )    BMI: Estimated body mass index is 42.88 kg/m  as calculated from the following:    Height as of 3/14/24: 1.727 m (5' 8\").    Weight as of 3/14/24: 127.9 kg (282 lb).     Sedation Ordered  MAC/deep sedation.   BMI<= 45 45 < BMI <= 48 48 < BMI < = 50  BMI > 50   No Restrictions No MG ASC  No ESSC  Magazine ASC with exceptions Hospital Only OR Only       Do you have a history of malignant hyperthermia?  No    (Females) Are you currently pregnant?   No     Have you been diagnosed or told you have pulmonary hypertension?   No    Do you have an LVAD?  No    Have you been told you have moderate to severe sleep apnea?  Yes (RN Review required for scheduling unless scheduling in Hospital.)    Have you been told you have COPD, asthma, or any other lung disease?  No    Do you have any heart conditions?  Yes     In the past year, have you had any hospitalizations for heart related issues including cardiomyopathy, heart attack, or stent placement?  No    Do you have any implantable devices in your body (pacemaker, ICD)?  No    Do you take nitroglycerine?  No    Have you ever had or are you waiting for an organ transplant?  No. Continue scheduling, no site restrictions.    Have you had a stroke or transient ischemic attack (TIA aka \"mini stroke\" in the last 6 months?   No    Have you been diagnosed with or been told you have cirrhosis of the liver?   No    Are you currently on dialysis?   No    Do you need assistance transferring?   No    BMI: Estimated body mass index is 42.88 kg/m  as calculated from the following:    Height as of 3/14/24: 1.727 " "m (5' 8\").    Weight as of 3/14/24: 127.9 kg (282 lb).     Is patients BMI > 40 and scheduling location [facility]?  Yes (If MAC sedation is ordered, schedule PAC eval)    Do you take an injectable medication for weight loss or diabetes (excluding insulin)?  Yes, hold time can be up to 7 days. Please consult with you prescribing provider to discuss endoscopy recommendations.     Do you take the medication Naltrexone?  No    Do you take blood thinners?  Yes     Are you taking Effient/Prasugrel?  No, you must contact your prescribing provider for direction on holding or bridging with a different medication.       Prep   Are you currently on dialysis or do you have chronic kidney disease?  Yes (Golytely Prep)    Do you have a diagnosis of diabetes?  Yes (Golytely Prep)    Do you have a diagnosis of cystic fibrosis (CF)?  No    On a regular basis do you go 3 -5 days between bowel movements?  No    BMI > 40?  Yes (Extended Prep)    Preferred Pharmacy:    [facility] - [place], [place] - [address], [address]  [address], [address]  [facility] [address]  Phone: [phone] Fax: [phone]      Final Scheduling Details     Procedure scheduled  Colonoscopy    Surgeon:  ARUNA    Date of procedure:  11/14/24     Pre-OP / PAC:   Yes - PAC clinic evaluation scheduled.    Location  [facility] - Per exclusion criteria.    Sedation   MAC/Deep Sedation - Per order.      Patient Reminders:   You will receive a call from a Nurse to review instructions and health history.  This assessment must be completed prior to your procedure.  Failure to complete the Nurse assessment may result in the procedure being cancelled.      On the day of your procedure, please designate an adult(s) who can drive you home stay with you for the next 24 hours. The medicines used in the exam will make you sleepy. You will not be able to drive.      You cannot take public transportation, ride share services, or non-medical taxi service " without a responsible caregiver.  Medical transport services are allowed with the requirement that a responsible caregiver will receive you at your destination.  We require that drivers and caregivers are confirmed prior to your procedure.

## 2024-05-17 ENCOUNTER — LAB (OUTPATIENT)
Dept: LAB | Facility: CLINIC | Age: 69
End: 2024-05-17
Payer: COMMERCIAL

## 2024-05-17 DIAGNOSIS — I50.30 HEART FAILURE WITH PRESERVED EJECTION FRACTION, NYHA CLASS I (H): ICD-10-CM

## 2024-05-17 LAB
ANION GAP SERPL CALCULATED.3IONS-SCNC: 13 MMOL/L (ref 7–15)
BUN SERPL-MCNC: 21.7 MG/DL (ref 8–23)
CALCIUM SERPL-MCNC: 10.1 MG/DL (ref 8.8–10.2)
CHLORIDE SERPL-SCNC: 105 MMOL/L (ref 98–107)
CREAT SERPL-MCNC: 1.03 MG/DL (ref 0.51–0.95)
DEPRECATED HCO3 PLAS-SCNC: 23 MMOL/L (ref 22–29)
EGFRCR SERPLBLD CKD-EPI 2021: 58 ML/MIN/1.73M2
GLUCOSE SERPL-MCNC: 151 MG/DL (ref 70–99)
POTASSIUM SERPL-SCNC: 4.8 MMOL/L (ref 3.4–5.3)
SODIUM SERPL-SCNC: 141 MMOL/L (ref 135–145)

## 2024-05-17 PROCEDURE — 80048 BASIC METABOLIC PNL TOTAL CA: CPT

## 2024-05-17 PROCEDURE — 36415 COLL VENOUS BLD VENIPUNCTURE: CPT

## 2024-05-28 ENCOUNTER — MYC MEDICAL ADVICE (OUTPATIENT)
Dept: CARDIOLOGY | Facility: CLINIC | Age: 69
End: 2024-05-28
Payer: COMMERCIAL

## 2024-05-28 DIAGNOSIS — E11.9 TYPE 2 DIABETES MELLITUS TREATED WITH INSULIN (H): Primary | ICD-10-CM

## 2024-05-28 DIAGNOSIS — E66.01 MORBID OBESITY (H): ICD-10-CM

## 2024-05-28 DIAGNOSIS — Z79.4 TYPE 2 DIABETES MELLITUS TREATED WITH INSULIN (H): Primary | ICD-10-CM

## 2024-05-28 NOTE — TELEPHONE ENCOUNTER
Pt phoned and relayed provider message below    Per Dr Hill, I would have her hold her AM metoprolol and get an EKG as soon as feasible (tomorrow AM is OK as long as she doesn't feel any worse or have HR persistently <40.)     Pt agreeable to plan. She will hold metoprolol and come in 10:30 AM for nurse visit EKG.     Bobbi Avendano RN

## 2024-05-29 ENCOUNTER — DOCUMENTATION ONLY (OUTPATIENT)
Dept: CARDIOLOGY | Facility: CLINIC | Age: 69
End: 2024-05-29

## 2024-05-29 ENCOUNTER — ALLIED HEALTH/NURSE VISIT (OUTPATIENT)
Dept: CARDIOLOGY | Facility: CLINIC | Age: 69
End: 2024-05-29
Payer: COMMERCIAL

## 2024-05-29 DIAGNOSIS — R00.1 BRADYCARDIA: Primary | ICD-10-CM

## 2024-05-29 PROCEDURE — 93010 ELECTROCARDIOGRAM REPORT: CPT | Performed by: INTERNAL MEDICINE

## 2024-05-29 NOTE — NURSING NOTE
Pt phoned with follow up plan to stop metoprolol, per Dr mauro. See result note, and mychart summary sent to pt    Pt phoned with this information and agreeable to plan. Metoprolol discontinued    Bobbi Avendano RN

## 2024-05-29 NOTE — PROGRESS NOTES
ECG 05/29/24 (done with AM metoprolol held) reviewed, shows normal sinus rhythm with 1st degree AV block, VR 66, .    Given reported symptomatic bradycardia yesterday and acceptable HR control this AM on diltiazem (and amiodarone) only, would remain off metoprolol for the present. If indicated for HR control in the future, it would be reasonable to resume beta blocker at reduced dose.    Plan in brief:  Paroxysmal AF  Symptomatic bradycardia 5/28/24, resolved with holding beta blocker per ECG 5/29/24  -continue diltiazem  -remain off metoprolol (could resume at reduced dose in the future if indicated for HR control)  -continue amiodarone  -continue DOAC  -continue EP follow up for AF as planned    Ismael Hill MD  Cardiology

## 2024-05-30 LAB
ATRIAL RATE - MUSE: 66 BPM
DIASTOLIC BLOOD PRESSURE - MUSE: NORMAL MMHG
INTERPRETATION ECG - MUSE: NORMAL
P AXIS - MUSE: 55 DEGREES
PR INTERVAL - MUSE: 254 MS
QRS DURATION - MUSE: 90 MS
QT - MUSE: 426 MS
QTC - MUSE: 446 MS
R AXIS - MUSE: 24 DEGREES
SYSTOLIC BLOOD PRESSURE - MUSE: NORMAL MMHG
T AXIS - MUSE: 61 DEGREES
VENTRICULAR RATE- MUSE: 66 BPM

## 2024-06-03 RX ORDER — SEMAGLUTIDE 2.68 MG/ML
2 INJECTION, SOLUTION SUBCUTANEOUS WEEKLY
Qty: 9 ML | Refills: 3 | Status: SHIPPED | OUTPATIENT
Start: 2024-06-03

## 2024-06-06 ENCOUNTER — MYC MEDICAL ADVICE (OUTPATIENT)
Dept: CARDIOLOGY | Facility: CLINIC | Age: 69
End: 2024-06-06
Payer: COMMERCIAL

## 2024-06-06 DIAGNOSIS — I48.0 PAROXYSMAL ATRIAL FIBRILLATION (H): ICD-10-CM

## 2024-06-06 DIAGNOSIS — I48.0 PAROXYSMAL ATRIAL FIBRILLATION (H): Primary | ICD-10-CM

## 2024-06-06 DIAGNOSIS — E03.4 HYPOTHYROIDISM DUE TO ACQUIRED ATROPHY OF THYROID: ICD-10-CM

## 2024-06-06 RX ORDER — AMIODARONE HYDROCHLORIDE 200 MG/1
200 TABLET ORAL DAILY
Qty: 90 TABLET | Refills: 1 | Status: SHIPPED | OUTPATIENT
Start: 2024-06-06

## 2024-06-06 RX ORDER — AMIODARONE HYDROCHLORIDE 200 MG/1
200 TABLET ORAL DAILY
Qty: 90 TABLET | Refills: 3 | OUTPATIENT
Start: 2024-06-06

## 2024-06-06 NOTE — TELEPHONE ENCOUNTER
Noted Amiodarone surveillance labs ( AST, ALT, TSH) are up to date and within normal limit.   Refill sent for Amiodarone 200 mg P.O daily Qty 90 tablets R:1.

## 2024-06-06 NOTE — TELEPHONE ENCOUNTER
amiodarone (PACERONE) 200 MG tablet 90 tablet 1 6/6/2024 -- No   Sig - Route: Take 1 tablet (200 mg) by mouth daily - Oral   Sent to pharmacy as: Amiodarone HCl 200 MG Oral Tablet (PACERONE)   Class: E-Prescribe   Order: [id number]   E-Prescribing Status: Receipt confirmed by pharmacy (6/6/2024  9:43 AM CDT)       Pharmacy    [facility] - [place], [place] - [address], [address]

## 2024-06-13 ENCOUNTER — MYC MEDICAL ADVICE (OUTPATIENT)
Dept: ENDOCRINOLOGY | Facility: CLINIC | Age: 69
End: 2024-06-13
Payer: COMMERCIAL

## 2024-06-13 DIAGNOSIS — E03.4 HYPOTHYROIDISM DUE TO ACQUIRED ATROPHY OF THYROID: ICD-10-CM

## 2024-06-14 RX ORDER — LEVOTHYROXINE SODIUM 75 UG/1
75 TABLET ORAL DAILY
Qty: 90 TABLET | Refills: 3 | Status: SHIPPED | OUTPATIENT
Start: 2024-06-14

## 2024-06-14 RX ORDER — LEVOTHYROXINE SODIUM 75 UG/1
75 TABLET ORAL EVERY MORNING
Qty: 90 TABLET | Refills: 1 | Status: SHIPPED | OUTPATIENT
Start: 2024-06-14

## 2024-06-14 NOTE — TELEPHONE ENCOUNTER
levothyroxine (SYNTHROID/LEVOTHROID) 75 MCG tablet    Last Written Prescription Date:  6/23/2023  Last Fill Quantity: 90,   # refills: 3  Last Office Visit : 4/24/24  Future Office visit:  11/14/24  TSH   Date Value Ref Range Status   03/14/2024 2.63 0.30 - 4.20 uIU/mL Final   11/28/2022 2.22 0.40 - 4.00 mU/L Final   01/05/2021 2.38 0.40 - 4.00 mU/L Final        Refilled per protocol.

## 2024-06-17 ENCOUNTER — PATIENT OUTREACH (OUTPATIENT)
Dept: CARE COORDINATION | Facility: CLINIC | Age: 69
End: 2024-06-17
Payer: COMMERCIAL

## 2024-07-15 ENCOUNTER — PATIENT OUTREACH (OUTPATIENT)
Dept: CARE COORDINATION | Facility: CLINIC | Age: 69
End: 2024-07-15
Payer: COMMERCIAL

## 2024-07-22 ENCOUNTER — TELEPHONE (OUTPATIENT)
Dept: GASTROENTEROLOGY | Facility: CLINIC | Age: 69
End: 2024-07-22
Payer: COMMERCIAL

## 2024-07-22 NOTE — TELEPHONE ENCOUNTER
Caller: Sherry Slaughter      Reason for Reschedule/Cancellation   (please be detailed, any staff messages or encounters to note?): Patient request to reschedule due to conflicting appointments same day, NO       Prior to reschedule please review:  Ordering Provider: Marilou Arciniega MD PhD in [facility]  Sedation Determined: MAC  Does patient have any ASC Exclusions, please identify?: YES, GENESIS      Notes on Cancelled Procedure:  Procedure: Lower Endoscopy [Colonoscopy]   Date: 11/14  Location: [facility]; [address], [place] [address]   Surgeon: ARUNA      Rescheduled: Yes,   Procedure: Lower Endoscopy [Colonoscopy]    Date: 9/12   Location: [facility]; [address] , [place] [address]   Surgeon: ERIKA   Sedation Level Scheduled  MAC ,  Reason for Sedation Level PER ORDER   Instructions updated and sent: VIVIANAHART     Does patient need PAC or Pre -Op Rescheduled? : NOT FOR PH       Did you cancel or rescheduled an EUS procedure? No.

## 2024-07-31 ENCOUNTER — OFFICE VISIT (OUTPATIENT)
Dept: OPTOMETRY | Facility: CLINIC | Age: 69
End: 2024-07-31
Payer: COMMERCIAL

## 2024-07-31 DIAGNOSIS — Z01.00 EXAMINATION OF EYES AND VISION: Primary | ICD-10-CM

## 2024-07-31 DIAGNOSIS — E11.9 TYPE 2 DIABETES MELLITUS TREATED WITH INSULIN (H): ICD-10-CM

## 2024-07-31 DIAGNOSIS — H52.223 REGULAR ASTIGMATISM OF BOTH EYES: ICD-10-CM

## 2024-07-31 DIAGNOSIS — H52.4 PRESBYOPIA: ICD-10-CM

## 2024-07-31 DIAGNOSIS — H02.889 MEIBOMIAN GLAND DYSFUNCTION: ICD-10-CM

## 2024-07-31 DIAGNOSIS — Z79.4 TYPE 2 DIABETES MELLITUS TREATED WITH INSULIN (H): ICD-10-CM

## 2024-07-31 DIAGNOSIS — Z96.1 PSEUDOPHAKIA OF BOTH EYES: ICD-10-CM

## 2024-07-31 PROCEDURE — 92015 DETERMINE REFRACTIVE STATE: CPT | Performed by: OPTOMETRIST

## 2024-07-31 PROCEDURE — 92014 COMPRE OPH EXAM EST PT 1/>: CPT | Performed by: OPTOMETRIST

## 2024-07-31 ASSESSMENT — REFRACTION_MANIFEST
OS_AXIS: 008
OD_ADD: +2.50
OS_SPHERE: -0.75
OD_SPHERE: -1.00
OS_CYLINDER: +1.50
OD_CYLINDER: +1.50
OS_ADD: +2.50
OD_AXIS: 010

## 2024-07-31 ASSESSMENT — REFRACTION_WEARINGRX
OD_CYLINDER: +1.50
OS_AXIS: 008
OS_SPHERE: +1.75
SPECS_TYPE: NEAR
OD_AXIS: 010
OS_CYLINDER: +1.00
OD_SPHERE: +1.50

## 2024-07-31 ASSESSMENT — CUP TO DISC RATIO
OS_RATIO: 0.25
OD_RATIO: 0.25

## 2024-07-31 ASSESSMENT — KERATOMETRY
OS_AXISANGLE2_DEGREES: 084
OS_AXISANGLE_DEGREES: 174
OD_K2POWER_DIOPTERS: 45.75
OD_K1POWER_DIOPTERS: 45.25
OS_K2POWER_DIOPTERS: 46.25
OS_K1POWER_DIOPTERS: 45.25
OD_AXISANGLE_DEGREES: 006
OD_AXISANGLE2_DEGREES: 096

## 2024-07-31 ASSESSMENT — CONF VISUAL FIELD
OS_NORMAL: 1
OS_SUPERIOR_NASAL_RESTRICTION: 0
OD_INFERIOR_NASAL_RESTRICTION: 0
OD_INFERIOR_TEMPORAL_RESTRICTION: 0
OD_SUPERIOR_NASAL_RESTRICTION: 0
OS_SUPERIOR_TEMPORAL_RESTRICTION: 0
OS_INFERIOR_TEMPORAL_RESTRICTION: 0
OD_SUPERIOR_TEMPORAL_RESTRICTION: 0
OD_NORMAL: 1
OS_INFERIOR_NASAL_RESTRICTION: 0

## 2024-07-31 ASSESSMENT — EXTERNAL EXAM - LEFT EYE: OS_EXAM: ROSACEA

## 2024-07-31 ASSESSMENT — TONOMETRY
OD_IOP_MMHG: 14
IOP_METHOD: TONOPEN
OS_IOP_MMHG: 15

## 2024-07-31 ASSESSMENT — VISUAL ACUITY
OD_SC: 20/50
OD_SC: 20/30
OS_SC+: -2
OS_SC: 20/50
METHOD: SNELLEN - LINEAR
OS_SC: 20/40
OD_SC+: -2

## 2024-07-31 ASSESSMENT — EXTERNAL EXAM - RIGHT EYE: OD_EXAM: ROSACEA

## 2024-07-31 NOTE — PATIENT INSTRUCTIONS
There are not any signs of the diabetes affecting the eyes today.  It is important that you get your eyes dilated once yearly and keep good control of your diabetes.    Heat to the eyes daily for 10-15 minutes nightly with warm washcloth or reusable gel masks from the pharmacy or  Leslye heat masks can be purchased at [employer].    OPTASE comfort dry eye spray.  Hold the spray tip approximately 4-6 inches away and with eyes closed spray directly onto the surface of the eyelid.  Gently massage into the eyelid extending to the lashes.  You can also spray on the the inner side of the finger and gently massage onto the eyelid and lashes.  This can be used 3-4 x daily.  This can be purchased on [employer].     Eyeglass prescription given.    Return in 1 year for a complete eye exam or sooner if needed.    Kameron Pedraza, KULDEEP    The affects of the dilating drops last for 4- 6 hours.  You will be more sensitive to light and vision will be blurry up close.  Do not drive if you do not feel comfortable.  Mydriatic sunglasses were given if needed.    Patient Education   Diabetes weakens the blood vessels all over the body, including the eyes. Damage to the blood vessels in the eyes can cause swelling or bleeding into part of the eye (called the retina). This is called diabetic retinopathy (YOSELIN-tin-AH-puh-thee). If not treated, this disease can cause vision loss or blindness.   Symptoms may include blurred or distorted vision, but many people have no symptoms. It's important to see your eye doctor regularly to check for problems.   Early treatment and good control can help protect your vision. Here are the things you can do to help prevent vision loss:      1. Keep your blood sugar levels under tight control.      2. Bring high blood pressure under control.      3. No smoking.      4. Have yearly dilated eye exams.       Optometry Providers       Clinic Locations                                 Telephone Number   Dr. Sanam Knight Dr.  J Carlos Giron    [place]   [facility]  [place] [phone]     [place] Hours:                [place] [facility] Hours:       [place] [facility] Hours:   [address]   [address]     [address] [place] [address]   [place] [address]    [place] [address]  Phone: [phone]                    Phone: [phone]     Phone: [phone]                      Monday 8:00-6:00                          Monday 8:00-6:00                          Monday 8:00-6:00              Tuesday 8:00-6:00                          Tuesday 8:00-6:00                          Tuesday 8:00-6:00              Wednesday 8:00-6:00                  Wednesday 8:00-6:00                   Wednesday 8:00-6:00      Thursday 8:00-6:00                        Thursday 8:00-6:00                         Thursday 8:00-6:00            Friday 8:00-5:00                              Friday 8:00-5:00                              Friday 8:00-5:00    [facility] Hours:   [address] [place] [address]  [phone]    Monday 9:00-6:00  Tuesday 9:00-6:00  Wednesday 9:00-6:00  Thursday 9:00-6:00  Friday 9:00-5:00  As always, Thank you for trusting us with your health care needs!

## 2024-07-31 NOTE — LETTER
7/31/2024      Sherry Slaughter  [address]  [place] [address]      Dear Colleague,    Thank you for referring your patient, Sherry Slaughter, to the [facility]. Please see a copy of my visit note below.    Chief Complaint   Patient presents with     Diabetic Eye Exam        Chief Complaint(s) and History of Present Illness(es)       Diabetic Eye Exam              Vision: is stable    Diabetes Type: Type 2, on insulin and taking oral medications    Duration: 20 years    Blood Sugars: is controlled                   Lab Results   Component Value Date    A1C 6.3 05/09/2024    A1C 6.1 12/06/2023    A1C 7.0 11/28/2022    A1C 7.1 05/25/2022    A1C 8.1 11/24/2021    A1C 9.0 08/16/2021    A1C 6.8 01/05/2021    A1C 6.9 11/25/2020    A1C 7.5 07/22/2020    A1C 7.3 12/10/2019            Last Eye Exam: 7-  Dilated Previously: Yes    What are you currently using to see? rx readers and otc readers    Distance Vision Acuity: Noticed gradual change in both eyes    Near Vision Acuity: Satisfied with vision while reading  with readers    Eye Comfort: good  Do you use eye drops? : No- no issues with dry eyes- has been recommended in the past to do heat, wipes and drops.  Does not like putting drops into her eyes.  Occupation or Hobbies: retired     History of cataract surgery with Dr. Patrick Linares  Date of surgery 4-5-2018 - right eye                             3- - left eye    Does not want to do bifocal- distance vision still seems fine.  Uses cheaters all over the house and has prescription readers to correct astigmatism from a few years ago.    Mona Herbert Optometric Assistant, A.B.O.C.     Medical, surgical and family histories reviewed and updated 7/31/2024.       OBJECTIVE: See Ophthalmology exam    ASSESSMENT:    ICD-10-CM    1. Examination of eyes and vision  Z01.00       2. Type 2 diabetes mellitus treated with insulin (H)  E11.9     Z79.4     Negative  diabetic retinopathy both eyes      3. Meibomian gland dysfunction  H02.889       4. Pseudophakia of both eyes  Z96.1       5. Presbyopia  H52.4       6. Regular astigmatism of both eyes  H52.223           PLAN:    Sherry Slaughter aware  eye exam results will be sent to Marilou Arciniega.  Patient Instructions   There are not any signs of the diabetes affecting the eyes today.  It is important that you get your eyes dilated once yearly and keep good control of your diabetes.    Heat to the eyes daily for 10-15 minutes nightly with warm washcloth or reusable gel masks from the pharmacy or  [employer] heat masks can be purchased at [employer].    OPTASE comfort dry eye spray.  Hold the spray tip approximately 4-6 inches away and with eyes closed spray directly onto the surface of the eyelid.  Gently massage into the eyelid extending to the lashes.  You can also spray on the the inner side of the finger and gently massage onto the eyelid and lashes.  This can be used 3-4 x daily.  This can be purchased on [employer].     Eyeglass prescription given.    Return in 1 year for a complete eye exam or sooner if needed.    Kameron Pedraza, KULDEEP             Again, thank you for allowing me to participate in the care of your patient.        Sincerely,        Kameron Pedraza, OD

## 2024-07-31 NOTE — PROGRESS NOTES
Chief Complaint   Patient presents with    Diabetic Eye Exam        Chief Complaint(s) and History of Present Illness(es)       Diabetic Eye Exam              Vision: is stable    Diabetes Type: Type 2, on insulin and taking oral medications    Duration: 20 years    Blood Sugars: is controlled                   Lab Results   Component Value Date    A1C 6.3 05/09/2024    A1C 6.1 12/06/2023    A1C 7.0 11/28/2022    A1C 7.1 05/25/2022    A1C 8.1 11/24/2021    A1C 9.0 08/16/2021    A1C 6.8 01/05/2021    A1C 6.9 11/25/2020    A1C 7.5 07/22/2020    A1C 7.3 12/10/2019            Last Eye Exam: 7-  Dilated Previously: Yes    What are you currently using to see? rx readers and otc readers    Distance Vision Acuity: Noticed gradual change in both eyes    Near Vision Acuity: Satisfied with vision while reading  with readers    Eye Comfort: good  Do you use eye drops? : No- no issues with dry eyes- has been recommended in the past to do heat, wipes and drops.  Does not like putting drops into her eyes.  Occupation or Hobbies: retired     History of cataract surgery with Dr. Patrick Linares  Date of surgery 4-5-2018 - right eye                             3- - left eye    Does not want to do bifocal- distance vision still seems fine.  Uses cheaters all over the house and has prescription readers to correct astigmatism from a few years ago.    Mona Herbert Optometric Assistant, A.B.O.C.     Medical, surgical and family histories reviewed and updated 7/31/2024.       OBJECTIVE: See Ophthalmology exam    ASSESSMENT:    ICD-10-CM    1. Examination of eyes and vision  Z01.00       2. Type 2 diabetes mellitus treated with insulin (H)  E11.9     Z79.4     Negative diabetic retinopathy both eyes      3. Meibomian gland dysfunction  H02.889       4. Pseudophakia of both eyes  Z96.1       5. Presbyopia  H52.4       6. Regular astigmatism of both eyes  H52.223           PLAN:    Sherry Slaughter aware  eye exam results  will be sent to Marilou Arciniega.  Patient Instructions   There are not any signs of the diabetes affecting the eyes today.  It is important that you get your eyes dilated once yearly and keep good control of your diabetes.    Heat to the eyes daily for 10-15 minutes nightly with warm washcloth or reusable gel masks from the pharmacy or  Leslye heat masks can be purchased at [employer].    OPTASE comfort dry eye spray.  Hold the spray tip approximately 4-6 inches away and with eyes closed spray directly onto the surface of the eyelid.  Gently massage into the eyelid extending to the lashes.  You can also spray on the the inner side of the finger and gently massage onto the eyelid and lashes.  This can be used 3-4 x daily.  This can be purchased on [employer].     Eyeglass prescription given.    Return in 1 year for a complete eye exam or sooner if needed.    Kameron Pedraza, KULDEEP

## 2024-08-15 ENCOUNTER — ANCILLARY PROCEDURE (OUTPATIENT)
Dept: MAMMOGRAPHY | Facility: CLINIC | Age: 69
End: 2024-08-15
Attending: INTERNAL MEDICINE
Payer: COMMERCIAL

## 2024-08-15 DIAGNOSIS — Z12.31 VISIT FOR SCREENING MAMMOGRAM: ICD-10-CM

## 2024-08-15 PROCEDURE — 77063 BREAST TOMOSYNTHESIS BI: CPT | Mod: GC | Performed by: STUDENT IN AN ORGANIZED HEALTH CARE EDUCATION/TRAINING PROGRAM

## 2024-08-15 PROCEDURE — 77067 SCR MAMMO BI INCL CAD: CPT | Mod: GC | Performed by: STUDENT IN AN ORGANIZED HEALTH CARE EDUCATION/TRAINING PROGRAM

## 2024-08-29 RX ORDER — BISACODYL 5 MG/1
TABLET, DELAYED RELEASE ORAL
Qty: 4 TABLET | Refills: 0 | Status: SHIPPED | OUTPATIENT
Start: 2024-08-29

## 2024-08-29 NOTE — TELEPHONE ENCOUNTER
"Received forwarded [employer] message requesting to send colonoscopy prep instructions:  \"----- Message -----   From: Amanuel Keenan RN   Sent: 8/29/2024   9:14 AM CDT   To: Endoscopy Scheduling Pool   Subject: needs prep information                           Please reach out to patient and send her the determined bowel prep information to her [facility].\"    Our team does not pre assess or call for [facility]. Prep instructions and scripts have been sent but forwarded message to [facility] nursing pool to discuss bowel prep with patient.    Bowel prep recommendation: Extended Golytely. Bowel prep prescription sent to  [place], [place] - [address], [address]   Due to: BMI > 40. , CKD noted. , diabetes. , and GLP-1 agonist medication noted in chart.     Prep instructions sent via [employer]       Bobbi Jeter RN  Endoscopy Procedure Pre Assessment RN  [phone] option 4  "

## 2024-09-04 ENCOUNTER — TELEPHONE (OUTPATIENT)
Dept: ENDOCRINOLOGY | Facility: CLINIC | Age: 69
End: 2024-09-04
Payer: COMMERCIAL

## 2024-09-04 NOTE — TELEPHONE ENCOUNTER
Prior Authorization Retail Medication Request    Medication/Dose: Dexcom G7  Diagnosis and ICD code (if different than what is on RX):    New/renewal/insurance change PA/secondary ins. PA:  Previously Tried and Failed:    Rationale:      Insurance   Primary: [id number]  Insurance ID:  [employer] part D    Secondary (if applicable):  Insurance ID:      Pharmacy Information (if different than what is on RX)  Name:    Phone:    Fax:

## 2024-09-05 NOTE — TELEPHONE ENCOUNTER
Central Prior Authorization Team  Phone: [phone]    PA Initiation    Medication: DEXCOM G7 SENSOR MISC  Insurance Company: [employer] - Phone [phone] Fax [phone]  Pharmacy Filling the Rx: [facility] [place], [place] - [address] [place], [address]  Filling Pharmacy Phone: [phone]  Filling Pharmacy Fax:    Start Date: 9/5/2024

## 2024-09-09 NOTE — TELEPHONE ENCOUNTER
Prior Authorization Approval    Medication: DEXCOM G7 SENSOR MISC  Authorization Effective Date: 8/8/2024  Authorization Expiration Date: 8/8/2027  Approved Dose/Quantity:   Reference #:     Insurance Company: [employer] - Phone [phone] Fax [phone]  Expected CoPay: $    CoPay Card Available:      Financial Assistance Needed:   Which Pharmacy is filling the prescription: [facility] [place], [place] - [address], [address]  Pharmacy Notified: Yes  Patient Notified: **Instructed pharmacy to notify patient when script is ready to /ship.**

## 2024-09-11 ENCOUNTER — ANESTHESIA EVENT (OUTPATIENT)
Dept: GASTROENTEROLOGY | Facility: CLINIC | Age: 69
End: 2024-09-11
Payer: COMMERCIAL

## 2024-09-11 RX ORDER — NALOXONE HYDROCHLORIDE 0.4 MG/ML
0.2 INJECTION, SOLUTION INTRAMUSCULAR; INTRAVENOUS; SUBCUTANEOUS
Status: CANCELLED | OUTPATIENT
Start: 2024-09-11

## 2024-09-11 RX ORDER — ONDANSETRON 2 MG/ML
4 INJECTION INTRAMUSCULAR; INTRAVENOUS EVERY 6 HOURS PRN
Status: CANCELLED | OUTPATIENT
Start: 2024-09-11

## 2024-09-11 RX ORDER — PROCHLORPERAZINE MALEATE 5 MG
5 TABLET ORAL EVERY 6 HOURS PRN
Status: CANCELLED | OUTPATIENT
Start: 2024-09-11

## 2024-09-11 RX ORDER — ONDANSETRON 4 MG/1
4 TABLET, ORALLY DISINTEGRATING ORAL EVERY 6 HOURS PRN
Status: CANCELLED | OUTPATIENT
Start: 2024-09-11

## 2024-09-11 RX ORDER — FLUMAZENIL 0.1 MG/ML
0.2 INJECTION, SOLUTION INTRAVENOUS
Status: CANCELLED | OUTPATIENT
Start: 2024-09-11 | End: 2024-09-12

## 2024-09-11 RX ORDER — NALOXONE HYDROCHLORIDE 0.4 MG/ML
0.4 INJECTION, SOLUTION INTRAMUSCULAR; INTRAVENOUS; SUBCUTANEOUS
Status: CANCELLED | OUTPATIENT
Start: 2024-09-11

## 2024-09-11 ASSESSMENT — ENCOUNTER SYMPTOMS: DYSRHYTHMIAS: 1

## 2024-09-11 ASSESSMENT — LIFESTYLE VARIABLES: TOBACCO_USE: 0

## 2024-09-12 ENCOUNTER — PATIENT OUTREACH (OUTPATIENT)
Dept: GASTROENTEROLOGY | Facility: CLINIC | Age: 69
End: 2024-09-12
Payer: COMMERCIAL

## 2024-09-12 ENCOUNTER — ANESTHESIA (OUTPATIENT)
Dept: GASTROENTEROLOGY | Facility: CLINIC | Age: 69
End: 2024-09-12
Payer: COMMERCIAL

## 2024-09-12 ENCOUNTER — HOSPITAL ENCOUNTER (OUTPATIENT)
Facility: CLINIC | Age: 69
Discharge: HOME | End: 2024-09-12
Attending: SURGERY | Admitting: SURGERY
Payer: COMMERCIAL

## 2024-09-12 VITALS
DIASTOLIC BLOOD PRESSURE: 53 MMHG | TEMPERATURE: 98 F | OXYGEN SATURATION: 96 % | HEART RATE: 65 BPM | RESPIRATION RATE: 16 BRPM | SYSTOLIC BLOOD PRESSURE: 100 MMHG

## 2024-09-12 LAB — COLONOSCOPY: NORMAL

## 2024-09-12 PROCEDURE — 45378 DIAGNOSTIC COLONOSCOPY: CPT | Performed by: SURGERY

## 2024-09-12 PROCEDURE — 258N000003 HC RX IP 258 OP 636: Performed by: NURSE ANESTHETIST, CERTIFIED REGISTERED

## 2024-09-12 PROCEDURE — 370N000017 HC ANESTHESIA TECHNICAL FEE, PER MIN: Performed by: SURGERY

## 2024-09-12 PROCEDURE — 250N000011 HC RX IP 250 OP 636: Performed by: NURSE ANESTHETIST, CERTIFIED REGISTERED

## 2024-09-12 PROCEDURE — G0105 COLORECTAL SCRN; HI RISK IND: HCPCS | Performed by: SURGERY

## 2024-09-12 PROCEDURE — 250N000009 HC RX 250: Performed by: NURSE ANESTHETIST, CERTIFIED REGISTERED

## 2024-09-12 RX ORDER — LIDOCAINE HYDROCHLORIDE 20 MG/ML
INJECTION, SOLUTION INFILTRATION; PERINEURAL PRN
Status: DISCONTINUED | OUTPATIENT
Start: 2024-09-12 | End: 2024-09-12

## 2024-09-12 RX ORDER — DEXAMETHASONE SODIUM PHOSPHATE 10 MG/ML
4 INJECTION, SOLUTION INTRAMUSCULAR; INTRAVENOUS
Status: CANCELLED | OUTPATIENT
Start: 2024-09-12

## 2024-09-12 RX ORDER — PROPOFOL 10 MG/ML
INJECTION, EMULSION INTRAVENOUS CONTINUOUS PRN
Status: DISCONTINUED | OUTPATIENT
Start: 2024-09-12 | End: 2024-09-12

## 2024-09-12 RX ORDER — ONDANSETRON 4 MG/1
4 TABLET, ORALLY DISINTEGRATING ORAL EVERY 30 MIN PRN
Status: CANCELLED | OUTPATIENT
Start: 2024-09-12

## 2024-09-12 RX ORDER — NALOXONE HYDROCHLORIDE 0.4 MG/ML
0.1 INJECTION, SOLUTION INTRAMUSCULAR; INTRAVENOUS; SUBCUTANEOUS
Status: CANCELLED | OUTPATIENT
Start: 2024-09-12

## 2024-09-12 RX ORDER — LIDOCAINE 40 MG/G
CREAM TOPICAL
Status: DISCONTINUED | OUTPATIENT
Start: 2024-09-12 | End: 2024-09-12 | Stop reason: HOSPADM

## 2024-09-12 RX ORDER — FENTANYL CITRATE 50 UG/ML
25 INJECTION, SOLUTION INTRAMUSCULAR; INTRAVENOUS
Status: CANCELLED | OUTPATIENT
Start: 2024-09-12

## 2024-09-12 RX ORDER — PROPOFOL 10 MG/ML
INJECTION, EMULSION INTRAVENOUS PRN
Status: DISCONTINUED | OUTPATIENT
Start: 2024-09-12 | End: 2024-09-12

## 2024-09-12 RX ORDER — ONDANSETRON 2 MG/ML
4 INJECTION INTRAMUSCULAR; INTRAVENOUS EVERY 30 MIN PRN
Status: CANCELLED | OUTPATIENT
Start: 2024-09-12

## 2024-09-12 RX ORDER — SODIUM CHLORIDE, SODIUM LACTATE, POTASSIUM CHLORIDE, CALCIUM CHLORIDE 600; 310; 30; 20 MG/100ML; MG/100ML; MG/100ML; MG/100ML
INJECTION, SOLUTION INTRAVENOUS CONTINUOUS
Status: DISCONTINUED | OUTPATIENT
Start: 2024-09-12 | End: 2024-09-12 | Stop reason: HOSPADM

## 2024-09-12 RX ORDER — ONDANSETRON 2 MG/ML
4 INJECTION INTRAMUSCULAR; INTRAVENOUS
Status: DISCONTINUED | OUTPATIENT
Start: 2024-09-12 | End: 2024-09-12 | Stop reason: HOSPADM

## 2024-09-12 RX ADMIN — SODIUM CHLORIDE, POTASSIUM CHLORIDE, SODIUM LACTATE AND CALCIUM CHLORIDE: 600; 310; 30; 20 INJECTION, SOLUTION INTRAVENOUS at 09:50

## 2024-09-12 RX ADMIN — PROPOFOL 100 MG: 10 INJECTION, EMULSION INTRAVENOUS at 09:56

## 2024-09-12 RX ADMIN — LIDOCAINE HYDROCHLORIDE 50 MG: 20 INJECTION, SOLUTION INFILTRATION; PERINEURAL at 09:56

## 2024-09-12 RX ADMIN — PROPOFOL 200 MCG/KG/MIN: 10 INJECTION, EMULSION INTRAVENOUS at 09:56

## 2024-09-12 ASSESSMENT — ACTIVITIES OF DAILY LIVING (ADL): ADLS_ACUITY_SCORE: 35

## 2024-09-12 NOTE — DISCHARGE INSTRUCTIONS
[facility]    Home Care Following Endoscopy          Activity:  You have just undergone an endoscopic procedure usually performed with conscious sedation.  Do not work or operate machinery (including a car) for at least 12 hours.    I encourage you to walk and attempt to pass this air as soon as possible.    Diet:  Return to the diet you were on before your procedure but eat lightly for the first 12-24 hours.  Drink plenty of water.  Resume any regular medications unless otherwise advised by your physician.  Please begin any new medication prescribed as a result of your procedure as directed by your physician.   Pain:  You may take Tylenol as needed for pain.  Expected Recovery:  You can expect some mild abdominal fullness and/or discomfort due to the air used to inflate your intestinal tract.     Call Your Physician if You Have:    After Colonoscopy:  Worsening persisting abdominal pain which is worse with activity.  Fevers (>101 degrees F), chills or shakes.  Passage of continued blood with bowel movements.     Any questions or concerns about your recovery, please call [phone] or after hours [phone] ([phone]) Nurse Advice Line.

## 2024-09-12 NOTE — ANESTHESIA POSTPROCEDURE EVALUATION
Patient: Sherry Slaughter    Procedure: Procedure(s):  Colonoscopy       Anesthesia Type:  MAC    Note:  Disposition: Outpatient   Postop Pain Control: Uneventful            Sign Out: Well controlled pain   PONV: No   Neuro/Psych: Uneventful            Sign Out: Acceptable/Baseline neuro status   Airway/Respiratory: Uneventful            Sign Out: Acceptable/Baseline resp. status   CV/Hemodynamics: Uneventful            Sign Out: Acceptable CV status   Other NRE: NONE   DID A NON-ROUTINE EVENT OCCUR? No    Event details/Postop Comments:  Pt was happy with anesthesia care.  No complications.  I will follow up with the pt if needed.           Last vitals:  Vitals:    09/12/24 0934 09/12/24 1020 09/12/24 1030   BP: (!) 142/72 100/53 100/53   Pulse: 75 62 65   Resp: 18 16    Temp: 97.2  F (36.2  C) 98  F (36.7  C)    SpO2: 98% 99% 96%       Electronically Signed By: JIM Velarde CRNA  September 12, 2024  10:41 AM

## 2024-09-12 NOTE — H&P
Patient seen for Endoscopy    HPI:  Patient is a 69 year old female here for endoscopy. Not taking blood thinning medications. No MI or CVA history. No issues with previous sedation. No recent acute illness.    Review Of Systems    Skin: negative  Ears/Nose/Throat: negative  Respiratory: No shortness of breath, dyspnea on exertion, cough, or hemoptysis  Cardiovascular: negative  Gastrointestinal: negative  Genitourinary: negative  Musculoskeletal: negative  Neurologic: negative  Hematologic/Lymphatic/Immunologic: negative  Endocrine: negative      Past Medical History:   Diagnosis Date    Cataract     Congestive heart failure (H) 2019 NOVEMBER    AFIB    DEPRESSION     comes and goes - tried meds - unsuccessfully, certain times of the year, no psych intervention and no counselors in the past - and not interested     Diabetic retinopathy associated with diabetes mellitus due to underlying condition (H)     Diverticulosis of colon (without mention of hemorrhage) 04/2004    colonoscopy    ECHO-mildLVH,tr MR,mild thick lflets w inc LA,trTR   12/2003    Essential hypertension, benign 1990s    late 1990s - started medications at that time - not to difficult to control meds     Fam hx-cardiovas dis NEC     father - CAD, and lipids/HTN - multiple members of the family     Family history of diabetes mellitus     sister and grandmother with DM     Family history of malignant neoplasm of breast     mother - young age - 45, and maternal cousin and aunt as well - no BRCA testing done     Family history of stroke (cerebrovascular)     grandmother in early life in her 40s     FAMILY HX COLON CANCER     Pat uncles x 2    Heart disease     murmur/    Heart murmur     HYPERLIPIDEMIA 2000    fairly recent - in the last 5 years - high for DM levels  -cholesterol recent     Irritable bowel syndrome     goes between the 2 - nerve related - more stressed more problems     MICROALBUMINURIA     unsure how long - been on the lisinopril - for  a few years at well     Nonspecific abnormal results of liver function study 02/03/2003    SGOT - has been high in the past - since the hepatitis b - borderline elevation - not really changing any     OBESITY     Obstructive sleep apnea     GENESIS (obstructive sleep apnea) 10/15/2006    psg 5/15 AHI 53 aPAP 8-15    OSTEOARTHRITIS L KNEE 2003    total knee on the left - and pain is gone since the knee replacement     PERS HX HEPATITIS B- RESOLVED] 1977    acute virus only - no chronic disease     PERS HX HEPATITIS B- RESOLVED]     Type II or unspecified type diabetes mellitus without mention of complication, not stated as uncontrolled 1991    oral meds frist, then insulin eventually later, difficult to control most of the time     Unspecified hypothyroidism 10/11/2006    TSH 3.36 - mild subclinical hypothyroidism - deciding on following or starting low dose meds - with dr Oreilly - following        Past Surgical History:   Procedure Laterality Date    ABDOMEN SURGERY      ovaian scope/ appendix removal    ANESTHESIA CARDIOVERSION N/A 12/4/2020    Procedure: ANESTHESIA, FOR CARDIOVERSION @1400;  Surgeon: GENERIC ANESTHESIA PROVIDER;  Location: [facility]    ARTHROPLASTY KNEE Right 9/23/2015    Procedure: ARTHROPLASTY KNEE;  Surgeon: Rufus Brown MD;  Location: [facility]    BUNIONECTOMY REN AND LEANDRO, COMBINED Left 2/2/2021    Procedure: Left bunion correction with bone cutting and screw fixation;  Surgeon: David Hoffman DPM;  Location: [facility] OR    CATARACT IOL, RT/LT Left     COLONOSCOPY  4/04    diverticulosis, rec repeat 10 yrs(consider fam hx?)    COLONOSCOPY WITH CO2 INSUFFLATION N/A 6/19/2019    Procedure: COLONOSCOPY, WITH CO2 INSUFFLATION;  Surgeon: Zeb Duarte MD;  Location: [facility] OR    EXCISE MASS FOOT Right 12/27/2023    Procedure: Medial and Lateral Sesamoidectomy Right;  Surgeon: Jeevan Gray MD;  Location: [facility]    ORTHOPEDIC SURGERY      right ankle    PHACOEMULSIFICATION CLEAR CORNEA WITH  STANDARD INTRAOCULAR LENS IMPLANT Left 3/15/2018    Procedure: PHACOEMULSIFICATION CLEAR CORNEA WITH STANDARD INTRAOCULAR LENS IMPLANT;  LEFT EYE PHACOEMULSIFICATION CLEAR CORNEA WITH STANDARD INTRAOCULAR LENS IMPLANT;  Surgeon: Bandar Linares MD;  Location: [facility]    PHACOEMULSIFICATION CLEAR CORNEA WITH STANDARD INTRAOCULAR LENS IMPLANT Right 4/5/2018    Procedure: PHACOEMULSIFICATION CLEAR CORNEA WITH STANDARD INTRAOCULAR LENS IMPLANT;  RIGHT PHACOEMULSIFICATION CLEAR CORNEA WITH STANDARD INTRAOCULAR LENS IMPLANT ;  Surgeon: Bandar Linares MD;  Location:  [facility]    STRESS ECHO (METRO)  12/03    no ischemia, limited by fatigue    SURGICAL HISTORY OF -       EXP LAP- R OVARY CYSTS    SURGICAL HISTORY OF -   1981,1984,1985    CHILDBIRTH    ZZC NONSPECIFIC PROCEDURE  6/06    right triple arthrodecesis     ZZC NONSPECIFIC PROCEDURE  7/06     right bunion surgery     ZZC TOTAL KNEE ARTHROPLASTY  3/03    L knee       Family History   Problem Relation Age of Onset    Diabetes Maternal Grandmother         DM TYPE 2    Cerebrovascular Disease Maternal Grandmother     Hypertension Sister     Lipids Sister     Heart Disease Sister         Chronic AFib    Diabetes Sister     Coronary Artery Disease Sister         afib    Hypertension Sister     Lipids Sister     Gynecology Sister     Diabetes Sister     Asthma Sister     Blood Disease Paternal Grandmother         T CELL LEUKEMIA    Hypertension Brother     Lipids Brother     Congenital Anomalies Brother     Cardiovascular Brother     Heart Disease Brother         CABG/AFIB    Coronary Artery Disease Brother         cabg afib AAA    Hypertension Brother     Lipids Brother     Other Cancer Brother         multple myeloma    Obesity Brother     Hypertension Brother     Respiratory Brother         Sleep apnea    Heart Disease Brother         AFib    Coronary Artery Disease Brother         afib    Alzheimer Disease Mother     Asthma Mother     Hypertension Mother      Breast Cancer Mother 40        has mastectomy and lived to 84    Allergies Mother         Sulfa,    Arthritis Mother     Cardiovascular Mother     Depression Mother     Respiratory Mother     Lipids Mother     Cancer Mother         breast    Thyroid Disease Mother     Cerebrovascular Disease Mother         FROM  AFIB    Dementia Mother         Alzheimers    Other Cancer Mother         breast    Cancer - colorectal Other     Colon Cancer Other         2019    Cancer Father         lung    Aneurysm Father         brother, AAA    Other Cancer Father         lung ca    Coronary Artery Disease Father         cad AAA    Asthma Sister     Cancer - colorectal Paternal Uncle         late 50s to early 60s - great uncles     Breast Cancer Other         Maternal cousin    Arrhythmia Sister         2 brothers 1 sister Afib    Breast Cancer Maternal Aunt 62    Other Cancer Maternal Aunt 35        Ovarian cancer.  Survived despite late recurrence and is now 92.    Glaucoma No family hx of     Macular Degeneration No family hx of        Social History     Socioeconomic History    Marital status:      Spouse name: Not on file    Number of children: 3    Years of education: Not on file    Highest education level: Not on file   Occupational History    Occupation: RN     Employer: [employer]   Tobacco Use    Smoking status: Former     Types: Cigarettes     Passive exposure: Past    Smokeless tobacco: Never    Tobacco comments:     tried in early 20s only    Vaping Use    Vaping status: Never Used   Substance and Sexual Activity    Alcohol use: Yes     Comment: 1 drink 1-2 year    Drug use: No    Sexual activity: Not Currently     Birth control/protection: Post-menopausal     Comment:  - since for 20 years, no signficant other  > 5 years sexual activity    Other Topics Concern     Service No    Blood Transfusions No    Caffeine Concern No    Occupational Exposure Yes     Comment: Normal  nursing exposures    Hobby Hazards No    Sleep Concern Yes    Stress Concern No     Comment: Stress level at HM=5, stress level at WK=6    Weight Concern Yes    Special Diet Yes     Comment: Diabetic diet (watching carbs)    Back Care No     Comment: Occassional back spasms    Exercise Yes     Comment: Pt joined a health club goes approx 3-4 times a week,half to one mile daily    Bike Helmet No    Seat Belt Yes     Comment: Sometimes    Self-Exams Yes    Parent/sibling w/ CABG, MI or angioplasty before 65F 55M? 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      Social Determinants of Health     Financial Resource Strain: Low Risk  (10/31/2023)    Financial Resource Strain     Within the past 12 months, have you or your family members you live with been unable to get utilities (heat, electricity) when it was really needed?: No   Food Insecurity: Low Risk  (10/31/2023)    Food Insecurity     Within the past 12 months, did you worry that your food would run out before you got money to buy more?: No     Within the past 12 months, did the food you bought just not last and you didn t have money to get more?: No   Transportation Needs: Low Risk  (10/31/2023)    Transportation Needs     Within the past 12 months, has lack of transportation kept you from medical appointments, getting your medicines, non-medical meetings or appointments, work, or from getting things that you need?: No   Physical Activity: Not on file   Stress: Not on file   Social Connections: Not on file   Interpersonal Safety: Low Risk  (11/3/2023)    Interpersonal Safety     Do you feel physically and emotionally safe where you currently live?: Yes     Within the past 12 months, have you been hit, slapped, kicked or otherwise physically hurt by someone?: No     Within the past 12 months, have you been humiliated or emotionally abused in other ways by your partner or ex-partner?: No   Housing  Stability: Low Risk  (10/31/2023)    Housing Stability     Do you have housing? : Yes     Are you worried about losing your housing?: No       No current outpatient medications on file.       Medications and history reviewed    Physical exam:  Vitals: LMP 10/02/2007   BMI= There is no height or weight on file to calculate BMI.    Constitutional: Healthy, alert, non-distressed   Head: Normo-cephalic, atraumatic, no lesions, masses or tenderness   Cardiovascular: RRR, no new murmurs, +S1, +S2 heart sounds, no clicks, rubs or gallops   Respiratory: CTAB, no rales, rhonchi or wheezing, equal chest rise, good respiratory effort   Gastrointestinal: Soft, non-tender, non distended, no rebound rigidity or guarding, no masses or hernias palpated   : Deferred  Musculoskeletal: Moves all extremities, normal  strength, no deformities noted   Skin: No suspicious lesions or rashes   Psychiatric: Mentation appears normal, affect appropriate   Hematologic/Lymphatic/Immunologic: Normal cervical and supraclavicular lymph nodes   Patient able to get up on table without difficulty.    Labs show:  No results found. However, due to the size of the patient record, not all encounters were searched. Please check Results Review for a complete set of results.    Assessment: Endoscopy  Plan: Pt cleared for anesthesia for proposed procedure.    Roger Das DO

## 2024-09-12 NOTE — ANESTHESIA PREPROCEDURE EVALUATION
Anesthesia Pre-Procedure Evaluation    Patient: Sherry Slaughter   MRN: [id number] : 1955        Procedure : Procedure(s):  Colonoscopy          Past Medical History:   Diagnosis Date    Cataract     Congestive heart failure (H) 2019    AFIB    DEPRESSION     comes and goes - tried meds - unsuccessfully, certain times of the year, no psych intervention and no counselors in the past - and not interested     Diabetic retinopathy associated with diabetes mellitus due to underlying condition (H)     Diverticulosis of colon (without mention of hemorrhage) 2004    colonoscopy    ECHO-mildLVH,tr MR,mild thick lflets w inc LA,trTR   2003    Essential hypertension, benign     late  - started medications at that time - not to difficult to control meds     Fam hx-cardiovas dis NEC     father - CAD, and lipids/HTN - multiple members of the family     Family history of diabetes mellitus     sister and grandmother with DM     Family history of malignant neoplasm of breast     mother - young age - 45, and maternal cousin and aunt as well - no BRCA testing done     Family history of stroke (cerebrovascular)     grandmother in early life in her 40s     FAMILY HX COLON CANCER     Pat uncles x 2    Heart disease     murmur/    Heart murmur     HYPERLIPIDEMIA     fairly recent - in the last 5 years - high for DM levels  -cholesterol recent     Irritable bowel syndrome     goes between the 2 - nerve related - more stressed more problems     MICROALBUMINURIA     unsure how long - been on the lisinopril - for a few years at well     Nonspecific abnormal results of liver function study 2003    SGOT - has been high in the past - since the hepatitis b - borderline elevation - not really changing any     OBESITY     Obstructive sleep apnea     GENESIS (obstructive sleep apnea) 10/15/2006    psg 5/15 AHI 53 aPAP 8-15    OSTEOARTHRITIS L KNEE     total knee on the left - and pain is gone since  the knee replacement     PERS HX HEPATITIS B- RESOLVED] 1977    acute virus only - no chronic disease     PERS HX HEPATITIS B- RESOLVED]     Type II or unspecified type diabetes mellitus without mention of complication, not stated as uncontrolled 1991    oral meds frist, then insulin eventually later, difficult to control most of the time     Unspecified hypothyroidism 10/11/2006    TSH 3.36 - mild subclinical hypothyroidism - deciding on following or starting low dose meds - with dr Oreilly - following       Past Surgical History:   Procedure Laterality Date    ABDOMEN SURGERY      ovaian scope/ appendix removal    ANESTHESIA CARDIOVERSION N/A 12/4/2020    Procedure: ANESTHESIA, FOR CARDIOVERSION @1400;  Surgeon: GENERIC ANESTHESIA PROVIDER;  Location: [facility]    ARTHROPLASTY KNEE Right 9/23/2015    Procedure: ARTHROPLASTY KNEE;  Surgeon: Rufus Brown MD;  Location:  OR    BUNIONECTOMY REN AND LEANDRO, COMBINED Left 2/2/2021    Procedure: Left bunion correction with bone cutting and screw fixation;  Surgeon: David Hoffman DPM;  Location: [facility]    CATARACT IOL, RT/LT Left     COLONOSCOPY  4/04    diverticulosis, rec repeat 10 yrs(consider fam hx?)    COLONOSCOPY WITH CO2 INSUFFLATION N/A 6/19/2019    Procedure: COLONOSCOPY, WITH CO2 INSUFFLATION;  Surgeon: Zeb Duarte MD;  Location: [facility]    EXCISE MASS FOOT Right 12/27/2023    Procedure: Medial and Lateral Sesamoidectomy Right;  Surgeon: Jeevan Gray MD;  Location:  OR    ORTHOPEDIC SURGERY      right ankle    PHACOEMULSIFICATION CLEAR CORNEA WITH STANDARD INTRAOCULAR LENS IMPLANT Left 3/15/2018    Procedure: PHACOEMULSIFICATION CLEAR CORNEA WITH STANDARD INTRAOCULAR LENS IMPLANT;  LEFT EYE PHACOEMULSIFICATION CLEAR CORNEA WITH STANDARD INTRAOCULAR LENS IMPLANT;  Surgeon: Bandar Linares MD;  Location:  [facility]    PHACOEMULSIFICATION CLEAR CORNEA WITH STANDARD INTRAOCULAR LENS IMPLANT Right 4/5/2018    Procedure:  PHACOEMULSIFICATION CLEAR CORNEA WITH STANDARD INTRAOCULAR LENS IMPLANT;  RIGHT PHACOEMULSIFICATION CLEAR CORNEA WITH STANDARD INTRAOCULAR LENS IMPLANT ;  Surgeon: Bandar Linares MD;  Location: [facility]    STRESS ECHO (METRO)  12/03    no ischemia, limited by fatigue    SURGICAL HISTORY OF -       EXP LAP- R OVARY CYSTS    SURGICAL HISTORY OF -   1981,1984,1985    CHILDBIRTH    ZZC NONSPECIFIC PROCEDURE  6/06    right triple arthrodecesis     ZZC NONSPECIFIC PROCEDURE  7/06     right bunion surgery     Z TOTAL KNEE ARTHROPLASTY  3/03    L knee      Allergies   Allergen Reactions    Empagliflozin      Yeast infections    Lipitor [Atorvastatin Calcium]      Gas    Pravachol [Pravastatin Sodium]      Pravachol - dry cough     Simvastatin      Myalgia      Social History     Tobacco Use    Smoking status: Former     Types: Cigarettes     Passive exposure: Past    Smokeless tobacco: Never    Tobacco comments:     tried in early 20s only    Substance Use Topics    Alcohol use: Yes     Comment: 1 drink 1-2 year      Wt Readings from Last 1 Encounters:   03/14/24 127.9 kg (282 lb)        Anesthesia Evaluation   Pt has had prior anesthetic. Type: Regional, MAC and General.    History of anesthetic complications (GENESIS)  - .      ROS/MED HX  ENT/Pulmonary: Comment: S/p b/l cataract surgery     (+) sleep apnea, uses CPAP,                                   (-) tobacco use   Neurologic:  - neg neurologic ROS     Cardiovascular: Comment: Denies cardiac symptoms including chest pain, SOB, palpitations, syncope, BAEZ, orthopnea, or PND.        (+)  hypertension- -   -  - -   Taking blood thinners Pt has received instructions: Instructions Given to patient: Hold Xaralto x 2 days prior to colonoscopy. CHF  Last EF: 60-65                dysrhythmias (h/o paroxysam atrial fibrillation s/p cardioversion 2020), a-fib,  valvular problems/murmurs  MItral valve insufficiency and aortic sclerosis.    Previous cardiac testing      METS/Exercise Tolerance:  Comment: Rides a stationary bike at home a couple times a month.  Does toning exercises with weights.  Able to walk two blocks.     Hematologic:  - neg hematologic  ROS     Musculoskeletal:   (+)  arthritis (s/p b/l TKA and osteoarthritis in shoulders.),             GI/Hepatic: Comment: IBS>>well controlled    Diverticulosis    (+)         appendicitis,        Inflammatory bowel disease: /p appendectomy.   Renal/Genitourinary:     (+) renal disease, type: CRI,            Endo:     (+)  type II DM (6.3), Last HgA1c: 6.3, date: 5/9/2024, Using insulin, - not using insulin pump. Normal glucose range: G7 continuous monitor, not previously admitted for DM/DKA.  thyroid problem, hypothyroidism,    Obesity,       Psychiatric/Substance Use:  - neg psychiatric ROS     Infectious Disease:  - neg infectious disease ROS     Malignancy:  - neg malignancy ROS     Other:  - neg other ROS    (+)  , H/O Chronic Pain (DJD),         Physical Exam    Airway        Mallampati: III   TM distance: > 3 FB   Neck ROM: full   Mouth opening: > 3 cm    Respiratory Devices and Support         Dental           Cardiovascular             Pulmonary                   OUTSIDE LABS:  CBC:   Lab Results   Component Value Date    WBC 9.5 03/14/2024    WBC 7.5 01/09/2023    HGB 12.7 03/14/2024    HGB 13.3 12/06/2023    HCT 38.4 03/14/2024    HCT 41.6 01/09/2023     03/14/2024     01/09/2023     BMP:   Lab Results   Component Value Date     05/17/2024     05/09/2024    POTASSIUM 4.8 05/17/2024    POTASSIUM 5.2 05/09/2024    CHLORIDE 105 05/17/2024    CHLORIDE 104 05/09/2024    CO2 23 05/17/2024    CO2 21 (L) 05/09/2024    BUN 21.7 05/17/2024    BUN 20.4 05/09/2024    CR 1.03 (H) 05/17/2024    CR 1.02 (H) 05/09/2024     (H) 05/17/2024     (H) 05/09/2024     COAGS:   Lab Results   Component Value Date    PTT 31 06/05/2006    INR 1.52 (H) 12/04/2020     POC:   Lab Results   Component Value  Date     (H) 02/02/2021     HEPATIC:   Lab Results   Component Value Date    ALBUMIN 4.5 09/14/2023    PROTTOTAL 7.1 09/14/2023    ALT 25 03/14/2024    AST 46 (H) 03/14/2024    ALKPHOS 49 09/14/2023    BILITOTAL 0.4 09/14/2023     OTHER:   Lab Results   Component Value Date    PH 7.437 05/07/2015    LACT 1.7 11/27/2020    A1C 6.3 (H) 05/09/2024    CASSIDY 10.1 05/17/2024    PHOS 3.5 12/09/2020    MAG 2.1 12/10/2020    TSH 2.63 03/14/2024    T4 1.08@ 08/11/2009    CRP 1.5 11/14/2005    SED 16 11/14/2005       Anesthesia Plan    ASA Status:  3    NPO Status:  NPO Appropriate    Anesthesia Type: MAC.     - Reason for MAC: straight local not clinically adequate   Induction: Intravenous, Propofol.   Maintenance: TIVA.        Consents    Anesthesia Plan(s) and associated risks, benefits, and realistic alternatives discussed. Questions answered and patient/representative(s) expressed understanding.     - Discussed:     - Discussed with:  Patient      - Extended Intubation/Ventilatory Support Discussed: No.      - Patient is DNR/DNI Status: No     Use of blood products discussed: No .     Postoperative Care    Pain management: IV analgesics.        Comments:    Other Comments: The risks and benefits of anesthesia, and the alternatives where applicable, have been discussed with the patient, and they wish to proceed.           JIM Velarde CRNA    I have reviewed the pertinent notes and labs in the chart from the past 30 days and (re)examined the patient.  Any updates or changes from those notes are reflected in this note.

## 2024-09-12 NOTE — ANESTHESIA CARE TRANSFER NOTE
Patient: Sherry Slaughter    Procedure: Procedure(s):  Colonoscopy       Diagnosis: Special screening for malignant neoplasms, colon [Z12.11]  Diagnosis Additional Information: No value filed.    Anesthesia Type:   MAC     Note:    Oropharynx: oropharynx clear of all foreign objects and spontaneously breathing  Level of Consciousness: drowsy  Oxygen Supplementation: face mask  Level of Supplemental Oxygen (L/min / FiO2): 6  Independent Airway: airway patency satisfactory and stable  Dentition: dentition unchanged  Vital Signs Stable: post-procedure vital signs reviewed and stable  Report to RN Given: handoff report given  Patient transferred to: Phase II    Handoff Report: Identifed the Patient, Identified the Reponsible Provider, Reviewed the pertinent medical history, Discussed the surgical course, Reviewed Intra-OP anesthesia mangement and issues during anesthesia, Set expectations for post-procedure period and Allowed opportunity for questions and acknowledgement of understanding      Vitals:  Vitals Value Taken Time   /53 09/12/24 1030   Temp 98  F (36.7  C) 09/12/24 1020   Pulse 65 09/12/24 1030   Resp 16 09/12/24 1020   SpO2 96 % 09/12/24 1039   Vitals shown include unfiled device data.    Electronically Signed By: JIM Velarde CRNA  September 12, 2024  10:40 AM

## 2024-09-22 NOTE — PROGRESS NOTES
ELECTROPHYSIOLOGY CLINIC VISIT    Assessment/Recommendations   Assessment/Plan:    Sherry Slaughter is a 69 year old female with past medical history significant for persistent AF, type II DM, HTN, GENESIS, hypothyroidism, HFpEF.     Atrial fibrillation, paroxysmal  Minimal episodes since amiodarone started march 2023. Thyroid/Liver function and PFTs stable.  Will continue amiodarone 200 every day, repeat labs and EKG in 6 months. [id number] score is 4 for HTN, DM, female, age >65, continue Xarelto 20 mg daily, no significant bleeding issues.     HFpEF  Follows with [facility] and Dr. Hill. Euvolemic today. She is on both lasix and spironolactone.     Follow up in 6 months, labs prior.      History of Present Illness/Subjective    Ms. Sherry Slaughter is a 69 year old female who comes in today for EP follow-up of AF.    Patient is a 70 yo female with history of AF initially diagnosed December 2020, underwent successful cardioversion 12/4/20 to sinus rhythm and had been on rivaroxaban, metoprolol, and diltiazem. Over the last few years she's had intermittent recurrent episodes with symptoms of fatigue and shoulder pain, all paroxysmal. Her metoprolol and diltiazem doses have been increased without much effect. Sotalol was then started Jan 2023, dose gradually increased to 160 bid with improvement for only about a month before increasing episodes of AF.  Sotalol was stopped and amiodarone started. Symptoms have improved on amiodarone, decreased to 200 mg daily in March 2023.     She presents today for follow up. She reports feeling well, continues to have exertional dyspnea. Activity levels limited due to joint pains. No significant AF recurrence. Metoprolol was stopped in May d/t bradycardia. When last seen by Dr Hill, hydrochlorothiazide switched to spironolactone. She denies chest discomfort, palpitations, abdominal fullness/bloating or peripheral edema, worsening shortness of breath,  "pre-syncope, or syncope. Presenting 12 lead ECG shows sinus Vent Rate 67 bpm,  ms, QRS 90 ms, QTc 454 ms.     I have reviewed and updated the patient's Past Medical History, Social History, Family History and Medication List.     Cardiographics (Personally Reviewed) :   Echo: 1/9/23  Interpretation Summary  Global and regional left ventricular function is normal with an EF of 60-65%.  Right ventricular function, chamber size, wall motion, and thickness are  normal.  Mild left atrial enlargement is present.   Grade II or moderate diastolic dysfunction.  No significant valvular abnormalities were noted.  This study was compared with the study from 10/7/21 .  No significant changes noted.    Ziopatch:   10/7-10/21/2021: PAF, AF burden 43% average rage 97 bpm, longest episode 6 days.       EKG:   TODAY 9/23/24 sinus 67 bpm  ms   3/14/2024: sinus 69 bpm,  ms  9/14/2023: sinus 66 bpm,  ms  3/28/2023: sinus 68 bpm,  ms       Physical Examination   /87 (BP Location: Right arm, Patient Position: Sitting, Cuff Size: Adult Large)   Pulse 63   Ht 1.727 m (5' 8\")   Wt 126.4 kg (278 lb 11.2 oz)   LMP 10/02/2007   SpO2 99%   BMI 42.38 kg/m    Wt Readings from Last 3 Encounters:   09/23/24 126.4 kg (278 lb 11.2 oz)   03/14/24 127.9 kg (282 lb)   03/04/24 128.5 kg (283 lb 4.8 oz)     General Appearance:   Alert, well-appearing and in no acute distress.   HEENT: Atraumatic, normocephalic. MMM.   Chest/Lungs:   Respirations unlabored.  Lungs are clear to auscultation.   Cardiovascular:   Regular rate and rhythm.  S1/S2. No murmur.    Abdomen:  Soft, nontender, nondistended.   Extremities: No cyanosis or clubbing. No edema.    Musculoskeletal: Moves all extremities.     Skin: Warm, dry, intact.    Neurologic: Mood and affect are appropriate.  Alert and oriented to person, place, time, and situation.          Medications  Allergies   Amiodarone 200 mg daily   Diltiazem 360 mg daily   Lasix 20 mg " daily   Lisinopril 20 mg daily   Xarelto 20 mg daily   Crestor 10 mg daily   Spironolactone 25 mg daily     Ozempic   Metformin   Synthroid    Allergies   Allergen Reactions    Empagliflozin      Yeast infections    Lipitor [Atorvastatin Calcium]      Gas    Pravachol [Pravastatin Sodium]      Pravachol - dry cough     Simvastatin      Myalgia         Lab Results (Personally Reviewed)    Chemistry/lipid CBC Cardiac Enzymes/BNP/TSH/INR   Lab Results   Component Value Date    BUN 19.9 09/23/2024     09/23/2024    CO2 22 09/23/2024     Creatinine   Date Value Ref Range Status   09/23/2024 1.04 (H) 0.51 - 0.95 mg/dL Final   04/13/2021 0.81 0.52 - 1.04 mg/dL Final       Lab Results   Component Value Date    CHOL 119 12/06/2023    HDL 55 12/06/2023    LDL 47 12/06/2023      Lab Results   Component Value Date    WBC 9.5 03/14/2024    HGB 12.7 03/14/2024    HCT 38.4 03/14/2024    MCV 96 03/14/2024     03/14/2024    Lab Results   Component Value Date    TSH 1.43 09/23/2024    INR 1.52 (H) 12/04/2020        The patient states understanding and is agreeable with the plan.     Natacha Schroeder PA-C  [facility]  Electrophysiology Consult Service  Pager: [phone]    I spent a total of 20 minutes face to face with Sherry Slaughter during today's office visit. I have spent an additional 15 minutes today on chart review and documentation.

## 2024-09-23 ENCOUNTER — OFFICE VISIT (OUTPATIENT)
Dept: CARDIOLOGY | Facility: CLINIC | Age: 69
End: 2024-09-23
Payer: COMMERCIAL

## 2024-09-23 ENCOUNTER — LAB (OUTPATIENT)
Dept: LAB | Facility: CLINIC | Age: 69
End: 2024-09-23
Payer: COMMERCIAL

## 2024-09-23 VITALS
HEIGHT: 68 IN | OXYGEN SATURATION: 99 % | HEART RATE: 63 BPM | DIASTOLIC BLOOD PRESSURE: 87 MMHG | WEIGHT: 278.7 LBS | SYSTOLIC BLOOD PRESSURE: 125 MMHG | BODY MASS INDEX: 42.24 KG/M2

## 2024-09-23 DIAGNOSIS — N18.32 CHRONIC KIDNEY DISEASE, STAGE 3B (H): Primary | ICD-10-CM

## 2024-09-23 DIAGNOSIS — I48.0 PAROXYSMAL ATRIAL FIBRILLATION (H): Primary | ICD-10-CM

## 2024-09-23 DIAGNOSIS — I48.0 PAROXYSMAL ATRIAL FIBRILLATION (H): ICD-10-CM

## 2024-09-23 LAB
ALT SERPL W P-5'-P-CCNC: 31 U/L (ref 0–50)
ANION GAP SERPL CALCULATED.3IONS-SCNC: 12 MMOL/L (ref 7–15)
AST SERPL W P-5'-P-CCNC: 35 U/L (ref 0–45)
BUN SERPL-MCNC: 19.9 MG/DL (ref 8–23)
CALCIUM SERPL-MCNC: 9.9 MG/DL (ref 8.8–10.4)
CHLORIDE SERPL-SCNC: 107 MMOL/L (ref 98–107)
CREAT SERPL-MCNC: 1.04 MG/DL (ref 0.51–0.95)
EGFRCR SERPLBLD CKD-EPI 2021: 57 ML/MIN/1.73M2
GLUCOSE SERPL-MCNC: 138 MG/DL (ref 70–99)
HCO3 SERPL-SCNC: 22 MMOL/L (ref 22–29)
POTASSIUM SERPL-SCNC: 4.4 MMOL/L (ref 3.4–5.3)
SODIUM SERPL-SCNC: 141 MMOL/L (ref 135–145)
TSH SERPL DL<=0.005 MIU/L-ACNC: 1.43 UIU/ML (ref 0.3–4.2)

## 2024-09-23 PROCEDURE — 84460 ALANINE AMINO (ALT) (SGPT): CPT

## 2024-09-23 PROCEDURE — 80048 BASIC METABOLIC PNL TOTAL CA: CPT

## 2024-09-23 PROCEDURE — 93000 ELECTROCARDIOGRAM COMPLETE: CPT

## 2024-09-23 PROCEDURE — 84450 TRANSFERASE (AST) (SGOT): CPT

## 2024-09-23 PROCEDURE — 99214 OFFICE O/P EST MOD 30 MIN: CPT

## 2024-09-23 PROCEDURE — 36415 COLL VENOUS BLD VENIPUNCTURE: CPT

## 2024-09-23 PROCEDURE — 84443 ASSAY THYROID STIM HORMONE: CPT

## 2024-09-23 ASSESSMENT — PAIN SCALES - GENERAL: PAINLEVEL: NO PAIN (0)

## 2024-09-23 NOTE — PATIENT INSTRUCTIONS
Take your medicines every day, as directed     Changes made today:  No medication changes.        Cardiology Care Coordinators:      Love JENNINGS RN     Cardiology Rooming staff:  Danielle LINO    Phone  [phone]      Fax [phone]    To Contact us     During Business Hours:  [phone]     If you are needing refills please contact your pharmacy.     For urgent after hour care please call the [facility] at [phone] or the [facility] at [phone] and ask to speak to the cardiologist on call.            HOW TO CHECK YOUR BLOOD PRESSURE AT HOME:     Avoid eating, smoking, and exercising for at least 30 minutes before taking a reading.     Be sure you have taken your BP medication at least 2-3 hours before you check it.      Sit quietly for 10 minutes before a reading.      Sit in a chair with your feet flat on the floor. Rest your  arm on a table so that the arm cuff is at the same level as your heart.     Remain still during the reading.  Record your blood pressure and pulse in a log and bring to your next appointment.       Use trustedsafe allows you to communicate directly with your heart team through secure messaging.  [employer] can be accessed any time on your phone, computer, or tablet.  If you need assistance, we'd be happy to help!             Keep your Heart Appointments:     Follow-up in 6 months with lab prior

## 2024-09-23 NOTE — NURSING NOTE
Med Reconcile: Reviewed and verified all current medications with the patient. The updated medication list was printed and given to the patient./ No medication changes.       Return Appointment  -Follow-up in 6 months with lab prior

## 2024-09-23 NOTE — NURSING NOTE
"Chief Complaint   Patient presents with    Follow Up     Return EP for afib       Initial /87 (BP Location: Right arm, Patient Position: Sitting, Cuff Size: Adult Large)   Pulse 63   Ht 1.727 m (5' 8\")   Wt 126.4 kg (278 lb 11.2 oz)   LMP 10/02/2007   SpO2 99%   BMI 42.38 kg/m   Estimated body mass index is 42.38 kg/m  as calculated from the following:    Height as of this encounter: 1.727 m (5' 8\").    Weight as of this encounter: 126.4 kg (278 lb 11.2 oz).  BP completed using cuff size: large    Medication refills needed?: No      Danielle Sullivan, EMT  "

## 2024-09-25 LAB
ATRIAL RATE - MUSE: 67 BPM
DIASTOLIC BLOOD PRESSURE - MUSE: NORMAL MMHG
INTERPRETATION ECG - MUSE: NORMAL
P AXIS - MUSE: 72 DEGREES
PR INTERVAL - MUSE: 226 MS
QRS DURATION - MUSE: 90 MS
QT - MUSE: 430 MS
QTC - MUSE: 454 MS
R AXIS - MUSE: 33 DEGREES
SYSTOLIC BLOOD PRESSURE - MUSE: NORMAL MMHG
T AXIS - MUSE: 64 DEGREES
VENTRICULAR RATE- MUSE: 67 BPM

## 2024-10-02 ENCOUNTER — MYC REFILL (OUTPATIENT)
Dept: CARDIOLOGY | Facility: CLINIC | Age: 69
End: 2024-10-02
Payer: COMMERCIAL

## 2024-10-02 DIAGNOSIS — I48.0 PAROXYSMAL ATRIAL FIBRILLATION (H): ICD-10-CM

## 2024-10-04 RX ORDER — DILTIAZEM HYDROCHLORIDE 360 MG/1
360 CAPSULE, EXTENDED RELEASE ORAL DAILY
Qty: 90 CAPSULE | Refills: 1 | Status: SHIPPED | OUTPATIENT
Start: 2024-10-04

## 2024-10-04 RX ORDER — DILTIAZEM HYDROCHLORIDE 360 MG/1
360 CAPSULE, EXTENDED RELEASE ORAL DAILY
Qty: 90 CAPSULE | Refills: 1 | OUTPATIENT
Start: 2024-10-04

## 2024-10-04 NOTE — TELEPHONE ENCOUNTER
diltiazem ER COATED BEADS (CARDIZEM CD) 360 MG 24 hr capsule   90 capsule 1 4/8/2024     Last Office Visit : 9-  Future Office visit:  11-    Calcium Channel Blockers Protocol  Argjqn66/02/2024 07:50 AM   Protocol Details GFR is on file in the past 12 months and most recent GFR is normal     Labs completed on :  9-  GFR Estimate  >60 mL/min/1.73m2 57 Low      Creatinine  0.51 - 0.95 mg/dL 1.04 High

## 2024-10-04 NOTE — TELEPHONE ENCOUNTER
Upon reviewing EPIC documentation, pt last seen by EP-AAKASH 9/23/2024. Labs completed results stable. Plan to follow-up in 6 months with lab prior. Refill sent.        Requested Prescriptions   Pending Prescriptions Disp Refills    diltiazem ER COATED BEADS (CARDIZEM CD) 360 MG 24 hr capsule [Pharmacy Med Name: DILTIAZEM HCL ER COATED  CP24] 90 capsule      Sig: Take 1 capsule (360 mg) by mouth daily.       Calcium Channel Blockers Protocol  Failed - 10/4/2024 11:19 AM        Failed - GFR is on file in the past 12 months and most recent GFR is normal        Passed - Blood pressure under 140/90 in past 12 months     BP Readings from Last 3 Encounters:   09/23/24 125/87   09/12/24 100/53   05/09/24 120/70       No data recorded            Passed - Medication is active on med list        Passed - Recent (12 mo) or future (90 days) visit within the authorizing provider's specialty     The patient must have completed an in-person or virtual visit within the past 12 months or has a future visit scheduled within the next 90 days with the authorizing provider s specialty.  Urgent care and e-visits do not quality as an office visit for this protocol.          Passed - Patient is age 18 or older        Passed - No active pregnancy on record        Passed - No positive pregnancy test in past 12 months

## 2024-10-18 DIAGNOSIS — I48.0 PAROXYSMAL ATRIAL FIBRILLATION (H): ICD-10-CM

## 2024-11-08 DIAGNOSIS — E11.9 TYPE 2 DIABETES MELLITUS TREATED WITH INSULIN (H): Primary | ICD-10-CM

## 2024-11-08 DIAGNOSIS — Z79.4 TYPE 2 DIABETES MELLITUS TREATED WITH INSULIN (H): Primary | ICD-10-CM

## 2024-11-10 SDOH — HEALTH STABILITY: PHYSICAL HEALTH: ON AVERAGE, HOW MANY DAYS PER WEEK DO YOU ENGAGE IN MODERATE TO STRENUOUS EXERCISE (LIKE A BRISK WALK)?: 2 DAYS

## 2024-11-10 SDOH — HEALTH STABILITY: PHYSICAL HEALTH: ON AVERAGE, HOW MANY MINUTES DO YOU ENGAGE IN EXERCISE AT THIS LEVEL?: 20 MIN

## 2024-11-10 ASSESSMENT — SOCIAL DETERMINANTS OF HEALTH (SDOH): HOW OFTEN DO YOU GET TOGETHER WITH FRIENDS OR RELATIVES?: TWICE A WEEK

## 2024-11-13 NOTE — PROGRESS NOTES
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    She was previously followed by Dr. Aranda and follows with Robinson Elizalde NP in [facility] and with Dr. Roslyn Mccracken for AF.  Dry weight has been felt to be ~299 lbs.    AF has been well-controlled on amiodarone. Her home weights have been relatively stable. She does have exertional dyspnea walking up 1 flight of stairs. This is greatly improved compared with when she was in AF. She has historically been limited from foot/knee problems more than breathing.     11/14/24:  Since her last visit, Sancho has felt generally well. She has not had any recurrence of her previous AF symptoms. Her exertion is largely limited by musculoskeletal issues but she does have some chronic exertional dyspnea also.      Cardiac medications:  Rivaroxaban 20 daily  Amiodarone 200 every day  Diltiazem  mg every day  Lisinopril 20 qd  Lasix 20 every day  KCL 20 meq qd  Rosuvastatin 10 every day  Spironolactone 25 mg daily    TTE 1/9/23 (read by me)  Global and regional left ventricular function is normal with an EF of 60-65%.  Right ventricular function, chamber size, wall motion, and thickness are  normal.  Grade II or moderate diastolic dysfunction.  No significant valvular abnormalities were noted.  This study was compared with the study from 10/7/21 .  No significant changes noted.    PAST MEDICAL HISTORY:  1. Atrial fibrillation, diagnosed 11/2020, persistent, s/p ELIAS/CV 12/4/2020 to sinus. Recurrent AF 12/9/2020 when admitted with hypertensive urgency, ventricular rates 80-90s, spontaneously converted. Recurrent PAF, sotalol started Jan 2023. Changed to amiodarone by March 2023.  2. Diabetes type II  3. Hypertension  4. GENESIS, on BiPAP  5. Hypothyroidism  6. BMI 42  7. HFpEF    CURRENT  MEDICATIONS:  Current Outpatient Medications   Medication Sig Dispense Refill    amiodarone (PACERONE) 200 MG tablet Take 1 tablet (200 mg) by mouth daily 90 tablet 1    bisacodyl (DULCOLAX) 5 MG EC tablet Two days prior to exam take two (2) tablets at 4pm. One day prior to exam take two (2) tablets at 4pm 4 tablet 0    blood glucose (NO BRAND SPECIFIED) test strip Use to test blood sugar 2 times daily or as directed. Patient requesting One Touch Ultra Strips 100 strip 3    Continuous Blood Gluc  (DEXCOM G7 ) KATIA Use to read blood sugars as per 's instructions. 1 each 0    Continuous Glucose Sensor (DEXCOM G7 SENSOR) [facility] CHANGE ONE SENSOR EVERY 10 DAYS 9 each 3    diltiazem ER COATED BEADS (CARDIZEM CD) 360 MG 24 hr capsule Take 1 capsule (360 mg) by mouth daily. 90 capsule 1    furosemide (LASIX) 20 MG tablet Take 1 tablet (20 mg) by mouth daily 90 tablet 3    Garlic 1000 MG CAPS Take 1 capsule by mouth daily Takes during summer months.  Done taking until next summer.      Glucagon (GVOKE HYPOPEN 1-PACK) 1 MG/0.2ML SOAJ Inject 1 mg Subcutaneous once as needed Administer the injection in the lower abdomen, outer thigh, or outer upperarm 0.2 mL 3    insulin pen needle (GNP CLICKFINE PEN NEEDLES) 31G X 8 MM miscellaneous Uses 3 times daily or as directed 300 each 3    insulin reg HIGH CONC (HUMULIN R U-500 KWIKPEN) 500 UNIT/ML PEN soln ADMINISTER 130-140 UNITS AT 7 IN THE MORNING,  UNITS AT 3-4 IN THE EVENING (Patient taking differently: 2 times daily (before meals). ADMINISTER 130-140 UNITS AT 7 IN THE MORNING,  UNITS AT 3-4 IN THE EVENING) 40 mL 1    levothyroxine (SYNTHROID/LEVOTHROID) 75 MCG tablet Take 1 tablet (75 mcg) by mouth every morning 90 tablet 1    levothyroxine (SYNTHROID/LEVOTHROID) 75 MCG tablet Take 1 tablet (75 mcg) by mouth daily 90 tablet 3    lisinopril (ZESTRIL) 20 MG tablet Take 1 tablet (20 mg) by mouth daily (Patient taking differently: Take 20 mg  by mouth every morning.) 90 tablet 3    metFORMIN (GLUCOPHAGE XR) 500 MG 24 hr tablet Take 4 tablets (2,000 mg) by mouth at bedtime TAKE 4 TABLETS AT BEDTIME 360 tablet 3    Multiple Vitamins-Minerals (ADVANCED DIABETIC MULTIVITAMIN) TABS Take 1 tablet by mouth every morning      ORDER FOR DME, SET TO LOCAL PRINT, Respironics REMSTAR 60 Series Auto CPAP 8-15cm H2O, Airfit P10 nasal pillow mask w/medium pillows      order for DME Equipment being ordered: [id number] $70   Shoe LG 1 Device 0    polyethylene glycol (GOLYTELY) 236 g suspension Two days before procedure at 5PM fill first container with water. Mix and drink an 8 oz glass every 15 minutes until HALF of the container is gone. Place the remainder in the refrigerator. One day before procedure at 5PM drink second half of bowel prep. Drink an 8 oz glass every 15 minutes until it is gone. Day of procedure 6 hours before arrival time fill the 2nd container with water. Mix and drink an 8 oz glass every 15 minutes until HALF of the container is gone. Discard the remaining solution. 8000 mL 0    potassium chloride ER (KLOR-CON M) 20 MEQ CR tablet Take 1 tablet (20 mEq) by mouth daily (Patient taking differently: Take 20 mEq by mouth every morning.) 90 tablet 3    rivaroxaban ANTICOAGULANT (XARELTO ANTICOAGULANT) 20 MG TABS tablet Take 1 tablet (20 mg) by mouth daily (with dinner). 90 tablet 3    rosuvastatin (CRESTOR) 10 MG tablet Take 1 tablet (10 mg) by mouth daily (Patient taking differently: Take 10 mg by mouth every evening.) 90 tablet 3    Semaglutide, 2 MG/DOSE, (OZEMPIC, 2 MG/DOSE,) 8 MG/3ML pen Inject 2 mg Subcutaneous once a week 9 mL 3    spironolactone (ALDACTONE) 25 MG tablet Take 1 tablet (25 mg) by mouth daily 90 tablet 3       ALLERGIES:     Allergies   Allergen Reactions    Empagliflozin      Yeast infections    Lipitor [Atorvastatin Calcium]      Gas    Pravachol [Pravastatin Sodium]      Pravachol - dry cough     Simvastatin      Myalgia        FAMILY HISTORY:  Family History   Problem Relation Age of Onset    Diabetes Maternal Grandmother         DM TYPE 2    Cerebrovascular Disease Maternal Grandmother     Hypertension Sister     Lipids Sister     Heart Disease Sister         Chronic AFib    Diabetes Sister     Coronary Artery Disease Sister         afib    Hypertension Sister     Lipids Sister     Gynecology Sister     Diabetes Sister     Asthma Sister     Blood Disease Paternal Grandmother         T CELL LEUKEMIA    Hypertension Brother     Lipids Brother     Congenital Anomalies Brother     Cardiovascular Brother     Heart Disease Brother         CABG/AFIB    Coronary Artery Disease Brother         cabg afib AAA    Hypertension Brother     Lipids Brother     Other Cancer Brother         multple myeloma    Obesity Brother     Hypertension Brother     Respiratory Brother         Sleep apnea    Heart Disease Brother         AFib    Coronary Artery Disease Brother         afib    Alzheimer Disease Mother     Asthma Mother     Hypertension Mother     Breast Cancer Mother 40        has mastectomy and lived to 84    Allergies Mother         Sulfa,    Arthritis Mother     Cardiovascular Mother     Depression Mother     Respiratory Mother     Lipids Mother     Cancer Mother         breast    Thyroid Disease Mother     Cerebrovascular Disease Mother         FROM  AFIB    Dementia Mother         Alzheimers    Other Cancer Mother         breast    Cancer - colorectal Other     Colon Cancer Other         2019    Cancer Father         lung    Aneurysm Father         brother, AAA    Other Cancer Father         lung ca    Coronary Artery Disease Father         cad AAA    Asthma Sister     Cancer - colorectal Paternal Uncle         late 50s to early 60s - great uncles     Breast Cancer Other         Maternal cousin    Arrhythmia Sister         2 brothers 1 sister Afib    Breast Cancer Maternal Aunt 62    Other Cancer Maternal Aunt 35        Ovarian cancer.   "Survived despite late recurrence and is now 92.    Glaucoma No family hx of     Macular Degeneration No family hx of      SOCIAL HISTORY:  Social History     Tobacco Use    Smoking status: Former     Types: Cigarettes     Passive exposure: Past    Smokeless tobacco: Never    Tobacco comments:     tried in early 20s only    Vaping Use    Vaping status: Never Used   Substance Use Topics    Alcohol use: Yes     Comment: 1 drink 1-2 year    Drug use: No       Exam:  /71 (BP Location: Right arm, Patient Position: Sitting, Cuff Size: Adult Large)   Pulse 66   Ht 1.727 m (5' 8\")   Wt 125.9 kg (277 lb 9.6 oz)   LMP 10/02/2007   SpO2 98%   BMI 42.21 kg/m    GENERAL APPEARANCE: healthy, alert and no distress  EYES: no icterus, no xanthelasmas  ENT: normal palate, mucosa moist, no central cyanosis  NECK: JVP not elevated  RESPIRATORY: lungs clear to auscultation - no rales, rhonchi or wheezes, no use of accessory muscles, no retractions, respirations are unlabored, normal respiratory rate  CARDIOVASCULAR: regular rhythm, normal S1 with physiologic split S2, no S3 or S4 and no murmur, click or rub.  GI: soft, non tender, bowel sounds normal,no abdominal bruits  EXTREMITIES: no edema, no bruits  NEURO: alert and oriented to person/place/time, normal speech, gait and affect  VASC: Radial, dorsalis pedis and posterior tibialis pulses 2+ bilaterally.  SKIN: no ecchymoses, no rashes.  PSYCH: cooperative, affect appropriate.     Labs:  Reviewed.     Testing/Procedures:  TTE 1/9/23 (read by me)  Global and regional left ventricular function is normal with an EF of 60-65%.  Right ventricular function, chamber size, wall motion, and thickness are  normal.  Grade II or moderate diastolic dysfunction.  No significant valvular abnormalities were noted.  This study was compared with the study from 10/7/21 .  No significant changes noted.    I personally visualized and interpreted:  ECG 05/29/24 (done with AM metoprolol held) " reviewed, shows normal sinus rhythm with 1st degree AV block, VR 66, .    Assessment and Plan:   HFpEF, currently euvolemic  HTN  -continue furosemide at present dose  -continue metoprolol and diltiazem and present doses  -continue spironolactone 25 mg daily   -continue [facility] follow up as planned    3. Paroxysmal AF  -followed by EP  -continue diltiazem  -remain off metoprolol (stopped 5/2024 due to symptomatic bradycardia and acceptable HR control and diltiazem/amiodarone only)  -continue amiodarone 200 mg daily  -continue DOAC  -amiodarone monitoring: (PFTs 3/2024, TSH 9/2024, LFTs 9/2024 stable)    4. Slightly elevated creatinine:  Likely related to mild hypovolemia today from fasting  -repeat BMP in ~2 weeks    5. Mild aortic stenosis: repeat TTE 2026 (3y from prior) or for change in symptoms    Ismael Hill MD  Cardiology      Follow up:  Follow up with me in 1 year with BMP, fasting lipids, LFTs  Follow up with [facility] as planned  Clarify EP follow up-- if no further follow up planned, would also repeat PFTs prior to next visit for amiodarone monitoring    The patient states understanding and is agreeable with plan.     Review of the result(s) of each unique test - BMP, A1c  Independent interpretation of a test performed by another physician/other qualified health care professional (not separately reported) - ECG  Ordering of each unique test  Prescription drug management    Ismael Hill MD  Cardiology    CC

## 2024-11-14 ENCOUNTER — MEDICAL CORRESPONDENCE (OUTPATIENT)
Dept: HEALTH INFORMATION MANAGEMENT | Facility: CLINIC | Age: 69
End: 2024-11-14

## 2024-11-14 ENCOUNTER — OFFICE VISIT (OUTPATIENT)
Dept: ENDOCRINOLOGY | Facility: CLINIC | Age: 69
End: 2024-11-14
Payer: COMMERCIAL

## 2024-11-14 ENCOUNTER — OFFICE VISIT (OUTPATIENT)
Dept: CARDIOLOGY | Facility: CLINIC | Age: 69
End: 2024-11-14
Payer: COMMERCIAL

## 2024-11-14 ENCOUNTER — LAB (OUTPATIENT)
Dept: LAB | Facility: CLINIC | Age: 69
End: 2024-11-14
Payer: COMMERCIAL

## 2024-11-14 VITALS
DIASTOLIC BLOOD PRESSURE: 71 MMHG | WEIGHT: 277.6 LBS | HEIGHT: 68 IN | BODY MASS INDEX: 42.07 KG/M2 | SYSTOLIC BLOOD PRESSURE: 116 MMHG | HEART RATE: 66 BPM | OXYGEN SATURATION: 98 %

## 2024-11-14 VITALS
WEIGHT: 277 LBS | BODY MASS INDEX: 42.12 KG/M2 | DIASTOLIC BLOOD PRESSURE: 70 MMHG | HEART RATE: 64 BPM | SYSTOLIC BLOOD PRESSURE: 125 MMHG | OXYGEN SATURATION: 97 % | RESPIRATION RATE: 16 BRPM

## 2024-11-14 DIAGNOSIS — G47.33 OSA (OBSTRUCTIVE SLEEP APNEA): ICD-10-CM

## 2024-11-14 DIAGNOSIS — E66.01 MORBID OBESITY (H): Primary | ICD-10-CM

## 2024-11-14 DIAGNOSIS — E11.9 TYPE 2 DIABETES MELLITUS TREATED WITH INSULIN (H): ICD-10-CM

## 2024-11-14 DIAGNOSIS — Z79.899 ENCOUNTER FOR MONITORING AMIODARONE THERAPY: ICD-10-CM

## 2024-11-14 DIAGNOSIS — I10 ESSENTIAL HYPERTENSION WITH GOAL BLOOD PRESSURE LESS THAN 140/90: ICD-10-CM

## 2024-11-14 DIAGNOSIS — R06.02 SOB (SHORTNESS OF BREATH): ICD-10-CM

## 2024-11-14 DIAGNOSIS — I10 BENIGN ESSENTIAL HYPERTENSION: ICD-10-CM

## 2024-11-14 DIAGNOSIS — I50.30 HEART FAILURE WITH PRESERVED EJECTION FRACTION, NYHA CLASS I (H): ICD-10-CM

## 2024-11-14 DIAGNOSIS — Z79.4 TYPE 2 DIABETES MELLITUS TREATED WITH INSULIN (H): ICD-10-CM

## 2024-11-14 DIAGNOSIS — E78.5 HYPERLIPIDEMIA LDL GOAL <100: ICD-10-CM

## 2024-11-14 DIAGNOSIS — N18.32 CHRONIC KIDNEY DISEASE, STAGE 3B (H): ICD-10-CM

## 2024-11-14 DIAGNOSIS — I48.0 PAROXYSMAL ATRIAL FIBRILLATION (H): Primary | ICD-10-CM

## 2024-11-14 DIAGNOSIS — I48.0 PAROXYSMAL ATRIAL FIBRILLATION (H): ICD-10-CM

## 2024-11-14 DIAGNOSIS — Z51.81 ENCOUNTER FOR MONITORING AMIODARONE THERAPY: ICD-10-CM

## 2024-11-14 DIAGNOSIS — I50.9 CONGESTIVE HEART FAILURE, UNSPECIFIED HF CHRONICITY, UNSPECIFIED HEART FAILURE TYPE (H): ICD-10-CM

## 2024-11-14 LAB
ANION GAP SERPL CALCULATED.3IONS-SCNC: 13 MMOL/L (ref 7–15)
BUN SERPL-MCNC: 25.7 MG/DL (ref 8–23)
CALCIUM SERPL-MCNC: 9.9 MG/DL (ref 8.8–10.4)
CHLORIDE SERPL-SCNC: 106 MMOL/L (ref 98–107)
CREAT SERPL-MCNC: 1.22 MG/DL (ref 0.51–0.95)
CREAT UR-MCNC: 205 MG/DL
EGFRCR SERPLBLD CKD-EPI 2021: 47 ML/MIN/1.73M2
EST. AVERAGE GLUCOSE BLD GHB EST-MCNC: 143 MG/DL
GLUCOSE SERPL-MCNC: 160 MG/DL (ref 70–99)
HBA1C MFR BLD: 6.6 % (ref 0–5.6)
HCO3 SERPL-SCNC: 23 MMOL/L (ref 22–29)
MICROALBUMIN UR-MCNC: 31.8 MG/L
MICROALBUMIN/CREAT UR: 15.51 MG/G CR (ref 0–25)
POTASSIUM SERPL-SCNC: 4.3 MMOL/L (ref 3.4–5.3)
SODIUM SERPL-SCNC: 142 MMOL/L (ref 135–145)

## 2024-11-14 PROCEDURE — 82570 ASSAY OF URINE CREATININE: CPT

## 2024-11-14 PROCEDURE — 99214 OFFICE O/P EST MOD 30 MIN: CPT | Performed by: INTERNAL MEDICINE

## 2024-11-14 PROCEDURE — 80061 LIPID PANEL: CPT

## 2024-11-14 PROCEDURE — 80048 BASIC METABOLIC PNL TOTAL CA: CPT

## 2024-11-14 PROCEDURE — 82043 UR ALBUMIN QUANTITATIVE: CPT

## 2024-11-14 PROCEDURE — 36415 COLL VENOUS BLD VENIPUNCTURE: CPT

## 2024-11-14 PROCEDURE — 83036 HEMOGLOBIN GLYCOSYLATED A1C: CPT

## 2024-11-14 RX ORDER — INSULIN GLARGINE 300 U/ML
100 INJECTION, SOLUTION SUBCUTANEOUS AT BEDTIME
Qty: 30 ML | Refills: 1 | Status: SHIPPED | OUTPATIENT
Start: 2024-11-14

## 2024-11-14 RX ORDER — LISINOPRIL 20 MG/1
20 TABLET ORAL DAILY
Qty: 90 TABLET | Refills: 3 | Status: SHIPPED | OUTPATIENT
Start: 2024-11-14

## 2024-11-14 RX ORDER — FUROSEMIDE 20 MG/1
20 TABLET ORAL DAILY
Qty: 90 TABLET | Refills: 3 | Status: SHIPPED | OUTPATIENT
Start: 2024-11-14

## 2024-11-14 RX ORDER — INSULIN ASPART 100 [IU]/ML
10 INJECTION, SOLUTION INTRAVENOUS; SUBCUTANEOUS 2 TIMES DAILY WITH MEALS
Qty: 30 ML | Refills: 0 | Status: SHIPPED | OUTPATIENT
Start: 2024-11-14

## 2024-11-14 RX ORDER — ROSUVASTATIN CALCIUM 10 MG/1
10 TABLET, COATED ORAL DAILY
Qty: 90 TABLET | Refills: 3 | Status: SHIPPED | OUTPATIENT
Start: 2024-11-14

## 2024-11-14 RX ORDER — SPIRONOLACTONE 25 MG/1
25 TABLET ORAL DAILY
Qty: 90 TABLET | Refills: 3 | Status: SHIPPED | OUTPATIENT
Start: 2024-11-14

## 2024-11-14 RX ORDER — DILTIAZEM HYDROCHLORIDE 360 MG/1
360 CAPSULE, EXTENDED RELEASE ORAL DAILY
Qty: 90 CAPSULE | Refills: 3 | Status: SHIPPED | OUTPATIENT
Start: 2024-11-14

## 2024-11-14 RX ORDER — TIRZEPATIDE 7.5 MG/.5ML
7.5 INJECTION, SOLUTION SUBCUTANEOUS
Qty: 2 ML | Refills: 0 | Status: SHIPPED | OUTPATIENT
Start: 2024-11-14

## 2024-11-14 RX ORDER — POTASSIUM CHLORIDE 1500 MG/1
20 TABLET, EXTENDED RELEASE ORAL DAILY
Qty: 90 TABLET | Refills: 3 | Status: SHIPPED | OUTPATIENT
Start: 2024-11-14

## 2024-11-14 RX ORDER — AMIODARONE HYDROCHLORIDE 200 MG/1
200 TABLET ORAL DAILY
Qty: 90 TABLET | Refills: 3 | Status: SHIPPED | OUTPATIENT
Start: 2024-11-14

## 2024-11-14 ASSESSMENT — PAIN SCALES - GENERAL: PAINLEVEL_OUTOF10: NO PAIN (0)

## 2024-11-14 NOTE — NURSING NOTE
"Chief Complaint   Patient presents with    Follow Up     Return General Cardiology for 6 month       Initial /71 (BP Location: Right arm, Patient Position: Sitting, Cuff Size: Adult Large)   Pulse 66   Ht 1.727 m (5' 8\")   Wt 125.9 kg (277 lb 9.6 oz)   LMP 10/02/2007   SpO2 98%   BMI 42.21 kg/m   Estimated body mass index is 42.21 kg/m  as calculated from the following:    Height as of this encounter: 1.727 m (5' 8\").    Weight as of this encounter: 125.9 kg (277 lb 9.6 oz).  BP completed using cuff size: large    Medication refills needed?: Amiodarone and diltiazem      Danielle Sullivan, EMT  "

## 2024-11-14 NOTE — LETTER
11/14/2024      Sherry Slaughter  [id number] [place] [address]      Dear Colleague,    Thank you for referring your patient, Sherry Slaughter, to the [facility]. Please see a copy of my visit note below.        Assessment/Plan :   Type 2 DM. Sancho is doing well. Her blood sugars are well within target range. She has even had some low blood sugars overnight. She was hopeful that we could switch back to a once daily injection. She would also be okay taking a meal time insulin, as needed. We discussed options and we will switch the Humulin R U500 to Toujeo U300. She will start with 75 unit(s) daily but if her blood sugars start to increase, she can slowly work up in dosing. We will also start her on Novolog 10 unit(s) with the largest meal of the day. Lastly, she has responded well to Ozempic but her weight loss has stalled. We discussed options and we will switch to Mounjaro. I think this help with greater blood sugar control and may contribute to further weight loss. She will switch to 7.5 mg weekly in December. We will follow-up in 6 mos but she will reach out to our office around January for further Mounjaro adjustment.      I have independently reviewed and interpreted labs, imaging as indicated.      Chief complaint:  Sherry is a 69 year old female who returns for follow-up of Type 2 DM.     I have reviewed Care Everywhere including [facility], [facility], [facility], [place], [facility], [facility] , [facility], [place] lab reports, imaging reports and provider notes as indicated.      HISTORY OF PRESENT ILLNESS  Sancho is doing well. She remains stable on metformin XR 2000 mg daily, Humulin R U500 45 unit(s) in the morning and 35 unit(s) in the evening, along with Ozempic 2 mg weekly. She knows that her blood sugars have been a bit higher than she would like over the last few weeks. She attributes it to watching the grand kids and Halloween. She is  back on track, now, and her blood sugars are improving. She has had a few episodes of low blood sugars overnight and she worries about taking the full 35 unit(s) of U500 in the evening. She was hopeful that we could switch to a once daily, long acting insulin.    Sancho is using the Dexcom G7 to monitor her blood sugars. It is connected to her Apple watch and she feels like this has really made an impact on her dietary choices. She has not had any recent episodes of severe hyperglycemia and/or hypoglycemia. She really tries to keep her numbers within the target range. She also has not had any problems with blurred vision or an increase in numbness/tingling in her feet. She has lost some weight since starting Ozempic but not as much as she would like.    Sherry was diagnosed with diabetes over 20 yrs ago. She has struggled with insulin resistance throughout the years which has led to weight gain and poor control. She has also taken a variety of medications throughout the years. Recently, she has found success with Ozempic. This has helped improve her blood sugars while also improving her insulin resistance. Her diabetes is complicated by hyperlipidemia, obesity, CKD stage 3, paroxysmal atrial fibrillation s/p pacemaker, GENESIS, osteoarthritis, and hypothyroidism.     Endocrine relevant labs are as follows:   Latest Reference Range & Units 11/14/24 08:26   Hemoglobin A1C 0.0 - 5.6 % 6.6 (H)   (H): Data is abnormally high   Latest Reference Range & Units 09/23/24 09:20   TSH 0.30 - 4.20 uIU/mL 1.43      Latest Reference Range & Units 05/09/24 09:53   Hemoglobin A1C 0.0 - 5.6 % 6.3 (H)   (H): Data is abnormally high   Latest Reference Range & Units 12/06/23 10:07   Albumin Urine mg/L mg/L <12.0      Latest Reference Range & Units 12/06/23 10:07   Hemoglobin A1C 0.0 - 5.6 % 6.1 (H)   (H): Data is abnormally high    REVIEW OF SYSTEMS    Endocrine: positive for diabetes and obesity  Skin: negative  Eyes: negative for,  visual blurring, redness, tearing  Ears/Nose/Throat: negative  Respiratory: No shortness of breath, dyspnea on exertion, cough, or hemoptysis  Cardiovascular: negative for, chest pain, dyspnea on exertion, lower extremity edema, and exercise intolerance  Gastrointestinal: negative for, nausea, vomiting, constipation, and diarrhea  Genitourinary: negative for, nocturia, dysuria, frequency, and urgency  Musculoskeletal: negative for, muscular weakness, nocturnal cramping, and foot pain  Neurologic: negative for, local weakness, numbness or tingling of hands, and numbness or tingling of feet  Psychiatric: negative  Hematologic/Lymphatic/Immunologic: negative    Past Medical History  Past Medical History:   Diagnosis Date     Cataract      Congestive heart failure (H) 2019 NOVEMBER    AFIB     DEPRESSION     comes and goes - tried meds - unsuccessfully, certain times of the year, no psych intervention and no counselors in the past - and not interested      Diabetic retinopathy associated with diabetes mellitus due to underlying condition (H)      Diverticulosis of colon (without mention of hemorrhage) 04/2004    colonoscopy     ECHO-mildLVH,tr MR,mild thick lflets w inc LA,trTR   12/2003     Essential hypertension, benign 1990s    late 1990s - started medications at that time - not to difficult to control meds      Fam hx-cardiovas dis NEC     father - CAD, and lipids/HTN - multiple members of the family      Family history of diabetes mellitus     sister and grandmother with DM      Family history of malignant neoplasm of breast     mother - young age - 45, and maternal cousin and aunt as well - no BRCA testing done      Family history of stroke (cerebrovascular)     grandmother in early life in her 40s      FAMILY HX COLON CANCER     Pat uncles x 2     Heart disease     murmur/     Heart murmur      HYPERLIPIDEMIA 2000    fairly recent - in the last 5 years - high for DM levels  -cholesterol recent      Irritable  bowel syndrome     goes between the 2 - nerve related - more stressed more problems      MICROALBUMINURIA     unsure how long - been on the lisinopril - for a few years at well      Nonspecific abnormal results of liver function study 02/03/2003    SGOT - has been high in the past - since the hepatitis b - borderline elevation - not really changing any      OBESITY      Obstructive sleep apnea      GENESIS (obstructive sleep apnea) 10/15/2006    psg 5/15 AHI 53 aPAP 8-15     OSTEOARTHRITIS L KNEE 2003    total knee on the left - and pain is gone since the knee replacement      PERS HX HEPATITIS B- RESOLVED] 1977    acute virus only - no chronic disease      PERS HX HEPATITIS B- RESOLVED]      Type II or unspecified type diabetes mellitus without mention of complication, not stated as uncontrolled 1991    oral meds frist, then insulin eventually later, difficult to control most of the time      Unspecified hypothyroidism 10/11/2006    TSH 3.36 - mild subclinical hypothyroidism - deciding on following or starting low dose meds - with dr Oreilly - following        Medications  Current Outpatient Medications   Medication Sig Dispense Refill     amiodarone (PACERONE) 200 MG tablet Take 1 tablet (200 mg) by mouth daily 90 tablet 1     bisacodyl (DULCOLAX) 5 MG EC tablet Two days prior to exam take two (2) tablets at 4pm. One day prior to exam take two (2) tablets at 4pm 4 tablet 0     blood glucose (NO BRAND SPECIFIED) test strip Use to test blood sugar 2 times daily or as directed. Patient requesting One Touch Ultra Strips 100 strip 3     Continuous Blood Gluc  (DEXCOM G7 ) KATIA Use to read blood sugars as per 's instructions. 1 each 0     Continuous Glucose Sensor (DEXCOM G7 SENSOR) [facility] CHANGE ONE SENSOR EVERY 10 DAYS 9 each 3     diltiazem ER COATED BEADS (CARDIZEM CD) 360 MG 24 hr capsule Take 1 capsule (360 mg) by mouth daily. 90 capsule 1     furosemide (LASIX) 20 MG tablet Take 1 tablet (20  mg) by mouth daily 90 tablet 3     Garlic 1000 MG CAPS Take 1 capsule by mouth daily Takes during summer months.  Done taking until next summer.       Glucagon (GVOKE HYPOPEN 1-PACK) 1 MG/0.2ML SOAJ Inject 1 mg Subcutaneous once as needed Administer the injection in the lower abdomen, outer thigh, or outer upperarm 0.2 mL 3     insulin pen needle (GNP CLICKFINE PEN NEEDLES) 31G X 8 MM miscellaneous Uses 3 times daily or as directed 300 each 3     insulin reg HIGH CONC (HUMULIN R U-500 KWIKPEN) 500 UNIT/ML PEN soln ADMINISTER 130-140 UNITS AT 7 IN THE MORNING,  UNITS AT 3-4 IN THE EVENING (Patient taking differently: 2 times daily (before meals). ADMINISTER 130-140 UNITS AT 7 IN THE MORNING,  UNITS AT 3-4 IN THE EVENING) 40 mL 1     levothyroxine (SYNTHROID/LEVOTHROID) 75 MCG tablet Take 1 tablet (75 mcg) by mouth every morning 90 tablet 1     levothyroxine (SYNTHROID/LEVOTHROID) 75 MCG tablet Take 1 tablet (75 mcg) by mouth daily 90 tablet 3     lisinopril (ZESTRIL) 20 MG tablet Take 1 tablet (20 mg) by mouth daily (Patient taking differently: Take 20 mg by mouth every morning.) 90 tablet 3     metFORMIN (GLUCOPHAGE XR) 500 MG 24 hr tablet Take 4 tablets (2,000 mg) by mouth at bedtime TAKE 4 TABLETS AT BEDTIME 360 tablet 3     Multiple Vitamins-Minerals (ADVANCED DIABETIC MULTIVITAMIN) TABS Take 1 tablet by mouth every morning       ORDER FOR DME, SET TO LOCAL PRINT, Respironics REMSTAR 60 Series Auto CPAP 8-15cm H2O, Airfit P10 nasal pillow mask w/medium pillows       order for DME Equipment being ordered: [id number] $70   Shoe LG 1 Device 0     polyethylene glycol (GOLYTELY) 236 g suspension Two days before procedure at 5PM fill first container with water. Mix and drink an 8 oz glass every 15 minutes until HALF of the container is gone. Place the remainder in the refrigerator. One day before procedure at 5PM drink second half of bowel prep. Drink an 8 oz glass every 15 minutes until it is  gone. Day of procedure 6 hours before arrival time fill the 2nd container with water. Mix and drink an 8 oz glass every 15 minutes until HALF of the container is gone. Discard the remaining solution. 8000 mL 0     potassium chloride ER (KLOR-CON M) 20 MEQ CR tablet Take 1 tablet (20 mEq) by mouth daily (Patient taking differently: Take 20 mEq by mouth every morning.) 90 tablet 3     rivaroxaban ANTICOAGULANT (XARELTO ANTICOAGULANT) 20 MG TABS tablet Take 1 tablet (20 mg) by mouth daily (with dinner). 90 tablet 3     rosuvastatin (CRESTOR) 10 MG tablet Take 1 tablet (10 mg) by mouth daily (Patient taking differently: Take 10 mg by mouth every evening.) 90 tablet 3     Semaglutide, 2 MG/DOSE, (OZEMPIC, 2 MG/DOSE,) 8 MG/3ML pen Inject 2 mg Subcutaneous once a week 9 mL 3     spironolactone (ALDACTONE) 25 MG tablet Take 1 tablet (25 mg) by mouth daily 90 tablet 3       Allergies  Allergies   Allergen Reactions     Empagliflozin      Yeast infections     Lipitor [Atorvastatin Calcium]      Gas     Pravachol [Pravastatin Sodium]      Pravachol - dry cough      Simvastatin      Myalgia         Family History  family history includes Allergies in her mother; Alzheimer Disease in her mother; Aneurysm in her father; Arrhythmia in her sister; Arthritis in her mother; Asthma in her mother, sister, and sister; Blood Disease in her paternal grandmother; Breast Cancer in an other family member; Breast Cancer (age of onset: 40) in her mother; Breast Cancer (age of onset: 62) in her maternal aunt; Cancer in her father and mother; Cancer - colorectal in her paternal uncle and another family member; Cardiovascular in her brother and mother; Cerebrovascular Disease in her maternal grandmother and mother; Colon Cancer in an other family member; Congenital Anomalies in her brother; Coronary Artery Disease in her brother, brother, father, and sister; Dementia in her mother; Depression in her mother; Diabetes in her maternal grandmother,  sister, and sister; Gynecology in her sister; Heart Disease in her brother, brother, and sister; Hypertension in her brother, brother, brother, mother, sister, and sister; Lipids in her brother, brother, mother, sister, and sister; Obesity in her brother; Other Cancer in her brother, father, and mother; Other Cancer (age of onset: 35) in her maternal aunt; Respiratory in her brother and mother; Thyroid Disease in her mother.    Social History  Social History     Tobacco Use     Smoking status: Former     Types: Cigarettes     Passive exposure: Past     Smokeless tobacco: Never     Tobacco comments:     tried in early 20s only    Vaping Use     Vaping status: Never Used   Substance Use Topics     Alcohol use: Yes     Comment: 1 drink 1-2 year     Drug use: No       Physical Exam  /70   Pulse 64   Resp 16   Wt 125.6 kg (277 lb)   LMP 10/02/2007   SpO2 97%   BMI 42.12 kg/m    Body mass index is 42.12 kg/m .  GENERAL :  In no apparent distress  SKIN: Normal color, normal temperature, texture.  No hirsutism, alopecia or purple striae.     EYES: PERRL, EOMI, No scleral icterus,  No proptosis, conjunctival redness, stare, retraction  RESP: Lungs clear to auscultation bilaterally  CARDIAC: Regular rate and rhythm, normal S1 S2, without murmurs, rubs or gallops    NEURO: awake, alert, responds appropriately to questions.  Cranial nerves intact.   Moves all extremities; Gait normal.  No tremor of the outstretched hand.   EXTREMITIES: No clubbing, cyanosis or edema.    DATA REVIEW  Dexcom Clarity  Time in target range 72%  Very Low<1%, High 27%, Very High 1%  Current Ave  mg/dl        Again, thank you for allowing me to participate in the care of your patient.        Sincerely,        Millicent Rocha PA-C

## 2024-11-14 NOTE — PROGRESS NOTES
Assessment/Plan :   Type 2 DM. Sancho is doing well. Her blood sugars are well within target range. She has even had some low blood sugars overnight. She was hopeful that we could switch back to a once daily injection. She would also be okay taking a meal time insulin, as needed. We discussed options and we will switch the Humulin R U500 to Toujeo U300. She will start with 75 unit(s) daily but if her blood sugars start to increase, she can slowly work up in dosing. We will also start her on Novolog 10 unit(s) with the largest meal of the day. Lastly, she has responded well to Ozempic but her weight loss has stalled. We discussed options and we will switch to Mounjaro. I think this help with greater blood sugar control and may contribute to further weight loss. She will switch to 7.5 mg weekly in December. We will follow-up in 6 mos but she will reach out to our office around January for further Mounjaro adjustment.      I have independently reviewed and interpreted labs, imaging as indicated.      Chief complaint:  Sherry is a 69 year old female who returns for follow-up of Type 2 DM.     I have reviewed Care Everywhere including [facility], [facility], [facility], [place], [facility], [facility] , [facility], [place] lab reports, imaging reports and provider notes as indicated.      HISTORY OF PRESENT ILLNESS  Sancho is doing well. She remains stable on metformin XR 2000 mg daily, Humulin R U500 45 unit(s) in the morning and 35 unit(s) in the evening, along with Ozempic 2 mg weekly. She knows that her blood sugars have been a bit higher than she would like over the last few weeks. She attributes it to watching the grand kids and Halloween. She is back on track, now, and her blood sugars are improving. She has had a few episodes of low blood sugars overnight and she worries about taking the full 35 unit(s) of U500 in the evening. She was hopeful that we could switch to a once daily, long acting  insulin.    Sancho is using the Dexcom G7 to monitor her blood sugars. It is connected to her Apple watch and she feels like this has really made an impact on her dietary choices. She has not had any recent episodes of severe hyperglycemia and/or hypoglycemia. She really tries to keep her numbers within the target range. She also has not had any problems with blurred vision or an increase in numbness/tingling in her feet. She has lost some weight since starting Ozempic but not as much as she would like.    Sherry was diagnosed with diabetes over 20 yrs ago. She has struggled with insulin resistance throughout the years which has led to weight gain and poor control. She has also taken a variety of medications throughout the years. Recently, she has found success with Ozempic. This has helped improve her blood sugars while also improving her insulin resistance. Her diabetes is complicated by hyperlipidemia, obesity, CKD stage 3, paroxysmal atrial fibrillation s/p pacemaker, GENESIS, osteoarthritis, and hypothyroidism.     Endocrine relevant labs are as follows:   Latest Reference Range & Units 11/14/24 08:26   Hemoglobin A1C 0.0 - 5.6 % 6.6 (H)   (H): Data is abnormally high   Latest Reference Range & Units 09/23/24 09:20   TSH 0.30 - 4.20 uIU/mL 1.43      Latest Reference Range & Units 05/09/24 09:53   Hemoglobin A1C 0.0 - 5.6 % 6.3 (H)   (H): Data is abnormally high   Latest Reference Range & Units 12/06/23 10:07   Albumin Urine mg/L mg/L <12.0      Latest Reference Range & Units 12/06/23 10:07   Hemoglobin A1C 0.0 - 5.6 % 6.1 (H)   (H): Data is abnormally high    REVIEW OF SYSTEMS    Endocrine: positive for diabetes and obesity  Skin: negative  Eyes: negative for, visual blurring, redness, tearing  Ears/Nose/Throat: negative  Respiratory: No shortness of breath, dyspnea on exertion, cough, or hemoptysis  Cardiovascular: negative for, chest pain, dyspnea on exertion, lower extremity edema, and exercise  intolerance  Gastrointestinal: negative for, nausea, vomiting, constipation, and diarrhea  Genitourinary: negative for, nocturia, dysuria, frequency, and urgency  Musculoskeletal: negative for, muscular weakness, nocturnal cramping, and foot pain  Neurologic: negative for, local weakness, numbness or tingling of hands, and numbness or tingling of feet  Psychiatric: negative  Hematologic/Lymphatic/Immunologic: negative    Past Medical History  Past Medical History:   Diagnosis Date    Cataract     Congestive heart failure (H) 2019 NOVEMBER    AFIB    DEPRESSION     comes and goes - tried meds - unsuccessfully, certain times of the year, no psych intervention and no counselors in the past - and not interested     Diabetic retinopathy associated with diabetes mellitus due to underlying condition (H)     Diverticulosis of colon (without mention of hemorrhage) 04/2004    colonoscopy    ECHO-mildLVH,tr MR,mild thick lflets w inc LA,trTR   12/2003    Essential hypertension, benign 1990s    late 1990s - started medications at that time - not to difficult to control meds     Fam hx-cardiovas dis NEC     father - CAD, and lipids/HTN - multiple members of the family     Family history of diabetes mellitus     sister and grandmother with DM     Family history of malignant neoplasm of breast     mother - young age - 45, and maternal cousin and aunt as well - no BRCA testing done     Family history of stroke (cerebrovascular)     grandmother in early life in her 40s     FAMILY HX COLON CANCER     Pat uncles x 2    Heart disease     murmur/    Heart murmur     HYPERLIPIDEMIA 2000    fairly recent - in the last 5 years - high for DM levels  -cholesterol recent     Irritable bowel syndrome     goes between the 2 - nerve related - more stressed more problems     MICROALBUMINURIA     unsure how long - been on the lisinopril - for a few years at well     Nonspecific abnormal results of liver function study 02/03/2003    SGOT - has been  high in the past - since the hepatitis b - borderline elevation - not really changing any     OBESITY     Obstructive sleep apnea     GENESIS (obstructive sleep apnea) 10/15/2006    psg 5/15 AHI 53 aPAP 8-15    OSTEOARTHRITIS L KNEE 2003    total knee on the left - and pain is gone since the knee replacement     PERS HX HEPATITIS B- RESOLVED] 1977    acute virus only - no chronic disease     PERS HX HEPATITIS B- RESOLVED]     Type II or unspecified type diabetes mellitus without mention of complication, not stated as uncontrolled 1991    oral meds frist, then insulin eventually later, difficult to control most of the time     Unspecified hypothyroidism 10/11/2006    TSH 3.36 - mild subclinical hypothyroidism - deciding on following or starting low dose meds - with dr Oreilly - following        Medications  Current Outpatient Medications   Medication Sig Dispense Refill    amiodarone (PACERONE) 200 MG tablet Take 1 tablet (200 mg) by mouth daily 90 tablet 1    bisacodyl (DULCOLAX) 5 MG EC tablet Two days prior to exam take two (2) tablets at 4pm. One day prior to exam take two (2) tablets at 4pm 4 tablet 0    blood glucose (NO BRAND SPECIFIED) test strip Use to test blood sugar 2 times daily or as directed. Patient requesting One Touch Ultra Strips 100 strip 3    Continuous Blood Gluc  (DEXCOM G7 ) KATIA Use to read blood sugars as per 's instructions. 1 each 0    Continuous Glucose Sensor (DEXCOM G7 SENSOR) MISC CHANGE ONE SENSOR EVERY 10 DAYS 9 each 3    diltiazem ER COATED BEADS (CARDIZEM CD) 360 MG 24 hr capsule Take 1 capsule (360 mg) by mouth daily. 90 capsule 1    furosemide (LASIX) 20 MG tablet Take 1 tablet (20 mg) by mouth daily 90 tablet 3    Garlic 1000 MG CAPS Take 1 capsule by mouth daily Takes during summer months.  Done taking until next summer.      Glucagon (GVOKE HYPOPEN 1-PACK) 1 MG/0.2ML SOAJ Inject 1 mg Subcutaneous once as needed Administer the injection in the lower  abdomen, outer thigh, or outer upperarm 0.2 mL 3    insulin pen needle (GNP CLICKFINE PEN NEEDLES) 31G X 8 MM miscellaneous Uses 3 times daily or as directed 300 each 3    insulin reg HIGH CONC (HUMULIN R U-500 KWIKPEN) 500 UNIT/ML PEN soln ADMINISTER 130-140 UNITS AT 7 IN THE MORNING,  UNITS AT 3-4 IN THE EVENING (Patient taking differently: 2 times daily (before meals). ADMINISTER 130-140 UNITS AT 7 IN THE MORNING,  UNITS AT 3-4 IN THE EVENING) 40 mL 1    levothyroxine (SYNTHROID/LEVOTHROID) 75 MCG tablet Take 1 tablet (75 mcg) by mouth every morning 90 tablet 1    levothyroxine (SYNTHROID/LEVOTHROID) 75 MCG tablet Take 1 tablet (75 mcg) by mouth daily 90 tablet 3    lisinopril (ZESTRIL) 20 MG tablet Take 1 tablet (20 mg) by mouth daily (Patient taking differently: Take 20 mg by mouth every morning.) 90 tablet 3    metFORMIN (GLUCOPHAGE XR) 500 MG 24 hr tablet Take 4 tablets (2,000 mg) by mouth at bedtime TAKE 4 TABLETS AT BEDTIME 360 tablet 3    Multiple Vitamins-Minerals (ADVANCED DIABETIC MULTIVITAMIN) TABS Take 1 tablet by mouth every morning      ORDER FOR DME, SET TO LOCAL PRINT, Respironics REMSTAR 60 Series Auto CPAP 8-15cm H2O, Airfit P10 nasal pillow mask w/medium pillows      order for DME Equipment being ordered: [id number] $70   Shoe LG 1 Device 0    polyethylene glycol (GOLYTELY) 236 g suspension Two days before procedure at 5PM fill first container with water. Mix and drink an 8 oz glass every 15 minutes until HALF of the container is gone. Place the remainder in the refrigerator. One day before procedure at 5PM drink second half of bowel prep. Drink an 8 oz glass every 15 minutes until it is gone. Day of procedure 6 hours before arrival time fill the 2nd container with water. Mix and drink an 8 oz glass every 15 minutes until HALF of the container is gone. Discard the remaining solution. 8000 mL 0    potassium chloride ER (KLOR-CON M) 20 MEQ CR tablet Take 1 tablet (20 mEq) by  mouth daily (Patient taking differently: Take 20 mEq by mouth every morning.) 90 tablet 3    rivaroxaban ANTICOAGULANT (XARELTO ANTICOAGULANT) 20 MG TABS tablet Take 1 tablet (20 mg) by mouth daily (with dinner). 90 tablet 3    rosuvastatin (CRESTOR) 10 MG tablet Take 1 tablet (10 mg) by mouth daily (Patient taking differently: Take 10 mg by mouth every evening.) 90 tablet 3    Semaglutide, 2 MG/DOSE, (OZEMPIC, 2 MG/DOSE,) 8 MG/3ML pen Inject 2 mg Subcutaneous once a week 9 mL 3    spironolactone (ALDACTONE) 25 MG tablet Take 1 tablet (25 mg) by mouth daily 90 tablet 3       Allergies  Allergies   Allergen Reactions    Empagliflozin      Yeast infections    Lipitor [Atorvastatin Calcium]      Gas    Pravachol [Pravastatin Sodium]      Pravachol - dry cough     Simvastatin      Myalgia         Family History  family history includes Allergies in her mother; Alzheimer Disease in her mother; Aneurysm in her father; Arrhythmia in her sister; Arthritis in her mother; Asthma in her mother, sister, and sister; Blood Disease in her paternal grandmother; Breast Cancer in an other family member; Breast Cancer (age of onset: 40) in her mother; Breast Cancer (age of onset: 62) in her maternal aunt; Cancer in her father and mother; Cancer - colorectal in her paternal uncle and another family member; Cardiovascular in her brother and mother; Cerebrovascular Disease in her maternal grandmother and mother; Colon Cancer in an other family member; Congenital Anomalies in her brother; Coronary Artery Disease in her brother, brother, father, and sister; Dementia in her mother; Depression in her mother; Diabetes in her maternal grandmother, sister, and sister; Gynecology in her sister; Heart Disease in her brother, brother, and sister; Hypertension in her brother, brother, brother, mother, sister, and sister; Lipids in her brother, brother, mother, sister, and sister; Obesity in her brother; Other Cancer in her brother, father, and  mother; Other Cancer (age of onset: 35) in her maternal aunt; Respiratory in her brother and mother; Thyroid Disease in her mother.    Social History  Social History     Tobacco Use    Smoking status: Former     Types: Cigarettes     Passive exposure: Past    Smokeless tobacco: Never    Tobacco comments:     tried in early 20s only    Vaping Use    Vaping status: Never Used   Substance Use Topics    Alcohol use: Yes     Comment: 1 drink 1-2 year    Drug use: No       Physical Exam  /70   Pulse 64   Resp 16   Wt 125.6 kg (277 lb)   LMP 10/02/2007   SpO2 97%   BMI 42.12 kg/m    Body mass index is 42.12 kg/m .  GENERAL :  In no apparent distress  SKIN: Normal color, normal temperature, texture.  No hirsutism, alopecia or purple striae.     EYES: PERRL, EOMI, No scleral icterus,  No proptosis, conjunctival redness, stare, retraction  RESP: Lungs clear to auscultation bilaterally  CARDIAC: Regular rate and rhythm, normal S1 S2, without murmurs, rubs or gallops    NEURO: awake, alert, responds appropriately to questions.  Cranial nerves intact.   Moves all extremities; Gait normal.  No tremor of the outstretched hand.   EXTREMITIES: No clubbing, cyanosis or edema.    DATA REVIEW  Dexcom Clarity  Time in target range 72%  Very Low<1%, High 27%, Very High 1%  Current Ave  mg/dl

## 2024-11-14 NOTE — NURSING NOTE
Sherry Slaughter's goals for this visit include:   Chief Complaint   Patient presents with    Follow Up    Diabetes       She requests these members of her care team be copied on today's visit information: yes    PCP: Marilou Arciniega    Referring Provider:  Referred Self, MD  No address on file    /70   Pulse 64   Resp 16   Wt 125.6 kg (277 lb)   LMP 10/02/2007   SpO2 97%   BMI 42.12 kg/m      Do you need any medication refills at today's visit? Unsure     Sj Short, EMT

## 2024-11-14 NOTE — PATIENT INSTRUCTIONS
Take your medicines every day, as directed     Changes made today:  none       Cardiology Care Coordinators:      Love JENNINGS RN     Cardiology Rooming staff:  Danielle LINO    Phone  [phone]      Fax [phone]    To Contact us     During Business Hours:  [phone]     If you are needing refills please contact your pharmacy.     For urgent after hour care please call the [place] [facility] at [phone] or the [facility] at [phone] and ask to speak to the cardiologist on call.            HOW TO CHECK YOUR BLOOD PRESSURE AT HOME:     Avoid eating, smoking, and exercising for at least 30 minutes before taking a reading.     Be sure you have taken your BP medication at least 2-3 hours before you check it.      Sit quietly for 10 minutes before a reading.      Sit in a chair with your feet flat on the floor. Rest your  arm on a table so that the arm cuff is at the same level as your heart.     Remain still during the reading.  Record your blood pressure and pulse in a log and bring to your next appointment.       Use [employer] allows you to communicate directly with your heart team through secure messaging.  [employer] can be accessed any time on your phone, computer, or tablet.  If you need assistance, we'd be happy to help!             Keep your Heart Appointments:     Labs in 2 weeks  One year follow up with labs prior

## 2024-11-14 NOTE — PATIENT INSTRUCTIONS
Welcome to the [facility] Endocrinology and Diabetes Clinics     Our Endocrinology Clinics are here to provide you with a team-based, collaborative approach in the diagnosis and treatment of patients with diabetes and endocrine disorders. The team is made up of Physicians, Physician Assistants, Certified Diabetes Educators, Registered Nurses, Medical Assistants, Emergency Medical Technicians, and many others, all of whom have the unified goal of providing our patients with high quality care.     Please see below for some helpful tips to best navigate and use the [facility] Endocrinology clinic:     [place] Respect: At [facility], we are committed to a respectful and safe space for all patients, visitors, and staff.  We believe that mutual respect between patients and their care team is the foundation of quality care.  It is our expectation that you will be treated with respect by your care team.  In turn, we ask that all communication with the care team (written and verbal) be respectful and free from profanity, threatening, or abusive language.  Disrespectful communication undermines our therapeutic relationship with you and may result in us being unable to continue to provide your care.    Refills: A provider must see you at least annually to prescribe and refill medications. This is to ensure your safety as well as meet insurance and compliance regulations.    Scheduling: Many of our Providers offer both in-person or video visits. Please call to schedule any needed follow ups as soon as possible because our provider schedules fill up very quickly. Our care team has the right to require an in-person visit when they believe that it is medically necessary. Please remember that for any virtual visits, you must be in the [facility] at the time of the visit, otherwise we are unable to see you and you will need to be rescheduled.    Missed Appointments: If you need to cancel or miss your  scheduled appointment, please call the clinic at [phone] to reschedule.  Please note if you repeatedly miss appointments or repeatedly miss appointments without calling to inform us ahead of time (no-show), the clinic may elect to not allow you to reschedule without speaking to a manager, may require a Partnership In Care Agreement prior to rescheduling, or could result in you no longer being able to receive care from the clinic. Providing the clinic with timely notification if you have to miss an appointment, allows us to better serve the needs of all of our patients.    Primary Care Provider: Our Endocrinologists are Specialists in their field. We expect you to have a Primary Care Provider established to handle any needs outside of your diabetes and endocrine care.  We would be happy to assist you find a Primary Care Provider, if you do not have one.    [employer]: [employer] is a wonderful resource that allows you access to your Care Team via online or the alejandra. Please ask a member of the team if you would like help creating an account. Please note that it may take up to 2 business days for a response. [employer] messages are not reviewed on weekends or after business hours.  Emergent or urgent care needs should never be communicated via [employer].  If you experience a medical emergency call 911 or go to the nearest emergency room.    Labs: It is recommended that you stay within the [facility] for labs but you are welcome to obtain ordered labs (with some exceptions) from any location of your choice as long as they are able to complete and process the needed labs. If you need us to fax orders to your preferred lab, please provide us the name and fax number of the lab you would like to go to so we can fax the orders. If your labs are drawn outside of the [facility], please have them fax the results to [phone] ([place]) or [phone] ([place]) or via [facility]. It is your  responsibility to ensure that outside lab results are sent to us.    We look forward to working with you. Please do not hesitate to reach out with any questions.    Thank you,    The Endocrine Team    [facility] Address:   [place] Address:     [address] [place] [address]    Phone: [phone]  Fax: [phone] [address] [place] [address]    Phone: [phone]  Fax: [phone]     [facility] Cost Estimate Phone Number: [phone]    General Lab and Imaging Scheduling Phone Number: [phone]

## 2024-11-15 ENCOUNTER — OFFICE VISIT (OUTPATIENT)
Dept: FAMILY MEDICINE | Facility: CLINIC | Age: 69
End: 2024-11-15
Payer: COMMERCIAL

## 2024-11-15 VITALS
WEIGHT: 278.4 LBS | BODY MASS INDEX: 43.7 KG/M2 | DIASTOLIC BLOOD PRESSURE: 67 MMHG | HEIGHT: 67 IN | TEMPERATURE: 96.7 F | RESPIRATION RATE: 18 BRPM | HEART RATE: 74 BPM | OXYGEN SATURATION: 98 % | SYSTOLIC BLOOD PRESSURE: 119 MMHG

## 2024-11-15 DIAGNOSIS — Z00.00 ENCOUNTER FOR MEDICARE ANNUAL WELLNESS EXAM: Primary | ICD-10-CM

## 2024-11-15 DIAGNOSIS — E78.5 HYPERLIPIDEMIA LDL GOAL <100: Chronic | ICD-10-CM

## 2024-11-15 DIAGNOSIS — Z79.4 TYPE 2 DIABETES MELLITUS WITH DIABETIC NEUROPATHY, WITH LONG-TERM CURRENT USE OF INSULIN (H): ICD-10-CM

## 2024-11-15 DIAGNOSIS — E11.40 TYPE 2 DIABETES MELLITUS WITH DIABETIC NEUROPATHY, WITH LONG-TERM CURRENT USE OF INSULIN (H): ICD-10-CM

## 2024-11-15 DIAGNOSIS — H93.8X1 ABNORMAL SENSATION IN RIGHT EAR: ICD-10-CM

## 2024-11-15 DIAGNOSIS — N18.31 STAGE 3A CHRONIC KIDNEY DISEASE (H): ICD-10-CM

## 2024-11-15 DIAGNOSIS — E66.01 MORBID OBESITY (H): ICD-10-CM

## 2024-11-15 PROBLEM — L89.893: Status: RESOLVED | Noted: 2022-11-02 | Resolved: 2024-11-15

## 2024-11-15 PROBLEM — E11.621 DIABETIC ULCER OF TOE OF LEFT FOOT ASSOCIATED WITH TYPE 2 DIABETES MELLITUS, UNSPECIFIED ULCER STAGE (H): Status: ACTIVE | Noted: 2024-11-15

## 2024-11-15 PROBLEM — L97.529 DIABETIC ULCER OF TOE OF LEFT FOOT ASSOCIATED WITH TYPE 2 DIABETES MELLITUS, UNSPECIFIED ULCER STAGE (H): Status: RESOLVED | Noted: 2024-11-15 | Resolved: 2024-11-15

## 2024-11-15 PROBLEM — N18.32 CHRONIC KIDNEY DISEASE, STAGE 3B (H): Status: RESOLVED | Noted: 2024-01-18 | Resolved: 2024-11-15

## 2024-11-15 PROBLEM — L97.529 DIABETIC ULCER OF TOE OF LEFT FOOT ASSOCIATED WITH TYPE 2 DIABETES MELLITUS, UNSPECIFIED ULCER STAGE (H): Status: ACTIVE | Noted: 2024-11-15

## 2024-11-15 PROBLEM — E11.621 DIABETIC ULCER OF TOE OF LEFT FOOT ASSOCIATED WITH TYPE 2 DIABETES MELLITUS, UNSPECIFIED ULCER STAGE (H): Status: RESOLVED | Noted: 2024-11-15 | Resolved: 2024-11-15

## 2024-11-15 LAB
CHOLEST SERPL-MCNC: 115 MG/DL
HDLC SERPL-MCNC: 48 MG/DL
LDLC SERPL CALC-MCNC: 44 MG/DL
NONHDLC SERPL-MCNC: 67 MG/DL
TRIGL SERPL-MCNC: 113 MG/DL

## 2024-11-15 SDOH — HEALTH STABILITY: PHYSICAL HEALTH: ON AVERAGE, HOW MANY MINUTES DO YOU ENGAGE IN EXERCISE AT THIS LEVEL?: 20 MIN

## 2024-11-15 SDOH — HEALTH STABILITY: PHYSICAL HEALTH: ON AVERAGE, HOW MANY DAYS PER WEEK DO YOU ENGAGE IN MODERATE TO STRENUOUS EXERCISE (LIKE A BRISK WALK)?: 2 DAYS

## 2024-11-15 ASSESSMENT — SOCIAL DETERMINANTS OF HEALTH (SDOH): HOW OFTEN DO YOU GET TOGETHER WITH FRIENDS OR RELATIVES?: TWICE A WEEK

## 2024-11-15 ASSESSMENT — PAIN SCALES - GENERAL: PAINLEVEL_OUTOF10: NO PAIN (0)

## 2024-11-15 NOTE — PATIENT INSTRUCTIONS
Patient Education   Preventive Care Advice   This is general advice given by our system to help you stay healthy. However, your care team may have specific advice just for you. Please talk to your care team about your preventive care needs.  Nutrition  Eat 5 or more servings of fruits and vegetables each day.  Try wheat bread, brown rice and whole grain pasta (instead of white bread, rice, and pasta).  Get enough calcium and vitamin D. Check the label on foods and aim for 100% of the RDA (recommended daily allowance).  Lifestyle  Exercise at least 150 minutes each week  (30 minutes a day, 5 days a week).  Do muscle strengthening activities 2 days a week. These help control your weight and prevent disease.  No smoking.  Wear sunscreen to prevent skin cancer.  Have a dental exam and cleaning every 6 months.  Yearly exams  See your health care team every year to talk about:  Any changes in your health.  Any medicines your care team has prescribed.  Preventive care, family planning, and ways to prevent chronic diseases.  Shots (vaccines)   HPV shots (up to age 26), if you've never had them before.  Hepatitis B shots (up to age 59), if you've never had them before.  COVID-19 shot: Get this shot when it's due.  Flu shot: Get a flu shot every year.  Tetanus shot: Get a tetanus shot every 10 years.  Pneumococcal, hepatitis A, and RSV shots: Ask your care team if you need these based on your risk.  Shingles shot (for age 50 and up)  General health tests  Diabetes screening:  Starting at age 35, Get screened for diabetes at least every 3 years.  If you are younger than age 35, ask your care team if you should be screened for diabetes.  Cholesterol test: At age 39, start having a cholesterol test every 5 years, or more often if advised.  Bone density scan (DEXA): At age 50, ask your care team if you should have this scan for osteoporosis (brittle bones).  Hepatitis C: Get tested at least once in your life.  STIs (sexually  transmitted infections)  Before age 24: Ask your care team if you should be screened for STIs.  After age 24: Get screened for STIs if you're at risk. You are at risk for STIs (including HIV) if:  You are sexually active with more than one person.  You don't use condoms every time.  You or a partner was diagnosed with a sexually transmitted infection.  If you are at risk for HIV, ask about PrEP medicine to prevent HIV.  Get tested for HIV at least once in your life, whether you are at risk for HIV or not.  Cancer screening tests  Cervical cancer screening: If you have a cervix, begin getting regular cervical cancer screening tests starting at age 21.  Breast cancer scan (mammogram): If you've ever had breasts, begin having regular mammograms starting at age 40. This is a scan to check for breast cancer.  Colon cancer screening: It is important to start screening for colon cancer at age 45.  Have a colonoscopy test every 10 years (or more often if you're at risk) Or, ask your provider about stool tests like a FIT test every year or Cologuard test every 3 years.  To learn more about your testing options, visit:   .  For help making a decision, visit:   https://bit.ly/sy71113.  Prostate cancer screening test: If you have a prostate, ask your care team if a prostate cancer screening test (PSA) at age 55 is right for you.  Lung cancer screening: If you are a current or former smoker ages 50 to 80, ask your care team if ongoing lung cancer screenings are right for you.  For informational purposes only. Not to replace the advice of your health care provider. Copyright   2023 [facility]. All rights reserved. Clinically reviewed by the [facility]. [facility] [id number] - REV 01/24.  Preventing Falls: Care Instructions  Injuries and health problems such as trouble walking or poor eyesight can increase your risk of falling. So can some medicines. But there are things you can do to help  "prevent falls. You can exercise to get stronger. You can also arrange your home to make it safer.    Talk to your doctor about the medicines you take. Ask if any of them increase the risk of falls and whether they can be changed or stopped.   Try to exercise regularly. It can help improve your strength and balance. This can help lower your risk of falling.         Practice fall safety and prevention.   Wear low-heeled shoes that fit well and give your feet good support. Talk to your doctor if you have foot problems that make this hard.  Carry a cellphone or wear a medical alert device that you can use to call for help.  Use stepladders instead of chairs to reach high objects. Don't climb if you're at risk for falls. Ask for help, if needed.  Wear the correct eyeglasses, if you need them.        Make your home safer.   Remove rugs, cords, clutter, and furniture from walkways.  Keep your house well lit. Use night-lights in hallways and bathrooms.  Install and use sturdy handrails on stairways.  Wear nonskid footwear, even inside. Don't walk barefoot or in socks without shoes.        Be safe outside.   Use handrails, curb cuts, and ramps whenever possible.  Keep your hands free by using a shoulder bag or backpack.  Try to walk in well-lit areas. Watch out for uneven ground, changes in pavement, and debris.  Be careful in the winter. Walk on the grass or gravel when sidewalks are slippery. Use de-icer on steps and walkways. Add non-slip devices to shoes.    Put grab bars and nonskid mats in your shower or tub and near the toilet. Try to use a shower chair or bath bench when bathing.   Get into a tub or shower by putting in your weaker leg first. Get out with your strong side first. Have a phone or medical alert device in the bathroom with you.   Where can you learn more?  Go to https://www.[employer].net/patiented  Enter G117 in the search box to learn more about \"Preventing Falls: Care Instructions.\"  Current as of: " July 17, 2023  Content Version: 14.2 2024 [facility] [employer].   Care instructions adapted under license by your healthcare professional. If you have questions about a medical condition or this instruction, always ask your healthcare professional. [employer] disclaims any warranty or liability for your use of this information.    Hearing Loss: Care Instructions  Overview     Hearing loss is a sudden or slow decrease in how well you hear. It can range from slight to profound. Permanent hearing loss can occur with aging. It also can happen when you are exposed long-term to loud noise. Examples include listening to loud music, riding motorcycles, or being around other loud machines.  Hearing loss can affect your work and home life. It can make you feel lonely or depressed. You may feel that you have lost your independence. But hearing aids and other devices can help you hear better and feel connected to others.  Follow-up care is a key part of your treatment and safety. Be sure to make and go to all appointments, and call your doctor if you are having problems. It's also a good idea to know your test results and keep a list of the medicines you take.  How can you care for yourself at home?  Avoid loud noises whenever possible. This helps keep your hearing from getting worse.  Always wear hearing protection around loud noises.  Wear a hearing aid as directed.  A professional can help you pick a hearing aid that will work best for you.  You can also get hearing aids over the counter for mild to moderate hearing loss.  Have hearing tests as your doctor suggests. They can show whether your hearing has changed. Your hearing aid may need to be adjusted.  Use other devices as needed. These may include:  Telephone amplifiers and hearing aids that can connect to a television, stereo, radio, or microphone.  Devices that use lights or vibrations. These alert you to the doorbell, a ringing telephone, or a baby  "monitor.  Television closed-captioning. This shows the words at the bottom of the screen. Most new TVs can do this.  TTY (text telephone). This lets you type messages back and forth on the telephone instead of talking or listening. These devices are also called TDD. When messages are typed on the keyboard, they are sent over the phone line to a receiving TTY. The message is shown on a monitor.  Use text messaging, social media, and email if it is hard for you to communicate by telephone.  Try to learn a listening technique called speechreading. It is not lipreading. You pay attention to people's gestures, expressions, posture, and tone of voice. These clues can help you understand what a person is saying. Face the person you are talking to, and have them face you. Make sure the lighting is good. You need to see the other person's face clearly.  Think about counseling if you need help to adjust to your hearing loss.  When should you call for help?  Watch closely for changes in your health, and be sure to contact your doctor if:    You think your hearing is getting worse.     You have new symptoms, such as dizziness or nausea.   Where can you learn more?  Go to https://www.FriendFeed.net/patiented  Enter R798 in the search box to learn more about \"Hearing Loss: Care Instructions.\"  Current as of: September 27, 2023  Content Version: 14.2 2024 [facility] [employer].   Care instructions adapted under license by your healthcare professional. If you have questions about a medical condition or this instruction, always ask your healthcare professional. Healthwise, Incorporated disclaims any warranty or liability for your use of this information.    Learning About Stress  What is stress?     Stress is your body's response to a hard situation. Your body can have a physical, emotional, or mental response. Stress is a fact of life for most people, and it affects everyone differently. What causes stress for you may not be " stressful for someone else.  A lot of things can cause stress. You may feel stress when you go on a job interview, take a test, or run a race. This kind of short-term stress is normal and even useful. It can help you if you need to work hard or react quickly. For example, stress can help you finish an important job on time.  Long-term stress is caused by ongoing stressful situations or events. Examples of long-term stress include long-term health problems, ongoing problems at work, or conflicts in your family. Long-term stress can harm your health.  How does stress affect your health?  When you are stressed, your body responds as though you are in danger. It makes hormones that speed up your heart, make you breathe faster, and give you a burst of energy. This is called the fight-or-flight stress response. If the stress is over quickly, your body goes back to normal and no harm is done.  But if stress happens too often or lasts too long, it can have bad effects. Long-term stress can make you more likely to get sick, and it can make symptoms of some diseases worse. If you tense up when you are stressed, you may develop neck, shoulder, or low back pain. Stress is linked to high blood pressure and heart disease.  Stress also harms your emotional health. It can make you jones, tense, or depressed. Your relationships may suffer, and you may not do well at work or school.  What can you do to manage stress?  You can try these things to help manage stress:   Do something active. Exercise or activity can help reduce stress. Walking is a great way to get started. Even everyday activities such as housecleaning or yard work can help.  Try yoga or marjan chi. These techniques combine exercise and meditation. You may need some training at first to learn them.  Do something you enjoy. For example, listen to music or go to a movie. Practice your hobby or do volunteer work.  Meditate. This can help you relax, because you are not  "worrying about what happened before or what may happen in the future.  Do guided imagery. Imagine yourself in any setting that helps you feel calm. You can use online videos, books, or a teacher to guide you.  Do breathing exercises. For example:  From a standing position, bend forward from the waist with your knees slightly bent. Let your arms dangle close to the floor.  Breathe in slowly and deeply as you return to a standing position. Roll up slowly and lift your head last.  Hold your breath for just a few seconds in the standing position.  Breathe out slowly and bend forward from the waist.  Let your feelings out. Talk, laugh, cry, and express anger when you need to. Talking with supportive friends or family, a counselor, or a radha leader about your feelings is a healthy way to relieve stress. Avoid discussing your feelings with people who make you feel worse.  Write. It may help to write about things that are bothering you. This helps you find out how much stress you feel and what is causing it. When you know this, you can find better ways to cope.  What can you do to prevent stress?  You might try some of these things to help prevent stress:  Manage your time. This helps you find time to do the things you want and need to do.  Get enough sleep. Your body recovers from the stresses of the day while you are sleeping.  Get support. Your family, friends, and community can make a difference in how you experience stress.  Limit your news feed. Avoid or limit time on social media or news that may make you feel stressed.  Do something active. Exercise or activity can help reduce stress. Walking is a great way to get started.  Where can you learn more?  Go to https://www.[employer].net/patiented  Enter N032 in the search box to learn more about \"Learning About Stress.\"  Current as of: October 24, 2023  Content Version: 14.2 2024 [employer].   Care instructions adapted under license by [facility] " professional. If you have questions about a medical condition or this instruction, always ask your healthcare professional. Healthwise, [facility] disclaims any warranty or liability for your use of this information.    Learning About Sleeping Well  What does sleeping well mean?     Sleeping well means getting enough sleep to feel good and stay healthy. How much sleep is enough varies among people.  The number of hours you sleep and how you feel when you wake up are both important. If you do not feel refreshed, you probably need more sleep. Another sign of not getting enough sleep is feeling tired during the day.  Experts recommend that adults get at least 7 or more hours of sleep per day. Children and older adults need more sleep.  Why is getting enough sleep important?  Getting enough quality sleep is a basic part of good health. When your sleep suffers, your physical health, mood, and your thoughts can suffer too. You may find yourself feeling more grumpy or stressed. Not getting enough sleep also can lead to serious problems, including injury, accidents, anxiety, and depression.  What might cause poor sleeping?  Many things can cause sleep problems, including:  Changes to your sleep schedule.  Stress. Stress can be caused by fear about a single event, such as giving a speech. Or you may have ongoing stress, such as worry about work or school.  Depression, anxiety, and other mental or emotional conditions.  Changes in your sleep habits or surroundings. This includes changes that happen where you sleep, such as noise, light, or sleeping in a different bed. It also includes changes in your sleep pattern, such as having jet lag or working a late shift.  Health problems, such as pain, breathing problems, and restless legs syndrome.  Lack of regular exercise.  Using alcohol, nicotine, or caffeine before bed.  How can you help yourself?  Here are some tips that may help you sleep more soundly and wake up feeling more  "refreshed.  Your sleeping area   Use your bedroom only for sleeping and sex. A bit of light reading may help you fall asleep. But if it doesn't, do your reading elsewhere in the house. Try not to use your TV, computer, smartphone, or tablet while you are in bed.  Be sure your bed is big enough to stretch out comfortably, especially if you have a sleep partner.  Keep your bedroom quiet, dark, and cool. Use curtains, blinds, or a sleep mask to block out light. To block out noise, use earplugs, soothing music, or a \"white noise\" machine.  Your evening and bedtime routine   Create a relaxing bedtime routine. You might want to take a warm shower or bath, or listen to soothing music.  Go to bed at the same time every night. And get up at the same time every morning, even if you feel tired.  What to avoid   Limit caffeine (coffee, tea, caffeinated sodas) during the day, and don't have any for at least 6 hours before bedtime.  Avoid drinking alcohol before bedtime. Alcohol can cause you to wake up more often during the night.  Try not to smoke or use tobacco, especially in the evening. Nicotine can keep you awake.  Limit naps during the day, especially close to bedtime.  Avoid lying in bed awake for too long. If you can't fall asleep or if you wake up in the middle of the night and can't get back to sleep within about 20 minutes, get out of bed and go to another room until you feel sleepy.  Avoid taking medicine right before bed that may keep you awake or make you feel hyper or energized. Your doctor can tell you if your medicine may do this and if you can take it earlier in the day.  If you can't sleep   Imagine yourself in a peaceful, pleasant scene. Focus on the details and feelings of being in a place that is relaxing.  Get up and do a quiet or boring activity until you feel sleepy.  Avoid drinking any liquids before going to bed to help prevent waking up often to use the bathroom.  Where can you learn more?  Go to " "https://www.iRewind.net/patiented  Enter J942 in the search box to learn more about \"Learning About Sleeping Well.\"  Current as of: July 10, 2023  Content Version: 14.2 2024 [employer].   Care instructions adapted under license by your healthcare professional. If you have questions about a medical condition or this instruction, always ask your healthcare professional. Healthwise, Incorporated disclaims any warranty or liability for your use of this information.    Bladder Training: Care Instructions  Your Care Instructions     Bladder training is used to treat urge incontinence and stress incontinence. Urge incontinence means that the need to urinate comes on so fast that you can't get to a toilet in time. Stress incontinence means that you leak urine because of pressure on your bladder. For example, it may happen when you laugh, cough, or lift something heavy.  Bladder training can increase how long you can wait before you have to urinate. It can also help your bladder hold more urine. And it can give you better control over the urge to urinate.  It is important to remember that bladder training takes a few weeks to a few months to make a difference. You may not see results right away, but don't give up.  Follow-up care is a key part of your treatment and safety. Be sure to make and go to all appointments, and call your doctor if you are having problems. It's also a good idea to know your test results and keep a list of the medicines you take.  How can you care for yourself at home?  Work with your doctor to come up with a bladder training program that is right for you. You may use one or more of the following methods.  Delayed urination  In the beginning, try to keep from urinating for 5 minutes after you first feel the need to go.  While you wait, take deep, slow breaths to relax. Kegel exercises can also help you delay the need to go to the bathroom.  After some practice, when you can easily " "wait 5 minutes to urinate, try to wait 10 minutes before you urinate.  Slowly increase the waiting period until you are able to control when you have to urinate.  Scheduled urination  Empty your bladder when you first wake up in the morning.  Schedule times throughout the day when you will urinate.  Start by going to the bathroom every hour, even if you don't need to go.  Slowly increase the time between trips to the bathroom.  When you have found a schedule that works well for you, keep doing it.  If you wake up during the night and have to urinate, do it. Apply your schedule to waking hours only.  Kegel exercises  These tighten and strengthen pelvic muscles, which can help you control the flow of urine. (If doing these exercises causes pain, stop doing them and talk with your doctor.) To do Kegel exercises:  Squeeze your muscles as if you were trying not to pass gas. Or squeeze your muscles as if you were stopping the flow of urine. Your belly, legs, and buttocks shouldn't move.  Hold the squeeze for 3 seconds, then relax for 5 to 10 seconds.  Start with 3 seconds, then add 1 second each week until you are able to squeeze for 10 seconds.  Repeat the exercise 10 times a session. Do 3 to 8 sessions a day.  When should you call for help?  Watch closely for changes in your health, and be sure to contact your doctor if:    Your incontinence is getting worse.     You do not get better as expected.   Where can you learn more?  Go to https://www.Heyo.net/patiented  Enter V684 in the search box to learn more about \"Bladder Training: Care Instructions.\"  Current as of: November 15, 2023  Content Version: 14.2 2024 [facility] [employer].   Care instructions adapted under license by your healthcare professional. If you have questions about a medical condition or this instruction, always ask your healthcare professional. Healthwise, Incorporated disclaims any warranty or liability for your use of this " information.

## 2024-11-15 NOTE — PROGRESS NOTES
"I have a 330 mL like oh I wonder if the personal, or notPreventive Care Visit  [facility]  Marilou Arciniega MD PhD, Internal Medicine - Pediatrics  Nov 15, 2024      Assessment & Plan     Sancho was seen today for wellness visit.    Diagnoses and all orders for this visit:    Encounter for Medicare annual wellness exam  -     REVIEW OF HEALTH MAINTENANCE PROTOCOL ORDERS    Hyperlipidemia LDL goal <100  Comments:  LDL at goal  Orders:  -     Lipid panel reflex to direct LDL Fasting; Future    Abnormal sensation in right ear  Comments:  pt declined hearing test  Orders:  -     Adult ENT  Referral; Future    Type 2 diabetes mellitus with diabetic neuropathy, with long-term current use of insulin (H)  Comments:  Follows with the endocrine    Morbid obesity (H)  Comments:  Follows with the endocrine    Stage 3a chronic kidney disease (H)  Comments:  Overall stable       Return in 1 year for annual wellness visit          BMI  Estimated body mass index is 43.6 kg/m  as calculated from the following:    Height as of this encounter: 1.702 m (5' 7\").    Weight as of this encounter: 126.3 kg (278 lb 6.4 oz).   Weight management plan: Follows with the endocrine on this    Counseling  Appropriate preventive services were addressed with this patient via screening, questionnaire, or discussion as appropriate for fall prevention, nutrition, physical activity, Tobacco-use cessation, social engagement, weight loss and cognition.  Checklist reviewing preventive services available has been given to the patient.  Reviewed patient's diet, addressing concerns and/or questions.   She is at risk for lack of exercise and has been provided with information to increase physical activity for the benefit of her well-being.   She is at risk for psychosocial distress and has been provided with information to reduce risk.   Discussed possible causes of fatigue. The patient was provided with written information regarding " signs of hearing loss.   Information on urinary incontinence and treatment options given to patient.           Subjective   Sancho is a 69 year old, presenting for the following:  Wellness Visit        11/15/2024     8:57 AM   Additional Questions   Roomed by J Carlos   Accompanied by Self         11/15/2024     8:57 AM   Patient Reported Additional Medications   Patient reports taking the following new medications None           Sherry Slaughter is a 69 year old female who has Morbid obesity (H); Hypothyroidism; GENESIS (obstructive sleep apnea); Hypertension goal BP (blood pressure) < 140/90; Mitral valve insufficiency; Tubular adenoma of colon; Long-term insulin use (H); Paroxysmal atrial fibrillation (H); and On continuous oral anticoagulation on their pertinent problem list.     Patient has routine follow-up with cardiology for mitral valve insufficiency aortic stenosis paroxysmal atrial fibrillation.   CC endocrine on a regular basis for type 2 diabetes.  A1c is not controlled on recent check.  She has been started on GLP-1 agonist.    Has had a lot of foot and ankle problem, no site orthopedist.  Not steady on her feet, also has knee problem.  Not able to exercise on a regular basis.  Weight loss has been a challenge.    Has had a feeling in the right ear, felt like it was blocked.  Hearing and echo from the right ear.  Hearing is not a problem.  She has been to a hearing place, no earwax, checked for bone-conduction that was normal.  Patient would like to see an ear specialist.      Health Care Directive  Patient does not have a Health Care Directive: Discussed advance care planning with patient; information given to patient to review.      11/15/2024   General Health   How would you rate your overall physical health? (!) FAIR   Feel stress (tense, anxious, or unable to sleep) To some extent      (!) STRESS CONCERN      11/15/2024   Nutrition   Diet: Low salt    Diabetic       Multiple values from one day  are sorted in reverse-chronological order         11/15/2024   Exercise   Days per week of moderate/strenous exercise 2 days   Average minutes spent exercising at this level 20 min      (!) EXERCISE CONCERN      11/15/2024   Social Factors   Frequency of gathering with friends or relatives Twice a week   Worry food won't last until get money to buy more No   Food not last or not have enough money for food? No   Do you have housing? (Housing is defined as stable permanent housing and does not include staying ouside in a car, in a tent, in an abandoned building, in an overnight shelter, or couch-surfing.) Yes   Are you worried about losing your housing? No   Lack of transportation? No   Unable to get utilities (heat,electricity)? No            11/15/2024   Fall Risk   Fallen 2 or more times in the past year? No    Trouble with walking or balance? Yes    Gait Speed Test (Document in seconds) 3.91   Gait Speed Test Interpretation Less than or equal to 5.00 seconds - PASS       Patient-reported          11/15/2024   Activities of Daily Living- Home Safety   Needs help with the following daily activites None of the above   Safety concerns in the home None of the above            11/15/2024   Dental   Dentist two times every year? Yes            11/15/2024   Hearing Screening   Hearing concerns? (!) IT'S HARD TO FOLLOW A CONVERSATION IN A NOISY RESTAURANT OR CROWDED ROOM.            11/15/2024   Driving Risk Screening   Patient/family members have concerns about driving No            11/15/2024   General Alertness/Fatigue Screening   Have you been more tired than usual lately? (!) YES            11/15/2024   Urinary Incontinence Screening   Bothered by leaking urine in past 6 months Yes               Today's PHQ-2 Score:       11/15/2024     8:57 AM   PHQ-2 ( 1999 Pfizer)   PHQ-2 Score Incomplete           11/15/2024   Substance Use   Alcohol more than 3/day or more than 7/wk No   Do you have a current opioid prescription?  No   How severe/bad is pain from 1 to 10? 5/10   Do you use any other substances recreationally? No        Social History     Tobacco Use    Smoking status: Former     Types: Cigarettes     Passive exposure: Past    Smokeless tobacco: Never    Tobacco comments:     tried in early 20s only    Vaping Use    Vaping status: Never Used   Substance Use Topics    Alcohol use: Yes     Comment: 1 drink 1-2 year    Drug use: No           8/15/2024   LAST FHS-7 RESULTS   1st degree relative breast or ovarian cancer Yes   Any relative bilateral breast cancer No   Any male have breast cancer No   Any ONE woman have BOTH breast AND ovarian cancer No   Any woman with breast cancer before 50yrs Yes   2 or more relatives with breast AND/OR ovarian cancer Yes   2 or more relatives with breast AND/OR bowel cancer No           Mammogram Screening - Mammogram every 1-2 years updated in Health Maintenance based on mutual decision making    ASCVD Risk   The ASCVD Risk score (Clarence CHAN, et al., 2019) failed to calculate for the following reasons:    The valid total cholesterol range is 130 to 320 mg/dL      Reviewed and updated as needed this visit by Provider                      Current providers sharing in care for this patient include:  Patient Care Team:  Marilou Arciniega MD PhD as PCP - General (Internal Medicine)  Rufus Hutson DPM as Assigned Musculoskeletal Provider  Kameron Pedraza OD as Assigned Surgical Provider  Patricia Bridges RN as Specialty Care Coordinator (Cardiology)  Marilou Arciniega MD PhD as Assigned PCP  Roslyn Mccracken MD as MD (Cardiovascular Disease)  Millicent Rocha PA-C as Physician Assistant (Endocrinology, Diabetes, and Metabolism)  Millicent Rocha PA-C as Assigned Endocrinology Provider  Roslyn Mccracken MD as Assigned Heart and Vascular Provider  No Ref-Primary, Physician  Leni Keenan [facility] as Pharmacist (Pharmacist)    The following health maintenance items are reviewed in Epic and correct as of today:  Health  "Maintenance   Topic Date Due    HF ACTION PLAN  Never done    RSV VACCINE (1 - Risk 60-74 years 1-dose series) Never done    DIABETIC FOOT EXAM  11/16/2024    CBC  03/14/2025    A1C  05/14/2025    EYE EXAM  07/31/2025    MAMMO SCREENING  08/15/2025    ALT  09/23/2025    TSH W/FREE T4 REFLEX  09/23/2025    BMP  11/14/2025    LIPID  11/14/2025    MICROALBUMIN  11/14/2025    MEDICARE ANNUAL WELLNESS VISIT  11/15/2025    ANNUAL REVIEW OF HM ORDERS  11/15/2025    FALL RISK ASSESSMENT  11/15/2025    DTAP/TDAP/TD IMMUNIZATION (5 - Td or Tdap) 10/13/2027    DEXA  02/11/2029    COLORECTAL CANCER SCREENING  09/12/2029    ADVANCE CARE PLANNING  11/15/2029    HEPATITIS C SCREENING  Completed    PHQ-2 (once per calendar year)  Completed    INFLUENZA VACCINE  Completed    Pneumococcal Vaccine: 65+ Years  Completed    ZOSTER IMMUNIZATION  Completed    COVID-19 Vaccine  Completed    Medicare Annual MTM Pharmacist Visit (once per calendar year)  Addressed    HPV IMMUNIZATION  Aged Out    MENINGITIS IMMUNIZATION  Aged Out    RSV MONOCLONAL ANTIBODY  Aged Out    URINE DRUG SCREEN  Discontinued    LUNG CANCER SCREENING  Discontinued         Review of Systems  Constitutional, HEENT, cardiovascular, pulmonary, gi and gu systems are negative, except as otherwise noted.     Objective    Exam  /67 (BP Location: Right arm, Patient Position: Sitting, Cuff Size: Adult Large)   Pulse 74   Temp (!) 96.7  F (35.9  C) (Tympanic)   Resp 18   Ht 1.702 m (5' 7\")   Wt 126.3 kg (278 lb 6.4 oz)   LMP 10/02/2007   SpO2 98%   BMI 43.60 kg/m     Estimated body mass index is 43.6 kg/m  as calculated from the following:    Height as of this encounter: 1.702 m (5' 7\").    Weight as of this encounter: 126.3 kg (278 lb 6.4 oz).    Physical Exam  GENERAL: alert and no distress  EYES: Eyes grossly normal to inspection, PERRL and conjunctivae and sclerae normal  HENT: normal cephalic/atraumatic and ear canals and TM's normal  NECK: no adenopathy, no " asymmetry, masses, or scars  RESP: lungs clear to auscultation - no rales, rhonchi or wheezes  CV: regular rate and rhythm, normal S1 S2, no S3 or S4, no murmur, click or rub, no peripheral edema  ABDOMEN: soft, nontender, no hepatosplenomegaly, no masses and bowel sounds normal  MS: no gross musculoskeletal defects noted, no edema  SKIN: no suspicious lesions or rashes  NEURO: Normal strength and tone, mentation intact and speech normal  PSYCH: mentation appears normal, affect normal/bright         11/15/2024   Mini Cog   Clock Draw Score 2 Normal   3 Item Recall 3 objects recalled   Mini Cog Total Score 5                 Signed Electronically by: Marilou Arciniega MD PhD

## 2024-11-25 ENCOUNTER — TELEPHONE (OUTPATIENT)
Dept: CARDIOLOGY | Facility: CLINIC | Age: 69
End: 2024-11-25
Payer: COMMERCIAL

## 2024-11-25 NOTE — TELEPHONE ENCOUNTER
Left Voicemail (1st Attempt) and Sent FanBridgehart (1st Attempt) for the patient to call back and schedule the following:    Appointment type: Return EP  Provider: Jamee Manuel  Return date: March 2025 or next available  Specialty phone number: [phone]  Additional appointment(s) needed: yes   Additonal Notes: lab prior    Attempted to schedule a return EP with Jamee Manuel(prev Natacha Schroeder pt), with labs prior. Due in March 2025.    Also sent a  PublicStuff message.    Jamila MCBRIDE/Jeannette Procedure    [facility]   Neurology, NeuroSurgery, NeuroPsychology, Pain Management and Cardiology Specialties  Medical/Surgical Adult Specialties

## 2024-12-02 ENCOUNTER — MYC MEDICAL ADVICE (OUTPATIENT)
Dept: CARDIOLOGY | Facility: CLINIC | Age: 69
End: 2024-12-02

## 2024-12-02 ENCOUNTER — LAB (OUTPATIENT)
Dept: LAB | Facility: CLINIC | Age: 69
End: 2024-12-02
Payer: COMMERCIAL

## 2024-12-02 DIAGNOSIS — I10 BENIGN ESSENTIAL HYPERTENSION: ICD-10-CM

## 2024-12-02 DIAGNOSIS — R94.4 DECREASED GFR: ICD-10-CM

## 2024-12-02 DIAGNOSIS — I50.30 HEART FAILURE WITH PRESERVED EJECTION FRACTION, NYHA CLASS I (H): ICD-10-CM

## 2024-12-02 DIAGNOSIS — I50.30 HEART FAILURE WITH PRESERVED EJECTION FRACTION, NYHA CLASS I (H): Primary | ICD-10-CM

## 2024-12-02 LAB
ANION GAP SERPL CALCULATED.3IONS-SCNC: 16 MMOL/L (ref 7–15)
BUN SERPL-MCNC: 27.7 MG/DL (ref 8–23)
CALCIUM SERPL-MCNC: 10.2 MG/DL (ref 8.8–10.4)
CHLORIDE SERPL-SCNC: 104 MMOL/L (ref 98–107)
CREAT SERPL-MCNC: 1.36 MG/DL (ref 0.51–0.95)
EGFRCR SERPLBLD CKD-EPI 2021: 41 ML/MIN/1.73M2
GLUCOSE SERPL-MCNC: 115 MG/DL (ref 70–99)
HCO3 SERPL-SCNC: 20 MMOL/L (ref 22–29)
POTASSIUM SERPL-SCNC: 4.7 MMOL/L (ref 3.4–5.3)
SODIUM SERPL-SCNC: 140 MMOL/L (ref 135–145)

## 2024-12-02 PROCEDURE — 36415 COLL VENOUS BLD VENIPUNCTURE: CPT

## 2024-12-02 PROCEDURE — 80048 BASIC METABOLIC PNL TOTAL CA: CPT

## 2024-12-05 NOTE — CONFIDENTIAL NOTE
DIAGNOSIS:    Heart failure with preserved ejection fraction, NYHA class I (H)   Decreased GFR      DATE RECEIVED:  01.10.2025    NOTES STATUS DETAILS   OFFICE NOTE from referring provider Internal 12.02.2024  Ismael Hill MD    MEDICATION LIST Internal    LABS     CBC Internal 03.14.2024   CMP Internal 03.14.2024   BMP Internal 12.02.2024   UA Internal 12.10.2020   URINE PROTEIN Internal 12.10.2020

## 2024-12-09 ENCOUNTER — DOCUMENTATION ONLY (OUTPATIENT)
Dept: CARDIOLOGY | Facility: CLINIC | Age: 69
End: 2024-12-09
Payer: COMMERCIAL

## 2024-12-09 DIAGNOSIS — I50.30 HEART FAILURE WITH PRESERVED EJECTION FRACTION, NYHA CLASS I (H): Primary | ICD-10-CM

## 2024-12-09 NOTE — PROGRESS NOTES
Sherry Slaughter has an upcoming lab appointment:    Future Appointments   Date Time Provider Department Center   12/18/2024 10:15 AM LAB FIRST FLOOR [facility]   12/18/2024 10:40 AM [facility] [place]   1/10/2025 10:00 AM  LAB [facility]   1/10/2025 11:00 AM Courtney Dominguez MD [facility]   3/25/2025  9:00 AM Scotty Rodriguez AuD AUD [place]   3/25/2025 12:00 PM [facility] [place]   5/15/2025 10:10 AM [facility] [place]   11/13/2025  8:15 AM LAB FIRST FLOOR [facility]   11/13/2025  9:00 AM Ismael Hill [facility] [place]   11/19/2025  9:00 AM Marilou Arciniega MD PhD [facility]     Patient is scheduled for the following lab(s): Pt is requesting labs for 12/18/24 from Robinson with no orders in chart.    Thank you, Fany Cosme

## 2024-12-18 ENCOUNTER — LAB (OUTPATIENT)
Dept: LAB | Facility: CLINIC | Age: 69
End: 2024-12-18
Payer: COMMERCIAL

## 2024-12-18 ENCOUNTER — OFFICE VISIT (OUTPATIENT)
Dept: CARDIOLOGY | Facility: CLINIC | Age: 69
End: 2024-12-18
Payer: COMMERCIAL

## 2024-12-18 VITALS
DIASTOLIC BLOOD PRESSURE: 72 MMHG | BODY MASS INDEX: 41.37 KG/M2 | SYSTOLIC BLOOD PRESSURE: 128 MMHG | HEIGHT: 68 IN | WEIGHT: 273 LBS | OXYGEN SATURATION: 99 % | HEART RATE: 64 BPM

## 2024-12-18 DIAGNOSIS — E11.9 TYPE 2 DIABETES MELLITUS WITHOUT COMPLICATION, WITH LONG-TERM CURRENT USE OF INSULIN (H): ICD-10-CM

## 2024-12-18 DIAGNOSIS — I50.30 HEART FAILURE WITH PRESERVED EJECTION FRACTION, NYHA CLASS I (H): ICD-10-CM

## 2024-12-18 DIAGNOSIS — G47.33 OSA (OBSTRUCTIVE SLEEP APNEA): ICD-10-CM

## 2024-12-18 DIAGNOSIS — I50.9 CONGESTIVE HEART FAILURE, UNSPECIFIED HF CHRONICITY, UNSPECIFIED HEART FAILURE TYPE (H): ICD-10-CM

## 2024-12-18 DIAGNOSIS — E66.01 MORBID OBESITY (H): ICD-10-CM

## 2024-12-18 DIAGNOSIS — Z79.4 TYPE 2 DIABETES MELLITUS WITHOUT COMPLICATION, WITH LONG-TERM CURRENT USE OF INSULIN (H): ICD-10-CM

## 2024-12-18 DIAGNOSIS — I48.0 PAROXYSMAL ATRIAL FIBRILLATION (H): ICD-10-CM

## 2024-12-18 DIAGNOSIS — M54.9 BACK PAIN: Primary | ICD-10-CM

## 2024-12-18 DIAGNOSIS — I10 BENIGN ESSENTIAL HYPERTENSION: ICD-10-CM

## 2024-12-18 LAB
ANION GAP SERPL CALCULATED.3IONS-SCNC: 13 MMOL/L (ref 7–15)
BUN SERPL-MCNC: 14.4 MG/DL (ref 8–23)
CALCIUM SERPL-MCNC: 10.1 MG/DL (ref 8.8–10.4)
CHLORIDE SERPL-SCNC: 103 MMOL/L (ref 98–107)
CREAT SERPL-MCNC: 0.97 MG/DL (ref 0.51–0.95)
EGFRCR SERPLBLD CKD-EPI 2021: 62 ML/MIN/1.73M2
GLUCOSE SERPL-MCNC: 147 MG/DL (ref 70–99)
HCO3 SERPL-SCNC: 22 MMOL/L (ref 22–29)
POTASSIUM SERPL-SCNC: 4.5 MMOL/L (ref 3.4–5.3)
SODIUM SERPL-SCNC: 138 MMOL/L (ref 135–145)

## 2024-12-18 PROCEDURE — 36415 COLL VENOUS BLD VENIPUNCTURE: CPT

## 2024-12-18 PROCEDURE — 80048 BASIC METABOLIC PNL TOTAL CA: CPT

## 2024-12-18 RX ORDER — FUROSEMIDE 20 MG/1
TABLET ORAL
Qty: 30 TABLET | Refills: 0 | COMMUNITY
Start: 2024-12-18

## 2024-12-18 NOTE — PATIENT INSTRUCTIONS
Take your medicines every day, as directed    Changes made today:  No medication changes/ Take 1 time dose of lasix 20 mg.   Referral to physico-therapy   Okay to cancel your appointment with nephrology    Monitor Your Weight and Symptoms    Contact us if you:    Gain 2 pounds in one day or 5 pounds in one week  Feel more short of breath  Notice more leg swelling  Feel lightheadeded   Change your lifestyle    Limit Salt or Sodium:  2000 mg  Limit Fluids:  2000 mL or approximately 64 ounces  Eat a Heart Healthy Diet  Low in saturated fats  Stay Active:  Aim to move at least 150 minutes every  week         To Contact us    During Business Hours:  [phone], option # 1      After hours, weekends or holidays:   [phone], Option #4  Ask to speak to the On-Call Cardiologist. Inform them you are a CORE/heart failure patient at the [facility].     Use [employer] allows you to communicate directly with your heart team through secure messaging.  [employer] can be accessed any time on your phone, computer, or tablet.  If you need assistance, we'd be happy to help!         Keep your Heart Appointments:    CORE in one year  Follow-up with Dr Hill in August 2025          Heart Failure Support Group  Virtual meetings will continue in 2024 Please reach out if you would like to attend and we can get you the information you need to log in.     2024 dates:    Monday, August 5th, 1-2pm     Monday, September 9th, 1-2pm     Monday, October 7th, 1-2pm     Monday, November 4th, 1-2pm     Monday, December 2nd, 1-2pm     Follow the American Heart Association Diet and Lifestyle recommendations:  Limit saturated fat, trans fat, sodium, red meat, sweets and sugar-sweetened beverages. If you choose to eat red meat, compare labels and select the leanest cuts available.  Aim for at least 150 minutes of moderate physical activity or 75 minutes of vigorous physical activity - or an equal combination of both - each week.      During business hours: [phone], press option # 1 to schedule an appointment or send a message to your care team     After hours, weekends or holidays: On Call Cardiologist- [phone]   option #4 and ask to speak to the on-call Cardiologist. Inform them you are a CORE/heart failure patient at the [facility].

## 2024-12-18 NOTE — NURSING NOTE
Med Reconcile: Reviewed and verified all current medications with the patient. The updated medication list was printed and given to the patient./ No medication changes. Take 1 time dose Lasix (20 mg) over the week-end.         Return Appointment  -CORE follow-up in 1 year   -Follow-up with Dr Hill in August 2025   -Referral to PT

## 2024-12-18 NOTE — PROGRESS NOTES
1.  Type 2 diabetes.   2.  Hypertension.   3.  Hyperlipidemia.   4.  Depression.   5.  Obstructive sleep apnea, currently untreated.   6.  Hypothyroidism.   7.  Diverticulosis.   8.  Low back pain.   9.  Irritable bowel syndrome.     On 11/25/2020, Dr. Aranda saw the patient and sent her to the emergency room for further treatment of her AFib.  She ended up having her rate controlled and had an echocardiogram performed.  She then had ELIAS-guided cardioversion performed on 12/04/2020 successfully.  She was then hospitalized again on 12/09/2020 for pulmonary edema, likely secondary to being in rapid AFib for quite some time in the past in addition to having her hydrochlorothiazide discontinued.  She was in pulmonary edema on 12/09/2020, was diuresed with 40 mg of IV Lasix and had p.o. Lasix 20 started and she was sent here for further evaluation.       5/31/23- she is on amiodarone and feels so much better. HR regular and in the 60's. No swelling in the LE. She has no BAEZ or SOB. She has a good appetite.  Weight at home 292 lbs.  She is feeling well overall.     12/6/23- she has been traveling and states she has been feeling so much better. No swelling in the LE.  Appetite is good. Scale at home 281 lbs. On Ozempic.  No BAEZ or SOB.  HR 60's. She has no other concerns today.     12/18/24- last saw Dr. Hill in November no med changes made.  Weight 272-274 lbs.  She has some joint pains she says. Appetite has been good. She will start Mounjaro. No SOB.  Off the Lasix and potassium. She feels really good.  She has some lower back pain.     CURRENT MEDICATIONS:  She is taking diltiazem 360 mg a day, Lasix 20 mg a day, insulin, levothyroxine, Victoza, lisinopril 20 mg a day, metformin, hydrochlorothiazide 50 mg  metoprolol succinate 200 mg a day. rivaroxaban 20 mg a day, rosuvastatin 10 mg a day.       ROS:   Constitutional: No fever, chills, or sweats.  ENT: No visual disturbance, ear ache, epistaxis, sore throat.    Allergies/Immunologic: Negative  Respiratory: No cough, hemoptysis.   Cardiovascular: As per HPI.   GI: As per HPI.   : No urinary frequency, dysuria, or hematuria.   Integument: Negative.   Psychiatric: Negative.   Neuro: Negative.   Endocrinology: negative   Musculoskeletal: negative    EXAM:   Constitutional - WDWN, no acute distress   Eyes - no redness or discharge, nonicteric sclera  Respiratory - nonlabored breathing, able to speak in full sentences without difficulty  CV: no visible edema of visualized upper extremities.  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 She appears to be euvolemic. No new concerns.    HFpEF:  BP: controlled   Fluid status euvolemic  Aldosterone antagonist: no  Ischemia evaluation: (complete/pending/not within the goals of care)   ACEi/ARB - Lisinopril  BB - Metoprolol   SCD prophylaxis - N/A, EF is normal   GLP-1- Mounjaro  NSAID use: Contraindicated, reviewed with patient   Sleep Apnea Evaluation: (yes uses CPAP/ yes refuses CPAP/ no/pending evaluation)      1.  Atrial fibrillation with rapid ventricular response. She underwent ELIAS-guided cardioversion on 12/04/2020.  She has a relatively structurally normal heart.  She is currently stable in normal sinus rhythm on significant doses of metoprolol and diltiazem.  We will continue to monitor.  She is tolerating anticoagulation well without gross bleeding.      2.  Hypertension.     Blood pressures do appear to be well controlled as they have been at 120 or lower systolic daily.      3.  Congestive heart failure.     Her dry weight is likely in the 280's pound range. LVEF 60-65%        4.  Sleep apnea. She has been trying to use the Bi-pap more.      5.  Mild aortic stenosis.    This was seen on recent echo and a mean  gradient was only 12 mmHg. continue to follow-through time.       I reviewed patients pertinent clinical laboratory studies  I reviewed patients medications  I reviewed patients pertinent medical records      Plan:  Referral PT - she would like to see if it helps low back pain.   No med changes  CORE in 12 months         Robinson FERNANDEZ NP-C  Advanced Heart Failure Nurse Practitioner  [facility]

## 2024-12-18 NOTE — NURSING NOTE
"Chief Complaint   Patient presents with    Follow Up       Initial /72 (BP Location: Right arm, Patient Position: Chair, Cuff Size: Adult Large)   Pulse 64   Ht 1.727 m (5' 8\")   Wt 123.8 kg (273 lb)   LMP 10/02/2007   SpO2 99%   BMI 41.51 kg/m   Estimated body mass index is 41.51 kg/m  as calculated from the following:    Height as of this encounter: 1.727 m (5' 8\").    Weight as of this encounter: 123.8 kg (273 lb)..  BP completed using cuff size: kalpana SCHMIDT CNA  "

## 2025-01-07 ENCOUNTER — THERAPY VISIT (OUTPATIENT)
Dept: PHYSICAL THERAPY | Facility: CLINIC | Age: 70
End: 2025-01-07
Attending: NURSE PRACTITIONER
Payer: COMMERCIAL

## 2025-01-07 DIAGNOSIS — G89.29 CHRONIC RIGHT-SIDED LOW BACK PAIN WITH RIGHT-SIDED SCIATICA: Primary | ICD-10-CM

## 2025-01-07 DIAGNOSIS — M54.41 CHRONIC RIGHT-SIDED LOW BACK PAIN WITH RIGHT-SIDED SCIATICA: Primary | ICD-10-CM

## 2025-01-07 PROCEDURE — 97161 PT EVAL LOW COMPLEX 20 MIN: CPT | Mod: GP | Performed by: PHYSICAL THERAPIST

## 2025-01-07 PROCEDURE — 97110 THERAPEUTIC EXERCISES: CPT | Mod: GP | Performed by: PHYSICAL THERAPIST

## 2025-01-09 ENCOUNTER — MYC REFILL (OUTPATIENT)
Dept: ENDOCRINOLOGY | Facility: CLINIC | Age: 70
End: 2025-01-09
Payer: COMMERCIAL

## 2025-01-09 DIAGNOSIS — Z79.4 TYPE 2 DIABETES MELLITUS TREATED WITH INSULIN (H): ICD-10-CM

## 2025-01-09 DIAGNOSIS — E11.9 TYPE 2 DIABETES MELLITUS TREATED WITH INSULIN (H): ICD-10-CM

## 2025-01-09 RX ORDER — TIRZEPATIDE 7.5 MG/.5ML
7.5 INJECTION, SOLUTION SUBCUTANEOUS
Qty: 2 ML | Refills: 0 | Status: CANCELLED | OUTPATIENT
Start: 2025-01-09

## 2025-01-09 NOTE — TELEPHONE ENCOUNTER
Per Millicent Rocha:    Millicent Rocha PA-C to [facility] Endocrinology Adult [facility]    1/9/25  9:44 AM  Let's increase to 10 mg weekly.    Thanks,  Millicent      Will send to Millicent to sign updated prescription.       Kylah Katz, RN  Endocrine Care Coordinator  [facility]

## 2025-01-10 ENCOUNTER — PRE VISIT (OUTPATIENT)
Dept: NEPHROLOGY | Facility: CLINIC | Age: 70
End: 2025-01-10

## 2025-01-15 ENCOUNTER — THERAPY VISIT (OUTPATIENT)
Dept: PHYSICAL THERAPY | Facility: CLINIC | Age: 70
End: 2025-01-15
Payer: COMMERCIAL

## 2025-01-15 DIAGNOSIS — M54.50 CHRONIC LOW BACK PAIN: Primary | ICD-10-CM

## 2025-01-15 DIAGNOSIS — G89.29 CHRONIC LOW BACK PAIN: Primary | ICD-10-CM

## 2025-01-15 PROCEDURE — 97140 MANUAL THERAPY 1/> REGIONS: CPT | Mod: GP | Performed by: PHYSICAL THERAPY ASSISTANT

## 2025-01-15 PROCEDURE — 97110 THERAPEUTIC EXERCISES: CPT | Mod: GP | Performed by: PHYSICAL THERAPY ASSISTANT

## 2025-01-15 RX ORDER — TIRZEPATIDE 7.5 MG/.5ML
7.5 INJECTION, SOLUTION SUBCUTANEOUS
Qty: 2 ML | Refills: 0 | OUTPATIENT
Start: 2025-01-15

## 2025-01-22 ENCOUNTER — THERAPY VISIT (OUTPATIENT)
Dept: PHYSICAL THERAPY | Facility: CLINIC | Age: 70
End: 2025-01-22
Payer: COMMERCIAL

## 2025-01-22 DIAGNOSIS — G89.29 CHRONIC LOW BACK PAIN: Primary | ICD-10-CM

## 2025-01-22 DIAGNOSIS — M54.50 CHRONIC LOW BACK PAIN: Primary | ICD-10-CM

## 2025-01-22 PROCEDURE — 97110 THERAPEUTIC EXERCISES: CPT | Mod: GP | Performed by: PHYSICAL THERAPY ASSISTANT

## 2025-01-22 PROCEDURE — 97140 MANUAL THERAPY 1/> REGIONS: CPT | Mod: GP | Performed by: PHYSICAL THERAPY ASSISTANT

## 2025-02-04 ENCOUNTER — THERAPY VISIT (OUTPATIENT)
Dept: PHYSICAL THERAPY | Facility: CLINIC | Age: 70
End: 2025-02-04
Payer: COMMERCIAL

## 2025-02-04 DIAGNOSIS — G89.29 CHRONIC LOW BACK PAIN: Primary | ICD-10-CM

## 2025-02-04 DIAGNOSIS — M54.50 CHRONIC LOW BACK PAIN: Primary | ICD-10-CM

## 2025-02-04 PROCEDURE — 97110 THERAPEUTIC EXERCISES: CPT | Mod: GP | Performed by: PHYSICAL THERAPY ASSISTANT

## 2025-02-05 ENCOUNTER — MYC MEDICAL ADVICE (OUTPATIENT)
Dept: ENDOCRINOLOGY | Facility: CLINIC | Age: 70
End: 2025-02-05
Payer: COMMERCIAL

## 2025-02-05 DIAGNOSIS — Z79.4 TYPE 2 DIABETES MELLITUS TREATED WITH INSULIN (H): ICD-10-CM

## 2025-02-05 DIAGNOSIS — E11.9 TYPE 2 DIABETES MELLITUS TREATED WITH INSULIN (H): ICD-10-CM

## 2025-02-07 DIAGNOSIS — Z79.4 TYPE 2 DIABETES MELLITUS WITH HYPERGLYCEMIA, WITH LONG-TERM CURRENT USE OF INSULIN (H): ICD-10-CM

## 2025-02-07 DIAGNOSIS — E11.65 TYPE 2 DIABETES MELLITUS WITH HYPERGLYCEMIA, WITH LONG-TERM CURRENT USE OF INSULIN (H): ICD-10-CM

## 2025-02-10 RX ORDER — TIRZEPATIDE 10 MG/.5ML
INJECTION, SOLUTION SUBCUTANEOUS
Qty: 2 ML | Refills: 0 | OUTPATIENT
Start: 2025-02-10

## 2025-02-11 DIAGNOSIS — Z79.4 TYPE 2 DIABETES MELLITUS TREATED WITH INSULIN (H): Primary | ICD-10-CM

## 2025-02-11 DIAGNOSIS — E11.9 TYPE 2 DIABETES MELLITUS TREATED WITH INSULIN (H): Primary | ICD-10-CM

## 2025-02-11 RX ORDER — TIRZEPATIDE 12.5 MG/.5ML
12.5 INJECTION, SOLUTION SUBCUTANEOUS
Qty: 2 ML | Refills: 1 | Status: SHIPPED | OUTPATIENT
Start: 2025-02-11

## 2025-02-12 RX ORDER — METFORMIN HYDROCHLORIDE 500 MG/1
TABLET, EXTENDED RELEASE ORAL
Qty: 360 TABLET | Refills: 3 | Status: SHIPPED | OUTPATIENT
Start: 2025-02-12

## 2025-02-12 NOTE — TELEPHONE ENCOUNTER
Last Written Prescription:     metFORMIN (GLUCOPHAGE XR) 500 MG 24 hr tablet 360 tablet 3 2/22/2024 -- No   Sig - Route: Take 4 tablets (2,000 mg) by mouth at bedtime TAKE 4 TABLETS AT BEDTIME - Oral     ----------------------  Last Visit Date: 11/15/24  Future Visit Date: 5/15/25 [facility]  ----------------------      [x]  Refill decision: Medication refilled per  Medication Refill in Ambulatory Care  policy.     Hemoglobin A1C   Date Value Ref Range Status   11/14/2024 6.6 (H) 0.0 - 5.6 % Final     Comment:     Normal <5.7%   Prediabetes 5.7-6.4%    Diabetes 6.5% or higher     Note: Adopted from ADA consensus guidelines.   05/25/2022 7.1 (A) 0.0 - 5.7 % Final    Last Comprehensive Metabolic Panel:  Lab Results   Component Value Date     12/18/2024    POTASSIUM 4.5 12/18/2024    CHLORIDE 103 12/18/2024    CO2 22 12/18/2024    ANIONGAP 13 12/18/2024     (H) 12/18/2024    BUN 14.4 12/18/2024    CR 0.97 (H) 12/18/2024    GFRESTIMATED 62 12/18/2024    CASSIDY 10.1 12/18/2024              Request from pharmacy:  Requested Prescriptions   Pending Prescriptions Disp Refills    metFORMIN (GLUCOPHAGE XR) 500 MG 24 hr tablet [Pharmacy Med Name: METFORMIN HCL ER 500MG TB24] 360 tablet 3     Sig: TAKE 4 TABLETS (2,000 MG) BY MOUTH AT BEDTIME       Biguanide Agents Failed - 2/12/2025  3:33 PM        Failed - Medication is active on med list and the sig matches. RN to manually verify dose and sig if red X/fail.     If the protocol passes (green check), you do not need to verify med dose and sig.    A prescription matches if they are the same clinical intention.    For Example: once daily and every morning are the same.    For all fails (red x), verify dose and sig.    If the refill does match what is on file, the RN can still proceed to approve the refill request.     If they do not match, route to the appropriate provider.             Passed - Patient is age 10 or older        Passed - Patient has documented A1c  within the specified period of time.     If HgbA1C is 8 or greater, it needs to be on file within the past 3 months.  If less than 8, must be on file within the past 6 months.     Recent Labs   Lab Test 11/14/24  0826 12/06/23  1007 09/28/23  0938   A1C 6.6*   < >  --    HEMOGLOBINA1  --   --  6.9*    < > = values in this interval not displayed.             Passed - Patient does NOT have a diagnosis of CHF.        Passed - Medication indicated for associated diagnosis     Medication is associated with one or more of the following diagnoses:     Gestational diabetes mellitus     Hyperinsulinar obesity     Hypersecretion of ovarian androgens    Non-alcoholic fatty liver    Polycystic ovarian syndrome               Pre-diabetes (DM 2 prevention)    Type 2 diabetes mellitus     Weight gain, antipsychotic therapy-induced    Impaired fasting glucose          Passed - Has GFR on file in past 12 months and most recent value is normal        Passed - Recent (6 mo) or future (90 days) visit within the authorizing provider's specialty     The patient must have completed an in-person or virtual visit within the past 6 months or has a future visit scheduled within the next 90 days with the authorizing provider s specialty.  Urgent care and e-visits do not quality as an office visit for this protocol.          Passed - Patient is not pregnant        Passed - Patient has not had a positive pregnancy test within the past 12 mos.

## 2025-03-04 ENCOUNTER — MYC MEDICAL ADVICE (OUTPATIENT)
Dept: ENDOCRINOLOGY | Facility: CLINIC | Age: 70
End: 2025-03-04
Payer: COMMERCIAL

## 2025-03-04 DIAGNOSIS — E11.9 TYPE 2 DIABETES MELLITUS TREATED WITH INSULIN (H): Primary | ICD-10-CM

## 2025-03-04 DIAGNOSIS — Z79.4 TYPE 2 DIABETES MELLITUS TREATED WITH INSULIN (H): Primary | ICD-10-CM

## 2025-03-24 ASSESSMENT — ENCOUNTER SYMPTOMS
CONSTITUTIONAL NEGATIVE: 1
EYES NEGATIVE: 1
RESPIRATORY NEGATIVE: 1

## 2025-03-25 ENCOUNTER — OFFICE VISIT (OUTPATIENT)
Dept: AUDIOLOGY | Facility: CLINIC | Age: 70
End: 2025-03-25
Payer: COMMERCIAL

## 2025-03-25 ENCOUNTER — OFFICE VISIT (OUTPATIENT)
Dept: OTOLARYNGOLOGY | Facility: CLINIC | Age: 70
End: 2025-03-25
Payer: COMMERCIAL

## 2025-03-25 DIAGNOSIS — H61.23 BILATERAL IMPACTED CERUMEN: ICD-10-CM

## 2025-03-25 DIAGNOSIS — H90.3 SNHL (SENSORY-NEURAL HEARING LOSS), ASYMMETRICAL: Primary | ICD-10-CM

## 2025-03-25 DIAGNOSIS — H93.8X1 ABNORMAL SENSATION IN RIGHT EAR: ICD-10-CM

## 2025-03-25 DIAGNOSIS — H60.8X1 CHRONIC ECZEMATOUS OTITIS EXTERNA OF RIGHT EAR: ICD-10-CM

## 2025-03-25 PROCEDURE — 92557 COMPREHENSIVE HEARING TEST: CPT | Performed by: AUDIOLOGIST

## 2025-03-25 PROCEDURE — 92550 TYMPANOMETRY & REFLEX THRESH: CPT | Performed by: AUDIOLOGIST

## 2025-03-25 RX ORDER — FLUOCINOLONE ACETONIDE 0.11 MG/ML
4 OIL AURICULAR (OTIC) DAILY PRN
Qty: 20 ML | Refills: 0 | Status: SHIPPED | OUTPATIENT
Start: 2025-03-25

## 2025-03-25 ASSESSMENT — ENCOUNTER SYMPTOMS
TINGLING: 0
BLURRED VISION: 0
WEAKNESS: 0
DOUBLE VISION: 0
HEARTBURN: 1

## 2025-03-25 NOTE — LETTER
3/25/2025      Sherry Slaughter  [address]  [place] [address]      Dear Colleague,    Thank you for referring your patient, Sherry Slaughter, to the [facility]. Please see a copy of my visit note below.    Chief Complaint   Patient presents with     Consult     Right ear fullness and itchiness, ongoing for a few years,. Has tried hydrogen peroxide as well as hydrocortisone in the ear to try and clean it out, but it feels like the ear is clogged further in the canal than what she can reach. Denies any ear pain, mainly just discomfort when it is clogged. Also has pulsatile tinnitus at nighttime. Has not previously tried any nasal sprays.        PCP: Marilou Arciniega     Referring Provider: Marilou Arciniega    [facility]     ENT Problem List:  Patient Active Problem List   Diagnosis     Morbid obesity (H)     Diverticulosis of large intestine     Nonspecific abnormal results of cardiovascular function study     FAMILY HX COLON CANCER     Nonallopathic lesion of thoracic region     Hypothyroidism     GENESIS (obstructive sleep apnea)     Irritable bowel syndrome     Family history of malignant neoplasm of breast     Type 2 diabetes mellitus treated with insulin (H)     History of major depression     OSTEOARTHRITIS L KNEE  s/p knee replacement on the left      Hypertension goal BP (blood pressure) < 140/90     Family history of stroke (cerebrovascular)     Family history of other cardiovascular diseases     JOINT PAIN-ANKLE - right ankle      Mitral valve insufficiency     Hyperlipidemia LDL goal <100     Aortic sclerosis     Tubular adenoma of colon     History of viral hepatitis, type B     Chronic low back pain     Long-term insulin use (H)     S/P total knee replacement using cement, right     Lumbago     Posterior vitreous detachment of right eye     Pseudophakia of both eyes     Paroxysmal atrial fibrillation (H)     SOB (shortness of breath)     Bunion     On continuous oral  anticoagulation     Type 2 diabetes mellitus with diabetic neuropathy, with long-term current use of insulin (H)      Current Medications:  Current Outpatient Medications   Medication Sig Dispense Refill     amiodarone (PACERONE) 200 MG tablet Take 1 tablet (200 mg) by mouth daily. 90 tablet 3     Continuous Blood Gluc  (DEXCOM G7 ) KATIA Use to read blood sugars as per 's instructions. 1 each 0     Continuous Glucose Sensor (DEXCOM G7 SENSOR) MISC CHANGE ONE SENSOR EVERY 10 DAYS 9 each 3     diltiazem ER COATED BEADS (CARDIZEM CD) 360 MG 24 hr capsule Take 1 capsule (360 mg) by mouth daily. 90 capsule 3     furosemide (LASIX) 20 MG tablet Take 1 tablet ( 20 mg) daily as needed. 30 tablet 0     insulin glargine U-300 (TOUJEO MAX SOLOSTAR) 300 UNIT/ML (2 units dial) pen Inject 100 Units subcutaneously at bedtime. 30 mL 1     insulin pen needle (GNP ToperaFINE PEN NEEDLES) 31G X 8 MM miscellaneous Uses 3 times daily or as directed 300 each 3     levothyroxine (SYNTHROID/LEVOTHROID) 75 MCG tablet Take 1 tablet (75 mcg) by mouth daily 90 tablet 3     lisinopril (ZESTRIL) 20 MG tablet Take 1 tablet (20 mg) by mouth daily. 90 tablet 3     metFORMIN (GLUCOPHAGE XR) 500 MG 24 hr tablet TAKE 4 TABLETS (2,000 MG) BY MOUTH AT BEDTIME 360 tablet 3     Multiple Vitamins-Minerals (ADVANCED DIABETIC MULTIVITAMIN) TABS Take 1 tablet by mouth every morning       NOVOLOG FLEXPEN 100 UNIT/ML soln Inject 10 Units subcutaneously 2 times daily (with meals). 30 mL 0     ORDER FOR DME, SET TO LOCAL PRINT, Respironics REMSTAR 60 Series Auto CPAP 8-15cm H2O, Airfit P10 nasal pillow mask w/medium pillows       potassium chloride ceasar ER (KLOR-CON M20) 20 MEQ CR tablet Take 1 tablet (20 mEq) by mouth daily. 90 tablet 3     rivaroxaban ANTICOAGULANT (XARELTO ANTICOAGULANT) 20 MG TABS tablet Take 1 tablet (20 mg) by mouth daily (with dinner). 90 tablet 3     rosuvastatin (CRESTOR) 10 MG tablet Take 1 tablet (10 mg) by  mouth daily. 90 tablet 3     spironolactone (ALDACTONE) 25 MG tablet Take 1 tablet (25 mg) by mouth daily. 90 tablet 3     tirzepatide (MOUNJARO) 15 MG/0.5ML SOAJ auto-injector pen Inject 0.5 mLs (15 mg) subcutaneously once a week. 2 mL 2     Garlic 1000 MG CAPS Take 1 capsule by mouth daily. Takes during summer months.  Done taking until next summer. (Patient not taking: Reported on 3/25/2025)       No current facility-administered medications for this visit.     Surgical History:  Past Surgical History:   Procedure Laterality Date     ABDOMEN SURGERY      ovaian scope/ appendix removal     ANESTHESIA CARDIOVERSION N/A 12/4/2020    Procedure: ANESTHESIA, FOR CARDIOVERSION @1400;  Surgeon: GENERIC ANESTHESIA PROVIDER;  Location: [facility] OR     ARTHROPLASTY KNEE Right 9/23/2015    Procedure: ARTHROPLASTY KNEE;  Surgeon: Rufus Brown MD;  Location:  OR     BUNIONECTOMY REN AND LEANDRO, COMBINED Left 2/2/2021    Procedure: Left bunion correction with bone cutting and screw fixation;  Surgeon: David Hoffman DPM;  Location: [facility] OR     CATARACT IOL, RT/LT Left      COLONOSCOPY  4/04    diverticulosis, rec repeat 10 yrs(consider fam hx?)     COLONOSCOPY N/A 9/12/2024    Procedure: Colonoscopy;  Surgeon: Roger Das DO;  Location:  GI     COLONOSCOPY WITH CO2 INSUFFLATION N/A 6/19/2019    Procedure: COLONOSCOPY, WITH CO2 INSUFFLATION;  Surgeon: Zeb Duarte MD;  Location:  OR     EXCISE MASS FOOT Right 12/27/2023    Procedure: Medial and Lateral Sesamoidectomy Right;  Surgeon: Jeevan Gray MD;  Location:  OR     ORTHOPEDIC SURGERY      right ankle     PHACOEMULSIFICATION CLEAR CORNEA WITH STANDARD INTRAOCULAR LENS IMPLANT Left 3/15/2018    Procedure: PHACOEMULSIFICATION CLEAR CORNEA WITH STANDARD INTRAOCULAR LENS IMPLANT;  LEFT EYE PHACOEMULSIFICATION CLEAR CORNEA WITH STANDARD INTRAOCULAR LENS IMPLANT;  Surgeon: Bandar Linares MD;  Location:  [facility]     PHACOEMULSIFICATION  CLEAR CORNEA WITH STANDARD INTRAOCULAR LENS IMPLANT Right 4/5/2018    Procedure: PHACOEMULSIFICATION CLEAR CORNEA WITH STANDARD INTRAOCULAR LENS IMPLANT;  RIGHT PHACOEMULSIFICATION CLEAR CORNEA WITH STANDARD INTRAOCULAR LENS IMPLANT ;  Surgeon: Bandar Linares MD;  Location: [facility]     STRESS ECHO (METRO)  12/03    no ischemia, limited by fatigue     SURGICAL HISTORY OF -       EXP LAP- R OVARY CYSTS     SURGICAL HISTORY OF -   1981,1984,1985    CHILDBIRTH     ZZC NONSPECIFIC PROCEDURE  6/06    right triple arthrodecesis      ZC NONSPECIFIC PROCEDURE  7/06     right bunion surgery      Z TOTAL KNEE ARTHROPLASTY  3/03    L knee       HPI  Pleasant 69 year old female presents today as a(n) new patient for abnormal sensation in right ear. Right ear fullness and itchiness, ongoing for a few years. She notes that it is hard to hear from her right ear at times. Has tried hydrogen peroxide as well as hydrocortisone in the ear to try and clean it out, but it feels like the ear is clogged further in the canal than what she can reach. Mainly discomfort when it is clogged. Also has pulsatile tinnitus at nighttime. Has not previously tried any nasal sprays.    She denies nasal congestion, post-nasal drip, facial pressure, heartburn, hx of head or neck surgery, blurred vision, double vision, weakness, tingling, watery nasal discharge    Review of Systems   Constitutional: Negative.    HENT:  Positive for tinnitus. Negative for congestion and ear pain.    Eyes: Negative.  Negative for blurred vision and double vision.   Respiratory: Negative.     Gastrointestinal:  Positive for heartburn.   Skin: Negative.    Neurological:  Negative for tingling and weakness.   Endo/Heme/Allergies: Negative.        Physical Exam  Vitals and nursing note reviewed.   Constitutional:       Appearance: Normal appearance.   HENT:      Head: Normocephalic and atraumatic.      Jaw: There is normal jaw occlusion.      Right Ear: Hearing,  tympanic membrane and ear canal normal.      Left Ear: Hearing, tympanic membrane and ear canal normal.      Ears:      Comments: Bilateral ears were examined under the microscope      Nose: No mucosal edema, congestion or rhinorrhea.      Right Nostril: No occlusion.      Left Nostril: No occlusion.      Right Turbinates: Not enlarged or swollen.      Left Turbinates: Not enlarged or swollen.      Right Sinus: No maxillary sinus tenderness or frontal sinus tenderness.      Left Sinus: No maxillary sinus tenderness or frontal sinus tenderness.      Mouth/Throat:      Mouth: Mucous membranes are moist.      Pharynx: Oropharynx is clear. Uvula midline.   Eyes:      Extraocular Movements: Extraocular movements intact.   Neurological:      Mental Status: She is alert.     Narrow right ear canal     Ear wax removal: The patient was seen in the room. An informed consent was obtained from the patient and his mother. His ears were evaluated under the microscope. Right ear canal was blocked 70% with ear wax that was suctioned. The left ear canal was blocked 100% with ear wax that was suctioned with a 7 tip. He tolerated the procedure well and left the room no complications.     AUDIOGRAM: The patient underwent an audiogram today.  Right: Speech reception threshold is 30 dB with 100% word recognition. Tympanogram A type.  Left: Speech reception threshold is 20 dB with 200% word recognition. Tympanogram A type.        Hx: Pt reports longstanding aural fullness and pulsatile tinnitus in right. Denies otalgia, otorrhea, and dizziness. Results: WNL sloping to severe SNHL right. WNL sloping to moderately-severe SNHL left. 100% word rec. bilaterally. Tymps WNL. Reflexes. as marked.     A/P  This pleasant patient has asymmetrical SNHL and chronic eczematous otitis externa of right ear. Audiogram was independently reviewed and discussed in detail with the patient. There are no signs of ear, nose, or sinus infections    For  asymmetrical SNHL, the etiologies, prevention and management of the problem were discussed. Patient needs to consider protection from noise exposure, acoustic trauma, and toxic medications. Due to asymmetrical nature of sensorineural hearing loss, a further imaging study with an MRI will be considered to r/o any retro-cochlear pathologies. Patient's questions were answered.     For chronic eczematous otitis externa of right ear, recommended patient use fluocinolone acetonide oil 0.01 % ear drops for itching and ear wax; place 0.2 mLs (4 drops) into the right ear daily as needed. Advised patient to stop use of q-tips in the ears.     Follow up in clinic in prn pending imaging results.    Scribe/Staff:    Scribe Disclosure:   I, Hiral Whitaker, am serving as a scribe; to document services personally performed by Madeline Garza MD based on data collection and the provider's statements to me.     Provider Disclosure:  I agree with above History, Review of Systems, Physical exam and Plan.  I have reviewed the content of the documentation and have edited it as needed. I have personally performed the services documented here and the documentation accurately represents those services and the decisions I have made.      Electronically signed by:  Madeline Garza MD          Again, thank you for allowing me to participate in the care of your patient.        Sincerely,        Madeline Garza MD    Electronically signed

## 2025-03-25 NOTE — NURSING NOTE
Sherry Slaughter's chief complaint for this visit includes:  Chief Complaint   Patient presents with    Consult     Right ear fullness and itchiness, ongoing for a few years,. Has tried hydrogen peroxide as well as hydrocortisone in the ear to try and clean it out, but it feels like the ear is clogged further in the canal than what she can reach. Denies any ear pain, mainly just discomfort when it is clogged. Also has pulsatile tinnitus at nighttime. Has not previously tried any nasal sprays.     PCP: Marilou Arciniega    Referring Provider:  Marilou Arciniega MD PhD  [address] [place] [address]    [facility]/[birth year]/2007     Rainer Menjivar, EMT  March 25, 2025

## 2025-03-25 NOTE — PROGRESS NOTES
AUDIOLOGY REPORT    SUMMARY: Audiology visit completed. See audiogram for results.    RECOMMENDATIONS: Follow-up with ENT.    Camilla Ward  Doctor of Audiology  [place] License # [id number]

## 2025-03-25 NOTE — PROGRESS NOTES
Chief Complaint   Patient presents with    Consult     Right ear fullness and itchiness, ongoing for a few years,. Has tried hydrogen peroxide as well as hydrocortisone in the ear to try and clean it out, but it feels like the ear is clogged further in the canal than what she can reach. Denies any ear pain, mainly just discomfort when it is clogged. Also has pulsatile tinnitus at nighttime. Has not previously tried any nasal sprays.        PCP: Marilou Arciniega     Referring Provider: Marilou Arciniega    [facility]/02/2007     ENT Problem List:  Patient Active Problem List   Diagnosis    Morbid obesity (H)    Diverticulosis of large intestine    Nonspecific abnormal results of cardiovascular function study    FAMILY HX COLON CANCER    Nonallopathic lesion of thoracic region    Hypothyroidism    GENESIS (obstructive sleep apnea)    Irritable bowel syndrome    Family history of malignant neoplasm of breast    Type 2 diabetes mellitus treated with insulin (H)    History of major depression    OSTEOARTHRITIS L KNEE  s/p knee replacement on the left     Hypertension goal BP (blood pressure) < 140/90    Family history of stroke (cerebrovascular)    Family history of other cardiovascular diseases    JOINT PAIN-ANKLE - right ankle     Mitral valve insufficiency    Hyperlipidemia LDL goal <100    Aortic sclerosis    Tubular adenoma of colon    History of viral hepatitis, type B    Chronic low back pain    Long-term insulin use (H)    S/P total knee replacement using cement, right    Lumbago    Posterior vitreous detachment of right eye    Pseudophakia of both eyes    Paroxysmal atrial fibrillation (H)    SOB (shortness of breath)    Bunion    On continuous oral anticoagulation    Type 2 diabetes mellitus with diabetic neuropathy, with long-term current use of insulin (H)      Current Medications:  Current Outpatient Medications   Medication Sig Dispense Refill    amiodarone (PACERONE) 200 MG tablet Take 1 tablet (200 mg) by mouth daily. 90 tablet  3    Continuous Blood Gluc  (DEXCOM G7 ) KATIA Use to read blood sugars as per 's instructions. 1 each 0    Continuous Glucose Sensor (DEXCOM G7 SENSOR) MISC CHANGE ONE SENSOR EVERY 10 DAYS 9 each 3    diltiazem ER COATED BEADS (CARDIZEM CD) 360 MG 24 hr capsule Take 1 capsule (360 mg) by mouth daily. 90 capsule 3    furosemide (LASIX) 20 MG tablet Take 1 tablet ( 20 mg) daily as needed. 30 tablet 0    insulin glargine U-300 (TOUJEO MAX SOLOSTAR) 300 UNIT/ML (2 units dial) pen Inject 100 Units subcutaneously at bedtime. 30 mL 1    insulin pen needle (GNP NetvibesFINE PEN NEEDLES) 31G X 8 MM miscellaneous Uses 3 times daily or as directed 300 each 3    levothyroxine (SYNTHROID/LEVOTHROID) 75 MCG tablet Take 1 tablet (75 mcg) by mouth daily 90 tablet 3    lisinopril (ZESTRIL) 20 MG tablet Take 1 tablet (20 mg) by mouth daily. 90 tablet 3    metFORMIN (GLUCOPHAGE XR) 500 MG 24 hr tablet TAKE 4 TABLETS (2,000 MG) BY MOUTH AT BEDTIME 360 tablet 3    Multiple Vitamins-Minerals (ADVANCED DIABETIC MULTIVITAMIN) TABS Take 1 tablet by mouth every morning      NOVOLOG FLEXPEN 100 UNIT/ML soln Inject 10 Units subcutaneously 2 times daily (with meals). 30 mL 0    ORDER FOR DME, SET TO LOCAL PRINT, Respironics REMSTAR 60 Series Auto CPAP 8-15cm H2O, Airfit P10 nasal pillow mask w/medium pillows      potassium chloride ceasar ER (KLOR-CON M20) 20 MEQ CR tablet Take 1 tablet (20 mEq) by mouth daily. 90 tablet 3    rivaroxaban ANTICOAGULANT (XARELTO ANTICOAGULANT) 20 MG TABS tablet Take 1 tablet (20 mg) by mouth daily (with dinner). 90 tablet 3    rosuvastatin (CRESTOR) 10 MG tablet Take 1 tablet (10 mg) by mouth daily. 90 tablet 3    spironolactone (ALDACTONE) 25 MG tablet Take 1 tablet (25 mg) by mouth daily. 90 tablet 3    tirzepatide (MOUNJARO) 15 MG/0.5ML SOAJ auto-injector pen Inject 0.5 mLs (15 mg) subcutaneously once a week. 2 mL 2    Garlic 1000 MG CAPS Take 1 capsule by mouth daily. Takes during  summer months.  Done taking until next summer. (Patient not taking: Reported on 3/25/2025)       No current facility-administered medications for this visit.     Surgical History:  Past Surgical History:   Procedure Laterality Date    ABDOMEN SURGERY      ovaian scope/ appendix removal    ANESTHESIA CARDIOVERSION N/A 12/4/2020    Procedure: ANESTHESIA, FOR CARDIOVERSION @1400;  Surgeon: GENERIC ANESTHESIA PROVIDER;  Location: [facility] OR    ARTHROPLASTY KNEE Right 9/23/2015    Procedure: ARTHROPLASTY KNEE;  Surgeon: Rufus Brown MD;  Location:  OR    BUNIONECTOMY REN AND LEANDRO, COMBINED Left 2/2/2021    Procedure: Left bunion correction with bone cutting and screw fixation;  Surgeon: David Hoffman DPM;  Location: [facility] OR    CATARACT IOL, RT/LT Left     COLONOSCOPY  4/04    diverticulosis, rec repeat 10 yrs(consider fam hx?)    COLONOSCOPY N/A 9/12/2024    Procedure: Colonoscopy;  Surgeon: Roger Das DO;  Location:  GI    COLONOSCOPY WITH CO2 INSUFFLATION N/A 6/19/2019    Procedure: COLONOSCOPY, WITH CO2 INSUFFLATION;  Surgeon: Zeb Duarte MD;  Location:  OR    EXCISE MASS FOOT Right 12/27/2023    Procedure: Medial and Lateral Sesamoidectomy Right;  Surgeon: Jeevan Gray MD;  Location: [facility] OR    ORTHOPEDIC SURGERY      right ankle    PHACOEMULSIFICATION CLEAR CORNEA WITH STANDARD INTRAOCULAR LENS IMPLANT Left 3/15/2018    Procedure: PHACOEMULSIFICATION CLEAR CORNEA WITH STANDARD INTRAOCULAR LENS IMPLANT;  LEFT EYE PHACOEMULSIFICATION CLEAR CORNEA WITH STANDARD INTRAOCULAR LENS IMPLANT;  Surgeon: Bandar Linares MD;  Location:  [facility]    PHACOEMULSIFICATION CLEAR CORNEA WITH STANDARD INTRAOCULAR LENS IMPLANT Right 4/5/2018    Procedure: PHACOEMULSIFICATION CLEAR CORNEA WITH STANDARD INTRAOCULAR LENS IMPLANT;  RIGHT PHACOEMULSIFICATION CLEAR CORNEA WITH STANDARD INTRAOCULAR LENS IMPLANT ;  Surgeon: Bandar Linares MD;  Location:  [facility]    STRESS ECHO (METRO)  12/03     no ischemia, limited by fatigue    SURGICAL HISTORY OF -       EXP LAP- R OVARY CYSTS    SURGICAL HISTORY OF -   1981,1984,1985    CHILDBIRTH    Z NONSPECIFIC PROCEDURE  6/06    right triple arthrodecesis     [facility] NONSPECIFIC PROCEDURE  7/06     right bunion surgery     [facility] TOTAL KNEE ARTHROPLASTY  3/03    L knee       HPI  Pleasant 69 year old female presents today as a(n) new patient for abnormal sensation in right ear. Right ear fullness and itchiness, ongoing for a few years. She notes that it is hard to hear from her right ear at times. Has tried hydrogen peroxide as well as hydrocortisone in the ear to try and clean it out, but it feels like the ear is clogged further in the canal than what she can reach. Mainly discomfort when it is clogged. Also has pulsatile tinnitus at nighttime. Has not previously tried any nasal sprays.    She denies nasal congestion, post-nasal drip, facial pressure, heartburn, hx of head or neck surgery, blurred vision, double vision, weakness, tingling, watery nasal discharge    Review of Systems   Constitutional: Negative.    HENT:  Positive for tinnitus. Negative for congestion and ear pain.    Eyes: Negative.  Negative for blurred vision and double vision.   Respiratory: Negative.     Gastrointestinal:  Positive for heartburn.   Skin: Negative.    Neurological:  Negative for tingling and weakness.   Endo/Heme/Allergies: Negative.        Physical Exam  Vitals and nursing note reviewed.   Constitutional:       Appearance: Normal appearance.   HENT:      Head: Normocephalic and atraumatic.      Jaw: There is normal jaw occlusion.      Right Ear: Hearing, tympanic membrane and ear canal normal.      Left Ear: Hearing, tympanic membrane and ear canal normal.      Ears:      Comments: Bilateral ears were examined under the microscope      Nose: No mucosal edema, congestion or rhinorrhea.      Right Nostril: No occlusion.      Left Nostril: No occlusion.      Right Turbinates: Not  enlarged or swollen.      Left Turbinates: Not enlarged or swollen.      Right Sinus: No maxillary sinus tenderness or frontal sinus tenderness.      Left Sinus: No maxillary sinus tenderness or frontal sinus tenderness.      Mouth/Throat:      Mouth: Mucous membranes are moist.      Pharynx: Oropharynx is clear. Uvula midline.   Eyes:      Extraocular Movements: Extraocular movements intact.   Neurological:      Mental Status: She is alert.     Narrow right ear canal     Ear wax removal: The patient was seen in the room. An informed consent was obtained from the patient and his mother. His ears were evaluated under the microscope. Right ear canal was blocked 70% with ear wax that was suctioned. The left ear canal was blocked 100% with ear wax that was suctioned with a 7 tip. He tolerated the procedure well and left the room no complications.     AUDIOGRAM: The patient underwent an audiogram today.  Right: Speech reception threshold is 30 dB with 100% word recognition. Tympanogram A type.  Left: Speech reception threshold is 20 dB with 200% word recognition. Tympanogram A type.        Hx: Pt reports longstanding aural fullness and pulsatile tinnitus in right. Denies otalgia, otorrhea, and dizziness. Results: WNL sloping to severe SNHL right. WNL sloping to moderately-severe SNHL left. 100% word rec. bilaterally. Tymps WNL. Reflexes. as marked.     A/P  This pleasant patient has asymmetrical SNHL and chronic eczematous otitis externa of right ear. Audiogram was independently reviewed and discussed in detail with the patient. There are no signs of ear, nose, or sinus infections    For asymmetrical SNHL, the etiologies, prevention and management of the problem were discussed. Patient needs to consider protection from noise exposure, acoustic trauma, and toxic medications. Due to asymmetrical nature of sensorineural hearing loss, a further imaging study with an MRI will be considered to r/o any retro-cochlear  pathologies. Patient's questions were answered.     For chronic eczematous otitis externa of right ear, recommended patient use fluocinolone acetonide oil 0.01 % ear drops for itching and ear wax; place 0.2 mLs (4 drops) into the right ear daily as needed. Advised patient to stop use of q-tips in the ears.     Follow up in clinic in prn pending imaging results.    Scribe/Staff:    Scribe Disclosure:   I, Hiral Whitaker, am serving as a scribe; to document services personally performed by Madeline Garza MD based on data collection and the provider's statements to me.     Provider Disclosure:  I agree with above History, Review of Systems, Physical exam and Plan.  I have reviewed the content of the documentation and have edited it as needed. I have personally performed the services documented here and the documentation accurately represents those services and the decisions I have made.      Electronically signed by:  Madeline Garza MD

## 2025-04-10 NOTE — PROGRESS NOTES
Sherry is a 66 year old who is being evaluated via a billable video visit.            Video-Visit Details    Type of service:  Video Visit    Video Start Time: 11:25    Video End Time: 11:36    Originating Location (pt. Location): Home    Distant Location (provider location):  [facility]     Platform used for Video Visit: AnneliseAccolo     Sherry Slaughter is a 66 year old female who is being evaluated via a billable video visit.      How would you like to obtain your AVS? MyChart  If the video visit is dropped, the invitation should be resent by: Text to cell phone: [phone]  Will anyone else be joining your video visit? Yane Wilson LPN      1.  Type 2 diabetes.   2.  Hypertension.   3.  Hyperlipidemia.   4.  Depression.   5.  Obstructive sleep apnea, currently untreated.   6.  Hypothyroidism.   7.  Diverticulosis.   8.  Low back pain.   9.  Irritable bowel syndrome.     On 11/25/2020, Dr. Aranda saw the patient and sent her to the emergency room for further treatment of her AFib.  She ended up having her rate controlled and had an echocardiogram performed.  She then had ELIAS-guided cardioversion performed on 12/04/2020 successfully.  She was then hospitalized again on 12/09/2020 for pulmonary edema, likely secondary to being in rapid AFib for quite some time in the past in addition to having her hydrochlorothiazide discontinued.  She was in pulmonary edema on 12/09/2020, was diuresed with 40 mg of IV Lasix and had p.o. Lasix 20 started and she was sent here for further evaluation.     [facility] appt was 12/23/20. At that appt we decreased the Lasix to 20 mg every other day. We had increased the hydrochlorothiazide to 50 mg daily.  Today she reports that her weight was 290 lbs pounds. She has been taking the lasix daily as she was up in weight for a few days. Her dry weight appears to be around 290 pounds.  She denies any swelling in her lower extremities.  She states her  "appetite has been fine.  Her blood pressures have been systolically in 110-120 systolic.  She denies having any significant chest pain or shortness of breath, PND or orthopnea. She says she can\" feel her heartbeat\"at times when she lays down. She denies racing or palpitations.  Her HR is in the 70's.     4/14/21- She has been feeling well. Her weights have 289-291 lbs.  She denies any swelling in her lower extremities.  She states her appetite has been fine.  Her blood pressures have been systolically in 110-120 systolic.  She denies having any significant chest pain or shortness of breath, PND or orthopnea. She denies racing or palpitations.  Her HR is in the 70's.     CURRENT MEDICATIONS:  She is taking diltiazem 360 mg a day, Lasix 20 mg a day, insulin, levothyroxine, Victoza, lisinopril 40 mg a day, metformin, hydrochlorothiazide 50 mg ,  metoprolol succinate 200 mg a day, rivaroxaban 20 mg a day, rosuvastatin 10 mg a day.       ROS:   Constitutional: No fever, chills, or sweats.  ENT: No visual disturbance, ear ache, epistaxis, sore throat.   Allergies/Immunologic: Negative  Respiratory: No cough, hemoptysis.   Cardiovascular: As per HPI.   GI: As per HPI.   : No urinary frequency, dysuria, or hematuria.   Integument: Negative.   Psychiatric: Negative.   Neuro: Negative.   Endocrinology: negative   Musculoskeletal: negative    EXAM:   Constitutional - WDWN, no acute distress   Eyes - no redness or discharge, nonicteric sclera  Respiratory - nonlabored breathing, able to speak in full sentences without difficulty  CV: no visible edema of visualized upper extremities.  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 She appears to be euvolemic today. She will continue the 20 mg lasix daily unless she notes the weight to be lower than 290 lbs . Then she will trial one tab every other day.      1.  Atrial fibrillation with rapid ventricular response. She underwent ELIAS-guided cardioversion on 12/04/2020.  She has a relatively structurally normal heart.  She is currently stable in normal sinus rhythm on significant doses of metoprolol and diltiazem.  We will continue to monitor.  She is tolerating anticoagulation well without gross bleeding.      2.  Hypertension.     Blood pressures do appear to be well controlled as they have been at 120 or lower systolic daily.      3.  Congestive heart failure.     Dr. Aranda suspects the  episode of congestive heart failure for which she was hospitalized recently on 12/09/2020 may have been related to her poor rate control previous to her cardioversion in addition to stopping hydrochlorothiazide.  Her dry weight is likely in the 290 pound range. LVEF 60-65%        4.  Untreated sleep apnea.  She will require sleep evaluation in order to help prevent another episode of AFib.      5.  Mild aortic stenosis.    This was seen on recent echo and a mean gradient was only 12 mmHg. continue to follow-through time.       I reviewed patients pertinent clinical laboratory studies  I reviewed patients medications  I reviewed patients pertinent medical records      Plan:   [facility] in July   Dr. Aranda in September       Robinson FERNANDEZ NP-C  Advanced Heart Failure Nurse Practitioner  [facility]         no

## 2025-04-24 ENCOUNTER — ANCILLARY PROCEDURE (OUTPATIENT)
Dept: MRI IMAGING | Facility: CLINIC | Age: 70
End: 2025-04-24
Attending: OTOLARYNGOLOGY
Payer: COMMERCIAL

## 2025-04-24 DIAGNOSIS — H90.3 SNHL (SENSORY-NEURAL HEARING LOSS), ASYMMETRICAL: ICD-10-CM

## 2025-04-24 PROCEDURE — A9585 GADOBUTROL INJECTION: HCPCS | Performed by: RADIOLOGY

## 2025-04-24 PROCEDURE — 70553 MRI BRAIN STEM W/O & W/DYE: CPT | Mod: GC | Performed by: RADIOLOGY

## 2025-04-24 RX ORDER — GADOBUTROL 604.72 MG/ML
12.5 INJECTION INTRAVENOUS ONCE
Status: COMPLETED | OUTPATIENT
Start: 2025-04-24 | End: 2025-04-24

## 2025-04-24 RX ADMIN — GADOBUTROL 12.5 ML: 604.72 INJECTION INTRAVENOUS at 08:39

## 2025-05-14 NOTE — TELEPHONE ENCOUNTER
Care Management Progress Note    Reason for Admission:   Shortness of breath [R06.02]  ALISHA (acute kidney injury) [N17.9]  Acute on chronic systolic CHF (congestive heart failure) (HCC) [I50.23]  Acute exacerbation of chronic heart failure (HCC) [I50.9]  Procedure(s) (LRB):  SIGMOID COLECTOMY (N/A)  * Day of Surgery *    Patient Admission Status: Inpatient  RUR: 11% Low  Hospitalization in the last 30 days (Readmission):  No        Transition of care plan:  Medical management ongoing, general surgery and ID following.   Home with [facility] and IV ABX with [facility].  Discharge plan communicated with patient and/or discharge caregiver: Yes    Date 1st IMM letter given: 5/5/25  Outpatient follow-up: PCP, Specialist  Transport at discharge: Family Trinh [place], [place]  [phone]   I spoke to patient who states she has been in a fib for a week. She thought she was out at one point but then she went back into a fib. Her rate has been in the 140s a few times but recently it is pretty stable between 80 and 100.     Patient states previously her metoprolol was increased further but she couldn't stay on the higher dose because her diastolic pressure was too low.     Patient states her BP and everything else is fine. She feels okay.     Will discuss with Dr. Aranda for recommendations.     Maxine Torres RN   Cardiology Care Coordinator  [facility]   Phone: [phone]  Fax: [phone]

## 2025-05-15 ENCOUNTER — OFFICE VISIT (OUTPATIENT)
Dept: ENDOCRINOLOGY | Facility: CLINIC | Age: 70
End: 2025-05-15
Payer: COMMERCIAL

## 2025-05-15 ENCOUNTER — TELEPHONE (OUTPATIENT)
Dept: ENDOCRINOLOGY | Facility: CLINIC | Age: 70
End: 2025-05-15

## 2025-05-15 VITALS
RESPIRATION RATE: 16 BRPM | SYSTOLIC BLOOD PRESSURE: 125 MMHG | DIASTOLIC BLOOD PRESSURE: 70 MMHG | BODY MASS INDEX: 38.32 KG/M2 | WEIGHT: 252 LBS | HEART RATE: 73 BPM | OXYGEN SATURATION: 98 %

## 2025-05-15 DIAGNOSIS — Z79.4 TYPE 2 DIABETES MELLITUS TREATED WITH INSULIN (H): ICD-10-CM

## 2025-05-15 DIAGNOSIS — E11.9 TYPE 2 DIABETES MELLITUS TREATED WITH INSULIN (H): ICD-10-CM

## 2025-05-15 DIAGNOSIS — E66.01 MORBID OBESITY (H): Primary | ICD-10-CM

## 2025-05-15 LAB
EST. AVERAGE GLUCOSE BLD GHB EST-MCNC: 123 MG/DL
HBA1C MFR BLD: 5.9 %

## 2025-05-15 RX ORDER — ACYCLOVIR 400 MG/1
TABLET ORAL
Qty: 9 EACH | Refills: 3 | OUTPATIENT
Start: 2025-05-15

## 2025-05-15 NOTE — LETTER
5/15/2025      Sherry Slaughter  [address]  [place] [address]      Dear Colleague,    Thank you for referring your patient, Sherry Slaughter, to the [facility]. Please see a copy of my visit note below.    Assessment/Plan :   Type 2 DM. Sancho is doing great. We reviewed her recent CGMS report and her blood sugars look great. She is really happy with her current medications and she could not believe that her A1C is under 6%. I do not see any reason to make adjustments to her current medications. I encouraged her to keep up the good work. We will follow-up in 6 mos. We will repeat routine laboratory testing at our follow-up visit.       I have independently reviewed and interpreted labs, imaging as indicated.      Chief complaint:  Sherry is a 70 year old female who returns for follow-up of Type 2 DM.    I have reviewed Care Everywhere including [facility], [facility], [facility], [facility], [place], [facility], [facility] , [facility], [place] lab reports, imaging reports and provider notes as indicated.      HISTORY OF PRESENT ILLNESS  Sherry is doing great. She feels like her current medications are working really well. She is happy with the Toujeo. She feels like it has really helped to stabilize her blood sugars. She is currently taking 72 unit(s) in the evening. She was taking 80 unit(s) but developed recurring overnight lows, so she cut back to 72 unit(s) and it seems to be working well. She also continues to take Mounjaro 15 mg weekly, metformin XR 2000 mg daily, and Novolog 10 unit(s) with meals.     Sherry monitors her blood sugars with the Dexcom G7 sensor. She has not had any problems with severe hyperglycemia and/or hypoglycemia. As previously stated, she was experiencing a few overnight lows but those seem to have cleared up. She is really feeling great. She does have a small sore on her right toe. This is healing up and she is hopeful that it  will continue to heal now that she can wear sandals. She has not had any problems with worsening numbness/tingling in her feet. She also has not had any issues with worsening blurred vision.      Sherry was diagnosed with diabetes over 20 yrs ago. She has struggled with insulin resistance throughout the years which has led to weight gain and poor control. She has also taken a variety of medications throughout the years. Recently, she has found success with Ozempic. This has helped improve her blood sugars while also improving her insulin resistance. Her diabetes is complicated by hyperlipidemia, obesity, CKD stage 3, paroxysmal atrial fibrillation s/p pacemaker, GENESIS, osteoarthritis, and hypothyroidism.     Endocrine relevant labs are as follows:   Latest Reference Range & Units 05/15/25 10:21   Afinion Hemoglobin A1c POCT <=5.7 % 5.9 (H)   (H): Data is abnormally high   Latest Reference Range & Units 11/14/24 08:26   Hemoglobin A1C 0.0 - 5.6 % 6.6 (H)   (H): Data is abnormally high   Latest Reference Range & Units 11/14/24 08:26   Albumin Urine mg/g Cr 0.00 - 25.00 mg/g Cr 15.51      Latest Reference Range & Units 11/14/24 08:26   Albumin Urine mg/L mg/L 31.8      Latest Reference Range & Units 05/09/24 09:53   Hemoglobin A1C 0.0 - 5.6 % 6.3 (H)   (H): Data is abnormally high    REVIEW OF SYSTEMS    10 system ROS otherwise as per the HPI or negative    Past Medical History  Past Medical History:   Diagnosis Date     Cataract      Congestive heart failure (H) 2019 NOVEMBER    AFIB     DEPRESSION     comes and goes - tried meds - unsuccessfully, certain times of the year, no psych intervention and no counselors in the past - and not interested      Diabetic retinopathy associated with diabetes mellitus due to underlying condition (H)      Diverticulosis of colon (without mention of hemorrhage) 04/2004    colonoscopy     ECHO-mildLVH,tr MR,mild thick lflets w inc LA,trTR   12/2003     Essential hypertension, benign  1990s    late 1990s - started medications at that time - not to difficult to control meds      Fam hx-cardiovas dis NEC     father - CAD, and lipids/HTN - multiple members of the family      Family history of diabetes mellitus     sister and grandmother with DM      Family history of malignant neoplasm of breast     mother - young age - 45, and maternal cousin and aunt as well - no BRCA testing done      Family history of stroke (cerebrovascular)     grandmother in early life in her 40s      FAMILY HX COLON CANCER     Pat uncles x 2     Heart disease     murmur/     Heart murmur      HYPERLIPIDEMIA 2000    fairly recent - in the last 5 years - high for DM levels  -cholesterol recent      Irritable bowel syndrome     goes between the 2 - nerve related - more stressed more problems      MICROALBUMINURIA     unsure how long - been on the lisinopril - for a few years at well      Nonspecific abnormal results of liver function study 02/03/2003    SGOT - has been high in the past - since the hepatitis b - borderline elevation - not really changing any      OBESITY      Obstructive sleep apnea      GENESIS (obstructive sleep apnea) 10/15/2006    psg 5/15 AHI 53 aPAP 8-15     OSTEOARTHRITIS L KNEE 2003    total knee on the left - and pain is gone since the knee replacement      PERS HX HEPATITIS B- RESOLVED] 1977    acute virus only - no chronic disease      PERS HX HEPATITIS B- RESOLVED]      Type II or unspecified type diabetes mellitus without mention of complication, not stated as uncontrolled 1991    oral meds frist, then insulin eventually later, difficult to control most of the time      Unspecified hypothyroidism 10/11/2006    TSH 3.36 - mild subclinical hypothyroidism - deciding on following or starting low dose meds - with dr Oreilly - following        Medications  Current Outpatient Medications   Medication Sig Dispense Refill     amiodarone (PACERONE) 200 MG tablet Take 1 tablet (200 mg) by mouth daily. 90 tablet 3      Continuous Blood Gluc  (DEXCOM G7 ) KATIA Use to read blood sugars as per 's instructions. 1 each 0     Continuous Glucose Sensor (DEXCOM G7 SENSOR) MIS CHANGE ONE SENSOR EVERY 10 DAYS 9 each 3     diltiazem ER COATED BEADS (CARDIZEM CD) 360 MG 24 hr capsule Take 1 capsule (360 mg) by mouth daily. 90 capsule 3     fluocinolone acetonide oil (DERMOTIC) 0.01 % ear drops Place 0.2 mLs (4 drops) into the right ear daily as needed (itching). 20 mL 0     furosemide (LASIX) 20 MG tablet Take 1 tablet ( 20 mg) daily as needed. 30 tablet 0     Garlic 1000 MG CAPS Take 1 capsule by mouth daily. Takes during summer months.  Done taking until next summer. (Patient not taking: Reported on 3/25/2025)       insulin glargine U-300 (TOUJEO MAX SOLOSTAR) 300 UNIT/ML (2 units dial) pen Inject 100 Units subcutaneously at bedtime. 30 mL 1     insulin pen needle (GNP CLICKFINE PEN NEEDLES) 31G X 8 MM miscellaneous Uses 3 times daily or as directed 300 each 3     levothyroxine (SYNTHROID/LEVOTHROID) 75 MCG tablet Take 1 tablet (75 mcg) by mouth daily 90 tablet 3     lisinopril (ZESTRIL) 20 MG tablet Take 1 tablet (20 mg) by mouth daily. 90 tablet 3     metFORMIN (GLUCOPHAGE XR) 500 MG 24 hr tablet TAKE 4 TABLETS (2,000 MG) BY MOUTH AT BEDTIME 360 tablet 3     Multiple Vitamins-Minerals (ADVANCED DIABETIC MULTIVITAMIN) TABS Take 1 tablet by mouth every morning       NOVOLOG FLEXPEN 100 UNIT/ML soln Inject 10 Units subcutaneously 2 times daily (with meals). 30 mL 0     ORDER FOR DME, SET TO LOCAL PRINT, Respironics REMSTAR 60 Series Auto CPAP 8-15cm H2O, Airfit P10 nasal pillow mask w/medium pillows       potassium chloride ceasar ER (KLOR-CON M20) 20 MEQ CR tablet Take 1 tablet (20 mEq) by mouth daily. 90 tablet 3     rivaroxaban ANTICOAGULANT (XARELTO ANTICOAGULANT) 20 MG TABS tablet Take 1 tablet (20 mg) by mouth daily (with dinner). 90 tablet 3     rosuvastatin (CRESTOR) 10 MG tablet Take 1 tablet (10 mg)  by mouth daily. 90 tablet 3     spironolactone (ALDACTONE) 25 MG tablet Take 1 tablet (25 mg) by mouth daily. 90 tablet 3     tirzepatide (MOUNJARO) 15 MG/0.5ML SOAJ auto-injector pen Inject 0.5 mLs (15 mg) subcutaneously once a week. 2 mL 2       Allergies  Allergies   Allergen Reactions     Empagliflozin      Yeast infections     Lipitor [Atorvastatin Calcium]      Gas     Pravachol [Pravastatin Sodium]      Pravachol - dry cough      Simvastatin      Myalgia         Family History  family history includes Allergies in her mother; Alzheimer Disease in her mother; Aneurysm in her father; Arrhythmia in her sister; Arthritis in her mother; Asthma in her mother, sister, and sister; Blood Disease in her paternal grandmother; Breast Cancer in an other family member; Breast Cancer (age of onset: 40) in her mother; Breast Cancer (age of onset: 62) in her maternal aunt; Cancer in her father and mother; Cancer - colorectal in her paternal uncle and another family member; Cardiovascular in her brother and mother; Cerebrovascular Disease in her maternal grandmother and mother; Colon Cancer in an other family member; Congenital Anomalies in her brother; Coronary Artery Disease in her brother, brother, father, and sister; Dementia in her mother; Depression in her mother; Diabetes in her maternal grandmother, sister, and sister; Gynecology in her sister; Heart Disease in her brother, brother, and sister; Hypertension in her brother, brother, brother, mother, sister, and sister; Lipids in her brother, brother, mother, sister, and sister; Obesity in her brother; Other Cancer in her brother, father, and mother; Other Cancer (age of onset: 35) in her maternal aunt; Respiratory in her brother and mother; Thyroid Disease in her mother.    Social History  Social History     Tobacco Use     Smoking status: Former     Types: Cigarettes     Passive exposure: Past     Smokeless tobacco: Never     Tobacco comments:     tried in early 20s  only    Vaping Use     Vaping status: Never Used   Substance Use Topics     Alcohol use: Yes     Comment: 1 drink 1-2 year     Drug use: No       Physical Exam  /70   Pulse 73   Resp 16   Wt 114.3 kg (252 lb)   LMP 10/02/2007   SpO2 98%   BMI 38.32 kg/m    Body mass index is 38.32 kg/m .  GENERAL :  In no apparent distress  SKIN: Normal color, normal temperature, texture.  No hirsutism, alopecia or purple striae.     EYES: PERRL, EOMI, No scleral icterus,  No proptosis, conjunctival redness, stare, retraction  RESP: Lungs clear to auscultation bilaterally  CARDIAC: Regular rate and rhythm, normal S1 S2, without murmurs, rubs or gallops    NEURO: awake, alert, responds appropriately to questions.  Cranial nerves intact.   Moves all extremities; Gait normal.  No tremor of the outstretched hand.   EXTREMITIES: No clubbing, cyanosis or edema.    DATA REVIEW  Dexcom Clarity  Time in target range 96%  Very Low<1%, Low 1%, High 3%  Current Ave  mg/dl         Again, thank you for allowing me to participate in the care of your patient.        Sincerely,        Mlilicent Rocha PA-C    Electronically signed

## 2025-05-15 NOTE — TELEPHONE ENCOUNTER
PA Initiation    Medication: DEXCOM G7 SENSOR MISC  Insurance Company: [employer] - Phone [phone] Fax [phone]  Pharmacy Filling the Rx:    Filling Pharmacy Phone:    Filling Pharmacy Fax:    Start Date: 5/15/2025    Key: [id number]

## 2025-05-15 NOTE — PATIENT INSTRUCTIONS
Welcome to the [facility]ology [facility] Diabetes Clinics     Our Endocrinology Clinics are here to provide you with a team-based, collaborative approach in the diagnosis and treatment of patients with diabetes and endocrine disorders. The team is made up of Physicians, Physician Assistants, Certified Diabetes Educators, Registered Nurses, Medical Assistants, Emergency Medical Technicians, and many others, all of whom have the unified goal of providing our patients with high quality care.     Please see below for some helpful tips to best navigate and use the [facility] Endocrinology clinic:     [place] Respect: At [facility], we are committed to a respectful and safe space for all patients, visitors, and staff.  We believe that mutual respect between patients and their care team is the foundation of quality care.  It is our expectation that you will be treated with respect by your care team.  In turn, we ask that all communication with the care team (written and verbal) be respectful and free from profanity, threatening, or abusive language.  Disrespectful communication undermines our therapeutic relationship with you and may result in us being unable to continue to provide your care.    Refills: A provider must see you at least annually to prescribe and refill medications. This is to ensure your safety as well as meet insurance and compliance regulations.    Scheduling: Many of our Providers offer both in-person or video visits. Please call to schedule any needed follow ups as soon as possible because our provider schedules fill up very quickly. Our care team has the right to require an in-person visit when they believe that it is medically necessary. Please remember that for any virtual visits, you must be in the [facility] at the time of the visit, otherwise we are unable to see you and you will need to be rescheduled.    Missed Appointments: If you need to cancel or miss your  scheduled appointment, please call the clinic at [phone] to reschedule.  Please note if you repeatedly miss appointments or repeatedly miss appointments without calling to inform us ahead of time (no-show), the clinic may elect to not allow you to reschedule without speaking to a manager, may require a Partnership In Care Agreement prior to rescheduling, or could result in you no longer being able to receive care from the clinic. Providing the clinic with timely notification if you have to miss an appointment, allows us to better serve the needs of all of our patients.    Primary Care Provider: Our Endocrinologists are Specialists in their field. We expect you to have a Primary Care Provider established to handle any needs outside of your diabetes and endocrine care.  We would be happy to assist you find a Primary Care Provider, if you do not have one.    [employer]: [employer] is a wonderful resource that allows you access to your Care Team via online or the alejandra. Please ask a member of the team if you would like help creating an account. Please note that it may take up to 2 business days for a response. [employer] messages are not reviewed on weekends or after business hours.  Emergent or urgent care needs should never be communicated via [employer].  If you experience a medical emergency call 911 or go to the nearest emergency room.    Labs: It is recommended that you stay within the [facility] for labs but you are welcome to obtain ordered labs (with some exceptions) from any location of your choice as long as they are able to complete and process the needed labs. If you need us to fax orders to your preferred lab, please provide us the name and fax number of the lab you would like to go to so we can fax the orders. If your labs are drawn outside of the [facility], please have them fax the results to [phone] ([place]) or [phone] ([place]) or via [facility]. It is your  responsibility to ensure that outside lab results are sent to us.    We look forward to working with you. Please do not hesitate to reach out with any questions.    Thank you,    The Endocrine Team    [facility] Address:   [place] Address:     [place] [address]    Phone: [phone]  Fax: [phone]   [address] [place] [address]    Phone: [phone]  Fax: [phone]     [facility] Cost Estimate Phone Number: [phone]    General Lab and Imaging Scheduling Phone Number: [phone]      If you are interested in exploring research opportunities in the Division of Diabetes, Endocrinology and Metabolism and within the [facility] you are welcome to view the following QR codes by scanning them using your phone's camera to access a link to more information.  Participating in a research study is voluntary. Your decision whether or not to participate will not affect your current or future relations with the [facility] or [facility]. Current available studies are:     Type 1 Diabetes  Adults     Pediatrics     Type 2 Diabetes  Weight Loss - People Treated with Diet or Metformin Only     Pediatrics and Young Adults

## 2025-05-15 NOTE — NURSING NOTE
Sherry Slaughter's goals for this visit include:   Chief Complaint   Patient presents with    Follow Up    Diabetes       She requests these members of her care team be copied on today's visit information: yes    PCP: Mariolu Arciniega    Referring Provider:  Referred Self, MD  No address on file    /70   Pulse 73   Resp 16   Wt 114.3 kg (252 lb)   LMP 10/02/2007   SpO2 98%   BMI 38.32 kg/m      Do you need any medication refills at today's visit? Yes    Sj Short, EMT

## 2025-05-15 NOTE — PROGRESS NOTES
Assessment/Plan :   Type 2 DM. Sancho is doing great. We reviewed her recent CGMS report and her blood sugars look great. She is really happy with her current medications and she could not believe that her A1C is under 6%. I do not see any reason to make adjustments to her current medications. I encouraged her to keep up the good work. We will follow-up in 6 mos. We will repeat routine laboratory testing at our follow-up visit.       I have independently reviewed and interpreted labs, imaging as indicated.      Chief complaint:  Sherry is a 70 year old female who returns for follow-up of Type 2 DM.    I have reviewed Care Everywhere including [facility], [facility], [facility], [facility], [place], [facility], [facility] , [facility], [place] lab reports, imaging reports and provider notes as indicated.      HISTORY OF PRESENT ILLNESS  Sherry is doing great. She feels like her current medications are working really well. She is happy with the Toujeo. She feels like it has really helped to stabilize her blood sugars. She is currently taking 72 unit(s) in the evening. She was taking 80 unit(s) but developed recurring overnight lows, so she cut back to 72 unit(s) and it seems to be working well. She also continues to take Mounjaro 15 mg weekly, metformin XR 2000 mg daily, and Novolog 10 unit(s) with meals.     Sherry monitors her blood sugars with the Dexcom G7 sensor. She has not had any problems with severe hyperglycemia and/or hypoglycemia. As previously stated, she was experiencing a few overnight lows but those seem to have cleared up. She is really feeling great. She does have a small sore on her right toe. This is healing up and she is hopeful that it will continue to heal now that she can wear sandals. She has not had any problems with worsening numbness/tingling in her feet. She also has not had any issues with worsening blurred vision.      Sherry was diagnosed with diabetes over 20 yrs ago. She  has struggled with insulin resistance throughout the years which has led to weight gain and poor control. She has also taken a variety of medications throughout the years. Recently, she has found success with Ozempic. This has helped improve her blood sugars while also improving her insulin resistance. Her diabetes is complicated by hyperlipidemia, obesity, CKD stage 3, paroxysmal atrial fibrillation s/p pacemaker, GENESIS, osteoarthritis, and hypothyroidism.     Endocrine relevant labs are as follows:   Latest Reference Range & Units 05/15/25 10:21   Afinion Hemoglobin A1c POCT <=5.7 % 5.9 (H)   (H): Data is abnormally high   Latest Reference Range & Units 11/14/24 08:26   Hemoglobin A1C 0.0 - 5.6 % 6.6 (H)   (H): Data is abnormally high   Latest Reference Range & Units 11/14/24 08:26   Albumin Urine mg/g Cr 0.00 - 25.00 mg/g Cr 15.51      Latest Reference Range & Units 11/14/24 08:26   Albumin Urine mg/L mg/L 31.8      Latest Reference Range & Units 05/09/24 09:53   Hemoglobin A1C 0.0 - 5.6 % 6.3 (H)   (H): Data is abnormally high    REVIEW OF SYSTEMS    10 system ROS otherwise as per the HPI or negative    Past Medical History  Past Medical History:   Diagnosis Date    Cataract     Congestive heart failure (H) 2019 NOVEMBER    AFIB    DEPRESSION     comes and goes - tried meds - unsuccessfully, certain times of the year, no psych intervention and no counselors in the past - and not interested     Diabetic retinopathy associated with diabetes mellitus due to underlying condition (H)     Diverticulosis of colon (without mention of hemorrhage) 04/2004    colonoscopy    ECHO-mildLVH,tr MR,mild thick lflets w inc LA,trTR   12/2003    Essential hypertension, benign 1990s    late 1990s - started medications at that time - not to difficult to control meds     Fam hx-cardiovas dis NEC     father - CAD, and lipids/HTN - multiple members of the family     Family history of diabetes mellitus     sister and grandmother with DM      Family history of malignant neoplasm of breast     mother - young age - 45, and maternal cousin and aunt as well - no BRCA testing done     Family history of stroke (cerebrovascular)     grandmother in early life in her 40s     FAMILY HX COLON CANCER     Pat uncles x 2    Heart disease     murmur/    Heart murmur     HYPERLIPIDEMIA 2000    fairly recent - in the last 5 years - high for DM levels  -cholesterol recent     Irritable bowel syndrome     goes between the 2 - nerve related - more stressed more problems     MICROALBUMINURIA     unsure how long - been on the lisinopril - for a few years at well     Nonspecific abnormal results of liver function study 02/03/2003    SGOT - has been high in the past - since the hepatitis b - borderline elevation - not really changing any     OBESITY     Obstructive sleep apnea     GENESIS (obstructive sleep apnea) 10/15/2006    psg 5/15 AHI 53 aPAP 8-15    OSTEOARTHRITIS L KNEE 2003    total knee on the left - and pain is gone since the knee replacement     PERS HX HEPATITIS B- RESOLVED] 1977    acute virus only - no chronic disease     PERS HX HEPATITIS B- RESOLVED]     Type II or unspecified type diabetes mellitus without mention of complication, not stated as uncontrolled 1991    oral meds frist, then insulin eventually later, difficult to control most of the time     Unspecified hypothyroidism 10/11/2006    TSH 3.36 - mild subclinical hypothyroidism - deciding on following or starting low dose meds - with dr Oreilly - following        Medications  Current Outpatient Medications   Medication Sig Dispense Refill    amiodarone (PACERONE) 200 MG tablet Take 1 tablet (200 mg) by mouth daily. 90 tablet 3    Continuous Blood Gluc  (DEXCOM G7 ) KATIA Use to read blood sugars as per 's instructions. 1 each 0    Continuous Glucose Sensor (DEXCOM G7 SENSOR) MISC CHANGE ONE SENSOR EVERY 10 DAYS 9 each 3    diltiazem ER COATED BEADS (CARDIZEM CD) 360 MG 24 hr  capsule Take 1 capsule (360 mg) by mouth daily. 90 capsule 3    fluocinolone acetonide oil (DERMOTIC) 0.01 % ear drops Place 0.2 mLs (4 drops) into the right ear daily as needed (itching). 20 mL 0    furosemide (LASIX) 20 MG tablet Take 1 tablet ( 20 mg) daily as needed. 30 tablet 0    Garlic 1000 MG CAPS Take 1 capsule by mouth daily. Takes during summer months.  Done taking until next summer. (Patient not taking: Reported on 3/25/2025)      insulin glargine U-300 (TOUJEO MAX SOLOSTAR) 300 UNIT/ML (2 units dial) pen Inject 100 Units subcutaneously at bedtime. 30 mL 1    insulin pen needle (GNP CLICKFINE PEN NEEDLES) 31G X 8 MM miscellaneous Uses 3 times daily or as directed 300 each 3    levothyroxine (SYNTHROID/LEVOTHROID) 75 MCG tablet Take 1 tablet (75 mcg) by mouth daily 90 tablet 3    lisinopril (ZESTRIL) 20 MG tablet Take 1 tablet (20 mg) by mouth daily. 90 tablet 3    metFORMIN (GLUCOPHAGE XR) 500 MG 24 hr tablet TAKE 4 TABLETS (2,000 MG) BY MOUTH AT BEDTIME 360 tablet 3    Multiple Vitamins-Minerals (ADVANCED DIABETIC MULTIVITAMIN) TABS Take 1 tablet by mouth every morning      NOVOLOG FLEXPEN 100 UNIT/ML soln Inject 10 Units subcutaneously 2 times daily (with meals). 30 mL 0    ORDER FOR DME, SET TO LOCAL PRINT, Respironics REMSTAR 60 Series Auto CPAP 8-15cm H2O, Airfit P10 nasal pillow mask w/medium pillows      potassium chloride ceasar ER (KLOR-CON M20) 20 MEQ CR tablet Take 1 tablet (20 mEq) by mouth daily. 90 tablet 3    rivaroxaban ANTICOAGULANT (XARELTO ANTICOAGULANT) 20 MG TABS tablet Take 1 tablet (20 mg) by mouth daily (with dinner). 90 tablet 3    rosuvastatin (CRESTOR) 10 MG tablet Take 1 tablet (10 mg) by mouth daily. 90 tablet 3    spironolactone (ALDACTONE) 25 MG tablet Take 1 tablet (25 mg) by mouth daily. 90 tablet 3    tirzepatide (MOUNJARO) 15 MG/0.5ML SOAJ auto-injector pen Inject 0.5 mLs (15 mg) subcutaneously once a week. 2 mL 2       Allergies  Allergies   Allergen Reactions     Empagliflozin      Yeast infections    Lipitor [Atorvastatin Calcium]      Gas    Pravachol [Pravastatin Sodium]      Pravachol - dry cough     Simvastatin      Myalgia         Family History  family history includes Allergies in her mother; Alzheimer Disease in her mother; Aneurysm in her father; Arrhythmia in her sister; Arthritis in her mother; Asthma in her mother, sister, and sister; Blood Disease in her paternal grandmother; Breast Cancer in an other family member; Breast Cancer (age of onset: 40) in her mother; Breast Cancer (age of onset: 62) in her maternal aunt; Cancer in her father and mother; Cancer - colorectal in her paternal uncle and another family member; Cardiovascular in her brother and mother; Cerebrovascular Disease in her maternal grandmother and mother; Colon Cancer in an other family member; Congenital Anomalies in her brother; Coronary Artery Disease in her brother, brother, father, and sister; Dementia in her mother; Depression in her mother; Diabetes in her maternal grandmother, sister, and sister; Gynecology in her sister; Heart Disease in her brother, brother, and sister; Hypertension in her brother, brother, brother, mother, sister, and sister; Lipids in her brother, brother, mother, sister, and sister; Obesity in her brother; Other Cancer in her brother, father, and mother; Other Cancer (age of onset: 35) in her maternal aunt; Respiratory in her brother and mother; Thyroid Disease in her mother.    Social History  Social History     Tobacco Use    Smoking status: Former     Types: Cigarettes     Passive exposure: Past    Smokeless tobacco: Never    Tobacco comments:     tried in early 20s only    Vaping Use    Vaping status: Never Used   Substance Use Topics    Alcohol use: Yes     Comment: 1 drink 1-2 year    Drug use: No       Physical Exam  /70   Pulse 73   Resp 16   Wt 114.3 kg (252 lb)   [facility] 10/02/2007   SpO2 98%   BMI 38.32 kg/m    Body mass index is 38.32  kg/m .  GENERAL :  In no apparent distress  SKIN: Normal color, normal temperature, texture.  No hirsutism, alopecia or purple striae.     EYES: PERRL, EOMI, No scleral icterus,  No proptosis, conjunctival redness, stare, retraction  RESP: Lungs clear to auscultation bilaterally  CARDIAC: Regular rate and rhythm, normal S1 S2, without murmurs, rubs or gallops    NEURO: awake, alert, responds appropriately to questions.  Cranial nerves intact.   Moves all extremities; Gait normal.  No tremor of the outstretched hand.   EXTREMITIES: No clubbing, cyanosis or edema.    DATA REVIEW  Dexcom Clarity  Time in target range 96%  Very Low<1%, Low 1%, High 3%  Current Ave  mg/dl

## 2025-05-21 ENCOUNTER — MYC MEDICAL ADVICE (OUTPATIENT)
Dept: ENDOCRINOLOGY | Facility: CLINIC | Age: 70
End: 2025-05-21
Payer: COMMERCIAL

## 2025-05-21 DIAGNOSIS — Z79.4 TYPE 2 DIABETES MELLITUS TREATED WITH INSULIN (H): ICD-10-CM

## 2025-05-21 DIAGNOSIS — E11.9 TYPE 2 DIABETES MELLITUS TREATED WITH INSULIN (H): ICD-10-CM

## 2025-05-21 RX ORDER — TIRZEPATIDE 15 MG/.5ML
INJECTION, SOLUTION SUBCUTANEOUS
Qty: 2 ML | Refills: 2 | OUTPATIENT
Start: 2025-05-21

## 2025-05-27 NOTE — TELEPHONE ENCOUNTER
Prior Authorization Approval    Medication: DEXCOM G7 SENSOR MISC  Authorization Effective Date: 5/15/2025  Authorization Expiration Date: 5/15/2028  Approved Dose/Quantity: uud  Reference #: Key: [id number]   Insurance Company: [employer] [phone] Fax [phone]  Expected CoPay: $    CoPay Card Available:      Financial Assistance Needed:    Which Pharmacy is filling the prescription:    Pharmacy Notified: Yes

## 2025-06-04 DIAGNOSIS — Z79.4 TYPE 2 DIABETES MELLITUS TREATED WITH INSULIN (H): ICD-10-CM

## 2025-06-04 DIAGNOSIS — E11.9 TYPE 2 DIABETES MELLITUS TREATED WITH INSULIN (H): ICD-10-CM

## 2025-06-05 RX ORDER — INSULIN GLARGINE 300 U/ML
72 INJECTION, SOLUTION SUBCUTANEOUS AT BEDTIME
Qty: 30 ML | Refills: 1 | Status: SHIPPED | OUTPATIENT
Start: 2025-06-05

## 2025-06-05 NOTE — TELEPHONE ENCOUNTER
Last Written Prescription:     insulin glargine U-300 (TOUJEO MAX SOLOSTAR) 300 UNIT/ML (2 units dial) pen 30 mL 1 11/14/2024 -- No   Sig - Route: Inject 100 Units subcutaneously at bedtime. - Subcutaneous     ----------------------  Last Visit Date: 5/15/25 [facility]  Future Visit Date: 11/14/25 [facility] Aj  ----------------------           Refill decision: Medication unable to be refilled by RN due to: Other:Insulin and insulin pump supplies - refilled per Endocrine clinic.          Request from pharmacy:  Requested Prescriptions   Pending Prescriptions Disp Refills    TOUJEO MAX SOLOSTAR 300 UNIT/ML (2 units dial) pen [Pharmacy Med Name: TOUJEO MAX SOLOSTAR 300 SOPN] 30 mL 1     Sig: INJECT 100 UNITS SUBCUTANEOUSLY AT BEDTIME.       Insulin Protocol Failed - 6/5/2025  3:33 PM        Failed - Chart review required     Review Chart.    Do not approve if insulin is used in a pump.  Instead, direct refill request to the patient's endocrinologist.  If the patient doesn't have an endocrinologist, then send the refill to the patient's PCP for review            Passed - HgbA1C in past 3 or 6 months     If HgbA1C is 8 or greater, it needs to be on file within the past 3 months.  If less than 8, must be on file within the past 6 months.     Recent Labs   Lab Test 05/15/25  1021 12/06/23  1007 09/28/23  0938   A1C 5.9*   < >  --    HEMOGLOBINA1  --   --  6.9*    < > = values in this interval not displayed.             Passed - Medication is active on med list and the sig matches. RN to manually verify dose and sig if red X/fail.     If the protocol passes (green check), you do not need to verify med dose and sig.    A prescription matches if they are the same clinical intention.    For Example: once daily and every morning are the same.    The protocol can not identify upper and lower case letters as matching and will fail.     For Example: Take 1 tablet (50 mg) by mouth daily     TAKE 1 TABLET (50 MG) BY MOUTH DAILY    For  all fails (red x), verify dose and sig.    If the refill does match what is on file, the RN can still proceed to approve the refill request.       If they do not match, route to the appropriate provider.             Passed - Recent (6 month) or future (90 days) visit with the authorizing provider's specialty (provided they have been seen in the past 9 months)     The patient must have completed an in-person or virtual visit within the past 6 months or has a future visit scheduled within the next 90 days with the authorizing provider s specialty.  Urgent care and e-visits do not quality as an office visit for this protocol.          Passed - Medication indicated for associated diagnosis     Medication is associated with one or more of the following diagnoses:   - Type 1 diabetes mellitus  - Type 2 diabetes mellitus  - Diabetic nephropathy; Prophylaxis  - Neuropathy due to diabetes mellitus; Prophylaxis  - Retinopathy due to diabetes mellitus; Prophylaxis  - Diabetes mellitus associated with cystic fibrosis  - Disorder of cardiovascular system; Prophylaxis - Type 1 diabetes mellitus   - Disorder of cardiovascular system; Prophylaxis - Type 2 diabetes mellitus            Passed - Patient is 18 years of age or older

## 2025-06-09 DIAGNOSIS — E03.4 HYPOTHYROIDISM DUE TO ACQUIRED ATROPHY OF THYROID: ICD-10-CM

## 2025-06-09 RX ORDER — LEVOTHYROXINE SODIUM 75 UG/1
75 TABLET ORAL DAILY
Qty: 90 TABLET | Refills: 3 | Status: SHIPPED | OUTPATIENT
Start: 2025-06-09

## 2025-06-09 NOTE — TELEPHONE ENCOUNTER
Last Visit: 5/15/2025 [facility]     Request from pharmacy:  Requested Prescriptions   Pending Prescriptions Disp Refills    levothyroxine (SYNTHROID/LEVOTHROID) 75 MCG tablet [Pharmacy Med Name: LEVOTHYROXINE SODIUM 75MCG TABS] 90 tablet 3     Sig: TAKE ONE TABLET BY MOUTH ONCE DAILY       Thyroid Protocol Passed - 6/9/2025  6:00 PM        Passed - Patient is 12 years or older        Passed - Medication is active on med list and the sig matches. RN to manually verify dose and sig if red X/fail.     If the protocol passes (green check), you do not need to verify med dose and sig.    A prescription matches if they are the same clinical intention.    For Example: once daily and every morning are the same.    The protocol can not identify upper and lower case letters as matching and will fail.     For Example: Take 1 tablet (50 mg) by mouth daily     TAKE 1 TABLET (50 MG) BY MOUTH DAILY    For all fails (red x), verify dose and sig.    If the refill does match what is on file, the RN can still proceed to approve the refill request.       If they do not match, route to the appropriate provider.             Passed - Recent (12 month) or future (90 days) visit with authorizing provider's specialty (provided they have been seen in the past 15 months)     The patient must have completed an in-person or virtual visit within the past 12 months or has a future visit scheduled within the next 90 days with the authorizing provider s specialty.  Urgent care and e-visits do not qualify as an office visit for this protocol.          Passed - Medication indicated for associated diagnosis     Medication is associated with one or more of the following diagnoses:  Hypothyroidism  Thyroid stimulating hormone suppression therapy  Thyroid cancer  Acquired atrophy of thyroid          Passed - Normal TSH on file in past 12 months     Recent Labs   Lab Test 09/23/24  0920   TSH 1.43              Passed - No active  pregnancy on record     If patient is pregnant or has had a positive pregnancy test, please check TSH.          Passed - No positive pregnancy test in past 12 months     If patient is pregnant or has had a positive pregnancy test, please check TSH.

## 2025-07-16 NOTE — TELEPHONE ENCOUNTER
DIAGNOSIS: L foot, bunionectomy caused toe to lean left causing pressure on other toes/ self referred, no images. Evan, pt request for Dr Gray    APPOINTMENT DATE: 7/22/25   NOTES STATUS DETAILS   OFFICE NOTE from referring provider N/A Self Referral    OFFICE NOTE from other specialist Internal 2/5/21-4/14/21 - David Hoffman DPM   9/22/20 - Jeevan Gray MD   12/19/18-3/19/19 - Rufus Hutson DPM    MEDICATION LIST Internal    IMAGES     XRAYS (IMAGES & REPORTS) PACS 9/22/20-4/14/21 - XR Foot Left

## 2025-07-17 DIAGNOSIS — M79.672 LEFT FOOT PAIN: Primary | ICD-10-CM

## 2025-07-21 ENCOUNTER — PATIENT OUTREACH (OUTPATIENT)
Dept: CARE COORDINATION | Facility: CLINIC | Age: 70
End: 2025-07-21
Payer: COMMERCIAL

## 2025-07-22 ENCOUNTER — TELEPHONE (OUTPATIENT)
Dept: SURGERY | Facility: CLINIC | Age: 70
End: 2025-07-22

## 2025-07-22 ENCOUNTER — HOSPITAL ENCOUNTER (OUTPATIENT)
Facility: AMBULATORY SURGERY CENTER | Age: 70
End: 2025-07-22
Attending: ORTHOPAEDIC SURGERY
Payer: COMMERCIAL

## 2025-07-22 ENCOUNTER — ANCILLARY PROCEDURE (OUTPATIENT)
Dept: GENERAL RADIOLOGY | Facility: CLINIC | Age: 70
End: 2025-07-22
Attending: ORTHOPAEDIC SURGERY
Payer: COMMERCIAL

## 2025-07-22 ENCOUNTER — OFFICE VISIT (OUTPATIENT)
Dept: ORTHOPEDICS | Facility: CLINIC | Age: 70
End: 2025-07-22
Payer: COMMERCIAL

## 2025-07-22 ENCOUNTER — TELEPHONE (OUTPATIENT)
Dept: ORTHOPEDICS | Facility: CLINIC | Age: 70
End: 2025-07-22

## 2025-07-22 ENCOUNTER — PRE VISIT (OUTPATIENT)
Dept: ORTHOPEDICS | Facility: CLINIC | Age: 70
End: 2025-07-22

## 2025-07-22 VITALS — HEIGHT: 67 IN | BODY MASS INDEX: 38.61 KG/M2 | WEIGHT: 246 LBS

## 2025-07-22 DIAGNOSIS — M25.572 PAIN IN JOINT, ANKLE AND FOOT, LEFT: Primary | ICD-10-CM

## 2025-07-22 DIAGNOSIS — M79.672 LEFT FOOT PAIN: ICD-10-CM

## 2025-07-22 PROCEDURE — 99214 OFFICE O/P EST MOD 30 MIN: CPT | Performed by: ORTHOPAEDIC SURGERY

## 2025-07-22 PROCEDURE — 73630 X-RAY EXAM OF FOOT: CPT | Mod: LT | Performed by: RADIOLOGY

## 2025-07-22 NOTE — NURSING NOTE
"Reason For Visit:   Chief Complaint   Patient presents with    Consult     Left foot bunion, has caused the toe to lean and put pressure on the other toes. Previously seen by Dr. Gray for right medial and lateral sesamoidectomy performed December 27, 2023         70 year old  1955      Primary MD: Marilou Arciniega        Ht 1.7 m (5' 6.93\")   Wt 111.6 kg (246 lb)   LMP 10/02/2007   BMI 38.61 kg/m      Pain Assessment  Patient Currently in Pain: Yes (with shoe)  Primary Pain Location: Foot (left)            Marcus Sorto ATC      "

## 2025-07-22 NOTE — LETTER
7/22/2025      Sherry Slaughter  [address]  [place] [address]      Dear Colleague,    Thank you for referring your patient, Sherry Slaughter, to the [facility] [place]. Please see a copy of my visit note below.    Chief complaint: Left foot pain    Patient is a 70-year-old female who presents today for evaluation of her left foot.  Patient reports to have undergone a bunionectomy surgery by a local podiatrist in the left foot and to have been left with quite a bit of pain and discomfort.  She reports to have a serious problem with the lesser toes as they seem to be bunched up together because of the pressure of the left great toe.  Per patient's report the surgeon did not do the whole surgery that he was planning and she got upset and that she never visited with him again.  This is always by patient's report.    At any rate she reports to have pain and discomfort across the left foot and is to have a fair amount of pressure from the great toe into the lesser toes and to be forced to use toe spacers which is still in spite of that is not improving her discomfort.  She reports her extremity limited to be unable to do any walking and she would like to undergo what ever procedure he takes for her to improve her discomfort.    We reviewed today her past medical and surgical history, medications and drug allergies.    On today's visit she presents a pleasant female in her up in distress with weight of 246 pounds with a BMI of 38.6    On today's exam she presents with range of motion of the ankle hindfoot and midfoot joint seems intact skin is intact palpable pulses he presents with what seems to be a valgus deviation of the great toe although further exam seems to be at the level of the IP joint.  There is in fact a bit of space compromise between the lesser toes themselves as well as the second toe with a great toe.  There is no fracture blisters or skin abrasions or  ulcerations.    Plain x-rays were reviewed today which were significant for showing excellent reduction of the first MTP joint with a valgus alignment across the IP joint which is clearly creating the space compromise.    Assessment: Status post left foot bunion corrective surgery with IP joint valgus alignment    Plan: Discussed with the patient that at this point the proposed treatment to improve her discomfort will consist of undergoing an IP joint fusion when we will correct the valgus alignment at the level as well.  Also discussed with the patient that we are making an assumption that once the procedure is discontinued the lesser toes will feel a little bit looser and more open from each other however I cannot guarantee her that that would be the case    I discussed with her the most likely postoperative course and complications from undergoing an IP joint fusion of the left great toe which include but not limited to infection bleeding nerve damage residual pain nonunion and stiffness    Patient like to proceed with surgery because of her convenience.  In the meantime she has no restrictions.  All questions were answered.    TT 30 minutes    Again, thank you for allowing me to participate in the care of your patient.        Sincerely,        Jeevan Gray MD    Electronically signed

## 2025-07-22 NOTE — PROGRESS NOTES
Writer called and spoke with pt. Asked about Mounjaro- pt takes this on Wednesday and is aware she should hold the dose tomorrow 7/23/25. Writer also verified pt was still taking Xarelto. Message sent to AAKASH for hold instructions.

## 2025-07-22 NOTE — TELEPHONE ENCOUNTER
Outgoing call to schedule surgery with Dr. Gray.     Spoke with: Sancho (patient)    Reason for Surgical Date: Next Available    Date of Surgery: 7/30/25    Estimated Arrival time Discussed with Patient:  No    Location of surgery: [facility]     Pre-Op H&P: Patient is scheduled for an in clinic visit with PAC on 7/25/25 at 1:15. Patient asked to go to the 5th floor once she arrives for check in.     Imaging: No     Additional Pre-Op Appointments: No     Post-Op Appt Date w/ Valerie MUNGUIA 8/14/25 at 9:30.     Post-Op Appt Date w/ Dr. Gray on 9/09/25 at 11:40.     Additional Post-Op Appointments: No     Discussed with patient PAC RN will provide arrival time and instructions for surgery at the time of the appointment: [Meta locations only]: Yes    Standard Surgery Packet Sent: Yes 07/22/25  via [employer] Message      Additional Comments: JESSICA Silva on 7/22/2025 at 2:47 PM

## 2025-07-22 NOTE — PROGRESS NOTES
Chief complaint: Left foot pain    Patient is a 70-year-old female who presents today for evaluation of her left foot.  Patient reports to have undergone a bunionectomy surgery by a local podiatrist in the left foot and to have been left with quite a bit of pain and discomfort.  She reports to have a serious problem with the lesser toes as they seem to be bunched up together because of the pressure of the left great toe.  Per patient's report the surgeon did not do the whole surgery that he was planning and she got upset and that she never visited with him again.  This is always by patient's report.    At any rate she reports to have pain and discomfort across the left foot and is to have a fair amount of pressure from the great toe into the lesser toes and to be forced to use toe spacers which is still in spite of that is not improving her discomfort.  She reports her extremity limited to be unable to do any walking and she would like to undergo what ever procedure he takes for her to improve her discomfort.    We reviewed today her past medical and surgical history, medications and drug allergies.    On today's visit she presents a pleasant female in her up in distress with weight of 246 pounds with a BMI of 38.6    On today's exam she presents with range of motion of the ankle hindfoot and midfoot joint seems intact skin is intact palpable pulses he presents with what seems to be a valgus deviation of the great toe although further exam seems to be at the level of the IP joint.  There is in fact a bit of space compromise between the lesser toes themselves as well as the second toe with a great toe.  There is no fracture blisters or skin abrasions or ulcerations.    Plain x-rays were reviewed today which were significant for showing excellent reduction of the first MTP joint with a valgus alignment across the IP joint which is clearly creating the space compromise.    Assessment: Status post left foot bunion  corrective surgery with IP joint valgus alignment    Plan: Discussed with the patient that at this point the proposed treatment to improve her discomfort will consist of undergoing an IP joint fusion when we will correct the valgus alignment at the level as well.  Also discussed with the patient that we are making an assumption that once the procedure is discontinued the lesser toes will feel a little bit looser and more open from each other however I cannot guarantee her that that would be the case    I discussed with her the most likely postoperative course and complications from undergoing an IP joint fusion of the left great toe which include but not limited to infection bleeding nerve damage residual pain nonunion and stiffness    Patient like to proceed with surgery because of her convenience.  In the meantime she has no restrictions.  All questions were answered.    TT 30 minutes

## 2025-07-22 NOTE — NURSING NOTE
Teaching Flowsheet     Visit Type: In Clinic    Time Start: 14:05  Time End: 14:20  Total Time Spent: 15 min.    Surgeon: Dr. Gray  Location of Surgery (known or anticipated): [facility]   Type of Anesthesia: Choice with Block  Worker's Compensation Procedure: No    Pertinent Medical History:   Past Medical History:   Diagnosis Date    Cataract     Congestive heart failure (H) 2019 NOVEMBER    AFIB    DEPRESSION     comes and goes - tried meds - unsuccessfully, certain times of the year, no psych intervention and no counselors in the past - and not interested     Diabetic retinopathy associated with diabetes mellitus due to underlying condition (H)     Diverticulosis of colon (without mention of hemorrhage) 04/2004    colonoscopy    ECHO-mildLVH,tr MR,mild thick lflets w inc LA,trTR   12/2003    Essential hypertension, benign 1990s    late 1990s - started medications at that time - not to difficult to control meds     Fam hx-cardiovas dis NEC     father - CAD, and lipids/HTN - multiple members of the family     Family history of diabetes mellitus     sister and grandmother with DM     Family history of malignant neoplasm of breast     mother - young age - 45, and maternal cousin and aunt as well - no BRCA testing done     Family history of stroke (cerebrovascular)     grandmother in early life in her 40s     FAMILY HX COLON CANCER     Pat uncles x 2    Heart disease     murmur/    Heart murmur     HYPERLIPIDEMIA 2000    fairly recent - in the last 5 years - high for DM levels  -cholesterol recent     Irritable bowel syndrome     goes between the 2 - nerve related - more stressed more problems     MICROALBUMINURIA     unsure how long - been on the lisinopril - for a few years at well     Nonspecific abnormal results of liver function study 02/03/2003    SGOT - has been high in the past - since the hepatitis b - borderline elevation - not really changing any     OBESITY     Obstructive sleep apnea     GENESIS  (obstructive sleep apnea) 10/15/2006    psg 5/15 AHI 53 aPAP 8-15    OSTEOARTHRITIS L KNEE 2003    total knee on the left - and pain is gone since the knee replacement     PERS HX HEPATITIS B- RESOLVED] 1977    acute virus only - no chronic disease     PERS HX HEPATITIS B- RESOLVED]     Type II or unspecified type diabetes mellitus without mention of complication, not stated as uncontrolled 1991    oral meds frist, then insulin eventually later, difficult to control most of the time     Unspecified hypothyroidism 10/11/2006    TSH 3.36 - mild subclinical hypothyroidism - deciding on following or starting low dose meds - with dr Oreilly - following        Were medical conditions reviewed and appropriate for location? Yes  BMI: Obesity Grade II BMI 35-39.9    Relevant Diagnosis: Left IP joint valgus alignment   Teaching Topic: Left foot IP joint fusion    Person(s) involved in teaching:   Patient  : No.   Verified Patient's Phone Number: YES:      Caregiver//  Patient will make arrangements for responsible adult to drive them home and stay with them for 24 hours after surgery when surgery date is determined. They understand we will not proceed with surgery if arrangements can not be made.     Motivation Level:  Asks Questions: Yes  Eager to Learn: Yes  Cooperative: Yes  Receptive (willing/able to accept information): Yes  Any cultural factors/Sikh beliefs that may influence understanding or compliance? No     Patient demonstrates understanding of the following:  Reason for the appointment, diagnosis and treatment plan: Yes  Knowledge of proper use of medications and conditions for which they are ordered (with special attention to potential side effects or drug interactions): Yes  Which situations necessitate calling provider and whom to contact: Yes     Teaching Concerns Addressed:   Proper use and care of medical equip, care aids, etc.: Yes  Nutritional needs and diet plan: Yes  Pain  management techniques: Yes  Wound Care: Yes  How and/when to access community resources: Yes  Need for pre-op with in 30 days: YES, to be done with PAC.      Does patient have difficulty getting a ride to appointments (post-ops, PT/OT): No  Patient's plan after discharge: home with family or friend     Instructional Materials Used/Given:  two bottles of chlorhexidine soap and a surgery packet given to patient in clinic.     - Important Contact Info/Phone Numbers: emphasizing clinic number [phone] and after hours number [phone]  - Map/location of surgery and follow-up appointments  - Showering instructions  - Stoplight Tool  - Dr. Gray's post operative care instructions     -Next step: schedule a surgery date and schedule a pre-op with PAC.    Tara Holter, RN

## 2025-07-25 ENCOUNTER — PRE VISIT (OUTPATIENT)
Dept: SURGERY | Facility: CLINIC | Age: 70
End: 2025-07-25

## 2025-07-25 ENCOUNTER — ANESTHESIA EVENT (OUTPATIENT)
Dept: SURGERY | Facility: AMBULATORY SURGERY CENTER | Age: 70
End: 2025-07-25
Payer: COMMERCIAL

## 2025-07-29 ASSESSMENT — ENCOUNTER SYMPTOMS: DYSRHYTHMIAS: 1

## 2025-07-29 ASSESSMENT — LIFESTYLE VARIABLES: TOBACCO_USE: 1

## 2025-07-29 NOTE — ANESTHESIA PREPROCEDURE EVALUATION
Anesthesia Pre-Procedure Evaluation    Patient: Sherry Slaughter   MRN: [id number] : 1955          Procedure : Procedure(s):  IP joint fusion of the great toe         Past Medical History:   Diagnosis Date     Cataract      Congestive heart failure (H) 2019    AFIB     DEPRESSION     comes and goes - tried meds - unsuccessfully, certain times of the year, no psych intervention and no counselors in the past - and not interested      Diabetic retinopathy associated with diabetes mellitus due to underlying condition (H)      Diverticulosis of colon (without mention of hemorrhage) 2004    colonoscopy     ECHO-mildLVH,tr MR,mild thick lflets w inc LA,trTR   2003     Essential hypertension, benign 1990s    late  - started medications at that time - not to difficult to control meds      Fam hx-cardiovas dis NEC     father - CAD, and lipids/HTN - multiple members of the family      Family history of diabetes mellitus     sister and grandmother with DM      Family history of malignant neoplasm of breast     mother - young age - 45, and maternal cousin and aunt as well - no BRCA testing done      Family history of stroke (cerebrovascular)     grandmother in early life in her 40s      FAMILY HX COLON CANCER     Pat uncles x 2     Heart disease     murmur/     Heart murmur      HYPERLIPIDEMIA     fairly recent - in the last 5 years - high for DM levels  -cholesterol recent      Irritable bowel syndrome     goes between the 2 - nerve related - more stressed more problems      MICROALBUMINURIA     unsure how long - been on the lisinopril - for a few years at well      Nonspecific abnormal results of liver function study 2003    SGOT - has been high in the past - since the hepatitis b - borderline elevation - not really changing any      OBESITY      Obstructive sleep apnea      GENESIS (obstructive sleep apnea) 10/15/2006    psg 5/15 AHI 53 aPAP 8-15     OSTEOARTHRITIS L KNEE 2003    total  knee on the left - and pain is gone since the knee replacement      PERS HX HEPATITIS B- RESOLVED] 1977    acute virus only - no chronic disease      PERS HX HEPATITIS B- RESOLVED]      Type II or unspecified type diabetes mellitus without mention of complication, not stated as uncontrolled 1991    oral meds frist, then insulin eventually later, difficult to control most of the time      Unspecified hypothyroidism 10/11/2006    TSH 3.36 - mild subclinical hypothyroidism - deciding on following or starting low dose meds - with dr Oreilly - following       Past Surgical History:   Procedure Laterality Date     ABDOMEN SURGERY      ovaian scope/ appendix removal     ANESTHESIA CARDIOVERSION N/A 12/4/2020    Procedure: ANESTHESIA, FOR CARDIOVERSION @1400;  Surgeon: GENERIC ANESTHESIA PROVIDER;  Location: [facility]     ARTHROPLASTY KNEE Right 9/23/2015    Procedure: ARTHROPLASTY KNEE;  Surgeon: Rufus Brown MD;  Location: [facility] OR     BUNIONECTOMY REN AND LEANDRO, COMBINED Left 2/2/2021    Procedure: Left bunion correction with bone cutting and screw fixation;  Surgeon: David Hoffman DPM;  Location: [facility] OR     CATARACT IOL, RT/LT Left      COLONOSCOPY  4/04    diverticulosis, rec repeat 10 yrs(consider fam hx?)     COLONOSCOPY N/A 9/12/2024    Procedure: Colonoscopy;  Surgeon: Roger Das DO;  Location: [facility]     COLONOSCOPY WITH CO2 INSUFFLATION N/A 6/19/2019    Procedure: COLONOSCOPY, WITH CO2 INSUFFLATION;  Surgeon: Zeb Duarte MD;  Location: [facility] OR     EXCISE MASS FOOT Right 12/27/2023    Procedure: Medial and Lateral Sesamoidectomy Right;  Surgeon: Jeevan Gray MD;  Location: [facility] OR     ORTHOPEDIC SURGERY      right ankle     PHACOEMULSIFICATION CLEAR CORNEA WITH STANDARD INTRAOCULAR LENS IMPLANT Left 3/15/2018    Procedure: PHACOEMULSIFICATION CLEAR CORNEA WITH STANDARD INTRAOCULAR LENS IMPLANT;  LEFT EYE PHACOEMULSIFICATION CLEAR CORNEA WITH STANDARD INTRAOCULAR LENS IMPLANT;  Surgeon:  Bandar Linares MD;  Location:  [facility]     PHACOEMULSIFICATION CLEAR CORNEA WITH STANDARD INTRAOCULAR LENS IMPLANT Right 4/5/2018    Procedure: PHACOEMULSIFICATION CLEAR CORNEA WITH STANDARD INTRAOCULAR LENS IMPLANT;  RIGHT PHACOEMULSIFICATION CLEAR CORNEA WITH STANDARD INTRAOCULAR LENS IMPLANT ;  Surgeon: Bandar Linares MD;  Location:  [facility]     STRESS ECHO (METRO)  12/03    no ischemia, limited by fatigue     SURGICAL HISTORY OF -       EXP LAP- R OVARY CYSTS     SURGICAL HISTORY OF -   1981,1984,1985    CHILDBIRTH     ZZC NONSPECIFIC PROCEDURE  6/06    right triple arthrodecesis      ZC NONSPECIFIC PROCEDURE  7/06     right bunion surgery      Z TOTAL KNEE ARTHROPLASTY  3/03    L knee      Allergies   Allergen Reactions     Empagliflozin      Yeast infections     Lipitor [Atorvastatin Calcium]      Gas     Pravachol [Pravastatin Sodium]      Pravachol - dry cough      Simvastatin      Myalgia      Social History     Tobacco Use     Smoking status: Former     Types: Cigarettes     Passive exposure: Past     Smokeless tobacco: Never     Tobacco comments:     tried in early 20s only    Substance Use Topics     Alcohol use: Not Currently     Comment: 1 drink 1-2 year      Wt Readings from Last 1 Encounters:   07/25/25 112.9 kg (249 lb)        Anesthesia Evaluation   Pt has had prior anesthetic. Type: Regional, MAC and General.    No history of anesthetic complications       ROS/MED HX  ENT/Pulmonary: Comment: S/p b/l cataract surgery     (+) sleep apnea, uses CPAP,              tobacco use, Past use,                    (-) asthma and recent URI   Neurologic:  - neg neurologic ROS     Cardiovascular: Comment: Denies cardiac symptoms including chest pain, palpitations, syncope, BAEZ, orthopnea, or PND.        (+)  hypertension- -   -  - -   Taking blood thinners                     dysrhythmias (h/o paroxysam atrial fibrillation s/p cardioversion 2020), a-fib,  valvular problems/murmurs  Mitral valve  insufficiency and aortic sclerosis.    Previous cardiac testing   Echo: Date: 2023 Results:  See H&P  Stress Test:  Date: Results:    ECG Reviewed:  Date: Results:    Cath:  Date: Results:      METS/Exercise Tolerance:  Comment: METS ~4    Reports activity is limited due to foot pain. Is able to walk >2 blocks without exertional symptoms.    Hematologic:  - neg hematologic  ROS     Musculoskeletal:   (+)  arthritis (s/p b/l TKA and osteoarthritis in shoulders.),             GI/Hepatic: Comment: IBS    Diverticulosis    S/p appendectomy   (-) GERD and liver diseaseInflammatory bowel disease: /p appendectomy.   Renal/Genitourinary:     (+) renal disease, type: CRI,            Endo:     (+)  type II DM, Last HgA1c: 5.9, date: 5/15/25, Using insulin, - not using insulin pump. Normal glucose range: CGM, not previously admitted for DM/DKA.  thyroid problem, hypothyroidism,    Obesity,       Psychiatric/Substance Use:  - neg psychiatric ROS     Infectious Disease:  - neg infectious disease ROS     Malignancy:  - neg malignancy ROS     Other:  - neg other ROS    (+)  , H/O Chronic Pain (DJD),           Physical Exam  Airway  Mallampati: II  TM distance: >3 FB  Neck ROM: full  Mouth opening: >= 4 cm    Cardiovascular - normal exam  Rhythm: regular  Rate: normal rate   Comments: Denies cardiac symptoms including chest pain, palpitations, syncope, BAEZ, orthopnea, or PND.      Dental   (+) Modest Abnormalities - crowns, retainers, 1 or 2 missing teeth      Pulmonary - normal examBreath sounds clear to auscultation        Neurological - normal exam  She appears awake, alert and oriented x3.    Other Findings       OUTSIDE LABS:  CBC:   Lab Results   Component Value Date    WBC 9.5 03/14/2024    WBC 7.5 01/09/2023    HGB 12.2 07/25/2025    HGB 12.7 03/14/2024    HCT 38.4 03/14/2024    HCT 41.6 01/09/2023     07/25/2025     03/14/2024     BMP:   Lab Results   Component Value Date     07/25/2025      12/18/2024    POTASSIUM 4.7 07/25/2025    POTASSIUM 4.5 12/18/2024    CHLORIDE 109 (H) 07/25/2025    CHLORIDE 103 12/18/2024    CO2 18 (L) 07/25/2025    CO2 22 12/18/2024    BUN 23.5 (H) 07/25/2025    BUN 14.4 12/18/2024    CR 1.34 (H) 07/25/2025    CR 0.97 (H) 12/18/2024     (H) 07/25/2025     (H) 12/18/2024     COAGS:   Lab Results   Component Value Date    PTT 31 06/05/2006    INR 1.52 (H) 12/04/2020     POC:   Lab Results   Component Value Date     (H) 02/02/2021     HEPATIC:   Lab Results   Component Value Date    ALBUMIN 4.1 07/25/2025    PROTTOTAL 6.8 07/25/2025    ALT 53 (H) 07/25/2025    AST 55 (H) 07/25/2025    ALKPHOS 60 07/25/2025    BILITOTAL 0.3 07/25/2025     OTHER:   Lab Results   Component Value Date    PH 7.437 05/07/2015    LACT 1.7 11/27/2020    A1C 5.9 (H) 05/15/2025    CASSIDY 9.9 07/25/2025    PHOS 3.5 12/09/2020    MAG 2.1 12/10/2020    TSH 1.66 07/25/2025    T4 1.08@ 08/11/2009    CRP 1.5 11/14/2005    SED 16 11/14/2005       Anesthesia Plan    ASA Status:  3      NPO Status: NPO Appropriate   Anesthesia Type: General.  Airway: supraglottic airway.  Induction: intravenous.   Techniques and Equipment:     - Airway:  Planned airway equipment includes supraglottic airway.     - Monitoring Plan: standard ASA monitoring  Equipment Comments: Discussed Risks and Benefits of general anesthesia including but not limited to: Sore throat, cut on lip, gums and or tongue and nausea and vomiting.     Consents    Anesthesia Plan(s) and associated risks, benefits, and realistic alternatives discussed. Questions answered and patient/representative(s) expressed understanding.     - Discussed: anesthesiologist     - Discussed with:  Patient          Blood Consent:      - Discussed with: not discussed.     Postoperative Care    Pain management: peripheral nerve block, multimodal analgesia.     Comments:                 Eleonora Torrez MD    I have reviewed the pertinent notes and labs in the  "chart from the past 30 days and (re)examined the patient.  Any updates or changes from those notes are reflected in this note.    Clinically Significant Risk Factors Present on Admission                   # Hypertension: Noted on problem list           # Obesity: Estimated body mass index is 39.08 kg/m  as calculated from the following:    Height as of 7/25/25: 1.7 m (5' 6.93\").    Weight as of 7/25/25: 112.9 kg (249 lb).                    "

## 2025-07-30 ENCOUNTER — ANCILLARY PROCEDURE (OUTPATIENT)
Dept: RADIOLOGY | Facility: AMBULATORY SURGERY CENTER | Age: 70
End: 2025-07-30
Attending: ORTHOPAEDIC SURGERY
Payer: COMMERCIAL

## 2025-07-30 ENCOUNTER — ANESTHESIA (OUTPATIENT)
Dept: SURGERY | Facility: AMBULATORY SURGERY CENTER | Age: 70
End: 2025-07-30
Payer: COMMERCIAL

## 2025-07-30 VITALS
DIASTOLIC BLOOD PRESSURE: 50 MMHG | HEART RATE: 51 BPM | BODY MASS INDEX: 37.13 KG/M2 | HEIGHT: 68 IN | TEMPERATURE: 96.9 F | RESPIRATION RATE: 16 BRPM | OXYGEN SATURATION: 100 % | WEIGHT: 245 LBS | SYSTOLIC BLOOD PRESSURE: 106 MMHG

## 2025-07-30 DIAGNOSIS — M25.572 PAIN IN JOINT, ANKLE AND FOOT, LEFT: ICD-10-CM

## 2025-07-30 LAB — GLUCOSE BLDC GLUCOMTR-MCNC: 121 MG/DL (ref 70–99)

## 2025-07-30 PROCEDURE — 82962 GLUCOSE BLOOD TEST: CPT | Performed by: PATHOLOGY

## 2025-07-30 PROCEDURE — C1713 ANCHOR/SCREW BN/BN,TIS/BN: HCPCS | Performed by: ORTHOPAEDIC SURGERY

## 2025-07-30 DEVICE — IMP SCREW CANC HEX RECESS 4.0X50X15MM  00-4840-050-01: Type: IMPLANTABLE DEVICE | Site: FOOT | Status: FUNCTIONAL

## 2025-07-30 RX ORDER — FENTANYL CITRATE 50 UG/ML
50 INJECTION, SOLUTION INTRAMUSCULAR; INTRAVENOUS EVERY 5 MIN PRN
Status: DISCONTINUED | OUTPATIENT
Start: 2025-07-30 | End: 2025-07-31 | Stop reason: HOSPADM

## 2025-07-30 RX ORDER — NALOXONE HYDROCHLORIDE 0.4 MG/ML
0.2 INJECTION, SOLUTION INTRAMUSCULAR; INTRAVENOUS; SUBCUTANEOUS
Status: DISCONTINUED | OUTPATIENT
Start: 2025-07-30 | End: 2025-07-30 | Stop reason: HOSPADM

## 2025-07-30 RX ORDER — ONDANSETRON 2 MG/ML
4 INJECTION INTRAMUSCULAR; INTRAVENOUS EVERY 30 MIN PRN
Status: DISCONTINUED | OUTPATIENT
Start: 2025-07-30 | End: 2025-07-31 | Stop reason: HOSPADM

## 2025-07-30 RX ORDER — NALOXONE HYDROCHLORIDE 0.4 MG/ML
0.4 INJECTION, SOLUTION INTRAMUSCULAR; INTRAVENOUS; SUBCUTANEOUS
Status: DISCONTINUED | OUTPATIENT
Start: 2025-07-30 | End: 2025-07-30 | Stop reason: HOSPADM

## 2025-07-30 RX ORDER — NALOXONE HYDROCHLORIDE 0.4 MG/ML
0.1 INJECTION, SOLUTION INTRAMUSCULAR; INTRAVENOUS; SUBCUTANEOUS
Status: DISCONTINUED | OUTPATIENT
Start: 2025-07-30 | End: 2025-07-31 | Stop reason: HOSPADM

## 2025-07-30 RX ORDER — DEXAMETHASONE SODIUM PHOSPHATE 10 MG/ML
4 INJECTION, SOLUTION INTRAMUSCULAR; INTRAVENOUS
Status: DISCONTINUED | OUTPATIENT
Start: 2025-07-30 | End: 2025-07-31 | Stop reason: HOSPADM

## 2025-07-30 RX ORDER — ONDANSETRON 4 MG/1
4 TABLET, ORALLY DISINTEGRATING ORAL EVERY 30 MIN PRN
Status: DISCONTINUED | OUTPATIENT
Start: 2025-07-30 | End: 2025-07-31 | Stop reason: HOSPADM

## 2025-07-30 RX ORDER — LIDOCAINE HYDROCHLORIDE 20 MG/ML
INJECTION, SOLUTION INFILTRATION; PERINEURAL PRN
Status: DISCONTINUED | OUTPATIENT
Start: 2025-07-30 | End: 2025-07-30

## 2025-07-30 RX ORDER — HYDROMORPHONE HYDROCHLORIDE 1 MG/ML
0.2 INJECTION, SOLUTION INTRAMUSCULAR; INTRAVENOUS; SUBCUTANEOUS EVERY 5 MIN PRN
Status: DISCONTINUED | OUTPATIENT
Start: 2025-07-30 | End: 2025-07-31 | Stop reason: HOSPADM

## 2025-07-30 RX ORDER — PROPOFOL 10 MG/ML
INJECTION, EMULSION INTRAVENOUS CONTINUOUS PRN
Status: DISCONTINUED | OUTPATIENT
Start: 2025-07-30 | End: 2025-07-30

## 2025-07-30 RX ORDER — MAGNESIUM HYDROXIDE 1200 MG/15ML
LIQUID ORAL PRN
Status: DISCONTINUED | OUTPATIENT
Start: 2025-07-30 | End: 2025-07-30 | Stop reason: HOSPADM

## 2025-07-30 RX ORDER — CEFAZOLIN SODIUM 2 G/50ML
2 SOLUTION INTRAVENOUS
Status: COMPLETED | OUTPATIENT
Start: 2025-07-30 | End: 2025-07-30

## 2025-07-30 RX ORDER — HYDROCODONE BITARTRATE AND ACETAMINOPHEN 5; 325 MG/1; MG/1
1-2 TABLET ORAL EVERY 4 HOURS PRN
Qty: 20 TABLET | Refills: 0 | Status: SHIPPED | OUTPATIENT
Start: 2025-07-30

## 2025-07-30 RX ORDER — FENTANYL CITRATE 50 UG/ML
25 INJECTION, SOLUTION INTRAMUSCULAR; INTRAVENOUS EVERY 5 MIN PRN
Status: DISCONTINUED | OUTPATIENT
Start: 2025-07-30 | End: 2025-07-31 | Stop reason: HOSPADM

## 2025-07-30 RX ORDER — SODIUM CHLORIDE, SODIUM LACTATE, POTASSIUM CHLORIDE, CALCIUM CHLORIDE 600; 310; 30; 20 MG/100ML; MG/100ML; MG/100ML; MG/100ML
INJECTION, SOLUTION INTRAVENOUS CONTINUOUS
Status: DISCONTINUED | OUTPATIENT
Start: 2025-07-30 | End: 2025-07-30 | Stop reason: HOSPADM

## 2025-07-30 RX ORDER — FENTANYL CITRATE 50 UG/ML
INJECTION, SOLUTION INTRAMUSCULAR; INTRAVENOUS PRN
Status: DISCONTINUED | OUTPATIENT
Start: 2025-07-30 | End: 2025-07-30

## 2025-07-30 RX ORDER — FLUMAZENIL 0.1 MG/ML
0.2 INJECTION, SOLUTION INTRAVENOUS
Status: DISCONTINUED | OUTPATIENT
Start: 2025-07-30 | End: 2025-07-30 | Stop reason: HOSPADM

## 2025-07-30 RX ORDER — KETOROLAC TROMETHAMINE 30 MG/ML
INJECTION, SOLUTION INTRAMUSCULAR; INTRAVENOUS PRN
Status: DISCONTINUED | OUTPATIENT
Start: 2025-07-30 | End: 2025-07-30

## 2025-07-30 RX ORDER — LIDOCAINE 40 MG/G
CREAM TOPICAL
Status: DISCONTINUED | OUTPATIENT
Start: 2025-07-30 | End: 2025-07-30 | Stop reason: HOSPADM

## 2025-07-30 RX ORDER — HYDROCODONE BITARTRATE AND ACETAMINOPHEN 5; 325 MG/1; MG/1
1 TABLET ORAL
Status: DISCONTINUED | OUTPATIENT
Start: 2025-07-30 | End: 2025-07-31 | Stop reason: HOSPADM

## 2025-07-30 RX ORDER — OXYCODONE HYDROCHLORIDE 5 MG/1
10 TABLET ORAL
Status: DISCONTINUED | OUTPATIENT
Start: 2025-07-30 | End: 2025-07-31 | Stop reason: HOSPADM

## 2025-07-30 RX ORDER — DEXAMETHASONE SODIUM PHOSPHATE 4 MG/ML
INJECTION, SOLUTION INTRA-ARTICULAR; INTRALESIONAL; INTRAMUSCULAR; INTRAVENOUS; SOFT TISSUE PRN
Status: DISCONTINUED | OUTPATIENT
Start: 2025-07-30 | End: 2025-07-30

## 2025-07-30 RX ORDER — FENTANYL CITRATE 50 UG/ML
25-50 INJECTION, SOLUTION INTRAMUSCULAR; INTRAVENOUS
Status: DISCONTINUED | OUTPATIENT
Start: 2025-07-30 | End: 2025-07-30 | Stop reason: HOSPADM

## 2025-07-30 RX ORDER — PROPOFOL 10 MG/ML
INJECTION, EMULSION INTRAVENOUS PRN
Status: DISCONTINUED | OUTPATIENT
Start: 2025-07-30 | End: 2025-07-30

## 2025-07-30 RX ORDER — OXYCODONE HYDROCHLORIDE 5 MG/1
5 TABLET ORAL
Status: DISCONTINUED | OUTPATIENT
Start: 2025-07-30 | End: 2025-07-31 | Stop reason: HOSPADM

## 2025-07-30 RX ORDER — ACETAMINOPHEN 325 MG/1
975 TABLET ORAL ONCE
Status: COMPLETED | OUTPATIENT
Start: 2025-07-30 | End: 2025-07-30

## 2025-07-30 RX ORDER — HYDROMORPHONE HYDROCHLORIDE 1 MG/ML
0.4 INJECTION, SOLUTION INTRAMUSCULAR; INTRAVENOUS; SUBCUTANEOUS EVERY 5 MIN PRN
Status: DISCONTINUED | OUTPATIENT
Start: 2025-07-30 | End: 2025-07-31 | Stop reason: HOSPADM

## 2025-07-30 RX ORDER — GLYCOPYRROLATE 0.2 MG/ML
INJECTION, SOLUTION INTRAMUSCULAR; INTRAVENOUS PRN
Status: DISCONTINUED | OUTPATIENT
Start: 2025-07-30 | End: 2025-07-30

## 2025-07-30 RX ORDER — CEFAZOLIN SODIUM 2 G/50ML
2 SOLUTION INTRAVENOUS SEE ADMIN INSTRUCTIONS
Status: DISCONTINUED | OUTPATIENT
Start: 2025-07-30 | End: 2025-07-30 | Stop reason: HOSPADM

## 2025-07-30 RX ORDER — SODIUM CHLORIDE, SODIUM LACTATE, POTASSIUM CHLORIDE, CALCIUM CHLORIDE 600; 310; 30; 20 MG/100ML; MG/100ML; MG/100ML; MG/100ML
INJECTION, SOLUTION INTRAVENOUS CONTINUOUS
Status: DISCONTINUED | OUTPATIENT
Start: 2025-07-30 | End: 2025-07-31 | Stop reason: HOSPADM

## 2025-07-30 RX ADMIN — LIDOCAINE HYDROCHLORIDE 100 MG: 20 INJECTION, SOLUTION INFILTRATION; PERINEURAL at 11:55

## 2025-07-30 RX ADMIN — FENTANYL CITRATE 50 MCG: 50 INJECTION, SOLUTION INTRAMUSCULAR; INTRAVENOUS at 10:35

## 2025-07-30 RX ADMIN — PROPOFOL 200 MG: 10 INJECTION, EMULSION INTRAVENOUS at 11:55

## 2025-07-30 RX ADMIN — GLYCOPYRROLATE 0.2 MG: 0.2 INJECTION, SOLUTION INTRAMUSCULAR; INTRAVENOUS at 11:47

## 2025-07-30 RX ADMIN — Medication 150 MCG: at 12:18

## 2025-07-30 RX ADMIN — SODIUM CHLORIDE, SODIUM LACTATE, POTASSIUM CHLORIDE, CALCIUM CHLORIDE: 600; 310; 30; 20 INJECTION, SOLUTION INTRAVENOUS at 10:05

## 2025-07-30 RX ADMIN — DEXAMETHASONE SODIUM PHOSPHATE 4 MG: 4 INJECTION, SOLUTION INTRA-ARTICULAR; INTRALESIONAL; INTRAMUSCULAR; INTRAVENOUS; SOFT TISSUE at 11:59

## 2025-07-30 RX ADMIN — FENTANYL CITRATE 50 MCG: 50 INJECTION, SOLUTION INTRAMUSCULAR; INTRAVENOUS at 12:03

## 2025-07-30 RX ADMIN — KETOROLAC TROMETHAMINE 15 MG: 30 INJECTION, SOLUTION INTRAMUSCULAR; INTRAVENOUS at 12:21

## 2025-07-30 RX ADMIN — FENTANYL CITRATE 50 MCG: 50 INJECTION, SOLUTION INTRAMUSCULAR; INTRAVENOUS at 11:55

## 2025-07-30 RX ADMIN — PROPOFOL 200 MCG/KG/MIN: 10 INJECTION, EMULSION INTRAVENOUS at 11:55

## 2025-07-30 RX ADMIN — CEFAZOLIN SODIUM 2 G: 2 SOLUTION INTRAVENOUS at 11:46

## 2025-07-30 NOTE — BRIEF OP NOTE
[facility]    Brief Operative Note    Pre-operative diagnosis: Pain in joint, ankle and foot, left [M25.572]  Post-operative diagnosis Same as pre-operative diagnosis    Procedure: Interphalangeal joint fusion of the great toe, Left - Foot    Surgeon: Surgeons and Role:     * Jeevan Gray MD - Primary  Anesthesia: Choice   Estimated Blood Loss: Less than 50 ml    Drains: None  Specimens: * No specimens in log *  Findings:   None.  Complications: None.  Implants:   Implant Name Type Inv. Item Serial No.  Lot No. LRB No. Used Action   IMP SCREW CANC HEX RECESS [id number]  -453-01 - [id number] [employer]/[place] IMP SCREW CANC HEX RECESS [id number]  -830-01  [employer]  Left 1 Implanted         Plan:  Same Day surgery discharge to home once criteria met.  CAM boot to remain on left  lower extremity and WBAT.  Norco for pain.  No dressing change on own.  Leave dressing on until 2 weeks follow up appointment.  F/U in clinic in 2 weeks    I was asked by Dr. Gray to assist with surgery. I positioned and prepped the patient. I retracted soft tissue.   I suctioned fluids when needed. I provided traction for dissection. I helped to ligate blood vessels. I helped Dr. Gray identify and protect important structures. The procedure was medically necessary for an assistant because Dr. Gray needed the operative exposure and assistance that I provided. This allowed him to safely and efficiently operate. It was also important that I help ligate blood vessels to maintain hemostasis and reduce the bleeding risk. I helped with the closure of the operative incisions as well as helping with the boot/cast/splint.  The assistance that I provided reduced operative time which meant less general anesthetic for the patient. No qualified residents were available to assist.    Sj Miranda PA-C

## 2025-07-30 NOTE — DISCHARGE INSTRUCTIONS
[facility]  Home Care Following Anesthesia  For 24 hours after surgery:  Get plenty of rest.  A responsible adult must stay with you for at least 24 hours after you leave the surgery center.  Do not drive or use heavy equipment.  If you have weakness or tingling, don't drive or use heavy equipment until this feeling goes away.   Do not drink alcohol.   Avoid strenuous or risky activities.  Ask for help when climbing stairs.  You may feel lightheaded.  IF so, sit for a few minutes before standing.  Have someone help you get up.   If you have nausea (feel sick to your stomach): Drink only clear liquids such as apple juice, ginger ale, broth or 7-Up.  Rest may also help.  Be sure to drink enough fluids.  Move to a regular diet as you feel able.   You may have a slight fever.  Call the doctor if your fever is over 100 F (37.7 C) (taken under the tongue) or lasts longer than 24 hours.  You may have a dry mouth, a sore throat, muscle aches or trouble sleeping. These should go away after 24 hours.  Do not make important or legal decisions.   It is recommended to avoid smoking.      Toradol 15 mg given at  12:20 pm .  Do not take any NSAIDs for 4 hours.  OK after 4:20 PM.  (Ibuprofen, Advil, Motrin, Naproxen, Aleve).           Tips for taking pain medications  To get the best pain relief possible, remember these points:  Take pain medications as directed, before pain becomes severe.  Pain medication can upset your stomach: taking it with food may help.  Constipation is a common side effect of pain medication. Drink plenty of  fluids.  Eat foods high in fiber. Take a stool softener if recommended by your doctor or pharmacist.  Do not drink alcohol, drive or operate machinery while taking pain medications.  Ask about other ways to control pain, such as with heat, ice or relaxation.    Tylenol/Acetaminophen Consumption    If you feel your pain relief is insufficient, you may take  Tylenol/Acetaminophen in addition to your narcotic pain medication.   Be careful not to exceed 4,000 mg of Tylenol/Acetaminophen in a 24 hour period from all sources.  If you are taking extra strength Tylenol/acetaminophen (500 mg), the maximum dose is 8 tablets in 24 hours.  If you are taking regular strength acetaminophen (325 mg), the maximum dose is 12 tablets in 24 hours.    Call a doctor for any of the following:  Signs of infection (fever, growing tenderness at the surgery site, a large amount of drainage or bleeding, severe pain, foul-smelling drainage, redness, swelling).  It has been over 8 to 10 hours since surgery and you are still not able to urinate (pass water).  Headache for over 24 hours.  Numbness, tingling or weakness the day after surgery (if you had spinal anesthesia).  Signs of Covid-19 infection (temperature over 100 degrees, shortness of breath, cough, loss of taste/smell, generalized body aches, persistent headache, chills, sore throat, nausea/vomiting/diarrhea)    Your doctor is:       Dr. Jeevan Gray, Orthopaedics: [phone]               After hours and weekends call the hospital @ [phone] and ask for the resident on call for:  Orthopaedics  For emergency care, call the:  [facility] Emergency Department: [phone] (TTY for hearing impaired: [phone])

## 2025-07-30 NOTE — OR NURSING
Patient received left side Adductor and popliteal nerve block  with Exparel.  Fentanyl 50mcg and Versed 1mg given. Tolerated procedure well.

## 2025-07-30 NOTE — ANESTHESIA PROCEDURE NOTES
"Sciatic Procedure Note    Pre-Procedure   Staff -        Anesthesiologist:  Eleonora Torrez MD       Performed By: anesthesiologist       Location: pre-op       Pre-Anesthestic Checklist: patient identified, IV checked, site marked, risks and benefits discussed, informed consent, monitors and equipment checked, pre-op evaluation, at physician/surgeon's request and post-op pain management  Timeout:       Correct Patient: Yes        Correct Procedure: Yes        Correct Site: Yes        Correct Position: Yes        Correct Laterality: Yes        Site Marked: Yes  Procedure Documentation  Procedure: Sciatic         Laterality: left       Patient Position: supine       Skin prep: Chloraprep (popliteal approach).       Ultrasound guided       1. Ultrasound was used to identify targeted nerve, plexus, vascular marker, or fascial plane and place a needle adjacent to it in real-time.       2. Ultrasound was used to visualize the spread of anesthetic in close proximity to the above referenced structure.       3. A permanent image is entered into the patient's record.    Assessment/Narrative         The placement was negative for: blood aspirated, painful injection and site bleeding       Paresthesias: No.       Bolus given via needle..        Secured via.        Insertion/Infusion Method: Single Shot    Medication(s) Administered   Bupivacaine 0.5% w/ 1:400K Epi (Injection) - Injection   20 mL - 7/30/2025 10:50:00 AM  Bupivacaine liposome (Exparel) 1.3% LA inj susp (Infiltration) - Infiltration   10 mL - 7/30/2025 10:50:00 AM   Comments:  133 mg of exparel      FOR [facility] ([facility]) ONLY:   Pain Team Contact information: please page the Pain Team Via [employer]. Search \"Pain\". During daytime hours, please page the attending first. At night please page the resident first.      "

## 2025-07-30 NOTE — ANESTHESIA CARE TRANSFER NOTE
Patient: Sherry Slaughter    Procedure: Procedure(s):  Interphalangeal joint fusion of the great toe       Diagnosis: Pain in joint, ankle and foot, left [M25.572]  Diagnosis Additional Information: No value filed.    Anesthesia Type:   General     Note:    Oropharynx: oropharynx clear of all foreign objects and spontaneously breathing  Level of Consciousness: awake and drowsy  Oxygen Supplementation: nasal cannula  Level of Supplemental Oxygen (L/min / FiO2): 2  Independent Airway: airway patency satisfactory and stable  Dentition: dentition unchanged  Vital Signs Stable: post-procedure vital signs reviewed and stable  Report to RN Given: handoff report given  Patient transferred to: PACU    Handoff Report: Identifed the Patient, Identified the Reponsible Provider, Reviewed the pertinent medical history, Discussed the surgical course, Reviewed Intra-OP anesthesia mangement and issues during anesthesia, Set expectations for post-procedure period and Allowed opportunity for questions and acknowledgement of understanding      Vitals:  Vitals Value Taken Time   /102 07/30/25 12:34   Temp 35.9  C (96.62  F) 07/30/25 12:35   Pulse 59 07/30/25 12:35   Resp 33 07/30/25 12:35   SpO2 90 % 07/30/25 12:35   Vitals shown include unfiled device data.    Electronically Signed By: JIM Kyle CRNA  July 30, 2025  12:36 PM

## 2025-07-30 NOTE — ANESTHESIA PROCEDURE NOTES
Airway    Staff -        Performed By: CRNAIndications and Patient Condition       Indications for airway management: arthur-procedural       Induction type:intravenous       Mask difficulty assessment: 2 - vent by mask + OA or adjuvant +/- NMBA    Final Airway Details       Final airway type: supraglottic airway    Supraglottic Airway Details        Type: LMA       Brand: I-Gel       LMA size: 4    Post intubation assessment        Placement verified by: capnometry and equal breath sounds        Number of attempts at approach: 1       Number of other approaches attempted: 0       Secured with: tape       Ease of procedure: easy       Dentition: Intact and Unchanged

## 2025-07-30 NOTE — ANESTHESIA PROCEDURE NOTES
"Adductor canal Procedure Note    Pre-Procedure   Staff -        Anesthesiologist:  Eleonora Torrez MD       Performed By: anesthesiologist       Location: OR       Pre-Anesthestic Checklist: patient identified, IV checked, site marked, risks and benefits discussed, informed consent, monitors and equipment checked, pre-op evaluation, at physician/surgeon's request and post-op pain management  Timeout:       Correct Patient: Yes        Correct Procedure: Yes        Correct Site: Yes        Correct Position: Yes        Correct Laterality: Yes        Site Marked: Yes  Procedure Documentation  Procedure: Adductor canal         Laterality: left       Patient Position: supine       Skin prep: Chloraprep       Needle Gauge: 21.        Needle Length (millimeters): 100        Ultrasound guided       1. Ultrasound was used to identify targeted nerve, plexus, vascular marker, or fascial plane and place a needle adjacent to it in real-time.       2. Ultrasound was used to visualize the spread of anesthetic in close proximity to the above referenced structure.       3. A permanent image is entered into the patient's record.    Assessment/Narrative         The placement was negative for: blood aspirated, painful injection and site bleeding       Paresthesias: No.       Bolus given via needle. no blood aspirated via catheter.        Secured via.        Insertion/Infusion Method: Single Shot       Complications: none    Medication(s) Administered   Bupivacaine 0.5% w/ 1:400K Epi (Injection) - Injection   10 mL - 7/30/2025 10:50:00 AM  Bupivacaine liposome (Exparel) 1.3% LA inj susp (Infiltration) - Infiltration   10 mL - 7/30/2025 10:50:00 AM   Comments:  133 mg of exparel      FOR [facility] ([facility] [place]) ONLY:   Pain Team Contact information: please page the Pain Team Via [employer]. Search \"Pain\". During daytime hours, please page the attending first. At night please page the resident first.      "

## 2025-07-30 NOTE — ANESTHESIA POSTPROCEDURE EVALUATION
Patient: Sherry Slaughter    Procedure: Procedure(s):  Interphalangeal joint fusion of the great toe       Anesthesia Type:  General    Note:  Disposition: Outpatient   Postop Pain Control: Uneventful            Sign Out: Well controlled pain   PONV: No   Neuro/Psych: Uneventful            Sign Out: Acceptable/Baseline neuro status   Airway/Respiratory: Uneventful            Sign Out: Acceptable/Baseline resp. status   CV/Hemodynamics: Uneventful            Sign Out: Acceptable CV status; No obvious hypovolemia; No obvious fluid overload   Other NRE: NONE   DID A NON-ROUTINE EVENT OCCUR? No       Last vitals:  Vitals Value Taken Time   /61 07/30/25 13:30   Temp 35.3  C (95.54  F) 07/30/25 13:30   Pulse 53 07/30/25 13:31   Resp 35 07/30/25 13:31   SpO2 99 % 07/30/25 13:31   Vitals shown include unfiled device data.    Electronically Signed By: Eleonora Torrez MD  July 30, 2025  2:14 PM

## 2025-07-31 NOTE — OP NOTE
Date of surgery: 7/30/2025      Preoperative diagnosis: Left great toe valgus deviation at the level of the interphalangeal joint     Postoperative diagnosis:  Left great toe valgus deviation at the level of the interphalangeal joint     Procedure: Left great toe interphalangeal joint arthrodesis with varus producing correction     Surgeons: Jeevan Gray MD     Assistants: Nathanael Miranda PA-C     Complications: None     Drains: None     EBL: Less than 20 cc     Anesthesia: General Endotracheal     Indications: Please refer to clinic note for further details     Procedure note: On 7/30/2025 patient was taken to surgery.  Preoperative antibiotics were administered to the patient prior to arrival to the OR     After successful induction of general endotracheal anesthesia she  was placed supine on the table.  The left lower extremity was prepped and draped in sterile fashion.  After exsanguination by gravity, tourniquet cuff was inflated to 300 mmHg on the proximal third of the left thigh.      The pause for the cause was performed according to our institutional policy which confirmed laterality of the procedure.     An incision was made along the dorsal aspect of the interphalangeal joint for the left great toe.  Subcutaneous's were dissected.  The joint was exposed and will proceed with a resection of the cartilage for the condyles of the proximal phalanx and the base of the distal phalanx.  We proceeded with a resection of the proximal phalanx in order to proceed with a correction and a valgus producing an arthrodesis.    This was followed by placement of a single screw from distal to proximal across the canal for both phalanxes with excellent purchase.    We confirmed the fluoroscopic examination to have excellent reduction of the toe with excellent alignment and location of the hardware.  These images were sent to PACS for definitive documentation.     Tourniquet was deflated.  Satisfactory hemostasis was  accomplished.  Wound was closed in layers.  Sterile dressings were applied.  Patient was placed in short cam walker and transferred in stable condition to PACU.      Plan: Patient will remain weightbearing as tolerated with use of the cam walker at all times except for hygiene for the first 6 weeks from surgery.  Patient will return to clinic at 2 weeks and surgery for suture removal if indicated.  Patient will return to clinic at 6 weeks from surgery for 3 views of the left foot.    Patient will not pursue any physical therapy for the first 6 weeks from surgery.    Jeevan Gray MD

## 2025-08-14 ENCOUNTER — OFFICE VISIT (OUTPATIENT)
Dept: ORTHOPEDICS | Facility: CLINIC | Age: 70
End: 2025-08-14
Payer: COMMERCIAL

## 2025-08-14 DIAGNOSIS — Z79.4 TYPE 2 DIABETES MELLITUS TREATED WITH INSULIN (H): ICD-10-CM

## 2025-08-14 DIAGNOSIS — E11.9 TYPE 2 DIABETES MELLITUS TREATED WITH INSULIN (H): ICD-10-CM

## 2025-08-14 DIAGNOSIS — Z98.890 STATUS POST FOOT SURGERY: Primary | ICD-10-CM

## 2025-08-16 RX ORDER — TIRZEPATIDE 15 MG/.5ML
15 INJECTION, SOLUTION SUBCUTANEOUS WEEKLY
Qty: 6 ML | Refills: 3 | Status: SHIPPED | OUTPATIENT
Start: 2025-08-16

## 2025-08-21 ENCOUNTER — LAB (OUTPATIENT)
Dept: LAB | Facility: CLINIC | Age: 70
End: 2025-08-21
Payer: COMMERCIAL

## 2025-08-21 ENCOUNTER — OFFICE VISIT (OUTPATIENT)
Dept: CARDIOLOGY | Facility: CLINIC | Age: 70
End: 2025-08-21
Attending: INTERNAL MEDICINE
Payer: COMMERCIAL

## 2025-08-21 VITALS
OXYGEN SATURATION: 99 % | DIASTOLIC BLOOD PRESSURE: 65 MMHG | SYSTOLIC BLOOD PRESSURE: 122 MMHG | HEART RATE: 71 BPM | WEIGHT: 240 LBS | HEIGHT: 68 IN | BODY MASS INDEX: 36.37 KG/M2

## 2025-08-21 DIAGNOSIS — Z79.899 ON AMIODARONE THERAPY: ICD-10-CM

## 2025-08-21 DIAGNOSIS — I50.9 CONGESTIVE HEART FAILURE, UNSPECIFIED HF CHRONICITY, UNSPECIFIED HEART FAILURE TYPE (H): Primary | ICD-10-CM

## 2025-08-21 DIAGNOSIS — I10 BENIGN ESSENTIAL HYPERTENSION: ICD-10-CM

## 2025-08-21 DIAGNOSIS — I50.9 CONGESTIVE HEART FAILURE, UNSPECIFIED HF CHRONICITY, UNSPECIFIED HEART FAILURE TYPE (H): ICD-10-CM

## 2025-08-21 DIAGNOSIS — Z51.81 ENCOUNTER FOR MONITORING AMIODARONE THERAPY: ICD-10-CM

## 2025-08-21 DIAGNOSIS — E78.5 HYPERLIPIDEMIA LDL GOAL <100: ICD-10-CM

## 2025-08-21 DIAGNOSIS — R06.02 SOB (SHORTNESS OF BREATH): ICD-10-CM

## 2025-08-21 DIAGNOSIS — Z79.899 ENCOUNTER FOR MONITORING AMIODARONE THERAPY: ICD-10-CM

## 2025-08-21 DIAGNOSIS — I48.0 PAROXYSMAL ATRIAL FIBRILLATION (H): ICD-10-CM

## 2025-08-21 DIAGNOSIS — I50.30 HEART FAILURE WITH PRESERVED EJECTION FRACTION, NYHA CLASS I (H): Primary | ICD-10-CM

## 2025-08-21 DIAGNOSIS — N18.32 CHRONIC KIDNEY DISEASE, STAGE 3B (H): ICD-10-CM

## 2025-08-21 DIAGNOSIS — I50.30 HEART FAILURE WITH PRESERVED EJECTION FRACTION, NYHA CLASS I (H): ICD-10-CM

## 2025-08-21 DIAGNOSIS — I10 ESSENTIAL HYPERTENSION WITH GOAL BLOOD PRESSURE LESS THAN 140/90: ICD-10-CM

## 2025-08-21 DIAGNOSIS — G47.33 OSA (OBSTRUCTIVE SLEEP APNEA): ICD-10-CM

## 2025-08-21 LAB
ANION GAP SERPL CALCULATED.3IONS-SCNC: 15 MMOL/L (ref 7–15)
BUN SERPL-MCNC: 23.2 MG/DL (ref 8–23)
CALCIUM SERPL-MCNC: 10.6 MG/DL (ref 8.8–10.4)
CHLORIDE SERPL-SCNC: 106 MMOL/L (ref 98–107)
CHOLEST SERPL-MCNC: 104 MG/DL
CREAT SERPL-MCNC: 1.11 MG/DL (ref 0.51–0.95)
EGFRCR SERPLBLD CKD-EPI 2021: 53 ML/MIN/1.73M2
ERYTHROCYTE [DISTWIDTH] IN BLOOD BY AUTOMATED COUNT: 12.8 % (ref 10–15)
FASTING STATUS PATIENT QL REPORTED: YES
FASTING STATUS PATIENT QL REPORTED: YES
GLUCOSE SERPL-MCNC: 99 MG/DL (ref 70–99)
HCO3 SERPL-SCNC: 19 MMOL/L (ref 22–29)
HCT VFR BLD AUTO: 39.5 % (ref 35–47)
HDLC SERPL-MCNC: 51 MG/DL
HGB BLD-MCNC: 13.3 G/DL (ref 11.7–15.7)
LDLC SERPL CALC-MCNC: 37 MG/DL
MCH RBC QN AUTO: 33.3 PG (ref 26.5–33)
MCHC RBC AUTO-ENTMCNC: 33.7 G/DL (ref 31.5–36.5)
MCV RBC AUTO: 98.8 FL (ref 78–100)
NONHDLC SERPL-MCNC: 53 MG/DL
PLATELET # BLD AUTO: 210 10E3/UL (ref 150–450)
POTASSIUM SERPL-SCNC: 4.9 MMOL/L (ref 3.4–5.3)
RBC # BLD AUTO: 4 10E6/UL (ref 3.8–5.2)
SODIUM SERPL-SCNC: 140 MMOL/L (ref 135–145)
TRIGL SERPL-MCNC: 82 MG/DL
WBC # BLD AUTO: 7.43 10E3/UL (ref 4–11)

## 2025-08-21 RX ORDER — ROSUVASTATIN CALCIUM 10 MG/1
10 TABLET, COATED ORAL AT BEDTIME
Qty: 90 TABLET | Refills: 3 | Status: SHIPPED | OUTPATIENT
Start: 2025-08-21

## 2025-08-21 RX ORDER — SPIRONOLACTONE 25 MG/1
25 TABLET ORAL EVERY MORNING
Qty: 90 TABLET | Refills: 3 | Status: SHIPPED | OUTPATIENT
Start: 2025-08-21

## 2025-08-21 RX ORDER — LISINOPRIL 20 MG/1
20 TABLET ORAL EVERY MORNING
Qty: 90 TABLET | Refills: 3 | Status: SHIPPED | OUTPATIENT
Start: 2025-08-21

## 2025-08-21 RX ORDER — DILTIAZEM HYDROCHLORIDE 360 MG/1
360 CAPSULE, EXTENDED RELEASE ORAL DAILY
Qty: 90 CAPSULE | Refills: 3 | Status: SHIPPED | OUTPATIENT
Start: 2025-08-21

## 2025-09-02 DIAGNOSIS — M79.672 LEFT FOOT PAIN: ICD-10-CM

## 2025-09-02 DIAGNOSIS — Z98.890 STATUS POST FOOT SURGERY: Primary | ICD-10-CM

## (undated) DEVICE — EYE SOL BSS 500ML

## (undated) DEVICE — DRSG KERLIX 4 1/2"X4YDS ROLL 6715

## (undated) DEVICE — GOWN XLG DISP 9545

## (undated) DEVICE — GLOVE BIOGEL PI MICRO INDICATOR UNDERGLOVE SZ 8.0 48980

## (undated) DEVICE — DRILL BIT QUICK CONNECT ZIM 2.5X110MM

## (undated) DEVICE — SUCTION MANIFOLD NEPTUNE 2 SYS 1 PORT 702-025-000

## (undated) DEVICE — DRAPE C-ARM MINI 5423

## (undated) DEVICE — SYR 10ML LL W/O NDL

## (undated) DEVICE — NDL 19GA 1.5"

## (undated) DEVICE — GLOVE PROTEXIS MICRO 7.0  2D73PM70

## (undated) DEVICE — EYE TIP IRRIGATION & ASPIRATION POLYMER 35D BENT 8065751511

## (undated) DEVICE — Device

## (undated) DEVICE — PACK CATARACT CUSTOM SO DALE SEY32CTFCX

## (undated) DEVICE — SOL NACL 0.9% IRRIG 500ML BOTTLE 2F7123

## (undated) DEVICE — PACK LOWER EXTREMITY CUSTOM ASC

## (undated) DEVICE — EYE PACK CUSTOM ANTERIOR 30DEG TIP CENTURION PPK6682-04

## (undated) DEVICE — GOWN IMPERVIOUS BREATHABLE 2XL/XLONG

## (undated) DEVICE — DRSG STERI STRIP 1/4X3" R1541

## (undated) DEVICE — GLOVE PROTEXIS MICRO 6.0  2D73PM60

## (undated) DEVICE — PACK EXTREMITY SOP15EXFSD

## (undated) DEVICE — ESU PENCIL SMOKE EVAC W/ROCKER SWITCH 0703-047-000

## (undated) DEVICE — SOL WATER IRRIG 1000ML BOTTLE 2F7114

## (undated) DEVICE — GLOVE BIOGEL PI ULTRATOUCH G SZ 7.5 42175

## (undated) DEVICE — SU VICRYL 2-0 CT-2 27" UND J269H

## (undated) DEVICE — SOL WATER IRRIG 1000ML BOTTLE 07139-09

## (undated) DEVICE — DRAPE STERI TOWEL SM 1000

## (undated) DEVICE — LINEN ORTHO PACK 5446

## (undated) DEVICE — EYE SHIELD PLASTIC

## (undated) DEVICE — EYE KNIFE SLIT XSTAR VISITEC 2.4MM 45DEG BEVEL UP 373724

## (undated) DEVICE — SU VICRYL 4-0 PS-2 18" UND J496H

## (undated) DEVICE — LINEN TOWEL PACK X5 5464

## (undated) DEVICE — EYE SOL BSS 15ML BOTTLE 65079515

## (undated) DEVICE — BNDG KLING 3" 2232

## (undated) DEVICE — TOURNIQUET CUFF 30" REPRO BLUE 60-7070-105

## (undated) DEVICE — KIT ENDO TURNOVER/PROCEDURE CARRY-ON 101822

## (undated) DEVICE — NDL 25GA 1.5" 305127

## (undated) DEVICE — GLOVE PROTEXIS MICRO 8.0  2D73PM80

## (undated) DEVICE — PREP CHLORAPREP 26ML TINTED HI-LITE ORANGE 930815

## (undated) DEVICE — SU ETHILON 3-0 PS-1 18" 1663H

## (undated) DEVICE — SU ETHILON 4-0 FS-2 18" 662H

## (undated) DEVICE — GLOVE PROTEXIS W/NEU-THERA 8.5  2D73TE85

## (undated) DEVICE — PACK LOWER EXTREMITY RIVERSIDE SOP32LEFSX

## (undated) DEVICE — PREP CHLORAPREP 26ML TINTED ORANGE  260815

## (undated) DEVICE — SU VICRYL 3-0 RB-1 27" UND J215H

## (undated) DEVICE — IMM LEG ELEVATOR 79-90191

## (undated) DEVICE — EYE PACK BVI READYPAK KIT #1

## (undated) DEVICE — TOURNIQUET SGL  BLADDER 30"X4" BLUE 5921030135

## (undated) DEVICE — GLOVE PROTEXIS BLUE W/NEU-THERA 8.0  2D73EB80

## (undated) DEVICE — STRAP KNEE/BODY 31143004

## (undated) DEVICE — DRSG GAUZE 4X4" TRAY 6939

## (undated) DEVICE — ESU GROUND PAD ADULT W/CORD E7507

## (undated) DEVICE — PIN GUARD 10-1-001 101001PBX

## (undated) DEVICE — TUBING SUCTION 6"X3/16" N56A

## (undated) DEVICE — SOL NACL 0.9% IRRIG 1000ML BOTTLE 2F7124

## (undated) DEVICE — BLADE SAW OSCILLATING STRYK MED 9.0X25X0.38MM 2296-003-111

## (undated) DEVICE — DRSG XEROFORM 1X8"

## (undated) RX ORDER — FENTANYL CITRATE 50 UG/ML
INJECTION, SOLUTION INTRAMUSCULAR; INTRAVENOUS
Status: DISPENSED
Start: 2020-12-04

## (undated) RX ORDER — FENTANYL CITRATE 50 UG/ML
INJECTION, SOLUTION INTRAMUSCULAR; INTRAVENOUS
Status: DISPENSED
Start: 2025-07-30

## (undated) RX ORDER — LIDOCAINE HYDROCHLORIDE 10 MG/ML
INJECTION, SOLUTION EPIDURAL; INFILTRATION; INTRACAUDAL; PERINEURAL
Status: DISPENSED
Start: 2018-03-15

## (undated) RX ORDER — FENTANYL CITRATE-0.9 % NACL/PF 10 MCG/ML
PLASTIC BAG, INJECTION (ML) INTRAVENOUS
Status: DISPENSED
Start: 2025-07-30

## (undated) RX ORDER — PROPOFOL 10 MG/ML
INJECTION, EMULSION INTRAVENOUS
Status: DISPENSED
Start: 2025-07-30

## (undated) RX ORDER — ACETAMINOPHEN 325 MG/1
TABLET ORAL
Status: DISPENSED
Start: 2021-02-02

## (undated) RX ORDER — OXYCODONE HYDROCHLORIDE 5 MG/1
TABLET ORAL
Status: DISPENSED
Start: 2023-12-27

## (undated) RX ORDER — PROPOFOL 10 MG/ML
INJECTION, EMULSION INTRAVENOUS
Status: DISPENSED
Start: 2021-02-02

## (undated) RX ORDER — BUPIVACAINE HYDROCHLORIDE 5 MG/ML
INJECTION, SOLUTION PERINEURAL
Status: DISPENSED
Start: 2023-12-27

## (undated) RX ORDER — ACETAMINOPHEN 325 MG/1
TABLET ORAL
Status: DISPENSED
Start: 2025-07-30

## (undated) RX ORDER — DEXTROSE MONOHYDRATE 25 G/50ML
INJECTION, SOLUTION INTRAVENOUS
Status: DISPENSED
Start: 2018-03-15

## (undated) RX ORDER — ONDANSETRON 2 MG/ML
INJECTION INTRAMUSCULAR; INTRAVENOUS
Status: DISPENSED
Start: 2018-03-15

## (undated) RX ORDER — FENTANYL CITRATE 50 UG/ML
INJECTION, SOLUTION INTRAMUSCULAR; INTRAVENOUS
Status: DISPENSED
Start: 2023-12-27

## (undated) RX ORDER — DEXAMETHASONE SODIUM PHOSPHATE 4 MG/ML
INJECTION, SOLUTION INTRA-ARTICULAR; INTRALESIONAL; INTRAMUSCULAR; INTRAVENOUS; SOFT TISSUE
Status: DISPENSED
Start: 2025-07-30

## (undated) RX ORDER — CEFAZOLIN SODIUM 2 G/50ML
SOLUTION INTRAVENOUS
Status: DISPENSED
Start: 2025-07-30

## (undated) RX ORDER — SIMETHICONE 40MG/0.6ML
SUSPENSION, DROPS(FINAL DOSAGE FORM)(ML) ORAL
Status: DISPENSED
Start: 2019-06-19

## (undated) RX ORDER — FENTANYL CITRATE 50 UG/ML
INJECTION, SOLUTION INTRAMUSCULAR; INTRAVENOUS
Status: DISPENSED
Start: 2021-02-02

## (undated) RX ORDER — TETRACAINE HYDROCHLORIDE 5 MG/ML
SOLUTION OPHTHALMIC
Status: DISPENSED
Start: 2018-03-15

## (undated) RX ORDER — ONDANSETRON 2 MG/ML
INJECTION INTRAMUSCULAR; INTRAVENOUS
Status: DISPENSED
Start: 2025-07-30

## (undated) RX ORDER — ACETAMINOPHEN 325 MG/1
TABLET ORAL
Status: DISPENSED
Start: 2023-12-27

## (undated) RX ORDER — CEFAZOLIN SODIUM/WATER 3 G/30 ML
SYRINGE (ML) INTRAVENOUS
Status: DISPENSED
Start: 2023-12-27

## (undated) RX ORDER — EPHEDRINE SULFATE 50 MG/ML
INJECTION, SOLUTION INTRAMUSCULAR; INTRAVENOUS; SUBCUTANEOUS
Status: DISPENSED
Start: 2021-02-02

## (undated) RX ORDER — LIDOCAINE HYDROCHLORIDE 20 MG/ML
SOLUTION OROPHARYNGEAL
Status: DISPENSED
Start: 2020-12-04

## (undated) RX ORDER — FENTANYL CITRATE 50 UG/ML
INJECTION, SOLUTION INTRAMUSCULAR; INTRAVENOUS
Status: DISPENSED
Start: 2019-06-19